# Patient Record
Sex: FEMALE | Race: WHITE | NOT HISPANIC OR LATINO | Employment: OTHER | URBAN - METROPOLITAN AREA
[De-identification: names, ages, dates, MRNs, and addresses within clinical notes are randomized per-mention and may not be internally consistent; named-entity substitution may affect disease eponyms.]

---

## 2017-01-17 DIAGNOSIS — K58.9 IRRITABLE BOWEL SYNDROME WITHOUT DIARRHEA: ICD-10-CM

## 2017-01-17 RX ORDER — DICYCLOMINE HYDROCHLORIDE 10 MG/1
CAPSULE ORAL
Qty: 360 CAPSULE | Refills: 1 | Status: SHIPPED | OUTPATIENT
Start: 2017-01-17 | End: 2018-11-30

## 2017-03-10 RX ORDER — ALBUTEROL SULFATE 90 UG/1
AEROSOL, METERED RESPIRATORY (INHALATION)
Qty: 18 INHALER | Refills: 5 | Status: SHIPPED | OUTPATIENT
Start: 2017-03-10 | End: 2017-09-27 | Stop reason: SDUPTHER

## 2017-03-10 NOTE — TELEPHONE ENCOUNTER
----- Message from Dahlia Wise sent at 3/10/2017 12:26 PM CST -----  Contact: self  Pt needs VENTOLIN HFA 90 mcg/actuation inhaler refilled. Please call [phone]. Thanks

## 2017-03-13 ENCOUNTER — OFFICE VISIT (OUTPATIENT)
Dept: NEUROLOGY | Facility: CLINIC | Age: 67
End: 2017-03-13
Payer: MEDICARE

## 2017-03-13 VITALS
BODY MASS INDEX: 26.01 KG/M2 | WEIGHT: 141.31 LBS | HEART RATE: 115 BPM | DIASTOLIC BLOOD PRESSURE: 93 MMHG | SYSTOLIC BLOOD PRESSURE: 156 MMHG | HEIGHT: 62 IN

## 2017-03-13 DIAGNOSIS — G25.0 ESSENTIAL TREMOR: ICD-10-CM

## 2017-03-13 PROCEDURE — 99999 PR PBB SHADOW E&M-EST. PATIENT-LVL III: CPT | Mod: PBBFAC,,, | Performed by: NEUROLOGICAL SURGERY

## 2017-03-13 PROCEDURE — 99214 OFFICE O/P EST MOD 30 MIN: CPT | Mod: S$GLB,,, | Performed by: NEUROLOGICAL SURGERY

## 2017-03-13 PROCEDURE — 1160F RVW MEDS BY RX/DR IN RCRD: CPT | Mod: S$GLB,,, | Performed by: NEUROLOGICAL SURGERY

## 2017-03-13 PROCEDURE — 3077F SYST BP >= 140 MM HG: CPT | Mod: S$GLB,,, | Performed by: NEUROLOGICAL SURGERY

## 2017-03-13 PROCEDURE — 3080F DIAST BP >= 90 MM HG: CPT | Mod: S$GLB,,, | Performed by: NEUROLOGICAL SURGERY

## 2017-03-13 NOTE — PROGRESS NOTES
"Chief Complaint   Patient presents with    Follow-up     Rebecca Boyd Susan Webb is a 66 y.o. female with a history of multiple medical diagnoses as listed below that presents for evaluation of tremor. She says that she has been having a tremor in her head for the last 5 years but feel that in the last year it has began to get worse so she was told that she should get it evaluated. She has a grandparent that had PD and she suspects that her father had PD though her was never diagnosed. She does not have any other family members that have PD. She has not had any shaking in her legs and feels that it has been present only very minimally in her hands. She has not had any difficulty with her ADLs due to the tremor but she does feel that she has been having some social difficulty due to her symptoms. She does not have any problems with managing her household. She does not have any memory impairment. She has been able to walk without problem other than her legs will on occasion buckle and cause her to fall. She has found that stress and anxiety tends to make the symptoms worse. She has not found that anything has made them any better. She does not drink alcohol. On occasion taking her Ativan will make her tremor less prominent.    Interval History  03/13/2017  Since last seen in clinic she has been doing well on Primidone. She says that the medication has helped to reduce the frequency and the intensity of the tremors. She does says that there have been a few bad days where it seems to be more prominent but she does think that it is mainly related to the weather. She has been tacking 50 mg TID for the last several weeks and notices that it makes her sleep which she thinks is beneficial because she was having insomnia. One fall was reported when she got up to tell her  to come back to get her at a later time and she feels her left knee "gave out" on her.     PAST MEDICAL HISTORY:  Past Medical History: "   Diagnosis Date    Allergy     Arthritis     Cataract     Colon polyps     COPD (chronic obstructive pulmonary disease)     Diabetes mellitus type II     Diabetic neuropathy     Fever blister     Glaucoma (increased eye pressure)     Hyperlipidemia     Hypertension     Irritable bowel syndrome     Keloid cicatrix     Osteoporosis     Retained cholelithiasis following cholecystectomy(997.41)     Right patella fracture        PAST SURGICAL HISTORY:  Past Surgical History:   Procedure Laterality Date    CATARACT EXTRACTION W/  INTRAOCULAR LENS IMPLANT Bilateral     CHOLECYSTECTOMY      Laparoscopic    COLONOSCOPY      HERNIA REPAIR      HYSTERECTOMY      KNEE SURGERY Right 3/4/2015    Dr Weller     ovarian tumor      Benign    TONSILLECTOMY, ADENOIDECTOMY         SOCIAL HISTORY:  Social History     Social History    Marital status:      Spouse name: N/A    Number of children: N/A    Years of education: N/A     Occupational History     [employer]     Social History Main Topics    Smoking status: Current Every Day Smoker     Packs/day: 0.50     Years: 40.00     Types: Cigarettes    Smokeless tobacco: Current User    Alcohol use No    Drug use: No    Sexual activity: Not on file     Other Topics Concern    Are You Pregnant Or Think You May Be? No    Breast-Feeding No     Social History Narrative       FAMILY HISTORY:  Family History   Problem Relation Age of Onset    Cancer Mother      Skin    Skin cancer Mother     Glaucoma Mother     Cataracts Mother     Heart disease Father     Diabetes Father     Skin cancer Sister     Melanoma Neg Hx     Psoriasis Neg Hx     Eczema Neg Hx     Lupus Neg Hx     Amblyopia Neg Hx     Blindness Neg Hx     Macular degeneration Neg Hx     Retinal detachment Neg Hx     Strabismus Neg Hx        ALLERGIES AND MEDICATIONS: updated and reviewed.  Review of patient's allergies indicates:   Allergen Reactions     Silicone      Burn skin    Adhesive Rash     Current Outpatient Prescriptions   Medication Sig Dispense Refill    albuterol (VENTOLIN HFA) 90 mcg/actuation inhaler INHALE 2 PUFFS EVERY 4 HOURS AS NEEDED FOR WHEEZING 18 Inhaler 5    alendronate (FOSAMAX) 70 MG tablet TAKE ONE TABLET BY MOUTH EVERY 7 DAYS 12 tablet 1    atorvastatin (LIPITOR) 40 MG tablet Take 1 tablet (40 mg total) by mouth once daily. 90 tablet 1    blood sugar diagnostic (BLOOD GLUCOSE TEST) Strp 1 strip by Misc.(Non-Drug; Combo Route) route once daily. 100 each 11    citalopram (CELEXA) 20 MG tablet Take 1 tablet (20 mg total) by mouth once daily. 90 tablet 1    cyclobenzaprine (FLEXERIL) 5 MG tablet TAKE 1 TABLET BY MOUTH 3 TIMES A DAY AS NEEDED 90 tablet 5    dicyclomine (BENTYL) 10 MG capsule TAKE ONE CAPSULE BY MOUTH 4 TIMES A DAY BEFORE MEALS AND NIGHTLY 360 capsule 1    dorzolamide-timolol 2-0.5% (COSOPT) 22.3-6.8 mg/mL ophthalmic solution INSTILL 1 DROP IN EACH EYE TWO TIMES A DAY 10 mL 6    esomeprazole (NEXIUM) 40 MG capsule Take 1 capsule (40 mg total) by mouth before breakfast. (Patient taking differently: Take 40 mg by mouth before breakfast. ) 90 capsule 1    fluticasone (FLONASE) 50 mcg/actuation nasal spray SPRAY TWICE IN EACH NOSTRIL ONCE DAILY 16 g 5    furosemide (LASIX) 20 MG tablet TAKE 1 TABLET BY MOUTH EVERY MORNING 90 tablet 1    gabapentin (NEURONTIN) 300 MG capsule TAKE ONE CAPSULE BY MOUTH THREE TIMES A  capsule 1    latanoprost 0.005 % ophthalmic solution INSTILL 1 DROP INTO BOTH EYES EVERY EVENING 2.5 mL 6    lisinopril (PRINIVIL,ZESTRIL) 20 MG tablet Take 1 tablet (20 mg total) by mouth once daily. 90 tablet 1    lorazepam (ATIVAN) 1 MG tablet Take 1 tablet (1 mg total) by mouth every 12 (twelve) hours as needed for Anxiety. 60 tablet 2    meloxicam (MOBIC) 15 MG tablet TAKE 1 TABLET BY MOUTH EVERY DAY 90 tablet 1    metformin (GLUCOPHAGE) 1000 MG tablet TAKE 1 TABLET BY MOUTH TWICE A DAY WITH  FOOD 180 tablet 1    omega-3 fatty acids-vitamin E (FISH OIL) 1,000 mg Cap       primidone (MYSOLINE) 50 MG Tab Take 1 tablet (50 mg total) by mouth 3 (three) times daily. 90 tablet 11    repaglinide (PRANDIN) 1 MG tablet TAKE 1 TABLET BY MOUTH 3 TIMES A DAY BEFORE MEALS 270 tablet 1    sitagliptin (JANUVIA) 100 MG Tab Take 1 tablet (100 mg total) by mouth once daily. 90 tablet 1    tiotropium (SPIRIVA WITH HANDIHALER) 18 mcg inhalation capsule Inhale 1 capsule (18 mcg total) into the lungs once daily. 90 capsule 1    tramadol (ULTRAM) 50 mg tablet Take 1 tablet (50 mg total) by mouth every 6 (six) hours as needed for Pain. 30 tablet 0     No current facility-administered medications for this visit.        Review of Systems   Constitutional: Negative for activity change, appetite change, fever and unexpected weight change.   HENT: Negative for trouble swallowing and voice change.    Eyes: Negative for photophobia and visual disturbance.   Respiratory: Negative for apnea and shortness of breath.    Cardiovascular: Negative for chest pain and leg swelling.   Gastrointestinal: Negative for constipation and nausea.   Genitourinary: Negative for difficulty urinating.   Musculoskeletal: Positive for neck pain. Negative for back pain and gait problem.   Skin: Negative for color change and pallor.   Neurological: Positive for tremors. Negative for dizziness, seizures, syncope, weakness and numbness.   Hematological: Negative for adenopathy.   Psychiatric/Behavioral: Negative for agitation, confusion and decreased concentration.       Neurologic Exam     Mental Status   Oriented to person, place, and time.   Registration: recalls 3 of 3 objects.   Attention: normal. Concentration: normal.   Speech: speech is normal   Level of consciousness: alert  Knowledge: good.     Cranial Nerves     CN II   Visual fields full to confrontation.   Right visual field deficit: none  Left visual field deficit: none     CN III, IV, VI    Pupils are equal, round, and reactive to light.  Extraocular motions are normal.   Right pupil: Size: 3 mm. Shape: regular. Accommodation: intact.   Left pupil: Size: 3 mm. Shape: regular. Accommodation: intact.   CN III: no CN III palsy  CN VI: no CN VI palsy  Nystagmus: none   Diplopia: none  Ophthalmoparesis: none  Upgaze: normal  Downgaze: normal  Conjugate gaze: present    CN V   Facial sensation intact.   Right facial sensation deficit: none  Left facial sensation deficit: none    CN VII   Facial expression full, symmetric.   Right facial weakness: none  Left facial weakness: none    CN VIII   CN VIII normal.     CN IX, X   CN IX normal.   CN X normal.   Palate: symmetric    CN XI   CN XI normal.   Right sternocleidomastoid strength: normal  Left sternocleidomastoid strength: normal  Right trapezius strength: normal  Left trapezius strength: normal    CN XII   CN XII normal.   Tongue deviation: none    Motor Exam   Muscle bulk: normal  Overall muscle tone: normal  Right arm tone: normal  Left arm tone: normal  Right leg tone: normal  Left leg tone: normal    Strength   Strength 5/5 throughout.     Sensory Exam   Right arm light touch: normal  Left arm light touch: normal  Right leg light touch: normal  Left leg light touch: normal  Right arm vibration: normal  Left arm vibration: normal  Right leg vibration: normal  Left leg vibration: normal  Right arm proprioception: normal  Left arm proprioception: normal  Right leg proprioception: normal  Left leg proprioception: normal  Right arm pinprick: normal  Left arm pinprick: normal  Right leg pinprick: normal  Left leg pinprick: normal    Gait, Coordination, and Reflexes     Gait  Gait: normal    Coordination   Romberg: negative  Finger to nose coordination: normal  Heel to shin coordination: normal  Tandem walking coordination: normal    Tremor   Resting tremor: absent    Reflexes   Right brachioradialis: 2+  Left brachioradialis: 2+  Right biceps: 2+  Left  "biceps: 2+  Right triceps: 2+  Left triceps: 2+  Right patellar: 2+  Left patellar: 2+  Right achilles: 2+  Left achilles: 2+  Right plantar: normal  Left plantar: normal      Physical Exam   Constitutional: She is oriented to person, place, and time. She appears well-developed and well-nourished.   HENT:   Head: Normocephalic and atraumatic.   Eyes: EOM are normal. Pupils are equal, round, and reactive to light.   Neck: Normal range of motion.   Cardiovascular: Normal rate and intact distal pulses.    Pulmonary/Chest: Effort normal. No apnea. No respiratory distress.   Musculoskeletal: Normal range of motion.   Neurological: She is alert and oriented to person, place, and time. She has normal strength. She has a normal Finger-Nose-Finger Test, a normal Heel to Shin Test, a normal Romberg Test and a normal Tandem Gait Test. Gait normal.   Reflex Scores:       Tricep reflexes are 2+ on the right side and 2+ on the left side.       Bicep reflexes are 2+ on the right side and 2+ on the left side.       Brachioradialis reflexes are 2+ on the right side and 2+ on the left side.       Patellar reflexes are 2+ on the right side and 2+ on the left side.       Achilles reflexes are 2+ on the right side and 2+ on the left side.  Skin: Skin is warm and dry.   Psychiatric: She has a normal mood and affect. Her speech is normal and behavior is normal. Thought content normal.   Vitals reviewed.      Vitals:    03/13/17 0935   BP: (!) 156/93   BP Location: Left arm   Patient Position: Sitting   BP Method: Manual   Pulse: (!) 115   Weight: 64.1 kg (141 lb 5 oz)   Height: 5' 2" (1.575 m)       Assessment & Plan:  Problem List Items Addressed This Visit     Essential tremor    Overview     Tremors have been better overall since starting primidone. She feels that she has had some days when it seems to be better than other. She had a fall after standing and feels her leg gave out on her. No rigidity. No other gait issues. No memory " problems.         Current Assessment & Plan     Continue primidone 50 mg TID. Patient says that the medication makes her sleepy at the current dose, but she feels it is beneficial because she has been having some difficulty with sleep. Discussed increasing the medication if needed.               Cervical disc disease likely contributing to mild symptoms in her hands    Health Maintenance reviewed    Follow-up: Return in about 6 months (around 9/13/2017).  More than 50% of this 25 minute encounter was spent in counseling and coordinating care.

## 2017-03-13 NOTE — ASSESSMENT & PLAN NOTE
Continue primidone 50 mg TID. Patient says that the medication makes her sleepy at the current dose, but she feels it is beneficial because she has been having some difficulty with sleep. Discussed increasing the medication if needed.

## 2017-03-15 ENCOUNTER — LAB VISIT (OUTPATIENT)
Dept: LAB | Facility: HOSPITAL | Age: 67
End: 2017-03-15
Attending: FAMILY MEDICINE
Payer: MEDICARE

## 2017-03-15 ENCOUNTER — OFFICE VISIT (OUTPATIENT)
Dept: FAMILY MEDICINE | Facility: CLINIC | Age: 67
End: 2017-03-15
Payer: MEDICARE

## 2017-03-15 ENCOUNTER — CLINICAL SUPPORT (OUTPATIENT)
Dept: OPHTHALMOLOGY | Facility: CLINIC | Age: 67
End: 2017-03-15
Attending: FAMILY MEDICINE
Payer: MEDICARE

## 2017-03-15 VITALS
HEART RATE: 118 BPM | BODY MASS INDEX: 25.68 KG/M2 | HEIGHT: 62 IN | WEIGHT: 139.56 LBS | OXYGEN SATURATION: 95 % | SYSTOLIC BLOOD PRESSURE: 136 MMHG | TEMPERATURE: 98 F | DIASTOLIC BLOOD PRESSURE: 96 MMHG

## 2017-03-15 DIAGNOSIS — M94.9 DISORDER OF BONE AND ARTICULAR CARTILAGE: ICD-10-CM

## 2017-03-15 DIAGNOSIS — I10 ESSENTIAL HYPERTENSION: Chronic | ICD-10-CM

## 2017-03-15 DIAGNOSIS — H40.1133 PRIMARY OPEN ANGLE GLAUCOMA OF BOTH EYES, SEVERE STAGE: Primary | ICD-10-CM

## 2017-03-15 DIAGNOSIS — G89.29 OTHER CHRONIC PAIN: ICD-10-CM

## 2017-03-15 DIAGNOSIS — Z13.220 SCREENING FOR CHOLESTEROL LEVEL: ICD-10-CM

## 2017-03-15 DIAGNOSIS — J42 CHRONIC BRONCHITIS, UNSPECIFIED CHRONIC BRONCHITIS TYPE: Chronic | ICD-10-CM

## 2017-03-15 DIAGNOSIS — M89.9 DISORDER OF BONE AND ARTICULAR CARTILAGE: ICD-10-CM

## 2017-03-15 LAB
CHOLEST/HDLC SERPL: 4 {RATIO}
HDL/CHOLESTEROL RATIO: 25 %
HDLC SERPL-MCNC: 156 MG/DL
HDLC SERPL-MCNC: 39 MG/DL
LDLC SERPL CALC-MCNC: 51 MG/DL
NONHDLC SERPL-MCNC: 117 MG/DL
TRIGL SERPL-MCNC: 330 MG/DL

## 2017-03-15 PROCEDURE — 3075F SYST BP GE 130 - 139MM HG: CPT | Mod: S$GLB,,, | Performed by: FAMILY MEDICINE

## 2017-03-15 PROCEDURE — 92227 IMG RTA DETCJ/MNTR DS STAFF: CPT | Mod: S$GLB,,, | Performed by: OPHTHALMOLOGY

## 2017-03-15 PROCEDURE — 99999 PR PBB SHADOW E&M-EST. PATIENT-LVL IV: CPT | Mod: PBBFAC,,, | Performed by: FAMILY MEDICINE

## 2017-03-15 PROCEDURE — 3080F DIAST BP >= 90 MM HG: CPT | Mod: S$GLB,,, | Performed by: FAMILY MEDICINE

## 2017-03-15 PROCEDURE — 99214 OFFICE O/P EST MOD 30 MIN: CPT | Mod: S$GLB,,, | Performed by: FAMILY MEDICINE

## 2017-03-15 PROCEDURE — 4010F ACE/ARB THERAPY RXD/TAKEN: CPT | Mod: S$GLB,,, | Performed by: FAMILY MEDICINE

## 2017-03-15 PROCEDURE — 3046F HEMOGLOBIN A1C LEVEL >9.0%: CPT | Mod: S$GLB,,, | Performed by: FAMILY MEDICINE

## 2017-03-15 PROCEDURE — 1160F RVW MEDS BY RX/DR IN RCRD: CPT | Mod: S$GLB,,, | Performed by: FAMILY MEDICINE

## 2017-03-15 RX ORDER — REPAGLINIDE 1 MG/1
1 TABLET ORAL
Qty: 270 TABLET | Refills: 1 | Status: SHIPPED | OUTPATIENT
Start: 2017-03-15 | End: 2017-08-21 | Stop reason: SDUPTHER

## 2017-03-15 RX ORDER — METFORMIN HYDROCHLORIDE 500 MG/1
500 TABLET, EXTENDED RELEASE ORAL 2 TIMES DAILY WITH MEALS
Qty: 360 TABLET | Refills: 3 | Status: SHIPPED | OUTPATIENT
Start: 2017-03-15 | End: 2018-06-07 | Stop reason: SDUPTHER

## 2017-03-15 RX ORDER — CYCLOBENZAPRINE HCL 5 MG
5 TABLET ORAL 3 TIMES DAILY PRN
Qty: 90 TABLET | Refills: 5 | Status: SHIPPED | OUTPATIENT
Start: 2017-03-15 | End: 2018-02-22 | Stop reason: SDUPTHER

## 2017-03-15 RX ORDER — TRAMADOL HYDROCHLORIDE 50 MG/1
50 TABLET ORAL
Qty: 30 TABLET | Refills: 0 | Status: SHIPPED | OUTPATIENT
Start: 2017-03-15 | End: 2017-06-29 | Stop reason: SDUPTHER

## 2017-03-15 RX ORDER — LISINOPRIL 40 MG/1
40 TABLET ORAL DAILY
Qty: 90 TABLET | Refills: 1 | Status: SHIPPED | OUTPATIENT
Start: 2017-03-15 | End: 2017-04-12 | Stop reason: SDUPTHER

## 2017-03-15 NOTE — MR AVS SNAPSHOT
[facility]  [address]  [place] [address]  Phone: [phone]  Fax: [phone]                  Deb Webb   3/15/2017 9:00 AM   Office Visit    Description:  Female : 1950   Provider:  Moiz Goldberg MD   Department:  [facility] - [facility] Medicine           Reason for Visit     Establish Care           Diagnoses this Visit        Comments    Uncontrolled type 2 diabetes mellitus with diabetic neuropathy, without long-term current use of insulin    -  Primary     Disorder of bone and articular cartilage         Screening for cholesterol level         Other chronic pain         Chronic bronchitis, unspecified chronic bronchitis type         Uncontrolled type 2 diabetes mellitus without complication, without long-term current use of insulin         Uncontrolled type 2 diabetes with neuropathy         Essential hypertension                To Do List           Goals (5 Years of Data)              3/15/17    10/3/16    2/3/16    HEMOGLOBIN A1C < 7.0   13.5  10.7  8.8      Follow-Up and Disposition     Return if symptoms worsen or fail to improve.    Follow-up and Disposition History       These Medications        Disp Refills Start End    cyclobenzaprine (FLEXERIL) 5 MG tablet 90 tablet 5 3/15/2017     Take 1 tablet (5 mg total) by mouth 3 (three) times daily as needed. - Oral    Pharmacy: [facility]/pharmacy #[address] [place] [address] [address] [facility] Ph #: [phone]       tramadol (ULTRAM) 50 mg tablet 30 tablet 0 3/15/2017     Take 1 tablet (50 mg total) by mouth every 24 hours as needed for Pain. - Oral    Pharmacy: [facility]/pharmacy #[address] [place] [address] [address] [facility] Ph #: [phone]       metformin (GLUCOPHAGE-XR) 500 MG 24 hr tablet 360 tablet 3 3/15/2017 3/15/2018    Take 1 tablet (500 mg total) by mouth 2 (two) times daily with meals. - Oral    Pharmacy: [facility]/pharmacy #[address] [place] [address] [address] [facility] Ph #: [phone]        umeclidinium 62.5 mcg/actuation DsDv 30 each 5 3/15/2017     Inhale 1 application into the lungs once daily. Controller - Inhalation    Pharmacy: [facility]/pharmacy #[address] [place] [address] Ph #: [phone]       repaglinide (PRANDIN) 1 MG tablet 270 tablet 1 3/15/2017     Take 1 tablet (1 mg total) by mouth 3 (three) times daily before meals. - Oral    Pharmacy: [facility]/pharmacy #[address] [place] [address] Ph #: [phone]       lisinopril (PRINIVIL,ZESTRIL) 40 MG tablet 90 tablet 1 3/15/2017 9/11/2017    Take 1 tablet (40 mg total) by mouth once daily. - Oral    Pharmacy: [facility]/pharmacy #[address] [place] [address] Ph #: [phone]         [facility]     [facility] - 24/7 Assistance  Registered nurses in the [facility] provide clinical advisement, health education, appointment booking, and other advisory services.  Call for this free service at [phone].             Medications           Message regarding Medications     Verify the changes and/or additions to your medication regime listed below are the same as discussed with your clinician today.  If any of these changes or additions are incorrect, please notify your healthcare provider.        START taking these NEW medications        Refills    metformin (GLUCOPHAGE-XR) 500 MG 24 hr tablet 3    Sig: Take 1 tablet (500 mg total) by mouth 2 (two) times daily with meals.    Class: Normal    Route: Oral    umeclidinium 62.5 mcg/actuation DsDv 5    Sig: Inhale 1 application into the lungs once daily. Controller    Class: Normal    Route: Inhalation      CHANGE how you are taking these medications     Start Taking Instead of    cyclobenzaprine (FLEXERIL) 5 MG tablet cyclobenzaprine (FLEXERIL) 5 MG tablet    Dosage:  Take 1 tablet (5 mg total) by mouth 3 (three) times daily as needed. Dosage:  TAKE 1 TABLET BY MOUTH 3 TIMES A DAY AS NEEDED    Reason for Change:  Reorder      tramadol (ULTRAM) 50 mg tablet tramadol (ULTRAM) 50 mg tablet    Dosage:  Take 1 tablet (50 mg total) by mouth every 24 hours as needed for Pain. Dosage:  Take 1 tablet (50 mg total) by mouth every 6 (six) hours as needed for Pain.    Reason for Change:  Reorder     repaglinide (PRANDIN) 1 MG tablet repaglinide (PRANDIN) 1 MG tablet    Dosage:  Take 1 tablet (1 mg total) by mouth 3 (three) times daily before meals. Dosage:  TAKE 1 TABLET BY MOUTH 3 TIMES A DAY BEFORE MEALS    Reason for Change:  Reorder     lisinopril (PRINIVIL,ZESTRIL) 40 MG tablet lisinopril (PRINIVIL,ZESTRIL) 20 MG tablet    Dosage:  Take 1 tablet (40 mg total) by mouth once daily. Dosage:  Take 1 tablet (20 mg total) by mouth once daily.    Reason for Change:  Reorder       STOP taking these medications     metformin (GLUCOPHAGE) 1000 MG tablet TAKE 1 TABLET BY MOUTH TWICE A DAY WITH FOOD    tiotropium (SPIRIVA WITH HANDIHALER) 18 mcg inhalation capsule Inhale 1 capsule (18 mcg total) into the lungs once daily.    sitagliptin (JANUVIA) 100 MG Tab Take 1 tablet (100 mg total) by mouth once daily.           Verify that the below list of medications is an accurate representation of the medications you are currently taking.  If none reported, the list may be blank. If incorrect, please contact your healthcare provider. Carry this list with you in case of emergency.           Current Medications     albuterol (VENTOLIN HFA) 90 mcg/actuation inhaler INHALE 2 PUFFS EVERY 4 HOURS AS NEEDED FOR WHEEZING    alendronate (FOSAMAX) 70 MG tablet TAKE ONE TABLET BY MOUTH EVERY 7 DAYS    atorvastatin (LIPITOR) 40 MG tablet Take 1 tablet (40 mg total) by mouth once daily.    blood sugar diagnostic (BLOOD GLUCOSE TEST) Strp 1 strip by Misc.(Non-Drug; Combo Route) route once daily.    citalopram (CELEXA) 20 MG tablet Take 1 tablet (20 mg total) by mouth once daily.    cyclobenzaprine (FLEXERIL) 5 MG tablet Take 1 tablet (5 mg total) by mouth 3 (three) times  "daily as needed.    dicyclomine (BENTYL) 10 MG capsule TAKE ONE CAPSULE BY MOUTH 4 TIMES A DAY BEFORE MEALS AND NIGHTLY    dorzolamide-timolol 2-0.5% (COSOPT) 22.3-6.8 mg/mL ophthalmic solution INSTILL 1 DROP IN EACH EYE TWO TIMES A DAY    esomeprazole (NEXIUM) 40 MG capsule Take 1 capsule (40 mg total) by mouth before breakfast.    fluticasone (FLONASE) 50 mcg/actuation nasal spray SPRAY TWICE IN EACH NOSTRIL ONCE DAILY    furosemide (LASIX) 20 MG tablet TAKE 1 TABLET BY MOUTH EVERY MORNING    gabapentin (NEURONTIN) 300 MG capsule TAKE ONE CAPSULE BY MOUTH THREE TIMES A DAY    latanoprost 0.005 % ophthalmic solution INSTILL 1 DROP INTO BOTH EYES EVERY EVENING    lisinopril (PRINIVIL,ZESTRIL) 40 MG tablet Take 1 tablet (40 mg total) by mouth once daily.    lorazepam (ATIVAN) 1 MG tablet Take 1 tablet (1 mg total) by mouth every 12 (twelve) hours as needed for Anxiety.    meloxicam (MOBIC) 15 MG tablet TAKE 1 TABLET BY MOUTH EVERY DAY    metformin (GLUCOPHAGE-XR) 500 MG 24 hr tablet Take 1 tablet (500 mg total) by mouth 2 (two) times daily with meals.    omega-3 fatty acids-vitamin E (FISH OIL) 1,000 mg Cap     primidone (MYSOLINE) 50 MG Tab Take 1 tablet (50 mg total) by mouth 3 (three) times daily.    repaglinide (PRANDIN) 1 MG tablet Take 1 tablet (1 mg total) by mouth 3 (three) times daily before meals.    tramadol (ULTRAM) 50 mg tablet Take 1 tablet (50 mg total) by mouth every 24 hours as needed for Pain.    umeclidinium 62.5 mcg/actuation DsDv Inhale 1 application into the lungs once daily. Controller           Clinical Reference Information           Your Vitals Were     BP Pulse Temp Height Weight SpO2    136/96 (BP Location: Left arm, Patient Position: Sitting, BP Method: Manual) 118 97.6 °F (36.4 °C) (Oral) 5' 2" (1.575 m) 63.3 kg (139 lb 8.8 oz) 95%    BMI                25.52 kg/m2          Blood Pressure          Most Recent Value    BP  (!)  136/96      Allergies as of 3/15/2017     Silicone    " Adhesive      Immunizations Administered on Date of Encounter - 3/15/2017     None      Orders Placed During Today's Visit     Future Labs/Procedures Expected by Expires    DXA Bone Density Spine And Hip  3/15/2017 3/15/2018    Hemoglobin A1c  3/15/2017 5/14/2018    Lipid panel  3/15/2017 5/14/2018    Diabetic Eye Screening Photo  As directed 3/15/2018      [facility] Sign-Up     Activating your [facility] account is as easy as 1-2-3!     1) Visit [email or web address], select Sign Up Now, enter this activation code and your date of birth, then select Next.  [email or web address]  Expires: 4/30/2017  9:27 AM      2) Create a username and password to use when you visit [facility] in the future and select a security question in case you lose your password and select Next.    3) Enter your e-mail address and click Sign Up!    Additional Information  If you have questions, please e-mail [email or web address] or call [phone] to talk to our [facility] staff. Remember, [facility] is NOT to be used for urgent needs. For medical emergencies, dial 911.         Smoking Cessation     If you would like to quit smoking:   You may be eligible for free services if you are a [place] resident and started smoking cigarettes before September 1, 1988.  Call the Smoking Cessation Trust (SCT) toll free at (277) 322-8709 or (610) 141-6883.   Call 0-800-QUIT-NOW if you do not meet the above criteria.            Language Assistance Services     ATTENTION: Language assistance services are available, free of charge. Please call [phone].      ATENCIÓN: Si habla español, tiene a jewell disposición servicios gratuitos de asistencia lingüística. Llame al [phone].     CHÚ Ý: N?u b?n nói Ti?ng Vi?t, có các d?ch v? h? tr? ngôn ng? mi?n phí dành cho b?n. G?i s? [phone].         [facility] complies with applicable Federal civil rights laws and does not discriminate on the basis of race, color, national origin, age, disability,  or sex.

## 2017-03-15 NOTE — PROGRESS NOTES
HPI     65 y/o here for screening for diabetic retinopathy with non-dilated   fundus photos per   Dr. Goldberg.       Last edited by Laverne Duffy on 3/15/2017 10:20 AM.         Assessment /Plan     For exam results, see Encounter Report.    Uncontrolled type 2 diabetes mellitus with diabetic neuropathy, without long-term current use of insulin  -     Diabetic Eye Screening Photo      Please see Dr. Johnson progress note for interpretation.

## 2017-03-15 NOTE — PROGRESS NOTES
[facility]  Progress Note    SUBJECTIVE:     Chief Complaint   Patient presents with    Establish Care       HPI   Deb Susan Webb  is a 66 y.o. female here to establish care and for follow up of her chronic conditions, DM, HTN, COPD, HLD. She takes prandal for her diabetes but stopped metformin due to GI side effects and januvia due to cost. She also can't afford her spiriva anymore and would like to try something else.     Review of patient's allergies indicates:   Allergen Reactions    Silicone      Burn skin    Adhesive Rash       Past Medical History:   Diagnosis Date    Allergy     Arthritis     Cataract     Colon polyps     COPD (chronic obstructive pulmonary disease)     Diabetes mellitus type II     Diabetic neuropathy     Fever blister     Glaucoma (increased eye pressure)     Hyperlipidemia     Hypertension     Irritable bowel syndrome     Keloid cicatrix     Osteoporosis     Retained cholelithiasis following cholecystectomy(997.41)     Right patella fracture      Past Surgical History:   Procedure Laterality Date    CATARACT EXTRACTION W/  INTRAOCULAR LENS IMPLANT Bilateral     CHOLECYSTECTOMY      Laparoscopic    COLONOSCOPY      HERNIA REPAIR      HYSTERECTOMY      KNEE SURGERY Right 3/4/2015    Dr Weller     ovarian tumor      Benign    TONSILLECTOMY, ADENOIDECTOMY       Family History   Problem Relation Age of Onset    Cancer Mother      Skin    Skin cancer Mother     Glaucoma Mother     Cataracts Mother     Heart disease Father     Diabetes Father     Skin cancer Sister     Melanoma Neg Hx     Psoriasis Neg Hx     Eczema Neg Hx     Lupus Neg Hx     Amblyopia Neg Hx     Blindness Neg Hx     Macular degeneration Neg Hx     Retinal detachment Neg Hx     Strabismus Neg Hx      Social History   Substance Use Topics    Smoking status: Current Every Day Smoker     Packs/day: 0.50     Years: 40.00     Types: Cigarettes    Smokeless tobacco:  Current User    Alcohol use No        Review of Systems   Constitutional: Negative for chills, fever and malaise/fatigue.   HENT: Negative.    Respiratory: Negative.  Negative for cough and shortness of breath.    Cardiovascular: Negative.  Negative for chest pain.   Gastrointestinal: Negative.  Negative for abdominal pain, nausea and vomiting.   Genitourinary: Negative.    Neurological: Negative for weakness and headaches.   All other systems reviewed and are negative.    OBJECTIVE:     Vitals:    03/15/17 0906   BP: (!) 136/96   Pulse: (!) 118   Temp: 97.6 °F (36.4 °C)     Body mass index is 25.52 kg/(m^2).    Physical Exam   Constitutional: She is oriented to person, place, and time and well-developed, well-nourished, and in no distress. No distress.   HENT:   Head: Normocephalic and atraumatic.   Eyes: Conjunctivae and EOM are normal.   Cardiovascular: Normal rate, regular rhythm and normal heart sounds.  Exam reveals no gallop and no friction rub.    No murmur heard.  Pulmonary/Chest: Effort normal and breath sounds normal. No respiratory distress. She has no wheezes. She has no rales.   Abdominal: Soft. Bowel sounds are normal. She exhibits no distension. There is no tenderness.   Neurological: She is alert and oriented to person, place, and time.   Skin: Skin is warm. She is not diaphoretic.     Protective Sensation (w/ 10 gram monofilament):  Right: Intact  Left: Intact    Visual Inspection:  Normal -  Bilateral    Pedal Pulses:   Right: Present  Left: Present    Posterior tibialis:   Right:Present  Left: Present        Old records were reviewed. Labs and/or images were independently reviewed.    ASSESSMENT     1. Uncontrolled type 2 diabetes mellitus with diabetic neuropathy, without long-term current use of insulin    2. Disorder of bone and articular cartilage    3. Screening for cholesterol level    4. Other chronic pain    5. Chronic bronchitis, unspecified chronic bronchitis type    6. Uncontrolled  type 2 diabetes mellitus without complication, without long-term current use of insulin    7. Uncontrolled type 2 diabetes with neuropathy    8. Essential hypertension        PLAN:     Uncontrolled type 2 diabetes mellitus with diabetic neuropathy, without long-term current use of insulin  -     Diabetic Eye Screening Photo; Future  -     Hemoglobin A1c; Future; Expected date: 3/15/17  -     Start metformin (GLUCOPHAGE-XR) 500 MG 24 hr tablet; Take 1 tablet (500 mg total) by mouth 2 (two) times daily with meals.  Dispense: 360 tablet; Refill: 3  -     Instructed patient to take daily glucose AM logs and to write them down to bring with on next visit. Advised patient to decrease intake of carbohydrates/simple sugars.           Disorder of bone and articular cartilage  -     DXA Bone Density Spine And Hip; Future; Expected date: 3/15/17    Screening for cholesterol level  -     Lipid panel; Future; Expected date: 3/15/17    Other chronic pain  -     cyclobenzaprine (FLEXERIL) 5 MG tablet; Take 1 tablet (5 mg total) by mouth 3 (three) times daily as needed.  Dispense: 90 tablet; Refill: 5  -     tramadol (ULTRAM) 50 mg tablet; Take 1 tablet (50 mg total) by mouth every 24 hours as needed for Pain.  Dispense: 30 tablet; Refill: 0    Chronic bronchitis, unspecified chronic bronchitis type  -     Start umeclidinium 62.5 mcg/actuation DsDv; Inhale 1 application into the lungs once daily. Controller  Dispense: 30 each; Refill: 5  -     Stop spiriva since it isnt covered anymore.    Uncontrolled type 2 diabetes mellitus without complication, without long-term current use of insulin  -     repaglinide (PRANDIN) 1 MG tablet; Take 1 tablet (1 mg total) by mouth 3 (three) times daily before meals.  Dispense: 270 tablet; Refill: 1    Essential hypertension  -     Increase lisinopril (PRINIVIL,ZESTRIL) 40 MG tablet; Take 1 tablet (40 mg total) by mouth once daily.  Dispense: 90 tablet; Refill: 1  -     Educated patient to take  medications as prescribed, and to record bp logs daily to bring along to next visit. Instructed patient to decrease salt intake. Also told patient to call if any new signs or symptoms develop.        RTC PRN 2 weeks.    Moiz Goldberg MD  03/15/2017 9:51 AM

## 2017-03-16 ENCOUNTER — HOSPITAL ENCOUNTER (OUTPATIENT)
Dept: RADIOLOGY | Facility: CLINIC | Age: 67
Discharge: HOME | End: 2017-03-16
Attending: FAMILY MEDICINE
Payer: MEDICARE

## 2017-03-16 DIAGNOSIS — M94.9 DISORDER OF BONE AND ARTICULAR CARTILAGE: ICD-10-CM

## 2017-03-16 DIAGNOSIS — M89.9 DISORDER OF BONE AND ARTICULAR CARTILAGE: ICD-10-CM

## 2017-03-16 LAB
ESTIMATED AVG GLUCOSE: 341 MG/DL
HBA1C MFR BLD HPLC: 13.5 %

## 2017-03-16 PROCEDURE — 77080 DXA BONE DENSITY AXIAL: CPT | Mod: TC,PO

## 2017-03-16 PROCEDURE — 77080 DXA BONE DENSITY AXIAL: CPT | Mod: 26,,, | Performed by: INTERNAL MEDICINE

## 2017-03-17 ENCOUNTER — TELEPHONE (OUTPATIENT)
Dept: OPHTHALMOLOGY | Facility: CLINIC | Age: 67
End: 2017-03-17

## 2017-03-17 PROBLEM — H40.1133 PRIMARY OPEN ANGLE GLAUCOMA OF BOTH EYES, SEVERE STAGE: Status: ACTIVE | Noted: 2017-03-17

## 2017-03-20 ENCOUNTER — TELEPHONE (OUTPATIENT)
Dept: FAMILY MEDICINE | Facility: CLINIC | Age: 67
End: 2017-03-20

## 2017-03-20 DIAGNOSIS — H40.1133 PRIMARY OPEN ANGLE GLAUCOMA OF BOTH EYES, SEVERE STAGE: Primary | ICD-10-CM

## 2017-03-20 NOTE — TELEPHONE ENCOUNTER
----- Message from Triston Johnson MD sent at 3/17/2017  1:40 PM CDT -----  No BDR  Know Glaucoma and lost to fu with [facility]

## 2017-03-24 ENCOUNTER — OFFICE VISIT (OUTPATIENT)
Dept: FAMILY MEDICINE | Facility: CLINIC | Age: 67
End: 2017-03-24
Payer: MEDICARE

## 2017-03-24 VITALS
HEIGHT: 62 IN | DIASTOLIC BLOOD PRESSURE: 96 MMHG | WEIGHT: 143.75 LBS | SYSTOLIC BLOOD PRESSURE: 142 MMHG | OXYGEN SATURATION: 95 % | TEMPERATURE: 98 F | HEART RATE: 107 BPM | BODY MASS INDEX: 26.45 KG/M2

## 2017-03-24 DIAGNOSIS — I10 ESSENTIAL HYPERTENSION: Chronic | ICD-10-CM

## 2017-03-24 DIAGNOSIS — F13.20 SEDATIVE HYPNOTIC OR ANXIOLYTIC DEPENDENCE: Chronic | ICD-10-CM

## 2017-03-24 DIAGNOSIS — M25.561 CHRONIC PAIN OF RIGHT KNEE: ICD-10-CM

## 2017-03-24 DIAGNOSIS — G89.29 CHRONIC PAIN OF RIGHT KNEE: ICD-10-CM

## 2017-03-24 PROCEDURE — 99214 OFFICE O/P EST MOD 30 MIN: CPT | Mod: S$GLB,,, | Performed by: FAMILY MEDICINE

## 2017-03-24 PROCEDURE — 4010F ACE/ARB THERAPY RXD/TAKEN: CPT | Mod: S$GLB,,, | Performed by: FAMILY MEDICINE

## 2017-03-24 PROCEDURE — 99999 PR PBB SHADOW E&M-EST. PATIENT-LVL IV: CPT | Mod: PBBFAC,,, | Performed by: FAMILY MEDICINE

## 2017-03-24 PROCEDURE — 3080F DIAST BP >= 90 MM HG: CPT | Mod: S$GLB,,, | Performed by: FAMILY MEDICINE

## 2017-03-24 PROCEDURE — 3077F SYST BP >= 140 MM HG: CPT | Mod: S$GLB,,, | Performed by: FAMILY MEDICINE

## 2017-03-24 PROCEDURE — 1160F RVW MEDS BY RX/DR IN RCRD: CPT | Mod: S$GLB,,, | Performed by: FAMILY MEDICINE

## 2017-03-24 PROCEDURE — 3046F HEMOGLOBIN A1C LEVEL >9.0%: CPT | Mod: S$GLB,,, | Performed by: FAMILY MEDICINE

## 2017-03-24 NOTE — PROGRESS NOTES
[facility]  Progress Note    SUBJECTIVE:     Chief Complaint   Patient presents with    Follow-up     fill out  paper work       HPI   Deb Webb  is a 66 y.o. female here for follow-up of her DM/HTN. She also has paperwork to be filled out for her right knee since she hasn't been able to work due to the fracture. She rates pain as moderate.     Review of patient's allergies indicates:   Allergen Reactions    Silicone      Burn skin    Adhesive Rash       Past Medical History:   Diagnosis Date    Allergy     Arthritis     Cataract     Colon polyps     COPD (chronic obstructive pulmonary disease)     Diabetes mellitus type II     Diabetic neuropathy     Fever blister     Glaucoma (increased eye pressure)     Hyperlipidemia     Hypertension     Irritable bowel syndrome     Keloid cicatrix     Osteoporosis     Retained cholelithiasis following cholecystectomy(997.41)     Right patella fracture      Past Surgical History:   Procedure Laterality Date    CATARACT EXTRACTION W/  INTRAOCULAR LENS IMPLANT Bilateral     CHOLECYSTECTOMY      Laparoscopic    COLONOSCOPY      HERNIA REPAIR      HYSTERECTOMY      KNEE SURGERY Right 3/4/2015    Dr Weller     ovarian tumor      Benign    TONSILLECTOMY, ADENOIDECTOMY       Family History   Problem Relation Age of Onset    Cancer Mother      Skin    Skin cancer Mother     Glaucoma Mother     Cataracts Mother     Heart disease Father     Diabetes Father     Skin cancer Sister     Melanoma Neg Hx     Psoriasis Neg Hx     Eczema Neg Hx     Lupus Neg Hx     Amblyopia Neg Hx     Blindness Neg Hx     Macular degeneration Neg Hx     Retinal detachment Neg Hx     Strabismus Neg Hx      Social History   Substance Use Topics    Smoking status: Current Every Day Smoker     Packs/day: 0.50     Years: 40.00     Types: Cigarettes    Smokeless tobacco: Current User    Alcohol use No        Review of Systems   Constitutional:  Negative for chills, fever and malaise/fatigue.   HENT: Negative.    Respiratory: Negative.  Negative for cough and shortness of breath.    Cardiovascular: Negative.  Negative for chest pain.   Gastrointestinal: Negative.  Negative for abdominal pain, nausea and vomiting.   Genitourinary: Negative.    Musculoskeletal: Positive for joint pain (right knee pain).   Neurological: Negative for weakness and headaches.   All other systems reviewed and are negative.    OBJECTIVE:     Vitals:    03/24/17 1411   BP: (!) 142/96   Pulse: 107   Temp: 98 °F (36.7 °C)     Body mass index is 26.29 kg/(m^2).    Physical Exam   Constitutional: She is oriented to person, place, and time and well-developed, well-nourished, and in no distress. No distress.   HENT:   Head: Normocephalic and atraumatic.   Eyes: Conjunctivae and EOM are normal.   Cardiovascular: Normal rate, regular rhythm and normal heart sounds.  Exam reveals no gallop and no friction rub.    No murmur heard.  Pulmonary/Chest: Effort normal and breath sounds normal. No respiratory distress. She has no wheezes. She has no rales.   Abdominal: Soft. Bowel sounds are normal. She exhibits no distension. There is no tenderness.   Musculoskeletal: She exhibits no tenderness.   Healed right knee surgical scar   Neurological: She is alert and oriented to person, place, and time.   + tremors   Skin: Skin is warm. She is not diaphoretic.       Old records were reviewed. Labs and/or images were independently reviewed.    ASSESSMENT     1. Uncontrolled type 2 diabetes mellitus with diabetic neuropathy, without long-term current use of insulin    2. Sedative hypnotic or anxiolytic dependence    3. Essential hypertension    4. Chronic pain of right knee        PLAN:     Uncontrolled type 2 diabetes mellitus with diabetic neuropathy, without long-term current use of insulin   -     Instructed patient to take daily glucose AM logs and to write them down to bring with on next visit.  Advised patient to decrease intake of carbohydrates/simple sugars.         Sedative hypnotic or anxiolytic dependence   -     Stable. Continue current regimen.'    Essential hypertension   -     Educated patient to take medications as prescribed, and to record bp logs daily to bring along to next visit. Instructed patient to decrease salt intake. Also told patient to call if any new signs or symptoms develop.    Chronic pain of right knee   -     Stable. Continue current regimen.    Filled out patient's paperwork in office.     RTC PRN 2 weeks for DM follow-up and blood work.    Moiz Goldberg MD  03/24/2017 2:42 PM

## 2017-03-24 NOTE — MR AVS SNAPSHOT
[facility]  [address] [place] [address]  Phone: [phone]  Fax: [phone]                  Deb Webb   3/24/2017 2:20 PM   Office Visit    Description:  Female : 1950   Provider:  Moiz Goldberg MD   Department:  [facility]           Reason for Visit     Follow-up           Diagnoses this Visit        Comments    Uncontrolled type 2 diabetes mellitus with diabetic neuropathy, without long-term current use of insulin    -  Primary     Sedative hypnotic or anxiolytic dependence         Essential hypertension         Chronic pain of right knee                To Do List           Future Appointments        Provider Department Dept Phone    2017 2:00 PM [facility] [phone]    2017 2:30 PM Christiano Mccartney MD [facility] [phone]      Goals (5 Years of Data)              3/15/17    10/3/16    2/3/16    HEMOGLOBIN A1C < 7.0   13.5  10.7  8.8      [facility]     [facility] On Call Nurse [facility] -  Assistance  Registered nurses in the [facility] On Call Center provide clinical advisement, health education, appointment booking, and other advisory services.  Call for this free service at [phone].             Medications           Message regarding Medications     Verify the changes and/or additions to your medication regime listed below are the same as discussed with your clinician today.  If any of these changes or additions are incorrect, please notify your healthcare provider.             Verify that the below list of medications is an accurate representation of the medications you are currently taking.  If none reported, the list may be blank. If incorrect, please contact your healthcare provider. Carry this list with you in case of emergency.           Current Medications     albuterol (VENTOLIN HFA) 90 mcg/actuation inhaler INHALE 2 PUFFS EVERY 4 HOURS AS NEEDED FOR  WHEEZING    alendronate (FOSAMAX) 70 MG tablet TAKE ONE TABLET BY MOUTH EVERY 7 DAYS    atorvastatin (LIPITOR) 40 MG tablet Take 1 tablet (40 mg total) by mouth once daily.    blood sugar diagnostic (BLOOD GLUCOSE TEST) Strp 1 strip by Misc.(Non-Drug; Combo Route) route once daily.    citalopram (CELEXA) 20 MG tablet Take 1 tablet (20 mg total) by mouth once daily.    cyclobenzaprine (FLEXERIL) 5 MG tablet Take 1 tablet (5 mg total) by mouth 3 (three) times daily as needed.    dicyclomine (BENTYL) 10 MG capsule TAKE ONE CAPSULE BY MOUTH 4 TIMES A DAY BEFORE MEALS AND NIGHTLY    dorzolamide-timolol 2-0.5% (COSOPT) 22.3-6.8 mg/mL ophthalmic solution INSTILL 1 DROP IN EACH EYE TWO TIMES A DAY    esomeprazole (NEXIUM) 40 MG capsule Take 1 capsule (40 mg total) by mouth before breakfast.    fluticasone (FLONASE) 50 mcg/actuation nasal spray SPRAY TWICE IN EACH NOSTRIL ONCE DAILY    furosemide (LASIX) 20 MG tablet TAKE 1 TABLET BY MOUTH EVERY MORNING    gabapentin (NEURONTIN) 300 MG capsule TAKE ONE CAPSULE BY MOUTH THREE TIMES A DAY    latanoprost 0.005 % ophthalmic solution INSTILL 1 DROP INTO BOTH EYES EVERY EVENING    lisinopril (PRINIVIL,ZESTRIL) 40 MG tablet Take 1 tablet (40 mg total) by mouth once daily.    lorazepam (ATIVAN) 1 MG tablet Take 1 tablet (1 mg total) by mouth every 12 (twelve) hours as needed for Anxiety.    meloxicam (MOBIC) 15 MG tablet TAKE 1 TABLET BY MOUTH EVERY DAY    metformin (GLUCOPHAGE-XR) 500 MG 24 hr tablet Take 1 tablet (500 mg total) by mouth 2 (two) times daily with meals.    omega-3 fatty acids-vitamin E (FISH OIL) 1,000 mg Cap     primidone (MYSOLINE) 50 MG Tab Take 1 tablet (50 mg total) by mouth 3 (three) times daily.    repaglinide (PRANDIN) 1 MG tablet Take 1 tablet (1 mg total) by mouth 3 (three) times daily before meals.    tramadol (ULTRAM) 50 mg tablet Take 1 tablet (50 mg total) by mouth every 24 hours as needed for Pain.    umeclidinium 62.5 mcg/actuation DsDv Inhale 1  "application into the lungs once daily. Controller           Clinical Reference Information           Your Vitals Were     BP Pulse Temp Height Weight SpO2    142/96 (BP Location: Right arm, Patient Position: Sitting, BP Method: Manual) 107 98 °F (36.7 °C) (Oral) 5' 2" (1.575 m) 65.2 kg (143 lb 11.8 oz) 95%    BMI                26.29 kg/m2          Blood Pressure          Most Recent Value    BP  (!)  142/96      Allergies as of 3/24/2017     Silicone    Adhesive      Immunizations Administered on Date of Encounter - 3/24/2017     None      [facility] Sign-Up     Activating your [facility] account is as easy as 1-2-3!     1) Visit [email or web address], select Sign Up Now, enter this activation code and your date of birth, then select Next.  [email or web address]  Expires: 4/30/2017  9:27 AM      2) Create a username and password to use when you visit [facility] in the future and select a security question in case you lose your password and select Next.    3) Enter your e-mail address and click Sign Up!    Additional Information  If you have questions, please e-mail [email or web address] or call [phone] to talk to our [facility] staff. Remember, [facility] is NOT to be used for urgent needs. For medical emergencies, dial 911.         Smoking Cessation     If you would like to quit smoking:   You may be eligible for free services if you are a [place] resident and started smoking cigarettes before September 1, 1988.  Call the Smoking Cessation [employer] (SCT) toll free at (554) 094-1374 or (019) 131-5755.   Call 1-800-QUIT-NOW if you do not meet the above criteria.            Language Assistance Services     ATTENTION: Language assistance services are available, free of charge. Please call [phone].      ATENCIÓN: Si habla español, tiene a jewell disposición servicios gratuitos de asistencia lingüística. Llame al [phone].     CHÚ Ý: N?u b?n nói Ti?ng Vi?t, có các d?ch v? h? tr? ngôn ng? mi?n phí dành cho b?n. G?i s? " [phone].         [facility] complies with applicable Federal civil rights laws and does not discriminate on the basis of race, color, national origin, age, disability, or sex.

## 2017-04-12 DIAGNOSIS — E78.2 COMBINED HYPERLIPIDEMIA ASSOCIATED WITH TYPE 2 DIABETES MELLITUS: Chronic | ICD-10-CM

## 2017-04-12 DIAGNOSIS — I10 ESSENTIAL HYPERTENSION: Chronic | ICD-10-CM

## 2017-04-12 DIAGNOSIS — E11.69 COMBINED HYPERLIPIDEMIA ASSOCIATED WITH TYPE 2 DIABETES MELLITUS: Chronic | ICD-10-CM

## 2017-04-12 RX ORDER — ATORVASTATIN CALCIUM 40 MG/1
40 TABLET, FILM COATED ORAL DAILY
Qty: 90 TABLET | Refills: 1 | Status: SHIPPED | OUTPATIENT
Start: 2017-04-12 | End: 2017-10-11 | Stop reason: SDUPTHER

## 2017-04-12 RX ORDER — LISINOPRIL 40 MG/1
40 TABLET ORAL DAILY
Qty: 90 TABLET | Refills: 1 | Status: SHIPPED | OUTPATIENT
Start: 2017-04-12 | End: 2018-03-07 | Stop reason: SDUPTHER

## 2017-04-12 NOTE — TELEPHONE ENCOUNTER
----- Message from Jaimie Tidwell sent at 4/12/2017 11:15 AM CDT -----  Contact: [facility]  PT is requesting refill for lisinopril (PRINIVIL,ZESTRIL) 40 MG tablet; atorvastatin (LIPITOR) 40 MG tablet Please call pt to confirm        Thanks

## 2017-04-24 ENCOUNTER — LAB VISIT (OUTPATIENT)
Dept: LAB | Facility: HOSPITAL | Age: 67
End: 2017-04-24
Attending: FAMILY MEDICINE
Payer: MEDICARE

## 2017-04-24 ENCOUNTER — OFFICE VISIT (OUTPATIENT)
Dept: FAMILY MEDICINE | Facility: CLINIC | Age: 67
End: 2017-04-24
Payer: MEDICARE

## 2017-04-24 VITALS
HEART RATE: 96 BPM | SYSTOLIC BLOOD PRESSURE: 118 MMHG | WEIGHT: 141.88 LBS | OXYGEN SATURATION: 94 % | BODY MASS INDEX: 26.11 KG/M2 | HEIGHT: 62 IN | TEMPERATURE: 97 F | DIASTOLIC BLOOD PRESSURE: 78 MMHG

## 2017-04-24 DIAGNOSIS — K21.9 GASTROESOPHAGEAL REFLUX DISEASE, ESOPHAGITIS PRESENCE NOT SPECIFIED: ICD-10-CM

## 2017-04-24 DIAGNOSIS — Z23 PNEUMOCOCCAL VACCINATION ADMINISTERED AT CURRENT VISIT: ICD-10-CM

## 2017-04-24 DIAGNOSIS — F17.200 TOBACCO USE DISORDER: ICD-10-CM

## 2017-04-24 DIAGNOSIS — I10 ESSENTIAL HYPERTENSION, BENIGN: ICD-10-CM

## 2017-04-24 PROCEDURE — 3046F HEMOGLOBIN A1C LEVEL >9.0%: CPT | Mod: S$GLB,,, | Performed by: FAMILY MEDICINE

## 2017-04-24 PROCEDURE — 1160F RVW MEDS BY RX/DR IN RCRD: CPT | Mod: S$GLB,,, | Performed by: FAMILY MEDICINE

## 2017-04-24 PROCEDURE — 36415 COLL VENOUS BLD VENIPUNCTURE: CPT | Mod: PO

## 2017-04-24 PROCEDURE — 90670 PCV13 VACCINE IM: CPT | Mod: S$GLB,,, | Performed by: FAMILY MEDICINE

## 2017-04-24 PROCEDURE — G0009 ADMIN PNEUMOCOCCAL VACCINE: HCPCS | Mod: S$GLB,,, | Performed by: FAMILY MEDICINE

## 2017-04-24 PROCEDURE — 82985 ASSAY OF GLYCATED PROTEIN: CPT

## 2017-04-24 PROCEDURE — 99214 OFFICE O/P EST MOD 30 MIN: CPT | Mod: S$GLB,,, | Performed by: FAMILY MEDICINE

## 2017-04-24 PROCEDURE — 99999 PR PBB SHADOW E&M-EST. PATIENT-LVL IV: CPT | Mod: PBBFAC,,, | Performed by: FAMILY MEDICINE

## 2017-04-24 PROCEDURE — 3078F DIAST BP <80 MM HG: CPT | Mod: S$GLB,,, | Performed by: FAMILY MEDICINE

## 2017-04-24 PROCEDURE — 4010F ACE/ARB THERAPY RXD/TAKEN: CPT | Mod: S$GLB,,, | Performed by: FAMILY MEDICINE

## 2017-04-24 PROCEDURE — 3074F SYST BP LT 130 MM HG: CPT | Mod: S$GLB,,, | Performed by: FAMILY MEDICINE

## 2017-04-24 RX ORDER — ESOMEPRAZOLE MAGNESIUM 40 MG/1
40 CAPSULE, DELAYED RELEASE ORAL
Qty: 90 CAPSULE | Refills: 2 | Status: SHIPPED | OUTPATIENT
Start: 2017-04-24 | End: 2018-02-22 | Stop reason: SDUPTHER

## 2017-04-24 RX ORDER — IBUPROFEN 200 MG
1 TABLET ORAL DAILY
Qty: 30 PATCH | Refills: 5 | Status: SHIPPED | OUTPATIENT
Start: 2017-04-24 | End: 2018-11-30

## 2017-04-24 RX ORDER — IBUPROFEN 200 MG
1 TABLET ORAL DAILY
Qty: 30 PATCH | Refills: 5 | Status: SHIPPED | OUTPATIENT
Start: 2017-04-24 | End: 2017-04-24 | Stop reason: SDUPTHER

## 2017-04-24 RX ORDER — IBUPROFEN 200 MG
1 TABLET ORAL DAILY
Qty: 30 PATCH | Refills: 5 | Status: SHIPPED | OUTPATIENT
Start: 2017-04-24 | End: 2017-04-24

## 2017-04-24 NOTE — PROGRESS NOTES
[facility]  Progress Note    SUBJECTIVE:     Chief Complaint   Patient presents with    Hypertension       HPI   Deb Webb  is a 66 y.o. female here for follow up of her HTN/DM2. Her accu checks been around 140s-150s. She has been trying to diet better. Admits not exercising. Does take meds as prescribed. She says is ready to try and quit smoking.     Review of patient's allergies indicates:   Allergen Reactions    Silicone      Burn skin    Adhesive Rash       Past Medical History:   Diagnosis Date    Allergy     Arthritis     Cataract     Colon polyps     COPD (chronic obstructive pulmonary disease)     Diabetes mellitus type II     Diabetic neuropathy     Fever blister     Glaucoma (increased eye pressure)     Hyperlipidemia     Hypertension     Irritable bowel syndrome     Keloid cicatrix     Osteoporosis     Retained cholelithiasis following cholecystectomy     Right patella fracture      Past Surgical History:   Procedure Laterality Date    CATARACT EXTRACTION W/  INTRAOCULAR LENS IMPLANT Bilateral     CHOLECYSTECTOMY      Laparoscopic    COLONOSCOPY      HERNIA REPAIR      HYSTERECTOMY      KNEE SURGERY Right 3/4/2015    Dr Weller     ovarian tumor      Benign    TONSILLECTOMY, ADENOIDECTOMY       Family History   Problem Relation Age of Onset    Cancer Mother      Skin    Skin cancer Mother     Glaucoma Mother     Cataracts Mother     Heart disease Father     Diabetes Father     Skin cancer Sister     Melanoma Neg Hx     Psoriasis Neg Hx     Eczema Neg Hx     Lupus Neg Hx     Amblyopia Neg Hx     Blindness Neg Hx     Macular degeneration Neg Hx     Retinal detachment Neg Hx     Strabismus Neg Hx      Social History   Substance Use Topics    Smoking status: Current Every Day Smoker     Packs/day: 0.50     Years: 40.00     Types: Cigarettes    Smokeless tobacco: Current User    Alcohol use No        Review of Systems   Constitutional: Negative  for chills, fever and malaise/fatigue.   HENT: Negative.    Respiratory: Negative.  Negative for cough and shortness of breath.    Cardiovascular: Negative.  Negative for chest pain.   Gastrointestinal: Negative.  Negative for abdominal pain, nausea and vomiting.   Genitourinary: Negative.    Neurological: Negative for weakness and headaches.   All other systems reviewed and are negative.    OBJECTIVE:     Vitals:    04/24/17 0835   BP: 118/78   Pulse: 96   Temp: 97.4 °F (36.3 °C)     Body mass index is 25.95 kg/(m^2).    Physical Exam   Constitutional: She is oriented to person, place, and time and well-developed, well-nourished, and in no distress. No distress.   HENT:   Head: Normocephalic and atraumatic.   Eyes: Conjunctivae and EOM are normal.   Pulmonary/Chest: Effort normal.   Neurological: She is alert and oriented to person, place, and time.   Skin: She is not diaphoretic.       Old records were reviewed. Labs and/or images were independently reviewed.    ASSESSMENT     1. Uncontrolled type 2 diabetes mellitus with diabetic neuropathy, without long-term current use of insulin    2. Essential hypertension, benign    3. Gastroesophageal reflux disease, esophagitis presence not specified    4. Pneumococcal vaccination administered at current visit    5. Tobacco use disorder        PLAN:     Uncontrolled type 2 diabetes mellitus with diabetic neuropathy, without long-term current use of insulin  -     FRUCTOSAMINE; Future; Expected date: 4/24/17  -     Instructed patient to take daily glucose AM logs and to write them down to bring with on next visit. Advised patient to decrease intake of carbohydrates/simple sugars.         Essential Hypertension   Well controlled, continue current regimen.    Gastroesophageal reflux disease, esophagitis presence not specified  -     esomeprazole (NEXIUM) 40 MG capsule; Take 1 capsule (40 mg total) by mouth before breakfast.  Dispense: 90 capsule; Refill: 2  -     Counseled  patient about healthy diet, exercise habits, and to increase physical activity.    Pneumococcal vaccination administered at current visit  -     Pneumococcal Conjugate Vaccine (13 Valent) (IM)    Tobacco use disorder  -     nicotine (NICODERM CQ) 21 mg/24 hr; Place 1 patch onto the skin once daily.  Dispense: 30 patch; Refill: 5  -     Counseled patient about importance of smoking cessation. Patient ready to quit. Will start smoking cessation treatment options.    RTC PRN or 2 weeks for DM follow-up.    Moiz Goldberg MD  04/24/2017 8:58 AM

## 2017-04-24 NOTE — PROGRESS NOTES
Prevnar-13 vaccine administered, filiberto well. Instructed to wait 15mins for observation, no reaction noted @ time of discharge.

## 2017-04-25 LAB — FRUCTOSAMINE SERPL-SCNC: 452 UMOL /L (ref 0–285)

## 2017-05-08 ENCOUNTER — OFFICE VISIT (OUTPATIENT)
Dept: FAMILY MEDICINE | Facility: CLINIC | Age: 67
End: 2017-05-08
Payer: MEDICARE

## 2017-05-08 VITALS
SYSTOLIC BLOOD PRESSURE: 126 MMHG | WEIGHT: 143.31 LBS | TEMPERATURE: 98 F | DIASTOLIC BLOOD PRESSURE: 84 MMHG | HEIGHT: 62 IN | OXYGEN SATURATION: 94 % | BODY MASS INDEX: 26.37 KG/M2 | HEART RATE: 120 BPM

## 2017-05-08 DIAGNOSIS — I10 ESSENTIAL HYPERTENSION, BENIGN: ICD-10-CM

## 2017-05-08 DIAGNOSIS — N39.0 URINARY TRACT INFECTION WITHOUT HEMATURIA, SITE UNSPECIFIED: ICD-10-CM

## 2017-05-08 PROCEDURE — 99214 OFFICE O/P EST MOD 30 MIN: CPT | Mod: S$GLB,,, | Performed by: FAMILY MEDICINE

## 2017-05-08 PROCEDURE — 4010F ACE/ARB THERAPY RXD/TAKEN: CPT | Mod: S$GLB,,, | Performed by: FAMILY MEDICINE

## 2017-05-08 PROCEDURE — 99999 PR PBB SHADOW E&M-EST. PATIENT-LVL III: CPT | Mod: PBBFAC,,, | Performed by: FAMILY MEDICINE

## 2017-05-08 PROCEDURE — 3074F SYST BP LT 130 MM HG: CPT | Mod: S$GLB,,, | Performed by: FAMILY MEDICINE

## 2017-05-08 PROCEDURE — 3079F DIAST BP 80-89 MM HG: CPT | Mod: S$GLB,,, | Performed by: FAMILY MEDICINE

## 2017-05-08 PROCEDURE — 3046F HEMOGLOBIN A1C LEVEL >9.0%: CPT | Mod: S$GLB,,, | Performed by: FAMILY MEDICINE

## 2017-05-08 RX ORDER — CIPROFLOXACIN 500 MG/1
500 TABLET ORAL 2 TIMES DAILY
Qty: 6 TABLET | Refills: 0 | Status: SHIPPED | OUTPATIENT
Start: 2017-05-08 | End: 2018-01-03 | Stop reason: ALTCHOICE

## 2017-05-08 RX ORDER — GLIMEPIRIDE 2 MG/1
2 TABLET ORAL
Qty: 90 TABLET | Refills: 3 | Status: SHIPPED | OUTPATIENT
Start: 2017-05-08 | End: 2018-01-03 | Stop reason: SDUPTHER

## 2017-05-08 NOTE — PROGRESS NOTES
[facility]  Progress Note    SUBJECTIVE:     Chief Complaint   Patient presents with    Hypertension    Diabetes       HPI   Deb Webb  is a 66 y.o. female here for follow-up of her diabetes and HTN. She has been taking her medications as prescribed. She admits still drinking a lot of coke zero. Overall, she feels better.     Review of patient's allergies indicates:   Allergen Reactions    Silicone      Burn skin    Adhesive Rash       Past Medical History:   Diagnosis Date    Allergy     Arthritis     Cataract     Colon polyps     COPD (chronic obstructive pulmonary disease)     Diabetes mellitus type II     Diabetic neuropathy     Fever blister     Glaucoma (increased eye pressure)     Hyperlipidemia     Hypertension     Irritable bowel syndrome     Keloid cicatrix     Osteoporosis     Retained cholelithiasis following cholecystectomy     Right patella fracture      Past Surgical History:   Procedure Laterality Date    CATARACT EXTRACTION W/  INTRAOCULAR LENS IMPLANT Bilateral     CHOLECYSTECTOMY      Laparoscopic    COLONOSCOPY      HERNIA REPAIR      HYSTERECTOMY      KNEE SURGERY Right 3/4/2015    Dr Weller     ovarian tumor      Benign    TONSILLECTOMY, ADENOIDECTOMY       Family History   Problem Relation Age of Onset    Cancer Mother      Skin    Skin cancer Mother     Glaucoma Mother     Cataracts Mother     Heart disease Father     Diabetes Father     Skin cancer Sister     Melanoma Neg Hx     Psoriasis Neg Hx     Eczema Neg Hx     Lupus Neg Hx     Amblyopia Neg Hx     Blindness Neg Hx     Macular degeneration Neg Hx     Retinal detachment Neg Hx     Strabismus Neg Hx      Social History   Substance Use Topics    Smoking status: Current Every Day Smoker     Packs/day: 0.50     Years: 40.00     Types: Cigarettes    Smokeless tobacco: Current User    Alcohol use No        Review of Systems   Constitutional: Negative for chills, fever and  malaise/fatigue.   HENT: Negative.    Respiratory: Negative.  Negative for cough and shortness of breath.    Cardiovascular: Negative.  Negative for chest pain.   Gastrointestinal: Negative.  Negative for abdominal pain, nausea and vomiting.   Genitourinary: Negative.    Neurological: Negative for weakness and headaches.   All other systems reviewed and are negative.    OBJECTIVE:     Vitals:    05/08/17 0824   BP: 126/84   Pulse: (!) 120   Temp: 97.8 °F (36.6 °C)     Body mass index is 26.21 kg/(m^2).    Physical Exam   Constitutional: She is oriented to person, place, and time and well-developed, well-nourished, and in no distress. No distress.   HENT:   Head: Normocephalic and atraumatic.   Eyes: Conjunctivae and EOM are normal.   Cardiovascular: Normal rate, regular rhythm and normal heart sounds.  Exam reveals no gallop and no friction rub.    No murmur heard.  Pulmonary/Chest: Effort normal and breath sounds normal. No respiratory distress. She has no wheezes. She has no rales.   Abdominal: Soft. Bowel sounds are normal. She exhibits no distension. There is no tenderness.   Neurological: She is alert and oriented to person, place, and time.   Skin: Skin is warm. She is not diaphoretic.       Old records were reviewed. Labs and/or images were independently reviewed.    ASSESSMENT     1. Uncontrolled type 2 diabetes mellitus with diabetic neuropathy, without long-term current use of insulin    2. Urinary tract infection without hematuria, site unspecified    3. Essential hypertension, benign        PLAN:     Uncontrolled type 2 diabetes mellitus with diabetic neuropathy, without long-term current use of insulin  -     Start glimepiride (AMARYL) 2 MG tablet; Take 1 tablet (2 mg total) by mouth before breakfast.  Dispense: 90 tablet; Refill: 3  -     Instructed patient to take daily glucose AM logs and to write them down to bring with on next visit. Advised patient to decrease intake of carbohydrates/simple  sugars.  -     Patient needs to stop coke zero.         Urinary tract infection without hematuria, site unspecified  -     Start ciprofloxacin HCl (CIPRO) 500 MG tablet; Take 1 tablet (500 mg total) by mouth 2 (two) times daily.  Dispense: 6 tablet; Refill: 0  -     Instructed patient to drink plenty of water, and to take medications as prescribed. Advised patient to call or RTC if symptoms persist or worsen.    Essential hypertension, benign   -     Stable. Continue current regimen.      RTC PRn 3-4 weeks for dm/htn.    Moiz Goldberg MD  05/08/2017 8:36 AM

## 2017-06-02 ENCOUNTER — OFFICE VISIT (OUTPATIENT)
Dept: FAMILY MEDICINE | Facility: CLINIC | Age: 67
End: 2017-06-02
Payer: MEDICARE

## 2017-06-02 ENCOUNTER — LAB VISIT (OUTPATIENT)
Dept: LAB | Facility: HOSPITAL | Age: 67
End: 2017-06-02
Attending: FAMILY MEDICINE
Payer: MEDICARE

## 2017-06-02 VITALS
DIASTOLIC BLOOD PRESSURE: 89 MMHG | SYSTOLIC BLOOD PRESSURE: 146 MMHG | HEIGHT: 62 IN | OXYGEN SATURATION: 95 % | BODY MASS INDEX: 26.03 KG/M2 | TEMPERATURE: 99 F | WEIGHT: 141.44 LBS | HEART RATE: 113 BPM

## 2017-06-02 DIAGNOSIS — M19.90 OSTEOARTHRITIS, UNSPECIFIED OSTEOARTHRITIS TYPE, UNSPECIFIED SITE: ICD-10-CM

## 2017-06-02 DIAGNOSIS — J01.00 ACUTE MAXILLARY SINUSITIS, RECURRENCE NOT SPECIFIED: Primary | ICD-10-CM

## 2017-06-02 PROCEDURE — 83036 HEMOGLOBIN GLYCOSYLATED A1C: CPT

## 2017-06-02 PROCEDURE — 99214 OFFICE O/P EST MOD 30 MIN: CPT | Mod: S$GLB,,, | Performed by: FAMILY MEDICINE

## 2017-06-02 PROCEDURE — 4010F ACE/ARB THERAPY RXD/TAKEN: CPT | Mod: S$GLB,,, | Performed by: FAMILY MEDICINE

## 2017-06-02 PROCEDURE — 36415 COLL VENOUS BLD VENIPUNCTURE: CPT | Mod: PO

## 2017-06-02 PROCEDURE — 99999 PR PBB SHADOW E&M-EST. PATIENT-LVL IV: CPT | Mod: PBBFAC,,, | Performed by: FAMILY MEDICINE

## 2017-06-02 PROCEDURE — 3046F HEMOGLOBIN A1C LEVEL >9.0%: CPT | Mod: S$GLB,,, | Performed by: FAMILY MEDICINE

## 2017-06-02 RX ORDER — LORATADINE 10 MG/1
10 TABLET ORAL DAILY PRN
Qty: 30 TABLET | Refills: 0 | Status: SHIPPED | OUTPATIENT
Start: 2017-06-02 | End: 2017-06-29 | Stop reason: SDUPTHER

## 2017-06-02 RX ORDER — AMOXICILLIN AND CLAVULANATE POTASSIUM 500; 125 MG/1; MG/1
1 TABLET, FILM COATED ORAL 2 TIMES DAILY
Qty: 14 TABLET | Refills: 0 | Status: SHIPPED | OUTPATIENT
Start: 2017-06-02 | End: 2018-11-16 | Stop reason: SDUPTHER

## 2017-06-02 RX ORDER — MELOXICAM 15 MG/1
15 TABLET ORAL DAILY
Qty: 90 TABLET | Refills: 1 | Status: SHIPPED | OUTPATIENT
Start: 2017-06-02 | End: 2017-10-24 | Stop reason: SDUPTHER

## 2017-06-02 RX ORDER — FLUTICASONE PROPIONATE 50 MCG
SPRAY, SUSPENSION (ML) NASAL
Qty: 16 G | Refills: 5 | Status: SHIPPED | OUTPATIENT
Start: 2017-06-02 | End: 2018-02-12 | Stop reason: SDUPTHER

## 2017-06-02 NOTE — PROGRESS NOTES
[facility]  Progress Note    SUBJECTIVE:     Chief Complaint   Patient presents with    URI       HPI   Deb Webb  is a 66 y.o. female here for c/o sinus tenderness, fevers, chills, malaise for the past week. Tried otc meds without relief. No known sick contacts. Cough is non-productive. Denies any SOB, chest pain.    Review of patient's allergies indicates:   Allergen Reactions    Silicone      Burn skin    Adhesive Rash       Past Medical History:   Diagnosis Date    Allergy     Arthritis     Cataract     Colon polyps     COPD (chronic obstructive pulmonary disease)     Diabetes mellitus type II     Diabetic neuropathy     Fever blister     Glaucoma (increased eye pressure)     Hyperlipidemia     Hypertension     Irritable bowel syndrome     Keloid cicatrix     Osteoporosis     Retained cholelithiasis following cholecystectomy     Right patella fracture      Past Surgical History:   Procedure Laterality Date    CATARACT EXTRACTION W/  INTRAOCULAR LENS IMPLANT Bilateral     CHOLECYSTECTOMY      Laparoscopic    COLONOSCOPY      HERNIA REPAIR      HYSTERECTOMY      KNEE SURGERY Right 3/4/2015    Dr Weller     ovarian tumor      Benign    TONSILLECTOMY, ADENOIDECTOMY       Family History   Problem Relation Age of Onset    Cancer Mother      Skin    Skin cancer Mother     Glaucoma Mother     Cataracts Mother     Heart disease Father     Diabetes Father     Skin cancer Sister     Melanoma Neg Hx     Psoriasis Neg Hx     Eczema Neg Hx     Lupus Neg Hx     Amblyopia Neg Hx     Blindness Neg Hx     Macular degeneration Neg Hx     Retinal detachment Neg Hx     Strabismus Neg Hx      Social History   Substance Use Topics    Smoking status: Current Every Day Smoker     Packs/day: 0.50     Years: 40.00     Types: Cigarettes    Smokeless tobacco: Current User    Alcohol use No        Review of Systems   Constitutional: Positive for malaise/fatigue. Negative for  chills, diaphoresis and fever.   HENT: Positive for congestion. Negative for ear pain and sore throat.    Respiratory: Positive for cough. Negative for shortness of breath.    Cardiovascular: Negative.    Musculoskeletal: Negative for myalgias.   Neurological: Positive for headaches.   All other systems reviewed and are negative.    OBJECTIVE:     Vitals:    06/02/17 0750   BP: (!) 146/89   Pulse: (!) 113   Temp: 99.3 °F (37.4 °C)     Body mass index is 25.87 kg/m².    Physical Exam   Constitutional: She is oriented to person, place, and time. She appears distressed (mild).   HENT:   Head: Normocephalic and atraumatic.   Right Ear: External ear normal. Tympanic membrane is not perforated, not erythematous, not retracted and not bulging. No hemotympanum.   Left Ear: External ear normal. Tympanic membrane is not perforated, not erythematous, not retracted and not bulging. No hemotympanum.   Nose: Right sinus exhibits maxillary sinus tenderness. Left sinus exhibits maxillary sinus tenderness.   Mouth/Throat: Oropharynx is clear and moist. No oropharyngeal exudate.   Eyes: Conjunctivae and EOM are normal.   Pulmonary/Chest: Effort normal and breath sounds normal. No stridor. No respiratory distress. She has no wheezes. She has no rales.   Lymphadenopathy:     She has no cervical adenopathy.   Neurological: She is alert and oriented to person, place, and time.   Skin: She is not diaphoretic.       Old records were reviewed. Labs and/or images were independently reviewed.    ASSESSMENT     1. Acute maxillary sinusitis, recurrence not specified    2. Osteoarthritis, unspecified osteoarthritis type, unspecified site    3. Uncontrolled type 2 diabetes mellitus with diabetic neuropathy, without long-term current use of insulin        PLAN:     Acute maxillary sinusitis, recurrence not specified  -     loratadine (CLARITIN) 10 mg tablet; Take 1 tablet (10 mg total) by mouth daily as needed for Allergies (or runny nose).   Dispense: 30 tablet; Refill: 0  -     amoxicillin-clavulanate 500-125mg (AUGMENTIN) 500-125 mg Tab; Take 1 tablet (500 mg total) by mouth 2 (two) times daily.  Dispense: 14 tablet; Refill: 0  -     fluticasone (FLONASE) 50 mcg/actuation nasal spray; SPRAY TWICE IN EACH NOSTRIL ONCE DAILY  Dispense: 16 g; Refill: 5  -     OK to take tylenol as needed PRN fever. Take mucinex and or claritin to help decrease congestion. Educated patient to drink plenty of fluids. Instructed patient to call or RTC if symptoms persist or worsen.    Osteoarthritis, unspecified osteoarthritis type, unspecified site  -     meloxicam (MOBIC) 15 MG tablet; Take 1 tablet (15 mg total) by mouth once daily.  Dispense: 90 tablet; Refill: 1    Uncontrolled type 2 diabetes mellitus with diabetic neuropathy, without long-term current use of insulin  -     Hemoglobin A1c; Future  -     Instructed patient to take daily glucose AM logs and to write them down to bring with on next visit. Advised patient to decrease intake of carbohydrates/simple sugars.             RTC PRN or 3-4 weeks for DM management.    Moiz Goldberg MD  06/02/2017 8:10 AM

## 2017-06-03 LAB
ESTIMATED AVG GLUCOSE: 292 MG/DL
HBA1C MFR BLD HPLC: 11.8 %

## 2017-06-23 ENCOUNTER — TELEPHONE (OUTPATIENT)
Dept: OPHTHALMOLOGY | Facility: CLINIC | Age: 67
End: 2017-06-23

## 2017-06-23 NOTE — TELEPHONE ENCOUNTER
----- Message from Aris James sent at 6/23/2017  8:06 AM CDT -----  Contact: Deb Webb [[id number]]  Pt needs to reschedule appt w/[facility],pt states August may be too far out,please call back at [phone],thanks

## 2017-06-29 DIAGNOSIS — R25.1 TREMOR: ICD-10-CM

## 2017-06-29 DIAGNOSIS — F41.9 ANXIETY: ICD-10-CM

## 2017-06-29 DIAGNOSIS — J01.00 ACUTE MAXILLARY SINUSITIS, RECURRENCE NOT SPECIFIED: ICD-10-CM

## 2017-06-29 DIAGNOSIS — G89.29 OTHER CHRONIC PAIN: ICD-10-CM

## 2017-06-29 DIAGNOSIS — F13.20 SEDATIVE HYPNOTIC OR ANXIOLYTIC DEPENDENCE: Chronic | ICD-10-CM

## 2017-06-29 RX ORDER — LORATADINE 10 MG/1
10 TABLET ORAL DAILY PRN
Qty: 90 TABLET | Refills: 3 | Status: SHIPPED | OUTPATIENT
Start: 2017-06-29 | End: 2018-01-24 | Stop reason: SDUPTHER

## 2017-06-29 RX ORDER — TRAMADOL HYDROCHLORIDE 50 MG/1
TABLET ORAL
Qty: 30 TABLET | Refills: 0 | Status: SHIPPED | OUTPATIENT
Start: 2017-06-29 | End: 2018-12-06 | Stop reason: SDUPTHER

## 2017-06-30 RX ORDER — LORAZEPAM 1 MG/1
TABLET ORAL
Qty: 60 TABLET | Refills: 0 | Status: SHIPPED | OUTPATIENT
Start: 2017-06-30 | End: 2017-07-24 | Stop reason: SDUPTHER

## 2017-07-14 ENCOUNTER — OFFICE VISIT (OUTPATIENT)
Dept: OPHTHALMOLOGY | Facility: CLINIC | Age: 67
End: 2017-07-14
Payer: MEDICARE

## 2017-07-14 ENCOUNTER — CLINICAL SUPPORT (OUTPATIENT)
Dept: OPHTHALMOLOGY | Facility: CLINIC | Age: 67
End: 2017-07-14
Payer: MEDICARE

## 2017-07-14 DIAGNOSIS — Z96.1 PSEUDOPHAKIA: ICD-10-CM

## 2017-07-14 DIAGNOSIS — H52.7 REFRACTIVE ERROR: ICD-10-CM

## 2017-07-14 DIAGNOSIS — I10 ESSENTIAL HYPERTENSION: ICD-10-CM

## 2017-07-14 DIAGNOSIS — H40.1133 PRIMARY OPEN ANGLE GLAUCOMA OF BOTH EYES, SEVERE STAGE: Primary | ICD-10-CM

## 2017-07-14 DIAGNOSIS — E11.9 DM TYPE 2 WITHOUT RETINOPATHY: ICD-10-CM

## 2017-07-14 DIAGNOSIS — H43.812 VITREOUS DETACHMENT, LEFT: ICD-10-CM

## 2017-07-14 PROCEDURE — 92083 EXTENDED VISUAL FIELD XM: CPT | Mod: S$GLB,,, | Performed by: OPHTHALMOLOGY

## 2017-07-14 PROCEDURE — 99999 PR PBB SHADOW E&M-EST. PATIENT-LVL II: CPT | Mod: PBBFAC,,, | Performed by: OPHTHALMOLOGY

## 2017-07-14 PROCEDURE — 92014 COMPRE OPH EXAM EST PT 1/>: CPT | Mod: S$GLB,,, | Performed by: OPHTHALMOLOGY

## 2017-07-14 RX ORDER — LATANOPROST 50 UG/ML
1 SOLUTION/ DROPS OPHTHALMIC NIGHTLY
Qty: 2.5 ML | Refills: 6 | Status: SHIPPED | OUTPATIENT
Start: 2017-07-14 | End: 2019-02-04 | Stop reason: SDUPTHER

## 2017-07-14 RX ORDER — DORZOLAMIDE HYDROCHLORIDE AND TIMOLOL MALEATE 20; 5 MG/ML; MG/ML
1 SOLUTION/ DROPS OPHTHALMIC 2 TIMES DAILY
Qty: 10 ML | Refills: 3 | Status: SHIPPED | OUTPATIENT
Start: 2017-07-14 | End: 2018-01-06 | Stop reason: SDUPTHER

## 2017-07-14 NOTE — LETTER
July 14, 2017      Moiz Goldberg MD  [address]  [place] [address]           [facility]  [address] [place] [address]  Phone: [phone]  Fax: [phone]          Patient: Deb Webb   MR Number: [id number]   YOB: 1950   Date of Visit: 7/14/2017       Dear Dr. Moiz Goldberg:    Thank you for referring Deb Webb to me for evaluation. Attached you will find relevant portions of my assessment and plan of care.    If you have questions, please do not hesitate to call me. I look forward to following Deb Webb along with you.    Sincerely,    Christiano Mccartney MD    Enclosure  CC:  No Recipients    If you would like to receive this communication electronically, please contact [email or web address] or [phone] to request more information on [facility] access.    For providers and/or their staff who would like to refer a patient to [facility], please contact us through our one-stop-shop provider referral line, [facility], at [phone].    If you feel you have received this communication in error or would no longer like to receive these types of communications, please e-mail [email or web address]

## 2017-07-14 NOTE — PROGRESS NOTES
Subjective:       Patient ID: Deb Webb is a 66 y.o. female.    Chief Complaint: Glaucoma ([facility] )    HPI  Review of Systems    Objective:      Physical Exam    Assessment:       1. Primary open angle glaucoma of both eyes, severe stage    2. Vitreous detachment, left    3. DM type 2 without retinopathy    4. Essential hypertension    5. Refractive error    6. Pseudophakia        Plan:       POAG-IOP's & ON's appear stable OU. HVF's are unreliable OU but appear worse OU.  PVD OS-Stable.  DM-No NPDR OU.  HTN-No retinopathy OU.  RE-No need to change Rx.      Restart Xalatan OU & increase Cosopt to bid OU.     Control DM & HTN.  RTC 4 mos for IOP's.

## 2017-07-24 DIAGNOSIS — F41.9 ANXIETY: ICD-10-CM

## 2017-07-24 DIAGNOSIS — R25.1 TREMOR: ICD-10-CM

## 2017-07-24 DIAGNOSIS — J01.00 ACUTE MAXILLARY SINUSITIS, RECURRENCE NOT SPECIFIED: ICD-10-CM

## 2017-07-24 DIAGNOSIS — F13.20 SEDATIVE HYPNOTIC OR ANXIOLYTIC DEPENDENCE: Chronic | ICD-10-CM

## 2017-07-24 RX ORDER — LORAZEPAM 0.5 MG/1
TABLET ORAL
Qty: 6 TABLET | Refills: 0 | Status: SHIPPED | OUTPATIENT
Start: 2017-07-24 | End: 2018-01-03 | Stop reason: SDUPTHER

## 2017-07-24 RX ORDER — AMOXICILLIN AND CLAVULANATE POTASSIUM 500; 125 MG/1; MG/1
1 TABLET, FILM COATED ORAL 2 TIMES DAILY
Qty: 14 TABLET | Refills: 0 | OUTPATIENT
Start: 2017-07-24 | End: 2017-07-31

## 2017-07-25 ENCOUNTER — OFFICE VISIT (OUTPATIENT)
Dept: FAMILY MEDICINE | Facility: CLINIC | Age: 67
End: 2017-07-25
Payer: MEDICARE

## 2017-07-25 VITALS
SYSTOLIC BLOOD PRESSURE: 110 MMHG | BODY MASS INDEX: 25.11 KG/M2 | DIASTOLIC BLOOD PRESSURE: 68 MMHG | WEIGHT: 136.44 LBS | TEMPERATURE: 98 F | HEART RATE: 122 BPM | OXYGEN SATURATION: 98 % | HEIGHT: 62 IN

## 2017-07-25 DIAGNOSIS — J01.90 ACUTE SINUSITIS, RECURRENCE NOT SPECIFIED, UNSPECIFIED LOCATION: Primary | ICD-10-CM

## 2017-07-25 DIAGNOSIS — Z72.0 TOBACCO ABUSE: ICD-10-CM

## 2017-07-25 DIAGNOSIS — J98.01 BRONCHOSPASM: ICD-10-CM

## 2017-07-25 PROCEDURE — 94640 AIRWAY INHALATION TREATMENT: CPT | Mod: S$GLB,,, | Performed by: FAMILY MEDICINE

## 2017-07-25 PROCEDURE — 99999 PR PBB SHADOW E&M-EST. PATIENT-LVL V: CPT | Mod: PBBFAC,,, | Performed by: FAMILY MEDICINE

## 2017-07-25 PROCEDURE — 99214 OFFICE O/P EST MOD 30 MIN: CPT | Mod: 25,S$GLB,, | Performed by: FAMILY MEDICINE

## 2017-07-25 RX ORDER — AMOXICILLIN AND CLAVULANATE POTASSIUM 500; 125 MG/1; MG/1
1 TABLET, FILM COATED ORAL 2 TIMES DAILY
Qty: 14 TABLET | Refills: 0 | Status: SHIPPED | OUTPATIENT
Start: 2017-07-25 | End: 2017-08-01

## 2017-07-25 RX ORDER — IPRATROPIUM BROMIDE AND ALBUTEROL SULFATE 2.5; .5 MG/3ML; MG/3ML
3 SOLUTION RESPIRATORY (INHALATION)
Status: COMPLETED | OUTPATIENT
Start: 2017-07-25 | End: 2017-07-25

## 2017-07-25 RX ADMIN — IPRATROPIUM BROMIDE AND ALBUTEROL SULFATE 3 ML: 2.5; .5 SOLUTION RESPIRATORY (INHALATION) at 11:07

## 2017-07-25 NOTE — PROGRESS NOTES
[facility]  Progress Note    SUBJECTIVE:     Chief Complaint   Patient presents with    Sinus Problem       HPI   Deb Webb  is a 66 y.o. female here for c/o cough, congestion, sinus pressure/tenderness for the past couple days. Has some mild fevers, chills as well. No known sick contacts. Has been using flonase, and claritin with no relief.     Review of patient's allergies indicates:   Allergen Reactions    Silicone      Burn skin    Adhesive Rash       Past Medical History:   Diagnosis Date    Allergy     Arthritis     Cataract     Colon polyps     COPD (chronic obstructive pulmonary disease)     Diabetes mellitus type II     Diabetic neuropathy     Fever blister     Glaucoma (increased eye pressure)     Hyperlipidemia     Hypertension     Irritable bowel syndrome     Keloid cicatrix     Osteoporosis     Ret cholelh fol cholecys     Right patella fracture      Past Surgical History:   Procedure Laterality Date    CATARACT EXTRACTION W/  INTRAOCULAR LENS IMPLANT Bilateral     CHOLECYSTECTOMY      Laparoscopic    COLONOSCOPY      HERNIA REPAIR      HYSTERECTOMY      KNEE SURGERY Right 3/4/2015    Dr Weller     ovarian tumor      Benign    TONSILLECTOMY, ADENOIDECTOMY       Family History   Problem Relation Age of Onset    Cancer Mother      Skin    Skin cancer Mother     Glaucoma Mother     Cataracts Mother     Heart disease Father     Diabetes Father     Skin cancer Sister     Melanoma Neg Hx     Psoriasis Neg Hx     Eczema Neg Hx     Lupus Neg Hx     Amblyopia Neg Hx     Blindness Neg Hx     Macular degeneration Neg Hx     Retinal detachment Neg Hx     Strabismus Neg Hx      Social History   Substance Use Topics    Smoking status: Current Every Day Smoker     Packs/day: 0.50     Years: 40.00     Types: Cigarettes    Smokeless tobacco: Current User    Alcohol use No        Review of Systems   Constitutional: Positive for malaise/fatigue. Negative  for chills, diaphoresis and fever.   HENT: Positive for congestion. Negative for ear pain and sore throat.    Respiratory: Negative.  Negative for cough and shortness of breath.    Cardiovascular: Negative.    Neurological: Positive for headaches.   All other systems reviewed and are negative.    OBJECTIVE:     Vitals:    07/25/17 1117   BP: 110/68   Pulse: (!) 122   Temp: 98.2 °F (36.8 °C)     Body mass index is 24.96 kg/m².    Physical Exam   Constitutional: She is oriented to person, place, and time. She appears distressed (mild).   HENT:   Head: Normocephalic and atraumatic.   Right Ear: External ear normal. Tympanic membrane is not perforated, not erythematous, not retracted and not bulging. No hemotympanum.   Left Ear: External ear normal. Tympanic membrane is not perforated, not erythematous, not retracted and not bulging. No hemotympanum.   Nose: Right sinus exhibits maxillary sinus tenderness. Left sinus exhibits maxillary sinus tenderness.   Mouth/Throat: Oropharynx is clear and moist. No oropharyngeal exudate.   Eyes: Conjunctivae and EOM are normal.   Pulmonary/Chest: Effort normal and breath sounds normal. No stridor. No respiratory distress. She has no wheezes. She has no rales.   Lymphadenopathy:     She has no cervical adenopathy.   Neurological: She is alert and oriented to person, place, and time.   Skin: She is not diaphoretic.       Old records were reviewed. Labs and/or images were independently reviewed.    ASSESSMENT     1. Acute sinusitis, recurrence not specified, unspecified location    2. Bronchospasm    3. Tobacco abuse        PLAN:     Acute sinusitis, recurrence not specified, unspecified location  -     amoxicillin-clavulanate 500-125mg (AUGMENTIN) 500-125 mg Tab; Take 1 tablet (500 mg total) by mouth 2 (two) times daily.  Dispense: 14 tablet; Refill: 0  -     OK to take tylenol as needed PRN fever. Take mucinex and or claritin to help decrease congestion. Educated patient to drink  plenty of fluids. Instructed patient to call or RTC if symptoms persist or worsen.  -     I recommend that she get the neti pot to rinse her sinuses to prevent future episodes of sinusitis.    Bronchospasm  -     albuterol-ipratropium 2.5mg-0.5mg/3mL nebulizer solution 3 mL; Take 3 mLs by nebulization one time.  -     Recommend patient use duoneb up to 5x a day for the next week. Take medications as prescribed.    Tobacco abuse  -     Ambulatory referral to Smoking Cessation Program  -     Counseled patient about importance of smoking cessation. Patient ready to quit. Will start smoking cessation treatment options.      RTC GITA Goldberg MD  07/25/2017 1:02 PM

## 2017-08-21 RX ORDER — REPAGLINIDE 1 MG/1
1 TABLET ORAL
Qty: 270 TABLET | Refills: 1 | Status: SHIPPED | OUTPATIENT
Start: 2017-08-21 | End: 2018-02-22

## 2017-09-22 DIAGNOSIS — E11.9 TYPE 2 DIABETES MELLITUS WITHOUT COMPLICATION: ICD-10-CM

## 2017-09-28 RX ORDER — ALBUTEROL SULFATE 90 UG/1
AEROSOL, METERED RESPIRATORY (INHALATION)
Qty: 18 INHALER | Refills: 5 | Status: SHIPPED | OUTPATIENT
Start: 2017-09-28 | End: 2018-01-24 | Stop reason: SDUPTHER

## 2017-10-11 DIAGNOSIS — E11.69 COMBINED HYPERLIPIDEMIA ASSOCIATED WITH TYPE 2 DIABETES MELLITUS: Chronic | ICD-10-CM

## 2017-10-11 DIAGNOSIS — E78.2 COMBINED HYPERLIPIDEMIA ASSOCIATED WITH TYPE 2 DIABETES MELLITUS: Chronic | ICD-10-CM

## 2017-10-11 RX ORDER — ATORVASTATIN CALCIUM 40 MG/1
40 TABLET, FILM COATED ORAL DAILY
Qty: 90 TABLET | Refills: 1 | Status: SHIPPED | OUTPATIENT
Start: 2017-10-11 | End: 2018-04-12 | Stop reason: SDUPTHER

## 2017-10-24 DIAGNOSIS — M19.90 OSTEOARTHRITIS, UNSPECIFIED OSTEOARTHRITIS TYPE, UNSPECIFIED SITE: ICD-10-CM

## 2017-10-24 RX ORDER — MELOXICAM 15 MG/1
15 TABLET ORAL DAILY
Qty: 90 TABLET | Refills: 1 | Status: SHIPPED | OUTPATIENT
Start: 2017-10-24 | End: 2018-07-28 | Stop reason: SDUPTHER

## 2017-11-10 ENCOUNTER — TELEPHONE (OUTPATIENT)
Dept: FAMILY MEDICINE | Facility: CLINIC | Age: 67
End: 2017-11-10

## 2017-11-10 NOTE — TELEPHONE ENCOUNTER
----- Message from Ashley Diaz sent at 11/10/2017  9:06 AM CST -----  Contact: LATA Card from [facility] requesting a call back regarding PA on medication cyclobenzaprine (FLEXERIL) 5 MG tablet.  Please call [phone].    Thanks!

## 2017-11-10 NOTE — TELEPHONE ENCOUNTER
Spoke with Stephie [facility] gave verbal clarification of clinical information.

## 2017-12-13 DIAGNOSIS — F41.9 ANXIETY: ICD-10-CM

## 2017-12-13 DIAGNOSIS — R25.1 TREMOR: ICD-10-CM

## 2017-12-13 DIAGNOSIS — F13.20 SEDATIVE HYPNOTIC OR ANXIOLYTIC DEPENDENCE: Chronic | ICD-10-CM

## 2017-12-13 RX ORDER — LORAZEPAM 1 MG/1
TABLET ORAL
Qty: 60 TABLET | Refills: 0 | OUTPATIENT
Start: 2017-12-13

## 2017-12-20 RX ORDER — LISINOPRIL 40 MG/1
40 TABLET ORAL DAILY
Refills: 1 | COMMUNITY
Start: 2017-12-06 | End: 2018-03-07

## 2018-01-03 ENCOUNTER — LAB VISIT (OUTPATIENT)
Dept: LAB | Facility: HOSPITAL | Age: 68
End: 2018-01-03
Attending: FAMILY MEDICINE
Payer: MEDICARE

## 2018-01-03 ENCOUNTER — OFFICE VISIT (OUTPATIENT)
Dept: FAMILY MEDICINE | Facility: CLINIC | Age: 68
End: 2018-01-03
Payer: MEDICARE

## 2018-01-03 ENCOUNTER — TELEPHONE (OUTPATIENT)
Dept: FAMILY MEDICINE | Facility: CLINIC | Age: 68
End: 2018-01-03

## 2018-01-03 VITALS
OXYGEN SATURATION: 95 % | SYSTOLIC BLOOD PRESSURE: 130 MMHG | HEART RATE: 130 BPM | WEIGHT: 139.69 LBS | DIASTOLIC BLOOD PRESSURE: 64 MMHG | HEIGHT: 62 IN | BODY MASS INDEX: 25.7 KG/M2 | TEMPERATURE: 98 F

## 2018-01-03 DIAGNOSIS — I10 ESSENTIAL HYPERTENSION, BENIGN: ICD-10-CM

## 2018-01-03 DIAGNOSIS — F41.9 ANXIETY: ICD-10-CM

## 2018-01-03 DIAGNOSIS — R53.1 WEAKNESS: ICD-10-CM

## 2018-01-03 DIAGNOSIS — N39.0 URINARY TRACT INFECTION WITHOUT HEMATURIA, SITE UNSPECIFIED: ICD-10-CM

## 2018-01-03 DIAGNOSIS — J42 CHRONIC BRONCHITIS, UNSPECIFIED CHRONIC BRONCHITIS TYPE: Chronic | ICD-10-CM

## 2018-01-03 DIAGNOSIS — F13.20 SEDATIVE HYPNOTIC OR ANXIOLYTIC DEPENDENCE: Chronic | ICD-10-CM

## 2018-01-03 DIAGNOSIS — R25.1 TREMOR: ICD-10-CM

## 2018-01-03 PROBLEM — E11.9 DM TYPE 2 WITHOUT RETINOPATHY: Status: RESOLVED | Noted: 2017-07-14 | Resolved: 2018-01-03

## 2018-01-03 LAB
ALBUMIN SERPL BCP-MCNC: 3.6 G/DL
ALP SERPL-CCNC: 140 U/L
ALT SERPL W/O P-5'-P-CCNC: 18 U/L
ANION GAP SERPL CALC-SCNC: 10 MMOL/L
AST SERPL-CCNC: 18 U/L
BILIRUB SERPL-MCNC: 0.3 MG/DL
BUN SERPL-MCNC: 10 MG/DL
CALCIUM SERPL-MCNC: 11.2 MG/DL
CHLORIDE SERPL-SCNC: 99 MMOL/L
CO2 SERPL-SCNC: 27 MMOL/L
CREAT SERPL-MCNC: 1.1 MG/DL
EST. GFR  (AFRICAN AMERICAN): >60 ML/MIN/1.73 M^2
EST. GFR  (NON AFRICAN AMERICAN): 52.4 ML/MIN/1.73 M^2
ESTIMATED AVG GLUCOSE: 309 MG/DL
GLUCOSE SERPL-MCNC: 546 MG/DL
HBA1C MFR BLD HPLC: 12.4 %
POTASSIUM SERPL-SCNC: 3.7 MMOL/L
PROT SERPL-MCNC: 7.4 G/DL
SODIUM SERPL-SCNC: 136 MMOL/L

## 2018-01-03 PROCEDURE — 36415 COLL VENOUS BLD VENIPUNCTURE: CPT | Mod: PO

## 2018-01-03 PROCEDURE — 99214 OFFICE O/P EST MOD 30 MIN: CPT | Mod: S$GLB,,, | Performed by: FAMILY MEDICINE

## 2018-01-03 PROCEDURE — 83036 HEMOGLOBIN GLYCOSYLATED A1C: CPT

## 2018-01-03 PROCEDURE — 99999 PR PBB SHADOW E&M-EST. PATIENT-LVL V: CPT | Mod: PBBFAC,,, | Performed by: FAMILY MEDICINE

## 2018-01-03 PROCEDURE — 80053 COMPREHEN METABOLIC PANEL: CPT

## 2018-01-03 RX ORDER — SULFAMETHOXAZOLE AND TRIMETHOPRIM 800; 160 MG/1; MG/1
1 TABLET ORAL 2 TIMES DAILY
Qty: 6 TABLET | Refills: 0 | Status: SHIPPED | OUTPATIENT
Start: 2018-01-03 | End: 2018-01-24 | Stop reason: ALTCHOICE

## 2018-01-03 RX ORDER — GLIMEPIRIDE 4 MG/1
4 TABLET ORAL
Qty: 90 TABLET | Refills: 3 | Status: SHIPPED | OUTPATIENT
Start: 2018-01-03 | End: 2018-09-05 | Stop reason: SDUPTHER

## 2018-01-03 RX ORDER — CITALOPRAM 20 MG/1
20 TABLET, FILM COATED ORAL DAILY
Qty: 90 TABLET | Refills: 3 | Status: SHIPPED | OUTPATIENT
Start: 2018-01-03 | End: 2018-12-14 | Stop reason: SDUPTHER

## 2018-01-03 RX ORDER — LORAZEPAM 0.5 MG/1
0.5 TABLET ORAL DAILY PRN
Qty: 10 TABLET | Refills: 0 | Status: SHIPPED | OUTPATIENT
Start: 2018-01-03 | End: 2018-02-06 | Stop reason: SDUPTHER

## 2018-01-03 NOTE — PROGRESS NOTES
"[facility]  Progress Note    SUBJECTIVE:     Chief Complaint   Patient presents with    Dysuria       HPI   Deb Webb  is a 67 y.o. female here with multiple complaints.  1. Dysuria and increased urinary frequency for the past week. No fevers, chills, discharge, flank pain. Admits doesn't drink a lot of water.  2. Diabetes. She is very non-compliant with her medications. Hasn't been checking her sugars. No chest pain, SOB, visual changes.   3. She feels "sort of weak", specifically lower extremities. Is able to ambulate but feels wobby at times.     Review of patient's allergies indicates:   Allergen Reactions    Silicone      Burn skin    Adhesive Rash       Past Medical History:   Diagnosis Date    Allergy     Arthritis     Cataract     Colon polyps     COPD (chronic obstructive pulmonary disease)     Diabetes mellitus type II     Diabetic neuropathy     Fever blister     Glaucoma (increased eye pressure)     Hyperlipidemia     Hypertension     Irritable bowel syndrome     Keloid cicatrix     Osteoporosis     Retained cholelithiasis following cholecystectomy(997.41)     Right patella fracture      Past Surgical History:   Procedure Laterality Date    CATARACT EXTRACTION W/  INTRAOCULAR LENS IMPLANT Bilateral     CHOLECYSTECTOMY      Laparoscopic    COLONOSCOPY      HERNIA REPAIR      HYSTERECTOMY      KNEE SURGERY Right 3/4/2015    Dr Weller     ovarian tumor      Benign    TONSILLECTOMY, ADENOIDECTOMY       Family History   Problem Relation Age of Onset    Cancer Mother      Skin    Skin cancer Mother     Glaucoma Mother     Cataracts Mother     Heart disease Father     Diabetes Father     Skin cancer Sister     Melanoma Neg Hx     Psoriasis Neg Hx     Eczema Neg Hx     Lupus Neg Hx     Amblyopia Neg Hx     Blindness Neg Hx     Macular degeneration Neg Hx     Retinal detachment Neg Hx     Strabismus Neg Hx      Social History   Substance Use Topics "    Smoking status: Current Every Day Smoker     Packs/day: 0.50     Years: 40.00     Types: Cigarettes    Smokeless tobacco: Current User    Alcohol use No        Review of Systems   Constitutional: Positive for malaise/fatigue. Negative for chills and fever.   Respiratory: Negative for shortness of breath.    Cardiovascular: Negative for chest pain.   Genitourinary: Positive for dysuria and urgency. Negative for flank pain, frequency and hematuria.   Skin: Negative for itching and rash.   Neurological: Positive for weakness.   Psychiatric/Behavioral: The patient is nervous/anxious.      OBJECTIVE:     Vitals:    01/03/18 0853   BP: 130/64   Pulse: (!) 130   Temp: 97.7 °F (36.5 °C)     Body mass index is 25.54 kg/m².    Physical Exam   Constitutional: She is oriented to person, place, and time and well-developed, well-nourished, and in no distress. No distress.   HENT:   Head: Normocephalic and atraumatic.   Nose: Nose normal.   Eyes: Conjunctivae and EOM are normal.   Cardiovascular: Normal rate, regular rhythm and normal heart sounds.  Exam reveals no gallop and no friction rub.    No murmur heard.  Pulmonary/Chest: Effort normal and breath sounds normal. No respiratory distress. She has no wheezes. She has no rales. She exhibits no tenderness.   Abdominal: Soft. Bowel sounds are normal. She exhibits no distension. There is no tenderness. There is no rebound.   Musculoskeletal:   No flank pain   Neurological: She is alert and oriented to person, place, and time.   Skin: Skin is warm. She is not diaphoretic.       Old records were reviewed. Labs and/or images were independently reviewed.    ASSESSMENT     1. Uncontrolled type 2 diabetes mellitus with diabetic neuropathy, without long-term current use of insulin    2. Anxiety    3. Sedative hypnotic or anxiolytic dependence    4. Tremor    5. Weakness    6. Chronic bronchitis, unspecified chronic bronchitis type    7. Essential hypertension, benign    8. Urinary  tract infection without hematuria, site unspecified        PLAN:     Uncontrolled type 2 diabetes mellitus with diabetic neuropathy, without long-term current use of insulin  -     Comprehensive metabolic panel; Future  -     Hemoglobin A1c; Future  -     Increased glimepiride (AMARYL) 4 MG tablet; Take 1 tablet (4 mg total) by mouth before breakfast.  Dispense: 90 tablet; Refill: 3  -     Microalbumin/creatinine urine ratio; Future; Expected date: 01/03/2018  -     Instructed patient to take daily glucose AM logs and to write them down to bring with on next visit. Advised patient to decrease intake of carbohydrates/simple sugars.         Anxiety  -     citalopram (CELEXA) 20 MG tablet; Take 1 tablet (20 mg total) by mouth once daily.  Dispense: 90 tablet; Refill: 3  -     LORazepam (ATIVAN) 0.5 MG tablet; Take 1 tablet (0.5 mg total) by mouth daily as needed for Anxiety.  Dispense: 10 tablet; Refill: 0  -     Stable. Continue current regimen.    Sedative hypnotic or anxiolytic dependence  -     LORazepam (ATIVAN) 0.5 MG tablet; Take 1 tablet (0.5 mg total) by mouth daily as needed for Anxiety.  Dispense: 10 tablet; Refill: 0    Tremor  -     LORazepam (ATIVAN) 0.5 MG tablet; Take 1 tablet (0.5 mg total) by mouth daily as needed for Anxiety.  Dispense: 10 tablet; Refill: 0    Weakness  -     Ambulatory Referral to Physical/Occupational Therapy for further evaluation and treatment.      Essential hypertension, benign   -     Stable. Continue current regimen.    Urinary tract infection without hematuria, site unspecified  -     sulfamethoxazole-trimethoprim 800-160mg (BACTRIM DS) 800-160 mg Tab; Take 1 tablet by mouth 2 (two) times daily.  Dispense: 6 tablet; Refill: 0  -     Instructed patient to drink plenty of water, and to take medications as prescribed. Advised patient to call or RTC if symptoms persist or worsen.      RTC GITA Goldberg MD  01/03/2018 4:56 PM

## 2018-01-06 DIAGNOSIS — H40.1133 PRIMARY OPEN ANGLE GLAUCOMA OF BOTH EYES, SEVERE STAGE: ICD-10-CM

## 2018-01-06 RX ORDER — DORZOLAMIDE HYDROCHLORIDE AND TIMOLOL MALEATE 20; 5 MG/ML; MG/ML
1 SOLUTION/ DROPS OPHTHALMIC 2 TIMES DAILY
Qty: 10 ML | Refills: 3 | Status: SHIPPED | OUTPATIENT
Start: 2018-01-06 | End: 2018-06-05 | Stop reason: SDUPTHER

## 2018-01-16 ENCOUNTER — CLINICAL SUPPORT (OUTPATIENT)
Dept: REHABILITATION | Facility: HOSPITAL | Age: 68
End: 2018-01-16
Attending: FAMILY MEDICINE
Payer: MEDICARE

## 2018-01-16 DIAGNOSIS — Z74.09 IMPAIRED FUNCTIONAL MOBILITY, BALANCE, GAIT, AND ENDURANCE: ICD-10-CM

## 2018-01-16 DIAGNOSIS — R29.898 BILATERAL LEG WEAKNESS: ICD-10-CM

## 2018-01-16 DIAGNOSIS — R29.898 WEAKNESS OF BOTH HIPS: ICD-10-CM

## 2018-01-16 PROCEDURE — G8978 MOBILITY CURRENT STATUS: HCPCS | Mod: CL,PN | Performed by: PHYSICAL THERAPIST

## 2018-01-16 PROCEDURE — G8979 MOBILITY GOAL STATUS: HCPCS | Mod: CK,PN | Performed by: PHYSICAL THERAPIST

## 2018-01-16 PROCEDURE — 97110 THERAPEUTIC EXERCISES: CPT | Mod: PN | Performed by: PHYSICAL THERAPIST

## 2018-01-16 PROCEDURE — 97162 PT EVAL MOD COMPLEX 30 MIN: CPT | Mod: PN | Performed by: PHYSICAL THERAPIST

## 2018-01-16 NOTE — PLAN OF CARE
TIME RECORD    Date: 01/16/2018    Start Time:  0800  Stop Time:  0900    Total Timed Minutes:  25'  Total Unimed Units:  35 minutes  Total Untimed Units:  1 Eval  Charges Billed/# of units:  2 TE    OUTPATIENT PHYSICAL THERAPY   PATIENT EVALUATION  Primary Diagnosis:BLE weakness  Treatment Diagnosis: decreased muscular endurance, balance impairment, gait instability, poor posture  Past Medical History:   Diagnosis Date    Allergy     Arthritis     Cataract     Colon polyps     COPD (chronic obstructive pulmonary disease)     Diabetes mellitus type II     Diabetic neuropathy     Fever blister     Glaucoma (increased eye pressure)     Hyperlipidemia     Hypertension     Irritable bowel syndrome     Keloid cicatrix     Osteoporosis     Retained cholelithiasis following cholecystectomy(997.41)     Right patella fracture      Past Surgical History:   Procedure Laterality Date    CATARACT EXTRACTION W/  INTRAOCULAR LENS IMPLANT Bilateral     CHOLECYSTECTOMY      Laparoscopic    COLONOSCOPY      HERNIA REPAIR      HYSTERECTOMY      KNEE SURGERY Right 3/4/2015    Dr Weller     ovarian tumor      Benign    TONSILLECTOMY, ADENOIDECTOMY       Precautions: falls  Prior Therapy: yes  Medications: Deb Webb has a current medication list which includes the following prescription(s): alendronate, atorvastatin, blood sugar diagnostic, citalopram, cyclobenzaprine, dicyclomine, dorzolamide-timolol 2-0.5%, esomeprazole, fluticasone, fluzone high-dose 2017-18 (pf), furosemide, gabapentin, glimepiride, latanoprost, lisinopril, loratadine, lorazepam, meloxicam, metformin, nicotine, omega-3 fatty acids-vitamin e, primidone, repaglinide, sulfamethoxazole-trimethoprim 800-160mg, tramadol, umeclidinium, and ventolin hfa.  Nutrition:  Overweight, recent weight loss for management of her diabetes  Prior Level of Function: Independent  Social History: retired  Place of Residence (Steps/Adaptations): 4 steps  to enter with railing  Functional Deficits Leading to Referral/Nature of Injury: falls, weakness in her legs  [facility]     [facility] states requesting to come back to therapy to work on the weakness in her legs and walk straight. She falls often. Most recent fall on Sunday while bending over to  items. Being treated by neurologist for essential tremors and has a new medication.     Pain:  Location: Left lateral hip, B anterior knees, and ankles  Description: Aching and Sharp  Activities Which Increase Pain: Walking, squatting  Activities Which Decrease Pain: massage and working it out  Pain Scale: 0/10 at best 6/10 now  10/10 at worst    Objective     Posture: increased thoracic kyphosis, flattened lordosis  Palpation: TTP L gluteus medius/ TFL  Sensation: LT sensation grossly intact  BLE's, monofilament NT  DTRs:NT  Range of Motion/Strength:      MMT   Left  Right    Hip:  Flexion   3+/5  4-/5   Extension  3+/5  3/5  Abduction  3+/5  3-/4  Adduction  4-/5  4-/5  External Rotation 3+/5  3+/5  Internal rotation 4-/5  4-/5    Knee:  Flexion   3+/5  3+/5  Extension  4/5  4-/5    Ankle:  Dorsiflexion  4-/5  4-/5  Plantar flexion  4/5  4/5  Inversion  4-/5  4-/5  Eversion  3+/5  3+/5    Flexibility: tightness in B gastroc  Gait: Without AD  Analysis: Assistance independent  Bed Mobility:Independent  Transfers: Independent    Other:     SMYTH Assessment    1. Sitting to Standing   4 - able to stand without using hands and stabilize independently  2. Standing Unsupported   4 - able to stand safely 2 minutes without hold  3. Sitting Unsupported   4 - able to sit safely and securely 2 minutes  4. Standing to Sitting   3 - controls descent by using hands  5. Pivot Transfer   3 - able to transfer safely with definite use of hands  6. Standing with Eyes Closed   3 - able to stand 10 seconds with supervision  7. Standing with Feet Together   2 - able to place feet  together independently but unable to hold for 30 seconds  8. Reaching Forward with Outstretched Arm   3 - can reach forward 12 cm/5 inches safely  9. Retrieving Object from Floor   3 - able to pick slipper but needs supervision  10. Turning to Look Behind   3 - looks behind one side only, other side less weight shift  11. Turning 360 Degrees   1 - needs close supervision or verbal cueing  12. Placing Alternate Foot on Step   1 - able to complete > 2 steps needs min assist  13. Standing with One Foot in Front   0 - Looses balance while stepping or standing  14. Standing on One Foot   0 - unable to try or needs assistance to prevent fall    SMYTH/56 (41-56 = low fall risk 21-40 = medium fall risk 0 -20 = high fall risk)    Treatment: Physical Therapist educated patient in a home exercise program and issued handout. Patient demonstrating good understanding of education provided and able to return demonstration of correct technique. Patient given the following exercises: supine transverse abdominus activation with marches in HL 2 min, HL clams 3 min green TB 3 min, SL hip abduction 2 x 10, bridging 2 x 10, diaphragmatic breathing 3 min    Assessment       Initial Assessment: Ms. Boyd is a 67 year old female referred to physical therapy for diagnosis of weakness. Patient has the following impairments upon initial evaluation: poor postural awareness, BLE weakness, poor cardiovascular endurance, gait instability, and decreased LE flexibility. Pt also demonstrating decreased static balance and is at risk for falls as not by SMYTH balance assessment. Pt is known to me from previous episode in 2016 and with decreased BLE strength since last seen. Patient to benefit from skilled physical therapy to address above deficits and maximize functional independence. Patient appears motivated to improve condition and is a good candidate for physical therapy. Patient has verbalized understanding of plan of care and set goals.  Patient has no identified cultural, spiritual, or educational needs that would impair learning.     History  Co-morbidities and personal factors that may impact the plan of care Examination  Body Structures and Functions, activity limitations and participation restrictions that may impact the plan of care Clinical Presentation   Decision Making/ Complexity Score   Co-morbidities:       hx of patella fx.            Personal Factors:    Body Regions:hips, knees, ankles    Body Systems:     Musculoskeletal:  weakness, impaired endurance, impaired functional mobility, gait instability, impaired balance, decreased safety awareness, pain and impaired muscle length    Neuromuscular:tremor    Activity limitations: bending, walking, squatting      Participation Restrictions: recreational activities such as dancing         evolving     moderate     CMS Impairment/Limitation/Restriction for FOTO Lower Leg (w/o Knee) Survey  Status Limitation G-Code CMS Severity Modifier  Intake 29% 71% Current Status CL - At least 60 percent but less than 80 percent  Predicted 44% 56% Goal Status+ CK - At least 40 percent but less than 60 percent    Rehab Potiential: good    Short Term Goals (4 Weeks):   1. Patient to have improved BLE strength as noted by sit to stand transfer without use of UE's  2. Patient to independently verbalize 3 strategies for fall prevention  3. Patient to be able to perform 30 minutes of consecutive exercise without rest break for improved functional endurance with [facility]  Long Term Goals (6 Weeks):   1. Patient to be independent with home exercise program for improved self management of condition  2. Patient to have decreased subjective report of disability as noted by a score of 56% or less on the FOTO questionnaire   3. Patient to have improved static balance as noted by 42/56 or greater of SMYTH for decreased risk for falls  4. Patient to have improved strength in BLE's by 1/3 muscle grade for improved  functional mobility     Plan     Certification Period: 1/16/2018 to 4/10/2018  Recommended Treatment Plan: 2 times per week for 6-8 weeks: Gait Training, Manual Therapy, Moist Heat/ Ice, Neuromuscular Re-ed, Patient Education, Self Care, Therapeutic Activites and Therapeutic Exercise      Therapist: Araseli Savage, PT

## 2018-01-23 ENCOUNTER — CLINICAL SUPPORT (OUTPATIENT)
Dept: REHABILITATION | Facility: HOSPITAL | Age: 68
End: 2018-01-23
Attending: FAMILY MEDICINE
Payer: MEDICARE

## 2018-01-23 DIAGNOSIS — R26.89 IMPAIRMENT OF BALANCE: ICD-10-CM

## 2018-01-23 DIAGNOSIS — R26.9 GAIT ABNORMALITY: ICD-10-CM

## 2018-01-23 DIAGNOSIS — R29.898 BILATERAL LEG WEAKNESS: ICD-10-CM

## 2018-01-23 DIAGNOSIS — R29.898 MUSCULAR DECONDITIONING: ICD-10-CM

## 2018-01-23 DIAGNOSIS — R29.898 WEAKNESS OF BOTH HIPS: ICD-10-CM

## 2018-01-23 DIAGNOSIS — Z74.09 IMPAIRED FUNCTIONAL MOBILITY, BALANCE, GAIT, AND ENDURANCE: Primary | ICD-10-CM

## 2018-01-23 PROCEDURE — 97110 THERAPEUTIC EXERCISES: CPT | Mod: PN | Performed by: PHYSICAL THERAPIST

## 2018-01-23 NOTE — PROGRESS NOTES
"Name: Deb Webb  [facility] Number: [id number]  Date of Treatment: 01/23/2018   Diagnosis:   Encounter Diagnoses   Name Primary?    Impaired functional mobility, balance, gait, and endurance Yes    Weakness of both hips     Bilateral leg weakness     Muscular deconditioning     Impairment of balance     Gait abnormality        Time in: 0830  Time Out: 0925  Total Treatment Time: 60' (1:1 with PT 30' of treatment session)  Visit #: 2      Subjective:    Deb reports not having any tennis shoes at home and has been wearing her slip on sandals. She plans to go shoe shopping soon.  Patient reports their pain to be 6/10 on a 0-10 scale with 0 being no pain and 10 being the worst pain imaginable in knees/ankles.    Objective    Patient received individual therapy with activities as follows:   Deb received therapeutic exercises to develop strength, endurance, ROM, flexibility, posture and core stabilization for 55 minutes including:     NuStep 6 min    Standing:  Marches x 10 ea LE  Hip extension x 10 ea LE  Mini squats x 10  Toe/heel raises x 20  NBOS airex pad 2 x 30"   Weight shifting airex pad 2 min  Hip abduction x 10 ea LE BUE support    Supine:  supine transverse abdominus activation with marches in HL 2 min  HL clams 3 min green TB 3 min  SL hip abduction 2 x 10  bridging 2 x 10  diaphragmatic breathing 3 min    Written Home Exercises Provided: reviewed HEP issued on [facility]  Pt demo good understanding of the education provided. Deb demonstrated good return demonstration of activities.     Assessment:     Ms. Boyd with good tolerance to therapeutic exercise without exacerbation of symptoms. Pt demonstrating poor safety awareness and spontaneous movements resulting in LOB.   Pt will continue to benefit from skilled PT intervention. Medical Necessity is demonstrated by:  Fall Risk, Pain limits function of effected part for some activities, Unable to participate fully in daily activities, Requires " skilled supervision to complete and progress HEP and Weakness.    Patient is making good progress towards established goals.    New/Revised goals: see initial eval on 1/16/2018      Plan:  Continue with established Plan of Care towards PT goals.     Araseli Savage, PT

## 2018-01-24 ENCOUNTER — OFFICE VISIT (OUTPATIENT)
Dept: FAMILY MEDICINE | Facility: CLINIC | Age: 68
End: 2018-01-24
Payer: MEDICARE

## 2018-01-24 VITALS
BODY MASS INDEX: 25.62 KG/M2 | SYSTOLIC BLOOD PRESSURE: 116 MMHG | HEIGHT: 62 IN | DIASTOLIC BLOOD PRESSURE: 86 MMHG | WEIGHT: 139.25 LBS | TEMPERATURE: 98 F | HEART RATE: 104 BPM

## 2018-01-24 DIAGNOSIS — J30.9 ALLERGIC RHINITIS, UNSPECIFIED CHRONICITY, UNSPECIFIED SEASONALITY, UNSPECIFIED TRIGGER: ICD-10-CM

## 2018-01-24 DIAGNOSIS — J42 CHRONIC BRONCHITIS, UNSPECIFIED CHRONIC BRONCHITIS TYPE: Chronic | ICD-10-CM

## 2018-01-24 PROCEDURE — 99214 OFFICE O/P EST MOD 30 MIN: CPT | Mod: S$GLB,,, | Performed by: FAMILY MEDICINE

## 2018-01-24 PROCEDURE — 99999 PR PBB SHADOW E&M-EST. PATIENT-LVL IV: CPT | Mod: PBBFAC,,, | Performed by: FAMILY MEDICINE

## 2018-01-24 RX ORDER — ALBUTEROL SULFATE 90 UG/1
AEROSOL, METERED RESPIRATORY (INHALATION)
Qty: 18 INHALER | Refills: 5 | Status: SHIPPED | OUTPATIENT
Start: 2018-01-24 | End: 2018-12-21 | Stop reason: SDUPTHER

## 2018-01-24 RX ORDER — LORATADINE 10 MG/1
10 TABLET ORAL DAILY PRN
Qty: 90 TABLET | Refills: 3 | Status: SHIPPED | OUTPATIENT
Start: 2018-01-24 | End: 2019-02-03 | Stop reason: SDUPTHER

## 2018-01-24 NOTE — PROGRESS NOTES
[facility]  Progress Note    SUBJECTIVE:     Chief Complaint   Patient presents with    Diabetes       HPI   Deb Webb  is a 67 y.o. female here for follow up of her diabetes. She knows been eating bad and not exercising. She takes her meds as prescribed, but when asked about the prandin, she doesn't know if she takes that or not. Doing well otherwise and has no new complaints/problems at this time.    Review of patient's allergies indicates:   Allergen Reactions    Silicone      Burn skin    Adhesive Rash       Past Medical History:   Diagnosis Date    Allergy     Arthritis     Cataract     Colon polyps     COPD (chronic obstructive pulmonary disease)     Diabetes mellitus type II     Diabetic neuropathy     Fever blister     Glaucoma (increased eye pressure)     Hyperlipidemia     Hypertension     Irritable bowel syndrome     Keloid cicatrix     Osteoporosis     Retained cholelithiasis following cholecystectomy(997.41)     Right patella fracture      Past Surgical History:   Procedure Laterality Date    CATARACT EXTRACTION W/  INTRAOCULAR LENS IMPLANT Bilateral     CHOLECYSTECTOMY      Laparoscopic    COLONOSCOPY      HERNIA REPAIR      HYSTERECTOMY      KNEE SURGERY Right 3/4/2015    Dr Weller     ovarian tumor      Benign    TONSILLECTOMY, ADENOIDECTOMY       Family History   Problem Relation Age of Onset    Cancer Mother      Skin    Skin cancer Mother     Glaucoma Mother     Cataracts Mother     Heart disease Father     Diabetes Father     Skin cancer Sister     Melanoma Neg Hx     Psoriasis Neg Hx     Eczema Neg Hx     Lupus Neg Hx     Amblyopia Neg Hx     Blindness Neg Hx     Macular degeneration Neg Hx     Retinal detachment Neg Hx     Strabismus Neg Hx      Social History   Substance Use Topics    Smoking status: Current Every Day Smoker     Packs/day: 0.50     Years: 40.00     Types: Cigarettes    Smokeless tobacco: Current User     Alcohol use No        Review of Systems   Constitutional: Negative for chills, fever and malaise/fatigue.   HENT: Negative.    Respiratory: Negative.  Negative for cough and shortness of breath.    Cardiovascular: Negative.  Negative for chest pain.   Gastrointestinal: Negative.  Negative for abdominal pain, nausea and vomiting.   Genitourinary: Negative.    Neurological: Negative for weakness and headaches.   All other systems reviewed and are negative.    OBJECTIVE:     Vitals:    01/24/18 0857   BP: 116/86   Pulse: 104   Temp: 97.7 °F (36.5 °C)     Body mass index is 25.46 kg/m².    Physical Exam   Constitutional: She is oriented to person, place, and time and well-developed, well-nourished, and in no distress. No distress.   HENT:   Head: Normocephalic and atraumatic.   Nose: Nose normal.   Eyes: Conjunctivae and EOM are normal.   Cardiovascular: Normal rate, regular rhythm and normal heart sounds.  Exam reveals no gallop and no friction rub.    No murmur heard.  Pulmonary/Chest: Effort normal and breath sounds normal. No respiratory distress. She has no wheezes. She has no rales. She exhibits no tenderness.   Abdominal: Soft. Bowel sounds are normal. She exhibits no distension. There is no tenderness. There is no rebound.   Neurological: She is alert and oriented to person, place, and time.   Skin: Skin is warm. She is not diaphoretic.       Old records were reviewed. Labs and/or images were independently reviewed.    ASSESSMENT     1. Uncontrolled type 2 diabetes mellitus with diabetic neuropathy, without long-term current use of insulin    2. Allergic rhinitis, unspecified chronicity, unspecified seasonality, unspecified trigger    3. Chronic bronchitis, unspecified chronic bronchitis type        PLAN:     Uncontrolled type 2 diabetes mellitus with diabetic neuropathy, without long-term current use of insulin  -     Start dulaglutide 0.75 mg/0.5 mL PnIj; Inject 0.5 mLs (0.75 mg total) into the skin once a  week.  Dispense: 4 Syringe; Refill: 5  -     Instructed patient to take daily glucose AM logs and to write them down to bring with on next visit. Advised patient to decrease intake of carbohydrates/simple sugars.         Allergic rhinitis, unspecified chronicity, unspecified seasonality, unspecified trigger  -     loratadine (CLARITIN) 10 mg tablet; Take 1 tablet (10 mg total) by mouth daily as needed for Allergies (or runny nose).  Dispense: 90 tablet; Refill: 3    Chronic bronchitis, unspecified chronic bronchitis type  -     umeclidinium 62.5 mcg/actuation DsDv; Inhale 1 application into the lungs once daily. Controller  Dispense: 30 each; Refill: 5  -     Stable. Continue current regimen.    Other orders  -     albuterol (VENTOLIN HFA) 90 mcg/actuation inhaler; INHALE 2 PUFFS EVERY 4 HOURS AS NEEDED FOR WHEEZING  Dispense: 18 Inhaler; Refill: 5      RTC PRN or 3-4 weeks for diabetic follow-up.    Moiz Goldberg MD  01/24/2018 9:56 AM

## 2018-01-31 ENCOUNTER — CLINICAL SUPPORT (OUTPATIENT)
Dept: REHABILITATION | Facility: HOSPITAL | Age: 68
End: 2018-01-31
Attending: FAMILY MEDICINE
Payer: MEDICARE

## 2018-01-31 DIAGNOSIS — R29.898 BILATERAL LEG WEAKNESS: ICD-10-CM

## 2018-01-31 DIAGNOSIS — Z74.09 IMPAIRED FUNCTIONAL MOBILITY, BALANCE, GAIT, AND ENDURANCE: Primary | ICD-10-CM

## 2018-01-31 DIAGNOSIS — R29.898 WEAKNESS OF BOTH HIPS: ICD-10-CM

## 2018-01-31 PROCEDURE — 97110 THERAPEUTIC EXERCISES: CPT | Mod: PN

## 2018-01-31 NOTE — PROGRESS NOTES
"Name: Deb Webb  [facility] Number: [id number]  Date of Treatment: 01/31/2018   Diagnosis:   Encounter Diagnoses   Name Primary?    Impaired functional mobility, balance, gait, and endurance Yes    Weakness of both hips     Bilateral leg weakness        Time in: 0830  Time Out: 0925    Total Treatment Time: 60' (1:1 with PT 30' of treatment session)  Visit #: 3      Subjective:    Pt c/o bilateral hip pain today 7/10. Reports good compliance with home program.     Objective    Patient received individual therapy with activities as follows:   Deb received therapeutic exercises to develop strength, endurance, ROM, flexibility, posture and core stabilization for 55 minutes including:     NuStep 8 min    Standing:  Marches x 10 ea LE  Hip extension x 10 ea LE  Mini squats x 10  Toe/heel raises x 20  NBOS airex pad 2 x 30"   Weight shifting airex pad 2 min  Hip abduction 2 x 10 ea LE BUE support    Supine:  supine transverse abdominus activation with marches in HL 2 min  HL clams 3 min green TB 3 min  SL hip abduction 2 x 10  bridging 2 x 10  diaphragmatic breathing 3 min    Written Home Exercises Provided: reviewed HEP issued on eval  Pt demo good understanding of the education provided. Deb demonstrated good return demonstration of activities.     Assessment:     No c/o increased discomfort with prescribed activities.  Demonstrates improved performance with standing therex requiring CGA  25%. Pt will continue to benefit from skilled PT intervention. Medical Necessity is demonstrated by:  Fall Risk, Pain limits function of effected part for some activities, Unable to participate fully in daily activities, Requires skilled supervision to complete and progress HEP and Weakness.    Patient is making good progress towards established goals.    New/Revised goals: see initial eval on 1/16/2018      Plan:  Continue with established Plan of Care towards PT goals.     Chriss Webster, PT  "

## 2018-02-06 ENCOUNTER — CLINICAL SUPPORT (OUTPATIENT)
Dept: REHABILITATION | Facility: HOSPITAL | Age: 68
End: 2018-02-06
Attending: FAMILY MEDICINE
Payer: MEDICARE

## 2018-02-06 DIAGNOSIS — R29.898 BILATERAL LEG WEAKNESS: ICD-10-CM

## 2018-02-06 DIAGNOSIS — R25.1 TREMOR: ICD-10-CM

## 2018-02-06 DIAGNOSIS — Z74.09 IMPAIRED FUNCTIONAL MOBILITY, BALANCE, GAIT, AND ENDURANCE: Primary | ICD-10-CM

## 2018-02-06 DIAGNOSIS — F13.20 SEDATIVE HYPNOTIC OR ANXIOLYTIC DEPENDENCE: Chronic | ICD-10-CM

## 2018-02-06 DIAGNOSIS — F41.9 ANXIETY: ICD-10-CM

## 2018-02-06 DIAGNOSIS — R29.898 WEAKNESS OF BOTH HIPS: ICD-10-CM

## 2018-02-06 PROCEDURE — 97110 THERAPEUTIC EXERCISES: CPT | Mod: PN

## 2018-02-06 NOTE — PROGRESS NOTES
"Name: Deb Webb  [facility] Number: [id number]  Date of Treatment: 02/06/2018   Diagnosis:   Encounter Diagnoses   Name Primary?    Impaired functional mobility, balance, gait, and endurance Yes    Weakness of both hips     Bilateral leg weakness      Time in: 0800  Time Out: 0900  Total Treatment Time: 60 minutes (1:1 with PTA duration of treatment session)     Visit #: 4    Subjective:    Deb states that both of her hips are sore today. Patient rates current pain level as 6/10 this morning. Patient states she continues to wear sandals because she hasn't found a pair of tennis shoes that work for her feet yet.     Objective    Patient received individual therapy with activities as follows:   Deb received therapeutic exercises to develop strength, endurance, ROM, flexibility, posture and core stabilization for 55 minutes including:     NuStep 8 min (collected subjective and discussed proper shoes)     Standing:  Marches x 15 ea LE  Hip extension x 15 ea LE  Mini squats 2x10  Toe/heel raises x 15  NBOS airex pad 2 x 30"   Weight shifting airex pad 2 min  Hip abduction 2 x 10 ea LE BUE support  +Step ups (4 inch) 2x10 ea   +BLE shuttle squats x 2 black cords 3x10    Supine:  supine transverse abdominus activation with marches in HL 2 min  +HL hip adduction (ball squeeze) x 2 minutes   HL clams 3 min green TB   SL hip abduction 2 x 10  bridging 2 x 10  diaphragmatic breathing 3 min    Written Home Exercises Provided: reviewed HEP issued on eval  Pt demo good understanding of the education provided. Deb demonstrated good return demonstration of activities.     Assessment:   Deb tolerated treatment well today. Patient demonstrates good tolerance to progression of repetitions today with appropriate training effect easily achieved. Patient able to progress to step ups and shuttle squats with heavy cues needed for bilateral valgus collapse awareness and correction. Patient requires heavy tactile cues to " achieve proper sidleying position with sidelying hip abduction in order to correct hip flexor compensation to ensure glut med activation.   Pt will continue to benefit from skilled PT intervention. Medical Necessity is demonstrated by:  Fall Risk, Pain limits function of effected part for some activities, Unable to participate fully in daily activities, Requires skilled supervision to complete and progress HEP and Weakness.    Patient is making good progress towards established goals.    New/Revised goals: see initial eval on 1/16/2018    Plan:  Continue with established Plan of Care towards PT goals.     Madeline Bridges, PTA   2/6/2018

## 2018-02-08 ENCOUNTER — CLINICAL SUPPORT (OUTPATIENT)
Dept: REHABILITATION | Facility: HOSPITAL | Age: 68
End: 2018-02-08
Attending: FAMILY MEDICINE
Payer: MEDICARE

## 2018-02-08 DIAGNOSIS — R29.898 BILATERAL LEG WEAKNESS: ICD-10-CM

## 2018-02-08 DIAGNOSIS — R29.898 WEAKNESS OF BOTH HIPS: ICD-10-CM

## 2018-02-08 DIAGNOSIS — Z74.09 IMPAIRED FUNCTIONAL MOBILITY, BALANCE, GAIT, AND ENDURANCE: ICD-10-CM

## 2018-02-08 PROCEDURE — 97110 THERAPEUTIC EXERCISES: CPT | Mod: PN

## 2018-02-08 RX ORDER — LORAZEPAM 0.5 MG/1
0.5 TABLET ORAL DAILY PRN
Qty: 10 TABLET | Refills: 0 | Status: SHIPPED | OUTPATIENT
Start: 2018-02-08 | End: 2021-09-09

## 2018-02-08 NOTE — PROGRESS NOTES
"Name: Deb Webb  [facility] Number: [id number]  Date of Treatment: 02/08/2018   Diagnosis:   Encounter Diagnoses   Name Primary?    Impaired functional mobility, balance, gait, and endurance     Weakness of both hips     Bilateral leg weakness      Time in: 0800  Time Out: 0855  Total Treatment Time: 55 minutes (1:1 with PTA 30 minutes of treatment session)     Visit #: 5    Subjective:    Deb states that her knee hurts this morning but she thinks it is because of the weather. Patient states she did have some muscle soreness after last treatment session and that she felt "perky." Patient rates current pain level as 5/10 this morning. Patient states she continues to wear sandals because she hasn't found a pair of tennis shoes that work for her feet yet.     Objective    Patient received individual therapy with activities as follows:   Deb received therapeutic exercises to develop strength, endurance, ROM, flexibility, posture and core stabilization for 55 minutes including:     NuStep x 6 minutes    Standing:  Marches 2x10  ea LE  Hip extension 2x10 ea LE  Hip abduction 2 x 10 ea LE BUE support  Mini squats 2x10  Toe/heel raises x 15  NBOS airex pad 2 x 30"   Weight shifting airex pad 2 min  +Tandem stance 30 sec x 2 ea LE  Step ups (4 inch) 2x10 ea   BLE shuttle squats x 2 black cords 3x10    Supine:  supine transverse abdominus activation with marches in HL 2 min  HL hip adduction (ball squeeze) x 2 minutes   HL clams 3 min green TB   SL hip abduction 2 x 10  bridging 2 x 10  diaphragmatic breathing 3 min    Written Home Exercises Provided: reviewed HEP issued on [facility]  Pt demo good understanding of the education provided. Deb demonstrated good return demonstration of activities.     Assessment:   Deb tolerated treatment well today. Patient able to progress to tandem stance balance activity with moderate postural sway and loss of balance episodes observed with heavy cues needed for utilization of " balance strategies. Patient demonstrates heavy TFL compensation when performing both side lying and standing hip abduction with tactile cues needed for correction. [facility] regarding the importance of proper shoe wear in order to reduce fall risk.   Pt will continue to benefit from skilled PT intervention. Medical Necessity is demonstrated by:  Fall Risk, Pain limits function of effected part for some activities, Unable to participate fully in daily activities, Requires skilled supervision to complete and progress HEP and Weakness.    Patient is making good progress towards established goals.    New/Revised goals: see initial eval on 1/16/2018    Plan:  Continue with established Plan of Care towards PT goals.     Madeline Bridges, PTA   2/8/2018

## 2018-02-12 ENCOUNTER — CLINICAL SUPPORT (OUTPATIENT)
Dept: REHABILITATION | Facility: HOSPITAL | Age: 68
End: 2018-02-12
Attending: FAMILY MEDICINE
Payer: MEDICARE

## 2018-02-12 DIAGNOSIS — J01.00 ACUTE MAXILLARY SINUSITIS, RECURRENCE NOT SPECIFIED: ICD-10-CM

## 2018-02-12 DIAGNOSIS — R29.898 BILATERAL LEG WEAKNESS: ICD-10-CM

## 2018-02-12 DIAGNOSIS — R29.898 WEAKNESS OF BOTH HIPS: ICD-10-CM

## 2018-02-12 DIAGNOSIS — Z74.09 IMPAIRED FUNCTIONAL MOBILITY, BALANCE, GAIT, AND ENDURANCE: ICD-10-CM

## 2018-02-12 PROCEDURE — 97110 THERAPEUTIC EXERCISES: CPT | Mod: PN

## 2018-02-12 RX ORDER — FLUTICASONE PROPIONATE 50 MCG
SPRAY, SUSPENSION (ML) NASAL
Qty: 1 BOTTLE | Refills: 0 | Status: SHIPPED | OUTPATIENT
Start: 2018-02-12 | End: 2018-03-13 | Stop reason: SDUPTHER

## 2018-02-12 NOTE — PROGRESS NOTES
"Name: Deb Webb  Clinic Number: [id number]  Date of Treatment: 02/12/2018   Diagnosis:   Encounter Diagnoses   Name Primary?    Impaired functional mobility, balance, gait, and endurance     Weakness of both hips     Bilateral leg weakness      Time in: 0800  Time Out: 0855  Total Treatment Time: 55 minutes (1:1 with PTA 30 minutes of treatment session)     Visit #: 6    Subjective:    Deb states that her knee hurts "as usual" this morning. Patient rates current pain level as 5/10 this morning. Patient states she is feeling very tired this morning.     Objective    Patient received individual therapy with activities as follows:   Deb received therapeutic exercises to develop strength, endurance, ROM, flexibility, posture and core stabilization for 55 minutes including:     NuStep x 8 minutes    Standing:  Marches 2x10  ea LE  Hip extension 2x10 ea LE  Hip abduction 2 x 10 ea LE BUE support  Mini squats 2x10  Toe/heel raises 2x10  NBOS airex pad 2 x 1 minute  Weight shifting airex pad 2 min  Tandem stance 30 sec x 2 ea LE  Step ups (4 inch) 2x10 ea   BLE shuttle squats x 2 black cords 3x10    Supine:  diaphragmatic breathing 3 min  Supine transverse abdominus activation with marches in HL 2 min  HL hip adduction (ball squeeze) x 2 minutes   HL clams x 3 min green TB   SL hip abduction 2 x 10  bridging 2 x 10    Written Home Exercises Provided: reviewed HEP issued on [employer]  Pt demo good understanding of the education provided. Deb demonstrated good return demonstration of activities.     Assessment:   Deb tolerated treatment well today. Patient demonstrates improvements in glut med muscle recruitment today with less incidence of valgus collapse with weight berating activities. Patient able to perform all standing exercises with 2 finger assist with cues needed to attempt without UE assist but increased apprehension noted. Patient continues to presents to clinic in [facility] despite heavy education " regarding the importance of proper shoe wear in order to reduce fall risk.   Pt will continue to benefit from skilled PT intervention. Medical Necessity is demonstrated by:  Fall Risk, Pain limits function of effected part for some activities, Unable to participate fully in daily activities, Requires skilled supervision to complete and progress HEP and Weakness.    Patient is making good progress towards established goals.    New/Revised goals: see initial eval on 1/16/2018    Plan:  Continue with established Plan of Care towards PT goals.     Madeline Bridges, PTA   2/12/2018

## 2018-02-15 ENCOUNTER — CLINICAL SUPPORT (OUTPATIENT)
Dept: REHABILITATION | Facility: HOSPITAL | Age: 68
End: 2018-02-15
Attending: FAMILY MEDICINE
Payer: MEDICARE

## 2018-02-15 DIAGNOSIS — Z74.09 IMPAIRED FUNCTIONAL MOBILITY, BALANCE, GAIT, AND ENDURANCE: ICD-10-CM

## 2018-02-15 DIAGNOSIS — R29.898 BILATERAL LEG WEAKNESS: ICD-10-CM

## 2018-02-15 DIAGNOSIS — R29.898 WEAKNESS OF BOTH HIPS: ICD-10-CM

## 2018-02-15 PROCEDURE — 97110 THERAPEUTIC EXERCISES: CPT | Mod: PN

## 2018-02-15 NOTE — PROGRESS NOTES
"Name: Deb Webb  Clinic Number: [id number]  Date of Treatment: 02/15/2018   Diagnosis:   Encounter Diagnoses   Name Primary?    Impaired functional mobility, balance, gait, and endurance     Weakness of both hips     Bilateral leg weakness      Time in: 0745  Time Out: 0845  Total Treatment Time: 60 minutes (1:1 with PTA 30 minutes of treatment session)     Visit #: 7  FOTO performed: visit 7     Subjective:    Deb states that her right knee is still hurting "as usual" today. Patient states her left hip is also a little sore today. Patient rates current pain level as 5/10 this morning. Patient states she has trouble riding in the car for more than an hour because of her left knee.     Objective    Patient received individual therapy with activities as follows:   Deb received therapeutic exercises to develop strength, endurance, ROM, flexibility, posture and core stabilization for 55 minutes including:     NuStep x 8 minutes    Standing:  Marches 2x10  ea LE  Hip extension 2x10 ea LE  Hip abduction 2 x 10 ea LE BUE support  Mini squats 2x10  Toe/heel raises 2x10  NBOS airex pad 2 x 1 minute  Weight shifting airex pad 2 min  Tandem stance 30 sec x 2 ea LE  +SL stance 10 sec x 2 ea   Step ups (4 inch) 2x10 ea   BLE shuttle squats x 2 black cords 3x10    Supine:  diaphragmatic breathing 3 min  Supine transverse abdominus activation with marches in HL 2 min  HL hip adduction (ball squeeze) x 2 minutes   HL clams x 3 min green TB   bridging 2 x 10  SL hip abduction 2 x 10    Written Home Exercises Provided: reviewed HEP issued on [employer]  Pt demo good understanding of the education provided. Deb demonstrated good return demonstration of activities.     Assessment:   Deb tolerated treatment well today. Patient able to progress to single limb stance with 2 finger assist required due to increased apprehension and decreased overall stability. Patient continues with TFL compensation when performing both " standing and sidelyinhg hip abduction with only minimal improvements achieved with cues for correction. Patient continues to presents to clinic in [facility] despite heavy education regarding the importance of proper shoe wear in order to reduce fall risk.   Pt will continue to benefit from skilled PT intervention. Medical Necessity is demonstrated by:  Fall Risk, Pain limits function of effected part for some activities, Unable to participate fully in daily activities, Requires skilled supervision to complete and progress HEP and Weakness.    Patient is making good progress towards established goals.    New/Revised goals: see initial eval on 1/16/2018    Plan:  Continue with established Plan of Care towards PT goals.     Madeline Bridges, PTA   2/15/2018

## 2018-02-20 ENCOUNTER — CLINICAL SUPPORT (OUTPATIENT)
Dept: REHABILITATION | Facility: HOSPITAL | Age: 68
End: 2018-02-20
Attending: FAMILY MEDICINE
Payer: MEDICARE

## 2018-02-20 DIAGNOSIS — R29.898 MUSCULAR DECONDITIONING: ICD-10-CM

## 2018-02-20 DIAGNOSIS — R29.898 WEAKNESS OF BOTH HIPS: ICD-10-CM

## 2018-02-20 DIAGNOSIS — Z74.09 IMPAIRED FUNCTIONAL MOBILITY, BALANCE, GAIT, AND ENDURANCE: Primary | ICD-10-CM

## 2018-02-20 DIAGNOSIS — R29.898 BILATERAL LEG WEAKNESS: ICD-10-CM

## 2018-02-20 DIAGNOSIS — R26.89 IMPAIRMENT OF BALANCE: ICD-10-CM

## 2018-02-20 PROCEDURE — G8979 MOBILITY GOAL STATUS: HCPCS | Mod: CK,PN | Performed by: PHYSICAL THERAPIST

## 2018-02-20 PROCEDURE — 97110 THERAPEUTIC EXERCISES: CPT | Mod: PN | Performed by: PHYSICAL THERAPIST

## 2018-02-20 PROCEDURE — 97112 NEUROMUSCULAR REEDUCATION: CPT | Mod: PN | Performed by: PHYSICAL THERAPIST

## 2018-02-20 PROCEDURE — G8978 MOBILITY CURRENT STATUS: HCPCS | Mod: CK,PN | Performed by: PHYSICAL THERAPIST

## 2018-02-20 NOTE — PROGRESS NOTES
"Name: Deb Webb  [facility] Number: [id number]  Date of Treatment: 02/20/2018   Diagnosis:   Encounter Diagnoses   Name Primary?    Impaired functional mobility, balance, gait, and endurance Yes    Weakness of both hips     Bilateral leg weakness     Muscular deconditioning     Impairment of balance      Time in: 0800  Time Out: 0900  Total Treatment Time: 60 minutes (1:1 with PT 30 minutes of treatment session)     Visit #: 8  FOTO performed: visit 7     Subjective:    Deb states that her right knee is still hurting "as usual" today. Patient states her left hip is also a little sore today. Patient rates current pain level as 5/10 this morning. Patient states she has trouble riding in the car for more than an hour because of her left knee.     Objective    SMYTH Assessment     1. Sitting to Standing              4 - able to stand without using hands and stabilize independently  2. Standing Unsupported              4 - able to stand safely 2 minutes without hold  3. Sitting Unsupported              4 - able to sit safely and securely 2 minutes  4. Standing to Sitting              4 - able to sit without using hands and stabilize independently  5. Pivot Transfer              4 - able to transfer safely with minimal use of hands  6. Standing with Eyes Closed              3 - able to stand 10 seconds with supervision  7. Standing with Feet Together              2 - able to place feet together independently but unable to hold for 30 seconds  8. Reaching Forward with Outstretched Arm              3 - can reach forward 12 cm/5 inches safely  9. Retrieving Object from Floor              3 - able to pick slipper but needs supervision  10. Turning to Look Behind              3 - looks behind one side only, other side less weight shift  11. Turning 360 Degrees              1 - needs close supervision or verbal cueing  12. Placing Alternate Foot on Step              1 - able to complete > 2 steps needs min assist  13. " Standing with One Foot in Front              0 - Looses balance while stepping or standing  14. Standing on One Foot              0 - unable to try or needs assistance to prevent fall     SMYTH/56 (41-56 = low fall risk 21-40 = medium fall risk 0 -20 = high fall risk)    Patient received individual therapy with activities as follows:   Deb received therapeutic exercises to develop strength, endurance, ROM, flexibility, posture and core stabilization for 55 minutes including:     NuStep x 8 minutes    Standing:  Marches 2x10  ea LE  Hip extension 2x10 ea LE  Hip abduction 2 x 10 ea LE BUE support  Mini squats 2x10  Toe/heel raises 2x10  NBOS airex pad 2 x 1 minute  Weight shifting airex pad 2 min  Tandem stance 30 sec x 2 ea LE  +SL stance 10 sec x 2 ea   Step ups (4 inch) 2x10 ea   BLE shuttle squats x 2 black cords 3x10    Supine:  diaphragmatic breathing 3 min  Supine transverse abdominus activation with marches in HL 2 min  HL hip adduction (ball squeeze) x 2 minutes   HL clams x 3 min green TB   bridging 2 x 10  SL hip abduction 2 x 10    Written Home Exercises Provided: reviewed HEP issued on [facility]  Pt demo good understanding of the education provided. Deb demonstrated good return demonstration of activities.     Assessment:   Deb tolerated treatment well with one month reassessment performed today. Patient demonstrating improved static standing balance as noted by recent SMYTH. Pt also reporting decreased subjective report of disability on FOTO outcomes measure. Pt continues to demonstrate poor safety awareness and discussed strategies for fall prevention including proper lighting throughout house, use of SC, and proper foot wear. Patient has met 2/3 short term and 1/4 long term goals. Patient to benefit from continued skilled physical therapy to progress towards remaining goals. Medical Necessity is demonstrated by:  Fall Risk, Pain limits function of effected part for some activities, Unable to  participate fully in daily activities, Requires skilled supervision to complete and progress HEP and Weakness.    Patient is making good progress towards established goals.    CMS Impairment/Limitation/Restriction for FOTO Lower Leg (w/o Knee) Survey  Status Limitation G-Code CMS Severity Modifier  Intake 29% 71%  Predicted 44% 56% Goal Status+ CK - At least 40 percent but less than 60 percent  2/15/2018 52% 48% Current Status CK - At least 40 percent but less than 60 percent    New/Revised goals: updated 2/20/2018    Short Term Goals (4 Weeks):   1. Patient to have improved BLE strength as noted by sit to stand transfer without use of UE's- met  2. Patient to independently verbalize 3 strategies for fall prevention- in progress  3. Patient to be able to perform 30 minutes of consecutive exercise without rest break for improved functional endurance with housechores- met  Long Term Goals (6 Weeks):   1. Patient to be independent with home exercise program for improved self management of condition  2. Patient to have decreased subjective report of disability as noted by a score of 56% or less on the FOTO questionnaire - met  3. Patient to have improved static balance as noted by 42/56 or greater of SMYTH for decreased risk for falls- in progress  4. Patient to have improved strength in BLE's by 1/3 muscle grade for improved functional mobility     Plan:  Continue with established Plan of Care towards PT goals.     Araseli Savage, PT   2/20/2018

## 2018-02-22 ENCOUNTER — OFFICE VISIT (OUTPATIENT)
Dept: FAMILY MEDICINE | Facility: CLINIC | Age: 68
End: 2018-02-22
Payer: MEDICARE

## 2018-02-22 VITALS
TEMPERATURE: 98 F | BODY MASS INDEX: 25.87 KG/M2 | HEART RATE: 116 BPM | WEIGHT: 140.56 LBS | OXYGEN SATURATION: 98 % | SYSTOLIC BLOOD PRESSURE: 120 MMHG | HEIGHT: 62 IN | DIASTOLIC BLOOD PRESSURE: 80 MMHG

## 2018-02-22 DIAGNOSIS — K58.9 IRRITABLE BOWEL SYNDROME WITHOUT DIARRHEA: ICD-10-CM

## 2018-02-22 DIAGNOSIS — Z00.00 ROUTINE PHYSICAL EXAMINATION: Primary | ICD-10-CM

## 2018-02-22 DIAGNOSIS — K21.9 GASTROESOPHAGEAL REFLUX DISEASE, ESOPHAGITIS PRESENCE NOT SPECIFIED: ICD-10-CM

## 2018-02-22 DIAGNOSIS — G89.29 OTHER CHRONIC PAIN: ICD-10-CM

## 2018-02-22 PROCEDURE — 99999 PR PBB SHADOW E&M-EST. PATIENT-LVL V: CPT | Mod: PBBFAC,,, | Performed by: FAMILY MEDICINE

## 2018-02-22 PROCEDURE — 99397 PER PM REEVAL EST PAT 65+ YR: CPT | Mod: S$GLB,,, | Performed by: FAMILY MEDICINE

## 2018-02-22 RX ORDER — CYCLOBENZAPRINE HCL 5 MG
5 TABLET ORAL 3 TIMES DAILY PRN
Qty: 90 TABLET | Refills: 5 | Status: SHIPPED | OUTPATIENT
Start: 2018-02-22 | End: 2020-05-18 | Stop reason: SDUPTHER

## 2018-02-22 RX ORDER — ESOMEPRAZOLE MAGNESIUM 40 MG/1
40 CAPSULE, DELAYED RELEASE ORAL
Qty: 90 CAPSULE | Refills: 2 | Status: SHIPPED | OUTPATIENT
Start: 2018-02-22 | End: 2018-11-28 | Stop reason: SDUPTHER

## 2018-02-22 RX ORDER — LANCETS
1 EACH MISCELLANEOUS 3 TIMES DAILY
Qty: 100 EACH | Refills: 5 | Status: SHIPPED | OUTPATIENT
Start: 2018-02-22 | End: 2018-07-09 | Stop reason: SDUPTHER

## 2018-02-22 RX ORDER — INSULIN PUMP SYRINGE, 3 ML
EACH MISCELLANEOUS
Qty: 1 EACH | Refills: 0 | Status: SHIPPED | OUTPATIENT
Start: 2018-02-22 | End: 2018-07-09

## 2018-02-22 NOTE — PROGRESS NOTES
[facility]  Progress Note    SUBJECTIVE:     Chief Complaint   Patient presents with    Diabetes    Annual Exam       HPI   Deb Webb  is a 67 y.o. female here for physical exam and for follow-up of her chronic conditions. Patient has no other new complaints/problems at this time.      Review of patient's allergies indicates:   Allergen Reactions    Silicone      Burn skin    Adhesive Rash       Past Medical History:   Diagnosis Date    Allergy     Arthritis     Cataract     Colon polyps     COPD (chronic obstructive pulmonary disease)     Diabetes mellitus type II     Diabetic neuropathy     Fever blister     Glaucoma (increased eye pressure)     Hyperlipidemia     Hypertension     Irritable bowel syndrome     Keloid cicatrix     Osteoporosis     Retained cholelithiasis following cholecystectomy(997.41)     Right patella fracture      Past Surgical History:   Procedure Laterality Date    CATARACT EXTRACTION W/  INTRAOCULAR LENS IMPLANT Bilateral     CHOLECYSTECTOMY      Laparoscopic    COLONOSCOPY      HERNIA REPAIR      HYSTERECTOMY      KNEE SURGERY Right 3/4/2015    Dr Weller     ovarian tumor      Benign    TONSILLECTOMY, ADENOIDECTOMY       Family History   Problem Relation Age of Onset    Cancer Mother      Skin    Skin cancer Mother     Glaucoma Mother     Cataracts Mother     Heart disease Father     Diabetes Father     Skin cancer Sister     Melanoma Neg Hx     Psoriasis Neg Hx     Eczema Neg Hx     Lupus Neg Hx     Amblyopia Neg Hx     Blindness Neg Hx     Macular degeneration Neg Hx     Retinal detachment Neg Hx     Strabismus Neg Hx      Social History   Substance Use Topics    Smoking status: Current Every Day Smoker     Packs/day: 0.50     Years: 40.00     Types: Cigarettes    Smokeless tobacco: Current User    Alcohol use No        Review of Systems   Constitutional: Negative for chills, diaphoresis and fever.   HENT: Negative for  congestion, ear pain and sore throat.    Eyes: Negative for photophobia and discharge.   Respiratory: Negative for cough, shortness of breath and wheezing.    Cardiovascular: Negative for chest pain and palpitations.   Gastrointestinal: Positive for constipation. Negative for abdominal pain, blood in stool, diarrhea, melena, nausea and vomiting.   Genitourinary: Negative for dysuria and hematuria.   Musculoskeletal: Negative for back pain and myalgias.   Skin: Negative for itching and rash.   Neurological: Negative for dizziness, sensory change, focal weakness, weakness and headaches.   All other systems reviewed and are negative.    OBJECTIVE:     Vitals:    02/22/18 0827   BP: 120/80   Pulse: (!) 116   Temp: 98.1 °F (36.7 °C)     Body mass index is 25.71 kg/m².    Physical Exam   Constitutional: She is oriented to person, place, and time and well-developed, well-nourished, and in no distress. No distress.   HENT:   Head: Normocephalic and atraumatic.   Right Ear: Tympanic membrane is not perforated, not erythematous and not bulging. No hemotympanum.   Left Ear: Tympanic membrane is not perforated, not erythematous and not bulging. No hemotympanum.   Mouth/Throat: Oropharynx is clear and moist. No oropharyngeal exudate.   Eyes: Conjunctivae and EOM are normal. Pupils are equal, round, and reactive to light.   Neck: No thyromegaly present.   Cardiovascular: Regular rhythm and normal heart sounds.  Tachycardia present.  Exam reveals no gallop and no friction rub.    No murmur heard.  Pulmonary/Chest: Effort normal and breath sounds normal. No respiratory distress. She has no wheezes. She has no rales.   Abdominal: Soft. Bowel sounds are normal. She exhibits no distension. There is no tenderness. There is no rebound and no guarding.   Musculoskeletal: Normal range of motion. She exhibits no edema or tenderness.   Lymphadenopathy:     She has no cervical adenopathy.   Neurological: She is alert and oriented to person,  place, and time.   Skin: Skin is warm. No rash noted. She is not diaphoretic. No erythema.     Protective Sensation (w/ 10 gram monofilament):  Right: Intact  Left: Intact    Visual Inspection:  Normal -  Bilateral    Pedal Pulses:   Right: Present  Left: Present    Posterior tibialis:   Right:Present  Left: Present      Old records were reviewed. Labs and/or images were independently reviewed.    ASSESSMENT     1. Routine physical examination    2. Other chronic pain    3. Gastroesophageal reflux disease, esophagitis presence not specified    4. Irritable bowel syndrome without diarrhea    5. Uncontrolled type 2 diabetes mellitus with diabetic neuropathy, without long-term current use of insulin        PLAN:     Routine physical examination  -     CBC auto differential; Future  -     Comprehensive metabolic panel; Future  -     Cancel: Hemoglobin A1c; Future  -     Lipid panel; Future  -     TSH; Future  -     Fructosamine; Future; Expected date: 02/22/2018  -     We briefly discussed diet, exercise, and routine preventive exams. All questions and comments addressed.    Other chronic pain  -     cyclobenzaprine (FLEXERIL) 5 MG tablet; Take 1 tablet (5 mg total) by mouth 3 (three) times daily as needed.  Dispense: 90 tablet; Refill: 5  -     Stable. Continue current regimen.    Gastroesophageal reflux disease, esophagitis presence not specified  -     esomeprazole (NEXIUM) 40 MG capsule; Take 1 capsule (40 mg total) by mouth before breakfast.  Dispense: 90 capsule; Refill: 2    Irritable bowel syndrome without diarrhea  -     Ambulatory referral to Gastroenterology for further evaluation and treatment.    Uncontrolled type 2 diabetes mellitus with diabetic neuropathy, without long-term current use of insulin  -     blood-glucose meter kit; Use as instructed  Dispense: 1 each; Refill: 0  -     lancets Misc; 1 application by Misc.(Non-Drug; Combo Route) route 3 (three) times daily.  Dispense: 100 each; Refill: 5  -      blood sugar diagnostic Strp; 1 strip by Misc.(Non-Drug; Combo Route) route 3 (three) times daily.  Dispense: 100 strip; Refill: 5  -     Instructed patient to take daily glucose AM logs and to write them down to bring with on next visit. Advised patient to decrease intake of carbohydrates/simple sugars.           RTC GITA Goldberg MD  02/22/2018 8:54 AM

## 2018-02-23 ENCOUNTER — CLINICAL SUPPORT (OUTPATIENT)
Dept: REHABILITATION | Facility: HOSPITAL | Age: 68
End: 2018-02-23
Attending: FAMILY MEDICINE
Payer: MEDICARE

## 2018-02-23 DIAGNOSIS — R29.898 BILATERAL LEG WEAKNESS: ICD-10-CM

## 2018-02-23 DIAGNOSIS — Z74.09 IMPAIRED FUNCTIONAL MOBILITY, BALANCE, GAIT, AND ENDURANCE: ICD-10-CM

## 2018-02-23 DIAGNOSIS — R29.898 WEAKNESS OF BOTH HIPS: ICD-10-CM

## 2018-02-23 PROCEDURE — 97110 THERAPEUTIC EXERCISES: CPT | Mod: PN

## 2018-02-23 PROCEDURE — 97112 NEUROMUSCULAR REEDUCATION: CPT | Mod: 59,PN

## 2018-02-23 NOTE — PROGRESS NOTES
Name: Deb Webb  [facility] Number: [id number]  Date of Treatment: 02/23/2018   Diagnosis:   Encounter Diagnoses   Name Primary?    Impaired functional mobility, balance, gait, and endurance     Weakness of both hips     Bilateral leg weakness      Time in: 0800  Time Out: 0900  Total Treatment Time: 60 minutes (1:1 with PTA duration of treatment session)     Visit #: 9  FOTO performed: visit 7     Subjective:    Deb states that her knees and hips are today. Patient states she is just feeling tired because she didn't sleep well last night. Patient rates current pain level as 0/10 this morning.      Objective       Patient received individual therapy with activities as follows:   Deb received therapeutic exercises to develop strength, endurance, ROM, flexibility, posture and core stabilization for 55 minutes including:     NuStep x 7 minutes    Standing:  Marches 2x10  ea LE  Hip extension 2x10 ea LE (YTB)  Hip abduction 2 x 10 ea LE BUE support (YTB)  Mini squats 2x10  Toe/heel raises 2x10  NBOS airex pad 2 x 1 minute (with head turns today)  Tandem stance 30 sec x 2 ea LE  SL stance 10 sec x 2 ea   +Ambulation over level 1 hurdles forward and lateral x 2 laps ea   Step ups (6 inch) 2x10 ea   BLE shuttle squats x 2 black cords 3x10    Supine:  diaphragmatic breathing 3 min  Supine transverse abdominus activation with SLR 2x10 ea   HL hip adduction (ball squeeze) x 2 minutes   Bridging 2 x 10  +SL clamshells (RTB) 2x10 ea     Written Home Exercises Provided: reviewed HEP issued on [employer]  Pt demo good understanding of the education provided. Deb demonstrated good return demonstration of activities.     Assessment:   Deb tolerated treatment well today. Patient with very good tolerance to ambulation over obstacles today with 1 loss of balance episode noted with lateral stepping. Patient able to progress quad and gluteals strengthening exercises without complaints of pain with appropriate training effect  achieved. Patient required heavy cues with sidelying clamshells to avoid compensatory rotation with appropriate correction achieved with tactile cueing.   Patient to benefit from continued skilled physical therapy to progress towards remaining goals. Medical Necessity is demonstrated by:  Fall Risk, Pain limits function of effected part for some activities, Unable to participate fully in daily activities, Requires skilled supervision to complete and progress HEP and Weakness.    Patient is making good progress towards established goals.    New/Revised goals: updated 2/20/2018    Short Term Goals (4 Weeks):   1. Patient to have improved BLE strength as noted by sit to stand transfer without use of UE's- met  2. Patient to independently verbalize 3 strategies for fall prevention- in progress  3. Patient to be able to perform 30 minutes of consecutive exercise without rest break for improved functional endurance with housechores- met  Long Term Goals (6 Weeks):   1. Patient to be independent with home exercise program for improved self management of condition  2. Patient to have decreased subjective report of disability as noted by a score of 56% or less on the FOTO questionnaire - met  3. Patient to have improved static balance as noted by 42/56 or greater of SMYTH for decreased risk for falls- in progress  4. Patient to have improved strength in BLE's by 1/3 muscle grade for improved functional mobility     Plan:  Continue with established Plan of Care towards PT goals.     Madeline Bridges, PTA   2/23/2018

## 2018-03-06 ENCOUNTER — CLINICAL SUPPORT (OUTPATIENT)
Dept: REHABILITATION | Facility: HOSPITAL | Age: 68
End: 2018-03-06
Attending: FAMILY MEDICINE
Payer: MEDICARE

## 2018-03-06 DIAGNOSIS — R29.898 WEAKNESS OF BOTH HIPS: ICD-10-CM

## 2018-03-06 DIAGNOSIS — Z74.09 IMPAIRED FUNCTIONAL MOBILITY, BALANCE, GAIT, AND ENDURANCE: ICD-10-CM

## 2018-03-06 DIAGNOSIS — R29.898 BILATERAL LEG WEAKNESS: ICD-10-CM

## 2018-03-06 PROCEDURE — 97112 NEUROMUSCULAR REEDUCATION: CPT | Mod: 59,PN

## 2018-03-06 PROCEDURE — 97110 THERAPEUTIC EXERCISES: CPT | Mod: PN

## 2018-03-06 NOTE — PROGRESS NOTES
Name: Deb Webb  [facility] Number: [id number]  Date of Treatment: 03/06/2018   Diagnosis:   Encounter Diagnoses   Name Primary?    Impaired functional mobility, balance, gait, and endurance     Weakness of both hips     Bilateral leg weakness      Time in: 0755  Time Out: 0850  Total Treatment Time: 55 minutes (1:1 with PTA 40 minutes of treatment session)     Visit #: 10  FOTO and G code performed: visit 7     Next FOTO and G code due visit: 17    Subjective:    Deb states that she finally got a pair of tennis shoes and she is trying to adjust. Patient states her left knee is a little more sore than usual this morning and she isn't sure why. Patient rates current pain level as 6/10 this morning.      Objective     Patient received individual therapy with activities as follows:   Deb received therapeutic exercises to develop strength, endurance, ROM, flexibility, posture and core stabilization for 55 minutes including:     NuStep x 7 minutes, hills-level 3     Standing:  Marches 2x10 ea LE  Hip extension 2x10 ea LE (YTB)  Hip abduction 2 x 10 ea LE BUE support (YTB)  Mini squats 2x10  Toe/heel raises 2x10  NBOS airex pad 2 x 1 minute (with head turns today)  Tandem stance 30 sec x 2 ea LE  SL stance 10 sec x 2 ea   Ambulation over level 1 hurdles forward and lateral x 2 laps ea   Step ups (6 inch) 2x10 ea   BLE shuttle squats x 2 black cords 3x10    Supine:  diaphragmatic breathing 3 min  Supine transverse abdominus activation with SLR 2x10 ea   HL hip adduction (ball squeeze) x 2 minutes   Bridging 2 x 10  SL clamshells (RTB) 2x10 ea     Written Home Exercises Provided: reviewed HEP issued on [employer]  Pt demo good understanding of the education provided. Deb demonstrated good return demonstration of activities.     Assessment:   Deb tolerated treatment well today. Patient continues demonstrate most difficulty with single limb stability requiring 1 UE assist to maintain balance with step ups. Patient  requires heavy cueing initially to perform SL clamshells correctly with improvements in form noted post instructions and with repetitions. Patient demonstrates improvements in both step height and step length when ambulating in tennis shoes vs. sandals with overall improvements in gait quality noted.   Patient to benefit from continued skilled physical therapy to progress towards remaining goals. Medical Necessity is demonstrated by:  Fall Risk, Pain limits function of effected part for some activities, Unable to participate fully in daily activities, Requires skilled supervision to complete and progress HEP and Weakness.    Patient is making good progress towards established goals.    New/Revised goals: updated 2/20/2018    Short Term Goals (4 Weeks):   1. Patient to have improved BLE strength as noted by sit to stand transfer without use of UE's- met  2. Patient to independently verbalize 3 strategies for fall prevention- in progress  3. Patient to be able to perform 30 minutes of consecutive exercise without rest break for improved functional endurance with housechores- met  Long Term Goals (6 Weeks):   1. Patient to be independent with home exercise program for improved self management of condition  2. Patient to have decreased subjective report of disability as noted by a score of 56% or less on the FOTO questionnaire - met  3. Patient to have improved static balance as noted by 42/56 or greater of SMYTH for decreased risk for falls- in progress  4. Patient to have improved strength in BLE's by 1/3 muscle grade for improved functional mobility     Plan:  Continue with established Plan of Care towards PT goals.     Madeline Bridges, PTA   3/6/2018

## 2018-03-06 NOTE — PLAN OF CARE
"Name: Deb Webb  [facility] Number: [id number]  Date of Treatment: 02/20/2018   Diagnosis:   Encounter Diagnoses   Name Primary?    Impaired functional mobility, balance, gait, and endurance Yes    Weakness of both hips     Bilateral leg weakness     Muscular deconditioning     Impairment of balance      Time in: 0800  Time Out: 0900  Total Treatment Time: 60 minutes (1:1 with PT 30 minutes of treatment session)     Visit #: 8  FOTO performed: visit 7     Subjective:    Deb states that her right knee is still hurting "as usual" today. Patient states her left hip is also a little sore today. Patient rates current pain level as 5/10 this morning. Patient states she has trouble riding in the car for more than an hour because of her left knee.     Objective    SMYTH Assessment     1. Sitting to Standing              4 - able to stand without using hands and stabilize independently  2. Standing Unsupported              4 - able to stand safely 2 minutes without hold  3. Sitting Unsupported              4 - able to sit safely and securely 2 minutes  4. Standing to Sitting              4 - able to sit without using hands and stabilize independently  5. Pivot Transfer              4 - able to transfer safely with minimal use of hands  6. Standing with Eyes Closed              3 - able to stand 10 seconds with supervision  7. Standing with Feet Together              2 - able to place feet together independently but unable to hold for 30 seconds  8. Reaching Forward with Outstretched Arm              3 - can reach forward 12 cm/5 inches safely  9. Retrieving Object from Floor              3 - able to pick slipper but needs supervision  10. Turning to Look Behind              3 - looks behind one side only, other side less weight shift  11. Turning 360 Degrees              1 - needs close supervision or verbal cueing  12. Placing Alternate Foot on Step              1 - able to complete > 2 steps needs min assist  13. " Standing with One Foot in Front              0 - Looses balance while stepping or standing  14. Standing on One Foot              0 - unable to try or needs assistance to prevent fall     SMYTH/56 (41-56 = low fall risk 21-40 = medium fall risk 0 -20 = high fall risk)    Patient received individual therapy with activities as follows:   Deb received therapeutic exercises to develop strength, endurance, ROM, flexibility, posture and core stabilization for 55 minutes including:     NuStep x 8 minutes    Standing:  Marches 2x10  ea LE  Hip extension 2x10 ea LE  Hip abduction 2 x 10 ea LE BUE support  Mini squats 2x10  Toe/heel raises 2x10  NBOS airex pad 2 x 1 minute  Weight shifting airex pad 2 min  Tandem stance 30 sec x 2 ea LE  +SL stance 10 sec x 2 ea   Step ups (4 inch) 2x10 ea   BLE shuttle squats x 2 black cords 3x10    Supine:  diaphragmatic breathing 3 min  Supine transverse abdominus activation with marches in HL 2 min  HL hip adduction (ball squeeze) x 2 minutes   HL clams x 3 min green TB   bridging 2 x 10  SL hip abduction 2 x 10    Written Home Exercises Provided: reviewed HEP issued on [facility]  Pt demo good understanding of the education provided. Deb demonstrated good return demonstration of activities.     Assessment:   Deb tolerated treatment well with one month reassessment performed today. Patient demonstrating improved static standing balance as noted by recent SMYTH. Pt also reporting decreased subjective report of disability on FOTO outcomes measure. Pt continues to demonstrate poor safety awareness and discussed strategies for fall prevention including proper lighting throughout house, use of SC, and proper foot wear. Patient has met 2/3 short term and 1/4 long term goals. Patient to benefit from continued skilled physical therapy to progress towards remaining goals. Medical Necessity is demonstrated by:  Fall Risk, Pain limits function of effected part for some activities, Unable to  participate fully in daily activities, Requires skilled supervision to complete and progress HEP and Weakness.    Patient is making good progress towards established goals.    CMS Impairment/Limitation/Restriction for FOTO Lower Leg (w/o Knee) Survey  Status Limitation G-Code CMS Severity Modifier  Intake 29% 71%  Predicted 44% 56% Goal Status+ CK - At least 40 percent but less than 60 percent  2/15/2018 52% 48% Current Status CK - At least 40 percent but less than 60 percent    New/Revised goals: updated 2/20/2018    Short Term Goals (4 Weeks):   1. Patient to have improved BLE strength as noted by sit to stand transfer without use of UE's- met  2. Patient to independently verbalize 3 strategies for fall prevention- in progress  3. Patient to be able to perform 30 minutes of consecutive exercise without rest break for improved functional endurance with housechores- met  Long Term Goals (6 Weeks):   1. Patient to be independent with home exercise program for improved self management of condition  2. Patient to have decreased subjective report of disability as noted by a score of 56% or less on the FOTO questionnaire - met  3. Patient to have improved static balance as noted by 42/56 or greater of SMYTH for decreased risk for falls- in progress  4. Patient to have improved strength in BLE's by 1/3 muscle grade for improved functional mobility     Plan:  Continue with established Plan of Care towards PT goals.     Araseli Savage, PT   2/20/2018

## 2018-03-07 DIAGNOSIS — I10 ESSENTIAL HYPERTENSION: Chronic | ICD-10-CM

## 2018-03-07 RX ORDER — LISINOPRIL 40 MG/1
40 TABLET ORAL DAILY
Qty: 90 TABLET | Refills: 1 | Status: SHIPPED | OUTPATIENT
Start: 2018-03-07 | End: 2018-09-02 | Stop reason: SDUPTHER

## 2018-03-13 ENCOUNTER — CLINICAL SUPPORT (OUTPATIENT)
Dept: REHABILITATION | Facility: HOSPITAL | Age: 68
End: 2018-03-13
Attending: FAMILY MEDICINE
Payer: MEDICARE

## 2018-03-13 DIAGNOSIS — J01.00 ACUTE MAXILLARY SINUSITIS, RECURRENCE NOT SPECIFIED: ICD-10-CM

## 2018-03-13 DIAGNOSIS — R29.898 WEAKNESS OF BOTH HIPS: ICD-10-CM

## 2018-03-13 DIAGNOSIS — R29.898 BILATERAL LEG WEAKNESS: ICD-10-CM

## 2018-03-13 DIAGNOSIS — Z74.09 IMPAIRED FUNCTIONAL MOBILITY, BALANCE, GAIT, AND ENDURANCE: ICD-10-CM

## 2018-03-13 PROCEDURE — 97112 NEUROMUSCULAR REEDUCATION: CPT | Mod: 59,PN

## 2018-03-13 PROCEDURE — 97110 THERAPEUTIC EXERCISES: CPT | Mod: PN

## 2018-03-13 RX ORDER — FLUTICASONE PROPIONATE 50 MCG
SPRAY, SUSPENSION (ML) NASAL
Qty: 16 G | Refills: 2 | Status: SHIPPED | OUTPATIENT
Start: 2018-03-13 | End: 2018-06-05 | Stop reason: SDUPTHER

## 2018-03-13 NOTE — PROGRESS NOTES
Name: Deb Webb  [facility] Number: [id number]  Date of Treatment: 03/13/2018   Diagnosis:   Encounter Diagnoses   Name Primary?    Impaired functional mobility, balance, gait, and endurance     Weakness of both hips     Bilateral leg weakness      Time in: 0755  Time Out: 0845  Total Treatment Time: 50 minutes (1:1 with PTA 40 minutes of treatment session)     Visit #: 10  FOTO and G code performed: visit 7     Next FOTO and G code due visit: 17    Subjective:    Deb states that her knees are finally feeling good for a change. Patient rates current pain level as 0/10 this morning.      Objective    Patient received individual therapy with activities as follows:   Deb received therapeutic exercises to develop strength, endurance, ROM, flexibility, posture and core stabilization for 55 minutes including:     NuStep x 7 minutes, hills-level 3 (+ upright bike today x 5 minutes)    Standing:  Hip extension 2x10 ea LE (YTB)  Hip abduction 2x10 ea LE BUE support (YTB)  Mini squats 2x10  Toe/heel raises 2x10    NBOS airex pad 2 x 1 minute (with head turns today)  Marches 2x10 ea LE (on air ex pad)   Tandem stance 30 sec x 2 ea LE  SL stance 10 sec x 2 ea   Ambulation over level 1/level 2 hurdles forward and lateral x 2 laps ea   Step ups (6 inch) 2x10 ea   BLE shuttle squats x 2 black cords 3x10    Supine:  Diaphragmatic breathing x 2 minutes  Supine transverse abdominus activation with SLR 2x10 ea   HL hip adduction (ball squeeze) x 2 minutes   Bridging 2 x 10  SL clamshells (RTB) 2x10 ea     Written Home Exercises Provided: reviewed HEP issued on [employer]  Pt demo good understanding of the education provided. Deb demonstrated good return demonstration of activities.     Assessment:   Deb tolerated treatment well today. Patient able to progress to warm up on upright bike this morning with BLE muscle fatigue and poor cardiovascular endurance noted. Patient able to progress to level 2 hurdles today but poor  neuromuscular control noted with several loss of balance episodes requiring UE assist to recover. Patient able to perform all exercises without complaints of BLE pain  Patient to benefit from continued skilled physical therapy to progress towards remaining goals. Medical Necessity is demonstrated by:  Fall Risk, Pain limits function of effected part for some activities, Unable to participate fully in daily activities, Requires skilled supervision to complete and progress HEP and Weakness.    Patient is making good progress towards established goals.    New/Revised goals: updated 2/20/2018    Short Term Goals (4 Weeks):   1. Patient to have improved BLE strength as noted by sit to stand transfer without use of UE's- met  2. Patient to independently verbalize 3 strategies for fall prevention- in progress  3. Patient to be able to perform 30 minutes of consecutive exercise without rest break for improved functional endurance with housechores- met  Long Term Goals (6 Weeks):   1. Patient to be independent with home exercise program for improved self management of condition  2. Patient to have decreased subjective report of disability as noted by a score of 56% or less on the FOTO questionnaire - met  3. Patient to have improved static balance as noted by 42/56 or greater of SMYTH for decreased risk for falls- in progress  4. Patient to have improved strength in BLE's by 1/3 muscle grade for improved functional mobility     Plan:  Continue with established Plan of Care towards PT goals.     Madeline Bridges, PTA   3/13/2018

## 2018-03-16 ENCOUNTER — CLINICAL SUPPORT (OUTPATIENT)
Dept: REHABILITATION | Facility: HOSPITAL | Age: 68
End: 2018-03-16
Attending: FAMILY MEDICINE
Payer: MEDICARE

## 2018-03-16 DIAGNOSIS — R29.898 MUSCULAR DECONDITIONING: ICD-10-CM

## 2018-03-16 DIAGNOSIS — R29.898 BILATERAL LEG WEAKNESS: ICD-10-CM

## 2018-03-16 DIAGNOSIS — R29.898 WEAKNESS OF BOTH HIPS: ICD-10-CM

## 2018-03-16 DIAGNOSIS — Z74.09 IMPAIRED FUNCTIONAL MOBILITY, BALANCE, GAIT, AND ENDURANCE: Primary | ICD-10-CM

## 2018-03-16 DIAGNOSIS — R26.89 IMPAIRMENT OF BALANCE: ICD-10-CM

## 2018-03-16 PROCEDURE — 97110 THERAPEUTIC EXERCISES: CPT | Mod: PN | Performed by: PHYSICAL THERAPIST

## 2018-03-16 NOTE — PROGRESS NOTES
Name: Deb Webb  [facility] Number: [id number]  Date of Treatment: 03/16/2018   Diagnosis:   Encounter Diagnoses   Name Primary?    Impaired functional mobility, balance, gait, and endurance Yes    Weakness of both hips     Muscular deconditioning     Bilateral leg weakness     Impairment of balance      Time in: 0730  Time Out: 0820  Total Treatment Time: 50 minutes (1:1 with PT 40 minutes of treatment session)     Visit #: 12  FOTO and G code performed: visit 7     Next FOTO and G code due visit: 17    Subjective:    Deb states looking into joining [facility] to be able to exercise after she is done with therapy. She would like to continue here to work more on her balance and not be so shaky. Patient rates current pain level as 0/10 this morning.      Objective    MMT                            Left                  Right     Hip:  Flexion                         3+/5                 4-/5        Extension                    3+/5                 3/5  Abduction                    4-/5                 3+/4  Adduction                    4-/5                  4-/5  External Rotation        4-/5                 3+/5  Internal rotation           4-/5                  4-/5     Knee:  Flexion                         3+/5                 4-/5  Extension                    4/5                   4/5     Ankle:  Dorsiflexion                 4-/5                  4-/5  Plantar flexion             4/5                   4/5  Inversion                     4-/5                  4-/5  Eversion                      4-/5                 4/5    Patient received individual therapy with activities as follows:   Deb received therapeutic exercises to develop strength, endurance, ROM, flexibility, posture and core stabilization for 50 minutes including:     NuStep x 7 minutes, hills-level 3     Standing:  Hip extension 2x10 ea LE (YTB)  Hip abduction 2x10 ea LE BUE support (YTB)  Mini squats 2x10  Toe/heel raises  2x10    NBOS airex pad 2 x 1 minute (with head turns today)  Marches 2x10 ea LE (on air ex pad)   Tandem stance 30 sec x 2 ea LE  SL stance 10 sec x 2 ea   Ambulation over level 1/level 2 hurdles forward and lateral x 2 laps ea   Step ups (6 inch) 2x10 ea   BLE shuttle squats x 2 black cords 3x10    Supine:  Diaphragmatic breathing x 2 minutes  Supine transverse abdominus activation with SLR 2x10 ea   HL hip adduction (ball squeeze) x 2 minutes   Bridging 2 x 10  SL clamshells (RTB) 2x10 ea     Written Home Exercises Provided: reviewed HEP issued on eval  Pt demo good understanding of the education provided. Deb demonstrated good return demonstration of activities.     Assessment:   Deb tolerated treatment well today with month reassessment performed. Pt demonstrating improvements in BLE strength as noted by recent objective measures. Patient to benefit from continued skilled physical therapy to progress towards remaining goals and emphasis on transition to gym program for management of chronic conditions. Medical Necessity is demonstrated by:  Fall Risk, Pain limits function of effected part for some activities, Unable to participate fully in daily activities, Requires skilled supervision to complete and progress HEP and Weakness.    Patient is making good progress towards established goals.    New/Revised goals: updated 3/16/2018    Short Term Goals (4 Weeks):   1. Patient to have improved BLE strength as noted by sit to stand transfer without use of UE's- met  2. Patient to independently verbalize 3 strategies for fall prevention- met  3. Patient to be able to perform 30 minutes of consecutive exercise without rest break for improved functional endurance with housechores- met  Long Term Goals (6 Weeks):   1. Patient to be independent with home exercise program for improved self management of condition  2. Patient to have decreased subjective report of disability as noted by a score of 56% or less on the  FOTO questionnaire - met  3. Patient to have improved static balance as noted by 42/56 or greater of SMYTH for decreased risk for falls- in progress  4. Patient to have improved strength in BLE's by 1/3 muscle grade for improved functional mobility - in progress    Plan:     Certification Period: 1/16/2018 to 5/10/2018  Recommended Treatment Plan: 2 times per week for 4-6 more weeks: Gait Training, Manual Therapy, Moist Heat/ Ice, Neuromuscular Re-ed, Patient Education, Self Care, Therapeutic Activites and Therapeutic Exercise     Araseli Savage, PT   3/16/2018

## 2018-03-16 NOTE — PLAN OF CARE
Name: Deb Webb  [facility] Number: [id number]  Date of Treatment: 03/16/2018   Diagnosis:   Encounter Diagnoses   Name Primary?    Impaired functional mobility, balance, gait, and endurance Yes    Weakness of both hips     Muscular deconditioning     Bilateral leg weakness     Impairment of balance      Time in: 0730  Time Out: 0820  Total Treatment Time: 50 minutes (1:1 with PT 40 minutes of treatment session)     Visit #: 12  FOTO and G code performed: visit 7     Next FOTO and G code due visit: 17    Subjective:    Deb states looking into joining [facility] to be able to exercise after she is done with therapy. She would like to continue here to work more on her balance and not be so shaky. Patient rates current pain level as 0/10 this morning.      Objective    MMT                            Left                  Right     Hip:  Flexion                         3+/5                 4-/5        Extension                    3+/5                 3/5  Abduction                    4-/5                 3+/4  Adduction                    4-/5                  4-/5  External Rotation        4-/5                 3+/5  Internal rotation           4-/5                  4-/5     Knee:  Flexion                         3+/5                 4-/5  Extension                    4/5                   4/5     Ankle:  Dorsiflexion                 4-/5                  4-/5  Plantar flexion             4/5                   4/5  Inversion                     4-/5                  4-/5  Eversion                      4-/5                 4/5    Patient received individual therapy with activities as follows:   Deb received therapeutic exercises to develop strength, endurance, ROM, flexibility, posture and core stabilization for 50 minutes including:     NuStep x 7 minutes, hills-level 3     Standing:  Hip extension 2x10 ea LE (YTB)  Hip abduction 2x10 ea LE BUE support (YTB)  Mini squats 2x10  Toe/heel raises  2x10    NBOS airex pad 2 x 1 minute (with head turns today)  Marches 2x10 ea LE (on air ex pad)   Tandem stance 30 sec x 2 ea LE  SL stance 10 sec x 2 ea   Ambulation over level 1/level 2 hurdles forward and lateral x 2 laps ea   Step ups (6 inch) 2x10 ea   BLE shuttle squats x 2 black cords 3x10    Supine:  Diaphragmatic breathing x 2 minutes  Supine transverse abdominus activation with SLR 2x10 ea   HL hip adduction (ball squeeze) x 2 minutes   Bridging 2 x 10  SL clamshells (RTB) 2x10 ea     Written Home Exercises Provided: reviewed HEP issued on eval  Pt demo good understanding of the education provided. Deb demonstrated good return demonstration of activities.     Assessment:   Deb tolerated treatment well today with month reassessment performed. Pt demonstrating improvements in BLE strength as noted by recent objective measures. Patient to benefit from continued skilled physical therapy to progress towards remaining goals and emphasis on transition to gym program for management of chronic conditions. Medical Necessity is demonstrated by:  Fall Risk, Pain limits function of effected part for some activities, Unable to participate fully in daily activities, Requires skilled supervision to complete and progress HEP and Weakness.    Patient is making good progress towards established goals.    New/Revised goals: updated 3/16/2018    Short Term Goals (4 Weeks):   1. Patient to have improved BLE strength as noted by sit to stand transfer without use of UE's- met  2. Patient to independently verbalize 3 strategies for fall prevention- met  3. Patient to be able to perform 30 minutes of consecutive exercise without rest break for improved functional endurance with housechores- met  Long Term Goals (6 Weeks):   1. Patient to be independent with home exercise program for improved self management of condition  2. Patient to have decreased subjective report of disability as noted by a score of 56% or less on the  FOTO questionnaire - met  3. Patient to have improved static balance as noted by 42/56 or greater of SMYTH for decreased risk for falls- in progress  4. Patient to have improved strength in BLE's by 1/3 muscle grade for improved functional mobility - in progress    Plan:     Certification Period: 1/16/2018 to 5/10/2018  Recommended Treatment Plan: 2 times per week for 4-6 more weeks: Gait Training, Manual Therapy, Moist Heat/ Ice, Neuromuscular Re-ed, Patient Education, Self Care, Therapeutic Activites and Therapeutic Exercise     Araseli Savage, PT   3/16/2018

## 2018-03-23 ENCOUNTER — CLINICAL SUPPORT (OUTPATIENT)
Dept: REHABILITATION | Facility: HOSPITAL | Age: 68
End: 2018-03-23
Attending: FAMILY MEDICINE
Payer: MEDICARE

## 2018-03-23 DIAGNOSIS — R29.898 WEAKNESS OF BOTH HIPS: ICD-10-CM

## 2018-03-23 DIAGNOSIS — Z74.09 IMPAIRED FUNCTIONAL MOBILITY, BALANCE, GAIT, AND ENDURANCE: ICD-10-CM

## 2018-03-23 DIAGNOSIS — R29.898 BILATERAL LEG WEAKNESS: ICD-10-CM

## 2018-03-23 PROCEDURE — 97112 NEUROMUSCULAR REEDUCATION: CPT | Mod: 59,PN

## 2018-03-23 PROCEDURE — 97110 THERAPEUTIC EXERCISES: CPT | Mod: PN

## 2018-03-23 NOTE — PROGRESS NOTES
Name: Deb Webb  Clinic Number: [id number]  Date of Treatment: 03/23/2018   Diagnosis:   Encounter Diagnoses   Name Primary?    Impaired functional mobility, balance, gait, and endurance     Weakness of both hips     Bilateral leg weakness      Time in: 0832  Time Out: 0930  Total Treatment Time: 58 minutes (1:1 with PTA 55 minutes of treatment session)     Visit #: 13  FOTO and PHYLICIA code performed: visit 7 (due on visit 17)    Subjective:    Deb states her knees and hips are pain free today. Patient states she will be attending [facility] in a few weeks and she would like to try going without her cane. Patient rates current pain level as 0/10 this morning.      Objective:    Patient received individual therapy with activities as follows:   Deb received therapeutic exercises to develop strength, endurance, ROM, flexibility, posture and core stabilization for 50 minutes including:     NuStep x 7 minutes, hills-level 3 (collected subjective and discussed importance of using cane in large group settings)     Standing:  Hip extension 2x10 ea LE (YTB)  Hip abduction 2x10 ea LE BUE support (YTB)  Mini squats 2x10  Toe/heel raises 2x10    NBOS airex pad 2 x 1 minute (with head turns today)  Marches 2x10 ea LE (on air ex pad)   Tandem stance 30 sec x 2 ea LE  SL stance 15 sec x 2 ea   Ambulation over level 1/level 2 hurdles forward and lateral x 2 laps ea   Step ups (6 inch) 2x10 ea   BLE shuttle squats x 2 black cords 3x10    Supine:  Diaphragmatic breathing x 2 minutes  Supine transverse abdominus activation with SLR 2x10 ea   HL hip adduction (ball squeeze) x 2 minutes   Bridging 3 x 10  SL clamshells (GTB) 3x10 ea     Written Home Exercises Provided: reviewed HEP issued on [employer]  Pt demo good understanding of the education provided. Deb demonstrated good return demonstration of activities.     Assessment:   Deb tolerated treatment well today. Patient demonstrates ability to perform forward and lateral  ambulation over hurdles today without HHA with good clearance of hurdles and no loss of balance noted. Patient demonstrates good tolerance to progression of repetitions and resistance of hip stabilization exercises with appropriate training effect achieved. Patient to benefit from continued skilled physical therapy to progress towards remaining goals and emphasis on transition to gym program for management of chronic conditions. Medical Necessity is demonstrated by:  Fall Risk, Pain limits function of effected part for some activities, Unable to participate fully in daily activities, Requires skilled supervision to complete and progress HEP and Weakness.    Patient is making good progress towards established goals.    New/Revised goals: updated 3/16/2018    Short Term Goals (4 Weeks):   1. Patient to have improved BLE strength as noted by sit to stand transfer without use of UE's- met  2. Patient to independently verbalize 3 strategies for fall prevention- met  3. Patient to be able to perform 30 minutes of consecutive exercise without rest break for improved functional endurance with housechores- met  Long Term Goals (6 Weeks):   1. Patient to be independent with home exercise program for improved self management of condition  2. Patient to have decreased subjective report of disability as noted by a score of 56% or less on the FOTO questionnaire - met  3. Patient to have improved static balance as noted by 42/56 or greater of SMYTH for decreased risk for falls- in progress  4. Patient to have improved strength in BLE's by 1/3 muscle grade for improved functional mobility - in progress    Plan:     Certification Period: 1/16/2018 to 5/10/2018  Recommended Treatment Plan: 2 times per week for 4-6 more weeks: Gait Training, Manual Therapy, Moist Heat/ Ice, Neuromuscular Re-ed, Patient Education, Self Care, Therapeutic Activites and Therapeutic Exercise     Madeline Bridges, PTA   3/23/2018

## 2018-03-27 ENCOUNTER — CLINICAL SUPPORT (OUTPATIENT)
Dept: REHABILITATION | Facility: HOSPITAL | Age: 68
End: 2018-03-27
Attending: FAMILY MEDICINE
Payer: MEDICARE

## 2018-03-27 DIAGNOSIS — R26.89 IMPAIRMENT OF BALANCE: ICD-10-CM

## 2018-03-27 DIAGNOSIS — R29.898 WEAKNESS OF BOTH HIPS: ICD-10-CM

## 2018-03-27 DIAGNOSIS — Z74.09 IMPAIRED FUNCTIONAL MOBILITY, BALANCE, GAIT, AND ENDURANCE: Primary | ICD-10-CM

## 2018-03-27 DIAGNOSIS — R29.898 BILATERAL LEG WEAKNESS: ICD-10-CM

## 2018-03-27 DIAGNOSIS — R29.898 MUSCULAR DECONDITIONING: ICD-10-CM

## 2018-03-27 PROCEDURE — 97110 THERAPEUTIC EXERCISES: CPT | Mod: PN | Performed by: PHYSICAL THERAPIST

## 2018-03-27 NOTE — PROGRESS NOTES
Name: Deb Webb  Clinic Number: [id number]  Date of Treatment: 03/27/2018   Diagnosis:   Encounter Diagnoses   Name Primary?    Impaired functional mobility, balance, gait, and endurance Yes    Weakness of both hips     Bilateral leg weakness     Muscular deconditioning     Impairment of balance      Time in: 0830  Time Out: 0930  Total Treatment Time: 60 minutes (1:1 with PT 30 minutes of treatment session)     Visit #: 14  FOTO and G code performed: visit 7 (due on visit 17)    Subjective:    Deb states wanting to try new balance exercises today. Patient rates current pain level as 0/10 this morning.      Objective:    Patient received individual therapy with activities as follows:   Deb received therapeutic exercises to develop strength, endurance, ROM, flexibility, posture and core stabilization for 50 minutes including:     NuStep x 7 minutes, hills-level 3     Standing:  Hip extension 2x10 ea LE (YTB)  Hip abduction 2x10 ea LE BUE support (YTB)  Hip flexion 2 x 10 ea LE (YTB)  Mini squats 2x10  Toe/heel raises 2x10    NBOS airex pad 2 x 1 minute (with head turns today)  Marches 2x10 ea LE (on air ex pad)   Tandem stance 30 sec x 2 ea LE  SL stance 15 sec x 2 ea   Ambulation over level 1/level 2 hurdles forward and lateral x 2 laps ea   Step ups (6 inch) 2x10 ea   BLE shuttle squats x 2 black cords 3x10 (NP)  +Leg press 20# x 15  +Step ups onto BOSU x 15 ea LE    Supine:  Diaphragmatic breathing x 2 minutes  Supine transverse abdominus activation with SLR 2x10 ea   HL hip adduction (ball squeeze) x 2 minutes   Bridging 3 x 10  SL clamshells (GTB) 3x10 ea     Written Home Exercises Provided: reviewed HEP issued on [employer]  Pt demo good understanding of the education provided. Deb demonstrated good return demonstration of activities.     Assessment:   Deb tolerated treatment well today without complaint of pain. Patient unable to progress LE strengthening on resisted machines due to poor  eccentric control. Pt also demonstrating poor dynamic balance on uneven surfaces requiring BUE support in parallel bars to correct los of balance. PT continues to recommend to continue use of AD for fall prevention and safety. Patient to benefit from continued skilled physical therapy to progress towards remaining goals and emphasis on transition to gym program for management of chronic conditions. Medical Necessity is demonstrated by:  Fall Risk, Pain limits function of effected part for some activities, Unable to participate fully in daily activities, Requires skilled supervision to complete and progress HEP and Weakness.    Patient is making good progress towards established goals.    New/Revised goals: updated 3/16/2018    Short Term Goals (4 Weeks):   1. Patient to have improved BLE strength as noted by sit to stand transfer without use of UE's- met  2. Patient to independently verbalize 3 strategies for fall prevention- met  3. Patient to be able to perform 30 minutes of consecutive exercise without rest break for improved functional endurance with housechores- met  Long Term Goals (6 Weeks):   1. Patient to be independent with home exercise program for improved self management of condition  2. Patient to have decreased subjective report of disability as noted by a score of 56% or less on the FOTO questionnaire - met  3. Patient to have improved static balance as noted by 42/56 or greater of SMYTH for decreased risk for falls- in progress  4. Patient to have improved strength in BLE's by 1/3 muscle grade for improved functional mobility - in progress    Plan:     Certification Period: 1/16/2018 to 5/10/2018  Recommended Treatment Plan: 2 times per week for 4-6 more weeks: Gait Training, Manual Therapy, Moist Heat/ Ice, Neuromuscular Re-ed, Patient Education, Self Care, Therapeutic Activites and Therapeutic Exercise     Araseli Savage, PT   3/27/2018

## 2018-03-29 ENCOUNTER — CLINICAL SUPPORT (OUTPATIENT)
Dept: REHABILITATION | Facility: HOSPITAL | Age: 68
End: 2018-03-29
Attending: FAMILY MEDICINE
Payer: MEDICARE

## 2018-03-29 DIAGNOSIS — Z74.09 IMPAIRED FUNCTIONAL MOBILITY, BALANCE, GAIT, AND ENDURANCE: ICD-10-CM

## 2018-03-29 DIAGNOSIS — R29.898 WEAKNESS OF BOTH HIPS: ICD-10-CM

## 2018-03-29 DIAGNOSIS — R29.898 BILATERAL LEG WEAKNESS: ICD-10-CM

## 2018-03-29 PROCEDURE — 97110 THERAPEUTIC EXERCISES: CPT | Mod: PN

## 2018-03-29 PROCEDURE — 97112 NEUROMUSCULAR REEDUCATION: CPT | Mod: PN

## 2018-03-29 NOTE — PROGRESS NOTES
Name: Deb Webb  [facility] Number: [id number]  Date of Treatment: 03/29/2018   Diagnosis:   Encounter Diagnoses   Name Primary?    Impaired functional mobility, balance, gait, and endurance     Weakness of both hips     Bilateral leg weakness      Time in: 0750  Time Out: 0845  Total Treatment Time: 55 minutes (1:1 with PTA 30 minutes of treatment session)     Visit #: 15  FOTO and PHYLICIA code performed: visit 7 (due on visit 17)    Subjective:    Deb states her right knee feels like lead today. Patient states there is no pain, it just feels heavy. Patient states she did not like the leg press last session and she would rather do the shuttle instead. Patient rates current pain level as 0/10 this morning.      Objective:    Patient received individual therapy with activities as follows:   Deb received therapeutic exercises to develop strength, endurance, ROM, flexibility, posture and core stabilization for 50 minutes including:     NuStep x 7 minutes, [place]-level 3     Standing:  Hip extension 2x10 ea LE (YTB)  Hip abduction 2x10 ea LE (YTB)  Hip flexion 2 x 10 ea LE (YTB)  Mini squats 2x10  Toe/heel raises 2x10    NBOS airex pad 2 x 1 minute (with head turns)  Marches 2x10 ea LE (on air ex pad)   Tandem stance 30 sec x 2 ea LE  SL stance 15 sec x 2 ea   Ambulation over level 2 hurdles forward and lateral x 2 laps ea   Step ups (6 inch) 2x10 ea   BLE shuttle squats x 2.5 cords 3x10  Step ups onto BOSU x 15 ea LE    Supine:  Diaphragmatic breathing x 2 minutes  Supine transverse abdominus activation with SLR 2x10 ea   HL hip adduction (ball squeeze) x 2 minutes   Bridging 3 x 10  SL clamshells (GTB) 3x10 ea     Written Home Exercises Provided: reviewed HEP issued on [employer]  Pt demo good understanding of the education provided. Deb demonstrated good return demonstration of activities.     Assessment:   Deb tolerated treatment well today without complaint of pain. Patient able to progress to level 2 hurdles  today with no loss of balance with forward ambulation but apprehension and decreased foot clearance noted with lateral ambulation. Patient demonstrates decreased dynamic stability requiring HHA to maintain stability when performing step ups on bosu ball. Patient continues to require cueing throughout session to ensure proper exercise form.   Patient to benefit from continued skilled physical therapy to progress towards remaining goals and emphasis on transition to gym program for management of chronic conditions. Medical Necessity is demonstrated by:  Fall Risk, Pain limits function of effected part for some activities, Unable to participate fully in daily activities, Requires skilled supervision to complete and progress HEP and Weakness.    Patient is making good progress towards established goals.    New/Revised goals: updated 3/16/2018    Short Term Goals (4 Weeks):   1. Patient to have improved BLE strength as noted by sit to stand transfer without use of UE's- met  2. Patient to independently verbalize 3 strategies for fall prevention- met  3. Patient to be able to perform 30 minutes of consecutive exercise without rest break for improved functional endurance with housechores- met  Long Term Goals (6 Weeks):   1. Patient to be independent with home exercise program for improved self management of condition  2. Patient to have decreased subjective report of disability as noted by a score of 56% or less on the FOTO questionnaire - met  3. Patient to have improved static balance as noted by 42/56 or greater of SMYTH for decreased risk for falls- in progress  4. Patient to have improved strength in BLE's by 1/3 muscle grade for improved functional mobility - in progress    Plan:     Certification Period: 1/16/2018 to 5/10/2018  Recommended Treatment Plan: 2 times per week for 4-6 more weeks: Gait Training, Manual Therapy, Moist Heat/ Ice, Neuromuscular Re-ed, Patient Education, Self Care, Therapeutic Activites and  Therapeutic Exercise     Madeline Bridges, PTA   3/29/2018

## 2018-04-10 ENCOUNTER — CLINICAL SUPPORT (OUTPATIENT)
Dept: REHABILITATION | Facility: HOSPITAL | Age: 68
End: 2018-04-10
Attending: FAMILY MEDICINE
Payer: MEDICARE

## 2018-04-10 DIAGNOSIS — R29.898 WEAKNESS OF BOTH HIPS: ICD-10-CM

## 2018-04-10 DIAGNOSIS — R29.898 MUSCULAR DECONDITIONING: ICD-10-CM

## 2018-04-10 DIAGNOSIS — R26.89 IMPAIRMENT OF BALANCE: ICD-10-CM

## 2018-04-10 DIAGNOSIS — Z74.09 IMPAIRED FUNCTIONAL MOBILITY, BALANCE, GAIT, AND ENDURANCE: Primary | ICD-10-CM

## 2018-04-10 DIAGNOSIS — R29.898 BILATERAL LEG WEAKNESS: ICD-10-CM

## 2018-04-10 PROCEDURE — 97110 THERAPEUTIC EXERCISES: CPT | Mod: PN | Performed by: PHYSICAL THERAPIST

## 2018-04-10 NOTE — PROGRESS NOTES
Name: Deb Webb  [facility] Number: [id number]  Date of Treatment: 04/10/2018   Diagnosis:   Encounter Diagnoses   Name Primary?    Impaired functional mobility, balance, gait, and endurance Yes    Weakness of both hips     Bilateral leg weakness     Muscular deconditioning     Impairment of balance      Time in: 0830  Time Out: 0930  Total Treatment Time: 60 minutes (1:1 with PT 30 minutes of treatment session)     Visit #: 16  FOTO and G code performed: visit 7 (due on visit 17)    Subjective:    Deb states forgetting her cane at [employer] and having trouble walking up the ramps. She notes increased swelling in her legs today and not sure if she can do all the standing. Patient rates current pain level as 0/10 this morning.      FOTO: issue next visit    Objective:    Patient received individual therapy with activities as follows:   Deb received therapeutic exercises to develop strength, endurance, ROM, flexibility, posture and core stabilization for 60 minutes including:     NuStep x 7 minutes, hills-level 3     Standing:  Hip extension 2x10 ea LE (YTB)  Hip abduction 2x10 ea LE (YTB)  Hip flexion 2 x 10 ea LE (YTB)  Mini squats 2x10  Toe/heel raises 2x10    NBOS airex pad 2 x 1 minute (with head turns)  Marches 2x10 ea LE (on air ex pad)   Tandem stance 30 sec x 2 ea LE  SL stance 15 sec x 2 ea   Ambulation over level 2 hurdles forward and lateral x 2 laps ea - not performed  Step ups (6 inch) 2x10 ea   BLE shuttle squats x 2.5 cords 3x10  Step ups onto BOSU x 15 ea LE    Supine:  Diaphragmatic breathing x 2 minutes  Supine transverse abdominus activation with SLR 2x10 ea   HL hip adduction (ball squeeze) x 2 minutes   Bridging 3 x 10  SL clamshells (GTB) 3x10 ea     Written Home Exercises Provided: reviewed HEP issued on eval  Pt demo good understanding of the education provided. Deb demonstrated good return demonstration of activities.     Assessment:   Deb tolerated treatment well and  able to perform all standing exercises without exacerbation of symptoms. Patient continues to demonstrate poor static and dynamic standing balance requiring HHA for recovery of LOB. Extensive education on use of cane for safety and fall prevention.   Patient to benefit from continued skilled physical therapy to progress towards remaining goals and emphasis on transition to gym program for management of chronic conditions. Medical Necessity is demonstrated by:  Fall Risk, Pain limits function of effected part for some activities, Unable to participate fully in daily activities, Requires skilled supervision to complete and progress HEP and Weakness.    Patient is making good progress towards established goals.    New/Revised goals: updated 3/16/2018    Short Term Goals (4 Weeks):   1. Patient to have improved BLE strength as noted by sit to stand transfer without use of UE's- met  2. Patient to independently verbalize 3 strategies for fall prevention- met  3. Patient to be able to perform 30 minutes of consecutive exercise without rest break for improved functional endurance with housechores- met  Long Term Goals (6 Weeks):   1. Patient to be independent with home exercise program for improved self management of condition  2. Patient to have decreased subjective report of disability as noted by a score of 56% or less on the FOTO questionnaire - met  3. Patient to have improved static balance as noted by 42/56 or greater of SMYTH for decreased risk for falls- in progress  4. Patient to have improved strength in BLE's by 1/3 muscle grade for improved functional mobility - in progress    Plan:     Certification Period: 1/16/2018 to 5/10/2018  Recommended Treatment Plan: 2 times per week for 4-6 more weeks: Gait Training, Manual Therapy, Moist Heat/ Ice, Neuromuscular Re-ed, Patient Education, Self Care, Therapeutic Activites and Therapeutic Exercise     Araseli Savage, PT   4/10/2018

## 2018-04-12 ENCOUNTER — CLINICAL SUPPORT (OUTPATIENT)
Dept: REHABILITATION | Facility: HOSPITAL | Age: 68
End: 2018-04-12
Attending: FAMILY MEDICINE
Payer: MEDICARE

## 2018-04-12 DIAGNOSIS — R29.898 BILATERAL LEG WEAKNESS: ICD-10-CM

## 2018-04-12 DIAGNOSIS — R29.898 WEAKNESS OF BOTH HIPS: ICD-10-CM

## 2018-04-12 DIAGNOSIS — E78.2 COMBINED HYPERLIPIDEMIA ASSOCIATED WITH TYPE 2 DIABETES MELLITUS: Chronic | ICD-10-CM

## 2018-04-12 DIAGNOSIS — E11.69 COMBINED HYPERLIPIDEMIA ASSOCIATED WITH TYPE 2 DIABETES MELLITUS: Chronic | ICD-10-CM

## 2018-04-12 DIAGNOSIS — Z74.09 IMPAIRED FUNCTIONAL MOBILITY, BALANCE, GAIT, AND ENDURANCE: ICD-10-CM

## 2018-04-12 PROCEDURE — 97112 NEUROMUSCULAR REEDUCATION: CPT | Mod: 59,PN

## 2018-04-12 PROCEDURE — 97110 THERAPEUTIC EXERCISES: CPT | Mod: PN

## 2018-04-12 PROCEDURE — G8979 MOBILITY GOAL STATUS: HCPCS | Mod: CK,PN | Performed by: PHYSICAL THERAPIST

## 2018-04-12 PROCEDURE — G8978 MOBILITY CURRENT STATUS: HCPCS | Mod: CK,PN | Performed by: PHYSICAL THERAPIST

## 2018-04-12 RX ORDER — ATORVASTATIN CALCIUM 40 MG/1
40 TABLET, FILM COATED ORAL DAILY
Qty: 90 TABLET | Refills: 2 | Status: SHIPPED | OUTPATIENT
Start: 2018-04-12 | End: 2018-11-16 | Stop reason: SDUPTHER

## 2018-04-12 NOTE — PROGRESS NOTES
Name: Deb Webb  [facility] Number: [id number]  Date of Treatment: 04/12/2018   Diagnosis:   Encounter Diagnoses   Name Primary?    Impaired functional mobility, balance, gait, and endurance     Weakness of both hips     Bilateral leg weakness      Time in: 0800  Time Out: 0855  Total Treatment Time: 60 minutes (1:1 with PTA 30 minutes of treatment session)     Visit #: 17  FOTO and G code performed: visit 17     Subjective:    Deb states she forgot her cane again this morning. Patient states she is completely pain free this morning. Patient rates current pain level as 0/10 this morning.      Objective:    Patient received individual therapy with activities as follows:   Deb received therapeutic exercises to develop strength, endurance, ROM, flexibility, posture and core stabilization for 60 minutes including:     NuStep x 7 minutes, hills-level 3     Standing:  Hip extension 2x10 ea LE (YTB)  Hip abduction 2x10 ea LE (YTB)  Hip flexion 2 x 10 ea LE (YTB)  Mini squats 2x10  Toe/heel raises 2x10    NBOS airex pad 2 x 1 minute (with head turns)  Marches 2x10 ea LE (on air ex pad)   Tandem stance 30 sec x 2 ea LE  SL stance 15 sec x 2 ea   Ambulation over level 2 hurdles forward and lateral x 2 laps ea - not performed  Step ups (6 inch) 2x10 ea   BLE shuttle squats x 2.5 cords 3x10  Step ups onto BOSU x 15 ea LE    Supine:  Diaphragmatic breathing x 2 minutes  Supine transverse abdominus activation with SLR 2x10 ea   HL hip adduction (ball squeeze) x 2 minutes   Bridging 3 x 10  SL clamshells (GTB) 3x10 ea     Written Home Exercises Provided: reviewed HEP issued on [employer]  Pt demo good understanding of the education provided. Deb demonstrated good return demonstration of activities.     Assessment:   Deb tolerated treatment well today. Patient able to perform SL clamshells with proper form and muscle activation noted. Patient continues to demonstrates most difficulty with stability with loss of balance  noted when challenged dynamically. Patient able to perform all exercises today without provocation of pain. Continued emphasis placed on ambulation with cane to promote patient safety and fall prevention.   Patient to benefit from continued skilled physical therapy to progress towards remaining goals and emphasis on transition to gym program for management of chronic conditions. Medical Necessity is demonstrated by:  Fall Risk, Pain limits function of effected part for some activities, Unable to participate fully in daily activities, Requires skilled supervision to complete and progress HEP and Weakness.    CMS Impairment/Limitation/Restriction for FOTO Lower Leg (w/o Knee) Survey  Status Limitation G-Code CMS Severity Modifier  Intake 29% 71%  Predicted 44% 56% Goal Status+ CK - At least 40 percent but less than 60 percent  2/15/2018 52% 48%  4/12/2018 48% 52% Current Status CK - At least 40 percent but less than 60 percent    Patient is making good progress towards established goals.    New/Revised goals: updated 3/16/2018    Short Term Goals (4 Weeks):   1. Patient to have improved BLE strength as noted by sit to stand transfer without use of UE's- met  2. Patient to independently verbalize 3 strategies for fall prevention- met  3. Patient to be able to perform 30 minutes of consecutive exercise without rest break for improved functional endurance with housechores- met  Long Term Goals (6 Weeks):   1. Patient to be independent with home exercise program for improved self management of condition  2. Patient to have decreased subjective report of disability as noted by a score of 56% or less on the FOTO questionnaire - met  3. Patient to have improved static balance as noted by 42/56 or greater of SMYTH for decreased risk for falls- in progress  4. Patient to have improved strength in BLE's by 1/3 muscle grade for improved functional mobility - in progress    Plan:     Certification Period: 1/16/2018 to  5/10/2018  Recommended Treatment Plan: 2 times per week for 4-6 more weeks: Gait Training, Manual Therapy, Moist Heat/ Ice, Neuromuscular Re-ed, Patient Education, Self Care, Therapeutic Activites and Therapeutic Exercise     DANIEL Dickinson, PT    4/12/2018

## 2018-04-13 DIAGNOSIS — E11.9 DIABETES MELLITUS WITHOUT COMPLICATION: ICD-10-CM

## 2018-04-13 DIAGNOSIS — E11.9 TYPE 2 DIABETES MELLITUS WITHOUT COMPLICATION: ICD-10-CM

## 2018-04-17 ENCOUNTER — CLINICAL SUPPORT (OUTPATIENT)
Dept: REHABILITATION | Facility: HOSPITAL | Age: 68
End: 2018-04-17
Attending: FAMILY MEDICINE
Payer: MEDICARE

## 2018-04-17 DIAGNOSIS — R29.898 MUSCULAR DECONDITIONING: ICD-10-CM

## 2018-04-17 DIAGNOSIS — R29.898 WEAKNESS OF BOTH HIPS: ICD-10-CM

## 2018-04-17 DIAGNOSIS — Z74.09 IMPAIRED FUNCTIONAL MOBILITY, BALANCE, GAIT, AND ENDURANCE: Primary | ICD-10-CM

## 2018-04-17 DIAGNOSIS — R29.898 BILATERAL LEG WEAKNESS: ICD-10-CM

## 2018-04-17 PROCEDURE — 97110 THERAPEUTIC EXERCISES: CPT | Mod: PN | Performed by: PHYSICAL THERAPIST

## 2018-04-17 NOTE — PROGRESS NOTES
Name: Deb Webb  [facility] Number: [id number]  Date of Treatment: 04/17/2018   Diagnosis:   Encounter Diagnoses   Name Primary?    Impaired functional mobility, balance, gait, and endurance Yes    Weakness of both hips     Bilateral leg weakness     Muscular deconditioning      Time in: 0800  Time Out: 0900  Total Treatment Time: 60 minutes (1:1 with PT 30 minutes of treatment session)     Visit #: 18  FOTO and G code performed: visit 17     Subjective:    Deb states she brought her cane today but forgot how to use it it's been so long. Patient rates current pain level as 0/10 this morning.      Objective:    MMT                            Left                  Right     Hip:  Flexion                         4-/5                 4-/5        Extension                    3+/5                 3+/5  Abduction                    4-/5                 4-/4  Adduction                    4/5                  4/5  External Rotation        3+/5                 3+/5  Internal rotation           4-/5                  4/5     Knee:  Flexion                         3+/5                 3+/5  Extension                    4+/5                   4+/5     Ankle:  Dorsiflexion                 4/5                  4/5    Patient received individual therapy with activities as follows:   Deb received therapeutic exercises to develop strength, endurance, ROM, flexibility, posture and core stabilization for 60 minutes including:     NuStep x 7 minutes, hills-level 3     Standing:  Hip extension 2x10 ea LE (YTB)  Hip abduction 2x10 ea LE (YTB)  Hip flexion 2 x 10 ea LE (YTB)  Mini squats 2x10  Toe/heel raises 2x10    NBOS airex pad 2 x 1 minute (with head turns)  Marches 2x10 ea LE (on air ex pad)   Tandem stance 30 sec x 2 ea LE  SL stance 15 sec x 2 ea   Ambulation over level 2 hurdles forward and lateral x 2 laps ea - not performed  Step ups (6 inch) 2x10 ea   BLE shuttle squats x 2.5 cords 3x10  Step ups onto BOSU x 15 ea  LE    Supine:  Diaphragmatic breathing x 2 minutes  Supine transverse abdominus activation with SLR 2x10 ea   HL hip adduction (ball squeeze) x 2 minutes   Bridging 3 x 10  SL clamshells (GTB) 3x10 ea     Written Home Exercises Provided: reviewed HEP issued on eval  Pt demo good understanding of the education provided. Deb demonstrated good return demonstration of activities.     Assessment:   Deb tolerated treatment well with month reassessment performed. Pt demonstrating improvements in BLE strength since evaluation. Pt continues to demonstrate decreased dynamic and static SL standing balance.   Patient to benefit from continued skilled physical therapy to progress towards remaining goals and emphasis on transition to gym program for management of chronic conditions. Medical Necessity is demonstrated by:  Fall Risk, Pain limits function of effected part for some activities, Unable to participate fully in daily activities, Requires skilled supervision to complete and progress HEP and Weakness.    CMS Impairment/Limitation/Restriction for FOTO Lower Leg (w/o Knee) Survey  Status Limitation G-Code CMS Severity Modifier  Intake 29% 71%  Predicted 44% 56% Goal Status+ CK - At least 40 percent but less than 60 percent  2/15/2018 52% 48%  4/12/2018 48% 52% Current Status CK - At least 40 percent but less than 60 percent    Patient is making good progress towards established goals.    New/Revised goals: updated 4/17/2018    Short Term Goals (4 Weeks):   1. Patient to have improved BLE strength as noted by sit to stand transfer without use of UE's- met  2. Patient to independently verbalize 3 strategies for fall prevention- met  3. Patient to be able to perform 30 minutes of consecutive exercise without rest break for improved functional endurance with housechores- met  Long Term Goals (6 Weeks):   1. Patient to be independent with home exercise program for improved self management of condition- met  2. Patient to  have decreased subjective report of disability as noted by a score of 56% or less on the FOTO questionnaire - met  3. Patient to have improved static balance as noted by 42/56 or greater of SMYTH for decreased risk for falls- in progress  4. Patient to have improved strength in BLE's by 1/3 muscle grade for improved functional mobility - in progress    Plan:     Certification Period: 1/16/2018 to 5/10/2018  Recommended Treatment Plan: 2 times per week for 4-6 more weeks: Gait Training, Manual Therapy, Moist Heat/ Ice, Neuromuscular Re-ed, Patient Education, Self Care, Therapeutic Activites and Therapeutic Exercise     Araseli Savage, PT   Araseli Savage, PT    4/17/2018

## 2018-04-18 DIAGNOSIS — G25.0 ESSENTIAL TREMOR: ICD-10-CM

## 2018-04-18 RX ORDER — PRIMIDONE 50 MG/1
TABLET ORAL
Qty: 90 TABLET | Refills: 0 | Status: SHIPPED | OUTPATIENT
Start: 2018-04-18 | End: 2018-05-13 | Stop reason: SDUPTHER

## 2018-04-19 ENCOUNTER — CLINICAL SUPPORT (OUTPATIENT)
Dept: REHABILITATION | Facility: HOSPITAL | Age: 68
End: 2018-04-19
Attending: FAMILY MEDICINE
Payer: MEDICARE

## 2018-04-19 DIAGNOSIS — R29.898 BILATERAL LEG WEAKNESS: ICD-10-CM

## 2018-04-19 DIAGNOSIS — Z74.09 IMPAIRED FUNCTIONAL MOBILITY, BALANCE, GAIT, AND ENDURANCE: Primary | ICD-10-CM

## 2018-04-19 DIAGNOSIS — R29.898 MUSCULAR DECONDITIONING: ICD-10-CM

## 2018-04-19 DIAGNOSIS — R29.898 WEAKNESS OF BOTH HIPS: ICD-10-CM

## 2018-04-19 DIAGNOSIS — R26.9 GAIT ABNORMALITY: ICD-10-CM

## 2018-04-19 DIAGNOSIS — R26.89 IMPAIRMENT OF BALANCE: ICD-10-CM

## 2018-04-19 PROCEDURE — 97110 THERAPEUTIC EXERCISES: CPT | Mod: PN | Performed by: PHYSICAL THERAPIST

## 2018-04-19 NOTE — PROGRESS NOTES
Name: Deb Webb  Clinic Number: [id number]  Date of Treatment: 04/19/2018   Diagnosis:   Encounter Diagnoses   Name Primary?    Impaired functional mobility, balance, gait, and endurance Yes    Weakness of both hips     Bilateral leg weakness     Muscular deconditioning     Impairment of balance     Gait abnormality      Time in: 0930  Time Out: 1030  Total Treatment Time: 60 minutes (1:1 with PT 30 minutes of treatment session)     Visit #: 19  FOTO and G code performed: visit 17     Subjective:    Deb states soreness in her right hip and noticed that it is weaker when doing stairs. Patient rates current pain level as 0/10 this morning.      Objective:    Patient received individual therapy with activities as follows:   Deb received therapeutic exercises to develop strength, endurance, ROM, flexibility, posture and core stabilization for 60 minutes including:     NuStep x 7 minutes, hills-level 3     Standing:  Hip extension 2x10 ea LE (YTB)- increase to red next visit  Hip abduction 2x10 ea LE (YTB)  Hip flexion 2 x 10 ea LE (YTB)  Mini squats 2x10  Toe/heel raises 2x10    NBOS airex pad 2 x 1 minute (with head turns)  Marches 2x10 ea LE (on air ex pad)   Tandem stance 30 sec x 2 ea LE  SL stance 15 sec x 2 ea   Ambulation over level 2 hurdles forward and lateral x 2 laps ea   Step ups (6 inch) 2x10 ea   +lateral step ups x 15  BLE shuttle squats x 2.5 cords 3x10  +SL shuttle squats x 1.5 cords x 15 ea LE  Step ups onto BOSU x 15 ea LE    Supine:  Diaphragmatic breathing x 2 minutes  Supine transverse abdominus activation with SLR 2x10 ea   HL hip adduction (ball squeeze) x 2 minutes - NP  Bridging 3 x 10  SL clamshells (GTB) 3x10 ea     Written Home Exercises Provided: reviewed HEP issued on eval  Pt demo good understanding of the education provided. Deb demonstrated good return demonstration of activities.     Assessment:   Deb tolerated treatment well without c/o pain. Pt able to progress  closed chain SL strengthening this session with appropriate muscular fatigue post RX. Plan to increase resistance with hip strengthening exercises next visit.     Patient to benefit from continued skilled physical therapy to progress towards remaining goals and emphasis on transition to gym program for management of chronic conditions. Medical Necessity is demonstrated by:  Fall Risk, Pain limits function of effected part for some activities, Unable to participate fully in daily activities, Requires skilled supervision to complete and progress HEP and Weakness.    CMS Impairment/Limitation/Restriction for FOTO Lower Leg (w/o Knee) Survey  Status Limitation G-Code CMS Severity Modifier  Intake 29% 71%  Predicted 44% 56% Goal Status+ CK - At least 40 percent but less than 60 percent  2/15/2018 52% 48%  4/12/2018 48% 52% Current Status CK - At least 40 percent but less than 60 percent    Patient is making good progress towards established goals.    New/Revised goals: updated 4/17/2018    Short Term Goals (4 Weeks):   1. Patient to have improved BLE strength as noted by sit to stand transfer without use of UE's- met  2. Patient to independently verbalize 3 strategies for fall prevention- met  3. Patient to be able to perform 30 minutes of consecutive exercise without rest break for improved functional endurance with housechores- met  Long Term Goals (6 Weeks):   1. Patient to be independent with home exercise program for improved self management of condition- met  2. Patient to have decreased subjective report of disability as noted by a score of 56% or less on the FOTO questionnaire - met  3. Patient to have improved static balance as noted by 42/56 or greater of SMYTH for decreased risk for falls- in progress  4. Patient to have improved strength in BLE's by 1/3 muscle grade for improved functional mobility - in progress    Plan:     Certification Period: 1/16/2018 to 5/10/2018  Recommended Treatment Plan: 2 times per  week for 4-6 more weeks: Gait Training, Manual Therapy, Moist Heat/ Ice, Neuromuscular Re-ed, Patient Education, Self Care, Therapeutic Activites and Therapeutic Exercise     Araseli Savage, PT   4/19/2018

## 2018-04-25 ENCOUNTER — TELEPHONE (OUTPATIENT)
Dept: FAMILY MEDICINE | Facility: CLINIC | Age: 68
End: 2018-04-25

## 2018-04-25 NOTE — TELEPHONE ENCOUNTER
Spoke w/emily pharmacist, states that rx is covered by her insurance but she currently in the gap. States that cash price for rx is $862 and copay is $190. States that she recommends pt call her insurance to find the most compatibale and affordable rx.     Spoke w/patient, requesting new rx for Trulicity. States that she spoke with her insurance company. Stated that she could not remember the name of the medication but call back to get them. Stated she would give our office a call back with insurance's recommendation.

## 2018-04-25 NOTE — TELEPHONE ENCOUNTER
----- Message from Juhi Velázquez sent at 4/25/2018  2:28 PM CDT -----  Contact: Self     Trulicity is no longer covered by insurance and she would like an alternate as close to it as posssible because it worked really good for her. Please call [phone].          [facility]

## 2018-04-26 ENCOUNTER — CLINICAL SUPPORT (OUTPATIENT)
Dept: REHABILITATION | Facility: HOSPITAL | Age: 68
End: 2018-04-26
Attending: FAMILY MEDICINE
Payer: MEDICARE

## 2018-04-26 DIAGNOSIS — R29.898 BILATERAL LEG WEAKNESS: ICD-10-CM

## 2018-04-26 DIAGNOSIS — R29.898 MUSCULAR DECONDITIONING: ICD-10-CM

## 2018-04-26 DIAGNOSIS — R26.89 IMPAIRMENT OF BALANCE: ICD-10-CM

## 2018-04-26 DIAGNOSIS — R29.898 WEAKNESS OF BOTH HIPS: ICD-10-CM

## 2018-04-26 DIAGNOSIS — Z74.09 IMPAIRED FUNCTIONAL MOBILITY, BALANCE, GAIT, AND ENDURANCE: Primary | ICD-10-CM

## 2018-04-26 PROCEDURE — 97110 THERAPEUTIC EXERCISES: CPT | Mod: PN | Performed by: PHYSICAL THERAPIST

## 2018-04-26 NOTE — PROGRESS NOTES
"Name: Deb Webb  [facility] Number: [id number]  Date of Treatment: 04/26/2018   Diagnosis:   Encounter Diagnoses   Name Primary?    Impaired functional mobility, balance, gait, and endurance Yes    Weakness of both hips     Bilateral leg weakness     Muscular deconditioning     Impairment of balance      Time in: 0900  Time Out: 0950  Total Treatment Time: 45 minutes (1:1 with PT entire duration of treatment session)     Visit #: 20  FOTO and G code performed: visit 17     Subjective:    Deb states no pain today but has had an upset stomach. Patient rates current pain level as 0/10 this morning.      Objective:    Patient received individual therapy with activities as follows:   Deb received therapeutic exercises to develop strength, endurance, ROM, flexibility, posture and core stabilization for 45 minutes including:     NuStep x 0 minutes, hills-level 3   Upright Bike x 7 minutes    Standing:  Hip extension 2x10 ea LE (RTB)  Hip abduction 2x10 ea LE (RTB)  Hip flexion 2 x 10 ea LE (RTB)  Mini squats 2x10 with (RTB)  Toe/heel raises 2x10    NBOS airex pad 2 x 1 minute (with head turns)  Marches 2x10 ea LE (on air ex pad)   Tandem stance 30 sec x 2 ea LE- NP  SL stance 15 sec x 2 ea - NP  Ambulation over level 2 hurdles forward and lateral x 2 laps ea   Step ups (6 inch) 2x10 ea   +lateral step ups x 15  BLE shuttle squats x 2.5 cords 3x10- NP  +SL shuttle squats x 1.5 cords x 15 ea LE- NP  Step ups onto BOSU x 15 ea LE  +static balance black side of BOSU 30" SBA to CGA    Supine:  Diaphragmatic breathing x 2 minutes  Supine transverse abdominus activation with SLR 2x10 ea   HL hip adduction (ball squeeze) x 2 minutes   Bridging 3 x 10- NP  SL clamshells (GTB) 3x10 ea     Written Home Exercises Provided: reviewed HEP issued on eval  Pt demo good understanding of the education provided. Deb demonstrated good return demonstration of activities.     Assessment:   Deb tolerated treatment well with " progression of resisted hip strengthening this session. Pt demonstrating decreased cardiorespiratory endurance on upright bike and standing endurance. Supine exercises performed with HOB elevated per subjective report of GI discomfort.     Patient to benefit from continued skilled physical therapy to progress towards remaining goals and emphasis on transition to gym program for management of chronic conditions. Medical Necessity is demonstrated by:  Fall Risk, Pain limits function of effected part for some activities, Unable to participate fully in daily activities, Requires skilled supervision to complete and progress HEP and Weakness.    CMS Impairment/Limitation/Restriction for FOTO Lower Leg (w/o Knee) Survey  Status Limitation G-Code CMS Severity Modifier  Intake 29% 71%  Predicted 44% 56% Goal Status+ CK - At least 40 percent but less than 60 percent  2/15/2018 52% 48%  4/12/2018 48% 52% Current Status CK - At least 40 percent but less than 60 percent    Patient is making good progress towards established goals.    New/Revised goals: updated 4/17/2018    Short Term Goals (4 Weeks):   1. Patient to have improved BLE strength as noted by sit to stand transfer without use of UE's- met  2. Patient to independently verbalize 3 strategies for fall prevention- met  3. Patient to be able to perform 30 minutes of consecutive exercise without rest break for improved functional endurance with housechores- met  Long Term Goals (6 Weeks):   1. Patient to be independent with home exercise program for improved self management of condition- met  2. Patient to have decreased subjective report of disability as noted by a score of 56% or less on the FOTO questionnaire - met  3. Patient to have improved static balance as noted by 42/56 or greater of SMYTH for decreased risk for falls- in progress  4. Patient to have improved strength in BLE's by 1/3 muscle grade for improved functional mobility - in  progress    Plan:     Certification Period: 1/16/2018 to 5/10/2018  Recommended Treatment Plan: 2 times per week for 4-6 more weeks: Gait Training, Manual Therapy, Moist Heat/ Ice, Neuromuscular Re-ed, Patient Education, Self Care, Therapeutic Activites and Therapeutic Exercise     Araseli Savage, PT   4/26/2018

## 2018-05-01 ENCOUNTER — CLINICAL SUPPORT (OUTPATIENT)
Dept: REHABILITATION | Facility: HOSPITAL | Age: 68
End: 2018-05-01
Attending: FAMILY MEDICINE
Payer: MEDICARE

## 2018-05-01 DIAGNOSIS — R26.89 IMPAIRMENT OF BALANCE: ICD-10-CM

## 2018-05-01 DIAGNOSIS — Z74.09 IMPAIRED FUNCTIONAL MOBILITY, BALANCE, GAIT, AND ENDURANCE: Primary | ICD-10-CM

## 2018-05-01 DIAGNOSIS — R29.898 MUSCULAR DECONDITIONING: ICD-10-CM

## 2018-05-01 DIAGNOSIS — R29.898 WEAKNESS OF BOTH HIPS: ICD-10-CM

## 2018-05-01 DIAGNOSIS — R29.898 BILATERAL LEG WEAKNESS: ICD-10-CM

## 2018-05-01 DIAGNOSIS — R26.9 GAIT ABNORMALITY: ICD-10-CM

## 2018-05-01 PROCEDURE — 97110 THERAPEUTIC EXERCISES: CPT | Mod: PN | Performed by: PHYSICAL THERAPIST

## 2018-05-01 NOTE — PROGRESS NOTES
"Name: Deb Webb  [facility] Number: [id number]  Date of Treatment: 05/01/2018   Diagnosis:   Encounter Diagnoses   Name Primary?    Impaired functional mobility, balance, gait, and endurance Yes    Weakness of both hips     Bilateral leg weakness     Muscular deconditioning     Impairment of balance     Gait abnormality      Time in: 0745  Time Out: 0845  Total Treatment Time: 45 minutes (1:1 with PT 30' of treatment session)     Visit #: 21  FOTO and G code performed: visit 18    Subjective:    Deb states she hopes her balance is better and that she can continue therapy. She believes that it is really helping. Patient rates current pain level as 0/10 this morning.      Objective:    Patient received individual therapy with activities as follows:   Deb received therapeutic exercises to develop strength, endurance, ROM, flexibility, posture and core stabilization for 60 minutes including:     NuStep x 8 minutes, hills-level 3   Upright Bike x 0 minutes    Standing:  Hip extension 2x10 ea LE (RTB)  Hip abduction 2x10 ea LE (RTB)  Hip flexion 2 x 10 ea LE (RTB)  Mini squats 2x10 with (RTB)  Toe/heel raises 2x10    NBOS airex pad 2 x 1 minute (with head turns)  Marches 2x10 ea LE (on air ex pad)   Tandem stance 30 sec x 2 ea LE  SL stance 15 sec x 2 ea   Ambulation over level 2 hurdles forward and lateral x 2 laps ea   Step ups (6 inch) 2x10 ea   lateral step ups x 15  BLE shuttle squats x 2.5 cords 3x10  SL shuttle squats x 1.5 cords x 15 ea LE- NP  Step ups onto BOSU x 15 ea LE  static balance black side of BOSU 30" SBA to CGA    Supine:  Diaphragmatic breathing x 2 minutes  Supine transverse abdominus activation with SLR 2x10 ea   HL hip adduction (ball squeeze) x 2 minutes   Bridging 3 x 10- NP  SL clamshells (GTB) 3x10 ea     Written Home Exercises Provided: reviewed HEP issued on eval  Pt demo good understanding of the education provided. Deb demonstrated good return demonstration of activities. "     Assessment:   Deb tolerated treatment well without c/o symptoms. Pt requiring SBA to min A for balance on BOSU this visit for posterior weight shifting and LOB. Patient to benefit from continued skilled physical therapy to progress towards remaining goals and emphasis on transition to gym program for management of chronic conditions. Medical Necessity is demonstrated by:  Fall Risk, Pain limits function of effected part for some activities, Unable to participate fully in daily activities, Requires skilled supervision to complete and progress HEP and Weakness.    CMS Impairment/Limitation/Restriction for FOTO Lower Leg (w/o Knee) Survey  Status Limitation G-Code CMS Severity Modifier  Intake 29% 71%  Predicted 44% 56% Goal Status+ CK - At least 40 percent but less than 60 percent  2/15/2018 52% 48%  4/12/2018 48% 52% Current Status CK - At least 40 percent but less than 60 percent    Patient is making good progress towards established goals.    New/Revised goals: updated 4/17/2018    Short Term Goals (4 Weeks):   1. Patient to have improved BLE strength as noted by sit to stand transfer without use of UE's- met  2. Patient to independently verbalize 3 strategies for fall prevention- met  3. Patient to be able to perform 30 minutes of consecutive exercise without rest break for improved functional endurance with housechores- met  Long Term Goals (6 Weeks):   1. Patient to be independent with home exercise program for improved self management of condition- met  2. Patient to have decreased subjective report of disability as noted by a score of 56% or less on the FOTO questionnaire - met  3. Patient to have improved static balance as noted by 42/56 or greater of SMYTH for decreased risk for falls- in progress  4. Patient to have improved strength in BLE's by 1/3 muscle grade for improved functional mobility - in progress    Plan:     Certification Period: 1/16/2018 to 5/10/2018  Recommended Treatment Plan: 2 times  per week for 4-6 more weeks: Gait Training, Manual Therapy, Moist Heat/ Ice, Neuromuscular Re-ed, Patient Education, Self Care, Therapeutic Activites and Therapeutic Exercise     Araseli Savage, PT   5/1/2018

## 2018-05-01 NOTE — PLAN OF CARE
Name: Deb Webb  [facility] Number: [id number]  Date of Treatment: 04/17/2018   Diagnosis:   Encounter Diagnoses   Name Primary?    Impaired functional mobility, balance, gait, and endurance Yes    Weakness of both hips     Bilateral leg weakness     Muscular deconditioning      Time in: 0800  Time Out: 0900  Total Treatment Time: 60 minutes (1:1 with PT 30 minutes of treatment session)     Visit #: 18  FOTO and G code performed: visit 17     Subjective:    Deb states she brought her cane today but forgot how to use it it's been so long. Patient rates current pain level as 0/10 this morning.      Objective:    MMT                            Left                  Right     Hip:  Flexion                         4-/5                 4-/5        Extension                    3+/5                 3+/5  Abduction                    4-/5                 4-/4  Adduction                    4/5                  4/5  External Rotation        3+/5                 3+/5  Internal rotation           4-/5                  4/5     Knee:  Flexion                         3+/5                 3+/5  Extension                    4+/5                   4+/5     Ankle:  Dorsiflexion                 4/5                  4/5    Patient received individual therapy with activities as follows:   Deb received therapeutic exercises to develop strength, endurance, ROM, flexibility, posture and core stabilization for 60 minutes including:     NuStep x 7 minutes, hills-level 3     Standing:  Hip extension 2x10 ea LE (YTB)  Hip abduction 2x10 ea LE (YTB)  Hip flexion 2 x 10 ea LE (YTB)  Mini squats 2x10  Toe/heel raises 2x10    NBOS airex pad 2 x 1 minute (with head turns)  Marches 2x10 ea LE (on air ex pad)   Tandem stance 30 sec x 2 ea LE  SL stance 15 sec x 2 ea   Ambulation over level 2 hurdles forward and lateral x 2 laps ea - not performed  Step ups (6 inch) 2x10 ea   BLE shuttle squats x 2.5 cords 3x10  Step ups onto BOSU x 15 ea  LE    Supine:  Diaphragmatic breathing x 2 minutes  Supine transverse abdominus activation with SLR 2x10 ea   HL hip adduction (ball squeeze) x 2 minutes   Bridging 3 x 10  SL clamshells (GTB) 3x10 ea     Written Home Exercises Provided: reviewed HEP issued on eval  Pt demo good understanding of the education provided. Deb demonstrated good return demonstration of activities.     Assessment:   Deb tolerated treatment well with month reassessment performed. Pt demonstrating improvements in BLE strength since evaluation. Pt continues to demonstrate decreased dynamic and static SL standing balance.   Patient to benefit from continued skilled physical therapy to progress towards remaining goals and emphasis on transition to gym program for management of chronic conditions. Medical Necessity is demonstrated by:  Fall Risk, Pain limits function of effected part for some activities, Unable to participate fully in daily activities, Requires skilled supervision to complete and progress HEP and Weakness.    CMS Impairment/Limitation/Restriction for FOTO Lower Leg (w/o Knee) Survey  Status Limitation G-Code CMS Severity Modifier  Intake 29% 71%  Predicted 44% 56% Goal Status+ CK - At least 40 percent but less than 60 percent  2/15/2018 52% 48%  4/12/2018 48% 52% Current Status CK - At least 40 percent but less than 60 percent    Patient is making good progress towards established goals.    New/Revised goals: updated 4/17/2018    Short Term Goals (4 Weeks):   1. Patient to have improved BLE strength as noted by sit to stand transfer without use of UE's- met  2. Patient to independently verbalize 3 strategies for fall prevention- met  3. Patient to be able to perform 30 minutes of consecutive exercise without rest break for improved functional endurance with housechores- met  Long Term Goals (6 Weeks):   1. Patient to be independent with home exercise program for improved self management of condition- met  2. Patient to  have decreased subjective report of disability as noted by a score of 56% or less on the FOTO questionnaire - met  3. Patient to have improved static balance as noted by 42/56 or greater of SMYTH for decreased risk for falls- in progress  4. Patient to have improved strength in BLE's by 1/3 muscle grade for improved functional mobility - in progress    Plan:     Certification Period: 1/16/2018 to 5/10/2018  Recommended Treatment Plan: 2 times per week for 4-6 more weeks: Gait Training, Manual Therapy, Moist Heat/ Ice, Neuromuscular Re-ed, Patient Education, Self Care, Therapeutic Activites and Therapeutic Exercise     Araseli Savage, PT   Araseli Savage, PT    4/17/2018

## 2018-05-10 ENCOUNTER — CLINICAL SUPPORT (OUTPATIENT)
Dept: REHABILITATION | Facility: HOSPITAL | Age: 68
End: 2018-05-10
Attending: FAMILY MEDICINE
Payer: MEDICARE

## 2018-05-10 DIAGNOSIS — R29.898 BILATERAL LEG WEAKNESS: ICD-10-CM

## 2018-05-10 DIAGNOSIS — Z74.09 IMPAIRED FUNCTIONAL MOBILITY, BALANCE, GAIT, AND ENDURANCE: ICD-10-CM

## 2018-05-10 DIAGNOSIS — R29.898 WEAKNESS OF BOTH HIPS: ICD-10-CM

## 2018-05-10 PROCEDURE — 97112 NEUROMUSCULAR REEDUCATION: CPT | Mod: 59,PN

## 2018-05-10 PROCEDURE — 97110 THERAPEUTIC EXERCISES: CPT | Mod: PN

## 2018-05-10 NOTE — PROGRESS NOTES
"Name: Deb Webb  [facility] Number: [id number]  Date of Treatment: 05/10/2018   Diagnosis:   Encounter Diagnoses   Name Primary?    Impaired functional mobility, balance, gait, and endurance     Weakness of both hips     Bilateral leg weakness      Time in: 0855  Time Out: 1005  Total Treatment Time: 70 minutes (1:1 with PT/PTA 30 minutes of treatment session)     Visit #: 22  FOTO and PHYLICIA code performed: visit 18    Subjective:    Deb states she's not in any pain today and is feeling good. Patient rates current pain level as 0/10 this morning.      Objective:    Patient received individual therapy with activities as follows:   Deb received therapeutic exercises to develop strength, endurance, ROM, flexibility, posture and core stabilization for 60 minutes including:     NuStep x 8 minutes, hills-level 3   Upright Bike x 0 minutes    Standing:  Hip extension 2x10 ea LE (RTB)  Hip abduction 2x10 ea LE (RTB)  Hip flexion 2 x 10 ea LE (RTB)  Mini squats 2x10 with (RTB)  Toe/heel raises 2x10    NBOS airex pad 2 x 1 minute (with head turns)  Marches 2 minutes alt. LE (on air ex pad)   Tandem stance 30 sec x 2 ea LE  SL stance 15 sec x 2 ea   Ambulation over level 2 hurdles forward and lateral x 2 laps ea   Step ups (3 steps) 2x10 ea      lateral step ups (3 steps) x 15       BLE shuttle squats x 2.5 cords 3x10  +SL shuttle squats x 1.5 cords x 15 ea LE  Step ups onto BOSU x 15 ea LE  static balance black side of BOSU 30" SBA to CGA    Supine:  Diaphragmatic breathing x 2 minutes  Supine transverse abdominus activation with SLR 2x10 ea 1#  HL hip adduction (ball squeeze) x 2 minutes   Bridging 3 x 10  SL clamshells (GTB) 3x10 ea     Written Home Exercises Provided: reviewed HEP issued on eval  Pt demo good understanding of the education provided. Deb demonstrated good return demonstration of activities.     Assessment:   Deb tolerated treatment well. Pt requires stand by assist/contact guard assist for " balance on BOSU for proper weight shifting. Patient also demonstrates loss of balance episodes when balancing on bosu requiring BUE assist to recover stability. Patient demonstrates good tolerance to BLE strengthening and balance progressions today with increased endurance and appropriate training effect noted.   Medical Necessity is demonstrated by:  Fall Risk, Pain limits function of effected part for some activities, Unable to participate fully in daily activities, Requires skilled supervision to complete and progress HEP and Weakness.    CMS Impairment/Limitation/Restriction for FOTO Lower Leg (w/o Knee) Survey  Status Limitation G-Code CMS Severity Modifier  Intake 29% 71%  Predicted 44% 56% Goal Status+ CK - At least 40 percent but less than 60 percent  2/15/2018 52% 48%  4/12/2018 48% 52% Current Status CK - At least 40 percent but less than 60 percent    Patient is making good progress towards established goals.    New/Revised goals: updated 4/17/2018    Short Term Goals (4 Weeks):   1. Patient to have improved BLE strength as noted by sit to stand transfer without use of UE's- met  2. Patient to independently verbalize 3 strategies for fall prevention- met  3. Patient to be able to perform 30 minutes of consecutive exercise without rest break for improved functional endurance with housechores- met  Long Term Goals (6 Weeks):   1. Patient to be independent with home exercise program for improved self management of condition- met  2. Patient to have decreased subjective report of disability as noted by a score of 56% or less on the FOTO questionnaire - met  3. Patient to have improved static balance as noted by 42/56 or greater of SMYTH for decreased risk for falls- in progress  4. Patient to have improved strength in BLE's by 1/3 muscle grade for improved functional mobility - in progress    Plan:     Certification Period: 1/16/2018 to 5/10/2018  Recommended Treatment Plan: 2 times per week for 4-6 more  weeks: Gait Training, Manual Therapy, Moist Heat/ Ice, Neuromuscular Re-ed, Patient Education, Self Care, Therapeutic Activites and Therapeutic Exercise     Madeline Bridges, PTA   5/10/2018

## 2018-05-11 ENCOUNTER — TELEPHONE (OUTPATIENT)
Dept: FAMILY MEDICINE | Facility: CLINIC | Age: 68
End: 2018-05-11

## 2018-05-13 DIAGNOSIS — G25.0 ESSENTIAL TREMOR: ICD-10-CM

## 2018-05-13 RX ORDER — PRIMIDONE 50 MG/1
TABLET ORAL
Qty: 90 TABLET | Refills: 0 | Status: SHIPPED | OUTPATIENT
Start: 2018-05-13 | End: 2018-06-09 | Stop reason: SDUPTHER

## 2018-05-15 ENCOUNTER — CLINICAL SUPPORT (OUTPATIENT)
Dept: REHABILITATION | Facility: HOSPITAL | Age: 68
End: 2018-05-15
Attending: FAMILY MEDICINE
Payer: MEDICARE

## 2018-05-15 DIAGNOSIS — R26.89 IMPAIRMENT OF BALANCE: ICD-10-CM

## 2018-05-15 DIAGNOSIS — R29.898 MUSCULAR DECONDITIONING: ICD-10-CM

## 2018-05-15 DIAGNOSIS — R29.898 WEAKNESS OF BOTH HIPS: ICD-10-CM

## 2018-05-15 DIAGNOSIS — R29.898 BILATERAL LEG WEAKNESS: ICD-10-CM

## 2018-05-15 DIAGNOSIS — R26.9 GAIT ABNORMALITY: ICD-10-CM

## 2018-05-15 DIAGNOSIS — Z74.09 IMPAIRED FUNCTIONAL MOBILITY, BALANCE, GAIT, AND ENDURANCE: Primary | ICD-10-CM

## 2018-05-15 PROCEDURE — 97110 THERAPEUTIC EXERCISES: CPT | Mod: PN | Performed by: PHYSICAL THERAPIST

## 2018-05-15 NOTE — PROGRESS NOTES
Name: Deb Webb  [facility] Number: [id number]  Date of Treatment: 05/15/2018   Diagnosis:   Encounter Diagnoses   Name Primary?    Impaired functional mobility, balance, gait, and endurance Yes    Weakness of both hips     Bilateral leg weakness     Muscular deconditioning     Impairment of balance     Gait abnormality      Time in: 0800  Time Out: 0900  Total Treatment Time: 60 minutes (1:1 with PT 30 minutes of treatment session)     Visit #: 23  FOTO and PHYLICIA code performed: visit 18    Subjective:    Deb states cramping in her toes today. Otherwise no issues or pain. She hopes her balance is better. Patient rates current pain level as 0/10 this morning.      Objective:    MMT                            Left                  Right     Hip:  Flexion                         4/5                 4/5        Extension                    3+/5                 3+/5  Abduction                    4-/5                 4-/4  Adduction                    4/5                  4/5  External Rotation        4/5                 4+/5  Internal rotation           4/5                  4/5     Knee:  Flexion                         4+/5                 4+/5  Extension                    4+/5                   4+/5     Ankle:  Dorsiflexion                 4/5                  4/5    SMYTH Assessment    1. Sitting to Standing   4 - able to stand without using hands and stabilize independently  2. Standing Unsupported   4 - able to stand safely 2 minutes without hold  3. Sitting Unsupported   4 - able to sit safely and securely 2 minutes  4. Standing to Sitting   4 - sits safely with minimal use of hands  5. Pivot Transfer   4 - able to trnasfer safely with minor use of hands  6. Standing with Eyes Closed   3 - able to stand 10 seconds with supervision  7. Standing with Feet Together   3 - able to place feet together independently and stand for 1 minute with supervision  8. Reaching Forward with Outstretched Arm   2 - can reach  "forward 5 cm/2 inches safely  9. Retrieving Object from Floor   4 - able to  slipper safely and easily  10. Turning to Look Behind   4 - looks behind from both sides and weights shifts well  11. Turning 360 Degrees   1 - needs close supervision or verbal cueing  12. Placing Alternate Foot on Step   1 - able to complete > 2 steps needs min assist  13. Standing with One Foot in Front   2 - able to take small step indenpendently and hold 30 seconds  14. Standing on One Foot   2- able to lift leg independently and hold < or = 3 seconds     SMYTH/56 (41-56 = low fall risk 21-40 = medium fall risk 0 -20 = high fall risk)    Patient received individual therapy with activities as follows:   Deb received therapeutic exercises to develop strength, endurance, ROM, flexibility, posture and core stabilization for 60 minutes including:     NuStep x 8 minutes, hills-level 3   Upright Bike x 0 minutes    Standing:  Hip extension 2x10 ea LE (RTB)  Hip abduction 2x10 ea LE (RTB)  Hip flexion 2 x 10 ea LE (RTB)  Mini squats 2x10 with (RTB)  Toe/heel raises 2x10    NBOS airex pad 2 x 1 minute (with head turns)  Marches 2 minutes alt. LE (on air ex pad)   Tandem stance 30 sec x 2 ea LE  SL stance 15 sec x 2 ea   Ambulation over level 2 hurdles forward and lateral x 2 laps ea   Step ups (3 steps) 2x10 ea      lateral step ups (3 steps) x 15       BLE shuttle squats x 2.5 cords 3x10  +SL shuttle squats x 1.5 cords x 15 ea LE  Step ups onto BOSU x 15 ea LE  static balance black side of BOSU 30" SBA to CGA    Supine:  Diaphragmatic breathing x 2 minutes  Supine transverse abdominus activation with SLR 2x10 ea 1#  HL hip adduction (ball squeeze) x 2 minutes   Bridging 3 x 10  SL clamshells (GTB) 3x10 ea     Written Home Exercises Provided: reviewed HEP issued on eval  Pt demo good understanding of the education provided. Deb demonstrated good return demonstration of activities.     Assessment:   Deb tolerated treatment " well one month reassessment performed today. Patient demonstrating improved static standing balance and BLE strength as noted by recent objective measures. Pt continues to be medium fall risk and advised continued use of SC for safety when ambulating in the community. Patient has met 3/3 short term and 2/4 long term goals. Patient to benefit from continued skilled physical therapy to progress towards remaining goals. Updated POC and goals this visit based on current progress. Medical Necessity is demonstrated by:  Fall Risk, Pain limits function of effected part for some activities, Unable to participate fully in daily activities, Requires skilled supervision to complete and progress HEP and Weakness.    CMS Impairment/Limitation/Restriction for FOTO Lower Leg (w/o Knee) Survey  Status Limitation G-Code CMS Severity Modifier  Intake 29% 71%  Predicted 44% 56% Goal Status+ CK - At least 40 percent but less than 60 percent  2/15/2018 52% 48%  4/12/2018 48% 52% Current Status CK - At least 40 percent but less than 60 percent    Patient is making good progress towards established goals.    New/Revised goals: updated 4/17/2018    Short Term Goals (4 Weeks):   1. Patient to have improved BLE strength as noted by sit to stand transfer without use of UE's- met  2. Patient to independently verbalize 3 strategies for fall prevention- met  3. Patient to be able to perform 30 minutes of consecutive exercise without rest break for improved functional endurance with housechores- met  Long Term Goals (6 Weeks):   1. Patient to be independent with home exercise program for improved self management of condition- met  2. Patient to have decreased subjective report of disability as noted by a score of 56% or less on the FOTO questionnaire - met  3. Patient to have improved static balance as noted by 42/56 or greater of SMYTH for decreased risk for falls- in progress  4. Patient to have improved strength in BLE's by 1/3 muscle grade for  improved functional mobility - in progress (met all but hip extension)    Plan:     Certification Period: 1/16/2018 to 6/15/2018  Recommended Treatment Plan: 1-2 times per week for 4 more weeks: Gait Training, Manual Therapy, Moist Heat/ Ice, Neuromuscular Re-ed, Patient Education, Self Care, Therapeutic Activites and Therapeutic Exercise     Araseli Savage, PT   5/15/2018

## 2018-05-22 NOTE — PLAN OF CARE
Name: Deb Webb  [facility] Number: [id number]  Date of Treatment: 05/15/2018   Diagnosis:   Encounter Diagnoses   Name Primary?    Impaired functional mobility, balance, gait, and endurance Yes    Weakness of both hips     Bilateral leg weakness     Muscular deconditioning     Impairment of balance     Gait abnormality      Time in: 0800  Time Out: 0900  Total Treatment Time: 60 minutes (1:1 with PT 30 minutes of treatment session)     Visit #: 23  FOTO and PHYLICIA code performed: visit 18    Subjective:    Deb states cramping in her toes today. Otherwise no issues or pain. She hopes her balance is better. Patient rates current pain level as 0/10 this morning.      Objective:    MMT                            Left                  Right     Hip:  Flexion                         4/5                 4/5        Extension                    3+/5                 3+/5  Abduction                    4-/5                 4-/4  Adduction                    4/5                  4/5  External Rotation        4/5                 4+/5  Internal rotation           4/5                  4/5     Knee:  Flexion                         4+/5                 4+/5  Extension                    4+/5                   4+/5     Ankle:  Dorsiflexion                 4/5                  4/5    SMYTH Assessment    1. Sitting to Standing   4 - able to stand without using hands and stabilize independently  2. Standing Unsupported   4 - able to stand safely 2 minutes without hold  3. Sitting Unsupported   4 - able to sit safely and securely 2 minutes  4. Standing to Sitting   4 - sits safely with minimal use of hands  5. Pivot Transfer   4 - able to trnasfer safely with minor use of hands  6. Standing with Eyes Closed   3 - able to stand 10 seconds with supervision  7. Standing with Feet Together   3 - able to place feet together independently and stand for 1 minute with supervision  8. Reaching Forward with Outstretched Arm   2 - can reach  "forward 5 cm/2 inches safely  9. Retrieving Object from Floor   4 - able to  slipper safely and easily  10. Turning to Look Behind   4 - looks behind from both sides and weights shifts well  11. Turning 360 Degrees   1 - needs close supervision or verbal cueing  12. Placing Alternate Foot on Step   1 - able to complete > 2 steps needs min assist  13. Standing with One Foot in Front   2 - able to take small step indenpendently and hold 30 seconds  14. Standing on One Foot   2- able to lift leg independently and hold < or = 3 seconds     SMYTH/56 (41-56 = low fall risk 21-40 = medium fall risk 0 -20 = high fall risk)    Patient received individual therapy with activities as follows:   Deb received therapeutic exercises to develop strength, endurance, ROM, flexibility, posture and core stabilization for 60 minutes including:     NuStep x 8 minutes, hills-level 3   Upright Bike x 0 minutes    Standing:  Hip extension 2x10 ea LE (RTB)  Hip abduction 2x10 ea LE (RTB)  Hip flexion 2 x 10 ea LE (RTB)  Mini squats 2x10 with (RTB)  Toe/heel raises 2x10    NBOS airex pad 2 x 1 minute (with head turns)  Marches 2 minutes alt. LE (on air ex pad)   Tandem stance 30 sec x 2 ea LE  SL stance 15 sec x 2 ea   Ambulation over level 2 hurdles forward and lateral x 2 laps ea   Step ups (3 steps) 2x10 ea      lateral step ups (3 steps) x 15       BLE shuttle squats x 2.5 cords 3x10  +SL shuttle squats x 1.5 cords x 15 ea LE  Step ups onto BOSU x 15 ea LE  static balance black side of BOSU 30" SBA to CGA    Supine:  Diaphragmatic breathing x 2 minutes  Supine transverse abdominus activation with SLR 2x10 ea 1#  HL hip adduction (ball squeeze) x 2 minutes   Bridging 3 x 10  SL clamshells (GTB) 3x10 ea     Written Home Exercises Provided: reviewed HEP issued on eval  Pt demo good understanding of the education provided. Deb demonstrated good return demonstration of activities.     Assessment:   Deb tolerated treatment " well one month reassessment performed today. Patient demonstrating improved static standing balance and BLE strength as noted by recent objective measures. Pt continues to be medium fall risk and advised continued use of SC for safety when ambulating in the community. Patient has met 3/3 short term and 2/4 long term goals. Patient to benefit from continued skilled physical therapy to progress towards remaining goals. Updated POC and goals this visit based on current progress. Medical Necessity is demonstrated by:  Fall Risk, Pain limits function of effected part for some activities, Unable to participate fully in daily activities, Requires skilled supervision to complete and progress HEP and Weakness.    CMS Impairment/Limitation/Restriction for FOTO Lower Leg (w/o Knee) Survey  Status Limitation G-Code CMS Severity Modifier  Intake 29% 71%  Predicted 44% 56% Goal Status+ CK - At least 40 percent but less than 60 percent  2/15/2018 52% 48%  4/12/2018 48% 52% Current Status CK - At least 40 percent but less than 60 percent    Patient is making good progress towards established goals.    New/Revised goals: updated 4/17/2018    Short Term Goals (4 Weeks):   1. Patient to have improved BLE strength as noted by sit to stand transfer without use of UE's- met  2. Patient to independently verbalize 3 strategies for fall prevention- met  3. Patient to be able to perform 30 minutes of consecutive exercise without rest break for improved functional endurance with housechores- met  Long Term Goals (6 Weeks):   1. Patient to be independent with home exercise program for improved self management of condition- met  2. Patient to have decreased subjective report of disability as noted by a score of 56% or less on the FOTO questionnaire - met  3. Patient to have improved static balance as noted by 42/56 or greater of SMYTH for decreased risk for falls- in progress  4. Patient to have improved strength in BLE's by 1/3 muscle grade for  improved functional mobility - in progress (met all but hip extension)    Plan:     Certification Period: 1/16/2018 to 6/15/2018  Recommended Treatment Plan: 1-2 times per week for 4 more weeks: Gait Training, Manual Therapy, Moist Heat/ Ice, Neuromuscular Re-ed, Patient Education, Self Care, Therapeutic Activites and Therapeutic Exercise     Araseli Savage, PT   5/15/2018

## 2018-05-29 ENCOUNTER — CLINICAL SUPPORT (OUTPATIENT)
Dept: REHABILITATION | Facility: HOSPITAL | Age: 68
End: 2018-05-29
Attending: FAMILY MEDICINE
Payer: MEDICARE

## 2018-05-29 DIAGNOSIS — R29.898 BILATERAL LEG WEAKNESS: ICD-10-CM

## 2018-05-29 DIAGNOSIS — Z74.09 IMPAIRED FUNCTIONAL MOBILITY, BALANCE, GAIT, AND ENDURANCE: Primary | ICD-10-CM

## 2018-05-29 DIAGNOSIS — R29.898 MUSCULAR DECONDITIONING: ICD-10-CM

## 2018-05-29 DIAGNOSIS — R26.89 IMPAIRMENT OF BALANCE: ICD-10-CM

## 2018-05-29 DIAGNOSIS — R26.9 GAIT ABNORMALITY: ICD-10-CM

## 2018-05-29 DIAGNOSIS — R29.898 WEAKNESS OF BOTH HIPS: ICD-10-CM

## 2018-05-29 PROCEDURE — 97110 THERAPEUTIC EXERCISES: CPT | Mod: PN | Performed by: PHYSICAL THERAPIST

## 2018-05-29 PROCEDURE — 97112 NEUROMUSCULAR REEDUCATION: CPT | Mod: PN | Performed by: PHYSICAL THERAPIST

## 2018-05-29 NOTE — PROGRESS NOTES
"Name: Deb Webb  Clinic Number: [id number]  Date of Treatment: 05/29/2018   Diagnosis:   Encounter Diagnoses   Name Primary?    Impaired functional mobility, balance, gait, and endurance Yes    Weakness of both hips     Bilateral leg weakness     Muscular deconditioning     Impairment of balance     Gait abnormality      Time in: 0805  Time Out: 0905  Total Treatment Time: 60 minutes (1:1 with PT 40 minutes of treatment session)     Visit #: 24  FOTO and G code performed: visit 19    Subjective:    Deb states having a "vacation" from the exercises while waiting for approval for more visits. She does not have a gym membership and thinking about [employer] since a lot of her family members go there. Patient rates current pain level as 0/10 this morning.      Objective:    Patient received individual therapy with activities as follows:   Deb received therapeutic exercises to develop strength, endurance, ROM, flexibility, posture and core stabilization for 60 minutes including:     NuStep x 8 minutes, hills-level 3   Upright Bike x 0 minutes    Standing:  Hip extension 2x10 ea LE (RTB)  Hip abduction 2x10 ea LE (RTB)  Hip flexion 2 x 10 ea LE (RTB)  Mini squats 2x10 with (RTB)  Toe/heel raises 2x10    NBOS airex pad 2 x 1 minute (with head turns)  Marches 2 minutes alt. LE (on air ex pad)   Tandem stance 30 sec x 2 ea LE  SL stance 15 sec x 2 ea   Ambulation over level 2 hurdles forward and lateral x 2 laps ea   Step ups (3 steps) 2x10 ea      lateral step ups (3 steps) x 15       BLE shuttle squats x 2.5 cords 3x10  +SL shuttle squats x 1.5 cords x 15 ea LE  Step ups onto BOSU x 15 ea LE  static balance black side of BOSU 30" SBA to CGA    Supine:  Diaphragmatic breathing x 2 minutes  Supine transverse abdominus activation with SLR 2x10 ea 1#  HL hip adduction (ball squeeze) x 2 minutes   Bridging 3 x 10  SL clamshells (GTB) 3x10 ea     Written Home Exercises Provided: reviewed HEP issued on eval  Pt demo " good understanding of the education provided. Deb demonstrated good return demonstration of activities.     Assessment:   Deb with fair tolerance to treatment session 2/2 fatigue and decreased cardiovascaular endurance. PT educated pt on medical fitness referral program at [facility] and importance of cardiovascular and performance of HEP for improved functional strength and endurance. Patient to benefit from continued skilled physical therapy to progress towards remaining goals. POC updated this visit due to lapse in care awaiting for approval for remaining visits. Medical Necessity is demonstrated by:  Fall Risk, Pain limits function of effected part for some activities, Unable to participate fully in daily activities, Requires skilled supervision to complete and progress HEP and Weakness.    CMS Impairment/Limitation/Restriction for FOTO Lower Leg (w/o Knee) Survey  Status Limitation G-Code CMS Severity Modifier  Intake 29% 71%  Predicted 44% 56% Goal Status+ CK - At least 40 percent but less than 60 percent  2/15/2018 52% 48%  4/12/2018 48% 52% Current Status CK - At least 40 percent but less than 60 percent    Patient is making good progress towards established goals.    New/Revised goals: updated 4/17/2018    Short Term Goals (4 Weeks):   1. Patient to have improved BLE strength as noted by sit to stand transfer without use of UE's- met  2. Patient to independently verbalize 3 strategies for fall prevention- met  3. Patient to be able to perform 30 minutes of consecutive exercise without rest break for improved functional endurance with housechores- met  Long Term Goals (6 Weeks):   1. Patient to be independent with home exercise program for improved self management of condition- met  2. Patient to have decreased subjective report of disability as noted by a score of 56% or less on the FOTO questionnaire - met  3. Patient to have improved static balance as noted by 42/56 or greater of SMYTH for decreased  risk for falls- in progress  4. Patient to have improved strength in BLE's by 1/3 muscle grade for improved functional mobility - in progress (met all but hip extension)    Plan:     Updated Certification Period: 1/16/2018 to 6/30/2018  Recommended Treatment Plan: 1-2 times per week for 4 more weeks: Gait Training, Manual Therapy, Moist Heat/ Ice, Neuromuscular Re-ed, Patient Education, Self Care, Therapeutic Activites and Therapeutic Exercise     Araseli Savage, PT   5/29/2018

## 2018-05-31 ENCOUNTER — CLINICAL SUPPORT (OUTPATIENT)
Dept: REHABILITATION | Facility: HOSPITAL | Age: 68
End: 2018-05-31
Attending: FAMILY MEDICINE
Payer: MEDICARE

## 2018-05-31 DIAGNOSIS — R29.898 MUSCULAR DECONDITIONING: ICD-10-CM

## 2018-05-31 DIAGNOSIS — R29.898 BILATERAL LEG WEAKNESS: ICD-10-CM

## 2018-05-31 DIAGNOSIS — R29.898 WEAKNESS OF BOTH HIPS: ICD-10-CM

## 2018-05-31 DIAGNOSIS — Z74.09 IMPAIRED FUNCTIONAL MOBILITY, BALANCE, GAIT, AND ENDURANCE: Primary | ICD-10-CM

## 2018-05-31 PROCEDURE — 97110 THERAPEUTIC EXERCISES: CPT | Mod: PN | Performed by: PHYSICAL THERAPIST

## 2018-05-31 NOTE — PROGRESS NOTES
"Name: Deb Webb  Clinic Number: [id number]  Date of Treatment: 05/31/2018   Diagnosis:   Encounter Diagnoses   Name Primary?    Impaired functional mobility, balance, gait, and endurance Yes    Weakness of both hips     Bilateral leg weakness     Muscular deconditioning      Time in: 0900  Time Out: 1000  Total Treatment Time: 60 minutes (1:1 with PT 40 minutes of treatment session)     Visit #: 25  FOTO and G code performed: visit 19    Subjective:    Deb states not qualifying for additional government assistance and it will be difficult to keep paying her co-payments and unable to get her medicines. Patient rates current pain level as 0/10 this morning. She is interested in the [facility] financal aid program and given handout.     Objective:    Patient received individual therapy with activities as follows:   Deb received therapeutic exercises to develop strength, endurance, ROM, flexibility, posture and core stabilization for 60 minutes including:     NuStep x 8 minutes, hills-level 3   Upright Bike x 0 minutes    Standing:  Hip extension 2x10 ea LE (RTB)  Hip abduction 2x10 ea LE (RTB)  Hip flexion 2 x 10 ea LE (RTB)  Mini squats 2x10 with (RTB)  Toe/heel raises 2x10   +monster walks RTB 4 laps in // bars  +kareoke walking 4 laps // bars    NBOS airex pad 2 x 1 minute (with head turns)  Marches 2 minutes alt. LE (on air ex pad)   Tandem stance 30 sec x 2 ea LE  SL stance 15 sec x 2 ea   Ambulation over level 2 hurdles forward and lateral x 2 laps ea   Step ups (3 steps) 2x10 ea      lateral step ups (3 steps) x 15       BLE shuttle squats x 2.5 cords 3x10  +SL shuttle squats x 1.5 cords x 15 ea LE  Step ups onto BOSU x 15 ea LE  static balance black side of BOSU 30" SBA to CGA    Supine:  Diaphragmatic breathing x 2 minutes  Supine transverse abdominus activation with SLR 2x10 ea 1#  HL hip adduction (ball squeeze) x 2 minutes   Bridging 3 x 10  SL clamshells (GTB) 3x10 ea     Written Home Exercises " Provided: reviewed HEP issued on eval  Pt demo good understanding of the education provided. Deb demonstrated good return demonstration of activities.     Assessment:   Deb with improved tolerance to treatment and standing exercises this session. Pt demonstrating poor motor learning and coordination of new dynamic balance and strengthening exercises. Patient to benefit from continued skilled physical therapy to progress towards remaining goals. POC updated this visit due to lapse in care awaiting for approval for remaining visits. Medical Necessity is demonstrated by:  Fall Risk, Pain limits function of effected part for some activities, Unable to participate fully in daily activities, Requires skilled supervision to complete and progress HEP and Weakness.    CMS Impairment/Limitation/Restriction for FOTO Lower Leg (w/o Knee) Survey  Status Limitation G-Code CMS Severity Modifier  Intake 29% 71%  Predicted 44% 56% Goal Status+ CK - At least 40 percent but less than 60 percent  2/15/2018 52% 48%  4/12/2018 48% 52% Current Status CK - At least 40 percent but less than 60 percent    Patient is making good progress towards established goals.    New/Revised goals: updated 4/17/2018    Short Term Goals (4 Weeks):   1. Patient to have improved BLE strength as noted by sit to stand transfer without use of UE's- met  2. Patient to independently verbalize 3 strategies for fall prevention- met  3. Patient to be able to perform 30 minutes of consecutive exercise without rest break for improved functional endurance with housechores- met  Long Term Goals (6 Weeks):   1. Patient to be independent with home exercise program for improved self management of condition- met  2. Patient to have decreased subjective report of disability as noted by a score of 56% or less on the FOTO questionnaire - met  3. Patient to have improved static balance as noted by 42/56 or greater of SMYTH for decreased risk for falls- in progress  4.  Patient to have improved strength in BLE's by 1/3 muscle grade for improved functional mobility - in progress (met all but hip extension)    Plan:     Updated Certification Period: 1/16/2018 to 6/30/2018  Recommended Treatment Plan: 1-2 times per week for 4 more weeks: Gait Training, Manual Therapy, Moist Heat/ Ice, Neuromuscular Re-ed, Patient Education, Self Care, Therapeutic Activites and Therapeutic Exercise     Araseli Savage, PT   5/31/2018

## 2018-06-05 DIAGNOSIS — H40.1133 PRIMARY OPEN ANGLE GLAUCOMA OF BOTH EYES, SEVERE STAGE: ICD-10-CM

## 2018-06-05 DIAGNOSIS — J01.00 ACUTE MAXILLARY SINUSITIS, RECURRENCE NOT SPECIFIED: ICD-10-CM

## 2018-06-05 RX ORDER — DORZOLAMIDE HYDROCHLORIDE AND TIMOLOL MALEATE 20; 5 MG/ML; MG/ML
1 SOLUTION/ DROPS OPHTHALMIC 2 TIMES DAILY
Qty: 10 ML | Refills: 3 | Status: SHIPPED | OUTPATIENT
Start: 2018-06-05 | End: 2019-02-04 | Stop reason: SDUPTHER

## 2018-06-05 RX ORDER — FLUTICASONE PROPIONATE 50 MCG
SPRAY, SUSPENSION (ML) NASAL
Qty: 16 ML | Refills: 2 | Status: SHIPPED | OUTPATIENT
Start: 2018-06-05 | End: 2018-09-16 | Stop reason: SDUPTHER

## 2018-06-06 ENCOUNTER — CLINICAL SUPPORT (OUTPATIENT)
Dept: REHABILITATION | Facility: HOSPITAL | Age: 68
End: 2018-06-06
Attending: FAMILY MEDICINE
Payer: MEDICARE

## 2018-06-06 DIAGNOSIS — Z74.09 IMPAIRED FUNCTIONAL MOBILITY, BALANCE, GAIT, AND ENDURANCE: Primary | ICD-10-CM

## 2018-06-06 DIAGNOSIS — R29.898 MUSCULAR DECONDITIONING: ICD-10-CM

## 2018-06-06 DIAGNOSIS — R29.898 BILATERAL LEG WEAKNESS: ICD-10-CM

## 2018-06-06 DIAGNOSIS — R29.898 WEAKNESS OF BOTH HIPS: ICD-10-CM

## 2018-06-06 PROCEDURE — 97110 THERAPEUTIC EXERCISES: CPT | Mod: PN | Performed by: PHYSICAL THERAPIST

## 2018-06-06 NOTE — PROGRESS NOTES
"Name: Deb Webb  Clinic Number: [id number]  Date of Treatment: 06/06/2018   Diagnosis:   Encounter Diagnoses   Name Primary?    Impaired functional mobility, balance, gait, and endurance Yes    Weakness of both hips     Bilateral leg weakness     Muscular deconditioning      Time in: 1030  Time Out: 1130  Total Treatment Time: 60 minutes (1:1 with PT 30 minutes of treatment session)     Visit #: 26  FOTO and PHYLICIA code performed: visit 19    Subjective:    Deb states feeling more tired than usual and has been running errands this morning. Patient rates current pain level as 0/10 this morning. She is interested in the [facility] financal aid program and given handout.     Objective:    Patient received individual therapy with activities as follows:   Deb received therapeutic exercises to develop strength, endurance, ROM, flexibility, posture and core stabilization for 60 minutes including:     NuStep x 8 minutes, hills-level 3   Upright Bike x 8 minutes    Standing:  Hip extension 2x10 ea LE (RTB)  Hip abduction 2x10 ea LE (RTB)  Hip flexion 2 x 10 ea LE (RTB)  Mini squats 2x10 with (RTB)  Toe/heel raises 2x10   monster walks RTB 4 laps in // bars  kareoke walking 4 laps // bars    NBOS airex pad 2 x 1 minute (with head turns)  Marches 2 minutes alt. LE (on air ex pad)   Tandem stance 30 sec x 2 ea LE  SL stance 15 sec x 2 ea   Ambulation over level 2 hurdles forward and lateral x 2 laps ea   Step ups (3 steps) 2x10 ea      lateral step ups (3 steps) x 15       BLE shuttle squats x 2.5 cords 3x10  +SL shuttle squats x 1.5 cords x 15 ea LE  Step ups onto BOSU x 15 ea LE  static balance black side of BOSU 30" SBA to CGA    Supine:  Diaphragmatic breathing x 2 minutes  Supine transverse abdominus activation with SLR 2x10 ea 1#  HL hip adduction (ball squeeze) x 2 minutes   Bridging 3 x 10  SL clamshells (GTB) 3x10 ea     Written Home Exercises Provided: reviewed HEP issued on eval  Pt demo good understanding of " the education provided. Deb demonstrated good return demonstration of activities.     Assessment:   Deb with fair tolerance to treatment session 2/2 fatigue. Pt requiring more frequent rest breaks due poor cardiovascular and muscular endurance. RLE appearing weaker than LLE with performance of single limb strengthening.  Patient to benefit from continued skilled physical therapy to progress towards remaining goals. POC updated this visit due to lapse in care awaiting for approval for remaining visits. Medical Necessity is demonstrated by:  Fall Risk, Pain limits function of effected part for some activities, Unable to participate fully in daily activities, Requires skilled supervision to complete and progress HEP and Weakness.    CMS Impairment/Limitation/Restriction for FOTO Lower Leg (w/o Knee) Survey  Status Limitation G-Code CMS Severity Modifier  Intake 29% 71%  Predicted 44% 56% Goal Status+ CK - At least 40 percent but less than 60 percent  2/15/2018 52% 48%  4/12/2018 48% 52% Current Status CK - At least 40 percent but less than 60 percent    Patient is making good progress towards established goals.    New/Revised goals: updated 4/17/2018    Short Term Goals (4 Weeks):   1. Patient to have improved BLE strength as noted by sit to stand transfer without use of UE's- met  2. Patient to independently verbalize 3 strategies for fall prevention- met  3. Patient to be able to perform 30 minutes of consecutive exercise without rest break for improved functional endurance with housechores- met  Long Term Goals (6 Weeks):   1. Patient to be independent with home exercise program for improved self management of condition- met  2. Patient to have decreased subjective report of disability as noted by a score of 56% or less on the FOTO questionnaire - met  3. Patient to have improved static balance as noted by 42/56 or greater of SMYTH for decreased risk for falls- in progress  4. Patient to have improved strength  in BLE's by 1/3 muscle grade for improved functional mobility - in progress (met all but hip extension)    Plan:     Updated Certification Period: 1/16/2018 to 6/30/2018  Recommended Treatment Plan: 1-2 times per week for 4 more weeks: Gait Training, Manual Therapy, Moist Heat/ Ice, Neuromuscular Re-ed, Patient Education, Self Care, Therapeutic Activites and Therapeutic Exercise     Araseli Saavge, PT   6/6/2018

## 2018-06-07 RX ORDER — METFORMIN HYDROCHLORIDE 500 MG/1
500 TABLET, EXTENDED RELEASE ORAL 2 TIMES DAILY WITH MEALS
Qty: 360 TABLET | Refills: 2 | Status: SHIPPED | OUTPATIENT
Start: 2018-06-07 | End: 2019-02-20

## 2018-06-08 ENCOUNTER — CLINICAL SUPPORT (OUTPATIENT)
Dept: REHABILITATION | Facility: HOSPITAL | Age: 68
End: 2018-06-08
Attending: FAMILY MEDICINE
Payer: MEDICARE

## 2018-06-08 DIAGNOSIS — R29.898 BILATERAL LEG WEAKNESS: ICD-10-CM

## 2018-06-08 DIAGNOSIS — Z74.09 IMPAIRED FUNCTIONAL MOBILITY, BALANCE, GAIT, AND ENDURANCE: Primary | ICD-10-CM

## 2018-06-08 DIAGNOSIS — R26.9 GAIT ABNORMALITY: ICD-10-CM

## 2018-06-08 DIAGNOSIS — R29.898 MUSCULAR DECONDITIONING: ICD-10-CM

## 2018-06-08 DIAGNOSIS — R26.89 IMPAIRMENT OF BALANCE: ICD-10-CM

## 2018-06-08 DIAGNOSIS — R29.898 WEAKNESS OF BOTH HIPS: ICD-10-CM

## 2018-06-08 PROCEDURE — 97110 THERAPEUTIC EXERCISES: CPT | Mod: PN | Performed by: PHYSICAL THERAPIST

## 2018-06-08 NOTE — PROGRESS NOTES
"Name: Deb Webb  Clinic Number: [id number]  Date of Treatment: 06/08/2018   Diagnosis:   Encounter Diagnoses   Name Primary?    Impaired functional mobility, balance, gait, and endurance Yes    Weakness of both hips     Bilateral leg weakness     Muscular deconditioning     Impairment of balance     Gait abnormality      Time in: 0800  Time Out: 0910  Total Treatment Time: 60 minutes (1:1 with PT 30 minutes of treatment session)     Visit #: 27  FOTO and G code performed: visit 19    Subjective:    Deb states her knees hurting more than usual today. Patient rates current pain level as 5/10 this morning.     Objective:    Patient received individual therapy with activities as follows:   Deb received therapeutic exercises to develop strength, endurance, ROM, flexibility, posture and core stabilization for 60 minutes including:     NuStep x 8 minutes, hills-level 3   Upright Bike x 8 minutes    Standing:  Hip extension 2x10 ea LE (RTB)  Hip abduction 2x10 ea LE (RTB)  Hip flexion 2 x 10 ea LE (RTB)  Mini squats 2x10 with (RTB)  Toe/heel raises 2x10   monster walks RTB 4 laps in // bars  kareoke walking 4 laps // bars    NBOS airex pad 2 x 1 minute (with head turns)  Marches 2 minutes alt. LE (on air ex pad)   Tandem stance 30 sec x 2 ea LE  SL stance 15 sec x 2 ea   Ambulation over level 2 hurdles forward and lateral x 2 laps ea   Step ups (3 steps) 2x10 ea      lateral step ups (3 steps) x 15       BLE shuttle squats x 2.5 cords 3x10  +SL shuttle squats x 1.5 cords x 15 ea LE  Step ups onto BOSU x 15 ea LE  static balance black side of BOSU 30" SBA to CGA    Supine:  Diaphragmatic breathing x 2 minutes  Supine transverse abdominus activation with SLR 2x10 ea 1#  HL hip adduction (ball squeeze) x 2 minutes   Bridging 3 x 10  SL clamshells (GTB) 3x10 ea     Written Home Exercises Provided: reviewed HEP issued on eval  Pt demo good understanding of the education provided. Deb demonstrated good return " demonstration of activities.     Assessment:   Deb with with improved tolerance to therapeutic exercise without c/o fatigue. Good response to skilled interventions with reduction in knee pain following. Patient to benefit from continued skilled physical therapy to progress towards remaining goals. POC updated this visit due to lapse in care awaiting for approval for remaining visits. Medical Necessity is demonstrated by:  Fall Risk, Pain limits function of effected part for some activities, Unable to participate fully in daily activities, Requires skilled supervision to complete and progress HEP and Weakness.    CMS Impairment/Limitation/Restriction for FOTO Lower Leg (w/o Knee) Survey  Status Limitation G-Code CMS Severity Modifier  Intake 29% 71%  Predicted 44% 56% Goal Status+ CK - At least 40 percent but less than 60 percent  2/15/2018 52% 48%  4/12/2018 48% 52% Current Status CK - At least 40 percent but less than 60 percent    Patient is making good progress towards established goals.    New/Revised goals: updated 4/17/2018    Short Term Goals (4 Weeks):   1. Patient to have improved BLE strength as noted by sit to stand transfer without use of UE's- met  2. Patient to independently verbalize 3 strategies for fall prevention- met  3. Patient to be able to perform 30 minutes of consecutive exercise without rest break for improved functional endurance with housechores- met  Long Term Goals (6 Weeks):   1. Patient to be independent with home exercise program for improved self management of condition- met  2. Patient to have decreased subjective report of disability as noted by a score of 56% or less on the FOTO questionnaire - met  3. Patient to have improved static balance as noted by 42/56 or greater of SMYTH for decreased risk for falls- in progress  4. Patient to have improved strength in BLE's by 1/3 muscle grade for improved functional mobility - in progress (met all but hip  extension)    Plan:     Updated Certification Period: 1/16/2018 to 6/30/2018  Recommended Treatment Plan: 1-2 times per week for 4 more weeks: Gait Training, Manual Therapy, Moist Heat/ Ice, Neuromuscular Re-ed, Patient Education, Self Care, Therapeutic Activites and Therapeutic Exercise     Araseli Savage, PT   6/8/2018

## 2018-06-09 DIAGNOSIS — G25.0 ESSENTIAL TREMOR: ICD-10-CM

## 2018-06-11 RX ORDER — PRIMIDONE 50 MG/1
TABLET ORAL
Qty: 90 TABLET | Refills: 0 | Status: SHIPPED | OUTPATIENT
Start: 2018-06-11 | End: 2018-07-09 | Stop reason: SDUPTHER

## 2018-06-12 ENCOUNTER — CLINICAL SUPPORT (OUTPATIENT)
Dept: REHABILITATION | Facility: HOSPITAL | Age: 68
End: 2018-06-12
Attending: FAMILY MEDICINE
Payer: MEDICARE

## 2018-06-12 DIAGNOSIS — R29.898 WEAKNESS OF BOTH HIPS: ICD-10-CM

## 2018-06-12 DIAGNOSIS — R29.898 BILATERAL LEG WEAKNESS: ICD-10-CM

## 2018-06-12 DIAGNOSIS — Z74.09 IMPAIRED FUNCTIONAL MOBILITY, BALANCE, GAIT, AND ENDURANCE: ICD-10-CM

## 2018-06-12 PROCEDURE — 97112 NEUROMUSCULAR REEDUCATION: CPT | Mod: PN

## 2018-06-12 PROCEDURE — 97110 THERAPEUTIC EXERCISES: CPT | Mod: PN

## 2018-06-12 NOTE — PROGRESS NOTES
"Name: Deb Webb  [facility] Number: [id number]  Date of Treatment: 06/12/2018   Diagnosis:   Encounter Diagnoses   Name Primary?    Impaired functional mobility, balance, gait, and endurance     Weakness of both hips     Bilateral leg weakness      Time in: 0730  Time Out: 0830  Total Treatment Time: 60 minutes (1:1 with PTA 40 minutes of treatment session)     Visit #: 28  FOTO and G code performed: visit 19    Subjective:    Deb states her knees are pain free this morning and she is feeling better than last week. Patient rates current pain level as 0/10.     Objective:    Patient received individual therapy with activities as follows:   Deb received therapeutic exercises to develop strength, endurance, ROM, flexibility, posture and core stabilization for 60 minutes including:     NuStep x 8 minutes, hills-level 3   Upright Bike x 8 minutes    Standing:  Hip extension 2x10 ea LE (RTB)  Hip abduction 2x10 ea LE (RTB)  Hip flexion 2 x 10 ea LE (RTB)  Mini squats 2x10 with (RTB)  Monster walks RTB 4 laps in // bars  Toe/heel raises 2x10   Kareoke walking 4 laps // bars    NBOS airex pad 2 x 1 minute (with head turns)  Marches 2 minutes alt. LE (on air ex pad)   Tandem stance 30 sec x 2 ea LE  SL stance 15 sec x 2 ea   Ambulation over level 2 hurdles forward and lateral x 2 laps ea   Step ups (3 steps) 2x10 ea      Lateral step ups (3 steps) x 15       BLE shuttle squats x 2.5 cords 3x10  SL shuttle squats x 1 cord x 15 ea LE  Step ups onto BOSU x 15 ea LE  static balance black side of BOSU 30" SBA to CGA    Supine:  Diaphragmatic breathing x 2 minutes  Supine transverse abdominus activation with SLR 2x10 ea 1#  HL hip adduction (ball squeeze) x 2 minutes   Bridging 3 x 10  SL clamshells (GTB) 3x10 ea     Written Home Exercises Provided: reviewed HEP issued on [employer]  Pt demo good understanding of the education provided. Deb demonstrated good return demonstration of activities.     Assessment:   Deb " tolerated treatment well today. Patient demonstrates max difficulty coordinating kareoke step activity with verbal and visual feedback needed for correct sequencing. Patient able to complete all exercises without complaints of pain or loss of balance episodes noted. Patient to benefit from continued skilled physical therapy to progress towards remaining goals. POC updated this visit due to lapse in care awaiting for approval for remaining visits. Medical Necessity is demonstrated by:  Fall Risk, Pain limits function of effected part for some activities, Unable to participate fully in daily activities, Requires skilled supervision to complete and progress HEP and Weakness.    CMS Impairment/Limitation/Restriction for FOTO Lower Leg (w/o Knee) Survey  Status Limitation G-Code CMS Severity Modifier  Intake 29% 71%  Predicted 44% 56% Goal Status+ CK - At least 40 percent but less than 60 percent  2/15/2018 52% 48%  4/12/2018 48% 52% Current Status CK - At least 40 percent but less than 60 percent    Patient is making good progress towards established goals.    New/Revised goals: updated 4/17/2018    Short Term Goals (4 Weeks):   1. Patient to have improved BLE strength as noted by sit to stand transfer without use of UE's- met  2. Patient to independently verbalize 3 strategies for fall prevention- met  3. Patient to be able to perform 30 minutes of consecutive exercise without rest break for improved functional endurance with housechores- met  Long Term Goals (6 Weeks):   1. Patient to be independent with home exercise program for improved self management of condition- met  2. Patient to have decreased subjective report of disability as noted by a score of 56% or less on the FOTO questionnaire - met  3. Patient to have improved static balance as noted by 42/56 or greater of SMYTH for decreased risk for falls- in progress  4. Patient to have improved strength in BLE's by 1/3 muscle grade for improved functional mobility  - in progress (met all but hip extension)    Plan:     Updated Certification Period: 1/16/2018 to 6/30/2018  Recommended Treatment Plan: 1-2 times per week for 4 more weeks: Gait Training, Manual Therapy, Moist Heat/ Ice, Neuromuscular Re-ed, Patient Education, Self Care, Therapeutic Activites and Therapeutic Exercise     Madeline Bridges, PTA   6/12/2018

## 2018-06-14 ENCOUNTER — CLINICAL SUPPORT (OUTPATIENT)
Dept: REHABILITATION | Facility: HOSPITAL | Age: 68
End: 2018-06-14
Attending: FAMILY MEDICINE
Payer: MEDICARE

## 2018-06-14 DIAGNOSIS — Z74.09 IMPAIRED FUNCTIONAL MOBILITY, BALANCE, GAIT, AND ENDURANCE: Primary | ICD-10-CM

## 2018-06-14 DIAGNOSIS — R29.898 MUSCULAR DECONDITIONING: ICD-10-CM

## 2018-06-14 DIAGNOSIS — R26.9 GAIT ABNORMALITY: ICD-10-CM

## 2018-06-14 DIAGNOSIS — R26.89 IMPAIRMENT OF BALANCE: ICD-10-CM

## 2018-06-14 DIAGNOSIS — R29.898 BILATERAL LEG WEAKNESS: ICD-10-CM

## 2018-06-14 DIAGNOSIS — R29.898 WEAKNESS OF BOTH HIPS: ICD-10-CM

## 2018-06-14 PROCEDURE — G8978 MOBILITY CURRENT STATUS: HCPCS | Mod: CK,PN | Performed by: PHYSICAL THERAPIST

## 2018-06-14 PROCEDURE — G8979 MOBILITY GOAL STATUS: HCPCS | Mod: CK,PN | Performed by: PHYSICAL THERAPIST

## 2018-06-14 PROCEDURE — 97110 THERAPEUTIC EXERCISES: CPT | Mod: PN | Performed by: PHYSICAL THERAPIST

## 2018-06-14 NOTE — PROGRESS NOTES
Name: Deb Webb  [facility] Number: [id number]  Date of Treatment: 06/14/2018   Diagnosis:   Encounter Diagnoses   Name Primary?    Impaired functional mobility, balance, gait, and endurance Yes    Weakness of both hips     Bilateral leg weakness     Muscular deconditioning     Impairment of balance     Gait abnormality      Time in: 0900  Time Out: 1010  Total Treatment Time: 60 minutes (1:1 with PT duration of treatment session)     Visit #: 29  FOTO and G code performed: visit 19    Subjective:    Deb states her knees are hurting today and requests ice at end of today. Patient rates current pain level as 5/10. Her family members go to [facility] and she may look into a membership for after therapy.     Objective:    Patient received individual therapy with activities as follows:   Deb received therapeutic exercises to develop strength, endurance, ROM, flexibility, posture and core stabilization for 60 minutes including:     NuStep x 8 minutes, hills-level 3     Matrix machines:  hip abduction 25# x 20  Hip adduction 25# x 20  Leg extension 10# x 20  Leg curls 10# x 20  Leg press 10# x 20    Hip extension 2x10 ea LE (RTB)  Hip abduction 2x10 ea LE (RTB)  Hip flexion 2 x 10 ea LE (RTB)  Toe/heel raises 2x10   Kareoke walking 4 laps // bars    NBOS airex pad 2 x 1 minute (with head turns)  Marches 2 minutes alt. LE (on air ex pad)   Tandem stance 30 sec x 2 ea LE  SL stance 15 sec x 2 ea     Written Home Exercises Provided: reviewed HEP issued on eval  Pt demo good understanding of the education provided. Deb demonstrated good return demonstration of activities.     Assessment:   Deb tolerated treatment well today. Recommended pt have her PCP refer her up for medical fitness program at [facility]. Began matrix machines today for resisted LE strengthening and transition to wellness program. Patient with c/o anterior knee pain with leg extensions and range modified 60-40deg.  Patient to benefit from continued skilled physical therapy to progress towards remaining goals. Medical Necessity is demonstrated by:  Fall Risk, Pain limits function of effected part for some activities, Unable to participate fully in daily activities, Requires skilled supervision to complete and progress HEP and Weakness.    CMS Impairment/Limitation/Restriction for FOTO Lower Leg (w/o Knee) Survey  Status Limitation G-Code CMS Severity Modifier  Intake 29% 71%  Predicted 44% 56% Goal Status+ CK - At least 40 percent but less than 60 percent  2/15/2018 52% 48%  4/12/2018 48% 52%  6/14/2018 51% 49% Current Status CK - At least 40 percent but less than 60 percent  Patient is making good progress towards established goals.    New/Revised goals: updated 4/17/2018    Short Term Goals (4 Weeks):   1. Patient to have improved BLE strength as noted by sit to stand transfer without use of UE's- met  2. Patient to independently verbalize 3 strategies for fall prevention- met  3. Patient to be able to perform 30 minutes of consecutive exercise without rest break for improved functional endurance with housechores- met  Long Term Goals (6 Weeks):   1. Patient to be independent with home exercise program for improved self management of condition- met  2. Patient to have decreased subjective report of disability as noted by a score of 56% or less on the FOTO questionnaire - met  3. Patient to have improved static balance as noted by 42/56 or greater of SMYTH for decreased risk for falls- in progress  4. Patient to have improved strength in BLE's by 1/3 muscle grade for improved functional mobility - in progress (met all but hip extension)    Plan:     Updated Certification Period: 1/16/2018 to 6/30/2018  Recommended Treatment Plan: 1-2 times per week for 4 more weeks: Gait Training, Manual Therapy, Moist Heat/ Ice, Neuromuscular Re-ed, Patient Education, Self Care, Therapeutic Activites and Therapeutic Exercise     Araseli  Janette, PT   6/14/2018

## 2018-06-15 ENCOUNTER — CLINICAL SUPPORT (OUTPATIENT)
Dept: OPHTHALMOLOGY | Facility: CLINIC | Age: 68
End: 2018-06-15
Attending: FAMILY MEDICINE
Payer: MEDICARE

## 2018-06-15 ENCOUNTER — OFFICE VISIT (OUTPATIENT)
Dept: FAMILY MEDICINE | Facility: CLINIC | Age: 68
End: 2018-06-15
Payer: MEDICARE

## 2018-06-15 ENCOUNTER — TELEPHONE (OUTPATIENT)
Dept: OPHTHALMOLOGY | Facility: CLINIC | Age: 68
End: 2018-06-15

## 2018-06-15 VITALS
SYSTOLIC BLOOD PRESSURE: 100 MMHG | HEIGHT: 62 IN | WEIGHT: 138.44 LBS | OXYGEN SATURATION: 96 % | TEMPERATURE: 98 F | DIASTOLIC BLOOD PRESSURE: 64 MMHG | BODY MASS INDEX: 25.48 KG/M2 | HEART RATE: 123 BPM

## 2018-06-15 DIAGNOSIS — I10 ESSENTIAL HYPERTENSION, BENIGN: ICD-10-CM

## 2018-06-15 DIAGNOSIS — Z12.31 ENCOUNTER FOR SCREENING MAMMOGRAM FOR BREAST CANCER: ICD-10-CM

## 2018-06-15 DIAGNOSIS — Q07.9 CONGENITAL TILTED OPTIC NERVE: Primary | ICD-10-CM

## 2018-06-15 DIAGNOSIS — H40.023 OPEN ANGLE WITH BORDERLINE FINDINGS AND HIGH GLAUCOMA RISK IN BOTH EYES: ICD-10-CM

## 2018-06-15 PROCEDURE — 3046F HEMOGLOBIN A1C LEVEL >9.0%: CPT | Mod: CPTII,S$GLB,, | Performed by: FAMILY MEDICINE

## 2018-06-15 PROCEDURE — 3074F SYST BP LT 130 MM HG: CPT | Mod: CPTII,S$GLB,, | Performed by: FAMILY MEDICINE

## 2018-06-15 PROCEDURE — 99999 PR PBB SHADOW E&M-EST. PATIENT-LVL V: CPT | Mod: PBBFAC,,, | Performed by: FAMILY MEDICINE

## 2018-06-15 PROCEDURE — 99214 OFFICE O/P EST MOD 30 MIN: CPT | Mod: S$GLB,,, | Performed by: FAMILY MEDICINE

## 2018-06-15 PROCEDURE — 92250 FUNDUS PHOTOGRAPHY W/I&R: CPT | Mod: S$GLB,,, | Performed by: OPHTHALMOLOGY

## 2018-06-15 PROCEDURE — 99999 PR PBB SHADOW E&M-EST. PATIENT-LVL II: CPT | Mod: PBBFAC,,,

## 2018-06-15 PROCEDURE — 3078F DIAST BP <80 MM HG: CPT | Mod: CPTII,S$GLB,, | Performed by: FAMILY MEDICINE

## 2018-06-15 NOTE — PROGRESS NOTES
HPI     Deb Webb here for diabetic eye exam with non-dilated fundus   photos.    Small pupils: yes  Pt cooperative: yes    Pt has hx of cataract surgery.  7/14/2017 - Dr. Mccartney: dx w/ primary open angle glaucoma ou, severe   stage.      Last edited by Xavier Hutson on 6/15/2018  9:27 AM. (History)            Assessment /Plan     For exam results, see Encounter Report.    Uncontrolled type 2 diabetes mellitus with diabetic neuropathy, without long-term current use of insulin  -     Diabetic Eye Screening Photo      Please see Dr. Johnson's progress notes for interpretation.

## 2018-06-15 NOTE — Clinical Note
HPI    Deb Webb here for diabetic eye exam with non-dilated fundus  photos.  Small pupils: yes Pt cooperative: yes  Pt has hx of cataract surgery. 7/14/2017 - Dr. Mccartney: dx w/ primary open angle glaucoma ou, severe  stage.    Last edited by Xavier Hutson on 6/15/2018  9:27 AM. (History)

## 2018-06-15 NOTE — PROGRESS NOTES
[facility]  Progress Note    SUBJECTIVE:     Chief Complaint   Patient presents with    Otalgia       HPI   Deb Webb  is a 67 y.o. female here for c/o mild left ear pain. She was tugging and ripped her earring off her ear lobe. Not bleeding but her piercing is now detached. Just wanted to get it checked. Admits her diabetes has been > 200. She hasn't been taking her trulicty because expensive. She doesn't diet, exercise, and a very non-compliant patient.     Review of patient's allergies indicates:   Allergen Reactions    Silicone      Burn skin    Adhesive Rash       Past Medical History:   Diagnosis Date    Allergy     Arthritis     Cataract     Colon polyps     COPD (chronic obstructive pulmonary disease)     Diabetes mellitus type II     Diabetic neuropathy     Fever blister     Glaucoma (increased eye pressure)     Hyperlipidemia     Hypertension     Irritable bowel syndrome     Keloid cicatrix     Osteoporosis     Retained cholelithiasis following cholecystectomy(997.41)     Right patella fracture      Past Surgical History:   Procedure Laterality Date    CATARACT EXTRACTION W/  INTRAOCULAR LENS IMPLANT Bilateral     CHOLECYSTECTOMY      Laparoscopic    COLONOSCOPY      HERNIA REPAIR      HYSTERECTOMY      KNEE SURGERY Right 3/4/2015    Dr Weller     ovarian tumor      Benign    TONSILLECTOMY, ADENOIDECTOMY       Family History   Problem Relation Age of Onset    Cancer Mother         Skin    Skin cancer Mother     Glaucoma Mother     Cataracts Mother     Heart disease Father     Diabetes Father     Skin cancer Sister     Melanoma Neg Hx     Psoriasis Neg Hx     Eczema Neg Hx     Lupus Neg Hx     Amblyopia Neg Hx     Blindness Neg Hx     Macular degeneration Neg Hx     Retinal detachment Neg Hx     Strabismus Neg Hx      Social History   Substance Use Topics    Smoking status: Current Every Day Smoker     Packs/day: 0.50     Years: 40.00      Types: Cigarettes    Smokeless tobacco: Current User    Alcohol use No        Review of Systems   Constitutional: Negative for chills, fever and malaise/fatigue.   HENT: Negative.    Respiratory: Negative.  Negative for cough and shortness of breath.    Cardiovascular: Negative.  Negative for chest pain.   Gastrointestinal: Negative.  Negative for abdominal pain, nausea and vomiting.   Genitourinary: Negative.    Neurological: Negative for weakness and headaches.   All other systems reviewed and are negative.    OBJECTIVE:     Vitals:    06/15/18 0835   BP: 100/64   Pulse: (!) 123   Temp: 98.1 °F (36.7 °C)     Body mass index is 25.32 kg/m².    Physical Exam   Constitutional: She is oriented to person, place, and time and well-developed, well-nourished, and in no distress. No distress.   HENT:   Head: Normocephalic and atraumatic.   Nose: Nose normal.   + disconnected previous ear ring hole of left ear.    Eyes: Conjunctivae and EOM are normal.   Cardiovascular: Normal rate, regular rhythm and normal heart sounds.  Exam reveals no gallop and no friction rub.    No murmur heard.  Pulmonary/Chest: Effort normal and breath sounds normal. No respiratory distress. She has no wheezes. She has no rales. She exhibits no tenderness.   Abdominal: Soft. Bowel sounds are normal. She exhibits no distension. There is no tenderness. There is no rebound.   Neurological: She is alert and oriented to person, place, and time.   Skin: Skin is warm. She is not diaphoretic.       Old records were reviewed. Labs and/or images were independently reviewed.    ASSESSMENT     1. Uncontrolled type 2 diabetes mellitus with diabetic neuropathy, without long-term current use of insulin    2. Essential hypertension, benign    3. Encounter for screening mammogram for breast cancer        PLAN:     Uncontrolled type 2 diabetes mellitus with diabetic neuropathy, without long-term current use of insulin  -     Ambulatory Referral to Diabetes  Education  -     Ambulatory referral to Endocrinology  -     Diabetic Eye Screening Photo; Future  -     Instructed patient to take daily glucose AM logs and to write them down to bring with on next visit. Advised patient to decrease intake of carbohydrates/simple sugars.  -     Discussed that uncontrolled diabetes is a risk factor for heart disease, stroke, among other things. She will try to be more compliant.          Essential hypertension, benign   -     Stable. Continue current regimen.    Encounter for screening mammogram for breast cancer  -     Mammo Digital Screening Bilat with CAD; Future; Expected date: 06/15/2018      RTC PRN, f/u with endo.    Moiz Goldberg MD  06/15/2018 8:57 AM

## 2018-06-15 NOTE — TELEPHONE ENCOUNTER
----- Message from BILL Mantilla sent at 6/15/2018 12:50 PM CDT -----      ----- Message -----  From: Triston Johnson MD  Sent: 6/15/2018  11:50 AM  To: Sherrill Rock

## 2018-06-15 NOTE — LETTER
Raegan 15, 2018    Deb Webb  [address] [place] [id number]             [facility]  [address] [facility] [address]  Phone: [phone]  Fax: [phone] Dear Ms. Deb Webb:    Below are the results from your recent visit:    No results found from the In Basket message.  Your results are abnormal.  Please don't hesitate to call our office if you have any questions or concerns and follow-up 1-month with the general ophthalmologist, Dr. Christiano Mccartney..      Sincerely,        Xavier Hutson MA

## 2018-06-21 ENCOUNTER — HOSPITAL ENCOUNTER (OUTPATIENT)
Dept: RADIOLOGY | Facility: HOSPITAL | Age: 68
Discharge: HOME | End: 2018-06-21
Attending: FAMILY MEDICINE
Payer: MEDICARE

## 2018-06-21 DIAGNOSIS — Z12.31 ENCOUNTER FOR SCREENING MAMMOGRAM FOR BREAST CANCER: ICD-10-CM

## 2018-06-21 PROCEDURE — 77063 BREAST TOMOSYNTHESIS BI: CPT | Mod: 26,,, | Performed by: RADIOLOGY

## 2018-06-21 PROCEDURE — 77067 SCR MAMMO BI INCL CAD: CPT | Mod: 26,,, | Performed by: RADIOLOGY

## 2018-06-21 PROCEDURE — 77067 SCR MAMMO BI INCL CAD: CPT | Mod: TC,PO

## 2018-06-28 ENCOUNTER — DOCUMENTATION ONLY (OUTPATIENT)
Dept: REHABILITATION | Facility: HOSPITAL | Age: 68
End: 2018-06-28

## 2018-06-28 DIAGNOSIS — R29.898 WEAKNESS OF BOTH HIPS: ICD-10-CM

## 2018-06-28 DIAGNOSIS — Z74.09 IMPAIRED FUNCTIONAL MOBILITY, BALANCE, GAIT, AND ENDURANCE: ICD-10-CM

## 2018-06-28 DIAGNOSIS — R29.898 BILATERAL LEG WEAKNESS: ICD-10-CM

## 2018-06-28 NOTE — PROGRESS NOTES
Name: Deb Webb   Clinic Number: [id number]   Age: 67 y.o.   Diagnosis:   Encounter Diagnoses   Name Primary?    Impaired functional mobility, balance, gait, and endurance     Weakness of both hips     Bilateral leg weakness       Physician: Moiz Goldberg MD  Original Orders : PT eval and treat  Initial visit: 1/16/2018  Date of Last visit: 6/28/2018  Date of Discharge Note:  6/14/2018  Total Visits Received: 29    Subjective: Per phone call, pt not approved for additional visits. Pt went to [facility] and will try the medical referral program. She has her HEP and band. She has not been doing the exercises.     Objective:  Treatment :    Included:Therapeutic exercise, Proprioception exercises , Stretching, Balance and Coordination ex and education in HEP        Status at last reassessment 5/15/2018:    MMT                            Left                  Right     Hip:  Flexion                         4/5                 4/5        Extension                    3+/5                 3+/5  Abduction                    4-/5                 4-/4  Adduction                    4/5                  4/5  External Rotation        4/5                 4+/5  Internal rotation           4/5                  4/5     Knee:  Flexion                         4+/5                 4+/5  Extension                    4+/5                   4+/5     Ankle:  Dorsiflexion                 4/5                  4/5    SMYTH Assessment    1. Sitting to Standing   4 - able to stand without using hands and stabilize independently  2. Standing Unsupported   4 - able to stand safely 2 minutes without hold  3. Sitting Unsupported   4 - able to sit safely and securely 2 minutes  4. Standing to Sitting   4 - sits safely with minimal use of hands  5. Pivot Transfer   4 - able to trnasfer safely with minor use of hands  6. Standing with Eyes Closed   3 - able to stand 10 seconds with supervision  7. Standing with Feet Together   3 -  able to place feet together independently and stand for 1 minute with supervision  8. Reaching Forward with Outstretched Arm   2 - can reach forward 5 cm/2 inches safely  9. Retrieving Object from Floor   4 - able to  slipper safely and easily  10. Turning to Look Behind   4 - looks behind from both sides and weights shifts well  11. Turning 360 Degrees   1 - needs close supervision or verbal cueing  12. Placing Alternate Foot on Step   1 - able to complete > 2 steps needs min assist  13. Standing with One Foot in Front   2 - able to take small step indenpendently and hold 30 seconds  14. Standing on One Foot   2- able to lift leg independently and hold < or = 3 seconds     SMYTH/56 (41-56 = low fall risk 21-40 = medium fall risk 0 -20 = high fall risk)    Assessment:  Ms Boyd has made improvements with muscular endurance, strength, and functional mobility. Long term SMYTH goal not met and recommended continue use of SC for safety. Pt continues to be at moderate risk for falls.    Short Term Goals (4 Weeks):   1. Patient to have improved BLE strength as noted by sit to stand transfer without use of UE's- met  2. Patient to independently verbalize 3 strategies for fall prevention- met  3. Patient to be able to perform 30 minutes of consecutive exercise without rest break for improved functional endurance with housechores- met  Long Term Goals (6 Weeks):   1. Patient to be independent with home exercise program for improved self management of condition- met  2. Patient to have decreased subjective report of disability as noted by a score of 56% or less on the FOTO questionnaire - met  3. Patient to have improved static balance as noted by 42/56 or greater of SMYTH for decreased risk for falls- not met  4. Patient to have improved strength in BLE's by 1/3 muscle grade for improved functional mobility - partially met (met all but hip extension)    Discharge reason : Patient has maximized benefit from PT at  this time    Discharge plan :Continue HEP, Pt to follow-up with MD as planned and Wellness program at [facility]    Plan:  This patient is discharged from Physical Therapy Services.     Araseli Savage, PT

## 2018-06-29 ENCOUNTER — TELEPHONE (OUTPATIENT)
Dept: DIABETES | Facility: CLINIC | Age: 68
End: 2018-06-29

## 2018-07-02 ENCOUNTER — CLINICAL SUPPORT (OUTPATIENT)
Dept: DIABETES | Facility: CLINIC | Age: 68
End: 2018-07-02
Payer: MEDICARE

## 2018-07-02 VITALS — WEIGHT: 138.38 LBS | BODY MASS INDEX: 25.31 KG/M2

## 2018-07-02 PROCEDURE — G0108 DIAB MANAGE TRN  PER INDIV: HCPCS | Mod: S$GLB,,, | Performed by: DIETITIAN, REGISTERED

## 2018-07-02 NOTE — PROGRESS NOTES
"Diabetes Education  Author: Estela Bob RD  Date: 7/2/2018    Diabetes Education Visit  Diabetes Education Record Assessment/Progress: Initial  Pt visit today focused on introducing pt to diabetes self management w emphasis on SMBG, CHO awareness/counting and meal planning. Pt states she has been diabetic for a long time but does not recall any recent dm education. Pt comes in w training for meter  Diabetes Type  Diabetes Type : Type II  Diabetes History  Diabetes Diagnosis: >10 years    Nutrition 24-hr diet recall  Meal Planning: skipping meals, diet drinks, artificial sweeteners (does not cook; diet hx indicates 2 meals, limited non-starchy veg. consumes sweetened desserts- jello, cookies, milk limited to catherine milk, drinks juices on occasion)  What type of sweetener do you use?: Equal  What type of beverages do you drink?: diet soda/tea, milk, juice (only tolerates catherine milk)  Meal Plan 24 Hour Recall - Breakfast: 9am: Brain Rex stuffed hashbrowns (800+mg NA, 24 gCHO, high sat fat), regular jello with canned peaches, coke zero  Meal Plan 24 Hour Recall - Lunch: 2-3pm: Chicken abigail microwave dinner w broccoli, water  Meal Plan 24 Hour Recall - Dinner: pt states not hungry    Monitoring   Monitoring: Other (True metrix ordered 4 months ago)  Self Monitoring : Pt picked up meter this week from pharmacy but did not start using. Attempted to Instruct pt on her meter today but did not have strips  Blood Glucose Logs: No (pt stated she has not checked BG in "very long time")  Do you use a personal glucose monitor?: No  In the last month, how often have you had a low blood sugar reaction?: never  Can you tell when your blood sugar is too high?: no    Exercise   Exercise Type: none (bad knee; in PT)    Current Diabetes Treatment   Current Treatment: Oral Medication, Diet (pt stated she stopped taking Trulicity)    Social History  Preferred Learning Method: Face to Face  Primary Support: Self  Educational Level: High " School  Smoking Status: Current Smoker  Alcohol Use: Never    PHQ-2 Total Score: 0  DDS-2 Score  ( > 3 = SIGNIFICANT DISTRESS): 1.5  Barriers to Change: None  Learning Challenges : None  Readiness to Learn : Acceptance  Cultural Influences: No    Diabetes Education Assessment/Progress  Diabetes Disease Process (diabetes disease process and treatment options): Discussion, Individual Session, Written Materials Provided, Instructed, Comprehends Key Points  Nutrition (Incorporating nutritional management into one's lifestyle): Discussion, Individual Session, Written Materials Provided, Instructed, Comprehends Key Points, Needs Review  -diet hx indicates pt consumes only 2 meals/day, stating she isnt hungry. Wt has been stable fluctuation 1-4 lb. Diet hx indicates limited low fat dairy, whole grains, non-starchy vegetables and unsweetened desserts. Pt drinks lots of water and some coke Zero.   -Pt states kitchen sink not working so consumes mostly microwave meals and/or eats out.  -Discussed carb vs non-carb foods. Discussed appropriate amount of carbs to have at meals/snacks. Discussed appropriate serving sizes of individual carb items. Instructed pt to aim for 30-45 gm carb at 3 evenly-spaced meals and 0-15 gm at snacks.    Physical Activity (incorporating physical activity into one's lifestyle): Discussion, Individual Session, Written Materials Provided, Instructed, Comprehends Key Points  Medications (states correct name, dose, onset, peak, duration, side effects & timing of meds): Discussion, Individual Session, Written Materials Provided, Instructed, Comprehends Key Points  Monitoring (monitoring blood glucose/other parameters & using results): Discussion, Return Demonstration, Demonstration, Individual Session, Written Materials Provided, Instructed, Needs Review, Comprehends Key Points  -pt brings new glucometer to office visit for training  but does not have the strips nor extra lancets. Supplies ordered 4  months ago and pt just picked up glucometer last week. Pt advised to retrun to pharmacy for additional supplies  -demonstrated the use of meter and lancet device ; pt given log sheet and instructed on how to complete and to bring in to all clinic visits. Pt stated she felt comfortable with the demonstration and how to check BG and would keep log as advised     Acute Complications (preventing, detecting, and treating acute complications): Discussion, Individual Session, Written Materials Provided, Instructed, Comprehends Key Points  -Reviewed s/s, causes, and treatment of hyperglycemia and hypoglycemia    Chronic Complications (preventing, detecting, and treating chronic complications): Discussion, Individual Session, Instructed, Comprehends Key Points, Needs Review  Clinical (diabetes, other pertinent medical history, and relevant comorbidities reviewed during visit): Discussion, Individual Session, Written Materials Provided, Instructed, Comprehends Key Points, Needs Review  Cognitive (knowledge of self-management skills, functional health literacy): Discussion, Individual Session  Psychosocial (emotional response to diabetes): Discussion, Individual Session, Instructed, Comprehends Key Points  Diabetes Distress and Support Systems: Discussion, Individual Session, Instructed, Comprehends Key Points  Behavioral (readiness for change, lifestyle practices, self-care behaviors): Discussion, Individual Session    Goals  Patient has selected/evaluated goals during today's session: Yes, selected  Healthy Eating: Set (consume 3 evenly spaced meals, 30-45gCHO/meal)  Start Date: 07/02/18  Monitoring: Set (SMBG 3x/day )  Start Date: 07/02/18    Diabetes Care Plan/Intervention  Education Plan/Intervention: Individual Follow-Up DSMT (pt will schedule follow-up)    Diabetes Meal Plan  Restrictions: Restricted Carbohydrate  Carbohydrate Per Meal: 30-45g  Carbohydrate Per Snack : 15-20g    Education Units of Time   Time Spent: 60  min    Health Maintenance was reviewed today with patient. Discussed with patient importance of routine eye exams, foot exams/foot care, blood work (i.e.: A1c, microalbumin, and lipid), dental visits, yearly flu vaccine, and pneumonia vaccine as indicated by PCP. Patient verbalized understanding.     Health Maintenance Topics with due status: Not Due       Topic Last Completion Date    DEXA SCAN 03/16/2017    Pneumococcal (65+) 04/24/2017    Influenza Vaccine 10/30/2017    Urine Microalbumin 01/03/2018    Foot Exam 02/22/2018    Lipid Panel 02/22/2018    Low Dose Statin 06/15/2018    Eye Exam 06/15/2018    Mammogram 06/21/2018     Health Maintenance Due   Topic Date Due    TETANUS VACCINE  10/29/1968    Hemoglobin A1c  04/03/2018    Colonoscopy  05/02/2018

## 2018-07-03 ENCOUNTER — TELEPHONE (OUTPATIENT)
Dept: ADMINISTRATIVE | Facility: HOSPITAL | Age: 68
End: 2018-07-03

## 2018-07-09 ENCOUNTER — LAB VISIT (OUTPATIENT)
Dept: LAB | Facility: HOSPITAL | Age: 68
End: 2018-07-09
Attending: FAMILY MEDICINE
Payer: MEDICARE

## 2018-07-09 ENCOUNTER — TELEPHONE (OUTPATIENT)
Dept: FAMILY MEDICINE | Facility: CLINIC | Age: 68
End: 2018-07-09

## 2018-07-09 ENCOUNTER — OFFICE VISIT (OUTPATIENT)
Dept: FAMILY MEDICINE | Facility: CLINIC | Age: 68
End: 2018-07-09
Payer: MEDICARE

## 2018-07-09 VITALS
OXYGEN SATURATION: 97 % | SYSTOLIC BLOOD PRESSURE: 114 MMHG | TEMPERATURE: 98 F | WEIGHT: 137.56 LBS | BODY MASS INDEX: 25.32 KG/M2 | HEART RATE: 110 BPM | DIASTOLIC BLOOD PRESSURE: 62 MMHG | HEIGHT: 62 IN

## 2018-07-09 DIAGNOSIS — G25.0 ESSENTIAL TREMOR: ICD-10-CM

## 2018-07-09 DIAGNOSIS — Z12.11 ENCOUNTER FOR FIT (FECAL IMMUNOCHEMICAL TEST) SCREENING: ICD-10-CM

## 2018-07-09 LAB
ALBUMIN SERPL BCP-MCNC: 3.8 G/DL
ALP SERPL-CCNC: 94 U/L
ALT SERPL W/O P-5'-P-CCNC: 23 U/L
ANION GAP SERPL CALC-SCNC: 12 MMOL/L
AST SERPL-CCNC: 24 U/L
BILIRUB SERPL-MCNC: 0.3 MG/DL
BUN SERPL-MCNC: 18 MG/DL
CALCIUM SERPL-MCNC: 9.4 MG/DL
CHLORIDE SERPL-SCNC: 103 MMOL/L
CO2 SERPL-SCNC: 18 MMOL/L
CREAT SERPL-MCNC: 1.4 MG/DL
EST. GFR  (AFRICAN AMERICAN): 45.2 ML/MIN/1.73 M^2
EST. GFR  (NON AFRICAN AMERICAN): 39.2 ML/MIN/1.73 M^2
ESTIMATED AVG GLUCOSE: 280 MG/DL
GLUCOSE SERPL-MCNC: 513 MG/DL
HBA1C MFR BLD HPLC: 11.4 %
POTASSIUM SERPL-SCNC: 4.3 MMOL/L
PROT SERPL-MCNC: 7.3 G/DL
SODIUM SERPL-SCNC: 133 MMOL/L

## 2018-07-09 PROCEDURE — 99214 OFFICE O/P EST MOD 30 MIN: CPT | Mod: S$GLB,,, | Performed by: FAMILY MEDICINE

## 2018-07-09 PROCEDURE — 3074F SYST BP LT 130 MM HG: CPT | Mod: CPTII,S$GLB,, | Performed by: FAMILY MEDICINE

## 2018-07-09 PROCEDURE — 83036 HEMOGLOBIN GLYCOSYLATED A1C: CPT

## 2018-07-09 PROCEDURE — 3046F HEMOGLOBIN A1C LEVEL >9.0%: CPT | Mod: CPTII,S$GLB,, | Performed by: FAMILY MEDICINE

## 2018-07-09 PROCEDURE — 36415 COLL VENOUS BLD VENIPUNCTURE: CPT | Mod: PO

## 2018-07-09 PROCEDURE — 99999 PR PBB SHADOW E&M-EST. PATIENT-LVL V: CPT | Mod: PBBFAC,,, | Performed by: FAMILY MEDICINE

## 2018-07-09 PROCEDURE — 3078F DIAST BP <80 MM HG: CPT | Mod: CPTII,S$GLB,, | Performed by: FAMILY MEDICINE

## 2018-07-09 PROCEDURE — 80053 COMPREHEN METABOLIC PANEL: CPT

## 2018-07-09 RX ORDER — INSULIN PUMP SYRINGE, 3 ML
EACH MISCELLANEOUS
Qty: 1 EACH | Refills: 0 | Status: SHIPPED | OUTPATIENT
Start: 2018-07-09 | End: 2018-09-05

## 2018-07-09 RX ORDER — PRIMIDONE 50 MG/1
TABLET ORAL
Qty: 90 TABLET | Refills: 0 | Status: SHIPPED | OUTPATIENT
Start: 2018-07-09 | End: 2018-08-27 | Stop reason: SDUPTHER

## 2018-07-09 RX ORDER — LANCETS
1 EACH MISCELLANEOUS 3 TIMES DAILY
Qty: 200 EACH | Refills: 5 | Status: SHIPPED | OUTPATIENT
Start: 2018-07-09 | End: 2018-09-05

## 2018-07-09 NOTE — PROGRESS NOTES
[facility]  Progress Note    SUBJECTIVE:     Chief Complaint   Patient presents with    [facility] health       HPI   Deb Webb  is a 67 y.o. female here for follow up of her diabetes. She hasn't been checking her sugars because ran out of strips/lancets. She hasn't been taking her trulicity because it is too expensive. She is only taking the metformin and glimepiride. She needs a form to be filled out today.     Review of patient's allergies indicates:   Allergen Reactions    Silicone      Burn skin    Adhesive Rash       Past Medical History:   Diagnosis Date    Allergy     Arthritis     Cataract     Colon polyps     COPD (chronic obstructive pulmonary disease)     Diabetes mellitus type II     Diabetic neuropathy     Fever blister     Glaucoma (increased eye pressure)     Hyperlipidemia     Hypertension     Irritable bowel syndrome     Keloid cicatrix     Osteoporosis     Retained cholelithiasis following cholecystectomy(997.41)     Right patella fracture      Past Surgical History:   Procedure Laterality Date    CATARACT EXTRACTION W/  INTRAOCULAR LENS IMPLANT Bilateral     CHOLECYSTECTOMY      Laparoscopic    COLONOSCOPY      HERNIA REPAIR      HYSTERECTOMY      KNEE SURGERY Right 3/4/2015    Dr Weller     OOPHORECTOMY      ovarian tumor      Benign    TONSILLECTOMY, ADENOIDECTOMY       Family History   Problem Relation Age of Onset    Cancer Mother         Skin    Skin cancer Mother     Glaucoma Mother     Cataracts Mother     Heart disease Father     Diabetes Father     Skin cancer Sister     Melanoma Neg Hx     Psoriasis Neg Hx     Eczema Neg Hx     Lupus Neg Hx     Amblyopia Neg Hx     Blindness Neg Hx     Macular degeneration Neg Hx     Retinal detachment Neg Hx     Strabismus Neg Hx      Social History   Substance Use Topics    Smoking status: Current Every Day Smoker     Packs/day: 0.50     Years: 40.00     Types: Cigarettes    Smokeless  tobacco: Current User    Alcohol use No        Review of Systems   Constitutional: Negative for chills, fever and malaise/fatigue.   HENT: Negative.    Respiratory: Negative.  Negative for cough and shortness of breath.    Cardiovascular: Negative.  Negative for chest pain.   Gastrointestinal: Negative.  Negative for abdominal pain, nausea and vomiting.   Genitourinary: Negative.    Neurological: Negative for weakness and headaches.   All other systems reviewed and are negative.    OBJECTIVE:     Vitals:    07/09/18 0923   BP: 114/62   Pulse: 110   Temp: 97.5 °F (36.4 °C)     Body mass index is 25.16 kg/m².    Physical Exam   Constitutional: She is oriented to person, place, and time and well-developed, well-nourished, and in no distress. No distress.   HENT:   Head: Normocephalic and atraumatic.   Nose: Nose normal.   Eyes: Conjunctivae and EOM are normal.   Cardiovascular: Normal rate, regular rhythm and normal heart sounds.  Exam reveals no gallop and no friction rub.    No murmur heard.  Pulmonary/Chest: Effort normal and breath sounds normal. No respiratory distress. She has no wheezes. She has no rales. She exhibits no tenderness.   Abdominal: Soft. Bowel sounds are normal. She exhibits no distension. There is no tenderness. There is no rebound.   Neurological: She is alert and oriented to person, place, and time.   Skin: Skin is warm. She is not diaphoretic.       Old records were reviewed. Labs and/or images were independently reviewed.    ASSESSMENT     1. Uncontrolled type 2 diabetes mellitus with diabetic neuropathy, without long-term current use of insulin    2. Encounter for FIT (fecal immunochemical test) screening        PLAN:     Uncontrolled type 2 diabetes mellitus with diabetic neuropathy, without long-term current use of insulin  -     blood sugar diagnostic Strp; 1 strip by Misc.(Non-Drug; Combo Route) route 3 (three) times daily.  Dispense: 200 strip; Refill: 5  -     lancets Misc; 1  application by Misc.(Non-Drug; Combo Route) route 3 (three) times daily.  Dispense: 200 each; Refill: 5  -     blood-glucose meter kit; Use as instructed  Dispense: 1 each; Refill: 0  -     Comprehensive metabolic panel; Future  -     Hemoglobin A1c; Future  -     Ambulatory Referral to Pharmacy Assistance   -     Patient unable to afford trulicity due to price. Will refer to pharmacy to see if they can help. Patient has appt with diabetes (endo) next month.   -     Instructed patient to take daily glucose AM logs and to write them down to bring with on next visit. Advised patient to decrease intake of carbohydrates/simple sugars.         Encounter for FIT (fecal immunochemical test) screening  -     Fecal Immunochemical Test (iFOBT); Future; Expected date: 07/09/2018      RTC PRN. F/u with endo.    Moiz Goldberg MD  07/09/2018 9:40 AM

## 2018-07-10 ENCOUNTER — TELEPHONE (OUTPATIENT)
Dept: FAMILY MEDICINE | Facility: CLINIC | Age: 68
End: 2018-07-10

## 2018-07-10 NOTE — TELEPHONE ENCOUNTER
----- Message from Moiz Goldberg MD sent at 7/9/2018  4:41 PM CDT -----  Glucose too high. Recommend to take glimepiride 8 mg a day for now until seen by endocrinology (currently on 4 mg a day).

## 2018-07-10 NOTE — TELEPHONE ENCOUNTER
Spoke with pt informed her to increase her glimepiride rx to 8 mg a day. Pt verbalized understanding

## 2018-07-17 ENCOUNTER — TELEPHONE (OUTPATIENT)
Dept: PHARMACY | Facility: CLINIC | Age: 68
End: 2018-07-17

## 2018-07-19 ENCOUNTER — TELEPHONE (OUTPATIENT)
Dept: FAMILY MEDICINE | Facility: CLINIC | Age: 68
End: 2018-07-19

## 2018-07-19 NOTE — TELEPHONE ENCOUNTER
----- Message from Jeff Hernandez sent at 7/19/2018 10:29 AM CDT -----  Contact: Brenda [facility]/[phone]  [facility] that [facility] need orders as well as clinical notes for the patients diabetic supplies.    Thank you

## 2018-07-28 DIAGNOSIS — M19.90 OSTEOARTHRITIS, UNSPECIFIED OSTEOARTHRITIS TYPE, UNSPECIFIED SITE: ICD-10-CM

## 2018-07-29 RX ORDER — MELOXICAM 15 MG/1
15 TABLET ORAL DAILY
Qty: 90 TABLET | Refills: 1 | Status: SHIPPED | OUTPATIENT
Start: 2018-07-29 | End: 2019-02-02 | Stop reason: SDUPTHER

## 2018-07-31 ENCOUNTER — TELEPHONE (OUTPATIENT)
Dept: FAMILY MEDICINE | Facility: CLINIC | Age: 68
End: 2018-07-31

## 2018-07-31 NOTE — TELEPHONE ENCOUNTER
----- Message from Alla Carrington sent at 7/31/2018  8:55 AM CDT -----  [facility] order for pt's diabetic supplies. Please call back at [phone]

## 2018-08-27 DIAGNOSIS — G25.0 ESSENTIAL TREMOR: ICD-10-CM

## 2018-08-27 RX ORDER — PRIMIDONE 50 MG/1
TABLET ORAL
Qty: 90 TABLET | Refills: 0 | Status: SHIPPED | OUTPATIENT
Start: 2018-08-27 | End: 2018-09-24 | Stop reason: SDUPTHER

## 2018-09-02 DIAGNOSIS — I10 ESSENTIAL HYPERTENSION: Chronic | ICD-10-CM

## 2018-09-03 RX ORDER — LISINOPRIL 40 MG/1
40 TABLET ORAL DAILY
Qty: 90 TABLET | Refills: 0 | Status: SHIPPED | OUTPATIENT
Start: 2018-09-03 | End: 2018-10-04 | Stop reason: SDUPTHER

## 2018-09-05 ENCOUNTER — OFFICE VISIT (OUTPATIENT)
Dept: ENDOCRINOLOGY | Facility: CLINIC | Age: 68
End: 2018-09-05
Payer: MEDICARE

## 2018-09-05 VITALS
SYSTOLIC BLOOD PRESSURE: 114 MMHG | BODY MASS INDEX: 25.92 KG/M2 | HEART RATE: 107 BPM | DIASTOLIC BLOOD PRESSURE: 78 MMHG | HEIGHT: 62 IN | WEIGHT: 140.88 LBS

## 2018-09-05 DIAGNOSIS — I10 ESSENTIAL HYPERTENSION: ICD-10-CM

## 2018-09-05 DIAGNOSIS — E78.1 HYPERTRIGLYCERIDEMIA: ICD-10-CM

## 2018-09-05 DIAGNOSIS — E78.5 HYPERLIPIDEMIA LDL GOAL <70: ICD-10-CM

## 2018-09-05 DIAGNOSIS — M81.0 OSTEOPOROSIS, POSTMENOPAUSAL: ICD-10-CM

## 2018-09-05 DIAGNOSIS — Z72.0 TOBACCO ABUSE: ICD-10-CM

## 2018-09-05 LAB — GLUCOSE SERPL-MCNC: 367 MG/DL (ref 70–110)

## 2018-09-05 PROCEDURE — 3074F SYST BP LT 130 MM HG: CPT | Mod: CPTII,,, | Performed by: NURSE PRACTITIONER

## 2018-09-05 PROCEDURE — 3078F DIAST BP <80 MM HG: CPT | Mod: CPTII,,, | Performed by: NURSE PRACTITIONER

## 2018-09-05 PROCEDURE — 82948 REAGENT STRIP/BLOOD GLUCOSE: CPT | Mod: PBBFAC,PN | Performed by: NURSE PRACTITIONER

## 2018-09-05 PROCEDURE — 99999 PR PBB SHADOW E&M-EST. PATIENT-LVL V: CPT | Mod: PBBFAC,,, | Performed by: NURSE PRACTITIONER

## 2018-09-05 PROCEDURE — 99215 OFFICE O/P EST HI 40 MIN: CPT | Mod: PBBFAC,PN | Performed by: NURSE PRACTITIONER

## 2018-09-05 PROCEDURE — 1101F PT FALLS ASSESS-DOCD LE1/YR: CPT | Mod: CPTII,,, | Performed by: NURSE PRACTITIONER

## 2018-09-05 PROCEDURE — 3046F HEMOGLOBIN A1C LEVEL >9.0%: CPT | Mod: CPTII,,, | Performed by: NURSE PRACTITIONER

## 2018-09-05 PROCEDURE — 99204 OFFICE O/P NEW MOD 45 MIN: CPT | Mod: S$PBB,,, | Performed by: NURSE PRACTITIONER

## 2018-09-05 RX ORDER — INSULIN PUMP SYRINGE, 3 ML
EACH MISCELLANEOUS
Qty: 1 EACH | Refills: 1 | Status: SHIPPED | OUTPATIENT
Start: 2018-09-05 | End: 2018-10-04

## 2018-09-05 RX ORDER — ALENDRONATE SODIUM 70 MG/1
70 TABLET ORAL
Qty: 12 TABLET | Refills: 0 | Status: SHIPPED | OUTPATIENT
Start: 2018-09-05 | End: 2018-11-26 | Stop reason: SDUPTHER

## 2018-09-05 RX ORDER — GLIMEPIRIDE 4 MG/1
8 TABLET ORAL
Qty: 180 TABLET | Refills: 2 | Status: SHIPPED | OUTPATIENT
Start: 2018-09-05 | End: 2018-11-30 | Stop reason: SDUPTHER

## 2018-09-05 RX ORDER — LANCETS
EACH MISCELLANEOUS
Qty: 200 EACH | Refills: 3 | Status: SHIPPED | OUTPATIENT
Start: 2018-09-05 | End: 2018-10-04

## 2018-09-05 RX ORDER — ASPIRIN 81 MG/1
81 TABLET ORAL DAILY
Refills: 0 | COMMUNITY
Start: 2018-09-05 | End: 2021-01-25 | Stop reason: SDUPTHER

## 2018-09-05 NOTE — PATIENT INSTRUCTIONS
Please call [facility] to set up an account - [phone]    To avoid diarrhea from metformin, can try 7-13 grams/day of psyllium fiber like sugar-free Metamucil and take metformin with food. Fiber can also lower cholesterol.    Avoid beverages with sugar.     Continue metformin and glimepiride.     Apply for free insulin from [facility].         Extra Help With Medicare Prescription Drug Plan Costs    Welcome!  The Medicare Prescription Drug program gives you a choice of prescription plans that offer various types of coverage.  You may be able to get extra help to pay for the monthly premiums, annual deductibles, and co-payments related to the Medicare Prescription Drug program. However, you must be enrolled in a Medicare Prescription Drug plan to get this extra help.  What Is This Application?  This is an application for Extra Help and does not enroll you in a Medicare prescription drug plan. You will have to enroll directly with an approved Medicare prescription drug provider for coverage. If you need information about Medicare Prescription Drug plans or how to enroll in a plan, call 1-800-MEDICARE (TTy 1-253.297.1728) or visit www.medicare.gov.   Who Should Complete This Application For Extra Help With Medicare Prescription Drug Plan Costs?  You should complete this application for Extra Help on the Internet if:   You have Medicare Part A (Hospital Insurance) and/or Medicare Part B (Medical Insurance); and   You live in one of the [address] [place] [address] or the [facility]; and   Your combined savings, investments, and real estate are not worth more than $27,600, if you are  and living with your spouse, or $13,820 if you are not currently  or not living with your spouse. (Do NOT count your home, vehicles, personal possessions, life insurance, burial plots, irrevocable burial contracts or back payments from Social Security or SSI.) If you have more than those amounts, you may not qualify for  the extra help. However, you can still enroll in an approved Medicare prescription drug plan for coverage.   EXCEPTION: Even if you meet these conditions, DO NOT complete this application if you have Medicare and Supplemental Security Income (SSI) or Medicare and Medicaid because you automatically will get the extra help.   How Can You Get The Extra Help?  To get extra help with Medicare Prescription Drug plan costs, you must complete and submit this application. We will review your application and send you a letter to let you know if you qualify for extra help.  NOTE: To apply, you must live in one of the [address] [place] [address] or the [facility].  If you need help completing this application, call Social Security toll-free at 1-742.326.8683 (TTY 1-462.308.2983).  You also may be able to get help from your State with other Medicare costs under the Medicare Savings Programs. By completing this form, you will start your application process for a Medicare Savings Program. We will send information to your State who will contact you to help you apply for a Medicare Savings Program unless you tell us not to when you complete this application.  If you need information about Medicare Savings Programs, Medicare Prescription Drug plans or how to enroll in a plan, call 1-800-MEDICARE (TTY 1-265.157.7346) or visit www.medicare.gov. You also can request information about how to contact your State Health Insurance Counseling and Assistance Program (SHIP). The SHIP offers help with your Medicare questions.

## 2018-09-05 NOTE — LETTER
September 5, 2018      Moiz Goldberg MD  [address]  [place] [address]           [place] [facility]  [address]  [place] [address]  Phone: [phone]  Fax: [phone]          Patient: Deb Webb   MR Number: [id number]   YOB: 1950   Date of Visit: 9/5/2018       Dear Dr. Moiz Goldberg:    Thank you for referring Deb Webb to me for evaluation. Attached you will find relevant portions of my assessment and plan of care.    If you have questions, please do not hesitate to call me. I look forward to following Deb Webb along with you.    Sincerely,    Sussy Goldberg, MARCO ANTONIO    Enclosure  CC:  No Recipients    If you would like to receive this communication electronically, please contact [email or web address] or [phone] to request more information on [facility] access.    For providers and/or their staff who would like to refer a patient to [facility], please contact us through our one-stop-shop provider referral line, [facility], at [phone].    If you feel you have received this communication in error or would no longer like to receive these types of communications, please e-mail [email or web address]

## 2018-09-10 ENCOUNTER — TELEPHONE (OUTPATIENT)
Dept: ENDOCRINOLOGY | Facility: CLINIC | Age: 68
End: 2018-09-10

## 2018-09-10 RX ORDER — PEN NEEDLE, DIABETIC 30 GX3/16"
1 NEEDLE, DISPOSABLE MISCELLANEOUS DAILY
Qty: 100 EACH | Refills: 4 | Status: SHIPPED | OUTPATIENT
Start: 2018-09-10 | End: 2019-08-20 | Stop reason: ALTCHOICE

## 2018-09-10 NOTE — TELEPHONE ENCOUNTER
Lantus approved for patient assistance for patient.   She will need to  insulin when available from office and start at 14 units once daily. She needs to see diabetes education for insulin training and nutrition. Referral order is in. rx for pen needles sent. Make sure she's picked up her testing supplies.

## 2018-09-10 NOTE — TELEPHONE ENCOUNTER
Spoke with pt to inform her of the info below. Pt stated she's not in the city and will be gone for at least 2 weeks and will not be able to pick it up until she's back. Told her that a rx for her pens and needles has been sent to her pharmacy, she stated that she will find out where a [facility] located where she's at to pick them up sooner. appt for education is scheduled.

## 2018-09-16 DIAGNOSIS — J01.00 ACUTE MAXILLARY SINUSITIS, RECURRENCE NOT SPECIFIED: ICD-10-CM

## 2018-09-16 RX ORDER — FLUTICASONE PROPIONATE 50 MCG
SPRAY, SUSPENSION (ML) NASAL
Qty: 16 ML | Refills: 2 | Status: SHIPPED | OUTPATIENT
Start: 2018-09-16 | End: 2018-10-16 | Stop reason: SDUPTHER

## 2018-09-24 DIAGNOSIS — G25.0 ESSENTIAL TREMOR: ICD-10-CM

## 2018-09-24 RX ORDER — PRIMIDONE 50 MG/1
TABLET ORAL
Qty: 90 TABLET | Refills: 0 | Status: SHIPPED | OUTPATIENT
Start: 2018-09-24 | End: 2019-03-27

## 2018-09-28 ENCOUNTER — TELEPHONE (OUTPATIENT)
Dept: DIABETES | Facility: CLINIC | Age: 68
End: 2018-09-28

## 2018-10-01 ENCOUNTER — CLINICAL SUPPORT (OUTPATIENT)
Dept: DIABETES | Facility: CLINIC | Age: 68
End: 2018-10-01
Payer: MEDICARE

## 2018-10-01 ENCOUNTER — TELEPHONE (OUTPATIENT)
Dept: ENDOCRINOLOGY | Facility: CLINIC | Age: 68
End: 2018-10-01

## 2018-10-01 PROCEDURE — G0108 DIAB MANAGE TRN  PER INDIV: HCPCS | Mod: PBBFAC,PN | Performed by: DIETITIAN, REGISTERED

## 2018-10-01 NOTE — PROGRESS NOTES
Diabetes Education  Author: Estela Bob RD  Date: 10/1/2018    Diabetes Care Management Summary  Pt visit today focused on SMBG and medication use, dosage, timing. Discussed CHO awareness and meal planning. Spent 3/4 of education session discussing BG testing and medications dosage, injections and timing  Diabetes Education Record Assessment/Progress: Post Program/Follow-up  Current Diabetes Risk Level: Moderate     Last A1c:   Lab Results   Component Value Date    HGBA1C 11.4 (H) 07/09/2018     Last visit with Diabetes Educator: : 10/01/2018    Diabetes Type  Diabetes Type : Type II  Diabetes History  Diabetes Diagnosis: >10 years  Current Treatment: Diet, Insulin    Health Maintenance was reviewed today with patient. Discussed with patient importance of routine eye exams, foot exams/foot care, blood work (i.e.: A1c, microalbumin, and lipid), dental visits, yearly flu vaccine, and pneumonia vaccine as indicated by PCP. Patient verbalized understanding.     Health Maintenance Topics with due status: Not Due       Topic Last Completion Date    DEXA SCAN 03/16/2017    Pneumococcal (65+) 04/24/2017    Urine Microalbumin 01/03/2018    Foot Exam 02/22/2018    Lipid Panel 02/22/2018    Eye Exam 06/15/2018    Mammogram 06/21/2018    Hemoglobin A1c 07/09/2018    Low Dose Statin 09/05/2018     Health Maintenance Due   Topic Date Due    TETANUS VACCINE  10/29/1969    Colonoscopy  05/02/2018    Influenza Vaccine  08/01/2018       Nutrition  Meal Planning: skipping meals, diet drinks, artificial sweeteners  What type of sweetener do you use?: Equal  What type of beverages do you drink?: diet soda/tea, juice    Monitoring   Monitoring: Other  Self Monitoring : true metrix brought in meter received in july but still does not have strips and not checking BG  Blood Glucose Logs: No  Do you use a personal continuous glucose monitor?: No  In the last month, how often have you had a low blood sugar reaction?: never  Can you tell  "when your blood sugar is too high?: no    Exercise   Exercise Type: walking, use exercise equipment  Intensity: Low    Current Diabetes Treatment   Current Treatment: Diet, Insulin    Social History  Preferred Learning Method: Face to Face  Primary Support: Self  Educational Level: High School  Smoking Status: Current Smoker  Alcohol Use: Never  Barriers to Change: None  Learning Challenges : None  Readiness to Learn : Acceptance  Cultural Influences: No    Diabetes Education Assessment/Progress  Diabetes Disease Process (diabetes disease process and treatment options): Discussion, Individual Session  Nutrition (Incorporating nutritional management into one's lifestyle): Discussion, Individual Session, Written Materials Provided, Instructed, Needs Review  -discussed need to continue w CHO control and reduction of reg sweetened foods/beverages in diet. Pt stated it was hard to stay on diet [place] Provided more suggestions on limiting CHO, and simple food preparation techniques. Reminded to to consume 3 evenly spaced meals w 30-45 grams CHO/meal    Physical Activity (incorporating physical activity into one's lifestyle): Discussion, Individual Session  Medications (states correct name, dose, onset, peak, duration, side effects & timing of meds): Individual Session, Written Materials Provided, Demonstration, Instructed, Discussion, Return Demonstration, Needs Review  -Pt  States she needed to p/u insulin from ENDO. It has been in office for past 3 weeks. Pt not taking insulin in its place. Instructed pt in correct use of inulin pen, airshots, needle use and disposal and pen storage. Pt had to repeat demonstrations 2 times before correctly performed    Monitoring (monitoring blood glucose/other parameters & using results): Individual Session, Written Materials Provided, Instructed, Discussion, Needs Review  -pt brings in meter for instruction on how to use but did not bring strips; stated " she did not know she needed to  both lancets and strips from pharmacy. Reviewed the correct technique w office meter. Pt stated she understood the process and correctly returned the demonstration    Acute Complications (preventing, detecting, and treating acute complications): Discussion, Individual Session  -Reviewed s/s, causes, and treatment of hyperglycemia and hypoglycemia    Chronic Complications (preventing, detecting, and treating chronic complications): Discussion, Individual Session    Goals  Patient has selected/evaluated goals during today's session: Yes, evaluated  Healthy Eating: In Progress    Diabetes Care Plan/Intervention  Education Plan/Intervention: Individual Follow-Up DSMT(contact information provided)    Diabetes Meal Plan  Restrictions: Restricted Carbohydrate  Carbohydrate Per Meal: 30-45g  Carbohydrate Per Snack : 15-20g    Today's Self-Management Care Plan was developed with the patient's input and is based on barriers identified during today's assessment.    The long and short-term goals in the care plan were written with the patient/caregiver's input. The patient has agreed to work toward these goals to improve her overall diabetes control.      The patient received a copy of today's self-management plan and verbalized understanding of the care plan, goals, and all of today's instructions.      The patient was encouraged to communicate with her physician and care team regarding her condition(s) and treatment.  I provided the patient with my contact information today and encouraged her to contact me via phone or patient portal as needed.     Education Units of Time   Time Spent: 60 min

## 2018-10-04 ENCOUNTER — OFFICE VISIT (OUTPATIENT)
Dept: FAMILY MEDICINE | Facility: CLINIC | Age: 68
End: 2018-10-04
Payer: MEDICARE

## 2018-10-04 VITALS
HEART RATE: 101 BPM | WEIGHT: 137 LBS | SYSTOLIC BLOOD PRESSURE: 128 MMHG | OXYGEN SATURATION: 95 % | BODY MASS INDEX: 25.21 KG/M2 | DIASTOLIC BLOOD PRESSURE: 86 MMHG | HEIGHT: 62 IN | TEMPERATURE: 98 F

## 2018-10-04 DIAGNOSIS — Z12.11 ENCOUNTER FOR SCREENING FOR MALIGNANT NEOPLASM OF COLON: ICD-10-CM

## 2018-10-04 DIAGNOSIS — R60.0 BILATERAL LOWER EXTREMITY EDEMA: ICD-10-CM

## 2018-10-04 DIAGNOSIS — I10 ESSENTIAL HYPERTENSION: Chronic | ICD-10-CM

## 2018-10-04 DIAGNOSIS — M79.89 SWELLING OF LOWER LEG: ICD-10-CM

## 2018-10-04 DIAGNOSIS — K29.00 ACUTE GASTRITIS WITHOUT HEMORRHAGE, UNSPECIFIED GASTRITIS TYPE: Primary | ICD-10-CM

## 2018-10-04 PROCEDURE — 99999 PR PBB SHADOW E&M-EST. PATIENT-LVL IV: CPT | Mod: PBBFAC,,, | Performed by: FAMILY MEDICINE

## 2018-10-04 PROCEDURE — 99214 OFFICE O/P EST MOD 30 MIN: CPT | Mod: PBBFAC,PO | Performed by: FAMILY MEDICINE

## 2018-10-04 PROCEDURE — 1101F PT FALLS ASSESS-DOCD LE1/YR: CPT | Mod: CPTII,,, | Performed by: FAMILY MEDICINE

## 2018-10-04 PROCEDURE — 99214 OFFICE O/P EST MOD 30 MIN: CPT | Mod: S$PBB,,, | Performed by: FAMILY MEDICINE

## 2018-10-04 PROCEDURE — 3079F DIAST BP 80-89 MM HG: CPT | Mod: CPTII,,, | Performed by: FAMILY MEDICINE

## 2018-10-04 PROCEDURE — 3074F SYST BP LT 130 MM HG: CPT | Mod: CPTII,,, | Performed by: FAMILY MEDICINE

## 2018-10-04 PROCEDURE — 3046F HEMOGLOBIN A1C LEVEL >9.0%: CPT | Mod: CPTII,,, | Performed by: FAMILY MEDICINE

## 2018-10-04 RX ORDER — INSULIN PUMP SYRINGE, 3 ML
EACH MISCELLANEOUS
Qty: 1 EACH | Refills: 0 | Status: SHIPPED | OUTPATIENT
Start: 2018-10-04 | End: 2018-10-04 | Stop reason: SDUPTHER

## 2018-10-04 RX ORDER — LANCETS
1 EACH MISCELLANEOUS 3 TIMES DAILY
Qty: 200 EACH | Refills: 5 | Status: SHIPPED | OUTPATIENT
Start: 2018-10-04 | End: 2018-11-16 | Stop reason: SDUPTHER

## 2018-10-04 RX ORDER — FUROSEMIDE 20 MG/1
20 TABLET ORAL EVERY MORNING
Qty: 90 TABLET | Refills: 1 | Status: SHIPPED | OUTPATIENT
Start: 2018-10-04 | End: 2019-03-30 | Stop reason: SDUPTHER

## 2018-10-04 RX ORDER — LISINOPRIL 40 MG/1
40 TABLET ORAL DAILY
Qty: 90 TABLET | Refills: 0 | Status: SHIPPED | OUTPATIENT
Start: 2018-10-04 | End: 2019-01-02

## 2018-10-04 RX ORDER — INSULIN PUMP SYRINGE, 3 ML
EACH MISCELLANEOUS
Qty: 1 EACH | Refills: 0 | Status: SHIPPED | OUTPATIENT
Start: 2018-10-04 | End: 2018-11-30

## 2018-10-04 NOTE — PROGRESS NOTES
[facility]  Progress Note    SUBJECTIVE:     Chief Complaint   Patient presents with    GI Problem       HPI   Deb Webb  is a 67 y.o. female here for c/o epigastric pain for the past week. Rates pain as mild. Hasn't tried anything for it. Hasn't eaten anything out of the ordinary. No nausea, vomiting, diarrhea. Patient has no other new complaints/problems at this time.      Review of patient's allergies indicates:   Allergen Reactions    Silicone      Burn skin    Adhesive Rash       Past Medical History:   Diagnosis Date    Allergy     Arthritis     Cataract     Colon polyps     COPD (chronic obstructive pulmonary disease)     Diabetes mellitus type II     Diabetic neuropathy     Fever blister     Glaucoma (increased eye pressure)     Hyperlipidemia     Hypertension     Irritable bowel syndrome     Keloid cicatrix     Osteoporosis     Retained cholelithiasis following cholecystectomy(997.41)     Right patella fracture      Past Surgical History:   Procedure Laterality Date    BACK SURGERY  2006    CATARACT EXTRACTION W/  INTRAOCULAR LENS IMPLANT Bilateral     CHOLECYSTECTOMY      Laparoscopic    COLONOSCOPY      COLONOSCOPY N/A 5/2/2013    [facility] ([facility])    HERNIA REPAIR      HYSTERECTOMY      KNEE SURGERY Right 3/4/2015    Dr Weller     OOPHORECTOMY      ORIF-PATELLA / C-ARM Right 3/4/2015    Per[facility]airon [facility]er [facility] [place]    ovarian tumor      Benign    TONSILLECTOMY, ADENOIDECTOMY       Family History   Problem Relation Age of Onset    Cancer Mother         Skin    Skin cancer Mother     Glaucoma Mother     Cataracts Mother     Diabetes Mother     Heart disease Father     Diabetes Father     Skin cancer Sister     Diabetes Sister     Diabetes Daughter     Melanoma Neg Hx     Psoriasis Neg Hx     Eczema Neg Hx     Lupus Neg Hx     Amblyopia Neg Hx     Blindness Neg Hx     Macular  degeneration Neg Hx     Retinal detachment Neg Hx     Strabismus Neg Hx      Social History     Tobacco Use    Smoking status: Current Every Day Smoker     Packs/day: 0.50     Years: 40.00     Pack years: 20.00     Types: Cigarettes    Smokeless tobacco: Current User   Substance Use Topics    Alcohol use: No    Drug use: No        Review of Systems   Constitutional: Negative for chills, fever, malaise/fatigue and weight loss.   Respiratory: Negative for cough and shortness of breath.    Cardiovascular: Negative for chest pain and palpitations.   Gastrointestinal: Positive for abdominal pain and heartburn. Negative for blood in stool, constipation, diarrhea, melena, nausea and vomiting.   Neurological: Negative for weakness.     OBJECTIVE:     Vitals:    10/04/18 1027   BP: 128/86   Pulse: 101   Temp: 98.4 °F (36.9 °C)     Body mass index is 25.06 kg/m².    Physical Exam   Constitutional: She is oriented to person, place, and time. She appears distressed (mild).   HENT:   Head: Normocephalic and atraumatic.   Nose: Nose normal.   Eyes: Conjunctivae and EOM are normal.   Cardiovascular: Normal rate, regular rhythm and normal heart sounds. Exam reveals no gallop and no friction rub.   No murmur heard.  Pulmonary/Chest: Effort normal and breath sounds normal. No respiratory distress. She has no wheezes. She has no rales. She exhibits no tenderness.   Abdominal: Soft. Bowel sounds are normal. She exhibits no distension. There is tenderness (to palpation of epigastric area). There is no rebound.   Musculoskeletal: She exhibits edema (b/l pitting lower leg swelling 2+).   Neurological: She is alert and oriented to person, place, and time.   Skin: Skin is warm. She is not diaphoretic.       Old records were reviewed. Labs and/or images were independently reviewed.    ASSESSMENT     1. Acute gastritis without hemorrhage, unspecified gastritis type    2. Uncontrolled type 2 diabetes mellitus with diabetic neuropathy,  without long-term current use of insulin    3. Encounter for screening for malignant neoplasm of colon    4. Swelling of lower leg    5. Essential hypertension    6. Bilateral lower extremity edema        PLAN:     Acute gastritis without hemorrhage, unspecified gastritis type  -     (pyxis) gi cocktail (mylanta 30 mL, lidocaine 2 % viscous 10 mL, dicyclomine 10 mL) 50 mL; Take by mouth once.    Uncontrolled type 2 diabetes mellitus with diabetic neuropathy, without long-term current use of insulin  -     lancets Misc; 1 application by Misc.(Non-Drug; Combo Route) route 3 (three) times daily.  Dispense: 200 each; Refill: 5  -     blood sugar diagnostic Strp; 1 strip by Misc.(Non-Drug; Combo Route) route 3 (three) times daily.  Dispense: 200 strip; Refill: 5  -     blood-glucose meter kit; Use as instructed  Dispense: 1 each; Refill: 0  -     Instructed patient to take daily glucose AM logs and to write them down to bring with on next visit. Advised patient to decrease intake of carbohydrates/simple sugars.         Encounter for screening for malignant neoplasm of colon  -     Case request GI: COLONOSCOPY    Swelling of lower leg  -     COMPRESSION STOCKINGS    Essential hypertension  -     lisinopril (PRINIVIL,ZESTRIL) 40 MG tablet; Take 1 tablet (40 mg total) by mouth once daily.  Dispense: 90 tablet; Refill: 0  -     Stable. Continue current regimen.    Bilateral lower extremity edema  -     furosemide (LASIX) 20 MG tablet; Take 1 tablet (20 mg total) by mouth every morning.  Dispense: 90 tablet; Refill: 1  -     Stable. Continue current regimen.      RTC PRLUAN Goldberg MD  10/04/2018 11:28 AM

## 2018-10-16 DIAGNOSIS — J01.00 ACUTE MAXILLARY SINUSITIS, RECURRENCE NOT SPECIFIED: ICD-10-CM

## 2018-10-16 RX ORDER — FLUTICASONE PROPIONATE 50 MCG
SPRAY, SUSPENSION (ML) NASAL
Qty: 16 ML | Refills: 5 | Status: SHIPPED | OUTPATIENT
Start: 2018-10-16 | End: 2018-11-08 | Stop reason: SDUPTHER

## 2018-10-19 DIAGNOSIS — E11.9 TYPE 2 DIABETES MELLITUS WITHOUT COMPLICATION: ICD-10-CM

## 2018-11-08 DIAGNOSIS — J01.00 ACUTE MAXILLARY SINUSITIS, RECURRENCE NOT SPECIFIED: ICD-10-CM

## 2018-11-08 RX ORDER — FLUTICASONE PROPIONATE 50 MCG
SPRAY, SUSPENSION (ML) NASAL
Qty: 16 ML | Refills: 5 | Status: SHIPPED | OUTPATIENT
Start: 2018-11-08 | End: 2019-10-07 | Stop reason: SDUPTHER

## 2018-11-16 ENCOUNTER — LAB VISIT (OUTPATIENT)
Dept: LAB | Facility: HOSPITAL | Age: 68
End: 2018-11-16
Attending: INTERNAL MEDICINE
Payer: MEDICARE

## 2018-11-16 ENCOUNTER — OFFICE VISIT (OUTPATIENT)
Dept: FAMILY MEDICINE | Facility: CLINIC | Age: 68
End: 2018-11-16
Payer: MEDICARE

## 2018-11-16 VITALS
OXYGEN SATURATION: 93 % | DIASTOLIC BLOOD PRESSURE: 88 MMHG | HEIGHT: 60 IN | BODY MASS INDEX: 27.48 KG/M2 | WEIGHT: 140 LBS | HEART RATE: 120 BPM | SYSTOLIC BLOOD PRESSURE: 138 MMHG | TEMPERATURE: 98 F

## 2018-11-16 DIAGNOSIS — J01.90 ACUTE BACTERIAL SINUSITIS: ICD-10-CM

## 2018-11-16 DIAGNOSIS — I10 ESSENTIAL HYPERTENSION, BENIGN: ICD-10-CM

## 2018-11-16 DIAGNOSIS — J42 CHRONIC BRONCHITIS, UNSPECIFIED CHRONIC BRONCHITIS TYPE: Chronic | ICD-10-CM

## 2018-11-16 DIAGNOSIS — E11.69 COMBINED HYPERLIPIDEMIA ASSOCIATED WITH TYPE 2 DIABETES MELLITUS: Chronic | ICD-10-CM

## 2018-11-16 DIAGNOSIS — B96.89 ACUTE BACTERIAL SINUSITIS: ICD-10-CM

## 2018-11-16 DIAGNOSIS — F17.200 TOBACCO USE DISORDER: ICD-10-CM

## 2018-11-16 DIAGNOSIS — R42 DIZZINESS: ICD-10-CM

## 2018-11-16 DIAGNOSIS — E78.2 COMBINED HYPERLIPIDEMIA ASSOCIATED WITH TYPE 2 DIABETES MELLITUS: Chronic | ICD-10-CM

## 2018-11-16 LAB
ANION GAP SERPL CALC-SCNC: 12 MMOL/L
BUN SERPL-MCNC: 6 MG/DL
CALCIUM SERPL-MCNC: 9.8 MG/DL
CHLORIDE SERPL-SCNC: 106 MMOL/L
CHOLEST SERPL-MCNC: 120 MG/DL
CO2 SERPL-SCNC: 25 MMOL/L
CREAT SERPL-MCNC: 0.8 MG/DL
EST. GFR  (AFRICAN AMERICAN): >60 ML/MIN/1.73 M^2
EST. GFR  (NON AFRICAN AMERICAN): >60 ML/MIN/1.73 M^2
ESTIMATED AVG GLUCOSE: 197 MG/DL
GLUCOSE SERPL-MCNC: 227 MG/DL
HBA1C MFR BLD HPLC: 8.5 %
HDLC SERPL-MCNC: 40 MG/DL
POTASSIUM SERPL-SCNC: 3.4 MMOL/L
SODIUM SERPL-SCNC: 143 MMOL/L

## 2018-11-16 PROCEDURE — 99406 BEHAV CHNG SMOKING 3-10 MIN: CPT | Mod: S$GLB,,, | Performed by: INTERNAL MEDICINE

## 2018-11-16 PROCEDURE — 3079F DIAST BP 80-89 MM HG: CPT | Mod: CPTII,S$GLB,, | Performed by: INTERNAL MEDICINE

## 2018-11-16 PROCEDURE — 3046F HEMOGLOBIN A1C LEVEL >9.0%: CPT | Mod: CPTII,S$GLB,, | Performed by: INTERNAL MEDICINE

## 2018-11-16 PROCEDURE — 1101F PT FALLS ASSESS-DOCD LE1/YR: CPT | Mod: CPTII,S$GLB,, | Performed by: INTERNAL MEDICINE

## 2018-11-16 PROCEDURE — 82465 ASSAY BLD/SERUM CHOLESTEROL: CPT

## 2018-11-16 PROCEDURE — 3075F SYST BP GE 130 - 139MM HG: CPT | Mod: CPTII,S$GLB,, | Performed by: INTERNAL MEDICINE

## 2018-11-16 PROCEDURE — 80048 BASIC METABOLIC PNL TOTAL CA: CPT

## 2018-11-16 PROCEDURE — 83718 ASSAY OF LIPOPROTEIN: CPT

## 2018-11-16 PROCEDURE — 99499 UNLISTED E&M SERVICE: CPT | Mod: S$GLB,,, | Performed by: INTERNAL MEDICINE

## 2018-11-16 PROCEDURE — 83701 LIPOPROTEIN BLD HR FRACTION: CPT

## 2018-11-16 PROCEDURE — 99999 PR PBB SHADOW E&M-EST. PATIENT-LVL III: CPT | Mod: PBBFAC,,, | Performed by: INTERNAL MEDICINE

## 2018-11-16 PROCEDURE — 99214 OFFICE O/P EST MOD 30 MIN: CPT | Mod: S$GLB,,, | Performed by: INTERNAL MEDICINE

## 2018-11-16 PROCEDURE — 36415 COLL VENOUS BLD VENIPUNCTURE: CPT | Mod: PO

## 2018-11-16 PROCEDURE — 83036 HEMOGLOBIN GLYCOSYLATED A1C: CPT

## 2018-11-16 RX ORDER — AMOXICILLIN AND CLAVULANATE POTASSIUM 500; 125 MG/1; MG/1
1 TABLET, FILM COATED ORAL 2 TIMES DAILY
Qty: 14 TABLET | Refills: 0 | Status: SHIPPED | OUTPATIENT
Start: 2018-11-16 | End: 2018-11-23

## 2018-11-16 RX ORDER — LANCETS
1 EACH MISCELLANEOUS 2 TIMES DAILY
Qty: 200 EACH | Refills: 0 | Status: SHIPPED | OUTPATIENT
Start: 2018-11-16 | End: 2018-11-30

## 2018-11-16 RX ORDER — MECLIZINE HCL 12.5 MG 12.5 MG/1
12.5 TABLET ORAL 3 TIMES DAILY PRN
Qty: 20 TABLET | Refills: 0 | Status: SHIPPED | OUTPATIENT
Start: 2018-11-16 | End: 2018-11-23 | Stop reason: SDUPTHER

## 2018-11-16 RX ORDER — ATORVASTATIN CALCIUM 40 MG/1
40 TABLET, FILM COATED ORAL DAILY
Qty: 90 TABLET | Refills: 2 | Status: SHIPPED | OUTPATIENT
Start: 2018-11-16 | End: 2019-05-15

## 2018-11-16 NOTE — PROGRESS NOTES
Patient, Deb Webb (MRN #[id number]), presented with a recent Estimated Glumerular Filtration Rate (EGFR) between 30 and 245 consistent with the definition of chronic kidney disease stage 4 (ICD10 - N18.3).    eGFR if non    Date Value Ref Range Status   07/09/2018 39.2 (A) >60 mL/min/1.73 m^2 Final     Comment:     Calculation used to obtain the estimated glomerular filtration  rate (eGFR) is the CKD-EPI equation.          The patient's chronic kidney disease stage 4 was monitored, evaluated, addressed and/or treated. This addendum to the medical record is made on 11/16/2018.

## 2018-11-16 NOTE — PROGRESS NOTES
Subjective:     Chief Complaint  Chief Complaint   Patient presents with    Dizziness    Fall       HPI  Deb Webb is a 68 y.o. female with medical diagnoses as listed in the medical history and problem list that presents for dizziness.  Last clinic visit was 10/4/2018.      Dizziness  Daily symptoms for several months  Feels 'woozy' and then 'next thing I know I'm passed out on the floor'  Recently fell and hurt her tailbone on her granddaughter's wood deck  She feels pressure in her face/sinus region as well as congestion and symptoms of ear blockage  She has been using a GI cocktail to alleviate diarrhea  Taking 2-3 Pepto Bismols a day  Also was taking fiber     T2DM  She is currently taking insulin for her diabetes mellitus   Started LANTUS in October 2018 - using 14 units every morning  Doesn't forget dosages she states   BG levels - hasn't been able to check since she      Taking lasix 20 mg daily - skipped yesterday's tablet    Patient Care Team:  Moiz Goldberg MD as PCP - General (Family Medicine)  Estela Bob RD as Dietitian (Nutrition)      PAST MEDICAL HISTORY:  Past Medical History:   Diagnosis Date    Allergy     Arthritis     Cataract     Colon polyps     COPD (chronic obstructive pulmonary disease)     Diabetes mellitus type II     Diabetic neuropathy     Fever blister     Glaucoma (increased eye pressure)     Hyperlipidemia     Hypertension     Irritable bowel syndrome     Keloid cicatrix     Osteoporosis     Retained cholelithiasis following cholecystectomy(997.41)     Right patella fracture        PAST SURGICAL HISTORY:  Past Surgical History:   Procedure Laterality Date    BACK SURGERY  2006    CATARACT EXTRACTION W/  INTRAOCULAR LENS IMPLANT Bilateral     CHOLECYSTECTOMY      Laparoscopic    COLONOSCOPY      COLONOSCOPY N/A 5/2/2013    Performed [facility] Perry [facility] ([facility])    HERNIA REPAIR      HYSTERECTOMY      KNEE SURGERY Right  3/4/2015    Dr Weller     OOPHORECTOMY      ORIF-PATELLA / C-ARM Right 3/4/2015    [facility]    ovarian tumor      Benign    TONSILLECTOMY, ADENOIDECTOMY         SOCIAL HISTORY:  Social History     Socioeconomic History    Marital status:      Spouse name: Not on file    Number of children: Not on file    Years of education: Not on file    Highest education level: Not on file   Social Needs    Financial resource strain: Not on file    Food insecurity - worry: Not on file    Food insecurity - inability: Not on file    Transportation needs - medical: Not on file    Transportation needs - non-medical: Not on file   Occupational History     Employer: [employer]   Tobacco Use    Smoking status: Current Every Day Smoker     Packs/day: 0.50     Years: 40.00     Pack years: 20.00     Types: Cigarettes    Smokeless tobacco: Current User   Substance and Sexual Activity    Alcohol use: No    Drug use: No    Sexual activity: No   Other Topics Concern    Are you pregnant or think you may be? No    Breast-feeding No   Social History Narrative    Not on file       FAMILY HISTORY:  Family History   Problem Relation Age of Onset    Cancer Mother         Skin    Skin cancer Mother     Glaucoma Mother     Cataracts Mother     Diabetes Mother     Heart disease Father     Diabetes Father     Skin cancer Sister     Diabetes Sister     Diabetes Daughter     Melanoma Neg Hx     Psoriasis Neg Hx     Eczema Neg Hx     Lupus Neg Hx     Amblyopia Neg Hx     Blindness Neg Hx     Macular degeneration Neg Hx     Retinal detachment Neg Hx     Strabismus Neg Hx        ALLERGIES AND MEDICATIONS: updated and reviewed.  Review of patient's allergies indicates:   Allergen Reactions    Silicone      Burn skin    Adhesive Rash     Current Outpatient Medications   Medication Sig Dispense Refill    albuterol (VENTOLIN HFA) 90 mcg/actuation inhaler  INHALE 2 PUFFS EVERY 4 HOURS AS NEEDED FOR WHEEZING 18 Inhaler 5    alendronate (FOSAMAX) 70 MG tablet Take 1 tablet (70 mg total) by mouth every 7 days. 12 tablet 0    aspirin (ECOTRIN) 81 MG EC tablet Take 1 tablet (81 mg total) by mouth once daily.  0    blood sugar diagnostic Strp 1 strip by Misc.(Non-Drug; Combo Route) route 3 (three) times daily. 200 strip 5    blood-glucose meter kit Use as instructed 1 each 0    citalopram (CELEXA) 20 MG tablet Take 1 tablet (20 mg total) by mouth once daily. 90 tablet 3    cyclobenzaprine (FLEXERIL) 5 MG tablet Take 1 tablet (5 mg total) by mouth 3 (three) times daily as needed. 90 tablet 5    dicyclomine (BENTYL) 10 MG capsule TAKE ONE CAPSULE BY MOUTH 4 TIMES A DAY BEFORE MEALS AND NIGHTLY 360 capsule 1    dorzolamide-timolol 2-0.5% (COSOPT) 22.3-6.8 mg/mL ophthalmic solution PLACE 1 DROP INTO BOTH EYES 2 (TWO) TIMES DAILY. 10 mL 3    esomeprazole (NEXIUM) 40 MG capsule Take 1 capsule (40 mg total) by mouth before breakfast. 90 capsule 2    fluticasone (FLONASE) 50 mcg/actuation nasal spray USE 2 SPRAY INTO EACH NOSTRIL DAILY 16 mL 5    furosemide (LASIX) 20 MG tablet Take 1 tablet (20 mg total) by mouth every morning. 90 tablet 1    gabapentin (NEURONTIN) 300 MG capsule TAKE ONE CAPSULE BY MOUTH THREE TIMES A  capsule 1    glimepiride (AMARYL) 4 MG tablet Take 2 tablets (8 mg total) by mouth before breakfast. 180 tablet 2    lancets Misc 1 application by Misc.(Non-Drug; Combo Route) route 3 (three) times daily. 200 each 5    latanoprost 0.005 % ophthalmic solution Place 1 drop into both eyes every evening. 2.5 mL 6    lisinopril (PRINIVIL,ZESTRIL) 40 MG tablet Take 1 tablet (40 mg total) by mouth once daily. 90 tablet 0    loratadine (CLARITIN) 10 mg tablet Take 1 tablet (10 mg total) by mouth daily as needed for Allergies (or runny nose). 90 tablet 3    LORazepam (ATIVAN) 0.5 MG tablet TAKE 1 TABLET (0.5 MG TOTAL) BY MOUTH DAILY AS NEEDED FOR  "ANXIETY. 10 tablet 0    meloxicam (MOBIC) 15 MG tablet TAKE 1 TABLET (15 MG TOTAL) BY MOUTH ONCE DAILY. 90 tablet 1    metFORMIN (GLUCOPHAGE-XR) 500 MG 24 hr tablet TAKE 1 TABLET (500 MG TOTAL) BY MOUTH 2 (TWO) TIMES DAILY WITH MEALS. 360 tablet 2    nicotine (NICODERM CQ) 21 mg/24 hr Place 1 patch onto the skin once daily. 30 patch 5    omega-3 fatty acids-vitamin E (FISH OIL) 1,000 mg Cap       pen needle, diabetic (BD ULTRA-FINE AGUS PEN NEEDLE) 32 gauge x 5/32" Ndle 1 Device by Misc.(Non-Drug; Combo Route) route once daily. 100 each 4    primidone (MYSOLINE) 50 MG Tab TAKE 1 TABLET BY MOUTH 3 TIMES A DAY 90 tablet 0    tramadol (ULTRAM) 50 mg tablet TAKE 1 TABLET BY MOUTH EVERY 24 HOURS AS NEEDED FOR PAIN 30 tablet 0    umeclidinium (INCRUSE ELLIPTA) 62.5 mcg/actuation DsDv Inhale 1 each into the lungs once daily. Controller 30 each 2    amoxicillin-clavulanate 500-125mg (AUGMENTIN) 500-125 mg Tab Take 1 tablet (500 mg total) by mouth 2 (two) times daily. for 7 days 14 tablet 0    atorvastatin (LIPITOR) 40 MG tablet Take 1 tablet (40 mg total) by mouth once daily. 90 tablet 2    meclizine (ANTIVERT) 12.5 mg tablet Take 1 tablet (12.5 mg total) by mouth 3 (three) times daily as needed for Dizziness. 20 tablet 0     Current Facility-Administered Medications   Medication Dose Route Frequency Provider Last Rate Last Dose    (pyxis) gi cocktail (mylanta 30 mL, lidocaine 2 % viscous 10 mL, dicyclomine 10 mL) 50 mL   Oral Once Moiz Goldberg MD             ROS:  Review of Systems   Constitutional: Positive for fatigue.   Neurological: Positive for dizziness (vertigo) and light-headedness.   Psychiatric/Behavioral: Positive for sleep disturbance.       Objective:       Physical Exam  Vitals:    11/16/18 0841 11/16/18 0927   BP: (!) 140/90 138/88   BP Location: Right arm    Patient Position: Sitting    BP Method: X-Large (Manual)    Pulse: (!) 120    Temp: 97.9 °F (36.6 °C)    SpO2: (!) 93%    Weight: 63.5 " kg (140 lb)    Height: 5' (1.524 m)     Body mass index is 27.34 kg/m².  Weight: 63.5 kg (140 lb)   Height: 5' (152.4 cm)   Physical Exam   Constitutional: She appears well-developed and well-nourished. No distress.   HENT:   Head: Normocephalic and atraumatic.   Nasal turbinate hypertrophy/swelling  Effusion present bilateral TMs  Maxillary sinus tenderness   Neck: Neck supple.   Cardiovascular: Normal rate. Exam reveals no gallop and no friction rub.   No murmur heard.  Pulmonary/Chest: Effort normal.   Musculoskeletal: She exhibits edema.   Lymphadenopathy:     She has no cervical adenopathy.   Skin: Skin is warm and dry. No rash noted.   Psychiatric: She has a normal mood and affect. Her behavior is normal.   Vitals reviewed.          Assessment:     1. Uncontrolled type 2 diabetes mellitus with diabetic neuropathy, without long-term current use of insulin    2. Acute bacterial sinusitis    3. Dizziness    4. Tobacco use disorder    5. Chronic bronchitis, unspecified chronic bronchitis type    6. Combined hyperlipidemia associated with type 2 diabetes mellitus    7. Essential hypertension, benign      Plan:     Deb was seen today for dizziness and fall.    Diagnoses and all orders for this visit:    Uncontrolled type 2 diabetes mellitus with diabetic neuropathy, without long-term current use of insulin  Last A1c >11% - she is taking Lantus 14 units daily however does not have glucometer supplies to check her BG levels (lancets). Will re-attempt to send supplies presently  -     LDL Cholesterol, Direct Measurement; Future  -     Cholesterol, total; Future  -     HDL cholesterol; Future  -     Basic metabolic panel; Future  -     Hemoglobin A1c; Future    Acute bacterial sinusitis  Discussed symptomatology of acute bacterial rhinosinusitis, including risk factors and proposed benefit, side effects/risks of antibiotic therapy   -     amoxicillin-clavulanate 500-125mg (AUGMENTIN) 500-125 mg Tab; Take 1 tablet  (500 mg total) by mouth 2 (two) times daily. for 7 days    Dizziness  In the setting of maxillary sinus infection  Given limited meclizine for associated vertigo symptoms  -     meclizine (ANTIVERT) 12.5 mg tablet; Take 1 tablet (12.5 mg total) by mouth 3 (three) times daily as needed for Dizziness.    Essential HTN  Some elevation noted today - improved with repeat. Likely secondary to acute illness.  Recheck BP at subsequent clinic encounter - continue current therapy    Tobacco use disorder  The patient was counseled on the dangers of tobacco use, and was Counseled for 3-10 minutes.. The patient was reluctant to quit     Chronic bronchitis, unspecified chronic bronchitis type  Stable - sent medication refill for inhaler to patient's preferred pharmacy on file.  -     umeclidinium (INCRUSE ELLIPTA) 62.5 mcg/actuation DsDv; Inhale 1 each into the lungs once daily. Controller    Combined hyperlipidemia associated with type 2 diabetes mellitus  Lipids controlled presently - reviewed anti-hyperlipidemic regimen - continue current therapy  -     atorvastatin (LIPITOR) 40 MG tablet; Take 1 tablet (40 mg total) by mouth once daily.          Health Maintenance       Date Due Completion Date    TETANUS VACCINE 10/29/1969 ---    Low Dose Statin 10/29/1972 ---    Colonoscopy 05/02/2018 5/2/2013    Urine Microalbumin 01/03/2019 1/3/2018    Hemoglobin A1c 12/05/2018 9/5/2018    Override on 3/2/2015: Done    Foot Exam 02/22/2019 2/22/2018    Override on 7/17/2014: Done (Dr.Giang LUIS Goldberg/podiatry)    Lipid Panel 02/22/2019 2/22/2018    Eye Exam 06/15/2019 6/15/2018    Override on 6/15/2018: Done    Override on 1/27/2012: Done    Mammogram 06/21/2019 6/21/2018    Override on 9/12/2006: Done    DEXA SCAN 03/16/2020 3/16/2017    Pneumococcal (65+) (2 of 2 - PPSV23) 02/03/2021 4/24/2017        Health Maintenance reviewed and addressed as per orders    Follow-up in about 2 months (around 1/16/2019) for DM - January 2019 /   [place].    The patient expressed understanding and no barriers to adherence were identified.     1. The patient indicates understanding of these issues and agrees with the plan. Brief care plan is updated and reviewed with the patient as applicable.     2. The patient is given an After Visit Summary that lists all medications with directions, allergies, orders placed during this encounter and follow-up instructions.     3. I have reviewed the patient's medical information including past medical, family, and social history sections including the medications and allergies.     4. We discussed the patient's current medications. I reconciled the patient's medication list and prepared and supplied needed refills.       Erik Mandujano MD  Internal Medicine-Pediatrics

## 2018-11-19 LAB — LDLC SERPL-MCNC: 53 MG/DL

## 2018-11-23 DIAGNOSIS — R42 DIZZINESS: ICD-10-CM

## 2018-11-23 RX ORDER — MECLIZINE HCL 12.5 MG 12.5 MG/1
12.5 TABLET ORAL 3 TIMES DAILY PRN
Qty: 20 TABLET | Refills: 0 | Status: SHIPPED | OUTPATIENT
Start: 2018-11-23 | End: 2019-01-29 | Stop reason: SDUPTHER

## 2018-11-26 DIAGNOSIS — M81.0 OSTEOPOROSIS, POSTMENOPAUSAL: ICD-10-CM

## 2018-11-26 RX ORDER — ALENDRONATE SODIUM 70 MG/1
TABLET ORAL
Qty: 12 TABLET | Refills: 1 | Status: SHIPPED | OUTPATIENT
Start: 2018-11-26 | End: 2019-05-23 | Stop reason: SDUPTHER

## 2018-11-28 DIAGNOSIS — K21.9 GASTROESOPHAGEAL REFLUX DISEASE, ESOPHAGITIS PRESENCE NOT SPECIFIED: ICD-10-CM

## 2018-11-28 RX ORDER — ESOMEPRAZOLE MAGNESIUM 40 MG/1
40 CAPSULE, DELAYED RELEASE ORAL
Qty: 90 CAPSULE | Refills: 2 | Status: SHIPPED | OUTPATIENT
Start: 2018-11-28 | End: 2019-03-07

## 2018-11-29 NOTE — PROGRESS NOTES
CC: This 68 y.o. White female  is here for evaluation of  T2DM along with comorbidities indicated in the Visit Diagnosis section of this encounter.    HPI: Deb Webb was diagnosed with T2DM in her 40s. Oral agent started at the time of diagnosis.     Initial visit on 9/5/18  New to Endocrine. smokes close to 1 ppd.   She reports having diarrhea on metformin xr 500 mg bid. She had to back down 1000 mg bid.   She started Trulicity ~ march 2018 but had to stop a few months later d/t cost $ 177, up from $30. She is in rx gap. States her BGs were in the low 200s on the Trulicity.   States that she can't cook because her sink is stopped up. Has been only eating microwave meals.   Plan Please call [employer] to set up an account - [phone]  Also call to apply for Extra Help.   To avoid diarrhea from metformin, can try 7-13 grams/day of psyllium fiber like sugar-free Metamucil and take metformin with food. Fiber can also lower cholesterol.  Avoid beverages with sugar. Continue metformin and glimepiride.   Apply for free insulin from [employer] [place]. -- will fax signed application for patient. Will wait for approval from [employer] before setting f/u date.     Interval history  She was approved for free insulin from [employer] but she did not pick it up until weeks later on 10/1/18. She no-showed her f/u visit with me a month earlier.   a1c down from 11.4 to 8.5%. Has not been able to test her BG because she does not have testing supplies. She does feel better overall.     Stopped metformin a month ago d/t diarrhea despite taking fiber supplement. However, she continued to have diarrhea up until recently.       LAST DIABETES EDUCATION: 7/2/18 - found visit helpful   10/1/18 for insulin training     PRESCIBED DIABETES MEDICATIONS: metformin xr 500 mg bid, glimepiride 8 mg once daily in AM Lantus Solostar 14 units every morning     Misses medication doses - No    DM COMPLICATIONS: nephropathy and peripheral  neuropathy    SIGNIFICANT DIABETES MED HISTORY: diarrhea on metformin     SELF MONITORING BLOOD GLUCOSE: Checks blood glucose at home - none.     HYPOGLYCEMIC EPISODES: denies symptoms; continues to have dizziness but no worse that before she started the insulin and the dizziness is a bit better now overall.      CURRENT DIET: drinks mostly water and occ Coke Zero. Eats about 2-3x/day.     CURRENT EXERCISE: none recent; [place] [facility] [place] - does the same exercises from her PT sessions. - stationary bike included       /85 (BP Location: Left arm, Patient Position: Sitting, BP Method: Large (Automatic))   Pulse 104   Ht 5' (1.524 m)   Wt 62.7 kg (138 lb 3.7 oz)   BMI 27.00 kg/m²          ROS:   CONSTITUTIONAL: Appetite good, denies fatigue  FOOT: pain, left worse than right foot       PHYSICAL EXAM:  GENERAL: Well developed, well nourished. No acute distress.   PSYCH: AAOx3, appropriate mood and affect, conversant, well-groomed. Judgement and insight good.   NEURO: Cranial nerves grossly intact. Speech clear, + tremor to head and hands   CHEST: Respirations even and unlabored. CTA bilaterally.  CARDIOVASCULAR:  + pitting 1-2 trace ble edema.           Hemoglobin A1C   Date Value Ref Range Status   11/16/2018 8.5 (H) 4.0 - 5.6 % Final     Comment:     ADA Screening Guidelines:  5.7-6.4%  Consistent with prediabetes  >or=6.5%  Consistent with diabetes  High levels of fetal hemoglobin interfere with the HbA1C  assay. Heterozygous hemoglobin variants (HbS, HgC, etc)do  not significantly interfere with this assay.   However, presence of multiple variants may affect accuracy.     07/09/2018 11.4 (H) 4.0 - 5.6 % Final     Comment:     ADA Screening Guidelines:  5.7-6.4%  Consistent with prediabetes  >or=6.5%  Consistent with diabetes  High levels of fetal hemoglobin interfere with the HbA1C  assay. Heterozygous hemoglobin variants (HbS, HgC, etc)do  not significantly interfere with this  assay.   However, presence of multiple variants may affect accuracy.     01/03/2018 12.4 (H) 4.0 - 5.6 % Final     Comment:     According to ADA guidelines, hemoglobin A1c <7.0% represents  optimal control in non-pregnant diabetic patients. Different  metrics may apply to specific patient populations.   Standards of Medical Care in Diabetes-2016.  For the purpose of screening for the presence of diabetes:  <5.7%     Consistent with the absence of diabetes  5.7-6.4%  Consistent with increasing risk for diabetes   (prediabetes)  >or=6.5%  Consistent with diabetes  Currently, no consensus exists for use of hemoglobin A1c  for diagnosis of diabetes for children.  This Hemoglobin A1c assay has significant interference with fetal   hemoglobin   (HbF). The results are invalid for patients with abnormal amounts of   HbF,   including those with known Hereditary Persistence   of Fetal Hemoglobin. Heterozygous hemoglobin variants (HbAS, HbAC,   HbAD, HbAE, HbA2) do not significantly interfere with this assay;   however, presence of multiple variants in a sample may impact the %   interference.             Chemistry        Component Value Date/Time     11/16/2018 0931    K 3.4 (L) 11/16/2018 0931     11/16/2018 0931    CO2 25 11/16/2018 0931    BUN 6 (L) 11/16/2018 0931    CREATININE 0.8 11/16/2018 0931     (H) 11/16/2018 0931        Component Value Date/Time    CALCIUM 9.8 11/16/2018 0931    ALKPHOS 94 07/09/2018 0958    AST 24 07/09/2018 0958    ALT 23 07/09/2018 0958    BILITOT 0.3 07/09/2018 0958    ESTGFRAFRICA >60.0 11/16/2018 0931    EGFRNONAA >60.0 11/16/2018 0931          Lab Results   Component Value Date    LDLCALC 27.2 (L) 02/22/2018        Ref. Range 2/22/2018 08:56   Cholesterol Latest Ref Range: 120 - 199 mg/dL 125   HDL Latest Ref Range: 40 - 75 mg/dL 42   LDL Cholesterol Latest Ref Range: 63.0 - 159.0 mg/dL 27.2 (L)   Total Cholesterol/HDL Ratio Latest Ref Range: 2.0 - 5.0  3.0    Triglycerides Latest Ref Range: 30 - 150 mg/dL 279 (H)     Lab Results   Component Value Date    STEFANLBCREAT 33.3 (H) 01/03/2018           STANDARDS of CARE:        ASA:               Last eye exam:       ASSESSMENT and PLAN:    A1C GOAL: < 7 %     1. Uncontrolled type 2 diabetes mellitus with diabetic neuropathy, without long-term current use of insulin  Unable to optimally adjust DM meds as there is no SMBG.       Please call [employer] to set up an account - [phone].   Please call our office if you do not get your testing supplies by the end of next week.   Test blood sugar 2x/day. Keep a blood sugar log.   Return to clinic in a month and bring your log.        2. Edema leg  Make f/u appt with PCP.   She will also f/u with DME company to inquire re: her compression stockings.    3. Hyperlipidemia LDL goal <70  At goal        No orders of the defined types were placed in this encounter.       Follow-up in about 4 weeks (around 12/28/2018).     Thank you very much for allowing me to participate in Deb Webb's care.

## 2018-11-30 ENCOUNTER — OFFICE VISIT (OUTPATIENT)
Dept: ENDOCRINOLOGY | Facility: CLINIC | Age: 68
End: 2018-11-30
Payer: MEDICARE

## 2018-11-30 VITALS
HEIGHT: 60 IN | DIASTOLIC BLOOD PRESSURE: 85 MMHG | BODY MASS INDEX: 27.14 KG/M2 | WEIGHT: 138.25 LBS | HEART RATE: 104 BPM | SYSTOLIC BLOOD PRESSURE: 132 MMHG

## 2018-11-30 DIAGNOSIS — R60.0 EDEMA LEG: ICD-10-CM

## 2018-11-30 DIAGNOSIS — E78.5 HYPERLIPIDEMIA LDL GOAL <70: ICD-10-CM

## 2018-11-30 PROCEDURE — 3045F PR MOST RECENT HEMOGLOBIN A1C LEVEL 7.0-9.0%: CPT | Mod: CPTII,S$GLB,, | Performed by: NURSE PRACTITIONER

## 2018-11-30 PROCEDURE — 99214 OFFICE O/P EST MOD 30 MIN: CPT | Mod: S$GLB,,, | Performed by: NURSE PRACTITIONER

## 2018-11-30 PROCEDURE — 3079F DIAST BP 80-89 MM HG: CPT | Mod: CPTII,S$GLB,, | Performed by: NURSE PRACTITIONER

## 2018-11-30 PROCEDURE — 1101F PT FALLS ASSESS-DOCD LE1/YR: CPT | Mod: CPTII,S$GLB,, | Performed by: NURSE PRACTITIONER

## 2018-11-30 PROCEDURE — 99999 PR PBB SHADOW E&M-EST. PATIENT-LVL V: CPT | Mod: PBBFAC,,, | Performed by: NURSE PRACTITIONER

## 2018-11-30 PROCEDURE — 3075F SYST BP GE 130 - 139MM HG: CPT | Mod: CPTII,S$GLB,, | Performed by: NURSE PRACTITIONER

## 2018-11-30 RX ORDER — LANCETS
EACH MISCELLANEOUS
Qty: 200 EACH | Refills: 3 | Status: SHIPPED | OUTPATIENT
Start: 2018-11-30 | End: 2018-12-06 | Stop reason: SDUPTHER

## 2018-11-30 RX ORDER — INSULIN PUMP SYRINGE, 3 ML
EACH MISCELLANEOUS
Qty: 1 EACH | Refills: 0 | Status: SHIPPED | OUTPATIENT
Start: 2018-11-30 | End: 2023-12-06 | Stop reason: CLARIF

## 2018-11-30 RX ORDER — GLIMEPIRIDE 4 MG/1
8 TABLET ORAL
Qty: 180 TABLET | Refills: 0 | Status: SHIPPED | OUTPATIENT
Start: 2018-11-30 | End: 2019-02-28

## 2018-11-30 NOTE — PATIENT INSTRUCTIONS
Please call [employer] to set up an account - [phone].   Please call our office if you do not get your testing supplies by the end of next week.   Test blood sugar 2x/day. Keep a blood sugar log.   Return to clinic in a month and bring your log.

## 2018-12-06 ENCOUNTER — OFFICE VISIT (OUTPATIENT)
Dept: FAMILY MEDICINE | Facility: CLINIC | Age: 68
End: 2018-12-06
Payer: MEDICARE

## 2018-12-06 VITALS
OXYGEN SATURATION: 95 % | TEMPERATURE: 98 F | SYSTOLIC BLOOD PRESSURE: 130 MMHG | BODY MASS INDEX: 27.55 KG/M2 | HEART RATE: 105 BPM | DIASTOLIC BLOOD PRESSURE: 84 MMHG | HEIGHT: 60 IN | WEIGHT: 140.31 LBS

## 2018-12-06 DIAGNOSIS — G89.29 OTHER CHRONIC PAIN: ICD-10-CM

## 2018-12-06 DIAGNOSIS — Z72.0 TOBACCO ABUSE: ICD-10-CM

## 2018-12-06 DIAGNOSIS — Z12.11 ENCOUNTER FOR FIT (FECAL IMMUNOCHEMICAL TEST) SCREENING: ICD-10-CM

## 2018-12-06 DIAGNOSIS — Z12.11 ENCOUNTER FOR SCREENING FOR MALIGNANT NEOPLASM OF COLON: ICD-10-CM

## 2018-12-06 DIAGNOSIS — I10 ESSENTIAL HYPERTENSION, BENIGN: ICD-10-CM

## 2018-12-06 DIAGNOSIS — I87.2 VENOUS INSUFFICIENCY: Primary | ICD-10-CM

## 2018-12-06 PROCEDURE — 1101F PT FALLS ASSESS-DOCD LE1/YR: CPT | Mod: CPTII,S$GLB,, | Performed by: FAMILY MEDICINE

## 2018-12-06 PROCEDURE — 3075F SYST BP GE 130 - 139MM HG: CPT | Mod: CPTII,S$GLB,, | Performed by: FAMILY MEDICINE

## 2018-12-06 PROCEDURE — 3079F DIAST BP 80-89 MM HG: CPT | Mod: CPTII,S$GLB,, | Performed by: FAMILY MEDICINE

## 2018-12-06 PROCEDURE — 99214 OFFICE O/P EST MOD 30 MIN: CPT | Mod: S$GLB,,, | Performed by: FAMILY MEDICINE

## 2018-12-06 PROCEDURE — 3045F PR MOST RECENT HEMOGLOBIN A1C LEVEL 7.0-9.0%: CPT | Mod: CPTII,S$GLB,, | Performed by: FAMILY MEDICINE

## 2018-12-06 PROCEDURE — 99999 PR PBB SHADOW E&M-EST. PATIENT-LVL V: CPT | Mod: PBBFAC,,, | Performed by: FAMILY MEDICINE

## 2018-12-06 RX ORDER — LANCETS
EACH MISCELLANEOUS
Qty: 200 EACH | Refills: 3 | Status: SHIPPED | OUTPATIENT
Start: 2018-12-06 | End: 2019-12-23 | Stop reason: SDUPTHER

## 2018-12-06 RX ORDER — TRAMADOL HYDROCHLORIDE 50 MG/1
TABLET ORAL
Qty: 30 TABLET | Refills: 0 | Status: ON HOLD | OUTPATIENT
Start: 2018-12-06 | End: 2019-01-27 | Stop reason: HOSPADM

## 2018-12-06 NOTE — PROGRESS NOTES
[facility]  Progress Note    SUBJECTIVE:     Chief Complaint   Patient presents with    Foot Swelling       HPI   Deb Webb  is a 68 y.o. female here for c/o b/l swelling. This has been going on for the past couple months but seems to be worsening. It causes pain in both feet. Takes lasix as prescribed which doesn't seem to be helping much. No falls/trauma.    Review of patient's allergies indicates:   Allergen Reactions    Silicone      Burn skin    Adhesive Rash       Past Medical History:   Diagnosis Date    Allergy     Arthritis     Cataract     Colon polyps     COPD (chronic obstructive pulmonary disease)     Diabetes mellitus type II     Diabetic neuropathy     Fever blister     Glaucoma (increased eye pressure)     Hyperlipidemia     Hypertension     Irritable bowel syndrome     Keloid cicatrix     Osteoporosis     Retained cholelithiasis following cholecystectomy(997.41)     Right patella fracture      Past Surgical History:   Procedure Laterality Date    BACK SURGERY  2006    CATARACT EXTRACTION W/  INTRAOCULAR LENS IMPLANT Bilateral     CHOLECYSTECTOMY      Laparoscopic    COLONOSCOPY      COLONOSCOPY N/A 5/2/2013    Performed by Perry Myers [facility] at [facility] (4TH FLR)    HERNIA REPAIR      HYSTERECTOMY      KNEE SURGERY Right 3/4/2015    Dr Weller     OOPHORECTOMY      ORIF-PATELLA / C-ARM Right 3/4/2015    Per[facility] Bairon [facility]er [facility] at [facility] OR    ovarian tumor      Benign    TONSILLECTOMY, ADENOIDECTOMY       Family History   Problem Relation Age of Onset    Cancer Mother         Skin    Skin cancer Mother     Glaucoma Mother     Cataracts Mother     Diabetes Mother     Heart disease Father     Diabetes Father     Skin cancer Sister     Diabetes Sister     Diabetes Daughter     Melanoma Neg Hx     Psoriasis Neg Hx     Eczema Neg Hx     Lupus Neg Hx     Amblyopia Neg Hx     Blindness Neg Hx     Macular degeneration Neg Hx      Retinal detachment Neg Hx     Strabismus Neg Hx      Social History     Tobacco Use    Smoking status: Current Every Day Smoker     Packs/day: 0.50     Years: 40.00     Pack years: 20.00     Types: Cigarettes    Smokeless tobacco: Current User   Substance Use Topics    Alcohol use: No    Drug use: No        Review of Systems   Constitutional: Negative for chills, fever and malaise/fatigue.   HENT: Negative.    Respiratory: Negative.  Negative for cough and shortness of breath.    Cardiovascular: Positive for leg swelling. Negative for chest pain.   Gastrointestinal: Negative.  Negative for abdominal pain, nausea and vomiting.   Genitourinary: Negative.    Neurological: Negative for weakness and headaches.   All other systems reviewed and are negative.    OBJECTIVE:     Vitals:    12/06/18 0819   BP: 130/84   Pulse: 105   Temp: 97.7 °F (36.5 °C)     Body mass index is 27.4 kg/m².    Physical Exam   Constitutional: She is oriented to person, place, and time and well-developed, well-nourished, and in no distress. No distress.   HENT:   Head: Normocephalic and atraumatic.   Nose: Nose normal.   Eyes: Conjunctivae and EOM are normal.   Cardiovascular: Normal rate, regular rhythm and normal heart sounds. Exam reveals no gallop and no friction rub.   No murmur heard.  Pulmonary/Chest: Effort normal and breath sounds normal. No respiratory distress. She has no wheezes. She has no rales. She exhibits no tenderness.   Abdominal: Soft. Bowel sounds are normal. She exhibits no distension. There is no tenderness. There is no rebound.   Musculoskeletal: She exhibits edema (2+ pitting edema b/l lower extremities).   Neurological: She is alert and oriented to person, place, and time.   Skin: Skin is warm. She is not diaphoretic.       Old records were reviewed. Labs and/or images were independently reviewed.    ASSESSMENT     1. Venous insufficiency    2. Uncontrolled type 2 diabetes mellitus with diabetic neuropathy,  without long-term current use of insulin    3. Essential hypertension, benign    4. Encounter for screening for malignant neoplasm of colon    5. Encounter for FIT (fecal immunochemical test) screening    6. Tobacco abuse    7. Other chronic pain        PLAN:     Venous insufficiency  -     COMPRESSION STOCKINGS  -     Recommend rest, elevation, and compression stockings to improve swelling. Ok to continue with lasix as prescribed.    Uncontrolled type 2 diabetes mellitus with diabetic neuropathy, without long-term current use of insulin   -     F/u with endo, A1c improving.    Essential hypertension, benign   -     Stable. Continue current regimen.    Encounter for screening for malignant neoplasm of colon  -     Case request GI: COLONOSCOPY    Encounter for FIT (fecal immunochemical test) screening  -     Fecal Immunochemical Test (iFOBT); Future; Expected date: 12/06/2018    Tobacco abuse  -     Ambulatory referral to Smoking Cessation Program  -     Counseled patient about importance of smoking cessation. Patient ready to quit. Will start smoking cessation treatment options.    Other chronic pain     -    Tramadol 50 mg po qd prn pain.    RTC PRN    Moiz Goldberg MD  12/06/2018 8:41 AM

## 2018-12-14 ENCOUNTER — TELEPHONE (OUTPATIENT)
Dept: ENDOCRINOLOGY | Facility: CLINIC | Age: 68
End: 2018-12-14

## 2018-12-14 DIAGNOSIS — F41.9 ANXIETY: ICD-10-CM

## 2018-12-14 RX ORDER — CITALOPRAM 20 MG/1
20 TABLET, FILM COATED ORAL DAILY
Qty: 90 TABLET | Refills: 0 | Status: SHIPPED | OUTPATIENT
Start: 2018-12-14 | End: 2018-12-21

## 2018-12-14 NOTE — TELEPHONE ENCOUNTER
Called patient and informed her that Lantus has been delivered by [employer] and is ready for . Patient stated she will come on Monday.

## 2018-12-21 RX ORDER — ALBUTEROL SULFATE 90 UG/1
AEROSOL, METERED RESPIRATORY (INHALATION)
Qty: 18 INHALER | Refills: 0 | Status: SHIPPED | OUTPATIENT
Start: 2018-12-21 | End: 2019-01-21 | Stop reason: SDUPTHER

## 2018-12-21 NOTE — TELEPHONE ENCOUNTER
----- Message from Carmen Gomez sent at 12/21/2018  9:39 AM CST -----  Contact: Self   Patient need a refill on her inhaler. Please call at [phone].      albuterol (VENTOLIN HFA) 90 mcg/actuation inhaler      [facility][id number] [facility] [address]

## 2019-01-07 ENCOUNTER — OFFICE VISIT (OUTPATIENT)
Dept: PODIATRY | Facility: CLINIC | Age: 69
End: 2019-01-07
Payer: MEDICARE

## 2019-01-07 VITALS — BODY MASS INDEX: 27.48 KG/M2 | HEIGHT: 60 IN | WEIGHT: 140 LBS

## 2019-01-07 DIAGNOSIS — R60.0 BILATERAL LEG EDEMA: ICD-10-CM

## 2019-01-07 DIAGNOSIS — M21.619 BUNION: ICD-10-CM

## 2019-01-07 DIAGNOSIS — M20.40 HAMMER TOE, UNSPECIFIED LATERALITY: ICD-10-CM

## 2019-01-07 PROCEDURE — 3045F PR MOST RECENT HEMOGLOBIN A1C LEVEL 7.0-9.0%: CPT | Mod: CPTII,S$GLB,, | Performed by: PODIATRIST

## 2019-01-07 PROCEDURE — 99203 PR OFFICE/OUTPT VISIT, NEW, LEVL III, 30-44 MIN: ICD-10-PCS | Mod: S$GLB,,, | Performed by: PODIATRIST

## 2019-01-07 PROCEDURE — 3045F PR MOST RECENT HEMOGLOBIN A1C LEVEL 7.0-9.0%: ICD-10-PCS | Mod: CPTII,S$GLB,, | Performed by: PODIATRIST

## 2019-01-07 PROCEDURE — 99203 OFFICE O/P NEW LOW 30 MIN: CPT | Mod: S$GLB,,, | Performed by: PODIATRIST

## 2019-01-07 PROCEDURE — 99999 PR PBB SHADOW E&M-EST. PATIENT-LVL IV: ICD-10-PCS | Mod: PBBFAC,,, | Performed by: PODIATRIST

## 2019-01-07 PROCEDURE — 1101F PR PT FALLS ASSESS DOC 0-1 FALLS W/OUT INJ PAST YR: ICD-10-PCS | Mod: CPTII,S$GLB,, | Performed by: PODIATRIST

## 2019-01-07 PROCEDURE — 1101F PT FALLS ASSESS-DOCD LE1/YR: CPT | Mod: CPTII,S$GLB,, | Performed by: PODIATRIST

## 2019-01-07 PROCEDURE — 99999 PR PBB SHADOW E&M-EST. PATIENT-LVL IV: CPT | Mod: PBBFAC,,, | Performed by: PODIATRIST

## 2019-01-07 RX ORDER — DICLOFENAC SODIUM 10 MG/G
2 GEL TOPICAL DAILY
Qty: 100 G | Refills: 3 | Status: ON HOLD | OUTPATIENT
Start: 2019-01-07 | End: 2022-01-31 | Stop reason: CLARIF

## 2019-01-07 NOTE — PROGRESS NOTES
Subjective:      Patient ID: Deb Webb is a 68 y.o. female.    Chief Complaint: Diabetes Mellitus (PCP Dr Goldberg 12/06/18); Diabetic Foot Exam; Nail Care; and Foot Problem    Deb is a 68 y.o. female who presents to the clinic upon referral from Dr. Bingham  for evaluation and treatment of diabetic feet. Deb has a past medical history of Allergy, Arthritis, Cataract, Colon polyps, COPD (chronic obstructive pulmonary disease), Diabetes mellitus type II, Diabetic neuropathy, Fever blister, Glaucoma (increased eye pressure), Hyperlipidemia, Hypertension, Irritable bowel syndrome, Keloid cicatrix, Osteoporosis, Retained cholelithiasis following cholecystectomy(997.41), and Right patella fracture. presents for diabetic foot risk assessment. Reports worsening swelling to B/L LE that started a couple of weeks ago. Also reports sharp shooting pain B/L LE L>R. Was previously prescribed compression stockings, has not received them yet.     PCP: Moiz Goldberg MD    Date Last Seen by PCP: 12/6/18    Current shoe gear: Tennis shoes    Hemoglobin A1C   Date Value Ref Range Status   11/16/2018 8.5 (H) 4.0 - 5.6 % Final     Comment:     ADA Screening Guidelines:  5.7-6.4%  Consistent with prediabetes  >or=6.5%  Consistent with diabetes  High levels of fetal hemoglobin interfere with the HbA1C  assay. Heterozygous hemoglobin variants (HbS, HgC, etc)do  not significantly interfere with this assay.   However, presence of multiple variants may affect accuracy.     07/09/2018 11.4 (H) 4.0 - 5.6 % Final     Comment:     ADA Screening Guidelines:  5.7-6.4%  Consistent with prediabetes  >or=6.5%  Consistent with diabetes  High levels of fetal hemoglobin interfere with the HbA1C  assay. Heterozygous hemoglobin variants (HbS, HgC, etc)do  not significantly interfere with this assay.   However, presence of multiple variants may affect accuracy.     01/03/2018 12.4 (H) 4.0 - 5.6 % Final     Comment:     According to ADA guidelines,  hemoglobin A1c <7.0% represents  optimal control in non-pregnant diabetic patients. Different  metrics may apply to specific patient populations.   Standards of Medical Care in Diabetes-2016.  For the purpose of screening for the presence of diabetes:  <5.7%     Consistent with the absence of diabetes  5.7-6.4%  Consistent with increasing risk for diabetes   (prediabetes)  >or=6.5%  Consistent with diabetes  Currently, no consensus exists for use of hemoglobin A1c  for diagnosis of diabetes for children.  This Hemoglobin A1c assay has significant interference with fetal   hemoglobin   (HbF). The results are invalid for patients with abnormal amounts of   HbF,   including those with known Hereditary Persistence   of Fetal Hemoglobin. Heterozygous hemoglobin variants (HbAS, HbAC,   HbAD, HbAE, HbA2) do not significantly interfere with this assay;   however, presence of multiple variants in a sample may impact the %   interference.             Review of Systems   Constitution: Negative for chills, diaphoresis, fever and weakness.   Cardiovascular: Negative for claudication, cyanosis, leg swelling and syncope.   Respiratory: Negative for cough and shortness of breath.    Skin: Positive for color change, nail changes and suspicious lesions.   Musculoskeletal: Negative for falls, joint pain, muscle cramps and muscle weakness.   Gastrointestinal: Negative for diarrhea, nausea and vomiting.   Neurological: Negative for disturbances in coordination, numbness, paresthesias, sensory change and tremors.   Psychiatric/Behavioral: Negative for altered mental status.           Objective:      Physical Exam   Constitutional: She appears well-developed. She is cooperative.   Oriented to time, place, and person.   Cardiovascular:   DP and PT pulses are diminished likely 2/2 edema. bilaterally. 3 sec capillary refill time and toes and feet are warm to touch proximally .  1+ pitting edema B/L LE.    Musculoskeletal:   Equinus noted b/l  ankles with < 10 deg DF noted. MMT 5/5 in DF/PF/Inv/Ev resistance with no reproduction of pain in any direction. Passive range of motion of ankle and pedal joints is painless b/l.          Patient has hammertoes of digits 2-5 bilateral partially reducible without symptom today.     Visible and palpable bunion without pain at dorsomedial 1st metatarsal head right and left.  Hallux abducted right and left partially reducible, tracks laterally without being track bound.  No ecchymosis, erythema, edema, or cardinal signs infection or signs of trauma same foot.          Feet:   Right Foot:   Skin Integrity: Negative for callus or dry skin.   Left Foot:   Skin Integrity: Negative for callus or dry skin.   Lymphadenopathy:   Negative lymphadenopathy bilateral popliteal fossa and tarsal tunnel.   Neurological: She is alert.   Light touch, proprioception, and sharp/dull sensation are all intact bilaterally. Protective threshold with the [place]ienstein monofilament is intact bilaterally.  Subjective paresthesias with no clearly identifiable source or trigger.      Skin:   No open lesions, lacerations or wounds noted.Interdigital spaces clean, dry and intact b/l. No erythema noted to b/l foot.    Toenails 1-5 bilaterally are elongated by 2-3 mm. Nails painted unable to evaluate for trophic changes.        Psychiatric: She has a normal mood and affect.             Assessment:       Encounter Diagnoses   Name Primary?    Uncontrolled type 2 diabetes mellitus with diabetic neuropathy, without long-term current use of insulin Yes    Bunion     Hammer toe, unspecified laterality     Bilateral leg edema          Plan:       Deb was seen today for diabetes mellitus, diabetic foot exam, nail care and foot problem.    Diagnoses and all orders for this visit:    Uncontrolled type 2 diabetes mellitus with diabetic neuropathy, without long-term current use of insulin  -     DIABETIC SHOES FOR HOME USE    Bunion  -     DIABETIC  SHOES FOR HOME USE    Hammer toe, unspecified laterality  -     DIABETIC SHOES FOR HOME USE    Bilateral leg edema    Other orders  -     diclofenac sodium (VOLTAREN) 1 % Gel; Apply 2 g topically once daily.      I counseled the patient on her conditions, their implications and medical management.      - Shoe inspection. Diabetic Foot Education. Patient reminded of the importance of good nutrition and blood sugar control to help prevent podiatric complications of diabetes. Patient instructed on proper foot hygeine. We discussed wearing proper shoe gear, daily foot inspections, never walking without protective shoe gear, never putting sharp instruments to feet, routine podiatric nail visits every 6  months.   - With patient's permission, nails were aggressively reduced and debrided x 10 to their soft tissue attachment mechanically and with electric , removing all offending nail and debris. Patient relates relief following the procedure. He will continue to monitor the areas daily, inspect his feet, wear protective shoe gear when ambulatory, moisturizer to maintain skin integrity and follow in this office in approximately 6  months, sooner p.r.n.     Discussed the use of compression stockings b/l to help control edema. Tubigrip applied today. Discussed proper and consistent elevation of lower extremities, above the level of the heart, while at rest, to help control/improve edema.     Rx diabetic shoes with custom molded inserts to be worn at all times while ambulating. Prescription provided with list of local retailers.     Rx Voltaren gel to be applied to affected area up to 3-4 x daily as needed for pain    F/u 6 months     Deyanira Kebede DPM

## 2019-01-21 ENCOUNTER — TELEPHONE (OUTPATIENT)
Dept: ENDOCRINOLOGY | Facility: CLINIC | Age: 69
End: 2019-01-21

## 2019-01-21 RX ORDER — ALBUTEROL SULFATE 90 UG/1
AEROSOL, METERED RESPIRATORY (INHALATION)
Qty: 18 INHALER | Refills: 3 | Status: SHIPPED | OUTPATIENT
Start: 2019-01-21 | End: 2019-05-24 | Stop reason: SDUPTHER

## 2019-01-21 NOTE — TELEPHONE ENCOUNTER
Called patient who stated she needed to reschedule her appointment until February. Rescheduled and reminder letter mailed.

## 2019-01-21 NOTE — TELEPHONE ENCOUNTER
----- Message from Sally Bautista sent at 1/21/2019 10:40 AM CST -----  Contact: self  Pt calling to discuss medications. Please call [phone].

## 2019-01-22 PROBLEM — N18.4 CKD STAGE 4 DUE TO TYPE 2 DIABETES MELLITUS: Status: ACTIVE | Noted: 2019-01-22

## 2019-01-22 PROBLEM — E11.22 CKD STAGE 4 DUE TO TYPE 2 DIABETES MELLITUS: Status: ACTIVE | Noted: 2019-01-22

## 2019-01-24 ENCOUNTER — HOSPITAL ENCOUNTER (INPATIENT)
Facility: HOSPITAL | Age: 69
LOS: 3 days | Discharge: HOME HEALTH | DRG: 073 | End: 2019-01-27
Attending: EMERGENCY MEDICINE | Admitting: INTERNAL MEDICINE
Payer: MEDICARE

## 2019-01-24 DIAGNOSIS — R55 SYNCOPE, UNSPECIFIED SYNCOPE TYPE: Primary | ICD-10-CM

## 2019-01-24 DIAGNOSIS — S20.212A CONTUSION OF LEFT CHEST WALL, INITIAL ENCOUNTER: ICD-10-CM

## 2019-01-24 DIAGNOSIS — R42 DIZZY: ICD-10-CM

## 2019-01-24 DIAGNOSIS — I63.9 CEREBROVASCULAR ACCIDENT (CVA), UNSPECIFIED MECHANISM: ICD-10-CM

## 2019-01-24 DIAGNOSIS — R73.9 HYPERGLYCEMIA: ICD-10-CM

## 2019-01-24 DIAGNOSIS — R55 SYNCOPE: ICD-10-CM

## 2019-01-24 DIAGNOSIS — E86.0 DEHYDRATION: ICD-10-CM

## 2019-01-24 DIAGNOSIS — S42.002A CLOSED NONDISPLACED FRACTURE OF LEFT CLAVICLE, UNSPECIFIED PART OF CLAVICLE, INITIAL ENCOUNTER: ICD-10-CM

## 2019-01-24 DIAGNOSIS — G89.29 OTHER CHRONIC PAIN: ICD-10-CM

## 2019-01-24 PROBLEM — S42.012A: Status: ACTIVE | Noted: 2019-01-24

## 2019-01-24 LAB
ALBUMIN SERPL BCP-MCNC: 3.4 G/DL
ALBUMIN SERPL-MCNC: 3.8 G/DL (ref 3.3–5.5)
ALLENS TEST: ABNORMAL
ALP SERPL-CCNC: 108 U/L (ref 42–141)
ALP SERPL-CCNC: 114 U/L
ALT SERPL W/O P-5'-P-CCNC: 14 U/L
ANION GAP SERPL CALC-SCNC: 11 MMOL/L
AST SERPL-CCNC: 18 U/L
BASOPHILS # BLD AUTO: 0.04 K/UL
BASOPHILS NFR BLD: 0.2 %
BILIRUB SERPL-MCNC: 0.4 MG/DL
BILIRUB SERPL-MCNC: 0.7 MG/DL (ref 0.2–1.6)
BILIRUBIN, POC UA: NEGATIVE
BLOOD, POC UA: ABNORMAL
BUN SERPL-MCNC: 22 MG/DL (ref 7–22)
BUN SERPL-MCNC: 23 MG/DL
CALCIUM SERPL-MCNC: 10.5 MG/DL (ref 8–10.3)
CALCIUM SERPL-MCNC: 9.9 MG/DL
CHLORIDE SERPL-SCNC: 105 MMOL/L
CHLORIDE SERPL-SCNC: 98 MMOL/L (ref 98–108)
CLARITY, POC UA: CLEAR
CO2 SERPL-SCNC: 26 MMOL/L
COLOR, POC UA: ABNORMAL
CREAT SERPL-MCNC: 0.6 MG/DL (ref 0.6–1.2)
CREAT SERPL-MCNC: 1.1 MG/DL
DELSYS: ABNORMAL
DIFFERENTIAL METHOD: ABNORMAL
EOSINOPHIL # BLD AUTO: 0.1 K/UL
EOSINOPHIL NFR BLD: 0.7 %
ERYTHROCYTE [DISTWIDTH] IN BLOOD BY AUTOMATED COUNT: 15.4 %
EST. GFR  (AFRICAN AMERICAN): >60 ML/MIN/1.73 M^2
EST. GFR  (NON AFRICAN AMERICAN): 52 ML/MIN/1.73 M^2
GLUCOSE SERPL-MCNC: 226 MG/DL (ref 70–110)
GLUCOSE SERPL-MCNC: 273 MG/DL
GLUCOSE SERPL-MCNC: 488 MG/DL (ref 70–110)
GLUCOSE SERPL-MCNC: 657 MG/DL (ref 73–118)
GLUCOSE SERPL-MCNC: >500 MG/DL (ref 70–110)
GLUCOSE, POC UA: ABNORMAL
HCO3 UR-SCNC: 21.8 MMOL/L (ref 24–28)
HCT VFR BLD AUTO: 41.9 %
HGB BLD-MCNC: 13.9 G/DL
KETONES, POC UA: NEGATIVE
LACTATE SERPL-SCNC: 1.5 MMOL/L
LDH SERPL L TO P-CCNC: 2.68 MMOL/L (ref 0.36–1.25)
LEUKOCYTE EST, POC UA: NEGATIVE
LYMPHOCYTES # BLD AUTO: 3.1 K/UL
LYMPHOCYTES NFR BLD: 18 %
MCH RBC QN AUTO: 29.4 PG
MCHC RBC AUTO-ENTMCNC: 33.2 G/DL
MCV RBC AUTO: 89 FL
MONOCYTES # BLD AUTO: 1.3 K/UL
MONOCYTES NFR BLD: 7.9 %
NEUTROPHILS # BLD AUTO: 12.4 K/UL
NEUTROPHILS NFR BLD: 73.2 %
NITRITE, POC UA: NEGATIVE
PCO2 BLDA: 34.2 MMHG (ref 35–45)
PH SMN: 7.41 [PH] (ref 7.35–7.45)
PH UR STRIP: 5.5 [PH]
PLATELET # BLD AUTO: 194 K/UL
PMV BLD AUTO: 10.3 FL
PO2 BLDA: 68 MMHG (ref 80–100)
POC ALT (SGPT): 20 U/L (ref 10–47)
POC AST (SGOT): 27 U/L (ref 11–38)
POC BE: -2 MMOL/L
POC CARDIAC TROPONIN I: 0.03 NG/ML
POC SATURATED O2: 94 % (ref 95–100)
POC TCO2: 23 MMOL/L (ref 23–27)
POC TCO2: 24 MMOL/L (ref 18–33)
POCT GLUCOSE: 208 MG/DL (ref 70–110)
POCT GLUCOSE: 226 MG/DL (ref 70–110)
POCT GLUCOSE: 242 MG/DL (ref 70–110)
POCT GLUCOSE: 302 MG/DL (ref 70–110)
POCT GLUCOSE: 314 MG/DL (ref 70–110)
POCT GLUCOSE: 488 MG/DL (ref 70–110)
POTASSIUM BLD-SCNC: 4 MMOL/L (ref 3.6–5.1)
POTASSIUM SERPL-SCNC: 3.7 MMOL/L
PROT SERPL-MCNC: 6.6 G/DL
PROTEIN, POC UA: ABNORMAL
PROTEIN, POC: 7.2 G/DL (ref 6.4–8.1)
RBC # BLD AUTO: 4.73 M/UL
SAMPLE: ABNORMAL
SAMPLE: NORMAL
SITE: ABNORMAL
SODIUM BLD-SCNC: 135 MMOL/L (ref 128–145)
SODIUM SERPL-SCNC: 142 MMOL/L
SPECIFIC GRAVITY, POC UA: <=1.005
TROPONIN I SERPL DL<=0.01 NG/ML-MCNC: 0.02 NG/ML
TROPONIN I SERPL DL<=0.01 NG/ML-MCNC: 0.04 NG/ML
UROBILINOGEN, POC UA: 0.2 E.U./DL
WBC # BLD AUTO: 16.94 K/UL

## 2019-01-24 PROCEDURE — 80053 COMPREHEN METABOLIC PANEL: CPT

## 2019-01-24 PROCEDURE — S5571 INSULIN DISPOS PEN 3 ML: HCPCS | Performed by: PHYSICIAN ASSISTANT

## 2019-01-24 PROCEDURE — 25000003 PHARM REV CODE 250: Performed by: EMERGENCY MEDICINE

## 2019-01-24 PROCEDURE — 99285 EMERGENCY DEPT VISIT HI MDM: CPT | Mod: 25,ER

## 2019-01-24 PROCEDURE — 83605 ASSAY OF LACTIC ACID: CPT

## 2019-01-24 PROCEDURE — 25500020 PHARM REV CODE 255: Mod: ER

## 2019-01-24 PROCEDURE — 85025 COMPLETE CBC W/AUTO DIFF WBC: CPT | Mod: ER

## 2019-01-24 PROCEDURE — 96361 HYDRATE IV INFUSION ADD-ON: CPT | Mod: ER

## 2019-01-24 PROCEDURE — 25000003 PHARM REV CODE 250: Performed by: PHYSICIAN ASSISTANT

## 2019-01-24 PROCEDURE — 93010 EKG 12-LEAD: ICD-10-PCS | Mod: ,,, | Performed by: INTERNAL MEDICINE

## 2019-01-24 PROCEDURE — 83036 HEMOGLOBIN GLYCOSYLATED A1C: CPT

## 2019-01-24 PROCEDURE — 36600 WITHDRAWAL OF ARTERIAL BLOOD: CPT | Mod: ER

## 2019-01-24 PROCEDURE — 84484 ASSAY OF TROPONIN QUANT: CPT | Mod: ER

## 2019-01-24 PROCEDURE — 80053 COMPREHEN METABOLIC PANEL: CPT | Mod: ER

## 2019-01-24 PROCEDURE — 96372 THER/PROPH/DIAG INJ SC/IM: CPT | Mod: 59,ER

## 2019-01-24 PROCEDURE — 93010 ELECTROCARDIOGRAM REPORT: CPT | Mod: ,,, | Performed by: INTERNAL MEDICINE

## 2019-01-24 PROCEDURE — 63600175 PHARM REV CODE 636 W HCPCS: Performed by: PHYSICIAN ASSISTANT

## 2019-01-24 PROCEDURE — 96374 THER/PROPH/DIAG INJ IV PUSH: CPT | Mod: ER

## 2019-01-24 PROCEDURE — 21400001 HC TELEMETRY ROOM

## 2019-01-24 PROCEDURE — 93005 ELECTROCARDIOGRAM TRACING: CPT | Mod: ER

## 2019-01-24 PROCEDURE — 96376 TX/PRO/DX INJ SAME DRUG ADON: CPT | Mod: ER

## 2019-01-24 PROCEDURE — 85025 COMPLETE CBC W/AUTO DIFF WBC: CPT

## 2019-01-24 PROCEDURE — 96372 THER/PROPH/DIAG INJ SC/IM: CPT | Mod: 59 | Performed by: EMERGENCY MEDICINE

## 2019-01-24 PROCEDURE — 81003 URINALYSIS AUTO W/O SCOPE: CPT | Mod: ER

## 2019-01-24 PROCEDURE — 25000003 PHARM REV CODE 250: Mod: ER | Performed by: EMERGENCY MEDICINE

## 2019-01-24 PROCEDURE — 63600175 PHARM REV CODE 636 W HCPCS: Mod: ER | Performed by: EMERGENCY MEDICINE

## 2019-01-24 PROCEDURE — 82962 GLUCOSE BLOOD TEST: CPT | Mod: ER

## 2019-01-24 PROCEDURE — 84484 ASSAY OF TROPONIN QUANT: CPT

## 2019-01-24 PROCEDURE — 36415 COLL VENOUS BLD VENIPUNCTURE: CPT

## 2019-01-24 PROCEDURE — 82803 BLOOD GASES ANY COMBINATION: CPT | Mod: ER

## 2019-01-24 PROCEDURE — 82607 VITAMIN B-12: CPT

## 2019-01-24 RX ORDER — IBUPROFEN 200 MG
16 TABLET ORAL
Status: DISCONTINUED | OUTPATIENT
Start: 2019-01-24 | End: 2019-01-24

## 2019-01-24 RX ORDER — ASPIRIN 81 MG/1
81 TABLET ORAL DAILY
Status: DISCONTINUED | OUTPATIENT
Start: 2019-01-25 | End: 2019-01-27 | Stop reason: HOSPADM

## 2019-01-24 RX ORDER — GLUCAGON 1 MG
1 KIT INJECTION
Status: DISCONTINUED | OUTPATIENT
Start: 2019-01-24 | End: 2019-01-24

## 2019-01-24 RX ORDER — INSULIN ASPART 100 [IU]/ML
0-5 INJECTION, SOLUTION INTRAVENOUS; SUBCUTANEOUS
Status: DISCONTINUED | OUTPATIENT
Start: 2019-01-24 | End: 2019-01-27 | Stop reason: HOSPADM

## 2019-01-24 RX ORDER — IBUPROFEN 200 MG
16 TABLET ORAL
Status: DISCONTINUED | OUTPATIENT
Start: 2019-01-24 | End: 2019-01-27 | Stop reason: HOSPADM

## 2019-01-24 RX ORDER — SODIUM CHLORIDE 9 MG/ML
INJECTION, SOLUTION INTRAVENOUS CONTINUOUS
Status: DISCONTINUED | OUTPATIENT
Start: 2019-01-24 | End: 2019-01-25

## 2019-01-24 RX ORDER — SODIUM CHLORIDE 0.9 % (FLUSH) 0.9 %
5 SYRINGE (ML) INJECTION
Status: DISCONTINUED | OUTPATIENT
Start: 2019-01-24 | End: 2019-01-27 | Stop reason: HOSPADM

## 2019-01-24 RX ORDER — MECLIZINE HCL 12.5 MG 12.5 MG/1
12.5 TABLET ORAL 3 TIMES DAILY PRN
Status: DISCONTINUED | OUTPATIENT
Start: 2019-01-24 | End: 2019-01-27 | Stop reason: HOSPADM

## 2019-01-24 RX ORDER — ALBUTEROL SULFATE 90 UG/1
2 AEROSOL, METERED RESPIRATORY (INHALATION) EVERY 4 HOURS PRN
Status: DISCONTINUED | OUTPATIENT
Start: 2019-01-24 | End: 2019-01-27 | Stop reason: HOSPADM

## 2019-01-24 RX ORDER — INSULIN ASPART 100 [IU]/ML
2 INJECTION, SOLUTION INTRAVENOUS; SUBCUTANEOUS
Status: DISCONTINUED | OUTPATIENT
Start: 2019-01-25 | End: 2019-01-26

## 2019-01-24 RX ORDER — ATORVASTATIN CALCIUM 40 MG/1
40 TABLET, FILM COATED ORAL DAILY
Status: DISCONTINUED | OUTPATIENT
Start: 2019-01-25 | End: 2019-01-27 | Stop reason: HOSPADM

## 2019-01-24 RX ORDER — IBUPROFEN 200 MG
24 TABLET ORAL
Status: DISCONTINUED | OUTPATIENT
Start: 2019-01-24 | End: 2019-01-24

## 2019-01-24 RX ORDER — INSULIN ASPART 100 [IU]/ML
0-5 INJECTION, SOLUTION INTRAVENOUS; SUBCUTANEOUS
Status: DISCONTINUED | OUTPATIENT
Start: 2019-01-24 | End: 2019-01-24

## 2019-01-24 RX ORDER — SODIUM CHLORIDE 9 MG/ML
1000 INJECTION, SOLUTION INTRAVENOUS
Status: COMPLETED | OUTPATIENT
Start: 2019-01-24 | End: 2019-01-24

## 2019-01-24 RX ORDER — INSULIN ASPART 100 [IU]/ML
1-10 INJECTION, SOLUTION INTRAVENOUS; SUBCUTANEOUS
Status: DISCONTINUED | OUTPATIENT
Start: 2019-01-24 | End: 2019-01-24

## 2019-01-24 RX ORDER — GLUCAGON 1 MG
1 KIT INJECTION
Status: DISCONTINUED | OUTPATIENT
Start: 2019-01-24 | End: 2019-01-27 | Stop reason: HOSPADM

## 2019-01-24 RX ORDER — LATANOPROST 50 UG/ML
1 SOLUTION/ DROPS OPHTHALMIC NIGHTLY
Status: DISCONTINUED | OUTPATIENT
Start: 2019-01-24 | End: 2019-01-27 | Stop reason: HOSPADM

## 2019-01-24 RX ORDER — PANTOPRAZOLE SODIUM 40 MG/1
40 TABLET, DELAYED RELEASE ORAL DAILY
Status: DISCONTINUED | OUTPATIENT
Start: 2019-01-25 | End: 2019-01-27 | Stop reason: HOSPADM

## 2019-01-24 RX ORDER — SODIUM CHLORIDE 9 MG/ML
1000 INJECTION, SOLUTION INTRAVENOUS
Status: DISCONTINUED | OUTPATIENT
Start: 2019-01-24 | End: 2019-01-24

## 2019-01-24 RX ORDER — LISINOPRIL 40 MG/1
40 TABLET ORAL DAILY
Status: ON HOLD | COMMUNITY
End: 2019-01-27 | Stop reason: HOSPADM

## 2019-01-24 RX ORDER — IBUPROFEN 200 MG
24 TABLET ORAL
Status: DISCONTINUED | OUTPATIENT
Start: 2019-01-24 | End: 2019-01-27 | Stop reason: HOSPADM

## 2019-01-24 RX ORDER — DORZOLAMIDE HYDROCHLORIDE AND TIMOLOL MALEATE 20; 5 MG/ML; MG/ML
1 SOLUTION/ DROPS OPHTHALMIC 2 TIMES DAILY
Status: DISCONTINUED | OUTPATIENT
Start: 2019-01-24 | End: 2019-01-27 | Stop reason: HOSPADM

## 2019-01-24 RX ORDER — TIOTROPIUM BROMIDE 18 UG/1
1 CAPSULE ORAL; RESPIRATORY (INHALATION) DAILY
Status: DISCONTINUED | OUTPATIENT
Start: 2019-01-25 | End: 2019-01-27 | Stop reason: HOSPADM

## 2019-01-24 RX ORDER — PRIMIDONE 50 MG/1
50 TABLET ORAL 3 TIMES DAILY
Status: DISCONTINUED | OUTPATIENT
Start: 2019-01-24 | End: 2019-01-27 | Stop reason: HOSPADM

## 2019-01-24 RX ADMIN — SODIUM CHLORIDE: 0.9 INJECTION, SOLUTION INTRAVENOUS at 04:01

## 2019-01-24 RX ADMIN — SODIUM CHLORIDE 1000 ML: 0.9 INJECTION, SOLUTION INTRAVENOUS at 02:01

## 2019-01-24 RX ADMIN — INSULIN ASPART 2 UNITS: 100 INJECTION, SOLUTION INTRAVENOUS; SUBCUTANEOUS at 10:01

## 2019-01-24 RX ADMIN — PRIMIDONE 50 MG: 50 TABLET ORAL at 10:01

## 2019-01-24 RX ADMIN — DORZOLAMIDE HYDROCHLORIDE AND TIMOLOL MALEATE 1 DROP: 20; 5 SOLUTION/ DROPS OPHTHALMIC at 10:01

## 2019-01-24 RX ADMIN — INSULIN HUMAN 6 UNITS: 100 INJECTION, SOLUTION PARENTERAL at 12:01

## 2019-01-24 RX ADMIN — LATANOPROST 1 DROP: 50 SOLUTION/ DROPS OPHTHALMIC at 10:01

## 2019-01-24 RX ADMIN — IOHEXOL 75 ML: 350 INJECTION, SOLUTION INTRAVENOUS at 12:01

## 2019-01-24 RX ADMIN — INSULIN DETEMIR 6 UNITS: 100 INJECTION, SOLUTION SUBCUTANEOUS at 10:01

## 2019-01-24 RX ADMIN — INSULIN HUMAN 4 UNITS: 100 INJECTION, SOLUTION PARENTERAL at 02:01

## 2019-01-24 NOTE — ASSESSMENT & PLAN NOTE
Lungs free of wheezes. CT with signs of emphysema. Saturating appropriately on RA without complaints of SOB.   Will continue home albuterol and spiriva in place of incruse ellipta

## 2019-01-24 NOTE — ED PROVIDER NOTES
"Encounter Date: 1/24/2019    SCRIBE #1 NOTE: I, Stacey Minor, am scribing for, and in the presence of,  Dr. Howell. I have scribed the following portions of the note - Other sections scribed: HPI, ROS, PE.       History     Chief Complaint   Patient presents with    Dizziness     Pt states," Dizzy for two weeks, clicking in my right ear.I fell today and hurt my left elbow."     Deb Webb is a 68 y.o. female with DM, HTN, HLD, COPD, and diabetic neuropathy who presents to the ED complaining of dizziness for 3 months with frequent falls. She reports she has dizzy-like spells where she would fall if she wasn't holding onto anything. Reports LOC for a few minutes, but as soon as she hit the floor she'd wake up. She reports a little shortness of breath during these episodes. Denies chest pain. Last episode was yesterday. She reports she fell today and hurt her left elbow. She has been to 4 doctors for these symptoms.    She has been told her symptoms might be due to her DM or sinus infection. She was put on medication for dizziness, which helped her symptoms. She reports she takes medication for "shakes" and the "shakes" occur before she feels dizzy. She has not taken her DM medication in the past two days. She reports vomiting episodes yesterday and today. She has not been eating much.    She is a current smoker. Denies drinking EtOH. She is not on blood thinners. She takes aspirin.      The history is provided by the patient. No  was used.     Review of patient's allergies indicates:   Allergen Reactions    Silicone      Burn skin    Adhesive Rash     Past Medical History:   Diagnosis Date    Allergy     Arthritis     Cataract     Colon polyps     COPD (chronic obstructive pulmonary disease)     Diabetes mellitus type II     Diabetic neuropathy     Fever blister     Glaucoma (increased eye pressure)     Hyperlipidemia     Hypertension     Irritable bowel syndrome     " Keloid cicatrix     Osteoporosis     Retained cholelithiasis following cholecystectomy(997.41)     Right patella fracture      Past Surgical History:   Procedure Laterality Date    BACK SURGERY  2006    CATARACT EXTRACTION W/  INTRAOCULAR LENS IMPLANT Bilateral     CHOLECYSTECTOMY      Laparoscopic    COLONOSCOPY      COLONOSCOPY N/A 5/2/2013    [facility]    HERNIA REPAIR      HYSTERECTOMY      KNEE SURGERY Right 3/4/2015    Dr Weller     OOPHORECTOMY      ORIF-PATELLA / C-ARM Right 3/4/2015    [facility]    ovarian tumor      Benign    TONSILLECTOMY, ADENOIDECTOMY       Family History   Problem Relation Age of Onset    Cancer Mother         Skin    Skin cancer Mother     Glaucoma Mother     Cataracts Mother     Diabetes Mother     Heart disease Father     Diabetes Father     Skin cancer Sister     Diabetes Sister     Diabetes Daughter     Melanoma Neg Hx     Psoriasis Neg Hx     Eczema Neg Hx     Lupus Neg Hx     Amblyopia Neg Hx     Blindness Neg Hx     Macular degeneration Neg Hx     Retinal detachment Neg Hx     Strabismus Neg Hx      Social History     Tobacco Use    Smoking status: Current Every Day Smoker     Packs/day: 0.50     Years: 40.00     Pack years: 20.00     Types: Cigarettes    Smokeless tobacco: Current User   Substance Use Topics    Alcohol use: No    Drug use: No     Review of Systems   Respiratory: Positive for shortness of breath.    Cardiovascular: Negative for chest pain.   Gastrointestinal: Positive for nausea and vomiting.   Musculoskeletal: Positive for arthralgias.        +left elbow pain   Neurological: Positive for dizziness and syncope. Negative for weakness.   All other systems reviewed and are negative.      Physical Exam     Initial Vitals [01/24/19 1036]   BP Pulse Resp Temp SpO2   133/85 100 16 98 °F (36.7 °C) 95 %      MAP       --         Physical  Exam    Nursing note and vitals reviewed.  Constitutional: She appears distressed (minimal).   Thin, frail and under nourished appearing   HENT:   Head: Normocephalic and atraumatic.   Mouth/Throat: Mucous membranes are dry.   Visual field intact.   Eyes: Conjunctivae and EOM are normal. No scleral icterus. Right eye exhibits no nystagmus. Left eye exhibits no nystagmus.   Neck: Normal range of motion. Neck supple. No JVD present.   Cardiovascular: Normal rate, regular rhythm and intact distal pulses. Exam reveals no gallop and no friction rub.    Murmur (soft) heard.   Systolic murmur is present.  Pulses:       Dorsalis pedis pulses are 2+ on the right side, and 2+ on the left side.   Pulmonary/Chest: Effort normal and breath sounds normal. No respiratory distress. She has no wheezes. She has no rhonchi. She has no rales.       Abdominal: Soft. She exhibits no distension. There is tenderness (mild) in the epigastric area. There is no rebound and no guarding.   Musculoskeletal: Normal range of motion. She exhibits edema.        Left shoulder: She exhibits tenderness, bony tenderness, swelling and deformity.        Right forearm: Normal. She exhibits no tenderness, no bony tenderness, no swelling, no edema, no deformity and no laceration.        Left forearm: Normal. She exhibits no tenderness, no bony tenderness, no swelling, no edema, no deformity and no laceration.        Arms:  Non-pitting peripheral edema in LE noted.   Neurological: She is alert and oriented to person, place, and time. She displays tremor (continuous mild). No cranial nerve deficit or sensory deficit. GCS score is 15. GCS eye subscore is 4. GCS verbal subscore is 5. GCS motor subscore is 6.   Negative pronator drift. Equal strength to bilateral lower extremities. Normal finger to nose. No dysmetria.     Strength is equal although the patient has generalized weakness.       Skin: Skin is warm and dry. Bruising and ecchymosis noted.        Large  contusion across the chest wall.  There is deformity of the left clavicle at the sternoclavicular margin.  Chest wall is tender to palpation.    Poor skin turgor.     Psychiatric: She has a normal mood and affect.         ED Course   Procedures  Labs Reviewed   POCT GLUCOSE, HAND-HELD DEVICE - Abnormal; Notable for the following components:       Result Value    POC Glucose 488 (*)     All other components within normal limits   POCT URINALYSIS W/O SCOPE - Abnormal; Notable for the following components:    Glucose, UA 3+ (*)     Bilirubin, UA Negative (*)     Ketones, UA Negative (*)     Spec Grav UA <=1.005 (*)     Blood, UA Trace-intact (*)     Protein, UA Trace (*)     Nitrite, UA Negative (*)     Leukocytes, UA Negative (*)     All other components within normal limits   POCT CMP - Abnormal; Notable for the following components:    Calcium, POC 10.5 (*)     POC Chloride 98 (*)     POC Glucose 657 (*)     All other components within normal limits   ISTAT PROCEDURE - Abnormal; Notable for the following components:    POC PCO2 34.2 (*)     POC PO2 68 (*)     POC HCO3 21.8 (*)     POC SATURATED O2 94 (*)     POC Lactate 2.68 (*)     All other components within normal limits   POCT GLUCOSE - Abnormal; Notable for the following components:    POCT Glucose 488 (*)     All other components within normal limits   POCT GLUCOSE - Abnormal; Notable for the following components:    POCT Glucose 314 (*)     All other components within normal limits   TROPONIN ISTAT   POCT CBC   POCT GLUCOSE, HAND-HELD DEVICE   POCT URINALYSIS W/O SCOPE   POCT GLUCOSE, HAND-HELD DEVICE   POCT GLUCOSE MONITORING CONTINUOUS   POCT CMP   POCT TROPONIN          Imaging Results          CT Chest With Contrast (Final result)  Result time 01/24/19 13:01:06    Final result by Alex Scott MD (01/24/19 13:01:06)                 Impression:      1. Proximal clavicular fracture at midline with overlying edema/hematoma.  Please note, evaluation of  the osseous structures is markedly limited secondary to respiratory motion.  No definite acute displaced rib fracture although there appears to be remote injuries involving the left and right ribs as described above.  2. Pulmonary emphysematous change without findings to suggest pneumothorax.  3. Possible ground-glass nodule within the right upper lobe.  For a ground glass nodule <6 mm, Fleischner Society 2017 guidelines recommend no routine follow up. However, suspicious features could warrant follow up with non-contrast chest CT at 2 years and 4 years after discovery.  4. Findings suggesting hepatic steatosis, correlation with LFTs recommended.  5. Left adrenal nodule, appears enlarged since the previous exam, remains nonspecific.  Further evaluation on a nonemergent basis as warranted.  6. Additional findings above.      Electronically signed by: Alex Scott MD  Date:    01/24/2019  Time:    13:01             Narrative:    EXAMINATION:  CT CHEST WITH CONTRAST    CLINICAL HISTORY:  Chest trauma, blunt;    TECHNIQUE:  Low dose axial images, sagittal and coronal reformations were obtained from the thoracic inlet to the lung bases following the IV administration of 75 mL of Omnipaque 350.    COMPARISON:  CT abdomen and pelvis 05/15/2014    FINDINGS:  Please note, examination is limited secondary to patient motion.    The structures at the base of the neck are grossly unremarkable.  No significant mediastinal lymphadenopathy.  The heart is not enlarged.  The thoracic aorta tapers normally.  The visualized portions of the kidneys, spleen, pancreas, and right adrenal gland are grossly unremarkable.  There is hypoattenuation of the a patent parenchyma, may reflect steatosis, correlation with LFTs recommended.  There is a left adrenal nodule measuring 2 cm, mildly enlarged since the previous examination.  The portal vein and splenic vein are patent.    Allowing for motion artifact, the airways are grossly patent.   There is bilateral pulmonary emphysematous change.  There is a ground-glass nodule within the anterior aspect of the right upper lobe measuring 0.4 cm.  Differential for this would include atelectasis as there is motion in the region.  There is bilateral basilar dependent atelectasis.  No pneumothorax.  No large focal consolidation.  No pleural effusion.    Although the examination is not optimized for the evaluation of the pulmonary arteries, no large central pulmonary arterial filling defect to suggest pulmonary thromboembolism.    Degenerative changes are noted of the spine.  There is fracture of the medial aspect of the left clavicle proximally.  There is associated subcutaneous edema/hematoma of the overlying pectoralis musculature.  Evaluation of the adjacent ribs is limited by motion.  There appears to be remote injury of left anterolateral ribs 4 and 5.  There is remote injury of right posterolateral ribs 8 and 7.  The sternum is unable to be adequately evaluated secondary to motion, no definite sternal fracture.                               CT Head Without Contrast (Final result)  Result time 01/24/19 12:46:11    Final result by Alex Scott MD (01/24/19 12:46:11)                 Impression:      1. Motion limited examination, no convincing acute intracranial abnormalities, noting sequela of chronic microvascular ischemic change and senescent change.      Electronically signed by: Alex Scott MD  Date:    01/24/2019  Time:    12:46             Narrative:    EXAMINATION:  CT HEAD WITHOUT CONTRAST    CLINICAL HISTORY:  Confusion/delirium, altered LOC, unexplained;Dizziness;    TECHNIQUE:  Low dose axial images were obtained through the head.  Coronal and sagittal reformations were also performed. Contrast was not administered.    COMPARISON:  None.    FINDINGS:  Examination is limited secondary to patient motion.  There is generalized cerebral volume loss.  There is hypoattenuation in a  periventricular fashion, likely sequela of chronic microvascular ischemic change.  There is no evidence of acute major vascular territory infarct, hemorrhage, or mass.  There is no hydrocephalus.  There are no abnormal extra-axial fluid collections.  The paranasal sinuses and mastoid air cells are clear, and there is no evidence of calvarial fracture.  The visualized soft tissues are unremarkable.                                 Medical Decision Making:   Clinical Tests:   Lab Tests: Ordered and Reviewed  Medical Tests: Ordered and Reviewed            Scribe Attestation:   Scribe #1: I performed the above scribed service and the documentation accurately describes the services I performed. I attest to the accuracy of the note.    I attest that I personally performed the services documented by the scribe and acknowledged and confirm the content of the note. Maria Teresa Howell              ED Course as of Jan 24 1438   Thu Jan 24, 2019   1139 The CMP reveals a glucose of 657, but no anion gap acidosis.  Calcium is elevated at 10.5.    Initial troponin is 0.03    CBC reveals an elevated white count of 18.  H&H are normal in platelets are normal  [MH]   1142 My independent interpretation of the EKG reveals normal sinus rhythm with sinus tachycardia at 1:07 a.m..  There is no ST segment elevation or depression.  [MH]   1338 CT of the chest:  Proximal clavicular fracture at midline with overlying edema/hematoma.    [MH]   1338 CT Head Without Contrast [MH]   1338 CT head no convincing acute intracranial abnormalities, noting sequela of chronic microvascular ischemic change and senescent change.  [MH]   1436 UA is consistent with dehydration Protein, UA: (!) Trace []      ED Course User Index  [MH] Maria Teresa Howell MD     Clinical Impression:     1. Syncope, unspecified syncope type    2. Dizzy    3. Hyperglycemia    4. Dehydration    5. Contusion of left chest wall, initial encounter    6. Closed nondisplaced fracture of  left clavicle, unspecified part of clavicle, initial encounter                Maria Teresa Howell MD  01/24/19 1139       Maria Teresa Howell MD  01/24/19 1148       Maria Teresa Howell MD  01/24/19 1353      Plan:  Admit to observation for gentle hydration and trending of troponins and evaluation of multiple episodes of syncope   Discussed with ERIK Wells.     Maria Teresa Howell MD  01/24/19 1438       Maria Teresa Howell MD  01/24/19 1410

## 2019-01-24 NOTE — NURSING
Pt arrived to floor via acaidian. Pt assisted from bed to stretcher. Tele monitor initiated. Pt is aaox4, calm. Resp even and unlabored. Family at bedside. Denies pain. In no acute distress . Bed in lowest locked positon. Call light placed within reach. Will continue to monitor.

## 2019-01-24 NOTE — ED NOTES
Report called to [facility] to update on pt's condition for transfer.  Nick is at facility for transfer.

## 2019-01-24 NOTE — ED NOTES
4 units Reg insulin IV given for CBG of 314.  Pt tolerated well.  Will continue to monitor.  Pt will be admitted to hospital and aware.  Pt continue to be monitor on continue cardiac monitor

## 2019-01-24 NOTE — ED NOTES
1140 [facility] [phone] 6 unit Reg insulin IV given per MD orders will recheck blood sugar.

## 2019-01-25 LAB
ALBUMIN SERPL BCP-MCNC: 2.9 G/DL
ALP SERPL-CCNC: 98 U/L
ALT SERPL W/O P-5'-P-CCNC: 12 U/L
ANION GAP SERPL CALC-SCNC: 9 MMOL/L
AORTIC ROOT ANNULUS: 3.35 CM
AORTIC VALVE CUSP SEPERATION: 2.02 CM
ASCENDING AORTA: 2.55 CM
AST SERPL-CCNC: 16 U/L
AV INDEX (PROSTH): 0.67
AV MEAN GRADIENT: 3.78 MMHG
AV PEAK GRADIENT: 5.48 MMHG
AV VALVE AREA: 2.82 CM2
AV VELOCITY RATIO: 0.68
BACTERIA #/AREA URNS HPF: ABNORMAL /HPF
BASOPHILS # BLD AUTO: 0.02 K/UL
BASOPHILS NFR BLD: 0.1 %
BILIRUB SERPL-MCNC: 0.6 MG/DL
BILIRUB UR QL STRIP: NEGATIVE
BSA FOR ECHO PROCEDURE: 1.64 M2
BUN SERPL-MCNC: 14 MG/DL
CALCIUM SERPL-MCNC: 9.2 MG/DL
CHLORIDE SERPL-SCNC: 104 MMOL/L
CHOLEST SERPL-MCNC: 104 MG/DL
CHOLEST/HDLC SERPL: 2.8 {RATIO}
CK SERPL-CCNC: 78 U/L
CLARITY UR: CLEAR
CO2 SERPL-SCNC: 25 MMOL/L
COLOR UR: YELLOW
CREAT SERPL-MCNC: 0.8 MG/DL
CV ECHO LV RWT: 0.47 CM
DIFFERENTIAL METHOD: ABNORMAL
DOP CALC AO PEAK VEL: 1.17 M/S
DOP CALC AO VTI: 23.49 CM
DOP CALC LVOT AREA: 4.23 CM2
DOP CALC LVOT DIAMETER: 2.32 CM
DOP CALC LVOT PEAK VEL: 0.79 M/S
DOP CALC LVOT STROKE VOLUME: 66.34 CM3
DOP CALCLVOT PEAK VEL VTI: 15.7 CM
E WAVE DECELERATION TIME: 113.87 MSEC
E/A RATIO: 1.25
E/E' RATIO: 6.32
ECHO LV POSTERIOR WALL: 0.96 CM (ref 0.6–1.1)
EOSINOPHIL # BLD AUTO: 0.1 K/UL
EOSINOPHIL NFR BLD: 0.8 %
ERYTHROCYTE [DISTWIDTH] IN BLOOD BY AUTOMATED COUNT: 15.2 %
EST. GFR  (AFRICAN AMERICAN): >60 ML/MIN/1.73 M^2
EST. GFR  (NON AFRICAN AMERICAN): >60 ML/MIN/1.73 M^2
ESTIMATED AVG GLUCOSE: 194 MG/DL
FRACTIONAL SHORTENING: 24 % (ref 28–44)
GLUCOSE SERPL-MCNC: 288 MG/DL
GLUCOSE UR QL STRIP: ABNORMAL
HBA1C MFR BLD HPLC: 8.4 %
HCT VFR BLD AUTO: 40 %
HDLC SERPL-MCNC: 37 MG/DL
HDLC SERPL: 35.6 %
HGB BLD-MCNC: 13.2 G/DL
HGB UR QL STRIP: ABNORMAL
INTERVENTRICULAR SEPTUM: 1 CM (ref 0.6–1.1)
IVRT: 0.13 MSEC
KETONES UR QL STRIP: NEGATIVE
LA MAJOR: 5.03 CM
LA MINOR: 4.47 CM
LA WIDTH: 3.6 CM
LDLC SERPL CALC-MCNC: 43.2 MG/DL
LEFT ATRIUM SIZE: 3.44 CM
LEFT ATRIUM VOLUME INDEX: 30.9 ML/M2
LEFT ATRIUM VOLUME: 49.83 CM3
LEFT INTERNAL DIMENSION IN SYSTOLE: 3.11 CM (ref 2.1–4)
LEFT VENTRICLE DIASTOLIC VOLUME INDEX: 46.19 ML/M2
LEFT VENTRICLE DIASTOLIC VOLUME: 74.53 ML
LEFT VENTRICLE MASS INDEX: 79.9 G/M2
LEFT VENTRICLE SYSTOLIC VOLUME INDEX: 23.7 ML/M2
LEFT VENTRICLE SYSTOLIC VOLUME: 38.23 ML
LEFT VENTRICULAR INTERNAL DIMENSION IN DIASTOLE: 4.11 CM (ref 3.5–6)
LEFT VENTRICULAR MASS: 128.91 G
LEUKOCYTE ESTERASE UR QL STRIP: ABNORMAL
LV LATERAL E/E' RATIO: 6
LV SEPTAL E/E' RATIO: 6.67
LYMPHOCYTES # BLD AUTO: 2.8 K/UL
LYMPHOCYTES NFR BLD: 18.3 %
MAGNESIUM SERPL-MCNC: 1.1 MG/DL
MCH RBC QN AUTO: 28.9 PG
MCHC RBC AUTO-ENTMCNC: 33 G/DL
MCV RBC AUTO: 88 FL
MICROSCOPIC COMMENT: ABNORMAL
MONOCYTES # BLD AUTO: 0.9 K/UL
MONOCYTES NFR BLD: 5.7 %
MV PEAK A VEL: 0.48 M/S
MV PEAK E VEL: 0.6 M/S
NEUTROPHILS # BLD AUTO: 11.5 K/UL
NEUTROPHILS NFR BLD: 75.1 %
NITRITE UR QL STRIP: NEGATIVE
NONHDLC SERPL-MCNC: 67 MG/DL
PH UR STRIP: 5 [PH] (ref 5–8)
PHOSPHATE SERPL-MCNC: 2.4 MG/DL
PISA TR MAX VEL: 4.09 M/S
PLATELET # BLD AUTO: 174 K/UL
PMV BLD AUTO: 11 FL
POCT GLUCOSE: 238 MG/DL (ref 70–110)
POCT GLUCOSE: 253 MG/DL (ref 70–110)
POCT GLUCOSE: 268 MG/DL (ref 70–110)
POCT GLUCOSE: 281 MG/DL (ref 70–110)
POCT GLUCOSE: >500 MG/DL (ref 70–110)
POTASSIUM SERPL-SCNC: 3.6 MMOL/L
PROT SERPL-MCNC: 6 G/DL
PROT UR QL STRIP: NEGATIVE
PULM VEIN S/D RATIO: 0.79
PV PEAK D VEL: 0.33 M/S
PV PEAK S VEL: 0.26 M/S
PV PEAK VELOCITY: 0.82 CM/S
RA MAJOR: 4.97 CM
RA PRESSURE: 3 MMHG
RA WIDTH: 3.44 CM
RBC # BLD AUTO: 4.56 M/UL
RBC #/AREA URNS HPF: 2 /HPF (ref 0–4)
RIGHT VENTRICULAR END-DIASTOLIC DIMENSION: 3.19 CM
RV TISSUE DOPPLER FREE WALL SYSTOLIC VELOCITY 1 (APICAL 4 CHAMBER VIEW): 10.98 M/S
SINUS: 3.17 CM
SODIUM SERPL-SCNC: 138 MMOL/L
SP GR UR STRIP: 1.03 (ref 1–1.03)
SQUAMOUS #/AREA URNS HPF: 10 /HPF
STJ: 2.35 CM
TDI LATERAL: 0.1
TDI SEPTAL: 0.09
TDI: 0.1
TR MAX PG: 66.91 MMHG
TRICUSPID ANNULAR PLANE SYSTOLIC EXCURSION: 1.88 CM
TRIGL SERPL-MCNC: 119 MG/DL
TSH SERPL DL<=0.005 MIU/L-ACNC: 0.52 UIU/ML
TV REST PULMONARY ARTERY PRESSURE: 70 MMHG
URN SPEC COLLECT METH UR: ABNORMAL
UROBILINOGEN UR STRIP-ACNC: NEGATIVE EU/DL
VIT B12 SERPL-MCNC: 981 PG/ML
WBC # BLD AUTO: 15.38 K/UL
WBC #/AREA URNS HPF: 18 /HPF (ref 0–5)
YEAST URNS QL MICRO: ABNORMAL

## 2019-01-25 PROCEDURE — 99222 PR INITIAL HOSPITAL CARE,LEVL II: ICD-10-PCS | Mod: ,,, | Performed by: PSYCHIATRY & NEUROLOGY

## 2019-01-25 PROCEDURE — 25000003 PHARM REV CODE 250: Performed by: HOSPITALIST

## 2019-01-25 PROCEDURE — 80061 LIPID PANEL: CPT

## 2019-01-25 PROCEDURE — 97165 OT EVAL LOW COMPLEX 30 MIN: CPT

## 2019-01-25 PROCEDURE — 84443 ASSAY THYROID STIM HORMONE: CPT

## 2019-01-25 PROCEDURE — 87077 CULTURE AEROBIC IDENTIFY: CPT

## 2019-01-25 PROCEDURE — 83735 ASSAY OF MAGNESIUM: CPT

## 2019-01-25 PROCEDURE — 85025 COMPLETE CBC W/AUTO DIFF WBC: CPT

## 2019-01-25 PROCEDURE — G8978 MOBILITY CURRENT STATUS: HCPCS | Mod: CK

## 2019-01-25 PROCEDURE — 99900035 HC TECH TIME PER 15 MIN (STAT)

## 2019-01-25 PROCEDURE — 21400001 HC TELEMETRY ROOM

## 2019-01-25 PROCEDURE — 80053 COMPREHEN METABOLIC PANEL: CPT

## 2019-01-25 PROCEDURE — 97162 PT EVAL MOD COMPLEX 30 MIN: CPT

## 2019-01-25 PROCEDURE — 63600175 PHARM REV CODE 636 W HCPCS: Performed by: PHYSICIAN ASSISTANT

## 2019-01-25 PROCEDURE — 81000 URINALYSIS NONAUTO W/SCOPE: CPT

## 2019-01-25 PROCEDURE — 36415 COLL VENOUS BLD VENIPUNCTURE: CPT

## 2019-01-25 PROCEDURE — 84100 ASSAY OF PHOSPHORUS: CPT

## 2019-01-25 PROCEDURE — 25000003 PHARM REV CODE 250: Performed by: PHYSICIAN ASSISTANT

## 2019-01-25 PROCEDURE — 87088 URINE BACTERIA CULTURE: CPT

## 2019-01-25 PROCEDURE — 25000242 PHARM REV CODE 250 ALT 637 W/ HCPCS: Performed by: PHYSICIAN ASSISTANT

## 2019-01-25 PROCEDURE — 87086 URINE CULTURE/COLONY COUNT: CPT

## 2019-01-25 PROCEDURE — 99222 1ST HOSP IP/OBS MODERATE 55: CPT | Mod: ,,, | Performed by: PSYCHIATRY & NEUROLOGY

## 2019-01-25 PROCEDURE — 25000003 PHARM REV CODE 250: Performed by: EMERGENCY MEDICINE

## 2019-01-25 PROCEDURE — G8979 MOBILITY GOAL STATUS: HCPCS | Mod: CI

## 2019-01-25 PROCEDURE — 94640 AIRWAY INHALATION TREATMENT: CPT

## 2019-01-25 PROCEDURE — 94761 N-INVAS EAR/PLS OXIMETRY MLT: CPT

## 2019-01-25 PROCEDURE — 82550 ASSAY OF CK (CPK): CPT

## 2019-01-25 PROCEDURE — 87186 SC STD MICRODIL/AGAR DIL: CPT

## 2019-01-25 RX ORDER — GABAPENTIN 300 MG/1
300 CAPSULE ORAL 3 TIMES DAILY
Status: DISCONTINUED | OUTPATIENT
Start: 2019-01-25 | End: 2019-01-27 | Stop reason: HOSPADM

## 2019-01-25 RX ORDER — LISINOPRIL 20 MG/1
40 TABLET ORAL DAILY
Status: DISCONTINUED | OUTPATIENT
Start: 2019-01-25 | End: 2019-01-27 | Stop reason: HOSPADM

## 2019-01-25 RX ORDER — FUROSEMIDE 20 MG/1
20 TABLET ORAL EVERY MORNING
Status: DISCONTINUED | OUTPATIENT
Start: 2019-01-26 | End: 2019-01-27 | Stop reason: HOSPADM

## 2019-01-25 RX ADMIN — ATORVASTATIN CALCIUM 40 MG: 40 TABLET, FILM COATED ORAL at 08:01

## 2019-01-25 RX ADMIN — DORZOLAMIDE HYDROCHLORIDE AND TIMOLOL MALEATE 1 DROP: 20; 5 SOLUTION/ DROPS OPHTHALMIC at 08:01

## 2019-01-25 RX ADMIN — INSULIN ASPART 3 UNITS: 100 INJECTION, SOLUTION INTRAVENOUS; SUBCUTANEOUS at 08:01

## 2019-01-25 RX ADMIN — INSULIN ASPART 2 UNITS: 100 INJECTION, SOLUTION INTRAVENOUS; SUBCUTANEOUS at 08:01

## 2019-01-25 RX ADMIN — PRIMIDONE 50 MG: 50 TABLET ORAL at 08:01

## 2019-01-25 RX ADMIN — INSULIN ASPART 2 UNITS: 100 INJECTION, SOLUTION INTRAVENOUS; SUBCUTANEOUS at 05:01

## 2019-01-25 RX ADMIN — TIOTROPIUM BROMIDE 18 MCG: 18 CAPSULE ORAL; RESPIRATORY (INHALATION) at 11:01

## 2019-01-25 RX ADMIN — INSULIN ASPART 1 UNITS: 100 INJECTION, SOLUTION INTRAVENOUS; SUBCUTANEOUS at 08:01

## 2019-01-25 RX ADMIN — INSULIN ASPART 2 UNITS: 100 INJECTION, SOLUTION INTRAVENOUS; SUBCUTANEOUS at 12:01

## 2019-01-25 RX ADMIN — LISINOPRIL 40 MG: 20 TABLET ORAL at 06:01

## 2019-01-25 RX ADMIN — ASPIRIN 81 MG: 81 TABLET, COATED ORAL at 08:01

## 2019-01-25 RX ADMIN — LATANOPROST 1 DROP: 50 SOLUTION/ DROPS OPHTHALMIC at 08:01

## 2019-01-25 RX ADMIN — INSULIN DETEMIR 6 UNITS: 100 INJECTION, SOLUTION SUBCUTANEOUS at 08:01

## 2019-01-25 RX ADMIN — GABAPENTIN 300 MG: 300 CAPSULE ORAL at 08:01

## 2019-01-25 RX ADMIN — PRIMIDONE 50 MG: 50 TABLET ORAL at 02:01

## 2019-01-25 RX ADMIN — PANTOPRAZOLE SODIUM 40 MG: 40 TABLET, DELAYED RELEASE ORAL at 08:01

## 2019-01-25 RX ADMIN — INSULIN ASPART 3 UNITS: 100 INJECTION, SOLUTION INTRAVENOUS; SUBCUTANEOUS at 12:01

## 2019-01-25 NOTE — ASSESSMENT & PLAN NOTE
Greater than 3 minutes spent counseling patient on dangers of continued tobacco abuse. Will provide tobacco cessation.

## 2019-01-25 NOTE — ASSESSMENT & PLAN NOTE
Holding home antihypertensives as patient had low blood pressure in ED and was orthostatic per report. Likely secondary to increase in urination that began around the same time as the syncope.

## 2019-01-25 NOTE — PLAN OF CARE
Problem: Fall Injury Risk  Goal: Absence of Fall and Fall-Related Injury    Intervention: Identify and Manage Contributors to Fall Injury Risk   01/24/19 1700 01/25/19 0349   Manage Acute Allergic Reaction   Medication Review/Management --  medications reviewed   Identify and Manage Contributors to Fall Injury Risk   Self-Care Promotion independence encouraged;BADL personal objects within reach;BADL personal routines maintained --          Problem: Adult Inpatient Plan of Care  Goal: Plan of Care Review   01/25/19 0349   Plan of Care Review   Plan of Care Reviewed With patient       Problem: Diabetes Comorbidity  Goal: Blood Glucose Level Within Desired Range    Intervention: Maintain Glycemic Control   01/25/19 0349   Monitor and Manage Ketoacidosis   Glycemic Management blood glucose monitoring;supplemental insulin given         Problem: Adjustment to Illness (Stroke, Ischemic/Transient Ischemic Attack)  Goal: Optimal Coping    Intervention: Support Patient/Family Psychosocial Response to Stroke   01/25/19 0349   Promote Anxiety Reduction   Supportive Measures active listening utilized;verbalization of feelings encouraged;self-care encouraged   Family/Support System Care presence promoted;support provided         Problem: Cerebral Tissue Perfusion Risk (Stroke, Ischemic/Transient Ischemic Attack)  Goal: Optimal Cerebral Tissue Perfusion    Intervention: Protect and Optimize Cerebral Perfusion   01/25/19 0349   Protect and Optimize Cerebral Perfusion   Cerebral Perfusion Promotion blood pressure monitored   Prevent or Manage Pain   Sensory Stimulation Regulation care clustered;music/television provided for relaxation     Intervention: Optimize Oxygenation and Ventilation   01/25/19 0349   Support Asthma Symptom Control   Airway/Ventilation Management airway patency maintained         Problem: Hemodynamic Instability (Stroke, Ischemic/Transient Ischemic Attack)  Goal: Vital Signs Remain in Desired  Range    Intervention: Optimize Blood Flow   01/25/19 0349   Monitor and Manage Cardiac Rhythm Effects   Fluid/Electrolyte Management intravenous fluids adjusted;intravenous fluid replacement initiated

## 2019-01-25 NOTE — PT/OT/SLP EVAL
"Occupational Therapy   Evaluation    Name: Deb Webb  MRN: [id number]  Admitting Diagnosis:  CVA (cerebral vascular accident)      Recommendations:     Discharge Recommendations: other (see comments)(home with family assistance and outpatient OT to follow when appropriate)  Discharge Equipment Recommendations:  none  Barriers to discharge:       Assessment:     Deb Webb is a 68 y.o. female with a medical diagnosis of CVA (cerebral vascular accident).  She presents with  independence for self-care and functional transfers secondary to immobilized LUE. Performance deficits affecting function: decreased upper extremity function, impaired self care skills, impaired endurance, impaired functional mobilty, decreased safety awareness, decreased ROM, impaired coordination, impaired fine motor, impaired cardiopulmonary response to activity, impaired joint extensibility, impaired muscle length.      Rehab Prognosis: Good; patient would benefit from acute skilled OT services to address these deficits and reach maximum level of function.       Plan:     Patient to be seen 3 x/week to address the above listed problems via self-care/home management, therapeutic activities, therapeutic exercises  · Plan of Care Expires: 19  · Plan of Care Reviewed with: patient    Subjective     Chief Complaint: "I told my  that I would likely be going to my daughter's house for a couple of weeks."  Patient/Family Comments/goals: to get better    Occupational Profile:  Living Environment: lives with her  in a house  Previous level of function: modified independent  Equipment Used at Home:  cane, straight  Assistance upon Discharge: from her adult children    Pain/Comfort:  · Pain Rating 1: 0/10("Surprisingly, my arm doesn't hurt too much right now.")    Patients cultural, spiritual, Jainism conflicts given the current situation: no    Objective:     Communicated with: nurse prior to session.  Patient " found up in chair with peripheral IV, telemetry upon OT entry to room.    General Precautions: Standard, fall   Orthopedic Precautions:LUE non weight bearing   Braces: N/A     Occupational Performance:    Bed Mobility:    · Patient found ambulating in the ram with PT no AD, HHA    Functional Mobility/Transfers:  · Patient completed Sit <> Stand Transfer with contact guard assistance  with  no assistive device and hand-held assist   · Functional Mobility: ambulated in the ram with PT    Activities of Daily Living:  · Feeding:  modified independence    · Upper Body Dressing: minimum assistance for one handed dressing  · Lower Body Dressing: moderate assistance limited to R UE only    Cognitive/Visual Perceptual:  Cognitive/Psychosocial Skills:     -       Oriented to: Person, Place and Situation   -       Follows Commands/attention:Follows one-step commands  -       Communication: clear/fluent  -       Memory: No Deficits noted  -       Safety awareness/insight to disability: impaired   -       Mood/Affect/Coping skills/emotional control: Appropriate to situation    Physical Exam:  Balance:    -       sit balance good; standing balance fair plus  Postural examination/scapula alignment:    -       Rounded shoulders  Skin integrity: Visible skin intact  Dominant hand:    -       right  Upper Extremity Range of Motion:     -       Right Upper Extremity: WFL  -       Left Upper Extremity: NT secondary to humeral fracture  Upper Extremity Strength:    -       Right Upper Extremity: WFL    AMPAC 6 Click ADL:  AMPAC Total Score: 18    Treatment & Education:  Evaluation   Education:    Patient left up in chair with all lines intact and call button in reach    GOALS:   Multidisciplinary Problems     Occupational Therapy Goals        Problem: Occupational Therapy Goal    Goal Priority Disciplines Outcome Interventions   Occupational Therapy Goal     OT, PT/OT Ongoing (interventions implemented as appropriate)    Description:   "Goals to be met by: 2019     Patient will increase functional independence with ADLs by performing:    UE Dressing with Contact Guard Assistance.  LE Dressing with Minimal Assistance.  Grooming while standing at sink with Contact Guard Assistance.  Toileting from toilet with Stand-by Assistance for hygiene and clothing management.   Toilet transfer to toilet with Stand-by Assistance.  Upper extremity exercise program with assistance as needed.                      History:     Past Medical History:   Diagnosis Date    Allergy     Arthritis     Cataract     Colon polyps     COPD (chronic obstructive pulmonary disease)     Diabetes mellitus type II     Diabetic neuropathy     Fever blister     Glaucoma (increased eye pressure)     Hyperlipidemia     Hypertension     Irritable bowel syndrome     Keloid cicatrix     Osteoporosis     Retained cholelithiasis following cholecystectomy(997.41)     Right patella fracture        Past Surgical History:   Procedure Laterality Date    BACK SURGERY      CATARACT EXTRACTION W/  INTRAOCULAR LENS IMPLANT Bilateral     CHOLECYSTECTOMY      Laparoscopic    COLONOSCOPY      COLONOSCOPY N/A 2013    [facility]formed [facility] at [facility] ([facility])    HERNIA REPAIR      HYSTERECTOMY      KNEE SURGERY Right 3/4/2015    Dr Weller     OOPHORECTOMY      ORIF-PATELLA / C-ARM Right 3/4/2015    [facility]on [facility] OR    ovarian tumor      Benign    TONSILLECTOMY, ADENOIDECTOMY         Clinical Decision Makin.  OT Low:  "Pt evaluation falls under low complexity for evaluation coding due to performance deficits noted in 1-3 areas as stated above and 0 co-morbities affecting current functional status. Data obtained from problem focused assessments. No modifications or assistance was required for completion of evaluation. Only brief occupational profile and history review completed."     Time Tracking:     OT Date " of Treatment: 01/25/19  OT Start Time: 1053  OT Stop Time: 1105  OT Total Time (min): 12 min    Billable Minutes:Evaluation 12 minutes following PT    KHALIDA Blackwood, MS  1/25/2019

## 2019-01-25 NOTE — ASSESSMENT & PLAN NOTE
Sling applied to arm. Will consult ortho for further recommendations as concern discussed with radiology there is some displacement.

## 2019-01-25 NOTE — H&P
"[facility] Medicine  History & Physical    Patient Name: Deb Webb  MRN: [id number]  Admission Date: 1/24/2019  Attending Physician: Sally Wells MD   Primary Care Provider: Moiz Goldberg MD         Patient information was obtained from patient, past medical records and ER records.     Subjective:     Principal Problem:Syncope    Chief Complaint:   Chief Complaint   Patient presents with    Dizziness     Pt states," Dizzy for two weeks, clicking in my right ear.I fell today and hurt my left elbow."        HPI: Deb Webb 68 y.o. female with HTN, HLD, COPD, and DM2 presents to the hospital with a chief complaint of syncope. She reports beginning in November of last year she found she would have syncopal episodes when she changed position. These episodes have increased in number and frequency. She now reports when she moves from laying to sitting and especially to standing she passes out, but immediately regains consciousness when on ground. She reports she changes positions quickly when she moves and finds this exacerbates the dizziness. She also had a syncopal episode Sunday in which she injured her chest and reports since then she has had chest pain located at the top left of her sternum and worsened with arm movement. She hand a syncopal episode today when she was getting out of her car at the grocery store prompting her to come to the hospital. Her syncopal episodes are associated with dizziness which was relieved with meclizine provided by PCP. She is not dizzy currently when laying in the bed. She has also had increased urination that started in November, and she reports she began to drink less water as home as she was urinating too frequently. She also stopped her lasix as she felt that was increasing her symptoms. She has not been compliant with her diabetic diet as she reports she has been eating food with her family over the holidays. She endorses dizzgiovanni, " emesis (2 episodes both associated with eating then laying flat patient believes), chest pain, and syncope. She denies fever, diarrhea, abdominal pain, dysuria.     In the ED, CT head without acute abnormality, CT chest with proximal clavicle fracture, remote rib injuries, emphysematous changes, troponin negative, EKG of sinus tach.    Past Medical History:   Diagnosis Date    Allergy     Arthritis     Cataract     Colon polyps     COPD (chronic obstructive pulmonary disease)     Diabetes mellitus type II     Diabetic neuropathy     Fever blister     Glaucoma (increased eye pressure)     Hyperlipidemia     Hypertension     Irritable bowel syndrome     Keloid cicatrix     Osteoporosis     Retained cholelithiasis following cholecystectomy(997.41)     Right patella fracture        Past Surgical History:   Procedure Laterality Date    BACK SURGERY  2006    CATARACT EXTRACTION W/  INTRAOCULAR LENS IMPLANT Bilateral     CHOLECYSTECTOMY      Laparoscopic    COLONOSCOPY      COLONOSCOPY N/A 5/2/2013    [facility]    HERNIA REPAIR      HYSTERECTOMY      KNEE SURGERY Right 3/4/2015    Dr Weller     OOPHORECTOMY      ORIF-PATELLA / C-ARM Right 3/4/2015    Per[facility] Bairon [facility]er [facility] OR    ovarian tumor      Benign    TONSILLECTOMY, ADENOIDECTOMY         Review of patient's allergies indicates:   Allergen Reactions    Silicone      Burn skin    Adhesive Rash       No current facility-administered medications on file prior to encounter.      Current Outpatient Medications on File Prior to Encounter   Medication Sig    albuterol (VENTOLIN HFA) 90 mcg/actuation inhaler TAKE 2 PUFFS BY MOUTH EVERY 4 HOURS AS NEEDED FOR WHEEZE    alendronate (FOSAMAX) 70 MG tablet TAKE 1 TABLET BY MOUTH ONE TIME PER WEEK    aspirin (ECOTRIN) 81 MG EC tablet Take 1 tablet (81 mg total) by mouth once daily.    atorvastatin (LIPITOR) 40 MG tablet Take 1  "tablet (40 mg total) by mouth once daily.    blood sugar diagnostic Strp To check BG 2 times daily, to use with insurance preferred meter    blood-glucose meter kit To check BG 2 times daily, to use with insurance preferred meter    cyclobenzaprine (FLEXERIL) 5 MG tablet Take 1 tablet (5 mg total) by mouth 3 (three) times daily as needed.    dorzolamide-timolol 2-0.5% (COSOPT) 22.3-6.8 mg/mL ophthalmic solution PLACE 1 DROP INTO BOTH EYES 2 (TWO) TIMES DAILY.    esomeprazole (NEXIUM) 40 MG capsule Take 1 capsule (40 mg total) by mouth before breakfast.    fluticasone (FLONASE) 50 mcg/actuation nasal spray USE 2 SPRAY INTO EACH NOSTRIL DAILY    furosemide (LASIX) 20 MG tablet Take 1 tablet (20 mg total) by mouth every morning.    gabapentin (NEURONTIN) 300 MG capsule TAKE ONE CAPSULE BY MOUTH THREE TIMES A DAY    glimepiride (AMARYL) 4 MG tablet Take 2 tablets (8 mg total) by mouth before breakfast.    lancets Misc To check BG 2 times daily, to use with insurance preferred meter    latanoprost 0.005 % ophthalmic solution Place 1 drop into both eyes every evening.    lisinopril (PRINIVIL,ZESTRIL) 40 MG tablet Take 40 mg by mouth once daily.    loratadine (CLARITIN) 10 mg tablet Take 1 tablet (10 mg total) by mouth daily as needed for Allergies (or runny nose).    LORazepam (ATIVAN) 0.5 MG tablet TAKE 1 TABLET (0.5 MG TOTAL) BY MOUTH DAILY AS NEEDED FOR ANXIETY.    meloxicam (MOBIC) 15 MG tablet TAKE 1 TABLET (15 MG TOTAL) BY MOUTH ONCE DAILY.    metFORMIN (GLUCOPHAGE-XR) 500 MG 24 hr tablet TAKE 1 TABLET (500 MG TOTAL) BY MOUTH 2 (TWO) TIMES DAILY WITH MEALS.    omega-3 fatty acids-vitamin E (FISH OIL) 1,000 mg Cap     pen needle, diabetic (BD ULTRA-FINE AGUS PEN NEEDLE) 32 gauge x 5/32" Ndle 1 Device by Misc.(Non-Drug; Combo Route) route once daily.    primidone (MYSOLINE) 50 MG Tab TAKE 1 TABLET BY MOUTH 3 TIMES A DAY    traMADol (ULTRAM) 50 mg tablet TAKE 1 TABLET BY MOUTH EVERY 24 HOURS AS " NEEDED FOR PAIN    umeclidinium (INCRUSE ELLIPTA) 62.5 mcg/actuation DsDv Inhale 1 each into the lungs once daily. Controller    diclofenac sodium (VOLTAREN) 1 % Gel Apply 2 g topically once daily.    meclizine (ANTIVERT) 12.5 mg tablet TAKE 1 TABLET (12.5 MG TOTAL) BY MOUTH 3 (THREE) TIMES DAILY AS NEEDED FOR DIZZINESS.     Family History     Problem Relation (Age of Onset)    Cancer Mother    Cataracts Mother    Diabetes Mother, Father, Sister, Daughter    Glaucoma Mother    Heart disease Father    Skin cancer Mother, Sister        Tobacco Use    Smoking status: Current Every Day Smoker     Packs/day: 0.50     Years: 40.00     Pack years: 20.00     Types: Cigarettes    Smokeless tobacco: Current User   Substance and Sexual Activity    Alcohol use: No    Drug use: No    Sexual activity: No     Review of Systems   Constitutional: Negative for chills and fever.   HENT: Negative for nosebleeds and tinnitus.    Eyes: Negative for photophobia and visual disturbance.   Respiratory: Positive for shortness of breath (chronic). Negative for wheezing.    Cardiovascular: Positive for chest pain and leg swelling (chronic improved with compression stockings). Negative for palpitations.   Gastrointestinal: Positive for vomiting (two episodes 1 last night and 1 this morning. Bother occurred after eating then laying down). Negative for abdominal distention, abdominal pain, diarrhea and nausea.   Endocrine: Positive for polyuria.   Genitourinary: Negative for dysuria, flank pain and hematuria.   Musculoskeletal: Negative for gait problem and joint swelling.   Skin: Negative for rash and wound.   Neurological: Positive for dizziness and syncope. Negative for seizures.     Objective:     Vital Signs (Most Recent):  Temp: 98.1 °F (36.7 °C) (01/24/19 1648)  Pulse: 108 (01/24/19 1648)  Resp: 18 (01/24/19 1648)  BP: 136/78 (01/24/19 1648)  SpO2: (!) 92 % (01/24/19 1648) Vital Signs (24h Range):  Temp:  [97.5 °F (36.4 °C)-98.1 °F  (36.7 °C)] 98.1 °F (36.7 °C)  Pulse:  [100-144] 108  Resp:  [16-32] 18  SpO2:  [88 %-95 %] 92 %  BP: (102-156)/(71-85) 136/78     Weight: 63 kg (138 lb 14.2 oz)  Body mass index is 26.24 kg/m².    Physical Exam   Constitutional: She is oriented to person, place, and time. She appears well-developed and well-nourished. No distress.   HENT:   Head: Normocephalic and atraumatic.   Right Ear: External ear normal.   Left Ear: External ear normal.   No bruising or lacerations to scalp. Area of mild tenderness at top of scalp.    Eyes: Conjunctivae and EOM are normal. Right eye exhibits no discharge. Left eye exhibits no discharge.   Neck: Normal range of motion. No thyromegaly present.   Cardiovascular: Normal rate, regular rhythm and intact distal pulses.   Murmur heard.  Pulmonary/Chest: Effort normal and breath sounds normal. No respiratory distress.   Decreased breath sounds. Few intermittent diffuse wheezes   Abdominal: Soft. Bowel sounds are normal. She exhibits no distension and no mass. There is no tenderness.   Musculoskeletal: She exhibits deformity. She exhibits no edema.   Bruising to anterior chest wall. Swelling at left clavicle sternal end. Pain to palpation.   Neurological: She is alert and oriented to person, place, and time.   Finger to nose intact. Able to feed self in bed. Slightly tremulous. Able to stand 5/5 strength at wrist and upper extremity. Shoulder exam limited due to clavicle fracture. Negative romberg. Sensation intact. Patient able to ambulate in room and feed herself. thumb to finger intact.   Skin: Skin is warm and dry. Capillary refill takes 2 to 3 seconds.   Psychiatric: She has a normal mood and affect. Her behavior is normal.   Nursing note and vitals reviewed.        CRANIAL NERVES     CN III, IV, VI   Extraocular motions are normal.        Significant Labs:   CBC:   Recent Labs   Lab 01/24/19  1713   WBC 16.94*   HGB 13.9   HCT 41.9        CMP:   Recent Labs   Lab  01/24/19  1713      K 3.7      CO2 26   *   BUN 23   CREATININE 1.1   CALCIUM 9.9   PROT 6.6   ALBUMIN 3.4*   BILITOT 0.4   ALKPHOS 114   AST 18   ALT 14   ANIONGAP 11   EGFRNONAA 52*     Lactic Acid:   Recent Labs   Lab 01/24/19  1743   LACTATE 1.5     Troponin:   Recent Labs   Lab 01/24/19  1721   TROPONINI 0.042*       Significant Imaging:   Imaging Results          CT Chest With Contrast (Final result)  Result time 01/24/19 13:01:06    Final result by Alex Scott MD (01/24/19 13:01:06)                 Impression:      1. Proximal clavicular fracture at midline with overlying edema/hematoma.  Please note, evaluation of the osseous structures is markedly limited secondary to respiratory motion.  No definite acute displaced rib fracture although there appears to be remote injuries involving the left and right ribs as described above.  2. Pulmonary emphysematous change without findings to suggest pneumothorax.  3. Possible ground-glass nodule within the right upper lobe.  For a ground glass nodule <6 mm, Fleischner Society 2017 guidelines recommend no routine follow up. However, suspicious features could warrant follow up with non-contrast chest CT at 2 years and 4 years after discovery.  4. Findings suggesting hepatic steatosis, correlation with LFTs recommended.  5. Left adrenal nodule, appears enlarged since the previous exam, remains nonspecific.  Further evaluation on a nonemergent basis as warranted.  6. Additional findings above.      Electronically signed by: Alex Scott MD  Date:    01/24/2019  Time:    13:01             Narrative:    EXAMINATION:  CT CHEST WITH CONTRAST    CLINICAL HISTORY:  Chest trauma, blunt;    TECHNIQUE:  Low dose axial images, sagittal and coronal reformations were obtained from the thoracic inlet to the lung bases following the IV administration of 75 mL of Omnipaque 350.    COMPARISON:  CT abdomen and pelvis 05/15/2014    FINDINGS:  Please note,  examination is limited secondary to patient motion.    The structures at the base of the neck are grossly unremarkable.  No significant mediastinal lymphadenopathy.  The heart is not enlarged.  The thoracic aorta tapers normally.  The visualized portions of the kidneys, spleen, pancreas, and right adrenal gland are grossly unremarkable.  There is hypoattenuation of the a patent parenchyma, may reflect steatosis, correlation with LFTs recommended.  There is a left adrenal nodule measuring 2 cm, mildly enlarged since the previous examination.  The portal vein and splenic vein are patent.    Allowing for motion artifact, the airways are grossly patent.  There is bilateral pulmonary emphysematous change.  There is a ground-glass nodule within the anterior aspect of the right upper lobe measuring 0.4 cm.  Differential for this would include atelectasis as there is motion in the region.  There is bilateral basilar dependent atelectasis.  No pneumothorax.  No large focal consolidation.  No pleural effusion.    Although the examination is not optimized for the evaluation of the pulmonary arteries, no large central pulmonary arterial filling defect to suggest pulmonary thromboembolism.    Degenerative changes are noted of the spine.  There is fracture of the medial aspect of the left clavicle proximally.  There is associated subcutaneous edema/hematoma of the overlying pectoralis musculature.  Evaluation of the adjacent ribs is limited by motion.  There appears to be remote injury of left anterolateral ribs 4 and 5.  There is remote injury of right posterolateral ribs 8 and 7.  The sternum is unable to be adequately evaluated secondary to motion, no definite sternal fracture.                               CT Head Without Contrast (Final result)  Result time 01/24/19 12:46:11    Final result by Alex Scott MD (01/24/19 12:46:11)                 Impression:      1. Motion limited examination, no convincing acute  intracranial abnormalities, noting sequela of chronic microvascular ischemic change and senescent change.      Electronically signed by: Alex Scott MD  Date:    01/24/2019  Time:    12:46             Narrative:    EXAMINATION:  CT HEAD WITHOUT CONTRAST    CLINICAL HISTORY:  Confusion/delirium, altered LOC, unexplained;Dizziness;    TECHNIQUE:  Low dose axial images were obtained through the head.  Coronal and sagittal reformations were also performed. Contrast was not administered.    COMPARISON:  None.    FINDINGS:  Examination is limited secondary to patient motion.  There is generalized cerebral volume loss.  There is hypoattenuation in a periventricular fashion, likely sequela of chronic microvascular ischemic change.  There is no evidence of acute major vascular territory infarct, hemorrhage, or mass.  There is no hydrocephalus.  There are no abnormal extra-axial fluid collections.  The paranasal sinuses and mastoid air cells are clear, and there is no evidence of calvarial fracture.  The visualized soft tissues are unremarkable.                                  Assessment/Plan:     * CVA (cerebral vascular accident)    Multiple episodes of dizziness and syncope. Reported by patient. CT in the ED without acute abnormality.   MRI with acute infarct.   Will allow permissive htn  Echo pending  PT/OT  Telemetry  Neuro check/aspiration precaution/seizure precaution/fall precaution     Fracture of sternal end of left clavicle    Sling applied to arm. Will consult ortho for further recommendations as concern discussed with radiology there is some displacement.   PT/OT     Syncope    Seems orthostatic per patient report. Likely secondary to volume loss from increased urination. Patient reports not following diabetic diet over holidays. She then began to urinate more frequently and then stopped drinking water as she did not want to continue to urinate. She was also taking lasix at home.  Orthostatic BP-Second set  negative. Reports not as dizzy as previously   IVF  Echo  Telemetry  Troponin trend  Holding home BP meds as BP is low.   MRI Given area of tenderness  Carotid US     Essential hypertension, benign    Holding home antihypertensives as patient had low blood pressure in ED and was orthostatic per report. Likely secondary to increase in urination that began around the same time as the syncope.      Essential tremor    Continue home primidone. Slightly tremulous today on exam     Tobacco use disorder    Greater than 3 minutes spent counseling patient on dangers of continued tobacco abuse. Will provide tobacco cessation.       COPD (chronic obstructive pulmonary disease)    Lungs free of wheezes. CT with signs of emphysema. Saturating appropriately on RA without complaints of SOB.   Will continue home albuterol and spiriva in place of incruse ellipta     Combined hyperlipidemia associated with type 2 diabetes mellitus    Continue home atovastatin     Uncontrolled type 2 diabetes mellitus with diabetic neuropathy, without long-term current use of insulin    BG of 600 initially in ED  Hold home glymepiride and metformin  A1c of 8.5 2 moths ago  Low dose sliding scale insulin in hospital. 6 units of Basal and 2 units of prandial  Hypoglycemic protocol  POCT glucose checks  Diabetic diet       VTE Risk Mitigation (From admission, onward)        Ordered     Place sequential compression device  Until discontinued      01/24/19 1713     IP VTE LOW RISK PATIENT  Once      01/24/19 1642     Place KANDI hose  Until discontinued      01/24/19 1642        VTE: KANDI/SCD  Code: full  Diet: diabetic  Dispo: pending echo, troponin trend, and control of BG       ADDENDUM 2230  MRI positive for embolic CVA in frontal lobes. Will allow permissive HTN to 220. Neurology consulted. Echo pending. Will consult PT/OT.    Luksa Luz PA-C  Department of Hospital Medicine   [facility]

## 2019-01-25 NOTE — PLAN OF CARE
01/25/19 1616   Discharge Assessment   Assessment Type Discharge Planning Assessment   Confirmed/corrected address and phone number on facesheet? Yes   Assessment information obtained from? Patient   Prior to hospitilization cognitive status: Alert/Oriented   Prior to hospitalization functional status: Assistive Equipment   Current cognitive status: Alert/Oriented   Current Functional Status: Assistive Equipment   Facility Arrived From: Home    Lives With spouse   Able to Return to Prior Arrangements yes   Is patient able to care for self after discharge? Yes   Who are your caregiver(s) and their phone number(s)? Halle Daughter [phone]    Patient's perception of discharge disposition admitted as an inpatient   Readmission Within the Last 30 Days no previous admission in last 30 days   Patient currently being followed by outpatient case management? No   Patient currently receives any other outside agency services? No   Equipment Currently Used at Home cane, straight   Do you have any problems affording any of your prescribed medications? No   Is the patient taking medications as prescribed? yes   Does the patient have transportation home? Yes   Transportation Anticipated family or friend will provide   Dialysis Name and Scheduled days N/A    Does the patient receive services at the [facility]? No   Discharge Plan A Home with family;Home Health   Discharge Plan B Home with family;Home Health   DME Needed Upon Discharge  walker, rolling   Patient/Family in Agreement with Plan yes     SW to patient's room to discuss Helping the patient manage care at home.   TN/SW role explained to pt.  Patient identified using two identifiers:  Name and date of birth.    TN's name and contact info placed on white board.     Person who will help at home if needed:  Pt's daughterHalle.     Preferred pharmacy:   [facility]/pharmacy #[id number] [facility] [address] [place] [address]  [id number]  Phone: [phone] Fax: [phone]      Preferred Appointment time: Morning appointment appointments

## 2019-01-25 NOTE — ASSESSMENT & PLAN NOTE
Initially holding medications for permissive HTN.  No need for permissive HTN per Neuro.  Restart home meds.

## 2019-01-25 NOTE — PLAN OF CARE
Problem: Physical Therapy Goal  Goal: Physical Therapy Goal  Goals to be met by: 19     Patient will increase functional independence with mobility by performin. Supine to sit with Modified [place]  2. Rolling to Left and Right with Modified [place]  3. Sit to stand transfer with Modified [place]  4. Bed to chair transfer with Modified [place]   5. Gait >250 feet with Modified [place] using Single-point Cane   6. Lower extremity exercise program 3 sets x10 reps per handout, with independence    Pt ambulated ~150 ft with R hand held assistance.

## 2019-01-25 NOTE — NURSING
Report received from CASI Trejo. Patient awake and alert. NAD noted. Safety maintained. Will continue to monitor.

## 2019-01-25 NOTE — ASSESSMENT & PLAN NOTE
"Multiple episodes of dizziness and syncope. Reported by patient. CT in the ED without acute abnormality.   MRI showing "Two punctate foci of diffusion restriction in the bilateral frontal lobes, most consistent with acute small embolic infarctions."  PT/OT  Telemetry  Appreciate Neuro input.  Symptoms not consistent with abnormalities found in MRI.  Possibly home tomorrow.  "

## 2019-01-25 NOTE — PROGRESS NOTES
[facility] Hospital Medicine  Progress Note    Patient Name: Deb Webb  MRN: [id number]  Patient Class: IP- Inpatient   Admission Date: 1/24/2019  Length of Stay: 1 days  Attending Physician: Bo Hinds MD  Primary Care Provider: Moiz Goldberg MD        Subjective:     Principal Problem:CVA (cerebral vascular accident)    HPI:  Deb Webb 68 y.o. female with HTN, HLD, COPD, and DM2 presents to the hospital with a chief complaint of syncope. She reports beginning in November of last year she found she would have syncopal episodes when she changed position. These episodes have increased in number and frequency. She now reports when she moves from laying to sitting and especially to standing she passes out, but immediately regains consciousness when on ground. She reports she changes positions quickly when she moves and finds this exacerbates the dizziness. She also had a syncopal episode Sunday in which she injured her chest and reports since then she has had chest pain located at the top left of her sternum and worsened with arm movement. She hand a syncopal episode today when she was getting out of her car at the grocery store prompting her to come to the hospital. Her syncopal episodes are associated with dizziness which was relieved with meclizine provided by PCP. She is not dizzy currently when laying in the bed. She has also had increased urination that started in November, and she reports she began to drink less water as home as she was urinating too frequently. She also stopped her lasix as she felt that was increasing her symptoms. She has not been compliant with her diabetic diet as she reports she has been eating food with her family over the holidays. She endorses dizzines, emesis (2 episodes both associated with eating then laying flat patient believes), chest pain, and syncope. She denies fever, diarrhea, abdominal pain, dysuria.     In the ED, CT head without  "acute abnormality, CT chest with proximal clavicle fracture, remote rib injuries, emphysematous changes, troponin negative, EKG of sinus tach.    Hospital Course:  Deb Webb 68 y.o. female placed in observation for management of syncope. In the ED, CT head without acute abnormality, CT chest with proximal clavicle fracture, remote rib injuries, emphysematous changes, troponin negative, EKG of sinus tach.  Ortho consulted for clavicle fracture.  MRI showed "Two punctate foci of diffusion restriction in the bilateral frontal lobes, most consistent with acute small embolic infarctions.".  Neurology consulted.    Interval History: Feeling better.    Review of Systems   HENT: Negative for drooling and ear discharge.    Eyes: Negative for pain and itching.   Endocrine: Negative for polyphagia and polyuria.   Neurological: Negative for seizures and speech difficulty.     Objective:     Vital Signs (Most Recent):  Temp: 98.4 °F (36.9 °C) (01/25/19 1546)  Pulse: 101 (01/25/19 1700)  Resp: 16 (01/25/19 1546)  BP: (!) 187/97 (01/25/19 1546)  SpO2: (!) 85 % (01/25/19 1546) Vital Signs (24h Range):  Temp:  [97.8 °F (36.6 °C)-99.6 °F (37.6 °C)] 98.4 °F (36.9 °C)  Pulse:  [] 101  Resp:  [16-19] 16  SpO2:  [85 %-95 %] 85 %  BP: (139-199)/() 187/97     Weight: 62.6 kg (138 lb)  Body mass index is 26.07 kg/m².    Intake/Output Summary (Last 24 hours) at 1/25/2019 1741  Last data filed at 1/25/2019 1419  Gross per 24 hour   Intake --   Output 600 ml   Net -600 ml      Physical Exam   Constitutional: She is oriented to person, place, and time. She appears well-developed and well-nourished. No distress.   HENT:   Head: Normocephalic and atraumatic.   Right Ear: External ear normal.   Left Ear: External ear normal.   No bruising or lacerations to scalp. Area of mild tenderness at top of scalp.    Eyes: Conjunctivae and EOM are normal. Right eye exhibits no discharge. Left eye exhibits no discharge.   Neck: Normal " "range of motion. No thyromegaly present.   Cardiovascular: Normal rate, regular rhythm and intact distal pulses.   Murmur heard.  Pulmonary/Chest: Effort normal and breath sounds normal. No respiratory distress.   Decreased breath sounds. Few intermittent diffuse wheezes   Abdominal: Soft. Bowel sounds are normal. She exhibits no distension and no mass. There is no tenderness.   Musculoskeletal: She exhibits deformity. She exhibits no edema.   Bruising to anterior chest wall. Swelling at left clavicle sternal end. Pain to palpation.   Neurological: She is alert and oriented to person, place, and time.   Finger to nose intact. Able to feed self in bed. Slightly tremulous. Able to stand   Skin: Skin is warm and dry. Capillary refill takes 2 to 3 seconds.   Psychiatric: She has a normal mood and affect. Her behavior is normal.   Nursing note and vitals reviewed.      Significant Labs:   BMP:   Recent Labs   Lab 01/25/19  0554   *      K 3.6      CO2 25   BUN 14   CREATININE 0.8   CALCIUM 9.2   MG 1.1*     CBC:   Recent Labs   Lab 01/24/19  1713 01/25/19  0554   WBC 16.94* 15.38*   HGB 13.9 13.2   HCT 41.9 40.0    174       Significant Imaging: I have reviewed all pertinent imaging results/findings within the past 24 hours.    Assessment/Plan:      * CVA (cerebral vascular accident)    Multiple episodes of dizziness and syncope. Reported by patient. CT in the ED without acute abnormality.   MRI showing "Two punctate foci of diffusion restriction in the bilateral frontal lobes, most consistent with acute small embolic infarctions."  PT/OT  Telemetry  Appreciate Neuro input.  Symptoms not consistent with abnormalities found in MRI.  Possibly home tomorrow.     Fracture of sternal end of left clavicle    Sling applied to arm.   Appreciate Ortho input.     Syncope    Unsure etiology.  Polypharmacy?  Monitor on Tele.     Essential hypertension, benign    Initially holding medications for permissive " HTN.  No need for permissive HTN per Neuro.  Restart home meds.     Essential tremor    Continue home primidone.     Tobacco use disorder    Greater than 3 minutes spent counseling patient on dangers of continued tobacco abuse. Will provide tobacco cessation.       COPD (chronic obstructive pulmonary disease)    Lungs free of wheezes. CT with signs of emphysema. Saturating appropriately on RA without complaints of SOB.   Will continue home albuterol and spiriva in place of incruse ellipta     Combined hyperlipidemia associated with type 2 diabetes mellitus    Continue home atovastatin     Uncontrolled type 2 diabetes mellitus with diabetic neuropathy, without long-term current use of insulin    BG of 600 initially in ED  Hold home glymepiride and metformin  A1c of 8.5 2 moths ago  Continue current insulin regimen.  Hypoglycemic protocol  POCT glucose checks  Diabetic diet       VTE Risk Mitigation (From admission, onward)        Ordered     Place sequential compression device  Until discontinued      01/24/19 1713     IP VTE LOW RISK PATIENT  Once      01/24/19 1642     Place KANDI hose  Until discontinued      01/24/19 1642              Bo Hinds MD  Department of Hospital Medicine   [facility]

## 2019-01-25 NOTE — CONSULTS
C.C. Left shoulder proximal clavicle fracture    HPI: Deb Albert68 y.o. complaining of left shoulder and chest pain s/p fall with left shoulder proximal clavicle fracture with pain. She was admitted after a recent stroke.     ROS:   Pertinent positives: left shoulder pain   Negatives: F/C, N/V, CP, SOB,    All other 14 point ROS negative    PMH:   Past Medical History:   Diagnosis Date    Allergy     Arthritis     Cataract     Colon polyps     COPD (chronic obstructive pulmonary disease)     Diabetes mellitus type II     Diabetic neuropathy     Fever blister     Glaucoma (increased eye pressure)     Hyperlipidemia     Hypertension     Irritable bowel syndrome     Keloid cicatrix     Osteoporosis     Retained cholelithiasis following cholecystectomy(997.41)     Right patella fracture        PSH:   Past Surgical History:   Procedure Laterality Date    BACK SURGERY  2006    CATARACT EXTRACTION W/  INTRAOCULAR LENS IMPLANT Bilateral     CHOLECYSTECTOMY      Laparoscopic    COLONOSCOPY      COLONOSCOPY N/A 5/2/2013    Performed by Perry Myers [facility] ([facility])    HERNIA REPAIR      HYSTERECTOMY      KNEE SURGERY Right 3/4/2015    Dr Weller     OOPHORECTOMY      ORIF-PATELLA / C-ARM Right 3/4/2015    Performed by Bairon Weller MD at [facility] OR    ovarian tumor      Benign    TONSILLECTOMY, ADENOIDECTOMY         Social Hx:   Social History     Occupational History     Employer: [employer]   Tobacco Use    Smoking status: Current Every Day Smoker     Packs/day: 0.50     Years: 40.00     Pack years: 20.00     Types: Cigarettes    Smokeless tobacco: Current User   Substance and Sexual Activity    Alcohol use: No    Drug use: No    Sexual activity: No       Medications:    No current facility-administered medications on file prior to encounter.      Current Outpatient Medications on File Prior to Encounter   Medication Sig Dispense Refill     albuterol (VENTOLIN HFA) 90 mcg/actuation inhaler TAKE 2 PUFFS BY MOUTH EVERY 4 HOURS AS NEEDED FOR WHEEZE 18 Inhaler 3    alendronate (FOSAMAX) 70 MG tablet TAKE 1 TABLET BY MOUTH ONE TIME PER WEEK 12 tablet 1    aspirin (ECOTRIN) 81 MG EC tablet Take 1 tablet (81 mg total) by mouth once daily.  0    atorvastatin (LIPITOR) 40 MG tablet Take 1 tablet (40 mg total) by mouth once daily. 90 tablet 2    blood sugar diagnostic Strp To check BG 2 times daily, to use with insurance preferred meter 200 each 3    blood-glucose meter kit To check BG 2 times daily, to use with insurance preferred meter 1 each 0    cyclobenzaprine (FLEXERIL) 5 MG tablet Take 1 tablet (5 mg total) by mouth 3 (three) times daily as needed. 90 tablet 5    dorzolamide-timolol 2-0.5% (COSOPT) 22.3-6.8 mg/mL ophthalmic solution PLACE 1 DROP INTO BOTH EYES 2 (TWO) TIMES DAILY. 10 mL 3    esomeprazole (NEXIUM) 40 MG capsule Take 1 capsule (40 mg total) by mouth before breakfast. 90 capsule 2    fluticasone (FLONASE) 50 mcg/actuation nasal spray USE 2 SPRAY INTO EACH NOSTRIL DAILY 16 mL 5    furosemide (LASIX) 20 MG tablet Take 1 tablet (20 mg total) by mouth every morning. 90 tablet 1    gabapentin (NEURONTIN) 300 MG capsule TAKE ONE CAPSULE BY MOUTH THREE TIMES A  capsule 1    glimepiride (AMARYL) 4 MG tablet Take 2 tablets (8 mg total) by mouth before breakfast. 180 tablet 0    lancets Misc To check BG 2 times daily, to use with insurance preferred meter 200 each 3    latanoprost 0.005 % ophthalmic solution Place 1 drop into both eyes every evening. 2.5 mL 6    lisinopril (PRINIVIL,ZESTRIL) 40 MG tablet Take 40 mg by mouth once daily.      loratadine (CLARITIN) 10 mg tablet Take 1 tablet (10 mg total) by mouth daily as needed for Allergies (or runny nose). 90 tablet 3    LORazepam (ATIVAN) 0.5 MG tablet TAKE 1 TABLET (0.5 MG TOTAL) BY MOUTH DAILY AS NEEDED FOR ANXIETY. 10 tablet 0    meloxicam (MOBIC) 15 MG tablet TAKE 1  "TABLET (15 MG TOTAL) BY MOUTH ONCE DAILY. 90 tablet 1    metFORMIN (GLUCOPHAGE-XR) 500 MG 24 hr tablet TAKE 1 TABLET (500 MG TOTAL) BY MOUTH 2 (TWO) TIMES DAILY WITH MEALS. 360 tablet 2    omega-3 fatty acids-vitamin E (FISH OIL) 1,000 mg Cap       pen needle, diabetic (BD ULTRA-FINE AGUS PEN NEEDLE) 32 gauge x 5/32" Ndle 1 Device by Misc.(Non-Drug; Combo Route) route once daily. 100 each 4    primidone (MYSOLINE) 50 MG Tab TAKE 1 TABLET BY MOUTH 3 TIMES A DAY 90 tablet 0    traMADol (ULTRAM) 50 mg tablet TAKE 1 TABLET BY MOUTH EVERY 24 HOURS AS NEEDED FOR PAIN 30 tablet 0    umeclidinium (INCRUSE ELLIPTA) 62.5 mcg/actuation DsDv Inhale 1 each into the lungs once daily. Controller 30 each 2    diclofenac sodium (VOLTAREN) 1 % Gel Apply 2 g topically once daily. 100 g 3    meclizine (ANTIVERT) 12.5 mg tablet TAKE 1 TABLET (12.5 MG TOTAL) BY MOUTH 3 (THREE) TIMES DAILY AS NEEDED FOR DIZZINESS. 20 tablet 0         PE:         Vitals:    01/25/19 1209   BP: (!) 194/120   Pulse: 105   Resp: 16   Temp: 97.8 °F (36.6 °C)       Estimated body mass index is 26.07 kg/m² as calculated from the following:    Height as of this encounter: 5' 1" (1.549 m).    Weight as of this encounter: 62.6 kg (138 lb).     General frail elderly     Extremity: left shoulder mild pain with ROM  Severe pain with palpation over the chest at the SC joint at the proximal clavicle.     Labs:    Lab Results   Component Value Date    WBC 15.38 (H) 01/25/2019    HGB 13.2 01/25/2019    HCT 40.0 01/25/2019    MCV 88 01/25/2019     01/25/2019       BMP  Lab Results   Component Value Date     01/25/2019    K 3.6 01/25/2019     01/25/2019    CO2 25 01/25/2019    BUN 14 01/25/2019    CREATININE 0.8 01/25/2019    CALCIUM 9.2 01/25/2019    ANIONGAP 9 01/25/2019    ESTGFRAFRICA >60 01/25/2019    EGFRNONAA >60 01/25/2019       Lab Results   Component Value Date    INR 1.0 02/26/2015    INR 0.9 07/01/2013    INR 1.0 12/27/2010       No " results found for: SEDRATE    No results found for: CRP    Radiography:  Film    Interpretation  Left shoulder proximal clavicle fracture closed minimally displaced        A/P  68 y.o.female with left shoulder proximal clavicle fracture minimally displaced    Plan conservative treatment with sling only for comfort. Please begin active and passive ROM  F/u in 4 weeks      Ibrahima Ramirez MD

## 2019-01-25 NOTE — ASSESSMENT & PLAN NOTE
Seems orthostatic per patient report. Likely secondary to volume loss from increased urination. Patient reports not following diabetic diet over holidays. She then began to urinate more frequently and then stopped drinking water as she did not want to continue to urinate. She was also taking lasix at home.  Orthostatic BP-Second set negative. Reports not as dizzy as previously   IVF  Echo  Telemetry  Troponin trend  Holding home BP meds as BP is low.

## 2019-01-25 NOTE — HOSPITAL COURSE
"Deb Webb 68 y.o. female placed in observation for management of syncope. In the ED, CT head without acute abnormality, CT chest with proximal clavicle fracture, remote rib injuries, emphysematous changes, troponin negative, EKG of sinus tach.  Ortho consulted for clavicle fracture.  MRI showed "Two punctate foci of diffusion restriction in the bilateral frontal lobes, most consistent with acute small embolic infarctions.".  Neurology consulted.  Symptoms not consistent with changes seen on MRI.  Symptoms possibly secondary to diabetic neuropathy with autonomic manifestations.  Unremarkable Echo.  Restarted home Lisinopril.  Increased insulin regimen for better glycemic control.  No intervention for clavicle fracture per Ortho.  Patient has remained afebrile and hemodynamically stable.  Symptoms much improved from admission.  She will continue ASA and Statin.  She will be discharged home with HH and follow up with PCP and Neurology.  "

## 2019-01-25 NOTE — PLAN OF CARE
Problem: Occupational Therapy Goal  Goal: Occupational Therapy Goal  Goals to be met by: 2/1/2019     Patient will increase functional independence with ADLs by performing:    UE Dressing with Contact Guard Assistance.  LE Dressing with Minimal Assistance.  Grooming while standing at sink with Contact Guard Assistance.  Toileting from toilet with Stand-by Assistance for hygiene and clothing management.   Toilet transfer to toilet with Stand-by Assistance.  Upper extremity exercise program with assistance as needed.    Outcome: Ongoing (interventions implemented as appropriate)  Patient tolerated evaluation well, good participation.  Patient will benefit from skilled OT services to address the above mentioned goals. KHALIDA Blackwood, MS

## 2019-01-25 NOTE — SUBJECTIVE & OBJECTIVE
Interval History: Feeling better.    Review of Systems   HENT: Negative for drooling and ear discharge.    Eyes: Negative for pain and itching.   Endocrine: Negative for polyphagia and polyuria.   Neurological: Negative for seizures and speech difficulty.     Objective:     Vital Signs (Most Recent):  Temp: 98.4 °F (36.9 °C) (01/25/19 1546)  Pulse: 101 (01/25/19 1700)  Resp: 16 (01/25/19 1546)  BP: (!) 187/97 (01/25/19 1546)  SpO2: (!) 85 % (01/25/19 1546) Vital Signs (24h Range):  Temp:  [97.8 °F (36.6 °C)-99.6 °F (37.6 °C)] 98.4 °F (36.9 °C)  Pulse:  [] 101  Resp:  [16-19] 16  SpO2:  [85 %-95 %] 85 %  BP: (139-199)/() 187/97     Weight: 62.6 kg (138 lb)  Body mass index is 26.07 kg/m².    Intake/Output Summary (Last 24 hours) at 1/25/2019 1741  Last data filed at 1/25/2019 1419  Gross per 24 hour   Intake --   Output 600 ml   Net -600 ml      Physical Exam   Constitutional: She is oriented to person, place, and time. She appears well-developed and well-nourished. No distress.   HENT:   Head: Normocephalic and atraumatic.   Right Ear: External ear normal.   Left Ear: External ear normal.   No bruising or lacerations to scalp. Area of mild tenderness at top of scalp.    Eyes: Conjunctivae and EOM are normal. Right eye exhibits no discharge. Left eye exhibits no discharge.   Neck: Normal range of motion. No thyromegaly present.   Cardiovascular: Normal rate, regular rhythm and intact distal pulses.   Murmur heard.  Pulmonary/Chest: Effort normal and breath sounds normal. No respiratory distress.   Decreased breath sounds. Few intermittent diffuse wheezes   Abdominal: Soft. Bowel sounds are normal. She exhibits no distension and no mass. There is no tenderness.   Musculoskeletal: She exhibits deformity. She exhibits no edema.   Bruising to anterior chest wall. Swelling at left clavicle sternal end. Pain to palpation.   Neurological: She is alert and oriented to person, place, and time.   Finger to nose  intact. Able to feed self in bed. Slightly tremulous. Able to stand   Skin: Skin is warm and dry. Capillary refill takes 2 to 3 seconds.   Psychiatric: She has a normal mood and affect. Her behavior is normal.   Nursing note and vitals reviewed.      Significant Labs:   BMP:   Recent Labs   Lab 01/25/19  0554   *      K 3.6      CO2 25   BUN 14   CREATININE 0.8   CALCIUM 9.2   MG 1.1*     CBC:   Recent Labs   Lab 01/24/19  1713 01/25/19  0554   WBC 16.94* 15.38*   HGB 13.9 13.2   HCT 41.9 40.0    174       Significant Imaging: I have reviewed all pertinent imaging results/findings within the past 24 hours.

## 2019-01-25 NOTE — SUBJECTIVE & OBJECTIVE
Past Medical History:   Diagnosis Date    Allergy     Arthritis     Cataract     Colon polyps     COPD (chronic obstructive pulmonary disease)     Diabetes mellitus type II     Diabetic neuropathy     Fever blister     Glaucoma (increased eye pressure)     Hyperlipidemia     Hypertension     Irritable bowel syndrome     Keloid cicatrix     Osteoporosis     Retained cholelithiasis following cholecystectomy(997.41)     Right patella fracture        Past Surgical History:   Procedure Laterality Date    BACK SURGERY  2006    CATARACT EXTRACTION W/  INTRAOCULAR LENS IMPLANT Bilateral     CHOLECYSTECTOMY      Laparoscopic    COLONOSCOPY      COLONOSCOPY N/A 5/2/2013    [facility] ([facility])    HERNIA REPAIR      HYSTERECTOMY      KNEE SURGERY Right 3/4/2015    Dr Weller     OOPHORECTOMY      ORIF-PATELLA / C-ARM Right 3/4/2015    Performed by Bairon [facility] at [facility] OR    ovarian tumor      Benign    TONSILLECTOMY, ADENOIDECTOMY         Review of patient's allergies indicates:   Allergen Reactions    Silicone      Burn skin    Adhesive Rash       No current facility-administered medications on file prior to encounter.      Current Outpatient Medications on File Prior to Encounter   Medication Sig    albuterol (VENTOLIN HFA) 90 mcg/actuation inhaler TAKE 2 PUFFS BY MOUTH EVERY 4 HOURS AS NEEDED FOR WHEEZE    alendronate (FOSAMAX) 70 MG tablet TAKE 1 TABLET BY MOUTH ONE TIME PER WEEK    aspirin (ECOTRIN) 81 MG EC tablet Take 1 tablet (81 mg total) by mouth once daily.    atorvastatin (LIPITOR) 40 MG tablet Take 1 tablet (40 mg total) by mouth once daily.    blood sugar diagnostic Strp To check BG 2 times daily, to use with insurance preferred meter    blood-glucose meter kit To check BG 2 times daily, to use with insurance preferred meter    cyclobenzaprine (FLEXERIL) 5 MG tablet Take 1 tablet (5 mg total) by mouth 3 (three) times daily as  "needed.    dorzolamide-timolol 2-0.5% (COSOPT) 22.3-6.8 mg/mL ophthalmic solution PLACE 1 DROP INTO BOTH EYES 2 (TWO) TIMES DAILY.    esomeprazole (NEXIUM) 40 MG capsule Take 1 capsule (40 mg total) by mouth before breakfast.    fluticasone (FLONASE) 50 mcg/actuation nasal spray USE 2 SPRAY INTO EACH NOSTRIL DAILY    furosemide (LASIX) 20 MG tablet Take 1 tablet (20 mg total) by mouth every morning.    gabapentin (NEURONTIN) 300 MG capsule TAKE ONE CAPSULE BY MOUTH THREE TIMES A DAY    glimepiride (AMARYL) 4 MG tablet Take 2 tablets (8 mg total) by mouth before breakfast.    lancets Misc To check BG 2 times daily, to use with insurance preferred meter    latanoprost 0.005 % ophthalmic solution Place 1 drop into both eyes every evening.    lisinopril (PRINIVIL,ZESTRIL) 40 MG tablet Take 40 mg by mouth once daily.    loratadine (CLARITIN) 10 mg tablet Take 1 tablet (10 mg total) by mouth daily as needed for Allergies (or runny nose).    LORazepam (ATIVAN) 0.5 MG tablet TAKE 1 TABLET (0.5 MG TOTAL) BY MOUTH DAILY AS NEEDED FOR ANXIETY.    meloxicam (MOBIC) 15 MG tablet TAKE 1 TABLET (15 MG TOTAL) BY MOUTH ONCE DAILY.    metFORMIN (GLUCOPHAGE-XR) 500 MG 24 hr tablet TAKE 1 TABLET (500 MG TOTAL) BY MOUTH 2 (TWO) TIMES DAILY WITH MEALS.    omega-3 fatty acids-vitamin E (FISH OIL) 1,000 mg Cap     pen needle, diabetic (BD ULTRA-FINE AGUS PEN NEEDLE) 32 gauge x 5/32" Ndle 1 Device by Misc.(Non-Drug; Combo Route) route once daily.    primidone (MYSOLINE) 50 MG Tab TAKE 1 TABLET BY MOUTH 3 TIMES A DAY    traMADol (ULTRAM) 50 mg tablet TAKE 1 TABLET BY MOUTH EVERY 24 HOURS AS NEEDED FOR PAIN    umeclidinium (INCRUSE ELLIPTA) 62.5 mcg/actuation DsDv Inhale 1 each into the lungs once daily. Controller    diclofenac sodium (VOLTAREN) 1 % Gel Apply 2 g topically once daily.    meclizine (ANTIVERT) 12.5 mg tablet TAKE 1 TABLET (12.5 MG TOTAL) BY MOUTH 3 (THREE) TIMES DAILY AS NEEDED FOR DIZZINESS.     Family " History     Problem Relation (Age of Onset)    Cancer Mother    Cataracts Mother    Diabetes Mother, Father, Sister, Daughter    Glaucoma Mother    Heart disease Father    Skin cancer Mother, Sister        Tobacco Use    Smoking status: Current Every Day Smoker     Packs/day: 0.50     Years: 40.00     Pack years: 20.00     Types: Cigarettes    Smokeless tobacco: Current User   Substance and Sexual Activity    Alcohol use: No    Drug use: No    Sexual activity: No     Review of Systems   Constitutional: Negative for chills and fever.   HENT: Negative for nosebleeds and tinnitus.    Eyes: Negative for photophobia and visual disturbance.   Respiratory: Positive for shortness of breath (chronic). Negative for wheezing.    Cardiovascular: Positive for chest pain and leg swelling (chronic improved with compression stockings). Negative for palpitations.   Gastrointestinal: Positive for vomiting (two episodes 1 last night and 1 this morning. Bother occurred after eating then laying down). Negative for abdominal distention, abdominal pain, diarrhea and nausea.   Endocrine: Positive for polyuria.   Genitourinary: Negative for dysuria, flank pain and hematuria.   Musculoskeletal: Negative for gait problem and joint swelling.   Skin: Negative for rash and wound.   Neurological: Positive for dizziness and syncope. Negative for seizures.     Objective:     Vital Signs (Most Recent):  Temp: 98.1 °F (36.7 °C) (01/24/19 1648)  Pulse: 108 (01/24/19 1648)  Resp: 18 (01/24/19 1648)  BP: 136/78 (01/24/19 1648)  SpO2: (!) 92 % (01/24/19 1648) Vital Signs (24h Range):  Temp:  [97.5 °F (36.4 °C)-98.1 °F (36.7 °C)] 98.1 °F (36.7 °C)  Pulse:  [100-144] 108  Resp:  [16-32] 18  SpO2:  [88 %-95 %] 92 %  BP: (102-156)/(71-85) 136/78     Weight: 63 kg (138 lb 14.2 oz)  Body mass index is 26.24 kg/m².    Physical Exam   Constitutional: She is oriented to person, place, and time. She appears well-developed and well-nourished. No distress.    HENT:   Head: Normocephalic and atraumatic.   Right Ear: External ear normal.   Left Ear: External ear normal.   No bruising or lacerations to scalp. Area of mild tenderness at top of scalp.    Eyes: Conjunctivae and EOM are normal. Right eye exhibits no discharge. Left eye exhibits no discharge.   Neck: Normal range of motion. No thyromegaly present.   Cardiovascular: Normal rate, regular rhythm and intact distal pulses.   Murmur heard.  Pulmonary/Chest: Effort normal and breath sounds normal. No respiratory distress.   Decreased breath sounds. Few intermittent diffuse wheezes   Abdominal: Soft. Bowel sounds are normal. She exhibits no distension and no mass. There is no tenderness.   Musculoskeletal: She exhibits deformity. She exhibits no edema.   Bruising to anterior chest wall. Swelling at left clavicle sternal end. Pain to palpation.   Neurological: She is alert and oriented to person, place, and time.   Finger to nose intact. Able to feed self in bed. Slightly tremulous. Able to stand   Skin: Skin is warm and dry. Capillary refill takes 2 to 3 seconds.   Psychiatric: She has a normal mood and affect. Her behavior is normal.   Nursing note and vitals reviewed.        CRANIAL NERVES     CN III, IV, VI   Extraocular motions are normal.        Significant Labs:   CBC:   Recent Labs   Lab 01/24/19  1713   WBC 16.94*   HGB 13.9   HCT 41.9        CMP:   Recent Labs   Lab 01/24/19  1713      K 3.7      CO2 26   *   BUN 23   CREATININE 1.1   CALCIUM 9.9   PROT 6.6   ALBUMIN 3.4*   BILITOT 0.4   ALKPHOS 114   AST 18   ALT 14   ANIONGAP 11   EGFRNONAA 52*     Lactic Acid:   Recent Labs   Lab 01/24/19  1743   LACTATE 1.5     Troponin:   Recent Labs   Lab 01/24/19  1721   TROPONINI 0.042*       Significant Imaging:   Imaging Results          CT Chest With Contrast (Final result)  Result time 01/24/19 13:01:06    Final result by Alex Scott MD (01/24/19 13:01:06)                  Impression:      1. Proximal clavicular fracture at midline with overlying edema/hematoma.  Please note, evaluation of the osseous structures is markedly limited secondary to respiratory motion.  No definite acute displaced rib fracture although there appears to be remote injuries involving the left and right ribs as described above.  2. Pulmonary emphysematous change without findings to suggest pneumothorax.  3. Possible ground-glass nodule within the right upper lobe.  For a ground glass nodule <6 mm, Fleischner Society 2017 guidelines recommend no routine follow up. However, suspicious features could warrant follow up with non-contrast chest CT at 2 years and 4 years after discovery.  4. Findings suggesting hepatic steatosis, correlation with LFTs recommended.  5. Left adrenal nodule, appears enlarged since the previous exam, remains nonspecific.  Further evaluation on a nonemergent basis as warranted.  6. Additional findings above.      Electronically signed by: Alex Scott MD  Date:    01/24/2019  Time:    13:01             Narrative:    EXAMINATION:  CT CHEST WITH CONTRAST    CLINICAL HISTORY:  Chest trauma, blunt;    TECHNIQUE:  Low dose axial images, sagittal and coronal reformations were obtained from the thoracic inlet to the lung bases following the IV administration of 75 mL of Omnipaque 350.    COMPARISON:  CT abdomen and pelvis 05/15/2014    FINDINGS:  Please note, examination is limited secondary to patient motion.    The structures at the base of the neck are grossly unremarkable.  No significant mediastinal lymphadenopathy.  The heart is not enlarged.  The thoracic aorta tapers normally.  The visualized portions of the kidneys, spleen, pancreas, and right adrenal gland are grossly unremarkable.  There is hypoattenuation of the a patent parenchyma, may reflect steatosis, correlation with LFTs recommended.  There is a left adrenal nodule measuring 2 cm, mildly enlarged since the previous  examination.  The portal vein and splenic vein are patent.    Allowing for motion artifact, the airways are grossly patent.  There is bilateral pulmonary emphysematous change.  There is a ground-glass nodule within the anterior aspect of the right upper lobe measuring 0.4 cm.  Differential for this would include atelectasis as there is motion in the region.  There is bilateral basilar dependent atelectasis.  No pneumothorax.  No large focal consolidation.  No pleural effusion.    Although the examination is not optimized for the evaluation of the pulmonary arteries, no large central pulmonary arterial filling defect to suggest pulmonary thromboembolism.    Degenerative changes are noted of the spine.  There is fracture of the medial aspect of the left clavicle proximally.  There is associated subcutaneous edema/hematoma of the overlying pectoralis musculature.  Evaluation of the adjacent ribs is limited by motion.  There appears to be remote injury of left anterolateral ribs 4 and 5.  There is remote injury of right posterolateral ribs 8 and 7.  The sternum is unable to be adequately evaluated secondary to motion, no definite sternal fracture.                               CT Head Without Contrast (Final result)  Result time 01/24/19 12:46:11    Final result by Alex Scott MD (01/24/19 12:46:11)                 Impression:      1. Motion limited examination, no convincing acute intracranial abnormalities, noting sequela of chronic microvascular ischemic change and senescent change.      Electronically signed by: Alex Scott MD  Date:    01/24/2019  Time:    12:46             Narrative:    EXAMINATION:  CT HEAD WITHOUT CONTRAST    CLINICAL HISTORY:  Confusion/delirium, altered LOC, unexplained;Dizziness;    TECHNIQUE:  Low dose axial images were obtained through the head.  Coronal and sagittal reformations were also performed. Contrast was not administered.    COMPARISON:  None.    FINDINGS:  Examination  is limited secondary to patient motion.  There is generalized cerebral volume loss.  There is hypoattenuation in a periventricular fashion, likely sequela of chronic microvascular ischemic change.  There is no evidence of acute major vascular territory infarct, hemorrhage, or mass.  There is no hydrocephalus.  There are no abnormal extra-axial fluid collections.  The paranasal sinuses and mastoid air cells are clear, and there is no evidence of calvarial fracture.  The visualized soft tissues are unremarkable.

## 2019-01-25 NOTE — ASSESSMENT & PLAN NOTE
BG of 600 initially in ED  Hold home glymepiride and metformin  A1c of 8.5 2 moths ago  Continue current insulin regimen.  Hypoglycemic protocol  POCT glucose checks  Diabetic diet

## 2019-01-25 NOTE — PT/OT/SLP EVAL
Physical Therapy Evaluation    Patient Name:  Deb Webb   MRN:  [id number]    Recommendations:     Discharge Recommendations:  (home health PT services with family assistance)   Discharge Equipment Recommendations: shower chair   Barriers to discharge: None    Assessment:     Deb Webb is a 68 y.o. female admitted with a medical diagnosis of CVA (cerebral vascular accident).  She presents with the following impairments/functional limitations:  weakness, impaired endurance, impaired sensation, impaired functional mobilty, gait instability, impaired balance, decreased upper extremity function, decreased lower extremity function, decreased safety awareness, pain, impaired cardiopulmonary response to activity, orthopedic precautions.    Rehab Prognosis: Good; patient would benefit from acute skilled PT services to address these deficits and reach maximum level of function.    Recent Surgery: * No surgery found *      Plan:     During this hospitalization, patient to be seen 3 x/week(S or S) to address the identified rehab impairments via gait training, therapeutic activities, therapeutic exercises and progress toward the following goals:    · Plan of Care Expires:  02/08/19    Subjective     Chief Complaint: dizziness  Patient/Family Comments/goals: to get well   Pain/Comfort:  · Pain Rating 1: (Pt c/o generalized pain everywhere.)      Living Environment:  Pt lives with spouse in a mobile home with ~4 steps and B HR at entry.  Prior to admission, patients level of function was independent and driving.  Equipment used at home: cane, straight.  Upon discharge, patient will have assistance from spouse and daughter.    Objective:     Communicated with nurse Trejo prior to session.  Patient found in bed with all lines intact, call button in reach and spouse present peripheral IV, telemetry  upon PT entry to room.    General Precautions: Standard, fall, diabetic   Orthopedic Precautions:LUE non weight bearing  2* L clavicle fx  Braces: UE Sling     Exams:  · Cognitive Exam:  Patient was able to follow multiple commands.   · Gross Motor Coordination:  WFL  · Postural Exam:  Patient presented with the following abnormalities:    · -       Rounded shoulders  · Sensation:    · -       Intact  light/touch BLE, reported h/o numbness to B feet  · Skin Integrity/Edema:      · -       Skin integrity: Visible skin intact  · -       Edema: None noted BLE  · RLE ROM: WNL  · RLE Strength: 4/5  · LLE ROM: WNL  · LLE Strength: 4/5    Functional Mobility:  · Bed Mobility:     · Scooting: minimum assistance to scoot EOB  · Supine to Sit: contact guard assistance with HOB elevated   · Transfers:     · Sit to Stand:  contact guard assistance with hand-held assist  · Bed to Chair: contact guard assistance with  hand-held assist  using  Step Transfer  · Gait: Pt ambulated ~150 ft with R HHA.  Pt with minimal unsteadiness, reaching for wall and furniture.  Pt c/o some dizziness and SOB.  Pt with decreased step length and rachel.   · Balance: Pt with fair dynamic standing balance.       AM-PAC 6 CLICK MOBILITY  Total Score:18     Patient left up in chair with all lines intact, call button in reach and OT present.    GOALS:   Multidisciplinary Problems     Physical Therapy Goals        Problem: Physical Therapy Goal    Goal Priority Disciplines Outcome Goal Variances Interventions   Physical Therapy Goal     PT, PT/OT      Description:  Goals to be met by: 19     Patient will increase functional independence with mobility by performin. Supine to sit with Modified [place]  2. Rolling to Left and Right with Modified [place]  3. Sit to stand transfer with Modified [place]  4. Bed to chair transfer with Modified [place]   5. Gait >250 feet with Modified [place] using Single-point Cane   6. Lower extremity exercise program 3 sets x10 reps per handout, with independence                      History:     Past  Medical History:   Diagnosis Date    Allergy     Arthritis     Cataract     Colon polyps     COPD (chronic obstructive pulmonary disease)     Diabetes mellitus type II     Diabetic neuropathy     Fever blister     Glaucoma (increased eye pressure)     Hyperlipidemia     Hypertension     Irritable bowel syndrome     Keloid cicatrix     Osteoporosis     Retained cholelithiasis following cholecystectomy(997.41)     Right patella fracture        Past Surgical History:   Procedure Laterality Date    BACK SURGERY  2006    CATARACT EXTRACTION W/  INTRAOCULAR LENS IMPLANT Bilateral     CHOLECYSTECTOMY      Laparoscopic    COLONOSCOPY      COLONOSCOPY N/A 5/2/2013    [facility] ([facility])    HERNIA REPAIR      HYSTERECTOMY      KNEE SURGERY Right 3/4/2015    Dr Weller     OOPHORECTOMY      ORIF-PATELLA / C-ARM Right 3/4/2015    [facility] OR    ovarian tumor      Benign    TONSILLECTOMY, ADENOIDECTOMY         Clinical Decision Making:     History  Co-morbidities and personal factors that may impact the plan of care Examination  Body Structures and Functions, activity limitations and participation restrictions that may impact the plan of care Clinical Presentation   Decision Making/ Complexity Score   Co-morbidities:   [] Time since onset of injury / illness / exacerbation  [x] Status of current condition  [x]Patient's cognitive status and safety concerns    [x] Multiple Medical Problems (see med hx)  Personal Factors:   [] Patient's age  [] Prior Level of function   [] Patient's home situation (environment and family support)  [] Patient's level of motivation  [] Expected progression of patient      HISTORY:(criteria)    [] 73516 - no personal factors/history    [] 34966 - has 1-2 personal factor/comorbidity     [x] 13506 - has >3 personal factor/comorbidity     Body Regions:  [x] Objective examination findings  [] Head      []  Neck  [] Trunk   [] Upper Extremity  [x] Lower Extremity    Body Systems:  [x] For communication ability, affect, cognition, language, and learning style: the assessment of the ability to make needs known, consciousness, orientation (person, place, and time), expected emotional /behavioral responses, and learning preferences (eg, learning barriers, education  needs)  [x] For the neuromuscular system: a general assessment of gross coordinated movement (eg, balance, gait, locomotion, transfers, and transitions) and motor function  (motor control and motor learning)  [x] For the musculoskeletal system: the assessment of gross symmetry, gross range of motion, gross strength, height, and weight  [] For the integumentary system: the assessment of pliability(texture), presence of scar formation, skin color, and skin integrity  [x] For cardiovascular/pulmonary system: the assessment of heart rate, respiratory rate, blood pressure, and edema     Activity limitations:    [x] Patient's cognitive status and saf ety concerns          [x] Status of current condition      [] Weight bearing restriction  [] Cardiopulmunary Restriction    Participation Restrictions:   [] Goals and goal agreement with the patient     [] Rehab potential (prognosis) and probable outcome      Examination of Body System: (criteria)    [] 19858 - addressing 1-2 elements    [] 74964 - addressing a total of 3 or more elements     [x] 32566 -  Addressing a total of 4 or more elements         Clinical Presentation: (criteria)  Evolving - 94821     On examination of body system using standardized tests and measures patient presents with 4 or more elements from any of the following: body structures and functions, activity limitations, and/or participation restrictions.  Leading to a clinical presentation that is considered evolving with changing characteristics                              Clinical Decision Making  (Eval Complexity):  Moderate - 66927      Time Tracking:     PT Received On: 01/25/19  PT Start Time: 1043     PT Stop Time: 1058  PT Total Time (min): 15 min     Billable Minutes: Evaluation 15 min      Patricia Pickens, PT  01/25/2019

## 2019-01-25 NOTE — HPI
Deb Webb 68 y.o. female with HTN, HLD, COPD, and DM2 presents to the hospital with a chief complaint of syncope. She reports beginning in November of last year she found she would have syncopal episodes when she changed position. These episodes have increased in number and frequency. She now reports when she moves from laying to sitting and especially to standing she passes out, but immediately regains consciousness when on ground. She reports she changes positions quickly when she moves and finds this exacerbates the dizziness. She also had a syncopal episode Sunday in which she injured her chest and reports since then she has had chest pain located at the top left of her sternum and worsened with arm movement. She hand a syncopal episode today when she was getting out of her car at the grocery store prompting her to come to the hospital. Her syncopal episodes are associated with dizziness which was relieved with meclizine provided by PCP. She is not dizzy currently when laying in the bed. She has also had increased urination that started in November, and she reports she began to drink less water as home as she was urinating too frequently. She also stopped her lasix as she felt that was increasing her symptoms. She has not been compliant with her diabetic diet as she reports she has been eating food with her family over the holidays. She endorses dizzines, emesis (2 episodes both associated with eating then laying flat patient believes), chest pain, and syncope. She denies fever, diarrhea, abdominal pain, dysuria.     In the ED, CT head without acute abnormality, CT chest with proximal clavicle fracture, remote rib injuries, emphysematous changes, troponin negative, EKG of sinus tach.

## 2019-01-25 NOTE — ASSESSMENT & PLAN NOTE
BG of 600 initially in ED  Hold home glymepiride and metformin  A1c of 8.5 2 moths ago  Low dose sliding scale insulin in hospital. 6 units of Basal and 2 units of prandial  Hypoglycemic protocol  POCT glucose checks  Diabetic diet

## 2019-01-26 LAB
ALBUMIN SERPL BCP-MCNC: 2.8 G/DL
ALP SERPL-CCNC: 107 U/L
ALT SERPL W/O P-5'-P-CCNC: 12 U/L
ANION GAP SERPL CALC-SCNC: 10 MMOL/L
AST SERPL-CCNC: 16 U/L
BILIRUB SERPL-MCNC: 0.5 MG/DL
BUN SERPL-MCNC: 8 MG/DL
CALCIUM SERPL-MCNC: 9.1 MG/DL
CHLORIDE SERPL-SCNC: 103 MMOL/L
CO2 SERPL-SCNC: 24 MMOL/L
CREAT SERPL-MCNC: 0.7 MG/DL
EST. GFR  (AFRICAN AMERICAN): >60 ML/MIN/1.73 M^2
EST. GFR  (NON AFRICAN AMERICAN): >60 ML/MIN/1.73 M^2
GLUCOSE SERPL-MCNC: 157 MG/DL
POCT GLUCOSE: 190 MG/DL (ref 70–110)
POCT GLUCOSE: 229 MG/DL (ref 70–110)
POCT GLUCOSE: 289 MG/DL (ref 70–110)
POCT GLUCOSE: 319 MG/DL (ref 70–110)
POTASSIUM SERPL-SCNC: 3.1 MMOL/L
PROT SERPL-MCNC: 5.9 G/DL
SODIUM SERPL-SCNC: 137 MMOL/L

## 2019-01-26 PROCEDURE — 27000221 HC OXYGEN, UP TO 24 HOURS

## 2019-01-26 PROCEDURE — 94761 N-INVAS EAR/PLS OXIMETRY MLT: CPT

## 2019-01-26 PROCEDURE — 21400001 HC TELEMETRY ROOM

## 2019-01-26 PROCEDURE — 25000003 PHARM REV CODE 250: Performed by: HOSPITALIST

## 2019-01-26 PROCEDURE — 36415 COLL VENOUS BLD VENIPUNCTURE: CPT

## 2019-01-26 PROCEDURE — 25000003 PHARM REV CODE 250: Performed by: EMERGENCY MEDICINE

## 2019-01-26 PROCEDURE — 25000003 PHARM REV CODE 250: Performed by: PHYSICIAN ASSISTANT

## 2019-01-26 PROCEDURE — 94640 AIRWAY INHALATION TREATMENT: CPT

## 2019-01-26 PROCEDURE — 80053 COMPREHEN METABOLIC PANEL: CPT

## 2019-01-26 RX ORDER — INSULIN ASPART 100 [IU]/ML
4 INJECTION, SOLUTION INTRAVENOUS; SUBCUTANEOUS
Status: DISCONTINUED | OUTPATIENT
Start: 2019-01-26 | End: 2019-01-27

## 2019-01-26 RX ORDER — CIPROFLOXACIN 500 MG/1
500 TABLET ORAL EVERY 12 HOURS
Status: DISCONTINUED | OUTPATIENT
Start: 2019-01-26 | End: 2019-01-27 | Stop reason: HOSPADM

## 2019-01-26 RX ORDER — CLONIDINE HYDROCHLORIDE 0.1 MG/1
0.2 TABLET ORAL EVERY 8 HOURS PRN
Status: DISCONTINUED | OUTPATIENT
Start: 2019-01-26 | End: 2019-01-27 | Stop reason: HOSPADM

## 2019-01-26 RX ORDER — LOPERAMIDE HYDROCHLORIDE 2 MG/1
2 CAPSULE ORAL 4 TIMES DAILY PRN
Status: DISCONTINUED | OUTPATIENT
Start: 2019-01-26 | End: 2019-01-27 | Stop reason: HOSPADM

## 2019-01-26 RX ADMIN — GABAPENTIN 300 MG: 300 CAPSULE ORAL at 08:01

## 2019-01-26 RX ADMIN — DORZOLAMIDE HYDROCHLORIDE AND TIMOLOL MALEATE 1 DROP: 20; 5 SOLUTION/ DROPS OPHTHALMIC at 08:01

## 2019-01-26 RX ADMIN — GABAPENTIN 300 MG: 300 CAPSULE ORAL at 04:01

## 2019-01-26 RX ADMIN — FUROSEMIDE 20 MG: 20 TABLET ORAL at 08:01

## 2019-01-26 RX ADMIN — PRIMIDONE 50 MG: 50 TABLET ORAL at 08:01

## 2019-01-26 RX ADMIN — CIPROFLOXACIN HYDROCHLORIDE 500 MG: 500 TABLET, FILM COATED ORAL at 10:01

## 2019-01-26 RX ADMIN — LISINOPRIL 40 MG: 20 TABLET ORAL at 08:01

## 2019-01-26 RX ADMIN — LOPERAMIDE HYDROCHLORIDE 2 MG: 2 CAPSULE ORAL at 01:01

## 2019-01-26 RX ADMIN — LATANOPROST 1 DROP: 50 SOLUTION/ DROPS OPHTHALMIC at 10:01

## 2019-01-26 RX ADMIN — TIOTROPIUM BROMIDE 18 MCG: 18 CAPSULE ORAL; RESPIRATORY (INHALATION) at 09:01

## 2019-01-26 RX ADMIN — PRIMIDONE 50 MG: 50 TABLET ORAL at 10:01

## 2019-01-26 RX ADMIN — PANTOPRAZOLE SODIUM 40 MG: 40 TABLET, DELAYED RELEASE ORAL at 08:01

## 2019-01-26 RX ADMIN — INSULIN ASPART 2 UNITS: 100 INJECTION, SOLUTION INTRAVENOUS; SUBCUTANEOUS at 08:01

## 2019-01-26 RX ADMIN — INSULIN ASPART 2 UNITS: 100 INJECTION, SOLUTION INTRAVENOUS; SUBCUTANEOUS at 12:01

## 2019-01-26 RX ADMIN — ATORVASTATIN CALCIUM 40 MG: 40 TABLET, FILM COATED ORAL at 08:01

## 2019-01-26 RX ADMIN — PRIMIDONE 50 MG: 50 TABLET ORAL at 04:01

## 2019-01-26 RX ADMIN — INSULIN ASPART 4 UNITS: 100 INJECTION, SOLUTION INTRAVENOUS; SUBCUTANEOUS at 05:01

## 2019-01-26 RX ADMIN — INSULIN ASPART 3 UNITS: 100 INJECTION, SOLUTION INTRAVENOUS; SUBCUTANEOUS at 12:01

## 2019-01-26 RX ADMIN — INSULIN ASPART 1 UNITS: 100 INJECTION, SOLUTION INTRAVENOUS; SUBCUTANEOUS at 10:01

## 2019-01-26 RX ADMIN — GABAPENTIN 300 MG: 300 CAPSULE ORAL at 10:01

## 2019-01-26 RX ADMIN — DORZOLAMIDE HYDROCHLORIDE AND TIMOLOL MALEATE 1 DROP: 20; 5 SOLUTION/ DROPS OPHTHALMIC at 10:01

## 2019-01-26 RX ADMIN — ASPIRIN 81 MG: 81 TABLET, COATED ORAL at 08:01

## 2019-01-26 NOTE — NURSING
Report received from CASI Trejo. Patient awake and alert. Assisted up to BSC. NAD noted. Safety maintained. Will continue to monitor.

## 2019-01-26 NOTE — PROGRESS NOTES
WRITTEN HEALTHCARE DISCHARGE INFORMATION     Things that YOU are RESPONSIBLE for to Manage Your Care At Home:    1. Getting your prescriptions filled.  2. Taking you medications as directed. DO NOT MISS ANY DOSES!  3. Going to your follow-up doctor appointments. This is important because it allows the doctor to monitor your progress and to determine if any changes need to be made to your treatment plan.    If you are unable to make your follow up appointments, please call the number listed and reschedule this appointment.     ____________HELP AT HOME____________________    Experiencing any SIGNS or SYMPTOMS: YOU CAN    Schedule a same day appopintment with your Primary Care Doctor or  you can call [facility] On Call Nurse Care Line for 24/7 assistance at [phone]    If you are experience any signs or symptoms that have become severe, Call 911 and come to your nearest Emergency Room.    Thank you for choosing [facility] and allowing us to care for you.   From your care management team    You should receive a call from [facility] Discharge Department within 48-72 hours to help manage your care after discharge. Please try to make sure that you answer your phone for this important phone call.     Follow-up Information     Moiz Goldberg MD On 2/5/2019.    Specialty:  Family Medicine  Why:  Hospital Follow Up on Tuesday at 9:20am.   Contact information:  [address] [facility] [address]  [phone]             Ibrahima Ramirez MD In 4 weeks.    Specialty:  Orthopedic Surgery  Why:  Please call to schedule Hospital Follow Up for Fall.   Contact information:  [address] [facility]  [address]  [facility] [address]  [phone]             Edward Stratton MD On 3/27/2019.    Specialty:  Neurology  Why:  Hospital Follow Up on Wednesday at 9:20am.   Contact information:  [address] [facility] [address]  [address]  [facility] [address]  [phone]

## 2019-01-26 NOTE — SUBJECTIVE & OBJECTIVE
Interval History: Feeling better with less dizziness.    Review of Systems   HENT: Negative for drooling and ear discharge.    Eyes: Negative for pain and itching.   Endocrine: Negative for polyphagia and polyuria.   Neurological: Negative for seizures and speech difficulty.     Objective:     Vital Signs (Most Recent):  Temp: 98 °F (36.7 °C) (01/26/19 1149)  Pulse: 97 (01/26/19 1149)  Resp: 18 (01/26/19 1149)  BP: (!) 148/85 (01/26/19 1149)  SpO2: (!) 94 % (01/26/19 1149) Vital Signs (24h Range):  Temp:  [98 °F (36.7 °C)-98.8 °F (37.1 °C)] 98 °F (36.7 °C)  Pulse:  [] 97  Resp:  [16-19] 18  SpO2:  [85 %-94 %] 94 %  BP: (148-189)/() 148/85     Weight: 60.5 kg (133 lb 6.1 oz)  Body mass index is 25.2 kg/m².    Intake/Output Summary (Last 24 hours) at 1/26/2019 1449  Last data filed at 1/26/2019 0448  Gross per 24 hour   Intake 0 ml   Output --   Net 0 ml      Physical Exam   Constitutional: She is oriented to person, place, and time. She appears well-developed and well-nourished. No distress.   HENT:   Head: Normocephalic and atraumatic.   Right Ear: External ear normal.   Left Ear: External ear normal.   No bruising or lacerations to scalp. Area of mild tenderness at top of scalp.    Eyes: Conjunctivae and EOM are normal. Right eye exhibits no discharge. Left eye exhibits no discharge.   Neck: Normal range of motion. No thyromegaly present.   Cardiovascular: Normal rate, regular rhythm and intact distal pulses.   Murmur heard.  Pulmonary/Chest: Effort normal and breath sounds normal. No respiratory distress.   Decreased breath sounds. Few intermittent diffuse wheezes   Abdominal: Soft. Bowel sounds are normal. She exhibits no distension and no mass. There is no tenderness.   Musculoskeletal: She exhibits no edema.   Bruising to anterior chest wall. Swelling at left clavicle sternal end. Pain to palpation.   Neurological: She is alert and oriented to person, place, and time.   Finger to nose intact. Able  to feed self in bed. Slightly tremulous. Able to stand   Skin: Skin is warm and dry. Capillary refill takes 2 to 3 seconds.   Psychiatric: She has a normal mood and affect. Her behavior is normal.   Nursing note and vitals reviewed.      Significant Labs:   BMP:   Recent Labs   Lab 01/25/19  0554 01/26/19  0438   * 157*    137   K 3.6 3.1*    103   CO2 25 24   BUN 14 8   CREATININE 0.8 0.7   CALCIUM 9.2 9.1   MG 1.1*  --      CBC:   Recent Labs   Lab 01/24/19  1713 01/25/19  0554   WBC 16.94* 15.38*   HGB 13.9 13.2   HCT 41.9 40.0    174

## 2019-01-26 NOTE — NURSING
"Pt c/o of diarrhea x3 days. States " I was constipated at home and took a whole bunch of laxatives and can't stop going." reported to BAR Hinds MD. Imodium administered per prn order.  "

## 2019-01-26 NOTE — ASSESSMENT & PLAN NOTE
BG of 600 initially in ED  Hold home glymepiride and metformin  A1c of 8.5 2 moths ago  Increase current insulin regimen.  Hypoglycemic protocol  POCT glucose checks  Diabetic diet

## 2019-01-26 NOTE — ASSESSMENT & PLAN NOTE
"Multiple episodes of dizziness and syncope. Reported by patient. CT in the ED without acute abnormality.   MRI showing "Two punctate foci of diffusion restriction in the bilateral frontal lobes, most consistent with acute small embolic infarctions."  PT/OT  Telemetry  Appreciate Neuro input.  Symptoms not consistent with abnormalities found in MRI.  Symptoms probably related to diabetic neuropathy with autonomic manifestations.  BP elevated.  Resume home Lisinopril.  Hyperglycemic and will increase insulin regimen.  Ambulate.  Wean off oxygen.  UTI POA--afebrile, but with leukocytosis.  Unsure if contributing to symptoms.  Start Cipro.  Home tomorrow.  "

## 2019-01-26 NOTE — PROGRESS NOTES
[facility] Hospital Medicine  Progress Note    Patient Name: Deb Webb  MRN: [id number]  Patient Class: IP- Inpatient   Admission Date: 1/24/2019  Length of Stay: 2 days  Attending Physician: Bo Hinds MD  Primary Care Provider: Moiz Goldberg MD        Subjective:     Principal Problem:CVA (cerebral vascular accident)    HPI:  Deb Webb 68 y.o. female with HTN, HLD, COPD, and DM2 presents to the hospital with a chief complaint of syncope. She reports beginning in November of last year she found she would have syncopal episodes when she changed position. These episodes have increased in number and frequency. She now reports when she moves from laying to sitting and especially to standing she passes out, but immediately regains consciousness when on ground. She reports she changes positions quickly when she moves and finds this exacerbates the dizziness. She also had a syncopal episode Sunday in which she injured her chest and reports since then she has had chest pain located at the top left of her sternum and worsened with arm movement. She hand a syncopal episode today when she was getting out of her car at the grocery store prompting her to come to the hospital. Her syncopal episodes are associated with dizziness which was relieved with meclizine provided by PCP. She is not dizzy currently when laying in the bed. She has also had increased urination that started in November, and she reports she began to drink less water as home as she was urinating too frequently. She also stopped her lasix as she felt that was increasing her symptoms. She has not been compliant with her diabetic diet as she reports she has been eating food with her family over the holidays. She endorses dizzines, emesis (2 episodes both associated with eating then laying flat patient believes), chest pain, and syncope. She denies fever, diarrhea, abdominal pain, dysuria.     In the ED, CT head without  "acute abnormality, CT chest with proximal clavicle fracture, remote rib injuries, emphysematous changes, troponin negative, EKG of sinus tach.    Hospital Course:  Deb Webb 68 y.o. female placed in observation for management of syncope. In the ED, CT head without acute abnormality, CT chest with proximal clavicle fracture, remote rib injuries, emphysematous changes, troponin negative, EKG of sinus tach.  Ortho consulted for clavicle fracture.  MRI showed "Two punctate foci of diffusion restriction in the bilateral frontal lobes, most consistent with acute small embolic infarctions.".  Neurology consulted.  Symptoms not consistent with changes seen on MRI.  Symptoms possibly secondary to diabetic neuropathy with autonomic manifestations.  Restarted home Lisinopril.  Increased insulin regimen for better glycemic control.      Interval History: Feeling better with less dizziness.    Review of Systems   HENT: Negative for drooling and ear discharge.    Eyes: Negative for pain and itching.   Endocrine: Negative for polyphagia and polyuria.   Neurological: Negative for seizures and speech difficulty.     Objective:     Vital Signs (Most Recent):  Temp: 98 °F (36.7 °C) (01/26/19 1149)  Pulse: 97 (01/26/19 1149)  Resp: 18 (01/26/19 1149)  BP: (!) 148/85 (01/26/19 1149)  SpO2: (!) 94 % (01/26/19 1149) Vital Signs (24h Range):  Temp:  [98 °F (36.7 °C)-98.8 °F (37.1 °C)] 98 °F (36.7 °C)  Pulse:  [] 97  Resp:  [16-19] 18  SpO2:  [85 %-94 %] 94 %  BP: (148-189)/() 148/85     Weight: 60.5 kg (133 lb 6.1 oz)  Body mass index is 25.2 kg/m².    Intake/Output Summary (Last 24 hours) at 1/26/2019 1449  Last data filed at 1/26/2019 0448  Gross per 24 hour   Intake 0 ml   Output --   Net 0 ml      Physical Exam   Constitutional: She is oriented to person, place, and time. She appears well-developed and well-nourished. No distress.   HENT:   Head: Normocephalic and atraumatic.   Right Ear: External ear normal.   Left " "Ear: External ear normal.   No bruising or lacerations to scalp. Area of mild tenderness at top of scalp.    Eyes: Conjunctivae and EOM are normal. Right eye exhibits no discharge. Left eye exhibits no discharge.   Neck: Normal range of motion. No thyromegaly present.   Cardiovascular: Normal rate, regular rhythm and intact distal pulses.   Murmur heard.  Pulmonary/Chest: Effort normal and breath sounds normal. No respiratory distress.   Decreased breath sounds. Few intermittent diffuse wheezes   Abdominal: Soft. Bowel sounds are normal. She exhibits no distension and no mass. There is no tenderness.   Musculoskeletal: She exhibits no edema.   Bruising to anterior chest wall. Swelling at left clavicle sternal end. Pain to palpation.   Neurological: She is alert and oriented to person, place, and time.   Finger to nose intact. Able to feed self in bed. Slightly tremulous. Able to stand   Skin: Skin is warm and dry. Capillary refill takes 2 to 3 seconds.   Psychiatric: She has a normal mood and affect. Her behavior is normal.   Nursing note and vitals reviewed.      Significant Labs:   BMP:   Recent Labs   Lab 01/25/19  0554 01/26/19  0438   * 157*    137   K 3.6 3.1*    103   CO2 25 24   BUN 14 8   CREATININE 0.8 0.7   CALCIUM 9.2 9.1   MG 1.1*  --      CBC:   Recent Labs   Lab 01/24/19  1713 01/25/19  0554   WBC 16.94* 15.38*   HGB 13.9 13.2   HCT 41.9 40.0    174         Assessment/Plan:      * CVA (cerebral vascular accident)    Multiple episodes of dizziness and syncope. Reported by patient. CT in the ED without acute abnormality.   MRI showing "Two punctate foci of diffusion restriction in the bilateral frontal lobes, most consistent with acute small embolic infarctions."  PT/OT  Telemetry  Appreciate Neuro input.  Symptoms not consistent with abnormalities found in MRI.  Symptoms probably related to diabetic neuropathy with autonomic manifestations.  BP elevated.  Resume home " Lisinopril.  Hyperglycemic and will increase insulin regimen.  Ambulate.  Wean off oxygen.  UTI POA--afebrile, but with leukocytosis.  Unsure if contributing to symptoms.  Start Cipro.  Home tomorrow.     Fracture of sternal end of left clavicle    Sling applied to arm.   Appreciate Ortho input.     Syncope    Unsure etiology.  Polypharmacy?  Monitor on Tele.     Essential hypertension, benign    Initially holding medications for permissive HTN.  No need for permissive HTN per Neuro.  Restart home meds.     Essential tremor    Continue home primidone.     Tobacco use disorder    Greater than 3 minutes spent counseling patient on dangers of continued tobacco abuse. Will provide tobacco cessation.       COPD (chronic obstructive pulmonary disease)    Lungs free of wheezes. CT with signs of emphysema. Saturating appropriately on RA without complaints of SOB.   Will continue home albuterol and spiriva in place of incruse ellipta     Combined hyperlipidemia associated with type 2 diabetes mellitus    Continue home atovastatin     Uncontrolled type 2 diabetes mellitus with diabetic neuropathy, without long-term current use of insulin    BG of 600 initially in ED  Hold home glymepiride and metformin  A1c of 8.5 2 moths ago  Increase current insulin regimen.  Hypoglycemic protocol  POCT glucose checks  Diabetic diet       VTE Risk Mitigation (From admission, onward)        Ordered     Place sequential compression device  Until discontinued      01/24/19 1713     IP VTE LOW RISK PATIENT  Once      01/24/19 1642     Place KANDI hose  Until discontinued      01/24/19 1642              Bo Hinds MD  Department of Hospital Medicine   [facility]

## 2019-01-27 VITALS
HEIGHT: 61 IN | DIASTOLIC BLOOD PRESSURE: 79 MMHG | BODY MASS INDEX: 26.35 KG/M2 | OXYGEN SATURATION: 92 % | SYSTOLIC BLOOD PRESSURE: 124 MMHG | TEMPERATURE: 98 F | RESPIRATION RATE: 18 BRPM | WEIGHT: 139.56 LBS | HEART RATE: 99 BPM

## 2019-01-27 PROBLEM — R55 SYNCOPE: Status: RESOLVED | Noted: 2019-01-24 | Resolved: 2019-01-27

## 2019-01-27 LAB
ALBUMIN SERPL BCP-MCNC: 2.9 G/DL
ALP SERPL-CCNC: 93 U/L
ALT SERPL W/O P-5'-P-CCNC: 10 U/L
ANION GAP SERPL CALC-SCNC: 8 MMOL/L
AST SERPL-CCNC: 14 U/L
BACTERIA UR CULT: NORMAL
BILIRUB SERPL-MCNC: 0.5 MG/DL
BUN SERPL-MCNC: 10 MG/DL
CALCIUM SERPL-MCNC: 9.2 MG/DL
CHLORIDE SERPL-SCNC: 103 MMOL/L
CO2 SERPL-SCNC: 26 MMOL/L
CREAT SERPL-MCNC: 0.8 MG/DL
EST. GFR  (AFRICAN AMERICAN): >60 ML/MIN/1.73 M^2
EST. GFR  (NON AFRICAN AMERICAN): >60 ML/MIN/1.73 M^2
GLUCOSE SERPL-MCNC: 181 MG/DL
POCT GLUCOSE: 239 MG/DL (ref 70–110)
POCT GLUCOSE: 265 MG/DL (ref 70–110)
POCT GLUCOSE: 287 MG/DL (ref 70–110)
POTASSIUM SERPL-SCNC: 3.3 MMOL/L
PROT SERPL-MCNC: 6.1 G/DL
SODIUM SERPL-SCNC: 137 MMOL/L

## 2019-01-27 PROCEDURE — 80053 COMPREHEN METABOLIC PANEL: CPT

## 2019-01-27 PROCEDURE — 99232 PR SUBSEQUENT HOSPITAL CARE,LEVL II: ICD-10-PCS | Mod: ,,, | Performed by: PSYCHIATRY & NEUROLOGY

## 2019-01-27 PROCEDURE — 97116 GAIT TRAINING THERAPY: CPT

## 2019-01-27 PROCEDURE — 63600175 PHARM REV CODE 636 W HCPCS: Performed by: HOSPITALIST

## 2019-01-27 PROCEDURE — 25000003 PHARM REV CODE 250: Performed by: EMERGENCY MEDICINE

## 2019-01-27 PROCEDURE — 25000003 PHARM REV CODE 250: Performed by: HOSPITALIST

## 2019-01-27 PROCEDURE — 94640 AIRWAY INHALATION TREATMENT: CPT

## 2019-01-27 PROCEDURE — 36415 COLL VENOUS BLD VENIPUNCTURE: CPT

## 2019-01-27 PROCEDURE — 99232 SBSQ HOSP IP/OBS MODERATE 35: CPT | Mod: ,,, | Performed by: PSYCHIATRY & NEUROLOGY

## 2019-01-27 PROCEDURE — 25000003 PHARM REV CODE 250: Performed by: PHYSICIAN ASSISTANT

## 2019-01-27 PROCEDURE — 94761 N-INVAS EAR/PLS OXIMETRY MLT: CPT

## 2019-01-27 RX ORDER — INSULIN GLARGINE 100 [IU]/ML
15 INJECTION, SOLUTION SUBCUTANEOUS DAILY
Refills: 0
Start: 2019-01-27 | End: 2019-08-20 | Stop reason: ALTCHOICE

## 2019-01-27 RX ORDER — GABAPENTIN 300 MG/1
300 CAPSULE ORAL 3 TIMES DAILY
Qty: 90 CAPSULE | Refills: 11 | Status: SHIPPED | OUTPATIENT
Start: 2019-01-27 | End: 2020-02-24 | Stop reason: SDUPTHER

## 2019-01-27 RX ORDER — CIPROFLOXACIN 500 MG/1
500 TABLET ORAL EVERY 12 HOURS
Qty: 10 TABLET | Refills: 0 | Status: SHIPPED | OUTPATIENT
Start: 2019-01-27 | End: 2019-02-01

## 2019-01-27 RX ORDER — TRAMADOL HYDROCHLORIDE 50 MG/1
50 TABLET ORAL EVERY 6 HOURS PRN
Qty: 12 TABLET | Refills: 0 | Status: SHIPPED | OUTPATIENT
Start: 2019-01-27 | End: 2019-02-05 | Stop reason: SDUPTHER

## 2019-01-27 RX ORDER — INSULIN ASPART 100 [IU]/ML
5 INJECTION, SOLUTION INTRAVENOUS; SUBCUTANEOUS
Status: DISCONTINUED | OUTPATIENT
Start: 2019-01-27 | End: 2019-01-27 | Stop reason: HOSPADM

## 2019-01-27 RX ORDER — METOPROLOL TARTRATE 1 MG/ML
2.5 INJECTION, SOLUTION INTRAVENOUS ONCE
Status: COMPLETED | OUTPATIENT
Start: 2019-01-27 | End: 2019-01-27

## 2019-01-27 RX ADMIN — LOPERAMIDE HYDROCHLORIDE 2 MG: 2 CAPSULE ORAL at 05:01

## 2019-01-27 RX ADMIN — INSULIN ASPART 3 UNITS: 100 INJECTION, SOLUTION INTRAVENOUS; SUBCUTANEOUS at 08:01

## 2019-01-27 RX ADMIN — CIPROFLOXACIN HYDROCHLORIDE 500 MG: 500 TABLET, FILM COATED ORAL at 08:01

## 2019-01-27 RX ADMIN — INSULIN ASPART 4 UNITS: 100 INJECTION, SOLUTION INTRAVENOUS; SUBCUTANEOUS at 08:01

## 2019-01-27 RX ADMIN — TIOTROPIUM BROMIDE 18 MCG: 18 CAPSULE ORAL; RESPIRATORY (INHALATION) at 07:01

## 2019-01-27 RX ADMIN — INSULIN ASPART 2 UNITS: 100 INJECTION, SOLUTION INTRAVENOUS; SUBCUTANEOUS at 11:01

## 2019-01-27 RX ADMIN — INSULIN ASPART 5 UNITS: 100 INJECTION, SOLUTION INTRAVENOUS; SUBCUTANEOUS at 04:01

## 2019-01-27 RX ADMIN — INSULIN ASPART 5 UNITS: 100 INJECTION, SOLUTION INTRAVENOUS; SUBCUTANEOUS at 11:01

## 2019-01-27 RX ADMIN — FUROSEMIDE 20 MG: 20 TABLET ORAL at 06:01

## 2019-01-27 RX ADMIN — GABAPENTIN 300 MG: 300 CAPSULE ORAL at 08:01

## 2019-01-27 RX ADMIN — PRIMIDONE 50 MG: 50 TABLET ORAL at 03:01

## 2019-01-27 RX ADMIN — LISINOPRIL 40 MG: 20 TABLET ORAL at 08:01

## 2019-01-27 RX ADMIN — PRIMIDONE 50 MG: 50 TABLET ORAL at 08:01

## 2019-01-27 RX ADMIN — ASPIRIN 81 MG: 81 TABLET, COATED ORAL at 08:01

## 2019-01-27 RX ADMIN — DORZOLAMIDE HYDROCHLORIDE AND TIMOLOL MALEATE 1 DROP: 20; 5 SOLUTION/ DROPS OPHTHALMIC at 08:01

## 2019-01-27 RX ADMIN — INSULIN ASPART 3 UNITS: 100 INJECTION, SOLUTION INTRAVENOUS; SUBCUTANEOUS at 04:01

## 2019-01-27 RX ADMIN — GABAPENTIN 300 MG: 300 CAPSULE ORAL at 03:01

## 2019-01-27 RX ADMIN — ATORVASTATIN CALCIUM 40 MG: 40 TABLET, FILM COATED ORAL at 08:01

## 2019-01-27 RX ADMIN — METOPROLOL TARTRATE 2.5 MG: 5 INJECTION, SOLUTION INTRAVENOUS at 02:01

## 2019-01-27 RX ADMIN — PANTOPRAZOLE SODIUM 40 MG: 40 TABLET, DELAYED RELEASE ORAL at 08:01

## 2019-01-27 NOTE — PT/OT/SLP PROGRESS
Physical Therapy Treatment    Patient Name:  Deb Webb   MRN:  [id number]    Recommendations:     Discharge Recommendations:  (home health PT services with family assistance)   Discharge Equipment Recommendations: shower chair   Barriers to discharge: None    Assessment:     Deb Webb is a 68 y.o. female admitted with a medical diagnosis of CVA (cerebral vascular accident).  She presents with the following impairments/functional limitations:  weakness, impaired endurance, impaired sensation, impaired functional mobilty, gait instability, impaired balance, decreased upper extremity function, decreased lower extremity function, decreased safety awareness, pain, impaired cardiopulmonary response to activity, orthopedic precautions.    Rehab Prognosis: Fair+; patient would benefit from acute skilled PT services to address these deficits and reach maximum level of function.    Recent Surgery: * No surgery found *      Plan:     During this hospitalization, patient to be seen 3 x/week(S or S) to address the identified rehab impairments via gait training, therapeutic activities, therapeutic exercises and progress toward the following goals:    · Plan of Care Expires:  02/08/19    Subjective     Chief Complaint: balance issues  Patient/Family Comments/goals: Pt will be staying with her daughter.    Pain/Comfort:  · Pain Rating 1: (Pt c/o minimal L shoulder pain.)      Objective:     Patient found in bed with all lines intact, call button in reach and [employer] monitor present peripheral IV, telemetry  upon PT entry to room.     General Precautions: Standard, fall, diabetic   Orthopedic Precautions:LUE non weight bearing   Braces: Pt declined LUE sling.      Functional Mobility:  · Bed Mobility:     · Scooting: supervision  · Supine to Sit: supervision with HOB elevated  · Transfers:     · Sit to Stand:  stand by assistance with no AD  · Bed to Chair: stand by assistance with  no AD  using  Step Transfer  · Gait:  Pt ambulated ~250 ft with CGA-SBA using no AD and ~250 ft with CGA-SBA using str cane.  Pt with increased B hip ER, decreased step length and rachel.  Pt with minimal unsteadiness with 1 LOB and able to self correct.    · Balance: Pt with fair dynamic standing balance.      AM-PAC 6 CLICK MOBILITY  Turning over in bed (including adjusting bedclothes, sheets and blankets)?: 4  Sitting down on and standing up from a chair with arms (e.g., wheelchair, bedside commode, etc.): 4  Moving from lying on back to sitting on the side of the bed?: 4  Moving to and from a bed to a chair (including a wheelchair)?: 3  Need to walk in hospital room?: 3  Climbing 3-5 steps with a railing?: 3  Basic Mobility Total Score: 21       Patient left up in chair with all lines intact, call button in reach and [employer] monitor present.    GOALS:   Multidisciplinary Problems     Physical Therapy Goals        Problem: Physical Therapy Goal    Goal Priority Disciplines Outcome Goal Variances Interventions   Physical Therapy Goal     PT, PT/OT      Description:  Goals to be met by: 19     Patient will increase functional independence with mobility by performin. Supine to sit with Modified [place]  2. Rolling to Left and Right with Modified [place]  3. Sit to stand transfer with Modified [place]  4. Bed to chair transfer with Modified [place]   5. Gait >250 feet with Modified [place] using Single-point Cane   6. Lower extremity exercise program 3 sets x10 reps per handout, with independence                      Time Tracking:     PT Received On: 19  PT Start Time: 1051     PT Stop Time: 1107  PT Total Time (min): 16 min     Billable Minutes: Gait Training 16 min                   Patricia Pickens, PT  2019

## 2019-01-27 NOTE — PLAN OF CARE
01/27/19 1120   Medicare Message   Important Message from Medicare regarding Discharge Appeal Rights Given to patient/caregiver;Signed/date by patient/caregiver;Explained to patient/caregiver   Date IMM was signed 01/27/19   Time IMM was signed 1120

## 2019-01-27 NOTE — PROGRESS NOTES
WRITTEN DISCHARGE INFORMATION:   Follow-up Information     Moiz Goldberg MD On 2/5/2019.    Specialty:  Family Medicine  Why:  Hospital Follow Up on Tuesday at 9:20am.   Contact information:  [address] [facility]  [place] [address]  [phone]             Ibrahima Ramirez MD In 4 weeks.    Specialty:  Orthopedic Surgery  Why:  Please call to schedule Hospital Follow Up for Fall.   Contact information:  [address] [facility]  [address]  [facility] [address]  [phone]             Edward Stratton MD On 3/27/2019.    Specialty:  Neurology  Why:  Hospital Follow Up on Wednesday at 9:20am.   Contact information:  [address] [facility] [address]  [address]  [facility] [address]  [phone]             [facility] On 1/28/2019.    Specialty:  Home Health Services  Why:  Home Health PHN will call with name of provider  Contact information:  [address]  [facility] [address]  [phone]               Things that YOU are responsible for to Manage Your Care At Home:  1. Getting your prescriptions filled.  2. Taking you medications as directed. DO NOT MISS ANY DOSES!  3. Going to your follow-up doctor appointments. This is important because it allows the doctor to monitor your progress and to determine if any changes need to be made to your treatment plan.                     Help at Home  After discharge for assistance [facility] On [facility] 24/7 assistance  [phone]   Thank you for choosing [facility] for your care.  .  Sincerely, Your [facility] Healthcare Manager is,  Enid Olguin RN [facility] [phone]

## 2019-01-27 NOTE — PLAN OF CARE
Problem: Cerebral Tissue Perfusion Risk (Stroke, Ischemic/Transient Ischemic Attack)  Goal: Optimal Cerebral Tissue Perfusion    Intervention: Protect and Optimize Cerebral Perfusion   01/25/19 0349 01/26/19 2200   Protect and Optimize Cerebral Perfusion   Cerebral Perfusion Promotion blood pressure monitored --    Prevent or Manage Pain   Sensory Stimulation Regulation --  care clustered;lighting decreased;music/television provided for relaxation         Comments: BP remained WNL. No new signs of CVA. Tremors noted. Camera at bedside. No injury.

## 2019-01-27 NOTE — PLAN OF CARE
Problem: Fall Injury Risk  Goal: Absence of Fall and Fall-Related Injury  Outcome: Outcome(s) achieved Date Met: 01/27/19  Intervention: Identify and Manage Contributors to Fall Injury Risk   01/27/19 1140   Manage Acute Allergic Reaction   Medication Review/Management medications reviewed   Identify and Manage Contributors to Fall Injury Risk   Self-Care Promotion independence encouraged

## 2019-01-27 NOTE — PROGRESS NOTES
TN taught Symptoms and Problems for Stroke home care with pt and  with teach back:  1. Weakness, 2.Dizziness, 3. Loss of balance. TN placed education sheet in [facility]..     Help at home will be from Halle olvera, assisting in pt's recovery.     TN taught patient about things she is responsible for when discharged:  Particularly on how to Manage his Care At Home:  1. Getting his prescriptions filled.  2. Taking his medications as directed. DO NOT MISS ANY DOSES!  3. Going to his follow-up doctor appointments.   .  TN checked whiteboard for cm/sw name, spectra link #, pt contact, and d/c disposition....Enid Olguin RN, BSN, STN CCMb1/27/2019

## 2019-01-27 NOTE — PROGRESS NOTES
TN informed telemetry nurse, Julianne that pt is cleared for discharge from cm viewpoint..Enid Olguin RN, BSN, STN [facility]  1/27/2019

## 2019-01-27 NOTE — DISCHARGE SUMMARY
[facility] Hospital Medicine  Discharge Summary      Patient Name: Deb Webb  MRN: [id number]  Admission Date: 1/24/2019  Hospital Length of Stay: 3 days  Discharge Date and Time:  01/27/2019 9:25 AM  Attending Physician: Bo Hinds MD   Discharging Provider: Bo Hinds MD  Primary Care Provider: Moiz Goldberg MD      HPI:   Deb Webb 68 y.o. female with HTN, HLD, COPD, and DM2 presents to the hospital with a chief complaint of syncope. She reports beginning in November of last year she found she would have syncopal episodes when she changed position. These episodes have increased in number and frequency. She now reports when she moves from laying to sitting and especially to standing she passes out, but immediately regains consciousness when on ground. She reports she changes positions quickly when she moves and finds this exacerbates the dizziness. She also had a syncopal episode Sunday in which she injured her chest and reports since then she has had chest pain located at the top left of her sternum and worsened with arm movement. She hand a syncopal episode today when she was getting out of her car at the grocery store prompting her to come to the hospital. Her syncopal episodes are associated with dizziness which was relieved with meclizine provided by PCP. She is not dizzy currently when laying in the bed. She has also had increased urination that started in November, and she reports she began to drink less water as home as she was urinating too frequently. She also stopped her lasix as she felt that was increasing her symptoms. She has not been compliant with her diabetic diet as she reports she has been eating food with her family over the holidays. She endorses dizzines, emesis (2 episodes both associated with eating then laying flat patient believes), chest pain, and syncope. She denies fever, diarrhea, abdominal pain, dysuria.     In the ED, CT head without  "acute abnormality, CT chest with proximal clavicle fracture, remote rib injuries, emphysematous changes, troponin negative, EKG of sinus tach.    * No surgery found *      Hospital Course:   Deb Webb 68 y.o. female placed in observation for management of syncope. In the ED, CT head without acute abnormality, CT chest with proximal clavicle fracture, remote rib injuries, emphysematous changes, troponin negative, EKG of sinus tach.  Ortho consulted for clavicle fracture.  MRI showed "Two punctate foci of diffusion restriction in the bilateral frontal lobes, most consistent with acute small embolic infarctions.".  Neurology consulted.  Symptoms not consistent with changes seen on MRI.  Symptoms possibly secondary to diabetic neuropathy with autonomic manifestations.  Unremarkable Echo.  Restarted home Lisinopril.  Increased insulin regimen for better glycemic control.  No intervention for clavicle fracture per Ortho.  Patient has remained afebrile and hemodynamically stable.  Symptoms much improved from admission.  She will continue ASA and Statin.  She will be discharged home with HH and follow up with PCP and Neurology.     Consults:   Consults (From admission, onward)        Status Ordering Provider     Inpatient consult to Neurology  Once     Provider:  Loy Osorio MD    Completed AMADEO VICTOR     Inpatient consult to Orthopedic Surgery  Once     Provider:  Francisco Mccloud MD    Completed SHOWAMADEO MICHELLE          No new Assessment & Plan notes have been filed under this hospital service since the last note was generated.  Service: Hospital Medicine    Final Active Diagnoses:    Diagnosis Date Noted POA    PRINCIPAL PROBLEM:  CVA (cerebral vascular accident) [I63.9] 01/24/2019 Yes    Fracture of sternal end of left clavicle [S42.012A] 01/24/2019 Yes    Essential hypertension, benign [I10] 05/08/2017 Yes    Essential tremor [G25.0] 03/13/2017 Yes    Tobacco use disorder [F17.200] 12/26/2014 Yes "    COPD (chronic obstructive pulmonary disease) [J44.9] 08/07/2014 Yes     Chronic    Uncontrolled type 2 diabetes mellitus with diabetic neuropathy, without long-term current use of insulin [E11.40, E11.65] 03/21/2013 Yes     Chronic    Combined hyperlipidemia associated with type 2 diabetes mellitus [E11.69, E78.2] 03/21/2013 Yes     Chronic      Problems Resolved During this Admission:    Diagnosis Date Noted Date Resolved POA    Syncope [R55] 01/24/2019 01/27/2019 Yes       Discharged Condition: stable    Disposition: Home-Health Care [facility]    Follow Up:  Follow-up Information     Moiz Goldberg MD On 2/5/2019.    Specialty:  Family Medicine  Why:  Hospital Follow Up on Tuesday at 9:20am.   Contact information:  [address] [facility] [address]  [phone]             Ibrahima Ramirez MD In 4 weeks.    Specialty:  Orthopedic Surgery  Why:  Please call to schedule Hospital Follow Up for Fall.   Contact information:  [address] [facility]  [address]  [facility] [address]  [phone]             Edward Stratton MD On 3/27/2019.    Specialty:  Neurology  Why:  Hospital Follow Up on Wednesday at 9:20am.   Contact information:  [address]  [address]  [facility] [address]  [phone]                 Patient Instructions:      Diet diabetic     Diet Cardiac     Notify your health care provider if you experience any of the following:  temperature >100.4     Notify your health care provider if you experience any of the following:  persistent nausea and vomiting or diarrhea     Notify your health care provider if you experience any of the following:  severe uncontrolled pain     Notify your health care provider if you experience any of the following:  difficulty breathing or increased cough     Notify your health care provider if you experience any of the following:  severe persistent headache     Notify your health care provider if you experience any of the following:  persistent dizziness, light-headedness, or  visual disturbances     Notify your health care provider if you experience any of the following:  increased confusion or weakness     Activity as tolerated         Pending Diagnostic Studies:     None         Medications:  Reconciled Home Medications:      Medication List      START taking these medications    ciprofloxacin HCl 500 MG tablet  Commonly known as:  CIPRO  Take 1 tablet (500 mg total) by mouth every 12 (twelve) hours. for 5 days     insulin glargine 100 units/mL (3mL) SubQ pen  Commonly known as:  LANTUS SOLOSTAR U-100 INSULIN  Inject 15 Units into the skin once daily.        CHANGE how you take these medications    traMADol 50 mg tablet  Commonly known as:  ULTRAM  Take 1 tablet (50 mg total) by mouth every 6 (six) hours as needed for Pain.  What changed:    · how much to take  · how to take this  · when to take this  · reasons to take this  · additional instructions        CONTINUE taking these medications    albuterol 90 mcg/actuation inhaler  Commonly known as:  VENTOLIN HFA  TAKE 2 PUFFS BY MOUTH EVERY 4 HOURS AS NEEDED FOR WHEEZE     alendronate 70 MG tablet  Commonly known as:  FOSAMAX  TAKE 1 TABLET BY MOUTH ONE TIME PER WEEK     aspirin 81 MG EC tablet  Commonly known as:  ECOTRIN  Take 1 tablet (81 mg total) by mouth once daily.     atorvastatin 40 MG tablet  Commonly known as:  LIPITOR  Take 1 tablet (40 mg total) by mouth once daily.     blood sugar diagnostic Strp  To check BG 2 times daily, to use with insurance preferred meter     blood-glucose meter kit  To check BG 2 times daily, to use with insurance preferred meter     cyclobenzaprine 5 MG tablet  Commonly known as:  FLEXERIL  Take 1 tablet (5 mg total) by mouth 3 (three) times daily as needed.     diclofenac sodium 1 % Gel  Commonly known as:  VOLTAREN  Apply 2 g topically once daily.     dorzolamide-timolol 2-0.5% 22.3-6.8 mg/mL ophthalmic solution  Commonly known as:  COSOPT  PLACE 1 DROP INTO BOTH EYES 2 (TWO) TIMES DAILY.    "  esomeprazole 40 MG capsule  Commonly known as:  NexIUM  Take 1 capsule (40 mg total) by mouth before breakfast.     FISH OIL 1,000 mg Cap  Generic drug:  omega-3 fatty acids-vitamin E     fluticasone 50 mcg/actuation nasal spray  Commonly known as:  FLONASE  USE 2 SPRAY INTO EACH NOSTRIL DAILY     furosemide 20 MG tablet  Commonly known as:  LASIX  Take 1 tablet (20 mg total) by mouth every morning.     gabapentin 300 MG capsule  Commonly known as:  NEURONTIN  TAKE ONE CAPSULE BY MOUTH THREE TIMES A DAY     glimepiride 4 MG tablet  Commonly known as:  AMARYL  Take 2 tablets (8 mg total) by mouth before breakfast.     lancets [facility]  To check BG 2 times daily, to use with insurance preferred meter     latanoprost 0.005 % ophthalmic solution  Place 1 drop into both eyes every evening.     loratadine 10 mg tablet  Commonly known as:  CLARITIN  Take 1 tablet (10 mg total) by mouth daily as needed for Allergies (or runny nose).     LORazepam 0.5 MG tablet  Commonly known as:  ATIVAN  TAKE 1 TABLET (0.5 MG TOTAL) BY MOUTH DAILY AS NEEDED FOR ANXIETY.     meclizine 12.5 mg tablet  Commonly known as:  ANTIVERT  TAKE 1 TABLET (12.5 MG TOTAL) BY MOUTH 3 (THREE) TIMES DAILY AS NEEDED FOR DIZZINESS.     meloxicam 15 MG tablet  Commonly known as:  MOBIC  TAKE 1 TABLET (15 MG TOTAL) BY MOUTH ONCE DAILY.     metFORMIN 500 MG 24 hr tablet  Commonly known as:  GLUCOPHAGE-XR  TAKE 1 TABLET (500 MG TOTAL) BY MOUTH 2 (TWO) TIMES DAILY WITH MEALS.     pen needle, diabetic 32 gauge x 5/32" Ndle  Commonly known as:  BD ULTRA-FINE AGUS PEN NEEDLE  1 Device by Misc.(Non-Drug; Combo Route) route once daily.     primidone 50 MG Tab  Commonly known as:  MYSOLINE  TAKE 1 TABLET BY MOUTH 3 TIMES A DAY     umeclidinium 62.5 mcg/actuation Dsdv  Commonly known as:  INCRUSE ELLIPTA  Inhale 1 each into the lungs once daily. Controller        STOP taking these medications    lisinopril 40 MG tablet  Commonly known as:  PRINIVIL,ZESTRIL        ASK your " doctor about these medications    lisinopril 40 MG tablet  Commonly known as:  PRINIVIL,ZESTRIL  Take 1 tablet (40 mg total) by mouth once daily.  Ask about: Should I take this medication?            Indwelling Lines/Drains at time of discharge:   Lines/Drains/Airways     Epidural Line                 Perineural Analgesia/Anesthesia Assessment (using dermatomes) Epidural -- days                Time spent on the discharge of patient: >30 minutes  Patient was seen and examined on the date of discharge and determined to be suitable for discharge.         Bo Hinds MD  Department of Hospital Medicine  [facility]

## 2019-01-27 NOTE — PLAN OF CARE
01/27/19 1154   Final Note   Assessment Type Final Discharge Note   Anticipated Discharge Disposition Home-Health   What phone number can be called within the next 1-3 days to see how you are doing after discharge? (see chart)   Hospital Follow Up  Appt(s) scheduled? Yes   Discharge plans and expectations educations in teach back method with documentation complete? Yes   Right Care Referral Info   Referral Type HOME CARE   Facility Name [facility].   [address] [place] [address]

## 2019-01-27 NOTE — NURSING
In preparation for discharge, removal of patient's saline lock and heart monitor per policy. Discharge instructions giving to patient. Patient verbalized understanding. Patient is waiting for her daughter to take her home. No distress noted.

## 2019-01-27 NOTE — PROGRESS NOTES
Denise from [facility] says pt will have [facility].  Patient will have 1 week of frozen diabetic dinners delivered to her house in the next day or two..  TN placed informed patient and placed on alfonso tab..Enid Olguin RN, BSN, STN CCM  1/27/2019

## 2019-01-27 NOTE — PLAN OF CARE
[facility]    HOME HEALTH ORDERS  FACE TO FACE ENCOUNTER    Patient Name: Deb Webb  YOB: 1950    PCP: Moiz Goldberg MD   PCP Address: [address] [place] [address]  PCP Phone Number: [phone]  PCP Fax: [phone]       Encounter Date: 01/27/2019    Admit to Home Health    Diagnoses:  Active Hospital Problems    Diagnosis  POA    *CVA (cerebral vascular accident) [I63.9]  Yes     Priority: 1 - High    Fracture of sternal end of left clavicle [S42.012A]  Yes    Essential hypertension, benign [I10]  Yes    Essential tremor [G25.0]  Yes     Tremors have been better overall since starting primidone. She feels that she has had some days when it seems to be better than other. She had a fall after standing and feels her leg gave out on her. No rigidity. No other gait issues. No memory problems.      Tobacco use disorder [F17.200]  Yes    COPD (chronic obstructive pulmonary disease) [J44.9]  Yes     Chronic    Uncontrolled type 2 diabetes mellitus with diabetic neuropathy, without long-term current use of insulin [E11.40, E11.65]  Yes     Chronic    Combined hyperlipidemia associated with type 2 diabetes mellitus [E11.69, E78.2]  Yes     Chronic      Resolved Hospital Problems    Diagnosis Date Resolved POA    Syncope [R55] 01/27/2019 Yes       Future Appointments   Date Time Provider Department Center   2/4/2019  9:30 AM Christiano Mccartney MD [facility] OPHTHAL [place]   2/5/2019  9:20 AM Moiz Goldberg MD [facility] FAM MED [place]   2/20/2019 11:30 AM Sussy Goldberg NP [facility] -    3/27/2019  9:20 AM Edward Stratton MD [facility] NEURO [place]     Follow-up Information     Moiz Goldberg MD On 2/5/2019.    Specialty:  Family Medicine  Why:  Hospital Follow Up on Tuesday at 9:20am.   Contact information:  [address] [place] [address]  [phone]             Ibrahima Ramirez MD In 4 weeks.    Specialty:  Orthopedic Surgery  Why:  Please  call to schedule Hospital Follow Up for Fall.   Contact information:  [address] [facility]  [address]  [place]  [phone]             Edward Stratton MD On 3/27/2019.    Specialty:  Neurology  Why:  Hospital Follow Up on Wednesday at 9:20am.   Contact information:  [address] [facility]  [address]  [place]  [phone]                     I have seen and examined this patient face to face today. My clinical findings that support the need for the home health skilled services and home bound status are the following:  Weakness/numbness causing balance and gait disturbance due to Fracture making it taxing to leave home.    Allergies:  Review of patient's allergies indicates:   Allergen Reactions    Silicone      Burn skin    Adhesive Rash       Diet: cardiac diet and diabetic diet: 2000 calorie    Activities: activity as tolerated    Nursing:   SN to complete comprehensive assessment including routine vital signs. Instruct on disease process and s/s of complications to report to MD. Review/verify medication list sent home with the patient at time of discharge  and instruct patient/caregiver as needed. Frequency may be adjusted depending on start of care date.    Notify MD if SBP > 160 or < 90; DBP > 90 or < 50; HR > 120 or < 50; Temp > 101      CONSULTS:    Physical Therapy to evaluate and treat. Evaluate for home safety and equipment needs; Establish/upgrade home exercise program. Perform / instruct on therapeutic exercises, gait training, transfer training, and Range of Motion.      Medications: Review discharge medications with patient and family and provide education.      Current Discharge Medication List      START taking these medications    Details   ciprofloxacin HCl (CIPRO) 500 MG tablet Take 1 tablet (500 mg total) by mouth every 12 (twelve) hours. for 5 days  Qty: 10 tablet, Refills: 0      insulin (LANTUS SOLOSTAR U-100 INSULIN) glargine 100 units/mL (3mL) SubQ pen Inject 15 Units into the  skin once daily.  Refills: 0         CONTINUE these medications which have CHANGED    Details   traMADol (ULTRAM) 50 mg tablet Take 1 tablet (50 mg total) by mouth every 6 (six) hours as needed for Pain.  Qty: 12 tablet, Refills: 0         CONTINUE these medications which have NOT CHANGED    Details   albuterol (VENTOLIN HFA) 90 mcg/actuation inhaler TAKE 2 PUFFS BY MOUTH EVERY 4 HOURS AS NEEDED FOR WHEEZE  Qty: 18 Inhaler, Refills: 3      alendronate (FOSAMAX) 70 MG tablet TAKE 1 TABLET BY MOUTH ONE TIME PER WEEK  Qty: 12 tablet, Refills: 1    Associated Diagnoses: Osteoporosis, postmenopausal      aspirin (ECOTRIN) 81 MG EC tablet Take 1 tablet (81 mg total) by mouth once daily.  Refills: 0    Associated Diagnoses: Uncontrolled type 2 diabetes mellitus with diabetic neuropathy, without long-term current use of insulin      atorvastatin (LIPITOR) 40 MG tablet Take 1 tablet (40 mg total) by mouth once daily.  Qty: 90 tablet, Refills: 2    Associated Diagnoses: Combined hyperlipidemia associated with type 2 diabetes mellitus      blood sugar diagnostic Strp To check BG 2 times daily, to use with insurance preferred meter  Qty: 200 each, Refills: 3    Associated Diagnoses: Uncontrolled type 2 diabetes mellitus with diabetic neuropathy, without long-term current use of insulin      blood-glucose meter kit To check BG 2 times daily, to use with insurance preferred meter  Qty: 1 each, Refills: 0      cyclobenzaprine (FLEXERIL) 5 MG tablet Take 1 tablet (5 mg total) by mouth 3 (three) times daily as needed.  Qty: 90 tablet, Refills: 5    Associated Diagnoses: Other chronic pain      dorzolamide-timolol 2-0.5% (COSOPT) 22.3-6.8 mg/mL ophthalmic solution PLACE 1 DROP INTO BOTH EYES 2 (TWO) TIMES DAILY.  Qty: 10 mL, Refills: 3    Associated Diagnoses: Primary open angle glaucoma of both eyes, severe stage      esomeprazole (NEXIUM) 40 MG capsule Take 1 capsule (40 mg total) by mouth before breakfast.  Qty: 90 capsule,  Refills: 2    Associated Diagnoses: Gastroesophageal reflux disease, esophagitis presence not specified      fluticasone (FLONASE) 50 mcg/actuation nasal spray USE 2 SPRAY INTO EACH NOSTRIL DAILY  Qty: 16 mL, Refills: 5    Associated Diagnoses: Acute maxillary sinusitis, recurrence not specified      furosemide (LASIX) 20 MG tablet Take 1 tablet (20 mg total) by mouth every morning.  Qty: 90 tablet, Refills: 1    Associated Diagnoses: Bilateral lower extremity edema      gabapentin (NEURONTIN) 300 MG capsule TAKE ONE CAPSULE BY MOUTH THREE TIMES A DAY  Qty: 270 capsule, Refills: 1      glimepiride (AMARYL) 4 MG tablet Take 2 tablets (8 mg total) by mouth before breakfast.  Qty: 180 tablet, Refills: 0    Associated Diagnoses: Uncontrolled type 2 diabetes mellitus with diabetic neuropathy, without long-term current use of insulin      lancets Misc To check BG 2 times daily, to use with insurance preferred meter  Qty: 200 each, Refills: 3    Associated Diagnoses: Uncontrolled type 2 diabetes mellitus with diabetic neuropathy, without long-term current use of insulin      latanoprost 0.005 % ophthalmic solution Place 1 drop into both eyes every evening.  Qty: 2.5 mL, Refills: 6    Associated Diagnoses: Primary open angle glaucoma of both eyes, severe stage      loratadine (CLARITIN) 10 mg tablet Take 1 tablet (10 mg total) by mouth daily as needed for Allergies (or runny nose).  Qty: 90 tablet, Refills: 3    Associated Diagnoses: Allergic rhinitis, unspecified chronicity, unspecified seasonality, unspecified trigger      LORazepam (ATIVAN) 0.5 MG tablet TAKE 1 TABLET (0.5 MG TOTAL) BY MOUTH DAILY AS NEEDED FOR ANXIETY.  Qty: 10 tablet, Refills: 0    Comments: Not to exceed 5 additional fills before 07/02/2018  Associated Diagnoses: Anxiety; Sedative hypnotic or anxiolytic dependence; Tremor      meloxicam (MOBIC) 15 MG tablet TAKE 1 TABLET (15 MG TOTAL) BY MOUTH ONCE DAILY.  Qty: 90 tablet, Refills: 1    Associated  "Diagnoses: Osteoarthritis, unspecified osteoarthritis type, unspecified site      metFORMIN (GLUCOPHAGE-XR) 500 MG 24 hr tablet TAKE 1 TABLET (500 MG TOTAL) BY MOUTH 2 (TWO) TIMES DAILY WITH MEALS.  Qty: 360 tablet, Refills: 2    Associated Diagnoses: Uncontrolled type 2 diabetes mellitus with diabetic neuropathy, without long-term current use of insulin      omega-3 fatty acids-vitamin E (FISH OIL) 1,000 mg Cap       pen needle, diabetic (BD ULTRA-FINE AGUS PEN NEEDLE) 32 gauge x 5/32" Ndle 1 Device by Misc.(Non-Drug; Combo Route) route once daily.  Qty: 100 each, Refills: 4      primidone (MYSOLINE) 50 MG Tab TAKE 1 TABLET BY MOUTH 3 TIMES A DAY  Qty: 90 tablet, Refills: 0    Associated Diagnoses: Essential tremor      umeclidinium (INCRUSE ELLIPTA) 62.5 mcg/actuation DsDv Inhale 1 each into the lungs once daily. Controller  Qty: 30 each, Refills: 2    Associated Diagnoses: Chronic bronchitis, unspecified chronic bronchitis type      diclofenac sodium (VOLTAREN) 1 % Gel Apply 2 g topically once daily.  Qty: 100 g, Refills: 3      meclizine (ANTIVERT) 12.5 mg tablet TAKE 1 TABLET (12.5 MG TOTAL) BY MOUTH 3 (THREE) TIMES DAILY AS NEEDED FOR DIZZINESS.  Qty: 20 tablet, Refills: 0    Associated Diagnoses: Dizziness         STOP taking these medications       lisinopril (PRINIVIL,ZESTRIL) 40 MG tablet Comments:   Reason for Stopping:         lisinopril (PRINIVIL,ZESTRIL) 40 MG tablet Comments:   Reason for Stopping:               I certify that this patient is confined to her home and needs intermittent skilled nursing care and physical therapy.      "

## 2019-01-27 NOTE — PROGRESS NOTES
"[facility] [place]  [place] Note    Patient Name: Deb Webb  MRN: [id number]  Admission Date: 1/24/2019  Hospital Length of Stay: 3 days  Code Status: Full Code   Attending Provider: Bo Hinds MD  Primary Care Physician: Moiz Goldberg MD   Principal Problem:CVA (cerebral vascular accident)    Subjective:     Interval History: 67 y/o female with medical Hx as listed below comes to ED for dizziness. Symptoms have been ongoing since November but worse for the last two weeks, Pt states that she has difficulty walking as when stands up feels lightheaded and at times that the room is spinning. Her dizziness may also occur whenever she sits up. No symptoms when laying down. Has fallen several times; last fall pt had a clavicular Fx. Ms. Webb tells me that upon standing she has "these tremors" that have also impaired her gait. She was given primidone which reports has helped her with tremors. Denies headaches, visual or speech disturbances, unilateral weakness or numbness.    -1/27/19: Pt with no new complaints today. She was able to walk assisted by PT.    Current Neurological Medications:     Current Facility-Administered Medications   Medication Dose Route Frequency Provider Last Rate Last Dose    albuterol inhaler 2 puff  2 puff Inhalation Q4H PRN Maria Teresa Howell MD        aspirin EC tablet 81 mg  81 mg Oral Daily Maria Teresa Howell MD   81 mg at 01/27/19 0818    atorvastatin tablet 40 mg  40 mg Oral Daily Lukas Luz PA-C   40 mg at 01/27/19 0818    ciprofloxacin HCl tablet 500 mg  500 mg Oral Q12H Bo Hinds MD   500 mg at 01/27/19 0818    cloNIDine tablet 0.2 mg  0.2 mg Oral Q8H PRN Bo Hinds MD        dextrose 50% injection 12.5 g  12.5 g Intravenous PRN Lukas Luz PA-C        dextrose 50% injection 25 g  25 g Intravenous PRN Lukas Luz PA-C        dorzolamide-timolol 2-0.5% ophthalmic solution 1 drop  1 drop Both Eyes BID Maria Teresa POSEY" MD Lynda   1 drop at 01/27/19 0825    furosemide tablet 20 mg  20 mg Oral QAM Bo Hinds MD   20 mg at 01/27/19 0602    gabapentin capsule 300 mg  300 mg Oral TID Bo Hinds MD   300 mg at 01/27/19 0819    glucagon (human recombinant) injection 1 mg  1 mg Intramuscular PRN Lukas Luz PA-C        glucose chewable tablet 16 g  16 g Oral PRN Lukas Luz PA-C        glucose chewable tablet 24 g  24 g Oral PRN Lukas Luz PA-C        insulin aspart U-100 pen 0-5 Units  0-5 Units Subcutaneous QID (AC + HS) PRN Lukas Luz PA-C   2 Units at 01/27/19 1158    insulin aspart U-100 pen 5 Units  5 Units Subcutaneous TIDWM Bo Hinds MD   5 Units at 01/27/19 1158    insulin detemir U-100 pen 9 Units  9 Units Subcutaneous QHS Bo Hinds MD   9 Units at 01/26/19 2225    latanoprost 0.005 % ophthalmic solution 1 drop  1 drop Both Eyes QHS Maria Teresa Howell MD   1 drop at 01/26/19 2225    lisinopril tablet 40 mg  40 mg Oral Daily Bo Hinds MD   40 mg at 01/27/19 0818    loperamide capsule 2 mg  2 mg Oral QID PRN Bo Hinds MD   2 mg at 01/27/19 0555    meclizine tablet 12.5 mg  12.5 mg Oral TID PRN Lukas Luz PA-C        pantoprazole EC tablet 40 mg  40 mg Oral Daily Lukas Luz PA-C   40 mg at 01/27/19 0818    primidone tablet 50 mg  50 mg Oral TID Lukas Luz PA-C   50 mg at 01/27/19 0825    sodium chloride 0.9% flush 5 mL  5 mL Intravenous PRN Maria Teresa Howell MD        tiotropium inhalation capsule 18 mcg  1 capsule Inhalation Daily Lukas Luz PA-C   18 mcg at 01/27/19 0717       Review of Systems   Constitutional: Negative for fever.   HENT: Negative for trouble swallowing.    Eyes: Negative for photophobia.   Respiratory: Negative for shortness of breath.    Cardiovascular: Negative for chest pain.   Gastrointestinal: Negative for abdominal pain.   Genitourinary: Negative for dysuria.   Musculoskeletal: Negative for back pain.    Neurological: Negative for facial asymmetry.   Psychiatric/Behavioral: Negative for confusion.         Objective:     Vital Signs (Most Recent):  Temp: 98 °F (36.7 °C) (01/27/19 1127)  Pulse: 99 (01/27/19 1127)  Resp: 18 (01/27/19 1127)  BP: 110/77 (01/27/19 1127)  SpO2: (!) 91 % (01/27/19 1127) Vital Signs (24h Range):  Temp:  [97.2 °F (36.2 °C)-99.2 °F (37.3 °C)] 98 °F (36.7 °C)  Pulse:  [] 99  Resp:  [16-20] 18  SpO2:  [90 %-95 %] 91 %  BP: (110-138)/(76-89) 110/77     Weight: 63.3 kg (139 lb 8.8 oz)  Body mass index is 26.37 kg/m².    Physical Exam  Constitutional: She is oriented to person, place, and time.   HENT:   Head: Normocephalic.   Eyes: Right eye exhibits no discharge. Left eye exhibits no discharge.   Neck: Normal range of motion.   Cardiovascular: Regular rhythm.   Pulmonary/Chest: Breath sounds normal.   Abdominal: Bowel sounds are normal.   Musculoskeletal: She exhibits no edema.   Neurological: She is oriented to person, place, and time. She has normal strength. She has a normal Finger-Nose-Finger Test and a normal Heel to Velázquez Test.   Skin: She is not diaphoretic.         NEUROLOGICAL EXAMINATION:      MENTAL STATUS   Oriented to person, place, and time.   Attention: normal.   Level of consciousness: alert     CRANIAL NERVES      CN III, IV, VI   Right pupil: Shape: regular. Consensual response: intact.   Left pupil: Shape: regular. Consensual response: intact.   Nystagmus: none      CN V   Right facial sensation deficit: none  Left facial sensation deficit: none     CN VII   Right facial weakness: none  Left facial weakness: none     CN XI   Right trapezius strength: normal  Left trapezius strength: normal     CN XII   Tongue: not atrophic     MOTOR EXAM      Strength   Strength 5/5 throughout     SENSORY EXAM   Right arm light touch: normal  Left arm light touch: normal  Right leg light touch: normal  Left leg light touch: normal       markedly decreased vibration in feet      GAIT AND  COORDINATION      Coordination   Finger to nose coordination: normal  Heel to shin coordination: normal     Tremor   Resting tremor: absent  Action tremor: absent             Significant Labs:   CBC: No results for input(s): WBC, HGB, HCT, PLT in the last 48 hours.  CMP:   Recent Labs   Lab 01/26/19  0438 01/27/19  0633   * 181*    137   K 3.1* 3.3*    103   CO2 24 26   BUN 8 10   CREATININE 0.7 0.8   CALCIUM 9.1 9.2   PROT 5.9* 6.1   ALBUMIN 2.8* 2.9*   BILITOT 0.5 0.5   ALKPHOS 107 93   AST 16 14   ALT 12 10   ANIONGAP 10 8   EGFRNONAA >60 >60         Assessment and Plan:     67 y/o female with acute stroke     1. Stroke: small area of infarction on left frontal lobe. This does not correlate with her symptoms.           -ASA and statin (lipids at goal).            -OK to control BP. Needs DM control as well.           -Mildly decreased EF on echo.     2. Falls/dizziness: perhaps the effects of a DM-related peripheral neuropathy (with autonomic manifestations).           -She is on gabapentin 300 mg TID for dysesthesias on distal LE's.           -Return to neurology clinic for follow up.     No other recs from neurology standpoint.    Active Diagnoses:    Diagnosis Date Noted POA    PRINCIPAL PROBLEM:  CVA (cerebral vascular accident) [I63.9] 01/24/2019 Yes    Fracture of sternal end of left clavicle [S42.012A] 01/24/2019 Yes    Essential hypertension, benign [I10] 05/08/2017 Yes    Essential tremor [G25.0] 03/13/2017 Yes    Tobacco use disorder [F17.200] 12/26/2014 Yes    COPD (chronic obstructive pulmonary disease) [J44.9] 08/07/2014 Yes     Chronic    Uncontrolled type 2 diabetes mellitus with diabetic neuropathy, without long-term current use of insulin [E11.40, E11.65] 03/21/2013 Yes     Chronic    Combined hyperlipidemia associated with type 2 diabetes mellitus [E11.69, E78.2] 03/21/2013 Yes     Chronic      Problems Resolved During this Admission:    Diagnosis Date Noted Date  Resolved POA    Syncope [R55] 01/24/2019 01/27/2019 Yes       VTE Risk Mitigation (From admission, onward)        Ordered     Place sequential compression device  Until discontinued      01/24/19 1713     IP VTE LOW RISK PATIENT  Once      01/24/19 1642     Place KANDI hose  Until discontinued      01/24/19 1642          Loy Osorio MD  Neurology  [facility]

## 2019-01-28 NOTE — PT/OT/SLP DISCHARGE
Physical Therapy Discharge Summary    Name: Deb Webb  MRN: [id number]   Principal Problem: CVA (cerebral vascular accident)     Patient Discharged from acute Physical Therapy on 19.  Please refer to prior PT notes for functional status.     Assessment:     Patient was discharged home. Refer to therapy initial evaluation and last progress note for initial and most recent functional status and goal achievement.  Recommendations made may be found in medical record.    Objective:     GOALS:   Multidisciplinary Problems     Physical Therapy Goals        Problem: Physical Therapy Goal    Goal Priority Disciplines Outcome Goal Variances Interventions   Physical Therapy Goal     PT, PT/OT Ongoing (interventions implemented as appropriate)     Description:  Goals to be met by: 19     Patient will increase functional independence with mobility by performin. Supine to sit with Modified [place]  2. Rolling to Left and Right with Modified [place]  3. Sit to stand transfer with Modified [place]  4. Bed to chair transfer with Modified [place]   5. Gait >250 feet with Modified [place] using Single-point Cane   6. Lower extremity exercise program 3 sets x10 reps per handout, with independence                      Reasons for Discontinuation of Therapy Services  Transfer to alternate level of care.      Plan:     Patient Discharged to: Home with Home Health Service.    Patricia Pickens, PT  2019

## 2019-01-29 DIAGNOSIS — R42 DIZZINESS: ICD-10-CM

## 2019-01-29 RX ORDER — MECLIZINE HCL 12.5 MG 12.5 MG/1
12.5 TABLET ORAL 3 TIMES DAILY PRN
Qty: 20 TABLET | Refills: 0 | Status: SHIPPED | OUTPATIENT
Start: 2019-01-29 | End: 2019-02-19 | Stop reason: SDUPTHER

## 2019-01-29 NOTE — TELEPHONE ENCOUNTER
----- Message from Lex Drake sent at 2019  1:23 PM CST -----  Contact: JAVON [phone]  REFILL: meclizine (ANTIVERT) 12.5 mg tablet ()    PHARMACY: [facility] #[id number] [address]

## 2019-01-30 NOTE — PHYSICIAN QUERY
PT Name: Deb Webb  MR #: [id number]    Physician Query Form - Emergency Medicine Diagnosis Clarification     CDS/: Minna Aceves RN               Contact information:[email or web address]  This form is a permanent document in the medical record.     Query Date: January 30, 2019    By submitting this query, we are merely seeking further clarification of documentation.  Please utilize your independent clinical judgment when addressing the question(s) below.      The Medical record contains the following:     Diagnosis Supporting Clinical Information Location in Medical Record   Dehydration She reports vomiting episodes yesterday and today. She has not been eating much.  Thin, frail and under nourished appearing .  Mucous membranes are dry. Conjunctivae and EOM are normal.  Non-pitting peripheral edema in LE noted. Strength is equal although the patient has generalized weakness.  Skin is warm and dry. Bruising and ecchymosis noted. glucose of 657, but no anion gap acidosis.  Calcium is elevated at 10.5.UA is consistent with dehydration Protein, UA: (!) Trace     WBC 16.94, glucose 273, Hemoglobin A1c 8.4  Urine culture: positive for escherichia coli    She has also had increased urination that started in November, and she reports she began to drink less water as home as she was urinating too frequently. She also stopped her lasix as she felt that was increasing her symptoms. She has not been compliant with her diabetic diet as she reports she has been eating food with her family over the holidays. She endorses dizzines, emesis (2 episodes both associated with eating then laying flat patient believes), chest pain   ED provider                  Labs 1/24  Urine culture 1/25    Discharge summary     Do you agree with the Emergency Medicine diagnosis of ___dehydration_____?    [ x  ] Yes   [   ] Yes, but it resolved prior to my assessment of the patient   [   ] No   [   ] Clinically Insignificant   [    ] Other/Clarification of Findings:   [  ] Clinically Undetermined         Please document in your progress notes daily for the duration of treatment until resolved and include in your discharge summary.

## 2019-01-30 NOTE — PHYSICIAN QUERY
PT Name: Deb Webb  MR #: [id number]     Physician Query Form - Documentation Clarification      CDS/: Minna Aceves RN               Contact information:[email or web address]    This form is a permanent document in the medical record.     Query Date: January 30, 2019    By submitting this query, we are merely seeking further clarification of documentation. Please utilize your independent clinical judgment when addressing the question(s) below.    The Medical record reflects the following:    Supporting Clinical Findings Location in Medical Record   She has also had increased urination that started in November, and she reports she began to drink less water as home as she was urinating too frequently. She also stopped her lasix as she felt that was increasing her symptoms. She has not been compliant with her diabetic diet as she reports she has been eating food with her family over the holidays. She endorses dizzines, emesis.  Uncontrolled type 2 diabetes mellitus with diabetic neuropathy, without long-term current use of insulin.    Increased insulin regimen for better glycemic control.     Regular insulin IV given twice  Insulin aspart   Insulin detemir    Discharge summary                    MAR 1/24  MAR 1/24-27  MAR 1/24-26   WBC 16.94, glucose 273, Hemoglobin A1c 8.4  Glucose 288  Glucose 157  Glucose 181 Labs 1/24  Labs 1/25  Labs 1/26  Labs 1/27                                                                            Doctor, Please specify diagnosis or diagnoses associated with above clinical findings.    Provider Use Only    [  x ] DM2, uncontrolled with hyperglycemia  [   ] DM2, uncontrolled with ________(please specify)  [   ] DM2, controlled    [   ] Other ____________________                                                                                                                           [  ] Clinically Undetermined

## 2019-01-30 NOTE — PHYSICIAN QUERY
PT Name: Deb Webb  MR #: [id number]    Physician Query Form - CardioPulmonary Clarification      CDS/: Minna Aceves RN   Contact information: [email or web address]  This form is a permanent document in the medical record.    Query Date: January 30, 2019    By submitting this query, we are merely seeking further clarification of documentation. Please utilize your independent clinical judgment when addressing the question(s) below.    The Medical record contains the following:   Indicators   Supporting Clinical Findings Location in Medical Record   x Pulmonary Hypertension documented Pulmonary hypertension ECHO 1/25   x Acute/Chronic Illness CVA, fracture of sternal end of L clavicle, HTN, HLD, COPD,  DM2, hyperlipidemia Discharge summary   x Echo and/or Heart Cath Findings · TDS  · Mildly decreased left ventricular systolic function. The estimated ejection fraction is 40%  · Possible Anterior wall motion abnormality  · Concentric left ventricular remodeling.  · Grade I (mild) left ventricular diastolic dysfunction consistent with impaired relaxation.  · The estimated PA systolic pressure is 70 mm Hg  · Pulmonary hypertension present. ECHO 1/25    BiPAP/Intubation/Supplemental O2     x SOB, ROPER, Fatigue, Dizziness, LE Edema, Cyanosis, Chest Pain, Respiratory Distress, Hypoxia, etc. beginning in November of last year she found she would have syncopal episodes when she changed position. These episodes have increased in number and frequency. She now reports when she moves from laying to sitting and especially to standing she passes out, but immediately regains consciousness when on ground. She reports she changes positions quickly when she moves and finds this exacerbates the dizziness. She also had a syncopal episode Sunday.  syncopal episode today when she was getting out of her car at the grocery store prompting her to come to the hospital. Her syncopal episodes are associated with dizziness  which was relieved with meclizine provided by PCP.  She has also had increased urination that started in November, and she reports she began to drink less water as home as she was urinating too frequently. She also stopped her lasix as she felt that was increasing her symptoms. She has not been compliant with her diabetic diet as she reports she has been eating food with her family over the holidays. She endorses dizzines, emesis (2 episodes both associated with eating then laying flat patient believes), chest pain, and syncope.  H&P   x Treatment         Medication Ecotrin, furosemide, lisinopril, lorazepam, metformin, tramadol, meclizine under current outpatient medications on file prior to encounter H&P     Other     Provider, please specify the type of pulmonary hypertension:  [   ] Group 1:  Pulmonary Arterial Hypertension - includes Primary, Idiopathic, Inheritable, and Secondary (due to drugs, toxins, congenital heart diease, HIV infection, etc.)     [   ] Group 3:  Pulmonary Hypertension due to Lung Disease     [   ] Group 5:  Pulmonary Hypertension due to other, multifactorial, or unclear mechanisms     [  x ] Pulmonary Hypertension, unspecified     [   ] Other Cardiopulmonary Condition (please specify):     [  ] Clinically Undetermined         Please document in your progress notes daily for the duration of treatment, until resolved, and include in your discharge summary.

## 2019-01-30 NOTE — PHYSICIAN QUERY
"PT Name: Deb Webb  MR #: [id number]    Physician Query Form - Nutrition Clarification     CDS/: Minna Aceves RN               Contact information: [email or web address]    This form is a permanent document in the medical record.     Query Date: January 30, 2019    By submitting this query, we are merely seeking further clarification of documentation.. Please utilize your independent clinical judgment when addressing the question(s) below.    The Medical record contains the following:   Indicators  Supporting Clinical Findings Location in Medical Record   x % of Estimated Energy Intake over a time frame from p.o., TF, or TPN She reports vomiting episodes yesterday and today. She has not been eating much. Skin is warm and dry. Bruising and ecchymosis noted.      Thin, frail and under nourished appearing.    No po intake recorded  50% intake @0800, 75% @ 1200, 100%@1700 ED provider        ED provider    Intake & output record 1/24-26  Intake & output record 1/27    Weight Status over a time frame      Subcutaneous Fat and/or Muscle Loss     x Fluid Accumulation or Edema Non-pitting peripheral edema in LE noted.she began to drink less water as home as she was urinating too frequently. ED provider   x Reduced  Strength Strength is equal although the patient has generalized weakness.x ED provider   x Wt / BMI / Usual Body Weight Height 5'1"  Weight 138 lb  BMI 26.3 Anthropometrics 1/24    Delayed Wound Healing / Failure to Thrive     x Acute or Chronic Illness increased urination that started in November, and she reports she began to drink less water as home as she was urinating too frequently. She also stopped her lasix as she felt that was increasing her symptoms. She has not been compliant with her diabetic diet as she reports she has been eating food with her family over the holidays. She endorses dizzines, emesis.  Uncontrolled type 2 diabetes mellitus with diabetic neuropathy, without " long-term current use of insulin.      Dehydration Discharge summary                        ED provider    Medication      Treatment     x Other Mucous membranes are dry. Conjunctivae and EOM are normal.  ED provider     AND / ASPEN Clinical Characteristics (October 2011)  A minimum of two characteristics is recommended for diagnosing either moderate or severe malnutrition   Mild Malnutrition Moderate Malnutrition Severe Malnutrition   Energy Intake from p.o., TF or TPN. < 75% intake of estimated energy needs for less than 7 days < 75% intake of estimated energy needs for greater than 7 days < 50% intake of estimated energy needs for > 5 days   Weight Loss 1-2% in 1 month  5% in 3 months  7.5% in 6 months  10% in 1 year 1-2 % in 1 week  5% in 1 month  7.5% in 3 months  10% in 6 months  20% in 1 year > 2% in 1 week  > 5% in 1 month  > 7.5% in 3 months  > 10% in 6 months  > 20% in 1 year   Physical Findings     None *Mild subcutaneous fat and/or muscle loss  *Mild fluid accumulation  *Stage II decubitus  *Surgical wound or non-healing wound *Mod/severe subcutaneous fat and/or muscle loss  *Mod/severe fluid accumulation  *Stage III or IV decubitus  *Non-healing surgical wound     Provider, please specify diagnosis or diagnoses associated with above clinical findings.    [ x ] Mild Protein-Calorie Malnutrition   [  ] Moderate Protein-Calorie Malnutrition   [  ] Cachexia   [  ] Other Nutritional Diagnosis (please specify):    [  ] Other:    [  ] Clinically Undetermined       Please document in your progress notes daily for the duration of treatment until resolved and include in your discharge summary.

## 2019-02-02 DIAGNOSIS — M19.90 OSTEOARTHRITIS, UNSPECIFIED OSTEOARTHRITIS TYPE, UNSPECIFIED SITE: ICD-10-CM

## 2019-02-03 DIAGNOSIS — J30.9 ALLERGIC RHINITIS: ICD-10-CM

## 2019-02-04 ENCOUNTER — OFFICE VISIT (OUTPATIENT)
Dept: OPHTHALMOLOGY | Facility: CLINIC | Age: 69
End: 2019-02-04
Payer: MEDICARE

## 2019-02-04 DIAGNOSIS — E11.9 DM TYPE 2 WITHOUT RETINOPATHY: ICD-10-CM

## 2019-02-04 DIAGNOSIS — Z96.1 PSEUDOPHAKIA: ICD-10-CM

## 2019-02-04 DIAGNOSIS — I10 ESSENTIAL HYPERTENSION: ICD-10-CM

## 2019-02-04 DIAGNOSIS — H40.1133 PRIMARY OPEN ANGLE GLAUCOMA OF BOTH EYES, SEVERE STAGE: Primary | ICD-10-CM

## 2019-02-04 DIAGNOSIS — H52.7 REFRACTIVE ERROR: ICD-10-CM

## 2019-02-04 DIAGNOSIS — H43.812 VITREOUS DETACHMENT, LEFT: ICD-10-CM

## 2019-02-04 PROCEDURE — 92014 COMPRE OPH EXAM EST PT 1/>: CPT | Mod: S$GLB,,, | Performed by: OPHTHALMOLOGY

## 2019-02-04 PROCEDURE — 99999 PR PBB SHADOW E&M-EST. PATIENT-LVL II: CPT | Mod: PBBFAC,,, | Performed by: OPHTHALMOLOGY

## 2019-02-04 PROCEDURE — 99999 PR PBB SHADOW E&M-EST. PATIENT-LVL II: ICD-10-PCS | Mod: PBBFAC,,, | Performed by: OPHTHALMOLOGY

## 2019-02-04 PROCEDURE — 92014 PR EYE EXAM, EST PATIENT,COMPREHESV: ICD-10-PCS | Mod: S$GLB,,, | Performed by: OPHTHALMOLOGY

## 2019-02-04 RX ORDER — DORZOLAMIDE HYDROCHLORIDE AND TIMOLOL MALEATE 20; 5 MG/ML; MG/ML
1 SOLUTION/ DROPS OPHTHALMIC 2 TIMES DAILY
Qty: 10 ML | Refills: 3 | Status: SHIPPED | OUTPATIENT
Start: 2019-02-04 | End: 2019-07-09 | Stop reason: SDUPTHER

## 2019-02-04 RX ORDER — MELOXICAM 15 MG/1
15 TABLET ORAL DAILY
Qty: 90 TABLET | Refills: 1 | Status: SHIPPED | OUTPATIENT
Start: 2019-02-04 | End: 2019-02-05

## 2019-02-04 RX ORDER — LATANOPROST 50 UG/ML
1 SOLUTION/ DROPS OPHTHALMIC NIGHTLY
Qty: 2.5 ML | Refills: 6 | Status: SHIPPED | OUTPATIENT
Start: 2019-02-04 | End: 2019-06-29 | Stop reason: SDUPTHER

## 2019-02-04 RX ORDER — LORATADINE 10 MG/1
10 TABLET ORAL DAILY PRN
Qty: 90 TABLET | Refills: 2 | Status: SHIPPED | OUTPATIENT
Start: 2019-02-04 | End: 2019-03-07 | Stop reason: SDUPTHER

## 2019-02-04 NOTE — PROGRESS NOTES
Subjective:       Patient ID: Deb Webb is a 68 y.o. female.    Chief Complaint: Glaucoma (POAG of both eyes, severe stage.)    HPI     Glaucoma      Additional comments: POAG of both eyes, severe stage.              Comments     68 y.o. Female is here for POAG of both eyes, severe stage. Pt had two   mild strokes a couple of weeks ago. Pt states that she has fainted over 10   times, since November. Health problems happen due to Diabetic Neuropathy.   Denies eye pain. No longer seeing f/f. No noticeable VA changes since last   visit. Tearing and burning bilateral. No itching.     Eye Meds: Latanoprost qhs OU                     Cosopt BID OU           Last edited by Christiano Mccartney MD on 2/4/2019 12:18 PM. (History)             Assessment:       1. Primary open angle glaucoma of both eyes, severe stage    2. Vitreous detachment, left    3. DM type 2 without retinopathy    4. Essential hypertension    5. Refractive error    6. Pseudophakia        Plan:       POAG OU-IOP's elevated OU but Pt has not been using gtts properly. ON's appear stable OU.  PVD OS-Stable.  DM-No NPDR OU.  HTN-No retinopathy OU.  RE-Pt wants MRx.        Cont Xalatan qhs OU & Cosopt bid OU.  Control DM & HTN.  RTC 3 mos for IOP's, HVF's & OCT's.

## 2019-02-05 ENCOUNTER — OFFICE VISIT (OUTPATIENT)
Dept: FAMILY MEDICINE | Facility: CLINIC | Age: 69
End: 2019-02-05
Payer: MEDICARE

## 2019-02-05 ENCOUNTER — TELEPHONE (OUTPATIENT)
Dept: FAMILY MEDICINE | Facility: CLINIC | Age: 69
End: 2019-02-05

## 2019-02-05 VITALS
OXYGEN SATURATION: 95 % | BODY MASS INDEX: 23.46 KG/M2 | SYSTOLIC BLOOD PRESSURE: 120 MMHG | HEIGHT: 61 IN | WEIGHT: 124.25 LBS | DIASTOLIC BLOOD PRESSURE: 82 MMHG | HEART RATE: 113 BPM | TEMPERATURE: 98 F

## 2019-02-05 DIAGNOSIS — E78.2 COMBINED HYPERLIPIDEMIA ASSOCIATED WITH TYPE 2 DIABETES MELLITUS: Chronic | ICD-10-CM

## 2019-02-05 DIAGNOSIS — F13.20 SEDATIVE HYPNOTIC OR ANXIOLYTIC DEPENDENCE: ICD-10-CM

## 2019-02-05 DIAGNOSIS — J42 CHRONIC BRONCHITIS, UNSPECIFIED CHRONIC BRONCHITIS TYPE: Chronic | ICD-10-CM

## 2019-02-05 DIAGNOSIS — I63.9 CEREBROVASCULAR ACCIDENT (CVA), UNSPECIFIED MECHANISM: ICD-10-CM

## 2019-02-05 DIAGNOSIS — I27.20 PULMONARY HYPERTENSION: ICD-10-CM

## 2019-02-05 DIAGNOSIS — Q07.9 CONGENITAL TILTED OPTIC NERVE: ICD-10-CM

## 2019-02-05 DIAGNOSIS — R55 SYNCOPE, UNSPECIFIED SYNCOPE TYPE: Primary | ICD-10-CM

## 2019-02-05 DIAGNOSIS — S42.018D CLOSED NONDISPLACED FRACTURE OF STERNAL END OF LEFT CLAVICLE WITH ROUTINE HEALING, SUBSEQUENT ENCOUNTER: ICD-10-CM

## 2019-02-05 DIAGNOSIS — E11.69 COMBINED HYPERLIPIDEMIA ASSOCIATED WITH TYPE 2 DIABETES MELLITUS: Chronic | ICD-10-CM

## 2019-02-05 DIAGNOSIS — I10 ESSENTIAL HYPERTENSION: Chronic | ICD-10-CM

## 2019-02-05 DIAGNOSIS — I50.42 CHRONIC COMBINED SYSTOLIC AND DIASTOLIC CONGESTIVE HEART FAILURE: ICD-10-CM

## 2019-02-05 PROCEDURE — 3074F SYST BP LT 130 MM HG: CPT | Mod: CPTII,S$GLB,, | Performed by: FAMILY MEDICINE

## 2019-02-05 PROCEDURE — 3074F PR MOST RECENT SYSTOLIC BLOOD PRESSURE < 130 MM HG: ICD-10-PCS | Mod: CPTII,S$GLB,, | Performed by: FAMILY MEDICINE

## 2019-02-05 PROCEDURE — 3079F PR MOST RECENT DIASTOLIC BLOOD PRESSURE 80-89 MM HG: ICD-10-PCS | Mod: CPTII,S$GLB,, | Performed by: FAMILY MEDICINE

## 2019-02-05 PROCEDURE — 99999 PR PBB SHADOW E&M-EST. PATIENT-LVL V: CPT | Mod: PBBFAC,,, | Performed by: FAMILY MEDICINE

## 2019-02-05 PROCEDURE — 99499 UNLISTED E&M SERVICE: CPT | Mod: S$GLB,,, | Performed by: FAMILY MEDICINE

## 2019-02-05 PROCEDURE — 3045F PR MOST RECENT HEMOGLOBIN A1C LEVEL 7.0-9.0%: CPT | Mod: CPTII,S$GLB,, | Performed by: FAMILY MEDICINE

## 2019-02-05 PROCEDURE — 3079F DIAST BP 80-89 MM HG: CPT | Mod: CPTII,S$GLB,, | Performed by: FAMILY MEDICINE

## 2019-02-05 PROCEDURE — 1101F PR PT FALLS ASSESS DOC 0-1 FALLS W/OUT INJ PAST YR: ICD-10-PCS | Mod: CPTII,S$GLB,, | Performed by: FAMILY MEDICINE

## 2019-02-05 PROCEDURE — 99999 PR PBB SHADOW E&M-EST. PATIENT-LVL V: ICD-10-PCS | Mod: PBBFAC,,, | Performed by: FAMILY MEDICINE

## 2019-02-05 PROCEDURE — 1101F PT FALLS ASSESS-DOCD LE1/YR: CPT | Mod: CPTII,S$GLB,, | Performed by: FAMILY MEDICINE

## 2019-02-05 PROCEDURE — 99214 OFFICE O/P EST MOD 30 MIN: CPT | Mod: S$GLB,,, | Performed by: FAMILY MEDICINE

## 2019-02-05 PROCEDURE — 99499 RISK ADDL DX/OHS AUDIT: ICD-10-PCS | Mod: S$GLB,,, | Performed by: FAMILY MEDICINE

## 2019-02-05 PROCEDURE — 3045F PR MOST RECENT HEMOGLOBIN A1C LEVEL 7.0-9.0%: ICD-10-PCS | Mod: CPTII,S$GLB,, | Performed by: FAMILY MEDICINE

## 2019-02-05 PROCEDURE — 99214 PR OFFICE/OUTPT VISIT, EST, LEVL IV, 30-39 MIN: ICD-10-PCS | Mod: S$GLB,,, | Performed by: FAMILY MEDICINE

## 2019-02-05 RX ORDER — TRAMADOL HYDROCHLORIDE 50 MG/1
50 TABLET ORAL EVERY 8 HOURS PRN
Qty: 21 TABLET | Refills: 0 | Status: SHIPPED | OUTPATIENT
Start: 2019-02-05 | End: 2020-01-08 | Stop reason: SDUPTHER

## 2019-02-05 NOTE — PROGRESS NOTES
[facility]  Progress Note    SUBJECTIVE:     Chief Complaint   Patient presents with    Hospital Follow Up       HPI   Deb Webb  is a 68 y.o. female here with daughter for hospital follow-up for having a syncopal episode. Work-up revealedd small embolic infarctions. Was seen by neurology  And felt that symptoms do not match MRI results. U/s carotid/echo did not show any thrombus. Doing better today, has home PT setup. Brought glucose log which has been around 140 to 180s, which she checked once in the AM before breakfast. Patient has no other new complaints/problems at this time.      Review of patient's allergies indicates:   Allergen Reactions    Silicone      Burn skin    Adhesive Rash       Past Medical History:   Diagnosis Date    Allergy     Arthritis     Cataract     Colon polyps     COPD (chronic obstructive pulmonary disease)     Diabetes mellitus type II     Diabetic neuropathy     Fever blister     Glaucoma (increased eye pressure)     Hyperlipidemia     Hypertension     Irritable bowel syndrome     Keloid cicatrix     Osteoporosis     Retained cholelithiasis following cholecystectomy(997.41)     Right patella fracture      Past Surgical History:   Procedure Laterality Date    BACK SURGERY  2006    CATARACT EXTRACTION W/  INTRAOCULAR LENS IMPLANT Bilateral     CHOLECYSTECTOMY      Laparoscopic    COLONOSCOPY      COLONOSCOPY N/A 5/2/2013    [facility] ([facility])    HERNIA REPAIR      HYSTERECTOMY      KNEE SURGERY Right 3/4/2015    Dr Weller     OOPHORECTOMY      ORIF-PATELLA / C-ARM Right 3/4/2015    [facility]airon [facility] OR    ovarian tumor      Benign    TONSILLECTOMY, ADENOIDECTOMY       Family History   Problem Relation Age of Onset    Cancer Mother         Skin    Skin cancer Mother     Glaucoma Mother     Cataracts Mother     Diabetes Mother     Heart disease Father     Diabetes  Father     Skin cancer Sister     Diabetes Sister     Diabetes Daughter     Melanoma Neg Hx     Psoriasis Neg Hx     Eczema Neg Hx     Lupus Neg Hx     Amblyopia Neg Hx     Blindness Neg Hx     Macular degeneration Neg Hx     Retinal detachment Neg Hx     Strabismus Neg Hx      Social History     Tobacco Use    Smoking status: Current Every Day Smoker     Packs/day: 0.50     Years: 40.00     Pack years: 20.00     Types: Cigarettes    Smokeless tobacco: Current User   Substance Use Topics    Alcohol use: No    Drug use: No        Review of Systems   Constitutional: Negative for chills, fever and malaise/fatigue.   HENT: Negative.    Respiratory: Negative.  Negative for cough and shortness of breath.    Cardiovascular: Negative.  Negative for chest pain.   Gastrointestinal: Negative.  Negative for abdominal pain, nausea and vomiting.   Genitourinary: Negative.    Neurological: Positive for tremors. Negative for weakness and headaches.        + syncope   All other systems reviewed and are negative.    OBJECTIVE:     Vitals:    02/05/19 0925   BP: 120/82   Pulse: (!) 113   Temp: 98.4 °F (36.9 °C)     Body mass index is 23.47 kg/m².    Physical Exam   Constitutional: She is oriented to person, place, and time and well-developed, well-nourished, and in no distress. No distress.   HENT:   Head: Normocephalic and atraumatic.   Nose: Nose normal.   Eyes: Conjunctivae and EOM are normal.   Cardiovascular: Regular rhythm and normal heart sounds. Tachycardia present. Exam reveals no gallop and no friction rub.   No murmur heard.  Pulmonary/Chest: Effort normal and breath sounds normal. No respiratory distress. She has no wheezes. She has no rales. She exhibits no tenderness.   Abdominal: Soft. Bowel sounds are normal. She exhibits no distension. There is no tenderness. There is no rebound.   Musculoskeletal: She exhibits tenderness (to left medial clavicle).   Neurological: She is alert and oriented to person,  place, and time.   Skin: Skin is warm. She is not diaphoretic.       Old records were reviewed. Labs and/or images were independently reviewed.    ASSESSMENT     1. Syncope, unspecified syncope type    2. Cerebrovascular accident (CVA), unspecified mechanism    3. Closed nondisplaced fracture of sternal end of left clavicle with routine healing, subsequent encounter    4. Uncontrolled type 2 diabetes mellitus with diabetic neuropathy, without long-term current use of insulin    5. Essential hypertension    6. Combined hyperlipidemia associated with type 2 diabetes mellitus    7. Pulmonary hypertension    8. Chronic combined systolic and diastolic congestive heart failure    9. 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 Chronic bronchitis, unspecified chronic bronchitis type        PLAN:     Syncope, unspecified syncope type  -     WALKER FOR HOME USE  -      Has home PT for gait strengthening.     Cerebrovascular accident (CVA), unspecified mechanism  -     WALKER FOR HOME USE  -     Already on high intensity statin, do not see any further benefit of increasing lipitor to 80 mg, with LDL < 50. Continue current regimen, needs further improvement in diabetes. She is improving slowly. F/u with endo, with glucose log 3x a day. F/u with neuro as scheduled. Continue with aspirin.    Closed nondisplaced fracture of sternal end of left clavicle with routine healing, subsequent encounter  -     traMADol (ULTRAM) 50 mg tablet; Take 1 tablet (50 mg total) by mouth every 8 (eight) hours as needed for Pain.  Dispense: 21 tablet; Refill: 0  -     Conservative management per ortho. F/u as scheduled.     Uncontrolled type 2 diabetes mellitus with diabetic neuropathy, without long-term current use of insulin  -     WALKER FOR HOME USE  -     F/u with endo.    Essential hypertension   -     Stable. Continue current regimen.    Combined hyperlipidemia associated with type 2 diabetes  mellitus   -     Stable. Continue current regimen.    Pulmonary hypertension   -     Stable. Continue current regimen.    Chronic combined systolic and diastolic congestive heart failure   -     Stable. Continue current regimen.    Sedative hypnotic or anxiolytic dependence   -     Stable. Continue current regimen.    Congenital tilted optic nerve   -     Stable. Continue current regimen.    Chronic bronchitis, unspecified chronic bronchitis type   -     Stable. Continue current regimen.      RTC PRLUAN Goldberg MD  02/05/2019 10:12 AM

## 2019-02-12 ENCOUNTER — TELEPHONE (OUTPATIENT)
Dept: FAMILY MEDICINE | Facility: CLINIC | Age: 69
End: 2019-02-12

## 2019-02-12 DIAGNOSIS — R26.81 GAIT INSTABILITY: Primary | ICD-10-CM

## 2019-02-12 NOTE — TELEPHONE ENCOUNTER
Patient is requesting to go to outpatient therapy through [facility] for Gait Training. Need script to go to [facility].

## 2019-02-12 NOTE — TELEPHONE ENCOUNTER
----- Message from Lex Drake sent at 2/12/2019 10:51 AM CST -----  Contact: JAVON [phone]  The patient is calling to get [facility], so that she can do outpatient therapy. Please call at your earliest convenience.

## 2019-02-19 DIAGNOSIS — R42 DIZZINESS: ICD-10-CM

## 2019-02-19 RX ORDER — MECLIZINE HCL 12.5 MG 12.5 MG/1
12.5 TABLET ORAL 3 TIMES DAILY PRN
Qty: 20 TABLET | Refills: 0 | Status: SHIPPED | OUTPATIENT
Start: 2019-02-19 | End: 2019-03-07 | Stop reason: SDUPTHER

## 2019-02-20 ENCOUNTER — OFFICE VISIT (OUTPATIENT)
Dept: ENDOCRINOLOGY | Facility: CLINIC | Age: 69
End: 2019-02-20
Payer: MEDICARE

## 2019-02-20 VITALS
DIASTOLIC BLOOD PRESSURE: 92 MMHG | WEIGHT: 135.56 LBS | SYSTOLIC BLOOD PRESSURE: 134 MMHG | HEIGHT: 61 IN | BODY MASS INDEX: 25.59 KG/M2 | HEART RATE: 106 BPM

## 2019-02-20 DIAGNOSIS — E11.649 HYPOGLYCEMIA ASSOCIATED WITH DIABETES: ICD-10-CM

## 2019-02-20 DIAGNOSIS — I10 ESSENTIAL HYPERTENSION: ICD-10-CM

## 2019-02-20 DIAGNOSIS — R60.0 EDEMA LEG: ICD-10-CM

## 2019-02-20 PROCEDURE — 99214 OFFICE O/P EST MOD 30 MIN: CPT | Mod: S$GLB,,, | Performed by: NURSE PRACTITIONER

## 2019-02-20 PROCEDURE — 3075F SYST BP GE 130 - 139MM HG: CPT | Mod: CPTII,S$GLB,, | Performed by: NURSE PRACTITIONER

## 2019-02-20 PROCEDURE — 99214 PR OFFICE/OUTPT VISIT, EST, LEVL IV, 30-39 MIN: ICD-10-PCS | Mod: S$GLB,,, | Performed by: NURSE PRACTITIONER

## 2019-02-20 PROCEDURE — 99999 PR PBB SHADOW E&M-EST. PATIENT-LVL V: ICD-10-PCS | Mod: PBBFAC,,, | Performed by: NURSE PRACTITIONER

## 2019-02-20 PROCEDURE — 3045F PR MOST RECENT HEMOGLOBIN A1C LEVEL 7.0-9.0%: ICD-10-PCS | Mod: CPTII,S$GLB,, | Performed by: NURSE PRACTITIONER

## 2019-02-20 PROCEDURE — 3080F DIAST BP >= 90 MM HG: CPT | Mod: CPTII,S$GLB,, | Performed by: NURSE PRACTITIONER

## 2019-02-20 PROCEDURE — 1101F PR PT FALLS ASSESS DOC 0-1 FALLS W/OUT INJ PAST YR: ICD-10-PCS | Mod: CPTII,S$GLB,, | Performed by: NURSE PRACTITIONER

## 2019-02-20 PROCEDURE — 3045F PR MOST RECENT HEMOGLOBIN A1C LEVEL 7.0-9.0%: CPT | Mod: CPTII,S$GLB,, | Performed by: NURSE PRACTITIONER

## 2019-02-20 PROCEDURE — 3075F PR MOST RECENT SYSTOLIC BLOOD PRESS GE 130-139MM HG: ICD-10-PCS | Mod: CPTII,S$GLB,, | Performed by: NURSE PRACTITIONER

## 2019-02-20 PROCEDURE — 99499 RISK ADDL DX/OHS AUDIT: ICD-10-PCS | Mod: S$GLB,,, | Performed by: NURSE PRACTITIONER

## 2019-02-20 PROCEDURE — 99999 PR PBB SHADOW E&M-EST. PATIENT-LVL V: CPT | Mod: PBBFAC,,, | Performed by: NURSE PRACTITIONER

## 2019-02-20 PROCEDURE — 1101F PT FALLS ASSESS-DOCD LE1/YR: CPT | Mod: CPTII,S$GLB,, | Performed by: NURSE PRACTITIONER

## 2019-02-20 PROCEDURE — 3080F PR MOST RECENT DIASTOLIC BLOOD PRESSURE >= 90 MM HG: ICD-10-PCS | Mod: CPTII,S$GLB,, | Performed by: NURSE PRACTITIONER

## 2019-02-20 PROCEDURE — 99499 UNLISTED E&M SERVICE: CPT | Mod: S$GLB,,, | Performed by: NURSE PRACTITIONER

## 2019-02-20 NOTE — PATIENT INSTRUCTIONS
Reduce insulin to 10 units every evening. Start today.   Continue to take glimepiride once daily with breakfast.

## 2019-02-20 NOTE — PROGRESS NOTES
CC: This 68 y.o. White female  is here for evaluation of  T2DM along with comorbidities indicated in the Visit Diagnosis section of this encounter.    HPI: Deb Webb was diagnosed with T2DM in her 40s. Oral agent started at the time of diagnosis.     Prior visit on 11/30/18  She was approved for free insulin from [employer] but she did not pick it up until weeks later on 10/1/18. She no-showed her f/u visit with me a month earlier.   a1c down from 11.4 to 8.5%. Has not been able to test her BG because she does not have testing supplies. She does feel better overall.   Stopped metformin a month ago d/t diarrhea despite taking fiber supplement. However, she continued to have diarrhea up until recently.   Plan Unable to optimally adjust DM meds as there is no SMBG.   Please call [employer] to set up an account - [phone].   Please call our office if you do not get your testing supplies by the end of next week.   Test blood sugar 2x/day. Keep a blood sugar log.   Return to clinic in a month and bring your log.     Interval history  She was seen by Dr. Moiz Goldberg about 2 weeks ago and was advised to increase her insulin dose from 14 to 15 units. She increased it to16 units bc she didn't see 15 units on her pen. But she resumed 14 units r/t hypoglycemia after about 5 days.     She was recently admitted for 4 days late Jan 2019 for syncopal episode. Work-up revealedd small embolic infarctions. Was seen by neurology  And felt that symptoms do not match MRI results. U/s carotid/echo did not show any thrombus.    LAST DIABETES EDUCATION: 7/2/18 - found visit helpful   10/1/18 for insulin training     PRESCIBED DIABETES MEDICATIONS: glimepiride 8 mg once daily in AM, Lantus Solostar 14 units every evening     Misses medication doses - No    DM COMPLICATIONS: nephropathy and peripheral neuropathy    SIGNIFICANT DIABETES MED HISTORY: diarrhea on metformin   She started Trulicity ~ march 2018 but had to stop a few months  "later d/t cost $ 177, up from $30. She is in rx gap. States her BGs were in the low 200s on the Trulicity.       SELF MONITORING BLOOD GLUCOSE: Checks blood glucose at home - 2x/day.              HYPOGLYCEMIC EPISODES: symptoms start in the 50s - starts getting dizzy      CURRENT DIET: drinks mostly water and occ Coke Zero. Eats about 2-3x/day. Trying to eat healthier.     CURRENT EXERCISE: will start PT in about a month.       BP (!) 134/92 (BP Location: Left arm, Patient Position: Sitting, BP Method: Large (Automatic))   Pulse 106   Ht 5' 1" (1.549 m)   Wt 61.5 kg (135 lb 9.3 oz)   BMI 25.62 kg/m²          ROS:   CONSTITUTIONAL: Appetite good, denies fatigue  NEURO: + Imbalance       PHYSICAL EXAM:  GENERAL: Well developed, well nourished. No acute distress.   PSYCH: AAOx3, appropriate mood and affect, conversant, well-groomed. Judgement and insight good.   NEURO: Cranial nerves grossly intact. Speech clear, + tremor to head and hands   CHEST: Respirations even and unlabored.   CARDIOVASCULAR:  + 2 pitting ble edema.           Hemoglobin A1C   Date Value Ref Range Status   01/24/2019 8.4 (H) 4.0 - 5.6 % Final     Comment:     ADA Screening Guidelines:  5.7-6.4%  Consistent with prediabetes  >or=6.5%  Consistent with diabetes  High levels of fetal hemoglobin interfere with the HbA1C  assay. Heterozygous hemoglobin variants (HbS, HgC, etc)do  not significantly interfere with this assay.   However, presence of multiple variants may affect accuracy.     11/16/2018 8.5 (H) 4.0 - 5.6 % Final     Comment:     ADA Screening Guidelines:  5.7-6.4%  Consistent with prediabetes  >or=6.5%  Consistent with diabetes  High levels of fetal hemoglobin interfere with the HbA1C  assay. Heterozygous hemoglobin variants (HbS, HgC, etc)do  not significantly interfere with this assay.   However, presence of multiple variants may affect accuracy.     07/09/2018 11.4 (H) 4.0 - 5.6 % Final     Comment:     ADA Screening " Guidelines:  5.7-6.4%  Consistent with prediabetes  >or=6.5%  Consistent with diabetes  High levels of fetal hemoglobin interfere with the HbA1C  assay. Heterozygous hemoglobin variants (HbS, HgC, etc)do  not significantly interfere with this assay.   However, presence of multiple variants may affect accuracy.             Chemistry        Component Value Date/Time     01/27/2019 0633    K 3.3 (L) 01/27/2019 0633     01/27/2019 0633    CO2 26 01/27/2019 0633    BUN 10 01/27/2019 0633    CREATININE 0.8 01/27/2019 0633     (H) 01/27/2019 0633        Component Value Date/Time    CALCIUM 9.2 01/27/2019 0633    ALKPHOS 93 01/27/2019 0633    AST 14 01/27/2019 0633    ALT 10 01/27/2019 0633    BILITOT 0.5 01/27/2019 0633    ESTGFRAFRICA >60 01/27/2019 0633    EGFRNONAA >60 01/27/2019 0633          Lab Results   Component Value Date    LDLCALC 43.2 (L) 01/25/2019          Ref. Range 1/25/2019 05:54   Cholesterol Latest Ref Range: 120 - 199 mg/dL 104 (L)   HDL Latest Ref Range: 40 - 75 mg/dL 37 (L)   HDL/Chol Ratio Latest Ref Range: 20.0 - 50.0 % 35.6   LDL Cholesterol Latest Ref Range: 63.0 - 159.0 mg/dL 43.2 (L)   Non-HDL Cholesterol Latest Units: mg/dL 67   Total Cholesterol/HDL Ratio Latest Ref Range: 2.0 - 5.0  2.8   Triglycerides Latest Ref Range: 30 - 150 mg/dL 119       Lab Results   Component Value Date    MICALBCREAT 33.3 (H) 01/03/2018           STANDARDS of CARE:        ASA:               Last eye exam:       ASSESSMENT and PLAN:    A1C GOAL: < 7 %     1. Uncontrolled type 2 diabetes mellitus with diabetic neuropathy, without long-term current use of insulin  Reduce Lantus to 10 units daily.   Test blood sugar 2x/day. Send log in 1 week. Continue glimepiride.   rtc in 5 mo with labs prior.     Hemoglobin A1c    Microalbumin/creatinine urine ratio   2. Edema leg  Has not yet taken lasix yet. Advised her to do so when she gets home.    3. Essential hypertension  As above.    4. Hypoglycemia  associated with diabetes  As above.        Orders Placed This Encounter   Procedures    Hemoglobin A1c     Standing Status:   Future     Standing Expiration Date:   4/20/2020    Microalbumin/creatinine urine ratio     Standing Status:   Future     Standing Expiration Date:   4/20/2020     Order Specific Question:   Specimen Source     Answer:   Urine        Follow-up in about 5 months (around 7/20/2019).

## 2019-02-21 ENCOUNTER — TELEPHONE (OUTPATIENT)
Dept: FAMILY MEDICINE | Facility: CLINIC | Age: 69
End: 2019-02-21

## 2019-02-21 NOTE — TELEPHONE ENCOUNTER
----- Message from Isabelle Flores sent at 2/21/2019 10:24 AM CST -----  Contact: self   Pt is requesting a refill on      meclizine (ANTIVERT) 12.5 mg tablet     Pls call ...    [facility]/pharmacy #[id number] [place], [place]  [facility] [address] [address]  Phone: [phone] Fax: [phone]    Thanks......ALICJA

## 2019-02-23 DIAGNOSIS — J42 CHRONIC BRONCHITIS, UNSPECIFIED CHRONIC BRONCHITIS TYPE: Chronic | ICD-10-CM

## 2019-02-24 RX ORDER — UMECLIDINIUM 62.5 UG/1
AEROSOL, POWDER ORAL
Qty: 30 EACH | Refills: 2 | Status: SHIPPED | OUTPATIENT
Start: 2019-02-24 | End: 2019-05-29 | Stop reason: SDUPTHER

## 2019-02-27 ENCOUNTER — TELEPHONE (OUTPATIENT)
Dept: ENDOCRINOLOGY | Facility: CLINIC | Age: 69
End: 2019-02-27

## 2019-02-27 NOTE — TELEPHONE ENCOUNTER
----- Message from Sussy Goldberg NP sent at 2/27/2019  8:25 AM CST -----      ----- Message -----  From: Sussy Goldberg NP  Sent: 2/27/2019  To: Sussy Goldberg NP    Need log

## 2019-02-27 NOTE — TELEPHONE ENCOUNTER
Called patient and informed her we need a BG log. Patient stated she will drop it off either this week or early next week.

## 2019-03-07 ENCOUNTER — OFFICE VISIT (OUTPATIENT)
Dept: FAMILY MEDICINE | Facility: CLINIC | Age: 69
End: 2019-03-07
Payer: MEDICARE

## 2019-03-07 VITALS
BODY MASS INDEX: 25.62 KG/M2 | HEIGHT: 61 IN | DIASTOLIC BLOOD PRESSURE: 106 MMHG | TEMPERATURE: 98 F | HEART RATE: 100 BPM | SYSTOLIC BLOOD PRESSURE: 148 MMHG | WEIGHT: 135.69 LBS | OXYGEN SATURATION: 96 %

## 2019-03-07 DIAGNOSIS — R26.81 UNSTABLE GAIT: ICD-10-CM

## 2019-03-07 DIAGNOSIS — S99.929A INJURY OF TOE, UNSPECIFIED LATERALITY, INITIAL ENCOUNTER: ICD-10-CM

## 2019-03-07 DIAGNOSIS — R42 DIZZINESS: ICD-10-CM

## 2019-03-07 DIAGNOSIS — J30.9 ALLERGIC RHINITIS: ICD-10-CM

## 2019-03-07 DIAGNOSIS — I10 ESSENTIAL HYPERTENSION, BENIGN: Primary | ICD-10-CM

## 2019-03-07 PROCEDURE — 99999 PR PBB SHADOW E&M-EST. PATIENT-LVL IV: CPT | Mod: PBBFAC,,, | Performed by: FAMILY MEDICINE

## 2019-03-07 PROCEDURE — 99214 PR OFFICE/OUTPT VISIT, EST, LEVL IV, 30-39 MIN: ICD-10-PCS | Mod: S$GLB,,, | Performed by: FAMILY MEDICINE

## 2019-03-07 PROCEDURE — 3045F PR MOST RECENT HEMOGLOBIN A1C LEVEL 7.0-9.0%: CPT | Mod: CPTII,S$GLB,, | Performed by: FAMILY MEDICINE

## 2019-03-07 PROCEDURE — 3080F DIAST BP >= 90 MM HG: CPT | Mod: CPTII,S$GLB,, | Performed by: FAMILY MEDICINE

## 2019-03-07 PROCEDURE — 99214 OFFICE O/P EST MOD 30 MIN: CPT | Mod: S$GLB,,, | Performed by: FAMILY MEDICINE

## 2019-03-07 PROCEDURE — 99999 PR PBB SHADOW E&M-EST. PATIENT-LVL IV: ICD-10-PCS | Mod: PBBFAC,,, | Performed by: FAMILY MEDICINE

## 2019-03-07 PROCEDURE — 3077F SYST BP >= 140 MM HG: CPT | Mod: CPTII,S$GLB,, | Performed by: FAMILY MEDICINE

## 2019-03-07 PROCEDURE — 3080F PR MOST RECENT DIASTOLIC BLOOD PRESSURE >= 90 MM HG: ICD-10-PCS | Mod: CPTII,S$GLB,, | Performed by: FAMILY MEDICINE

## 2019-03-07 PROCEDURE — 1101F PR PT FALLS ASSESS DOC 0-1 FALLS W/OUT INJ PAST YR: ICD-10-PCS | Mod: CPTII,S$GLB,, | Performed by: FAMILY MEDICINE

## 2019-03-07 PROCEDURE — 3045F PR MOST RECENT HEMOGLOBIN A1C LEVEL 7.0-9.0%: ICD-10-PCS | Mod: CPTII,S$GLB,, | Performed by: FAMILY MEDICINE

## 2019-03-07 PROCEDURE — 3077F PR MOST RECENT SYSTOLIC BLOOD PRESSURE >= 140 MM HG: ICD-10-PCS | Mod: CPTII,S$GLB,, | Performed by: FAMILY MEDICINE

## 2019-03-07 PROCEDURE — 1101F PT FALLS ASSESS-DOCD LE1/YR: CPT | Mod: CPTII,S$GLB,, | Performed by: FAMILY MEDICINE

## 2019-03-07 RX ORDER — MECLIZINE HCL 12.5 MG 12.5 MG/1
12.5 TABLET ORAL 3 TIMES DAILY PRN
Qty: 30 TABLET | Refills: 0 | Status: SHIPPED | OUTPATIENT
Start: 2019-03-07 | End: 2019-06-28 | Stop reason: SDUPTHER

## 2019-03-07 RX ORDER — OMEPRAZOLE 40 MG/1
40 CAPSULE, DELAYED RELEASE ORAL
Refills: 2 | COMMUNITY
Start: 2019-02-27 | End: 2019-11-25 | Stop reason: SDUPTHER

## 2019-03-07 RX ORDER — IRBESARTAN 75 MG/1
75 TABLET ORAL DAILY
Qty: 90 TABLET | Refills: 3 | Status: SHIPPED | OUTPATIENT
Start: 2019-03-07 | End: 2020-03-06

## 2019-03-07 RX ORDER — LORATADINE 10 MG/1
10 TABLET ORAL DAILY PRN
Qty: 90 TABLET | Refills: 2 | Status: SHIPPED | OUTPATIENT
Start: 2019-03-07 | End: 2019-11-20 | Stop reason: SDUPTHER

## 2019-03-07 NOTE — PROGRESS NOTES
[facility]  Progress Note    SUBJECTIVE:     Chief Complaint   Patient presents with    Toe Injury       HPI   Deb Webb  is a 68 y.o. female here for follow up of her chronic conditions. Been checking sugars, around 140-180s. Also has issues with toe injury which she stepped on something. Rates pain as mild. She just wants to make sure it is not infected.     Review of patient's allergies indicates:   Allergen Reactions    Silicone      Burn skin    Adhesive Rash       Past Medical History:   Diagnosis Date    Allergy     Arthritis     Cataract     Colon polyps     COPD (chronic obstructive pulmonary disease)     Diabetes mellitus type II     Diabetic neuropathy     Fever blister     Glaucoma (increased eye pressure)     Hyperlipidemia     Hypertension     Irritable bowel syndrome     Keloid cicatrix     Osteoporosis     Retained cholelithiasis following cholecystectomy(997.41)     Right patella fracture      Past Surgical History:   Procedure Laterality Date    BACK SURGERY  2006    CATARACT EXTRACTION W/  INTRAOCULAR LENS IMPLANT Bilateral     CHOLECYSTECTOMY      Laparoscopic    COLONOSCOPY      COLONOSCOPY N/A 5/2/2013    Performed by [facility] at [facility]O (4TH FLR)    HERNIA REPAIR      HYSTERECTOMY      KNEE SURGERY Right 3/4/2015    Dr Weller     OOPHORECTOMY      ORIF-PATELLA / C-ARM Right 3/4/2015    [facility]on [facility] OR    ovarian tumor      Benign    TONSILLECTOMY, ADENOIDECTOMY       Family History   Problem Relation Age of Onset    Cancer Mother         Skin    Skin cancer Mother     Glaucoma Mother     Cataracts Mother     Diabetes Mother     Heart disease Father     Diabetes Father     Skin cancer Sister     Diabetes Sister     Diabetes Daughter     Melanoma Neg Hx     Psoriasis Neg Hx     Eczema Neg Hx     Lupus Neg Hx     Amblyopia Neg Hx     Blindness Neg Hx     Macular degeneration Neg Hx      Retinal detachment Neg Hx     Strabismus Neg Hx      Social History     Tobacco Use    Smoking status: Current Every Day Smoker     Packs/day: 0.50     Years: 40.00     Pack years: 20.00     Types: Cigarettes    Smokeless tobacco: Current User   Substance Use Topics    Alcohol use: No    Drug use: No        Review of Systems   Constitutional: Negative for chills, fever and malaise/fatigue.   HENT: Negative.    Respiratory: Negative.  Negative for cough and shortness of breath.    Cardiovascular: Negative.  Negative for chest pain.   Gastrointestinal: Negative.  Negative for abdominal pain, nausea and vomiting.   Genitourinary: Negative.    Neurological: Negative for weakness and headaches.   All other systems reviewed and are negative.    OBJECTIVE:     Vitals:    03/07/19 0934   BP: (!) 148/106   Pulse: 100   Temp: 97.6 °F (36.4 °C)     Body mass index is 25.64 kg/m².    Physical Exam   Constitutional: She is oriented to person, place, and time and well-developed, well-nourished, and in no distress. No distress.   HENT:   Head: Normocephalic and atraumatic.   Nose: Nose normal.   Eyes: Conjunctivae and EOM are normal.   Cardiovascular: Normal rate, regular rhythm and normal heart sounds. Exam reveals no gallop and no friction rub.   No murmur heard.  Pulmonary/Chest: Effort normal and breath sounds normal. No respiratory distress. She has no wheezes. She has no rales. She exhibits no tenderness.   Abdominal: Soft. Bowel sounds are normal. She exhibits no distension. There is no tenderness. There is no rebound.   Neurological: She is alert and oriented to person, place, and time.   Skin: Skin is warm. She is not diaphoretic.   No skin breaks of big toe. No laceration.     + hard callus on bottom of both feet.      Protective Sensation (w/ 10 gram monofilament):  Right: Decreased  Left: Decreased    Visual Inspection:  Normal -  Bilateral    Pedal Pulses:   Right: Present  Left: Present    Posterior  tibialis:   Right:Present  Left: Present      Old records were reviewed. Labs and/or images were independently reviewed.    ASSESSMENT     1. Essential hypertension, benign    2. Allergic rhinitis    3. Uncontrolled type 2 diabetes mellitus with diabetic neuropathy, without long-term current use of insulin    4. Dizziness    5. Injury of toe, unspecified laterality, initial encounter        PLAN:     Essential hypertension, benign  -     Start irbesartan (AVAPRO) 75 MG tablet; Take 1 tablet (75 mg total) by mouth once daily.  Dispense: 90 tablet; Refill: 3  -     Educated patient to take medications as prescribed, and to record bp logs daily to bring along to next visit. Instructed patient to decrease salt intake. Also told patient to call if any new signs or symptoms develop.    Allergic rhinitis  -     loratadine (CLARITIN) 10 mg tablet; Take 1 tablet (10 mg total) by mouth daily as needed for Allergies (or runny nose).  Dispense: 90 tablet; Refill: 2    Uncontrolled type 2 diabetes mellitus with diabetic neuropathy, without long-term current use of insulin  -     Microalbumin/creatinine urine ratio; Future; Expected date: 03/07/2019  -     DIABETIC SHOES FOR HOME USE  -     Instructed patient to take daily glucose AM logs and to write them down to bring with on next visit. Advised patient to decrease intake of carbohydrates/simple sugars.         Dizziness  -     meclizine (ANTIVERT) 12.5 mg tablet; Take 1 tablet (12.5 mg total) by mouth 3 (three) times daily as needed for Dizziness.  Dispense: 30 tablet; Refill: 0  -     Stable. Continue current regimen.    Injury of toe, unspecified laterality, initial encounter   -     No further testing, treatment necessary.    RTC PRLUAN Goldberg MD  03/07/2019 9:52 AM

## 2019-03-08 ENCOUNTER — TELEPHONE (OUTPATIENT)
Dept: FAMILY MEDICINE | Facility: CLINIC | Age: 69
End: 2019-03-08

## 2019-03-08 DIAGNOSIS — Z12.11 ENCOUNTER FOR FIT (FECAL IMMUNOCHEMICAL TEST) SCREENING: Primary | ICD-10-CM

## 2019-03-08 NOTE — TELEPHONE ENCOUNTER
Spoke with patient and she said that she informed them that she can't do the colonoscopy right now because she is recovering from a broken neck.. Patient want to know if she could do the fit kit instead.

## 2019-03-08 NOTE — TELEPHONE ENCOUNTER
----- Message from Jolene Aguila LPN sent at 3/7/2019  3:48 PM CST -----          03/07/2019  Medical Record # [id number]     Dear Moiz Goldberg MD,    An order for the following procedure,colonoscopy , was placed for Deb Davis Jon. Three attempts have been made to schedule your patient at [phone] (home) .  She has refused to schedule. Please follow up with the patient regarding scheduling this procedure.        Sincerely,  Jolene Aguila LPN [phone]

## 2019-03-11 NOTE — PROGRESS NOTES
Please See Full Physical Therapy Evaluation in Plan of Care                                                        Physical Therapy Initial Evaluation     Name: Deb Webb  Clinic Number: [id number]    Therapy Diagnosis:   Encounter Diagnoses   Name Primary?    Gait difficulty Yes    Impaired functional mobility, balance, gait, and endurance     Muscle weakness      Physician: Moiz Goldberg MD    Physician Orders: PT Eval and Treat  Medical Diagnosis from Referral: Gait Instability  Evaluation Date: 3/12/2019  Authorization Period Expiration: 5/12/19  Plan of Care Expiration: 5/12/19  Visit # / Visits authorized: 1/ 12    Precautions: Standard and Fall, Diabetic Neuropathy B feet, COPD    Functional Limitations Reports - G Codes  Category: Mobility  Tool: FOTO NOC - Musculoskeletal Disorder Survey  Score: 71% Limitation   Modifier  Impairment Limitation Restriction    CH  0 % impaired, limited or restricted    CI  @ least 1% but less than 20% impaired, limited or restricted    CJ  @ least 20%<40% impaired, limited or restricted    CK  @ least 40%<60% impaired, limited or restricted    CL  @ least 60% <80% impaired, limited or restricted    CM  @ least 80%<100% impaired limited or restricted    CN  100% impaired, limited or restricted     Current/  CL = 60-80% limitation  Goal/ : CK =  40-60% limitation    TREATMENT     Deb received therapeutic exercises to develop strength, endurance, ROM, flexibility, posture and core stabilization for 15 minutes including:    Bridges 10x  Seated March 20x ea  Seated LAQ 20x ea  -add balance training    Home Exercises and Patient Education Provided  Education provided:   Written Home Exercises Provided: Patient instructed to cont prior HEP.  Exercises were reviewed and Deb was able to demonstrate them prior to the end of the session.  Deb demonstrated good  understanding of the education provided.     See EMR under Patient Instructions for exercises  provided 3/12/2019.    ASSESSMENT   Pt's spiritual, cultural and educational needs considered and pt agreeable to plan of care and goals as stated below:     Short Term GOALS: 4 weeks. Pt agrees with goals set.  1. Patient demonstrates independence with HEP.   2. Patient demonstrates independence with Postural Awareness.   3. Patient demonstrates increased strength BLE's by 1/3 muscle grade or greater to improve tolerance to functional activities pain free.   4. Patient demonstrates ability to complete Timed Up-and-Go Test by 28 seconds or less, use of AD, no LOB    Long Term GOALS: 8 weeks. Pt agrees with goals set.  1. Patient demonstrates improve Hines Balance Assessment  To 41/56 or greater  2. Patient demonstrates increased strength BLE's to 4/5 or greater to improve tolerance to functional activities pain free.   3. Patient demonstrates improved overall function per FOTO NOC - Musculoskeletal Survey to 60% Limitation or less.   4. Patient demonstrates ability to perform 7 repetitions or more on 30 Second Sit-to-Stand test, use of BUE on arm-rest, no LOB  5. Pt able to ambulate 500 feet, use of AD, appropriate gait patter, no LOB    PLAN     Plan of care Certification: 3/12/2019 to 5/12/19.    Outpatient Physical Therapy 2 times weekly for 8 weeks to include the following interventions: Electrical Stimulation IFC/NMES, Gait Training, Manual Therapy, Moist Heat/ Ice, Neuromuscular Re-ed, Paraffin, Patient Education, Self Care, Therapeutic Activites, Therapeutic Exercise and Ultrasound.  Pt may be seen by PTA as part of the rehabilitation team.     Logan Goldberg, PT  3/12/2019    I have seen the patient, reviewed the therapist's plan of care, and I agree with the plan of care.      I certify the need for these services furnished under this plan of treatment and while under my care.     ___________________ ________ Physician/Referring Practitioner            ___________________________ Date of Signature

## 2019-03-12 ENCOUNTER — CLINICAL SUPPORT (OUTPATIENT)
Dept: REHABILITATION | Facility: HOSPITAL | Age: 69
End: 2019-03-12
Attending: FAMILY MEDICINE
Payer: MEDICARE

## 2019-03-12 DIAGNOSIS — M62.81 MUSCLE WEAKNESS: ICD-10-CM

## 2019-03-12 DIAGNOSIS — R26.9 GAIT DIFFICULTY: Primary | ICD-10-CM

## 2019-03-12 DIAGNOSIS — Z74.09 IMPAIRED FUNCTIONAL MOBILITY, BALANCE, GAIT, AND ENDURANCE: ICD-10-CM

## 2019-03-12 PROCEDURE — 97110 THERAPEUTIC EXERCISES: CPT | Mod: PN

## 2019-03-12 PROCEDURE — 97161 PT EVAL LOW COMPLEX 20 MIN: CPT | Mod: PN

## 2019-03-12 PROCEDURE — G8978 MOBILITY CURRENT STATUS: HCPCS | Mod: CL,PN

## 2019-03-12 PROCEDURE — G8979 MOBILITY GOAL STATUS: HCPCS | Mod: CK,PN

## 2019-03-12 NOTE — TELEPHONE ENCOUNTER
.[employer] was Mailed to patient on 3/12/2019 1:22 PM . Let message on answering machine that [employer] was approved and will be mailed to home with instruction and if she has any questions to return call to clinic.

## 2019-03-12 NOTE — PLAN OF CARE
"                                                      Physical Therapy Initial Evaluation     Name: Deb Webb  Clinic Number: [id number]    Therapy Diagnosis:   Encounter Diagnoses   Name Primary?    Gait difficulty Yes    Impaired functional mobility, balance, gait, and endurance     Muscle weakness      Physician: Moiz Goldberg MD    Physician Orders: PT Eval and Treat  Medical Diagnosis from Referral: Gait Instability  Evaluation Date: 3/12/2019  Authorization Period Expiration: 5/12/19  Plan of Care Expiration: 5/12/19  Visit # / Visits authorized: 1/ 12    Time In: 1000  Time Out: 1100  Total Billable Time: 60 minutes    Precautions: Standard and Fall, Diabetic Neuropathy B feet, COPD    Subjective     Date of onset: ~1.5 months ago  History of current condition - Deb reports: having dizziness spell, she fell on kitchen floor and broke her "neck" and injured ribs. Had fall ~1 month ago. Was previously here for balance and weakness. Reports falling over 5x in last 3 months. Most recent time she fell was due to uncontrolled shaking, similar to seizure, and fell. Believes more of her imbalance and due to dizziness/feeling of faintness, more than LE buckling or ability to lift LE over obstacles. Has been using straight-cane for past ~1 year, however she thought she was improved and didn't have AD in hand when she most recently fell. Supposed to have RW but not approved yet. Believes she can walk ~100 feet before having to sit. She was using sling for L collarbone/sternum and neck     Per Discharge Summary 1/27/19"  She also had a syncopal episode Sunday in which she injured her chest and reports since then she has had chest pain located at the top left of her sternum and worsened with arm movement. She hand a syncopal episode today when she was getting out of her car at the grocery store prompting her to come to the hospital."      Medical History:   Past Medical History:   Diagnosis Date    Allergy  "    Arthritis     Cataract     Colon polyps     COPD (chronic obstructive pulmonary disease)     Diabetes mellitus type II     Diabetic neuropathy     Fever blister     Glaucoma (increased eye pressure)     Hyperlipidemia     Hypertension     Irritable bowel syndrome     Keloid cicatrix     Osteoporosis     Retained cholelithiasis following cholecystectomy(997.41)     Right patella fracture      Surgical History:   Deb Webb  has a past surgical history that includes Cholecystectomy; Hysterectomy; ovarian tumor; Colonoscopy; TONSILLECTOMY, ADENOIDECTOMY; Hernia repair; Knee surgery (Right, 3/4/2015); Oophorectomy; Back surgery (2006); and Cataract extraction w/  intraocular lens implant (Bilateral).    Medications:   Deb has a current medication list which includes the following prescription(s): albuterol, alendronate, aspirin, atorvastatin, blood sugar diagnostic, blood-glucose meter, calcium carbonate/vitamin d3, cyclobenzaprine, diclofenac sodium, dorzolamide-timolol 2-0.5%, fluticasone, furosemide, gabapentin, glimepiride, incruse ellipta, insulin, irbesartan, lancets, latanoprost, loratadine, lorazepam, meclizine, omega-3 fatty acids-vitamin e, omeprazole, pen needle, diabetic, primidone, and tramadol.    Allergies:   Review of patient's allergies indicates:   Allergen Reactions    Silicone      Burn skin    Adhesive Rash      Imaging CT Clavicle 1/24/19  1. Proximal clavicular fracture at midline with overlying edema/hematoma.      Prior Therapy: Yes - last year due to balance/gait  Social History: lives with spouse; lives 1-story home 5 steps with rails to get to deck  Occupation: Retired  Prior Level of Function: mod indep with straight-cane  DME owned/used: straight cane  Current Level of Function: Mod indep with straight-cane    Pain:  Current 5/10, worst 8/10, best 0/10   Location: bilateral lumbar  Description: Aching  Aggravating Factors: weather change, sitting  Easing  Factors: massage, stretching, gentle movement    Pts goals: improve balance, improve walking quality/distance, improve strength    Objective     Mental status : alert, oriented  Posture Alignment : forward head, slouched posture  Sensation: Light Touch: Intact        Flexibility:  Mild deficit in B HS bilaterally    Lower Extremity Strength  Right LE  Left LE    Knee extension: 4/5 Knee extension: 4-/5   Knee flexion: 4-/5 Knee flexion: 4-/5   Hip flexion: 4/5 Hip flexion: 4-/5   Hip abduction: (seated) 4+/5 Hip abduction: 4+/5   Hip adduction: (seated) 4+/5 Hip adduction: 4+/5   Ankle dorsiflexion:   5/5 Ankle dorsiflexion:   5/5   Ankle plantarflexion: 4+/5 Ankle plantarflexion: 4+/5     GAIT: Deb ambulates 50 feet with SPC with mod independently.     GAIT DEVIATIONS: Deb displays occasional unsteadiness with change of directions, decreased stride length, decreased gait speed, forward trunk lean    Eye Head Coordination:  Smooth Pursuit: good motion quality  Convergence/divergence: Intact  Vestibular Ocular Reflex: Intact    30 second Sit to Stand:  5 reps - use of B UE    TU seconds (use of SPC)    SMYTH Assessment  1. Sitting to Standing   3 - able to stand independent using hands  2. Standing Unsupported   2 - able to stand 30 seconds unsupported  3. Sitting Unsupported   4 - able to sit safely and securely 2 minutes  4. Standing to Sitting   3 - controls descent by using hands  5. Pivot Transfer   3 - able to transfer safely with definite use of hands  6. Standing with Eyes Closed   2 - able to stand 3 seconds  7. Standing with Feet Together   2 - able to place feet together independently but unable to hold for 30 seconds  8. Reaching Forward with Outstretched Arm   2 - can reach forward 5 cm/2 inches safely  9. Retrieving Object from Floor   3 - able to pick slipper but needs supervision  10. Turning to Look Behind   4 - looks behind from both sides and weights shifts well  11. Turning 360  Degrees   2 - able to turn 360 safely but slowly  12. Placing Alternate Foot on Step   1 - able to complete > 2 steps needs min assist  13. Standing with One Foot in Front   1 - need help to step but can hold 15 seconds  14. Standing on One Foot   1 - tries to lift leg and unable to hold 3 seconds but remains standing independently  Total: 35  Maximum: 56    Functional Limitations Reports - G Codes  Category: Mobility  Tool: FOTO NOC - Musculoskeletal Disorder Survey  Score: 71% Limitation   Modifier  Impairment Limitation Restriction    CH  0 % impaired, limited or restricted    CI  @ least 1% but less than 20% impaired, limited or restricted    CJ  @ least 20%<40% impaired, limited or restricted    CK  @ least 40%<60% impaired, limited or restricted    CL  @ least 60% <80% impaired, limited or restricted    CM  @ least 80%<100% impaired limited or restricted    CN  100% impaired, limited or restricted     Current/  CL = 60-80% limitation  Goal/ : CK =  40-60% limitation    TREATMENT     Treatment Time In: 1040  Treatment Time Out: 1055  Total Treatment time separate from Evaluation: 15 minutes    Deb received therapeutic exercises to develop strength, endurance, ROM, flexibility, posture and core stabilization for 15 minutes including:    Bridges 10x  Seated March 20x ea  Seated LAQ 20x ea  -add balance training    Home Exercises and Patient Education Provided  Education provided:   Written Home Exercises Provided: Patient instructed to cont prior HEP.  Exercises were reviewed and Deb was able to demonstrate them prior to the end of the session.  Deb demonstrated good  understanding of the education provided.     See EMR under Patient Instructions for exercises provided 3/12/2019.    ASSESSMENT     Deb is a 68 y.o. female referred to outpatient Physical Therapy with a medical diagnosis of Gait Instability. with signs and symptoms including: decreased LE strength, impaired gait quality, high  risk of falls, impaired balance in weight-bearing, decreased endurance, impaired independence with transfers, and decreased tolerance to functional activities. Pt presents ambulating with SPC, with supervision required with change of directions; mod indep with straight-line short clinic distances. Greatest deficit noted with narrow KENNEDY or single-limb stance activities, with inability to maintain stability greater than 2 seconds with tandem (either LE position) and SL stance. High fall risk indicated by 35/56 score on Hines Balance Assessment, subjective history of frequent falls, and prolonged time with TUG. No significant unsteadiness noted when performing TUG, however slow, wide turn required for change of direction. Decreased strength globally throughout B hip, knees, and ankles. Fair light touch sensation intact during sensory testing at plantar surface of B feet.  Deb will benefit from skilled physical therapy intervention to address the above impairments and functional limitations.     The following goals were discussed with the patient and patient is in agreement with them as to be addressed in the treatment plan.   Pt prognosis is Good.  Pt will benefit from skilled outpatient physical therapy to address the above stated deficits, provide pt/family education and to maximize pt's level of independence.     Medical necessity is demonstrated by the following IMPAIRMENTS/PROBLEMS:  weakness, impaired endurance, impaired sensation, impaired self care skills, impaired functional mobility, gait instability, impaired balance, decreased coordination, decreased lower extremity function, decreased safety awareness, pain and decreased ROM    Plan of care discussed with patient: Yes  Pt's spiritual, cultural and educational needs considered and patient is agreeable to the plan of care and goals as stated below:     Anticipated Barriers for therapy: Transportation    Medical Necessity is demonstrated by the  following  History  Co-morbidities and personal factors that may impact the plan of care Co-morbidities:   COPD, Osteoporosis, Diabetic neuropathy, tremors    Personal Factors:   no deficits     moderate   Examination  Body Structures and Functions, activity limitations and participation restrictions that may impact the plan of care Body Regions:   back  lower extremities  upper extremities  trunk    Body Systems:    gross symmetry  ROM  strength  gross coordinated movement  balance  gait  transfers  transitions  motor control  motor learning  heart rate    Participation Restrictions:   Heavy household activity, recreational activity, ADL's    Activity limitations:   Learning and applying knowledge  no deficits    General Tasks and Commands  no deficits    Communication  no deficits    Mobility  lifting and carrying objects  fine hand use (grasping/picking up)  walking  moving around using equipment (WC)  driving (bike, car, motorcycle)    Self care  washing oneself (bathing, drying, washing hands)  caring for body parts (brushing teeth, shaving, grooming)  toileting  dressing  looking after one's health    Domestic Life  shopping  cooking  doing house work (cleaning house, washing dishes, laundry)  assisting others    Interactions/Relationships  no deficits    Life Areas  no deficits    Community and Social Life  no deficits         low   Clinical Presentation stable and uncomplicated low   Decision Making/ Complexity Score: low     Pt's spiritual, cultural and educational needs considered and pt agreeable to plan of care and goals as stated below:     Short Term GOALS: 4 weeks. Pt agrees with goals set.  1. Patient demonstrates independence with HEP.   2. Patient demonstrates independence with Postural Awareness.   3. Patient demonstrates increased strength BLE's by 1/3 muscle grade or greater to improve tolerance to functional activities pain free.   4. Patient demonstrates ability to complete Timed Up-and-Go  Test by 28 seconds or less, use of AD, no LOB    Long Term GOALS: 8 weeks. Pt agrees with goals set.  1. Patient demonstrates improve Hines Balance Assessment  To 41/56 or greater  2. Patient demonstrates increased strength BLE's to 4/5 or greater to improve tolerance to functional activities pain free.   3. Patient demonstrates improved overall function per FOTO NOC - Musculoskeletal Survey to 60% Limitation or less.   4. Patient demonstrates ability to perform 7 repetitions or more on 30 Second Sit-to-Stand test, use of BUE on arm-rest, no LOB  5. Pt able to ambulate 500 feet, use of AD, appropriate gait patter, no LOB    PLAN     Plan of care Certification: 3/12/2019 to 5/12/19.    Outpatient Physical Therapy 2 times weekly for 8 weeks to include the following interventions: Electrical Stimulation IFC/NMES, Gait Training, Manual Therapy, Moist Heat/ Ice, Neuromuscular Re-ed, Paraffin, Patient Education, Self Care, Therapeutic Activites, Therapeutic Exercise and Ultrasound.  Pt may be seen by PTA as part of the rehabilitation team.     Logan Goldberg, PT  3/12/2019    I have seen the patient, reviewed the therapist's plan of care, and I agree with the plan of care.      I certify the need for these services furnished under this plan of treatment and while under my care.     ___________________ ________ Physician/Referring Practitioner            ___________________________ Date of Signature

## 2019-03-14 DIAGNOSIS — F41.9 ANXIETY: ICD-10-CM

## 2019-03-14 RX ORDER — CITALOPRAM 20 MG/1
TABLET, FILM COATED ORAL
Qty: 90 TABLET | Refills: 3 | Status: SHIPPED | OUTPATIENT
Start: 2019-03-14 | End: 2020-06-09

## 2019-03-26 ENCOUNTER — CLINICAL SUPPORT (OUTPATIENT)
Dept: REHABILITATION | Facility: HOSPITAL | Age: 69
End: 2019-03-26
Attending: FAMILY MEDICINE
Payer: MEDICARE

## 2019-03-26 DIAGNOSIS — M62.81 MUSCLE WEAKNESS: ICD-10-CM

## 2019-03-26 DIAGNOSIS — R26.9 GAIT DIFFICULTY: ICD-10-CM

## 2019-03-26 DIAGNOSIS — Z74.09 IMPAIRED FUNCTIONAL MOBILITY, BALANCE, GAIT, AND ENDURANCE: Primary | ICD-10-CM

## 2019-03-26 PROCEDURE — 97110 THERAPEUTIC EXERCISES: CPT | Mod: PN

## 2019-03-26 PROCEDURE — 97112 NEUROMUSCULAR REEDUCATION: CPT | Mod: PN

## 2019-03-26 NOTE — PROGRESS NOTES
"  Physical Therapy Daily Treatment Note     Name: Deb Webb  Clinic Number: [id number]    Therapy Diagnosis:   Encounter Diagnoses   Name Primary?    Impaired functional mobility, balance, gait, and endurance Yes    Gait difficulty     Muscle weakness      Physician: Moiz Goldberg MD    Visit Date: 3/26/2019    Physician Orders: PT Eval and Treat  Medical Diagnosis from Referral: Gait Instability  Evaluation Date: 3/12/2019  Authorization Period Expiration: 5/12/19  Plan of Care Expiration: 5/12/19  Visit # / Visits authorized: 2/ 12     Time In: 0846  Time Out: 0945  Total Time: 59 Minutes  Total Billable Time: 40 Minutes (One on One with PTA)     Precautions: Standard and Fall, Diabetic Neuropathy B feet, COPD        Subjective     Pt reports: she wasn't able to make her appointment last week because she was sick.  She was compliant with home exercise program.  Response to previous treatment: good  Functional change: ongoing    Pain: 5/10  Location: left shoulder  and collar bone      Objective     Deb received therapeutic exercises to develop strength, endurance, ROM, flexibility, posture and core stabilization for 32 minutes including:    NuStep 5 minutes    Standing in // bars:  Heel raises x20  Toe raises xf20  Hip abduction x20  Marching x20 (unilateral UE assist)    Supine:  Bridges x20  SLRs x15  SAQs 3" hold x15  Ball Squeezes 3" x15  Hip Abduction with RTB 3" x15    Seated on blue foam: (engaging abdominal muscles)  Seated March 20x ea  Seated LAQ 20x ea  Ball Throws with small red theraball x20 (reaching outside KENNEDY/hand eye coordination)          Deb received the following manual therapy techniques:  were applied to the: NA for 00 minutes, including:      Deb participated in neuromuscular re-education activities to improve: Balance, Coordination, Kinesthetic, Sense, Proprioception and Posture for 10 minutes. The following activities were included:    NBOS with no UE support " "3x20"  NBOS with eyes closed with no UE support 3x20"  WBOS on blue foam with no UE support 3x20"  Cone taps x20 each LE (alternating)  Modified single leg balance with purple block 3x20"    Deb participated in dynamic functional therapeutic activities to improve functional performance for 5  minutes, including:  Sit<>stands x15 from high/low table (no UE assist)  Step Ups on blue foam x15 leading with each LE (unilateral UE assist)    Deb participated in gait training to improve functional mobility and safety for 12 minutes, including:    Standing in // bars:  Fwd walking x3 trials (no UE assist)  Retro walking x3 trials (no UE assist)  Lateral walking x3 trials (no UE assist)    Deb received the following direct contact modalities after being cleared for contraindications:   Deb received the following supervised modalities after being cleared for contradictions:    Deb received hot pack for 00 minutes to L shoulder/ collar bone.    Deb received cold pack for 00 minutes to L shoulder/collar bone.      Home Exercises Provided and Patient Education Provided     Education provided:   - compliance with HEP  - safety with ambulation    Written Home Exercises Provided: Patient instructed to cont prior HEP.  Exercises were reviewed and Deb was able to demonstrate them prior to the end of the session.  Deb demonstrated good  understanding of the education provided.     See EMR under Patient Instructions for exercises provided on IE 3/12/19.    Assessment     Pt tolerated treatment good today.  Challenged with new exercises post initial evaluation with no adverse reactions.  Pt is greatly challenged with balance exercises with decreased proprioception, balance, coordination, and ankle strategy.  Displays difficulties reaching outside KENNEDY.  BUE tremors with standing exercises noted.  Pt requires close CGA/SBA for all exercises in // bars 2/2 safety.  Pt experienced LOB x2 in // bars with fwd walking " requiring Min A for recovery. Pt is a fall risk and requires close assistance for all standing activities.    Deb is progressing well towards her goals.   Pt prognosis is Good.     Pt will continue to benefit from skilled outpatient physical therapy to address the deficits listed in the problem list box on initial evaluation, provide pt/family education and to maximize pt's level of independence in the home and community environment.     Pt's spiritual, cultural and educational needs considered and pt agreeable to plan of care and goals.     Anticipated barriers to physical therapy: transportation    Goals:   Short Term GOALS: 4 weeks. Pt agrees with goals set.  1. Patient demonstrates independence with HEP.   2. Patient demonstrates independence with Postural Awareness.   3. Patient demonstrates increased strength BLE's by 1/3 muscle grade or greater to improve tolerance to functional activities pain free.   4. Patient demonstrates ability to complete Timed Up-and-Go Test by 28 seconds or less, use of AD, no LOB     Long Term GOALS: 8 weeks. Pt agrees with goals set.  1. Patient demonstrates improve Hines Balance Assessment  To 41/56 or greater  2. Patient demonstrates increased strength BLE's to 4/5 or greater to improve tolerance to functional activities pain free.   3. Patient demonstrates improved overall function per FOTO NOC - Musculoskeletal Survey to 60% Limitation or less.   4. Patient demonstrates ability to perform 7 repetitions or more on 30 Second Sit-to-Stand test, use of BUE on arm-rest, no LOB  5. Pt able to ambulate 500 feet, use of AD, appropriate gait patter, no LOB        Plan     Continue with current POC    Plan of care Certification: 3/12/2019 to 5/12/19.     Outpatient Physical Therapy 2 times weekly for 8 weeks to include the following interventions: Electrical Stimulation IFC/NMES, Gait Training, Manual Therapy, Moist Heat/ Ice, Neuromuscular Re-ed, Paraffin, Patient Education, Self  Care, Therapeutic Activites, Therapeutic Exercise and Ultrasound.  Pt may be seen by PTA as part of the rehabilitation team.       Cara Dean, PTA

## 2019-03-27 ENCOUNTER — HOSPITAL ENCOUNTER (OUTPATIENT)
Dept: RADIOLOGY | Facility: HOSPITAL | Age: 69
Discharge: HOME | End: 2019-03-27
Attending: NEUROLOGICAL SURGERY
Payer: MEDICARE

## 2019-03-27 ENCOUNTER — OFFICE VISIT (OUTPATIENT)
Dept: NEUROLOGY | Facility: CLINIC | Age: 69
End: 2019-03-27
Payer: MEDICARE

## 2019-03-27 VITALS
WEIGHT: 135.56 LBS | HEIGHT: 61 IN | SYSTOLIC BLOOD PRESSURE: 150 MMHG | BODY MASS INDEX: 25.59 KG/M2 | DIASTOLIC BLOOD PRESSURE: 90 MMHG | HEART RATE: 112 BPM

## 2019-03-27 DIAGNOSIS — G25.0 ESSENTIAL TREMOR: Primary | ICD-10-CM
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
## 2019-03-27 PROCEDURE — 99999 PR PBB SHADOW E&M-EST. PATIENT-LVL V: ICD-10-PCS | Mod: PBBFAC,,, | Performed by: NEUROLOGICAL SURGERY

## 2019-03-27 PROCEDURE — 73030 XR SHOULDER COMPLETE 2 OR MORE VIEWS LEFT: ICD-10-PCS | Mod: 26,LT,, | Performed by: RADIOLOGY

## 2019-03-27 PROCEDURE — 3077F SYST BP >= 140 MM HG: CPT | Mod: CPTII,S$GLB,, | Performed by: NEUROLOGICAL SURGERY

## 2019-03-27 PROCEDURE — 99999 PR PBB SHADOW E&M-EST. PATIENT-LVL V: CPT | Mod: PBBFAC,,, | Performed by: NEUROLOGICAL SURGERY

## 2019-03-27 PROCEDURE — 99214 OFFICE O/P EST MOD 30 MIN: CPT | Mod: S$GLB,,, | Performed by: NEUROLOGICAL SURGERY

## 2019-03-27 PROCEDURE — 73030 X-RAY EXAM OF SHOULDER: CPT | Mod: TC,FY,PO,LT

## 2019-03-27 PROCEDURE — 3080F DIAST BP >= 90 MM HG: CPT | Mod: CPTII,S$GLB,, | Performed by: NEUROLOGICAL SURGERY

## 2019-03-27 PROCEDURE — 3080F PR MOST RECENT DIASTOLIC BLOOD PRESSURE >= 90 MM HG: ICD-10-PCS | Mod: CPTII,S$GLB,, | Performed by: NEUROLOGICAL SURGERY

## 2019-03-27 PROCEDURE — 1101F PR PT FALLS ASSESS DOC 0-1 FALLS W/OUT INJ PAST YR: ICD-10-PCS | Mod: CPTII,S$GLB,, | Performed by: NEUROLOGICAL SURGERY

## 2019-03-27 PROCEDURE — 3077F PR MOST RECENT SYSTOLIC BLOOD PRESSURE >= 140 MM HG: ICD-10-PCS | Mod: CPTII,S$GLB,, | Performed by: NEUROLOGICAL SURGERY

## 2019-03-27 PROCEDURE — 1101F PT FALLS ASSESS-DOCD LE1/YR: CPT | Mod: CPTII,S$GLB,, | Performed by: NEUROLOGICAL SURGERY

## 2019-03-27 PROCEDURE — 99214 PR OFFICE/OUTPT VISIT, EST, LEVL IV, 30-39 MIN: ICD-10-PCS | Mod: S$GLB,,, | Performed by: NEUROLOGICAL SURGERY

## 2019-03-27 PROCEDURE — 73030 X-RAY EXAM OF SHOULDER: CPT | Mod: 26,LT,, | Performed by: RADIOLOGY

## 2019-03-27 RX ORDER — PRIMIDONE 50 MG/1
100 TABLET ORAL 3 TIMES DAILY
Qty: 180 TABLET | Refills: 11 | Status: SHIPPED | OUTPATIENT
Start: 2019-03-27 | End: 2019-12-30

## 2019-03-27 NOTE — PROGRESS NOTES
"  Physical Therapy Daily Treatment Note     Name: Deb Webb  Clinic Number: [id number]    Therapy Diagnosis:   Encounter Diagnoses   Name Primary?    Impaired functional mobility, balance, gait, and endurance Yes    Gait difficulty     Muscle weakness      Physician: Moiz Goldberg MD    Visit Date: 3/28/2019    Physician Orders: PT Eval and Treat  Medical Diagnosis from Referral: Gait Instability  Evaluation Date: 3/12/2019  Authorization Period Expiration: 5/12/19  Plan of Care Expiration: 5/12/19  Visit # / Visits authorized: 3 / 12  PTA Visit 1/6      Time In: 0730  Time Out: 0825  Total Time: 55 Minutes  Total Billable Time: 30 Minutes (One on One with PTA)     Precautions: Standard and Fall, Diabetic Neuropathy B feet, COPD        Subjective     Pt reports: Feeling tired this morning, some muscle fatigue after last session.     She was compliant with home exercise program.  Response to previous treatment: good  Functional change: ongoing    Pain: 5/10  Location: left shoulder  and collar bone      Objective     Deb received therapeutic exercises to develop strength, endurance, ROM, flexibility, posture and core stabilization for 55 minutes including:    NuStep 5 minutes    Standing in // bars:  Heel raises x20  Toe raises xf20  Hip abduction x20  Marching x20 (unilateral UE assist)  +Standing SLR x 20     Supine:  Bridges x20  SLRs x15  SAQs 3" hold x15  +Quad set x 20   Ball Squeezes 3" x15  Hip Abduction with RTB 3" x15    Seated on blue foam: (engaging abdominal muscles)  Seated March 20x ea  Seated LAQ 20x ea  Ball Throws with small red theraball x20 (reaching outside KENNEDY/hand eye coordination)          Deb received the following manual therapy techniques:  were applied to the: NA for 00 minutes, including:      Deb participated in neuromuscular re-education activities to improve: Balance, Coordination, Kinesthetic, Sense, Proprioception and Posture for 0 minutes. The following " "activities were included:    NBOS with no UE support 3x20"  NBOS with eyes closed with no UE support 3x20"  WBOS on blue foam with no UE support 3x20"  Cone taps x20 each LE (alternating)  Modified single leg balance with purple block 3x20"    Deb participated in dynamic functional therapeutic activities to improve functional performance for 0 minutes, including:  Sit<>stands x15 from high/low table (no UE assist)  Step Ups on blue foam x15 leading with each LE (unilateral UE assist)    Deb participated in gait training to improve functional mobility and safety for 0 minutes, including:    Standing in // bars:  Fwd walking x3 trials (no UE assist)  Retro walking x3 trials (no UE assist)  Lateral walking x3 trials (no UE assist)    Deb received the following direct contact modalities after being cleared for contraindications:   Deb received the following supervised modalities after being cleared for contradictions:    Deb received hot pack for 00 minutes to L shoulder/ collar bone.    Deb received cold pack for 00 minutes to L shoulder/collar bone.      Home Exercises Provided and Patient Education Provided     Education provided:   - compliance with HEP  - safety with ambulation    Written Home Exercises Provided: Patient instructed to cont prior HEP.  Exercises were reviewed and Deb was able to demonstrate them prior to the end of the session.  Deb demonstrated good  understanding of the education provided.     See EMR under Patient Instructions for exercises provided on IE 3/12/19.    Assessment     Patient tolerated today's treatment session well. Minor cues required for proper technique with exercises. Patient continues to benefit from skilled therapies for progression of exercise routine. No reports of increased pain with any of today's activities.       Deb is progressing well towards her goals.   Pt prognosis is Good.     Pt will continue to benefit from skilled outpatient physical " therapy to address the deficits listed in the problem list box on initial evaluation, provide pt/family education and to maximize pt's level of independence in the home and community environment.     Pt's spiritual, cultural and educational needs considered and pt agreeable to plan of care and goals.     Anticipated barriers to physical therapy: transportation    Goals:   Short Term GOALS: 4 weeks. Pt agrees with goals set.  1. Patient demonstrates independence with HEP.   2. Patient demonstrates independence with Postural Awareness.   3. Patient demonstrates increased strength BLE's by 1/3 muscle grade or greater to improve tolerance to functional activities pain free.   4. Patient demonstrates ability to complete Timed Up-and-Go Test by 28 seconds or less, use of AD, no LOB     Long Term GOALS: 8 weeks. Pt agrees with goals set.  1. Patient demonstrates improve Hines Balance Assessment  To 41/56 or greater  2. Patient demonstrates increased strength BLE's to 4/5 or greater to improve tolerance to functional activities pain free.   3. Patient demonstrates improved overall function per FOTO NOC - Musculoskeletal Survey to 60% Limitation or less.   4. Patient demonstrates ability to perform 7 repetitions or more on 30 Second Sit-to-Stand test, use of BUE on arm-rest, no LOB  5. Pt able to ambulate 500 feet, use of AD, appropriate gait patter, no LOB        Plan     Continue with current POC    Plan of care Certification: 3/12/2019 to 5/12/19.     Outpatient Physical Therapy 2 times weekly for 8 weeks to include the following interventions: Electrical Stimulation IFC/NMES, Gait Training, Manual Therapy, Moist Heat/ Ice, Neuromuscular Re-ed, Paraffin, Patient Education, Self Care, Therapeutic Activites, Therapeutic Exercise and Ultrasound.  Pt may be seen by PTA as part of the rehabilitation team.       Neymar Varela, DANIEL

## 2019-03-27 NOTE — PATIENT INSTRUCTIONS
Increase primidone slowly to try to better control your tremors:    Week one: 1 AM/ 1 afternoon/ 2 PM  Week two: 2 AM/ 1 afternoon/ 2 PM  Week three: 2 AM/ 2 afternoon/ 2 PM

## 2019-03-27 NOTE — PROGRESS NOTES
"Chief Complaint   Patient presents with    Stroke        Deb Webb is a 68 y.o. female with a history of multiple medical diagnoses as listed below that presents for evaluation of tremor. She says that she has been having a tremor in her head for the last 5 years but feel that in the last year it has began to get worse so she was told that she should get it evaluated. She has a grandparent that had PD and she suspects that her father had PD though her was never diagnosed. She does not have any other family members that have PD. She has not had any shaking in her legs and feels that it has been present only very minimally in her hands. She has not had any difficulty with her ADLs due to the tremor but she does feel that she has been having some social difficulty due to her symptoms. She does not have any problems with managing her household. She does not have any memory impairment. She has been able to walk without problem other than her legs will on occasion buckle and cause her to fall. She has found that stress and anxiety tends to make the symptoms worse. She has not found that anything has made them any better. She does not drink alcohol. On occasion taking her Ativan will make her tremor less prominent.    Interval History  03/13/2017  Since last seen in clinic she has been doing well on Primidone. She says that the medication has helped to reduce the frequency and the intensity of the tremors. She does says that there have been a few bad days where it seems to be more prominent but she does think that it is mainly related to the weather. She has been tacking 50 mg TID for the last several weeks and notices that it makes her sleep which she thinks is beneficial because she was having insomnia. One fall was reported when she got up to tell her  to come back to get her at a later time and she feels her left knee "gave out" on her.    03/27/2019  She presents to clinic for evaluation of tremor.  " She feels that the symptoms have been getting progressively worse since she was last seen in clinic approximately 2 years ago.  She has been taking primidone 3 times a day as directed and feels that the medication was helpful initially, but seems to be less effective with time.  The medication has been well tolerated without any complaints of side effects. She denies any change in her walking, memory, or sleeping.  She had a fall recently that caused her to fall onto a left-sided hitting her shoulder which has caused her to have residual pain in the left shoulder and across the clavicle on the left.    PAST MEDICAL HISTORY:  Past Medical History:   Diagnosis Date    Allergy     Arthritis     Cataract     Colon polyps     COPD (chronic obstructive pulmonary disease)     Diabetes mellitus type II     Diabetic neuropathy     Fever blister     Glaucoma (increased eye pressure)     Hyperlipidemia     Hypertension     Irritable bowel syndrome     Keloid cicatrix     Osteoporosis     Retained cholelithiasis following cholecystectomy(997.41)     Right patella fracture        PAST SURGICAL HISTORY:  Past Surgical History:   Procedure Laterality Date    BACK SURGERY  2006    CATARACT EXTRACTION W/  INTRAOCULAR LENS IMPLANT Bilateral     CHOLECYSTECTOMY      Laparoscopic    COLONOSCOPY      COLONOSCOPY N/A 5/2/2013    Performed [facility] ([facility])    HERNIA REPAIR      HYSTERECTOMY      KNEE SURGERY Right 3/4/2015    Dr Weller     OOPHORECTOMY      ORIF-PATELLA / C-ARM Right 3/4/2015    [facility]on [facility] OR    ovarian tumor      Benign    TONSILLECTOMY, ADENOIDECTOMY         SOCIAL HISTORY:  Social History     Socioeconomic History    Marital status:      Spouse name: Not on file    Number of children: Not on file    Years of education: Not on file    Highest education level: Not on file   Occupational History     Employer: [employer]    Social Needs    Financial resource strain: Not on file    Food insecurity:     Worry: Not on file     Inability: Not on file    Transportation needs:     Medical: Not on file     Non-medical: Not on file   Tobacco Use    Smoking status: Current Every Day Smoker     Packs/day: 0.50     Years: 40.00     Pack years: 20.00     Types: Cigarettes    Smokeless tobacco: Current User   Substance and Sexual Activity    Alcohol use: No    Drug use: No    Sexual activity: Never   Lifestyle    Physical activity:     Days per week: Not on file     Minutes per session: Not on file    Stress: Not on file   Relationships    Social connections:     Talks on phone: Not on file     Gets together: Not on file     Attends Scientologist service: Not on file     Active member of club or organization: Not on file     Attends meetings of clubs or organizations: Not on file     Relationship status: Not on file    Intimate partner violence:     Fear of current or ex partner: Not on file     Emotionally abused: Not on file     Physically abused: Not on file     Forced sexual activity: Not on file   Other Topics Concern    Are you pregnant or think you may be? No    Breast-feeding No   Social History Narrative    Not on file       FAMILY HISTORY:  Family History   Problem Relation Age of Onset    Cancer Mother         Skin    Skin cancer Mother     Glaucoma Mother     Cataracts Mother     Diabetes Mother     Heart disease Father     Diabetes Father     Skin cancer Sister     Diabetes Sister     Diabetes Daughter     Melanoma Neg Hx     Psoriasis Neg Hx     Eczema Neg Hx     Lupus Neg Hx     Amblyopia Neg Hx     Blindness Neg Hx     Macular degeneration Neg Hx     Retinal detachment Neg Hx     Strabismus Neg Hx        ALLERGIES AND MEDICATIONS: updated and reviewed.  Review of patient's allergies indicates:   Allergen Reactions    Silicone      Burn skin    Adhesive Rash     Current Outpatient  Medications   Medication Sig Dispense Refill    albuterol (VENTOLIN HFA) 90 mcg/actuation inhaler TAKE 2 PUFFS BY MOUTH EVERY 4 HOURS AS NEEDED FOR WHEEZE 18 Inhaler 3    alendronate (FOSAMAX) 70 MG tablet TAKE 1 TABLET BY MOUTH ONE TIME PER WEEK 12 tablet 1    aspirin (ECOTRIN) 81 MG EC tablet Take 1 tablet (81 mg total) by mouth once daily.  0    atorvastatin (LIPITOR) 40 MG tablet Take 1 tablet (40 mg total) by mouth once daily. 90 tablet 2    blood sugar diagnostic Strp To check BG 2 times daily, to use with insurance preferred meter 200 each 3    blood-glucose meter kit To check BG 2 times daily, to use with insurance preferred meter 1 each 0    calcium carbonate/vitamin D3 (CALCIUM 600 + D,3, ORAL) Take by mouth.      citalopram (CELEXA) 20 MG tablet TAKE 1 TABLET BY MOUTH EVERY DAY 90 tablet 3    cyclobenzaprine (FLEXERIL) 5 MG tablet Take 1 tablet (5 mg total) by mouth 3 (three) times daily as needed. 90 tablet 5    diclofenac sodium (VOLTAREN) 1 % Gel Apply 2 g topically once daily. 100 g 3    dorzolamide-timolol 2-0.5% (COSOPT) 22.3-6.8 mg/mL ophthalmic solution Place 1 drop into both eyes 2 (two) times daily. 10 mL 3    fluticasone (FLONASE) 50 mcg/actuation nasal spray USE 2 SPRAY INTO EACH NOSTRIL DAILY 16 mL 5    furosemide (LASIX) 20 MG tablet Take 1 tablet (20 mg total) by mouth every morning. 90 tablet 1    gabapentin (NEURONTIN) 300 MG capsule Take 1 capsule (300 mg total) by mouth 3 (three) times daily. 90 capsule 11    glimepiride (AMARYL) 4 MG tablet Take 2 tablets (8 mg total) by mouth before breakfast. 180 tablet 0    INCRUSE ELLIPTA 62.5 mcg/actuation DsDv INHALE 1 EACH INTO THE LUNGS ONCE DAILY. CONTROLLER 30 each 2    insulin (LANTUS SOLOSTAR U-100 INSULIN) glargine 100 units/mL (3mL) SubQ pen Inject 15 Units into the skin once daily.  0    irbesartan (AVAPRO) 75 MG tablet Take 1 tablet (75 mg total) by mouth once daily. 90 tablet 3    lancets Misc To check BG 2 times  "daily, to use with insurance preferred meter 200 each 3    latanoprost 0.005 % ophthalmic solution Place 1 drop into both eyes every evening. 2.5 mL 6    loratadine (CLARITIN) 10 mg tablet Take 1 tablet (10 mg total) by mouth daily as needed for Allergies (or runny nose). 90 tablet 2    LORazepam (ATIVAN) 0.5 MG tablet TAKE 1 TABLET (0.5 MG TOTAL) BY MOUTH DAILY AS NEEDED FOR ANXIETY. 10 tablet 0    meclizine (ANTIVERT) 12.5 mg tablet Take 1 tablet (12.5 mg total) by mouth 3 (three) times daily as needed for Dizziness. 30 tablet 0    omega-3 fatty acids-vitamin E (FISH OIL) 1,000 mg Cap       omeprazole (PRILOSEC) 40 MG capsule Take 40 mg by mouth before breakfast.  2    pen needle, diabetic (BD ULTRA-FINE AGUS PEN NEEDLE) 32 gauge x 5/32" Ndle 1 Device by Misc.(Non-Drug; Combo Route) route once daily. 100 each 4    primidone (MYSOLINE) 50 MG Tab Take 2 tablets (100 mg total) by mouth 3 (three) times daily. One half tablet for one week, then as prescribed 180 tablet 11    traMADol (ULTRAM) 50 mg tablet Take 1 tablet (50 mg total) by mouth every 8 (eight) hours as needed for Pain. 21 tablet 0     No current facility-administered medications for this visit.        Review of Systems   Constitutional: Negative for activity change, appetite change, fever and unexpected weight change.   HENT: Negative for trouble swallowing and voice change.    Eyes: Negative for photophobia and visual disturbance.   Respiratory: Negative for apnea and shortness of breath.    Cardiovascular: Negative for chest pain and leg swelling.   Gastrointestinal: Negative for constipation and nausea.   Genitourinary: Negative for difficulty urinating.   Musculoskeletal: Positive for neck pain. Negative for back pain and gait problem.   Skin: Negative for color change and pallor.   Neurological: Positive for tremors. Negative for dizziness, seizures, syncope, weakness and numbness.   Hematological: Negative for adenopathy. "   Psychiatric/Behavioral: Negative for agitation, confusion and decreased concentration.       Neurologic Exam     Mental Status   Oriented to person, place, and time.   Registration: recalls 3 of 3 objects.   Attention: normal. Concentration: normal.   Speech: speech is normal   Level of consciousness: alert  Knowledge: good.     Cranial Nerves     CN II   Visual fields full to confrontation.   Right visual field deficit: none  Left visual field deficit: none     CN III, IV, VI   Pupils are equal, round, and reactive to light.  Extraocular motions are normal.   Right pupil: Size: 3 mm. Shape: regular. Accommodation: intact.   Left pupil: Size: 3 mm. Shape: regular. Accommodation: intact.   CN III: no CN III palsy  CN VI: no CN VI palsy  Nystagmus: none   Diplopia: none  Ophthalmoparesis: none  Upgaze: normal  Downgaze: normal  Conjugate gaze: present    CN V   Facial sensation intact.   Right facial sensation deficit: none  Left facial sensation deficit: none    CN VII   Facial expression full, symmetric.   Right facial weakness: none  Left facial weakness: none    CN VIII   CN VIII normal.     CN IX, X   CN IX normal.   CN X normal.   Palate: symmetric    CN XI   CN XI normal.   Right sternocleidomastoid strength: normal  Left sternocleidomastoid strength: normal  Right trapezius strength: normal  Left trapezius strength: normal    CN XII   CN XII normal.   Tongue deviation: none    Motor Exam   Muscle bulk: normal  Overall muscle tone: normal  Right arm tone: normal  Left arm tone: normal  Right leg tone: normal  Left leg tone: normal    Strength   Strength 5/5 throughout.     Sensory Exam   Right arm light touch: normal  Left arm light touch: normal  Right leg light touch: normal  Left leg light touch: normal  Right arm vibration: normal  Left arm vibration: normal  Right leg vibration: normal  Left leg vibration: normal  Right arm proprioception: normal  Left arm proprioception: normal  Right leg  proprioception: normal  Left leg proprioception: normal  Right arm pinprick: normal  Left arm pinprick: normal  Right leg pinprick: normal  Left leg pinprick: normal    Gait, Coordination, and Reflexes     Gait  Gait: normal    Coordination   Romberg: negative  Finger to nose coordination: normal  Heel to shin coordination: normal  Tandem walking coordination: normal    Tremor   Resting tremor: absent    Reflexes   Right brachioradialis: 2+  Left brachioradialis: 2+  Right biceps: 2+  Left biceps: 2+  Right triceps: 2+  Left triceps: 2+  Right patellar: 2+  Left patellar: 2+  Right achilles: 2+  Left achilles: 2+  Right plantar: normal  Left plantar: normal      Physical Exam   Constitutional: She is oriented to person, place, and time. She appears well-developed and well-nourished.   HENT:   Head: Normocephalic and atraumatic.   Eyes: Pupils are equal, round, and reactive to light. EOM are normal.   Neck: Normal range of motion.   Cardiovascular: Normal rate and intact distal pulses.   Pulmonary/Chest: Effort normal. No apnea. No respiratory distress.   Musculoskeletal: Normal range of motion.   Neurological: She is alert and oriented to person, place, and time. She has normal strength. She has a normal Finger-Nose-Finger Test, a normal Heel to Shin Test, a normal Romberg Test and a normal Tandem Gait Test. Gait normal.   Reflex Scores:       Tricep reflexes are 2+ on the right side and 2+ on the left side.       Bicep reflexes are 2+ on the right side and 2+ on the left side.       Brachioradialis reflexes are 2+ on the right side and 2+ on the left side.       Patellar reflexes are 2+ on the right side and 2+ on the left side.       Achilles reflexes are 2+ on the right side and 2+ on the left side.  Skin: Skin is warm and dry.   Psychiatric: She has a normal mood and affect. Her speech is normal and behavior is normal. Thought content normal.   Vitals reviewed.      Vitals:    03/27/19 0924   BP: (!) 150/90   BP  "Location: Right arm   Patient Position: Sitting   BP Method: Large (Automatic)   Pulse: (!) 112   Weight: 61.5 kg (135 lb 9.3 oz)   Height: 5' 1" (1.549 m)       Assessment & Plan:  Problem List Items Addressed This Visit     Essential tremor - Primary    Overview     Tremors have been better overall since starting primidone. She feels that she has had some days when it seems to be better than other. She had a fall after standing and feels her leg gave out on her. No rigidity. No other gait issues. No memory problems.         Relevant Medications    primidone (MYSOLINE) 50 MG Tab      Other Visit Diagnoses     Fall, subsequent encounter            Follow-up: Follow up in about 3 months (around 6/27/2019).  More than 50% of this 25 minute encounter was spent in counseling and coordinating care.      "

## 2019-03-28 ENCOUNTER — CLINICAL SUPPORT (OUTPATIENT)
Dept: REHABILITATION | Facility: HOSPITAL | Age: 69
End: 2019-03-28
Attending: FAMILY MEDICINE
Payer: MEDICARE

## 2019-03-28 DIAGNOSIS — Z74.09 IMPAIRED FUNCTIONAL MOBILITY, BALANCE, GAIT, AND ENDURANCE: Primary | ICD-10-CM

## 2019-03-28 DIAGNOSIS — M62.81 MUSCLE WEAKNESS: ICD-10-CM

## 2019-03-28 DIAGNOSIS — R26.9 GAIT DIFFICULTY: ICD-10-CM

## 2019-03-28 PROCEDURE — 97110 THERAPEUTIC EXERCISES: CPT | Mod: PN

## 2019-03-29 NOTE — PROGRESS NOTES
"  Physical Therapy Daily Treatment Note     Name: Deb Webb  Clinic Number: [id number]    Therapy Diagnosis:   Encounter Diagnoses   Name Primary?    Impaired functional mobility, balance, gait, and endurance Yes    Gait difficulty     Muscle weakness     Weakness of both hips     Bilateral leg weakness     Muscular deconditioning     Gait abnormality     Impairment of balance      Physician: Moiz Goldberg MD    Visit Date: 4/1/2019    Physician Orders: PT Eval and Treat  Medical Diagnosis from Referral: Gait Instability  Evaluation Date: 3/12/2019  Authorization Period Expiration: 5/12/19  Plan of Care Expiration: 5/12/19  Visit # / Visits authorized: 4 / 12  PTA Visit 3/6      Time In: 0938  Time Out: 1030  Total Time: 52 Minutes  Total Billable Time: 22 Minutes (One on One with PTA)     Precautions: Standard and Fall, Diabetic Neuropathy B feet, COPD        Subjective     Pt reports: Feeling dizzy today, reports having to lie back down to rest this morning. Staff monitored BP before session, 128/88 and HR 98 BPM. Education completed on contacting MD if dizziness continues or if worsens.     She was compliant with home exercise program.  Response to previous treatment: good  Functional change: ongoing    Pain: 5/10  Location: left shoulder  and collar bone      Objective     Deb received therapeutic exercises to develop strength, endurance, ROM, flexibility, posture and core stabilization for 32 minutes including:    NuStep 5 minutes    Standing in // bars:  Heel raises x20  Toe raises xf20  Hip abduction x20  Marching x20 (unilateral UE assist)  Standing SLR x 20     Supine:  Bridges x20  SLRs x15  SAQs 3" hold x15  +Quad set x 20   Ball Squeezes 3" x15  Hip Abduction with RTB 3" x15    Seated on blue foam: (engaging abdominal muscles)  Seated March 20x ea  Seated LAQ 20x ea  Ball Throws with small red theraball x20 (reaching outside KENNEDY/hand eye coordination)          Deb received the " "following manual therapy techniques:  were applied to the: NA for 00 minutes, including:      Deb participated in neuromuscular re-education activities to improve: Balance, Coordination, Kinesthetic, Sense, Proprioception and Posture for 15 minutes. The following activities were included:    NBOS with no UE support 3x20"  NBOS with eyes closed with no UE support 3x20"  +Standing airex FT EO 3 x 30"   WBOS on blue foam with no UE support 3x20"  Cone taps x20 each LE (alternating)  Modified single leg balance with purple block 3x20"  +Tandem standing 3 x 30" ea    Deb participated in dynamic functional therapeutic activities to improve functional performance for 0 minutes, including:  Sit<>stands x15 from high/low table (no UE assist)  Step Ups on blue foam x15 leading with each LE (unilateral UE assist)    Deb participated in gait training to improve functional mobility and safety for 5 minutes, including:    Standing in // bars:  Fwd walking x3 trials (no UE assist)  Retro walking x3 trials (no UE assist)  Lateral walking x3 trials (no UE assist)    Deb received the following direct contact modalities after being cleared for contraindications:   Deb received the following supervised modalities after being cleared for contradictions:    Deb received hot pack for 00 minutes to L shoulder/ collar bone.    Deb received cold pack for 10 minutes to L bilateral knees      Home Exercises Provided and Patient Education Provided     Education provided:   - compliance with HEP  - safety with ambulation    Written Home Exercises Provided: Patient instructed to cont prior HEP.  Exercises were reviewed and Deb was able to demonstrate them prior to the end of the session.  Deb demonstrated good  understanding of the education provided.     See EMR under Patient Instructions for exercises provided on IE 3/12/19.    Assessment     Patient tolerated today's treatment session well. Staff progressed balance " activities today with some swaying noted but no overt LOB. Short rest breaks required throughout today's session due to patient reports of fatigue. Continues to benefit from skilled therapy sessions for progression of balance activities and strength.     Deb is progressing well towards her goals.   Pt prognosis is Good.     Pt will continue to benefit from skilled outpatient physical therapy to address the deficits listed in the problem list box on initial evaluation, provide pt/family education and to maximize pt's level of independence in the home and community environment.     Pt's spiritual, cultural and educational needs considered and pt agreeable to plan of care and goals.     Anticipated barriers to physical therapy: transportation    Goals:   Short Term GOALS: 4 weeks. Pt agrees with goals set.  1. Patient demonstrates independence with HEP.   2. Patient demonstrates independence with Postural Awareness.   3. Patient demonstrates increased strength BLE's by 1/3 muscle grade or greater to improve tolerance to functional activities pain free.   4. Patient demonstrates ability to complete Timed Up-and-Go Test by 28 seconds or less, use of AD, no LOB     Long Term GOALS: 8 weeks. Pt agrees with goals set.  1. Patient demonstrates improve Hines Balance Assessment  To 41/56 or greater  2. Patient demonstrates increased strength BLE's to 4/5 or greater to improve tolerance to functional activities pain free.   3. Patient demonstrates improved overall function per FOTO NOC - Musculoskeletal Survey to 60% Limitation or less.   4. Patient demonstrates ability to perform 7 repetitions or more on 30 Second Sit-to-Stand test, use of BUE on arm-rest, no LOB  5. Pt able to ambulate 500 feet, use of AD, appropriate gait patter, no LOB        Plan     Continue with current POC    Plan of care Certification: 3/12/2019 to 5/12/19.     Outpatient Physical Therapy 2 times weekly for 8 weeks to include the following  interventions: Electrical Stimulation IFC/NMES, Gait Training, Manual Therapy, Moist Heat/ Ice, Neuromuscular Re-ed, Paraffin, Patient Education, Self Care, Therapeutic Activites, Therapeutic Exercise and Ultrasound.  Pt may be seen by PTA as part of the rehabilitation team.       Neymar Varela, PTA

## 2019-03-30 DIAGNOSIS — R60.0 BILATERAL LOWER EXTREMITY EDEMA: ICD-10-CM

## 2019-03-30 RX ORDER — FUROSEMIDE 20 MG/1
TABLET ORAL
Qty: 90 TABLET | Refills: 1 | Status: SHIPPED | OUTPATIENT
Start: 2019-03-30 | End: 2019-09-24 | Stop reason: SDUPTHER

## 2019-04-01 ENCOUNTER — CLINICAL SUPPORT (OUTPATIENT)
Dept: REHABILITATION | Facility: HOSPITAL | Age: 69
End: 2019-04-01
Attending: FAMILY MEDICINE
Payer: MEDICARE

## 2019-04-01 DIAGNOSIS — R26.9 GAIT ABNORMALITY: ICD-10-CM

## 2019-04-01 DIAGNOSIS — R29.898 BILATERAL LEG WEAKNESS: ICD-10-CM

## 2019-04-01 DIAGNOSIS — R26.89 IMPAIRMENT OF BALANCE: ICD-10-CM

## 2019-04-01 DIAGNOSIS — M62.81 MUSCLE WEAKNESS: ICD-10-CM

## 2019-04-01 DIAGNOSIS — I10 ESSENTIAL HYPERTENSION: Chronic | ICD-10-CM

## 2019-04-01 DIAGNOSIS — Z74.09 IMPAIRED FUNCTIONAL MOBILITY, BALANCE, GAIT, AND ENDURANCE: Primary | ICD-10-CM

## 2019-04-01 DIAGNOSIS — R29.898 WEAKNESS OF BOTH HIPS: ICD-10-CM

## 2019-04-01 DIAGNOSIS — R26.9 GAIT DIFFICULTY: ICD-10-CM

## 2019-04-01 DIAGNOSIS — R29.898 MUSCULAR DECONDITIONING: ICD-10-CM

## 2019-04-01 PROCEDURE — 97110 THERAPEUTIC EXERCISES: CPT | Mod: PN

## 2019-04-01 RX ORDER — LISINOPRIL 40 MG/1
TABLET ORAL
Qty: 90 TABLET | Refills: 0 | OUTPATIENT
Start: 2019-04-01

## 2019-04-03 ENCOUNTER — CLINICAL SUPPORT (OUTPATIENT)
Dept: REHABILITATION | Facility: HOSPITAL | Age: 69
End: 2019-04-03
Attending: FAMILY MEDICINE
Payer: MEDICARE

## 2019-04-03 DIAGNOSIS — R26.9 GAIT DIFFICULTY: ICD-10-CM

## 2019-04-03 DIAGNOSIS — M62.81 MUSCLE WEAKNESS: ICD-10-CM

## 2019-04-03 DIAGNOSIS — Z74.09 IMPAIRED FUNCTIONAL MOBILITY, BALANCE, GAIT, AND ENDURANCE: ICD-10-CM

## 2019-04-03 PROCEDURE — 97110 THERAPEUTIC EXERCISES: CPT | Mod: PN

## 2019-04-03 NOTE — PROGRESS NOTES
"  Physical Therapy Daily Treatment Note     Name: Deb Webb  Clinic Number: [id number]    Therapy Diagnosis:   Encounter Diagnoses   Name Primary?    Muscle weakness     Impaired functional mobility, balance, gait, and endurance     Gait difficulty      Physician: Moiz Goldberg MD    Visit Date: 4/3/2019    Physician Orders: PT Eval and Treat  Medical Diagnosis from Referral: Gait Instability  Evaluation Date: 3/12/2019  Authorization Period Expiration: 5/12/19  Plan of Care Expiration: 5/12/19  Visit # / Visits authorized: 5 / 12  PTA Visit 0     Time In: 830  Time Out: 930  Total Time: 60 Minutes  Total Billable Time: 30 Minutes (One on One with PT)     Precautions: Standard and Fall, Diabetic Neuropathy B feet, COPD      Subjective     Pt reports: She is doing well. Denies dizziness currently. Believes she has improved in stability overall    She was compliant with home exercise program.  Response to previous treatment: good  Functional change: ongoing    Pain: 0/10  Location: left shoulder  and collar bone      Objective     Deb received therapeutic exercises to develop strength, endurance, ROM, flexibility, posture and core stabilization for 32 minutes including:    NuStep 5 minutes    Standing in // bars:  Heel raises x20  Toe raises xf20  Hip abduction x20 1#  Marching x20 (unilateral UE assist) 1#  Standing SLR x 20 1#    Supine:  Bridges x20  SLRs x15  SAQs 3" hold x15  +Quad set x 20   Ball Squeezes 3" x15  Hip Abduction with RTB 3" x15    Seated on blue foam: (engaging abdominal muscles)  Seated March 20x ea  Seated LAQ 20x ea 1#  Ball Throws with small red theraball x20 (reaching outside KENNEDY/hand eye coordination)          Deb received the following manual therapy techniques:  were applied to the: NA for 00 minutes, including:      Deb participated in neuromuscular re-education activities to improve: Balance, Coordination, Kinesthetic, Sense, Proprioception and Posture for 15 minutes. " "The following activities were included:    NBOS with no UE support 3x20"  NBOS with eyes closed with no UE support 3x20"  Standing airex FT EO 3 x 30"   WBOS on blue foam with no UE support 3x20"  Cone taps x20 each LE (alternating)  Modified single leg balance with purple block 3x20"  Tandem standing 3 x 30" ea    Deb participated in dynamic functional therapeutic activities to improve functional performance for 0 minutes, including:  Sit<>stands x15 from high/low table (no UE assist)  Step Ups on blue foam x15 leading with each LE (unilateral UE assist)    Deb participated in gait training to improve functional mobility and safety for 5 minutes, including:    Standing in // bars:  Fwd walking x3 trials (no UE assist)  Retro walking x3 trials (no UE assist)  Lateral walking x3 trials (no UE assist)    Deb received the following direct contact modalities after being cleared for contraindications:   Deb received the following supervised modalities after being cleared for contradictions:    Deb received hot pack for 00 minutes to L shoulder/ collar bone.    Deb received cold pack for 10 minutes to L bilateral knees      Home Exercises Provided and Patient Education Provided     Education provided:   - compliance with HEP  - safety with ambulation    Written Home Exercises Provided: Patient instructed to cont prior HEP.  Exercises were reviewed and Deb was able to demonstrate them prior to the end of the session.  Deb demonstrated good  understanding of the education provided.     See EMR under Patient Instructions for exercises provided on IE 3/12/19.    Assessment     Patient tolerated today's treatment session well. Good response to added resistance at ankles for standing strength activities, no significant difficulty noted. Greatest difficulty with tandem stance during balance activities due to narrow KENNEDY; intermittent HHA required throughout. Continues to benefit from skilled therapy " sessions for progression of balance activities and strength.     Deb is progressing well towards her goals.   Pt prognosis is Good.     Pt will continue to benefit from skilled outpatient physical therapy to address the deficits listed in the problem list box on initial evaluation, provide pt/family education and to maximize pt's level of independence in the home and community environment.     Pt's spiritual, cultural and educational needs considered and pt agreeable to plan of care and goals.     Anticipated barriers to physical therapy: transportation    Goals:   Short Term GOALS: 4 weeks. Pt agrees with goals set.  1. Patient demonstrates independence with HEP.   2. Patient demonstrates independence with Postural Awareness.   3. Patient demonstrates increased strength BLE's by 1/3 muscle grade or greater to improve tolerance to functional activities pain free.   4. Patient demonstrates ability to complete Timed Up-and-Go Test by 28 seconds or less, use of AD, no LOB     Long Term GOALS: 8 weeks. Pt agrees with goals set.  1. Patient demonstrates improve Hines Balance Assessment  To 41/56 or greater  2. Patient demonstrates increased strength BLE's to 4/5 or greater to improve tolerance to functional activities pain free.   3. Patient demonstrates improved overall function per FOTO NOC - Musculoskeletal Survey to 60% Limitation or less.   4. Patient demonstrates ability to perform 7 repetitions or more on 30 Second Sit-to-Stand test, use of BUE on arm-rest, no LOB  5. Pt able to ambulate 500 feet, use of AD, appropriate gait patter, no LOB        Plan     Continue with current POC    Plan of care Certification: 3/12/2019 to 5/12/19.     Outpatient Physical Therapy 2 times weekly for 8 weeks to include the following interventions: Electrical Stimulation IFC/NMES, Gait Training, Manual Therapy, Moist Heat/ Ice, Neuromuscular Re-ed, Paraffin, Patient Education, Self Care, Therapeutic Activites, Therapeutic Exercise  and Ultrasound.  Pt may be seen by PTA as part of the rehabilitation team.       Logan Goldberg, PT

## 2019-04-10 NOTE — PROGRESS NOTES
"  Physical Therapy Daily Treatment Note     Name: Deb Webb  Clinic Number: [id number]    Therapy Diagnosis:   Encounter Diagnoses   Name Primary?    Muscle weakness Yes    Impaired functional mobility, balance, gait, and endurance     Gait difficulty     Weakness of both hips     Bilateral leg weakness     Muscular deconditioning     Gait abnormality     Impairment of balance      Physician: Moiz Goldberg MD    Visit Date: 4/11/2019    Physician Orders: PT Eval and Treat  Medical Diagnosis from Referral: Gait Instability  Evaluation Date: 3/12/2019  Authorization Period Expiration: 5/12/19  Plan of Care Expiration: 5/12/19  Visit # / Visits authorized: 6 / 12  PTA Visit 1/6      Time In: 0830  Time Out: 0910  Total Time: 40 Minutes  Total Billable Time: 30 Minutes (One on One with PTA)     Precautions: Standard and Fall, Diabetic Neuropathy B feet, COPD      Subjective     Pt reports: Was feeling dizzy earlier in the week and required missing appointments. Feels better now.     She was compliant with home exercise program.  Response to previous treatment: good  Functional change: ongoing    Pain: 0/10  Location: left shoulder  and collar bone      Objective     Deb received therapeutic exercises to develop strength, endurance, ROM, flexibility, posture and core stabilization for 40 minutes including:    NuStep 5 minutes    Standing in // bars:  Heel raises x20  Toe raises xf20  Hip abduction x20 1#  Marching x20 (unilateral UE assist) 1#  Standing SLR x 20 1#    Supine:  Bridges x20  SLRs x15  SAQs 3" hold x15  Quad set x 20   Ball Squeezes 3" x15  Hip Abduction with RTB 3" x15    Seated on blue foam: (engaging abdominal muscles)  Seated March 20x ea  Seated LAQ 20x ea 1#  Ball Throws with small red theraball x20 (reaching outside KENNEDY/hand eye coordination)          Deb received the following manual therapy techniques:  were applied to the: NA for 00 minutes, including:      Deb " "participated in neuromuscular re-education activities to improve: Balance, Coordination, Kinesthetic, Sense, Proprioception and Posture for 0 minutes. The following activities were included:    NBOS with no UE support 3x20"  NBOS with eyes closed with no UE support 3x20"  Standing airex FT EO 3 x 30"   WBOS on blue foam with no UE support 3x20"  Cone taps x20 each LE (alternating)  Modified single leg balance with purple block 3x20"  Tandem standing 3 x 30" ea    Deb participated in dynamic functional therapeutic activities to improve functional performance for 0 minutes, including:  Sit<>stands x15 from high/low table (no UE assist)  Step Ups on blue foam x15 leading with each LE (unilateral UE assist)    Deb participated in gait training to improve functional mobility and safety for 5 minutes, including:    Standing in // bars:  Fwd walking x3 trials (no UE assist)  Retro walking x3 trials (no UE assist)  Lateral walking x3 trials (no UE assist)    Deb received the following direct contact modalities after being cleared for contraindications:   Deb received the following supervised modalities after being cleared for contradictions:    Deb received hot pack for 00 minutes to L shoulder/ collar bone.    Deb received cold pack for 10 minutes to L bilateral knees      Home Exercises Provided and Patient Education Provided     Education provided:   - compliance with HEP  - safety with ambulation    Written Home Exercises Provided: Patient instructed to cont prior HEP.  Exercises were reviewed and Deb was able to demonstrate them prior to the end of the session.  Deb demonstrated good  understanding of the education provided.     See EMR under Patient Instructions for exercises provided on IE 3/12/19.    Assessment     Patient tolerated today's session well. States legs are were getting tired so session ended a little early. Patient informed staff they continue to have issues with variable blood " sugars and managing medications. Patient also reports doctor is not able to see her until August. Patient encouraged to contact clinic to see another MD to manage medications. Patient also encouraged to bring new shoes to next therapy session with focus on dynamic balance activities.     Deb is progressing well towards her goals.   Pt prognosis is Good.     Pt will continue to benefit from skilled outpatient physical therapy to address the deficits listed in the problem list box on initial evaluation, provide pt/family education and to maximize pt's level of independence in the home and community environment.     Pt's spiritual, cultural and educational needs considered and pt agreeable to plan of care and goals.     Anticipated barriers to physical therapy: transportation    Goals:   Short Term GOALS: 4 weeks. Pt agrees with goals set.  1. Patient demonstrates independence with HEP.   2. Patient demonstrates independence with Postural Awareness.   3. Patient demonstrates increased strength BLE's by 1/3 muscle grade or greater to improve tolerance to functional activities pain free.   4. Patient demonstrates ability to complete Timed Up-and-Go Test by 28 seconds or less, use of AD, no LOB     Long Term GOALS: 8 weeks. Pt agrees with goals set.  1. Patient demonstrates improve Hines Balance Assessment  To 41/56 or greater  2. Patient demonstrates increased strength BLE's to 4/5 or greater to improve tolerance to functional activities pain free.   3. Patient demonstrates improved overall function per FOTO NOC - Musculoskeletal Survey to 60% Limitation or less.   4. Patient demonstrates ability to perform 7 repetitions or more on 30 Second Sit-to-Stand test, use of BUE on arm-rest, no LOB  5. Pt able to ambulate 500 feet, use of AD, appropriate gait patter, no LOB        Plan     Continue with current POC    Plan of care Certification: 3/12/2019 to 5/12/19.     Outpatient Physical Therapy 2 times weekly for 8 weeks  to include the following interventions: Electrical Stimulation IFC/NMES, Gait Training, Manual Therapy, Moist Heat/ Ice, Neuromuscular Re-ed, Paraffin, Patient Education, Self Care, Therapeutic Activites, Therapeutic Exercise and Ultrasound.  Pt may be seen by PTA as part of the rehabilitation team.       Neymar Varela, PTA

## 2019-04-11 ENCOUNTER — CLINICAL SUPPORT (OUTPATIENT)
Dept: REHABILITATION | Facility: HOSPITAL | Age: 69
End: 2019-04-11
Attending: FAMILY MEDICINE
Payer: MEDICARE

## 2019-04-11 DIAGNOSIS — R29.898 BILATERAL LEG WEAKNESS: ICD-10-CM

## 2019-04-11 DIAGNOSIS — R29.898 WEAKNESS OF BOTH HIPS: ICD-10-CM

## 2019-04-11 DIAGNOSIS — R26.9 GAIT ABNORMALITY: ICD-10-CM

## 2019-04-11 DIAGNOSIS — R26.9 GAIT DIFFICULTY: ICD-10-CM

## 2019-04-11 DIAGNOSIS — R26.89 IMPAIRMENT OF BALANCE: ICD-10-CM

## 2019-04-11 DIAGNOSIS — Z74.09 IMPAIRED FUNCTIONAL MOBILITY, BALANCE, GAIT, AND ENDURANCE: ICD-10-CM

## 2019-04-11 DIAGNOSIS — M62.81 MUSCLE WEAKNESS: Primary | ICD-10-CM

## 2019-04-11 DIAGNOSIS — R29.898 MUSCULAR DECONDITIONING: ICD-10-CM

## 2019-04-11 PROCEDURE — 97110 THERAPEUTIC EXERCISES: CPT | Mod: PN

## 2019-04-24 NOTE — PROGRESS NOTES
"  Physical Therapy Daily Treatment Note     Name: Deb Webb  Clinic Number: [id number]    Therapy Diagnosis:   Encounter Diagnoses   Name Primary?    Muscle weakness     Impaired functional mobility, balance, gait, and endurance     Gait difficulty      Physician: Moiz Goldberg MD    Visit Date: 4/25/2019    Physician Orders: PT Eval and Treat  Medical Diagnosis from Referral: Gait Instability  Evaluation Date: 3/12/2019  Authorization Period Expiration: 5/12/19  Plan of Care Expiration: 5/12/19  Visit # / Visits authorized: 7 / 12  PTA Visit 2/6      Time In: 0755  Time Out: 0855  Total Time: 60 Minutes  Total Billable Time: 30 Minutes (One on One with PTA)     Precautions: Standard and Fall, Diabetic Neuropathy B feet, COPD      Subjective     Pt reports: Patient arrives with new shoes today, feels that have improved support and stability.    She was compliant with home exercise program.  Response to previous treatment: good  Functional change: ongoing    Pain: 0/10  Location: left shoulder  and collar bone      Objective     Deb received therapeutic exercises to develop strength, endurance, ROM, flexibility, posture and core stabilization for 45 minutes including:    NuStep 5 minutes    Standing in // bars:  Heel raises x20  Toe raises x 20  Hip abduction x20 1#  Marching x20 (unilateral UE assist) 1#  Standing SLR x 20 1#  +Mini squats 2 x 10     Supine:  Bridges x20  SLRs x15  SAQs 3" hold x15  Quad set x 20   Ball Squeezes 3" x15  Hip Abduction with RTB 3" x15    Seated on blue foam: (engaging abdominal muscles)  Seated March 20x ea  Seated LAQ 20x ea 1#  Ball Throws with small red theraball x20 (reaching outside KENNEDY/hand eye coordination)          Deb received the following manual therapy techniques:  were applied to the: NA for 00 minutes, including:      Deb participated in neuromuscular re-education activities to improve: Balance, Coordination, Kinesthetic, Sense, Proprioception and " "Posture for 15 minutes. The following activities were included:    NBOS with no UE support 3x20"   NBOS with eyes closed with no UE support 3x20"  Standing airex FT EO 3 x 30"   NBOS on blue foam with no UE support 3x20"  Cone taps x20 each LE (alternating)  Modified single leg balance with purple block 3x20"  Tandem standing 3 x 30" ea    Deb participated in dynamic functional therapeutic activities to improve functional performance for 0 minutes, including:  Sit<>stands x15 from high/low table (no UE assist)  Step Ups on blue foam x15 leading with each LE (unilateral UE assist)    Deb participated in gait training to improve functional mobility and safety for 0 minutes, including:    Standing in // bars:  Fwd walking x3 trials (no UE assist)  Retro walking x3 trials (no UE assist)  Lateral walking x3 trials (no UE assist)    Deb received the following direct contact modalities after being cleared for contraindications:   Deb received the following supervised modalities after being cleared for contradictions:    Deb received hot pack for 00 minutes to L shoulder/ collar bone.    Deb received cold pack for 10 minutes to L bilateral knees      Home Exercises Provided and Patient Education Provided     Education provided:   - compliance with HEP  - safety with ambulation    Written Home Exercises Provided: Patient instructed to cont prior HEP.  Exercises were reviewed and Deb was able to demonstrate them prior to the end of the session.  Deb demonstrated good  understanding of the education provided.     See EMR under Patient Instructions for exercises provided on IE 3/12/19.    Assessment     Patient tolerated today's treatment session well. Patient challenged with dynamic balance activities today with minor swaying noted. No overt LOB noted with balance activities. Continues to progress in therapies and benefit from skilled services.     Deb is progressing well towards her goals.   Pt " prognosis is Good.     Pt will continue to benefit from skilled outpatient physical therapy to address the deficits listed in the problem list box on initial evaluation, provide pt/family education and to maximize pt's level of independence in the home and community environment.     Pt's spiritual, cultural and educational needs considered and pt agreeable to plan of care and goals.     Anticipated barriers to physical therapy: transportation    Goals:   Short Term GOALS: 4 weeks. Pt agrees with goals set.  1. Patient demonstrates independence with HEP.   2. Patient demonstrates independence with Postural Awareness.   3. Patient demonstrates increased strength BLE's by 1/3 muscle grade or greater to improve tolerance to functional activities pain free.   4. Patient demonstrates ability to complete Timed Up-and-Go Test by 28 seconds or less, use of AD, no LOB     Long Term GOALS: 8 weeks. Pt agrees with goals set.  1. Patient demonstrates improve Hines Balance Assessment  To 41/56 or greater  2. Patient demonstrates increased strength BLE's to 4/5 or greater to improve tolerance to functional activities pain free.   3. Patient demonstrates improved overall function per FOTO NOC - Musculoskeletal Survey to 60% Limitation or less.   4. Patient demonstrates ability to perform 7 repetitions or more on 30 Second Sit-to-Stand test, use of BUE on arm-rest, no LOB  5. Pt able to ambulate 500 feet, use of AD, appropriate gait patter, no LOB        Plan     Continue with current [facility]    Plan of care Certification: 3/12/2019 to 5/12/19.     Outpatient Physical Therapy 2 times weekly for 8 weeks to include the following interventions: Electrical Stimulation IFC/NMES, Gait Training, Manual Therapy, Moist Heat/ Ice, Neuromuscular Re-ed, Paraffin, Patient Education, Self Care, Therapeutic Activites, Therapeutic Exercise and Ultrasound.  Pt may be seen by PTA as part of the rehabilitation team.       Neymar Varela PTA

## 2019-04-25 ENCOUNTER — CLINICAL SUPPORT (OUTPATIENT)
Dept: REHABILITATION | Facility: HOSPITAL | Age: 69
End: 2019-04-25
Attending: FAMILY MEDICINE
Payer: MEDICARE

## 2019-04-25 DIAGNOSIS — R26.9 GAIT DIFFICULTY: ICD-10-CM

## 2019-04-25 DIAGNOSIS — M62.81 MUSCLE WEAKNESS: ICD-10-CM

## 2019-04-25 DIAGNOSIS — Z74.09 IMPAIRED FUNCTIONAL MOBILITY, BALANCE, GAIT, AND ENDURANCE: ICD-10-CM

## 2019-04-25 PROCEDURE — 97110 THERAPEUTIC EXERCISES: CPT | Mod: PN

## 2019-04-25 PROCEDURE — 97112 NEUROMUSCULAR REEDUCATION: CPT | Mod: PN

## 2019-05-02 NOTE — PROGRESS NOTES
"  Physical Therapy Daily Treatment Note     Name: Deb Webb  Clinic Number: [id number]    Therapy Diagnosis:   Encounter Diagnoses   Name Primary?    Muscle weakness     Impaired functional mobility, balance, gait, and endurance     Gait difficulty      Physician: Moiz Goldberg MD    Visit Date: 5/3/2019    Physician Orders: PT Eval and Treat  Medical Diagnosis from Referral: Gait Instability  Evaluation Date: 3/12/2019  Authorization Period Expiration: 5/12/19  Plan of Care Expiration: 5/12/19  Visit # / Visits authorized: 8 / 12  PTA Visit 3/6      Time In: 0820  Time Out: 0920  Total Time: 60 Minutes  Total Billable Time: 45 Minutes (One on One with PTA)     Precautions: Standard and Fall, Diabetic Neuropathy B feet, COPD      Subjective     Pt reports: Was not able to make last appointment due to being sick. Feels pretty good today.    She was compliant with home exercise program.  Response to previous treatment: good  Functional change: ongoing    Pain: 0/10  Location: left shoulder  and collar bone      Objective     Deb received therapeutic exercises to develop strength, endurance, ROM, flexibility, posture and core stabilization for 45 minutes including:    NuStep 7 minutes    Standing in // bars:  Heel raises x20  Toe raises x 20  Hip abduction x20 1#  Marching x20 (unilateral UE assist) 1#  Standing SLR x 20 1#  Mini squats 2 x 10     Supine:  Bridges x20  SLRs 2 x 10   SAQs 3" hold x15  Quad set x 20   Ball Squeezes 3" x15  Hip Abduction with RTB 3" x15    Seated on blue foam: (engaging abdominal muscles)  Seated March 20x ea  Seated LAQ 20x ea 1#  Ball Throws with small red theraball x20 (reaching outside KENNEDY/hand eye coordination)    +Matrix hamstring curl and knee extension - trial next session          Deb received the following manual therapy techniques:  were applied to the: NA for 00 minutes, including:      Deb participated in neuromuscular re-education activities to improve: " "Balance, Coordination, Kinesthetic, Sense, [id number] and Posture for 15 minutes. The following activities were included:    NBOS with no UE support 3x20"   NBOS with eyes closed with no UE support 3x20"  NBOS on blue foam with no UE support 3x20"  +NBOS on blue foam with no UE support, head turning left/right 3 x 30"   Cone taps x20 each LE (alternating)  Modified single leg balance with purple block 3x20"  Tandem standing 3 x 30" ea    Deb participated in dynamic functional therapeutic activities to improve functional performance for 0 minutes, including:  Sit<>stands x15 from high/low table (no UE assist)  Step Ups on blue foam x15 leading with each LE (unilateral UE assist)    Deb participated in gait training to improve functional mobility and safety for 0 minutes, including:     Standing in // bars:  Fwd walking x3 trials (no UE assist)  Retro walking x3 trials (no UE assist)  Lateral walking x3 trials (no UE assist)    Deb received the following direct contact modalities after being cleared for contraindications:   Deb received the following supervised modalities after being cleared for contradictions:    Deb received hot pack for 00 minutes to L shoulder/ collar bone.    Deb received cold pack for 10 minutes to L bilateral knees      Home Exercises Provided and Patient Education Provided     Education provided:   - compliance with HEP  - safety with ambulation    Written Home Exercises Provided: Patient instructed to cont prior HEP.  Exercises were reviewed and Deb was able to demonstrate them prior to the end of the session.  Deb demonstrated good  understanding of the education provided.     See EMR under Patient Instructions for exercises provided on IE 3/12/19.    Assessment     Patient tolerated today's treatment session well. Patient challenged with progression of dynamic balance activities in parallel bars, one LOB noted with CGA to recover. Continues to improve with " skilled therapies and benefit from progression of exercise routine. Next session would benefit from trial of matrix knee extension and matrix hamstring exercises.     Deb is progressing well towards her goals.   Pt prognosis is Good.     Pt will continue to benefit from skilled outpatient physical therapy to address the deficits listed in the problem list box on initial evaluation, provide pt/family education and to maximize pt's level of independence in the home and community environment.     Pt's spiritual, cultural and educational needs considered and pt agreeable to plan of care and goals.     Anticipated barriers to physical therapy: transportation    Goals:   Short Term GOALS: 4 weeks. Pt agrees with goals set.  1. Patient demonstrates independence with HEP.   2. Patient demonstrates independence with Postural Awareness.   3. Patient demonstrates increased strength BLE's by 1/3 muscle grade or greater to improve tolerance to functional activities pain free.   4. Patient demonstrates ability to complete Timed Up-and-Go Test by 28 seconds or less, use of AD, no LOB     Long Term GOALS: 8 weeks. Pt agrees with goals set.  1. Patient demonstrates improve Hines Balance Assessment  To 41/56 or greater  2. Patient demonstrates increased strength BLE's to 4/5 or greater to improve tolerance to functional activities pain free.   3. Patient demonstrates improved overall function per FOTO NOC - Musculoskeletal Survey to 60% Limitation or less.   4. Patient demonstrates ability to perform 7 repetitions or more on 30 Second Sit-to-Stand test, use of BUE on arm-rest, no LOB  5. Pt able to ambulate 500 feet, use of AD, appropriate gait patter, no LOB        Plan     Continue with current POC    Plan of care Certification: 3/12/2019 to 5/12/19.     Outpatient Physical Therapy 2 times weekly for 8 weeks to include the following interventions: Electrical Stimulation IFC/NMES, Gait Training, Manual Therapy, Moist Heat/ Ice,  Neuromuscular Re-ed, Paraffin, Patient Education, Self Care, Therapeutic Activites, Therapeutic Exercise and Ultrasound.  Pt may be seen by PTA as part of the rehabilitation team.       Neymar Varela, PTA   05/03/2019

## 2019-05-03 ENCOUNTER — CLINICAL SUPPORT (OUTPATIENT)
Dept: REHABILITATION | Facility: HOSPITAL | Age: 69
End: 2019-05-03
Attending: FAMILY MEDICINE
Payer: MEDICARE

## 2019-05-03 DIAGNOSIS — Z74.09 IMPAIRED FUNCTIONAL MOBILITY, BALANCE, GAIT, AND ENDURANCE: ICD-10-CM

## 2019-05-03 DIAGNOSIS — R26.9 GAIT DIFFICULTY: ICD-10-CM

## 2019-05-03 DIAGNOSIS — M62.81 MUSCLE WEAKNESS: ICD-10-CM

## 2019-05-03 PROCEDURE — 97112 NEUROMUSCULAR REEDUCATION: CPT | Mod: PN

## 2019-05-03 PROCEDURE — 97110 THERAPEUTIC EXERCISES: CPT | Mod: PN

## 2019-05-06 ENCOUNTER — OFFICE VISIT (OUTPATIENT)
Dept: NEUROLOGY | Facility: CLINIC | Age: 69
End: 2019-05-06
Payer: MEDICARE

## 2019-05-06 VITALS
WEIGHT: 136.69 LBS | HEART RATE: 103 BPM | DIASTOLIC BLOOD PRESSURE: 93 MMHG | BODY MASS INDEX: 25.81 KG/M2 | SYSTOLIC BLOOD PRESSURE: 137 MMHG | HEIGHT: 61 IN

## 2019-05-06 DIAGNOSIS — F41.9 ANXIETY: ICD-10-CM

## 2019-05-06 DIAGNOSIS — G25.0 ESSENTIAL TREMOR: ICD-10-CM

## 2019-05-06 DIAGNOSIS — M25.612 DECREASED RANGE OF MOTION OF LEFT SHOULDER: ICD-10-CM

## 2019-05-06 DIAGNOSIS — I63.9 CEREBROVASCULAR ACCIDENT (CVA), UNSPECIFIED MECHANISM: Primary | ICD-10-CM

## 2019-05-06 PROCEDURE — 99214 PR OFFICE/OUTPT VISIT, EST, LEVL IV, 30-39 MIN: ICD-10-PCS | Mod: S$GLB,,, | Performed by: NEUROLOGICAL SURGERY

## 2019-05-06 PROCEDURE — 3080F DIAST BP >= 90 MM HG: CPT | Mod: CPTII,S$GLB,, | Performed by: NEUROLOGICAL SURGERY

## 2019-05-06 PROCEDURE — 1101F PR PT FALLS ASSESS DOC 0-1 FALLS W/OUT INJ PAST YR: ICD-10-PCS | Mod: CPTII,S$GLB,, | Performed by: NEUROLOGICAL SURGERY

## 2019-05-06 PROCEDURE — 1101F PT FALLS ASSESS-DOCD LE1/YR: CPT | Mod: CPTII,S$GLB,, | Performed by: NEUROLOGICAL SURGERY

## 2019-05-06 PROCEDURE — 99999 PR PBB SHADOW E&M-EST. PATIENT-LVL III: CPT | Mod: PBBFAC,,, | Performed by: NEUROLOGICAL SURGERY

## 2019-05-06 PROCEDURE — 3080F PR MOST RECENT DIASTOLIC BLOOD PRESSURE >= 90 MM HG: ICD-10-PCS | Mod: CPTII,S$GLB,, | Performed by: NEUROLOGICAL SURGERY

## 2019-05-06 PROCEDURE — 99999 PR PBB SHADOW E&M-EST. PATIENT-LVL III: ICD-10-PCS | Mod: PBBFAC,,, | Performed by: NEUROLOGICAL SURGERY

## 2019-05-06 PROCEDURE — 3075F PR MOST RECENT SYSTOLIC BLOOD PRESS GE 130-139MM HG: ICD-10-PCS | Mod: CPTII,S$GLB,, | Performed by: NEUROLOGICAL SURGERY

## 2019-05-06 PROCEDURE — 3075F SYST BP GE 130 - 139MM HG: CPT | Mod: CPTII,S$GLB,, | Performed by: NEUROLOGICAL SURGERY

## 2019-05-06 PROCEDURE — 99214 OFFICE O/P EST MOD 30 MIN: CPT | Mod: S$GLB,,, | Performed by: NEUROLOGICAL SURGERY

## 2019-05-06 NOTE — PROGRESS NOTES
"Chief Complaint   Patient presents with    Stroke        Deb Webb is a 68 y.o. female with a history of multiple medical diagnoses as listed below that presents for evaluation of tremor. She says that she has been having a tremor in her head for the last 5 years but feel that in the last year it has began to get worse so she was told that she should get it evaluated. She has a grandparent that had PD and she suspects that her father had PD though her was never diagnosed. She does not have any other family members that have PD. She has not had any shaking in her legs and feels that it has been present only very minimally in her hands. She has not had any difficulty with her ADLs due to the tremor but she does feel that she has been having some social difficulty due to her symptoms. She does not have any problems with managing her household. She does not have any memory impairment. She has been able to walk without problem other than her legs will on occasion buckle and cause her to fall. She has found that stress and anxiety tends to make the symptoms worse. She has not found that anything has made them any better. She does not drink alcohol. On occasion taking her Ativan will make her tremor less prominent.    Interval History  03/13/2017  Since last seen in clinic she has been doing well on Primidone. She says that the medication has helped to reduce the frequency and the intensity of the tremors. She does says that there have been a few bad days where it seems to be more prominent but she does think that it is mainly related to the weather. She has been tacking 50 mg TID for the last several weeks and notices that it makes her sleep which she thinks is beneficial because she was having insomnia. One fall was reported when she got up to tell her  to come back to get her at a later time and she feels her left knee "gave out" on her.    03/27/2019  She presents to clinic for evaluation of tremor.  " She feels that the symptoms have been getting progressively worse since she was last seen in clinic approximately 2 years ago.  She has been taking primidone 3 times a day as directed and feels that the medication was helpful initially, but seems to be less effective with time.  The medication has been well tolerated without any complaints of side effects. She denies any change in her walking, memory, or sleeping.  She had a fall recently that caused her to fall onto a left-sided hitting her shoulder which has caused her to have residual pain in the left shoulder and across the clavicle on the left.    05/06/2019  She has been having reduction in her tremor since increasing her dose of primidone as directed.  She has been complaining of excessive daytime somnolence and has been falling sleep throughout the day which causes her to be up at odd hours of the night.  She would like to have a more closely regulated sleep cycle.  Blood pressures have been well controlled.  She has been taking her aspirin as directed.    PAST MEDICAL HISTORY:  Past Medical History:   Diagnosis Date    Allergy     Arthritis     Cataract     Colon polyps     COPD (chronic obstructive pulmonary disease)     Diabetes mellitus type II     Diabetic neuropathy     Fever blister     Glaucoma (increased eye pressure)     Hyperlipidemia     Hypertension     Irritable bowel syndrome     Keloid cicatrix     Osteoporosis     Retained cholelithiasis following cholecystectomy(997.41)     Right patella fracture        PAST SURGICAL HISTORY:  Past Surgical History:   Procedure Laterality Date    BACK SURGERY  2006    CATARACT EXTRACTION W/  INTRAOCULAR LENS IMPLANT Bilateral     CHOLECYSTECTOMY      Laparoscopic    COLONOSCOPY      COLONOSCOPY N/A 5/2/2013    Performed by [facility] at [facility] ([facility] F[facility])    HERNIA REPAIR      HYSTERECTOMY      KNEE SURGERY Right 3/4/2015    Dr Weller     OOPHORECTOMY       ORIF-PATELLA / C-ARM Right 3/4/2015    Performed by Bairon Weller [facility] at [facility] OR    ovarian tumor      Benign    TONSILLECTOMY, ADENOIDECTOMY         SOCIAL HISTORY:  Social History     Socioeconomic History    Marital status:      Spouse name: Not on file    Number of children: Not on file    Years of education: Not on file    Highest education level: Not on file   Occupational History     Employer: [employer]   Social Needs    Financial resource strain: Not on file    Food insecurity:     Worry: Not on file     Inability: Not on file    Transportation needs:     Medical: Not on file     Non-medical: Not on file   Tobacco Use    Smoking status: Current Every Day Smoker     Packs/day: 0.50     Years: 40.00     Pack years: 20.00     Types: Cigarettes    Smokeless tobacco: Current User   Substance and Sexual Activity    Alcohol use: No    Drug use: No    Sexual activity: Never   Lifestyle    Physical activity:     Days per week: Not on file     Minutes per session: Not on file    Stress: Not on file   Relationships    Social connections:     Talks on phone: Not on file     Gets together: Not on file     Attends Scientologist service: Not on file     Active member of club or organization: Not on file     Attends meetings of clubs or organizations: Not on file     Relationship status: Not on file   Other Topics Concern    Are you pregnant or think you may be? No    Breast-feeding No   Social History Narrative    Not on file       FAMILY HISTORY:  Family History   Problem Relation Age of Onset    Cancer Mother         Skin    Skin cancer Mother     Glaucoma Mother     Cataracts Mother     Diabetes Mother     Heart disease Father     Diabetes Father     Skin cancer Sister     Diabetes Sister     Diabetes Daughter     Melanoma Neg Hx     Psoriasis Neg Hx     Eczema Neg Hx     Lupus Neg Hx     Amblyopia Neg Hx     Blindness Neg Hx     Macular degeneration Neg Hx      Retinal detachment Neg Hx     Strabismus Neg Hx        ALLERGIES AND MEDICATIONS: updated and reviewed.  Review of patient's allergies indicates:   Allergen Reactions    Silicone      Burn skin    Adhesive Rash     Current Outpatient Medications   Medication Sig Dispense Refill    albuterol (VENTOLIN HFA) 90 mcg/actuation inhaler TAKE 2 PUFFS BY MOUTH EVERY 4 HOURS AS NEEDED FOR WHEEZE 18 Inhaler 3    alendronate (FOSAMAX) 70 MG tablet TAKE 1 TABLET BY MOUTH ONE TIME PER WEEK 12 tablet 1    aspirin (ECOTRIN) 81 MG EC tablet Take 1 tablet (81 mg total) by mouth once daily.  0    atorvastatin (LIPITOR) 40 MG tablet Take 1 tablet (40 mg total) by mouth once daily. 90 tablet 2    blood sugar diagnostic Strp To check BG 2 times daily, to use with insurance preferred meter 200 each 3    blood-glucose meter kit To check BG 2 times daily, to use with insurance preferred meter 1 each 0    calcium carbonate/vitamin D3 (CALCIUM 600 + D,3, ORAL) Take by mouth.      citalopram (CELEXA) 20 MG tablet TAKE 1 TABLET BY MOUTH EVERY DAY 90 tablet 3    cyclobenzaprine (FLEXERIL) 5 MG tablet Take 1 tablet (5 mg total) by mouth 3 (three) times daily as needed. 90 tablet 5    diclofenac sodium (VOLTAREN) 1 % Gel Apply 2 g topically once daily. 100 g 3    dorzolamide-timolol 2-0.5% (COSOPT) 22.3-6.8 mg/mL ophthalmic solution Place 1 drop into both eyes 2 (two) times daily. 10 mL 3    fluticasone (FLONASE) 50 mcg/actuation nasal spray USE 2 SPRAY INTO EACH NOSTRIL DAILY 16 mL 5    furosemide (LASIX) 20 MG tablet TAKE 1 TABLET BY MOUTH EVERY MORNING 90 tablet 1    gabapentin (NEURONTIN) 300 MG capsule Take 1 capsule (300 mg total) by mouth 3 (three) times daily. 90 capsule 11    glimepiride (AMARYL) 4 MG tablet Take 2 tablets (8 mg total) by mouth before breakfast. 180 tablet 0    INCRUSE ELLIPTA 62.5 mcg/actuation DsDv INHALE 1 EACH INTO THE LUNGS ONCE DAILY. CONTROLLER 30 each 2    insulin (LANTUS SOLOSTAR U-100  "INSULIN) glargine 100 units/mL (3mL) SubQ pen Inject 15 Units into the skin once daily.  0    irbesartan (AVAPRO) 75 MG tablet Take 1 tablet (75 mg total) by mouth once daily. 90 tablet 3    lancets Misc To check BG 2 times daily, to use with insurance preferred meter 200 each 3    latanoprost 0.005 % ophthalmic solution Place 1 drop into both eyes every evening. 2.5 mL 6    loratadine (CLARITIN) 10 mg tablet Take 1 tablet (10 mg total) by mouth daily as needed for Allergies (or runny nose). 90 tablet 2    LORazepam (ATIVAN) 0.5 MG tablet TAKE 1 TABLET (0.5 MG TOTAL) BY MOUTH DAILY AS NEEDED FOR ANXIETY. 10 tablet 0    meclizine (ANTIVERT) 12.5 mg tablet Take 1 tablet (12.5 mg total) by mouth 3 (three) times daily as needed for Dizziness. 30 tablet 0    omega-3 fatty acids-vitamin E (FISH OIL) 1,000 mg Cap       omeprazole (PRILOSEC) 40 MG capsule Take 40 mg by mouth before breakfast.  2    pen needle, diabetic (BD ULTRA-FINE AGUS PEN NEEDLE) 32 gauge x 5/32" Ndle 1 Device by Misc.(Non-Drug; Combo Route) route once daily. 100 each 4    primidone (MYSOLINE) 50 MG Tab Take 2 tablets (100 mg total) by mouth 3 (three) times daily. One half tablet for one week, then as prescribed 180 tablet 11    traMADol (ULTRAM) 50 mg tablet Take 1 tablet (50 mg total) by mouth every 8 (eight) hours as needed for Pain. 21 tablet 0     No current facility-administered medications for this visit.        Review of Systems   Constitutional: Negative for activity change, appetite change, fever and unexpected weight change.   HENT: Negative for trouble swallowing and voice change.    Eyes: Negative for photophobia and visual disturbance.   Respiratory: Negative for apnea and shortness of breath.    Cardiovascular: Negative for chest pain and leg swelling.   Gastrointestinal: Negative for constipation and nausea.   Genitourinary: Negative for difficulty urinating.   Musculoskeletal: Positive for neck pain. Negative for back pain " and gait problem.   Skin: Negative for color change and pallor.   Neurological: Positive for tremors. Negative for dizziness, seizures, syncope, weakness and numbness.   Hematological: Negative for adenopathy.   Psychiatric/Behavioral: Negative for agitation, confusion and decreased concentration.       Neurologic Exam     Mental Status   Oriented to person, place, and time.   Registration: recalls 3 of 3 objects.   Attention: normal. Concentration: normal.   Speech: speech is normal   Level of consciousness: alert  Knowledge: good.     Cranial Nerves     CN II   Visual fields full to confrontation.   Right visual field deficit: none  Left visual field deficit: none     CN III, IV, VI   Pupils are equal, round, and reactive to light.  Extraocular motions are normal.   Right pupil: Size: 3 mm. Shape: regular. Accommodation: intact.   Left pupil: Size: 3 mm. Shape: regular. Accommodation: intact.   CN III: no CN III palsy  CN VI: no CN VI palsy  Nystagmus: none   Diplopia: none  Ophthalmoparesis: none  Upgaze: normal  Downgaze: normal  Conjugate gaze: present    CN V   Facial sensation intact.   Right facial sensation deficit: none  Left facial sensation deficit: none    CN VII   Facial expression full, symmetric.   Right facial weakness: none  Left facial weakness: none    CN VIII   CN VIII normal.     CN IX, X   CN IX normal.   CN X normal.   Palate: symmetric    CN XI   CN XI normal.   Right sternocleidomastoid strength: normal  Left sternocleidomastoid strength: normal  Right trapezius strength: normal  Left trapezius strength: normal    CN XII   CN XII normal.   Tongue deviation: none    Motor Exam   Muscle bulk: normal  Overall muscle tone: normal  Right arm tone: normal  Left arm tone: normal  Right leg tone: normal  Left leg tone: normal    Strength   Strength 5/5 throughout.     Sensory Exam   Right arm light touch: normal  Left arm light touch: normal  Right leg light touch: normal  Left leg light touch:  normal  Right arm vibration: normal  Left arm vibration: normal  Right leg vibration: normal  Left leg vibration: normal  Right arm proprioception: normal  Left arm proprioception: normal  Right leg proprioception: normal  Left leg proprioception: normal  Right arm pinprick: normal  Left arm pinprick: normal  Right leg pinprick: normal  Left leg pinprick: normal    Gait, Coordination, and Reflexes     Gait  Gait: normal    Coordination   Romberg: negative  Finger to nose coordination: normal  Heel to shin coordination: normal  Tandem walking coordination: normal    Tremor   Resting tremor: absent    Reflexes   Right brachioradialis: 2+  Left brachioradialis: 2+  Right biceps: 2+  Left biceps: 2+  Right triceps: 2+  Left triceps: 2+  Right patellar: 2+  Left patellar: 2+  Right achilles: 2+  Left achilles: 2+  Right plantar: normal  Left plantar: normal      Physical Exam   Constitutional: She is oriented to person, place, and time. She appears well-developed and well-nourished.   HENT:   Head: Normocephalic and atraumatic.   Eyes: Pupils are equal, round, and reactive to light. EOM are normal.   Neck: Normal range of motion.   Cardiovascular: Normal rate and intact distal pulses.   Pulmonary/Chest: Effort normal. No apnea. No respiratory distress.   Musculoskeletal: Normal range of motion.   Neurological: She is alert and oriented to person, place, and time. She has normal strength. She has a normal Finger-Nose-Finger Test, a normal Heel to Shin Test, a normal Romberg Test and a normal Tandem Gait Test. Gait normal.   Reflex Scores:       Tricep reflexes are 2+ on the right side and 2+ on the left side.       Bicep reflexes are 2+ on the right side and 2+ on the left side.       Brachioradialis reflexes are 2+ on the right side and 2+ on the left side.       Patellar reflexes are 2+ on the right side and 2+ on the left side.       Achilles reflexes are 2+ on the right side and 2+ on the left side.  Skin: Skin is  "warm and dry.   Psychiatric: She has a normal mood and affect. Her speech is normal and behavior is normal. Thought content normal.   Vitals reviewed.      Vitals:    05/06/19 0917   BP: (!) 137/93   BP Location: Left arm   Patient Position: Sitting   BP Method: Large (Automatic)   Pulse: 103   Weight: 62 kg (136 lb 11 oz)   Height: 5' 1" (1.549 m)       Assessment & Plan:  Problem List Items Addressed This Visit     Anxiety    Essential tremor    Overview     Tremors have been better overall since starting primidone. She feels that she has had some days when it seems to be better than other. She had a fall after standing and feels her leg gave out on her. No rigidity. No other gait issues. No memory problems.         CVA (cerebral vascular accident) - Primary      Other Visit Diagnoses     Decreased range of motion of left shoulder        Relevant Orders    Ambulatory referral to Orthopedics        Change dosing of primidone to try to minimize daytime somnolence.  One tablet in the morning, 2 tablets in the afternoon, 2 tablets at bedtime.    Follow-up: Follow up in about 3 months (around 8/6/2019).  More than 50% of this 25 minute encounter was spent in counseling and coordinating care of stroke in tremor.      "

## 2019-05-08 ENCOUNTER — CLINICAL SUPPORT (OUTPATIENT)
Dept: REHABILITATION | Facility: HOSPITAL | Age: 69
End: 2019-05-08
Attending: FAMILY MEDICINE
Payer: MEDICARE

## 2019-05-08 DIAGNOSIS — Z74.09 IMPAIRED FUNCTIONAL MOBILITY, BALANCE, GAIT, AND ENDURANCE: ICD-10-CM

## 2019-05-08 DIAGNOSIS — M62.81 MUSCLE WEAKNESS: ICD-10-CM

## 2019-05-08 DIAGNOSIS — R26.9 GAIT DIFFICULTY: ICD-10-CM

## 2019-05-08 PROCEDURE — 97110 THERAPEUTIC EXERCISES: CPT | Mod: PN

## 2019-05-08 PROCEDURE — 97112 NEUROMUSCULAR REEDUCATION: CPT | Mod: PN

## 2019-05-08 NOTE — PROGRESS NOTES
"  Physical Therapy Daily Treatment Note     Name: Deb Webb  Clinic Number: [id number]    Therapy Diagnosis:   Encounter Diagnoses   Name Primary?    Muscle weakness     Impaired functional mobility, balance, gait, and endurance     Gait difficulty      Physician: Moiz Goldberg MD    Visit Date: 5/8/2019    Physician Orders: PT Eval and Treat  Medical Diagnosis from Referral: Gait Instability  Evaluation Date: 3/12/2019  Authorization Period Expiration: 5/12/19  Plan of Care Expiration: 5/12/19  Visit # / Visits authorized: 9 / 12  PTA Visit 4/6      Time In: 0800  Time Out: 0900  Total Time: 60 Minutes  Total Billable Time: 40 Minutes (One on One with PTA)     Precautions: Standard and Fall, Diabetic Neuropathy B feet, COPD      Subjective     Pt reports: Overall feels that things have been improving. Been walking less at home with SEC.     She was compliant with home exercise program.  Response to previous treatment: good  Functional change: ongoing    Pain: 0/10  Location: left shoulder  and collar bone      Objective     Deb received therapeutic exercises to develop strength, endurance, ROM, flexibility, posture and core stabilization for 38 minutes including:    NuStep 7 minutes    Standing in // bars:  Heel raises x20  Toe raises x 20  Hip abduction x20 1#  Marching x20 (unilateral UE assist) 1#  Standing SLR x 20 1#  Mini squats 2 x 10   +Matrix extension 5# x 10     Supine:  Bridges x20  SLRs 2 x 10   SAQs 3" hold x15  Quad set x 20   Ball Squeezes 3" x15  Hip Abduction with RTB 3" x15    Seated on blue foam: (engaging abdominal muscles)  Seated March 20x ea  Seated LAQ 20x ea 1#  Ball Throws with small red theraball x20 (reaching outside KENNEDY/hand eye coordination)              Deb received the following manual therapy techniques:  were applied to the: NA for 00 minutes, including:      Deb participated in neuromuscular re-education activities to improve: Balance, Coordination, " "Kinesthetic, Sense, [id number] and Posture for 12 minutes. The following activities were included:    NBOS with no UE support 3x20"   NBOS with eyes closed with no UE support 3x20"  NBOS on blue foam with no UE support 3x20"  NBOS on blue foam with no UE support, head turning left/right 3 x 30"   Cone taps x20 each LE (alternating)  Modified single leg balance with purple block 3x20"  Tandem standing 3 x 30" ea  +Marching airex no UE support 1 min x 2     Deb participated in dynamic functional therapeutic activities to improve functional performance for 0 minutes, including:  Sit<>stands x15 from high/low table (no UE assist)  Step Ups on blue foam x15 leading with each LE (unilateral UE assist)    Deb participated in gait training to improve functional mobility and safety for 0 minutes, including:     Standing in // bars:  Fwd walking x3 trials (no UE assist)  Retro walking x3 trials (no UE assist)  Lateral walking x3 trials (no UE assist)    Deb received the following direct contact modalities after being cleared for contraindications:   Deb received the following supervised modalities after being cleared for contradictions:    Deb received hot pack for 00 minutes to L shoulder/ collar bone.    Deb received cold pack for 10 minutes to L bilateral knees      Home Exercises Provided and Patient Education Provided     Education provided:   - compliance with HEP  - safety with ambulation    Written Home Exercises Provided: Patient instructed to cont prior HEP.  Exercises were reviewed and Deb was able to demonstrate them prior to the end of the session.  Deb demonstrated good  understanding of the education provided.     See EMR under Patient Instructions for exercises provided on IE 3/12/19.    Assessment     Patient tolerated today's treatment session well. Patient challenged with balance activities with swaying noted but no overt LOB noted. Patient encouraged to return to fitness center " once therapy ends to maintain strength and activity tolerance. Minor cues required for rest breaks throughout session.     Deb is progressing well towards her goals.   Pt prognosis is Good.     Pt will continue to benefit from skilled outpatient physical therapy to address the deficits listed in the problem list box on initial evaluation, provide pt/family education and to maximize pt's level of independence in the home and community environment.     Pt's spiritual, cultural and educational needs considered and pt agreeable to plan of care and goals.     Anticipated barriers to physical therapy: transportation    Goals:   Short Term GOALS: 4 weeks. Pt agrees with goals set.  1. Patient demonstrates independence with HEP.   2. Patient demonstrates independence with Postural Awareness.   3. Patient demonstrates increased strength BLE's by 1/3 muscle grade or greater to improve tolerance to functional activities pain free.   4. Patient demonstrates ability to complete Timed Up-and-Go Test by 28 seconds or less, use of AD, no LOB     Long Term GOALS: 8 weeks. Pt agrees with goals set.  1. Patient demonstrates improve Hines Balance Assessment  To 41/56 or greater  2. Patient demonstrates increased strength BLE's to 4/5 or greater to improve tolerance to functional activities pain free.   3. Patient demonstrates improved overall function per FOTO NOC - Musculoskeletal Survey to 60% Limitation or less.   4. Patient demonstrates ability to perform 7 repetitions or more on 30 Second Sit-to-Stand test, use of BUE on arm-rest, no LOB  5. Pt able to ambulate 500 feet, use of AD, appropriate gait patter, no LOB        Plan     Continue with current POC    Plan of care Certification: 3/12/2019 to 5/12/19.     Outpatient Physical Therapy 2 times weekly for 8 weeks to include the following interventions: Electrical Stimulation IFC/NMES, Gait Training, Manual Therapy, Moist Heat/ Ice, Neuromuscular Re-ed, Paraffin, Patient  Education, Self Care, Therapeutic Activites, Therapeutic Exercise and Ultrasound.  Pt may be seen by PTA as part of the rehabilitation team.       Neymar Varela, PTA   05/08/2019

## 2019-05-09 ENCOUNTER — CLINICAL SUPPORT (OUTPATIENT)
Dept: REHABILITATION | Facility: HOSPITAL | Age: 69
End: 2019-05-09
Attending: FAMILY MEDICINE
Payer: MEDICARE

## 2019-05-09 ENCOUNTER — OFFICE VISIT (OUTPATIENT)
Dept: ORTHOPEDICS | Facility: CLINIC | Age: 69
End: 2019-05-09
Payer: MEDICARE

## 2019-05-09 VITALS
BODY MASS INDEX: 26.47 KG/M2 | SYSTOLIC BLOOD PRESSURE: 120 MMHG | WEIGHT: 140.19 LBS | HEART RATE: 119 BPM | HEIGHT: 61 IN | DIASTOLIC BLOOD PRESSURE: 80 MMHG

## 2019-05-09 DIAGNOSIS — R26.9 GAIT DIFFICULTY: ICD-10-CM

## 2019-05-09 DIAGNOSIS — M25.512 CHRONIC LEFT SHOULDER PAIN: Primary | ICD-10-CM

## 2019-05-09 DIAGNOSIS — M62.81 MUSCLE WEAKNESS: ICD-10-CM

## 2019-05-09 DIAGNOSIS — Z74.09 IMPAIRED FUNCTIONAL MOBILITY, BALANCE, GAIT, AND ENDURANCE: ICD-10-CM

## 2019-05-09 DIAGNOSIS — G89.29 CHRONIC LEFT SHOULDER PAIN: Primary | ICD-10-CM

## 2019-05-09 PROCEDURE — 97110 THERAPEUTIC EXERCISES: CPT | Mod: PN

## 2019-05-09 PROCEDURE — 20610 LARGE JOINT ASPIRATION/INJECTION: L SUBACROMIAL BURSA: ICD-10-PCS | Mod: LT,S$GLB,, | Performed by: ORTHOPAEDIC SURGERY

## 2019-05-09 PROCEDURE — 3074F SYST BP LT 130 MM HG: CPT | Mod: CPTII,S$GLB,, | Performed by: ORTHOPAEDIC SURGERY

## 2019-05-09 PROCEDURE — 3079F DIAST BP 80-89 MM HG: CPT | Mod: CPTII,S$GLB,, | Performed by: ORTHOPAEDIC SURGERY

## 2019-05-09 PROCEDURE — 20610 DRAIN/INJ JOINT/BURSA W/O US: CPT | Mod: LT,S$GLB,, | Performed by: ORTHOPAEDIC SURGERY

## 2019-05-09 PROCEDURE — 99999 PR PBB SHADOW E&M-EST. PATIENT-LVL III: ICD-10-PCS | Mod: PBBFAC,,, | Performed by: ORTHOPAEDIC SURGERY

## 2019-05-09 PROCEDURE — 1101F PR PT FALLS ASSESS DOC 0-1 FALLS W/OUT INJ PAST YR: ICD-10-PCS | Mod: CPTII,S$GLB,, | Performed by: ORTHOPAEDIC SURGERY

## 2019-05-09 PROCEDURE — 99204 OFFICE O/P NEW MOD 45 MIN: CPT | Mod: 25,S$GLB,, | Performed by: ORTHOPAEDIC SURGERY

## 2019-05-09 PROCEDURE — 1101F PT FALLS ASSESS-DOCD LE1/YR: CPT | Mod: CPTII,S$GLB,, | Performed by: ORTHOPAEDIC SURGERY

## 2019-05-09 PROCEDURE — 99999 PR PBB SHADOW E&M-EST. PATIENT-LVL III: CPT | Mod: PBBFAC,,, | Performed by: ORTHOPAEDIC SURGERY

## 2019-05-09 PROCEDURE — G8979 MOBILITY GOAL STATUS: HCPCS | Mod: CJ,PN

## 2019-05-09 PROCEDURE — 3079F PR MOST RECENT DIASTOLIC BLOOD PRESSURE 80-89 MM HG: ICD-10-PCS | Mod: CPTII,S$GLB,, | Performed by: ORTHOPAEDIC SURGERY

## 2019-05-09 PROCEDURE — 3074F PR MOST RECENT SYSTOLIC BLOOD PRESSURE < 130 MM HG: ICD-10-PCS | Mod: CPTII,S$GLB,, | Performed by: ORTHOPAEDIC SURGERY

## 2019-05-09 PROCEDURE — G8978 MOBILITY CURRENT STATUS: HCPCS | Mod: CK,PN

## 2019-05-09 PROCEDURE — 99204 PR OFFICE/OUTPT VISIT, NEW, LEVL IV, 45-59 MIN: ICD-10-PCS | Mod: 25,S$GLB,, | Performed by: ORTHOPAEDIC SURGERY

## 2019-05-09 RX ORDER — TRIAMCINOLONE ACETONIDE 40 MG/ML
40 INJECTION, SUSPENSION INTRA-ARTICULAR; INTRAMUSCULAR
Status: DISCONTINUED | OUTPATIENT
Start: 2019-05-09 | End: 2019-05-09 | Stop reason: HOSPADM

## 2019-05-09 RX ADMIN — TRIAMCINOLONE ACETONIDE 40 MG: 40 INJECTION, SUSPENSION INTRA-ARTICULAR; INTRAMUSCULAR at 10:05

## 2019-05-09 NOTE — PATIENT INSTRUCTIONS
Your shoulder was injected with two medications today: a numbing medicine (lidocaine) and a corticosteroid (kenalog).  The numbing medicine works immediately and works for a few hours. The steroid medication takes 2-3 days to work.   You may notice that your pain returns or even worsens after the numbing medicine wears off.    If pain worsens, treat with ice 30 minutes on and 10 minutes off the area, over the counter or prescription anti-inflammatories and rest.    Call for any redness, fevers, chills or inability to move the joint. Call the office if pain does not improve in 2-3 days.

## 2019-05-09 NOTE — PLAN OF CARE
Physical Therapy Daily Treatment Note     Name: Deb Webb  Clinic Number: [id number]    Therapy Diagnosis:   Encounter Diagnoses   Name Primary?    Muscle weakness     Impaired functional mobility, balance, gait, and endurance     Gait difficulty      Physician: Moiz Goldberg MD    Visit Date: 2019    Physician Orders: PT Eval and Treat  Medical Diagnosis from Referral: Gait Instability  Evaluation Date: 3/12/2019  Authorization Period Expiration: 19  Plan of Care Expiration: 19  Visit # / Visits authorized: 10 / 12  PTA Visit 0/6      Time In: 820  Time Out: 920  Total Time: 60 Minutes  Total Billable Time: 30 Minutes (One on One with PT)     Precautions: Standard and Fall, Diabetic Neuropathy B feet, COPD      Subjective     Pt reports: Improving overall, however still has mild unsteadiness during ambulation without AD.    She was compliant with home exercise program.  Response to previous treatment: good  Functional change: ongoing    Pain: 0/10  Location: left shoulder  and collar bone      Objective     Lower Extremity Strength  Right LE   Left LE     Knee extension: 4/5 Knee extension: 4-/5   Knee flexion: 4-/5 Knee flexion: 4-/5   Hip flexion: 4/5 Hip flexion: 4/5   Hip abduction: (seated) 4+/5 Hip abduction: 4+/5   Hip adduction: (seated) 4+/5 Hip adduction: 4+/5   Ankle dorsiflexion:   5/5 Ankle dorsiflexion:   5/5   Ankle plantarflexion: 4+/5 Ankle plantarflexion: 4+/5         30 second Sit to Stand:  7 reps - use of B UE (5 at eval)     TU seconds (use of SPC) (33 at eval)    Home Exercises Provided and Patient Education Provided     Education provided:   - compliance with HEP  - safety with ambulation    Written Home Exercises Provided: Patient instructed to cont prior HEP.  Exercises were reviewed and Deb was able to demonstrate them prior to the end of the session.  Deb demonstrated good  understanding of the education provided.     See EMR under Patient  Instructions for exercises provided on IE 3/12/19.    Functional Limitations Reports - G Codes  Category: Mobility  Tool: FOTO NOC - Musculoskeletal Disorder Survey  Score: 54% Limitation   Modifier  Impairment Limitation Restriction    CH  0 % impaired, limited or restricted    CI  @ least 1% but less than 20% impaired, limited or restricted    CJ  @ least 20%<40% impaired, limited or restricted    CK  @ least 40%<60% impaired, limited or restricted    CL  @ least 60% <80% impaired, limited or restricted    CM  @ least 80%<100% impaired limited or restricted    CN  100% impaired, limited or restricted     Current/  CK = 40-60% limitation  Goal/ : CJ =  20-40% limitation      Assessment     Patient tolerated today's treatment session well, with reassessment perforemd. Pt demonstrates increased repetitions on 30 Second Sit-to-Stand and decreased duration with TUG without LOB. Significantly improved static/dynamic standing balance during this episode of care. Mild limitations remain throughout B LE strength. Continue to progress with strength, endurance, balance and is working well towards goals.    Deb is progressing well towards her goals.   Pt prognosis is Good.     Pt will continue to benefit from skilled outpatient physical therapy to address the deficits listed in the problem list box on initial evaluation, provide pt/family education and to maximize pt's level of independence in the home and community environment.   Pt's spiritual, cultural and educational needs considered and pt agreeable to plan of care and goals.     Anticipated barriers to physical therapy: transportation    Goals:   Short Term GOALS: 4 weeks. Pt agrees with goals set.  1. Patient demonstrates independence with HEP. In progress   2. Patient demonstrates independence with Postural Awareness.  In progress   3. Patient demonstrates increased strength BLE's by 1/3 muscle grade or greater to improve tolerance to functional  activities pain free.  In progress   4. Patient demonstrates ability to complete Timed Up-and-Go Test by 28 seconds or less, use of AD, no LOB met     Long Term GOALS: 8 weeks. Pt agrees with goals set.  1. Patient demonstrates improve Hines Balance Assessment  To 41/56 or greater  2. Patient demonstrates increased strength BLE's to 4/5 or greater to improve tolerance to functional activities pain free.   3. Patient demonstrates improved overall function per FOTO NOC - Musculoskeletal Survey to 60% Limitation or less.   4. Patient demonstrates ability to perform 7 repetitions or more on 30 Second Sit-to-Stand test, use of BUE on arm-rest, no LOB  5. Pt able to ambulate 500 feet, use of AD, appropriate gait patter, no LOB      Plan     Plan of care Certification: 5/9/2019 to 6/20/19.     Extension of Currently Established Physical Therapy 2 times weekly for 6 weeks    Logan Goldberg, PT   05/09/2019

## 2019-05-09 NOTE — LETTER
May 9, 2019      Edward Stratton MD  [address] [facility] [address]  [address]  [place] [address]           [facility]  [address]  [place] [address]  Phone: [phone]          Patient: Deb Webb   MR Number: [id number]   YOB: 1950   Date of Visit: 5/9/2019       Dear Dr. Edward Stratton:    Thank you for referring Deb Webb to me for evaluation. Attached you will find relevant portions of my assessment and plan of care.    If you have questions, please do not hesitate to call me. I look forward to following Deb Webb along with you.    Sincerely,    Bel Bernard MD    Enclosure  CC:  No Recipients    If you would like to receive this communication electronically, please contact [email or web address] or [phone] to request more information on [employer] Link access.    For providers and/or their staff who would like to refer a patient to [facility], please contact us through our one-stop-shop provider referral line, [facility], at [phone].    If you feel you have received this communication in error or would no longer like to receive these types of communications, please e-mail [email or web address]

## 2019-05-09 NOTE — PROCEDURES
Large Joint Aspiration/Injection: L subacromial bursa  Date/Time: 5/9/2019 10:50 AM  Performed by: Bel Bernard MD  Authorized by: Bel Bernard MD     Consent Done?:  Yes (Written)  Indications:  Pain  Timeout: Prior to procedure the correct patient, procedure, and site was verified      Location:  Shoulder  Site:  L subacromial bursa  Prep: Patient was prepped and draped in usual sterile fashion    Needle size:  22 G  Medications:  40 mg triamcinolone acetonide 40 mg/mL  Patient tolerance:  Patient tolerated the procedure well with no immediate complications

## 2019-05-09 NOTE — PROGRESS NOTES
Physical Therapy Daily Treatment Note     Name: Deb Webb  Clinic Number: [id number]    Therapy Diagnosis:   Encounter Diagnoses   Name Primary?    Muscle weakness     Impaired functional mobility, balance, gait, and endurance     Gait difficulty      Physician: Moiz Goldberg MD    Visit Date: 2019    Physician Orders: PT Eval and Treat  Medical Diagnosis from Referral: Gait Instability  Evaluation Date: 3/12/2019  Authorization Period Expiration: 19  Plan of Care Expiration: 19  Visit # / Visits authorized: 10 / 12  PTA Visit 0/6      Time In: 820  Time Out: 920  Total Time: 60 Minutes  Total Billable Time: 30 Minutes (One on One with PT)     Precautions: Standard and Fall, Diabetic Neuropathy B feet, COPD      Subjective     Pt reports: Improving overall, however still has mild unsteadiness during ambulation without AD.    She was compliant with home exercise program.  Response to previous treatment: good  Functional change: ongoing    Pain: 0/10  Location: left shoulder  and collar bone      Objective     Lower Extremity Strength  Right LE   Left LE     Knee extension: 4/5 Knee extension: 4-/5   Knee flexion: 4-/5 Knee flexion: 4-/5   Hip flexion: 4/5 Hip flexion: 4/5   Hip abduction: (seated) 4+/5 Hip abduction: 4+/5   Hip adduction: (seated) 4+/5 Hip adduction: 4+/5   Ankle dorsiflexion:   5/5 Ankle dorsiflexion:   5/5   Ankle plantarflexion: 4+/5 Ankle plantarflexion: 4+/5         30 second Sit to Stand:  7 reps - use of B UE (5 at eval)     TU seconds (use of SPC) (33 at eval)    Deb received therapeutic exercises to develop strength, endurance, ROM, flexibility, posture and core stabilization for 38 minutes including:    NuStep 7 minutes    Standing in // bars:  Heel raises x20  Toe raises x 20  Hip abduction x20 1#  Marching x20 (unilateral UE assist) 1#  Standing SLR x 20 1#  Mini squats 2 x 10   +Matrix extension 5# x 10     Supine:  Bridges x20  SLRs 2 x 10   SAQs  "3" hold x15  Quad set x 20   Ball Squeezes 3" x15  Hip Abduction with RTB 3" x15    Seated on blue foam: (engaging abdominal muscles)  Seated March 20x ea  Seated LAQ 20x ea 1#  Ball Throws with small red theraball x20 (reaching outside KENNEDY/hand eye coordination)    Deb received the following manual therapy techniques:  were applied to the: NA for 00 minutes, including:    Deb participated in neuromuscular re-education activities to improve: Balance, Coordination, Kinesthetic, Sense, [id number] and Posture for 12 minutes. The following activities were included:    NBOS with no UE support 3x20"   NBOS with eyes closed with no UE support 3x20"  NBOS on blue foam with no UE support 3x20"  NBOS on blue foam with no UE support, head turning left/right 3 x 30"   Cone taps x20 each LE (alternating)  Modified single leg balance with purple block 3x20"  Tandem standing 3 x 30" ea  +Marching airex no UE support 1 min x 2     Deb participated in dynamic functional therapeutic activities to improve functional performance for 0 minutes, including:  Sit<>stands x15 from high/low table (no UE assist)  Step Ups on blue foam x15 leading with each LE (unilateral UE assist)    Deb participated in gait training to improve functional mobility and safety for 0 minutes, including:     Standing in // bars:  Fwd walking x3 trials (no UE assist)  Retro walking x3 trials (no UE assist)  Lateral walking x3 trials (no UE assist)    Deb received the following direct contact modalities after being cleared for contraindications:   Deb received the following supervised modalities after being cleared for contradictions:    Deb received hot pack for 00 minutes to L shoulder/ collar bone.  Deb received cold pack for 10 minutes to L bilateral knees      Home Exercises Provided and Patient Education Provided     Education provided:   - compliance with HEP  - safety with ambulation    Written Home Exercises Provided: Patient " instructed to cont prior HEP.  Exercises were reviewed and Deb was able to demonstrate them prior to the end of the session.  Deb demonstrated good  understanding of the education provided.     See EMR under Patient Instructions for exercises provided on IE 3/12/19.    Functional Limitations Reports - G Codes  Category: Mobility  Tool: FOTO NOC - Musculoskeletal Disorder Survey  Score: 54% Limitation   Modifier  Impairment Limitation Restriction    CH  0 % impaired, limited or restricted    CI  @ least 1% but less than 20% impaired, limited or restricted    CJ  @ least 20%<40% impaired, limited or restricted    CK  @ least 40%<60% impaired, limited or restricted    CL  @ least 60% <80% impaired, limited or restricted    CM  @ least 80%<100% impaired limited or restricted    CN  100% impaired, limited or restricted     Current/  CK = 40-60% limitation  Goal/ : CJ =  20-40% limitation      Assessment     Patient tolerated today's treatment session well, with reassessment perforemd. Pt demonstrates increased repetitions on 30 Second Sit-to-Stand and decreased duration with TUG without LOB. Significantly improved static/dynamic standing balance during this episode of care. Mild limitations remain throughout B LE strength. Continue to progress with strength, endurance, balance and is working well towards goals.    Deb is progressing well towards her goals.   Pt prognosis is Good.     Pt will continue to benefit from skilled outpatient physical therapy to address the deficits listed in the problem list box on initial evaluation, provide pt/family education and to maximize pt's level of independence in the home and community environment.   Pt's spiritual, cultural and educational needs considered and pt agreeable to plan of care and goals.     Anticipated barriers to physical therapy: transportation    Goals:   Short Term GOALS: 4 weeks. Pt agrees with goals set.  1. Patient demonstrates independence  with HEP. In progress   2. Patient demonstrates independence with Postural Awareness.  In progress   3. Patient demonstrates increased strength BLE's by 1/3 muscle grade or greater to improve tolerance to functional activities pain free.  In progress   4. Patient demonstrates ability to complete Timed Up-and-Go Test by 28 seconds or less, use of AD, no LOB met     Long Term GOALS: 8 weeks. Pt agrees with goals set.  1. Patient demonstrates improve Hines Balance Assessment  To 41/56 or greater  2. Patient demonstrates increased strength BLE's to 4/5 or greater to improve tolerance to functional activities pain free.   3. Patient demonstrates improved overall function per FOTO NOC - Musculoskeletal Survey to 60% Limitation or less.   4. Patient demonstrates ability to perform 7 repetitions or more on 30 Second Sit-to-Stand test, use of BUE on arm-rest, no LOB  5. Pt able to ambulate 500 feet, use of AD, appropriate gait patter, no LOB      Plan     Plan of care Certification: 5/9/2019 to 6/20/19.     Extension of Currently Established Physical Therapy 2 times weekly for 6 weeks      Logan Goldberg, PT   05/09/2019

## 2019-05-09 NOTE — PROGRESS NOTES
Chief Complaint   Patient presents with    Left Shoulder - Injury, Pain, Swelling    Neck - Injury, Pain, Swelling    Right Hand - thumb, Pain, Swelling       This patient was seen in consultation at the request of Dr. Edward Stratton     HPI: Deb Webb is a 68 y.o. female who presents today complaining of Injury, Pain, and Swelling of the Left Shoulder; Injury, Pain, and Swelling of the Neck; and thumb, Pain, and Swelling of the Right Hand     Duration of symptoms:  Fall in January after a stroke, sustained a proximal clavicle fracture that was treated non operatively.  Her pain and swelling over the proximal clavicle have improved significantly over the last 3 months but she does still have occasional pain. She also complains of lateral based shoulder pain that is worse at night and with motion of the shoulder.  Her motion has improved significantly over the last few months as well.  She has not had any treatment for this pain but has been doing physical therapy to address the stroke and even doing some shoulder exercises which are    This is the extent of the patient's complaints at this time.     Hand dominance: Right     Review of Systems   Constitutional: Negative.    HENT: Negative.    Eyes: Negative.    Respiratory: Negative.    Cardiovascular: Negative.    Gastrointestinal: Negative.    Genitourinary: Negative.    Skin: Negative.    Neurological: Negative.    Endo/Heme/Allergies: Negative.    Psychiatric/Behavioral: Negative.          Review of patient's allergies indicates:   Allergen Reactions    Silicone      Burn skin    Adhesive Rash         Current Outpatient Medications:     albuterol (VENTOLIN HFA) 90 mcg/actuation inhaler, TAKE 2 PUFFS BY MOUTH EVERY 4 HOURS AS NEEDED FOR WHEEZE, Disp: 18 Inhaler, Rfl: 3    alendronate (FOSAMAX) 70 MG tablet, TAKE 1 TABLET BY MOUTH ONE TIME PER WEEK, Disp: 12 tablet, Rfl: 1    aspirin (ECOTRIN) 81 MG EC tablet, Take 1 tablet (81 mg total) by mouth  once daily., Disp: , Rfl: 0    atorvastatin (LIPITOR) 40 MG tablet, Take 1 tablet (40 mg total) by mouth once daily., Disp: 90 tablet, Rfl: 2    blood sugar diagnostic Strp, To check BG 2 times daily, to use with insurance preferred meter, Disp: 200 each, Rfl: 3    blood-glucose meter kit, To check BG 2 times daily, to use with insurance preferred meter, Disp: 1 each, Rfl: 0    calcium carbonate/vitamin D3 (CALCIUM 600 + D,3, ORAL), Take by mouth., Disp: , Rfl:     citalopram (CELEXA) 20 MG tablet, TAKE 1 TABLET BY MOUTH EVERY DAY, Disp: 90 tablet, Rfl: 3    cyclobenzaprine (FLEXERIL) 5 MG tablet, Take 1 tablet (5 mg total) by mouth 3 (three) times daily as needed., Disp: 90 tablet, Rfl: 5    diclofenac sodium (VOLTAREN) 1 % Gel, Apply 2 g topically once daily., Disp: 100 g, Rfl: 3    dorzolamide-timolol 2-0.5% (COSOPT) 22.3-6.8 mg/mL ophthalmic solution, Place 1 drop into both eyes 2 (two) times daily., Disp: 10 mL, Rfl: 3    fluticasone (FLONASE) 50 mcg/actuation nasal spray, USE 2 SPRAY INTO EACH NOSTRIL DAILY, Disp: 16 mL, Rfl: 5    furosemide (LASIX) 20 MG tablet, TAKE 1 TABLET BY MOUTH EVERY MORNING, Disp: 90 tablet, Rfl: 1    gabapentin (NEURONTIN) 300 MG capsule, Take 1 capsule (300 mg total) by mouth 3 (three) times daily., Disp: 90 capsule, Rfl: 11    INCRUSE ELLIPTA 62.5 mcg/actuation DsDv, INHALE 1 EACH INTO THE LUNGS ONCE DAILY. CONTROLLER, Disp: 30 each, Rfl: 2    insulin (LANTUS SOLOSTAR U-100 INSULIN) glargine 100 units/mL (3mL) SubQ pen, Inject 15 Units into the skin once daily., Disp: , Rfl: 0    irbesartan (AVAPRO) 75 MG tablet, Take 1 tablet (75 mg total) by mouth once daily., Disp: 90 tablet, Rfl: 3    lancets Misc, To check BG 2 times daily, to use with insurance preferred meter, Disp: 200 each, Rfl: 3    latanoprost 0.005 % ophthalmic solution, Place 1 drop into both eyes every evening., Disp: 2.5 mL, Rfl: 6    loratadine (CLARITIN) 10 mg tablet, Take 1 tablet (10 mg total) by  "mouth daily as needed for Allergies (or runny nose)., Disp: 90 tablet, Rfl: 2    LORazepam (ATIVAN) 0.5 MG tablet, TAKE 1 TABLET (0.5 MG TOTAL) BY MOUTH DAILY AS NEEDED FOR ANXIETY., Disp: 10 tablet, Rfl: 0    omega-3 fatty acids-vitamin E (FISH OIL) 1,000 mg Cap, , Disp: , Rfl:     omeprazole (PRILOSEC) 40 MG capsule, Take 40 mg by mouth before breakfast., Disp: , Rfl: 2    pen needle, diabetic (BD ULTRA-FINE AGUS PEN NEEDLE) 32 gauge x 5/32" Ndle, 1 Device by Misc.(Non-Drug; Combo Route) route once daily., Disp: 100 each, Rfl: 4    primidone (MYSOLINE) 50 MG Tab, Take 2 tablets (100 mg total) by mouth 3 (three) times daily. One half tablet for one week, then as prescribed, Disp: 180 tablet, Rfl: 11    traMADol (ULTRAM) 50 mg tablet, Take 1 tablet (50 mg total) by mouth every 8 (eight) hours as needed for Pain., Disp: 21 tablet, Rfl: 0    glimepiride (AMARYL) 4 MG tablet, Take 2 tablets (8 mg total) by mouth before breakfast., Disp: 180 tablet, Rfl: 0    meclizine (ANTIVERT) 12.5 mg tablet, Take 1 tablet (12.5 mg total) by mouth 3 (three) times daily as needed for Dizziness., Disp: 30 tablet, Rfl: 0    Past Medical History:   Diagnosis Date    Allergy     Arthritis     Cataract     Colon polyps     COPD (chronic obstructive pulmonary disease)     Diabetes mellitus type II     Diabetic neuropathy     Fever blister     Glaucoma (increased eye pressure)     Hyperlipidemia     Hypertension     Irritable bowel syndrome     Keloid cicatrix     Osteoporosis     Retained cholelithiasis following cholecystectomy(997.41)     Right patella fracture        Patient Active Problem List   Diagnosis    Retained cholelithiasis following cholecystectomy(997.41)    Osteoporosis, postmenopausal    Uncontrolled type 2 diabetes mellitus with diabetic neuropathy, without long-term current use of insulin    Combined hyperlipidemia associated with type 2 diabetes mellitus    Essential hypertension    " Osteoarthritis    Anxiety    GERD (gastroesophageal reflux disease)    COPD (chronic obstructive pulmonary disease)    Pseudophakia    Tobacco use disorder    IBS (irritable bowel syndrome)    S/P R knee surgery, ORIF patella (3/4/15)    Gait difficulty    Chronic allergic rhinitis    Vitreous detachment, left    Sedative hypnotic or anxiolytic dependence    Essential tremor    Primary open angle glaucoma of both eyes, severe stage    Essential hypertension, benign    DM type 2 without retinopathy    Refractive error    Impaired functional mobility, balance, gait, and endurance    Muscle weakness    Open angle with borderline findings and high glaucoma risk in both eyes    Congenital tilted optic nerve    Fracture of sternal end of left clavicle    CVA (cerebral vascular accident)    Pulmonary hypertension    Chronic combined systolic and diastolic congestive heart failure       Past Surgical History:   Procedure Laterality Date    BACK SURGERY  2006    CATARACT EXTRACTION W/  INTRAOCULAR LENS IMPLANT Bilateral     CHOLECYSTECTOMY      Laparoscopic    COLONOSCOPY      COLONOSCOPY N/A 5/2/2013    [facility] ([facility])    HERNIA REPAIR      HYSTERECTOMY      KNEE SURGERY Right 3/4/2015    Dr Weller     OOPHORECTOMY      ORIF-PATELLA / C-ARM Right 3/4/2015    [facility]    ovarian tumor      Benign    TONSILLECTOMY, ADENOIDECTOMY         Social History     Tobacco Use    Smoking status: Current Every Day Smoker     Packs/day: 0.50     Years: 40.00     Pack years: 20.00     Types: Cigarettes    Smokeless tobacco: Current User   Substance Use Topics    Alcohol use: No    Drug use: No       Family History   Problem Relation Age of Onset    Cancer Mother         Skin    Skin cancer Mother     Glaucoma Mother     Cataracts Mother     Diabetes Mother     Heart disease Father     Diabetes Father     Skin  cancer Sister     Diabetes Sister     Diabetes Daughter     Melanoma Neg Hx     Psoriasis Neg Hx     Eczema Neg Hx     Lupus Neg Hx     Amblyopia Neg Hx     Blindness Neg Hx     Macular degeneration Neg Hx     Retinal detachment Neg Hx     Strabismus Neg Hx        Physical Exam:     Vitals:    05/09/19 0952   BP: 120/80   Pulse: (!) 119           General: Weight: 63.6 kg (140 lb 3.4 oz) Body mass index is 26.49 kg/m².  Patient is alert, awake and oriented to time, place and person. Mood and affect are appropriate.  Patient does not appear to be in any distress, denies any constitutional symptoms and appears stated age.   HEENT: Pupils are equal and round, sclera are not injected. External examination of ears and nose reveals no abnormalities. Cranial nerves II-X are grossly intact  Neck: examination demonstrates painless full active range of motion. Spurling's sign is negative  Skin: no rashes, abrasions or open wounds on the affected extremity   Resp: No respiratory distress or audible wheezing   CV: 2+  pulses, all extremities warm and well perfused     There is a prominence over the proximal clavicle. This is mildly tender palpation    Shoulder Range of Motion    Right     Left   (Active/Passive)       Forward Elevation     150/160            150/160  External rotation (abduction)   40             50   External rotation (arm at side)  20/30             40/40   Internal rotation in abduction   50                        60   Internal rotation behind the back  buttock             buttock     Range of motion is painful     Scapular winging no  Scapular dyskinesia no    Examination of the back shows no atrophy     Acromioclavicular joint is tender  Crossbody test: negative    Neer's positive  Hawkin's positive    Orestes's positive -- pain no weakness  Drop arm negative  Belly press negative  Liftoff not tested due to limitations of motion  External rotation lag sign negative    Internal rotation lag sign  negative    Cuff Strength     Right     Left   Supraspinatus        5/5    5/5  Infraspinatus     5/5    5/5  Subscapularis     5/5    5/5    Deltoid testing            5/5    5/5    Speeds negative  Yergasons negative    Elbow examination demonstrates no tenderness to palpation and has normal range of motion.     ltsi C5-T1  + epl, io, fds, fdp   2+ RP      Imaging: 3 views of the left shoulder:  positive for degenerative changes of the AC joint. The humeral head is well centered on the AP and axillary views.  There is no cystic change, sclerosis or calcification at the rotator cuff insertion on the greater tuberosity. There is not significant degenerative change of the glenohumeral joint or posterior subluxation of the humeral head. No acute changes or fracture.        Assessment: 68 y.o. female with left proximal clavicle fracture, left subacromial bursitis, possible rotator cuff dysfunction      I explained my diagnostic impression and the reasoning behind it in detail, using layman's terms.  Models and/or pictures were used to help in the explanation.  We discussed non operative and operative treatment modalities -- She would like to start with non-operative treatment in the form of injection.       Plan:   - injection performed to the left shoulder, please see procedure note for more details  - Continue physical therapy  - Return to clinic in 3 months. Return sooner if symptoms worsen or fail to improve.    All questions were answered in detail. The patient is in full agreement with the treatment plan and will proceed accordingly.    A note notifying Dr. Edward Stratton of my findings was sent via the electronic medical record     This note was created by combination of typed  and M-Modal dictation. Transcription and phonetic errors may be present.  If there are any questions, please contact me.

## 2019-05-09 NOTE — PROGRESS NOTES
Chief Complaint   Patient presents with    Left Shoulder - Injury, Pain, Swelling    Neck - Injury, Pain, Swelling    Right Hand - thumb, Pain, Swelling       This patient was seen in consultation at the request of Dr. Edward Stratton     HPI: Deb Webb is a 68 y.o. female who presents today complaining of Injury, Pain, and Swelling of the Left Shoulder; Injury, Pain, and Swelling of the Neck; and thumb, Pain, and Swelling of the Right Hand     She had a fall in January and sustained a proximal clavicle fracture.  Pain is {Constant/Intermittent:62728}   ***/10 at best and ***/10 at its worst  Aggravating factors: ***  Relieving factors: ***  Radicular symptoms: {YES/NO:23914} {Radiculopathy symptoms:66213}   Associated symptoms:  {pain associated symptoms:43417}.    Prior treatment:  {prior treatment :83635} {WITH-WITHOUT:00399} improvement in {lgl pain swelling range of motion:74068}.     Pain {DOES_DOES NOT:84289} interfere with {activities :95038}.    This is the extent of the patient's complaints at this time.     Hand dominance: {Desc; Left/Right:18402}     Occupation:***    ROS ***     Review of patient's allergies indicates:   Allergen Reactions    Silicone      Burn skin    Adhesive Rash         Current Outpatient Medications:     albuterol (VENTOLIN HFA) 90 mcg/actuation inhaler, TAKE 2 PUFFS BY MOUTH EVERY 4 HOURS AS NEEDED FOR WHEEZE, Disp: 18 Inhaler, Rfl: 3    alendronate (FOSAMAX) 70 MG tablet, TAKE 1 TABLET BY MOUTH ONE TIME PER WEEK, Disp: 12 tablet, Rfl: 1    aspirin (ECOTRIN) 81 MG EC tablet, Take 1 tablet (81 mg total) by mouth once daily., Disp: , Rfl: 0    atorvastatin (LIPITOR) 40 MG tablet, Take 1 tablet (40 mg total) by mouth once daily., Disp: 90 tablet, Rfl: 2    blood sugar diagnostic Strp, To check BG 2 times daily, to use with insurance preferred meter, Disp: 200 each, Rfl: 3    blood-glucose meter kit, To check BG 2 times daily, to use with insurance preferred meter,  Disp: 1 each, Rfl: 0    calcium carbonate/vitamin D3 (CALCIUM 600 + D,3, ORAL), Take by mouth., Disp: , Rfl:     citalopram (CELEXA) 20 MG tablet, TAKE 1 TABLET BY MOUTH EVERY DAY, Disp: 90 tablet, Rfl: 3    cyclobenzaprine (FLEXERIL) 5 MG tablet, Take 1 tablet (5 mg total) by mouth 3 (three) times daily as needed., Disp: 90 tablet, Rfl: 5    diclofenac sodium (VOLTAREN) 1 % Gel, Apply 2 g topically once daily., Disp: 100 g, Rfl: 3    dorzolamide-timolol 2-0.5% (COSOPT) 22.3-6.8 mg/mL ophthalmic solution, Place 1 drop into both eyes 2 (two) times daily., Disp: 10 mL, Rfl: 3    fluticasone (FLONASE) 50 mcg/actuation nasal spray, USE 2 SPRAY INTO EACH NOSTRIL DAILY, Disp: 16 mL, Rfl: 5    furosemide (LASIX) 20 MG tablet, TAKE 1 TABLET BY MOUTH EVERY MORNING, Disp: 90 tablet, Rfl: 1    gabapentin (NEURONTIN) 300 MG capsule, Take 1 capsule (300 mg total) by mouth 3 (three) times daily., Disp: 90 capsule, Rfl: 11    INCRUSE ELLIPTA 62.5 mcg/actuation DsDv, INHALE 1 EACH INTO THE LUNGS ONCE DAILY. CONTROLLER, Disp: 30 each, Rfl: 2    insulin (LANTUS SOLOSTAR U-100 INSULIN) glargine 100 units/mL (3mL) SubQ pen, Inject 15 Units into the skin once daily., Disp: , Rfl: 0    irbesartan (AVAPRO) 75 MG tablet, Take 1 tablet (75 mg total) by mouth once daily., Disp: 90 tablet, Rfl: 3    lancets Misc, To check BG 2 times daily, to use with insurance preferred meter, Disp: 200 each, Rfl: 3    latanoprost 0.005 % ophthalmic solution, Place 1 drop into both eyes every evening., Disp: 2.5 mL, Rfl: 6    loratadine (CLARITIN) 10 mg tablet, Take 1 tablet (10 mg total) by mouth daily as needed for Allergies (or runny nose)., Disp: 90 tablet, Rfl: 2    LORazepam (ATIVAN) 0.5 MG tablet, TAKE 1 TABLET (0.5 MG TOTAL) BY MOUTH DAILY AS NEEDED FOR ANXIETY., Disp: 10 tablet, Rfl: 0    omega-3 fatty acids-vitamin E (FISH OIL) 1,000 mg Cap, , Disp: , Rfl:     omeprazole (PRILOSEC) 40 MG capsule, Take 40 mg by mouth before  "breakfast., Disp: , Rfl: 2    pen needle, diabetic (BD ULTRA-FINE AGUS PEN NEEDLE) 32 gauge x 5/32" Ndle, 1 Device by Misc.(Non-Drug; Combo Route) route once daily., Disp: 100 each, Rfl: 4    primidone (MYSOLINE) 50 MG Tab, Take 2 tablets (100 mg total) by mouth 3 (three) times daily. One half tablet for one week, then as prescribed, Disp: 180 tablet, Rfl: 11    traMADol (ULTRAM) 50 mg tablet, Take 1 tablet (50 mg total) by mouth every 8 (eight) hours as needed for Pain., Disp: 21 tablet, Rfl: 0    glimepiride (AMARYL) 4 MG tablet, Take 2 tablets (8 mg total) by mouth before breakfast., Disp: 180 tablet, Rfl: 0    meclizine (ANTIVERT) 12.5 mg tablet, Take 1 tablet (12.5 mg total) by mouth 3 (three) times daily as needed for Dizziness., Disp: 30 tablet, Rfl: 0    Past Medical History:   Diagnosis Date    Allergy     Arthritis     Cataract     Colon polyps     COPD (chronic obstructive pulmonary disease)     Diabetes mellitus type II     Diabetic neuropathy     Fever blister     Glaucoma (increased eye pressure)     Hyperlipidemia     Hypertension     Irritable bowel syndrome     Keloid cicatrix     Osteoporosis     Retained cholelithiasis following cholecystectomy(997.41)     Right patella fracture        Patient Active Problem List   Diagnosis    Retained cholelithiasis following cholecystectomy(997.41)    Osteoporosis, postmenopausal    Uncontrolled type 2 diabetes mellitus with diabetic neuropathy, without long-term current use of insulin    Combined hyperlipidemia associated with type 2 diabetes mellitus    Essential hypertension    Osteoarthritis    Anxiety    GERD (gastroesophageal reflux disease)    COPD (chronic obstructive pulmonary disease)    Pseudophakia    Tobacco use disorder    IBS (irritable bowel syndrome)    S/P R knee surgery, ORIF patella (3/4/15)    Gait difficulty    Chronic allergic rhinitis    Vitreous detachment, left    Sedative hypnotic or anxiolytic " dependence    Essential tremor    Primary open angle glaucoma of both eyes, severe stage    Essential hypertension, benign    DM type 2 without retinopathy    Refractive error    Impaired functional mobility, balance, gait, and endurance    Muscle weakness    Open angle with borderline findings and high glaucoma risk in both eyes    Congenital tilted optic nerve    Fracture of sternal end of left clavicle    CVA (cerebral vascular accident)    Pulmonary hypertension    Chronic combined systolic and diastolic congestive heart failure       Past Surgical History:   Procedure Laterality Date    BACK SURGERY  2006    CATARACT EXTRACTION W/  INTRAOCULAR LENS IMPLANT Bilateral     CHOLECYSTECTOMY      Laparoscopic    COLONOSCOPY      COLONOSCOPY N/A 5/2/2013    [facility])    HERNIA REPAIR      HYSTERECTOMY      KNEE SURGERY Right 3/4/2015    Dr Weller     OOPHORECTOMY      ORIF-PATELLA / C-ARM Right 3/4/2015    [facility]    ovarian tumor      Benign    TONSILLECTOMY, ADENOIDECTOMY         Social History     Tobacco Use    Smoking status: Current Every Day Smoker     Packs/day: 0.50     Years: 40.00     Pack years: 20.00     Types: Cigarettes    Smokeless tobacco: Current User   Substance Use Topics    Alcohol use: No    Drug use: No       Family History   Problem Relation Age of Onset    Cancer Mother         Skin    Skin cancer Mother     Glaucoma Mother     Cataracts Mother     Diabetes Mother     Heart disease Father     Diabetes Father     Skin cancer Sister     Diabetes Sister     Diabetes Daughter     Melanoma Neg Hx     Psoriasis Neg Hx     Eczema Neg Hx     Lupus Neg Hx     Amblyopia Neg Hx     Blindness Neg Hx     Macular degeneration Neg Hx     Retinal detachment Neg Hx     Strabismus Neg Hx        Physical Exam:     Vitals:    05/09/19 0952   BP: 120/80   Pulse: (!) 119            General: Weight: 63.6 kg (140 lb 3.4 oz) Body mass index is 26.49 kg/m².  Patient is alert, awake and oriented to time, place and person. Mood and affect are appropriate.  Patient does not appear to be in any distress, denies any constitutional symptoms and appears stated age.   HEENT: Pupils are equal and round, sclera are not injected. External examination of ears and nose reveals no abnormalities. Cranial nerves II-X are grossly intact  Neck:*** examination demonstrates painless *** active range of motion. Spurling's sign is {NEGATIVE/POSITIVE:18343}  Skin: no rashes, abrasions or open wounds on the affected extremity   Resp: No respiratory distress or audible wheezing   CV: 2+ *** pulses, all extremities warm and well perfused   {Desc; Left/Right:31364} {examlocation:78319}     Imaging: ***     Assessment: 68 y.o. female with ***      I explained my diagnostic impression and the reasoning behind it in detail, using layman's terms.  Models and/or pictures were used to help in the explanation.  We discussed non operative and operative treatment modalities -- {HE/SHE:56858} would like to start with {lgltreatment:61828} treatment in the form of ***.       Plan:   - ***  - Return to clinic in {NUMBERS 1-20:82454} {DAYS, WEEKS ,MONTHS:44331}. Return sooner if symptoms worsen or fail to improve.    All questions were answered in detail. The patient is in full agreement with the treatment plan and will proceed accordingly.    A note notifying Dr. Edward Stratton of my findings was sent via the electronic medical record     This note was created by combination of typed  and M-Modal dictation. Transcription and phonetic errors may be present.  If there are any questions, please contact me.

## 2019-05-10 ENCOUNTER — OFFICE VISIT (OUTPATIENT)
Dept: OPHTHALMOLOGY | Facility: CLINIC | Age: 69
End: 2019-05-10
Payer: MEDICARE

## 2019-05-10 ENCOUNTER — CLINICAL SUPPORT (OUTPATIENT)
Dept: OPHTHALMOLOGY | Facility: CLINIC | Age: 69
End: 2019-05-10
Payer: MEDICARE

## 2019-05-10 DIAGNOSIS — Z96.1 PSEUDOPHAKIA: ICD-10-CM

## 2019-05-10 DIAGNOSIS — H40.1133 PRIMARY OPEN ANGLE GLAUCOMA OF BOTH EYES, SEVERE STAGE: ICD-10-CM

## 2019-05-10 DIAGNOSIS — H40.1133 PRIMARY OPEN ANGLE GLAUCOMA OF BOTH EYES, SEVERE STAGE: Primary | ICD-10-CM

## 2019-05-10 PROCEDURE — 92133 POSTERIOR SEGMENT OCT OPTIC NERVE(OCULAR COHERENCE TOMOGRAPHY) - OU - BOTH EYES: ICD-10-PCS | Mod: S$GLB,,, | Performed by: OPHTHALMOLOGY

## 2019-05-10 PROCEDURE — 99999 PR PBB SHADOW E&M-EST. PATIENT-LVL II: ICD-10-PCS | Mod: PBBFAC,,, | Performed by: OPHTHALMOLOGY

## 2019-05-10 PROCEDURE — 92012 PR EYE EXAM, EST PATIENT,INTERMED: ICD-10-PCS | Mod: S$GLB,,, | Performed by: OPHTHALMOLOGY

## 2019-05-10 PROCEDURE — 92012 INTRM OPH EXAM EST PATIENT: CPT | Mod: S$GLB,,, | Performed by: OPHTHALMOLOGY

## 2019-05-10 PROCEDURE — 92133 CPTRZD OPH DX IMG PST SGM ON: CPT | Mod: S$GLB,,, | Performed by: OPHTHALMOLOGY

## 2019-05-10 PROCEDURE — 99999 PR PBB SHADOW E&M-EST. PATIENT-LVL II: CPT | Mod: PBBFAC,,, | Performed by: OPHTHALMOLOGY

## 2019-05-10 PROCEDURE — 92083 HUMPHREY VISUAL FIELD - OU - BOTH EYES: ICD-10-PCS | Mod: S$GLB,,, | Performed by: OPHTHALMOLOGY

## 2019-05-10 PROCEDURE — 92083 EXTENDED VISUAL FIELD XM: CPT | Mod: S$GLB,,, | Performed by: OPHTHALMOLOGY

## 2019-05-10 NOTE — PROGRESS NOTES
Subjective:       Patient ID: Deb Webb is a 68 y.o. female.    Chief Complaint: Glaucoma    HPI     DSL- 2/4/19     67 y/o female is here for IOP check. Pt has been doing gtts as directed.     Eyemeds  Latanoprost OU QHS  Cospost BID OU     Last edited by Cruz Mensah on 5/10/2019  9:30 AM. (History)             Assessment:       1. Primary open angle glaucoma of both eyes, severe stage    2. Pseudophakia        Plan:       POAG OU-IOP's better today OU. HVF's are abnl OU but stable. OCT's are abnl OU.      Cont Xalatan & Cosopt OU.  RTC 4 mos for IOP's & DFE.

## 2019-05-13 ENCOUNTER — CLINICAL SUPPORT (OUTPATIENT)
Dept: REHABILITATION | Facility: HOSPITAL | Age: 69
End: 2019-05-13
Attending: FAMILY MEDICINE
Payer: MEDICARE

## 2019-05-13 DIAGNOSIS — R26.9 GAIT DIFFICULTY: ICD-10-CM

## 2019-05-13 DIAGNOSIS — M62.81 MUSCLE WEAKNESS: ICD-10-CM

## 2019-05-13 DIAGNOSIS — Z74.09 IMPAIRED FUNCTIONAL MOBILITY, BALANCE, GAIT, AND ENDURANCE: ICD-10-CM

## 2019-05-13 PROCEDURE — 97110 THERAPEUTIC EXERCISES: CPT | Mod: PN

## 2019-05-13 NOTE — PROGRESS NOTES
"  Physical Therapy Daily Treatment Note     Name: Deb Webb  Clinic Number: [id number]    Therapy Diagnosis:   Encounter Diagnoses   Name Primary?    Muscle weakness     Impaired functional mobility, balance, gait, and endurance     Gait difficulty      Physician: Moiz Goldberg MD    Visit Date: 5/13/2019    Physician Orders: PT Eval and Treat  Medical Diagnosis from Referral: Gait Instability  Evaluation Date: 3/12/2019  Authorization Period Expiration: 5/12/19  Plan of Care Expiration: 5/12/19  Visit # / Visits authorized: 10 / 12  PTA Visit 0/6      Time In: 0800  Time Out: 0900  Total Time: 60 Minutes  Total Billable Time: 30 Minutes (One on One with PT)     Precautions: Standard and Fall, Diabetic Neuropathy B feet, COPD      Subjective     Pt reports: Slowly progressing and is walking with less unsteadiness.    She was compliant with home exercise program.  Response to previous treatment: good  Functional change: improved confidence with walking  Pain: 0/10  Location: left shoulder  and collar bone      Objective     Deb received therapeutic exercises to develop strength, endurance, ROM, flexibility, posture and core stabilization for 38 minutes including:    NuStep 7 minutes    Standing in // bars:  Heel raises x30  Toe raises x 30  Hip abduction x30 1#  +Hip Extension x30 1#  Standing SLR x 30 1#  Marching x30 (unilateral UE assist) 1#    Mini squats 2 x 10   Matrix extension 5# 2 x 10     Supine:  Bridges x20  SLRs 2 x 10   SAQs 3" hold x15  Quad set x 20   Ball Squeezes 3" x15    Hip Abduction with RTB 3" x15    Seated on blue foam: (engaging abdominal muscles)  Seated March 20x ea  Seated LAQ 20x ea 1#  Ball Throws with small red theraball x20 (reaching outside KENNEDY/hand eye coordination)    Deb received the following manual therapy techniques:  were applied to the: NA for 00 minutes, including:    Deb participated in neuromuscular re-education activities to improve: Balance, " "Coordination, Kinesthetic, Sense, [id number] and Posture for 12 minutes. The following activities were included:    NBOS with no UE support 3x20"   NBOS with eyes closed with no UE support 3x20"  NBOS on blue foam with no UE support 3x20"  NBOS on blue foam with no UE support, head turning left/right 3 x 30"   Cone taps x20 each LE (alternating)  Modified single leg balance with purple block 3x20"  Tandem standing 3 x 30" ea  +Marching airex no UE support 1 min x 2     Deb participated in dynamic functional therapeutic activities to improve functional performance for 0 minutes, including:  Sit<>stands x15 from high/low table (no UE assist)  Step Ups on blue foam x15 leading with each LE (unilateral UE assist)    Deb participated in gait training to improve functional mobility and safety for 0 minutes, including:     Standing in // bars:  Fwd walking x3 trials (no UE assist)  Retro walking x3 trials (no UE assist)  Lateral walking x3 trials (no UE assist)    Deb received the following direct contact modalities after being cleared for contraindications:   Deb received the following supervised modalities after being cleared for contradictions:    Deb received hot pack for 00 minutes to L shoulder/ collar bone.  Deb received cold pack for 10 minutes to L bilateral knees      Home Exercises Provided and Patient Education Provided     Education provided:   - compliance with HEP  - safety with ambulation    Written Home Exercises Provided: Patient instructed to cont prior HEP.  Exercises were reviewed and Deb was able to demonstrate them prior to the end of the session.  Deb demonstrated good  understanding of the education provided.     See EMR under Patient Instructions for exercises provided on IE 3/12/19.    Assessment     Patient tolerated today's treatment session well. Good response to increased repetitions throughout standing strength/endurance training without adverse effect. Slowly " progressing with dynamic balance activities, with intermittent trunk sway. Demonstrates safe indep ambulation with short clinic distances this session    Deb is progressing well towards her goals.   Pt prognosis is Good.     Pt will continue to benefit from skilled outpatient physical therapy to address the deficits listed in the problem list box on initial evaluation, provide pt/family education and to maximize pt's level of independence in the home and community environment.   Pt's spiritual, cultural and educational needs considered and pt agreeable to plan of care and goals.     Anticipated barriers to physical therapy: transportation    Goals:   Short Term GOALS: 4 weeks. Pt agrees with goals set.  1. Patient demonstrates independence with HEP. In progress   2. Patient demonstrates independence with Postural Awareness.  In progress   3. Patient demonstrates increased strength BLE's by 1/3 muscle grade or greater to improve tolerance to functional activities pain free.  In progress   4. Patient demonstrates ability to complete Timed Up-and-Go Test by 28 seconds or less, use of AD, no LOB met     Long Term GOALS: 8 weeks. Pt agrees with goals set.  1. Patient demonstrates improve Hines Balance Assessment  To 41/56 or greater  2. Patient demonstrates increased strength BLE's to 4/5 or greater to improve tolerance to functional activities pain free.   3. Patient demonstrates improved overall function per FOTO NOC - Musculoskeletal Survey to 60% Limitation or less.   4. Patient demonstrates ability to perform 7 repetitions or more on 30 Second Sit-to-Stand test, use of BUE on arm-rest, no LOB  5. Pt able to ambulate 500 feet, use of AD, appropriate gait patter, no LOB      Plan     Plan of care Certification: 5/9/2019 to 6/20/19.     Extension of Currently Established Physical Therapy 2 times weekly for 6 weeks      Logan Goldberg, PT   05/13/2019

## 2019-05-23 DIAGNOSIS — M81.0 OSTEOPOROSIS, POSTMENOPAUSAL: ICD-10-CM

## 2019-05-23 RX ORDER — ALENDRONATE SODIUM 70 MG/1
TABLET ORAL
Qty: 12 TABLET | Refills: 1 | Status: SHIPPED | OUTPATIENT
Start: 2019-05-23 | End: 2019-11-18 | Stop reason: SDUPTHER

## 2019-05-24 RX ORDER — ALBUTEROL SULFATE 90 UG/1
AEROSOL, METERED RESPIRATORY (INHALATION)
Qty: 18 INHALER | Refills: 3 | Status: SHIPPED | OUTPATIENT
Start: 2019-05-24 | End: 2019-09-18 | Stop reason: SDUPTHER

## 2019-05-29 DIAGNOSIS — J42 CHRONIC BRONCHITIS, UNSPECIFIED CHRONIC BRONCHITIS TYPE: Chronic | ICD-10-CM

## 2019-05-29 RX ORDER — UMECLIDINIUM 62.5 UG/1
AEROSOL, POWDER ORAL
Qty: 30 EACH | Refills: 2 | Status: SHIPPED | OUTPATIENT
Start: 2019-05-29 | End: 2019-11-27 | Stop reason: SDUPTHER

## 2019-06-05 ENCOUNTER — CLINICAL SUPPORT (OUTPATIENT)
Dept: REHABILITATION | Facility: HOSPITAL | Age: 69
End: 2019-06-05
Attending: FAMILY MEDICINE
Payer: MEDICARE

## 2019-06-05 DIAGNOSIS — M62.81 MUSCLE WEAKNESS: ICD-10-CM

## 2019-06-05 DIAGNOSIS — R26.9 GAIT DIFFICULTY: ICD-10-CM

## 2019-06-05 DIAGNOSIS — Z74.09 IMPAIRED FUNCTIONAL MOBILITY, BALANCE, GAIT, AND ENDURANCE: ICD-10-CM

## 2019-06-05 PROCEDURE — 97112 NEUROMUSCULAR REEDUCATION: CPT | Mod: PN

## 2019-06-05 NOTE — PROGRESS NOTES
"  Physical Therapy Daily Treatment Note     Name: Deb Webb  Clinic Number: [id number]    Therapy Diagnosis:   Encounter Diagnoses   Name Primary?    Muscle weakness     Impaired functional mobility, balance, gait, and endurance     Gait difficulty      Physician: Moiz Goldberg MD    Visit Date: 6/5/2019    Physician Orders: PT Eval and Treat  Medical Diagnosis from Referral: Gait Instability  Evaluation Date: 3/12/2019  Authorization Period Expiration: 5/12/19  Plan of Care Expiration: 5/12/19  Visit # / Visits authorized: 11 / 12  PTA Visit 1/6      Time In: 1015  Time Out: 1100  Total Time: 45 Minutes  Total Billable Time: 15 Minutes (One on One with PT)     Precautions: Standard and Fall, Diabetic Neuropathy B feet, COPD      Subjective     Pt reports: Has not been to therapy in a few weeks due to waiting on insurance. Has been continuing with HEP outside of sessions.     She was compliant with home exercise program.  Response to previous treatment: good  Functional change: improved confidence with walking  Pain: 0/10  Location: left shoulder  and collar bone      Objective     Deb received therapeutic exercises to develop strength, endurance, ROM, flexibility, posture and core stabilization for 7 minutes including:    NuStep 7 minutes    Standing in // bars:  Heel raises x30  Toe raises x 30  Hip abduction x30 1#  +Hip Extension x30 1#  Standing SLR x 30 1#  Marching x30 (unilateral UE assist) 1#    Mini squats 2 x 10   Matrix knee extension 5# 2 x 10     Supine:  Bridges x20  SLRs 2 x 10   SAQs 3" hold x15  Quad set x 20   Ball Squeezes 3" x15    Hip Abduction with RTB 3" x15    Seated on blue foam: (engaging abdominal muscles)  Seated March 20x ea  Seated LAQ 20x ea 1#  Ball Throws with small red theraball x20 (reaching outside KENNEDY/hand eye coordination)    Deb received the following manual therapy techniques:  were applied to the: NA for 00 minutes, including:    Deb participated in " "neuromuscular re-education activities to improve: Balance, Coordination, Kinesthetic, Sense, [id number] and Posture for 38 minutes. The following activities were included:    NBOS with eyes closed with no UE support 3x20"  NBOS on blue foam with no UE support 3x20"  NBOS on blue foam with no UE support, head turning left/right 3 x 30"   Cone taps x20 each LE (alternating)  Modified single leg balance with purple block 3x20"  Tandem standing 3 x 30" ea  Marching airex no UE support 1 min x 2     Deb participated in dynamic functional therapeutic activities to improve functional performance for 0 minutes, including:  Sit<>stands x15 from high/low table (no UE assist)  Step Ups on blue foam x15 leading with each LE (unilateral UE assist)    Deb participated in gait training to improve functional mobility and safety for 0 minutes, including:     Standing in // bars:  Fwd walking x3 trials (no UE assist)  Retro walking x3 trials (no UE assist)  Lateral walking x3 trials (no UE assist)    Deb received the following direct contact modalities after being cleared for contraindications:   Deb received the following supervised modalities after being cleared for contradictions:    Deb received hot pack for 00 minutes to L shoulder/ collar bone.  Deb received cold pack for 10 minutes to L bilateral knees      Home Exercises Provided and Patient Education Provided     Education provided:   - compliance with HEP  - safety with ambulation    Written Home Exercises Provided: Patient instructed to cont prior HEP.  Exercises were reviewed and Deb was able to demonstrate them prior to the end of the session.  Deb demonstrated good  understanding of the education provided.     See EMR under Patient Instructions for exercises provided on IE 3/12/19.    Assessment     Muscle fatigue achieved with prescribed activities today. Patient improving with balance activities with less sway noted. Patient continues to " progress and benefit from skilled sessions for monitoring of response and cues as needed for technique. Session ended slightly early today due to patient having GI issues    Deb is progressing well towards her goals.   Pt prognosis is Good.     Pt will continue to benefit from skilled outpatient physical therapy to address the deficits listed in the problem list box on initial evaluation, provide pt/family education and to maximize pt's level of independence in the home and community environment.   Pt's spiritual, cultural and educational needs considered and pt agreeable to plan of care and goals.     Anticipated barriers to physical therapy: transportation    Goals:   Short Term GOALS: 4 weeks. Pt agrees with goals set.  1. Patient demonstrates independence with HEP. In progress   2. Patient demonstrates independence with Postural Awareness.  In progress   3. Patient demonstrates increased strength BLE's by 1/3 muscle grade or greater to improve tolerance to functional activities pain free.  In progress   4. Patient demonstrates ability to complete Timed Up-and-Go Test by 28 seconds or less, use of AD, no LOB met     Long Term GOALS: 8 weeks. Pt agrees with goals set.  1. Patient demonstrates improve Hines Balance Assessment  To 41/56 or greater  2. Patient demonstrates increased strength BLE's to 4/5 or greater to improve tolerance to functional activities pain free.   3. Patient demonstrates improved overall function per FOTO NOC - Musculoskeletal Survey to 60% Limitation or less.   4. Patient demonstrates ability to perform 7 repetitions or more on 30 Second Sit-to-Stand test, use of BUE on arm-rest, no LOB  5. Pt able to ambulate 500 feet, use of AD, appropriate gait patter, no LOB      Plan     Plan of care Certification: 5/9/2019 to 6/20/19.     Extension of Currently Established Physical Therapy 2 times weekly for 6 weeks      Neymar Varela, PTA   06/05/2019

## 2019-06-10 ENCOUNTER — CLINICAL SUPPORT (OUTPATIENT)
Dept: REHABILITATION | Facility: HOSPITAL | Age: 69
End: 2019-06-10
Attending: FAMILY MEDICINE
Payer: MEDICARE

## 2019-06-10 DIAGNOSIS — R26.9 GAIT DIFFICULTY: ICD-10-CM

## 2019-06-10 DIAGNOSIS — M62.81 MUSCLE WEAKNESS: ICD-10-CM

## 2019-06-10 DIAGNOSIS — Z74.09 IMPAIRED FUNCTIONAL MOBILITY, BALANCE, GAIT, AND ENDURANCE: ICD-10-CM

## 2019-06-10 PROCEDURE — 97110 THERAPEUTIC EXERCISES: CPT | Mod: PN

## 2019-06-10 NOTE — PROGRESS NOTES
"  Physical Therapy Reassessment Note     Name: Deb Webb  Clinic Number: [id number]    Therapy Diagnosis:   Encounter Diagnoses   Name Primary?    Muscle weakness     Impaired functional mobility, balance, gait, and endurance     Gait difficulty      Physician: Moiz Goldberg MD    Visit Date: 6/10/2019    Physician Orders: PT Eval and Treat  Medical Diagnosis from Referral: Gait Instability  Evaluation Date: 3/12/2019  Authorization Period Expiration: 19  Plan of Care Expiration: 19  Visit # / Visits authorized: 3 / 12 (total visit 13)  PTA Visit 0/6      Time In: 900  Time Out: 1000  Total Time: 60 Minutes  Total Billable Time: 35 Minutes (One on One with PT)     Precautions: Standard and Fall, Diabetic Neuropathy B feet, COPD      Subjective     Pt reports: Presents without cane because she "forgot it". She is doing better and contineus to improving    She was compliant with home exercise program.  Response to previous treatment: good  Functional change: improved confidence with walking  Pain: 0/10  Location: left shoulder  and collar bone      Objective     30 second Sit to Stand:  8 reps - use of 1 UE; 4 reps without UE (5 at eval)     TU seconds (no AD) (33 at eval with AD)       Deb received therapeutic exercises to develop strength, endurance, ROM, flexibility, posture and core stabilization for 7 minutes including:    NuStep 7 minutes    Standing in // bars:  Heel raises x30  Toe raises x 30  Hip abduction x30 1#  +Hip Extension x30 1#  Standing SLR x 30 1#  Marching x30 (unilateral UE assist) 1#    Mini squats 2 x 10   Matrix knee extension 5# 2 x 10     Supine:  Bridges x20  SLRs 2 x 10   SAQs 3" hold x15  Quad set x 20   Ball Squeezes 3" x15    Hip Abduction with RTB 3" x15    Seated on blue foam: (engaging abdominal muscles)  Seated March 20x ea  Seated LAQ 20x ea 1#  Ball Throws with small red theraball x20 (reaching outside KENNEDY/hand eye coordination)    Deb received " "the following manual therapy techniques:  were applied to the: NA for 00 minutes, including:    Deb participated in neuromuscular re-education activities to improve: Balance, Coordination, Kinesthetic, Sense, [id number] and Posture for 38 minutes. The following activities were included:    NBOS with eyes closed with no UE support 3x20"  NBOS on blue foam with no UE support 3x20"  NBOS on blue foam with no UE support, head turning left/right 3 x 30"   Cone taps x20 each LE (alternating)  Modified single leg balance with purple block 3x20"  Tandem standing 3 x 30" ea  Marching airex no UE support 1 min x 2     Deb participated in dynamic functional therapeutic activities to improve functional performance for 0 minutes, including:  Sit<>stands x15 from high/low table (no UE assist)  Step Ups on blue foam x15 leading with each LE (unilateral UE assist)    Deb participated in gait training to improve functional mobility and safety for 0 minutes, including:     Standing in // bars:  Fwd walking x3 trials (no UE assist)  Retro walking x3 trials (no UE assist)  Lateral walking x3 trials (no UE assist)    Deb received the following direct contact modalities after being cleared for contraindications:   Deb received the following supervised modalities after being cleared for contradictions:    Deb received hot pack for 00 minutes to L shoulder/ collar bone.  Deb received cold pack for 10 minutes to L bilateral knees      Home Exercises Provided and Patient Education Provided     Education provided:   - compliance with HEP  - safety with ambulation    Written Home Exercises Provided: Patient instructed to cont prior HEP.  Exercises were reviewed and Deb was able to demonstrate them prior to the end of the session.  Deb demonstrated good  understanding of the education provided.     See EMR under Patient Instructions for exercises provided on IE 3/12/19.    Assessment     Pt tolerated treatment " session well, with reassessment performed. Prolonged breaks between sessions during reassessment period due to authorization difficulties. Pt with significantly improved functional mobility since start of care, with decreased time duration on TUG and no AD with no LOB or unsteadiness noted throughout. Able to perform increased sit-to-stand repetitions with decreased UE assistance. Pt is progressing well towards achieving goals and optimal functional mobility in house and community.    Deb is progressing well towards her goals.   Pt prognosis is Good.     Pt will continue to benefit from skilled outpatient physical therapy to address the deficits listed in the problem list box on initial evaluation, provide pt/family education and to maximize pt's level of independence in the home and community environment.   Pt's spiritual, cultural and educational needs considered and pt agreeable to plan of care and goals.     Anticipated barriers to physical therapy: transportation    Goals:   Short Term GOALS: 4 weeks. Pt agrees with goals set.  1. Patient demonstrates independence with HEP. met  2. Patient demonstrates independence with Postural Awareness.  In progress   3. Patient demonstrates increased strength BLE's by 1/3 muscle grade or greater to improve tolerance to functional activities pain free.  met  4. Patient demonstrates ability to complete Timed Up-and-Go Test by 28 seconds or less, use of AD, no LOB met     Long Term GOALS: 8 weeks. Pt agrees with goals set.  1. Patient demonstrates improve Hines Balance Assessment  To 41/56 or greater in progress  2. Patient demonstrates increased strength BLE's to 4/5 or greater to improve tolerance to functional activities pain free. In progress  3. Patient demonstrates improved overall function per FOTO NOC - Musculoskeletal Survey to 60% Limitation or less. In progress  4. Patient demonstrates ability to perform 7 repetitions or more on 30 Second Sit-to-Stand test, use of  BUE on arm-rest, no LOB met  5. Pt able to ambulate 500 feet, use of AD, appropriate gait patter, no LOB in progress      Plan     Plan of care Certification: 5/9/2019 to 6/20/19.     Continues with Established Physical Therapy 2 times weekly for 6 weeks. Increase gait training without AD, balance and comprehensive strengthening    Logan Goldberg, PT   06/10/2019

## 2019-06-12 ENCOUNTER — CLINICAL SUPPORT (OUTPATIENT)
Dept: REHABILITATION | Facility: HOSPITAL | Age: 69
End: 2019-06-12
Attending: FAMILY MEDICINE
Payer: MEDICARE

## 2019-06-12 DIAGNOSIS — M62.81 MUSCLE WEAKNESS: ICD-10-CM

## 2019-06-12 DIAGNOSIS — R26.9 GAIT DIFFICULTY: ICD-10-CM

## 2019-06-12 DIAGNOSIS — Z74.09 IMPAIRED FUNCTIONAL MOBILITY, BALANCE, GAIT, AND ENDURANCE: ICD-10-CM

## 2019-06-12 PROCEDURE — 97110 THERAPEUTIC EXERCISES: CPT | Mod: PN

## 2019-06-12 NOTE — PROGRESS NOTES
"  Physical Therapy Reassessment Note     Name: Deb Webb  Clinic Number: [id number]    Therapy Diagnosis:   Encounter Diagnoses   Name Primary?    Muscle weakness     Impaired functional mobility, balance, gait, and endurance     Gait difficulty      Physician: Moiz Goldberg MD    Visit Date: 2019    Physician Orders: PT Eval and Treat  Medical Diagnosis from Referral: Gait Instability  Evaluation Date: 3/12/2019  Authorization Period Expiration: 19  Plan of Care Expiration: 19  Visit # / Visits authorized:  (total visit 13)  PTA Visit       Time In: 855  Time Out: 955  Total Time: 60 Minutes  Total Billable Time: 30     Precautions: Standard and Fall, Diabetic Neuropathy B feet, COPD      Subjective     Pt reports: No new complaints or concerns. Feels things are improving.     She was compliant with home exercise program.  Response to previous treatment: good  Functional change: improved confidence with walking  Pain: 0/10  Location: left shoulder  and collar bone      Objective     30 second Sit to Stand:  8 reps - use of 1 UE; 4 reps without UE (5 at eval)     TU seconds (no AD) (33 at eval with AD)       Deb received therapeutic exercises to develop strength, endurance, ROM, flexibility, posture and core stabilization for 60 minutes including:    NuStep 7 minutes     Standing in // bars:  Heel raises x30  Toe raises x 30  Hip abduction x30 1#  Hip Extension x30 1#  Standing SLR x 30 1#  Marching x30 (unilateral UE assist) 1#    Mini squats 2 x 10   Matrix knee extension 5# 2 x 10   Matrix knee flexion 15# 2 x 10     Supine:  Bridges x20  SLRs 2 x 10   SAQs 3" hold x15  Quad set x 20   Ball Squeezes 3" x15    Hip Abduction with RTB 3" x15    Seated on blue foam: (engaging abdominal muscles)  Seated March 20x ea  Seated LAQ 20x ea 1#  Ball Throws with small red theraball x20 (reaching outside KENNEDY/hand eye coordination)    Deb received the following manual therapy " "techniques:  were applied to the: NA for 00 minutes, including:    Deb participated in neuromuscular re-education activities to improve: Balance, Coordination, Kinesthetic, Sense, [id number] and Posture for 0 minutes. The following activities were included:    NBOS with eyes closed with no UE support 3x20"  NBOS on blue foam with no UE support 3x20"  NBOS on blue foam with no UE support, head turning left/right 3 x 30"   Cone taps x20 each LE (alternating)  Modified single leg balance with purple block 3x20"  Tandem standing 3 x 30" ea  Marching airex no UE support 1 min x 2     Deb participated in dynamic functional therapeutic activities to improve functional performance for 0 minutes, including:  Sit<>stands x15 from high/low table (no UE assist)  Step Ups on blue foam x15 leading with each LE (unilateral UE assist)    Deb participated in gait training to improve functional mobility and safety for 0 minutes, including:     Standing in // bars:  Fwd walking x3 trials (no UE assist)  Retro walking x3 trials (no UE assist)  Lateral walking x3 trials (no UE assist)    Dbe received the following direct contact modalities after being cleared for contraindications:   Deb received the following supervised modalities after being cleared for contradictions:    Deb received hot pack for 00 minutes to L shoulder/ collar bone.  Deb received cold pack for 10 minutes to L bilateral knees      Home Exercises Provided and Patient Education Provided     Education provided:   - compliance with HEP  - safety with ambulation    Written Home Exercises Provided: Patient instructed to cont prior HEP.  Exercises were reviewed and Deb was able to demonstrate them prior to the end of the session.  Deb demonstrated good  understanding of the education provided.     See EMR under Patient Instructions for exercises provided on IE 3/12/19.    Assessment     Patient tolerated today's session well. Staff " incorporated use of Matrix knee flexion today with appropriate muscle fatigue from prescribed activities. Patient continues to progress with strength and balance activities. Next session would benefit from trial Matrix hip add/abd.     Deb is progressing well towards her goals.   Pt prognosis is Good.     Pt will continue to benefit from skilled outpatient physical therapy to address the deficits listed in the problem list box on initial evaluation, provide pt/family education and to maximize pt's level of independence in the home and community environment.   Pt's spiritual, cultural and educational needs considered and pt agreeable to plan of care and goals.     Anticipated barriers to physical therapy: transportation    Goals:   Short Term GOALS: 4 weeks. Pt agrees with goals set.  1. Patient demonstrates independence with HEP. met  2. Patient demonstrates independence with Postural Awareness.  In progress   3. Patient demonstrates increased strength BLE's by 1/3 muscle grade or greater to improve tolerance to functional activities pain free.  met  4. Patient demonstrates ability to complete Timed Up-and-Go Test by 28 seconds or less, use of AD, no LOB met     Long Term GOALS: 8 weeks. Pt agrees with goals set.  1. Patient demonstrates improve Hines Balance Assessment  To 41/56 or greater in progress  2. Patient demonstrates increased strength BLE's to 4/5 or greater to improve tolerance to functional activities pain free. In progress  3. Patient demonstrates improved overall function per FOTO NOC - Musculoskeletal Survey to 60% Limitation or less. In progress  4. Patient demonstrates ability to perform 7 repetitions or more on 30 Second Sit-to-Stand test, use of BUE on arm-rest, no LOB met  5. Pt able to ambulate 500 feet, use of AD, appropriate gait patter, no LOB in progress      Plan     Plan of care Certification: 5/9/2019 to 6/20/19.     Continues with Established Physical Therapy 2 times weekly for 6  weeks. Increase gait training without AD, balance and comprehensive strengthening    Neymar Varela, PTA   06/12/2019

## 2019-06-18 ENCOUNTER — CLINICAL SUPPORT (OUTPATIENT)
Dept: REHABILITATION | Facility: HOSPITAL | Age: 69
End: 2019-06-18
Attending: FAMILY MEDICINE
Payer: MEDICARE

## 2019-06-18 DIAGNOSIS — Z74.09 IMPAIRED FUNCTIONAL MOBILITY, BALANCE, GAIT, AND ENDURANCE: ICD-10-CM

## 2019-06-18 DIAGNOSIS — R26.9 GAIT DIFFICULTY: ICD-10-CM

## 2019-06-18 DIAGNOSIS — M62.81 MUSCLE WEAKNESS: ICD-10-CM

## 2019-06-18 PROCEDURE — 97110 THERAPEUTIC EXERCISES: CPT | Mod: PN

## 2019-06-18 NOTE — PROGRESS NOTES
"  Physical Therapy Reassessment Note     Name: Deb Webb  Clinic Number: [id number]    Therapy Diagnosis:   Encounter Diagnoses   Name Primary?    Muscle weakness     Impaired functional mobility, balance, gait, and endurance     Gait difficulty      Physician: Moiz Goldberg MD    Visit Date: 6/18/2019    Physician Orders: PT Eval and Treat  Medical Diagnosis from Referral: Gait Instability  Evaluation Date: 3/12/2019  Authorization Period Expiration: 5/12/19  Plan of Care Expiration: 5/12/19  Visit # / Visits authorized: 5 / 12 (total visit 13)  PTA Visit 1/6      Time In: 0855  Time Out: 0955    Total Billable Time: 30     Precautions: Standard and Fall, Diabetic Neuropathy B feet, COPD      Subjective     Pt reports: she continues to improve with therapy.     She was compliant with home exercise program.  Response to previous treatment: good  Functional change: improved confidence with walking  Pain: 0/10  Location: left shoulder  and collar bone      Objective       Deb received therapeutic exercises to develop strength, endurance, ROM, flexibility, posture and core stabilization for 60 minutes including:    NuStep 7 minutes     Standing in // bars:  Heel raises x30  Toe raises x 30  Hip abduction x30 1#  Hip Extension x30 1#  Standing SLR x 30 1#  Marching x30 (unilateral UE assist) 1#    Mini squats 2 x 10   Matrix knee extension 5# 2 x 10   Matrix knee flexion 15# 2 x 10     Supine:  Bridges x20  SLRs 2 x 10   SAQs 3" hold x15  Quad set x 20   Ball Squeezes 3" x15    Hip Abduction with RTB 3" x15    Seated on blue foam: (engaging abdominal muscles)    Seated LAQ 20x ea 1#  Ball Throws with small red theraball x20 (reaching outside KENNEDY/hand eye coordination)    Deb received the following manual therapy techniques:  were applied to the: NA for 00 minutes, including:    Deb participated in neuromuscular re-education activities to improve: Balance, Coordination, Kinesthetic, Sense, " "[id number] and Posture for 0 minutes. The following activities were included:    NBOS with eyes closed with no UE support 3x20"  NBOS on blue foam with no UE support 3x20"  NBOS on blue foam with no UE support, head turning left/right 3 x 30"   Cone taps x20 each LE (alternating)  Modified single leg balance with purple block 3x20"  Tandem standing 3 x 30" ea  Marching airex no UE support 1 min x 2     Deb participated in dynamic functional therapeutic activities to improve functional performance for 0 minutes, including:  Sit<>stands x15 from high/low table (no UE assist)  Step Ups on blue foam x15 leading with each LE (unilateral UE assist)    Deb participated in gait training to improve functional mobility and safety for 0 minutes, including:     Standing in // bars:  Fwd walking x3 trials (no UE assist)  Retro walking x3 trials (no UE assist)  Lateral walking x3 trials (no UE assist)    Deb received cold pack for 10 minutes to L bilateral knees      Home Exercises Provided and Patient Education Provided     Education provided:   - compliance with HEP  - safety with ambulation    Written Home Exercises Provided: Patient instructed to cont prior HEP.  Exercises were reviewed and Deb was able to demonstrate them prior to the end of the session.  Deb demonstrated good  understanding of the education provided.     See EMR under Patient Instructions for exercises provided on IE 3/12/19.    Assessment     No c/o increased discomfort with prescribed activities. Good response to exercise progression.  Mild difficulty with modified balance activities with HT requiring 25% CGa.  Patient continues to progress with strength and balance activities.   Deb is progressing well towards her goals.     Pt prognosis is Good.     Pt will continue to benefit from skilled outpatient physical therapy to address the deficits listed in the problem list box on initial evaluation, provide pt/family education and to " maximize pt's level of independence in the home and community environment.   Pt's spiritual, cultural and educational needs considered and pt agreeable to plan of care and goals.     Anticipated barriers to physical therapy: transportation    Goals:   Short Term GOALS: 4 weeks. Pt agrees with goals set.  1. Patient demonstrates independence with HEP. met  2. Patient demonstrates independence with Postural Awareness.  In progress   3. Patient demonstrates increased strength BLE's by 1/3 muscle grade or greater to improve tolerance to functional activities pain free.  met  4. Patient demonstrates ability to complete Timed Up-and-Go Test by 28 seconds or less, use of AD, no LOB met     Long Term GOALS: 8 weeks. Pt agrees with goals set.  1. Patient demonstrates improve Hines Balance Assessment  To 41/56 or greater in progress  2. Patient demonstrates increased strength BLE's to 4/5 or greater to improve tolerance to functional activities pain free. In progress  3. Patient demonstrates improved overall function per FOTO NOC - Musculoskeletal Survey to 60% Limitation or less. In progress  4. Patient demonstrates ability to perform 7 repetitions or more on 30 Second Sit-to-Stand test, use of BUE on arm-rest, no LOB met  5. Pt able to ambulate 500 feet, use of AD, appropriate gait patter, no LOB in progress      Plan     Plan of care Certification: 5/9/2019 to 6/20/19.     Continues with Established Physical Therapy 2 times weekly for 6 weeks. Increase gait training without AD, balance and comprehensive strengthening    Chriss Webster, PT   06/18/2019

## 2019-06-21 ENCOUNTER — TELEPHONE (OUTPATIENT)
Dept: OTOLARYNGOLOGY | Facility: CLINIC | Age: 69
End: 2019-06-21

## 2019-06-21 NOTE — TELEPHONE ENCOUNTER
----- Message from Erasto Upton sent at 6/21/2019  2:57 PM CDT -----  Contact: pt   ..Type: Patient Call Back    Who called:pt     What is the request in detail:pt states that she has been out of her meds for 2  Weeks because the pharmacy said they are not recieving that medicine anymore. They sent over a request for something different but pt never received her call.     Can the clinic reply by [facility] no    Would the patient rather a call back or a response via [facility]? Call     Best call back number:[phone]    Additional Information:Needs filled today

## 2019-06-21 NOTE — TELEPHONE ENCOUNTER
Called patients Pharmacy and a shipment of meds came in today and her Irbesartan is being refilled.  Notified Patient of her medication being filled today.

## 2019-06-28 DIAGNOSIS — R42 DIZZINESS: ICD-10-CM

## 2019-06-28 RX ORDER — MECLIZINE HCL 12.5 MG 12.5 MG/1
12.5 TABLET ORAL 3 TIMES DAILY PRN
Qty: 30 TABLET | Refills: 0 | Status: SHIPPED | OUTPATIENT
Start: 2019-06-28 | End: 2019-08-08 | Stop reason: SDUPTHER

## 2019-06-29 DIAGNOSIS — H40.1133 PRIMARY OPEN ANGLE GLAUCOMA OF BOTH EYES, SEVERE STAGE: ICD-10-CM

## 2019-06-30 RX ORDER — LATANOPROST 50 UG/ML
1 SOLUTION/ DROPS OPHTHALMIC NIGHTLY
Qty: 7.5 ML | Refills: 2 | Status: SHIPPED | OUTPATIENT
Start: 2019-06-30 | End: 2020-01-03

## 2019-07-09 DIAGNOSIS — H40.1133 PRIMARY OPEN ANGLE GLAUCOMA OF BOTH EYES, SEVERE STAGE: ICD-10-CM

## 2019-07-09 RX ORDER — DORZOLAMIDE HYDROCHLORIDE AND TIMOLOL MALEATE 20; 5 MG/ML; MG/ML
SOLUTION/ DROPS OPHTHALMIC
Qty: 20 ML | Refills: 1 | Status: SHIPPED | OUTPATIENT
Start: 2019-07-09 | End: 2021-10-13 | Stop reason: SDUPTHER

## 2019-07-31 ENCOUNTER — PATIENT OUTREACH (OUTPATIENT)
Dept: ADMINISTRATIVE | Facility: OTHER | Age: 69
End: 2019-07-31

## 2019-08-01 NOTE — PROGRESS NOTES
Follow up visit    History of Present Illness:   Deb comes to the office for follow up evaluation of left shoulder pain.  Recommended treatment at the last visit included physical therapy and an injection of the left subacromial space.  Since the last visit her pain has improved significantly.   She did PT for balance and gait.  They did not add shoulder on to her PT. She needs a new prescription    ROS: unremarkable and no change since last visit    Physical Examination:    NAD  Left shoulder   Prominence over proximal clavicle without tenderness   AROM FE: 150/160. ER 0  IR L3  Negative jobes  Good cuff testing  LTSI m/u/r  2+ RP  + EPL, IO, FDS, FDP    Radiographic imaging: no new imaging   CT from previous hospitalization showed possible proximal clavicle fracture that was not visible on plain films including shoulder and calvicle series     Assessment/Plan:  68 y.o. female  with Left subacromial bursitis, healed clavicle fracture     We discussed the etiology of persistent pain and further treatment options.  1. PT referral sent   2. Return for follow up visit as needed     All questions were answered in detail. The patient  verbalized the understanding of the treatment plan and is in full agreement with the treatment plan.

## 2019-08-02 ENCOUNTER — TELEPHONE (OUTPATIENT)
Dept: ENDOCRINOLOGY | Facility: CLINIC | Age: 69
End: 2019-08-02

## 2019-08-02 ENCOUNTER — OFFICE VISIT (OUTPATIENT)
Dept: ENDOCRINOLOGY | Facility: CLINIC | Age: 69
End: 2019-08-02
Payer: MEDICARE

## 2019-08-02 ENCOUNTER — OFFICE VISIT (OUTPATIENT)
Dept: ORTHOPEDICS | Facility: CLINIC | Age: 69
End: 2019-08-02
Payer: MEDICARE

## 2019-08-02 VITALS
RESPIRATION RATE: 16 BRPM | HEART RATE: 92 BPM | OXYGEN SATURATION: 92 % | BODY MASS INDEX: 26.55 KG/M2 | HEIGHT: 61 IN | WEIGHT: 140.63 LBS | TEMPERATURE: 99 F | DIASTOLIC BLOOD PRESSURE: 87 MMHG | SYSTOLIC BLOOD PRESSURE: 148 MMHG

## 2019-08-02 VITALS
HEART RATE: 88 BPM | SYSTOLIC BLOOD PRESSURE: 148 MMHG | DIASTOLIC BLOOD PRESSURE: 87 MMHG | BODY MASS INDEX: 26.45 KG/M2 | WEIGHT: 140 LBS

## 2019-08-02 DIAGNOSIS — M25.512 CHRONIC LEFT SHOULDER PAIN: Primary | ICD-10-CM

## 2019-08-02 DIAGNOSIS — G89.29 CHRONIC LEFT SHOULDER PAIN: Primary | ICD-10-CM

## 2019-08-02 DIAGNOSIS — Z72.0 TOBACCO ABUSE: ICD-10-CM

## 2019-08-02 DIAGNOSIS — M62.81 MUSCLE WEAKNESS: ICD-10-CM

## 2019-08-02 DIAGNOSIS — E78.5 HYPERLIPIDEMIA LDL GOAL <70: ICD-10-CM

## 2019-08-02 DIAGNOSIS — M81.0 OSTEOPOROSIS, POSTMENOPAUSAL: ICD-10-CM

## 2019-08-02 PROCEDURE — 99213 OFFICE O/P EST LOW 20 MIN: CPT | Mod: S$GLB,,, | Performed by: ORTHOPAEDIC SURGERY

## 2019-08-02 PROCEDURE — 3045F PR MOST RECENT HEMOGLOBIN A1C LEVEL 7.0-9.0%: CPT | Mod: CPTII,S$GLB,, | Performed by: NURSE PRACTITIONER

## 2019-08-02 PROCEDURE — 99213 PR OFFICE/OUTPT VISIT, EST, LEVL III, 20-29 MIN: ICD-10-PCS | Mod: S$GLB,,, | Performed by: ORTHOPAEDIC SURGERY

## 2019-08-02 PROCEDURE — 3079F DIAST BP 80-89 MM HG: CPT | Mod: CPTII,S$GLB,, | Performed by: NURSE PRACTITIONER

## 2019-08-02 PROCEDURE — 3045F PR MOST RECENT HEMOGLOBIN A1C LEVEL 7.0-9.0%: ICD-10-PCS | Mod: CPTII,S$GLB,, | Performed by: NURSE PRACTITIONER

## 2019-08-02 PROCEDURE — 3079F PR MOST RECENT DIASTOLIC BLOOD PRESSURE 80-89 MM HG: ICD-10-PCS | Mod: CPTII,S$GLB,, | Performed by: ORTHOPAEDIC SURGERY

## 2019-08-02 PROCEDURE — 1101F PR PT FALLS ASSESS DOC 0-1 FALLS W/OUT INJ PAST YR: ICD-10-PCS | Mod: CPTII,S$GLB,, | Performed by: NURSE PRACTITIONER

## 2019-08-02 PROCEDURE — 3077F PR MOST RECENT SYSTOLIC BLOOD PRESSURE >= 140 MM HG: ICD-10-PCS | Mod: CPTII,S$GLB,, | Performed by: ORTHOPAEDIC SURGERY

## 2019-08-02 PROCEDURE — 3079F PR MOST RECENT DIASTOLIC BLOOD PRESSURE 80-89 MM HG: ICD-10-PCS | Mod: CPTII,S$GLB,, | Performed by: NURSE PRACTITIONER

## 2019-08-02 PROCEDURE — 99999 PR PBB SHADOW E&M-EST. PATIENT-LVL III: CPT | Mod: PBBFAC,,, | Performed by: NURSE PRACTITIONER

## 2019-08-02 PROCEDURE — 99999 PR PBB SHADOW E&M-EST. PATIENT-LVL III: CPT | Mod: PBBFAC,,, | Performed by: ORTHOPAEDIC SURGERY

## 2019-08-02 PROCEDURE — 1101F PT FALLS ASSESS-DOCD LE1/YR: CPT | Mod: CPTII,S$GLB,, | Performed by: NURSE PRACTITIONER

## 2019-08-02 PROCEDURE — 99999 PR PBB SHADOW E&M-EST. PATIENT-LVL III: ICD-10-PCS | Mod: PBBFAC,,, | Performed by: ORTHOPAEDIC SURGERY

## 2019-08-02 PROCEDURE — 1101F PT FALLS ASSESS-DOCD LE1/YR: CPT | Mod: CPTII,S$GLB,, | Performed by: ORTHOPAEDIC SURGERY

## 2019-08-02 PROCEDURE — 99999 PR PBB SHADOW E&M-EST. PATIENT-LVL III: ICD-10-PCS | Mod: PBBFAC,,, | Performed by: NURSE PRACTITIONER

## 2019-08-02 PROCEDURE — 99214 OFFICE O/P EST MOD 30 MIN: CPT | Mod: S$GLB,,, | Performed by: NURSE PRACTITIONER

## 2019-08-02 PROCEDURE — 3077F SYST BP >= 140 MM HG: CPT | Mod: CPTII,S$GLB,, | Performed by: NURSE PRACTITIONER

## 2019-08-02 PROCEDURE — 3077F PR MOST RECENT SYSTOLIC BLOOD PRESSURE >= 140 MM HG: ICD-10-PCS | Mod: CPTII,S$GLB,, | Performed by: NURSE PRACTITIONER

## 2019-08-02 PROCEDURE — 99214 PR OFFICE/OUTPT VISIT, EST, LEVL IV, 30-39 MIN: ICD-10-PCS | Mod: S$GLB,,, | Performed by: NURSE PRACTITIONER

## 2019-08-02 PROCEDURE — 1101F PR PT FALLS ASSESS DOC 0-1 FALLS W/OUT INJ PAST YR: ICD-10-PCS | Mod: CPTII,S$GLB,, | Performed by: ORTHOPAEDIC SURGERY

## 2019-08-02 PROCEDURE — 3077F SYST BP >= 140 MM HG: CPT | Mod: CPTII,S$GLB,, | Performed by: ORTHOPAEDIC SURGERY

## 2019-08-02 PROCEDURE — 3079F DIAST BP 80-89 MM HG: CPT | Mod: CPTII,S$GLB,, | Performed by: ORTHOPAEDIC SURGERY

## 2019-08-02 RX ORDER — GLIMEPIRIDE 4 MG/1
TABLET ORAL
Refills: 0 | COMMUNITY
Start: 2019-05-30 | End: 2019-08-20 | Stop reason: ALTCHOICE

## 2019-08-02 RX ORDER — MELOXICAM 15 MG/1
15 TABLET ORAL DAILY
Refills: 1 | COMMUNITY
Start: 2019-05-10 | End: 2020-01-28

## 2019-08-02 RX ORDER — ATORVASTATIN CALCIUM 40 MG/1
40 TABLET, FILM COATED ORAL DAILY
Refills: 2 | COMMUNITY
Start: 2019-06-28 | End: 2019-12-23

## 2019-08-02 NOTE — TELEPHONE ENCOUNTER
Call made to [employer], paperwork faxed to office and pt called to inform her that her signature is needed to complete paperwork.

## 2019-08-02 NOTE — PATIENT INSTRUCTIONS
Continue glimepiride at 8 mg once daily before breakfast.   Start Trulicity 0.75 mg once weekly, cost permitting.   If you are able to start Trulicity, please reduce Lantus from 16 to 14 units once daily.    Test glucose 2x/day.     Return to clinic in 6 weeks.

## 2019-08-02 NOTE — TELEPHONE ENCOUNTER
----- Message from Sussy Goldberg NP sent at 8/2/2019 11:38 AM CDT -----  Please call [employer] patient assistance. Pt needs to reorder her Lantus. We need order form faxed here.

## 2019-08-02 NOTE — PROGRESS NOTES
CC: This 68 y.o. White female  is here for evaluation of  T2DM along with comorbidities indicated in the Visit Diagnosis section of this encounter.    HPI: Deb Webb was diagnosed with T2DM in her 40s. Oral agent started at the time of diagnosis.       Prior visit 2/20/19  She was seen by Dr. Moiz Goldberg about 2 weeks ago and was advised to increase her insulin dose from 14 to 15 units. She increased it to16 units bc she didn't see 15 units on her pen. But she resumed 14 units r/t hypoglycemia after about 5 days.   She was recently admitted for 4 days late Jan 2019 for syncopal episode. Work-up revealedd small embolic infarctions. Was seen by neurology  And felt that symptoms do not match MRI results. U/s carotid/echo did not show any thrombus.  Plan Reduce Lantus to 10 units daily.   Test blood sugar 2x/day. Send log in 1 week. Continue glimepiride.   rtc in 5 mo with labs prior.     Interval history  She increased her Lantus dose because of high BGs which started rising ~ March. Increased from 10 to 16 units. She also increased her glimepiride dose from 8 to 12 mg once daily in the morning (= 3 tablets).     Smokes 6-7 cigarettes a day which is an improvement.   She is enrolled in new program for financial assistance from  so many meds don't have copay.     LAST DIABETES EDUCATION: 7/2/18 - found visit helpful   10/1/18 for insulin training     PRESCIBED DIABETES MEDICATIONS: glimepiride 8 mg once daily in AM, Lantus Solostar 16 units every evening     Misses medication doses - No    DM COMPLICATIONS: nephropathy and peripheral neuropathy    SIGNIFICANT DIABETES MED HISTORY: diarrhea on metformin   She started Trulicity ~ march 2018 but had to stop a few months later d/t cost $ 177, up from $30. She is in rx gap. States her BGs were in the low 200s on the Trulicity.       SELF MONITORING BLOOD GLUCOSE: Checks blood glucose at home - 2x/day.                          HYPOGLYCEMIC EPISODES: none recent;  symptoms start in the 50s - starts getting dizzy      CURRENT DIET: drinks mostly water and occ Coke Zero. Eats about 2-3x/day.     Diet recall: breakfast is tucker deacon stuffed hash brown; dinner was skipped bc she was full from lunch;   Lunch was [employer] buffet but states she didn't eat much - salad, a little mac n cheese, grilled chx, sausage.           BP (!) 148/87 (BP Location: Left arm, Patient Position: Sitting, BP Method: Large (Automatic))   Pulse 88   Wt 63.5 kg (140 lb)   BMI 26.45 kg/m²          ROS:   CONSTITUTIONAL: Appetite good, denies fatigue  MS: + shoulder pain       PHYSICAL EXAM:  GENERAL: Well developed, well nourished. No acute distress.   PSYCH: AAOx3, appropriate mood and affect, conversant, well-groomed. Judgement and insight good.   NEURO: Cranial nerves grossly intact. Speech clear, + tremor to head and hands   CHEST: Respirations even and unlabored.         Hemoglobin A1C   Date Value Ref Range Status   01/24/2019 8.4 (H) 4.0 - 5.6 % Final     Comment:     ADA Screening Guidelines:  5.7-6.4%  Consistent with prediabetes  >or=6.5%  Consistent with diabetes  High levels of fetal hemoglobin interfere with the HbA1C  assay. Heterozygous hemoglobin variants (HbS, HgC, etc)do  not significantly interfere with this assay.   However, presence of multiple variants may affect accuracy.     11/16/2018 8.5 (H) 4.0 - 5.6 % Final     Comment:     ADA Screening Guidelines:  5.7-6.4%  Consistent with prediabetes  >or=6.5%  Consistent with diabetes  High levels of fetal hemoglobin interfere with the HbA1C  assay. Heterozygous hemoglobin variants (HbS, HgC, etc)do  not significantly interfere with this assay.   However, presence of multiple variants may affect accuracy.     07/09/2018 11.4 (H) 4.0 - 5.6 % Final     Comment:     ADA Screening Guidelines:  5.7-6.4%  Consistent with prediabetes  >or=6.5%  Consistent with diabetes  High levels of fetal hemoglobin interfere with the HbA1C  assay.  Heterozygous hemoglobin variants (HbS, HgC, etc)do  not significantly interfere with this assay.   However, presence of multiple variants may affect accuracy.             Chemistry        Component Value Date/Time     01/27/2019 0633    K 3.3 (L) 01/27/2019 0633     01/27/2019 0633    CO2 26 01/27/2019 0633    BUN 10 01/27/2019 0633    CREATININE 0.8 01/27/2019 0633     (H) 01/27/2019 0633        Component Value Date/Time    CALCIUM 9.2 01/27/2019 0633    ALKPHOS 93 01/27/2019 0633    AST 14 01/27/2019 0633    ALT 10 01/27/2019 0633    BILITOT 0.5 01/27/2019 0633    ESTGFRAFRICA >60 01/27/2019 0633    EGFRNONAA >60 01/27/2019 0633          Lab Results   Component Value Date    LDLCALC 43.2 (L) 01/25/2019          Ref. Range 1/25/2019 05:54   Cholesterol Latest Ref Range: 120 - 199 mg/dL 104 (L)   HDL Latest Ref Range: 40 - 75 mg/dL 37 (L)   HDL/Chol Ratio Latest Ref Range: 20.0 - 50.0 % 35.6   LDL Cholesterol Latest Ref Range: 63.0 - 159.0 mg/dL 43.2 (L)   Non-HDL Cholesterol Latest Units: mg/dL 67   Total Cholesterol/HDL Ratio Latest Ref Range: 2.0 - 5.0  2.8   Triglycerides Latest Ref Range: 30 - 150 mg/dL 119       Lab Results   Component Value Date    MICALBCREAT 25.9 03/07/2019           STANDARDS of CARE:        ASA:               Last eye exam:       ASSESSMENT and PLAN:    A1C GOAL: < 7 %       1. Uncontrolled type 2 diabetes mellitus with diabetic neuropathy, without long-term current use of insulin  Continue glimepiride at 8 mg once daily before breakfast.   Start Trulicity 0.75 mg once weekly, cost permitting.   If you are able to start Trulicity, please reduce Lantus from 16 to 14 units once daily.    Test glucose 2x/day.     Return to clinic in 6 weeks.        2. Hyperlipidemia LDL goal <70  At goal   3. Tobacco abuse  Encouraged total cessation   4. Osteoporosis, postmenopausal  On fosamax      No orders of the defined types were placed in this encounter.       Follow up in about 6  weeks (around 9/13/2019).

## 2019-08-07 ENCOUNTER — TELEPHONE (OUTPATIENT)
Dept: FAMILY MEDICINE | Facility: CLINIC | Age: 69
End: 2019-08-07

## 2019-08-07 ENCOUNTER — TELEPHONE (OUTPATIENT)
Dept: ENDOCRINOLOGY | Facility: CLINIC | Age: 69
End: 2019-08-07

## 2019-08-07 DIAGNOSIS — M19.90 OSTEOARTHRITIS, UNSPECIFIED OSTEOARTHRITIS TYPE, UNSPECIFIED SITE: ICD-10-CM

## 2019-08-07 RX ORDER — MELOXICAM 15 MG/1
TABLET ORAL
Qty: 90 TABLET | Refills: 1 | Status: SHIPPED | OUTPATIENT
Start: 2019-08-07 | End: 2020-01-08

## 2019-08-07 NOTE — TELEPHONE ENCOUNTER
Start Januvia 100 mg TABLET once daily in AM, cost permitting. Side effects can include headache and joint pain but this is rare.

## 2019-08-07 NOTE — TELEPHONE ENCOUNTER
----- Message from Annita James MA sent at 8/7/2019 10:50 AM CDT -----      ----- Message -----  From: Enid Pisano  Sent: 8/7/2019  10:06 AM  To: [facility] Staff    Type: Patient Call Back    Who called: pt     What is the request in detail: pt state she still cannot get the Trulicity it is $103.    Can the clinic reply by [facility]? No     Would the patient rather a call back or a response via [facility]? Call back     Best call back number: [phone]    Additional Information:

## 2019-08-08 ENCOUNTER — OFFICE VISIT (OUTPATIENT)
Dept: FAMILY MEDICINE | Facility: CLINIC | Age: 69
End: 2019-08-08
Payer: MEDICARE

## 2019-08-08 ENCOUNTER — LAB VISIT (OUTPATIENT)
Dept: LAB | Facility: HOSPITAL | Age: 69
End: 2019-08-08
Attending: FAMILY MEDICINE
Payer: MEDICARE

## 2019-08-08 VITALS
BODY MASS INDEX: 26.43 KG/M2 | DIASTOLIC BLOOD PRESSURE: 74 MMHG | TEMPERATURE: 98 F | SYSTOLIC BLOOD PRESSURE: 130 MMHG | HEIGHT: 61 IN | WEIGHT: 140 LBS

## 2019-08-08 DIAGNOSIS — J01.90 ACUTE RHINOSINUSITIS: Primary | ICD-10-CM

## 2019-08-08 DIAGNOSIS — Z12.11 ENCOUNTER FOR SCREENING FOR MALIGNANT NEOPLASM OF COLON: ICD-10-CM

## 2019-08-08 DIAGNOSIS — J42 CHRONIC BRONCHITIS, UNSPECIFIED CHRONIC BRONCHITIS TYPE: Chronic | ICD-10-CM

## 2019-08-08 DIAGNOSIS — I10 ESSENTIAL HYPERTENSION, BENIGN: ICD-10-CM

## 2019-08-08 DIAGNOSIS — Z12.31 ENCOUNTER FOR SCREENING MAMMOGRAM FOR BREAST CANCER: ICD-10-CM

## 2019-08-08 DIAGNOSIS — R42 DIZZINESS: ICD-10-CM

## 2019-08-08 LAB
ALBUMIN SERPL BCP-MCNC: 3.9 G/DL (ref 3.5–5.2)
ALP SERPL-CCNC: 109 U/L (ref 55–135)
ALT SERPL W/O P-5'-P-CCNC: 21 U/L (ref 10–44)
ANION GAP SERPL CALC-SCNC: 11 MMOL/L (ref 8–16)
AST SERPL-CCNC: 23 U/L (ref 10–40)
BILIRUB SERPL-MCNC: 0.3 MG/DL (ref 0.1–1)
BUN SERPL-MCNC: 18 MG/DL (ref 8–23)
CALCIUM SERPL-MCNC: 10.5 MG/DL (ref 8.7–10.5)
CHLORIDE SERPL-SCNC: 104 MMOL/L (ref 95–110)
CO2 SERPL-SCNC: 26 MMOL/L (ref 23–29)
CREAT SERPL-MCNC: 1.2 MG/DL (ref 0.5–1.4)
EST. GFR  (AFRICAN AMERICAN): 54 ML/MIN/1.73 M^2
EST. GFR  (NON AFRICAN AMERICAN): 46.9 ML/MIN/1.73 M^2
ESTIMATED AVG GLUCOSE: 197 MG/DL (ref 68–131)
GLUCOSE SERPL-MCNC: 356 MG/DL (ref 70–110)
HBA1C MFR BLD HPLC: 8.5 % (ref 4–5.6)
POTASSIUM SERPL-SCNC: 4.7 MMOL/L (ref 3.5–5.1)
PROT SERPL-MCNC: 7.6 G/DL (ref 6–8.4)
SODIUM SERPL-SCNC: 141 MMOL/L (ref 136–145)

## 2019-08-08 PROCEDURE — 3075F PR MOST RECENT SYSTOLIC BLOOD PRESS GE 130-139MM HG: ICD-10-PCS | Mod: CPTII,S$GLB,, | Performed by: FAMILY MEDICINE

## 2019-08-08 PROCEDURE — 1101F PT FALLS ASSESS-DOCD LE1/YR: CPT | Mod: CPTII,S$GLB,, | Performed by: FAMILY MEDICINE

## 2019-08-08 PROCEDURE — 94640 AIRWAY INHALATION TREATMENT: CPT | Mod: S$GLB,,, | Performed by: FAMILY MEDICINE

## 2019-08-08 PROCEDURE — 99999 PR PBB SHADOW E&M-EST. PATIENT-LVL III: ICD-10-PCS | Mod: PBBFAC,,, | Performed by: FAMILY MEDICINE

## 2019-08-08 PROCEDURE — 83036 HEMOGLOBIN GLYCOSYLATED A1C: CPT

## 2019-08-08 PROCEDURE — 3078F PR MOST RECENT DIASTOLIC BLOOD PRESSURE < 80 MM HG: ICD-10-PCS | Mod: CPTII,S$GLB,, | Performed by: FAMILY MEDICINE

## 2019-08-08 PROCEDURE — 3078F DIAST BP <80 MM HG: CPT | Mod: CPTII,S$GLB,, | Performed by: FAMILY MEDICINE

## 2019-08-08 PROCEDURE — 99214 OFFICE O/P EST MOD 30 MIN: CPT | Mod: 25,S$GLB,, | Performed by: FAMILY MEDICINE

## 2019-08-08 PROCEDURE — 99214 PR OFFICE/OUTPT VISIT, EST, LEVL IV, 30-39 MIN: ICD-10-PCS | Mod: 25,S$GLB,, | Performed by: FAMILY MEDICINE

## 2019-08-08 PROCEDURE — 1101F PR PT FALLS ASSESS DOC 0-1 FALLS W/OUT INJ PAST YR: ICD-10-PCS | Mod: CPTII,S$GLB,, | Performed by: FAMILY MEDICINE

## 2019-08-08 PROCEDURE — 99999 PR PBB SHADOW E&M-EST. PATIENT-LVL III: CPT | Mod: PBBFAC,,, | Performed by: FAMILY MEDICINE

## 2019-08-08 PROCEDURE — 3045F PR MOST RECENT HEMOGLOBIN A1C LEVEL 7.0-9.0%: CPT | Mod: CPTII,S$GLB,, | Performed by: FAMILY MEDICINE

## 2019-08-08 PROCEDURE — 36415 COLL VENOUS BLD VENIPUNCTURE: CPT | Mod: PO

## 2019-08-08 PROCEDURE — 3045F PR MOST RECENT HEMOGLOBIN A1C LEVEL 7.0-9.0%: ICD-10-PCS | Mod: CPTII,S$GLB,, | Performed by: FAMILY MEDICINE

## 2019-08-08 PROCEDURE — 3075F SYST BP GE 130 - 139MM HG: CPT | Mod: CPTII,S$GLB,, | Performed by: FAMILY MEDICINE

## 2019-08-08 PROCEDURE — 80053 COMPREHEN METABOLIC PANEL: CPT

## 2019-08-08 PROCEDURE — 94640 PR INHAL RX, AIRWAY OBST/DX SPUTUM INDUCT: ICD-10-PCS | Mod: S$GLB,,, | Performed by: FAMILY MEDICINE

## 2019-08-08 RX ORDER — IPRATROPIUM BROMIDE AND ALBUTEROL SULFATE 2.5; .5 MG/3ML; MG/3ML
3 SOLUTION RESPIRATORY (INHALATION)
Status: COMPLETED | OUTPATIENT
Start: 2019-08-08 | End: 2019-08-08

## 2019-08-08 RX ORDER — MECLIZINE HCL 12.5 MG 12.5 MG/1
12.5 TABLET ORAL 3 TIMES DAILY PRN
Qty: 30 TABLET | Refills: 0 | Status: SHIPPED | OUTPATIENT
Start: 2019-08-08 | End: 2019-11-04 | Stop reason: SDUPTHER

## 2019-08-08 RX ORDER — AMOXICILLIN AND CLAVULANATE POTASSIUM 500; 125 MG/1; MG/1
1 TABLET, FILM COATED ORAL 2 TIMES DAILY
Qty: 14 TABLET | Refills: 0 | Status: SHIPPED | OUTPATIENT
Start: 2019-08-08 | End: 2019-08-15

## 2019-08-08 RX ADMIN — IPRATROPIUM BROMIDE AND ALBUTEROL SULFATE 3 ML: 2.5; .5 SOLUTION RESPIRATORY (INHALATION) at 09:08

## 2019-08-08 NOTE — TELEPHONE ENCOUNTER
Patient's diabetes very uncontrolled. Will need to try something different. I have sent in trulicity and referred to pharmacy assistance. If she can get it, let us know, to determine next steps.

## 2019-08-08 NOTE — PROGRESS NOTES
[facility]  Progress Note    SUBJECTIVE:     Chief Complaint   Patient presents with    Facial Pain       HPI   Deb Webb  is a 68 y.o. female here for c/o sinus pressure, tenderness, malaise, cough, congestion for the past week. Tried otc meds without relief. No fevers, chills.     Review of patient's allergies indicates:   Allergen Reactions    Silicone      Burn skin    Adhesive Rash       Past Medical History:   Diagnosis Date    Allergy     Arthritis     Cataract     Colon polyps     COPD (chronic obstructive pulmonary disease)     Diabetes mellitus type II     Diabetic neuropathy     Fever blister     Glaucoma (increased eye pressure)     Hyperlipidemia     Hypertension     Irritable bowel syndrome     Keloid cicatrix     Osteoporosis     Retained cholelithiasis following cholecystectomy(997.41)     Right patella fracture      Past Surgical History:   Procedure Laterality Date    BACK SURGERY  2006    CATARACT EXTRACTION W/  INTRAOCULAR LENS IMPLANT Bilateral     CHOLECYSTECTOMY      Laparoscopic    COLONOSCOPY      COLONOSCOPY N/A 5/2/2013    [facility] ([facility]    HERNIA REPAIR      HYSTERECTOMY      KNEE SURGERY Right 3/4/2015    Dr Weller     OOPHORECTOMY      ORIF-PATELLA / C-ARM Right 3/4/2015    [facility]    ovarian tumor      Benign    TONSILLECTOMY, ADENOIDECTOMY       Family History   Problem Relation Age of Onset    Cancer Mother         Skin    Skin cancer Mother     Glaucoma Mother     Cataracts Mother     Diabetes Mother     Heart disease Father     Diabetes Father     Skin cancer Sister     Diabetes Sister     Diabetes Daughter     Melanoma Neg Hx     Psoriasis Neg Hx     Eczema Neg Hx     Lupus Neg Hx     Amblyopia Neg Hx     Blindness Neg Hx     Macular degeneration Neg Hx     Retinal detachment Neg Hx     Strabismus Neg Hx      Social History      Tobacco Use    Smoking status: Current Every Day Smoker     Packs/day: 0.50     Years: 40.00     Pack years: 20.00     Types: Cigarettes    Smokeless tobacco: Current User   Substance Use Topics    Alcohol use: No    Drug use: No        Review of Systems   Constitutional: Positive for malaise/fatigue. Negative for chills, diaphoresis and fever.   HENT: Positive for congestion. Negative for ear pain and sore throat.    Respiratory: Positive for cough. Negative for hemoptysis, sputum production, shortness of breath and wheezing.    Cardiovascular: Negative.    Musculoskeletal: Negative for myalgias and neck pain.   Neurological: Positive for headaches.   All other systems reviewed and are negative.    OBJECTIVE:     Vitals:    08/08/19 0847   BP: 130/74   Temp: 98.1 °F (36.7 °C)     Body mass index is 26.45 kg/m².    Physical Exam   Constitutional: She is oriented to person, place, and time. She appears distressed (mild).   HENT:   Head: Normocephalic and atraumatic.   Right Ear: External ear normal. Tympanic membrane is not perforated, not erythematous, not retracted and not bulging. No hemotympanum.   Left Ear: External ear normal. Tympanic membrane is not perforated, not erythematous, not retracted and not bulging. No hemotympanum.   Nose: Right sinus exhibits maxillary sinus tenderness. Left sinus exhibits maxillary sinus tenderness.   Mouth/Throat: Oropharynx is clear and moist. No oropharyngeal exudate.   Eyes: Conjunctivae and EOM are normal.   Pulmonary/Chest: Effort normal and breath sounds normal. No stridor. No respiratory distress. She has no wheezes. She has no rales.   Lymphadenopathy:     She has no cervical adenopathy.   Neurological: She is alert and oriented to person, place, and time.   Skin: She is not diaphoretic.       Old records were reviewed. Labs and/or images were independently reviewed.    ASSESSMENT     1. Acute rhinosinusitis    2. Dizziness    3. Chronic bronchitis, unspecified  chronic bronchitis type    4. Essential hypertension, benign    5. Encounter for screening mammogram for breast cancer    6. Encounter for screening for malignant neoplasm of colon    7. Uncontrolled type 2 diabetes mellitus with diabetic neuropathy, without long-term current use of insulin        PLAN:     Acute rhinosinusitis  -     albuterol-ipratropium 2.5 mg-0.5 mg/3 mL nebulizer solution 3 mL  -     amoxicillin-clavulanate 500-125mg (AUGMENTIN) 500-125 mg Tab; Take 1 tablet (500 mg total) by mouth 2 (two) times daily. for 7 days  Dispense: 14 tablet; Refill: 0  -     OK to take tylenol as needed PRN fever. Take mucinex and or claritin to help decrease congestion. Educated patient to drink plenty of fluids and to take vitamin C to help boost immune system. Instructed patient to call or RTC if symptoms persist or worsen.    Dizziness  -     meclizine (ANTIVERT) 12.5 mg tablet; Take 1 tablet (12.5 mg total) by mouth 3 (three) times daily as needed for Dizziness.  Dispense: 30 tablet; Refill: 0  -     Stable. Continue current regimen.    Chronic bronchitis, unspecified chronic bronchitis type   -     Stable. Continue current regimen.    Essential hypertension, benign   -     Stable. Continue current regimen.    Encounter for screening mammogram for breast cancer  -     Mammo Digital Screening Bilat; Future; Expected date: 08/08/2019    Encounter for screening for malignant neoplasm of colon  -     Case request GI: COLONOSCOPY    Uncontrolled type 2 diabetes mellitus with diabetic neuropathy, without long-term current use of insulin  -     Comprehensive metabolic panel; Future  -     Hemoglobin A1c; Future  -     Instructed patient to take daily glucose AM logs and to write them down to bring with on next visit. Advised patient to decrease intake of carbohydrates/simple sugars.           RTC PRN    Moiz Goldberg MD  08/08/2019 9:50 AM

## 2019-08-08 NOTE — TELEPHONE ENCOUNTER
Spoke with pt informed of results and information. Pt states she will call to follow up if medication a covered or not.

## 2019-08-09 ENCOUNTER — TELEPHONE (OUTPATIENT)
Dept: ENDOCRINOLOGY | Facility: CLINIC | Age: 69
End: 2019-08-09

## 2019-08-09 DIAGNOSIS — Z12.11 COLON CANCER SCREENING: Primary | ICD-10-CM

## 2019-08-09 NOTE — TELEPHONE ENCOUNTER
Spoke to patient. She cannot afford Januvia which is > $100 as well.   She states that Dr. Moiz Goldberg is working to get Trulicity for her through a patient assistance program.     Advised her to call office if she cannot get it so that we can adjust meds. Will likely need to switch from lantus to mixed insulin bid as a next step if needed.

## 2019-08-20 ENCOUNTER — PATIENT OUTREACH (OUTPATIENT)
Dept: ADMINISTRATIVE | Facility: OTHER | Age: 69
End: 2019-08-20

## 2019-08-20 ENCOUNTER — TELEPHONE (OUTPATIENT)
Dept: NEUROLOGY | Facility: CLINIC | Age: 69
End: 2019-08-20

## 2019-08-20 RX ORDER — PEN NEEDLE, DIABETIC 30 GX3/16"
1 NEEDLE, DISPOSABLE MISCELLANEOUS 2 TIMES DAILY
Qty: 200 EACH | Refills: 4 | Status: SHIPPED | OUTPATIENT
Start: 2019-08-20 | End: 2019-12-23 | Stop reason: SDUPTHER

## 2019-08-20 NOTE — TELEPHONE ENCOUNTER
She's been taking Lantus 17 units every day ~ 11 am. Advised pt to start Novolin 70/30 14 units before breakfast (at 5-6 am) and 10 units before dinner (~ 1-2 pm). If rx not covered or is not affordable can get vial/syringe from [employer] without rx. Send log in 1-2 weeks. Test bg 3x/day. Stop Lantus and glimepiride same day that she starts Novolin 70/30.  She voiced understanding.

## 2019-08-20 NOTE — TELEPHONE ENCOUNTER
Patient states she still waiting for the patient asst program to kick in so she has not been on the Trulicity. She has been calling the asst program as well no answer. However would like to know what to do until she receives the Trulicity.       ----- Message from Reinaldo Gomez, Patient Care Assistant sent at 8/20/2019 11:09 AM CDT -----  Contact: Self  Pt states that she can not get her Trulicity and was told to reach out to you if she was having this problem.     She can be reached at [phone].    Thank you

## 2019-08-21 ENCOUNTER — OFFICE VISIT (OUTPATIENT)
Dept: ORTHOPEDICS | Facility: CLINIC | Age: 69
End: 2019-08-21
Payer: MEDICARE

## 2019-08-21 VITALS
SYSTOLIC BLOOD PRESSURE: 138 MMHG | HEIGHT: 61 IN | BODY MASS INDEX: 26.47 KG/M2 | WEIGHT: 140.19 LBS | DIASTOLIC BLOOD PRESSURE: 98 MMHG | HEART RATE: 66 BPM | RESPIRATION RATE: 16 BRPM

## 2019-08-21 DIAGNOSIS — M67.431 GANGLION CYST OF VOLAR ASPECT OF RIGHT WRIST: ICD-10-CM

## 2019-08-21 PROCEDURE — 3080F DIAST BP >= 90 MM HG: CPT | Mod: CPTII,S$GLB,, | Performed by: ORTHOPAEDIC SURGERY

## 2019-08-21 PROCEDURE — 99999 PR PBB SHADOW E&M-EST. PATIENT-LVL V: ICD-10-PCS | Mod: PBBFAC,,, | Performed by: ORTHOPAEDIC SURGERY

## 2019-08-21 PROCEDURE — 3075F PR MOST RECENT SYSTOLIC BLOOD PRESS GE 130-139MM HG: ICD-10-PCS | Mod: CPTII,S$GLB,, | Performed by: ORTHOPAEDIC SURGERY

## 2019-08-21 PROCEDURE — 99213 OFFICE O/P EST LOW 20 MIN: CPT | Mod: S$GLB,,, | Performed by: ORTHOPAEDIC SURGERY

## 2019-08-21 PROCEDURE — 1101F PR PT FALLS ASSESS DOC 0-1 FALLS W/OUT INJ PAST YR: ICD-10-PCS | Mod: CPTII,S$GLB,, | Performed by: ORTHOPAEDIC SURGERY

## 2019-08-21 PROCEDURE — 99999 PR PBB SHADOW E&M-EST. PATIENT-LVL V: CPT | Mod: PBBFAC,,, | Performed by: ORTHOPAEDIC SURGERY

## 2019-08-21 PROCEDURE — 3075F SYST BP GE 130 - 139MM HG: CPT | Mod: CPTII,S$GLB,, | Performed by: ORTHOPAEDIC SURGERY

## 2019-08-21 PROCEDURE — 1101F PT FALLS ASSESS-DOCD LE1/YR: CPT | Mod: CPTII,S$GLB,, | Performed by: ORTHOPAEDIC SURGERY

## 2019-08-21 PROCEDURE — 3080F PR MOST RECENT DIASTOLIC BLOOD PRESSURE >= 90 MM HG: ICD-10-PCS | Mod: CPTII,S$GLB,, | Performed by: ORTHOPAEDIC SURGERY

## 2019-08-21 PROCEDURE — 99213 PR OFFICE/OUTPT VISIT, EST, LEVL III, 20-29 MIN: ICD-10-PCS | Mod: S$GLB,,, | Performed by: ORTHOPAEDIC SURGERY

## 2019-08-21 NOTE — PROGRESS NOTES
Follow up visit    History of Present Illness:   Deb comes to the office for follow up evaluation of right wrist cyst and pain.  She first noticed a cyst a few months ago, started hurting a couple weeks ago. Varies in size. No numbness or paresthesias in hand. RHD    ROS: unremarkable and no change since last visit    Physical Examination:    NAD  Right wrist   Cyst over volar wrist consistent in appearance with ganglion - firm, mobile. Mildly tender  Directly under radial artery. Mass itself is not pulsating   LTSI m/u/r  2+ RP  + EPL, IO, FDS, FDP    Radiographic imaging: 3 views right wrist:  No acute or chronic changes    Assessment/Plan:  68 y.o. female  with Right volar ganglion     We discussed the etiology of persistent pain and further treatment options.  1. Continue mobic  2. Wrist brace 24/7 x 4 weeks  3. Ice p.r.n.  4. Keep PT appt for shoulder   5. Return for follow up visit: 4 weeks, will refer to hand if continues to be symptomatic     All questions were answered in detail. The patient  verbalized the understanding of the treatment plan and is in full agreement with the treatment plan.

## 2019-08-26 ENCOUNTER — TELEPHONE (OUTPATIENT)
Dept: ENDOCRINOLOGY | Facility: CLINIC | Age: 69
End: 2019-08-26

## 2019-08-26 NOTE — TELEPHONE ENCOUNTER
Spoke with pt and reminded that on her last visit Sussy changed her her insulin to the 70/30 and she wants her to continue with this dosage. The pt is aware of the out of pocket cost as is the provider. The pt acknowledged understanding. Sussy informed of this pt update.

## 2019-08-28 RX ORDER — GLIMEPIRIDE 4 MG/1
8 TABLET ORAL
Qty: 180 TABLET | Refills: 0 | OUTPATIENT
Start: 2019-08-28 | End: 2019-11-26

## 2019-09-16 ENCOUNTER — PATIENT OUTREACH (OUTPATIENT)
Dept: ADMINISTRATIVE | Facility: OTHER | Age: 69
End: 2019-09-16

## 2019-09-19 RX ORDER — ALBUTEROL SULFATE 90 UG/1
AEROSOL, METERED RESPIRATORY (INHALATION)
Qty: 18 INHALER | Refills: 3 | Status: SHIPPED | OUTPATIENT
Start: 2019-09-19 | End: 2020-01-19

## 2019-09-23 ENCOUNTER — PATIENT OUTREACH (OUTPATIENT)
Dept: ADMINISTRATIVE | Facility: OTHER | Age: 69
End: 2019-09-23

## 2019-09-23 ENCOUNTER — TELEPHONE (OUTPATIENT)
Dept: NEUROLOGY | Facility: CLINIC | Age: 69
End: 2019-09-23

## 2019-09-23 ENCOUNTER — OFFICE VISIT (OUTPATIENT)
Dept: ORTHOPEDICS | Facility: CLINIC | Age: 69
End: 2019-09-23
Payer: MEDICARE

## 2019-09-23 VITALS
HEIGHT: 61 IN | WEIGHT: 139.75 LBS | BODY MASS INDEX: 26.39 KG/M2 | DIASTOLIC BLOOD PRESSURE: 80 MMHG | HEART RATE: 113 BPM | SYSTOLIC BLOOD PRESSURE: 132 MMHG | RESPIRATION RATE: 18 BRPM | OXYGEN SATURATION: 98 %

## 2019-09-23 DIAGNOSIS — M67.431 GANGLION CYST OF VOLAR ASPECT OF RIGHT WRIST: Primary | ICD-10-CM

## 2019-09-23 PROCEDURE — 1101F PT FALLS ASSESS-DOCD LE1/YR: CPT | Mod: CPTII,S$GLB,, | Performed by: ORTHOPAEDIC SURGERY

## 2019-09-23 PROCEDURE — 3075F PR MOST RECENT SYSTOLIC BLOOD PRESS GE 130-139MM HG: ICD-10-PCS | Mod: CPTII,S$GLB,, | Performed by: ORTHOPAEDIC SURGERY

## 2019-09-23 PROCEDURE — 3079F PR MOST RECENT DIASTOLIC BLOOD PRESSURE 80-89 MM HG: ICD-10-PCS | Mod: CPTII,S$GLB,, | Performed by: ORTHOPAEDIC SURGERY

## 2019-09-23 PROCEDURE — 99213 OFFICE O/P EST LOW 20 MIN: CPT | Mod: S$GLB,,, | Performed by: ORTHOPAEDIC SURGERY

## 2019-09-23 PROCEDURE — 99213 PR OFFICE/OUTPT VISIT, EST, LEVL III, 20-29 MIN: ICD-10-PCS | Mod: S$GLB,,, | Performed by: ORTHOPAEDIC SURGERY

## 2019-09-23 PROCEDURE — 3075F SYST BP GE 130 - 139MM HG: CPT | Mod: CPTII,S$GLB,, | Performed by: ORTHOPAEDIC SURGERY

## 2019-09-23 PROCEDURE — 99999 PR PBB SHADOW E&M-EST. PATIENT-LVL V: CPT | Mod: PBBFAC,,, | Performed by: ORTHOPAEDIC SURGERY

## 2019-09-23 PROCEDURE — 1101F PR PT FALLS ASSESS DOC 0-1 FALLS W/OUT INJ PAST YR: ICD-10-PCS | Mod: CPTII,S$GLB,, | Performed by: ORTHOPAEDIC SURGERY

## 2019-09-23 PROCEDURE — 99999 PR PBB SHADOW E&M-EST. PATIENT-LVL V: ICD-10-PCS | Mod: PBBFAC,,, | Performed by: ORTHOPAEDIC SURGERY

## 2019-09-23 PROCEDURE — 3079F DIAST BP 80-89 MM HG: CPT | Mod: CPTII,S$GLB,, | Performed by: ORTHOPAEDIC SURGERY

## 2019-09-23 NOTE — TELEPHONE ENCOUNTER
----- Message from Makayla Gonzales sent at 9/23/2019  2:08 PM CDT -----  Contact: Pt    Name of Who is Calling:JAVON THOMAS [[id number]]    What is the request in detail: Patient would like a call back regarding primidone (MYSOLINE) 50 MG Tab Please contact to further discuss and advise    Can the clinic reply by [facility]: no    [facility] [phone]        Informed patient that she should be taking 2 tablets 3 times a day not 4 tablets three times a day

## 2019-09-24 DIAGNOSIS — R60.0 BILATERAL LOWER EXTREMITY EDEMA: ICD-10-CM

## 2019-09-24 RX ORDER — FUROSEMIDE 20 MG/1
TABLET ORAL
Qty: 90 TABLET | Refills: 1 | Status: SHIPPED | OUTPATIENT
Start: 2019-09-24 | End: 2020-01-08 | Stop reason: SDUPTHER

## 2019-09-26 ENCOUNTER — OFFICE VISIT (OUTPATIENT)
Dept: FAMILY MEDICINE | Facility: CLINIC | Age: 69
End: 2019-09-26
Payer: MEDICARE

## 2019-09-26 VITALS
OXYGEN SATURATION: 96 % | WEIGHT: 141.19 LBS | TEMPERATURE: 98 F | BODY MASS INDEX: 26.68 KG/M2 | SYSTOLIC BLOOD PRESSURE: 130 MMHG | DIASTOLIC BLOOD PRESSURE: 66 MMHG | HEART RATE: 100 BPM

## 2019-09-26 DIAGNOSIS — K52.9 ENTERITIS: Primary | ICD-10-CM

## 2019-09-26 DIAGNOSIS — K58.9 IRRITABLE BOWEL SYNDROME, UNSPECIFIED TYPE: ICD-10-CM

## 2019-09-26 DIAGNOSIS — R19.7 DIARRHEA, UNSPECIFIED TYPE: ICD-10-CM

## 2019-09-26 PROCEDURE — 1101F PR PT FALLS ASSESS DOC 0-1 FALLS W/OUT INJ PAST YR: ICD-10-PCS | Mod: CPTII,S$GLB,, | Performed by: NURSE PRACTITIONER

## 2019-09-26 PROCEDURE — 99214 PR OFFICE/OUTPT VISIT, EST, LEVL IV, 30-39 MIN: ICD-10-PCS | Mod: S$GLB,,, | Performed by: NURSE PRACTITIONER

## 2019-09-26 PROCEDURE — 3045F PR MOST RECENT HEMOGLOBIN A1C LEVEL 7.0-9.0%: CPT | Mod: CPTII,S$GLB,, | Performed by: NURSE PRACTITIONER

## 2019-09-26 PROCEDURE — 1101F PT FALLS ASSESS-DOCD LE1/YR: CPT | Mod: CPTII,S$GLB,, | Performed by: NURSE PRACTITIONER

## 2019-09-26 PROCEDURE — 3045F PR MOST RECENT HEMOGLOBIN A1C LEVEL 7.0-9.0%: ICD-10-PCS | Mod: CPTII,S$GLB,, | Performed by: NURSE PRACTITIONER

## 2019-09-26 PROCEDURE — 99214 OFFICE O/P EST MOD 30 MIN: CPT | Mod: S$GLB,,, | Performed by: NURSE PRACTITIONER

## 2019-09-26 PROCEDURE — 3075F SYST BP GE 130 - 139MM HG: CPT | Mod: CPTII,S$GLB,, | Performed by: NURSE PRACTITIONER

## 2019-09-26 PROCEDURE — 99999 PR PBB SHADOW E&M-EST. PATIENT-LVL V: ICD-10-PCS | Mod: PBBFAC,,, | Performed by: NURSE PRACTITIONER

## 2019-09-26 PROCEDURE — 3078F DIAST BP <80 MM HG: CPT | Mod: CPTII,S$GLB,, | Performed by: NURSE PRACTITIONER

## 2019-09-26 PROCEDURE — 3075F PR MOST RECENT SYSTOLIC BLOOD PRESS GE 130-139MM HG: ICD-10-PCS | Mod: CPTII,S$GLB,, | Performed by: NURSE PRACTITIONER

## 2019-09-26 PROCEDURE — 99999 PR PBB SHADOW E&M-EST. PATIENT-LVL V: CPT | Mod: PBBFAC,,, | Performed by: NURSE PRACTITIONER

## 2019-09-26 PROCEDURE — 3078F PR MOST RECENT DIASTOLIC BLOOD PRESSURE < 80 MM HG: ICD-10-PCS | Mod: CPTII,S$GLB,, | Performed by: NURSE PRACTITIONER

## 2019-09-26 RX ORDER — SULFAMETHOXAZOLE AND TRIMETHOPRIM 800; 160 MG/1; MG/1
1 TABLET ORAL 2 TIMES DAILY
Qty: 20 TABLET | Refills: 0 | Status: SHIPPED | OUTPATIENT
Start: 2019-09-26 | End: 2019-10-06

## 2019-09-26 RX ORDER — DICYCLOMINE HYDROCHLORIDE 20 MG/1
20 TABLET ORAL EVERY 6 HOURS
Qty: 120 TABLET | Refills: 0 | Status: SHIPPED | OUTPATIENT
Start: 2019-09-26 | End: 2019-10-19 | Stop reason: SDUPTHER

## 2019-09-26 RX ORDER — METRONIDAZOLE 500 MG/1
500 TABLET ORAL EVERY 8 HOURS
Qty: 30 TABLET | Refills: 0 | Status: SHIPPED | OUTPATIENT
Start: 2019-09-26 | End: 2019-10-06

## 2019-09-26 NOTE — PROGRESS NOTES
Subjective:       Patient ID: Deb Webb is a 68 y.o. female.    Chief Complaint: Diarrhea    Diarrhea    This is a new problem. The current episode started more than 1 month ago. The problem occurs 2 to 4 times per day. The problem has been unchanged. The stool consistency is described as watery (loose). The patient states that diarrhea awakens (sometimes) her from sleep. Associated symptoms include abdominal pain, bloating and increased flatus. Pertinent negatives include no arthralgias, chills, coughing, fever, headaches, myalgias, sweats, URI, vomiting or weight loss. Nothing aggravates the symptoms. There are no known risk factors (history of IBS). She has tried anti-motility drug for the symptoms. Her past medical history is significant for irritable bowel syndrome.     Review of Systems   Constitutional: Negative for chills, fever and weight loss.   Respiratory: Negative for cough and chest tightness.    Gastrointestinal: Positive for abdominal distention, abdominal pain, bloating, diarrhea and flatus. Negative for anal bleeding, blood in stool, constipation, nausea and vomiting.   Musculoskeletal: Negative for arthralgias and myalgias.   Neurological: Negative for headaches.       Objective:      Physical Exam   Constitutional: She is oriented to person, place, and time. Vital signs are normal. She appears well-developed and well-nourished.   HENT:   Head: Normocephalic and atraumatic.   Right Ear: External ear normal.   Left Ear: External ear normal.   Nose: Nose normal.   Mouth/Throat: Oropharynx is clear and moist. No oropharyngeal exudate.   Eyes: Pupils are equal, round, and reactive to light. Conjunctivae and EOM are normal.   Cardiovascular: Normal rate, regular rhythm and normal heart sounds.   Pulmonary/Chest: Effort normal and breath sounds normal.   Abdominal: Soft. Normal appearance and bowel sounds are normal. There is tenderness in the epigastric area.   Neurological: She is alert and  oriented to person, place, and time.   Skin: Skin is warm, dry and intact.   Psychiatric: She has a normal mood and affect.       Assessment:       1. Enteritis    2. Irritable bowel syndrome, unspecified type    3. Diarrhea, unspecified type    4. Uncontrolled type 2 diabetes mellitus with diabetic neuropathy, without long-term current use of insulin        Plan:       Deb was seen today for diarrhea.    Diagnoses and all orders for this visit:    Enteritis  -     dicyclomine (BENTYL) 20 mg tablet; Take 1 tablet (20 mg total) by mouth every 6 (six) hours.  -     metroNIDAZOLE (FLAGYL) 500 MG tablet; Take 1 tablet (500 mg total) by mouth every 8 (eight) hours. for 10 days  -     sulfamethoxazole-trimethoprim 800-160mg (BACTRIM DS) 800-160 mg Tab; Take 1 tablet by mouth 2 (two) times daily. for 10 days    Irritable bowel syndrome, unspecified type  -     dicyclomine (BENTYL) 20 mg tablet; Take 1 tablet (20 mg total) by mouth every 6 (six) hours.    Diarrhea, unspecified type  -     dicyclomine (BENTYL) 20 mg tablet; Take 1 tablet (20 mg total) by mouth every 6 (six) hours.  -     metroNIDAZOLE (FLAGYL) 500 MG tablet; Take 1 tablet (500 mg total) by mouth every 8 (eight) hours. for 10 days  -     sulfamethoxazole-trimethoprim 800-160mg (BACTRIM DS) 800-160 mg Tab; Take 1 tablet by mouth 2 (two) times daily. for 10 days  Home care  The goal of treatment is to control and relieve your symptoms, so you can lead a full and active life. There is no cure for IBS. But it can be managed.  Diet  Your diet did not cause your IBS, but it can affect it. No one diet works for everyone. Finding the best foods for you may take trial and error. Keep a food log to help find what foods made your symptoms worse. Below are some tips that may help you.  · Eat more slowly. Eat smaller amounts at a time, but more often. Remember, you can always eat more, but cannot eat less once youve eaten too much.  · High-fiber foods are complicated.  While they may help relieve constipation, they can make your bloating, cramping, gas, and diarrhea worse.  · Eat less sugar.  · Try cutting out dairy products. Sometimes this helps.  · Try cutting out foods that are high in fat and fatty meats.  · You can control bloating or passing excess gas. Be careful with gassy vegetables and fruits like beans, cabbage, broccoli, and cauliflower.  · Be careful with carbonated beverages and fruit juices. They can make your bloating and diarrhea worse.  · Caffeine, alcohol, and stimulants can make symptoms worse. These include coffee, tea, sodas, energy drinks, and chocolate.  Lifestyle  · Look for factors that seem to worsen your symptoms. These include stress and emotions.   · Although stress does not cause IBS, it may trigger flare-ups. Counseling can help you learn to handle stress. So can self-help measures like exercise, yoga, and meditation.  · Depression can occur along with IBS. Your healthcare provider may prescribe antidepressant medicine. This may help with diarrhea, constipation, and cramping, as well as with symptoms of depression.  · Smoking doesn't cause IBS, but can make the symptoms worse.  Medicines  Your healthcare provider may prescribe medicines. Take them as directed. For acute flare-ups of your illness, your provider may give you prescription medicines.  · Check with your healthcare provider before taking any medicines for diarrhea.  · Avoid anti-inflammatory medicines like ibuprofen or naproxen.  · Consider nutritional supplements. This is especially true if your diarrhea is prolonged, or you aren't eating or are losing weight  Follow-up care  Follow up with your healthcare provider, or as advised. If a stool sample was taken or cultures were done, you will be told if your treatment needs to change. You can call as directed for the results.  When to seek medical advice  Call your healthcare provider right away if any of these occur:  · Abdominal pain  gets worse  · Constant abdominal pain moves to the right-lower abdomen  · You can't keep liquids down because of vomiting  · You have severe diarrhea  · You have blood (red or black color) or mucus in your stool  · You feel very weak or dizzy, faint, or have extreme thirst  · You have a fever of 100.4ºF (38.0ºC) or higher, or as directed by your healthcare provider  Date Last Reviewed: 8/31/2015 © 2000-2017 [employer]. [address] [place] [address]. All rights reserved. This information is not intended as a substitute for professional medical care. Always follow your healthcare professional's instructions.        Uncontrolled type 2 diabetes mellitus with diabetic neuropathy, without long-term current use of insulin    The current medical regimen is effective;  continue present plan and medications.

## 2019-09-26 NOTE — PATIENT INSTRUCTIONS
Irritable Bowel Syndrome    Irritable bowel syndrome (IBS) is a disorder of the intestines. It is not a disease, but a group of symptoms caused by changes in the way the intestines work. It is fairly common, but the cause is not well understood.  Symptoms of IBS include:  · Abdominal pain, discomfort, and cramping  · Diarrhea  · Constipation or dry, hard stools  · Mucous stool  · Bloating  · Feeling of incomplete bowel movements  It usually results in one of 3 patterns of symptoms:  · Chronic abdominal pain and constipation  · Recurring episodes of diarrhea, with or without pain  · Alternating diarrhea and constipation  Home care  The goal of treatment is to control and relieve your symptoms, so you can lead a full and active life. There is no cure for IBS. But it can be managed.  Diet  Your diet did not cause your IBS, but it can affect it. No one diet works for everyone. Finding the best foods for you may take trial and error. Keep a food log to help find what foods made your symptoms worse. Below are some tips that may help you.  · Eat more slowly. Eat smaller amounts at a time, but more often. Remember, you can always eat more, but cannot eat less once youve eaten too much.  · High-fiber foods are complicated. While they may help relieve constipation, they can make your bloating, cramping, gas, and diarrhea worse.  · Eat less sugar.  · Try cutting out dairy products. Sometimes this helps.  · Try cutting out foods that are high in fat and fatty meats.  · You can control bloating or passing excess gas. Be careful with gassy vegetables and fruits like beans, cabbage, broccoli, and cauliflower.  · Be careful with carbonated beverages and fruit juices. They can make your bloating and diarrhea worse.  · Caffeine, alcohol, and stimulants can make symptoms worse. These include coffee, tea, sodas, energy drinks, and chocolate.  Lifestyle  · Look for factors that seem to worsen your symptoms. These include stress and  emotions.   · Although stress does not cause IBS, it may trigger flare-ups. Counseling can help you learn to handle stress. So can self-help measures like exercise, yoga, and meditation.  · Depression can occur along with IBS. Your healthcare provider may prescribe antidepressant medicine. This may help with diarrhea, constipation, and cramping, as well as with symptoms of depression.  · Smoking doesn't cause IBS, but can make the symptoms worse.  Medicines  Your healthcare provider may prescribe medicines. Take them as directed. For acute flare-ups of your illness, your provider may give you prescription medicines.  · Check with your healthcare provider before taking any medicines for diarrhea.  · Avoid anti-inflammatory medicines like ibuprofen or naproxen.  · Consider nutritional supplements. This is especially true if your diarrhea is prolonged, or you aren't eating or are losing weight  Follow-up care  Follow up with your healthcare provider, or as advised. If a stool sample was taken or cultures were done, you will be told if your treatment needs to change. You can call as directed for the results.  When to seek medical advice  Call your healthcare provider right away if any of these occur:  · Abdominal pain gets worse  · Constant abdominal pain moves to the right-lower abdomen  · You can't keep liquids down because of vomiting  · You have severe diarrhea  · You have blood (red or black color) or mucus in your stool  · You feel very weak or dizzy, faint, or have extreme thirst  · You have a fever of 100.4ºF (38.0ºC) or higher, or as directed by your healthcare provider  Date Last Reviewed: 8/31/2015  © 5200-5529 [employer]. [address] [place] [address], [place] [address]. All rights reserved. This information is not intended as a substitute for professional medical care. Always follow your healthcare professional's instructions.

## 2019-09-30 ENCOUNTER — TELEPHONE (OUTPATIENT)
Dept: RADIOLOGY | Facility: HOSPITAL | Age: 69
End: 2019-09-30

## 2019-10-01 ENCOUNTER — HOSPITAL ENCOUNTER (OUTPATIENT)
Dept: RADIOLOGY | Facility: HOSPITAL | Age: 69
Discharge: HOME | End: 2019-10-01
Attending: FAMILY MEDICINE
Payer: MEDICARE

## 2019-10-01 DIAGNOSIS — Z12.31 ENCOUNTER FOR SCREENING MAMMOGRAM FOR BREAST CANCER: ICD-10-CM

## 2019-10-01 PROCEDURE — 77063 MAMMO DIGITAL SCREENING BILAT WITH TOMOSYNTHESIS_CAD: ICD-10-PCS | Mod: 26,,, | Performed by: RADIOLOGY

## 2019-10-01 PROCEDURE — 77067 MAMMO DIGITAL SCREENING BILAT WITH TOMOSYNTHESIS_CAD: ICD-10-PCS | Mod: 26,,, | Performed by: RADIOLOGY

## 2019-10-01 PROCEDURE — 77063 BREAST TOMOSYNTHESIS BI: CPT | Mod: 26,,, | Performed by: RADIOLOGY

## 2019-10-01 PROCEDURE — 77067 SCR MAMMO BI INCL CAD: CPT | Mod: TC,PO

## 2019-10-01 PROCEDURE — 77067 SCR MAMMO BI INCL CAD: CPT | Mod: 26,,, | Performed by: RADIOLOGY

## 2019-10-07 DIAGNOSIS — J01.00 ACUTE MAXILLARY SINUSITIS, RECURRENCE NOT SPECIFIED: ICD-10-CM

## 2019-10-07 RX ORDER — FLUTICASONE PROPIONATE 50 MCG
SPRAY, SUSPENSION (ML) NASAL
Qty: 48 ML | Refills: 1 | Status: ON HOLD | OUTPATIENT
Start: 2019-10-07 | End: 2022-01-31 | Stop reason: CLARIF

## 2019-10-11 NOTE — TELEPHONE ENCOUNTER
----- Message from Kacy Acevedo sent at 10/11/2019  8:13 AM CDT -----  Contact: self  Type: Patient Call Back    Who called:self    What is the request in detail:Pt said she has spoke to MARCO ANTONIO Goldberg about her being out of her 70/30 insulin and she has not gotten anything sent to the Pharmacy. Pt is going out of Town today and really needs her insulin.    Can the clinic reply by [facility]?    Would the patient rather a call back or a response via [facility]? call    Best call back number:

## 2019-10-11 NOTE — TELEPHONE ENCOUNTER
Spoke with pt/ Pt stated the pharmacy does not have a Rx on file for Novolin 70/30. Notified pt Rx was sent to [facility] in [place] on 10/8/2019. Pt stated she contact the pharmacy again

## 2019-10-19 DIAGNOSIS — K58.9 IRRITABLE BOWEL SYNDROME, UNSPECIFIED TYPE: ICD-10-CM

## 2019-10-19 DIAGNOSIS — R19.7 DIARRHEA, UNSPECIFIED TYPE: ICD-10-CM

## 2019-10-19 DIAGNOSIS — K52.9 ENTERITIS: ICD-10-CM

## 2019-10-20 RX ORDER — DICYCLOMINE HYDROCHLORIDE 20 MG/1
20 TABLET ORAL EVERY 6 HOURS
Qty: 120 TABLET | Refills: 0 | Status: SHIPPED | OUTPATIENT
Start: 2019-10-20 | End: 2019-11-26 | Stop reason: SDUPTHER

## 2019-11-04 DIAGNOSIS — R42 DIZZINESS: ICD-10-CM

## 2019-11-04 RX ORDER — MECLIZINE HCL 12.5 MG 12.5 MG/1
12.5 TABLET ORAL 3 TIMES DAILY PRN
Qty: 30 TABLET | Refills: 0 | Status: SHIPPED | OUTPATIENT
Start: 2019-11-04 | End: 2019-12-05 | Stop reason: SDUPTHER

## 2019-11-09 ENCOUNTER — PATIENT OUTREACH (OUTPATIENT)
Dept: ADMINISTRATIVE | Facility: OTHER | Age: 69
End: 2019-11-09

## 2019-11-09 DIAGNOSIS — E11.9 TYPE 2 DIABETES MELLITUS WITHOUT COMPLICATION, UNSPECIFIED WHETHER LONG TERM INSULIN USE: Primary | ICD-10-CM

## 2019-11-18 DIAGNOSIS — M81.0 OSTEOPOROSIS, POSTMENOPAUSAL: ICD-10-CM

## 2019-11-18 RX ORDER — ALENDRONATE SODIUM 70 MG/1
TABLET ORAL
Qty: 12 TABLET | Refills: 1 | Status: SHIPPED | OUTPATIENT
Start: 2019-11-18 | End: 2020-05-15

## 2019-11-20 ENCOUNTER — PATIENT OUTREACH (OUTPATIENT)
Dept: ADMINISTRATIVE | Facility: OTHER | Age: 69
End: 2019-11-20

## 2019-11-20 DIAGNOSIS — J30.9 ALLERGIC RHINITIS: ICD-10-CM

## 2019-11-20 RX ORDER — LORATADINE 10 MG/1
10 TABLET ORAL DAILY PRN
Qty: 90 TABLET | Refills: 2 | Status: SHIPPED | OUTPATIENT
Start: 2019-11-20 | End: 2020-08-11

## 2019-11-22 ENCOUNTER — OFFICE VISIT (OUTPATIENT)
Dept: FAMILY MEDICINE | Facility: CLINIC | Age: 69
End: 2019-11-22
Payer: MEDICARE

## 2019-11-22 VITALS
DIASTOLIC BLOOD PRESSURE: 74 MMHG | WEIGHT: 134.69 LBS | HEART RATE: 80 BPM | TEMPERATURE: 98 F | OXYGEN SATURATION: 97 % | SYSTOLIC BLOOD PRESSURE: 132 MMHG | HEIGHT: 61 IN | BODY MASS INDEX: 25.43 KG/M2

## 2019-11-22 DIAGNOSIS — R42 DIZZINESS: ICD-10-CM

## 2019-11-22 DIAGNOSIS — J42 CHRONIC BRONCHITIS, UNSPECIFIED CHRONIC BRONCHITIS TYPE: ICD-10-CM

## 2019-11-22 DIAGNOSIS — J01.90 ACUTE NON-RECURRENT SINUSITIS, UNSPECIFIED LOCATION: Primary | ICD-10-CM

## 2019-11-22 PROCEDURE — 3075F PR MOST RECENT SYSTOLIC BLOOD PRESS GE 130-139MM HG: ICD-10-PCS | Mod: CPTII,S$GLB,, | Performed by: PHYSICIAN ASSISTANT

## 2019-11-22 PROCEDURE — 1101F PT FALLS ASSESS-DOCD LE1/YR: CPT | Mod: CPTII,S$GLB,, | Performed by: PHYSICIAN ASSISTANT

## 2019-11-22 PROCEDURE — 1159F PR MEDICATION LIST DOCUMENTED IN MEDICAL RECORD: ICD-10-PCS | Mod: S$GLB,,, | Performed by: PHYSICIAN ASSISTANT

## 2019-11-22 PROCEDURE — 99499 RISK ADDL DX/OHS AUDIT: ICD-10-PCS | Mod: S$GLB,,, | Performed by: PHYSICIAN ASSISTANT

## 2019-11-22 PROCEDURE — 99499 UNLISTED E&M SERVICE: CPT | Mod: S$GLB,,, | Performed by: PHYSICIAN ASSISTANT

## 2019-11-22 PROCEDURE — 99999 PR PBB SHADOW E&M-EST. PATIENT-LVL V: CPT | Mod: PBBFAC,,, | Performed by: PHYSICIAN ASSISTANT

## 2019-11-22 PROCEDURE — 99214 OFFICE O/P EST MOD 30 MIN: CPT | Mod: S$GLB,,, | Performed by: PHYSICIAN ASSISTANT

## 2019-11-22 PROCEDURE — 1159F MED LIST DOCD IN RCRD: CPT | Mod: S$GLB,,, | Performed by: PHYSICIAN ASSISTANT

## 2019-11-22 PROCEDURE — 99999 PR PBB SHADOW E&M-EST. PATIENT-LVL V: ICD-10-PCS | Mod: PBBFAC,,, | Performed by: PHYSICIAN ASSISTANT

## 2019-11-22 PROCEDURE — 3078F DIAST BP <80 MM HG: CPT | Mod: CPTII,S$GLB,, | Performed by: PHYSICIAN ASSISTANT

## 2019-11-22 PROCEDURE — 3078F PR MOST RECENT DIASTOLIC BLOOD PRESSURE < 80 MM HG: ICD-10-PCS | Mod: CPTII,S$GLB,, | Performed by: PHYSICIAN ASSISTANT

## 2019-11-22 PROCEDURE — 3075F SYST BP GE 130 - 139MM HG: CPT | Mod: CPTII,S$GLB,, | Performed by: PHYSICIAN ASSISTANT

## 2019-11-22 PROCEDURE — 1101F PR PT FALLS ASSESS DOC 0-1 FALLS W/OUT INJ PAST YR: ICD-10-PCS | Mod: CPTII,S$GLB,, | Performed by: PHYSICIAN ASSISTANT

## 2019-11-22 PROCEDURE — 99214 PR OFFICE/OUTPT VISIT, EST, LEVL IV, 30-39 MIN: ICD-10-PCS | Mod: S$GLB,,, | Performed by: PHYSICIAN ASSISTANT

## 2019-11-22 RX ORDER — DOXYCYCLINE 100 MG/1
100 CAPSULE ORAL 2 TIMES DAILY
Qty: 14 CAPSULE | Refills: 0 | Status: SHIPPED | OUTPATIENT
Start: 2019-11-22 | End: 2019-11-29

## 2019-11-22 NOTE — PROGRESS NOTES
Patient Name: Deb Webb    : 1950  MRN: [id number]    Subjective:  Deb is a 69 y.o. female who presents today for:    Chief Complaint   Patient presents with    Sinus Problem    Cough    Dizziness       HPI  Patient has multiple medical diagnoses as listed below in the history. Patient is new to me, but known to the clinic. She complains of sinus pain, pressure, dizziness and post nasal drip for 8 days. She now has developed cough that is non productive. The symptoms are worse in the evening and early morning. She has taken Claritin and Meclizine with some relief. She denies sick contacts. She denies fever, chills, body aches, wheezing, dyspnea or chest pain.    Past Medical History  Past Medical History:   Diagnosis Date    Allergy     Arthritis     Cataract     Colon polyps     COPD (chronic obstructive pulmonary disease)     Diabetes mellitus type II     Diabetic neuropathy     Fever blister     Glaucoma (increased eye pressure)     Hyperlipidemia     Hypertension     Irritable bowel syndrome     Keloid cicatrix     Osteoporosis     Retained cholelithiasis following cholecystectomy(997.41)     Right patella fracture        Past Surgical History  Past Surgical History:   Procedure Laterality Date    BACK SURGERY      CATARACT EXTRACTION W/  INTRAOCULAR LENS IMPLANT Bilateral     CHOLECYSTECTOMY      Laparoscopic    COLONOSCOPY      HERNIA REPAIR      HYSTERECTOMY      KNEE SURGERY Right 3/4/2015    Dr Weller     OOPHORECTOMY      ovarian tumor      Benign    TONSILLECTOMY, ADENOIDECTOMY         Family History  Family History   Problem Relation Age of Onset    Cancer Mother         Skin    Skin cancer Mother     Glaucoma Mother     Cataracts Mother     Diabetes Mother     Heart disease Father     Diabetes Father     Skin cancer Sister     Diabetes Sister     Diabetes Daughter     Breast cancer Maternal Aunt     Melanoma Neg Hx     Psoriasis Neg Hx      Eczema Neg Hx     Lupus Neg Hx     Amblyopia Neg Hx     Blindness Neg Hx     Macular degeneration Neg Hx     Retinal detachment Neg Hx     Strabismus Neg Hx        Social History  Social History     Socioeconomic History    Marital status:      Spouse name: Not on file    Number of children: Not on file    Years of education: Not on file    Highest education level: Not on file   Occupational History     Employer: [employer]   Social Needs    Financial resource strain: Not on file    Food insecurity:     Worry: Not on file     Inability: Not on file    Transportation needs:     Medical: Not on file     Non-medical: Not on file   Tobacco Use    Smoking status: Current Every Day Smoker     Packs/day: 0.50     Years: 40.00     Pack years: 20.00     Types: Cigarettes    Smokeless tobacco: Current User   Substance and Sexual Activity    Alcohol use: No    Drug use: No    Sexual activity: Never   Lifestyle    Physical activity:     Days per week: Not on file     Minutes per session: Not on file    Stress: Not on file   Relationships    Social connections:     Talks on phone: Not on file     Gets together: Not on file     Attends Oriental orthodox service: Not on file     Active member of club or organization: Not on file     Attends meetings of clubs or organizations: Not on file     Relationship status: Not on file   Other Topics Concern    Are you pregnant or think you may be? No    Breast-feeding No   Social History Narrative    Not on file       Current Medications  Current Outpatient Medications on File Prior to Visit   Medication Sig Dispense Refill    albuterol (PROVENTIL/VENTOLIN HFA) 90 mcg/actuation inhaler TAKE 2 PUFFS BY MOUTH EVERY 4 HOURS AS NEEDED FOR WHEEZING 18 Inhaler 3    alendronate (FOSAMAX) 70 MG tablet TAKE 1 TABLET BY MOUTH ONE TIME PER WEEK 12 tablet 1    aspirin (ECOTRIN) 81 MG EC tablet Take 1 tablet (81 mg total) by mouth once daily.  0     atorvastatin (LIPITOR) 40 MG tablet Take 40 mg by mouth once daily.  2    blood sugar diagnostic Strp To check BG 2 times daily, to use with insurance preferred meter 200 each 3    blood-glucose meter kit To check BG 2 times daily, to use with insurance preferred meter 1 each 0    calcium carbonate/vitamin D3 (CALCIUM 600 + D,3, ORAL) Take by mouth.      citalopram (CELEXA) 20 MG tablet TAKE 1 TABLET BY MOUTH EVERY DAY 90 tablet 3    cyclobenzaprine (FLEXERIL) 5 MG tablet Take 1 tablet (5 mg total) by mouth 3 (three) times daily as needed. 90 tablet 5    diclofenac sodium (VOLTAREN) 1 % Gel Apply 2 g topically once daily. 100 g 3    dorzolamide-timolol 2-0.5% (COSOPT) 22.3-6.8 mg/mL ophthalmic solution INSTILL 1 DROP INTO BOTH EYES TWICE A DAY 20 mL 1    dulaglutide (TRULICITY) 0.75 mg/0.5 mL PnIj Inject 0.5 mLs (0.75 mg total) into the skin every 7 days. 12 Syringe 1    fluticasone propionate (FLONASE) 50 mcg/actuation nasal spray INHALE 2 SPRAY INTO EACH NOSTRIL DAILY 48 mL 1    furosemide (LASIX) 20 MG tablet TAKE 1 TABLET BY MOUTH EVERY DAY IN THE MORNING 90 tablet 1    gabapentin (NEURONTIN) 300 MG capsule Take 1 capsule (300 mg total) by mouth 3 (three) times daily. 90 capsule 11    INCRUSE ELLIPTA 62.5 mcg/actuation DsDv INHALE 1 EACH INTO THE LUNGS ONCE DAILY. 30 each 2    insulin NPH/Reg human (NOVOLIN 70-30 FLEXPEN U-100) 100 unit/mL (70-30) InPn pen INJECT 14 UNITS BEFORE BREAKFAST AND 10 UNITS BEFORE DINNER 15 mL 2    irbesartan (AVAPRO) 75 MG tablet Take 1 tablet (75 mg total) by mouth once daily. 90 tablet 3    lancets Misc To check BG 2 times daily, to use with insurance preferred meter 200 each 3    latanoprost 0.005 % ophthalmic solution PLACE 1 DROP INTO BOTH EYES EVERY EVENING. 7.5 mL 2    loratadine (CLARITIN) 10 mg tablet TAKE 1 TABLET (10 MG TOTAL) BY MOUTH DAILY AS NEEDED FOR ALLERGIES (OR RUNNY NOSE). 90 tablet 2    LORazepam (ATIVAN) 0.5 MG tablet TAKE 1 TABLET (0.5 MG  "TOTAL) BY MOUTH DAILY AS NEEDED FOR ANXIETY. 10 tablet 0    meclizine (ANTIVERT) 12.5 mg tablet TAKE 1 TABLET (12.5 MG TOTAL) BY MOUTH 3 (THREE) TIMES DAILY AS NEEDED FOR DIZZINESS. 30 tablet 0    meloxicam (MOBIC) 15 MG tablet Take 15 mg by mouth once daily.  1    meloxicam (MOBIC) 15 MG tablet TAKE 1 TABLET BY MOUTH EVERY DAY 90 tablet 1    omega-3 fatty acids-vitamin E (FISH OIL) 1,000 mg Cap       omeprazole (PRILOSEC) 40 MG capsule Take 40 mg by mouth before breakfast.  2    pen needle, diabetic (BD ULTRA-FINE AGUS PEN NEEDLE) 32 gauge x 5/32" Ndle 1 Device by Misc.(Non-Drug; Combo Route) route 2 (two) times daily. 200 each 4    primidone (MYSOLINE) 50 MG Tab Take 2 tablets (100 mg total) by mouth 3 (three) times daily. One half tablet for one week, then as prescribed 180 tablet 11    SITagliptin (JANUVIA) 100 MG Tab Take 1 tablet (100 mg total) by mouth once daily. 30 tablet 2    traMADol (ULTRAM) 50 mg tablet Take 1 tablet (50 mg total) by mouth every 8 (eight) hours as needed for Pain. 21 tablet 0    varicella-zoster gE-AS01B, PF, (SHINGRIX, PF,) 50 mcg/0.5 mL injection Inject 0.5 mLs into the muscle. 0.5 mL 0     No current facility-administered medications on file prior to visit.        Allergies   Review of patient's allergies indicates:   Allergen Reactions    Silicone      Burn skin    Adhesive Rash         ROS  Review of Systems   Constitutional: Positive for fatigue. Negative for chills and fever.   HENT: Positive for congestion, postnasal drip, sinus pressure and sinus pain. Negative for ear pain, rhinorrhea, sneezing and sore throat.    Respiratory: Positive for cough. Negative for shortness of breath and wheezing.    Cardiovascular: Negative for chest pain and palpitations.   Gastrointestinal: Negative for abdominal pain and nausea.   Musculoskeletal: Negative for myalgias.   Skin: Negative for rash.   Neurological: Positive for dizziness. Negative for light-headedness and headaches. " "  Hematological: Negative for adenopathy.         Objective:    /74 (BP Location: Right arm, Patient Position: Sitting, BP Method: Medium (Manual))   Pulse 80   Temp 98.4 °F (36.9 °C) (Oral)   Ht 5' 1" (1.549 m)   Wt 61.1 kg (134 lb 11.2 oz)   SpO2 97%   BMI 25.45 kg/m²     Physical Exam   Constitutional: Vital signs are normal.   HENT:   Head: Normocephalic.   Right Ear: Hearing, tympanic membrane, external ear and ear canal normal.   Left Ear: Hearing, tympanic membrane, external ear and ear canal normal.   Nose: Mucosal edema present. Right sinus exhibits maxillary sinus tenderness. Right sinus exhibits no frontal sinus tenderness. Left sinus exhibits maxillary sinus tenderness. Left sinus exhibits no frontal sinus tenderness.   Mouth/Throat: Uvula is midline and mucous membranes are normal. Posterior oropharyngeal erythema present. No oropharyngeal exudate.   Eyes: EOM are normal.   Cardiovascular: Normal rate, regular rhythm, S1 normal and S2 normal.   Pulmonary/Chest: Effort normal and breath sounds normal. She has no wheezes.   Abdominal: Soft. Normal appearance and bowel sounds are normal. There is no hepatosplenomegaly. There is no tenderness. There is no guarding and no CVA tenderness.   Lymphadenopathy:     She has no cervical adenopathy.        Right: No supraclavicular adenopathy present.        Left: No supraclavicular adenopathy present.   Neurological: She is alert. No cranial nerve deficit.   Skin: Skin is warm, dry and intact. No rash noted.   Psychiatric: She has a normal mood and affect. Judgment normal.       Assessment/Plan:  Deb Webb is a 69 y.o. female who presents today for :    Deb was seen today for sinus problem, cough and dizziness.    Diagnoses and all orders for this visit:    Acute non-recurrent sinusitis, unspecified location  -     doxycycline (MONODOX) 100 MG capsule; Take 1 capsule (100 mg total) by mouth 2 (two) times daily. for 7 days  Discussed " symptomatology of acute bacterial rhinosinusitis, including risk factors and proposed benefit, side effects/risks of antibiotic therapy. Continue with Claritin and Mucinex for symptoms relief.     Dizziness  Likely 2/2 to sinus infection, will treat as above  Continue with meclizine PRN    Counseled patient on the clinical course of condition including symptomatology, treatment and prevention.  Counseled regarding risks, benefits, and limitations of medications.  Advised patient to seek urgent/emergent care if symptoms intensify.  Sent patient with informational material about diagnosis.  Patient gave verbal understanding and agreement of plan.    Problem list issues addressed during this visit    COPD (chronic obstructive pulmonary disease)  Will cover with antibiotic given sinus infection, smoking history and COPD  No apparent acute exacerbation, lungs are clear         Health maintenance reviewed and discussed, deferred at this time due to acute illness      I spent >50% of this 25 minute encounter counseling the patient on diagnoses, risk factors, and treatments.         Encouraged to call/return to clinic if symptoms persist or worsen    Lesly Guevara PA-C  [facility]/ Internal [facility]

## 2019-11-22 NOTE — PATIENT INSTRUCTIONS
Sinusitis (Antibiotic Treatment)    The sinuses are air-filled spaces within the bones of the face. They connect to the inside of the nose. Sinusitis is an inflammation of the tissue lining the sinus cavity. Sinus inflammation can occur during a cold. It can also be due to allergies to pollens and other particles in the air. Sinusitis can cause symptoms of sinus congestion and fullness. A sinus infection causes fever, headache and facial pain. There is often green or yellow drainage from the nose or into the back of the throat (post-nasal drip). You have been given antibiotics to treat this condition.  Home care:  · Take the full course of antibiotics as instructed. Do not stop taking them, even if you feel better.  · Drink plenty of water, hot tea, and other liquids. This may help thin mucus. It also may promote sinus drainage.  · Heat may help soothe painful areas of the face. Use a towel soaked in hot water. Or,  the shower and direct the hot spray onto your face. Using a vaporizer along with a menthol rub at night may also help.   · An expectorant containing guaifenesin may help thin the mucus and promote drainage from the sinuses.  · Over-the-counter decongestants may be used unless a similar medicine was prescribed. Nasal sprays work the fastest. Use one that contains phenylephrine or oxymetazoline. First blow the nose gently. Then use the spray. Do not use these medicines more often than directed on the label or symptoms may get worse. You may also use tablets containing pseudoephedrine. Avoid products that combine ingredients, because side effects may be increased. Read labels. You can also ask the pharmacist for help. (NOTE: Persons with high blood pressure should not use decongestants. They can raise blood pressure.)  · Over-the-counter antihistamines may help if allergies contributed to your sinusitis.    · Do not use nasal rinses or irrigation during an acute sinus infection, unless told to by  your health care provider. Rinsing may spread the infection to other sinuses.  · Use acetaminophen or ibuprofen to control pain, unless another pain medicine was prescribed. (If you have chronic liver or kidney disease or ever had a stomach ulcer, talk with your doctor before using these medicines. Aspirin should never be used in anyone under 18 years of age who is ill with a fever. It may cause severe liver damage.)  · Don't smoke. This can worsen symptoms.  Follow-up care  Follow up with your healthcare provider or our staff if you are not improving within the next week.  When to seek medical advice  Call your healthcare provider if any of these occur:  · Facial pain or headache becoming more severe  · Stiff neck  · Unusual drowsiness or confusion  · Swelling of the forehead or eyelids  · Vision problems, including blurred or double vision  · Fever of 100.4ºF (38ºC) or higher, or as directed by your healthcare provider  · Seizure  · Breathing problems  · Symptoms not resolving within 10 days  Date Last Reviewed: 4/13/2015  © 1995-2718 [employer] [address] [place] [address], [place] [address]. All rights reserved. This information is not intended as a substitute for professional medical care. Always follow your healthcare professional's instructions.

## 2019-11-22 NOTE — ASSESSMENT & PLAN NOTE
Will cover with antibiotic given sinus infection, smoking history and COPD  No apparent acute exacerbation, lungs are clear

## 2019-11-25 ENCOUNTER — OFFICE VISIT (OUTPATIENT)
Dept: OTOLARYNGOLOGY | Facility: CLINIC | Age: 69
End: 2019-11-25
Payer: MEDICARE

## 2019-11-25 ENCOUNTER — CLINICAL SUPPORT (OUTPATIENT)
Dept: AUDIOLOGY | Facility: CLINIC | Age: 69
End: 2019-11-25
Payer: MEDICARE

## 2019-11-25 VITALS — HEIGHT: 61 IN | BODY MASS INDEX: 25.27 KG/M2 | WEIGHT: 133.81 LBS

## 2019-11-25 DIAGNOSIS — H69.91 DYSFUNCTION OF RIGHT EUSTACHIAN TUBE: ICD-10-CM

## 2019-11-25 DIAGNOSIS — H90.3 SENSORINEURAL HEARING LOSS, BILATERAL: Primary | ICD-10-CM

## 2019-11-25 DIAGNOSIS — Z01.10 ENCOUNTER FOR HEARING EXAMINATION, UNSPECIFIED WHETHER ABNORMAL FINDINGS: Primary | ICD-10-CM

## 2019-11-25 DIAGNOSIS — M26.609 TEMPOROMANDIBULAR JOINT DYSFUNCTION: ICD-10-CM

## 2019-11-25 DIAGNOSIS — H91.13 PRESBYCUSIS OF BOTH EARS: ICD-10-CM

## 2019-11-25 PROCEDURE — 1159F PR MEDICATION LIST DOCUMENTED IN MEDICAL RECORD: ICD-10-PCS | Mod: S$GLB,,, | Performed by: OTOLARYNGOLOGY

## 2019-11-25 PROCEDURE — 92567 PR TYMPA2METRY: ICD-10-PCS | Mod: S$GLB,,, | Performed by: AUDIOLOGIST

## 2019-11-25 PROCEDURE — 92557 PR COMPREHENSIVE HEARING TEST: ICD-10-PCS | Mod: S$GLB,,, | Performed by: AUDIOLOGIST

## 2019-11-25 PROCEDURE — 1126F PR PAIN SEVERITY QUANTIFIED, NO PAIN PRESENT: ICD-10-PCS | Mod: S$GLB,,, | Performed by: OTOLARYNGOLOGY

## 2019-11-25 PROCEDURE — 99204 OFFICE O/P NEW MOD 45 MIN: CPT | Mod: S$GLB,,, | Performed by: OTOLARYNGOLOGY

## 2019-11-25 PROCEDURE — 92567 TYMPANOMETRY: CPT | Mod: S$GLB,,, | Performed by: AUDIOLOGIST

## 2019-11-25 PROCEDURE — 1101F PT FALLS ASSESS-DOCD LE1/YR: CPT | Mod: CPTII,S$GLB,, | Performed by: OTOLARYNGOLOGY

## 2019-11-25 PROCEDURE — 1159F MED LIST DOCD IN RCRD: CPT | Mod: S$GLB,,, | Performed by: OTOLARYNGOLOGY

## 2019-11-25 PROCEDURE — 92557 COMPREHENSIVE HEARING TEST: CPT | Mod: S$GLB,,, | Performed by: AUDIOLOGIST

## 2019-11-25 PROCEDURE — 99999 PR PBB SHADOW E&M-EST. PATIENT-LVL V: ICD-10-PCS | Mod: PBBFAC,,, | Performed by: OTOLARYNGOLOGY

## 2019-11-25 PROCEDURE — 1126F AMNT PAIN NOTED NONE PRSNT: CPT | Mod: S$GLB,,, | Performed by: OTOLARYNGOLOGY

## 2019-11-25 PROCEDURE — 99999 PR PBB SHADOW E&M-EST. PATIENT-LVL V: CPT | Mod: PBBFAC,,, | Performed by: OTOLARYNGOLOGY

## 2019-11-25 PROCEDURE — 99204 PR OFFICE/OUTPT VISIT, NEW, LEVL IV, 45-59 MIN: ICD-10-PCS | Mod: S$GLB,,, | Performed by: OTOLARYNGOLOGY

## 2019-11-25 PROCEDURE — 1101F PR PT FALLS ASSESS DOC 0-1 FALLS W/OUT INJ PAST YR: ICD-10-PCS | Mod: CPTII,S$GLB,, | Performed by: OTOLARYNGOLOGY

## 2019-11-25 RX ORDER — OMEPRAZOLE 40 MG/1
CAPSULE, DELAYED RELEASE ORAL
Qty: 90 CAPSULE | Refills: 2 | Status: SHIPPED | OUTPATIENT
Start: 2019-11-25 | End: 2020-08-25

## 2019-11-25 NOTE — PROGRESS NOTES
Deb Webb was seen today in the clinic for an audiologic evaluation.  Patients main complaint was crackling sound and pain in her right ear.  Ms. Webb reported that she is experiencing an intermittent crackling sound and pain in her right ear.  She denied any issues with her left ear.  Pt also experienced episodic dizziness that lasts for minutes at time.      Tympanometry revealed Type A in the right ear and Type A in the left ear.  Audiogram results revealed normal sloping to moderate-severe sensorineural hearing loss (SNHL) in the right ear and normal sloping to moderate-severe SNHL in the left ear.  Speech reception thresholds were noted at 10 dB in the right ear and 10 dB in the left ear.  Speech discrimination scores were 96% in the right ear and 80% in the left ear.    Recommendations:  1. Otologic evaluation  2. Annual audiogram  3. Noise protection when in noise

## 2019-11-25 NOTE — PROGRESS NOTES
Subjective:       Patient ID: Deb Webb is a 69 y.o. female.    Chief Complaint: Other (crackling sound in right ear)    HPI: Pt with MMP and recent cough/ sinus inf on Doxy with R ear popping, otalgia.    No HL.      No DC.    Present for a few days.      Past Medical History: Patient has a past medical history of Allergy, Arthritis, Cataract, Colon polyps, COPD (chronic obstructive pulmonary disease), Diabetes mellitus type II, Diabetic neuropathy, Fever blister, Glaucoma (increased eye pressure), Hyperlipidemia, Hypertension, Irritable bowel syndrome, Keloid cicatrix, Osteoporosis, Retained cholelithiasis following cholecystectomy(997.41), and Right patella fracture.    Past Surgical History: Patient has a past surgical history that includes Cholecystectomy; Hysterectomy; ovarian tumor; Colonoscopy; TONSILLECTOMY, ADENOIDECTOMY; Hernia repair; Knee surgery (Right, 3/4/2015); Oophorectomy; Back surgery (2006); and Cataract extraction w/  intraocular lens implant (Bilateral).    Social History: Patient reports that she has been smoking cigarettes. She has a 20.00 pack-year smoking history. She uses smokeless tobacco. She reports that she does not drink alcohol or use drugs.    Family History: family history includes Breast cancer in her maternal aunt; Cancer in her mother; Cataracts in her mother; Diabetes in her daughter, father, mother, and sister; Glaucoma in her mother; Heart disease in her father; Skin cancer in her mother and sister.    Medications:   Current Outpatient Medications   Medication Sig    albuterol (PROVENTIL/VENTOLIN HFA) 90 mcg/actuation inhaler TAKE 2 PUFFS BY MOUTH EVERY 4 HOURS AS NEEDED FOR WHEEZING    alendronate (FOSAMAX) 70 MG tablet TAKE 1 TABLET BY MOUTH ONE TIME PER WEEK    atorvastatin (LIPITOR) 40 MG tablet Take 40 mg by mouth once daily.    blood sugar diagnostic Strp To check BG 2 times daily, to use with insurance preferred meter    blood-glucose meter kit To check  BG 2 times daily, to use with insurance preferred meter    calcium carbonate/vitamin D3 (CALCIUM 600 + D,3, ORAL) Take by mouth.    citalopram (CELEXA) 20 MG tablet TAKE 1 TABLET BY MOUTH EVERY DAY    cyclobenzaprine (FLEXERIL) 5 MG tablet Take 1 tablet (5 mg total) by mouth 3 (three) times daily as needed.    diclofenac sodium (VOLTAREN) 1 % Gel Apply 2 g topically once daily.    dorzolamide-timolol 2-0.5% (COSOPT) 22.3-6.8 mg/mL ophthalmic solution INSTILL 1 DROP INTO BOTH EYES TWICE A DAY    doxycycline (MONODOX) 100 MG capsule Take 1 capsule (100 mg total) by mouth 2 (two) times daily. for 7 days    dulaglutide (TRULICITY) 0.75 mg/0.5 mL PnIj Inject 0.5 mLs (0.75 mg total) into the skin every 7 days.    fluticasone propionate (FLONASE) 50 mcg/actuation nasal spray INHALE 2 SPRAY INTO EACH NOSTRIL DAILY    furosemide (LASIX) 20 MG tablet TAKE 1 TABLET BY MOUTH EVERY DAY IN THE MORNING    gabapentin (NEURONTIN) 300 MG capsule Take 1 capsule (300 mg total) by mouth 3 (three) times daily.    INCRUSE ELLIPTA 62.5 mcg/actuation DsDv INHALE 1 EACH INTO THE LUNGS ONCE DAILY.    insulin NPH/Reg human (NOVOLIN 70-30 FLEXPEN U-100) 100 unit/mL (70-30) InPn pen INJECT 14 UNITS BEFORE BREAKFAST AND 10 UNITS BEFORE DINNER    irbesartan (AVAPRO) 75 MG tablet Take 1 tablet (75 mg total) by mouth once daily.    lancets Misc To check BG 2 times daily, to use with insurance preferred meter    latanoprost 0.005 % ophthalmic solution PLACE 1 DROP INTO BOTH EYES EVERY EVENING.    loratadine (CLARITIN) 10 mg tablet TAKE 1 TABLET (10 MG TOTAL) BY MOUTH DAILY AS NEEDED FOR ALLERGIES (OR RUNNY NOSE).    LORazepam (ATIVAN) 0.5 MG tablet TAKE 1 TABLET (0.5 MG TOTAL) BY MOUTH DAILY AS NEEDED FOR ANXIETY.    meclizine (ANTIVERT) 12.5 mg tablet TAKE 1 TABLET (12.5 MG TOTAL) BY MOUTH 3 (THREE) TIMES DAILY AS NEEDED FOR DIZZINESS.    meloxicam (MOBIC) 15 MG tablet Take 15 mg by mouth once daily.    meloxicam (MOBIC) 15 MG  "tablet TAKE 1 TABLET BY MOUTH EVERY DAY    omega-3 fatty acids-vitamin E (FISH OIL) 1,000 mg Cap     omeprazole (PRILOSEC) 40 MG capsule TAKE ONE CAPSULE BY MOUTH BEFORE BREAKFAST    pen needle, diabetic (BD ULTRA-FINE AGUS PEN NEEDLE) 32 gauge x 5/32" Ndle 1 Device by Misc.(Non-Drug; Combo Route) route 2 (two) times daily.    primidone (MYSOLINE) 50 MG Tab Take 2 tablets (100 mg total) by mouth 3 (three) times daily. One half tablet for one week, then as prescribed    SITagliptin (JANUVIA) 100 MG Tab Take 1 tablet (100 mg total) by mouth once daily.    traMADol (ULTRAM) 50 mg tablet Take 1 tablet (50 mg total) by mouth every 8 (eight) hours as needed for Pain.    varicella-zoster gE-AS01B, PF, (SHINGRIX, PF,) 50 mcg/0.5 mL injection Inject 0.5 mLs into the muscle.    aspirin (ECOTRIN) 81 MG EC tablet Take 1 tablet (81 mg total) by mouth once daily.     No current facility-administered medications for this visit.        Allergies: Patient is allergic to silicone and adhesive.    Review of Systems   Constitutional: Negative for appetite change, chills, fatigue and fever.   HENT: Positive for ear pain and rhinorrhea. Negative for congestion, ear discharge, facial swelling, hearing loss, postnasal drip, sore throat, tinnitus and trouble swallowing.    Eyes: Negative for photophobia, pain, discharge, redness, itching and visual disturbance.   Respiratory: Negative for apnea, cough, choking, chest tightness, shortness of breath, wheezing and stridor.    Cardiovascular: Negative for chest pain and palpitations.   Gastrointestinal: Positive for abdominal pain and diarrhea. Negative for constipation, nausea and vomiting.   Genitourinary: Positive for frequency.   Musculoskeletal: Positive for arthralgias. Negative for gait problem, joint swelling, myalgias, neck pain and neck stiffness.   Skin: Negative for color change, pallor, rash and wound.   Neurological: Positive for dizziness. Negative for tremors, seizures, " syncope, facial asymmetry, speech difficulty, weakness, light-headedness, numbness and headaches.   Hematological: Negative for adenopathy. Does not bruise/bleed easily.   Psychiatric/Behavioral: Negative for agitation, behavioral problems, confusion, decreased concentration, dysphoric mood, hallucinations, sleep disturbance and suicidal ideas. The patient is not nervous/anxious and is not hyperactive.        Objective:      Physical Exam   Constitutional: She is oriented to person, place, and time. She appears well-developed and well-nourished. She is cooperative.  Non-toxic appearance. She does not have a sickly appearance. She does not appear ill. No distress.   HENT:   Head: Normocephalic and atraumatic. Not macrocephalic and not microcephalic. Head is without raccoon's eyes, without Meyer's sign, without abrasion, without contusion, without laceration, without right periorbital erythema and without left periorbital erythema. Hair is normal.   Right Ear: Ear canal normal. No lacerations. No drainage, swelling or tenderness. No foreign bodies. No mastoid tenderness. Tympanic membrane is not injected, not scarred, not perforated, not erythematous, not retracted and not bulging. Tympanic membrane mobility is normal. No middle ear effusion. No hemotympanum. Decreased hearing is noted.   Left Ear: Ear canal normal. No lacerations. No drainage, swelling or tenderness. No foreign bodies. No mastoid tenderness. Tympanic membrane is not injected, not scarred, not perforated, not erythematous, not retracted and not bulging. Tympanic membrane mobility is normal.  No middle ear effusion. No hemotympanum. Decreased hearing is noted.   Nose: No mucosal edema, rhinorrhea, nose lacerations, sinus tenderness, nasal deformity, septal deviation or nasal septal hematoma. No epistaxis.  No foreign bodies. Right sinus exhibits no maxillary sinus tenderness. Left sinus exhibits no maxillary sinus tenderness.       R TMJ +++  tender.      Audio with B HF SNHL/ Nl tymps.               Eyes: Pupils are equal, round, and reactive to light. Conjunctivae, EOM and lids are normal.   Neck: Normal range of motion. Neck supple. No JVD present. No tracheal tenderness and no muscular tenderness present. No neck rigidity. No tracheal deviation, no edema, no erythema and normal range of motion present. No thyroid mass and no thyromegaly present.   Cardiovascular: Normal rate and regular rhythm.   Pulmonary/Chest: Effort normal. No accessory muscle usage or stridor. No apnea, no tachypnea and no bradypnea. No respiratory distress.   Abdominal: Soft. Normal appearance.   Musculoskeletal: Normal range of motion.   Lymphadenopathy:        Head (right side): No submental, no submandibular, no tonsillar, no preauricular and no posterior auricular adenopathy present.        Head (left side): No submental, no submandibular, no tonsillar, no preauricular and no posterior auricular adenopathy present.     She has no cervical adenopathy.        Right cervical: No superficial cervical, no deep cervical and no posterior cervical adenopathy present.       Left cervical: No superficial cervical, no deep cervical and no posterior cervical adenopathy present.   Neurological: She is alert and oriented to person, place, and time. She displays no atrophy and no tremor. No cranial nerve deficit or sensory deficit. She exhibits normal muscle tone. She displays no seizure activity.   Skin: Skin is warm, dry and intact. No abrasion, no bruising, no burn, no ecchymosis, no laceration, no lesion and no rash noted. She is not diaphoretic. No erythema. No pallor.   Psychiatric: She has a normal mood and affect. Her behavior is normal. Judgment and thought content normal. Her speech is not rapid and/or pressured and not slurred. Cognition and memory are normal. She is communicative.   Nursing note and vitals reviewed.          Assessment:       1. Encounter for hearing  examination, unspecified whether abnormal findings    2. Dysfunction of right eustachian tube    3. Presbycusis of both ears    4. Temporomandibular joint dysfunction        Plan:         Finish meds.      TMJ regimen with soft diet, NSAID's, Heating pad over joint for 20 minutes tid for 7-10 days. If no better consider re evaluation for use of muscle relaxants and or referral to Oral Surgery specialist.      Answers for HPI/ROS submitted by the patient on 11/25/2019   Sinus infection(s)?: Yes  Foot swelling?: Yes

## 2019-11-26 DIAGNOSIS — R19.7 DIARRHEA, UNSPECIFIED TYPE: ICD-10-CM

## 2019-11-26 DIAGNOSIS — K52.9 ENTERITIS: ICD-10-CM

## 2019-11-26 DIAGNOSIS — K58.9 IRRITABLE BOWEL SYNDROME, UNSPECIFIED TYPE: ICD-10-CM

## 2019-11-26 RX ORDER — DICYCLOMINE HYDROCHLORIDE 20 MG/1
20 TABLET ORAL EVERY 6 HOURS
Qty: 120 TABLET | Refills: 0 | Status: SHIPPED | OUTPATIENT
Start: 2019-11-26 | End: 2020-01-10

## 2019-11-27 DIAGNOSIS — J42 CHRONIC BRONCHITIS, UNSPECIFIED CHRONIC BRONCHITIS TYPE: Chronic | ICD-10-CM

## 2019-11-27 RX ORDER — UMECLIDINIUM 62.5 UG/1
AEROSOL, POWDER ORAL
Qty: 30 EACH | Refills: 2 | Status: SHIPPED | OUTPATIENT
Start: 2019-11-27 | End: 2021-01-25 | Stop reason: SDUPTHER

## 2019-12-02 ENCOUNTER — TELEPHONE (OUTPATIENT)
Dept: ADMINISTRATIVE | Facility: OTHER | Age: 69
End: 2019-12-02

## 2019-12-02 NOTE — TELEPHONE ENCOUNTER
Left message for patient to collect and mail FitKit.  Patient should call if new FitKit is needed and one will be mailed.

## 2019-12-05 DIAGNOSIS — R42 DIZZINESS: ICD-10-CM

## 2019-12-05 RX ORDER — MECLIZINE HCL 12.5 MG 12.5 MG/1
12.5 TABLET ORAL 3 TIMES DAILY PRN
Qty: 30 TABLET | Refills: 1 | Status: SHIPPED | OUTPATIENT
Start: 2019-12-05 | End: 2020-01-08 | Stop reason: SDUPTHER

## 2019-12-23 ENCOUNTER — TELEPHONE (OUTPATIENT)
Dept: ENDOCRINOLOGY | Facility: CLINIC | Age: 69
End: 2019-12-23

## 2019-12-23 RX ORDER — LANCETS
EACH MISCELLANEOUS
Qty: 200 EACH | Refills: 3 | OUTPATIENT
Start: 2019-12-23 | End: 2021-03-01 | Stop reason: SDUPTHER

## 2019-12-23 RX ORDER — PEN NEEDLE, DIABETIC 30 GX3/16"
1 NEEDLE, DISPOSABLE MISCELLANEOUS 2 TIMES DAILY
Qty: 200 EACH | Refills: 4 | Status: SHIPPED | OUTPATIENT
Start: 2019-12-23 | End: 2023-12-06 | Stop reason: CLARIF

## 2019-12-23 RX ORDER — LANCETS
EACH MISCELLANEOUS
Qty: 200 EACH | Refills: 3 | Status: SHIPPED | OUTPATIENT
Start: 2019-12-23 | End: 2019-12-23 | Stop reason: SDUPTHER

## 2019-12-23 RX ORDER — ATORVASTATIN CALCIUM 40 MG/1
TABLET, FILM COATED ORAL
Qty: 90 TABLET | Refills: 2 | Status: SHIPPED | OUTPATIENT
Start: 2019-12-23 | End: 2020-09-14

## 2019-12-23 RX ORDER — PEN NEEDLE, DIABETIC 30 GX3/16"
1 NEEDLE, DISPOSABLE MISCELLANEOUS 2 TIMES DAILY
Qty: 200 EACH | Refills: 4 | Status: SHIPPED | OUTPATIENT
Start: 2019-12-23 | End: 2019-12-23

## 2019-12-23 NOTE — TELEPHONE ENCOUNTER
Spoke with [facility] pharmacy and they informed me they were out of stock of Novolin and suggested pt to try another pharmacy. Pt has requested to use [facility] in [place] the pharmacy has been added to her chart.

## 2019-12-23 NOTE — TELEPHONE ENCOUNTER
Received message from [facility] pharmacy stating that Novolin 70/30 doesn't come in a pen. However, patient confirms she's been taking it in a pen. She requested rx be sent to [facility]. rx sent to [facility] as requested.

## 2019-12-28 DIAGNOSIS — G25.0 ESSENTIAL TREMOR: ICD-10-CM

## 2019-12-30 RX ORDER — PRIMIDONE 50 MG/1
TABLET ORAL
Qty: 540 TABLET | Refills: 3 | Status: SHIPPED | OUTPATIENT
Start: 2019-12-30 | End: 2021-09-21 | Stop reason: SDUPTHER

## 2020-01-02 NOTE — PLAN OF CARE
Problem: Fall Injury Risk  Goal: Absence of Fall and Fall-Related Injury    Intervention: Identify and Manage Contributors to Fall Injury Risk   01/26/19 0527   Identify and Manage Contributors to Fall Injury Risk   Self-Care Promotion independence encouraged;BADL personal objects within reach         Problem: Adult Inpatient Plan of Care  Goal: Plan of Care Review   01/25/19 1113   Plan of Care Review   Plan of Care Reviewed With patient       Problem: Diabetes Comorbidity  Goal: Blood Glucose Level Within Desired Range    Intervention: Maintain Glycemic Control   01/25/19 0349   Monitor and Manage Ketoacidosis   Glycemic Management blood glucose monitoring;supplemental insulin given         Problem: Adjustment to Illness (Stroke, Ischemic/Transient Ischemic Attack)  Goal: Optimal Coping    Intervention: Support Patient/Family Psychosocial Response to Stroke   01/25/19 0349 01/26/19 0527   Promote Anxiety Reduction   Supportive Measures --  active listening utilized;verbalization of feelings encouraged   Family/Support System Care presence promoted;support provided --          Problem: Cerebral Tissue Perfusion Risk (Stroke, Ischemic/Transient Ischemic Attack)  Goal: Optimal Cerebral Tissue Perfusion    Intervention: Protect and Optimize Cerebral Perfusion   01/25/19 0349 01/25/19 0808   Protect and Optimize Cerebral Perfusion   Cerebral Perfusion Promotion blood pressure monitored --    Prevent or Manage Pain   Sensory Stimulation Regulation --  care clustered            Product 16 Refills: 0 Product 11 Amount/Unit (Numbers Only): 1 Product 54 Unit Type: mg Product 10 Application Directions: Take two tablets with water prior to procedure. Product 4 Unit Type: tablets Name Of Product 13: Exfoliate Glycolic Cleanser Product 1 Price/Unit (In Dollars): 0.00 Product 15 Application Directions: Take one tablet twice daily for 3-6 months Name Of Product 5: Hydroquinone 4% / Tretinoin 0.05% / Fluocinolone 0.01% Product 2 Application Directions: Apply at bedtime Product 8 Application Directions: Apply to eyelashes nightly Name Of Product 11: Hydroquinone 6% pads Product 9 Unit Type: jar(s) Product 1 Application Directions: Apply to eyelids am and pm Name Of Product 10: Valium Product 1 Unit Type: ml Product 4 Units Dispensed: 2 Product 7 Unit Type: bottle(s) Product 12 Application Directions: Apply to the entire face in the Am and Pm Name Of Product 15: Viviscal PRO Product 6 Amount/Unit (Numbers Only): [address] [facility] [place] Product 4 Application Directions: Take 1 tablet today for swelling second tomorrow Name Of Product 8: Prednisone 10mg Name Of Product 2: [employer] Render Product Pricing In Note: No Name Of Product 14: Valtrex 500mg Product 21 Application Directions: Use as directed Name Of Product 6: Tretinoin 0.05% cream Product 3 Application Directions: Take 1 tablet today after procedure as indicated Name Of Product 1: Latiesse 3 ml Name Of Product 7: Latisse 5ml Product 10 Amount/Unit (Numbers Only): 5 Product 9 Application Directions: Apply to affected area perioral area at bedtime only as indicated. Product 21 Unit Type: kit(s) Name Of Product 12: Skin Better Even tone Serum Product 15 Amount/Unit (Numbers Only): [facility] Product 14 Application Directions: Take one tablet in the Am and Pm x 3 days Product 8 Amount/Unit (Numbers Only): 3 Product 7 Application Directions: Apply to each eye lashes at bedtime Product 14 Amount/Unit (Numbers Only): [address] Product 13 Application Directions: Wash the face in the AM and PM Name Of Product 21: Defenage skincare Product 5 Application Directions: Apply thin layer to back area at bedtime every night as indicated Detail Level: Detailed Name Of Product 9: Brightening pads 4 % Name Of Product 3: Loratadine 10 mg Product 6 Application Directions: Mix with her cerave apply to entire body. Product 11 Application Directions: Apply to the affected area of the body daily. Product 5 Unit Type: tube(s) Product 6 Unit Type: grams

## 2020-01-03 DIAGNOSIS — H40.1133 PRIMARY OPEN ANGLE GLAUCOMA OF BOTH EYES, SEVERE STAGE: ICD-10-CM

## 2020-01-03 RX ORDER — LATANOPROST 50 UG/ML
1 SOLUTION/ DROPS OPHTHALMIC NIGHTLY
Qty: 7.5 ML | Refills: 1 | Status: SHIPPED | OUTPATIENT
Start: 2020-01-03 | End: 2021-10-13 | Stop reason: SDUPTHER

## 2020-01-04 ENCOUNTER — PATIENT OUTREACH (OUTPATIENT)
Dept: ADMINISTRATIVE | Facility: OTHER | Age: 70
End: 2020-01-04

## 2020-01-08 ENCOUNTER — LAB VISIT (OUTPATIENT)
Dept: LAB | Facility: HOSPITAL | Age: 70
End: 2020-01-08
Payer: MEDICARE

## 2020-01-08 ENCOUNTER — OFFICE VISIT (OUTPATIENT)
Dept: ENDOCRINOLOGY | Facility: CLINIC | Age: 70
End: 2020-01-08
Payer: MEDICARE

## 2020-01-08 ENCOUNTER — OFFICE VISIT (OUTPATIENT)
Dept: FAMILY MEDICINE | Facility: CLINIC | Age: 70
End: 2020-01-08
Payer: MEDICARE

## 2020-01-08 VITALS
SYSTOLIC BLOOD PRESSURE: 138 MMHG | BODY MASS INDEX: 26.76 KG/M2 | HEART RATE: 66 BPM | OXYGEN SATURATION: 95 % | WEIGHT: 141.75 LBS | TEMPERATURE: 98 F | HEIGHT: 61 IN | DIASTOLIC BLOOD PRESSURE: 78 MMHG

## 2020-01-08 VITALS
HEART RATE: 98 BPM | BODY MASS INDEX: 26.77 KG/M2 | SYSTOLIC BLOOD PRESSURE: 142 MMHG | DIASTOLIC BLOOD PRESSURE: 98 MMHG | WEIGHT: 141.69 LBS

## 2020-01-08 DIAGNOSIS — M19.90 OSTEOARTHRITIS, UNSPECIFIED OSTEOARTHRITIS TYPE, UNSPECIFIED SITE: ICD-10-CM

## 2020-01-08 DIAGNOSIS — Q07.9 CONGENITAL TILTED OPTIC NERVE: ICD-10-CM

## 2020-01-08 DIAGNOSIS — R42 DIZZINESS: ICD-10-CM

## 2020-01-08 DIAGNOSIS — I10 ESSENTIAL HYPERTENSION, BENIGN: Chronic | ICD-10-CM

## 2020-01-08 DIAGNOSIS — Z59.9 FINANCIAL DIFFICULTIES: ICD-10-CM

## 2020-01-08 DIAGNOSIS — F13.20 SEDATIVE HYPNOTIC OR ANXIOLYTIC DEPENDENCE: ICD-10-CM

## 2020-01-08 DIAGNOSIS — I27.20 PULMONARY HYPERTENSION: ICD-10-CM

## 2020-01-08 DIAGNOSIS — Z71.9 VISIT FOR COUNSELING: ICD-10-CM

## 2020-01-08 DIAGNOSIS — R60.0 BILATERAL LOWER EXTREMITY EDEMA: ICD-10-CM

## 2020-01-08 DIAGNOSIS — J42 CHRONIC BRONCHITIS, UNSPECIFIED CHRONIC BRONCHITIS TYPE: ICD-10-CM

## 2020-01-08 DIAGNOSIS — J31.0 RHINITIS, UNSPECIFIED TYPE: Primary | ICD-10-CM

## 2020-01-08 DIAGNOSIS — E11.9 DM TYPE 2 WITHOUT RETINOPATHY: ICD-10-CM

## 2020-01-08 PROBLEM — Z86.73 HISTORY OF STROKE: Status: ACTIVE | Noted: 2019-01-24

## 2020-01-08 LAB
ANION GAP SERPL CALC-SCNC: 10 MMOL/L (ref 8–16)
BUN SERPL-MCNC: 14 MG/DL (ref 8–23)
CALCIUM SERPL-MCNC: 10.2 MG/DL (ref 8.7–10.5)
CHLORIDE SERPL-SCNC: 104 MMOL/L (ref 95–110)
CO2 SERPL-SCNC: 29 MMOL/L (ref 23–29)
CREAT SERPL-MCNC: 1 MG/DL (ref 0.5–1.4)
EST. GFR  (AFRICAN AMERICAN): >60 ML/MIN/1.73 M^2
EST. GFR  (NON AFRICAN AMERICAN): 58 ML/MIN/1.73 M^2
ESTIMATED AVG GLUCOSE: 200 MG/DL (ref 68–131)
GLUCOSE SERPL-MCNC: 252 MG/DL (ref 70–110)
HBA1C MFR BLD HPLC: 8.6 % (ref 4–5.6)
POTASSIUM SERPL-SCNC: 4.6 MMOL/L (ref 3.5–5.1)
SODIUM SERPL-SCNC: 143 MMOL/L (ref 136–145)

## 2020-01-08 PROCEDURE — 3075F PR MOST RECENT SYSTOLIC BLOOD PRESS GE 130-139MM HG: ICD-10-PCS | Mod: CPTII,S$GLB,, | Performed by: FAMILY MEDICINE

## 2020-01-08 PROCEDURE — 1101F PT FALLS ASSESS-DOCD LE1/YR: CPT | Mod: CPTII,S$GLB,, | Performed by: FAMILY MEDICINE

## 2020-01-08 PROCEDURE — 1101F PR PT FALLS ASSESS DOC 0-1 FALLS W/OUT INJ PAST YR: ICD-10-PCS | Mod: CPTII,S$GLB,, | Performed by: FAMILY MEDICINE

## 2020-01-08 PROCEDURE — 99499 UNLISTED E&M SERVICE: CPT | Mod: S$GLB,,, | Performed by: FAMILY MEDICINE

## 2020-01-08 PROCEDURE — 99214 OFFICE O/P EST MOD 30 MIN: CPT | Mod: S$GLB,,, | Performed by: FAMILY MEDICINE

## 2020-01-08 PROCEDURE — 1159F MED LIST DOCD IN RCRD: CPT | Mod: S$GLB,,, | Performed by: FAMILY MEDICINE

## 2020-01-08 PROCEDURE — 1159F MED LIST DOCD IN RCRD: CPT | Mod: S$GLB,,, | Performed by: NURSE PRACTITIONER

## 2020-01-08 PROCEDURE — 3080F PR MOST RECENT DIASTOLIC BLOOD PRESSURE >= 90 MM HG: ICD-10-PCS | Mod: CPTII,S$GLB,, | Performed by: NURSE PRACTITIONER

## 2020-01-08 PROCEDURE — 1159F PR MEDICATION LIST DOCUMENTED IN MEDICAL RECORD: ICD-10-PCS | Mod: S$GLB,,, | Performed by: NURSE PRACTITIONER

## 2020-01-08 PROCEDURE — 3077F PR MOST RECENT SYSTOLIC BLOOD PRESSURE >= 140 MM HG: ICD-10-PCS | Mod: CPTII,S$GLB,, | Performed by: NURSE PRACTITIONER

## 2020-01-08 PROCEDURE — 1125F PR PAIN SEVERITY QUANTIFIED, PAIN PRESENT: ICD-10-PCS | Mod: S$GLB,,, | Performed by: FAMILY MEDICINE

## 2020-01-08 PROCEDURE — 3078F PR MOST RECENT DIASTOLIC BLOOD PRESSURE < 80 MM HG: ICD-10-PCS | Mod: CPTII,S$GLB,, | Performed by: FAMILY MEDICINE

## 2020-01-08 PROCEDURE — 99214 PR OFFICE/OUTPT VISIT, EST, LEVL IV, 30-39 MIN: ICD-10-PCS | Mod: S$GLB,,, | Performed by: FAMILY MEDICINE

## 2020-01-08 PROCEDURE — 99499 RISK ADDL DX/OHS AUDIT: ICD-10-PCS | Mod: S$GLB,,, | Performed by: NURSE PRACTITIONER

## 2020-01-08 PROCEDURE — 1126F AMNT PAIN NOTED NONE PRSNT: CPT | Mod: S$GLB,,, | Performed by: NURSE PRACTITIONER

## 2020-01-08 PROCEDURE — 3078F DIAST BP <80 MM HG: CPT | Mod: CPTII,S$GLB,, | Performed by: FAMILY MEDICINE

## 2020-01-08 PROCEDURE — 99214 PR OFFICE/OUTPT VISIT, EST, LEVL IV, 30-39 MIN: ICD-10-PCS | Mod: S$GLB,,, | Performed by: NURSE PRACTITIONER

## 2020-01-08 PROCEDURE — 3075F SYST BP GE 130 - 139MM HG: CPT | Mod: CPTII,S$GLB,, | Performed by: FAMILY MEDICINE

## 2020-01-08 PROCEDURE — 1159F PR MEDICATION LIST DOCUMENTED IN MEDICAL RECORD: ICD-10-PCS | Mod: S$GLB,,, | Performed by: FAMILY MEDICINE

## 2020-01-08 PROCEDURE — 3077F SYST BP >= 140 MM HG: CPT | Mod: CPTII,S$GLB,, | Performed by: NURSE PRACTITIONER

## 2020-01-08 PROCEDURE — 3080F DIAST BP >= 90 MM HG: CPT | Mod: CPTII,S$GLB,, | Performed by: NURSE PRACTITIONER

## 2020-01-08 PROCEDURE — 1125F AMNT PAIN NOTED PAIN PRSNT: CPT | Mod: S$GLB,,, | Performed by: FAMILY MEDICINE

## 2020-01-08 PROCEDURE — 36415 COLL VENOUS BLD VENIPUNCTURE: CPT | Mod: PO

## 2020-01-08 PROCEDURE — 99999 PR PBB SHADOW E&M-EST. PATIENT-LVL V: CPT | Mod: PBBFAC,,, | Performed by: NURSE PRACTITIONER

## 2020-01-08 PROCEDURE — 83036 HEMOGLOBIN GLYCOSYLATED A1C: CPT

## 2020-01-08 PROCEDURE — 99499 UNLISTED E&M SERVICE: CPT | Mod: S$GLB,,, | Performed by: NURSE PRACTITIONER

## 2020-01-08 PROCEDURE — 99999 PR PBB SHADOW E&M-EST. PATIENT-LVL III: ICD-10-PCS | Mod: PBBFAC,,, | Performed by: FAMILY MEDICINE

## 2020-01-08 PROCEDURE — 99499 RISK ADDL DX/OHS AUDIT: ICD-10-PCS | Mod: S$GLB,,, | Performed by: FAMILY MEDICINE

## 2020-01-08 PROCEDURE — 1101F PT FALLS ASSESS-DOCD LE1/YR: CPT | Mod: CPTII,S$GLB,, | Performed by: NURSE PRACTITIONER

## 2020-01-08 PROCEDURE — 99999 PR PBB SHADOW E&M-EST. PATIENT-LVL III: CPT | Mod: PBBFAC,,, | Performed by: FAMILY MEDICINE

## 2020-01-08 PROCEDURE — 80048 BASIC METABOLIC PNL TOTAL CA: CPT

## 2020-01-08 PROCEDURE — 99999 PR PBB SHADOW E&M-EST. PATIENT-LVL V: ICD-10-PCS | Mod: PBBFAC,,, | Performed by: NURSE PRACTITIONER

## 2020-01-08 PROCEDURE — 1101F PR PT FALLS ASSESS DOC 0-1 FALLS W/OUT INJ PAST YR: ICD-10-PCS | Mod: CPTII,S$GLB,, | Performed by: NURSE PRACTITIONER

## 2020-01-08 PROCEDURE — 1126F PR PAIN SEVERITY QUANTIFIED, NO PAIN PRESENT: ICD-10-PCS | Mod: S$GLB,,, | Performed by: NURSE PRACTITIONER

## 2020-01-08 PROCEDURE — 99214 OFFICE O/P EST MOD 30 MIN: CPT | Mod: S$GLB,,, | Performed by: NURSE PRACTITIONER

## 2020-01-08 RX ORDER — FUROSEMIDE 20 MG/1
20 TABLET ORAL DAILY
Qty: 90 TABLET | Refills: 1 | Status: SHIPPED | OUTPATIENT
Start: 2020-01-08 | End: 2020-07-02

## 2020-01-08 RX ORDER — MECLIZINE HCL 12.5 MG 12.5 MG/1
12.5 TABLET ORAL 3 TIMES DAILY PRN
Qty: 30 TABLET | Refills: 1 | Status: SHIPPED | OUTPATIENT
Start: 2020-01-08 | End: 2020-03-17 | Stop reason: SDUPTHER

## 2020-01-08 RX ORDER — TRAMADOL HYDROCHLORIDE 50 MG/1
50 TABLET ORAL EVERY 8 HOURS PRN
Qty: 21 TABLET | Refills: 0 | Status: SHIPPED | OUTPATIENT
Start: 2020-01-08 | End: 2021-01-25 | Stop reason: SDUPTHER

## 2020-01-08 RX ORDER — AZELASTINE 1 MG/ML
1 SPRAY, METERED NASAL 2 TIMES DAILY
Qty: 30 ML | Refills: 0 | Status: SHIPPED | OUTPATIENT
Start: 2020-01-08 | End: 2020-01-30

## 2020-01-08 SDOH — SOCIAL DETERMINANTS OF HEALTH (SDOH): PROBLEM RELATED TO HOUSING AND ECONOMIC CIRCUMSTANCES, UNSPECIFIED: Z59.9

## 2020-01-08 NOTE — PROGRESS NOTES
[facility]  Progress Note    SUBJECTIVE:     Chief Complaint   Patient presents with    Fatigue    Knee Pain    Medication Refill       HPI   Deb Webb  is a 69 y.o. female here for follow-up of her chronic conditions. Doing well on current regimen. Has been having issues with runny nose/sinus congestion. Takes loratadine and flonase which slightly helps. Patient has no other new complaints/problems at this time.      Review of patient's allergies indicates:   Allergen Reactions    Silicone      Burn skin    Adhesive Rash       Past Medical History:   Diagnosis Date    Allergy     Arthritis     Cataract     Colon polyps     COPD (chronic obstructive pulmonary disease)     Diabetes mellitus type II     Diabetic neuropathy     Fever blister     Glaucoma (increased eye pressure)     Hyperlipidemia     Hypertension     Irritable bowel syndrome     Keloid cicatrix     Osteoporosis     Retained cholelithiasis following cholecystectomy(997.41)     Right patella fracture      Past Surgical History:   Procedure Laterality Date    BACK SURGERY  2006    CATARACT EXTRACTION W/  INTRAOCULAR LENS IMPLANT Bilateral     CHOLECYSTECTOMY      Laparoscopic    COLONOSCOPY      HERNIA REPAIR      HYSTERECTOMY      KNEE SURGERY Right 3/4/2015    Dr Weller     OOPHORECTOMY      ovarian tumor      Benign    TONSILLECTOMY, ADENOIDECTOMY       Family History   Problem Relation Age of Onset    Cancer Mother         Skin    Skin cancer Mother     Glaucoma Mother     Cataracts Mother     Diabetes Mother     Heart disease Father     Diabetes Father     Skin cancer Sister     Diabetes Sister     Diabetes Daughter     Breast cancer Maternal Aunt     Melanoma Neg Hx     Psoriasis Neg Hx     Eczema Neg Hx     Lupus Neg Hx     Amblyopia Neg Hx     Blindness Neg Hx     Macular degeneration Neg Hx     Retinal detachment Neg Hx     Strabismus Neg Hx      Social History     Tobacco  Use    Smoking status: Current Every Day Smoker     Packs/day: 0.50     Years: 40.00     Pack years: 20.00     Types: Cigarettes    Smokeless tobacco: Current User   Substance Use Topics    Alcohol use: No    Drug use: No        Review of Systems   Constitutional: Negative for chills, fever and malaise/fatigue.   HENT: Negative.    Respiratory: Negative.  Negative for cough and shortness of breath.    Cardiovascular: Negative.  Negative for chest pain.   Gastrointestinal: Negative.  Negative for abdominal pain, nausea and vomiting.   Genitourinary: Negative.    Neurological: Negative for weakness and headaches.   All other systems reviewed and are negative.    OBJECTIVE:     Vitals:    01/08/20 0912   BP: 138/78   Pulse: 66   Temp: 98.1 °F (36.7 °C)     Body mass index is 26.78 kg/m².    Physical Exam   Constitutional: She is oriented to person, place, and time and well-developed, well-nourished, and in no distress. No distress.   HENT:   Head: Normocephalic and atraumatic.   Nose: Nose normal.   Eyes: Conjunctivae and EOM are normal.   Cardiovascular: Normal rate, regular rhythm and normal heart sounds. Exam reveals no gallop and no friction rub.   No murmur heard.  Pulmonary/Chest: Effort normal and breath sounds normal. No respiratory distress. She has no wheezes. She has no rales. She exhibits no tenderness.   Abdominal: Soft. Bowel sounds are normal. She exhibits no distension. There is no tenderness. There is no rebound.   Neurological: She is alert and oriented to person, place, and time.   Skin: Skin is warm. She is not diaphoretic.       Old records were reviewed. Labs and/or images were independently reviewed.    ASSESSMENT     1. Rhinitis, unspecified type    2. Sedative hypnotic or anxiolytic dependence    3. Congenital tilted optic nerve    4. Pulmonary hypertension    5. Chronic bronchitis, unspecified chronic bronchitis type    6. DM type 2 without retinopathy    7. Essential hypertension, benign     8. Dizziness    9. Bilateral lower extremity edema    10. Osteoarthritis, unspecified osteoarthritis type, unspecified site        PLAN:     Rhinitis, unspecified type  -     Start azelastine (ASTELIN) 137 mcg (0.1 %) nasal spray; 1 spray (137 mcg total) by Nasal route 2 (two) times daily.  Dispense: 30 mL; Refill: 0  -     OK to take tylenol as needed PRN fever. Take mucinex and or claritin to help decrease congestion. Educated patient to drink plenty of fluids and to take vitamin C to help boost immune system. Instructed patient to call or RTC if symptoms persist or worsen.    Sedative hypnotic or anxiolytic dependence   -     Stable. Continue current regimen.    Congenital tilted optic nerve   -     Stable. Continue current regimen.    Pulmonary hypertension   -     Stable. Continue current regimen.    Chronic bronchitis, unspecified chronic bronchitis type   -     Stable. Continue current regimen.    DM type 2 without retinopathy   -     Stable. Continue current regimen.    Essential hypertension, benign   -     Stable. Continue current regimen.    Dizziness  -     meclizine (ANTIVERT) 12.5 mg tablet; Take 1 tablet (12.5 mg total) by mouth 3 (three) times daily as needed for Dizziness.  Dispense: 30 tablet; Refill: 1  -     Stable. Continue current regimen.    Bilateral lower extremity edema  -     furosemide (LASIX) 20 MG tablet; Take 1 tablet (20 mg total) by mouth once daily.  Dispense: 90 tablet; Refill: 1    Osteoarthritis, unspecified osteoarthritis type, unspecified site  -     traMADol (ULTRAM) 50 mg tablet; Take 1 tablet (50 mg total) by mouth every 8 (eight) hours as needed for Pain.  Dispense: 21 tablet; Refill: 0  -     Discussed to take very sparingly.       RTC PRN    Moiz Goldberg MD  01/08/2020 9:54 AM

## 2020-01-08 NOTE — PATIENT INSTRUCTIONS
Stop glimepiride.   Increase Novolin 70/30 to 19 units before breakfast and 16 units before lunch.   See Diabetes Educator/Registered Dietician for Medical Nutrition Therapy. -- fluctuations in glucoses are related to inconsistencies in diet and insulin administration.   Test glucose 3x/day - before meals.   Please follow up with Pharmacy Assistance to see if you can get the Trulicity. It would be ideal if you can start Trulicity again.   Return to clinic in 6 weeks.

## 2020-01-08 NOTE — PROGRESS NOTES
CC: This 69 y.o. White female  is here for evaluation of  T2DM along with comorbidities indicated in the Visit Diagnosis section of this encounter.    HPI: Deb Webb was diagnosed with T2DM in her 40s. Oral agent started at the time of diagnosis.         Prior visit 8/2/19  She increased her Lantus dose because of high BGs which started rising ~ March. Increased from 10 to 16 units. She also increased her glimepiride dose from 8 to 12 mg once daily in the morning (= 3 tablets).   Smokes 6-7 cigarettes a day which is an improvement.   She is enrolled in new program for financial assistance from  so many meds don't have copay.   Plan Continue glimepiride at 8 mg once daily before breakfast.   Start Trulicity 0.75 mg once weekly, cost permitting.   If you are able to start Trulicity, please reduce Lantus from 16 to 14 units once daily.  Test glucose 2x/day. Return to clinic in 6 weeks.       Interval history  She was advised to switch from Lantus and glimepiride to Novolin 70/30 in late August until she could get the Trulicity from [facility] [employer] [facility]. However, she reports today that she was eventually approved to get the Trulicity but thought she had to remain on the 70/30. She gets 2 boxes of Novolin 70/30 insulin for $38.     She was advised to start Novolin 70/30 at 14 units before breakfast and 10 units before dinner. She has since increased to 16 units BID. She restarted taking glimepiride 4 mg once daily in AM off/on around November and finds it's helped lower her BGs.     Has been dizzy because she's been taking more primidone that rx'd by mistake.        LAST DIABETES EDUCATION: 7/2/18 - found visit helpful   10/1/18 for insulin training     PRESCIBED DIABETES MEDICATIONS: Novolin 70/30 Flexpen 16 units before breakfast and before dinner   She injects insulin at 5-6 am and again at 1-2 pm before lunch.      Misses medication doses - No    DM COMPLICATIONS: nephropathy and peripheral  neuropathy                                                                                          SIGNIFICANT DIABETES MED HISTORY:   - diarrhea on metformin   - She started Trulicity ~ march 2018 but had to stop a few months later d/t cost $ 177, up from $30. She was in rx gap. States her BGs were in the low 200s on the Trulicity.       SELF MONITORING BLOOD GLUCOSE: Checks blood glucose at home - 2x/day.                   HYPOGLYCEMIC EPISODES: She has taken insulin midday without eating, resulting in low BGs in the 50-70s.         CURRENT DIET: drinks mostly water and occ Coke Zero. Eats about 2-3x/day. Eats always at 5-6 am, 1-2 pm, and a few times a week she eats again at 6 pm.   breakfast is tucker deacon stuffed hash brown      BP (!) 142/98 (BP Location: Left arm, Patient Position: Sitting, BP Method: Large (Automatic))   Pulse 98   Wt 64.3 kg (141 lb 11.2 oz)   BMI 26.77 kg/m²          ROS:   CONSTITUTIONAL: Appetite good, denies fatigue  MS: + shoulder pain       PHYSICAL EXAM:  GENERAL: Well developed, well nourished. No acute distress.   PSYCH: AAOx3, appropriate mood and affect, conversant, well-groomed. Judgement and insight good.   NEURO: Cranial nerves grossly intact. Speech clear, + tremor to head and hands   CHEST: Respirations even and unlabored.         Hemoglobin A1C   Date Value Ref Range Status   08/08/2019 8.5 (H) 4.0 - 5.6 % Final     Comment:     ADA Screening Guidelines:  5.7-6.4%  Consistent with prediabetes  >or=6.5%  Consistent with diabetes  High levels of fetal hemoglobin interfere with the HbA1C  assay. Heterozygous hemoglobin variants (HbS, HgC, etc)do  not significantly interfere with this assay.   However, presence of multiple variants may affect accuracy.     01/24/2019 8.4 (H) 4.0 - 5.6 % Final     Comment:     ADA Screening Guidelines:  5.7-6.4%  Consistent with prediabetes  >or=6.5%  Consistent with diabetes  High levels of fetal hemoglobin interfere with the HbA1C  assay.  Heterozygous hemoglobin variants (HbS, HgC, etc)do  not significantly interfere with this assay.   However, presence of multiple variants may affect accuracy.     11/16/2018 8.5 (H) 4.0 - 5.6 % Final     Comment:     ADA Screening Guidelines:  5.7-6.4%  Consistent with prediabetes  >or=6.5%  Consistent with diabetes  High levels of fetal hemoglobin interfere with the HbA1C  assay. Heterozygous hemoglobin variants (HbS, HgC, etc)do  not significantly interfere with this assay.   However, presence of multiple variants may affect accuracy.             Chemistry        Component Value Date/Time     08/08/2019 0922    K 4.7 08/08/2019 0922     08/08/2019 0922    CO2 26 08/08/2019 0922    BUN 18 08/08/2019 0922    CREATININE 1.2 08/08/2019 0922     (H) 08/08/2019 0922        Component Value Date/Time    CALCIUM 10.5 08/08/2019 0922    ALKPHOS 109 08/08/2019 0922    AST 23 08/08/2019 0922    ALT 21 08/08/2019 0922    BILITOT 0.3 08/08/2019 0922    ESTGFRAFRICA 54.0 (A) 08/08/2019 0922    EGFRNONAA 46.9 (A) 08/08/2019 0922          Lab Results   Component Value Date    LDLCALC 43.2 (L) 01/25/2019          Ref. Range 1/25/2019 05:54   Cholesterol Latest Ref Range: 120 - 199 mg/dL 104 (L)   HDL Latest Ref Range: 40 - 75 mg/dL 37 (L)   HDL/Chol Ratio Latest Ref Range: 20.0 - 50.0 % 35.6   LDL Cholesterol Latest Ref Range: 63.0 - 159.0 mg/dL 43.2 (L)   Non-HDL Cholesterol Latest Units: mg/dL 67   Total Cholesterol/HDL Ratio Latest Ref Range: 2.0 - 5.0  2.8   Triglycerides Latest Ref Range: 30 - 150 mg/dL 119       Lab Results   Component Value Date    MICALBCREAT 25.9 03/07/2019           STANDARDS of CARE:        ASA:               Last eye exam:       ASSESSMENT and PLAN:    A1C GOAL: < 7 %       1. Uncontrolled type 2 diabetes mellitus with diabetic neuropathy, without long-term current use of insulin  Very difficult situation because she cannot afford ideal diabetic medications.  Fluctuations in glucoses  are related to inconsistencies in diet and insulin administration. Mixed insulin should be taken on a fixed schedule and fairly consistent diet for ideal results.     Advised her -     Stop glimepiride.   Increase Novolin 70/30 to 19 units before breakfast and 16 units before lunch.   See Diabetes Educator/Registered Dietician for Medical Nutrition Therapy. -- needs help with maintaining consistent diet.   Test glucose 3x/day - before meals.   Please follow up with Pharmacy Assistance to see if you can get the Trulicity. It would be ideal if you can start Trulicity again. -- a message was sent to Pharmacy Assistance by myself as well to see if she would qualify for any insulin analogues.   Return to clinic in 6 weeks.       Hemoglobin A1c    Basic metabolic panel   2. Financial difficulties  As above        Spent 40 minutes with patient with >50% time spent in counseling, as noted in # 1.          Orders Placed This Encounter   Procedures    Hemoglobin A1c     Standing Status:   Future     Standing Expiration Date:   3/8/2021    Basic metabolic panel     Standing Status:   Future     Standing Expiration Date:   3/8/2021        Follow up in about 6 weeks (around 2/19/2020).

## 2020-01-09 ENCOUNTER — TELEPHONE (OUTPATIENT)
Dept: PHARMACY | Facility: CLINIC | Age: 70
End: 2020-01-09

## 2020-01-09 ENCOUNTER — TELEPHONE (OUTPATIENT)
Dept: ENDOCRINOLOGY | Facility: CLINIC | Age: 70
End: 2020-01-09

## 2020-01-09 NOTE — TELEPHONE ENCOUNTER
----- Message from Duyen Frances sent at 2020  9:17 AM CST -----  Good Morning,   The medication that she was approved for is no longer available because the time has , but I can do a application for the Trulicity now and I can also do the Basaglar along with the Trulicity through the  [facility] program.  Thank You  Duyen  ----- Message -----  From: Sussy Goldberg NP  Sent: 2020   9:05 AM CST  To: Pharmacy Patient Assistance Team    Good morning,     This patient was trying to get Trulicity a few months ago and she told me that she was approved for it. She mistakenly did not pick it up.     Is she still able to get the Trulicity?     Also, are there other medications that she would qualify for?   Like a basal insulin (Lantus, Toujeo, Basaglar, Levemir, or Tresiba) and a prandial insulin (Novolog, Humalog, Apidra)?     Thanks so much!     Sussy Goldberg NP

## 2020-01-10 ENCOUNTER — TELEPHONE (OUTPATIENT)
Dept: ENDOCRINOLOGY | Facility: CLINIC | Age: 70
End: 2020-01-10

## 2020-01-10 DIAGNOSIS — R19.7 DIARRHEA, UNSPECIFIED TYPE: ICD-10-CM

## 2020-01-10 DIAGNOSIS — K58.9 IRRITABLE BOWEL SYNDROME, UNSPECIFIED TYPE: ICD-10-CM

## 2020-01-10 DIAGNOSIS — K52.9 ENTERITIS: ICD-10-CM

## 2020-01-10 RX ORDER — DICYCLOMINE HYDROCHLORIDE 20 MG/1
20 TABLET ORAL EVERY 6 HOURS
Qty: 120 TABLET | Refills: 0 | Status: SHIPPED | OUTPATIENT
Start: 2020-01-10 | End: 2020-02-08

## 2020-01-10 NOTE — TELEPHONE ENCOUNTER
----- Message from Sussy Goldberg NP sent at 1/9/2020  9:22 AM CST -----  A1c is about the same at 8.6% which indicates average glucose of 200.

## 2020-01-13 ENCOUNTER — TELEPHONE (OUTPATIENT)
Dept: ENDOCRINOLOGY | Facility: CLINIC | Age: 70
End: 2020-01-13

## 2020-01-13 NOTE — TELEPHONE ENCOUNTER
Spoke with pt and informed her that her A1c is about the same at 8.6% which indicates average glucose of 200.

## 2020-01-19 RX ORDER — ALBUTEROL SULFATE 90 UG/1
AEROSOL, METERED RESPIRATORY (INHALATION)
Qty: 18 INHALER | Refills: 3 | Status: SHIPPED | OUTPATIENT
Start: 2020-01-19 | End: 2020-03-17 | Stop reason: SDUPTHER

## 2020-01-28 RX ORDER — MELOXICAM 15 MG/1
TABLET ORAL
Qty: 90 TABLET | Refills: 1 | Status: SHIPPED | OUTPATIENT
Start: 2020-01-28 | End: 2020-10-14

## 2020-01-30 DIAGNOSIS — J31.0 RHINITIS, UNSPECIFIED TYPE: ICD-10-CM

## 2020-01-30 RX ORDER — AZELASTINE 1 MG/ML
1 SPRAY, METERED NASAL 2 TIMES DAILY
Qty: 30 ML | Refills: 0 | Status: SHIPPED | OUTPATIENT
Start: 2020-01-30 | End: 2020-04-20 | Stop reason: SDUPTHER

## 2020-01-31 DIAGNOSIS — E11.9 TYPE 2 DIABETES MELLITUS WITHOUT COMPLICATION: ICD-10-CM

## 2020-02-08 DIAGNOSIS — K58.9 IRRITABLE BOWEL SYNDROME, UNSPECIFIED TYPE: ICD-10-CM

## 2020-02-08 DIAGNOSIS — R19.7 DIARRHEA, UNSPECIFIED TYPE: ICD-10-CM

## 2020-02-08 DIAGNOSIS — K52.9 ENTERITIS: ICD-10-CM

## 2020-02-08 RX ORDER — DICYCLOMINE HYDROCHLORIDE 20 MG/1
20 TABLET ORAL EVERY 6 HOURS
Qty: 120 TABLET | Refills: 0 | Status: SHIPPED | OUTPATIENT
Start: 2020-02-08 | End: 2020-03-09

## 2020-02-21 ENCOUNTER — PATIENT OUTREACH (OUTPATIENT)
Dept: ADMINISTRATIVE | Facility: OTHER | Age: 70
End: 2020-02-21

## 2020-02-24 RX ORDER — GABAPENTIN 300 MG/1
300 CAPSULE ORAL 3 TIMES DAILY
Qty: 90 CAPSULE | Refills: 0 | Status: SHIPPED | OUTPATIENT
Start: 2020-02-24 | End: 2020-04-18

## 2020-02-24 RX ORDER — GABAPENTIN 300 MG/1
300 CAPSULE ORAL 3 TIMES DAILY
Qty: 90 CAPSULE | Refills: 0 | Status: SHIPPED | OUTPATIENT
Start: 2020-02-24 | End: 2020-02-24 | Stop reason: SDUPTHER

## 2020-02-24 RX ORDER — GABAPENTIN 300 MG/1
300 CAPSULE ORAL 3 TIMES DAILY
Qty: 90 CAPSULE | Refills: 11 | Status: CANCELLED | OUTPATIENT
Start: 2020-02-24

## 2020-02-24 NOTE — TELEPHONE ENCOUNTER
----- Message from Adilene Santoro sent at 2/24/2020 11:30 AM CST -----  Contact: Patient   Type: RX Refill Request    Who Called: Patient     Have you contacted your pharmacy: no     Refill or New Rx: Refill     RX Name and Strength: gabapentin (NEURONTIN) 300 MG capsule    How is the patient currently taking it? (ex. 1XDay)    Is this a 30 day or 90 day RX:    Preferred Pharmacy with phone number:   [facility]/pharmacy #[id number] [place], [place] - [address] [address]  Phone: [phone] Fax: [phone]      Local or Mail Order: Local    Ordering Provider: Dr. Goldberg     Would the patient rather a call back or a response via My [facility]? Call back     Best Call Back Number: [phone]

## 2020-03-05 DIAGNOSIS — I10 ESSENTIAL HYPERTENSION, BENIGN: ICD-10-CM

## 2020-03-06 ENCOUNTER — TELEPHONE (OUTPATIENT)
Dept: ENDOCRINOLOGY | Facility: CLINIC | Age: 70
End: 2020-03-06

## 2020-03-06 RX ORDER — IRBESARTAN 75 MG/1
TABLET ORAL
Qty: 90 TABLET | Refills: 3 | Status: SHIPPED | OUTPATIENT
Start: 2020-03-06 | End: 2021-03-24

## 2020-03-06 NOTE — PROGRESS NOTES
Refill Authorization Note     is requesting a refill authorization.    Brief assessment and rationale for refill: APPROVE: prr                                         Comments:     Requested Prescriptions   Signed Prescriptions Disp Refills    irbesartan (AVAPRO) 75 MG tablet 90 tablet 3     Sig: TAKE 1 TABLET BY MOUTH EVERY DAY       Cardiovascular:  Angiotensin Receptor Blockers Passed - 3/5/2020  1:52 AM        Passed - Patient is at least 18 years old        Passed - Last BP in normal range within 360 days.     BP Readings from Last 3 Encounters:   01/08/20 138/78   01/08/20 (!) 142/98   11/22/19 132/74              Passed - Office visit in past 12 months or future 90 days.     Recent Outpatient Visits            1 month ago Rhinitis, unspecified type    [facility] - Family Medicine Moiz Goldberg MD    1 month ago Uncontrolled type 2 diabetes mellitus with diabetic neuropathy, without long-term current use of insulin    [place] - Endo/Diabetes Sussy Goldberg NP    3 months ago Encounter for hearing examination, unspecified whether abnormal findings    [facility] - Otorhinolaryngology Zeus Dick MD    3 months ago Acute non-recurrent sinusitis, unspecified location    [facility] - Family Medicine Lesly Guevara PA-C    5 months ago Enteritis    [facility] Medicine Milad Gruber NP          Future Appointments              In 2 weeks [facility]    In 1 month Sussy Goldberg NP [place] - Endo/Diabetes, [facility]                Passed - Cr is 1.3 or below and within 360 days     Creatinine   Date Value Ref Range Status   01/08/2020 1.0 0.5 - 1.4 mg/dL Final   08/08/2019 1.2 0.5 - 1.4 mg/dL Final   01/27/2019 0.8 0.5 - 1.4 mg/dL Final     POC Creatinine   Date Value Ref Range Status   01/24/2019 0.6 0.6 - 1.2 mg/dL Final              Passed - K in normal range and within 360 days     POC Potassium   Date Value Ref Range Status   01/24/2019 4.0 3.6  - 5.1 mmol/L Final     Potassium   Date Value Ref Range Status   01/08/2020 4.6 3.5 - 5.1 mmol/L Final   08/08/2019 4.7 3.5 - 5.1 mmol/L Final   01/27/2019 3.3 (L) 3.5 - 5.1 mmol/L Final              Passed - eGFR within 360 days     eGFR if non    Date Value Ref Range Status   01/08/2020 58.0 (A) >60 mL/min/1.73 m^2 Final     Comment:     Calculation used to obtain the estimated glomerular filtration  rate (eGFR) is the CKD-EPI equation.      08/08/2019 46.9 (A) >60 mL/min/1.73 m^2 Final     Comment:     Calculation used to obtain the estimated glomerular filtration  rate (eGFR) is the CKD-EPI equation.      01/27/2019 >60 >60 mL/min/1.73 m^2 Final     Comment:     Calculation used to obtain the estimated glomerular filtration  rate (eGFR) is the CKD-EPI equation.        eGFR if    Date Value Ref Range Status   01/08/2020 >60.0 >60 mL/min/1.73 m^2 Final   08/08/2019 54.0 (A) >60 mL/min/1.73 m^2 Final   01/27/2019 >60 >60 mL/min/1.73 m^2 Final               Appointments  past 12m or future 3m with PCP    Date Provider   Last Visit   1/8/2020 Moiz Goldberg MD   Next Visit   Visit date not found Moiz Goldberg MD   .  ED visits in past 90 days: 0       Note composed:1:08 PM 03/06/2020

## 2020-03-17 DIAGNOSIS — R42 DIZZINESS: ICD-10-CM

## 2020-03-17 NOTE — TELEPHONE ENCOUNTER
----- Message from Frankrudy Ko sent at 3/17/2020 11:19 AM CDT -----  Contact: Self  Type: RX Refill Request    Who Called:  self  Have you contacted your pharmacy: no     Refill or New Rx refill     RX Name and Strength: meclizine (ANTIVERT) 12.5 mg tablet                                                  albuterol (PROVENTIL/VENTOLIN HFA) 90 mcg/actuation inhaler    Preferred Pharmacy with phone number: [facility]/pharmacy #[id number] [address] [phone] (Phone)  [phone] (Fax)      Local or Mail Order: local  Ordering Provider: Ashlyn    Would the patient rather a call back or a response via My [facility]? Call     Best Call Back Number: [phone]

## 2020-03-19 ENCOUNTER — TELEPHONE (OUTPATIENT)
Dept: FAMILY MEDICINE | Facility: CLINIC | Age: 70
End: 2020-03-19

## 2020-03-19 NOTE — TELEPHONE ENCOUNTER
----- Message from Crystal Malagon, Patient Care Assistant sent at 3/19/2020  2:03 PM CDT -----  Contact: JAVON THOMAS [[id number]]  Name of Who is Calling: JAVON THOMAS [[id number]]    What is the request in detail:Requesting a call back in regards of Rx albuterol (PROVENTIL/VENTOLIN HFA) 90 mcg/actuation inhaler, stating office needs to call [facility] [employer] so she can receive medication. Ph [phone]..  Please contact to further discuss and advise      Can the clinic reply by [facility]: No    What Number to Call Back [facility] not [facility]:   [phone]

## 2020-03-25 RX ORDER — MECLIZINE HCL 12.5 MG 12.5 MG/1
12.5 TABLET ORAL 3 TIMES DAILY PRN
Qty: 30 TABLET | Refills: 1 | Status: SHIPPED | OUTPATIENT
Start: 2020-03-25 | End: 2020-07-27

## 2020-03-25 RX ORDER — ALBUTEROL SULFATE 90 UG/1
AEROSOL, METERED RESPIRATORY (INHALATION)
Qty: 54 G | Refills: 3 | Status: SHIPPED | OUTPATIENT
Start: 2020-03-25 | End: 2020-04-02 | Stop reason: SDUPTHER

## 2020-03-25 NOTE — TELEPHONE ENCOUNTER
Refill Routing Note     Medication(s) are not appropriate for processing by [facility]:    Medication Outside of Protocol    Appointments  past 12m or future 3m with PCP    Date Provider   Last Visit   1/8/2020 Moiz Goldberg MD   Next Visit   3/19/2020 Moiz Goldberg MD           Automatic Epic Protocol Generated Data:    Requested Prescriptions   Pending Prescriptions Disp Refills    meclizine (ANTIVERT) 12.5 mg tablet 30 tablet 1     Sig: Take 1 tablet (12.5 mg total) by mouth 3 (three) times daily as needed for Dizziness.       There is no refill protocol information for this order        Note composed:12:31 PM 03/25/2020

## 2020-03-25 NOTE — PROGRESS NOTES
Refill Authorization Note     is requesting a refill authorization.    Brief assessment and rationale for refill: ROUTE: op(antivert)/APPROVE          Medication Therapy Plan: Outside of protocol(antivert), route to you; Incruse active on med list; Approve albuterol inhaler 12 more months    Medication reconciliation completed: No                         Comments:   Refill Center Care Gap Closure protocols temporarily suspended.   Requested Prescriptions   Pending Prescriptions Disp Refills    albuterol (PROVENTIL/VENTOLIN HFA) 90 mcg/actuation inhaler 54 g 3     Sig: TAKE 2 PUFFS BY MOUTH EVERY 4 HOURS AS NEEDED FOR WHEEZING (RESCUE)       Pulmonology:  Beta Agonists Failed - 3/25/2020 11:21 AM        Failed - Patient has asthma or COPD and there is a controller medication on the active medication list        Failed - Manual Review: If patient with asthma requests more than 1 inhaler every 3 months, assess for uncontrolled symptoms and/or notify provider.        Passed - Patient is at least 18 years old        Passed - Last Heart Rate in normal range within 360 days.     Pulse Readings from Last 3 Encounters:   01/08/20 66   01/08/20 98   11/22/19 80             Passed - Office visit in past 12 months or future 90 days.     Recent Outpatient Visits            2 months ago Rhinitis, unspecified type    [place] - Family Medicine Moiz Goldberg MD    2 months ago Uncontrolled type 2 diabetes mellitus with diabetic neuropathy, without long-term current use of insulin    [place] - Endo/Diabetes Sussy Goldberg NP    4 months ago Encounter for hearing examination, unspecified whether abnormal findings    [facility] - Otorhinolaryngology Zeus Dick MD    4 months ago Acute non-recurrent sinusitis, unspecified location    [place] - Family Medicine Lesly Guevara PA-C    6 months ago Enteritis    [facility] - Family Medicine Milad Gruber NP          Future Appointments              In 2 weeks Sussy  MARCO ANTONIO Goldberg [place] - Endo/Diabetes, [place] - [facility]    In 2 weeks Edward Stratton MD [place] - Neurology, [place]               meclizine (ANTIVERT) 12.5 mg tablet 30 tablet 1     Sig: Take 1 tablet (12.5 mg total) by mouth 3 (three) times daily as needed for Dizziness.       There is no refill protocol information for this order         Appointments  past 12m or future 3m with PCP    Date Provider   Last Visit   1/8/2020 Moiz Goldberg MD   Next Visit   3/19/2020 Moiz Goldberg MD   .  ED visits in past 90 days: 0       Note composed:11:32 AM 03/25/2020

## 2020-04-01 RX ORDER — DULAGLUTIDE 0.75 MG/.5ML
INJECTION, SOLUTION SUBCUTANEOUS
Qty: 2 SYRINGE | Refills: 2 | Status: SHIPPED | OUTPATIENT
Start: 2020-04-01 | End: 2021-01-25

## 2020-04-02 ENCOUNTER — TELEPHONE (OUTPATIENT)
Dept: FAMILY MEDICINE | Facility: CLINIC | Age: 70
End: 2020-04-02

## 2020-04-02 DIAGNOSIS — J98.01 BRONCHOSPASM: Primary | ICD-10-CM

## 2020-04-02 RX ORDER — ALBUTEROL SULFATE 90 UG/1
AEROSOL, METERED RESPIRATORY (INHALATION)
Qty: 18 G | Refills: 3 | Status: SHIPPED | OUTPATIENT
Start: 2020-04-02 | End: 2020-04-06 | Stop reason: SDUPTHER

## 2020-04-02 NOTE — TELEPHONE ENCOUNTER
----- Message from Beba Ko sent at 4/2/2020  9:30 AM CDT -----  Contact: Self  Type: Patient Call Back    Who called:Self    What is the request in detail: albuterol (PROVENTIL/VENTOLIN HFA) 90 mcg/actuation inhaler not covered by patient's insurance. Please consider another medication     Can the clinic reply by [facility]? no    Would the patient rather a call back or a response via [facility]? call    Best call back number: [phone]

## 2020-04-02 NOTE — TELEPHONE ENCOUNTER
Spoke with Sally with [facility] pharmacy she states if quantity of medication is changed from a 90 day to 30 day supply it can lower cost of rx.Also she states medication is covered its just the pt's co pay she has to pay for rx. Please advise how to move forward.

## 2020-04-06 ENCOUNTER — TELEPHONE (OUTPATIENT)
Dept: ENDOCRINOLOGY | Facility: CLINIC | Age: 70
End: 2020-04-06

## 2020-04-06 ENCOUNTER — TELEPHONE (OUTPATIENT)
Dept: FAMILY MEDICINE | Facility: CLINIC | Age: 70
End: 2020-04-06

## 2020-04-06 DIAGNOSIS — J98.01 BRONCHOSPASM: ICD-10-CM

## 2020-04-06 RX ORDER — ALBUTEROL SULFATE 90 UG/1
AEROSOL, METERED RESPIRATORY (INHALATION)
Qty: 18 G | Refills: 3 | Status: SHIPPED | OUTPATIENT
Start: 2020-04-06 | End: 2020-04-06 | Stop reason: SDUPTHER

## 2020-04-06 RX ORDER — ALBUTEROL SULFATE 90 UG/1
AEROSOL, METERED RESPIRATORY (INHALATION)
Qty: 18 G | Refills: 3 | Status: SHIPPED | OUTPATIENT
Start: 2020-04-06 | End: 2021-06-10

## 2020-04-06 NOTE — TELEPHONE ENCOUNTER
[facility] Fax Message: Albuterol alternative    Alternative requested: WRITE WITH A KRISTINA 1 FOR PROAIR.

## 2020-04-06 NOTE — TELEPHONE ENCOUNTER
Left message for patient to discuss diabetes on phone and to cancel appt on Wednesday d/t COVID19 on 4/3/20 and again today. Callback number provided.

## 2020-04-09 ENCOUNTER — PATIENT OUTREACH (OUTPATIENT)
Dept: ADMINISTRATIVE | Facility: OTHER | Age: 70
End: 2020-04-09

## 2020-04-13 ENCOUNTER — OFFICE VISIT (OUTPATIENT)
Dept: NEUROLOGY | Facility: CLINIC | Age: 70
End: 2020-04-13
Payer: MEDICARE

## 2020-04-13 VITALS — WEIGHT: 141.75 LBS | HEIGHT: 61 IN | BODY MASS INDEX: 26.76 KG/M2

## 2020-04-13 DIAGNOSIS — G25.0 ESSENTIAL TREMOR: Primary | ICD-10-CM

## 2020-04-13 PROCEDURE — 99442 PR PHYSICIAN TELEPHONE EVALUATION 11-20 MIN: CPT | Mod: 95,,, | Performed by: NEUROLOGICAL SURGERY

## 2020-04-13 PROCEDURE — 99442 PR PHYSICIAN TELEPHONE EVALUATION 11-20 MIN: ICD-10-PCS | Mod: 95,,, | Performed by: NEUROLOGICAL SURGERY

## 2020-04-13 NOTE — PROGRESS NOTES
"Chief Complaint   Patient presents with    Tremors        Deb Webb is a 69 y.o. female with a history of multiple medical diagnoses as listed below that presents for evaluation of tremor. She says that she has been having a tremor in her head for the last 5 years but feel that in the last year it has began to get worse so she was told that she should get it evaluated. She has a grandparent that had PD and she suspects that her father had PD though her was never diagnosed. She does not have any other family members that have PD. She has not had any shaking in her legs and feels that it has been present only very minimally in her hands. She has not had any difficulty with her ADLs due to the tremor but she does feel that she has been having some social difficulty due to her symptoms. She does not have any problems with managing her household. She does not have any memory impairment. She has been able to walk without problem other than her legs will on occasion buckle and cause her to fall. She has found that stress and anxiety tends to make the symptoms worse. She has not found that anything has made them any better. She does not drink alcohol. On occasion taking her Ativan will make her tremor less prominent.    Interval History  03/13/2017  Since last seen in clinic she has been doing well on Primidone. She says that the medication has helped to reduce the frequency and the intensity of the tremors. She does says that there have been a few bad days where it seems to be more prominent but she does think that it is mainly related to the weather. She has been tacking 50 mg TID for the last several weeks and notices that it makes her sleep which she thinks is beneficial because she was having insomnia. One fall was reported when she got up to tell her  to come back to get her at a later time and she feels her left knee "gave out" on her.    03/27/2019  She presents to clinic for evaluation of tremor.  " She feels that the symptoms have been getting progressively worse since she was last seen in clinic approximately 2 years ago.  She has been taking primidone 3 times a day as directed and feels that the medication was helpful initially, but seems to be less effective with time.  The medication has been well tolerated without any complaints of side effects. She denies any change in her walking, memory, or sleeping.  She had a fall recently that caused her to fall onto a left-sided hitting her shoulder which has caused her to have residual pain in the left shoulder and across the clavicle on the left.    05/06/2019  She has been having reduction in her tremor since increasing her dose of primidone as directed.  She has been complaining of excessive daytime somnolence and has been falling sleep throughout the day which causes her to be up at odd hours of the night.  She would like to have a more closely regulated sleep cycle.  Blood pressures have been well controlled.  She has been taking her aspirin as directed.    04/13/2020  She says that her tremors have been variable.  They seem to be under good control but for a few days she notes that they were more prominent than her baseline.  She was taking the medication as directed and cannot account for what may have caused her tremors to become worse all of sudden.  She has since notes that they have returned to their baseline.  Primidone has been taken two tablets 3 times a day.  She has noticed some excessive daytime somnolence on the medication..    PAST MEDICAL HISTORY:  Past Medical History:   Diagnosis Date    Allergy     Arthritis     Cataract     Colon polyps     COPD (chronic obstructive pulmonary disease)     Diabetes mellitus type II     Diabetic neuropathy     Fever blister     Glaucoma (increased eye pressure)     Hyperlipidemia     Hypertension     Irritable bowel syndrome     Keloid cicatrix     Osteoporosis     Retained cholelithiasis  following cholecystectomy(997.41)     Right patella fracture        PAST SURGICAL HISTORY:  Past Surgical History:   Procedure Laterality Date    BACK SURGERY  2006    CATARACT EXTRACTION W/  INTRAOCULAR LENS IMPLANT Bilateral     CHOLECYSTECTOMY      Laparoscopic    COLONOSCOPY      HERNIA REPAIR      HYSTERECTOMY      KNEE SURGERY Right 3/4/2015    Dr Weller     OOPHORECTOMY      ovarian tumor      Benign    TONSILLECTOMY, ADENOIDECTOMY         SOCIAL HISTORY:  Social History     Socioeconomic History    Marital status:      Spouse name: Not on file    Number of children: Not on file    Years of education: Not on file    Highest education level: Not on file   Occupational History     Employer: [employer]   Social Needs    Financial resource strain: Not on file    Food insecurity:     Worry: Not on file     Inability: Not on file    Transportation needs:     Medical: Not on file     Non-medical: Not on file   Tobacco Use    Smoking status: Current Every Day Smoker     Packs/day: 0.50     Years: 40.00     Pack years: 20.00     Types: Cigarettes    Smokeless tobacco: Current User   Substance and Sexual Activity    Alcohol use: No    Drug use: No    Sexual activity: Never   Lifestyle    Physical activity:     Days per week: Not on file     Minutes per session: Not on file    Stress: Not on file   Relationships    Social connections:     Talks on phone: Not on file     Gets together: Not on file     Attends Muslim service: Not on file     Active member of club or organization: Not on file     Attends meetings of clubs or organizations: Not on file     Relationship status: Not on file   Other Topics Concern    Are you pregnant or think you may be? No    Breast-feeding No   Social History Narrative    Not on file       FAMILY HISTORY:  Family History   Problem Relation Age of Onset    Cancer Mother         Skin    Skin cancer Mother     Glaucoma Mother      Cataracts Mother     Diabetes Mother     Heart disease Father     Diabetes Father     Skin cancer Sister     Diabetes Sister     Diabetes Daughter     Breast cancer Maternal Aunt     Melanoma Neg Hx     Psoriasis Neg Hx     Eczema Neg Hx     Lupus Neg Hx     Amblyopia Neg Hx     Blindness Neg Hx     Macular degeneration Neg Hx     Retinal detachment Neg Hx     Strabismus Neg Hx        ALLERGIES AND MEDICATIONS: updated and reviewed.  Review of patient's allergies indicates:   Allergen Reactions    Silicone      Burn skin    Adhesive Rash     Current Outpatient Medications   Medication Sig Dispense Refill    albuterol (PROVENTIL/VENTOLIN HFA) 90 mcg/actuation inhaler TAKE 2 PUFFS BY MOUTH EVERY 4 HOURS AS NEEDED FOR WHEEZING (RESCUE) 18 g 3    alendronate (FOSAMAX) 70 MG tablet TAKE 1 TABLET BY MOUTH ONE TIME PER WEEK 12 tablet 1    aspirin (ECOTRIN) 81 MG EC tablet Take 1 tablet (81 mg total) by mouth once daily.  0    atorvastatin (LIPITOR) 40 MG tablet TAKE 1 TABLET BY MOUTH EVERY DAY 90 tablet 2    azelastine (ASTELIN) 137 mcg (0.1 %) nasal spray 1 SPRAY (137 MCG TOTAL) BY NASAL ROUTE 2 (TWO) TIMES DAILY. 30 mL 0    blood sugar diagnostic Strp To check BG 2 times daily, to use with insurance preferred meter 200 each 3    blood-glucose meter kit To check BG 2 times daily, to use with insurance preferred meter 1 each 0    calcium carbonate/vitamin D3 (CALCIUM 600 + D,3, ORAL) Take by mouth.      citalopram (CELEXA) 20 MG tablet TAKE 1 TABLET BY MOUTH EVERY DAY 90 tablet 3    cyclobenzaprine (FLEXERIL) 5 MG tablet Take 1 tablet (5 mg total) by mouth 3 (three) times daily as needed. 90 tablet 5    diclofenac sodium (VOLTAREN) 1 % Gel Apply 2 g topically once daily. 100 g 3    dorzolamide-timolol 2-0.5% (COSOPT) 22.3-6.8 mg/mL ophthalmic solution INSTILL 1 DROP INTO BOTH EYES TWICE A DAY 20 mL 1    fluticasone propionate (FLONASE) 50 mcg/actuation nasal spray INHALE 2 SPRAY INTO EACH  "NOSTRIL DAILY 48 mL 1    furosemide (LASIX) 20 MG tablet Take 1 tablet (20 mg total) by mouth once daily. 90 tablet 1    gabapentin (NEURONTIN) 300 MG capsule Take 1 capsule (300 mg total) by mouth 3 (three) times daily. 90 capsule 0    INCRUSE ELLIPTA 62.5 mcg/actuation DsDv INHALE 1 EACH INTO THE LUNGS ONCE DAILY. 30 each 2    insulin NPH/Reg human (HUMULIN 70/30 U-100 KWIKPEN) 100 unit/mL (70-30) InPn pen Inject 14 units before breakfast and 10 units before supper 15 mL 5    irbesartan (AVAPRO) 75 MG tablet TAKE 1 TABLET BY MOUTH EVERY DAY 90 tablet 3    lancets Misc To check BG 2 times daily, to use with insurance preferred meter 200 each 3    latanoprost 0.005 % ophthalmic solution PLACE 1 DROP INTO BOTH EYES EVERY EVENING. 7.5 mL 1    loratadine (CLARITIN) 10 mg tablet TAKE 1 TABLET (10 MG TOTAL) BY MOUTH DAILY AS NEEDED FOR ALLERGIES (OR RUNNY NOSE). 90 tablet 2    LORazepam (ATIVAN) 0.5 MG tablet TAKE 1 TABLET (0.5 MG TOTAL) BY MOUTH DAILY AS NEEDED FOR ANXIETY. 10 tablet 0    meclizine (ANTIVERT) 12.5 mg tablet Take 1 tablet (12.5 mg total) by mouth 3 (three) times daily as needed for Dizziness. 30 tablet 1    meloxicam (MOBIC) 15 MG tablet TAKE 1 TABLET BY MOUTH EVERY DAY 90 tablet 1    omega-3 fatty acids-vitamin E (FISH OIL) 1,000 mg Cap       omeprazole (PRILOSEC) 40 MG capsule TAKE ONE CAPSULE BY MOUTH BEFORE BREAKFAST 90 capsule 2    pen needle, diabetic (BD ULTRA-FINE AGUS PEN NEEDLE) 32 gauge x 5/32" Ndle 1 Device by Misc.(Non-Drug; Combo Route) route 2 (two) times daily. 200 each 4    primidone (MYSOLINE) 50 MG Tab TAKE 2 TABLETS BY MOUTH 3 (THREE) TIMES DAILY. ONE HALF TABLET FOR ONE WEEK, THEN AS PRESCRIBED 540 tablet 3    SITagliptin (JANUVIA) 100 MG Tab Take 1 tablet (100 mg total) by mouth once daily. 30 tablet 2    traMADol (ULTRAM) 50 mg tablet Take 1 tablet (50 mg total) by mouth every 8 (eight) hours as needed for Pain. 21 tablet 0    TRULICITY 0.75 mg/0.5 mL PnIj INJECT " 0.5 MLS (0.75 MG TOTAL) INTO THE SKIN EVERY 7 DAYS. 2 Syringe 2    varicella-zoster gE-AS01B, PF, (SHINGRIX, PF,) 50 mcg/0.5 mL injection Inject 0.5 mLs into the muscle. 0.5 mL 0     No current facility-administered medications for this visit.        Review of Systems   Constitutional: Negative for activity change, appetite change, fever and unexpected weight change.   HENT: Negative for trouble swallowing and voice change.    Eyes: Negative for photophobia and visual disturbance.   Respiratory: Negative for apnea and shortness of breath.    Cardiovascular: Negative for chest pain and leg swelling.   Gastrointestinal: Negative for constipation and nausea.   Genitourinary: Negative for difficulty urinating.   Musculoskeletal: Positive for neck pain. Negative for back pain and gait problem.   Skin: Negative for color change and pallor.   Neurological: Positive for tremors. Negative for dizziness, seizures, syncope, weakness and numbness.   Hematological: Negative for adenopathy.   Psychiatric/Behavioral: Negative for agitation, confusion and decreased concentration.       Neurologic Exam     Mental Status   Oriented to person, place, and time.   Registration: recalls 3 of 3 objects.   Attention: normal. Concentration: normal.   Speech: speech is normal   Level of consciousness: alert  Knowledge: good.     Cranial Nerves     CN II   Visual fields full to confrontation.   Right visual field deficit: none  Left visual field deficit: none     CN III, IV, VI   Pupils are equal, round, and reactive to light.  Extraocular motions are normal.   Right pupil: Size: 3 mm. Shape: regular. Accommodation: intact.   Left pupil: Size: 3 mm. Shape: regular. Accommodation: intact.   CN III: no CN III palsy  CN VI: no CN VI palsy  Nystagmus: none   Diplopia: none  Ophthalmoparesis: none  Upgaze: normal  Downgaze: normal  Conjugate gaze: present    CN V   Facial sensation intact.   Right facial sensation deficit: none  Left facial  sensation deficit: none    CN VII   Facial expression full, symmetric.   Right facial weakness: none  Left facial weakness: none    CN VIII   CN VIII normal.     CN IX, X   CN IX normal.   CN X normal.   Palate: symmetric    CN XI   CN XI normal.   Right sternocleidomastoid strength: normal  Left sternocleidomastoid strength: normal  Right trapezius strength: normal  Left trapezius strength: normal    CN XII   CN XII normal.   Tongue deviation: none    Motor Exam   Muscle bulk: normal  Overall muscle tone: normal  Right arm tone: normal  Left arm tone: normal  Right leg tone: normal  Left leg tone: normal    Strength   Strength 5/5 throughout.     Sensory Exam   Right arm light touch: normal  Left arm light touch: normal  Right leg light touch: normal  Left leg light touch: normal  Right arm vibration: normal  Left arm vibration: normal  Right leg vibration: normal  Left leg vibration: normal  Right arm proprioception: normal  Left arm proprioception: normal  Right leg proprioception: normal  Left leg proprioception: normal  Right arm pinprick: normal  Left arm pinprick: normal  Right leg pinprick: normal  Left leg pinprick: normal    Gait, Coordination, and Reflexes     Gait  Gait: normal    Coordination   Romberg: negative  Finger to nose coordination: normal  Heel to shin coordination: normal  Tandem walking coordination: normal    Tremor   Resting tremor: absent    Reflexes   Right brachioradialis: 2+  Left brachioradialis: 2+  Right biceps: 2+  Left biceps: 2+  Right triceps: 2+  Left triceps: 2+  Right patellar: 2+  Left patellar: 2+  Right achilles: 2+  Left achilles: 2+  Right plantar: normal  Left plantar: normal      Physical Exam   Constitutional: She is oriented to person, place, and time. She appears well-developed and well-nourished.   HENT:   Head: Normocephalic and atraumatic.   Eyes: Pupils are equal, round, and reactive to light. EOM are normal.   Neck: Normal range of motion.   Cardiovascular:  "Normal rate and intact distal pulses.   Pulmonary/Chest: Effort normal. No apnea. No respiratory distress.   Musculoskeletal: Normal range of motion.   Neurological: She is alert and oriented to person, place, and time. She has normal strength. She has a normal Finger-Nose-Finger Test, a normal Heel to Shin Test, a normal Romberg Test and a normal Tandem Gait Test. Gait normal.   Reflex Scores:       Tricep reflexes are 2+ on the right side and 2+ on the left side.       Bicep reflexes are 2+ on the right side and 2+ on the left side.       Brachioradialis reflexes are 2+ on the right side and 2+ on the left side.       Patellar reflexes are 2+ on the right side and 2+ on the left side.       Achilles reflexes are 2+ on the right side and 2+ on the left side.  Skin: Skin is warm and dry.   Psychiatric: She has a normal mood and affect. Her speech is normal and behavior is normal. Thought content normal.   Vitals reviewed.      Vitals:    04/13/20 0956   Weight: 64.3 kg (141 lb 12.1 oz)   Height: 5' 1" (1.549 m)       Assessment & Plan:  Problem List Items Addressed This Visit     Essential tremor - Primary    Overview     Tremors have been better overall since starting primidone. She feels that she has had some days when it seems to be better than other. She had a fall after standing and feels her leg gave out on her. No rigidity. No other gait issues. No memory problems. Change dosing of primidone to try to minimize daytime somnolence.  One tablet in the morning, 2 tablets in the afternoon, 2 tablets at bedtime.             The patient location is: home  The chief complaint leading to consultation is: Tremor  Visit type: Audio Only  Total time spent with patient: 12  Each patient to whom he or she provides medical services by telemedicine is:  (1) informed of the relationship between the physician and patient and the respective role of any other health care provider with respect to management of the patient; and " (2) notified that he or she may decline to receive medical services by telemedicine and may withdraw from such care at any time.    Notes:       Follow-up: Follow up in about 6 months (around 10/13/2020).

## 2020-04-16 DIAGNOSIS — J31.0 RHINITIS, UNSPECIFIED TYPE: ICD-10-CM

## 2020-04-17 NOTE — PROGRESS NOTES
Refill Routing Note     Medication(s) are not appropriate for processing by [facility]:    Outside of protocol                    Appointments  past 12m or future 3m with PCP    Date Provider   Last Visit   1/8/2020 Moiz Goldberg MD   Next Visit   Visit date not found Moiz Goldberg MD           Automatic Epic Protocol Generated Data:    Requested Prescriptions   Pending Prescriptions Disp Refills    gabapentin (NEURONTIN) 300 MG capsule [Pharmacy Med Name: GABAPENTIN 300 MG CAPSULE] 90 capsule 0     Sig: TAKE 1 CAPSULE BY MOUTH THREE TIMES A DAY       Anticonvulsants Protocol Passed - 4/17/2020 12:26 AM        Passed - Visit with Authorizing provider in past 9 months or upcoming 90 days        Passed - No active pregnancy on record           Note composed:11:27 AM 04/17/2020

## 2020-04-18 RX ORDER — GABAPENTIN 300 MG/1
CAPSULE ORAL
Qty: 90 CAPSULE | Refills: 0 | Status: SHIPPED | OUTPATIENT
Start: 2020-04-18 | End: 2020-05-12

## 2020-04-20 DIAGNOSIS — J31.0 RHINITIS, UNSPECIFIED TYPE: ICD-10-CM

## 2020-04-20 DIAGNOSIS — K58.9 IRRITABLE BOWEL SYNDROME, UNSPECIFIED TYPE: Primary | ICD-10-CM

## 2020-04-20 RX ORDER — AZELASTINE 1 MG/ML
1 SPRAY, METERED NASAL 2 TIMES DAILY
Qty: 30 ML | Refills: 0 | Status: SHIPPED | OUTPATIENT
Start: 2020-04-20 | End: 2020-05-14

## 2020-04-20 RX ORDER — DICYCLOMINE HYDROCHLORIDE 20 MG/1
20 TABLET ORAL EVERY 6 HOURS
Qty: 120 TABLET | Refills: 0 | Status: SHIPPED | OUTPATIENT
Start: 2020-04-20 | End: 2020-05-14

## 2020-04-20 NOTE — TELEPHONE ENCOUNTER
----- Message from Giana Nathan sent at 4/20/2020  1:20 PM CDT -----  Contact: self  [phone]  .Type: RX Refill Request    Who Called: self    Have you contacted your pharmacy: no    Refill or New Rx: refill     RX Name and Strength: dicyclomine (BENTYL) 20 mg tablet, azelastine (ASTELIN) 137 mcg (0.1 %) nasal spray    Is this a 30 day or 90 day RX: 90 day    Preferred Pharmacy with phone number: .  [facility]/pharmacy #[id number] [place], [place] - [address] [address]  Phone: [phone] Fax: [phone]    Local or Mail Order: local    Would the patient rather a call back or a response via [facility]? Call back     Best Call Back Number: [phone]

## 2020-04-21 RX ORDER — AZELASTINE 1 MG/ML
SPRAY, METERED NASAL
Qty: 30 ML | Refills: 0 | OUTPATIENT
Start: 2020-04-21

## 2020-04-21 NOTE — PROGRESS NOTES
Quick DC. Duplicate Request   Refill Authorization Note     is requesting a refill authorization.    Brief assessment and rationale for refill: QUICK DC: Duplicate               Medication reconciliation completed: No                         Comments:   Pended Medication(s)   Requested Prescriptions     Pending Prescriptions Disp Refills    azelastine (ASTELIN) 137 mcg (0.1 %) nasal spray 30 mL 0     Sig: SPRAY 1 SPRAY INTO EACH NOSTRIL 2 (TWO) TIMES DAILY.        Duplicate Pended Encounter(s)/ Last Prescribed Details:    Ordering User:  Russell Ruiz MD            Diagnosis Association: Rhinitis, unspecified type (J31.0)      Original Order:  azelastine (ASTELIN) 137 mcg (0.1 %) nasal spray [[id number]]      Pharmacy:  [facility]/pharmacy #[id number] [address] [place] #:  [id number]     Pharmacy Comments:  --          Fill quantity remaining:  -- Fill quantity used:  -- Next fill due: --       Outpatient Medication Detail      Disp Refills Start End    azelastine (ASTELIN) 137 mcg (0.1 %) nasal spray 30 mL 0 4/20/2020 4/20/2021    Sig - Route: 1 spray (137 mcg total) by Nasal route 2 (two) times daily. - Nasal    Sent to pharmacy as: azelastine (ASTELIN) 137 mcg (0.1 %) nasal spray    E-Prescribing Status: Receipt confirmed by pharmacy (4/20/2020  2:10 PM CDT)    Ordering Encounter Report     Associated Reports   View Encounter                  Note composed:6:52 PM 04/21/2020

## 2020-05-05 DIAGNOSIS — M19.90 OSTEOARTHRITIS, UNSPECIFIED OSTEOARTHRITIS TYPE, UNSPECIFIED SITE: ICD-10-CM

## 2020-05-05 NOTE — PROGRESS NOTES
Refill Routing Note    Medication(s) are not appropriate for processing by [facility]:       Outside of protocol           Medication reconciliation completed: No      Automatic Epic Protocol Generated Data:    Requested Prescriptions   Pending Prescriptions Disp Refills    traMADoL (ULTRAM) 50 mg tablet [Pharmacy Med Name: TRAMADOL HCL 50 MG TABLET] 21 tablet 0     Sig: TAKE 1 TABLET BY MOUTH EVERY 8 HOURS AS NEEDED FOR PAIN       Narcotics Refill Protocol Failed - 5/5/2020  5:23 PM        Failed - Patient seen within 3 months     Last visit with Moiz Goldberg MD: 1/8/2020  Last visit in [facility]: 1/8/2020    Patient's next visit in [facility]: Visit date not found           Passed - Patient not pregnant        Passed - Med not refilled within 4 weeks           Appointments  past 12m or future 3m with PCP    Date Provider   Last Visit   1/8/2020 Moiz Goldberg MD   Next Visit   Visit date not found Moiz Goldberg MD   ED visits in past 90 days: 0     Note composed:6:39 PM 05/05/2020

## 2020-05-06 RX ORDER — TRAMADOL HYDROCHLORIDE 50 MG/1
TABLET ORAL
Qty: 21 TABLET | Refills: 0 | OUTPATIENT
Start: 2020-05-06

## 2020-05-12 RX ORDER — GABAPENTIN 300 MG/1
CAPSULE ORAL
Qty: 90 CAPSULE | Refills: 0 | Status: SHIPPED | OUTPATIENT
Start: 2020-05-12 | End: 2020-06-11

## 2020-05-12 NOTE — PROGRESS NOTES
Refill Routing Note     Medication(s) are not appropriate for processing by [facility]:    Medication Outside of Protocol    Appointments  past 12m or future 3m with PCP    Date Provider   Last Visit   1/8/2020 Moiz Goldberg MD   Next Visit   Visit date not found Moiz Goldberg MD           Automatic Epic Protocol Generated Data:    Requested Prescriptions   Pending Prescriptions Disp Refills    gabapentin (NEURONTIN) 300 MG capsule [Pharmacy Med Name: GABAPENTIN 300 MG CAPSULE] 90 capsule 0     Sig: TAKE 1 CAPSULE BY MOUTH THREE TIMES A DAY       Anticonvulsants Protocol Passed - 5/12/2020  1:37 PM        Passed - Visit with Authorizing provider in past 9 months or upcoming 90 days        Passed - No active pregnancy on record           Note composed:2:24 PM 05/12/2020

## 2020-05-14 ENCOUNTER — TELEPHONE (OUTPATIENT)
Dept: PULMONOLOGY | Facility: CLINIC | Age: 70
End: 2020-05-14

## 2020-05-14 DIAGNOSIS — K58.9 IRRITABLE BOWEL SYNDROME, UNSPECIFIED TYPE: ICD-10-CM

## 2020-05-14 DIAGNOSIS — J31.0 RHINITIS, UNSPECIFIED TYPE: ICD-10-CM

## 2020-05-14 RX ORDER — AZELASTINE 1 MG/ML
1 SPRAY, METERED NASAL 2 TIMES DAILY
Qty: 30 ML | Refills: 2 | Status: SHIPPED | OUTPATIENT
Start: 2020-05-14 | End: 2020-08-10

## 2020-05-14 RX ORDER — DICYCLOMINE HYDROCHLORIDE 20 MG/1
20 TABLET ORAL EVERY 6 HOURS
Qty: 120 TABLET | Refills: 0 | Status: SHIPPED | OUTPATIENT
Start: 2020-05-14 | End: 2020-06-10

## 2020-05-15 DIAGNOSIS — E11.9 TYPE 2 DIABETES MELLITUS WITHOUT COMPLICATION: ICD-10-CM

## 2020-05-15 DIAGNOSIS — M81.0 OSTEOPOROSIS, POSTMENOPAUSAL: ICD-10-CM

## 2020-05-15 RX ORDER — ALENDRONATE SODIUM 70 MG/1
TABLET ORAL
Qty: 12 TABLET | Refills: 1 | Status: SHIPPED | OUTPATIENT
Start: 2020-05-15 | End: 2020-10-27

## 2020-05-15 NOTE — PROGRESS NOTES
Refill Routing Note    Medication(s) are not appropriate for processing by [facility]:       Outside of protocol           Medication reconciliation completed: No      Automatic Epic Protocol Generated Data:    Requested Prescriptions   Pending Prescriptions Disp Refills    alendronate (FOSAMAX) 70 MG tablet [Pharmacy Med Name: ALENDRONATE SODIUM 70 MG TAB] 12 tablet 1     Sig: TAKE 1 TABLET BY MOUTH ONE TIME PER WEEK       Endocrinology: Bisphosphonates - alendronate Failed - 5/15/2020  1:45 AM        Failed - DEXA completed in last 720 days     Results for orders placed during the hospital encounter of 03/16/17   DXA Bone Density Spine And Hip 3/20/2017               Failed - Vitamin D in normal range and within 360 days     Vit D, 25-Hydroxy   Date Value Ref Range Status   01/17/2006 15 (L) 25 - 80 ng/mL Final     Comment:     Vitamin D, 25-Hydroxy:   Interpretation: 10-24 (mild to moderate deficiency)   -- EXPECTED VALUES --   25-HYDROXY D TOTAL (D2+D3)   Optimum levels in the normal   population are 25-80   Please note change of reference value formatting   effective November 22, 2005.                 Passed - Patient is at least 18 years old        Passed - Office visit in past 12 months or future 90 days.     Recent Outpatient Visits            1 month ago Essential tremor    [facility] [place]    4 months ago Rhinitis, unspecified type    [place] - Family Medicine Moiz Goldberg MD    4 months ago Uncontrolled type 2 diabetes mellitus with diabetic neuropathy, without long-term current use of insulin    [place] - Endo/Diabetes Sussy Goldberg NP    5 months ago Encounter for hearing examination, unspecified whether abnormal findings    [facility] - Otorhinolaryngology Zeus Dick MD    5 months ago Acute non-recurrent sinusitis, unspecified location    [place] - Family Medicine Lesly Gueavra PA-C                    Passed - Cr is 1.3 or below and within 360 days      Creatinine   Date Value Ref Range Status   01/08/2020 1.0 0.5 - 1.4 mg/dL Final   08/08/2019 1.2 0.5 - 1.4 mg/dL Final   01/27/2019 0.8 0.5 - 1.4 mg/dL Final     POC Creatinine   Date Value Ref Range Status   01/24/2019 0.6 0.6 - 1.2 mg/dL Final              Passed - Ca in normal range and within 360 days     Calcium   Date Value Ref Range Status   01/08/2020 10.2 8.7 - 10.5 mg/dL Final   08/08/2019 10.5 8.7 - 10.5 mg/dL Final   01/27/2019 9.2 8.7 - 10.5 mg/dL Final     Calcium, POC   Date Value Ref Range Status   01/24/2019 10.5 (H) 8.0 - 10.3 mg/dL Final              Passed - eGFR is 35 or above and within 360 days     eGFR if non    Date Value Ref Range Status   01/08/2020 58.0 (A) >60 mL/min/1.73 m^2 Final     Comment:     Calculation used to obtain the estimated glomerular filtration  rate (eGFR) is the CKD-EPI equation.      08/08/2019 46.9 (A) >60 mL/min/1.73 m^2 Final     Comment:     Calculation used to obtain the estimated glomerular filtration  rate (eGFR) is the CKD-EPI equation.      01/27/2019 >60 >60 mL/min/1.73 m^2 Final     Comment:     Calculation used to obtain the estimated glomerular filtration  rate (eGFR) is the CKD-EPI equation.        eGFR if    Date Value Ref Range Status   01/08/2020 >60.0 >60 mL/min/1.73 m^2 Final   08/08/2019 54.0 (A) >60 mL/min/1.73 m^2 Final   01/27/2019 >60 >60 mL/min/1.73 m^2 Final                 Appointments  past 12m or future 3m with PCP    Date Provider   Last Visit   1/8/2020 Moiz Goldberg MD   Next Visit   Visit date not found Moiz Goldberg MD   ED visits in past 90 days: 0     Note composed:4:22 PM 05/15/2020

## 2020-05-18 DIAGNOSIS — G89.29 OTHER CHRONIC PAIN: ICD-10-CM

## 2020-05-18 DIAGNOSIS — M19.90 OSTEOARTHRITIS, UNSPECIFIED OSTEOARTHRITIS TYPE, UNSPECIFIED SITE: ICD-10-CM

## 2020-05-18 RX ORDER — CYCLOBENZAPRINE HCL 5 MG
5 TABLET ORAL 3 TIMES DAILY PRN
Qty: 90 TABLET | Refills: 5 | Status: SHIPPED | OUTPATIENT
Start: 2020-05-18 | End: 2021-09-09

## 2020-05-18 RX ORDER — TRAMADOL HYDROCHLORIDE 50 MG/1
50 TABLET ORAL EVERY 8 HOURS PRN
Qty: 21 TABLET | Refills: 0 | OUTPATIENT
Start: 2020-05-18

## 2020-05-18 NOTE — TELEPHONE ENCOUNTER
----- Message from Giana Nathan sent at 5/18/2020 10:06 AM CDT -----  Contact: self  [phone]  .Type: RX Refill Request    Who Called: self    Have you contacted your pharmacy yes     Refill or New Rx refill     RX Name and Strength: cyclobenzaprine (FLEXERIL) 5 MG tablet & traMADol (ULTRAM) 50 mg tablet    Is this a 30 day or 90 day RX: 30 day    Preferred Pharmacy with phone number: .  [facility]/pharmacy #[id number] [place] [address] [address] [address]  Phone: [phone] Fax: [phone]    Local or Mail Order local    Would the patient rather a call back or a response via [facility]?  Call back     Best Call Back Number: [phone]

## 2020-05-19 DIAGNOSIS — M19.90 OSTEOARTHRITIS, UNSPECIFIED OSTEOARTHRITIS TYPE, UNSPECIFIED SITE: ICD-10-CM

## 2020-05-19 RX ORDER — TRAMADOL HYDROCHLORIDE 50 MG/1
TABLET ORAL
Qty: 21 TABLET | Refills: 0 | OUTPATIENT
Start: 2020-05-19

## 2020-05-19 NOTE — PROGRESS NOTES
Refill Routing Note    Medication(s) are not appropriate for processing by [facility]:       Outside of protocol           Medication reconciliation completed: No      Automatic Epic Protocol Generated Data:    Requested Prescriptions   Pending Prescriptions Disp Refills    traMADoL (ULTRAM) 50 mg tablet [Pharmacy Med Name: TRAMADOL HCL 50 MG TABLET] 21 tablet 0     Sig: TAKE 1 TABLET BY MOUTH EVERY 8 HOURS AS NEEDED FOR PAIN       Narcotics Refill Protocol Failed - 5/19/2020 10:35 AM        Failed - Patient seen within 3 months     Last visit with Moiz Goldberg MD: 1/8/2020  Last visit in [facility]: 1/8/2020    Patient's next visit in [facility]: Visit date not found           Passed - Patient not pregnant        Passed - Med not refilled within 4 weeks           Appointments  past 12m or future 3m with PCP    Date Provider   Last Visit   1/8/2020 Moiz Goldberg MD   Next Visit   Visit date not found Moiz Goldberg MD   ED visits in past 90 days: 0     Note composed:10:49 AM 05/19/2020

## 2020-05-20 NOTE — TELEPHONE ENCOUNTER
Provider Staff:   Please note denial of medication.     Patient Needs  Appointment     Thanks!  [facility]     Note composed: 05/20/2020 1:54 PM

## 2020-06-01 DIAGNOSIS — M19.90 OSTEOARTHRITIS, UNSPECIFIED OSTEOARTHRITIS TYPE, UNSPECIFIED SITE: ICD-10-CM

## 2020-06-02 RX ORDER — TRAMADOL HYDROCHLORIDE 50 MG/1
TABLET ORAL
Qty: 21 TABLET | Refills: 0 | OUTPATIENT
Start: 2020-06-02

## 2020-06-02 NOTE — PROGRESS NOTES
Refill Routing Note    Medication(s) are not appropriate for processing by [facility]:       Outside of protocol           Medication reconciliation completed: No      Automatic Epic Protocol Generated Data:    Requested Prescriptions   Pending Prescriptions Disp Refills    traMADoL (ULTRAM) 50 mg tablet [Pharmacy Med Name: TRAMADOL HCL 50 MG TABLET] 21 tablet 0     Sig: TAKE 1 TABLET BY MOUTH EVERY 8 HOURS AS NEEDED FOR PAIN       Narcotics Refill Protocol Failed - 6/1/2020 11:20 AM        Failed - Patient seen within 3 months     Last visit with Moiz Goldberg MD: 1/8/2020  Last visit in [facility]: 1/8/2020    Patient's next visit in [facility]: Visit date not found           Passed - Patient not pregnant        Passed - Med not refilled within 4 weeks           Appointments  past 12m or future 3m with PCP    Date Provider   Last Visit   1/8/2020 Moiz Goldberg MD   Next Visit   Visit date not found Moiz Goldberg MD   ED visits in past 90 days: 0     Note composed:9:19 PM 06/01/2020

## 2020-06-07 DIAGNOSIS — F41.9 ANXIETY: ICD-10-CM

## 2020-06-09 RX ORDER — CITALOPRAM 20 MG/1
TABLET, FILM COATED ORAL
Qty: 90 TABLET | Refills: 2 | Status: SHIPPED | OUTPATIENT
Start: 2020-06-09 | End: 2021-03-22

## 2020-06-09 NOTE — PROGRESS NOTES
Refill Authorization Note    is requesting a refill authorization.    Brief assessment and rationale for refill: APPROVE: prr               Medication reconciliation completed: No                         Comments:      Requested Prescriptions   Signed Prescriptions Disp Refills    citalopram (CELEXA) 20 MG tablet 90 tablet 2     Sig: TAKE 1 TABLET BY MOUTH EVERY DAY       Psychiatry:  Antidepressants - SSRI Passed - 6/7/2020  9:31 AM        Passed - Patient is at least 18 years old        Passed - Office visit in past 6 months or future 90 days.     Recent Outpatient Visits            1 month ago Essential tremor    [facility]- Neurology Edward Stratton MD    5 months ago Rhinitis, unspecified type    [facility] - Family Medicine Moiz Goldberg MD    5 months ago Uncontrolled type 2 diabetes mellitus with diabetic neuropathy, without long-term current use of insulin    [place] - Endo/Diabetes Sussy Goldberg NP    6 months ago Encounter for hearing examination, unspecified whether abnormal findings    [facility] - Otorhinolaryngology Zeus Dick MD    6 months ago Acute non-recurrent sinusitis, unspecified location    [facility] Lesly Guevara PA-C                     Appointments  past 12m or future 3m with PCP    Date Provider   Last Visit   1/8/2020 Moiz Goldberg MD   Next Visit   Visit date not found Moiz Goldberg MD   ED visits in past 90 days: 0     Note composed:8:35 AM 06/09/2020

## 2020-06-11 RX ORDER — GABAPENTIN 300 MG/1
CAPSULE ORAL
Qty: 90 CAPSULE | Refills: 0 | Status: SHIPPED | OUTPATIENT
Start: 2020-06-11 | End: 2020-07-08

## 2020-06-11 NOTE — PROGRESS NOTES
Refill Routing Note    Medication(s) are not appropriate for processing by [facility]:       Outside of protocol           Medication reconciliation completed: No      Automatic Epic Protocol Generated Data:    Requested Prescriptions   Pending Prescriptions Disp Refills    gabapentin (NEURONTIN) 300 MG capsule [Pharmacy Med Name: GABAPENTIN 300 MG CAPSULE] 90 capsule 0     Sig: TAKE 1 CAPSULE BY MOUTH THREE TIMES A DAY       Anticonvulsants Protocol Passed - 6/10/2020  1:35 PM        Passed - Visit with Authorizing provider in past 9 months or upcoming 90 days        Passed - No active pregnancy on record           Appointments  past 12m or future 3m with PCP    Date Provider   Last Visit   1/8/2020 Moiz Goldberg MD   Next Visit   Visit date not found Moiz Goldberg MD   ED visits in past 90 days: 0     Note composed:11:00 PM 06/10/2020

## 2020-06-17 DIAGNOSIS — M19.90 OSTEOARTHRITIS, UNSPECIFIED OSTEOARTHRITIS TYPE, UNSPECIFIED SITE: ICD-10-CM

## 2020-06-17 RX ORDER — TRAMADOL HYDROCHLORIDE 50 MG/1
50 TABLET ORAL EVERY 8 HOURS PRN
Qty: 21 TABLET | Refills: 0 | OUTPATIENT
Start: 2020-06-17

## 2020-06-17 NOTE — TELEPHONE ENCOUNTER
----- Message from Leidytony Ny sent at 6/17/2020  9:16 AM CDT -----  Contact: Self [phone]  Type: RX Refill Request    Who Called: self    Have you contacted your pharmacy:    Refill or New Rx: refill    RX Name and Strength: traMADol (ULTRAM) 50 mg tablet    Preferred Pharmacy with phone number:   [facility]/pharmacy #[id number] [facility] [address] [address]  Phone: [phone] Fax: [phone]    Local or Mail Order: Local    Would the patient rather a call back or a response via [facility]? Call back    Best Call Back Number: [phone]

## 2020-07-02 ENCOUNTER — TELEPHONE (OUTPATIENT)
Dept: FAMILY MEDICINE | Facility: CLINIC | Age: 70
End: 2020-07-02

## 2020-07-02 DIAGNOSIS — R60.0 BILATERAL LOWER EXTREMITY EDEMA: ICD-10-CM

## 2020-07-02 RX ORDER — FUROSEMIDE 20 MG/1
TABLET ORAL
Qty: 90 TABLET | Refills: 0 | Status: SHIPPED | OUTPATIENT
Start: 2020-07-02 | End: 2020-09-30

## 2020-07-02 NOTE — TELEPHONE ENCOUNTER
----- Message from Aneta Cervantes sent at 7/2/2020  2:06 PM CDT -----  Regarding: Medication  Contact: pt  Reason: Refill request for insuline(pt does not know the name) Please send to [facility] on file    Communication: [phone]

## 2020-07-02 NOTE — PROGRESS NOTES
Refill Authorization Note    is requesting a refill authorization.    Brief assessment and rationale for refill: APPROVE: PRR               Medication reconciliation completed: No                         Comments:      Requested Prescriptions   Pending Prescriptions Disp Refills    furosemide (LASIX) 20 MG tablet [Pharmacy Med Name: FUROSEMIDE 20 MG TABLET] 90 tablet 0     Sig: TAKE 1 TABLET BY MOUTH EVERY DAY       Cardiovascular:  Diuretics - Loop Passed - 7/2/2020 12:41 AM        Passed - Patient is at least 18 years old        Passed - Last BP in normal range within 360 days.     BP Readings from Last 3 Encounters:   01/08/20 138/78   01/08/20 (!) 142/98   11/22/19 132/74              Passed - Office visit in past 12 months or future 90 days.     Recent Outpatient Visits            2 months ago Essential tremor    [facility] [place] Edward Stratton MD    5 months ago Rhinitis, unspecified type    [facility] - Family Medicine Moiz Goldberg MD    5 months ago Uncontrolled type 2 diabetes mellitus with diabetic neuropathy, without long-term current use of insulin    [place] - Endo/Diabetes Sussy Goldberg NP    7 months ago Encounter for hearing examination, unspecified whether abnormal findings    [facility] - Otorhinolaryngology Zeus Dick MD    7 months ago Acute non-recurrent sinusitis, unspecified location    [facility] Lesly Guevara PA-C          Future Appointments              In 1 week Agustín Barrientos, SONYP-C [facility]                Passed - K in normal range and within 180 days     POC Potassium   Date Value Ref Range Status   01/24/2019 4.0 3.6 - 5.1 mmol/L Final     Potassium   Date Value Ref Range Status   01/08/2020 4.6 3.5 - 5.1 mmol/L Final   08/08/2019 4.7 3.5 - 5.1 mmol/L Final   01/27/2019 3.3 (L) 3.5 - 5.1 mmol/L Final              Passed - Na is between 130 and 148 and within 180 days     POC Sodium   Date Value Ref Range Status    01/24/2019 135 128 - 145 mmol/L Final     Sodium   Date Value Ref Range Status   01/08/2020 143 136 - 145 mmol/L Final   08/08/2019 141 136 - 145 mmol/L Final   01/27/2019 137 136 - 145 mmol/L Final              Passed - Cr is 1.3 or below and within 180 days     Creatinine   Date Value Ref Range Status   01/08/2020 1.0 0.5 - 1.4 mg/dL Final   08/08/2019 1.2 0.5 - 1.4 mg/dL Final   01/27/2019 0.8 0.5 - 1.4 mg/dL Final     POC Creatinine   Date Value Ref Range Status   01/24/2019 0.6 0.6 - 1.2 mg/dL Final              Passed - eGFR in normal range and within 180 days     eGFR if non    Date Value Ref Range Status   01/08/2020 58.0 (A) >60 mL/min/1.73 m^2 Final     Comment:     Calculation used to obtain the estimated glomerular filtration  rate (eGFR) is the CKD-EPI equation.      08/08/2019 46.9 (A) >60 mL/min/1.73 m^2 Final     Comment:     Calculation used to obtain the estimated glomerular filtration  rate (eGFR) is the CKD-EPI equation.      01/27/2019 >60 >60 mL/min/1.73 m^2 Final     Comment:     Calculation used to obtain the estimated glomerular filtration  rate (eGFR) is the CKD-EPI equation.        eGFR if    Date Value Ref Range Status   01/08/2020 >60.0 >60 mL/min/1.73 m^2 Final   08/08/2019 54.0 (A) >60 mL/min/1.73 m^2 Final   01/27/2019 >60 >60 mL/min/1.73 m^2 Final                  Appointments  past 12m or future 3m with PCP    Date Provider   Last Visit   1/8/2020 Moiz Goldberg MD   Next Visit   Visit date not found Moiz Goldberg MD   ED visits in past 90 days: 0     Note composed:6:22 AM 07/02/2020

## 2020-07-02 NOTE — TELEPHONE ENCOUNTER
Left message on answering machine to return call to clinic. Patient has 2 insulins on file. Need to know what one need to be refilled.

## 2020-07-08 DIAGNOSIS — K58.9 IRRITABLE BOWEL SYNDROME, UNSPECIFIED TYPE: ICD-10-CM

## 2020-07-08 RX ORDER — DICYCLOMINE HYDROCHLORIDE 20 MG/1
20 TABLET ORAL EVERY 6 HOURS
Qty: 120 TABLET | Refills: 0 | Status: SHIPPED | OUTPATIENT
Start: 2020-07-08 | End: 2020-08-03

## 2020-07-08 NOTE — PROGRESS NOTES
Refill Routing Note   Medication(s) are not appropriate for processing by [facility]:    Outside of Protocol       Automatic Epic Protocol Generated Data:    Requested Prescriptions   Pending Prescriptions Disp Refills    dicyclomine (BENTYL) 20 mg tablet [Pharmacy Med Name: DICYCLOMINE 20 MG TABLET] 120 tablet 0     Sig: TAKE 1 TABLET (20 MG TOTAL) BY MOUTH EVERY 6 (SIX) HOURS.       There is no refill protocol information for this order           Appointments [id number] or future 3m with PCP    Date Provider   Last Visit   1/8/2020 Moiz Goldberg MD   Next Visit   Visit date not found Moiz Goldberg MD   ED visits in past 90 days: 0     Note composed:3:54 PM 07/08/2020

## 2020-07-31 DIAGNOSIS — K58.9 IRRITABLE BOWEL SYNDROME, UNSPECIFIED TYPE: ICD-10-CM

## 2020-07-31 NOTE — PROGRESS NOTES
Refill Routing Note   Medication(s) are not appropriate for processing by [facility]:       - Outside of protocol           Medication reconciliation completed: No      Automatic Epic Generated Protocol Data:        Requested Prescriptions   Pending Prescriptions Disp Refills    dicyclomine (BENTYL) 20 mg tablet [Pharmacy Med Name: DICYCLOMINE 20 MG TABLET] 120 tablet 0     Sig: TAKE 1 TABLET (20 MG TOTAL) BY MOUTH EVERY 6 (SIX) HOURS.       There is no refill protocol information for this order           Appointments  past 12m or future 3m with PCP    Date Provider   Last Visit   1/8/2020 Moiz Goldberg MD   Next Visit   Visit date not found Moiz Goldberg MD   ED visits in past 90 days: 0     Note composed:9:45 AM 07/31/2020

## 2020-08-03 RX ORDER — DICYCLOMINE HYDROCHLORIDE 20 MG/1
20 TABLET ORAL EVERY 6 HOURS
Qty: 120 TABLET | Refills: 0 | Status: SHIPPED | OUTPATIENT
Start: 2020-08-03 | End: 2020-09-02

## 2020-08-13 ENCOUNTER — OFFICE VISIT (OUTPATIENT)
Dept: ENDOCRINOLOGY | Facility: CLINIC | Age: 70
End: 2020-08-13
Payer: MEDICARE

## 2020-08-13 ENCOUNTER — LAB VISIT (OUTPATIENT)
Dept: LAB | Facility: HOSPITAL | Age: 70
End: 2020-08-13
Attending: NURSE PRACTITIONER
Payer: MEDICARE

## 2020-08-13 VITALS
HEIGHT: 61 IN | TEMPERATURE: 97 F | HEART RATE: 102 BPM | WEIGHT: 136 LBS | DIASTOLIC BLOOD PRESSURE: 83 MMHG | BODY MASS INDEX: 25.68 KG/M2 | SYSTOLIC BLOOD PRESSURE: 123 MMHG

## 2020-08-13 DIAGNOSIS — E78.5 HYPERLIPIDEMIA, UNSPECIFIED HYPERLIPIDEMIA TYPE: ICD-10-CM

## 2020-08-13 DIAGNOSIS — I10 ESSENTIAL HYPERTENSION: ICD-10-CM

## 2020-08-13 PROCEDURE — 99999 PR PBB SHADOW E&M-EST. PATIENT-LVL V: ICD-10-PCS | Mod: PBBFAC,,, | Performed by: NURSE PRACTITIONER

## 2020-08-13 PROCEDURE — 99499 UNLISTED E&M SERVICE: CPT | Mod: S$GLB,,, | Performed by: NURSE PRACTITIONER

## 2020-08-13 PROCEDURE — 99214 PR OFFICE/OUTPT VISIT, EST, LEVL IV, 30-39 MIN: ICD-10-PCS | Mod: S$GLB,,, | Performed by: NURSE PRACTITIONER

## 2020-08-13 PROCEDURE — 3079F DIAST BP 80-89 MM HG: CPT | Mod: CPTII,S$GLB,, | Performed by: NURSE PRACTITIONER

## 2020-08-13 PROCEDURE — 1159F MED LIST DOCD IN RCRD: CPT | Mod: S$GLB,,, | Performed by: NURSE PRACTITIONER

## 2020-08-13 PROCEDURE — 99499 RISK ADDL DX/OHS AUDIT: ICD-10-PCS | Mod: S$GLB,,, | Performed by: NURSE PRACTITIONER

## 2020-08-13 PROCEDURE — 36415 COLL VENOUS BLD VENIPUNCTURE: CPT | Mod: PN

## 2020-08-13 PROCEDURE — 3052F PR MOST RECENT HEMOGLOBIN A1C LEVEL 8.0 - < 9.0%: ICD-10-PCS | Mod: CPTII,S$GLB,, | Performed by: NURSE PRACTITIONER

## 2020-08-13 PROCEDURE — 3074F PR MOST RECENT SYSTOLIC BLOOD PRESSURE < 130 MM HG: ICD-10-PCS | Mod: CPTII,S$GLB,, | Performed by: NURSE PRACTITIONER

## 2020-08-13 PROCEDURE — 3008F PR BODY MASS INDEX (BMI) DOCUMENTED: ICD-10-PCS | Mod: CPTII,S$GLB,, | Performed by: NURSE PRACTITIONER

## 2020-08-13 PROCEDURE — 3052F HG A1C>EQUAL 8.0%<EQUAL 9.0%: CPT | Mod: CPTII,S$GLB,, | Performed by: NURSE PRACTITIONER

## 2020-08-13 PROCEDURE — 99999 PR PBB SHADOW E&M-EST. PATIENT-LVL V: CPT | Mod: PBBFAC,,, | Performed by: NURSE PRACTITIONER

## 2020-08-13 PROCEDURE — 83036 HEMOGLOBIN GLYCOSYLATED A1C: CPT

## 2020-08-13 PROCEDURE — 99214 OFFICE O/P EST MOD 30 MIN: CPT | Mod: S$GLB,,, | Performed by: NURSE PRACTITIONER

## 2020-08-13 PROCEDURE — 1101F PR PT FALLS ASSESS DOC 0-1 FALLS W/OUT INJ PAST YR: ICD-10-PCS | Mod: CPTII,S$GLB,, | Performed by: NURSE PRACTITIONER

## 2020-08-13 PROCEDURE — 1101F PT FALLS ASSESS-DOCD LE1/YR: CPT | Mod: CPTII,S$GLB,, | Performed by: NURSE PRACTITIONER

## 2020-08-13 PROCEDURE — 3008F BODY MASS INDEX DOCD: CPT | Mod: CPTII,S$GLB,, | Performed by: NURSE PRACTITIONER

## 2020-08-13 PROCEDURE — 1159F PR MEDICATION LIST DOCUMENTED IN MEDICAL RECORD: ICD-10-PCS | Mod: S$GLB,,, | Performed by: NURSE PRACTITIONER

## 2020-08-13 PROCEDURE — 3079F PR MOST RECENT DIASTOLIC BLOOD PRESSURE 80-89 MM HG: ICD-10-PCS | Mod: CPTII,S$GLB,, | Performed by: NURSE PRACTITIONER

## 2020-08-13 PROCEDURE — 3074F SYST BP LT 130 MM HG: CPT | Mod: CPTII,S$GLB,, | Performed by: NURSE PRACTITIONER

## 2020-08-13 RX ORDER — DULAGLUTIDE 1.5 MG/.5ML
1.5 INJECTION, SOLUTION SUBCUTANEOUS
Qty: 4 PEN | Refills: 11 | Status: SHIPPED | OUTPATIENT
Start: 2020-08-13 | End: 2021-05-28 | Stop reason: SDUPTHER

## 2020-08-13 NOTE — PATIENT INSTRUCTIONS
Sent orders for Trulicity at higher dose of 1.5 mg weekly and Pharmacy Assistance has been contacted to help with this.     Until you can start Trulicity 1.5 mg weekly, then continue taking Trulicity 0.75 mg weeky and the Humulin 70/30 at reduced doses.   Inject Humulin 70/30 - 10  Units before breakfast and 6 units before dinner.     Call if blood sugars continue to be less 80 or if you get night sweats. Check blood sugar if you're having night sweats.     When you do start Trulicity 1.5 mg weekly, then  STOP Humulin 70/30.     Test glucose 2-3x/day. Keep a log.     Follow up in 3 months with a1c prior.

## 2020-08-13 NOTE — PROGRESS NOTES
CC: This 69 y.o. White female  is here for evaluation of  T2DM along with comorbidities indicated in the Visit Diagnosis section of this encounter.    HPI: Deb Webb was diagnosed with T2DM in her 40s. Oral agent started at the time of diagnosis.         Prior visit 1/2020  She was advised to switch from Lantus and glimepiride to Novolin 70/30 in late August until she could get the Trulicity from [facility]. However, she reports today that she was eventually approved to get the Trulicity but thought she had to remain on the 70/30. She gets 2 boxes of Novolin 70/30 insulin for $38.   She was advised to start Novolin 70/30 at 14 units before breakfast and 10 units before dinner. She has since increased to 16 units BID. She restarted taking glimepiride 4 mg once daily in AM off/on around November and finds it's helped lower her BGs.   Has been dizzy because she's been taking more primidone that rx'd by mistake.   Plan Very difficult situation because she cannot afford ideal diabetic medications.  Fluctuations in glucoses are related to inconsistencies in diet and insulin administration. Mixed insulin should be taken on a fixed schedule and fairly consistent diet for ideal results.   Advised her -   Stop glimepiride.   Increase Novolin 70/30 to 19 units before breakfast and 16 units before lunch.   See Diabetes Educator/Registered Dietician for Medical Nutrition Therapy. -- needs help with maintaining consistent diet.   Test glucose 3x/day - before meals.   Please follow up with Pharmacy Assistance to see if you can get the Trulicity. It would be ideal if you can start Trulicity again. -- a message was sent to Pharmacy Assistance by myself as well to see if she would qualify for any insulin analogues.   Return to clinic in 6 weeks.     Interval history   States that her BGs are doing better. She has been taking Trulicity through patient assistance and it has greatly helped lower BGs. Dizziness  has improved. She continues to see Neurology.         LAST DIABETES EDUCATION: 7/2/18 - found visit helpful   10/1/18 for insulin training     PRESCIBED DIABETES MEDICATIONS: Humulin 70/30 14 units before breakfast and 10 units before dinner, Trulicity 0.75 mg weekly     Misses medication doses - No    DM COMPLICATIONS: nephropathy and peripheral neuropathy                                                                                          SIGNIFICANT DIABETES MED HISTORY:   - diarrhea on metformin   - She started Trulicity ~ march 2018 but had to stop a few months later d/t cost $ 177, up from $30. She was in rx gap. States her BGs were in the low 200s on the Trulicity.       SELF MONITORING BLOOD GLUCOSE: Checks blood glucose at home - 2x/day.                   HYPOGLYCEMIC EPISODES: she c/o night sweats almost nightly         CURRENT DIET: drinks mostly water and occ Coke Zero. Eats 2 meals/day. No lunch.     /83 (BP Location: Left arm, Patient Position: Sitting, BP Method: Medium (Automatic))   Pulse 102   Temp 96.6 °F (35.9 °C) (Oral)          ROS:   CONSTITUTIONAL: Appetite good, denies fatigue  NEURO: DIZZINESS IMPROVED       PHYSICAL EXAM:  GENERAL: Well developed, well nourished. No acute distress.   PSYCH: AAOx3, appropriate mood and affect, conversant, well-groomed. Judgement and insight good.   NEURO: Cranial nerves grossly intact. Speech clear, + tremor to head and hands   CHEST: Respirations even and unlabored.   Foot exam:     Protective Sensation (w/ 10 gram monofilament):  Right: Intact  Left: Intact    Visual Inspection:  Skin Breakdown -  Neither    Pedal Pulses:   Right: Present  Left: Present    Posterior tibialis:   Right:Diminshed  Left: Diminshed          Hemoglobin A1C   Date Value Ref Range Status   01/08/2020 8.6 (H) 4.0 - 5.6 % Final     Comment:     ADA Screening Guidelines:  5.7-6.4%  Consistent with prediabetes  >or=6.5%  Consistent with diabetes  High levels of fetal  hemoglobin interfere with the HbA1C  assay. Heterozygous hemoglobin variants (HbS, HgC, etc)do  not significantly interfere with this assay.   However, presence of multiple variants may affect accuracy.     08/08/2019 8.5 (H) 4.0 - 5.6 % Final     Comment:     ADA Screening Guidelines:  5.7-6.4%  Consistent with prediabetes  >or=6.5%  Consistent with diabetes  High levels of fetal hemoglobin interfere with the HbA1C  assay. Heterozygous hemoglobin variants (HbS, HgC, etc)do  not significantly interfere with this assay.   However, presence of multiple variants may affect accuracy.     01/24/2019 8.4 (H) 4.0 - 5.6 % Final     Comment:     ADA Screening Guidelines:  5.7-6.4%  Consistent with prediabetes  >or=6.5%  Consistent with diabetes  High levels of fetal hemoglobin interfere with the HbA1C  assay. Heterozygous hemoglobin variants (HbS, HgC, etc)do  not significantly interfere with this assay.   However, presence of multiple variants may affect accuracy.             Chemistry        Component Value Date/Time     01/08/2020 0952    K 4.6 01/08/2020 0952     01/08/2020 0952    CO2 29 01/08/2020 0952    BUN 14 01/08/2020 0952    CREATININE 1.0 01/08/2020 0952     (H) 01/08/2020 0952        Component Value Date/Time    CALCIUM 10.2 01/08/2020 0952    ALKPHOS 109 08/08/2019 0922    AST 23 08/08/2019 0922    ALT 21 08/08/2019 0922    BILITOT 0.3 08/08/2019 0922    ESTGFRAFRICA >60.0 01/08/2020 0952    EGFRNONAA 58.0 (A) 01/08/2020 0952          Lab Results   Component Value Date    LDLCALC 43.2 (L) 01/25/2019          Ref. Range 1/25/2019 05:54   Cholesterol Latest Ref Range: 120 - 199 mg/dL 104 (L)   HDL Latest Ref Range: 40 - 75 mg/dL 37 (L)   HDL/Chol Ratio Latest Ref Range: 20.0 - 50.0 % 35.6   LDL Cholesterol Latest Ref Range: 63.0 - 159.0 mg/dL 43.2 (L)   Non-HDL Cholesterol Latest Units: mg/dL 67   Total Cholesterol/HDL Ratio Latest Ref Range: 2.0 - 5.0  2.8   Triglycerides Latest Ref Range: 30  - 150 mg/dL 119       Lab Results   Component Value Date    MICALBCREAT 25.9 03/07/2019           STANDARDS of CARE:        ASA:               Last eye exam:       ASSESSMENT and PLAN:    A1C GOAL: < 7 %       1. Uncontrolled type 2 diabetes mellitus with diabetic neuropathy, without long-term current use of insulin  Sent orders for Trulicity at higher dose of 1.5 mg weekly and Pharmacy Assistance has been contacted to help with this.     Until you can start Trulicity 1.5 mg weekly, then continue taking Trulicity 0.75 mg weeky and the Humulin 70/30 at reduced doses.   Inject Humulin 70/30 - 10  Units before breakfast and 6 units before dinner.     Call if blood sugars continue to be less 80 or if you get night sweats.     When you do start Trulicity 1.5 mg weekly, then  STOP Humulin 70/30.     Test glucose 2-3x/day. Keep a log.     Follow up in 3 months with a1c prior.     Hemoglobin A1C   2. Hyperlipidemia, unspecified hyperlipidemia type  Lipid Panel   3. Essential hypertension  Microalbumin/creatinine urine ratio            Orders Placed This Encounter   Procedures    Hemoglobin A1C     Standing Status:   Standing     Number of Occurrences:   2     Standing Expiration Date:   10/12/2021    Lipid Panel     Standing Status:   Future     Standing Expiration Date:   8/13/2021    Microalbumin/creatinine urine ratio     Standing Status:   Future     Standing Expiration Date:   10/12/2021     Order Specific Question:   Specimen Source     Answer:   Urine        Follow up in about 3 months (around 11/13/2020).

## 2020-08-14 ENCOUNTER — TELEPHONE (OUTPATIENT)
Dept: ENDOCRINOLOGY | Facility: CLINIC | Age: 70
End: 2020-08-14

## 2020-08-14 LAB
ESTIMATED AVG GLUCOSE: 120 MG/DL (ref 68–131)
HBA1C MFR BLD HPLC: 5.8 % (ref 4–5.6)

## 2020-08-14 NOTE — TELEPHONE ENCOUNTER
Spoke with the pt and informed her that her a1c is down from 8.6% in January to now 5.8%. Pharmacy Assistance will help her next week obtain higher dose of Trulicity. The pt understood.

## 2020-08-14 NOTE — TELEPHONE ENCOUNTER
----- Message from Sussy Goldberg NP sent at 8/14/2020  4:02 PM CDT -----  a1c is down from 8.6% in January to now 5.8%. Pharmacy Assistance will help her next week obtain higher dose of Trulicity.

## 2020-08-25 RX ORDER — OMEPRAZOLE 40 MG/1
CAPSULE, DELAYED RELEASE ORAL
Qty: 90 CAPSULE | Refills: 1 | Status: SHIPPED | OUTPATIENT
Start: 2020-08-25 | End: 2021-02-17

## 2020-08-25 NOTE — TELEPHONE ENCOUNTER
No new care gaps identified.  Powered by [employer]. Reference number: [id number]. 8/25/2020 12:34:53 AM   JACQUET

## 2020-08-26 NOTE — PROGRESS NOTES
Refill Routing Note   Medication(s) are not appropriate for processing by [facility]:       - Drug-Disease Interaction (Osteoporosis and omeprazole)     Medication-related problems identified: Drug-disease interaction  Medication Therapy Plan: CDMR. Drug disease interaction: Osteoporosis on problem list; Defer to you  Medication reconciliation completed: No      Automatic Epic Generated Protocol Data:        Requested Prescriptions   Pending Prescriptions Disp Refills    omeprazole (PRILOSEC) 40 MG capsule [Pharmacy Med Name: OMEPRAZOLE DR 40 MG CAPSULE] 90 capsule 1     Sig: TAKE ONE CAPSULE BY MOUTH BEFORE BREAKFAST       Gastroenterology: Proton Pump Inhibitors Failed - 8/25/2020 12:34 AM        Failed - Osteoporosis is not on problem list        Failed - Office visit in past 6 months or future 90 days.     Recent Outpatient Visits            1 week ago Uncontrolled type 2 diabetes mellitus with diabetic neuropathy, without long-term current use of insulin    [place] - Endo/Diabetes Sussy Goldberg NP    4 months ago Essential tremor    [facility] [place]- Neurology Edward Stratton MD    7 months ago Rhinitis, unspecified type    [place] - Family Medicine Moiz Goldberg MD    7 months ago Uncontrolled type 2 diabetes mellitus with diabetic neuropathy, without long-term current use of insulin    [place] - Endo/Diabetes Sussy Goldberg NP    9 months ago Encounter for hearing examination, unspecified whether abnormal findings    [facility] - [place] [address] Zeus Dick MD          Future Appointments              In 2 months LAB, [facility] DRAW STATION [facility] [place]    In 2 months Sussy Goldberg NP [place] - Endo/Diabetes, [facility] [place]                Passed - Patient is at least 18 years old        Passed - Plavix is not on active medication list        Passed - GERD is on problem list              Appointments  past 12m or future 3m with PCP    Date Provider    Last Visit   1/8/2020 Moiz Goldberg MD   Next Visit   Visit date not found Moiz Goldberg MD   ED visits in past 90 days: 0     Note composed:8:09 PM 08/25/2020

## 2020-09-01 DIAGNOSIS — K58.9 IRRITABLE BOWEL SYNDROME, UNSPECIFIED TYPE: ICD-10-CM

## 2020-09-01 NOTE — PROGRESS NOTES
Refill Routing Note   Medication(s) are not appropriate for processing by [facility]:       - Outside of protocol           Medication reconciliation completed: No      Automatic Epic Generated Protocol Data:        Requested Prescriptions   Pending Prescriptions Disp Refills    dicyclomine (BENTYL) 20 mg tablet [Pharmacy Med Name: DICYCLOMINE 20 MG TABLET] 120 tablet 0     Sig: TAKE 1 TABLET (20 MG TOTAL) BY MOUTH EVERY 6 (SIX) HOURS.       Off-Protocol Failed - 9/1/2020  1:35 PM        Failed - Medication not assigned to a protocol, review manually.        Passed - Office visit in past 12 months or future 90 days.     Recent Outpatient Visits            2 weeks ago Uncontrolled type 2 diabetes mellitus with diabetic neuropathy, without long-term current use of insulin    [place] - Endo/Diabetes Sussy Goldberg NP    4 months ago Essential tremor    [facility] [place]- Neurology Edward Stratton MD    7 months ago Rhinitis, unspecified type    [facility] Moiz [facility]    7 months ago Uncontrolled type 2 diabetes mellitus with diabetic neuropathy, without long-term current use of insulin    [place] - Endo/Diabetes Sussy Goldberg NP    9 months ago Encounter for hearing examination, unspecified whether abnormal findings    [facility]  [place] [address] Zeus Dick MD          Future Appointments              In 2 months LAB, [facility] DRAW STATION [facility] [place] - [place]    In 2 months Sussy Goldberg NP [place] - Endo/Diabetes, [facility] [place]                      Appointments  past 12m or future 3m with PCP    Date Provider   Last Visit   1/8/2020 Moiz Goldberg MD   Next Visit   Visit date not found Moiz Goldberg MD   ED visits in past 90 days: 0     Note composed:4:38 PM 09/01/2020

## 2020-09-02 RX ORDER — DICYCLOMINE HYDROCHLORIDE 20 MG/1
20 TABLET ORAL EVERY 6 HOURS
Qty: 120 TABLET | Refills: 0 | Status: SHIPPED | OUTPATIENT
Start: 2020-09-02 | End: 2020-09-25

## 2020-09-09 ENCOUNTER — TELEPHONE (OUTPATIENT)
Dept: ENDOCRINOLOGY | Facility: CLINIC | Age: 70
End: 2020-09-09

## 2020-09-09 DIAGNOSIS — Z12.11 SPECIAL SCREENING FOR MALIGNANT NEOPLASM OF COLON: Primary | ICD-10-CM

## 2020-09-09 NOTE — TELEPHONE ENCOUNTER
Spoke with the pt and she states the her Rx has to be called into the number she provided due to her going through a patient assistance program. I called the number and had to leave a voicemail for a verbal Rx with a call back number.

## 2020-09-09 NOTE — TELEPHONE ENCOUNTER
----- Message from Giana Evans sent at 9/9/2020  1:20 PM CDT -----  Regarding: self [phone]  .Type: Patient Call Back    Who called: self     What is the request in detail: Pt stated that she needs the correct dose of TRULICITY  to be called in to [facility] @ [phone]      Can the clinic reply by [facility]? Call back     Would the patient rather a call back or a response via [facility]?  Call back     Best call back number: [phone]

## 2020-09-11 NOTE — TELEPHONE ENCOUNTER
No new care gaps identified.  Powered by [employer]. Reference number: [id number]. 9/11/2020 12:37:45 AM   JACQUET

## 2020-09-14 RX ORDER — ATORVASTATIN CALCIUM 40 MG/1
TABLET, FILM COATED ORAL
Qty: 90 TABLET | Refills: 0 | Status: SHIPPED | OUTPATIENT
Start: 2020-09-14 | End: 2020-12-17

## 2020-09-17 ENCOUNTER — TELEPHONE (OUTPATIENT)
Dept: ENDOCRINOLOGY | Facility: CLINIC | Age: 70
End: 2020-09-17

## 2020-09-17 NOTE — TELEPHONE ENCOUNTER
Spoke with Evie at the patient assistance pharmacy and gave a verbal fill of the Trulicity. I informed the pt her Rx was put in and should be getting to her shortly. The pt understood.

## 2020-09-17 NOTE — TELEPHONE ENCOUNTER
----- Message from Aneta Peña sent at 9/17/2020  1:12 PM CDT -----  Contact: Patient [phone]  Type: Patient Call Back    Who called:Patient    What is the request in detail: Need the nurse to contact the pharmacy again in regards to her Trulicity medication. Pt states that she was sent the old dosage of the medication. Please call again to put in the request for the new dosage, which is 1.5, at [phone]. Please call pt when this is done.     Would the patient rather a call back or a response via [facility]? Call back     Best call back number: [phone]

## 2020-09-25 DIAGNOSIS — K58.9 IRRITABLE BOWEL SYNDROME, UNSPECIFIED TYPE: ICD-10-CM

## 2020-09-25 RX ORDER — DICYCLOMINE HYDROCHLORIDE 20 MG/1
20 TABLET ORAL EVERY 6 HOURS
Qty: 120 TABLET | Refills: 0 | Status: SHIPPED | OUTPATIENT
Start: 2020-09-25 | End: 2020-10-19

## 2020-09-25 NOTE — PROGRESS NOTES
Refill Routing Note   Medication(s) are not appropriate for processing by [facility]:       - Outside of protocol           Medication reconciliation completed: No      Automatic Epic Generated Protocol Data:        Requested Prescriptions   Pending Prescriptions Disp Refills    dicyclomine (BENTYL) 20 mg tablet [Pharmacy Med Name: DICYCLOMINE 20 MG TABLET] 120 tablet 0     Sig: TAKE 1 TABLET (20 MG TOTAL) BY MOUTH EVERY 6 (SIX) HOURS.       Off-Protocol Failed - 9/25/2020  9:32 AM        Failed - Medication not assigned to a protocol, review manually.        Passed - Valid encounter within last 12 months     Recent Outpatient Visits            1 month ago Uncontrolled type 2 diabetes mellitus with diabetic neuropathy, without long-term current use of insulin    [place] - Endo/Diabetes Sussy Goldberg NP    5 months ago Essential tremor    [facility]- Neurology Edward Stratton MD    8 months ago [facility]initis, unspecified type    [facility]    8 months ago Uncontrolled type 2 diabetes mellitus with diabetic neuropathy, without long-term current use of insulin    [place] - Endo/Diabetes Sussy Goldberg NP    10 months ago Encounter for hearing examination, unspecified whether abnormal findings    [facility] - [place] [address] Zeus Dick MD          Future Appointments              In 1 month LAB, [facility] DRAW STATION [facility] [place] - [place]    In 1 month Sussy Goldberg NP [place] - Endo/Diabetes, [facility]                      Appointments  past 12m or future 3m with PCP    Date Provider   Last Visit   1/8/2020 Moiz Goldberg MD   Next Visit   Visit date not found Moiz Goldberg MD   ED visits in past 90 days: 0     Note composed:11:03 AM 09/25/2020

## 2020-09-25 NOTE — TELEPHONE ENCOUNTER
No new care gaps identified.  Powered by [employer] Reference number: [id number]. 9/25/2020 9:33:38 AM LIZY

## 2020-10-18 DIAGNOSIS — K58.9 IRRITABLE BOWEL SYNDROME, UNSPECIFIED TYPE: ICD-10-CM

## 2020-10-19 RX ORDER — DICYCLOMINE HYDROCHLORIDE 20 MG/1
20 TABLET ORAL EVERY 6 HOURS
Qty: 120 TABLET | Refills: 0 | Status: SHIPPED | OUTPATIENT
Start: 2020-10-19 | End: 2020-11-11

## 2020-10-19 NOTE — PROGRESS NOTES
Refill Routing Note   Medication(s) are not appropriate for processing by [facility] for the following reason(s):     - Outside of protocol    [facility] actions taken in this encounter: Route          Medication reconciliation completed: No   Automatic Epic Generated Protocol Data:        Requested Prescriptions   Pending Prescriptions Disp Refills    dicyclomine (BENTYL) 20 mg tablet [Pharmacy Med Name: DICYCLOMINE 20 MG TABLET] 120 tablet 0     Sig: TAKE 1 TABLET (20 MG TOTAL) BY MOUTH EVERY 6 (SIX) HOURS.       There is no refill protocol information for this order           Appointments  past 12m or future 3m with PCP    Date Provider   Last Visit   1/8/2020 Moiz Goldberg MD   Next Visit   Visit date not found Moiz Goldberg MD   ED visits in past 90 days: 0        Note composed:12:01 PM 10/19/2020

## 2020-10-25 DIAGNOSIS — M81.0 OSTEOPOROSIS, POSTMENOPAUSAL: ICD-10-CM

## 2020-10-26 NOTE — PROGRESS NOTES
Refill Routing Note   Medication(s) are not appropriate for processing by [facility] for the following reason(s):     - Outside of protocol    [facility] actions taken in this encounter: Route          Medication reconciliation completed: No   Automatic Epic Generated Protocol Data:        Requested Prescriptions   Pending Prescriptions Disp Refills    alendronate (FOSAMAX) 70 MG tablet [Pharmacy Med Name: ALENDRONATE SODIUM 70 MG TAB] 12 tablet 1     Sig: TAKE 1 TABLET BY MOUTH ONE TIME PER WEEK       There is no refill protocol information for this order           Appointments  past 12m or future 3m with PCP    Date Provider   Last Visit   1/8/2020 Moiz Goldberg MD   Next Visit   Visit date not found Moiz Goldberg MD   ED visits in past 90 days: 0        Note composed:11:46 AM 10/26/2020

## 2020-10-27 RX ORDER — ALENDRONATE SODIUM 70 MG/1
TABLET ORAL
Qty: 12 TABLET | Refills: 1 | Status: SHIPPED | OUTPATIENT
Start: 2020-10-27 | End: 2021-04-13

## 2020-11-06 DIAGNOSIS — G25.0 ESSENTIAL TREMOR: ICD-10-CM

## 2020-11-06 RX ORDER — PRIMIDONE 50 MG/1
TABLET ORAL
Qty: 540 TABLET | Refills: 3 | Status: CANCELLED | OUTPATIENT
Start: 2020-11-06

## 2020-11-24 ENCOUNTER — LAB VISIT (OUTPATIENT)
Dept: LAB | Facility: HOSPITAL | Age: 70
End: 2020-11-24
Attending: FAMILY MEDICINE
Payer: MEDICARE

## 2020-11-24 DIAGNOSIS — I10 ESSENTIAL HYPERTENSION: ICD-10-CM

## 2020-11-24 DIAGNOSIS — E11.9 TYPE 2 DIABETES MELLITUS WITHOUT COMPLICATION: ICD-10-CM

## 2020-11-24 DIAGNOSIS — E11.9 TYPE 2 DIABETES MELLITUS WITHOUT COMPLICATION, UNSPECIFIED WHETHER LONG TERM INSULIN USE: ICD-10-CM

## 2020-11-24 DIAGNOSIS — I10 ESSENTIAL HYPERTENSION, BENIGN: Chronic | ICD-10-CM

## 2020-11-24 LAB
ALBUMIN SERPL BCP-MCNC: 4.1 G/DL (ref 3.5–5.2)
ALP SERPL-CCNC: 110 U/L (ref 55–135)
ALT SERPL W/O P-5'-P-CCNC: 18 U/L (ref 10–44)
ANION GAP SERPL CALC-SCNC: 13 MMOL/L (ref 8–16)
AST SERPL-CCNC: 20 U/L (ref 10–40)
BILIRUB SERPL-MCNC: 0.4 MG/DL (ref 0.1–1)
BUN SERPL-MCNC: 9 MG/DL (ref 8–23)
CALCIUM SERPL-MCNC: 9.9 MG/DL (ref 8.7–10.5)
CHLORIDE SERPL-SCNC: 104 MMOL/L (ref 95–110)
CHOLEST SERPL-MCNC: 126 MG/DL (ref 120–199)
CHOLEST/HDLC SERPL: 3 {RATIO} (ref 2–5)
CO2 SERPL-SCNC: 27 MMOL/L (ref 23–29)
CREAT SERPL-MCNC: 0.8 MG/DL (ref 0.5–1.4)
EST. GFR  (AFRICAN AMERICAN): >60 ML/MIN/1.73 M^2
EST. GFR  (NON AFRICAN AMERICAN): >60 ML/MIN/1.73 M^2
ESTIMATED AVG GLUCOSE: 146 MG/DL (ref 68–131)
GLUCOSE SERPL-MCNC: 300 MG/DL (ref 70–110)
HBA1C MFR BLD HPLC: 6.7 % (ref 4–5.6)
HDLC SERPL-MCNC: 42 MG/DL (ref 40–75)
HDLC SERPL: 33.3 % (ref 20–50)
LDLC SERPL CALC-MCNC: 56 MG/DL (ref 63–159)
NONHDLC SERPL-MCNC: 84 MG/DL
POTASSIUM SERPL-SCNC: 3.7 MMOL/L (ref 3.5–5.1)
PROT SERPL-MCNC: 7.2 G/DL (ref 6–8.4)
SODIUM SERPL-SCNC: 144 MMOL/L (ref 136–145)
TRIGL SERPL-MCNC: 140 MG/DL (ref 30–150)

## 2020-11-24 PROCEDURE — 83036 HEMOGLOBIN GLYCOSYLATED A1C: CPT

## 2020-11-24 PROCEDURE — 80061 LIPID PANEL: CPT

## 2020-11-24 PROCEDURE — 36415 COLL VENOUS BLD VENIPUNCTURE: CPT | Mod: PN

## 2020-11-24 PROCEDURE — 80053 COMPREHEN METABOLIC PANEL: CPT

## 2020-12-03 ENCOUNTER — PATIENT OUTREACH (OUTPATIENT)
Dept: ADMINISTRATIVE | Facility: OTHER | Age: 70
End: 2020-12-03

## 2020-12-03 NOTE — PROGRESS NOTES
Health Maintenance Due   Topic Date Due    Aspirin/Antiplatelet Therapy  10/29/1968    Colorectal Cancer Screening  05/02/2018    Urine Microalbumin  03/07/2020    DEXA SCAN  03/16/2020    Eye Exam  05/10/2020    Influenza Vaccine (1) 08/01/2020    Mammogram  10/01/2020     Updates were requested from care everywhere.  Chart was reviewed for overdue [facility]active [facility] ([facility]) topics (CRS, Breast Cancer Screening, Eye exam)  Health Maintenance has been updated.  LINKS immunization registry triggered.  Immunizations were reconciled.    Case request already placed for cscope.

## 2020-12-06 DIAGNOSIS — K58.9 IRRITABLE BOWEL SYNDROME, UNSPECIFIED TYPE: ICD-10-CM

## 2020-12-07 RX ORDER — DICYCLOMINE HYDROCHLORIDE 20 MG/1
20 TABLET ORAL EVERY 6 HOURS
Qty: 120 TABLET | Refills: 0 | Status: SHIPPED | OUTPATIENT
Start: 2020-12-07 | End: 2021-03-01

## 2020-12-07 NOTE — PROGRESS NOTES
Refill Routing Note   Medication(s) are not appropriate for processing by [facility] for the following reason(s):     - Outside of protocol  [facility] action(s):  Route        Medication reconciliation completed: No   Automatic Epic Generated Protocol Data:        Requested Prescriptions   Pending Prescriptions Disp Refills    dicyclomine (BENTYL) 20 mg tablet [Pharmacy Med Name: DICYCLOMINE 20 MG TABLET] 120 tablet 0     Sig: TAKE 1 TABLET (20 MG TOTAL) BY MOUTH EVERY 6 (SIX) HOURS.       There is no refill protocol information for this order           Appointments  past 12m or future 3m with PCP    Date Provider   Last Visit   1/8/2020 Moiz Goldberg MD   Next Visit   Visit date not found Moiz Goldberg MD   ED visits in past 90 days: 0        Note composed:7:15 AM 12/07/2020

## 2020-12-16 NOTE — TELEPHONE ENCOUNTER
No new care gaps identified.  Powered by [employer]. Reference number: [id number]. 12/16/2020 1:08:20 AM   CST

## 2020-12-17 RX ORDER — ATORVASTATIN CALCIUM 40 MG/1
TABLET, FILM COATED ORAL
Qty: 90 TABLET | Refills: 0 | Status: SHIPPED | OUTPATIENT
Start: 2020-12-17 | End: 2021-03-22

## 2020-12-17 NOTE — PROGRESS NOTES
Refill Authorization Note   Deb Webb  is requesting a refill authorization.  Brief Assessment and Rationale for Refill:  Approve    -Medication-Related Problems Identified: Requires appointment  Medication Therapy Plan:  CDML. NEEDS APPT(ANNUAL)    Medication Reconciliation Completed: No   Comments:       Requested Prescriptions   Pending Prescriptions Disp Refills    atorvastatin (LIPITOR) 40 MG tablet [Pharmacy Med Name: ATORVASTATIN 40 MG TABLET] 90 tablet 0     Sig: TAKE 1 TABLET BY MOUTH EVERY DAY       Cardiovascular:  Antilipid - Statins Passed - 12/16/2020  1:07 AM        Passed - Patient is at least 18 years old        Passed - Office visit in past 12 months or future 90 days     Recent Outpatient Visits            4 months ago Uncontrolled type 2 diabetes mellitus with diabetic neuropathy, without long-term current use of insulin    [place] - Endo/Diabetes Sussy Goldberg NP    8 months ago Essential tremor    [facility] [place]- Neurology Edward Stratton MD    11 months ago Rhinitis, unspecified type    [facility] - Family Medicine Moiz Goldberg MD    11 months ago Uncontrolled type 2 diabetes mellitus with diabetic neuropathy, without long-term current use of insulin    [place] - Endo/Diabetes Sussy Goldberg NP    1 year ago Encounter for hearing examination, unspecified whether abnormal findings    [facility]  [facility] [address] Zeus Dick MD          Future Appointments              Tomorrow Sussy Goldberg NP [place] - Endo/Diabetes, [facility] [place]                Passed - ALT is 94 or below and within 360 days     ALT   Date Value Ref Range Status   11/24/2020 18 10 - 44 U/L Final   08/08/2019 21 10 - 44 U/L Final   01/27/2019 10 10 - 44 U/L Final     ALT (SGPT), POC   Date Value Ref Range Status   01/24/2019 20 10 - 47 U/L Final              Passed - AST is 54 or below and within 360 days     AST   Date Value Ref Range Status   11/24/2020 20 10 - 40 U/L Final   08/08/2019 23 10 - 40 U/L  Final   01/27/2019 14 10 - 40 U/L Final     AST (SGOT), POC   Date Value Ref Range Status   01/24/2019 27 11 - 38 U/L Final              Passed - Total Cholesterol within 360 days     Cholesterol   Date Value Ref Range Status   11/24/2020 126 120 - 199 mg/dL Final     Comment:     The National Cholesterol Education Program (NCEP) has set the  following guidelines (reference ranges) for Cholesterol:  Optimal.....................<200 mg/dL  Borderline High.............200-239 mg/dL  High........................> or = 240 mg/dL     01/25/2019 104 (L) 120 - 199 mg/dL Final     Comment:     The National Cholesterol Education Program (NCEP) has set the  following guidelines (reference ranges) for Cholesterol:  Optimal.....................<200 mg/dL  Borderline High.............200-239 mg/dL  High........................> or = 240 mg/dL     11/16/2018 120 120 - 199 mg/dL Final     Comment:     The National Cholesterol Education Program (NCEP) has set the  following guidelines (reference ranges) for Cholesterol:  Optimal.....................<200 mg/dL  Borderline High.............200-239 mg/dL  High........................> or = 240 mg/dL                Passed - LDL within 360 days     LDL Cholesterol   Date Value Ref Range Status   11/24/2020 56.0 (L) 63.0 - 159.0 mg/dL Final     Comment:     The National Cholesterol Education Program (NCEP) has set the  following guidelines (reference values) for LDL Cholesterol:  Optimal.......................<130 mg/dL  Borderline High...............130-159 mg/dL  High..........................160-189 mg/dL  Very High.....................>190 mg/dL       LDL Cholesterol (Beta Quantification), Serum   Date Value Ref Range Status   11/16/2018 53 mg/dL Final     Comment:     -------------------REFERENCE VALUE--------------------------  Desirable: <100  Above Desirable: 100-129   Borderline high: 130-159  High: 160-189  Very high: > or =190  -------------------ADDITIONAL  INFORMATION-------------------  This test was developed and its performance characteristics   determined by [facility] in a manner consistent with   CLIA requirements. This test has not been cleared or   approved by the U.S. Food and Drug Administration.  Test Performed by:  [facility] - [address] [address]              Passed - HDL within 360 days     HDL   Date Value Ref Range Status   11/24/2020 42 40 - 75 mg/dL Final     Comment:     The National Cholesterol Education Program (NCEP) has set the  following guidelines (reference values) for HDL Cholesterol:  Low...............<40 mg/dL  Optimal...........>60 mg/dL              Passed - Triglycerides within 360 days     Triglycerides   Date Value Ref Range Status   11/24/2020 140 30 - 150 mg/dL Final     Comment:     The National Cholesterol Education Program (NCEP) has set the  following guidelines (reference values) for triglycerides:  Normal......................<150 mg/dL  Borderline High.............150-199 mg/dL  High........................200-499 mg/dL     01/25/2019 119 30 - 150 mg/dL Final     Comment:     The National Cholesterol Education Program (NCEP) has set the  following guidelines (reference values) for triglycerides:  Normal......................<150 mg/dL  Borderline High.............150-199 mg/dL  High........................200-499 mg/dL     02/22/2018 279 (H) 30 - 150 mg/dL Final     Comment:     The National Cholesterol Education Program (NCEP) has set the  following guidelines (reference values) for triglycerides:  Normal......................<150 mg/dL  Borderline High.............150-199 mg/dL  High........................200-499 mg/dL                    Appointments  past 12m or future 3m with PCP    Date Provider   Last Visit   1/8/2020 Moiz Goldberg MD   Next Visit   Visit date not found Moiz Goldberg MD   ED visits in past 90 days: 0     Note composed:7:48 AM 12/17/2020

## 2021-01-25 ENCOUNTER — OFFICE VISIT (OUTPATIENT)
Dept: FAMILY MEDICINE | Facility: CLINIC | Age: 71
End: 2021-01-25
Payer: MEDICARE

## 2021-01-25 ENCOUNTER — LAB VISIT (OUTPATIENT)
Dept: LAB | Facility: HOSPITAL | Age: 71
End: 2021-01-25
Attending: FAMILY MEDICINE
Payer: MEDICARE

## 2021-01-25 VITALS
HEART RATE: 108 BPM | DIASTOLIC BLOOD PRESSURE: 80 MMHG | BODY MASS INDEX: 26.2 KG/M2 | SYSTOLIC BLOOD PRESSURE: 130 MMHG | OXYGEN SATURATION: 90 % | WEIGHT: 129.94 LBS | HEIGHT: 59 IN | TEMPERATURE: 98 F

## 2021-01-25 DIAGNOSIS — R19.7 DIARRHEA, UNSPECIFIED TYPE: ICD-10-CM

## 2021-01-25 DIAGNOSIS — E11.42 TYPE 2 DIABETES MELLITUS WITH DIABETIC POLYNEUROPATHY, WITHOUT LONG-TERM CURRENT USE OF INSULIN: ICD-10-CM

## 2021-01-25 DIAGNOSIS — Z12.11 ENCOUNTER FOR SCREENING FOR MALIGNANT NEOPLASM OF COLON: ICD-10-CM

## 2021-01-25 DIAGNOSIS — Z72.0 TOBACCO ABUSE: ICD-10-CM

## 2021-01-25 DIAGNOSIS — M19.90 OSTEOARTHRITIS, UNSPECIFIED OSTEOARTHRITIS TYPE, UNSPECIFIED SITE: ICD-10-CM

## 2021-01-25 DIAGNOSIS — Z12.31 ENCOUNTER FOR SCREENING MAMMOGRAM FOR BREAST CANCER: ICD-10-CM

## 2021-01-25 DIAGNOSIS — E11.42 TYPE 2 DIABETES MELLITUS WITH DIABETIC POLYNEUROPATHY, WITHOUT LONG-TERM CURRENT USE OF INSULIN: Primary | ICD-10-CM

## 2021-01-25 DIAGNOSIS — I10 ESSENTIAL HYPERTENSION, BENIGN: Chronic | ICD-10-CM

## 2021-01-25 DIAGNOSIS — J42 CHRONIC BRONCHITIS, UNSPECIFIED CHRONIC BRONCHITIS TYPE: ICD-10-CM

## 2021-01-25 DIAGNOSIS — R42 DIZZINESS: ICD-10-CM

## 2021-01-25 DIAGNOSIS — F13.20 SEDATIVE HYPNOTIC OR ANXIOLYTIC DEPENDENCE: ICD-10-CM

## 2021-01-25 DIAGNOSIS — Q07.9 CONGENITAL TILTED OPTIC NERVE: ICD-10-CM

## 2021-01-25 DIAGNOSIS — J01.90 ACUTE RHINOSINUSITIS: ICD-10-CM

## 2021-01-25 DIAGNOSIS — I27.20 PULMONARY HYPERTENSION: ICD-10-CM

## 2021-01-25 PROBLEM — I50.42 CHRONIC COMBINED SYSTOLIC AND DIASTOLIC CONGESTIVE HEART FAILURE: Chronic | Status: ACTIVE | Noted: 2019-02-05

## 2021-01-25 PROBLEM — E11.9 DM TYPE 2 WITHOUT RETINOPATHY: Status: RESOLVED | Noted: 2017-07-14 | Resolved: 2021-01-25

## 2021-01-25 PROBLEM — Z86.73 HISTORY OF STROKE: Chronic | Status: ACTIVE | Noted: 2019-01-24

## 2021-01-25 LAB
ALBUMIN/CREAT UR: 17.9 UG/MG (ref 0–30)
CREAT UR-MCNC: 156 MG/DL (ref 15–325)
MICROALBUMIN UR DL<=1MG/L-MCNC: 28 UG/ML

## 2021-01-25 PROCEDURE — 3079F DIAST BP 80-89 MM HG: CPT | Mod: CPTII,S$GLB,, | Performed by: FAMILY MEDICINE

## 2021-01-25 PROCEDURE — 1101F PT FALLS ASSESS-DOCD LE1/YR: CPT | Mod: CPTII,S$GLB,, | Performed by: FAMILY MEDICINE

## 2021-01-25 PROCEDURE — 1159F PR MEDICATION LIST DOCUMENTED IN MEDICAL RECORD: ICD-10-PCS | Mod: S$GLB,,, | Performed by: FAMILY MEDICINE

## 2021-01-25 PROCEDURE — 3008F PR BODY MASS INDEX (BMI) DOCUMENTED: ICD-10-PCS | Mod: CPTII,S$GLB,, | Performed by: FAMILY MEDICINE

## 2021-01-25 PROCEDURE — 3044F HG A1C LEVEL LT 7.0%: CPT | Mod: CPTII,S$GLB,, | Performed by: FAMILY MEDICINE

## 2021-01-25 PROCEDURE — 99214 OFFICE O/P EST MOD 30 MIN: CPT | Mod: S$GLB,,, | Performed by: FAMILY MEDICINE

## 2021-01-25 PROCEDURE — 3075F PR MOST RECENT SYSTOLIC BLOOD PRESS GE 130-139MM HG: ICD-10-PCS | Mod: CPTII,S$GLB,, | Performed by: FAMILY MEDICINE

## 2021-01-25 PROCEDURE — 3288F PR FALLS RISK ASSESSMENT DOCUMENTED: ICD-10-PCS | Mod: CPTII,S$GLB,, | Performed by: FAMILY MEDICINE

## 2021-01-25 PROCEDURE — 3288F FALL RISK ASSESSMENT DOCD: CPT | Mod: CPTII,S$GLB,, | Performed by: FAMILY MEDICINE

## 2021-01-25 PROCEDURE — 82570 ASSAY OF URINE CREATININE: CPT

## 2021-01-25 PROCEDURE — 3075F SYST BP GE 130 - 139MM HG: CPT | Mod: CPTII,S$GLB,, | Performed by: FAMILY MEDICINE

## 2021-01-25 PROCEDURE — 99499 RISK ADDL DX/OHS AUDIT: ICD-10-PCS | Mod: S$GLB,,, | Performed by: FAMILY MEDICINE

## 2021-01-25 PROCEDURE — 1101F PR PT FALLS ASSESS DOC 0-1 FALLS W/OUT INJ PAST YR: ICD-10-PCS | Mod: CPTII,S$GLB,, | Performed by: FAMILY MEDICINE

## 2021-01-25 PROCEDURE — 1125F PR PAIN SEVERITY QUANTIFIED, PAIN PRESENT: ICD-10-PCS | Mod: S$GLB,,, | Performed by: FAMILY MEDICINE

## 2021-01-25 PROCEDURE — 1125F AMNT PAIN NOTED PAIN PRSNT: CPT | Mod: S$GLB,,, | Performed by: FAMILY MEDICINE

## 2021-01-25 PROCEDURE — 99214 PR OFFICE/OUTPT VISIT, EST, LEVL IV, 30-39 MIN: ICD-10-PCS | Mod: S$GLB,,, | Performed by: FAMILY MEDICINE

## 2021-01-25 PROCEDURE — 99499 UNLISTED E&M SERVICE: CPT | Mod: S$GLB,,, | Performed by: FAMILY MEDICINE

## 2021-01-25 PROCEDURE — 99999 PR PBB SHADOW E&M-EST. PATIENT-LVL V: CPT | Mod: PBBFAC,,, | Performed by: FAMILY MEDICINE

## 2021-01-25 PROCEDURE — 3008F BODY MASS INDEX DOCD: CPT | Mod: CPTII,S$GLB,, | Performed by: FAMILY MEDICINE

## 2021-01-25 PROCEDURE — 3079F PR MOST RECENT DIASTOLIC BLOOD PRESSURE 80-89 MM HG: ICD-10-PCS | Mod: CPTII,S$GLB,, | Performed by: FAMILY MEDICINE

## 2021-01-25 PROCEDURE — 1159F MED LIST DOCD IN RCRD: CPT | Mod: S$GLB,,, | Performed by: FAMILY MEDICINE

## 2021-01-25 PROCEDURE — 3044F PR MOST RECENT HEMOGLOBIN A1C LEVEL <7.0%: ICD-10-PCS | Mod: CPTII,S$GLB,, | Performed by: FAMILY MEDICINE

## 2021-01-25 PROCEDURE — 99999 PR PBB SHADOW E&M-EST. PATIENT-LVL V: ICD-10-PCS | Mod: PBBFAC,,, | Performed by: FAMILY MEDICINE

## 2021-01-25 PROCEDURE — 82043 UR ALBUMIN QUANTITATIVE: CPT

## 2021-01-25 RX ORDER — FLASH GLUCOSE SCANNING READER
1 EACH MISCELLANEOUS 3 TIMES DAILY
Qty: 1 EACH | Refills: 0 | Status: ON HOLD | OUTPATIENT
Start: 2021-01-25 | End: 2022-01-31 | Stop reason: CLARIF

## 2021-01-25 RX ORDER — MECLIZINE HCL 12.5 MG 12.5 MG/1
12.5 TABLET ORAL 3 TIMES DAILY PRN
Qty: 30 TABLET | Refills: 0 | Status: SHIPPED | OUTPATIENT
Start: 2021-01-25 | End: 2021-06-01

## 2021-01-25 RX ORDER — UMECLIDINIUM 62.5 UG/1
AEROSOL, POWDER ORAL
Qty: 30 EACH | Refills: 2 | Status: SHIPPED | OUTPATIENT
Start: 2021-01-25 | End: 2021-05-17

## 2021-01-25 RX ORDER — ASPIRIN 81 MG/1
81 TABLET ORAL DAILY
Refills: 0 | Status: ON HOLD | COMMUNITY
Start: 2021-01-25 | End: 2022-01-31 | Stop reason: CLARIF

## 2021-01-25 RX ORDER — TRAMADOL HYDROCHLORIDE 50 MG/1
50 TABLET ORAL EVERY 8 HOURS PRN
Qty: 21 TABLET | Refills: 0 | Status: SHIPPED | OUTPATIENT
Start: 2021-01-25 | End: 2021-09-09

## 2021-01-25 RX ORDER — AMOXICILLIN AND CLAVULANATE POTASSIUM 500; 125 MG/1; MG/1
1 TABLET, FILM COATED ORAL 2 TIMES DAILY
Qty: 14 TABLET | Refills: 0 | Status: SHIPPED | OUTPATIENT
Start: 2021-01-25 | End: 2021-02-01

## 2021-01-25 RX ORDER — FLASH GLUCOSE SENSOR
1 KIT MISCELLANEOUS 3 TIMES DAILY
Qty: 2 KIT | Refills: 3 | Status: ON HOLD | OUTPATIENT
Start: 2021-01-25 | End: 2022-01-31 | Stop reason: CLARIF

## 2021-01-29 ENCOUNTER — TELEPHONE (OUTPATIENT)
Dept: FAMILY MEDICINE | Facility: CLINIC | Age: 71
End: 2021-01-29

## 2021-02-04 DIAGNOSIS — J31.0 RHINITIS, UNSPECIFIED TYPE: ICD-10-CM

## 2021-02-05 RX ORDER — AZELASTINE 1 MG/ML
SPRAY, METERED NASAL
Qty: 90 ML | Refills: 3 | Status: SHIPPED | OUTPATIENT
Start: 2021-02-05 | End: 2022-02-14

## 2021-02-14 DIAGNOSIS — J30.9 ALLERGIC RHINITIS: ICD-10-CM

## 2021-02-17 RX ORDER — OMEPRAZOLE 40 MG/1
CAPSULE, DELAYED RELEASE ORAL
Qty: 90 CAPSULE | Refills: 3 | Status: SHIPPED | OUTPATIENT
Start: 2021-02-17 | End: 2021-09-09 | Stop reason: SDUPTHER

## 2021-02-17 RX ORDER — LORATADINE 10 MG/1
10 TABLET ORAL DAILY PRN
Qty: 90 TABLET | Refills: 3 | Status: SHIPPED | OUTPATIENT
Start: 2021-02-17 | End: 2021-11-15

## 2021-02-26 RX ORDER — GABAPENTIN 300 MG/1
CAPSULE ORAL
Qty: 90 CAPSULE | Refills: 1 | Status: SHIPPED | OUTPATIENT
Start: 2021-02-26 | End: 2021-06-11

## 2021-03-01 DIAGNOSIS — K58.9 IRRITABLE BOWEL SYNDROME, UNSPECIFIED TYPE: ICD-10-CM

## 2021-03-01 RX ORDER — DICYCLOMINE HYDROCHLORIDE 20 MG/1
20 TABLET ORAL EVERY 6 HOURS
Qty: 120 TABLET | Refills: 0 | Status: SHIPPED | OUTPATIENT
Start: 2021-03-01 | End: 2021-03-26

## 2021-03-01 RX ORDER — LANCETS
EACH MISCELLANEOUS
Qty: 200 EACH | Refills: 3 | Status: ON HOLD | OUTPATIENT
Start: 2021-03-01 | End: 2024-01-04

## 2021-03-12 ENCOUNTER — IMMUNIZATION (OUTPATIENT)
Dept: PRIMARY CARE CLINIC | Facility: CLINIC | Age: 71
End: 2021-03-12
Payer: MEDICARE

## 2021-03-12 DIAGNOSIS — Z23 NEED FOR VACCINATION: Primary | ICD-10-CM

## 2021-03-12 PROCEDURE — 0001A PR IMMUNIZ ADMIN, SARS-COV-2 COVID-19 VACC, 30MCG/0.3ML, 1ST DOSE: CPT | Mod: CV19,S$GLB,, | Performed by: INTERNAL MEDICINE

## 2021-03-12 PROCEDURE — 91300 PR SARS-COV- 2 COVID-19 VACCINE, NO PRSV, 30MCG/0.3ML, IM: CPT | Mod: S$GLB,,, | Performed by: INTERNAL MEDICINE

## 2021-03-12 PROCEDURE — 0001A PR IMMUNIZ ADMIN, SARS-COV-2 COVID-19 VACC, 30MCG/0.3ML, 1ST DOSE: ICD-10-PCS | Mod: CV19,S$GLB,, | Performed by: INTERNAL MEDICINE

## 2021-03-12 PROCEDURE — 91300 PR SARS-COV- 2 COVID-19 VACCINE, NO PRSV, 30MCG/0.3ML, IM: ICD-10-PCS | Mod: S$GLB,,, | Performed by: INTERNAL MEDICINE

## 2021-03-12 RX ADMIN — Medication 0.3 ML: at 09:03

## 2021-03-19 DIAGNOSIS — F41.9 ANXIETY: ICD-10-CM

## 2021-03-22 DIAGNOSIS — I10 ESSENTIAL HYPERTENSION, BENIGN: ICD-10-CM

## 2021-03-22 RX ORDER — ATORVASTATIN CALCIUM 40 MG/1
TABLET, FILM COATED ORAL
Qty: 90 TABLET | Refills: 2 | Status: SHIPPED | OUTPATIENT
Start: 2021-03-22 | End: 2021-09-09 | Stop reason: SDUPTHER

## 2021-03-22 RX ORDER — CITALOPRAM 20 MG/1
TABLET, FILM COATED ORAL
Qty: 90 TABLET | Refills: 3 | Status: SHIPPED | OUTPATIENT
Start: 2021-03-22 | End: 2021-09-09 | Stop reason: SDUPTHER

## 2021-03-24 RX ORDER — IRBESARTAN 75 MG/1
TABLET ORAL
Qty: 90 TABLET | Refills: 2 | Status: SHIPPED | OUTPATIENT
Start: 2021-03-24 | End: 2021-09-09 | Stop reason: SDUPTHER

## 2021-03-25 DIAGNOSIS — K58.9 IRRITABLE BOWEL SYNDROME, UNSPECIFIED TYPE: ICD-10-CM

## 2021-03-26 RX ORDER — DICYCLOMINE HYDROCHLORIDE 20 MG/1
20 TABLET ORAL EVERY 6 HOURS
Qty: 120 TABLET | Refills: 0 | Status: SHIPPED | OUTPATIENT
Start: 2021-03-26 | End: 2021-04-19

## 2021-04-02 ENCOUNTER — IMMUNIZATION (OUTPATIENT)
Dept: PRIMARY CARE CLINIC | Facility: CLINIC | Age: 71
End: 2021-04-02
Payer: MEDICARE

## 2021-04-02 DIAGNOSIS — Z23 NEED FOR VACCINATION: Primary | ICD-10-CM

## 2021-04-02 PROCEDURE — 91300 PR SARS-COV- 2 COVID-19 VACCINE, NO PRSV, 30MCG/0.3ML, IM: ICD-10-PCS | Mod: S$GLB,,, | Performed by: INTERNAL MEDICINE

## 2021-04-02 PROCEDURE — 91300 PR SARS-COV- 2 COVID-19 VACCINE, NO PRSV, 30MCG/0.3ML, IM: CPT | Mod: S$GLB,,, | Performed by: INTERNAL MEDICINE

## 2021-04-02 PROCEDURE — 0002A PR IMMUNIZ ADMIN, SARS-COV-2 COVID-19 VACC, 30MCG/0.3ML, 2ND DOSE: CPT | Mod: CV19,S$GLB,, | Performed by: INTERNAL MEDICINE

## 2021-04-02 PROCEDURE — 0002A PR IMMUNIZ ADMIN, SARS-COV-2 COVID-19 VACC, 30MCG/0.3ML, 2ND DOSE: ICD-10-PCS | Mod: CV19,S$GLB,, | Performed by: INTERNAL MEDICINE

## 2021-04-02 RX ADMIN — Medication 0.3 ML: at 09:04

## 2021-04-12 ENCOUNTER — LAB VISIT (OUTPATIENT)
Dept: LAB | Facility: HOSPITAL | Age: 71
End: 2021-04-12
Attending: NURSE PRACTITIONER
Payer: MEDICARE

## 2021-04-12 ENCOUNTER — OFFICE VISIT (OUTPATIENT)
Dept: ENDOCRINOLOGY | Facility: CLINIC | Age: 71
End: 2021-04-12
Payer: MEDICARE

## 2021-04-12 VITALS
DIASTOLIC BLOOD PRESSURE: 95 MMHG | BODY MASS INDEX: 25.93 KG/M2 | TEMPERATURE: 98 F | WEIGHT: 128.38 LBS | HEART RATE: 96 BPM | SYSTOLIC BLOOD PRESSURE: 142 MMHG

## 2021-04-12 DIAGNOSIS — Z72.0 TOBACCO ABUSE: ICD-10-CM

## 2021-04-12 DIAGNOSIS — E11.40 TYPE 2 DIABETES MELLITUS WITH DIABETIC NEUROPATHY, WITHOUT LONG-TERM CURRENT USE OF INSULIN: Primary | ICD-10-CM

## 2021-04-12 DIAGNOSIS — M81.0 OSTEOPOROSIS, POSTMENOPAUSAL: ICD-10-CM

## 2021-04-12 DIAGNOSIS — E78.5 HYPERLIPIDEMIA, UNSPECIFIED HYPERLIPIDEMIA TYPE: ICD-10-CM

## 2021-04-12 LAB
ESTIMATED AVG GLUCOSE: 151 MG/DL (ref 68–131)
HBA1C MFR BLD: 6.9 % (ref 4–5.6)

## 2021-04-12 PROCEDURE — 83036 HEMOGLOBIN GLYCOSYLATED A1C: CPT | Performed by: NURSE PRACTITIONER

## 2021-04-12 PROCEDURE — 1126F AMNT PAIN NOTED NONE PRSNT: CPT | Mod: S$GLB,,, | Performed by: NURSE PRACTITIONER

## 2021-04-12 PROCEDURE — 36415 COLL VENOUS BLD VENIPUNCTURE: CPT | Mod: PN | Performed by: NURSE PRACTITIONER

## 2021-04-12 PROCEDURE — 99999 PR PBB SHADOW E&M-EST. PATIENT-LVL IV: ICD-10-PCS | Mod: PBBFAC,,, | Performed by: NURSE PRACTITIONER

## 2021-04-12 PROCEDURE — 3008F BODY MASS INDEX DOCD: CPT | Mod: CPTII,S$GLB,, | Performed by: NURSE PRACTITIONER

## 2021-04-12 PROCEDURE — 99214 OFFICE O/P EST MOD 30 MIN: CPT | Mod: S$GLB,,, | Performed by: NURSE PRACTITIONER

## 2021-04-12 PROCEDURE — 99499 UNLISTED E&M SERVICE: CPT | Mod: S$GLB,,, | Performed by: NURSE PRACTITIONER

## 2021-04-12 PROCEDURE — 1159F MED LIST DOCD IN RCRD: CPT | Mod: S$GLB,,, | Performed by: NURSE PRACTITIONER

## 2021-04-12 PROCEDURE — 99214 PR OFFICE/OUTPT VISIT, EST, LEVL IV, 30-39 MIN: ICD-10-PCS | Mod: S$GLB,,, | Performed by: NURSE PRACTITIONER

## 2021-04-12 PROCEDURE — 3008F PR BODY MASS INDEX (BMI) DOCUMENTED: ICD-10-PCS | Mod: CPTII,S$GLB,, | Performed by: NURSE PRACTITIONER

## 2021-04-12 PROCEDURE — 99999 PR PBB SHADOW E&M-EST. PATIENT-LVL IV: CPT | Mod: PBBFAC,,, | Performed by: NURSE PRACTITIONER

## 2021-04-12 PROCEDURE — 99499 RISK ADDL DX/OHS AUDIT: ICD-10-PCS | Mod: S$GLB,,, | Performed by: NURSE PRACTITIONER

## 2021-04-12 PROCEDURE — 1126F PR PAIN SEVERITY QUANTIFIED, NO PAIN PRESENT: ICD-10-PCS | Mod: S$GLB,,, | Performed by: NURSE PRACTITIONER

## 2021-04-12 PROCEDURE — 1159F PR MEDICATION LIST DOCUMENTED IN MEDICAL RECORD: ICD-10-PCS | Mod: S$GLB,,, | Performed by: NURSE PRACTITIONER

## 2021-04-13 ENCOUNTER — TELEPHONE (OUTPATIENT)
Dept: ENDOCRINOLOGY | Facility: CLINIC | Age: 71
End: 2021-04-13

## 2021-04-13 RX ORDER — ALENDRONATE SODIUM 70 MG/1
TABLET ORAL
Qty: 12 TABLET | Refills: 3 | Status: SHIPPED | OUTPATIENT
Start: 2021-04-13 | End: 2021-09-09 | Stop reason: SDUPTHER

## 2021-04-17 DIAGNOSIS — K58.9 IRRITABLE BOWEL SYNDROME, UNSPECIFIED TYPE: ICD-10-CM

## 2021-04-19 RX ORDER — DICYCLOMINE HYDROCHLORIDE 20 MG/1
20 TABLET ORAL EVERY 6 HOURS
Qty: 120 TABLET | Refills: 0 | Status: SHIPPED | OUTPATIENT
Start: 2021-04-19 | End: 2021-05-12

## 2021-04-26 RX ORDER — MELOXICAM 15 MG/1
15 TABLET ORAL DAILY PRN
Qty: 90 TABLET | Refills: 3 | Status: ON HOLD | OUTPATIENT
Start: 2021-04-26 | End: 2022-01-31 | Stop reason: CLARIF

## 2021-05-12 DIAGNOSIS — K58.9 IRRITABLE BOWEL SYNDROME, UNSPECIFIED TYPE: ICD-10-CM

## 2021-05-12 RX ORDER — DICYCLOMINE HYDROCHLORIDE 20 MG/1
20 TABLET ORAL EVERY 6 HOURS
Qty: 120 TABLET | Refills: 0 | Status: SHIPPED | OUTPATIENT
Start: 2021-05-12 | End: 2021-06-11

## 2021-05-13 ENCOUNTER — PATIENT OUTREACH (OUTPATIENT)
Dept: ADMINISTRATIVE | Facility: OTHER | Age: 71
End: 2021-05-13

## 2021-05-13 ENCOUNTER — TELEPHONE (OUTPATIENT)
Dept: GASTROENTEROLOGY | Facility: CLINIC | Age: 71
End: 2021-05-13

## 2021-05-13 DIAGNOSIS — J42 CHRONIC BRONCHITIS, UNSPECIFIED CHRONIC BRONCHITIS TYPE: ICD-10-CM

## 2021-05-17 RX ORDER — UMECLIDINIUM 62.5 UG/1
AEROSOL, POWDER ORAL
Qty: 90 EACH | Refills: 0 | Status: SHIPPED | OUTPATIENT
Start: 2021-05-17 | End: 2021-12-09

## 2021-05-26 ENCOUNTER — TELEPHONE (OUTPATIENT)
Dept: FAMILY MEDICINE | Facility: CLINIC | Age: 71
End: 2021-05-26

## 2021-05-27 ENCOUNTER — TELEPHONE (OUTPATIENT)
Dept: FAMILY MEDICINE | Facility: CLINIC | Age: 71
End: 2021-05-27

## 2021-05-28 RX ORDER — DULAGLUTIDE 1.5 MG/.5ML
1.5 INJECTION, SOLUTION SUBCUTANEOUS
Qty: 4 PEN | Refills: 11 | Status: SHIPPED | OUTPATIENT
Start: 2021-05-28 | End: 2021-06-01 | Stop reason: SDUPTHER

## 2021-06-01 ENCOUNTER — TELEPHONE (OUTPATIENT)
Dept: FAMILY MEDICINE | Facility: CLINIC | Age: 71
End: 2021-06-01

## 2021-06-01 DIAGNOSIS — R42 DIZZINESS: ICD-10-CM

## 2021-06-01 DIAGNOSIS — E11.42 TYPE 2 DIABETES MELLITUS WITH DIABETIC POLYNEUROPATHY, WITHOUT LONG-TERM CURRENT USE OF INSULIN: Primary | ICD-10-CM

## 2021-06-01 RX ORDER — MECLIZINE HCL 12.5 MG 12.5 MG/1
12.5 TABLET ORAL 3 TIMES DAILY PRN
Qty: 30 TABLET | Refills: 0 | Status: SHIPPED | OUTPATIENT
Start: 2021-06-01 | End: 2021-09-09

## 2021-06-01 RX ORDER — DULAGLUTIDE 1.5 MG/.5ML
1.5 INJECTION, SOLUTION SUBCUTANEOUS
Qty: 12 PEN | Refills: 3 | Status: SHIPPED | OUTPATIENT
Start: 2021-06-01 | End: 2021-06-08 | Stop reason: SDUPTHER

## 2021-06-08 DIAGNOSIS — E11.42 TYPE 2 DIABETES MELLITUS WITH DIABETIC POLYNEUROPATHY, WITHOUT LONG-TERM CURRENT USE OF INSULIN: ICD-10-CM

## 2021-06-08 RX ORDER — DULAGLUTIDE 1.5 MG/.5ML
1.5 INJECTION, SOLUTION SUBCUTANEOUS
Qty: 12 PEN | Refills: 3 | Status: SHIPPED | OUTPATIENT
Start: 2021-06-08 | End: 2022-05-02 | Stop reason: SDUPTHER

## 2021-06-10 DIAGNOSIS — J98.01 BRONCHOSPASM: ICD-10-CM

## 2021-06-10 DIAGNOSIS — R60.0 BILATERAL LOWER EXTREMITY EDEMA: ICD-10-CM

## 2021-06-10 DIAGNOSIS — K58.9 IRRITABLE BOWEL SYNDROME, UNSPECIFIED TYPE: ICD-10-CM

## 2021-06-10 RX ORDER — ALBUTEROL SULFATE 90 UG/1
AEROSOL, METERED RESPIRATORY (INHALATION)
Qty: 25.5 G | Refills: 3 | Status: SHIPPED | OUTPATIENT
Start: 2021-06-10 | End: 2022-02-21 | Stop reason: SDUPTHER

## 2021-06-11 RX ORDER — DICYCLOMINE HYDROCHLORIDE 20 MG/1
20 TABLET ORAL EVERY 6 HOURS
Qty: 120 TABLET | Refills: 0 | Status: SHIPPED | OUTPATIENT
Start: 2021-06-11 | End: 2021-07-05

## 2021-06-11 RX ORDER — GABAPENTIN 300 MG/1
CAPSULE ORAL
Qty: 90 CAPSULE | Refills: 1 | Status: SHIPPED | OUTPATIENT
Start: 2021-06-11 | End: 2021-09-09 | Stop reason: SDUPTHER

## 2021-06-11 RX ORDER — FUROSEMIDE 20 MG/1
TABLET ORAL
Qty: 90 TABLET | Refills: 1 | Status: SHIPPED | OUTPATIENT
Start: 2021-06-11 | End: 2021-09-09 | Stop reason: SDUPTHER

## 2021-07-04 DIAGNOSIS — K58.9 IRRITABLE BOWEL SYNDROME, UNSPECIFIED TYPE: ICD-10-CM

## 2021-07-05 RX ORDER — DICYCLOMINE HYDROCHLORIDE 20 MG/1
20 TABLET ORAL EVERY 6 HOURS
Qty: 120 TABLET | Refills: 0 | Status: SHIPPED | OUTPATIENT
Start: 2021-07-05 | End: 2021-07-30

## 2021-07-21 DIAGNOSIS — E11.9 TYPE 2 DIABETES MELLITUS WITHOUT COMPLICATION: ICD-10-CM

## 2021-07-30 DIAGNOSIS — K58.9 IRRITABLE BOWEL SYNDROME, UNSPECIFIED TYPE: ICD-10-CM

## 2021-07-30 RX ORDER — DICYCLOMINE HYDROCHLORIDE 20 MG/1
20 TABLET ORAL EVERY 6 HOURS
Qty: 120 TABLET | Refills: 0 | Status: SHIPPED | OUTPATIENT
Start: 2021-07-30 | End: 2021-08-16

## 2021-08-13 DIAGNOSIS — K58.9 IRRITABLE BOWEL SYNDROME, UNSPECIFIED TYPE: ICD-10-CM

## 2021-08-16 RX ORDER — DICYCLOMINE HYDROCHLORIDE 20 MG/1
20 TABLET ORAL EVERY 6 HOURS
Qty: 360 TABLET | Refills: 1 | Status: SHIPPED | OUTPATIENT
Start: 2021-08-16 | End: 2021-09-09

## 2021-08-17 ENCOUNTER — TELEPHONE (OUTPATIENT)
Dept: FAMILY MEDICINE | Facility: CLINIC | Age: 71
End: 2021-08-17

## 2021-09-08 PROBLEM — H52.7 REFRACTIVE ERROR: Status: RESOLVED | Noted: 2017-07-14 | Resolved: 2021-09-08

## 2021-09-08 PROBLEM — M67.431 GANGLION CYST OF VOLAR ASPECT OF RIGHT WRIST: Status: RESOLVED | Noted: 2019-08-21 | Resolved: 2021-09-08

## 2021-09-08 PROBLEM — M62.81 MUSCLE WEAKNESS: Status: RESOLVED | Noted: 2018-01-16 | Resolved: 2021-09-08

## 2021-09-08 PROBLEM — S42.012A: Status: RESOLVED | Noted: 2019-01-24 | Resolved: 2021-09-08

## 2021-09-08 PROBLEM — Q07.9 CONGENITAL TILTED OPTIC NERVE: Status: RESOLVED | Noted: 2018-06-15 | Resolved: 2021-09-08

## 2021-09-08 PROBLEM — Z74.09 IMPAIRED FUNCTIONAL MOBILITY, BALANCE, GAIT, AND ENDURANCE: Status: RESOLVED | Noted: 2018-01-16 | Resolved: 2021-09-08

## 2021-09-09 ENCOUNTER — OFFICE VISIT (OUTPATIENT)
Dept: FAMILY MEDICINE | Facility: CLINIC | Age: 71
End: 2021-09-09
Payer: MEDICARE

## 2021-09-09 ENCOUNTER — LAB VISIT (OUTPATIENT)
Dept: LAB | Facility: HOSPITAL | Age: 71
End: 2021-09-09
Attending: INTERNAL MEDICINE
Payer: MEDICARE

## 2021-09-09 VITALS
DIASTOLIC BLOOD PRESSURE: 60 MMHG | BODY MASS INDEX: 23.65 KG/M2 | TEMPERATURE: 98 F | HEART RATE: 53 BPM | OXYGEN SATURATION: 98 % | HEIGHT: 59 IN | SYSTOLIC BLOOD PRESSURE: 126 MMHG | WEIGHT: 117.31 LBS

## 2021-09-09 DIAGNOSIS — Z12.2 SCREENING FOR MALIGNANT NEOPLASM OF RESPIRATORY ORGAN: ICD-10-CM

## 2021-09-09 DIAGNOSIS — Z12.11 SCREENING FOR COLON CANCER: ICD-10-CM

## 2021-09-09 DIAGNOSIS — M81.0 OSTEOPOROSIS, POSTMENOPAUSAL: ICD-10-CM

## 2021-09-09 DIAGNOSIS — Z79.1 NSAID LONG-TERM USE: ICD-10-CM

## 2021-09-09 DIAGNOSIS — R42 DIZZINESS: ICD-10-CM

## 2021-09-09 DIAGNOSIS — Z91.81 AT HIGH RISK FOR FALLS: ICD-10-CM

## 2021-09-09 DIAGNOSIS — Z12.31 ENCOUNTER FOR SCREENING MAMMOGRAM FOR BREAST CANCER: ICD-10-CM

## 2021-09-09 DIAGNOSIS — R27.8 OTHER LACK OF COORDINATION: ICD-10-CM

## 2021-09-09 DIAGNOSIS — Z72.0 TOBACCO ABUSE: ICD-10-CM

## 2021-09-09 DIAGNOSIS — K21.9 GERD WITHOUT ESOPHAGITIS: ICD-10-CM

## 2021-09-09 DIAGNOSIS — F41.9 ANXIETY: ICD-10-CM

## 2021-09-09 DIAGNOSIS — I10 ESSENTIAL HYPERTENSION: ICD-10-CM

## 2021-09-09 DIAGNOSIS — R60.0 BILATERAL LOWER EXTREMITY EDEMA: ICD-10-CM

## 2021-09-09 DIAGNOSIS — K58.0 IRRITABLE BOWEL SYNDROME WITH DIARRHEA: ICD-10-CM

## 2021-09-09 DIAGNOSIS — Z78.0 POSTMENOPAUSAL: ICD-10-CM

## 2021-09-09 DIAGNOSIS — E11.42 TYPE 2 DIABETES MELLITUS WITH DIABETIC POLYNEUROPATHY, WITHOUT LONG-TERM CURRENT USE OF INSULIN: Primary | ICD-10-CM

## 2021-09-09 DIAGNOSIS — E11.42 TYPE 2 DIABETES MELLITUS WITH DIABETIC POLYNEUROPATHY, WITHOUT LONG-TERM CURRENT USE OF INSULIN: ICD-10-CM

## 2021-09-09 DIAGNOSIS — J42 CHRONIC BRONCHITIS, UNSPECIFIED CHRONIC BRONCHITIS TYPE: Chronic | ICD-10-CM

## 2021-09-09 DIAGNOSIS — F17.210 NICOTINE DEPENDENCE, CIGARETTES, UNCOMPLICATED: ICD-10-CM

## 2021-09-09 LAB
ALBUMIN SERPL BCP-MCNC: 4 G/DL (ref 3.5–5.2)
ALP SERPL-CCNC: 97 U/L (ref 55–135)
ALT SERPL W/O P-5'-P-CCNC: 15 U/L (ref 10–44)
ANION GAP SERPL CALC-SCNC: 9 MMOL/L (ref 8–16)
AST SERPL-CCNC: 16 U/L (ref 10–40)
BILIRUB SERPL-MCNC: 0.4 MG/DL (ref 0.1–1)
BUN SERPL-MCNC: 11 MG/DL (ref 8–23)
CALCIUM SERPL-MCNC: 10.4 MG/DL (ref 8.7–10.5)
CHLORIDE SERPL-SCNC: 107 MMOL/L (ref 95–110)
CHOLEST SERPL-MCNC: 154 MG/DL (ref 120–199)
CHOLEST/HDLC SERPL: 3.1 {RATIO} (ref 2–5)
CO2 SERPL-SCNC: 26 MMOL/L (ref 23–29)
CREAT SERPL-MCNC: 0.7 MG/DL (ref 0.5–1.4)
EST. GFR  (AFRICAN AMERICAN): >60 ML/MIN/1.73 M^2
EST. GFR  (NON AFRICAN AMERICAN): >60 ML/MIN/1.73 M^2
ESTIMATED AVG GLUCOSE: 137 MG/DL (ref 68–131)
GLUCOSE SERPL-MCNC: 123 MG/DL (ref 70–110)
HBA1C MFR BLD: 6.4 % (ref 4–5.6)
HDLC SERPL-MCNC: 49 MG/DL (ref 40–75)
HDLC SERPL: 31.8 % (ref 20–50)
LDLC SERPL CALC-MCNC: 85.8 MG/DL (ref 63–159)
NONHDLC SERPL-MCNC: 105 MG/DL
POTASSIUM SERPL-SCNC: 3.9 MMOL/L (ref 3.5–5.1)
PROT SERPL-MCNC: 6.9 G/DL (ref 6–8.4)
SODIUM SERPL-SCNC: 142 MMOL/L (ref 136–145)
TRIGL SERPL-MCNC: 96 MG/DL (ref 30–150)
VIT B12 SERPL-MCNC: 561 PG/ML (ref 210–950)

## 2021-09-09 PROCEDURE — 1159F PR MEDICATION LIST DOCUMENTED IN MEDICAL RECORD: ICD-10-PCS | Mod: CPTII,S$GLB,, | Performed by: INTERNAL MEDICINE

## 2021-09-09 PROCEDURE — 3061F PR NEG MICROALBUMINURIA RESULT DOCUMENTED/REVIEW: ICD-10-PCS | Mod: CPTII,S$GLB,, | Performed by: INTERNAL MEDICINE

## 2021-09-09 PROCEDURE — 3008F PR BODY MASS INDEX (BMI) DOCUMENTED: ICD-10-PCS | Mod: CPTII,S$GLB,, | Performed by: INTERNAL MEDICINE

## 2021-09-09 PROCEDURE — 3074F SYST BP LT 130 MM HG: CPT | Mod: CPTII,S$GLB,, | Performed by: INTERNAL MEDICINE

## 2021-09-09 PROCEDURE — 99214 PR OFFICE/OUTPT VISIT, EST, LEVL IV, 30-39 MIN: ICD-10-PCS | Mod: S$GLB,,, | Performed by: INTERNAL MEDICINE

## 2021-09-09 PROCEDURE — 3288F FALL RISK ASSESSMENT DOCD: CPT | Mod: CPTII,S$GLB,, | Performed by: INTERNAL MEDICINE

## 2021-09-09 PROCEDURE — 3044F HG A1C LEVEL LT 7.0%: CPT | Mod: CPTII,S$GLB,, | Performed by: INTERNAL MEDICINE

## 2021-09-09 PROCEDURE — 3066F NEPHROPATHY DOC TX: CPT | Mod: CPTII,S$GLB,, | Performed by: INTERNAL MEDICINE

## 2021-09-09 PROCEDURE — 99999 PR PBB SHADOW E&M-EST. PATIENT-LVL IV: ICD-10-PCS | Mod: PBBFAC,,, | Performed by: INTERNAL MEDICINE

## 2021-09-09 PROCEDURE — 80053 COMPREHEN METABOLIC PANEL: CPT | Performed by: INTERNAL MEDICINE

## 2021-09-09 PROCEDURE — 3078F PR MOST RECENT DIASTOLIC BLOOD PRESSURE < 80 MM HG: ICD-10-PCS | Mod: CPTII,S$GLB,, | Performed by: INTERNAL MEDICINE

## 2021-09-09 PROCEDURE — 3061F NEG MICROALBUMINURIA REV: CPT | Mod: CPTII,S$GLB,, | Performed by: INTERNAL MEDICINE

## 2021-09-09 PROCEDURE — 99499 UNLISTED E&M SERVICE: CPT | Mod: S$GLB,,, | Performed by: INTERNAL MEDICINE

## 2021-09-09 PROCEDURE — 1126F PR PAIN SEVERITY QUANTIFIED, NO PAIN PRESENT: ICD-10-PCS | Mod: CPTII,S$GLB,, | Performed by: INTERNAL MEDICINE

## 2021-09-09 PROCEDURE — 1101F PR PT FALLS ASSESS DOC 0-1 FALLS W/OUT INJ PAST YR: ICD-10-PCS | Mod: CPTII,S$GLB,, | Performed by: INTERNAL MEDICINE

## 2021-09-09 PROCEDURE — 3074F PR MOST RECENT SYSTOLIC BLOOD PRESSURE < 130 MM HG: ICD-10-PCS | Mod: CPTII,S$GLB,, | Performed by: INTERNAL MEDICINE

## 2021-09-09 PROCEDURE — 36415 COLL VENOUS BLD VENIPUNCTURE: CPT | Mod: PO | Performed by: INTERNAL MEDICINE

## 2021-09-09 PROCEDURE — 3288F PR FALLS RISK ASSESSMENT DOCUMENTED: ICD-10-PCS | Mod: CPTII,S$GLB,, | Performed by: INTERNAL MEDICINE

## 2021-09-09 PROCEDURE — 1160F PR REVIEW ALL MEDS BY PRESCRIBER/CLIN PHARMACIST DOCUMENTED: ICD-10-PCS | Mod: CPTII,S$GLB,, | Performed by: INTERNAL MEDICINE

## 2021-09-09 PROCEDURE — 3044F PR MOST RECENT HEMOGLOBIN A1C LEVEL <7.0%: ICD-10-PCS | Mod: CPTII,S$GLB,, | Performed by: INTERNAL MEDICINE

## 2021-09-09 PROCEDURE — 4010F ACE/ARB THERAPY RXD/TAKEN: CPT | Mod: CPTII,S$GLB,, | Performed by: INTERNAL MEDICINE

## 2021-09-09 PROCEDURE — 83036 HEMOGLOBIN GLYCOSYLATED A1C: CPT | Performed by: INTERNAL MEDICINE

## 2021-09-09 PROCEDURE — 1101F PT FALLS ASSESS-DOCD LE1/YR: CPT | Mod: CPTII,S$GLB,, | Performed by: INTERNAL MEDICINE

## 2021-09-09 PROCEDURE — 1126F AMNT PAIN NOTED NONE PRSNT: CPT | Mod: CPTII,S$GLB,, | Performed by: INTERNAL MEDICINE

## 2021-09-09 PROCEDURE — 3008F BODY MASS INDEX DOCD: CPT | Mod: CPTII,S$GLB,, | Performed by: INTERNAL MEDICINE

## 2021-09-09 PROCEDURE — 82607 VITAMIN B-12: CPT | Performed by: INTERNAL MEDICINE

## 2021-09-09 PROCEDURE — 3078F DIAST BP <80 MM HG: CPT | Mod: CPTII,S$GLB,, | Performed by: INTERNAL MEDICINE

## 2021-09-09 PROCEDURE — 1159F MED LIST DOCD IN RCRD: CPT | Mod: CPTII,S$GLB,, | Performed by: INTERNAL MEDICINE

## 2021-09-09 PROCEDURE — 99999 PR PBB SHADOW E&M-EST. PATIENT-LVL IV: CPT | Mod: PBBFAC,,, | Performed by: INTERNAL MEDICINE

## 2021-09-09 PROCEDURE — 80061 LIPID PANEL: CPT | Performed by: INTERNAL MEDICINE

## 2021-09-09 PROCEDURE — 1160F RVW MEDS BY RX/DR IN RCRD: CPT | Mod: CPTII,S$GLB,, | Performed by: INTERNAL MEDICINE

## 2021-09-09 PROCEDURE — 4010F PR ACE/ARB THEARPY RXD/TAKEN: ICD-10-PCS | Mod: CPTII,S$GLB,, | Performed by: INTERNAL MEDICINE

## 2021-09-09 PROCEDURE — 99214 OFFICE O/P EST MOD 30 MIN: CPT | Mod: S$GLB,,, | Performed by: INTERNAL MEDICINE

## 2021-09-09 PROCEDURE — 3066F PR DOCUMENTATION OF TREATMENT FOR NEPHROPATHY: ICD-10-PCS | Mod: CPTII,S$GLB,, | Performed by: INTERNAL MEDICINE

## 2021-09-09 PROCEDURE — 99499 RISK ADDL DX/OHS AUDIT: ICD-10-PCS | Mod: S$GLB,,, | Performed by: INTERNAL MEDICINE

## 2021-09-09 RX ORDER — OMEPRAZOLE 40 MG/1
40 CAPSULE, DELAYED RELEASE ORAL
Qty: 90 CAPSULE | Refills: 1 | Status: SHIPPED | OUTPATIENT
Start: 2021-09-09 | End: 2022-06-01 | Stop reason: SDUPTHER

## 2021-09-09 RX ORDER — IRBESARTAN 75 MG/1
75 TABLET ORAL DAILY
Qty: 90 TABLET | Refills: 1 | Status: ON HOLD | OUTPATIENT
Start: 2021-09-09 | End: 2022-03-24 | Stop reason: HOSPADM

## 2021-09-09 RX ORDER — CITALOPRAM 20 MG/1
20 TABLET, FILM COATED ORAL DAILY
Qty: 90 TABLET | Refills: 1 | Status: SHIPPED | OUTPATIENT
Start: 2021-09-09 | End: 2022-11-07 | Stop reason: SDUPTHER

## 2021-09-09 RX ORDER — GABAPENTIN 300 MG/1
300 CAPSULE ORAL 3 TIMES DAILY
Qty: 270 CAPSULE | Refills: 1 | Status: ON HOLD | OUTPATIENT
Start: 2021-09-09 | End: 2022-01-31 | Stop reason: CLARIF

## 2021-09-09 RX ORDER — MECLIZINE HCL 12.5 MG 12.5 MG/1
12.5 TABLET ORAL 3 TIMES DAILY PRN
Qty: 30 TABLET | Refills: 0 | Status: SHIPPED | OUTPATIENT
Start: 2021-09-09 | End: 2022-03-21 | Stop reason: CLARIF

## 2021-09-09 RX ORDER — ALENDRONATE SODIUM 70 MG/1
70 TABLET ORAL WEEKLY
Qty: 12 TABLET | Refills: 1 | Status: SHIPPED | OUTPATIENT
Start: 2021-09-09 | End: 2022-11-07 | Stop reason: SDUPTHER

## 2021-09-09 RX ORDER — ATORVASTATIN CALCIUM 40 MG/1
40 TABLET, FILM COATED ORAL DAILY
Qty: 90 TABLET | Refills: 1 | Status: SHIPPED | OUTPATIENT
Start: 2021-09-09 | End: 2022-05-28

## 2021-09-09 RX ORDER — CHOLESTYRAMINE 4 G/4.8G
4 POWDER, FOR SUSPENSION ORAL 2 TIMES DAILY
Qty: 180 PACKET | Refills: 0 | Status: SHIPPED | OUTPATIENT
Start: 2021-09-09 | End: 2021-11-30

## 2021-09-09 RX ORDER — FUROSEMIDE 20 MG/1
20 TABLET ORAL DAILY
Qty: 90 TABLET | Refills: 1 | Status: ON HOLD | OUTPATIENT
Start: 2021-09-09 | End: 2022-07-18 | Stop reason: SDUPTHER

## 2021-09-21 ENCOUNTER — TELEPHONE (OUTPATIENT)
Dept: GASTROENTEROLOGY | Facility: CLINIC | Age: 71
End: 2021-09-21

## 2021-09-21 DIAGNOSIS — G25.0 ESSENTIAL TREMOR: ICD-10-CM

## 2021-09-21 RX ORDER — PRIMIDONE 50 MG/1
TABLET ORAL
Qty: 540 TABLET | Refills: 3 | Status: SHIPPED | OUTPATIENT
Start: 2021-09-21 | End: 2022-09-14

## 2021-10-04 NOTE — PLAN OF CARE
Problem: Physical Therapy Goal  Goal: Physical Therapy Goal  Goals to be met by: 19     Patient will increase functional independence with mobility by performin. Supine to sit with Modified [place]  2. Rolling to Left and Right with Modified [place]  3. Sit to stand transfer with Modified [place]  4. Bed to chair transfer with Modified [place]   5. Gait >250 feet with Modified [place] using Single-point Cane   6. Lower extremity exercise program 3 sets x10 reps per handout, with independence     Outcome: Ongoing (interventions implemented as appropriate)  Pt ambulated ~250 ft without AD and ~250 ft with str cane with CGA-SBA.         [Negative] : Heme/Lymph [FreeTextEntry9] : Right ankle/foot

## 2021-10-07 ENCOUNTER — PATIENT OUTREACH (OUTPATIENT)
Dept: ADMINISTRATIVE | Facility: OTHER | Age: 71
End: 2021-10-07

## 2021-10-13 ENCOUNTER — OFFICE VISIT (OUTPATIENT)
Dept: OPTOMETRY | Facility: CLINIC | Age: 71
End: 2021-10-13
Payer: MEDICARE

## 2021-10-13 DIAGNOSIS — H40.1133 PRIMARY OPEN ANGLE GLAUCOMA OF BOTH EYES, SEVERE STAGE: ICD-10-CM

## 2021-10-13 DIAGNOSIS — E11.9 TYPE 2 DIABETES MELLITUS WITHOUT RETINOPATHY: Primary | ICD-10-CM

## 2021-10-13 DIAGNOSIS — Z96.1 PSEUDOPHAKIA OF BOTH EYES: ICD-10-CM

## 2021-10-13 PROCEDURE — 92004 COMPRE OPH EXAM NEW PT 1/>: CPT | Mod: S$GLB,,, | Performed by: OPTOMETRIST

## 2021-10-13 PROCEDURE — 2023F DILAT RTA XM W/O RTNOPTHY: CPT | Mod: CPTII,S$GLB,, | Performed by: OPTOMETRIST

## 2021-10-13 PROCEDURE — 99499 RISK ADDL DX/OHS AUDIT: ICD-10-PCS | Mod: S$GLB,,, | Performed by: OPTOMETRIST

## 2021-10-13 PROCEDURE — 3288F FALL RISK ASSESSMENT DOCD: CPT | Mod: CPTII,S$GLB,, | Performed by: OPTOMETRIST

## 2021-10-13 PROCEDURE — 1101F PR PT FALLS ASSESS DOC 0-1 FALLS W/OUT INJ PAST YR: ICD-10-PCS | Mod: CPTII,S$GLB,, | Performed by: OPTOMETRIST

## 2021-10-13 PROCEDURE — 1126F AMNT PAIN NOTED NONE PRSNT: CPT | Mod: CPTII,S$GLB,, | Performed by: OPTOMETRIST

## 2021-10-13 PROCEDURE — 3066F PR DOCUMENTATION OF TREATMENT FOR NEPHROPATHY: ICD-10-PCS | Mod: CPTII,S$GLB,, | Performed by: OPTOMETRIST

## 2021-10-13 PROCEDURE — 99999 PR PBB SHADOW E&M-EST. PATIENT-LVL II: ICD-10-PCS | Mod: PBBFAC,,, | Performed by: OPTOMETRIST

## 2021-10-13 PROCEDURE — 2023F PR DILATED RETINAL EXAM W/O EVID OF RETINOPATHY: ICD-10-PCS | Mod: CPTII,S$GLB,, | Performed by: OPTOMETRIST

## 2021-10-13 PROCEDURE — 1159F MED LIST DOCD IN RCRD: CPT | Mod: CPTII,S$GLB,, | Performed by: OPTOMETRIST

## 2021-10-13 PROCEDURE — 99499 UNLISTED E&M SERVICE: CPT | Mod: S$GLB,,, | Performed by: OPTOMETRIST

## 2021-10-13 PROCEDURE — 3066F NEPHROPATHY DOC TX: CPT | Mod: CPTII,S$GLB,, | Performed by: OPTOMETRIST

## 2021-10-13 PROCEDURE — 3061F NEG MICROALBUMINURIA REV: CPT | Mod: CPTII,S$GLB,, | Performed by: OPTOMETRIST

## 2021-10-13 PROCEDURE — 1101F PT FALLS ASSESS-DOCD LE1/YR: CPT | Mod: CPTII,S$GLB,, | Performed by: OPTOMETRIST

## 2021-10-13 PROCEDURE — 4010F PR ACE/ARB THEARPY RXD/TAKEN: ICD-10-PCS | Mod: CPTII,S$GLB,, | Performed by: OPTOMETRIST

## 2021-10-13 PROCEDURE — 1160F PR REVIEW ALL MEDS BY PRESCRIBER/CLIN PHARMACIST DOCUMENTED: ICD-10-PCS | Mod: CPTII,S$GLB,, | Performed by: OPTOMETRIST

## 2021-10-13 PROCEDURE — 1126F PR PAIN SEVERITY QUANTIFIED, NO PAIN PRESENT: ICD-10-PCS | Mod: CPTII,S$GLB,, | Performed by: OPTOMETRIST

## 2021-10-13 PROCEDURE — 3288F PR FALLS RISK ASSESSMENT DOCUMENTED: ICD-10-PCS | Mod: CPTII,S$GLB,, | Performed by: OPTOMETRIST

## 2021-10-13 PROCEDURE — 1159F PR MEDICATION LIST DOCUMENTED IN MEDICAL RECORD: ICD-10-PCS | Mod: CPTII,S$GLB,, | Performed by: OPTOMETRIST

## 2021-10-13 PROCEDURE — 3044F PR MOST RECENT HEMOGLOBIN A1C LEVEL <7.0%: ICD-10-PCS | Mod: CPTII,S$GLB,, | Performed by: OPTOMETRIST

## 2021-10-13 PROCEDURE — 99999 PR PBB SHADOW E&M-EST. PATIENT-LVL II: CPT | Mod: PBBFAC,,, | Performed by: OPTOMETRIST

## 2021-10-13 PROCEDURE — 3044F HG A1C LEVEL LT 7.0%: CPT | Mod: CPTII,S$GLB,, | Performed by: OPTOMETRIST

## 2021-10-13 PROCEDURE — 3061F PR NEG MICROALBUMINURIA RESULT DOCUMENTED/REVIEW: ICD-10-PCS | Mod: CPTII,S$GLB,, | Performed by: OPTOMETRIST

## 2021-10-13 PROCEDURE — 92004 PR EYE EXAM, NEW PATIENT,COMPREHESV: ICD-10-PCS | Mod: S$GLB,,, | Performed by: OPTOMETRIST

## 2021-10-13 PROCEDURE — 1160F RVW MEDS BY RX/DR IN RCRD: CPT | Mod: CPTII,S$GLB,, | Performed by: OPTOMETRIST

## 2021-10-13 PROCEDURE — 4010F ACE/ARB THERAPY RXD/TAKEN: CPT | Mod: CPTII,S$GLB,, | Performed by: OPTOMETRIST

## 2021-10-13 RX ORDER — LATANOPROST 50 UG/ML
1 SOLUTION/ DROPS OPHTHALMIC NIGHTLY
Qty: 7.5 ML | Refills: 1 | Status: SHIPPED | OUTPATIENT
Start: 2021-10-13 | End: 2022-04-04

## 2021-10-13 RX ORDER — DORZOLAMIDE HYDROCHLORIDE AND TIMOLOL MALEATE 20; 5 MG/ML; MG/ML
1 SOLUTION/ DROPS OPHTHALMIC 2 TIMES DAILY
Qty: 10 ML | Refills: 11 | Status: ON HOLD | OUTPATIENT
Start: 2021-10-13 | End: 2022-08-29

## 2021-11-04 ENCOUNTER — OFFICE VISIT (OUTPATIENT)
Dept: OPTOMETRY | Facility: CLINIC | Age: 71
End: 2021-11-04
Payer: MEDICARE

## 2021-11-04 DIAGNOSIS — H40.1133 PRIMARY OPEN ANGLE GLAUCOMA OF BOTH EYES, SEVERE STAGE: ICD-10-CM

## 2021-11-04 PROCEDURE — 1126F PR PAIN SEVERITY QUANTIFIED, NO PAIN PRESENT: ICD-10-PCS | Mod: CPTII,S$GLB,, | Performed by: OPTOMETRIST

## 2021-11-04 PROCEDURE — 92012 INTRM OPH EXAM EST PATIENT: CPT | Mod: S$GLB,,, | Performed by: OPTOMETRIST

## 2021-11-04 PROCEDURE — 1159F PR MEDICATION LIST DOCUMENTED IN MEDICAL RECORD: ICD-10-PCS | Mod: CPTII,S$GLB,, | Performed by: OPTOMETRIST

## 2021-11-04 PROCEDURE — 3044F HG A1C LEVEL LT 7.0%: CPT | Mod: CPTII,S$GLB,, | Performed by: OPTOMETRIST

## 2021-11-04 PROCEDURE — 3061F NEG MICROALBUMINURIA REV: CPT | Mod: CPTII,S$GLB,, | Performed by: OPTOMETRIST

## 2021-11-04 PROCEDURE — 1101F PR PT FALLS ASSESS DOC 0-1 FALLS W/OUT INJ PAST YR: ICD-10-PCS | Mod: CPTII,S$GLB,, | Performed by: OPTOMETRIST

## 2021-11-04 PROCEDURE — 1126F AMNT PAIN NOTED NONE PRSNT: CPT | Mod: CPTII,S$GLB,, | Performed by: OPTOMETRIST

## 2021-11-04 PROCEDURE — 3288F FALL RISK ASSESSMENT DOCD: CPT | Mod: CPTII,S$GLB,, | Performed by: OPTOMETRIST

## 2021-11-04 PROCEDURE — 92012 PR EYE EXAM, EST PATIENT,INTERMED: ICD-10-PCS | Mod: S$GLB,,, | Performed by: OPTOMETRIST

## 2021-11-04 PROCEDURE — 1160F RVW MEDS BY RX/DR IN RCRD: CPT | Mod: CPTII,S$GLB,, | Performed by: OPTOMETRIST

## 2021-11-04 PROCEDURE — 1159F MED LIST DOCD IN RCRD: CPT | Mod: CPTII,S$GLB,, | Performed by: OPTOMETRIST

## 2021-11-04 PROCEDURE — 4010F PR ACE/ARB THEARPY RXD/TAKEN: ICD-10-PCS | Mod: CPTII,S$GLB,, | Performed by: OPTOMETRIST

## 2021-11-04 PROCEDURE — 92133 CPTRZD OPH DX IMG PST SGM ON: CPT | Mod: S$GLB,,, | Performed by: OPTOMETRIST

## 2021-11-04 PROCEDURE — 3066F NEPHROPATHY DOC TX: CPT | Mod: CPTII,S$GLB,, | Performed by: OPTOMETRIST

## 2021-11-04 PROCEDURE — 3044F PR MOST RECENT HEMOGLOBIN A1C LEVEL <7.0%: ICD-10-PCS | Mod: CPTII,S$GLB,, | Performed by: OPTOMETRIST

## 2021-11-04 PROCEDURE — 99999 PR PBB SHADOW E&M-EST. PATIENT-LVL IV: CPT | Mod: PBBFAC,,, | Performed by: OPTOMETRIST

## 2021-11-04 PROCEDURE — 3066F PR DOCUMENTATION OF TREATMENT FOR NEPHROPATHY: ICD-10-PCS | Mod: CPTII,S$GLB,, | Performed by: OPTOMETRIST

## 2021-11-04 PROCEDURE — 99999 PR PBB SHADOW E&M-EST. PATIENT-LVL IV: ICD-10-PCS | Mod: PBBFAC,,, | Performed by: OPTOMETRIST

## 2021-11-04 PROCEDURE — 3288F PR FALLS RISK ASSESSMENT DOCUMENTED: ICD-10-PCS | Mod: CPTII,S$GLB,, | Performed by: OPTOMETRIST

## 2021-11-04 PROCEDURE — 3061F PR NEG MICROALBUMINURIA RESULT DOCUMENTED/REVIEW: ICD-10-PCS | Mod: CPTII,S$GLB,, | Performed by: OPTOMETRIST

## 2021-11-04 PROCEDURE — 4010F ACE/ARB THERAPY RXD/TAKEN: CPT | Mod: CPTII,S$GLB,, | Performed by: OPTOMETRIST

## 2021-11-04 PROCEDURE — 1101F PT FALLS ASSESS-DOCD LE1/YR: CPT | Mod: CPTII,S$GLB,, | Performed by: OPTOMETRIST

## 2021-11-04 PROCEDURE — 92133 POSTERIOR SEGMENT OCT OPTIC NERVE(OCULAR COHERENCE TOMOGRAPHY) - OU - BOTH EYES: ICD-10-PCS | Mod: S$GLB,,, | Performed by: OPTOMETRIST

## 2021-11-04 PROCEDURE — 1160F PR REVIEW ALL MEDS BY PRESCRIBER/CLIN PHARMACIST DOCUMENTED: ICD-10-PCS | Mod: CPTII,S$GLB,, | Performed by: OPTOMETRIST

## 2021-11-09 ENCOUNTER — PATIENT OUTREACH (OUTPATIENT)
Dept: ADMINISTRATIVE | Facility: OTHER | Age: 71
End: 2021-11-09
Payer: MEDICAID

## 2021-11-09 DIAGNOSIS — Z12.11 COLON CANCER SCREENING: Primary | ICD-10-CM

## 2021-11-12 ENCOUNTER — TELEPHONE (OUTPATIENT)
Dept: GASTROENTEROLOGY | Facility: CLINIC | Age: 71
End: 2021-11-12
Payer: MEDICAID

## 2021-11-22 ENCOUNTER — TELEPHONE (OUTPATIENT)
Dept: FAMILY MEDICINE | Facility: CLINIC | Age: 71
End: 2021-11-22
Payer: MEDICAID

## 2021-11-22 ENCOUNTER — OFFICE VISIT (OUTPATIENT)
Dept: URGENT CARE | Facility: CLINIC | Age: 71
End: 2021-11-22
Payer: MEDICARE

## 2021-11-22 VITALS
WEIGHT: 117 LBS | TEMPERATURE: 98 F | RESPIRATION RATE: 16 BRPM | OXYGEN SATURATION: 96 % | HEART RATE: 115 BPM | BODY MASS INDEX: 23.59 KG/M2 | SYSTOLIC BLOOD PRESSURE: 138 MMHG | HEIGHT: 59 IN | DIASTOLIC BLOOD PRESSURE: 88 MMHG

## 2021-11-22 DIAGNOSIS — J34.9 SINUS PROBLEM: Primary | ICD-10-CM

## 2021-11-22 LAB
CTP QC/QA: YES
SARS-COV-2 RDRP RESP QL NAA+PROBE: NEGATIVE

## 2021-11-22 PROCEDURE — 3061F PR NEG MICROALBUMINURIA RESULT DOCUMENTED/REVIEW: ICD-10-PCS | Mod: CPTII,S$GLB,, | Performed by: FAMILY MEDICINE

## 2021-11-22 PROCEDURE — 99214 PR OFFICE/OUTPT VISIT, EST, LEVL IV, 30-39 MIN: ICD-10-PCS | Mod: S$GLB,,, | Performed by: FAMILY MEDICINE

## 2021-11-22 PROCEDURE — U0002 COVID-19 LAB TEST NON-CDC: HCPCS | Mod: QW,S$GLB,, | Performed by: FAMILY MEDICINE

## 2021-11-22 PROCEDURE — 99214 OFFICE O/P EST MOD 30 MIN: CPT | Mod: S$GLB,,, | Performed by: FAMILY MEDICINE

## 2021-11-22 PROCEDURE — 3061F NEG MICROALBUMINURIA REV: CPT | Mod: CPTII,S$GLB,, | Performed by: FAMILY MEDICINE

## 2021-11-22 PROCEDURE — U0002: ICD-10-PCS | Mod: QW,S$GLB,, | Performed by: FAMILY MEDICINE

## 2021-11-22 PROCEDURE — 3066F NEPHROPATHY DOC TX: CPT | Mod: CPTII,S$GLB,, | Performed by: FAMILY MEDICINE

## 2021-11-22 PROCEDURE — 3066F PR DOCUMENTATION OF TREATMENT FOR NEPHROPATHY: ICD-10-PCS | Mod: CPTII,S$GLB,, | Performed by: FAMILY MEDICINE

## 2021-11-22 PROCEDURE — 4010F ACE/ARB THERAPY RXD/TAKEN: CPT | Mod: CPTII,S$GLB,, | Performed by: FAMILY MEDICINE

## 2021-11-22 PROCEDURE — 4010F PR ACE/ARB THEARPY RXD/TAKEN: ICD-10-PCS | Mod: CPTII,S$GLB,, | Performed by: FAMILY MEDICINE

## 2021-11-22 RX ORDER — BENZONATATE 100 MG/1
100 CAPSULE ORAL EVERY 6 HOURS PRN
Qty: 30 CAPSULE | Refills: 1 | Status: ON HOLD | OUTPATIENT
Start: 2021-11-22 | End: 2022-01-31 | Stop reason: CLARIF

## 2021-11-22 RX ORDER — DOXYCYCLINE 100 MG/1
100 CAPSULE ORAL 2 TIMES DAILY
Qty: 20 CAPSULE | Refills: 0 | Status: SHIPPED | OUTPATIENT
Start: 2021-11-22 | End: 2021-12-02

## 2021-11-22 NOTE — TELEPHONE ENCOUNTER
----- Message from Beth Perez sent at 11/22/2021  9:44 AM CST -----  Type:  Patient Returning Call    Who Called:  Patient  Who Left Message for Patient:  Mansi  Does the patient know what this is regarding?:  yes  Best Call Back Number:  [phone]  Additional Information:  No response on messenger

## 2021-11-22 NOTE — TELEPHONE ENCOUNTER
----- Message from Magi Jaimes sent at 11/22/2021 10:16 AM CST -----  Contact: patient  Type:  Patient Returning Call    Who Called:   patient     Who Left Message for Patient:   Mansi    Does the patient know what this is regarding?:   medication     Best Call Back Number:  [phone] (home)     Additional Information:

## 2021-11-22 NOTE — TELEPHONE ENCOUNTER
----- Message from Tammie George sent at 11/22/2021  8:46 AM CST -----  Type: Needs Medical Advice  Who Called:  pt  Symptoms (please be specific):  Sinus infection  How long has patient had these symptoms:  2 days   Pharmacy name and phone #:     [id number] - [place], [place] - [address]  [address]  [facility] [address]  Phone: [phone] Fax: [phone]      Best Call Back Number: [phone] ([place])     Additional Information: Pt would like an antibiotic called in if possible for a sinus infection. Please advise

## 2021-11-30 DIAGNOSIS — K58.0 IRRITABLE BOWEL SYNDROME WITH DIARRHEA: ICD-10-CM

## 2021-11-30 RX ORDER — CHOLESTYRAMINE 4 G/5.5G
POWDER, FOR SUSPENSION ORAL
Qty: 180 PACKET | Refills: 0 | Status: SHIPPED | OUTPATIENT
Start: 2021-11-30 | End: 2022-02-22 | Stop reason: SDUPTHER

## 2021-12-09 ENCOUNTER — LAB VISIT (OUTPATIENT)
Dept: LAB | Facility: HOSPITAL | Age: 71
End: 2021-12-09
Attending: INTERNAL MEDICINE
Payer: MEDICARE

## 2021-12-09 ENCOUNTER — OFFICE VISIT (OUTPATIENT)
Dept: FAMILY MEDICINE | Facility: CLINIC | Age: 71
End: 2021-12-09
Payer: MEDICARE

## 2021-12-09 VITALS
SYSTOLIC BLOOD PRESSURE: 120 MMHG | HEART RATE: 60 BPM | WEIGHT: 109.38 LBS | DIASTOLIC BLOOD PRESSURE: 78 MMHG | HEIGHT: 59 IN | BODY MASS INDEX: 22.05 KG/M2 | OXYGEN SATURATION: 98 %

## 2021-12-09 DIAGNOSIS — E11.42 TYPE 2 DIABETES MELLITUS WITH DIABETIC POLYNEUROPATHY, WITHOUT LONG-TERM CURRENT USE OF INSULIN: Primary | ICD-10-CM

## 2021-12-09 DIAGNOSIS — I10 ESSENTIAL HYPERTENSION: ICD-10-CM

## 2021-12-09 DIAGNOSIS — F17.200 TOBACCO USE DISORDER: ICD-10-CM

## 2021-12-09 DIAGNOSIS — J41.0 SIMPLE CHRONIC BRONCHITIS: ICD-10-CM

## 2021-12-09 DIAGNOSIS — N18.31 CHRONIC KIDNEY DISEASE, STAGE 3A: ICD-10-CM

## 2021-12-09 DIAGNOSIS — E11.42 TYPE 2 DIABETES MELLITUS WITH DIABETIC POLYNEUROPATHY, WITHOUT LONG-TERM CURRENT USE OF INSULIN: ICD-10-CM

## 2021-12-09 LAB
ESTIMATED AVG GLUCOSE: 166 MG/DL (ref 68–131)
HBA1C MFR BLD: 7.4 % (ref 4–5.6)

## 2021-12-09 PROCEDURE — 99214 OFFICE O/P EST MOD 30 MIN: CPT | Mod: S$GLB,,, | Performed by: INTERNAL MEDICINE

## 2021-12-09 PROCEDURE — 99999 PR PBB SHADOW E&M-EST. PATIENT-LVL V: ICD-10-PCS | Mod: PBBFAC,,, | Performed by: INTERNAL MEDICINE

## 2021-12-09 PROCEDURE — 3061F NEG MICROALBUMINURIA REV: CPT | Mod: CPTII,S$GLB,, | Performed by: INTERNAL MEDICINE

## 2021-12-09 PROCEDURE — 36415 COLL VENOUS BLD VENIPUNCTURE: CPT | Mod: PO | Performed by: INTERNAL MEDICINE

## 2021-12-09 PROCEDURE — 3066F PR DOCUMENTATION OF TREATMENT FOR NEPHROPATHY: ICD-10-PCS | Mod: CPTII,S$GLB,, | Performed by: INTERNAL MEDICINE

## 2021-12-09 PROCEDURE — 83036 HEMOGLOBIN GLYCOSYLATED A1C: CPT | Performed by: INTERNAL MEDICINE

## 2021-12-09 PROCEDURE — 4010F PR ACE/ARB THEARPY RXD/TAKEN: ICD-10-PCS | Mod: CPTII,S$GLB,, | Performed by: INTERNAL MEDICINE

## 2021-12-09 PROCEDURE — 99499 UNLISTED E&M SERVICE: CPT | Mod: S$GLB,,, | Performed by: INTERNAL MEDICINE

## 2021-12-09 PROCEDURE — 99499 RISK ADDL DX/OHS AUDIT: ICD-10-PCS | Mod: S$GLB,,, | Performed by: INTERNAL MEDICINE

## 2021-12-09 PROCEDURE — 3061F PR NEG MICROALBUMINURIA RESULT DOCUMENTED/REVIEW: ICD-10-PCS | Mod: CPTII,S$GLB,, | Performed by: INTERNAL MEDICINE

## 2021-12-09 PROCEDURE — 99999 PR PBB SHADOW E&M-EST. PATIENT-LVL V: CPT | Mod: PBBFAC,,, | Performed by: INTERNAL MEDICINE

## 2021-12-09 PROCEDURE — 3066F NEPHROPATHY DOC TX: CPT | Mod: CPTII,S$GLB,, | Performed by: INTERNAL MEDICINE

## 2021-12-09 PROCEDURE — 99214 PR OFFICE/OUTPT VISIT, EST, LEVL IV, 30-39 MIN: ICD-10-PCS | Mod: S$GLB,,, | Performed by: INTERNAL MEDICINE

## 2021-12-09 PROCEDURE — 4010F ACE/ARB THERAPY RXD/TAKEN: CPT | Mod: CPTII,S$GLB,, | Performed by: INTERNAL MEDICINE

## 2021-12-09 RX ORDER — PREDNISONE 20 MG/1
40 TABLET ORAL DAILY
Qty: 10 TABLET | Refills: 0 | Status: SHIPPED | OUTPATIENT
Start: 2021-12-09 | End: 2021-12-14

## 2021-12-16 ENCOUNTER — HOSPITAL ENCOUNTER (OUTPATIENT)
Dept: RADIOLOGY | Facility: HOSPITAL | Age: 71
Discharge: HOME | End: 2021-12-16
Attending: INTERNAL MEDICINE
Payer: MEDICARE

## 2021-12-16 DIAGNOSIS — M81.0 OSTEOPOROSIS, POSTMENOPAUSAL: ICD-10-CM

## 2021-12-16 DIAGNOSIS — F17.210 NICOTINE DEPENDENCE, CIGARETTES, UNCOMPLICATED: ICD-10-CM

## 2021-12-16 PROBLEM — K76.0 HEPATIC STEATOSIS: Status: ACTIVE | Noted: 2021-12-16

## 2021-12-16 PROCEDURE — 71271 CT CHEST LUNG SCREENING LOW DOSE: ICD-10-PCS | Mod: 26,,, | Performed by: RADIOLOGY

## 2021-12-16 PROCEDURE — 71271 CT THORAX LUNG CANCER SCR C-: CPT | Mod: 26,,, | Performed by: RADIOLOGY

## 2021-12-16 PROCEDURE — 77080 DEXA BONE DENSITY SPINE HIP: ICD-10-PCS | Mod: 26,,, | Performed by: RADIOLOGY

## 2021-12-16 PROCEDURE — 77080 DXA BONE DENSITY AXIAL: CPT | Mod: 26,,, | Performed by: RADIOLOGY

## 2021-12-16 PROCEDURE — 71271 CT THORAX LUNG CANCER SCR C-: CPT | Mod: TC,PO

## 2021-12-16 PROCEDURE — 77080 DXA BONE DENSITY AXIAL: CPT | Mod: TC,PO

## 2021-12-30 DIAGNOSIS — R91.1 LUNG NODULE: Primary | ICD-10-CM

## 2022-01-03 ENCOUNTER — TELEPHONE (OUTPATIENT)
Dept: HEMATOLOGY/ONCOLOGY | Facility: CLINIC | Age: 72
End: 2022-01-03
Payer: MEDICAID

## 2022-01-03 DIAGNOSIS — R91.1 LUNG NODULE: Primary | ICD-10-CM

## 2022-01-03 NOTE — NURSING
Dr. Busch discussed this patient with Dr. Valverde and is recommending PET and IR biopsy Lung prior to arranging appt with Hem/Onc. Per Dr. Busch, can hold off on pulmonary referral for now. Patient has been notified of plan. She has to rely on transportation by her sister and nephew so will coordinate appts according to what will work best for her. Advised her I would call her once coordinated and she verbalized understanding. Requested Hem/ONc referral to be put in Epic by Dr. Valverde's staff

## 2022-01-05 ENCOUNTER — TELEPHONE (OUTPATIENT)
Dept: FAMILY MEDICINE | Facility: CLINIC | Age: 72
End: 2022-01-05
Payer: MEDICAID

## 2022-01-05 DIAGNOSIS — R91.1 LUNG NODULE: Primary | ICD-10-CM

## 2022-01-05 NOTE — TELEPHONE ENCOUNTER
----- Message from Desiree Beck RN sent at 1/3/2022  9:39 AM CST -----  Regarding: Referral  Good Morning,    Dr. Valverde discussed CT results with Dr. Busch and we will set her up with an appointment. Can someone place a Ambulatory referral to Hem/Onc ([id number]) so we can get her scheduled?    Thanks,  CASI Ramírez  Oncology Nurse Navigator

## 2022-01-06 ENCOUNTER — HOSPITAL ENCOUNTER (OUTPATIENT)
Dept: RADIOLOGY | Facility: HOSPITAL | Age: 72
Discharge: HOME | End: 2022-01-06
Attending: INTERNAL MEDICINE
Payer: MEDICARE

## 2022-01-06 ENCOUNTER — TELEPHONE (OUTPATIENT)
Dept: HEMATOLOGY/ONCOLOGY | Facility: CLINIC | Age: 72
End: 2022-01-06
Payer: MEDICAID

## 2022-01-06 DIAGNOSIS — Z12.31 ENCOUNTER FOR SCREENING MAMMOGRAM FOR BREAST CANCER: ICD-10-CM

## 2022-01-06 PROCEDURE — 77067 SCR MAMMO BI INCL CAD: CPT | Mod: 26,,, | Performed by: RADIOLOGY

## 2022-01-06 PROCEDURE — 77067 MAMMO DIGITAL SCREENING BILAT WITH TOMO: ICD-10-PCS | Mod: 26,,, | Performed by: RADIOLOGY

## 2022-01-06 PROCEDURE — 77063 MAMMO DIGITAL SCREENING BILAT WITH TOMO: ICD-10-PCS | Mod: 26,,, | Performed by: RADIOLOGY

## 2022-01-06 PROCEDURE — 77063 BREAST TOMOSYNTHESIS BI: CPT | Mod: 26,,, | Performed by: RADIOLOGY

## 2022-01-06 PROCEDURE — 77063 BREAST TOMOSYNTHESIS BI: CPT | Mod: TC,PO

## 2022-01-06 NOTE — NURSING
Left msg on VM to return call to navigation office to schedule appt with Dr. Busch. Patient tentatively placed on the schedule and will need to be confirmed with patient

## 2022-01-10 ENCOUNTER — OFFICE VISIT (OUTPATIENT)
Dept: FAMILY MEDICINE | Facility: CLINIC | Age: 72
End: 2022-01-10
Payer: MEDICARE

## 2022-01-10 VITALS
BODY MASS INDEX: 21.73 KG/M2 | OXYGEN SATURATION: 95 % | SYSTOLIC BLOOD PRESSURE: 128 MMHG | HEIGHT: 59 IN | WEIGHT: 107.81 LBS | HEART RATE: 82 BPM | DIASTOLIC BLOOD PRESSURE: 78 MMHG

## 2022-01-10 DIAGNOSIS — N18.31 CHRONIC KIDNEY DISEASE, STAGE 3A: ICD-10-CM

## 2022-01-10 DIAGNOSIS — R91.1 LUNG NODULE: Primary | ICD-10-CM

## 2022-01-10 DIAGNOSIS — J41.0 SIMPLE CHRONIC BRONCHITIS: ICD-10-CM

## 2022-01-10 DIAGNOSIS — E11.42 TYPE 2 DIABETES MELLITUS WITH DIABETIC POLYNEUROPATHY, WITHOUT LONG-TERM CURRENT USE OF INSULIN: ICD-10-CM

## 2022-01-10 DIAGNOSIS — G25.0 ESSENTIAL TREMOR: ICD-10-CM

## 2022-01-10 DIAGNOSIS — I27.20 PULMONARY HYPERTENSION: ICD-10-CM

## 2022-01-10 PROCEDURE — 3051F HG A1C>EQUAL 7.0%<8.0%: CPT | Mod: CPTII,S$GLB,, | Performed by: INTERNAL MEDICINE

## 2022-01-10 PROCEDURE — 3078F DIAST BP <80 MM HG: CPT | Mod: CPTII,S$GLB,, | Performed by: INTERNAL MEDICINE

## 2022-01-10 PROCEDURE — 3288F FALL RISK ASSESSMENT DOCD: CPT | Mod: CPTII,S$GLB,, | Performed by: INTERNAL MEDICINE

## 2022-01-10 PROCEDURE — 3074F SYST BP LT 130 MM HG: CPT | Mod: CPTII,S$GLB,, | Performed by: INTERNAL MEDICINE

## 2022-01-10 PROCEDURE — 99499 RISK ADDL DX/OHS AUDIT: ICD-10-PCS | Mod: S$PBB,,, | Performed by: INTERNAL MEDICINE

## 2022-01-10 PROCEDURE — 3051F PR MOST RECENT HEMOGLOBIN A1C LEVEL 7.0 - < 8.0%: ICD-10-PCS | Mod: CPTII,S$GLB,, | Performed by: INTERNAL MEDICINE

## 2022-01-10 PROCEDURE — 1101F PT FALLS ASSESS-DOCD LE1/YR: CPT | Mod: CPTII,S$GLB,, | Performed by: INTERNAL MEDICINE

## 2022-01-10 PROCEDURE — 1160F PR REVIEW ALL MEDS BY PRESCRIBER/CLIN PHARMACIST DOCUMENTED: ICD-10-PCS | Mod: CPTII,S$GLB,, | Performed by: INTERNAL MEDICINE

## 2022-01-10 PROCEDURE — 3074F PR MOST RECENT SYSTOLIC BLOOD PRESSURE < 130 MM HG: ICD-10-PCS | Mod: CPTII,S$GLB,, | Performed by: INTERNAL MEDICINE

## 2022-01-10 PROCEDURE — 1159F MED LIST DOCD IN RCRD: CPT | Mod: CPTII,S$GLB,, | Performed by: INTERNAL MEDICINE

## 2022-01-10 PROCEDURE — 99999 PR PBB SHADOW E&M-EST. PATIENT-LVL IV: ICD-10-PCS | Mod: PBBFAC,,, | Performed by: INTERNAL MEDICINE

## 2022-01-10 PROCEDURE — 3008F BODY MASS INDEX DOCD: CPT | Mod: CPTII,S$GLB,, | Performed by: INTERNAL MEDICINE

## 2022-01-10 PROCEDURE — 3008F PR BODY MASS INDEX (BMI) DOCUMENTED: ICD-10-PCS | Mod: CPTII,S$GLB,, | Performed by: INTERNAL MEDICINE

## 2022-01-10 PROCEDURE — 99213 PR OFFICE/OUTPT VISIT, EST, LEVL III, 20-29 MIN: ICD-10-PCS | Mod: S$GLB,,, | Performed by: INTERNAL MEDICINE

## 2022-01-10 PROCEDURE — 99213 OFFICE O/P EST LOW 20 MIN: CPT | Mod: S$GLB,,, | Performed by: INTERNAL MEDICINE

## 2022-01-10 PROCEDURE — 3078F PR MOST RECENT DIASTOLIC BLOOD PRESSURE < 80 MM HG: ICD-10-PCS | Mod: CPTII,S$GLB,, | Performed by: INTERNAL MEDICINE

## 2022-01-10 PROCEDURE — 3072F PR LOW RISK FOR RETINOPATHY: ICD-10-PCS | Mod: CPTII,S$GLB,, | Performed by: INTERNAL MEDICINE

## 2022-01-10 PROCEDURE — 1160F RVW MEDS BY RX/DR IN RCRD: CPT | Mod: CPTII,S$GLB,, | Performed by: INTERNAL MEDICINE

## 2022-01-10 PROCEDURE — 99499 UNLISTED E&M SERVICE: CPT | Mod: S$PBB,,, | Performed by: INTERNAL MEDICINE

## 2022-01-10 PROCEDURE — 99999 PR PBB SHADOW E&M-EST. PATIENT-LVL IV: CPT | Mod: PBBFAC,,, | Performed by: INTERNAL MEDICINE

## 2022-01-10 PROCEDURE — 3072F LOW RISK FOR RETINOPATHY: CPT | Mod: CPTII,S$GLB,, | Performed by: INTERNAL MEDICINE

## 2022-01-10 PROCEDURE — 3288F PR FALLS RISK ASSESSMENT DOCUMENTED: ICD-10-PCS | Mod: CPTII,S$GLB,, | Performed by: INTERNAL MEDICINE

## 2022-01-10 PROCEDURE — 1159F PR MEDICATION LIST DOCUMENTED IN MEDICAL RECORD: ICD-10-PCS | Mod: CPTII,S$GLB,, | Performed by: INTERNAL MEDICINE

## 2022-01-10 PROCEDURE — 1101F PR PT FALLS ASSESS DOC 0-1 FALLS W/OUT INJ PAST YR: ICD-10-PCS | Mod: CPTII,S$GLB,, | Performed by: INTERNAL MEDICINE

## 2022-01-10 NOTE — PROGRESS NOTES
Subjective     Deb Webb is a 71 y.o. old, female here for Follow-up and Neck Pain    Here with her close friend.  72 y/o with PMH of CVA, Type 2 DM with neuropathy, HTN, HLD, COPD, osteopenia, IBS, anxiety, tobacco use, Essential tremor, prior BZO dependence    Discussed results of her recent CT scan showing multiple lung nodules including 8 mm left upper lobe spiculated nodule.   She is scheduled to have a CT guided biopsy and PET scan.  She is losing weight.  Wt Readings from Last 3 Encounters:   01/10/22 1142 48.9 kg (107 lb 12.9 oz)   12/09/21 0936 49.6 kg (109 lb 5.6 oz)   11/22/21 1333 53.1 kg (117 lb)     Neck tremor from ET is worsening over time.  She is cutting back some on smoking.  Wheezing since last visit has resolved, still expectorating some sputum.    Review of Systems   Constitutional: Negative for chills and fever.   Respiratory: Positive for cough. Negative for hemoptysis and wheezing.    Cardiovascular: Negative.    Neurological: Positive for tremors.     Medications     Outpatient Medications Marked as Taking for the 1/10/22 encounter (Office Visit) with Triston Valverde MD   Medication Sig Dispense Refill    alendronate (FOSAMAX) 70 MG tablet Take 1 tablet (70 mg total) by mouth once a week. 12 tablet 1    aspirin (ECOTRIN) 81 MG EC tablet Take 1 tablet (81 mg total) by mouth once daily.  0    atorvastatin (LIPITOR) 40 MG tablet Take 1 tablet (40 mg total) by mouth once daily. 90 tablet 1    azelastine (ASTELIN) 137 mcg (0.1 %) nasal spray INHALE 1 SPRAY (137 MCG TOTAL) BY NASAL ROUTE 2 (TWO) TIMES DAILY. 90 mL 3    benzonatate (TESSALON PERLES) 100 MG capsule Take 1 capsule (100 mg total) by mouth every 6 (six) hours as needed for Cough. 30 capsule 1    blood sugar diagnostic Strp To check BG 2 times daily, to use with insurance preferred meter 200 each 3    calcium carbonate/vitamin D3 (CALCIUM 600 + D,3, ORAL) Take by mouth.      citalopram (CELEXA) 20 MG tablet Take  1 tablet (20 mg total) by mouth once daily. 90 tablet 1    diclofenac sodium (VOLTAREN) 1 % Gel Apply 2 g topically once daily. 100 g 3    dorzolamide-timolol 2-0.5% (COSOPT) 22.3-6.8 mg/mL ophthalmic solution Place 1 drop into both eyes 2 (two) times daily. 10 mL 11    dulaglutide (TRULICITY) 1.5 mg/0.5 mL pen injector Inject 1.5 mg into the skin every 7 days. Through patient assistance 12 pen 3    flash glucose scanning reader (FREESTYLE NATY 14 DAY READER) Misc 1 application by Misc.(Non-Drug; Combo Route) route 3 (three) times daily. 1 each 0    flash glucose sensor (FREESTYLE NATY 14 DAY SENSOR) Kit 1 application by Misc.(Non-Drug; Combo Route) route 3 (three) times daily. 2 kit 3    fluticasone propionate (FLONASE) 50 mcg/actuation nasal spray INHALE 2 SPRAY INTO EACH NOSTRIL DAILY 48 mL 1    furosemide (LASIX) 20 MG tablet Take 1 tablet (20 mg total) by mouth once daily. 90 tablet 1    insulin NPH/Reg human (HUMULIN 70/30 U-100 KWIKPEN) 100 unit/mL (70-30) InPn pen Inject 14 units before breakfast and 10 units before supper 15 mL 5    irbesartan (AVAPRO) 75 MG tablet Take 1 tablet (75 mg total) by mouth once daily. 90 tablet 1    lancets Misc To check BG 2 times daily, to use with insurance preferred meter 200 each 3    latanoprost 0.005 % ophthalmic solution Place 1 drop into both eyes every evening. 7.5 mL 1    loratadine (CLARITIN) 10 mg tablet Take 1 tablet (10 mg total) by mouth daily as needed for Allergies (or runny nose). 90 tablet 1    meclizine (ANTIVERT) 12.5 mg tablet Take 1 tablet (12.5 mg total) by mouth 3 (three) times daily as needed for Dizziness. 30 tablet 0    meloxicam (MOBIC) 15 MG tablet Take 1 tablet (15 mg total) by mouth daily as needed for Pain. 90 tablet 3    omega-3 fatty acids-vitamin E 1,000 mg Cap       omeprazole (PRILOSEC) 40 MG capsule Take 1 capsule (40 mg total) by mouth before breakfast. 90 capsule 1    pen needle, diabetic (BD ULTRA-FINE AGUS PEN  "NEEDLE) 32 gauge x 5/32" Ndle 1 Device by Misc.(Non-Drug; Combo Route) route 2 (two) times daily. 200 each 4    PREVALITE 4 gram PwPk TAKE 1 PACKET (4 G TOTAL) BY MOUTH 2 (TWO) TIMES DAILY. FOR DIARRHEA 180 packet 0    primidone (MYSOLINE) 50 MG Tab TAKE 2 TABLETS BY MOUTH 3 (THREE) TIMES DAILY. ONE HALF TABLET FOR ONE WEEK, THEN AS PRESCRIBED 540 tablet 3    PROAIR HFA 90 mcg/actuation inhaler TAKE 2 PUFFS BY MOUTH EVERY 4 HOURS AS NEEDED FOR WHEEZING (RESCUE) 25.5 g 3    umeclidinium-vilanteroL (ANORO ELLIPTA) 62.5-25 mcg/actuation DsDv Inhale 1 puff into the lungs once daily. Controller 60 each 2     Objective     /78   Pulse 82   Ht 4' 11" (1.499 m)   Wt 48.9 kg (107 lb 12.9 oz)   SpO2 95%   BMI 21.77 kg/m²   Physical Exam  Constitutional:       General: She is not in acute distress.     Appearance: She is well-developed.   Cardiovascular:      Rate and Rhythm: Normal rate and regular rhythm.      Pulses: Intact distal pulses.      Heart sounds: No murmur heard.      Pulmonary:      Effort: Pulmonary effort is normal. No respiratory distress.      Breath sounds: Normal breath sounds.       Assessment and Plan     Lung nodule    Type 2 diabetes mellitus with diabetic polyneuropathy, without long-term current use of insulin    Pulmonary hypertension    Simple chronic bronchitis    Chronic kidney disease, stage 3a    Essential tremor      COPD improved.  Continue meds for chronic medical problems as above.    ___________________  Triston Valverde MD  Internal Medicine and Pediatrics  "

## 2022-01-11 ENCOUNTER — TELEPHONE (OUTPATIENT)
Dept: HEMATOLOGY/ONCOLOGY | Facility: CLINIC | Age: 72
End: 2022-01-11
Payer: MEDICAID

## 2022-01-11 NOTE — NURSING
Reviewed chart. Workup is pending for abnormal Lung CT. Appts are scheduled.    Oncology Navigation   Intake  Date Worked: 1/11/2022     Treatment  Current Status: Staging work-up       Medical Oncologist: Dr. Leatha Busch  Consult Date: 2/3/2022       Procedures: PET scan; Biopsy  Biopsy Schedule Date: 1/27/2022 (CT lung biopsy)  PET Scan Schedule Date: 1/24/2022                 Acuity      Follow Up  No follow-ups on file.

## 2022-01-24 ENCOUNTER — HOSPITAL ENCOUNTER (OUTPATIENT)
Dept: RADIOLOGY | Facility: HOSPITAL | Age: 72
Discharge: HOME | End: 2022-01-24
Attending: INTERNAL MEDICINE
Payer: MEDICARE

## 2022-01-24 DIAGNOSIS — R91.1 LUNG NODULE: ICD-10-CM

## 2022-01-24 LAB — GLUCOSE SERPL-MCNC: 152 MG/DL (ref 70–110)

## 2022-01-24 PROCEDURE — 78815 PET IMAGE W/CT SKULL-THIGH: CPT | Mod: 26,PI,, | Performed by: RADIOLOGY

## 2022-01-24 PROCEDURE — 78815 NM PET CT ROUTINE: ICD-10-PCS | Mod: 26,PI,, | Performed by: RADIOLOGY

## 2022-01-24 PROCEDURE — A9552 F18 FDG: HCPCS | Mod: PN

## 2022-01-24 NOTE — PROGRESS NOTES
PET Imaging Questionnaire    1. Are you a Diabetic? Recent Blood Sugar level? Yes    2. Are you anemic? Bone Marrow Stimulation Meds? No    3. Have you had a CT Scan, if so when & where was your last one? Yes - 1/3/22    4. Have you had a PET Scan, if so when & where was your last one? No    5. Chemotherapy or currently on Chemotherapy? No    6. Radiation therapy? No    Surgical History:   Past Surgical History:   Procedure Laterality Date    BACK SURGERY  2006    CATARACT EXTRACTION W/  INTRAOCULAR LENS IMPLANT Bilateral     CHOLECYSTECTOMY      Laparoscopic    COLONOSCOPY      HERNIA REPAIR      HYSTERECTOMY      KNEE SURGERY Right 3/4/2015    Dr Weller     OOPHORECTOMY      ovarian tumor      Benign    TONSILLECTOMY, ADENOIDECTOMY     7.      8. Have you been fasting for at least 6 hours? Yes    9. Is there any chance you may be pregnant or breastfeeding? No    Assay: 11.0 MCi@:9:56   Injection Site:LT AC    Residual: .212 mCi@: 9:59   Technologist: Glenn Wilkes Injected:10.79mCi

## 2022-01-25 ENCOUNTER — TELEPHONE (OUTPATIENT)
Dept: GASTROENTEROLOGY | Facility: CLINIC | Age: 72
End: 2022-01-25
Payer: MEDICAID

## 2022-01-26 ENCOUNTER — CLINICAL SUPPORT (OUTPATIENT)
Dept: URGENT CARE | Facility: CLINIC | Age: 72
End: 2022-01-26
Payer: MEDICARE

## 2022-01-26 DIAGNOSIS — Z20.822 ENCOUNTER FOR LABORATORY TESTING FOR COVID-19 VIRUS: Primary | ICD-10-CM

## 2022-01-26 LAB
CTP QC/QA: YES
SARS-COV-2 RDRP RESP QL NAA+PROBE: NEGATIVE

## 2022-01-26 PROCEDURE — U0002 COVID-19 LAB TEST NON-CDC: HCPCS | Mod: QW,S$GLB,, | Performed by: EMERGENCY MEDICINE

## 2022-01-26 PROCEDURE — 99211 PR OFFICE/OUTPT VISIT, EST, LEVL I: ICD-10-PCS | Mod: S$GLB,CS,, | Performed by: EMERGENCY MEDICINE

## 2022-01-26 PROCEDURE — 99211 OFF/OP EST MAY X REQ PHY/QHP: CPT | Mod: S$GLB,CS,, | Performed by: EMERGENCY MEDICINE

## 2022-01-26 PROCEDURE — U0002: ICD-10-PCS | Mod: QW,S$GLB,, | Performed by: EMERGENCY MEDICINE

## 2022-01-27 PROBLEM — J96.02 ACUTE RESPIRATORY FAILURE WITH HYPOXIA AND HYPERCARBIA: Status: ACTIVE | Noted: 2022-01-27

## 2022-01-27 PROBLEM — Z71.89 ACP (ADVANCE CARE PLANNING): Status: ACTIVE | Noted: 2022-01-27

## 2022-01-27 PROBLEM — J96.01 ACUTE RESPIRATORY FAILURE WITH HYPOXIA AND HYPERCARBIA: Status: ACTIVE | Noted: 2022-01-27

## 2022-01-28 ENCOUNTER — TELEPHONE (OUTPATIENT)
Dept: HEMATOLOGY/ONCOLOGY | Facility: CLINIC | Age: 72
End: 2022-01-28
Payer: MEDICAID

## 2022-01-28 NOTE — NURSING
Chart reviewed. Patient is admitted to [facility] for observation of post bx pneumothorax. Pathology pending and she is scheduled to see Dr. Busch next week. Continuing to follow.

## 2022-02-03 ENCOUNTER — TELEPHONE (OUTPATIENT)
Dept: HEMATOLOGY/ONCOLOGY | Facility: CLINIC | Age: 72
End: 2022-02-03
Payer: MEDICAID

## 2022-02-03 ENCOUNTER — TELEPHONE (OUTPATIENT)
Dept: GASTROENTEROLOGY | Facility: CLINIC | Age: 72
End: 2022-02-03
Payer: MEDICAID

## 2022-02-03 NOTE — NURSING
Spoke with patient regarding Hem/Onc and pulmonary appts. SHe is only able to schedule appts on Mondays due to transportation. Scheduled her appt for 2/7/22 @ 11:00 following her eye doctor appt. [facility] will contact patient to schedule their hospital follow up. Provided her with my contact info and she verbalized understanding.

## 2022-02-07 ENCOUNTER — OFFICE VISIT (OUTPATIENT)
Dept: HEMATOLOGY/ONCOLOGY | Facility: CLINIC | Age: 72
End: 2022-02-07
Payer: MEDICARE

## 2022-02-07 ENCOUNTER — OFFICE VISIT (OUTPATIENT)
Dept: OPTOMETRY | Facility: CLINIC | Age: 72
End: 2022-02-07
Payer: MEDICARE

## 2022-02-07 VITALS
TEMPERATURE: 98 F | OXYGEN SATURATION: 91 % | WEIGHT: 109.56 LBS | SYSTOLIC BLOOD PRESSURE: 98 MMHG | HEIGHT: 59 IN | DIASTOLIC BLOOD PRESSURE: 70 MMHG | RESPIRATION RATE: 18 BRPM | HEART RATE: 105 BPM | BODY MASS INDEX: 22.09 KG/M2

## 2022-02-07 DIAGNOSIS — F17.200 TOBACCO USE DISORDER: ICD-10-CM

## 2022-02-07 DIAGNOSIS — I10 ESSENTIAL HYPERTENSION: Primary | ICD-10-CM

## 2022-02-07 DIAGNOSIS — N18.31 CHRONIC KIDNEY DISEASE, STAGE 3A: ICD-10-CM

## 2022-02-07 DIAGNOSIS — H40.1133 PRIMARY OPEN ANGLE GLAUCOMA OF BOTH EYES, SEVERE STAGE: Primary | ICD-10-CM

## 2022-02-07 DIAGNOSIS — R91.1 LUNG NODULE: ICD-10-CM

## 2022-02-07 DIAGNOSIS — Z96.1 PSEUDOPHAKIA OF BOTH EYES: ICD-10-CM

## 2022-02-07 PROCEDURE — 99499 RISK ADDL DX/OHS AUDIT: ICD-10-PCS | Mod: S$GLB,,, | Performed by: STUDENT IN AN ORGANIZED HEALTH CARE EDUCATION/TRAINING PROGRAM

## 2022-02-07 PROCEDURE — 3008F PR BODY MASS INDEX (BMI) DOCUMENTED: ICD-10-PCS | Mod: CPTII,S$GLB,, | Performed by: STUDENT IN AN ORGANIZED HEALTH CARE EDUCATION/TRAINING PROGRAM

## 2022-02-07 PROCEDURE — 1159F PR MEDICATION LIST DOCUMENTED IN MEDICAL RECORD: ICD-10-PCS | Mod: CPTII,S$GLB,, | Performed by: STUDENT IN AN ORGANIZED HEALTH CARE EDUCATION/TRAINING PROGRAM

## 2022-02-07 PROCEDURE — 99999 PR PBB SHADOW E&M-EST. PATIENT-LVL V: ICD-10-PCS | Mod: PBBFAC,,, | Performed by: STUDENT IN AN ORGANIZED HEALTH CARE EDUCATION/TRAINING PROGRAM

## 2022-02-07 PROCEDURE — 1101F PT FALLS ASSESS-DOCD LE1/YR: CPT | Mod: CPTII,S$GLB,, | Performed by: STUDENT IN AN ORGANIZED HEALTH CARE EDUCATION/TRAINING PROGRAM

## 2022-02-07 PROCEDURE — 1126F AMNT PAIN NOTED NONE PRSNT: CPT | Mod: CPTII,S$GLB,, | Performed by: STUDENT IN AN ORGANIZED HEALTH CARE EDUCATION/TRAINING PROGRAM

## 2022-02-07 PROCEDURE — 99999 PR PBB SHADOW E&M-EST. PATIENT-LVL V: CPT | Mod: PBBFAC,,, | Performed by: STUDENT IN AN ORGANIZED HEALTH CARE EDUCATION/TRAINING PROGRAM

## 2022-02-07 PROCEDURE — 92012 PR EYE EXAM, EST PATIENT,INTERMED: ICD-10-PCS | Mod: S$GLB,,, | Performed by: OPTOMETRIST

## 2022-02-07 PROCEDURE — 3288F FALL RISK ASSESSMENT DOCD: CPT | Mod: CPTII,S$GLB,, | Performed by: OPTOMETRIST

## 2022-02-07 PROCEDURE — 99999 PR PBB SHADOW E&M-EST. PATIENT-LVL III: ICD-10-PCS | Mod: PBBFAC,,, | Performed by: OPTOMETRIST

## 2022-02-07 PROCEDURE — 1101F PT FALLS ASSESS-DOCD LE1/YR: CPT | Mod: CPTII,S$GLB,, | Performed by: OPTOMETRIST

## 2022-02-07 PROCEDURE — 1126F AMNT PAIN NOTED NONE PRSNT: CPT | Mod: CPTII,S$GLB,, | Performed by: OPTOMETRIST

## 2022-02-07 PROCEDURE — 1111F PR DISCHARGE MEDS RECONCILED W/ CURRENT OUTPATIENT MED LIST: ICD-10-PCS | Mod: CPTII,S$GLB,, | Performed by: STUDENT IN AN ORGANIZED HEALTH CARE EDUCATION/TRAINING PROGRAM

## 2022-02-07 PROCEDURE — 3288F FALL RISK ASSESSMENT DOCD: CPT | Mod: CPTII,S$GLB,, | Performed by: STUDENT IN AN ORGANIZED HEALTH CARE EDUCATION/TRAINING PROGRAM

## 2022-02-07 PROCEDURE — 3008F BODY MASS INDEX DOCD: CPT | Mod: CPTII,S$GLB,, | Performed by: STUDENT IN AN ORGANIZED HEALTH CARE EDUCATION/TRAINING PROGRAM

## 2022-02-07 PROCEDURE — 1101F PR PT FALLS ASSESS DOC 0-1 FALLS W/OUT INJ PAST YR: ICD-10-PCS | Mod: CPTII,S$GLB,, | Performed by: OPTOMETRIST

## 2022-02-07 PROCEDURE — 1160F RVW MEDS BY RX/DR IN RCRD: CPT | Mod: CPTII,S$GLB,, | Performed by: STUDENT IN AN ORGANIZED HEALTH CARE EDUCATION/TRAINING PROGRAM

## 2022-02-07 PROCEDURE — 1126F PR PAIN SEVERITY QUANTIFIED, NO PAIN PRESENT: ICD-10-PCS | Mod: CPTII,S$GLB,, | Performed by: STUDENT IN AN ORGANIZED HEALTH CARE EDUCATION/TRAINING PROGRAM

## 2022-02-07 PROCEDURE — 3288F PR FALLS RISK ASSESSMENT DOCUMENTED: ICD-10-PCS | Mod: CPTII,S$GLB,, | Performed by: STUDENT IN AN ORGANIZED HEALTH CARE EDUCATION/TRAINING PROGRAM

## 2022-02-07 PROCEDURE — 3072F PR LOW RISK FOR RETINOPATHY: ICD-10-PCS | Mod: CPTII,S$GLB,, | Performed by: STUDENT IN AN ORGANIZED HEALTH CARE EDUCATION/TRAINING PROGRAM

## 2022-02-07 PROCEDURE — 3074F PR MOST RECENT SYSTOLIC BLOOD PRESSURE < 130 MM HG: ICD-10-PCS | Mod: CPTII,S$GLB,, | Performed by: STUDENT IN AN ORGANIZED HEALTH CARE EDUCATION/TRAINING PROGRAM

## 2022-02-07 PROCEDURE — 3078F PR MOST RECENT DIASTOLIC BLOOD PRESSURE < 80 MM HG: ICD-10-PCS | Mod: CPTII,S$GLB,, | Performed by: STUDENT IN AN ORGANIZED HEALTH CARE EDUCATION/TRAINING PROGRAM

## 2022-02-07 PROCEDURE — 1111F DSCHRG MED/CURRENT MED MERGE: CPT | Mod: CPTII,S$GLB,, | Performed by: STUDENT IN AN ORGANIZED HEALTH CARE EDUCATION/TRAINING PROGRAM

## 2022-02-07 PROCEDURE — 99204 PR OFFICE/OUTPT VISIT, NEW, LEVL IV, 45-59 MIN: ICD-10-PCS | Mod: S$GLB,,, | Performed by: STUDENT IN AN ORGANIZED HEALTH CARE EDUCATION/TRAINING PROGRAM

## 2022-02-07 PROCEDURE — 3072F LOW RISK FOR RETINOPATHY: CPT | Mod: CPTII,S$GLB,, | Performed by: STUDENT IN AN ORGANIZED HEALTH CARE EDUCATION/TRAINING PROGRAM

## 2022-02-07 PROCEDURE — 3288F PR FALLS RISK ASSESSMENT DOCUMENTED: ICD-10-PCS | Mod: CPTII,S$GLB,, | Performed by: OPTOMETRIST

## 2022-02-07 PROCEDURE — 3074F SYST BP LT 130 MM HG: CPT | Mod: CPTII,S$GLB,, | Performed by: STUDENT IN AN ORGANIZED HEALTH CARE EDUCATION/TRAINING PROGRAM

## 2022-02-07 PROCEDURE — 99999 PR PBB SHADOW E&M-EST. PATIENT-LVL III: CPT | Mod: PBBFAC,,, | Performed by: OPTOMETRIST

## 2022-02-07 PROCEDURE — 92012 INTRM OPH EXAM EST PATIENT: CPT | Mod: S$GLB,,, | Performed by: OPTOMETRIST

## 2022-02-07 PROCEDURE — 1101F PR PT FALLS ASSESS DOC 0-1 FALLS W/OUT INJ PAST YR: ICD-10-PCS | Mod: CPTII,S$GLB,, | Performed by: STUDENT IN AN ORGANIZED HEALTH CARE EDUCATION/TRAINING PROGRAM

## 2022-02-07 PROCEDURE — 99499 UNLISTED E&M SERVICE: CPT | Mod: S$GLB,,, | Performed by: STUDENT IN AN ORGANIZED HEALTH CARE EDUCATION/TRAINING PROGRAM

## 2022-02-07 PROCEDURE — 1126F PR PAIN SEVERITY QUANTIFIED, NO PAIN PRESENT: ICD-10-PCS | Mod: CPTII,S$GLB,, | Performed by: OPTOMETRIST

## 2022-02-07 PROCEDURE — 99204 OFFICE O/P NEW MOD 45 MIN: CPT | Mod: S$GLB,,, | Performed by: STUDENT IN AN ORGANIZED HEALTH CARE EDUCATION/TRAINING PROGRAM

## 2022-02-07 PROCEDURE — 1160F PR REVIEW ALL MEDS BY PRESCRIBER/CLIN PHARMACIST DOCUMENTED: ICD-10-PCS | Mod: CPTII,S$GLB,, | Performed by: STUDENT IN AN ORGANIZED HEALTH CARE EDUCATION/TRAINING PROGRAM

## 2022-02-07 PROCEDURE — 3078F DIAST BP <80 MM HG: CPT | Mod: CPTII,S$GLB,, | Performed by: STUDENT IN AN ORGANIZED HEALTH CARE EDUCATION/TRAINING PROGRAM

## 2022-02-07 PROCEDURE — 1159F MED LIST DOCD IN RCRD: CPT | Mod: CPTII,S$GLB,, | Performed by: STUDENT IN AN ORGANIZED HEALTH CARE EDUCATION/TRAINING PROGRAM

## 2022-02-07 NOTE — PROGRESS NOTES
HPI     Glaucoma      Additional comments: Has plenty of drops at home.   Due for vf              Comments     3 month IOP check/DM2  *pt. Was in hospital until last Thursday due to a punctured lung after a   lung bx. States they were putting a 3rd drop in OU but pt. Does not know   what it was.*    Pt. States denies any changes since last visit.    Denies any flashes or floaters.     Cosopt BID OU   Latanoprost QHS OU    Hemoglobin A1C       Date                     Value               Ref Range             Status                12/09/2021               7.4 (H)             4.0 - 5.6 %           Final                 09/09/2021               6.4 (H)             4.0 - 5.6 %           Final                 04/12/2021               6.9 (H)             4.0 - 5.6 %           Final                    Last edited by Christiano Hdez, OD on 2/7/2022 11:30 AM. (History)            Assessment /Plan     For exam results, see Encounter Report.    Primary open angle glaucoma of both eyes, severe stage  -     García Visual Field - OU - Extended - Both Eyes; Future    Pseudophakia of both eyes      1. Pt just got out of hospital, given drops in hospital, IOP still low, target at 12-14, has enough drops at home, no vision changes noted,  pachy normal, prev OCT with sup and inf loss OD, poor scan OS, RTC IOP ck with HVf(24-2)kodi std, overdue, consider add drops or referral.   2. Monitor condition. Patient to report any changes. RTC 1 year recheck.

## 2022-02-07 NOTE — PROGRESS NOTES
Subjective:                                                                                    Consult note   Patient ID:    Name: Deb Webb  : 1950  MRN: [id number]      HPI:   Deb Webb is a 71 y.o. female presents for evaluation of Lung Nodule    Patient with history of chronic/heaving smoker, quit ~ 2 weeks ago. Had a low dose CT chest low dose screening and was found to have multiple new lung nodules in the upper lobes, largest in the SARAH 8mm, and 5mm RUL, underwent PET/CT scan on 22 Left nodule with SUV of 1.4 and left adrenal mass with SUV of 2.6 likely adrenal adenoma. Patient had a IR guided biopsy of the lung and developed pneumothorax.Patient was discharged on 2 L of oxygen and she a follow up evelia with pulmonologist to start weaning her off. Pathology of the SARAH was negative for tumor and granuloma . Patient schedule to meet with pulmonology team for possible repeating the biopsy. Off note Last clonoscopy ~ 10 years wnl,polyps and last Mammogram 2021: negative     Past Medical History:   Diagnosis Date    Allergy     Arthritis     Cataract     Colon polyps     COPD (chronic obstructive pulmonary disease)     Diabetes mellitus type II     Diabetic neuropathy     Fever blister     Glaucoma (increased eye pressure)     Hyperlipidemia     Hypertension     Irritable bowel syndrome     Keloid cicatrix     Osteoporosis     Retained cholelithiasis following cholecystectomy(997.41)     Right patella fracture        Past Surgical History:   Procedure Laterality Date    BACK SURGERY      CATARACT EXTRACTION W/  INTRAOCULAR LENS IMPLANT Bilateral     CHOLECYSTECTOMY      Laparoscopic    COLONOSCOPY      HERNIA REPAIR      HYSTERECTOMY      KNEE SURGERY Right 3/4/2015    Dr Weller     OOPHORECTOMY      ovarian tumor      Benign    TONSILLECTOMY, ADENOIDECTOMY         Family History   Problem Relation Age of Onset    Cancer Mother         Skin    Skin  "cancer Mother     Glaucoma Mother     Cataracts Mother     Diabetes Mother     Heart disease Father     Diabetes Father     Skin cancer Sister     Diabetes Sister     Diabetes Daughter     Breast cancer Maternal Aunt     Melanoma Neg Hx     Psoriasis Neg Hx     Eczema Neg Hx     Lupus Neg Hx     Amblyopia Neg Hx     Blindness Neg Hx     Macular degeneration Neg Hx     Retinal detachment Neg Hx     Strabismus Neg Hx        Social History     Socioeconomic History    Marital status:    Occupational History     Employer: [employer]   Tobacco Use    Smoking status: Current Every Day Smoker     Packs/day: 0.50     Years: 40.00     Pack years: 20.00     Types: Cigarettes    Smokeless tobacco: Current User   Substance and Sexual Activity    Alcohol use: No    Drug use: No    Sexual activity: Never   Other Topics Concern    Are you pregnant or think you may be? No    Breast-feeding No       Review of patient's allergies indicates:   Allergen Reactions    Silicone      Burn skin    Adhesive Rash       Review of Systems   Constitutional: Negative for decreased appetite, fever and night sweats.   Eyes: Negative for blurred vision, double vision, pain and visual disturbance.   Cardiovascular: Negative for chest pain.   Respiratory: Positive for cough, shortness of breath and wheezing.    Hematologic/Lymphatic: Negative for adenopathy.   Skin: Negative for rash.   Musculoskeletal: Negative for back pain.   Gastrointestinal: Negative for abdominal pain and diarrhea.   Genitourinary: Negative for frequency.   Neurological: Negative for headaches.   Psychiatric/Behavioral: The patient is not nervous/anxious.             Objective:     Vitals:    02/07/22 1113   BP: 98/70   BP Location: Right arm   Patient Position: Sitting   BP Method: Medium (Manual)   Pulse: 105   Resp: 18   Temp: 98 °F (36.7 °C)   TempSrc: Oral   SpO2: (!) 91%   Weight: 49.7 kg (109 lb 9.1 oz)   Height: 4' 11" " (1.499 m)        Physical Exam  Constitutional:       General: She is not in acute distress.     Appearance: Normal appearance. She is normal weight.   HENT:      Head: Normocephalic and atraumatic.   Eyes:      General: No scleral icterus.  Cardiovascular:      Rate and Rhythm: Normal rate and regular rhythm.      Heart sounds: Normal heart sounds.   Pulmonary:      Effort: Pulmonary effort is normal.      Breath sounds: Wheezing, rhonchi and rales present.   Chest:      Chest wall: No tenderness.   Abdominal:      General: Bowel sounds are normal. There is no distension.      Palpations: Abdomen is soft. There is no mass.   Musculoskeletal:         General: No swelling or tenderness.   Lymphadenopathy:      Cervical: No cervical adenopathy.   Skin:     General: Skin is warm.      Coloration: Skin is not jaundiced or pale.   Neurological:      General: No focal deficit present.      Mental Status: She is oriented to person, place, and time.   Psychiatric:         Mood and Affect: Mood normal.         Behavior: Behavior normal.             Current Outpatient Medications on File Prior to Visit   Medication Sig    alendronate (FOSAMAX) 70 MG tablet Take 1 tablet (70 mg total) by mouth once a week. (Patient taking differently: Take 70 mg by mouth every Monday.)    [START ON 2/12/2022] aspirin (ECOTRIN) 81 MG EC tablet Take 1 tablet (81 mg total) by mouth once daily.    atorvastatin (LIPITOR) 40 MG tablet Take 1 tablet (40 mg total) by mouth once daily.    azelastine (ASTELIN) 137 mcg (0.1 %) nasal spray INHALE 1 SPRAY (137 MCG TOTAL) BY NASAL ROUTE 2 (TWO) TIMES DAILY. (Patient taking differently: 1 spray by Nasal route 2 (two) times daily.)    blood sugar diagnostic Strp To check BG 2 times daily, to use with insurance preferred meter    calcium carbonate/vitamin D3 (CALCIUM 600 + D,3, ORAL) Take 1 tablet by mouth once daily.    citalopram (CELEXA) 20 MG tablet Take 1 tablet (20 mg total) by mouth once daily.  "   dorzolamide-timolol 2-0.5% (COSOPT) 22.3-6.8 mg/mL ophthalmic solution Place 1 drop into both eyes 2 (two) times daily.    dulaglutide (TRULICITY) 1.5 mg/0.5 mL pen injector Inject 1.5 mg into the skin every 7 days. Through patient assistance (Patient taking differently: Inject 1.5 mg into the skin Every Friday. Through patient assistance)    furosemide (LASIX) 20 MG tablet Take 1 tablet (20 mg total) by mouth once daily. (Patient taking differently: Take 20 mg by mouth daily as needed (swelling).)    gabapentin (NEURONTIN) 300 MG capsule Take 300 mg by mouth 3 (three) times daily.    irbesartan (AVAPRO) 75 MG tablet Take 1 tablet (75 mg total) by mouth once daily.    lancets [facility] To check BG 2 times daily, to use with insurance preferred meter    latanoprost 0.005 % ophthalmic solution Place 1 drop into both eyes every evening.    loratadine (CLARITIN) 10 mg tablet Take 1 tablet (10 mg total) by mouth daily as needed for Allergies (or runny nose).    meclizine (ANTIVERT) 12.5 mg tablet Take 1 tablet (12.5 mg total) by mouth 3 (three) times daily as needed for Dizziness.    omega-3 fatty acids-vitamin E 1,000 mg Cap Take 1 capsule by mouth once daily.    omeprazole (PRILOSEC) 40 MG capsule Take 1 capsule (40 mg total) by mouth before breakfast.    pen needle, diabetic (BD ULTRA-FINE AGUS PEN NEEDLE) 32 gauge x 5/32" Ndle 1 Device by Misc.(Non-Drug; Combo Route) route 2 (two) times daily.    PREVALITE 4 gram PwPk TAKE 1 PACKET (4 G TOTAL) BY MOUTH 2 (TWO) TIMES DAILY. FOR DIARRHEA (Patient taking differently: Take 1 packet by mouth 2 (two) times daily.)    primidone (MYSOLINE) 50 MG Tab TAKE 2 TABLETS BY MOUTH 3 (THREE) TIMES DAILY. ONE HALF TABLET FOR ONE WEEK, THEN AS PRESCRIBED (Patient taking differently: Take 100 mg by mouth 3 (three) times daily.)    PROAIR HFA 90 mcg/actuation inhaler TAKE 2 PUFFS BY MOUTH EVERY 4 HOURS AS NEEDED FOR WHEEZING (RESCUE) (Patient taking differently: Inhale 2 " puffs into the lungs every 4 (four) hours as needed for Wheezing or Shortness of Breath.)    umeclidinium-vilanteroL (ANORO ELLIPTA) 62.5-25 mcg/actuation DsDv Inhale 1 puff into the lungs once daily. Controller    blood-glucose meter kit To check BG 2 times daily, to use with insurance preferred meter     No current facility-administered medications on file prior to visit.       CBC:  Lab Results   Component Value Date    WBC 11.09 02/02/2022    HGB 12.5 02/02/2022    HCT 38.6 02/02/2022    MCV 97 02/02/2022     02/02/2022       CMP:  Sodium   Date Value Ref Range Status   02/02/2022 138 136 - 145 mmol/L Final     Potassium   Date Value Ref Range Status   02/02/2022 4.4 3.5 - 5.1 mmol/L Final     Chloride   Date Value Ref Range Status   02/02/2022 101 95 - 110 mmol/L Final     CO2   Date Value Ref Range Status   02/02/2022 31 22 - 31 mmol/L Final     Glucose   Date Value Ref Range Status   02/02/2022 140 (H) 70 - 110 mg/dL Final     Comment:     The ADA recommends the following guidelines for fasting glucose:    Normal:       less than 100 mg/dL    Prediabetes:  100 mg/dL to 125 mg/dL    Diabetes:     126 mg/dL or higher       BUN   Date Value Ref Range Status   02/02/2022 14 7 - 18 mg/dL Final     Creatinine   Date Value Ref Range Status   02/02/2022 0.59 0.50 - 1.40 mg/dL Final     Calcium   Date Value Ref Range Status   02/02/2022 10.0 8.4 - 10.2 mg/dL Final     Total Protein   Date Value Ref Range Status   09/09/2021 6.9 6.0 - 8.4 g/dL Final     Albumin   Date Value Ref Range Status   09/09/2021 4.0 3.5 - 5.2 g/dL Final     Total Bilirubin   Date Value Ref Range Status   09/09/2021 0.4 0.1 - 1.0 mg/dL Final     Comment:     For infants and newborns, interpretation of results should be based  on gestational age, weight and in agreement with clinical  observations.    Premature Infant recommended reference ranges:  Up to 24 hours.............<8.0 mg/dL  Up to 48 hours............<12.0 mg/dL  3-5  days..................<15.0 mg/dL  6-29 days.................<15.0 mg/dL       Alkaline Phosphatase   Date Value Ref Range Status   09/09/2021 97 55 - 135 U/L Final     AST   Date Value Ref Range Status   09/09/2021 16 10 - 40 U/L Final     ALT   Date Value Ref Range Status   09/09/2021 15 10 - 44 U/L Final     Anion Gap   Date Value Ref Range Status   02/02/2022 6 (L) 8 - 16 mmol/L Final     eGFR if    Date Value Ref Range Status   02/02/2022 >60 >60 mL/min/1.73 m^2 Final     eGFR if non    Date Value Ref Range Status   02/02/2022 >60 >60 mL/min/1.73 m^2 Final     Comment:     Calculation used to obtain the estimated glomerular filtration  rate (eGFR) is the CKD-EPI equation.          X-Ray Chest PA And Lateral  Narrative: EXAMINATION:  XR CHEST PA AND LATERAL    CLINICAL HISTORY:  pneumothorax; mass lung mass    TECHNIQUE:  PA and lateral views of the chest were performed.    COMPARISON:  01/29/2022    FINDINGS:  Heart size is enlarged.  Lungs are expanded with patchy bilateral pulmonary infiltrates persisting.  Improved aeration about the left lung field when compared to the previous exam.  Blunting and volume loss at the left costophrenic angle.  No pneumothorax visualized on today's exam.  Impression: Improved aeration about the left lung when compared to the previous exam.  No pneumothorax.    Electronically signed by: Gonzalez Renae MD  Date:    01/30/2022  Time:    17:48       All pertinent labs and imaging reviewed.    Assessment:       1. Essential hypertension    2. Lung nodule    3. Chronic kidney disease, stage 3a    4. Tobacco use disorder      # Lung nodules: IR biopsy negative for malignancy   - due to concern for possible malignancy patient might benefit from repeating biopsy with bronchoscopy if possible or EBUS , following up with pulmonology team   - might also consider monitoring with repeat CT chest in the near future   - Patient with extensive smoking  history, quit 2 weeks ago   - discussed in length about quitting and patient seems interested but would like to discuss it further on next visit     # Hypoxic respiratory distress likely due to pneumothorax:  -  on 2L as needed, at home, following up with pulmonologist     # HTN/CKD : following up with her PCP     Plan:     Essential hypertension    Lung nodule  -     Ambulatory referral/consult to Hematology / Oncology    Chronic kidney disease, stage 3a    Tobacco use disorder         Patient queried and all questions were answered.    Plan was discussed with the patient at length, and she verbalized understanding.    F/u in 1 month earlier if needed (Monday's only due to transportation)    Signed:  Elizabeth Callahan MD  Hematology and Oncology  [facility]

## 2022-02-10 ENCOUNTER — DOCUMENTATION ONLY (OUTPATIENT)
Dept: HEMATOLOGY/ONCOLOGY | Facility: CLINIC | Age: 72
End: 2022-02-10
Payer: MEDICAID

## 2022-02-10 NOTE — PROGRESS NOTES
[facility]      THORACIC MULTIDISCIPLINARY TUMOR BOARD  PATIENT REVIEW FORM  ________________________________________________________________________    CLINIC #: [id number]  DATE: 02/10/2022    TUMOR SITE:   Left Upper Lobe Lung mass    PRESENTER:   Dr. Busch    PATIENT SUMMARY:   70 y/o female, smoker  12/16/2021: Low dose screening CT that revealed 9mm spiculated nodule SARAH concerning for malignancy; multiple opacities bilaterally. Mildly prominent lymph nodes  01/24/2022: PET/CT SARAH nodule and Lt adrenal mass  01/27/2022: IR guided Bx with nodular fibrosis/interstitial fibrosis. Patient developed pneumothorax, d/c on O2  Looking for recommendation for EBUS vs short term follow-up    BOARD RECOMMENDATIONS:   Short interval f/u with CT chest in 3 months              [] Randi'l Treatment Guidelines reviewed and care planned is consistent with guidelines.         (i.e., NCCN, NCI, PD, ACO, AUA, etc.)    PRESENTATION AT CANCER CONFERENCE:         [x] Prospective    [] Retrospective     [] Follow-Up          [] Eligible for clinical trial      Luci Jett

## 2022-02-22 DIAGNOSIS — K58.0 IRRITABLE BOWEL SYNDROME WITH DIARRHEA: ICD-10-CM

## 2022-02-22 RX ORDER — CHOLESTYRAMINE 4 G/4.8G
POWDER, FOR SUSPENSION ORAL
Qty: 180 PACKET | Refills: 0 | Status: SHIPPED | OUTPATIENT
Start: 2022-02-22 | End: 2022-08-15

## 2022-02-22 RX ORDER — CHOLESTYRAMINE 4 G/5.5G
POWDER, FOR SUSPENSION ORAL
Qty: 180 PACKET | Refills: 0 | OUTPATIENT
Start: 2022-02-22

## 2022-02-22 NOTE — TELEPHONE ENCOUNTER
Refill Routing Note   Medication(s) are not appropriate for processing by [facility] for the following reason(s):     - Outside of Protocol  [facility] action(s):                  Automatic Epic Generated Protocol Data:        Requested Prescriptions   Pending Prescriptions Disp Refills    cholestyramine-aspartame (PREVALITE) 4 gram PwPk 180 packet 0     Sig: TAKE 1 PACKET (4 G TOTAL) BY MOUTH 2 (TWO) TIMES DAILY. FOR DIARRHEA       There is no refill protocol information for this order      Refused Prescriptions Disp Refills    PREVALITE 4 gram PwPk [Pharmacy Med Name: PREVALITE PACKET] 180 packet 0     Sig: TAKE 1 PACKET (4 G TOTAL) BY MOUTH 2 (TWO) TIMES DAILY. FOR DIARRHEA       There is no refill protocol information for this order           Appointments  past 12m or future 3m with PCP    Date Provider   Last Visit   1/25/2021 Moiz Goldberg MD   Next Visit   Visit date not found oMiz Goldberg MD   ED visits in past 90 days: 0        Note composed:8:21 AM 02/22/2022

## 2022-02-22 NOTE — TELEPHONE ENCOUNTER
No new care gaps identified.  Powered by [employer]. Reference number: [id number].   2/22/2022 7:32:39 AM CST

## 2022-02-28 ENCOUNTER — HOSPITAL ENCOUNTER (OUTPATIENT)
Dept: RADIOLOGY | Facility: HOSPITAL | Age: 72
Discharge: HOME | End: 2022-02-28
Attending: NURSE PRACTITIONER
Payer: MEDICARE

## 2022-02-28 DIAGNOSIS — J98.01 BRONCHOSPASM: ICD-10-CM

## 2022-02-28 DIAGNOSIS — J44.9 CHRONIC OBSTRUCTIVE PULMONARY DISEASE, UNSPECIFIED COPD TYPE: ICD-10-CM

## 2022-02-28 PROCEDURE — 71046 X-RAY EXAM CHEST 2 VIEWS: CPT | Mod: TC,FY,PO

## 2022-02-28 PROCEDURE — 71046 X-RAY EXAM CHEST 2 VIEWS: CPT | Mod: 26,,, | Performed by: RADIOLOGY

## 2022-02-28 PROCEDURE — 71046 XR CHEST PA AND LATERAL: ICD-10-PCS | Mod: 26,,, | Performed by: RADIOLOGY

## 2022-03-07 ENCOUNTER — OFFICE VISIT (OUTPATIENT)
Dept: URGENT CARE | Facility: CLINIC | Age: 72
End: 2022-03-07
Payer: MEDICARE

## 2022-03-07 VITALS
DIASTOLIC BLOOD PRESSURE: 65 MMHG | HEIGHT: 59 IN | OXYGEN SATURATION: 94 % | BODY MASS INDEX: 21.97 KG/M2 | HEART RATE: 77 BPM | SYSTOLIC BLOOD PRESSURE: 103 MMHG | WEIGHT: 109 LBS | TEMPERATURE: 99 F | RESPIRATION RATE: 20 BRPM

## 2022-03-07 DIAGNOSIS — R05.9 COUGH: ICD-10-CM

## 2022-03-07 DIAGNOSIS — B96.89 BACTERIAL SINUSITIS: Primary | ICD-10-CM

## 2022-03-07 DIAGNOSIS — J32.9 BACTERIAL SINUSITIS: Primary | ICD-10-CM

## 2022-03-07 LAB
CTP QC/QA: YES
CTP QC/QA: YES
POC MOLECULAR INFLUENZA A AGN: NEGATIVE
POC MOLECULAR INFLUENZA B AGN: NEGATIVE
SARS-COV-2 RDRP RESP QL NAA+PROBE: NEGATIVE

## 2022-03-07 PROCEDURE — 1160F RVW MEDS BY RX/DR IN RCRD: CPT | Mod: CPTII,S$GLB,, | Performed by: PHYSICIAN ASSISTANT

## 2022-03-07 PROCEDURE — 71046 X-RAY EXAM CHEST 2 VIEWS: CPT | Mod: S$GLB,,, | Performed by: RADIOLOGY

## 2022-03-07 PROCEDURE — 3074F PR MOST RECENT SYSTOLIC BLOOD PRESSURE < 130 MM HG: ICD-10-PCS | Mod: CPTII,S$GLB,, | Performed by: PHYSICIAN ASSISTANT

## 2022-03-07 PROCEDURE — 3078F DIAST BP <80 MM HG: CPT | Mod: CPTII,S$GLB,, | Performed by: PHYSICIAN ASSISTANT

## 2022-03-07 PROCEDURE — 99214 OFFICE O/P EST MOD 30 MIN: CPT | Mod: S$GLB,,, | Performed by: PHYSICIAN ASSISTANT

## 2022-03-07 PROCEDURE — 3008F BODY MASS INDEX DOCD: CPT | Mod: CPTII,S$GLB,, | Performed by: PHYSICIAN ASSISTANT

## 2022-03-07 PROCEDURE — 1159F PR MEDICATION LIST DOCUMENTED IN MEDICAL RECORD: ICD-10-PCS | Mod: CPTII,S$GLB,, | Performed by: PHYSICIAN ASSISTANT

## 2022-03-07 PROCEDURE — U0002: ICD-10-PCS | Mod: QW,S$GLB,, | Performed by: PHYSICIAN ASSISTANT

## 2022-03-07 PROCEDURE — U0002 COVID-19 LAB TEST NON-CDC: HCPCS | Mod: QW,S$GLB,, | Performed by: PHYSICIAN ASSISTANT

## 2022-03-07 PROCEDURE — 99214 PR OFFICE/OUTPT VISIT, EST, LEVL IV, 30-39 MIN: ICD-10-PCS | Mod: S$GLB,,, | Performed by: PHYSICIAN ASSISTANT

## 2022-03-07 PROCEDURE — 87502 POCT INFLUENZA A/B MOLECULAR: ICD-10-PCS | Mod: QW,S$GLB,, | Performed by: PHYSICIAN ASSISTANT

## 2022-03-07 PROCEDURE — 1160F PR REVIEW ALL MEDS BY PRESCRIBER/CLIN PHARMACIST DOCUMENTED: ICD-10-PCS | Mod: CPTII,S$GLB,, | Performed by: PHYSICIAN ASSISTANT

## 2022-03-07 PROCEDURE — 1159F MED LIST DOCD IN RCRD: CPT | Mod: CPTII,S$GLB,, | Performed by: PHYSICIAN ASSISTANT

## 2022-03-07 PROCEDURE — 3078F PR MOST RECENT DIASTOLIC BLOOD PRESSURE < 80 MM HG: ICD-10-PCS | Mod: CPTII,S$GLB,, | Performed by: PHYSICIAN ASSISTANT

## 2022-03-07 PROCEDURE — 3074F SYST BP LT 130 MM HG: CPT | Mod: CPTII,S$GLB,, | Performed by: PHYSICIAN ASSISTANT

## 2022-03-07 PROCEDURE — 3072F LOW RISK FOR RETINOPATHY: CPT | Mod: CPTII,S$GLB,, | Performed by: PHYSICIAN ASSISTANT

## 2022-03-07 PROCEDURE — 87502 INFLUENZA DNA AMP PROBE: CPT | Mod: QW,S$GLB,, | Performed by: PHYSICIAN ASSISTANT

## 2022-03-07 PROCEDURE — 3072F PR LOW RISK FOR RETINOPATHY: ICD-10-PCS | Mod: CPTII,S$GLB,, | Performed by: PHYSICIAN ASSISTANT

## 2022-03-07 PROCEDURE — 3008F PR BODY MASS INDEX (BMI) DOCUMENTED: ICD-10-PCS | Mod: CPTII,S$GLB,, | Performed by: PHYSICIAN ASSISTANT

## 2022-03-07 PROCEDURE — 71046 XR CHEST PA AND LATERAL: ICD-10-PCS | Mod: S$GLB,,, | Performed by: RADIOLOGY

## 2022-03-07 RX ORDER — BENZONATATE 200 MG/1
200 CAPSULE ORAL 3 TIMES DAILY PRN
Qty: 30 CAPSULE | Refills: 0 | Status: SHIPPED | OUTPATIENT
Start: 2022-03-07 | End: 2022-03-17

## 2022-03-07 RX ORDER — DOXYCYCLINE 100 MG/1
100 CAPSULE ORAL 2 TIMES DAILY
Qty: 20 CAPSULE | Refills: 0 | Status: SHIPPED | OUTPATIENT
Start: 2022-03-07 | End: 2022-03-17

## 2022-03-07 NOTE — PROGRESS NOTES
"Subjective:       Patient ID: Deb Webb is a 71 y.o. female.    Vitals:  height is 4' 11" (1.499 m) and weight is 49.4 kg (109 lb). Her oral temperature is 98.9 °F (37.2 °C). Her blood pressure is 103/65 and her pulse is 77. Her respiration is 20 and oxygen saturation is 94% (abnormal).     Chief Complaint: Cough and Nasal Congestion    Pt presents today with c/o nasal and chest congestion, fatigue and cough X's 1 week. Pt is vaccinated. Pt has used OTC meds with no relief. Pt O2 sats upon arrival @ 80%-82%. Pt placed on 3L O2, stats improve to 90% - 95% at baseline. Pt states she is coughing up a "dark brown" sputum, denies any blood in sputum.  Pt also c/o of bi-lat rib pain from coughing. Hx of COPD, pt does use oxygen at home.     Cough  This is a new problem. The current episode started in the past 7 days. The problem has been gradually worsening. The problem occurs constantly. The cough is productive of sputum. Associated symptoms include nasal congestion and postnasal drip. Pertinent negatives include no chills or fever. Treatments tried: OTC congetion meds. The treatment provided no relief. Her past medical history is significant for COPD. There is no history of asthma, bronchitis, emphysema or pneumonia.       Constitution: Negative for chills and fever.   HENT: Positive for postnasal drip, sinus pain and sinus pressure.    Cardiovascular: Negative for leg swelling.   Respiratory: Positive for cough and COPD.        Objective:      Physical Exam   Constitutional:  Non-toxic appearance. No distress.      Comments:Essential tremor noted normal  HENT:   Head: Normocephalic and atraumatic.   Ears:   Right Ear: External ear normal.   Left Ear: External ear normal.   Nose: Right sinus exhibits maxillary sinus tenderness and frontal sinus tenderness. Left sinus exhibits maxillary sinus tenderness and frontal sinus tenderness.   Eyes: Conjunctivae are normal. Right eye exhibits no discharge. Left eye " exhibits no discharge.      extraocular movement intact   Cardiovascular: Normal rate, regular rhythm and normal heart sounds.   No murmur heard.  Pulmonary/Chest: No accessory muscle usage. No respiratory distress. She has wheezes.   Neurological: She is alert.   Skin: Skin is warm, dry, not pale and no rash.   Psychiatric: Her behavior is normal. Mood, judgment and thought content normal.   Nursing note and vitals reviewed.        Assessment:       1. Bacterial sinusitis    2. Cough          Plan:         Bacterial sinusitis    Cough  -     X-Ray Chest PA And Lateral; Future; Expected date: 03/07/2022    X-Ray Chest PA And Lateral    Result Date: 3/7/2022  EXAMINATION: XR CHEST PA AND LATERAL CLINICAL HISTORY: Cough, unspecified TECHNIQUE: PA and lateral views of the chest were performed. COMPARISON: Chest of February 28, 2022 FINDINGS: The mediastinal and cardiac size and contours normal.  There is increased interstitial markings diffusely through the lung fields consistent with interstitial fibrosis or diffuse pneumonitis..  A consolidated infiltrate or solid mass is not seen.  No pneumothorax or pleural effusion is noted.  There is mild thoracic kyphosis.     Diffuse increased interstitial markings through the lung fields may represent a pneumonitis or interstitial fibrosis.  Mild kyphosis. Electronically signed by: Mario Alberto Mccord MD Date:    03/07/2022 Time:    13:24    -     POCT COVID-19 Rapid Screening  -     POCT Influenza A/B MOLECULAR    Results for orders placed or performed in visit on 03/07/22   POCT COVID-19 Rapid Screening   Result Value Ref Range    POC Rapid COVID Negative Negative     Acceptable Yes    POCT Influenza A/B MOLECULAR   Result Value Ref Range    POC Molecular Influenza A Ag Negative Negative, Not Reported    POC Molecular Influenza B Ag Negative Negative, Not Reported     Acceptable Yes      *Note: Due to a large number of results and/or encounters for  the requested time period, some results have not been displayed. A complete set of results can be found in Results Review.       Other orders  -     doxycycline (VIBRAMYCIN) 100 MG Cap; Take 1 capsule (100 mg total) by mouth 2 (two) times daily. for 10 days  Dispense: 20 capsule; Refill: 0  -     benzonatate (TESSALON) 200 MG capsule; Take 1 capsule (200 mg total) by mouth 3 (three) times daily as needed for Cough.  Dispense: 30 capsule; Refill: 0    Patient in no acute distress.  Is able to speak in full sentences.  Upon initial auscultation of lungs, faint wheezing was heard.  Auscultation at end of visit revealed no wheezing.  Discussed with patient will place on doxycycline for bacterial sinusitis.  Discussed if patient begins having any fever, dizziness, increased difficulty with breathing will need to go to ED.  Otherwise would have close follow-up with pulmonology due to initial room air O2 sats of 80%.  Discussed use of O2 machine at home.  Again strict ER precautions discussed with patient.  Patient voices understanding and agrees with plan.     Patient Instructions   You must understand that you've received an Urgent Care treatment only and that you may be released before all your medical problems are known or treated. You, the patient, will arrange for follow up care as instructed.  Follow up with your PCP or specialty clinic as directed in the next 1-2 weeks if not improved or as needed.  You can call [phone] to schedule an appointment with the appropriate provider.  If your condition worsens we recommend that you receive another evaluation at the emergency room immediately or contact your primary medical clinics after hours call service to discuss your concerns.  Please return here or go to the Emergency Department for any concerns or worsening of condition.

## 2022-03-07 NOTE — PATIENT INSTRUCTIONS

## 2022-03-21 ENCOUNTER — TELEPHONE (OUTPATIENT)
Dept: SMOKING CESSATION | Facility: CLINIC | Age: 72
End: 2022-03-21
Payer: MEDICAID

## 2022-03-21 PROBLEM — S32.502D: Status: ACTIVE | Noted: 2022-03-21

## 2022-03-21 PROBLEM — C78.6 CARCINOMATOSIS PERITONEI: Status: ACTIVE | Noted: 2022-03-21

## 2022-03-21 PROBLEM — D72.829 LEUKOCYTOSIS: Status: ACTIVE | Noted: 2022-03-21

## 2022-03-21 PROBLEM — S42.032A DISPLACED FRACTURE OF LATERAL END OF LEFT CLAVICLE, INITIAL ENCOUNTER FOR CLOSED FRACTURE: Status: ACTIVE | Noted: 2022-03-21

## 2022-03-21 PROBLEM — J96.11 CHRONIC RESPIRATORY FAILURE WITH HYPOXIA: Status: ACTIVE | Noted: 2022-03-21

## 2022-03-21 NOTE — TELEPHONE ENCOUNTER
Patient had a tobacco cessation appointment today but patient has been admitted to [facility] ER at this time. I will contact patient later this week to reschedule.

## 2022-03-30 DIAGNOSIS — E11.9 TYPE 2 DIABETES MELLITUS WITHOUT COMPLICATION: ICD-10-CM

## 2022-04-07 DIAGNOSIS — E11.9 TYPE 2 DIABETES MELLITUS WITHOUT COMPLICATION: ICD-10-CM

## 2022-04-13 DIAGNOSIS — E11.9 TYPE 2 DIABETES MELLITUS WITHOUT COMPLICATION: ICD-10-CM

## 2022-04-20 DIAGNOSIS — E11.9 TYPE 2 DIABETES MELLITUS WITHOUT COMPLICATION: ICD-10-CM

## 2022-04-25 ENCOUNTER — TELEPHONE (OUTPATIENT)
Dept: FAMILY MEDICINE | Facility: CLINIC | Age: 72
End: 2022-04-25
Payer: MEDICAID

## 2022-04-25 DIAGNOSIS — R09.02 HYPOXIA: Primary | ICD-10-CM

## 2022-04-25 NOTE — TELEPHONE ENCOUNTER
----- Message from Ann  sent at 4/25/2022  3:46 PM CDT -----  Regarding: orders  Type: Needs Medical Advice    Who Called:      Best Call Back Number:   Additional Information: Requesting a call back from Nurse, regarding people's health needs orders for finger pulse/ox reader sent over for pt ,please advise and call back with questions

## 2022-04-25 NOTE — TELEPHONE ENCOUNTER
Patient is requesting a pulse oximetry for home use. Epic does not have an order to pend. Patient stated it needed to be faxed to [facility]. It may need to be on a written rx pad. Please advise

## 2022-04-27 DIAGNOSIS — E11.9 TYPE 2 DIABETES MELLITUS WITHOUT COMPLICATION: ICD-10-CM

## 2022-04-27 NOTE — PROGRESS NOTES
April 27, 2022    Hello, may I speak with Bryan Dover?    My name is Fausto    I am  with [facility]  [address] [place] [address] [phone].    Before we get started may I verify your date of birth? 1952    I am calling to officially welcome you to our practice and ask about your recent visit. Is this a good time to talk? No. Left VM.     Tell me about your visit with us. What things went well?        We're always looking for ways to make our patients' experiences even better. Do you have recommendations on ways we may improve?     Overall were you satisfied with your first visit to our practice?        I appreciate you taking the time to speak with me today. Is there anything else I can do for you?       Thank you, and have a great day.       Follow up visit    History of Present Illness:   Deb comes to the office for follow up evaluation of right wrist pain due to volar ganglion cyst.  Recommended treatment at the last visit included immobilization and NSAIDs.  Since the last visit her pain has resolved.  Her primary complaint is that her tremors are worse and she recently had a fall because of this     ROS: unremarkable and no change since last visit    Physical Examination:    NAD  Right wrist   Mass over volar wrist unchanged size  Non tender  Mobile, firm - consistent in appearance with volar ganglion     Radiographic imaging: wrists from previous visit reviewed -- mild radiocarpal degenerative changes     Assessment/Plan:  68 y.o. female  with Right volar ganglion cyst     We discussed the etiology of persistent pain and further treatment options.  1. OTC meds and wrist brace PRN  2. Activity as tolerated  3. Will call Dr Stratton Re: tremor  4. Return for follow up visit: PRN    All questions were answered in detail. The patient  verbalized the understanding of the treatment plan and is in full agreement with the treatment plan.

## 2022-05-02 ENCOUNTER — OFFICE VISIT (OUTPATIENT)
Dept: FAMILY MEDICINE | Facility: CLINIC | Age: 72
End: 2022-05-02
Payer: MEDICARE

## 2022-05-02 ENCOUNTER — LAB VISIT (OUTPATIENT)
Dept: LAB | Facility: HOSPITAL | Age: 72
End: 2022-05-02
Attending: INTERNAL MEDICINE
Payer: MEDICAID

## 2022-05-02 VITALS
OXYGEN SATURATION: 96 % | HEART RATE: 108 BPM | BODY MASS INDEX: 21.96 KG/M2 | SYSTOLIC BLOOD PRESSURE: 126 MMHG | WEIGHT: 108.94 LBS | HEIGHT: 59 IN | DIASTOLIC BLOOD PRESSURE: 80 MMHG

## 2022-05-02 DIAGNOSIS — J96.11 CHRONIC RESPIRATORY FAILURE WITH HYPOXIA: ICD-10-CM

## 2022-05-02 DIAGNOSIS — N18.31 CHRONIC KIDNEY DISEASE, STAGE 3A: ICD-10-CM

## 2022-05-02 DIAGNOSIS — S32.502D CLOSED DISPLACED FRACTURE OF LEFT PUBIS WITH ROUTINE HEALING: ICD-10-CM

## 2022-05-02 DIAGNOSIS — I10 ESSENTIAL HYPERTENSION: ICD-10-CM

## 2022-05-02 DIAGNOSIS — E11.42 TYPE 2 DIABETES MELLITUS WITH DIABETIC POLYNEUROPATHY, WITHOUT LONG-TERM CURRENT USE OF INSULIN: Primary | ICD-10-CM

## 2022-05-02 DIAGNOSIS — I50.42 CHRONIC COMBINED SYSTOLIC AND DIASTOLIC CONGESTIVE HEART FAILURE: Chronic | ICD-10-CM

## 2022-05-02 DIAGNOSIS — E11.42 TYPE 2 DIABETES MELLITUS WITH DIABETIC POLYNEUROPATHY, WITHOUT LONG-TERM CURRENT USE OF INSULIN: ICD-10-CM

## 2022-05-02 PROBLEM — J96.01 ACUTE RESPIRATORY FAILURE WITH HYPOXIA: Status: RESOLVED | Noted: 2022-01-27 | Resolved: 2022-05-02

## 2022-05-02 LAB
ESTIMATED AVG GLUCOSE: 154 MG/DL (ref 68–131)
HBA1C MFR BLD: 7 % (ref 4–5.6)

## 2022-05-02 PROCEDURE — 1100F PTFALLS ASSESS-DOCD GE2>/YR: CPT | Mod: CPTII,S$GLB,, | Performed by: INTERNAL MEDICINE

## 2022-05-02 PROCEDURE — 3008F BODY MASS INDEX DOCD: CPT | Mod: CPTII,S$GLB,, | Performed by: INTERNAL MEDICINE

## 2022-05-02 PROCEDURE — 99999 PR PBB SHADOW E&M-EST. PATIENT-LVL IV: ICD-10-PCS | Mod: PBBFAC,,, | Performed by: INTERNAL MEDICINE

## 2022-05-02 PROCEDURE — 99499 UNLISTED E&M SERVICE: CPT | Mod: S$PBB,,, | Performed by: INTERNAL MEDICINE

## 2022-05-02 PROCEDURE — 1159F PR MEDICATION LIST DOCUMENTED IN MEDICAL RECORD: ICD-10-PCS | Mod: CPTII,S$GLB,, | Performed by: INTERNAL MEDICINE

## 2022-05-02 PROCEDURE — 99214 PR OFFICE/OUTPT VISIT, EST, LEVL IV, 30-39 MIN: ICD-10-PCS | Mod: S$GLB,,, | Performed by: INTERNAL MEDICINE

## 2022-05-02 PROCEDURE — 3008F PR BODY MASS INDEX (BMI) DOCUMENTED: ICD-10-PCS | Mod: CPTII,S$GLB,, | Performed by: INTERNAL MEDICINE

## 2022-05-02 PROCEDURE — 4010F ACE/ARB THERAPY RXD/TAKEN: CPT | Mod: CPTII,S$GLB,, | Performed by: INTERNAL MEDICINE

## 2022-05-02 PROCEDURE — 3079F PR MOST RECENT DIASTOLIC BLOOD PRESSURE 80-89 MM HG: ICD-10-PCS | Mod: CPTII,S$GLB,, | Performed by: INTERNAL MEDICINE

## 2022-05-02 PROCEDURE — 3072F LOW RISK FOR RETINOPATHY: CPT | Mod: CPTII,S$GLB,, | Performed by: INTERNAL MEDICINE

## 2022-05-02 PROCEDURE — 3288F PR FALLS RISK ASSESSMENT DOCUMENTED: ICD-10-PCS | Mod: CPTII,S$GLB,, | Performed by: INTERNAL MEDICINE

## 2022-05-02 PROCEDURE — 3072F PR LOW RISK FOR RETINOPATHY: ICD-10-PCS | Mod: CPTII,S$GLB,, | Performed by: INTERNAL MEDICINE

## 2022-05-02 PROCEDURE — 83036 HEMOGLOBIN GLYCOSYLATED A1C: CPT | Performed by: INTERNAL MEDICINE

## 2022-05-02 PROCEDURE — 4010F PR ACE/ARB THEARPY RXD/TAKEN: ICD-10-PCS | Mod: CPTII,S$GLB,, | Performed by: INTERNAL MEDICINE

## 2022-05-02 PROCEDURE — 3051F PR MOST RECENT HEMOGLOBIN A1C LEVEL 7.0 - < 8.0%: ICD-10-PCS | Mod: CPTII,S$GLB,, | Performed by: INTERNAL MEDICINE

## 2022-05-02 PROCEDURE — 99499 RISK ADDL DX/OHS AUDIT: ICD-10-PCS | Mod: S$PBB,,, | Performed by: INTERNAL MEDICINE

## 2022-05-02 PROCEDURE — 1159F MED LIST DOCD IN RCRD: CPT | Mod: CPTII,S$GLB,, | Performed by: INTERNAL MEDICINE

## 2022-05-02 PROCEDURE — 3051F HG A1C>EQUAL 7.0%<8.0%: CPT | Mod: CPTII,S$GLB,, | Performed by: INTERNAL MEDICINE

## 2022-05-02 PROCEDURE — 3074F PR MOST RECENT SYSTOLIC BLOOD PRESSURE < 130 MM HG: ICD-10-PCS | Mod: CPTII,S$GLB,, | Performed by: INTERNAL MEDICINE

## 2022-05-02 PROCEDURE — 99214 OFFICE O/P EST MOD 30 MIN: CPT | Mod: S$GLB,,, | Performed by: INTERNAL MEDICINE

## 2022-05-02 PROCEDURE — 3074F SYST BP LT 130 MM HG: CPT | Mod: CPTII,S$GLB,, | Performed by: INTERNAL MEDICINE

## 2022-05-02 PROCEDURE — 99999 PR PBB SHADOW E&M-EST. PATIENT-LVL IV: CPT | Mod: PBBFAC,,, | Performed by: INTERNAL MEDICINE

## 2022-05-02 PROCEDURE — 1100F PR PT FALLS ASSESS DOC 2+ FALLS/FALL W/INJURY/YR: ICD-10-PCS | Mod: CPTII,S$GLB,, | Performed by: INTERNAL MEDICINE

## 2022-05-02 PROCEDURE — 36415 COLL VENOUS BLD VENIPUNCTURE: CPT | Mod: PO | Performed by: INTERNAL MEDICINE

## 2022-05-02 PROCEDURE — 1160F RVW MEDS BY RX/DR IN RCRD: CPT | Mod: CPTII,S$GLB,, | Performed by: INTERNAL MEDICINE

## 2022-05-02 PROCEDURE — 3079F DIAST BP 80-89 MM HG: CPT | Mod: CPTII,S$GLB,, | Performed by: INTERNAL MEDICINE

## 2022-05-02 PROCEDURE — 1160F PR REVIEW ALL MEDS BY PRESCRIBER/CLIN PHARMACIST DOCUMENTED: ICD-10-PCS | Mod: CPTII,S$GLB,, | Performed by: INTERNAL MEDICINE

## 2022-05-02 PROCEDURE — 3288F FALL RISK ASSESSMENT DOCD: CPT | Mod: CPTII,S$GLB,, | Performed by: INTERNAL MEDICINE

## 2022-05-02 RX ORDER — IRBESARTAN 75 MG/1
75 TABLET ORAL DAILY
Qty: 90 TABLET | Refills: 1 | Status: ON HOLD | OUTPATIENT
Start: 2022-05-02 | End: 2022-07-17 | Stop reason: HOSPADM

## 2022-05-02 RX ORDER — DULAGLUTIDE 1.5 MG/.5ML
1.5 INJECTION, SOLUTION SUBCUTANEOUS
Qty: 12 PEN | Refills: 3 | Status: SHIPPED | OUTPATIENT
Start: 2022-05-02 | End: 2023-04-23

## 2022-05-02 RX ORDER — BLOOD-GLUCOSE SENSOR
EACH MISCELLANEOUS
Qty: 10 EACH | Refills: 2 | Status: SHIPPED | OUTPATIENT
Start: 2022-05-02 | End: 2023-12-06 | Stop reason: CLARIF

## 2022-05-02 RX ORDER — BLOOD-GLUCOSE,RECEIVER,CONT
EACH MISCELLANEOUS
Qty: 1 EACH | Refills: 1 | Status: SHIPPED | OUTPATIENT
Start: 2022-05-02 | End: 2023-12-06 | Stop reason: CLARIF

## 2022-05-02 NOTE — PROGRESS NOTES
Subjective     Deb Webb is a 71 y.o. old, female here for Hospital Follow Up    Here today with her sister. Recent hospital admission for fracture and SNF stay, now back living with family (grand daughter)  72 y/o with PMH of CVA, Type 2 DM with neuropathy, HTN, HLD, Chronic respiratory failure, COPD, osteopenia, IBS, anxiety, tobacco use, Essential tremor, prior BZO dependence    Type 2 DM: On insulin in the hospital and at SNF. She was eating more starches than normal back on her regular diet now with mostly veggies that was controlling it. She needs to check four times a day and I have recommended she get the dexcom system or similar CGM to check things more accurately. Her sister already uses one with good success.    She now uses a walker, has home oxygen.    HTN: She didn't realize she had been taken off irbesartan and since she has been home she has been taking it. BP is controlled the past few weeks and with no adverse effects so it should probably be continued.    Her other problems are stable but she does have a walking boot on the left foot and has a f/u scheduled with ortho.    Review of Systems   Constitutional: Negative.    Cardiovascular: Negative.    Musculoskeletal: Positive for joint pain.   Neurological: Positive for tremors.   Psychiatric/Behavioral: Negative.      Medications     Outpatient Medications Marked as Taking for the 5/2/22 encounter (Office Visit) with Triston Valverde MD   Medication Sig Dispense Refill    alendronate (FOSAMAX) 70 MG tablet Take 1 tablet (70 mg total) by mouth once a week. (Patient taking differently: Take 70 mg by mouth every Monday.) 12 tablet 1    ANORO ELLIPTA 62.5-25 mcg/actuation DsDv INHALE 1 PUFF INTO THE LUNGS ONCE DAILY (Patient taking differently: Inhale 1 puff into the lungs once daily at 6am.) 60 each 0    aspirin (ECOTRIN) 81 MG EC tablet Take 1 tablet (81 mg total) by mouth once daily.  0    atorvastatin (LIPITOR) 40 MG tablet Take 1  "tablet (40 mg total) by mouth once daily. 90 tablet 1    azelastine (ASTELIN) 137 mcg (0.1 %) nasal spray INHALE 1 SPRAY (137 MCG TOTAL) BY NASAL ROUTE 2 (TWO) TIMES DAILY. (Patient taking differently: 1 spray by Nasal route 2 (two) times daily.) 90 mL 0    blood sugar diagnostic Strp To check BG 2 times daily, to use with insurance preferred meter 200 each 3    calcium carbonate/vitamin D3 (CALCIUM 600 + D,3, ORAL) Take 1 tablet by mouth once daily.      cholestyramine-aspartame (PREVALITE) 4 gram PwPk TAKE 1 PACKET (4 G TOTAL) BY MOUTH 2 (TWO) TIMES DAILY. FOR DIARRHEA (Patient taking differently: Take 4 g by mouth 2 (two) times daily. TAKE 1 PACKET (4 G TOTAL) BY MOUTH 2 (TWO) TIMES DAILY. FOR DIARRHEA) 180 packet 0    citalopram (CELEXA) 20 MG tablet Take 1 tablet (20 mg total) by mouth once daily. 90 tablet 1    dorzolamide-timolol 2-0.5% (COSOPT) 22.3-6.8 mg/mL ophthalmic solution Place 1 drop into both eyes 2 (two) times daily. 10 mL 11    furosemide (LASIX) 20 MG tablet Take 1 tablet (20 mg total) by mouth once daily. (Patient taking differently: Take 20 mg by mouth daily as needed (swelling).) 90 tablet 1    gabapentin (NEURONTIN) 300 MG capsule TAKE 1 CAPSULE BY MOUTH THREE TIMES A DAY (Patient taking differently: Take 300 mg by mouth 3 (three) times daily.) 270 capsule 1    lancets Misc To check BG 2 times daily, to use with insurance preferred meter 200 each 3    latanoprost 0.005 % ophthalmic solution INSTILL 1 DROP INTO BOTH EYES EVERY EVENING 7.5 mL 1    loratadine (CLARITIN) 10 mg tablet Take 1 tablet (10 mg total) by mouth daily as needed for Allergies (or runny nose). 90 tablet 1    omega-3 fatty acids-vitamin E 1,000 mg Cap Take 1 capsule by mouth once daily.      omeprazole (PRILOSEC) 40 MG capsule Take 1 capsule (40 mg total) by mouth before breakfast. 90 capsule 1    pen needle, diabetic (BD ULTRA-FINE AGUS PEN NEEDLE) 32 gauge x 5/32" Ndle 1 Device by Misc.(Non-Drug; Combo Route) " "route 2 (two) times daily. 200 each 4    pneumoc 20-geena conj-dip cr,PF, (PREVNAR-20, PF,) 0.5 mL Syrg injection Inject 0.5 mLs into the muscle once. for 1 dose 0.5 mL 0    primidone (MYSOLINE) 50 MG Tab TAKE 2 TABLETS BY MOUTH 3 (THREE) TIMES DAILY. ONE HALF TABLET FOR ONE WEEK, THEN AS PRESCRIBED (Patient taking differently: Take 100 mg by mouth 3 (three) times daily. TAKE 2 TABLETS BY MOUTH 3 (THREE) TIMES DAILY. ONE HALF TABLET FOR ONE WEEK, THEN AS PRESCRIBED) 540 tablet 3    PROAIR HFA 90 mcg/actuation inhaler TAKE 2 PUFFS BY MOUTH EVERY 4 HOURS AS NEEDED FOR WHEEZING (RESCUE) (Patient taking differently: Inhale 1 puff into the lungs 2 (two) times daily as needed for Wheezing.) 25.5 g 3    [DISCONTINUED] dulaglutide (TRULICITY) 1.5 mg/0.5 mL pen injector Inject 1.5 mg into the skin every 7 days. Through patient assistance 12 pen 3     Objective     /80   Pulse 108   Ht 4' 11" (1.499 m)   Wt 49.4 kg (108 lb 14.5 oz)   SpO2 96%   BMI 22.00 kg/m²   Physical Exam  Assessment and Plan     Type 2 diabetes mellitus with diabetic polyneuropathy, without long-term current use of insulin  -     Hemoglobin A1C; Future; Expected date: 05/02/2022  -     Microalbumin/Creatinine Ratio, Urine  -     dulaglutide (TRULICITY) 1.5 mg/0.5 mL pen injector; Inject 1.5 mg into the skin every 7 days. Through patient assistance  Dispense: 12 pen; Refill: 3  -     blood-glucose sensor (DEXCOM G6 SENSOR) Emmie; Use as directed  Dispense: 10 each; Refill: 2  -     blood-glucose meter,continuous (DEXCOM G6 ) Misc; Use as directed  Dispense: 1 each; Refill: 1    Essential hypertension  -     irbesartan (AVAPRO) 75 MG tablet; Take 1 tablet (75 mg total) by mouth once daily.  Dispense: 90 tablet; Refill: 1    Chronic respiratory failure with hypoxia    Chronic combined systolic and diastolic congestive heart failure    Chronic kidney disease, stage 3a    Closed displaced fracture of left pubis with routine " healing      Follow up in about 3 months (around 8/2/2022).  ___________________  Triston Valverde MD  Internal Medicine and Pediatrics

## 2022-05-03 ENCOUNTER — TELEPHONE (OUTPATIENT)
Dept: FAMILY MEDICINE | Facility: CLINIC | Age: 72
End: 2022-05-03
Payer: MEDICAID

## 2022-05-03 DIAGNOSIS — S32.502D CLOSED DISPLACED FRACTURE OF LEFT PUBIS WITH ROUTINE HEALING: Primary | ICD-10-CM

## 2022-05-03 NOTE — TELEPHONE ENCOUNTER
----- Message from Steven Angeles sent at 5/3/2022 10:16 AM CDT -----  Regarding: orders  Contact: Patient  Patient want to know if office can put orders in for physical therapy at [facility], any questions please call back at [phone] (home)     Case number [id number]

## 2022-05-04 DIAGNOSIS — J41.0 SIMPLE CHRONIC BRONCHITIS: ICD-10-CM

## 2022-05-04 DIAGNOSIS — J98.01 BRONCHOSPASM: ICD-10-CM

## 2022-05-04 DIAGNOSIS — E11.9 TYPE 2 DIABETES MELLITUS WITHOUT COMPLICATION: ICD-10-CM

## 2022-05-09 RX ORDER — ALBUTEROL SULFATE 90 UG/1
AEROSOL, METERED RESPIRATORY (INHALATION)
Qty: 25.5 G | Refills: 3 | Status: SHIPPED | OUTPATIENT
Start: 2022-05-09 | End: 2022-07-15 | Stop reason: CLARIF

## 2022-05-09 RX ORDER — ALBUTEROL SULFATE 90 UG/1
2 AEROSOL, METERED RESPIRATORY (INHALATION) EVERY 6 HOURS PRN
Qty: 18 G | Refills: 2 | Status: ON HOLD | OUTPATIENT
Start: 2022-05-09 | End: 2022-07-18 | Stop reason: SDUPTHER

## 2022-05-09 RX ORDER — UMECLIDINIUM BROMIDE AND VILANTEROL TRIFENATATE 62.5; 25 UG/1; UG/1
1 POWDER RESPIRATORY (INHALATION) DAILY
Qty: 60 EACH | Refills: 11 | Status: ON HOLD | OUTPATIENT
Start: 2022-05-09 | End: 2022-07-18 | Stop reason: SDUPTHER

## 2022-05-11 DIAGNOSIS — E11.9 TYPE 2 DIABETES MELLITUS WITHOUT COMPLICATION: ICD-10-CM

## 2022-05-12 DIAGNOSIS — M25.572 LEFT ANKLE PAIN: Primary | ICD-10-CM

## 2022-05-12 DIAGNOSIS — M25.562 LEFT KNEE PAIN: ICD-10-CM

## 2022-05-16 ENCOUNTER — CLINICAL SUPPORT (OUTPATIENT)
Dept: REHABILITATION | Facility: HOSPITAL | Age: 72
End: 2022-05-16
Attending: INTERNAL MEDICINE
Payer: MEDICAID

## 2022-05-16 ENCOUNTER — OFFICE VISIT (OUTPATIENT)
Dept: ORTHOPEDICS | Facility: CLINIC | Age: 72
End: 2022-05-16
Payer: MEDICARE

## 2022-05-16 ENCOUNTER — HOSPITAL ENCOUNTER (OUTPATIENT)
Dept: RADIOLOGY | Facility: HOSPITAL | Age: 72
Discharge: HOME | End: 2022-05-16
Attending: ORTHOPAEDIC SURGERY
Payer: MEDICARE

## 2022-05-16 VITALS — BODY MASS INDEX: 21.77 KG/M2 | WEIGHT: 108 LBS | HEIGHT: 59 IN

## 2022-05-16 DIAGNOSIS — M25.562 LEFT KNEE PAIN: ICD-10-CM

## 2022-05-16 DIAGNOSIS — S32.502D CLOSED DISPLACED FRACTURE OF LEFT PUBIS WITH ROUTINE HEALING: ICD-10-CM

## 2022-05-16 DIAGNOSIS — M25.572 LEFT ANKLE PAIN: ICD-10-CM

## 2022-05-16 DIAGNOSIS — S82.892D CLOSED FRACTURE OF LEFT ANKLE WITH ROUTINE HEALING, SUBSEQUENT ENCOUNTER: Primary | ICD-10-CM

## 2022-05-16 PROCEDURE — 73562 XR KNEE ORTHO BILAT: ICD-10-PCS | Mod: 26,50,, | Performed by: RADIOLOGY

## 2022-05-16 PROCEDURE — 3051F PR MOST RECENT HEMOGLOBIN A1C LEVEL 7.0 - < 8.0%: ICD-10-PCS | Mod: CPTII,S$GLB,, | Performed by: ORTHOPAEDIC SURGERY

## 2022-05-16 PROCEDURE — 1159F MED LIST DOCD IN RCRD: CPT | Mod: CPTII,S$GLB,, | Performed by: ORTHOPAEDIC SURGERY

## 2022-05-16 PROCEDURE — 73562 X-RAY EXAM OF KNEE 3: CPT | Mod: 26,50,, | Performed by: RADIOLOGY

## 2022-05-16 PROCEDURE — 1101F PR PT FALLS ASSESS DOC 0-1 FALLS W/OUT INJ PAST YR: ICD-10-PCS | Mod: CPTII,S$GLB,, | Performed by: ORTHOPAEDIC SURGERY

## 2022-05-16 PROCEDURE — 99999 PR PBB SHADOW E&M-EST. PATIENT-LVL IV: CPT | Mod: PBBFAC,,, | Performed by: ORTHOPAEDIC SURGERY

## 2022-05-16 PROCEDURE — 1160F RVW MEDS BY RX/DR IN RCRD: CPT | Mod: CPTII,S$GLB,, | Performed by: ORTHOPAEDIC SURGERY

## 2022-05-16 PROCEDURE — 1160F PR REVIEW ALL MEDS BY PRESCRIBER/CLIN PHARMACIST DOCUMENTED: ICD-10-PCS | Mod: CPTII,S$GLB,, | Performed by: ORTHOPAEDIC SURGERY

## 2022-05-16 PROCEDURE — 3288F PR FALLS RISK ASSESSMENT DOCUMENTED: ICD-10-PCS | Mod: CPTII,S$GLB,, | Performed by: ORTHOPAEDIC SURGERY

## 2022-05-16 PROCEDURE — 3008F BODY MASS INDEX DOCD: CPT | Mod: CPTII,S$GLB,, | Performed by: ORTHOPAEDIC SURGERY

## 2022-05-16 PROCEDURE — 99203 PR OFFICE/OUTPT VISIT, NEW, LEVL III, 30-44 MIN: ICD-10-PCS | Mod: S$GLB,,, | Performed by: ORTHOPAEDIC SURGERY

## 2022-05-16 PROCEDURE — 3072F PR LOW RISK FOR RETINOPATHY: ICD-10-PCS | Mod: CPTII,S$GLB,, | Performed by: ORTHOPAEDIC SURGERY

## 2022-05-16 PROCEDURE — 99999 PR PBB SHADOW E&M-EST. PATIENT-LVL IV: ICD-10-PCS | Mod: PBBFAC,,, | Performed by: ORTHOPAEDIC SURGERY

## 2022-05-16 PROCEDURE — 3288F FALL RISK ASSESSMENT DOCD: CPT | Mod: CPTII,S$GLB,, | Performed by: ORTHOPAEDIC SURGERY

## 2022-05-16 PROCEDURE — 73610 XR ANKLE COMPLETE 3 VIEW LEFT: ICD-10-PCS | Mod: 26,LT,, | Performed by: RADIOLOGY

## 2022-05-16 PROCEDURE — 73610 X-RAY EXAM OF ANKLE: CPT | Mod: TC,PO,LT

## 2022-05-16 PROCEDURE — 73562 X-RAY EXAM OF KNEE 3: CPT | Mod: TC,50,PO

## 2022-05-16 PROCEDURE — 4010F PR ACE/ARB THEARPY RXD/TAKEN: ICD-10-PCS | Mod: CPTII,S$GLB,, | Performed by: ORTHOPAEDIC SURGERY

## 2022-05-16 PROCEDURE — 4010F ACE/ARB THERAPY RXD/TAKEN: CPT | Mod: CPTII,S$GLB,, | Performed by: ORTHOPAEDIC SURGERY

## 2022-05-16 PROCEDURE — 1101F PT FALLS ASSESS-DOCD LE1/YR: CPT | Mod: CPTII,S$GLB,, | Performed by: ORTHOPAEDIC SURGERY

## 2022-05-16 PROCEDURE — 1159F PR MEDICATION LIST DOCUMENTED IN MEDICAL RECORD: ICD-10-PCS | Mod: CPTII,S$GLB,, | Performed by: ORTHOPAEDIC SURGERY

## 2022-05-16 PROCEDURE — 97163 PT EVAL HIGH COMPLEX 45 MIN: CPT | Mod: PO | Performed by: PHYSICAL THERAPIST

## 2022-05-16 PROCEDURE — 1125F AMNT PAIN NOTED PAIN PRSNT: CPT | Mod: CPTII,S$GLB,, | Performed by: ORTHOPAEDIC SURGERY

## 2022-05-16 PROCEDURE — 3072F LOW RISK FOR RETINOPATHY: CPT | Mod: CPTII,S$GLB,, | Performed by: ORTHOPAEDIC SURGERY

## 2022-05-16 PROCEDURE — 3051F HG A1C>EQUAL 7.0%<8.0%: CPT | Mod: CPTII,S$GLB,, | Performed by: ORTHOPAEDIC SURGERY

## 2022-05-16 PROCEDURE — 73610 X-RAY EXAM OF ANKLE: CPT | Mod: 26,LT,, | Performed by: RADIOLOGY

## 2022-05-16 PROCEDURE — 1125F PR PAIN SEVERITY QUANTIFIED, PAIN PRESENT: ICD-10-PCS | Mod: CPTII,S$GLB,, | Performed by: ORTHOPAEDIC SURGERY

## 2022-05-16 PROCEDURE — 97110 THERAPEUTIC EXERCISES: CPT | Mod: PO | Performed by: PHYSICAL THERAPIST

## 2022-05-16 PROCEDURE — 3008F PR BODY MASS INDEX (BMI) DOCUMENTED: ICD-10-PCS | Mod: CPTII,S$GLB,, | Performed by: ORTHOPAEDIC SURGERY

## 2022-05-16 PROCEDURE — 99203 OFFICE O/P NEW LOW 30 MIN: CPT | Mod: S$GLB,,, | Performed by: ORTHOPAEDIC SURGERY

## 2022-05-16 NOTE — PROGRESS NOTES
71 years old about 2 months out from injury to her left ankle comes in today in a boot says she is feeling better    Exam shows minimal tenderness the ankle, no swelling or bruising good motion and strength    X-rays show healing distal fibular fracture acceptable position    Assessment:  Healing left distal fibula fracture    Plan:  Discontinue boot, gradually return into activities tolerance, physical therapy at [facility], follow-up in several weeks time as an established patient with repeat x-rays of her left ankle    Imaging studies ordered and reviewed by me    Further History  Aching pain  Worse with activity  Relieved with rest  No other associated symptoms  No other radiation    Further Exam  Alert and oriented  Pleasant  Contralateral limb has appropriate range of motion for age and condition  Contralateral limb has appropriate strength for age and condition  Contralateral limb has appropriate stability  for age and condition  No adenopathy  Pulses are appropriate for current condition  Skin is intact        Chief Complaint    Chief Complaint   Patient presents with    Left Ankle - Pain     Hospital FU       [facility]  Deb Susan Webb is a 71 y.o.  female who presents with       Past Medical History  Past Medical History:   Diagnosis Date    Allergy     Arthritis     Cataract     Colon polyps     COPD (chronic obstructive pulmonary disease)     Diabetes mellitus type II     Diabetic neuropathy     Fever blister     Glaucoma (increased eye pressure)     Hyperlipidemia     Hypertension     Irritable bowel syndrome     Keloid cicatrix     Osteoporosis     Retained cholelithiasis following cholecystectomy(997.41)     Right patella fracture        Past Surgical History  Past Surgical History:   Procedure Laterality Date    BACK SURGERY  2006    CATARACT EXTRACTION W/  INTRAOCULAR LENS IMPLANT Bilateral     CHOLECYSTECTOMY      Laparoscopic    COLONOSCOPY      HERNIA REPAIR       HYSTERECTOMY      KNEE SURGERY Right 3/4/2015    Dr Weller     OOPHORECTOMY      ovarian tumor      Benign    TONSILLECTOMY, ADENOIDECTOMY         Medications  Current Outpatient Medications   Medication Sig    albuterol (VENTOLIN HFA) 90 mcg/actuation inhaler Inhale 2 puffs into the lungs every 6 (six) hours as needed for Wheezing. Rescue    alendronate (FOSAMAX) 70 MG tablet Take 1 tablet (70 mg total) by mouth once a week. (Patient taking differently: Take 70 mg by mouth every Monday.)    aspirin (ECOTRIN) 81 MG EC tablet Take 1 tablet (81 mg total) by mouth once daily.    atorvastatin (LIPITOR) 40 MG tablet Take 1 tablet (40 mg total) by mouth once daily.    azelastine (ASTELIN) 137 mcg (0.1 %) nasal spray INHALE 1 SPRAY (137 MCG TOTAL) BY NASAL ROUTE 2 (TWO) TIMES DAILY. (Patient taking differently: 1 spray by Nasal route 2 (two) times daily.)    blood sugar diagnostic Strp To check BG 2 times daily, to use with insurance preferred meter    blood-glucose meter,continuous (DEXCOM G6 ) Misc Use as directed    blood-glucose sensor (DEXCOM G6 SENSOR) Emmie Use as directed    calcium carbonate/vitamin D3 (CALCIUM 600 + D,3, ORAL) Take 1 tablet by mouth once daily.    cholestyramine-aspartame (PREVALITE) 4 gram PwPk TAKE 1 PACKET (4 G TOTAL) BY MOUTH 2 (TWO) TIMES DAILY. FOR DIARRHEA (Patient taking differently: Take 4 g by mouth 2 (two) times daily. TAKE 1 PACKET (4 G TOTAL) BY MOUTH 2 (TWO) TIMES DAILY. FOR DIARRHEA)    citalopram (CELEXA) 20 MG tablet Take 1 tablet (20 mg total) by mouth once daily.    dorzolamide-timolol 2-0.5% (COSOPT) 22.3-6.8 mg/mL ophthalmic solution Place 1 drop into both eyes 2 (two) times daily.    dulaglutide (TRULICITY) 1.5 mg/0.5 mL pen injector Inject 1.5 mg into the skin every 7 days. Through patient assistance    furosemide (LASIX) 20 MG tablet Take 1 tablet (20 mg total) by mouth once daily. (Patient taking differently: Take 20 mg by mouth daily as  "needed (swelling).)    gabapentin (NEURONTIN) 300 MG capsule TAKE 1 CAPSULE BY MOUTH THREE TIMES A DAY (Patient taking differently: Take 300 mg by mouth 3 (three) times daily.)    irbesartan (AVAPRO) 75 MG tablet Take 1 tablet (75 mg total) by mouth once daily.    lancets Misc To check BG 2 times daily, to use with insurance preferred meter    latanoprost 0.005 % ophthalmic solution INSTILL 1 DROP INTO BOTH EYES EVERY EVENING    loratadine (CLARITIN) 10 mg tablet TAKE 1 TABLET (10 MG TOTAL) BY MOUTH DAILY AS NEEDED FOR ALLERGIES (OR RUNNY NOSE).    omega-3 fatty acids-vitamin E 1,000 mg Cap Take 1 capsule by mouth once daily.    omeprazole (PRILOSEC) 40 MG capsule Take 1 capsule (40 mg total) by mouth before breakfast.    pen needle, diabetic (BD ULTRA-FINE AGUS PEN NEEDLE) 32 gauge x 5/32" Ndle 1 Device by Misc.(Non-Drug; Combo Route) route 2 (two) times daily.    primidone (MYSOLINE) 50 MG Tab TAKE 2 TABLETS BY MOUTH 3 (THREE) TIMES DAILY. ONE HALF TABLET FOR ONE WEEK, THEN AS PRESCRIBED (Patient taking differently: Take 100 mg by mouth 3 (three) times daily. TAKE 2 TABLETS BY MOUTH 3 (THREE) TIMES DAILY. ONE HALF TABLET FOR ONE WEEK, THEN AS PRESCRIBED)    PROAIR HFA 90 mcg/actuation inhaler TAKE 2 PUFFS BY MOUTH EVERY 4 HOURS AS NEEDED FOR WHEEZING (RESCUE)    umeclidinium-vilanteroL (ANORO ELLIPTA) 62.5-25 mcg/actuation DsDv Inhale 1 puff into the lungs once daily. Controller    blood-glucose meter kit To check BG 2 times daily, to use with insurance preferred meter     No current facility-administered medications for this visit.       Allergies  Review of patient's allergies indicates:   Allergen Reactions    Silicone      Burn skin    Adhesive Rash       Family History  Family History   Problem Relation Age of Onset    Cancer Mother         Skin    Skin cancer Mother     Glaucoma Mother     Cataracts Mother     Diabetes Mother     Heart disease Father     Diabetes Father     Skin cancer " Sister     Diabetes Sister     Diabetes Daughter     Breast cancer Maternal Aunt     Melanoma Neg Hx     Psoriasis Neg Hx     Eczema Neg Hx     Lupus Neg Hx     Amblyopia Neg Hx     Blindness Neg Hx     Macular degeneration Neg Hx     Retinal detachment Neg Hx     Strabismus Neg Hx        Social History  Social History     Socioeconomic History    Marital status:    Occupational History     Employer: [employer]   Tobacco Use    Smoking status: Current Every Day Smoker     Packs/day: 0.50     Years: 40.00     Pack years: 20.00     Types: Cigarettes    Smokeless tobacco: Current User   Substance and Sexual Activity    Alcohol use: No    Drug use: No    Sexual activity: Never   Other Topics Concern    Are you pregnant or think you may be? No    Breast-feeding No               Review of Systems     Constitutional: Negative    HENT: Negative  Eyes: Negative  Respiratory: Negative  Cardiovascular: Negative  Musculoskeletal: HPI  Skin: Negative  Neurological: Negative  Hematological: Negative  Endocrine: Negative                 Physical Exam    There were no vitals filed for this visit.  Body mass index is 21.81 kg/m².  Physical Examination:     General appearance -  well appearing, and in no distress  Mental status - awake  Neck - supple  Chest -  symmetric air entry  Heart - normal rate   Abdomen - soft      Assessment     1. Closed fracture of left ankle with routine healing, subsequent encounter          Plan

## 2022-05-16 NOTE — PROGRESS NOTES
See anup.  Thanks for Consult.      Isaac Menezes PT, DPT  Board Certified Clinical Specialist in Orthopaedic Physical Therapy  [facility]

## 2022-05-16 NOTE — PLAN OF CARE
[facility]  Physical Therapy Initial Evaluation    Name: Deb Webb  Clinic Number: [id number]    Therapy Diagnosis:   Encounter Diagnosis   Name Primary?    Closed displaced fracture of left pubis with routine healing      Physician: Triston Valverde MD    Physician Orders: PT Eval and Treat  Medical Diagnosis from Referral: Closed displaced fracture of left pubis with routine healing  Evaluation Date: 5/16/2022  Authorization Period Expiration: 5/3/2023  Plan of Care Expiration: 8/16/2022  Progress Note Due: 6/16/2022  Visit # / Visits authorized: 1/ 1     Time In: 11:00  Time Out: 11:30  Total Billable Time: 30 minutes    Precautions: Standard    Subjective   Date of onset: 3 months  History of current condition - Deb reports she a few months ago and injured her ankle and then shortly after fell and fractured part of her pelvis. She has been cleared to put weight on her leg and just this morning was cleared to start walking without her walking boot. She currently lives at her [facility] but is independent with everything.     Medical History:   Past Medical History:   Diagnosis Date    Allergy     Arthritis     Cataract     Colon polyps     COPD (chronic obstructive pulmonary disease)     Diabetes mellitus type II     Diabetic neuropathy     Fever blister     Glaucoma (increased eye pressure)     Hyperlipidemia     Hypertension     Irritable bowel syndrome     Keloid cicatrix     Osteoporosis     Retained cholelithiasis following cholecystectomy(997.41)     Right patella fracture        Surgical History:   Deb Webb  has a past surgical history that includes Cholecystectomy; Hysterectomy; ovarian tumor; Colonoscopy; TONSILLECTOMY, ADENOIDECTOMY; Hernia repair; Knee surgery (Right, 3/4/2015); Oophorectomy; Back surgery (2006); and Cataract extraction w/  intraocular lens implant (Bilateral).    Medications:   Deb has a current  medication list which includes the following prescription(s): albuterol, alendronate, aspirin, atorvastatin, azelastine, blood sugar diagnostic, blood-glucose meter, dexcom g6 , dexcom g6 sensor, calcium carbonate/vitamin d3, cholestyramine-aspartame, citalopram, dorzolamide-timolol 2-0.5%, trulicity, furosemide, gabapentin, irbesartan, lancets, latanoprost, loratadine, omega-3 fatty acids-vitamin e, omeprazole, pen needle, diabetic, primidone, proair hfa, and anoro ellipta.    Allergies:   Review of patient's allergies indicates:   Allergen Reactions    Silicone      Burn skin    Adhesive Rash        Imaging, x-rays:  There is degenerative arthrosis of the medial compartments of both knees with mild joint space narrowing.  There is also degenerative arthrosis of the lateral compartment of the right knee with joint space narrowing.  There is chondrocalcinosis of the knee menisci consistent with CPPD.  No fracture or subluxation are identified.  There are postoperative changes of orthopedic fixation of a right patellar fracture.  There are no signs of joint effusion.  There has been no significant change.    Prior Therapy: Yes, while at SNF immediatley after injury  Social History:  lives with their family (granddaughter and great-grandson)  Occupation: retired  Prior Level of Function: Ambulation with rollator or cane without pain  Current Level of Function: Ambulation with rollator only with some ankle and hip pain    Pain:  Current 2/10, worst 6/10, best 0/10   Location: left hip and ankle   Description: Dull  Aggravating Factors: Standing  Easing Factors: rest    Pts goals: Improve pain and function    Objective     Gait: Step to gait pattern with rollator    Hip Range of Motion:   Left active Left Passive   Flexion WFL WFL   Abduction WFL WFL   Adduction WFL WFL   Extension WFL WFL   ER @ 90 WFL WFL   IR @ 90 WFL WFL     Lower Extremity Strength  Right LE  Left LE    Knee extension: 3+/5 Knee  extension: 3+/5   Knee flexion: 3+/5 Knee flexion: 3+/5   Hip flexion: 3+/5 Hip flexion: 3+/5   Hip Internal Rotation:  3+/5    Hip Internal Rotation: 3+/5      Hip External Rotation: 3+/5    Hip External Rotation: 3+/5      Hip extension:  3+/5 Hip extension: 3+/5   Hip abduction: 3+/5 Hip abduction: 3+/5   Hip adduction: 3+/5 Hip adduction 3+/5       TREATMENT   Treatment Time In: 11:15  Treatment Time Out: 11:30  Total Treatment time separate from Evaluation: 15 minutes    Deb received therapeutic exercises to develop strength, endurance and ROM for 15 minutes including:  Bridge 10x10s  LAQ 30x each leg  Ankle pumps 40  sidelying clamshell no band 3/10    Home Exercises and Patient Education Provided    Education provided:   - Role of PT, PT POC    Written Home Exercises Provided: yes.  Exercises were reviewed and Deb was able to demonstrate them prior to the end of the session.  Deb demonstrated good  understanding of the education provided.     See EMR under Patient Instructions for exercises provided 5/16/2022.    Assessment   Patient presents with signs and symptoms consistent with a diagnosis of a left closed pubic fracture with routine healing including all above listed objective impairments. It appears that the patients most significant impairments contributing to their diagnosis are global decreased LE strength and motor control. This pt is a good candidate for skilled PT treatment and stands to benefit from a combination of manual therapy, therapeutic exercise/actvities, neuromuscular re-education, and modalities Prn. The pt has been educated on their dx/POC and consents to further PT treatment.      Pt prognosis is Fair.   Pt will benefit from skilled outpatient Physical Therapy to address the deficits stated above and in the chart below, provide pt/family education, and to maximize pt's level of independence.     Plan of care discussed with patient: Yes  Pt's spiritual, cultural and  educational needs considered and patient is agreeable to the plan of care and goals as stated below:     Anticipated Barriers for therapy: None    Medical Necessity is demonstrated by the following  History  Co-morbidities and personal factors that may impact the plan of care Co-morbidities:   COPD, HTN, Osteoporosis    Personal Factors:   age     high   Examination  Body Structures and Functions, activity limitations and participation restrictions that may impact the plan of care Body Regions:   lower extremities  trunk    Body Systems:    gross symmetry  ROM  strength  gross coordinated movement  balance  gait  transfers    Participation Restrictions:   ADL    Activity limitations:   Learning and applying knowledge  no deficits    General Tasks and Commands  no deficits    Communication  no deficits    Mobility  no deficits    Self care  no deficits    Domestic Life  no deficits    Interactions/Relationships  no deficits    Life Areas  no deficits    Community and Social Life  no deficits         high   Clinical Presentation unstable clinical presentation with unpredictable characteristics high   Decision Making/ Complexity Score: high     Goals:  Short-Term Goals: 4 weeks  - The patient will be independent with initial home exercise program.  - The patient will increase strength to at least 4-/5 in muscles tested to indicate improvements in functional strength.    Long-Term Goals: 8 weeks  - The patient will increase strength to at least 4/5 to perform functional mobility including sit to stand, steps/curbs, and walking  - The patient will increase ROM to WFL to perform ADL, vocational, and recreational tasks without pain.    Plan   Plan of care Certification: 5/16/2022 to 8/16/2022.    Outpatient Physical Therapy 2 times weekly for 8 weeks to include the following interventions: Manual Therapy, Orthotic Management and Training, Therapeutic Activities and Therapeutic Exercise.     Isaac Menezes, PT

## 2022-05-18 DIAGNOSIS — E11.9 TYPE 2 DIABETES MELLITUS WITHOUT COMPLICATION: ICD-10-CM

## 2022-05-23 ENCOUNTER — CLINICAL SUPPORT (OUTPATIENT)
Dept: REHABILITATION | Facility: HOSPITAL | Age: 72
End: 2022-05-23
Attending: INTERNAL MEDICINE
Payer: MEDICARE

## 2022-05-23 DIAGNOSIS — M25.572 LEFT ANKLE PAIN: ICD-10-CM

## 2022-05-23 DIAGNOSIS — R29.898 WEAKNESS OF BOTH HIPS: ICD-10-CM

## 2022-05-23 DIAGNOSIS — S82.892D CLOSED FRACTURE OF LEFT ANKLE WITH ROUTINE HEALING, SUBSEQUENT ENCOUNTER: ICD-10-CM

## 2022-05-23 PROCEDURE — 97110 THERAPEUTIC EXERCISES: CPT | Mod: PO | Performed by: PHYSICAL THERAPIST

## 2022-05-23 NOTE — PROGRESS NOTES
[facility]   Physical Therapy Treatment Note     Name: Deb Webb  Clinic Number: [id number]    Therapy Diagnosis:   Encounter Diagnoses   Name Primary?    Closed fracture of left ankle with routine healing, subsequent encounter     Left ankle pain     Weakness of both hips      Physician: Triston Valverde MD    Visit Date: 5/23/2022  Physician Orders: PT Eval and Treat  Medical Diagnosis from Referral: Closed displaced fracture of left pubis with routine healing  Evaluation Date: 5/16/2022  Authorization Period Expiration: 5/3/2023  Plan of Care Expiration: 8/16/2022  Progress Note Due: 6/16/2022  Visit # / Visits authorized: 1/ 1     PTA Visit #: 0/5     Time In: 10:00  Time Out: 10:45  Total Billable Time: 45 minutes    SUBJECTIVE     Pt reports: Hip and ankle are feeling good but left ankle is feeling a little swollen today.  She was compliant with home exercise program.  Response to previous treatment: No change.  Functional change: Too soon to tell.    Pain: 2/10  Location: left ankles     OBJECTIVE     Objective Measures updated at progress report unless specified.     Treatment     Deb received the treatments listed below:      therapeutic exercises to develop strength, endurance, ROM, flexibility, posture and core stabilization for 45 minutes including:  Bridge 3x10 5s hold  SLR 3x10  SAQ 3x10 3s hold  Calf stretch with strap 10x10s  Ankle 4-way TB 2x15  Shuttle squats double leg  Recumbent bike 5'    neuromuscular re-education activities to improve: Balance, Coordination, Kinesthetic, Sense and Proprioception for 00 minutes. The following activities were included:    therapeutic activities to improve functional performance for 00  minutes, including:    Patient Education and Home Exercises     Home Exercises Provided and Patient Education Provided     Education provided:   - HEP, PT POC    Written Home Exercises Provided: Patient instructed to cont prior HEP.  Exercises were reviewed and Deb was able to demonstrate them prior to the end of the session.  Deb demonstrated good  understanding of the education provided. See EMR under Patient Instructions for exercises provided during therapy sessions    ASSESSMENT     Pt tolerated exercises well today with good fatigue of both hip and ankle musculature. Current plan is to continue building strength and then work on functional gait and balance tasks once enough strength has been obtained.    Deb Is progressing well towards her goals.   Pt prognosis is Good.     Pt will continue to benefit from skilled outpatient physical therapy to address the deficits listed in the problem list box on initial evaluation, provide pt/family education and to maximize pt's level of independence in the home and community environment.     Pt's spiritual, cultural and educational needs considered and pt agreeable to plan of care and goals.     Anticipated barriers to physical therapy: None    Goals:  Short-Term Goals: 4 weeks  - The patient will be independent with initial home exercise program.  - The patient will increase strength to at least 4-/5 in muscles tested to indicate improvements in functional strength.     Long-Term Goals: 8 weeks  - The patient will increase strength to at least 4/5 to perform functional mobility including sit to stand, steps/curbs, and walking  - The patient will increase ROM to WFL to perform ADL, vocational, and recreational tasks without pain.    PLAN     Progress as tolerated.    Isaac Menezes, PT

## 2022-05-25 DIAGNOSIS — E11.9 TYPE 2 DIABETES MELLITUS WITHOUT COMPLICATION: ICD-10-CM

## 2022-05-30 ENCOUNTER — CLINICAL SUPPORT (OUTPATIENT)
Dept: REHABILITATION | Facility: HOSPITAL | Age: 72
End: 2022-05-30
Attending: INTERNAL MEDICINE
Payer: MEDICAID

## 2022-05-30 DIAGNOSIS — R29.898 WEAKNESS OF BOTH HIPS: Primary | ICD-10-CM

## 2022-05-30 PROCEDURE — 97110 THERAPEUTIC EXERCISES: CPT | Mod: PO,CQ

## 2022-05-30 NOTE — PROGRESS NOTES
[facility]   Physical Therapy Treatment Note     Name: Deb Webb  Clinic Number: [id number]    Therapy Diagnosis:   Encounter Diagnosis   Name Primary?    Weakness of both hips Yes     Physician: Triston Valverde MD    Visit Date: 5/30/2022  Physician Orders: PT Eval and Treat  Medical Diagnosis from Referral: Closed displaced fracture of left pubis with routine healing  Evaluation Date: 5/16/2022  Authorization Period Expiration: 6/20/2022  Plan of Care Expiration: 8/16/2022  Progress Note Due: 6/16/2022  Visit # / Visits authorized: 2/12    PTA Visit #: 1/5     Time In: 10:02  Time Out: 10:45  Total Billable Time: 45 minutes    SUBJECTIVE     Pt reports: Stubbed her 2nd toe on the left foot and thinks that it is broken.  Pt has not contacted the doctor yet.    She was compliant with home exercise program.   Response to previous treatment: some stomach soreness  Functional change: Too soon to tell.    Pain: 2/10  Location: left ankles     OBJECTIVE     Objective Measures updated at progress report unless specified.     Treatment       Deb received the treatments listed below:      Therapeutic exercises to develop strength, endurance, ROM, flexibility, posture and core stabilization for 45 minutes including:  Bridge 3x10 5s hold  Supine clamshell GTB 3x10  SLR 3x10  SAQ 3x10 3s hold  Calf stretch with strap 10x10s  Ankle 4-way RTB 2x15  Shuttle squats double leg 25# 4x10  Recumbent bike 5'- not performed    Neuromuscular re-education activities to improve: Balance, Coordination, Kinesthetic, Sense and Proprioception for 00 minutes. The following activities were included:    therapeutic activities to improve functional performance for 00  minutes, including:    Patient Education and Home Exercises     Home Exercises Provided and Patient Education Provided     Education provided:   - HEP, reprinted for compliance    Written Home Exercises Provided: yes. Exercises were  reviewed and Deb was able to demonstrate them prior to the end of the session.  Deb demonstrated good  understanding of the education provided. See EMR under Patient Instructions for exercises provided during therapy sessions    ASSESSMENT     Pt with good tolerance of exercises today.  Pt requires cueing for inversion and eversion of ankle due to knee compensations.  HEP was reprinted for compliance purposes.    Deb Is progressing well towards her goals.   Pt prognosis is Good.     Pt will continue to benefit from skilled outpatient physical therapy to address the deficits listed in the problem list box on initial evaluation, provide pt/family education and to maximize pt's level of independence in the home and community environment.     Pt's spiritual, cultural and educational needs considered and pt agreeable to plan of care and goals.     Anticipated barriers to physical therapy: None    Goals:  Short-Term Goals: 4 weeks  - The patient will be independent with initial home exercise program.  - The patient will increase strength to at least 4-/5 in muscles tested to indicate improvements in functional strength.     Long-Term Goals: 8 weeks  - The patient will increase strength to at least 4/5 to perform functional mobility including sit to stand, steps/curbs, and walking  - The patient will increase ROM to WFL to perform ADL, vocational, and recreational tasks without pain.    PLAN     Progress as tolerated.    Perlita Lorenzo, PTA

## 2022-06-02 DIAGNOSIS — E11.9 TYPE 2 DIABETES MELLITUS WITHOUT COMPLICATION: ICD-10-CM

## 2022-06-06 ENCOUNTER — CLINICAL SUPPORT (OUTPATIENT)
Dept: REHABILITATION | Facility: HOSPITAL | Age: 72
End: 2022-06-06
Attending: INTERNAL MEDICINE
Payer: MEDICAID

## 2022-06-06 ENCOUNTER — TELEPHONE (OUTPATIENT)
Dept: FAMILY MEDICINE | Facility: CLINIC | Age: 72
End: 2022-06-06
Payer: MEDICAID

## 2022-06-06 DIAGNOSIS — M79.603 PAIN OF UPPER EXTREMITY, UNSPECIFIED LATERALITY: Primary | ICD-10-CM

## 2022-06-06 DIAGNOSIS — R29.898 WEAKNESS OF BOTH HIPS: Primary | ICD-10-CM

## 2022-06-06 PROCEDURE — 97110 THERAPEUTIC EXERCISES: CPT | Mod: PO,CQ

## 2022-06-06 NOTE — TELEPHONE ENCOUNTER
----- Message from Lopez Browning sent at 6/6/2022 11:24 AM CDT -----  Regarding: PT  Patient states she is currently doing physical therapy and would like to know if a new order could be put in for her arms as well

## 2022-06-06 NOTE — PROGRESS NOTES
[facility]   Physical Therapy Treatment Note     Name: eDb Webb  Clinic Number: [id number]    Therapy Diagnosis:   Encounter Diagnosis   Name Primary?    Weakness of both hips Yes     Physician: Triston Valverde MD    Visit Date: 6/6/2022  Physician Orders: PT Eval and Treat  Medical Diagnosis from Referral: Closed displaced fracture of left pubis with routine healing  Evaluation Date: 5/16/2022  Authorization Period Expiration: 6/20/2022  Plan of Care Expiration: 8/16/2022  Progress Note Due: 6/16/2022  Visit # / Visits authorized: 3/12    PTA Visit #: 1/5     Time In: 10:00  Time Out: 10:45  Total Billable Time: 45 minutes    SUBJECTIVE     Pt reports: Her toe is doing better.  No current pain  She was compliant with home exercise program.   Response to previous treatment: some knee and stomach soreness  Functional change: Too soon to tell.    Pain: 0/10  Location: left ankles     OBJECTIVE     Objective Measures updated at progress report unless specified.     Treatment       Deb received the treatments listed below:      Therapeutic exercises to develop strength, endurance, ROM, flexibility, posture and core stabilization for 45 minutes including:    Recumbent bike 5'  LAQ 1# 3 x 10  Hip adduction with ball 3x10  Bridge 3x10 5s hold  Supine clamshell GTB 3x10  SLR 3x10  SAQ 3x10 3s hold- not performed  Calf stretch with strap 10x10s  Ankle 4-way RTB 2x15  Shuttle squats double leg 37# 2x10      Neuromuscular re-education activities to improve: Balance, Coordination, Kinesthetic, Sense and Proprioception for 00 minutes. The following activities were included:    therapeutic activities to improve functional performance for 00  minutes, including:    Patient Education and Home Exercises     Home Exercises Provided and Patient Education Provided     Education provided:   - HEP, reprinted for compliance    Written Home Exercises Provided: yes. Exercises were reviewed and  Deb was able to demonstrate them prior to the end of the session.  Deb demonstrated good  understanding of the education provided. See EMR under Patient Instructions for exercises provided during therapy sessions    ASSESSMENT     Pt with good tolerance of exercises today.  Pt with less cueing during ankle 4 way exercise.  Pt able to progress weight with the shuttle today with mild fatigue.  Will continue working on LE strength and then incorporating balance in future sessions.    Deb Is progressing well towards her goals.   Pt prognosis is Good.     Pt will continue to benefit from skilled outpatient physical therapy to address the deficits listed in the problem list box on initial evaluation, provide pt/family education and to maximize pt's level of independence in the home and community environment.     Pt's spiritual, cultural and educational needs considered and pt agreeable to plan of care and goals.     Anticipated barriers to physical therapy: None    Goals:  Short-Term Goals: 4 weeks  - The patient will be independent with initial home exercise program.  - The patient will increase strength to at least 4-/5 in muscles tested to indicate improvements in functional strength.     Long-Term Goals: 8 weeks  - The patient will increase strength to at least 4/5 to perform functional mobility including sit to stand, steps/curbs, and walking  - The patient will increase ROM to WFL to perform ADL, vocational, and recreational tasks without pain.    PLAN     Progress as tolerated.    Perlita Lorenzo, PTA

## 2022-06-09 DIAGNOSIS — J31.0 RHINITIS, UNSPECIFIED TYPE: ICD-10-CM

## 2022-06-09 RX ORDER — AZELASTINE 1 MG/ML
SPRAY, METERED NASAL
Qty: 90 ML | Refills: 2 | Status: SHIPPED | OUTPATIENT
Start: 2022-06-09 | End: 2022-08-15 | Stop reason: ALTCHOICE

## 2022-06-09 NOTE — TELEPHONE ENCOUNTER
Care Due:                  Date            Visit Type   Department     Provider  --------------------------------------------------------------------------------                                \[facility] [place] [facility]\"" FAMILY  Last Visit: 05-      FOLLOW UP    MEDICINE       Triston Valverde                               -                              [facility] FAMILY  Next Visit: 07-      CARE ([facility])   MEDICINE       Triston Valverde                                                            Last  Test          Frequency    Reason                     Performed    Due Date  --------------------------------------------------------------------------------    Lipid Panel.  12 months..  atorvastatin,              09- 09-                             cholestyramine-aspartame.    [facility] Embedded Care Gaps. Reference number: [id number]. 6/09/2022   1:34:10 PM CDT

## 2022-06-09 NOTE — TELEPHONE ENCOUNTER
Refill Routing Note   Medication(s) are not appropriate for processing by [facility] for the following reason(s):      - Medication is a new start (<3 months)    [facility] action(s):  Defer          Medication reconciliation completed: No     Appointments  past 12m or future 3m with PCP    Date Provider   Last Visit   5/2/2022 Triston Valverde MD   Next Visit   7/25/2022 Triston Valverde MD   ED visits in past 90 days: 0        Note composed:1:43 PM 06/09/2022

## 2022-06-13 ENCOUNTER — CLINICAL SUPPORT (OUTPATIENT)
Dept: REHABILITATION | Facility: HOSPITAL | Age: 72
End: 2022-06-13
Attending: INTERNAL MEDICINE
Payer: MEDICAID

## 2022-06-13 DIAGNOSIS — R29.898 WEAKNESS OF BOTH HIPS: Primary | ICD-10-CM

## 2022-06-13 DIAGNOSIS — M79.603 PAIN OF UPPER EXTREMITY, UNSPECIFIED LATERALITY: ICD-10-CM

## 2022-06-13 PROCEDURE — 97110 THERAPEUTIC EXERCISES: CPT | Mod: PO,CQ

## 2022-06-13 NOTE — PROGRESS NOTES
[facility]   Physical Therapy Treatment Note     Name: Deb Webb  Clinic Number: [id number]    Therapy Diagnosis:   Encounter Diagnoses   Name Primary?    Pain of upper extremity, unspecified laterality     Weakness of both hips Yes     Physician: Triston Valverde MD    Visit Date: 6/13/2022  Physician Orders: PT Eval and Treat  Medical Diagnosis from Referral: Closed displaced fracture of left pubis with routine healing  Evaluation Date: 5/16/2022  Authorization Period Expiration: 6/20/2022  Plan of Care Expiration: 8/16/2022  Progress Note Due: 6/16/2022  Visit # / Visits authorized: 4/12    PTA Visit #: 1/5     Time In: 10:00  Time Out: 10:49  Total Billable Time: 44 minutes    SUBJECTIVE     Pt reports: minimal L ankle pain today. Her stomach hurt today.   She was compliant with home exercise program.   Response to previous treatment: some knee and stomach soreness  Functional change: Too soon to tell.    Pain: 3/10  Location: left ankles     OBJECTIVE     Objective Measures updated at progress report unless specified.     Treatment       Deb received the treatments listed below:      Therapeutic exercises to develop strength, endurance, ROM, flexibility, posture and core stabilization for 34 minutes including:    Recumbent bike 5'  LAQ 2# 3 x 10  Hip adduction with ball 3x10  Bridge 3x10 5s hold  STS 2 x 10    Not performed:  Supine clamshell GTB 3x10  SLR 3x10  SAQ 3x10 3s hold- not performed  Calf stretch with strap 10x10s  Ankle 4-way RTB 2x15  Shuttle squats double leg 37# 2x10      Neuromuscular re-education activities to improve: Balance, Coordination, Kinesthetic, Sense and Proprioception for 10 minutes. The following activities were included:  NBOS 3x 30s  Tandem 3x 30s  therapeutic activities to improve functional performance for 00  minutes, including:    Patient Education and Home Exercises      Home Exercises Provided and Patient Education Provided      Education provided:   - HEP, reprinted for compliance    Written Home Exercises Provided: yes. Exercises were reviewed and Deb was able to demonstrate them prior to the end of the session.  Deb demonstrated good  understanding of the education provided. See EMR under Patient Instructions for exercises provided during therapy sessions    ASSESSMENT     Pt with good tolerance of exercises today.  Added balance activities today with minimal sway, but no LOB.  No c/o increased ankle pain with session.  Will continue working on LE strength and balance to increase strength and maximize safe mobility.     Deb Is progressing well towards her goals.   Pt prognosis is Good.     Pt will continue to benefit from skilled outpatient physical therapy to address the deficits listed in the problem list box on initial evaluation, provide pt/family education and to maximize pt's level of independence in the home and community environment.     Pt's spiritual, cultural and educational needs considered and pt agreeable to plan of care and goals.     Anticipated barriers to physical therapy: None    Goals:  Short-Term Goals: 4 weeks  - The patient will be independent with initial home exercise program.  - The patient will increase strength to at least 4-/5 in muscles tested to indicate improvements in functional strength.     Long-Term Goals: 8 weeks  - The patient will increase strength to at least 4/5 to perform functional mobility including sit to stand, steps/curbs, and walking  - The patient will increase ROM to WFL to perform ADL, vocational, and recreational tasks without pain.    PLAN     Progress as tolerated.    Evie Rene, PTA

## 2022-06-15 DIAGNOSIS — E11.9 TYPE 2 DIABETES MELLITUS WITHOUT COMPLICATION: ICD-10-CM

## 2022-06-22 DIAGNOSIS — E11.9 TYPE 2 DIABETES MELLITUS WITHOUT COMPLICATION: ICD-10-CM

## 2022-06-27 ENCOUNTER — CLINICAL SUPPORT (OUTPATIENT)
Dept: REHABILITATION | Facility: HOSPITAL | Age: 72
End: 2022-06-27
Attending: INTERNAL MEDICINE
Payer: MEDICAID

## 2022-06-27 DIAGNOSIS — R29.898 WEAKNESS OF BOTH HIPS: Primary | ICD-10-CM

## 2022-06-27 PROCEDURE — 97110 THERAPEUTIC EXERCISES: CPT | Mod: PO | Performed by: PHYSICAL THERAPIST

## 2022-06-27 NOTE — PROGRESS NOTES
"Mom calling asking about imetigo care. She will most likely bring child to express care today.    Florida Arredondo RN      Impetigo  Impetigo is a common bacterial infection of the skin that can appear on many parts of the body. It can happen to anyone, of any age, but is more common in children. For this reason, it used to be called \"school sores.\"  Causes  It s normal to get scrapes on your body from activity or from scratching your skin. The skin normally has bacteria on it. Sometimes an impetigo infection can start on healthy skin. But it usually starts when there is an injury to the skin, or break in the skin. Although nothing usually happens, the bacteria normally on the skin can cause infection. This is the most common way people get impetigo.  Impetigo is very contagious. So once there is an infection, it needs to be treated so it doesn't get worse, spread to other areas, or to other people. Impetigo can easily be passed to other family members, friends, schoolmates, or co-workers, through scratching, rubbing, or touching an infected area. Common causes include:    After a cold    Bites    From another infected person    Injury to skin    Insect bites    Other skin problems that are infected, such as eczema    Scratches  Symptoms  There is often a skin injury like a scratch, scrape, or insect bite that may have gone unnoticed or been ignored before the infection began. Symptoms of impetigo include:    Red, inflamed area or rash    One or many red bumps    Bumps that turn into blisters filled with yellow fluid or pus    Blisters break or leak causing honey-colored crusting or scabbing over the area    Skin sores that spread to other surrounding areas  Home care  The following guidelines will help you care for your infection at home.  Wound care    Trim fingernails and cover sores with an adhesive bandage, if needed, to prevent scratching. Picking at the sores may leave a scar.    If the infection is on or " [facility]   Physical Therapy Treatment Note     Name: Deb Webb  Clinic Number: [id number]    Therapy Diagnosis:   Encounter Diagnosis   Name Primary?    Weakness of both hips Yes     Physician: Triston Valverde MD    Visit Date: 6/27/2022  Physician Orders: PT Eval and Treat  Medical Diagnosis from Referral: Closed displaced fracture of left pubis with routine healing  Evaluation Date: 5/16/2022  Authorization Period Expiration: 6/20/2022  Plan of Care Expiration: 8/16/2022  Progress Note Due: 6/16/2022  Visit # / Visits authorized: 4/12    PTA Visit #: 1/5     Time In: 10:00  Time Out: 10:50  Total Billable Time: 25 minutes    SUBJECTIVE     Pt reports: minimal L ankle pain today.  She was compliant with home exercise program.   Response to previous treatment: some knee and stomach soreness  Functional change: Too soon to tell.    Pain: 3/10  Location: left ankles     OBJECTIVE     Objective Measures updated at progress report unless specified.     Treatment       Deb received the treatments listed below:      Therapeutic exercises to develop strength, endurance, ROM, flexibility, posture and core stabilization for 34 minutes including:    Recumbent bike 5'  LAQ 2# 3 x 10  Hip adduction with ball 3x10  Bridge 3x10 5s hold  Sidelying clamshell GTB 3x10  SLR 3x10  SAQ 3x10 3s hold  Shuttle squats double leg 1.0 4x10  Single leg squats .5 2x8    Neuromuscular re-education activities to improve: Balance, Coordination, Kinesthetic, Sense and Proprioception for 10 minutes. The following activities were included:  NBOS 3x 30s  Tandem 3x 30s    therapeutic activities to improve functional performance for 00  minutes, including:    Patient Education and Home Exercises      Home Exercises Provided and Patient Education Provided     Education provided:   - HEP, reprinted for compliance    Written Home Exercises Provided: yes. Exercises were reviewed and Deb was able to  around your lips, don't lick or chew on the sores. This will make the infection worse.    If a bandage or dressing is used, you can put a nonstick dressing over it.    Wash your hands and your child s hands often. This will avoid spreading the infection to other parts of the body and to other people. Do not share the infected person s washcloths, towels, pillows, sheets, or clothes with others. Wash these items in hot water before using again.    Clean the area several times a day. You don t want to scrub the area. The best way to do this is to soak the sores in warm, soapy water until they get soft enough to be wiped away. This will help remove the crust that forms from the dried liquid. In areas that you can t soak, like the mouth or face, you can put a clean, warm washcloth over the infected are for 5 to 10 minutes at a time, until the scabs soften enough to remove.  Medicines    You can use over-the-counter medicine as directed based on age and weight for pain, fever, fussiness, or discomfort, unless another medicine was prescribed. In infants ages 6 months and older, you may use ibuprofen as well as acetaminophen. You can alternate them, or use both together. They work differently and are a different class of medicines, so taking them together is not an overdose. If you or your child has chronic liver or kidney disease or ever had a stomach ulcer or gastrointestinal bleeding, talk with your healthcare provider before using these medicines. Also talk with your healthcare provider if your child is taking blood-thinner medicines.    Do not give aspirin to your child. Aspirin should never be used in children ages 18 and younger who is ill with a fever. It may cause severe disease or death.     Impetigo can often be cured with topical creams. Apply these as directed by your healthcare provider.    If you were given oral antibiotics, take them until they are used up. It is important to finish the antibiotics even if  demonstrate them prior to the end of the session.  Deb demonstrated good  understanding of the education provided. See EMR under Patient Instructions for exercises provided during therapy sessions    ASSESSMENT     Pt with good tolerance of exercises today.  Added balance activities today with minimal sway, but no LOB.  No c/o increased ankle pain with session.  Will continue working on LE strength and balance to increase strength and maximize safe mobility.     Deb Is progressing well towards her goals.   Pt prognosis is Good.     Pt will continue to benefit from skilled outpatient physical therapy to address the deficits listed in the problem list box on initial evaluation, provide pt/family education and to maximize pt's level of independence in the home and community environment.     Pt's spiritual, cultural and educational needs considered and pt agreeable to plan of care and goals.     Anticipated barriers to physical therapy: None    Goals:  Short-Term Goals: 4 weeks  - The patient will be independent with initial home exercise program.  - The patient will increase strength to at least 4-/5 in muscles tested to indicate improvements in functional strength.     Long-Term Goals: 8 weeks  - The patient will increase strength to at least 4/5 to perform functional mobility including sit to stand, steps/curbs, and walking  - The patient will increase ROM to WFL to perform ADL, vocational, and recreational tasks without pain.    PLAN     Progress as tolerated.    Isaac Menezes, PT      the wound looks better to make sure the infection has cleared.  Follow-up care  Follow up with your healthcare provider if the sores continue to spread after 3 days of treatment. It will take about 7 to 10 days to heal completely.  Your child should stay out of school until completing 2 full days of antibiotic treatment.  When to seek medical advice  Call your healthcare provider right away if any of the following occur:    Fever of 100.4 F (38 C) or higher, or as directed    Increased amounts of fluid or pus coming from the sores    Increasing number of sores or spreading areas of redness after 2 days of treatment with antibiotics    Increasing swelling or pain    Loss of appetite or vomiting    Unusual drowsiness, weakness, or change in behavior  Date Last Reviewed: 8/1/2016 2000-2018 [employer]. [address] [place] [address], [place] [address]. All rights reserved. This information is not intended as a substitute for professional medical care. Always follow your healthcare professional's instructions.

## 2022-07-13 ENCOUNTER — OFFICE VISIT (OUTPATIENT)
Dept: URGENT CARE | Facility: CLINIC | Age: 72
End: 2022-07-13
Payer: MEDICARE

## 2022-07-13 VITALS
WEIGHT: 108 LBS | HEIGHT: 59 IN | OXYGEN SATURATION: 96 % | RESPIRATION RATE: 18 BRPM | BODY MASS INDEX: 21.77 KG/M2 | HEART RATE: 100 BPM | DIASTOLIC BLOOD PRESSURE: 92 MMHG | TEMPERATURE: 100 F | SYSTOLIC BLOOD PRESSURE: 150 MMHG

## 2022-07-13 DIAGNOSIS — R09.02 HYPOXIA: Primary | ICD-10-CM

## 2022-07-13 DIAGNOSIS — R09.81 SINUS CONGESTION: ICD-10-CM

## 2022-07-13 DIAGNOSIS — R06.02 SHORTNESS OF BREATH: ICD-10-CM

## 2022-07-13 DIAGNOSIS — R50.9 FEVER, UNSPECIFIED FEVER CAUSE: ICD-10-CM

## 2022-07-13 LAB
CTP QC/QA: YES
SARS-COV-2 RDRP RESP QL NAA+PROBE: NEGATIVE

## 2022-07-13 PROCEDURE — U0002 COVID-19 LAB TEST NON-CDC: HCPCS | Mod: QW,S$GLB,, | Performed by: PHYSICIAN ASSISTANT

## 2022-07-13 PROCEDURE — 3077F SYST BP >= 140 MM HG: CPT | Mod: CPTII,S$GLB,, | Performed by: PHYSICIAN ASSISTANT

## 2022-07-13 PROCEDURE — 1125F PR PAIN SEVERITY QUANTIFIED, PAIN PRESENT: ICD-10-PCS | Mod: CPTII,S$GLB,, | Performed by: PHYSICIAN ASSISTANT

## 2022-07-13 PROCEDURE — 4010F ACE/ARB THERAPY RXD/TAKEN: CPT | Mod: CPTII,S$GLB,, | Performed by: PHYSICIAN ASSISTANT

## 2022-07-13 PROCEDURE — 3072F PR LOW RISK FOR RETINOPATHY: ICD-10-PCS | Mod: CPTII,S$GLB,, | Performed by: PHYSICIAN ASSISTANT

## 2022-07-13 PROCEDURE — 71046 XR CHEST PA AND LATERAL: ICD-10-PCS | Mod: S$GLB,,, | Performed by: RADIOLOGY

## 2022-07-13 PROCEDURE — 3008F PR BODY MASS INDEX (BMI) DOCUMENTED: ICD-10-PCS | Mod: CPTII,S$GLB,, | Performed by: PHYSICIAN ASSISTANT

## 2022-07-13 PROCEDURE — 1160F RVW MEDS BY RX/DR IN RCRD: CPT | Mod: CPTII,S$GLB,, | Performed by: PHYSICIAN ASSISTANT

## 2022-07-13 PROCEDURE — 1159F PR MEDICATION LIST DOCUMENTED IN MEDICAL RECORD: ICD-10-PCS | Mod: CPTII,S$GLB,, | Performed by: PHYSICIAN ASSISTANT

## 2022-07-13 PROCEDURE — 99499 RISK ADDL DX/OHS AUDIT: ICD-10-PCS | Mod: S$GLB,,, | Performed by: PHYSICIAN ASSISTANT

## 2022-07-13 PROCEDURE — 71046 X-RAY EXAM CHEST 2 VIEWS: CPT | Mod: S$GLB,,, | Performed by: RADIOLOGY

## 2022-07-13 PROCEDURE — 3080F DIAST BP >= 90 MM HG: CPT | Mod: CPTII,S$GLB,, | Performed by: PHYSICIAN ASSISTANT

## 2022-07-13 PROCEDURE — 3051F HG A1C>EQUAL 7.0%<8.0%: CPT | Mod: CPTII,S$GLB,, | Performed by: PHYSICIAN ASSISTANT

## 2022-07-13 PROCEDURE — U0002: ICD-10-PCS | Mod: QW,S$GLB,, | Performed by: PHYSICIAN ASSISTANT

## 2022-07-13 PROCEDURE — 99213 OFFICE O/P EST LOW 20 MIN: CPT | Mod: S$GLB,,, | Performed by: PHYSICIAN ASSISTANT

## 2022-07-13 PROCEDURE — 3077F PR MOST RECENT SYSTOLIC BLOOD PRESSURE >= 140 MM HG: ICD-10-PCS | Mod: CPTII,S$GLB,, | Performed by: PHYSICIAN ASSISTANT

## 2022-07-13 PROCEDURE — 1160F PR REVIEW ALL MEDS BY PRESCRIBER/CLIN PHARMACIST DOCUMENTED: ICD-10-PCS | Mod: CPTII,S$GLB,, | Performed by: PHYSICIAN ASSISTANT

## 2022-07-13 PROCEDURE — 99213 PR OFFICE/OUTPT VISIT, EST, LEVL III, 20-29 MIN: ICD-10-PCS | Mod: S$GLB,,, | Performed by: PHYSICIAN ASSISTANT

## 2022-07-13 PROCEDURE — 99499 UNLISTED E&M SERVICE: CPT | Mod: S$GLB,,, | Performed by: PHYSICIAN ASSISTANT

## 2022-07-13 PROCEDURE — 3051F PR MOST RECENT HEMOGLOBIN A1C LEVEL 7.0 - < 8.0%: ICD-10-PCS | Mod: CPTII,S$GLB,, | Performed by: PHYSICIAN ASSISTANT

## 2022-07-13 PROCEDURE — 1159F MED LIST DOCD IN RCRD: CPT | Mod: CPTII,S$GLB,, | Performed by: PHYSICIAN ASSISTANT

## 2022-07-13 PROCEDURE — 4010F PR ACE/ARB THEARPY RXD/TAKEN: ICD-10-PCS | Mod: CPTII,S$GLB,, | Performed by: PHYSICIAN ASSISTANT

## 2022-07-13 PROCEDURE — 3008F BODY MASS INDEX DOCD: CPT | Mod: CPTII,S$GLB,, | Performed by: PHYSICIAN ASSISTANT

## 2022-07-13 PROCEDURE — 1125F AMNT PAIN NOTED PAIN PRSNT: CPT | Mod: CPTII,S$GLB,, | Performed by: PHYSICIAN ASSISTANT

## 2022-07-13 PROCEDURE — 3072F LOW RISK FOR RETINOPATHY: CPT | Mod: CPTII,S$GLB,, | Performed by: PHYSICIAN ASSISTANT

## 2022-07-13 PROCEDURE — 3080F PR MOST RECENT DIASTOLIC BLOOD PRESSURE >= 90 MM HG: ICD-10-PCS | Mod: CPTII,S$GLB,, | Performed by: PHYSICIAN ASSISTANT

## 2022-07-13 NOTE — PROGRESS NOTES
"Subjective:       Patient ID: Deb Webb is a 71 y.o. female.    Vitals:  height is 4' 11" (1.499 m) and weight is 49 kg (108 lb). Her temperature is 100.1 °F (37.8 °C). Her blood pressure is 150/92 (abnormal) and her pulse is 100. Her respiration is 18 and oxygen saturation is 96%.     Chief Complaint: Sinus Problem    Patient is a 71-year-old female with PMH of COPD, pulmonary hypertension, chronic respiratory failure with hypoxia, type 2 diabetes mellitus, essential hypertension, CKD stage 3, GERD, and IBS, who presents to urgent care for evaluation of cough, sinus congestion, fever, body aches, ear pressure, and shortness of breath.  Her symptoms have been progressing for the past week, and they initially started on 07/06/2022..  She states that for the past 3 days she has been staying with her sister and not using her home oxygen.  She denies any chest pain, dizziness, hemoptysis, or weakness in 1 side of her body.  She does report mild swelling in her bilateral lower extremity.     Sinus Problem  This is a new problem. The current episode started in the past 7 days. The problem is unchanged. There has been no fever. Her pain is at a severity of 0/10. She is experiencing no pain. Associated symptoms include congestion, coughing, shortness of breath and sinus pressure. Pertinent negatives include no chills or headaches. Past treatments include nothing.       Constitution: Negative for chills and fever.   HENT: Positive for congestion and sinus pressure.    Cardiovascular: Negative for chest pain.   Respiratory: Positive for cough and shortness of breath.    Gastrointestinal: Negative for abdominal pain, nausea, vomiting, constipation and diarrhea.   Musculoskeletal: Positive for muscle ache.   Skin: Negative for rash.   Neurological: Negative for headaches.       Objective:      Physical Exam   Constitutional: She is oriented to person, place, and time. She appears well-developed. She is cooperative.  " Non-toxic appearance. She does not appear ill. No distress.   HENT:   Head: Normocephalic and atraumatic.   Ears:   Right Ear: Hearing, external ear and ear canal normal. Tympanic membrane is not erythematous and not bulging. A middle ear effusion ( mild; clear) is present.   Left Ear: Hearing, external ear and ear canal normal. Tympanic membrane is bulging. Tympanic membrane is not erythematous. A middle ear effusion (Mild; clear) is present.   Nose: No mucosal edema, rhinorrhea or nasal deformity. No epistaxis. Right sinus exhibits maxillary sinus tenderness. Right sinus exhibits no frontal sinus tenderness. Left sinus exhibits maxillary sinus tenderness. Left sinus exhibits no frontal sinus tenderness.   Mouth/Throat: Uvula is midline and mucous membranes are normal. No trismus in the jaw. Normal dentition. No uvula swelling. Posterior oropharyngeal erythema ( mild; PND noted) present. No oropharyngeal exudate, posterior oropharyngeal edema, tonsillar abscesses or cobblestoning.   Eyes: Conjunctivae and lids are normal. Right eye exhibits no discharge. Left eye exhibits no discharge. No scleral icterus.   Neck: Trachea normal and phonation normal. Neck supple. No edema present. No erythema present. No neck rigidity present.   Cardiovascular: Normal rate, regular rhythm, normal heart sounds and normal pulses.   No murmur heard.Exam reveals no gallop and no friction rub.   Pulmonary/Chest: Effort normal. No stridor. No respiratory distress. She has no decreased breath sounds. She has wheezes in the right upper field, the right middle field, the right lower field, the left upper field, the left middle field and the left lower field. She has rhonchi in the right upper field, the right middle field, the right lower field, the left upper field, the left middle field and the left lower field. She has no rales.         Comments: Scattered rhonchi and wheeze auscultated in bilateral lung fields.  Patient is in mild  distress with purse lipped breathing.    Abdominal: Normal appearance.   Musculoskeletal: Normal range of motion.         General: No deformity. Normal range of motion.   Lymphadenopathy:        Head (right side): No submandibular and no tonsillar adenopathy present.        Head (left side): No submandibular and no tonsillar adenopathy present.     She has no cervical adenopathy.        Right cervical: No superficial cervical and no posterior cervical adenopathy present.       Left cervical: No superficial cervical and no posterior cervical adenopathy present.   Neurological: She is alert and oriented to person, place, and time. She exhibits normal muscle tone. Coordination normal.   Skin: Skin is warm, dry, intact, not diaphoretic and not pale.   Psychiatric: Her speech is normal and behavior is normal. Judgment and thought content normal.   Nursing note and vitals reviewed.        Results for orders placed or performed in visit on 07/13/22   POCT COVID-19 Rapid Screening   Result Value Ref Range    POC Rapid COVID Negative Negative     Acceptable Yes      *Note: Due to a large number of results and/or encounters for the requested time period, some results have not been displayed. A complete set of results can be found in Results Review.     CXR:  Impression:     1. No acute abnormality suspected.  There are chronic changes with minimal scar or atelectasis in the lateral right lower lung but there is no infiltrate, mass, pleural effusion or evidence of gross congestive failure.  2. There is a chronic compression deformity of T11.  Assessment:       1. Hypoxia    2. Sinus congestion    3. Shortness of breath    4. Fever, unspecified fever cause        Plan:       Patient with multiple comorbidities including COPD, pulmonary hypertension, and chronic respiratory failure with hypoxia, presents to urgent care for evaluation of shortness of breath and worsening URI symptoms.  Upon arrival her O2  saturation on room air is 88%.  Patient was started on 3 L of oxygen and O2 saturation improved to 96%.  Patient does appear to be in mild distress with pursed lipped breathing and tachypnea.  This improved with her improved O2 saturation.  She is febrile with temperature of 100.1°.  To further evaluate her hypoxia, cough, and shortness of breath, chest x-ray obtained at time of visit.  Chest x-ray shows no acute changes.  Discussed with patient chest x-ray results.  Discussed that her COVID testing is negative at this time as well.  Patient has been staying at her sister's house with no home oxygen for the last 3 days.  Discussed that my concern at this time is worsening infection and her meeting SIRS criteria as well as considering her hypoxia.  Discussed case with Dr. Mercado who agrees with emergent evaluation in the ER at this time.  Offered EMS transport however patient declined.  She states she would prefer to travel by private vehicle with her nephew.  Patient is aware and verbalized understanding.  Patient is amenable with treatment plan. Patient verbalized understanding and all of their questions were answered.     Hypoxia    Sinus congestion  -     POCT COVID-19 Rapid Screening  -     XR CHEST PA AND LATERAL; Future; Expected date: 07/13/2022    Shortness of breath    Fever, unspecified fever cause    Please follow up with your Primary care provider within 2-5 days if your signs and symptoms have not resolved or worsen.     If your condition worsens or fails to improve we recommend that you receive another evaluation at the emergency room immediately or contact your primary medical clinic to discuss your concerns.   You must understand that you have received an Urgent Care treatment only and that you may be released before all of your medical problems are known or treated. You, the patient, will arrange for follow up care as instructed.     RED FLAGS/WARNING SYMPTOMS DISCUSSED WITH PATIENT THAT WOULD  WARRANT EMERGENT MEDICAL ATTENTION. PATIENT VERBALIZED UNDERSTANDING.

## 2022-07-15 PROBLEM — J06.9 RECURRENT UPPER RESPIRATORY INFECTION (URI): Status: ACTIVE | Noted: 2022-07-15

## 2022-07-15 PROBLEM — J84.9 INTERSTITIAL LUNG DISEASE: Status: ACTIVE | Noted: 2022-07-15

## 2022-07-16 PROBLEM — I47.20 VENTRICULAR TACHYCARDIA: Status: ACTIVE | Noted: 2022-07-16

## 2022-07-16 PROBLEM — J96.21 ACUTE ON CHRONIC RESPIRATORY FAILURE WITH HYPOXEMIA: Status: ACTIVE | Noted: 2022-03-21

## 2022-07-25 ENCOUNTER — OFFICE VISIT (OUTPATIENT)
Dept: FAMILY MEDICINE | Facility: CLINIC | Age: 72
End: 2022-07-25
Payer: MEDICARE

## 2022-07-25 ENCOUNTER — CLINICAL SUPPORT (OUTPATIENT)
Dept: REHABILITATION | Facility: HOSPITAL | Age: 72
End: 2022-07-25
Attending: INTERNAL MEDICINE
Payer: MEDICAID

## 2022-07-25 VITALS
HEART RATE: 105 BPM | HEIGHT: 59 IN | WEIGHT: 104.06 LBS | DIASTOLIC BLOOD PRESSURE: 72 MMHG | OXYGEN SATURATION: 99 % | BODY MASS INDEX: 20.98 KG/M2 | SYSTOLIC BLOOD PRESSURE: 118 MMHG

## 2022-07-25 DIAGNOSIS — E11.40 CONTROLLED TYPE 2 DIABETES MELLITUS WITH DIABETIC NEUROPATHY, WITHOUT LONG-TERM CURRENT USE OF INSULIN: ICD-10-CM

## 2022-07-25 DIAGNOSIS — J44.1 COPD WITH ACUTE EXACERBATION: Primary | ICD-10-CM

## 2022-07-25 DIAGNOSIS — R29.898 WEAKNESS OF BOTH HIPS: Primary | ICD-10-CM

## 2022-07-25 DIAGNOSIS — R53.81 PHYSICAL DECONDITIONING: ICD-10-CM

## 2022-07-25 PROCEDURE — 3008F BODY MASS INDEX DOCD: CPT | Mod: CPTII,S$GLB,, | Performed by: INTERNAL MEDICINE

## 2022-07-25 PROCEDURE — 99214 OFFICE O/P EST MOD 30 MIN: CPT | Mod: S$GLB,,, | Performed by: INTERNAL MEDICINE

## 2022-07-25 PROCEDURE — 1160F RVW MEDS BY RX/DR IN RCRD: CPT | Mod: CPTII,S$GLB,, | Performed by: INTERNAL MEDICINE

## 2022-07-25 PROCEDURE — 99214 PR OFFICE/OUTPT VISIT, EST, LEVL IV, 30-39 MIN: ICD-10-PCS | Mod: S$GLB,,, | Performed by: INTERNAL MEDICINE

## 2022-07-25 PROCEDURE — 1160F PR REVIEW ALL MEDS BY PRESCRIBER/CLIN PHARMACIST DOCUMENTED: ICD-10-PCS | Mod: CPTII,S$GLB,, | Performed by: INTERNAL MEDICINE

## 2022-07-25 PROCEDURE — 97110 THERAPEUTIC EXERCISES: CPT | Mod: PO | Performed by: PHYSICAL THERAPIST

## 2022-07-25 PROCEDURE — 3051F HG A1C>EQUAL 7.0%<8.0%: CPT | Mod: CPTII,S$GLB,, | Performed by: INTERNAL MEDICINE

## 2022-07-25 PROCEDURE — 3288F PR FALLS RISK ASSESSMENT DOCUMENTED: ICD-10-PCS | Mod: CPTII,S$GLB,, | Performed by: INTERNAL MEDICINE

## 2022-07-25 PROCEDURE — 3078F PR MOST RECENT DIASTOLIC BLOOD PRESSURE < 80 MM HG: ICD-10-PCS | Mod: CPTII,S$GLB,, | Performed by: INTERNAL MEDICINE

## 2022-07-25 PROCEDURE — 1101F PR PT FALLS ASSESS DOC 0-1 FALLS W/OUT INJ PAST YR: ICD-10-PCS | Mod: CPTII,S$GLB,, | Performed by: INTERNAL MEDICINE

## 2022-07-25 PROCEDURE — 3072F PR LOW RISK FOR RETINOPATHY: ICD-10-PCS | Mod: CPTII,S$GLB,, | Performed by: INTERNAL MEDICINE

## 2022-07-25 PROCEDURE — 3074F PR MOST RECENT SYSTOLIC BLOOD PRESSURE < 130 MM HG: ICD-10-PCS | Mod: CPTII,S$GLB,, | Performed by: INTERNAL MEDICINE

## 2022-07-25 PROCEDURE — 3288F FALL RISK ASSESSMENT DOCD: CPT | Mod: CPTII,S$GLB,, | Performed by: INTERNAL MEDICINE

## 2022-07-25 PROCEDURE — 3051F PR MOST RECENT HEMOGLOBIN A1C LEVEL 7.0 - < 8.0%: ICD-10-PCS | Mod: CPTII,S$GLB,, | Performed by: INTERNAL MEDICINE

## 2022-07-25 PROCEDURE — 99999 PR PBB SHADOW E&M-EST. PATIENT-LVL III: CPT | Mod: PBBFAC,,, | Performed by: INTERNAL MEDICINE

## 2022-07-25 PROCEDURE — 4010F PR ACE/ARB THEARPY RXD/TAKEN: ICD-10-PCS | Mod: CPTII,S$GLB,, | Performed by: INTERNAL MEDICINE

## 2022-07-25 PROCEDURE — 3008F PR BODY MASS INDEX (BMI) DOCUMENTED: ICD-10-PCS | Mod: CPTII,S$GLB,, | Performed by: INTERNAL MEDICINE

## 2022-07-25 PROCEDURE — 1101F PT FALLS ASSESS-DOCD LE1/YR: CPT | Mod: CPTII,S$GLB,, | Performed by: INTERNAL MEDICINE

## 2022-07-25 PROCEDURE — 3072F LOW RISK FOR RETINOPATHY: CPT | Mod: CPTII,S$GLB,, | Performed by: INTERNAL MEDICINE

## 2022-07-25 PROCEDURE — 4010F ACE/ARB THERAPY RXD/TAKEN: CPT | Mod: CPTII,S$GLB,, | Performed by: INTERNAL MEDICINE

## 2022-07-25 PROCEDURE — 3074F SYST BP LT 130 MM HG: CPT | Mod: CPTII,S$GLB,, | Performed by: INTERNAL MEDICINE

## 2022-07-25 PROCEDURE — 3078F DIAST BP <80 MM HG: CPT | Mod: CPTII,S$GLB,, | Performed by: INTERNAL MEDICINE

## 2022-07-25 PROCEDURE — 99999 PR PBB SHADOW E&M-EST. PATIENT-LVL III: ICD-10-PCS | Mod: PBBFAC,,, | Performed by: INTERNAL MEDICINE

## 2022-07-25 PROCEDURE — 1159F PR MEDICATION LIST DOCUMENTED IN MEDICAL RECORD: ICD-10-PCS | Mod: CPTII,S$GLB,, | Performed by: INTERNAL MEDICINE

## 2022-07-25 PROCEDURE — 1159F MED LIST DOCD IN RCRD: CPT | Mod: CPTII,S$GLB,, | Performed by: INTERNAL MEDICINE

## 2022-07-25 RX ORDER — ALBUTEROL SULFATE 90 UG/1
2 AEROSOL, METERED RESPIRATORY (INHALATION) EVERY 6 HOURS PRN
Qty: 18 G | Refills: 2 | Status: SHIPPED | OUTPATIENT
Start: 2022-07-25 | End: 2022-09-26 | Stop reason: SDUPTHER

## 2022-07-25 RX ORDER — ALBUTEROL SULFATE 90 UG/1
2 AEROSOL, METERED RESPIRATORY (INHALATION) EVERY 6 HOURS PRN
Qty: 18 G | Refills: 2 | Status: SHIPPED | OUTPATIENT
Start: 2022-07-25 | End: 2022-07-25 | Stop reason: SDUPTHER

## 2022-07-25 NOTE — PROGRESS NOTES
Subjective     Deb Webb is a 71 y.o. old, female here for hosp f/u    72 y/o with PMH of CVA, Type 2 DM with neuropathy, HTN, HLD, Chronic respiratory failure, COPD, osteopenia, IBS, anxiety, tobacco use, Essential tremor, prior BZO dependence    HPI:   This patient Deb Webb is a 71-year-old white female with past medical history of COPD, pulmonary hypertension, CKD stage 3, diabetes type 2, hypertension, hyperlipidemia, irritable bowel syndrome and chronic respiratory failure (2-3 L nasal cannula) who presents to the ER (tobacco use 65 years x 0.5-1ppd), pulmonary hypertension, type 2 diabetes, CKD stage 3, hypertension, hyperlipidemia, GERD and IBS. She presents to the ED for evaluation of cough, sinus congestion, fever, body aches, ear pressure and shortness of breath.  Her symptoms have been progressing for the past week, they initially started on 07/06/2022.  She does report mild swelling in her bilateral lower extremity. She was evaluated recently in an Urgent Care for shortness of breath and worsening URI symptoms.  Upon arrival there her O2 saturation on room air was 88%.  She was started on 3 L of oxygen and O2 saturation improved to 96%.  On presentation the patient appeared to be in mild distress with pursed lipped breathing tachypneic, hypoxic and tachypnea.  Radiographic studies showed a chronic compression deformity of T11 and Small bilateral pleural effusions.  Her labs were essentially unremarkable.  Admission requested for further management, respiratory panel showed Human Rhinovirus/Enterovirus.        Hospital Course:   Patient was admitted for closer observation started on IV antibiotics steroids and breathing treatments.  Oxygen quickly weaned to baseline.  Patient has history of COPD her steroids were increased and made p.o..  She tolerated this well.  She worked with therapy during her stay agree home health however patient refused this wanted to continue with her  outpatient therapy.   was consulted for discharge planning.  Complications during stay were of a 12 beat run of V-tach cardiology was consulted echo was done preserved EF cardiology advised just to follow up outpatient.  She will be sent home on a tapering dose of steroids and continued bronchodilators.  PCT negative x 2 DC Abx. She has oxygen at home at to wear at night a home O2 study was done prior to discharge he qualified for oxygen during the day continuously.   to arrange.  Blood pressure noted to be slightly low during stay at discharge discontinued her home ARB and started her on low-dose losartan.  For a full hospital course please refer to all notes, labs, images during patient's stay       Hemoglobin A1C (%)   Date Value   05/02/2022 7.0 (H)   12/09/2021 7.4 (H)   09/09/2021 6.4 (H)     Overall much improved.  Just oxygen at night. Pulse ox even at 99% at times.  Cough improved, wheezing improved  Taking meds as prescribed.  Lost strength and conditioning, previously had PT, will reorder.  Albuterol not covered at pharmacy?  Didn't check her BG's much in the hospital but she will continue to check at home.    Review of Systems   Constitutional: Positive for malaise/fatigue.   Respiratory: Negative.    Cardiovascular: Negative.      Medications     Outpatient Medications Marked as Taking for the 7/25/22 encounter (Office Visit) with Triston Valverde MD   Medication Sig Dispense Refill    alendronate (FOSAMAX) 70 MG tablet Take 1 tablet (70 mg total) by mouth once a week. (Patient taking differently: Take 70 mg by mouth Every Friday.) 12 tablet 1    aspirin (ECOTRIN) 81 MG EC tablet Take 1 tablet (81 mg total) by mouth once daily.  0    atorvastatin (LIPITOR) 40 MG tablet TAKE 1 TABLET BY MOUTH EVERY DAY (Patient taking differently: Take 40 mg by mouth every evening.) 90 tablet 1    azelastine (ASTELIN) 137 mcg (0.1 %) nasal spray INHALE 1 SPRAY (137 MCG TOTAL) BY NASAL  ROUTE 2 (TWO) TIMES DAILY. (Patient taking differently: 1 spray by Nasal route 2 (two) times daily.) 90 mL 2    blood sugar diagnostic Strp To check BG 2 times daily, to use with insurance preferred meter 200 each 3    blood-glucose meter,continuous (DEXCOM G6 ) Misc Use as directed 1 each 1    blood-glucose sensor (DEXCOM G6 SENSOR) Emmie Use as directed 10 each 2    calcium carbonate/vitamin D3 (CALCIUM 600 + D,3, ORAL) Take 1 tablet by mouth once daily.      cetirizine (ZYRTEC) 10 MG tablet Take 1 tablet (10 mg total) by mouth once daily. 90 tablet 3    cholestyramine-aspartame (PREVALITE) 4 gram PwPk TAKE 1 PACKET (4 G TOTAL) BY MOUTH 2 (TWO) TIMES DAILY. FOR DIARRHEA (Patient taking differently: Take 4 g by mouth 2 (two) times daily. FOR DIARRHEA) 180 packet 0    citalopram (CELEXA) 20 MG tablet Take 1 tablet (20 mg total) by mouth once daily. (Patient taking differently: Take 20 mg by mouth every evening.) 90 tablet 1    dorzolamide-timolol 2-0.5% (COSOPT) 22.3-6.8 mg/mL ophthalmic solution Place 1 drop into both eyes 2 (two) times daily. 10 mL 11    dulaglutide (TRULICITY) 1.5 mg/0.5 mL pen injector Inject 1.5 mg into the skin every 7 days. Through patient assistance (Patient taking differently: Inject 1.5 mg into the skin every Monday. Through patient assistance) 12 pen 3    fluticasone propionate (FLONASE) 50 mcg/actuation nasal spray 2 sprays (100 mcg total) by Each Nostril route once daily. 16 g 0    furosemide (LASIX) 20 MG tablet Take 1 tablet (20 mg total) by mouth once daily. 30 tablet 1    gabapentin (NEURONTIN) 300 MG capsule TAKE 1 CAPSULE BY MOUTH THREE TIMES A DAY (Patient taking differently: Take 300 mg by mouth 3 (three) times daily.) 270 capsule 1    lancets Misc To check BG 2 times daily, to use with insurance preferred meter 200 each 3    latanoprost 0.005 % ophthalmic solution INSTILL 1 DROP INTO BOTH EYES EVERY EVENING (Patient taking differently: Place 1 drop into  "both eyes every evening.) 7.5 mL 1    losartan (COZAAR) 25 MG tablet Take 0.5 tablets (12.5 mg total) by mouth once daily. 30 tablet 1    meclizine (ANTIVERT) 12.5 mg tablet TAKE 1 TABLET (12.5 MG TOTAL) BY MOUTH 3 (THREE) TIMES DAILY AS NEEDED FOR DIZZINESS. 30 tablet 0    omega-3 fatty acids-vitamin E 1,000 mg Cap Take 1 capsule by mouth once daily.      omeprazole (PRILOSEC) 40 MG capsule TAKE 1 CAPSULE (40 MG TOTAL) BY MOUTH BEFORE BREAKFAST. 90 capsule 1    pen needle, diabetic (BD ULTRA-FINE AGUS PEN NEEDLE) 32 gauge x 5/32" Ndle 1 Device by Misc.(Non-Drug; Combo Route) route 2 (two) times daily. 200 each 4    predniSONE (DELTASONE) 20 MG tablet Take 2 tablets (40 mg total) by mouth once daily for 5 days, THEN 1 tablet (20 mg total) once daily for 5 days, THEN 0.5 tablets (10 mg total) once daily for 10 days. 20 tablet 0    primidone (MYSOLINE) 50 MG Tab TAKE 2 TABLETS BY MOUTH 3 (THREE) TIMES DAILY. ONE HALF TABLET FOR ONE WEEK, THEN AS PRESCRIBED (Patient taking differently: Take 100 mg by mouth 3 (three) times daily.) 540 tablet 3    umeclidinium-vilanteroL (ANORO ELLIPTA) 62.5-25 mcg/actuation DsDv Inhale 1 puff into the lungs once daily. Controller 60 each 11    [DISCONTINUED] albuterol (VENTOLIN HFA) 90 mcg/actuation inhaler Inhale 2 puffs into the lungs every 6 (six) hours as needed for Wheezing or Shortness of Breath. Rescue 18 g 2     Objective     /72   Pulse 105   Ht 4' 11" (1.499 m)   Wt 47.2 kg (104 lb 0.9 oz)   SpO2 99%   BMI 21.02 kg/m²   Physical Exam  Constitutional:       General: She is not in acute distress.     Appearance: She is well-developed.   Cardiovascular:      Rate and Rhythm: Normal rate and regular rhythm.      Heart sounds: No murmur heard.  Pulmonary:      Effort: Pulmonary effort is normal. No respiratory distress.      Breath sounds: Normal breath sounds.   Neurological:      Motor: Tremor present.       Assessment and Plan     COPD with acute " exacerbation    Physical deconditioning  -     Ambulatory referral/consult to Physical/Occupational Therapy; Future; Expected date: 08/01/2022    Controlled type 2 diabetes mellitus with diabetic neuropathy, without long-term current use of insulin    Other orders  -     Discontinue: albuterol (VENTOLIN HFA) 90 mcg/actuation inhaler; Inhale 2 puffs into the lungs every 6 (six) hours as needed for Wheezing or Shortness of Breath. Rescue  Dispense: 18 g; Refill: 2  -     albuterol (VENTOLIN HFA) 90 mcg/actuation inhaler; Inhale 2 puffs into the lungs every 6 (six) hours as needed for Wheezing or Shortness of Breath. Rescue  Dispense: 18 g; Refill: 2        Follow up in about 3 months (around 10/25/2022).  ___________________  Triston Valverde MD  Internal Medicine and Pediatrics

## 2022-07-25 NOTE — PROGRESS NOTES
[facility]   Physical Therapy Treatment Note     Name: Deb Webb  Clinic Number: [id number]    Therapy Diagnosis:   Encounter Diagnosis   Name Primary?    Weakness of both hips Yes     Physician: Triston Valverde MD    Visit Date: 7/25/2022  Physician Orders: PT Eval and Treat  Medical Diagnosis from Referral: Closed displaced fracture of left pubis with routine healing  Evaluation Date: 5/16/2022  Authorization Period Expiration: 6/20/2022  Plan of Care Expiration: 8/16/2022  Progress Note Due: 6/16/2022  Visit # / Visits authorized: 4/12    PTA Visit #: 1/5     Time In: 10:00  Time Out: 10:50  Total Billable Time: 25 minutes    SUBJECTIVE     Pt reports: No pain today. Has been feeling a little weaker since being in the hospital for a few days.  She was compliant with home exercise program.   Response to previous treatment: some knee and stomach soreness  Functional change: Too soon to tell.    Pain: 3/10  Location: left ankles     OBJECTIVE     Objective Measures updated at progress report unless specified.     Treatment       Deb received the treatments listed below:      Therapeutic exercises to develop strength, endurance, ROM, flexibility, posture and core stabilization for 34 minutes including:    Recumbent bike 5'  LAQ 2# 3 x 10  Hip adduction with ball 3x10  Bridge 3x10 5s hold  Sidelying clamshell GTB 3x10  SLR 3x10  SAQ 3x10 3s hold  Shuttle squats double leg 1.0 4x10  Single leg squats .5 2x8    Neuromuscular re-education activities to improve: Balance, Coordination, Kinesthetic, Sense and Proprioception for 10 minutes. The following activities were included:  NBOS 3x 30s  Tandem 3x 30s    therapeutic activities to improve functional performance for 00  minutes, including:    Patient Education and Home Exercises      Home Exercises Provided and Patient Education Provided     Education provided:   - HEP, reprinted for compliance    Written Home Exercises  Provided: yes. Exercises were reviewed and Deb was able to demonstrate them prior to the end of the session.  Deb demonstrated good  understanding of the education provided. See EMR under Patient Instructions for exercises provided during therapy sessions    ASSESSMENT     Pt with good tolerance of exercises today.  Added balance activities today with minimal sway, but no LOB.  No c/o increased ankle pain with session.  Will continue working on LE strength and balance to increase strength and maximize safe mobility.     Deb Is progressing well towards her goals.   Pt prognosis is Good.     Pt will continue to benefit from skilled outpatient physical therapy to address the deficits listed in the problem list box on initial evaluation, provide pt/family education and to maximize pt's level of independence in the home and community environment.     Pt's spiritual, cultural and educational needs considered and pt agreeable to plan of care and goals.     Anticipated barriers to physical therapy: None    Goals:  Short-Term Goals: 4 weeks  - The patient will be independent with initial home exercise program.  - The patient will increase strength to at least 4-/5 in muscles tested to indicate improvements in functional strength.     Long-Term Goals: 8 weeks  - The patient will increase strength to at least 4/5 to perform functional mobility including sit to stand, steps/curbs, and walking  - The patient will increase ROM to WFL to perform ADL, vocational, and recreational tasks without pain.    PLAN     Progress as tolerated.    Isaac Menezes, PT

## 2022-08-01 ENCOUNTER — CLINICAL SUPPORT (OUTPATIENT)
Dept: REHABILITATION | Facility: HOSPITAL | Age: 72
End: 2022-08-01
Attending: INTERNAL MEDICINE
Payer: MEDICARE

## 2022-08-01 DIAGNOSIS — R29.898 WEAKNESS OF BOTH HIPS: Primary | ICD-10-CM

## 2022-08-01 PROCEDURE — 97112 NEUROMUSCULAR REEDUCATION: CPT | Mod: PO,CQ

## 2022-08-01 PROCEDURE — 97110 THERAPEUTIC EXERCISES: CPT | Mod: PO,CQ

## 2022-08-03 ENCOUNTER — TELEPHONE (OUTPATIENT)
Dept: FAMILY MEDICINE | Facility: CLINIC | Age: 72
End: 2022-08-03
Payer: MEDICAID

## 2022-08-03 RX ORDER — TIOTROPIUM BROMIDE AND OLODATEROL 3.124; 2.736 UG/1; UG/1
2 SPRAY, METERED RESPIRATORY (INHALATION) DAILY
Qty: 4 G | Refills: 11 | Status: SHIPPED | OUTPATIENT
Start: 2022-08-03 | End: 2022-09-26

## 2022-08-03 NOTE — TELEPHONE ENCOUNTER
Pt is stating her insurance will not cover her anoro inhaler and is asking for another inhaler to be sent    Thank you      ----- Message from Kari Helm sent at 8/3/2022 12:12 PM CDT -----  Regarding: pt called  Name of Who is Calling: JAVON THOMAS [[id number]]      What is the request in detail: pt would like another medication called in  [employer] will not pay for her anoro inhaler. Please advise            [facility]/pharmacy #[id number] - [place] - [address]  [address] [address]  Phone: [phone] Fax: [phone]            Can the clinic reply by [facility]: NO      [facility] Back [facility]: [phone] ([place])

## 2022-08-08 ENCOUNTER — CLINICAL SUPPORT (OUTPATIENT)
Dept: REHABILITATION | Facility: HOSPITAL | Age: 72
End: 2022-08-08
Attending: INTERNAL MEDICINE
Payer: MEDICAID

## 2022-08-08 DIAGNOSIS — R29.898 WEAKNESS OF BOTH HIPS: Primary | ICD-10-CM

## 2022-08-08 PROCEDURE — 97110 THERAPEUTIC EXERCISES: CPT | Mod: PO | Performed by: PHYSICAL THERAPIST

## 2022-08-08 NOTE — PROGRESS NOTES
[facility]   Physical Therapy Treatment Note     Name: Deb Webb  Clinic Number: [id number]    Therapy Diagnosis:   Encounter Diagnosis   Name Primary?    Weakness of both hips Yes     Physician: Triston Valverde MD    Visit Date: 8/8/2022  Physician Orders: PT Eval and Treat  Medical Diagnosis from Referral: Closed displaced fracture of left pubis with routine healing  Evaluation Date: 5/16/2022  Authorization Period Expiration: 12/31/2022  Plan of Care Expiration: 8/16/2022  Progress Note Due: 6/16/2022  Visit # / Visits authorized: 7/20    PTA Visit #: 1/5     Time In: 10:00 am  Time Out: 10:50  Total Billable Time: 55 minutes    SUBJECTIVE     Pt reports: No complaints.  Pt saw Primary Care who said she was doing well.  She was compliant with home exercise program.   Response to previous treatment: mild soreness  Functional change: Too soon to tell    Pain: 0/10  Location: left ankles     OBJECTIVE     Objective Measures updated at progress report unless specified.     Treatment       Deb received the treatments listed below:      Therapeutic exercises to develop strength, endurance, ROM, flexibility, posture and core stabilization for 55 minutes including:    Recumbent bike 5'  LAQ 2# 3 x 10  Hip adduction with ball 4x10  Bridge 3x10 5s hold  Sidelying clamshell GTB 3x10  SLR 3x10- not performed  SAQ 3x10 3s hold 1#  Shuttle squats double leg 1.0 4x10- not performed  Squats with UE support 3 x 10  Standing hip abduction and extension c/ YTB 3x10 each  Sit to stand at mat     Neuromuscular re-education activities to improve: Balance, Coordination, Kinesthetic, Sense and Proprioception for 10 minutes. The following activities were included:  NBOS on foam 3x 30s  Tandem 3x 30s    therapeutic activities to improve functional performance for 00  minutes, including:    Patient Education and Home Exercises      Home Exercises Provided and Patient Education Provided      Education provided:   - HEP, reprinted for compliance    Written Home Exercises Provided: Patient instructed to cont prior HEP. Exercises were reviewed and Deb was able to demonstrate them prior to the end of the session.  Deb demonstrated good  understanding of the education provided. See EMR under Patient Instructions for exercises provided during therapy sessions    ASSESSMENT     Pt with good tolerance of exercises today.  Pt would like to be able to walk with her cane but patient is unable to perform more than a few seconds of tandem stance without B UE support.  Pt is still very weak and unable to correct if she missteps. Pt is aware that she is unsafe to use any other AD at this time and verbalized understanding. Pt's O2 stats were taking during exercises and oxygen was never lower than 94%.   Will continue working on LE strength and balance to increase strength and maximize safe mobility.     Deb Is progressing well towards her goals.   Pt prognosis is Good.     Pt will continue to benefit from skilled outpatient physical therapy to address the deficits listed in the problem list box on initial evaluation, provide pt/family education and to maximize pt's level of independence in the home and community environment.     Pt's spiritual, cultural and educational needs considered and pt agreeable to plan of care and goals.     Anticipated barriers to physical therapy: None    Goals:  Short-Term Goals: 4 weeks  - The patient will be independent with initial home exercise program.  - The patient will increase strength to at least 4-/5 in muscles tested to indicate improvements in functional strength.     Long-Term Goals: 8 weeks  - The patient will increase strength to at least 4/5 to perform functional mobility including sit to stand, steps/curbs, and walking  - The patient will increase ROM to WFL to perform ADL, vocational, and recreational tasks without pain.    PLAN     Progress as  tolerated.    Isaac Menezes, PT

## 2022-08-15 ENCOUNTER — CLINICAL SUPPORT (OUTPATIENT)
Dept: REHABILITATION | Facility: HOSPITAL | Age: 72
End: 2022-08-15
Attending: INTERNAL MEDICINE
Payer: MEDICAID

## 2022-08-15 ENCOUNTER — OFFICE VISIT (OUTPATIENT)
Dept: OTOLARYNGOLOGY | Facility: CLINIC | Age: 72
End: 2022-08-15
Payer: MEDICARE

## 2022-08-15 VITALS — HEIGHT: 59 IN | BODY MASS INDEX: 21.12 KG/M2 | WEIGHT: 104.75 LBS

## 2022-08-15 DIAGNOSIS — R29.898 WEAKNESS OF BOTH HIPS: Primary | ICD-10-CM

## 2022-08-15 DIAGNOSIS — J30.0 VMR (VASOMOTOR RHINITIS): Primary | ICD-10-CM

## 2022-08-15 PROCEDURE — 3288F FALL RISK ASSESSMENT DOCD: CPT | Mod: CPTII,S$GLB,, | Performed by: NURSE PRACTITIONER

## 2022-08-15 PROCEDURE — 99213 OFFICE O/P EST LOW 20 MIN: CPT | Mod: S$GLB,,, | Performed by: NURSE PRACTITIONER

## 2022-08-15 PROCEDURE — 3072F LOW RISK FOR RETINOPATHY: CPT | Mod: CPTII,S$GLB,, | Performed by: NURSE PRACTITIONER

## 2022-08-15 PROCEDURE — 1160F PR REVIEW ALL MEDS BY PRESCRIBER/CLIN PHARMACIST DOCUMENTED: ICD-10-PCS | Mod: CPTII,S$GLB,, | Performed by: NURSE PRACTITIONER

## 2022-08-15 PROCEDURE — 99999 PR PBB SHADOW E&M-EST. PATIENT-LVL III: CPT | Mod: PBBFAC,,, | Performed by: NURSE PRACTITIONER

## 2022-08-15 PROCEDURE — 99999 PR PBB SHADOW E&M-EST. PATIENT-LVL III: ICD-10-PCS | Mod: PBBFAC,,, | Performed by: NURSE PRACTITIONER

## 2022-08-15 PROCEDURE — 1160F RVW MEDS BY RX/DR IN RCRD: CPT | Mod: CPTII,S$GLB,, | Performed by: NURSE PRACTITIONER

## 2022-08-15 PROCEDURE — 3072F PR LOW RISK FOR RETINOPATHY: ICD-10-PCS | Mod: CPTII,S$GLB,, | Performed by: NURSE PRACTITIONER

## 2022-08-15 PROCEDURE — 4010F ACE/ARB THERAPY RXD/TAKEN: CPT | Mod: CPTII,S$GLB,, | Performed by: NURSE PRACTITIONER

## 2022-08-15 PROCEDURE — 99213 PR OFFICE/OUTPT VISIT, EST, LEVL III, 20-29 MIN: ICD-10-PCS | Mod: S$GLB,,, | Performed by: NURSE PRACTITIONER

## 2022-08-15 PROCEDURE — 3008F PR BODY MASS INDEX (BMI) DOCUMENTED: ICD-10-PCS | Mod: CPTII,S$GLB,, | Performed by: NURSE PRACTITIONER

## 2022-08-15 PROCEDURE — 3051F HG A1C>EQUAL 7.0%<8.0%: CPT | Mod: CPTII,S$GLB,, | Performed by: NURSE PRACTITIONER

## 2022-08-15 PROCEDURE — 4010F PR ACE/ARB THEARPY RXD/TAKEN: ICD-10-PCS | Mod: CPTII,S$GLB,, | Performed by: NURSE PRACTITIONER

## 2022-08-15 PROCEDURE — 1126F PR PAIN SEVERITY QUANTIFIED, NO PAIN PRESENT: ICD-10-PCS | Mod: CPTII,S$GLB,, | Performed by: NURSE PRACTITIONER

## 2022-08-15 PROCEDURE — 1101F PT FALLS ASSESS-DOCD LE1/YR: CPT | Mod: CPTII,S$GLB,, | Performed by: NURSE PRACTITIONER

## 2022-08-15 PROCEDURE — 1126F AMNT PAIN NOTED NONE PRSNT: CPT | Mod: CPTII,S$GLB,, | Performed by: NURSE PRACTITIONER

## 2022-08-15 PROCEDURE — 1159F PR MEDICATION LIST DOCUMENTED IN MEDICAL RECORD: ICD-10-PCS | Mod: CPTII,S$GLB,, | Performed by: NURSE PRACTITIONER

## 2022-08-15 PROCEDURE — 3008F BODY MASS INDEX DOCD: CPT | Mod: CPTII,S$GLB,, | Performed by: NURSE PRACTITIONER

## 2022-08-15 PROCEDURE — 1159F MED LIST DOCD IN RCRD: CPT | Mod: CPTII,S$GLB,, | Performed by: NURSE PRACTITIONER

## 2022-08-15 PROCEDURE — 3288F PR FALLS RISK ASSESSMENT DOCUMENTED: ICD-10-PCS | Mod: CPTII,S$GLB,, | Performed by: NURSE PRACTITIONER

## 2022-08-15 PROCEDURE — 97112 NEUROMUSCULAR REEDUCATION: CPT | Mod: PO,CQ

## 2022-08-15 PROCEDURE — 97110 THERAPEUTIC EXERCISES: CPT | Mod: PO,CQ

## 2022-08-15 PROCEDURE — 1101F PR PT FALLS ASSESS DOC 0-1 FALLS W/OUT INJ PAST YR: ICD-10-PCS | Mod: CPTII,S$GLB,, | Performed by: NURSE PRACTITIONER

## 2022-08-15 PROCEDURE — 3051F PR MOST RECENT HEMOGLOBIN A1C LEVEL 7.0 - < 8.0%: ICD-10-PCS | Mod: CPTII,S$GLB,, | Performed by: NURSE PRACTITIONER

## 2022-08-15 PROCEDURE — 99213 OFFICE O/P EST LOW 20 MIN: CPT | Mod: PBBFAC,PO | Performed by: NURSE PRACTITIONER

## 2022-08-15 RX ORDER — IPRATROPIUM BROMIDE 42 UG/1
2 SPRAY, METERED NASAL 3 TIMES DAILY
Qty: 15 ML | Refills: 12 | Status: SHIPPED | OUTPATIENT
Start: 2022-08-15 | End: 2023-09-01

## 2022-08-15 NOTE — PROGRESS NOTES
Subjective:       Patient ID: Deb Webb is a 71 y.o. female.    Chief Complaint: No chief complaint on file.    HPI   Patient last seen by Dr. Dick in 2019 for presbycusis, TMJ, and ETD. Patient returns today for sinuses. Patient went to Urgent Care one month ago for sinus pressure. COVID was negative, CXR negative. She was evaluated in ER the next day. Admitted for closer observation started on IV antibiotics steroids and breathing treatments.  Patient states she used to see Dr. Contreras Christensen at [facility] once/year for an allergy shot that would reportedly help for a year. Patient states she has never seen an allergist or been allergy tested. Patient's chief concern is clear rhinorrhea, profuse at times. Does not notice it when she lays down, but as soon as she gets up, it starts. Worse with eating. Allergy meds do not seem to help. She does Flonase & Astelin several times a day without relief.     Review of Systems   Constitutional: Negative.  Negative for fever.   HENT: Positive for nasal congestion, dental problem, rhinorrhea, sneezing, trouble swallowing and voice change. Negative for facial swelling and sore throat.    Eyes: Positive for discharge and itching.   Respiratory: Positive for cough and choking.    Cardiovascular: Negative.    Gastrointestinal: Negative.    Musculoskeletal: Negative.    Integumentary:  Negative.   Neurological: Negative.  Negative for headaches.   Hematological: Negative.    Psychiatric/Behavioral: Negative.          Objective:      Physical Exam  Vitals and nursing note reviewed.   Constitutional:       General: She is not in acute distress.     Appearance: She is well-developed. She is not ill-appearing or diaphoretic.   HENT:      Head: Normocephalic and atraumatic.      Right Ear: Hearing, tympanic membrane, ear canal and external ear normal. No middle ear effusion. Tympanic membrane is not erythematous.      Left Ear: Hearing, tympanic membrane, ear canal and  external ear normal.  No middle ear effusion. Tympanic membrane is not erythematous.      Nose: Nose normal.      Mouth/Throat:      Pharynx: Uvula midline.   Eyes:      General: Lids are normal. No scleral icterus.        Right eye: No discharge.         Left eye: No discharge.   Neck:      Trachea: Trachea normal. No tracheal deviation.   Cardiovascular:      Rate and Rhythm: Normal rate.   Pulmonary:      Effort: Pulmonary effort is normal. No respiratory distress.      Breath sounds: No stridor. No wheezing.   Musculoskeletal:         General: Normal range of motion.      Cervical back: Normal range of motion and neck supple.   Skin:     General: Skin is warm and dry.      Coloration: Skin is not pale.   Neurological:      Mental Status: She is alert and oriented to person, place, and time.      Coordination: Coordination normal.      Gait: Gait normal.   Psychiatric:         Speech: Speech normal.         Behavior: Behavior normal. Behavior is cooperative.         Thought Content: Thought content normal.         Judgment: Judgment normal.         Assessment:       Problem List Items Addressed This Visit    None     Visit Diagnoses     VMR (vasomotor rhinitis)    -  Primary    Relevant Medications    ipratropium (ATROVENT) 42 mcg (0.06 %) nasal spray          Plan:     Discontinue Flonase, Astelin, Zyrtec, and Claritin since not really helping anyway.  Start trial of Atrovent nasal spray TID.     Patient encouraged to return to clinic if symptoms worsen/persist and as needed for further ENT symptoms or concerns.

## 2022-08-15 NOTE — PROGRESS NOTES
[facility]   Physical Therapy Treatment Note     Name: Deb Webb  Clinic Number: [id number]    Therapy Diagnosis:   Encounter Diagnosis   Name Primary?    Weakness of both hips Yes     Physician: Triston Valverde MD    Visit Date: 8/15/2022  Physician Orders: PT Eval and Treat  Medical Diagnosis from Referral: Closed displaced fracture of left pubis with routine healing  Evaluation Date: 5/16/2022  Authorization Period Expiration: 12/31/2022  Plan of Care Expiration: 8/16/2022  Progress Note Due: 6/16/2022  Visit # / Visits authorized: 9/20    PTA Visit #: 1/5     Time In: 11:30 am  Time Out: 12:15 pm  Total Billable Time: 45 minutes    SUBJECTIVE     Pt reports: She is trying to do more walking at home.  Pt reports her sinuses have been draining a lot so she is seeing an ENT today.  She was compliant with home exercise program.   Response to previous treatment: fatigue  Functional change: Too soon to tell    Pain: 0/10  Location: left ankles     OBJECTIVE     Objective Measures updated at progress report unless specified.     Treatment       Deb received the treatments listed below:      Therapeutic exercises to develop strength, endurance, ROM, flexibility, posture and core stabilization for 40 minutes including:    Recumbent bike 5'  LAQ 2# 3 x 10  Hip adduction with ball 3x10- not performed  Bridge 3x10 5s hold  Sidelying clamshell GTB 3x10  SLR 3x10  SAQ 3x10 3s hold 1#- not performed   Shuttle squats double leg 1.5 3x10  Squats with UE support 3 x 10- not performed  Standing hip abduction and extension c/ YTB 3x10 each- not performed  Sit to stand at mat 2 x 10- no UE support    Neuromuscular re-education activities to improve: Balance, Coordination, Kinesthetic, Sense and Proprioception for 15 minutes. The following activities were included:  NBOS on foam 3x 30s  Tandem 3x 30s  Step ups on foam 2 x 10- CGA  Toe taps to foam pad x 20    therapeutic activities to  improve functional performance for 00  minutes, including:    Patient Education and Home Exercises      Home Exercises Provided and Patient Education Provided     Education provided:   - HEP, reprinted for compliance    Written Home Exercises Provided: Patient instructed to cont prior HEP. Exercises were reviewed and Deb was able to demonstrate them prior to the end of the session.  Deb demonstrated good  understanding of the education provided. See EMR under Patient Instructions for exercises provided during therapy sessions    ASSESSMENT     Pt continues to be challenged with balance activities.  Pt unable to perform tandem stance without intermittent UE support.  Pt is unsafe without UE support.   Will continue working on LE strength and balance to increase strength and maximize safe mobility.     Deb Is progressing well towards her goals.   Pt prognosis is Good.     Pt will continue to benefit from skilled outpatient physical therapy to address the deficits listed in the problem list box on initial evaluation, provide pt/family education and to maximize pt's level of independence in the home and community environment.     Pt's spiritual, cultural and educational needs considered and pt agreeable to plan of care and goals.     Anticipated barriers to physical therapy: None    Goals:  Short-Term Goals: 4 weeks  - The patient will be independent with initial home exercise program.  - The patient will increase strength to at least 4-/5 in muscles tested to indicate improvements in functional strength.     Long-Term Goals: 8 weeks  - The patient will increase strength to at least 4/5 to perform functional mobility including sit to stand, steps/curbs, and walking  - The patient will increase ROM to WFL to perform ADL, vocational, and recreational tasks without pain.    PLAN     Progress as tolerated.    Perlita Lorenzo, PTA

## 2022-08-29 PROBLEM — K52.9 ACUTE COLITIS: Status: ACTIVE | Noted: 2022-08-29

## 2022-08-29 PROBLEM — J96.21 ACUTE ON CHRONIC RESPIRATORY FAILURE WITH HYPOXIA: Status: ACTIVE | Noted: 2022-08-29

## 2022-08-29 PROBLEM — R11.2 NAUSEA & VOMITING: Status: ACTIVE | Noted: 2022-08-29

## 2022-08-30 PROBLEM — R09.02 HYPOXIA: Status: ACTIVE | Noted: 2022-08-30

## 2022-09-01 PROBLEM — K62.5 RECTAL BLEEDING: Status: ACTIVE | Noted: 2022-09-01

## 2022-09-02 ENCOUNTER — TELEPHONE (OUTPATIENT)
Dept: FAMILY MEDICINE | Facility: CLINIC | Age: 72
End: 2022-09-02

## 2022-09-02 ENCOUNTER — OUTPATIENT CASE MANAGEMENT (OUTPATIENT)
Dept: ADMINISTRATIVE | Facility: OTHER | Age: 72
End: 2022-09-02
Payer: MEDICARE

## 2022-09-02 NOTE — TELEPHONE ENCOUNTER
----- Message from Herminia Saba RN sent at 9/2/2022  1:31 PM CDT -----  Good afternoon,     I spoke with this patient today. She stated that they hydrocortisone suppositories prescribed to her from the ER are not covered by her insurance. Are there any medications similar that would be covered?   The patient also wondering if she can reschedule her f/u appointment on Thursday with Dr. Valverde to Monday due to transportation issues. Anything around 11 am would be best. She is okay with seeing a nurse practitioner if Dr. Valverde does not have anything available.   Please call and advise patient.       Thank you for your assistance,   Herminia Saba RN  Outpatient Complex Care Management  [phone]

## 2022-09-02 NOTE — LETTER
September 2, 2022    Deb Webb  [id number] [facility] [id number]             [facility] [address] [place] [address] Dear Ms. eWbb:    Welcome to [facility] Complex Care Management Program.  It was a pleasure talking with you today.  My name is Herminia Saba RN. I look forward to working with you as your .  My goal is to help you function at the healthiest and highest level possible.  You can contact me directly at [phone].    As an [facility] patient with [employer], some of the services we may be able to provide include:      Development of an individualized care plan with a Registered Nurse    Connection with a    Connection with available resources and services     Coordinate communication among your care team members    Provide coaching and education    Help you understand your doctors treatment plan   Help you obtain information about your insurance coverage.     All services provided by [facility]s Complex Care Managers and other care team members are coordinated with and communicated to your primary care team.    As part of your enrollment, you will be receiving education materials and more information about these services in your [facility] account, by phone or through the mail.  If you do not wish to participate or receive information, please contact our office at [phone].      Sincerely,    Herminia Saba. RN  [facility]   Out-patient RN Complex Care Manager

## 2022-09-12 ENCOUNTER — CLINICAL SUPPORT (OUTPATIENT)
Dept: REHABILITATION | Facility: HOSPITAL | Age: 72
End: 2022-09-12
Attending: INTERNAL MEDICINE
Payer: MEDICARE

## 2022-09-12 DIAGNOSIS — R29.898 WEAKNESS OF BOTH HIPS: Primary | ICD-10-CM

## 2022-09-12 PROCEDURE — 97110 THERAPEUTIC EXERCISES: CPT | Mod: PO | Performed by: PHYSICAL THERAPIST

## 2022-09-12 NOTE — PROGRESS NOTES
"[facility]   Physical Therapy Treatment Note     Name: Deb Webb  Clinic Number: [id number]    Therapy Diagnosis:   Encounter Diagnosis   Name Primary?    Weakness of both hips Yes       Physician: Triston Valverde MD    Visit Date: 9/12/2022  Physician Orders: PT Eval and Treat  Medical Diagnosis from Referral: Closed displaced fracture of left pubis with routine healing  Evaluation Date: 5/16/2022  Authorization Period Expiration: 12/31/2022  Plan of Care Expiration: 8/16/2022  Progress Note Due: 6/16/2022  Visit # / Visits authorized: 10/20    PTA Visit #: 1/5     Time In: 1000  Time Out: 1050  Total Billable Time: 50    SUBJECTIVE     Pt reports: She is feeling okay, no pain.  She was compliant with home exercise program.   Response to previous treatment: fatigue  Functional change: Too soon to tell    Pain: 0/10  Location: left ankles     OBJECTIVE     Objective Measures updated at progress report unless specified.     Treatment       Deb received the treatments listed below:      Therapeutic exercises to develop strength, endurance, ROM, flexibility, posture and core stabilization for 40 minutes including:    Recumbent bike 5'  SAQ 3x10 3s hold 3#  SLR 3x10  Bridge 3x10 5s hold  Hip adduction with ball 3x10  Sidelying clamshell GTB 3x10  LAQ 3# 3 x 10  Sit to stand at mat 1 x 10    Neuromuscular re-education activities to improve: Balance, Coordination, Kinesthetic, Sense and Proprioception for 10 minutes. The following activities were included:  NBOS on foam 3x 30s  Tandem 3x 30s each leg  Step ups on foam 2 x 10 each leg  SLS 3x30" each leg    therapeutic activities to improve functional performance for 00  minutes, including:    Patient Education and Home Exercises      Home Exercises Provided and Patient Education Provided     Education provided:   - HEP, reprinted for compliance    Written Home Exercises Provided: Patient instructed to cont prior HEP. Exercises " were reviewed and Deb was able to demonstrate them prior to the end of the session.  Deb demonstrated good  understanding of the education provided. See EMR under Patient Instructions for exercises provided during therapy sessions    ASSESSMENT     Deb is doing well, she tolerated last session with no increase in symptoms. She completed new therex weights with no issues and will continue working on her balance. She still struggles with feet together tandem stance but does better when her feet are apart in tandem.  ANYA Flood assisted in all aspects of today's treatment including manual therapy and administration of exercises.     Deb Is progressing well towards her goals.   Pt prognosis is Good.     Pt will continue to benefit from skilled outpatient physical therapy to address the deficits listed in the problem list box on initial evaluation, provide pt/family education and to maximize pt's level of independence in the home and community environment.     Pt's spiritual, cultural and educational needs considered and pt agreeable to plan of care and goals.     Anticipated barriers to physical therapy: None    Goals:  Short-Term Goals: 4 weeks  - The patient will be independent with initial home exercise program.  - The patient will increase strength to at least 4-/5 in muscles tested to indicate improvements in functional strength.     Long-Term Goals: 8 weeks  - The patient will increase strength to at least 4/5 to perform functional mobility including sit to stand, steps/curbs, and walking  - The patient will increase ROM to WFL to perform ADL, vocational, and recreational tasks without pain.    PLAN     Progress as tolerated.    Isaac Menezes, ANYA Krishnan  This note was created in whole or at least part by ANYA Flood and reviewed by Isaac Menezes PT.

## 2022-09-16 ENCOUNTER — OUTPATIENT CASE MANAGEMENT (OUTPATIENT)
Dept: ADMINISTRATIVE | Facility: OTHER | Age: 72
End: 2022-09-16
Payer: MEDICARE

## 2022-09-16 NOTE — PROGRESS NOTES
09/16/22 Attempt assessment with patient/caregiver. No answer. Unable to LM. RN OPCM  first f/u attempt.

## 2022-09-19 ENCOUNTER — OFFICE VISIT (OUTPATIENT)
Dept: FAMILY MEDICINE | Facility: CLINIC | Age: 72
End: 2022-09-19
Payer: MEDICARE

## 2022-09-19 ENCOUNTER — CLINICAL SUPPORT (OUTPATIENT)
Dept: REHABILITATION | Facility: HOSPITAL | Age: 72
End: 2022-09-19
Attending: INTERNAL MEDICINE
Payer: MEDICARE

## 2022-09-19 VITALS
HEIGHT: 59 IN | SYSTOLIC BLOOD PRESSURE: 104 MMHG | HEART RATE: 65 BPM | DIASTOLIC BLOOD PRESSURE: 60 MMHG | BODY MASS INDEX: 21.02 KG/M2 | OXYGEN SATURATION: 91 %

## 2022-09-19 DIAGNOSIS — E11.42 TYPE 2 DIABETES MELLITUS WITH DIABETIC POLYNEUROPATHY, WITHOUT LONG-TERM CURRENT USE OF INSULIN: ICD-10-CM

## 2022-09-19 DIAGNOSIS — K52.9 ACUTE COLITIS: ICD-10-CM

## 2022-09-19 DIAGNOSIS — R29.898 WEAKNESS OF BOTH HIPS: Primary | ICD-10-CM

## 2022-09-19 DIAGNOSIS — I10 ESSENTIAL HYPERTENSION: ICD-10-CM

## 2022-09-19 DIAGNOSIS — J96.11 CHRONIC RESPIRATORY FAILURE WITH HYPOXIA: Primary | ICD-10-CM

## 2022-09-19 DIAGNOSIS — K58.9 IRRITABLE BOWEL SYNDROME WITHOUT DIARRHEA: ICD-10-CM

## 2022-09-19 DIAGNOSIS — J44.1 COPD WITH ACUTE EXACERBATION: ICD-10-CM

## 2022-09-19 PROCEDURE — 1159F MED LIST DOCD IN RCRD: CPT | Mod: CPTII,S$GLB,, | Performed by: INTERNAL MEDICINE

## 2022-09-19 PROCEDURE — 1159F PR MEDICATION LIST DOCUMENTED IN MEDICAL RECORD: ICD-10-PCS | Mod: CPTII,S$GLB,, | Performed by: INTERNAL MEDICINE

## 2022-09-19 PROCEDURE — 3008F PR BODY MASS INDEX (BMI) DOCUMENTED: ICD-10-PCS | Mod: CPTII,S$GLB,, | Performed by: INTERNAL MEDICINE

## 2022-09-19 PROCEDURE — 3074F PR MOST RECENT SYSTOLIC BLOOD PRESSURE < 130 MM HG: ICD-10-PCS | Mod: CPTII,S$GLB,, | Performed by: INTERNAL MEDICINE

## 2022-09-19 PROCEDURE — 97110 THERAPEUTIC EXERCISES: CPT | Mod: PO | Performed by: PHYSICAL THERAPIST

## 2022-09-19 PROCEDURE — 4010F PR ACE/ARB THEARPY RXD/TAKEN: ICD-10-PCS | Mod: CPTII,S$GLB,, | Performed by: INTERNAL MEDICINE

## 2022-09-19 PROCEDURE — 1126F PR PAIN SEVERITY QUANTIFIED, NO PAIN PRESENT: ICD-10-PCS | Mod: CPTII,S$GLB,, | Performed by: INTERNAL MEDICINE

## 2022-09-19 PROCEDURE — 99999 PR PBB SHADOW E&M-EST. PATIENT-LVL III: CPT | Mod: PBBFAC,,, | Performed by: INTERNAL MEDICINE

## 2022-09-19 PROCEDURE — 4010F ACE/ARB THERAPY RXD/TAKEN: CPT | Mod: CPTII,S$GLB,, | Performed by: INTERNAL MEDICINE

## 2022-09-19 PROCEDURE — 99999 PR PBB SHADOW E&M-EST. PATIENT-LVL III: ICD-10-PCS | Mod: PBBFAC,,, | Performed by: INTERNAL MEDICINE

## 2022-09-19 PROCEDURE — 1101F PT FALLS ASSESS-DOCD LE1/YR: CPT | Mod: CPTII,S$GLB,, | Performed by: INTERNAL MEDICINE

## 2022-09-19 PROCEDURE — 1160F RVW MEDS BY RX/DR IN RCRD: CPT | Mod: CPTII,S$GLB,, | Performed by: INTERNAL MEDICINE

## 2022-09-19 PROCEDURE — 3078F DIAST BP <80 MM HG: CPT | Mod: CPTII,S$GLB,, | Performed by: INTERNAL MEDICINE

## 2022-09-19 PROCEDURE — 3078F PR MOST RECENT DIASTOLIC BLOOD PRESSURE < 80 MM HG: ICD-10-PCS | Mod: CPTII,S$GLB,, | Performed by: INTERNAL MEDICINE

## 2022-09-19 PROCEDURE — 3051F PR MOST RECENT HEMOGLOBIN A1C LEVEL 7.0 - < 8.0%: ICD-10-PCS | Mod: CPTII,S$GLB,, | Performed by: INTERNAL MEDICINE

## 2022-09-19 PROCEDURE — 1160F PR REVIEW ALL MEDS BY PRESCRIBER/CLIN PHARMACIST DOCUMENTED: ICD-10-PCS | Mod: CPTII,S$GLB,, | Performed by: INTERNAL MEDICINE

## 2022-09-19 PROCEDURE — 3072F LOW RISK FOR RETINOPATHY: CPT | Mod: CPTII,S$GLB,, | Performed by: INTERNAL MEDICINE

## 2022-09-19 PROCEDURE — 99213 OFFICE O/P EST LOW 20 MIN: CPT | Mod: PBBFAC,PO | Performed by: INTERNAL MEDICINE

## 2022-09-19 PROCEDURE — 3072F PR LOW RISK FOR RETINOPATHY: ICD-10-PCS | Mod: CPTII,S$GLB,, | Performed by: INTERNAL MEDICINE

## 2022-09-19 PROCEDURE — 1101F PR PT FALLS ASSESS DOC 0-1 FALLS W/OUT INJ PAST YR: ICD-10-PCS | Mod: CPTII,S$GLB,, | Performed by: INTERNAL MEDICINE

## 2022-09-19 PROCEDURE — 3008F BODY MASS INDEX DOCD: CPT | Mod: CPTII,S$GLB,, | Performed by: INTERNAL MEDICINE

## 2022-09-19 PROCEDURE — 3288F PR FALLS RISK ASSESSMENT DOCUMENTED: ICD-10-PCS | Mod: CPTII,S$GLB,, | Performed by: INTERNAL MEDICINE

## 2022-09-19 PROCEDURE — 3074F SYST BP LT 130 MM HG: CPT | Mod: CPTII,S$GLB,, | Performed by: INTERNAL MEDICINE

## 2022-09-19 PROCEDURE — 99214 OFFICE O/P EST MOD 30 MIN: CPT | Mod: S$GLB,,, | Performed by: INTERNAL MEDICINE

## 2022-09-19 PROCEDURE — 3288F FALL RISK ASSESSMENT DOCD: CPT | Mod: CPTII,S$GLB,, | Performed by: INTERNAL MEDICINE

## 2022-09-19 PROCEDURE — 1126F AMNT PAIN NOTED NONE PRSNT: CPT | Mod: CPTII,S$GLB,, | Performed by: INTERNAL MEDICINE

## 2022-09-19 PROCEDURE — 99214 PR OFFICE/OUTPT VISIT, EST, LEVL IV, 30-39 MIN: ICD-10-PCS | Mod: S$GLB,,, | Performed by: INTERNAL MEDICINE

## 2022-09-19 PROCEDURE — 3051F HG A1C>EQUAL 7.0%<8.0%: CPT | Mod: CPTII,S$GLB,, | Performed by: INTERNAL MEDICINE

## 2022-09-19 NOTE — PROGRESS NOTES
Subjective     Deb Webb is a 71 y.o. old, female here for hospital f/u  and Shortness of Breath    70 y/o with PMH of CVA, Type 2 DM with neuropathy, HTN, HLD, Chronic respiratory failure, COPD, osteopenia, IBS, anxiety, tobacco use, Essential tremor, prior BZO dependence    Here for hospital follow-up for abdominal pain and AECOPD.  Abdominal pain has improved but COPD exacerbation continues.  In discussing with her and her bringing her inhaler I realized she does not know how to work it and the cartridge is not inside the inhaler.  She continues to wheeze and feel significant ROPER.  Reviewed her latest EGD, colonoscopy.    Review of Systems   Constitutional:  Positive for malaise/fatigue. Negative for chills and fever.   Respiratory:  Positive for cough, sputum production, shortness of breath and wheezing.    Cardiovascular:  Negative for chest pain, palpitations and leg swelling.   Gastrointestinal:  Positive for abdominal pain and nausea. Negative for vomiting.   Medications     Outpatient Medications Marked as Taking for the 9/19/22 encounter (Office Visit) with Triston Valverde MD   Medication Sig Dispense Refill    albuterol (VENTOLIN HFA) 90 mcg/actuation inhaler Inhale 2 puffs into the lungs every 6 (six) hours as needed for Wheezing or Shortness of Breath. Rescue 18 g 2    alendronate (FOSAMAX) 70 MG tablet Take 1 tablet (70 mg total) by mouth once a week. (Patient taking differently: Take 70 mg by mouth Every Friday.) 12 tablet 1    aspirin (ECOTRIN) 81 MG EC tablet Take 1 tablet (81 mg total) by mouth once daily.  0    atorvastatin (LIPITOR) 40 MG tablet TAKE 1 TABLET BY MOUTH EVERY DAY (Patient taking differently: Take 40 mg by mouth every evening.) 90 tablet 1    blood sugar diagnostic Strp To check BG 2 times daily, to use with insurance preferred meter 200 each 3    blood-glucose meter kit To check BG 2 times daily, to use with insurance preferred meter 1 each 0    blood-glucose  meter,continuous (DEXCOM G6 ) Misc Use as directed 1 each 1    blood-glucose sensor (DEXCOM G6 SENSOR) Emmie Use as directed 10 each 2    calcium carbonate/vitamin D3 (CALCIUM 600 + D,3, ORAL) Take 1 tablet by mouth once daily.      cholestyramine-aspartame (PREVALITE) 4 gram PwPk TAKE 1 PACKET (4 G TOTAL) BY MOUTH 2 (TWO) TIMES DAILY. FOR DIARRHEA (Patient taking differently: Take 1 packet by mouth 2 (two) times daily.) 180 packet 0    citalopram (CELEXA) 20 MG tablet Take 1 tablet (20 mg total) by mouth once daily. (Patient taking differently: Take 20 mg by mouth every evening.) 90 tablet 1    dulaglutide (TRULICITY) 1.5 mg/0.5 mL pen injector Inject 1.5 mg into the skin every 7 days. Through patient assistance 12 pen 3    furosemide (LASIX) 20 MG tablet Take 1 tablet (20 mg total) by mouth as needed (swelling).      gabapentin (NEURONTIN) 300 MG capsule TAKE 1 CAPSULE BY MOUTH THREE TIMES A DAY (Patient taking differently: Take 300 mg by mouth 3 (three) times daily.) 270 capsule 1    guaiFENesin (MUCINEX) 600 mg 12 hr tablet Take 1 tablet (600 mg total) by mouth 2 (two) times daily.      ipratropium (ATROVENT) 42 mcg (0.06 %) nasal spray 2 sprays by Nasal route 3 (three) times daily. Use 3 times per day if necessary for chronic nasal drip 15 mL 12    Lactobacillus rhamnosus GG (CULTURELLE) 10 billion cell capsule Take 1 capsule by mouth once daily. 60 capsule 0    lancets Misc To check BG 2 times daily, to use with insurance preferred meter 200 each 3    latanoprost 0.005 % ophthalmic solution INSTILL 1 DROP INTO BOTH EYES EVERY EVENING (Patient taking differently: Place 1 drop into both eyes every evening.) 7.5 mL 1    losartan (COZAAR) 25 MG tablet Take 0.5 tablets (12.5 mg total) by mouth once daily. 30 tablet 1    omega-3 fatty acids-vitamin E 1,000 mg Cap Take 1 capsule by mouth once daily.      omeprazole (PRILOSEC) 40 MG capsule TAKE 1 CAPSULE (40 MG TOTAL) BY MOUTH BEFORE BREAKFAST. 90 capsule 1     "pen needle, diabetic (BD ULTRA-FINE AGUS PEN NEEDLE) 32 gauge x 5/32" Ndle 1 Device by Misc.(Non-Drug; Combo Route) route 2 (two) times daily. 200 each 4    pramoxine-hydrocortisone (PROCTOCREAM-HC) 1-1 % rectal cream Place rectally 2 (two) times daily. 30 g 0    primidone (MYSOLINE) 50 MG Tab TAKE 2 TABLETS BY MOUTH 3 (THREE) TIMES DAILY. ONE HALF TABLET FOR ONE WEEK, THEN AS PRESCRIBED 540 tablet 3    tiotropium-olodateroL (STIOLTO RESPIMAT) 2.5-2.5 mcg/actuation Mist Inhale 2 puffs into the lungs once daily at 6am. Controller 4 g 11     Objective     /60   Pulse 65   Ht 4' 11" (1.499 m)   SpO2 (!) 91%   BMI 21.02 kg/m²   Physical Exam  Constitutional:       General: She is not in acute distress.     Appearance: Normal appearance. She is well-developed.   Abdominal:      General: Abdomen is flat. There is no distension.      Palpations: There is no mass.   Neurological:      Mental Status: She is alert.     Assessment and Plan     Chronic respiratory failure with hypoxia    COPD with acute exacerbation    Type 2 diabetes mellitus with diabetic polyneuropathy, without long-term current use of insulin    Essential hypertension    Acute colitis    Irritable bowel syndrome without diarrhea    Significant time spent teaching her how to use her inhaler and testing it out.    No follow-ups on file.  ___________________  Triston Valverde MD  Internal Medicine and Pediatrics    "

## 2022-09-19 NOTE — PROGRESS NOTES
"[facility]   Physical Therapy Treatment Note     Name: Deb Webb  Clinic Number: [id number]    Therapy Diagnosis:   Encounter Diagnosis   Name Primary?    Weakness of both hips Yes         Physician: Triston Valverde MD    Visit Date: 9/19/2022  Physician Orders: PT Eval and Treat  Medical Diagnosis from Referral: Closed displaced fracture of left pubis with routine healing  Evaluation Date: 5/16/2022  Authorization Period Expiration: 12/31/2022  Plan of Care Expiration: 8/16/2022  Progress Note Due: 6/16/2022  Visit # / Visits authorized: 11/20    PTA Visit #: 1/5     Time In: 1000  Time Out: 1100  Total Billable Time: 60    SUBJECTIVE     Pt reports: She is feeling okay, no pain. Shes been walking outside with her grand-daughter and her walker.  She was compliant with home exercise program.   Response to previous treatment: fatigue  Functional change: Too soon to tell    Pain: 0/10  Location: left ankles     OBJECTIVE     Objective Measures updated at progress report unless specified.     Treatment       Deb received the treatments listed below:      Therapeutic exercises to develop strength, endurance, ROM, flexibility, posture and core stabilization for 40 minutes including:    Recumbent bike 10'  SLR 3x10 #3  Bridge 3x10 5s hold  Hip adduction with ball 3x10  Supine clamshell YTB 3x10  LAQ 3# 3 x 10  Sit to stand at mat 1 x 10  Calf raises, 3x10    Neuromuscular re-education activities to improve: Balance, Coordination, Kinesthetic, Sense and Proprioception for 10 minutes. The following activities were included:  Tandem 3x 30s each leg  SLS 3x30" each leg    therapeutic activities to improve functional performance for 00  minutes, including:    Patient Education and Home Exercises      Home Exercises Provided and Patient Education Provided     Education provided:   - HEP, reprinted for compliance    Written Home Exercises Provided: Patient instructed to cont prior HEP. " Exercises were reviewed and Deb was able to demonstrate them prior to the end of the session.  Deb demonstrated good  understanding of the education provided. See EMR under Patient Instructions for exercises provided during therapy sessions    ASSESSMENT     Deb is doing well, she tolerated last session with no increase in symptoms. She completed new therex weights with no issues and will continue working on her balance. Her balance has improved since last session and her strength is improving as well, we will increase weights next session and incorporate more balance training.  ANYA Flood assisted in all aspects of today's treatment including manual therapy and administration of exercises.     Deb Is progressing well towards her goals.   Pt prognosis is Good.     Pt will continue to benefit from skilled outpatient physical therapy to address the deficits listed in the problem list box on initial evaluation, provide pt/family education and to maximize pt's level of independence in the home and community environment.     Pt's spiritual, cultural and educational needs considered and pt agreeable to plan of care and goals.     Anticipated barriers to physical therapy: None    Goals:  Short-Term Goals: 4 weeks  - The patient will be independent with initial home exercise program.  - The patient will increase strength to at least 4-/5 in muscles tested to indicate improvements in functional strength.     Long-Term Goals: 8 weeks  - The patient will increase strength to at least 4/5 to perform functional mobility including sit to stand, steps/curbs, and walking  - The patient will increase ROM to WFL to perform ADL, vocational, and recreational tasks without pain.    PLAN     Progress as tolerated.    Isaac Menezes, ANYA Krishnan  This note was created in whole or at least part by ANYA Flood and reviewed by Isaac Menezes PT.

## 2022-09-20 ENCOUNTER — OUTPATIENT CASE MANAGEMENT (OUTPATIENT)
Dept: ADMINISTRATIVE | Facility: OTHER | Age: 72
End: 2022-09-20
Payer: MEDICARE

## 2022-09-22 ENCOUNTER — TELEPHONE (OUTPATIENT)
Dept: FAMILY MEDICINE | Facility: CLINIC | Age: 72
End: 2022-09-22
Payer: MEDICARE

## 2022-09-22 NOTE — TELEPHONE ENCOUNTER
Spoke to pt who stated she doesn't know why [facility] never called her for Dexcom monitor. Called pharmacy, monitor is not covered by medicare D and they need pt's medicare B information to run rx. Informed pt of this

## 2022-09-23 NOTE — TELEPHONE ENCOUNTER
I apologize. Nothing is needed on your end at this point. Pt is aware that she needs to update insurance info at pharmacy. Please disregard.

## 2022-09-26 ENCOUNTER — CLINICAL SUPPORT (OUTPATIENT)
Dept: REHABILITATION | Facility: HOSPITAL | Age: 72
End: 2022-09-26
Attending: INTERNAL MEDICINE
Payer: MEDICARE

## 2022-09-26 DIAGNOSIS — R29.898 WEAKNESS OF BOTH HIPS: Primary | ICD-10-CM

## 2022-09-26 PROCEDURE — 97110 THERAPEUTIC EXERCISES: CPT | Mod: PO | Performed by: PHYSICAL THERAPIST

## 2022-09-26 NOTE — PROGRESS NOTES
"[facility]   Physical Therapy Treatment Note     Name: Deb Webb  Clinic Number: [id number]    Therapy Diagnosis:   Encounter Diagnosis   Name Primary?    Weakness of both hips Yes         Physician: Triston Valverde MD    Visit Date: 9/26/2022  Physician Orders: PT Eval and Treat  Medical Diagnosis from Referral: Closed displaced fracture of left pubis with routine healing  Evaluation Date: 5/16/2022  Authorization Period Expiration: 12/31/2022  Plan of Care Expiration: 8/16/2022  Progress Note Due: 6/16/2022  Visit # / Visits authorized: 11/20    PTA Visit #: 1/5     Time In: 1000  Time Out: 1100  Total Billable Time: 60    SUBJECTIVE     Pt reports: She is feeling okay, no pain. Shes been walking outside with her grand-daughter and her walker.  She was compliant with home exercise program.   Response to previous treatment: fatigue  Functional change: Too soon to tell    Pain: 0/10  Location: left ankles     OBJECTIVE     Objective Measures updated at progress report unless specified.     Treatment       Deb received the treatments listed below:      Therapeutic exercises to develop strength, endurance, ROM, flexibility, posture and core stabilization for 40 minutes including:    Recumbent bike 10'  SLR 3x10 #3  Bridge 3x10 5s hold  Hip adduction with ball 3x10  Supine clamshell YTB 3x10  LAQ 3# 3 x 10  Sit to stand at mat 1 x 10  Calf raises, 3x10    Neuromuscular re-education activities to improve: Balance, Coordination, Kinesthetic, Sense and Proprioception for 10 minutes. The following activities were included:  Tandem 3x 30s each leg  SLS 3x30" each leg    therapeutic activities to improve functional performance for 00  minutes, including:    Patient Education and Home Exercises      Home Exercises Provided and Patient Education Provided     Education provided:   - HEP, reprinted for compliance    Written Home Exercises Provided: Patient instructed to cont prior HEP. " Exercises were reviewed and Deb was able to demonstrate them prior to the end of the session.  Deb demonstrated good  understanding of the education provided. See EMR under Patient Instructions for exercises provided during therapy sessions    ASSESSMENT     Deb is doing well, she tolerated last session with no increase in symptoms. She completed new therex weights with no issues and will continue working on her balance. Her balance has improved since last session and her strength is improving as well, we will increase weights next session and incorporate more balance training.  ANYA Flood assisted in all aspects of today's treatment including manual therapy and administration of exercises.     Deb Is progressing well towards her goals.   Pt prognosis is Good.     Pt will continue to benefit from skilled outpatient physical therapy to address the deficits listed in the problem list box on initial evaluation, provide pt/family education and to maximize pt's level of independence in the home and community environment.     Pt's spiritual, cultural and educational needs considered and pt agreeable to plan of care and goals.     Anticipated barriers to physical therapy: None    Goals:  Short-Term Goals: 4 weeks  - The patient will be independent with initial home exercise program.  - The patient will increase strength to at least 4-/5 in muscles tested to indicate improvements in functional strength.     Long-Term Goals: 8 weeks  - The patient will increase strength to at least 4/5 to perform functional mobility including sit to stand, steps/curbs, and walking  - The patient will increase ROM to WFL to perform ADL, vocational, and recreational tasks without pain.    PLAN     Progress as tolerated.    Isaac Menezes, ANYA Krishnan  This note was created in whole or at least part by ANYA Flood and reviewed by Isaac Menezes PT.

## 2022-10-03 ENCOUNTER — CLINICAL SUPPORT (OUTPATIENT)
Dept: REHABILITATION | Facility: HOSPITAL | Age: 72
End: 2022-10-03
Attending: INTERNAL MEDICINE
Payer: MEDICARE

## 2022-10-03 DIAGNOSIS — R29.898 WEAKNESS OF BOTH HIPS: Primary | ICD-10-CM

## 2022-10-03 PROCEDURE — 97112 NEUROMUSCULAR REEDUCATION: CPT | Mod: PO | Performed by: PHYSICAL THERAPIST

## 2022-10-03 PROCEDURE — 97110 THERAPEUTIC EXERCISES: CPT | Mod: PO | Performed by: PHYSICAL THERAPIST

## 2022-10-03 NOTE — PROGRESS NOTES
"[facility]   Physical Therapy Treatment Note     Name: Deb Webb  Clinic Number: [id number]    Therapy Diagnosis:   Encounter Diagnosis   Name Primary?    Weakness of both hips Yes     Physician: Triston Valverde MD    Visit Date: 10/3/2022  Physician Orders: PT Eval and Treat  Medical Diagnosis from Referral: Closed displaced fracture of left pubis with routine healing  Evaluation Date: 5/16/2022  Authorization Period Expiration: 12/31/2022  Plan of Care Expiration: 8/16/2022  Progress Note Due: 6/16/2022  Visit # / Visits authorized: 11/20    PTA Visit #: 1/5     Time In: 1000  Time Out: 1045  Total Billable Time: 45    SUBJECTIVE     Pt reports: She is feeling good, her balance has been better. Shes been walking outside with her grand-daughter and her walker with no issues.   She was compliant with home exercise program.   Response to previous treatment: fatigue  Functional change: Too soon to tell    Pain: 0/10  Location: left ankles     OBJECTIVE     Objective Measures updated at progress report unless specified.     Treatment       Deb received the treatments listed below:      Therapeutic exercises to develop strength, endurance, ROM, flexibility, posture and core stabilization for 30 minutes including:    Recumbent bike 10'  SLR 3x10 #2  Bridge 3x10 5s hold GTB  Hip adduction with ball 3x10  Supine clamshell GTB 3x10  LAQ 4# 3 x 10  Sit to stand 1 x 15    Neuromuscular re-education activities to improve: Balance, Coordination, Kinesthetic, Sense and Proprioception for 10 minutes. The following activities were included:  Tandem 3x 30s each leg, 2x30" on foam each leg  SLS 3x30" each leg    therapeutic activities to improve functional performance for 00  minutes, including:    Patient Education and Home Exercises      Home Exercises Provided and Patient Education Provided     Education provided:   - HEP, reprinted for compliance    Written Home Exercises Provided: " Patient instructed to cont prior HEP. Exercises were reviewed and Deb was able to demonstrate them prior to the end of the session.  Deb demonstrated good  understanding of the education provided. See EMR under Patient Instructions for exercises provided during therapy sessions    ASSESSMENT     Deb is doing well, she tolerated last session with no increase in symptoms. Her balance has improved since last session and she had minimal losses of balance with tandem stance on both legs as compared to starting physical therapy. Balance will continue to be challenged as well as strengthening her lower extremities.   ANYA Flood assisted in all aspects of today's treatment including manual therapy and administration of exercises.     Deb Is progressing well towards her goals.   Pt prognosis is Good.     Pt will continue to benefit from skilled outpatient physical therapy to address the deficits listed in the problem list box on initial evaluation, provide pt/family education and to maximize pt's level of independence in the home and community environment.     Pt's spiritual, cultural and educational needs considered and pt agreeable to plan of care and goals.     Anticipated barriers to physical therapy: None    Goals:  Short-Term Goals: 4 weeks  - The patient will be independent with initial home exercise program.  - The patient will increase strength to at least 4-/5 in muscles tested to indicate improvements in functional strength.     Long-Term Goals: 8 weeks  - The patient will increase strength to at least 4/5 to perform functional mobility including sit to stand, steps/curbs, and walking  - The patient will increase ROM to WFL to perform ADL, vocational, and recreational tasks without pain.    PLAN     Progress as tolerated.    REGGIE Hammond SPT  This note was created in whole or at least part by ANYA Flood and reviewed by Isaac Menezes PT.

## 2022-10-11 ENCOUNTER — PES CALL (OUTPATIENT)
Dept: ADMINISTRATIVE | Facility: OTHER | Age: 72
End: 2022-10-11
Payer: MEDICARE

## 2022-10-14 NOTE — PROGRESS NOTES
"[facility]   Physical Therapy Treatment Note     Name: Deb Webb  Clinic Number: [id number]    Therapy Diagnosis:   Encounter Diagnosis   Name Primary?    Weakness of both hips Yes     Physician: Triston Valverde MD    Physician Orders: PT Eval and Treat  Medical Diagnosis from Referral: Closed displaced fracture of left pubis with routine healing  Evaluation Date: 05/16/2022  Authorization Period Expiration: 11/11/2022  Plan of Care Expiration: 11/18/2022  Progress Note Due: 6/16/2022  Visit # / Visits authorized: 14/21    PTA Visit #: 1/5     Time In: 1100  Time Out: 1155  Total Billable Time: 55    SUBJECTIVE     Pt reports: She is feeling better, her ribs are still sore. She reports she did obtain any imaging.   She was compliant with home exercise program.   Response to previous treatment: fatigue  Functional change: Too soon to tell    Pain: 2/10  Location: ribs    OBJECTIVE     Objective Measures updated at progress report unless specified.     Treatment     Deb received the treatments listed below:      Therapeutic exercises to develop strength, endurance, ROM, flexibility, posture and core stabilization for 45 minutes including:  Recumbent bike 10'  Bridge 3x10 5s hold  Supine clamshell RTB 3x10  Hip adduction with ball 3x10  SAQ #2 2x10 each leg  LAQ #2 2 x 10 each leg  Sit to stand 1 x 15    Neuromuscular re-education activities to improve: Balance, Coordination, Kinesthetic, Sense and Proprioception for 10 minutes. The following activities were included:  Tandem 3x 30s each leg, eyes closed  SLS 3x30" each leg    therapeutic activities to improve functional performance for 00  minutes, including:    Patient Education and Home Exercises      Home Exercises Provided and Patient Education Provided     Education provided:   - HEP, reprinted for compliance    Written Home Exercises Provided: Patient instructed to cont prior HEP. Exercises were reviewed and Deb was " able to demonstrate them prior to the end of the session.  Deb demonstrated good  understanding of the education provided. See EMR under Patient Instructions for exercises provided during therapy sessions    ASSESSMENT     Deb is doing well, she tolerated last session with no increase in symptoms. She still had some rib pain from her fall last week but was able to complete her therex with no issues. She presents with increased lower extremity strength with manual muscle testing but still has deficits with global lower extremity strength, endurance, and balance.     Deb Is progressing well towards her goals.   Pt prognosis is Good.     Pt will continue to benefit from skilled outpatient physical therapy to address the deficits listed in the problem list box on initial evaluation, provide pt/family education and to maximize pt's level of independence in the home and community environment.     Pt's spiritual, cultural and educational needs considered and pt agreeable to plan of care and goals.     Anticipated barriers to physical therapy: None    Goals:  Short-Term Goals: 4 weeks  - The patient will be independent with initial home exercise program. (Not met, progressing)  - The patient will increase strength to at least 4-/5 in muscles tested to indicate improvements in functional strength. (Met)     Long-Term Goals: 8 weeks  - The patient will increase strength to at least 4/5 to perform functional mobility including sit to stand, steps/curbs, and walking (Not met, progressing)  - The patient will increase ROM to WFL to perform ADL, vocational, and recreational tasks without pain. (Met)    PLAN     Progress as tolerated with balance exercises     Scar Ward, PT   Antonino Lewis, SPT assisted in all aspects of today's treatment including manual therapy and administration of exercises.     I certify that I was present in the room directing the student in service delivery and guiding them using my  skilled judgement. As the co-signing therapist, I have reviewed the students documentation and am responsible for the treatment, assessment, and plan.

## 2022-10-17 ENCOUNTER — CLINICAL SUPPORT (OUTPATIENT)
Dept: REHABILITATION | Facility: HOSPITAL | Age: 72
End: 2022-10-17
Attending: INTERNAL MEDICINE
Payer: MEDICARE

## 2022-10-17 ENCOUNTER — IMMUNIZATION (OUTPATIENT)
Dept: PHARMACY | Facility: CLINIC | Age: 72
End: 2022-10-17
Payer: MEDICARE

## 2022-10-17 DIAGNOSIS — J41.0 SIMPLE CHRONIC BRONCHITIS: Primary | ICD-10-CM

## 2022-10-17 DIAGNOSIS — R29.898 WEAKNESS OF BOTH HIPS: Primary | ICD-10-CM

## 2022-10-17 PROCEDURE — 97112 NEUROMUSCULAR REEDUCATION: CPT | Mod: PO

## 2022-10-17 PROCEDURE — 97110 THERAPEUTIC EXERCISES: CPT | Mod: PO

## 2022-10-17 RX ORDER — ALBUTEROL SULFATE 90 UG/1
2 AEROSOL, METERED RESPIRATORY (INHALATION) EVERY 6 HOURS PRN
Qty: 18 G | Refills: 2 | Status: SHIPPED | OUTPATIENT
Start: 2022-10-17 | End: 2022-11-07

## 2022-10-17 NOTE — TELEPHONE ENCOUNTER
----- Message from Tammie Bauer sent at 10/17/2022 10:29 AM CDT -----  Regarding: Pro Air  Mrs. Alexander is requesting for Dr Valverde to order her some Pro Air please. Best contact is [phone]

## 2022-10-17 NOTE — TELEPHONE ENCOUNTER
No new care gaps identified.  [facility] Embedded Care Gaps. Reference number: [id number]. 10/17/2022   2:00:28 PM CDT

## 2022-10-17 NOTE — PLAN OF CARE
[facility]  Physical Therapy Reassessment and Plan of Care Update    Name: Deb Webb  Clinic Number: [id number]    Therapy Diagnosis:   Encounter Diagnosis   Name Primary?    Weakness of both hips Yes     Physician: Triston Valverde MD    Physician Orders: PT Eval and Treat  Medical Diagnosis from Referral: Closed displaced fracture of left pubis with routine healing  Evaluation Date: 05/16/2022  Authorization Period Expiration: 11/11/2022  Plan of Care Expiration: 11/18/2022  Progress Note Due: 6/16/2022  Visit # / Visits authorized: 14/21    Precautions: Standard    Subjective   Date of onset: 3 months  History of current condition - Deb reports she a few months ago and injured her ankle and then shortly after fell and fractured part of her pelvis. She has been cleared to put weight on her leg and just this morning was cleared to start walking without her walking boot. She currently lives at her [facility] but is independent with everything.    Reassessment: She has been doing great with strength. She still has trouble with her balance which is why she fell last week. Overall she thinks she's improved a lot, she's been able to walk around the block and she hadn't been able to do that before. She still wants to work on both her strength and balance.     Medical History:   Past Medical History:   Diagnosis Date    Allergy     Arthritis     Cataract     Colon polyps     COPD (chronic obstructive pulmonary disease)     Diabetes mellitus type II     Diabetic neuropathy     Fever blister     Glaucoma (increased eye pressure)     Hyperlipidemia     Hypertension     Irritable bowel syndrome     Keloid cicatrix     Osteoporosis     Retained cholelithiasis following cholecystectomy(997.41)     Right patella fracture        Surgical History:   Deb Webb  has a past surgical history that includes Cholecystectomy; Hysterectomy; ovarian tumor; Colonoscopy;  TONSILLECTOMY, ADENOIDECTOMY; Hernia repair; Knee surgery (Right, 3/4/2015); Oophorectomy; Back surgery (2006); Cataract extraction w/  intraocular lens implant (Bilateral); Esophagogastroduodenoscopy (N/A, 8/30/2022); Esophageal dilation (N/A, 8/30/2022); and Colonoscopy (N/A, 8/31/2022).    Medications:   Deb has a current medication list which includes the following prescription(s): albuterol, alendronate, aspirin, atorvastatin, blood sugar diagnostic, blood-glucose meter, dexcom g6 , dexcom g6 sensor, calcium carbonate/vitamin d3, cholestyramine-aspartame, citalopram, trulicity, flu vac 2022 [id number](pf), furosemide, gabapentin, guaifenesin, ipratropium, lactobacillus rhamnosus gg, lancets, latanoprost, losartan, omega-3 fatty acids-vitamin e, omeprazole, pen needle, diabetic, pramoxine-hydrocortisone, primidone, anoro ellipta, [DISCONTINUED] irbesartan, and [DISCONTINUED] loratadine.    Allergies:   Review of patient's allergies indicates:   Allergen Reactions    Silicone      Burn skin    Adhesive Rash        Imaging, x-rays:  There is degenerative arthrosis of the medial compartments of both knees with mild joint space narrowing.  There is also degenerative arthrosis of the lateral compartment of the right knee with joint space narrowing.  There is chondrocalcinosis of the knee menisci consistent with CPPD.  No fracture or subluxation are identified.  There are postoperative changes of orthopedic fixation of a right patellar fracture.  There are no signs of joint effusion.  There has been no significant change.    Prior Therapy: Yes, while at SNF immediatley after injury  Social History:  lives with their family (granddaughter and great-grandson)  Occupation: retired  Prior Level of Function: Ambulation with rollator or cane without pain  Current Level of Function: Ambulation with rollator only with some ankle and hip pain    Pain:  Current 2/10, worst 6/10, best 0/10   Location: left hip and  ankle   Description: Dull  Aggravating Factors: Standing  Easing Factors: rest    Pts goals: Improve pain and function    Objective     Gait: Step to gait pattern with rollator    Hip Range of Motion:   Left active Left Passive   Flexion WFL WFL   Abduction WFL WFL   Adduction WFL WFL   Extension WFL WFL   ER @ 90 WFL WFL   IR @ 90 WFL WFL     Lower Extremity Strength  Right LE  Left LE    Knee extension: 4/5 Knee extension: 4-/5   Knee flexion: 4/5 Knee flexion: 4-/5   Hip flexion: 4/5 Hip flexion: 4-/5   Hip Internal Rotation:  4/5 Hip Internal Rotation: 4/5   Hip External Rotation: 4/5 Hip External Rotation: 4/5   Hip extension:  4/5 Hip extension: 4/5   Hip abduction: 4-/5 Hip abduction: 4-/5   Hip adduction: 4-/5 Hip adduction 4-/5       TREATMENT     See Daily Note    Assessment   Patient presents with signs and symptoms consistent with a diagnosis of a left closed pubic fracture with routine healing including all above listed objective impairments. Since beginning PT, Deb has been seen 14 times since initial evaluation on 05/16/2022. Overall, Deb  has made good, steady progress with his/her PT treatments and has worked hard towards all of his/her PT goals as evidenced by subjective and objective improvements. Good improvements noted with her LE strength. Despite these improvements, he/she remains with deficits with balance, gross LE strength and will continue to benefit from skilled PT to address remaining limitations and increase functional mobility.    Pt prognosis is Fair.   Pt will benefit from skilled outpatient Physical Therapy to address the deficits stated above and in the chart below, provide pt/family education, and to maximize pt's level of independence.     Plan of care discussed with patient: Yes  Pt's spiritual, cultural and educational needs considered and patient is agreeable to the plan of care and goals as stated below:     Anticipated Barriers for therapy: None    Medical Necessity  is demonstrated by the following  History  Co-morbidities and personal factors that may impact the plan of care Co-morbidities:   COPD, HTN, Osteoporosis    Personal Factors:   age     high   Examination  Body Structures and Functions, activity limitations and participation restrictions that may impact the plan of care Body Regions:   lower extremities  trunk    Body Systems:    gross symmetry  ROM  strength  gross coordinated movement  balance  gait  transfers    Participation Restrictions:   ADL    Activity limitations:   Learning and applying knowledge  no deficits    General Tasks and Commands  no deficits    Communication  no deficits    Mobility  no deficits    Self care  no deficits    Domestic Life  no deficits    Interactions/Relationships  no deficits    Life Areas  no deficits    Community and Social Life  no deficits         high   Clinical Presentation unstable clinical presentation with unpredictable characteristics high   Decision Making/ Complexity Score: high     Goals:  Short-Term Goals: 4 weeks  - The patient will be independent with initial home exercise program. (Not met, progressing)  - The patient will increase strength to at least 4-/5 in muscles tested to indicate improvements in functional strength. (Met)    Long-Term Goals: 8 weeks  - The patient will increase strength to at least 4/5 to perform functional mobility including sit to stand, steps/curbs, and walking (Not met, progressing)  - The patient will increase ROM to WFL to perform ADL, vocational, and recreational tasks without pain. (Met)    Plan   Plan of care Certification: 10/17/2022 to 11/18/2022.    Outpatient Physical Therapy 2 times weekly for 4 weeks to include the following interventions: Manual Therapy, Neuromuscular Re-ed, Patient Education, Therapeutic Activities, and Therapeutic Exercise.     Scar Ward, PT  Antonino Lewis, SPT assisted in all aspects of today's treatment including manual therapy and  administration of exercises.     I certify that I was present in the room directing the student in service delivery and guiding them using my skilled judgement. As the co-signing therapist, I have reviewed the students documentation and am responsible for the treatment, assessment, and plan.

## 2022-10-20 NOTE — PROGRESS NOTES
"[facility]   Physical Therapy Treatment Note     Name: Deb Webb  Clinic Number: [id number]    Therapy Diagnosis:   Encounter Diagnosis   Name Primary?    Weakness of both hips Yes       Physician: Triston Valverde MD    Physician Orders: PT Eval and Treat  Medical Diagnosis from Referral: Closed displaced fracture of left pubis with routine healing  Evaluation Date: 05/16/2022  Authorization Period Expiration: 11/11/2022  Plan of Care Expiration: 11/18/2022  Progress Note Due: 11/18/2022  Visit # / Visits authorized: 15/21    PTA Visit #: 0/5     Time In: 1148  Time Out: 1238  Total Billable Time: 40     SUBJECTIVE     Pt reports: She reports her ribs are not as painful. Some shoulder soreness today. "I always feel better when I leave physical therapy."  She was compliant with home exercise program.   Response to previous treatment: fatigue  Functional change: Too soon to tell    Pain: 2/10  Location: ribs    OBJECTIVE     Objective Measures updated at progress report unless specified.     Treatment     Deb received the treatments listed below:      Therapeutic exercises to develop strength, endurance, ROM, flexibility, posture and core stabilization for 33 minutes including:  Recumbent bike 10'  Bridge 3x10 5s hold  Supine clamshell RTB 3x10  Hip adduction with ball 3x10  SAQ #2  3 x10 each leg  LAQ #2 3 x 10 each leg  Sit to stand 1 x 15    Neuromuscular re-education activities to improve: Balance, Coordination, Kinesthetic, Sense and Proprioception for 7 minutes. The following activities were included:  Tandem 3x 30s each leg  SLS 3x30" each leg    therapeutic activities to improve functional performance for 00  minutes, including:    Patient Education and Home Exercises      Home Exercises Provided and Patient Education Provided     Education provided:   - HEP, reprinted for compliance    Written Home Exercises Provided: Patient instructed to cont prior HEP. Exercises " were reviewed and Deb was able to demonstrate them prior to the end of the session.  Deb demonstrated good  understanding of the education provided. See EMR under Patient Instructions for exercises provided during therapy sessions    ASSESSMENT     Deb continues to demo increased sway with tandem stance and SLS stance with UE support needed to maintain balance. She tolerated progression of repetitions for lE strengthening well. Appropriate muscle fatigue noted at completion of treatment especially with sit to stands.    Deb Is progressing well towards her goals.   Pt prognosis is Good.     Pt will continue to benefit from skilled outpatient physical therapy to address the deficits listed in the problem list box on initial evaluation, provide pt/family education and to maximize pt's level of independence in the home and community environment.     Pt's spiritual, cultural and educational needs considered and pt agreeable to plan of care and goals.     Anticipated barriers to physical therapy: None    Goals:  Short-Term Goals: 4 weeks  - The patient will be independent with initial home exercise program. (Not met, progressing)  - The patient will increase strength to at least 4-/5 in muscles tested to indicate improvements in functional strength. (Met)     Long-Term Goals: 8 weeks  - The patient will increase strength to at least 4/5 to perform functional mobility including sit to stand, steps/curbs, and walking (Not met, progressing)  - The patient will increase ROM to WFL to perform ADL, vocational, and recreational tasks without pain. (Met)    PLAN     Progress as tolerated with balance exercises     Scar Ward, PT   Antonino Lewis, SPT assisted in all aspects of today's treatment including manual therapy and administration of exercises.     I certify that I was present in the room directing the student in service delivery and guiding them using my skilled judgement. As the co-signing  therapist, I have reviewed the students documentation and am responsible for the treatment, assessment, and plan.

## 2022-10-24 ENCOUNTER — CLINICAL SUPPORT (OUTPATIENT)
Dept: REHABILITATION | Facility: HOSPITAL | Age: 72
End: 2022-10-24
Attending: INTERNAL MEDICINE
Payer: MEDICARE

## 2022-10-24 ENCOUNTER — OFFICE VISIT (OUTPATIENT)
Dept: OPTOMETRY | Facility: CLINIC | Age: 72
End: 2022-10-24
Payer: MEDICARE

## 2022-10-24 DIAGNOSIS — H52.7 REFRACTIVE ERROR: ICD-10-CM

## 2022-10-24 DIAGNOSIS — E11.9 TYPE 2 DIABETES MELLITUS WITHOUT RETINOPATHY: Primary | ICD-10-CM

## 2022-10-24 DIAGNOSIS — Z96.1 PSEUDOPHAKIA OF BOTH EYES: ICD-10-CM

## 2022-10-24 DIAGNOSIS — H40.1133 PRIMARY OPEN ANGLE GLAUCOMA OF BOTH EYES, SEVERE STAGE: ICD-10-CM

## 2022-10-24 DIAGNOSIS — R29.898 WEAKNESS OF BOTH HIPS: Primary | ICD-10-CM

## 2022-10-24 PROCEDURE — 4010F PR ACE/ARB THEARPY RXD/TAKEN: ICD-10-PCS | Mod: CPTII,S$GLB,, | Performed by: OPTOMETRIST

## 2022-10-24 PROCEDURE — 2023F PR DILATED RETINAL EXAM W/O EVID OF RETINOPATHY: ICD-10-PCS | Mod: CPTII,S$GLB,, | Performed by: OPTOMETRIST

## 2022-10-24 PROCEDURE — 4010F ACE/ARB THERAPY RXD/TAKEN: CPT | Mod: CPTII,S$GLB,, | Performed by: OPTOMETRIST

## 2022-10-24 PROCEDURE — 1126F PR PAIN SEVERITY QUANTIFIED, NO PAIN PRESENT: ICD-10-PCS | Mod: CPTII,S$GLB,, | Performed by: OPTOMETRIST

## 2022-10-24 PROCEDURE — 1159F PR MEDICATION LIST DOCUMENTED IN MEDICAL RECORD: ICD-10-PCS | Mod: CPTII,S$GLB,, | Performed by: OPTOMETRIST

## 2022-10-24 PROCEDURE — 3288F FALL RISK ASSESSMENT DOCD: CPT | Mod: CPTII,S$GLB,, | Performed by: OPTOMETRIST

## 2022-10-24 PROCEDURE — 1101F PR PT FALLS ASSESS DOC 0-1 FALLS W/OUT INJ PAST YR: ICD-10-PCS | Mod: CPTII,S$GLB,, | Performed by: OPTOMETRIST

## 2022-10-24 PROCEDURE — 1126F AMNT PAIN NOTED NONE PRSNT: CPT | Mod: CPTII,S$GLB,, | Performed by: OPTOMETRIST

## 2022-10-24 PROCEDURE — 92014 COMPRE OPH EXAM EST PT 1/>: CPT | Mod: S$GLB,,, | Performed by: OPTOMETRIST

## 2022-10-24 PROCEDURE — 92133 POSTERIOR SEGMENT OCT OPTIC NERVE(OCULAR COHERENCE TOMOGRAPHY) - OU - BOTH EYES: ICD-10-PCS | Mod: S$GLB,,, | Performed by: OPTOMETRIST

## 2022-10-24 PROCEDURE — 1159F MED LIST DOCD IN RCRD: CPT | Mod: CPTII,S$GLB,, | Performed by: OPTOMETRIST

## 2022-10-24 PROCEDURE — 92014 PR EYE EXAM, EST PATIENT,COMPREHESV: ICD-10-PCS | Mod: S$GLB,,, | Performed by: OPTOMETRIST

## 2022-10-24 PROCEDURE — 99212 OFFICE O/P EST SF 10 MIN: CPT | Mod: PBBFAC,PO | Performed by: OPTOMETRIST

## 2022-10-24 PROCEDURE — 2023F DILAT RTA XM W/O RTNOPTHY: CPT | Mod: CPTII,S$GLB,, | Performed by: OPTOMETRIST

## 2022-10-24 PROCEDURE — 3288F PR FALLS RISK ASSESSMENT DOCUMENTED: ICD-10-PCS | Mod: CPTII,S$GLB,, | Performed by: OPTOMETRIST

## 2022-10-24 PROCEDURE — 99999 PR PBB SHADOW E&M-EST. PATIENT-LVL II: CPT | Mod: PBBFAC,,, | Performed by: OPTOMETRIST

## 2022-10-24 PROCEDURE — 1160F PR REVIEW ALL MEDS BY PRESCRIBER/CLIN PHARMACIST DOCUMENTED: ICD-10-PCS | Mod: CPTII,S$GLB,, | Performed by: OPTOMETRIST

## 2022-10-24 PROCEDURE — 97110 THERAPEUTIC EXERCISES: CPT | Mod: PO

## 2022-10-24 PROCEDURE — 1160F RVW MEDS BY RX/DR IN RCRD: CPT | Mod: CPTII,S$GLB,, | Performed by: OPTOMETRIST

## 2022-10-24 PROCEDURE — 92133 CPTRZD OPH DX IMG PST SGM ON: CPT | Mod: PBBFAC,PO | Performed by: OPTOMETRIST

## 2022-10-24 PROCEDURE — 3051F HG A1C>EQUAL 7.0%<8.0%: CPT | Mod: CPTII,S$GLB,, | Performed by: OPTOMETRIST

## 2022-10-24 PROCEDURE — 1101F PT FALLS ASSESS-DOCD LE1/YR: CPT | Mod: CPTII,S$GLB,, | Performed by: OPTOMETRIST

## 2022-10-24 PROCEDURE — 99999 PR PBB SHADOW E&M-EST. PATIENT-LVL II: ICD-10-PCS | Mod: PBBFAC,,, | Performed by: OPTOMETRIST

## 2022-10-24 PROCEDURE — 3051F PR MOST RECENT HEMOGLOBIN A1C LEVEL 7.0 - < 8.0%: ICD-10-PCS | Mod: CPTII,S$GLB,, | Performed by: OPTOMETRIST

## 2022-10-24 NOTE — PROGRESS NOTES
HPI    + DM2  + HTN  + POAG  + Phaco OU    C/o not seeing as well out of her glasses since last visit. C/o not being   able to see well through bifocal/progressive glasses.  Denies flashes, floaters, or pain    Latanaprost QHS OU  Another drop BID OU  for POAG (pt. Not sure of name)    Hemoglobin A1C       Date                     Value               Ref Range             Status                08/29/2022               7.3 (H)             0.0 - 5.6 %           Final                    05/02/2022               7.0 (H)             4.0 - 5.6 %           Final                  12/09/2021               7.4 (H)             4.0 - 5.6 %           Final            Last edited by Juhi James on 10/24/2022 10:17 AM.            Assessment /Plan     For exam results, see Encounter Report.    Type 2 diabetes mellitus without retinopathy    Primary open angle glaucoma of both eyes, severe stage  -     OCT - Optic Nerve  -     García Visual Field - OU - Extended - Both Eyes; Future    Pseudophakia of both eyes    Refractive error      No diabetic retinopathy, no csme. Return in 1 year for dilated eye exam.   2. IOP low and cont both drops, pt feels vision stable, Pt prev got out of hospital, given drops in hospital, IOP still low, target at 12-14 no vision changes noted,  pachy normal, prev OCT with sup and inf loss OD, poor scan OS, RTC IOP ck with HVf(24-2)kodi std, overdue, consider add drops or referral.   3. Monitor condition. Patient to report any changes. RTC 1 year recheck.   4. Spectacle Rx given, discussed different options for glasses. RTC 1 year routine eye exam.

## 2022-10-28 NOTE — PROGRESS NOTES
[facility]   Physical Therapy Treatment Note     Name: Deb Webb  Clinic Number: [id number]    Therapy Diagnosis:   Encounter Diagnosis   Name Primary?    Weakness of both hips Yes     Physician: Triston Valverde MD    Physician Orders: PT Eval and Treat  Medical Diagnosis from Referral: Closed displaced fracture of left pubis with routine healing  Evaluation Date: 05/16/2022  Authorization Period Expiration: 11/11/2022  Plan of Care Expiration: 11/18/2022  Progress Note Due: 11/18/2022  Visit # / Visits authorized: 16/21    PTA Visit #: 0/5     Time In: 1000  Time Out: 1040  Total Billable Time: 40     SUBJECTIVE     Pt reports: She feels like she is doing better, but notes she still needs to work on her balance.   She was compliant with home exercise program.   Response to previous treatment: fatigue  Functional change: Too soon to tell    Pain: 2/10  Location: ribs    OBJECTIVE     SO2 at 87%  BP: 110/90  HR: 97 bpm    Treatment     Deb received the treatments listed below:      Therapeutic exercises to develop strength, endurance, ROM, flexibility, posture and core stabilization for 33 minutes including:  Recumbent bike 10'  Bridge 3x10 5s hold  Supine clamshell RTB 3x10  Hip adduction with ball 3x10  SAQ #3  3 x10 each leg  LAQ #3 3 x 10 each leg  Sit to stand 1 x 15 - NP    Neuromuscular re-education activities to improve: Balance, Coordination, Kinesthetic, Sense and Proprioception for 7 minutes. The following activities were included:  Tandem 3x 30s each leg  NBOS on foam 3 x 30s  Wide stance on foam with head turns    Patient Education and Home Exercises      Home Exercises Provided and Patient Education Provided     Education provided:   - HEP, reprinted for compliance    Written Home Exercises Provided: Patient instructed to cont prior HEP. Exercises were reviewed and Deb was able to demonstrate them prior to the end of the session.  Deb demonstrated good   understanding of the education provided. See EMR under Patient Instructions for exercises provided during therapy sessions    ASSESSMENT     Deb escalona decreased SO2 when PT attempted to fix her pulse oximeter, but was not symptomatic with SOB or ROPER. Told patient to follow up with MD or go to ER if her SO2 does not increase with oxygen supplementation at home. Patient observed smoking cigarette outside after treatment and remained asymptomatic. She tolerated all exercises well today with mild sway with balance exercises.      Deb Is progressing well towards her goals.   Pt prognosis is Good.     Pt will continue to benefit from skilled outpatient physical therapy to address the deficits listed in the problem list box on initial evaluation, provide pt/family education and to maximize pt's level of independence in the home and community environment.     Pt's spiritual, cultural and educational needs considered and pt agreeable to plan of care and goals.     Anticipated barriers to physical therapy: None    Goals:  Short-Term Goals: 4 weeks  - The patient will be independent with initial home exercise program. (Not met, progressing)  - The patient will increase strength to at least 4-/5 in muscles tested to indicate improvements in functional strength. (Met)     Long-Term Goals: 8 weeks  - The patient will increase strength to at least 4/5 to perform functional mobility including sit to stand, steps/curbs, and walking (Not met, progressing)  - The patient will increase ROM to WFL to perform ADL, vocational, and recreational tasks without pain. (Met)    PLAN     Progress as tolerated with balance exercises     Scar Ward, PT   Antonino Lewis, SPT assisted in all aspects of today's treatment including manual therapy and administration of exercises.     I certify that I was present in the room directing the student in service delivery and guiding them using my skilled judgement. As the co-signing therapist,  I have reviewed the students documentation and am responsible for the treatment, assessment, and plan.

## 2022-10-31 ENCOUNTER — CLINICAL SUPPORT (OUTPATIENT)
Dept: REHABILITATION | Facility: HOSPITAL | Age: 72
End: 2022-10-31
Attending: INTERNAL MEDICINE
Payer: MEDICARE

## 2022-10-31 DIAGNOSIS — R29.898 WEAKNESS OF BOTH HIPS: Primary | ICD-10-CM

## 2022-10-31 PROCEDURE — 97110 THERAPEUTIC EXERCISES: CPT | Mod: PO

## 2022-11-01 NOTE — TELEPHONE ENCOUNTER
----- Message from Madeline Myers sent at 10/31/2022 11:12 AM CDT -----  Contact: Patient  Type:  Needs Medical Advice    Who Called:  Patient     Would the patient rather a call back or a response via [facility]? Call     Best Call Back Number: [phone] (home)      Additional Information:  Patient is calling about an refill for ProAir.     Please call to advise

## 2022-11-01 NOTE — TELEPHONE ENCOUNTER
----- Message from Madeline Myers sent at 10/31/2022 11:12 AM CDT -----  Contact: Patient  Type:  Needs Medical Advice    Who Called:  Patient     Would the patient rather a call back or a response via [facility]? Call     Best Call Back Number: [phone] (home)      Additional Information:  Patient is calling about an refill for ProAir.     Please call to advise

## 2022-11-02 ENCOUNTER — PES CALL (OUTPATIENT)
Dept: ADMINISTRATIVE | Facility: CLINIC | Age: 72
End: 2022-11-02
Payer: MEDICARE

## 2022-11-04 NOTE — PROGRESS NOTES
"[facility]   Physical Therapy Treatment Note     Name: Deb Webb  Clinic Number: [id number]    Therapy Diagnosis:   Encounter Diagnosis   Name Primary?    Weakness of both hips Yes     Physician: Triston Valverde MD    Physician Orders: PT Eval and Treat  Medical Diagnosis from Referral: Closed displaced fracture of left pubis with routine healing  Evaluation Date: 05/16/2022  Authorization Period Expiration: 11/11/2022  Plan of Care Expiration: 11/18/2022  Progress Note Due: 11/18/2022  Visit # / Visits authorized: 17/21    PTA Visit #: 0/5     Time In: 1155  Time Out: 1240  Total Billable Time: 45     SUBJECTIVE     Pt reports: She's been feeling good overall, still needs to work on balance. She wants to find a way to gain her weight back.  She was compliant with home exercise program.   Response to previous treatment: fatigue  Functional change: Too soon to tell    Pain: 1/10  Location: ribs    OBJECTIVE     SO2 at 87%  BP: 110/90  HR: 97 bpm  Treatment     Deb received the treatments listed below:      Therapeutic exercises to develop strength, endurance, ROM, flexibility, posture and core stabilization for 37 minutes including:  Recumbent bike 10' L2  Sit to stand 1 x 15   Bridge 3x10 5s hold  LAQ #4 3 x 10 each leg  Seated taps to 4" box #4, 2 x 10 each leg  Supine clamshell BTB 3x10  Hip adduction with ball 3x10    Neuromuscular re-education activities to improve: Balance, Coordination, Kinesthetic, Sense and Proprioception for 8 minutes. The following activities were included:  Tandem 3x 30s each leg  SLS 3 x 30" each leg    NP:  NBOS on foam 3 x 30s  Wide stance on foam with head turns    Patient Education and Home Exercises      Home Exercises Provided and Patient Education Provided     Education provided:   - HEP, reprinted for compliance    Written Home Exercises Provided: Patient instructed to cont prior HEP. Exercises were reviewed and Deb was able to " demonstrate them prior to the end of the session.  eDb demonstrated good  understanding of the education provided. See EMR under Patient Instructions for exercises provided during therapy sessions    ASSESSMENT     Deb tolerated today's therex well, she demonstrated increase fatigue with increased weights and reps incorporated today. She is moderately challenged with tandem, single leg balance and this continues to be her biggest deficit and complaint. Continue LE strengthening, balance and progress to small jean carlos step overs next session pending pt presentation and tolerance.     Deb Is progressing well towards her goals.   Pt prognosis is Good.     Pt will continue to benefit from skilled outpatient physical therapy to address the deficits listed in the problem list box on initial evaluation, provide pt/family education and to maximize pt's level of independence in the home and community environment.     Pt's spiritual, cultural and educational needs considered and pt agreeable to plan of care and goals.     Anticipated barriers to physical therapy: None    Goals:  Short-Term Goals: 4 weeks  - The patient will be independent with initial home exercise program. (Not met, progressing)  - The patient will increase strength to at least 4-/5 in muscles tested to indicate improvements in functional strength. (Met)     Long-Term Goals: 8 weeks  - The patient will increase strength to at least 4/5 to perform functional mobility including sit to stand, steps/curbs, and walking (Not met, progressing)  - The patient will increase ROM to WFL to perform ADL, vocational, and recreational tasks without pain. (Met)    PLAN     Progress as tolerated with balance exercises     John Sorensen, PT     Antonino Lewis, SPT assisted in all aspects of today's treatment including manual therapy and administration of exercises.     I certify that I was present in the room directing the student in service delivery and guiding them  using my skilled judgement. As the co-signing therapist, I have reviewed the students documentation and am responsible for the treatment, assessment, and plan.

## 2022-11-07 ENCOUNTER — OFFICE VISIT (OUTPATIENT)
Dept: FAMILY MEDICINE | Facility: CLINIC | Age: 72
End: 2022-11-07
Payer: MEDICARE

## 2022-11-07 ENCOUNTER — IMMUNIZATION (OUTPATIENT)
Dept: PHARMACY | Facility: CLINIC | Age: 72
End: 2022-11-07
Payer: MEDICAID

## 2022-11-07 ENCOUNTER — CLINICAL SUPPORT (OUTPATIENT)
Dept: REHABILITATION | Facility: HOSPITAL | Age: 72
End: 2022-11-07
Attending: INTERNAL MEDICINE
Payer: MEDICARE

## 2022-11-07 VITALS
OXYGEN SATURATION: 98 % | BODY MASS INDEX: 19.7 KG/M2 | SYSTOLIC BLOOD PRESSURE: 102 MMHG | HEART RATE: 92 BPM | WEIGHT: 97.56 LBS | DIASTOLIC BLOOD PRESSURE: 68 MMHG

## 2022-11-07 DIAGNOSIS — R29.898 WEAKNESS OF BOTH HIPS: Primary | ICD-10-CM

## 2022-11-07 DIAGNOSIS — F41.9 ANXIETY: ICD-10-CM

## 2022-11-07 DIAGNOSIS — E11.42 TYPE 2 DIABETES MELLITUS WITH DIABETIC POLYNEUROPATHY, WITHOUT LONG-TERM CURRENT USE OF INSULIN: ICD-10-CM

## 2022-11-07 DIAGNOSIS — Z12.31 ENCOUNTER FOR SCREENING MAMMOGRAM FOR BREAST CANCER: ICD-10-CM

## 2022-11-07 DIAGNOSIS — M81.0 OSTEOPOROSIS, POSTMENOPAUSAL: ICD-10-CM

## 2022-11-07 DIAGNOSIS — J96.11 CHRONIC RESPIRATORY FAILURE WITH HYPOXIA: Primary | ICD-10-CM

## 2022-11-07 DIAGNOSIS — R63.4 WEIGHT LOSS: ICD-10-CM

## 2022-11-07 DIAGNOSIS — R29.6 FREQUENT FALLS: ICD-10-CM

## 2022-11-07 DIAGNOSIS — I10 ESSENTIAL HYPERTENSION: ICD-10-CM

## 2022-11-07 DIAGNOSIS — R42 RECURRENT VERTIGO: ICD-10-CM

## 2022-11-07 PROBLEM — K52.9 ACUTE COLITIS: Status: RESOLVED | Noted: 2022-08-29 | Resolved: 2022-11-07

## 2022-11-07 PROBLEM — Z71.89 ACP (ADVANCE CARE PLANNING): Status: RESOLVED | Noted: 2022-01-27 | Resolved: 2022-11-07

## 2022-11-07 LAB
ALBUMIN/CREAT UR: 24.4 UG/MG (ref 0–30)
CREAT UR-MCNC: 90 MG/DL (ref 15–325)
MICROALBUMIN UR DL<=1MG/L-MCNC: 22 UG/ML

## 2022-11-07 PROCEDURE — 3074F SYST BP LT 130 MM HG: CPT | Mod: CPTII,S$GLB,, | Performed by: INTERNAL MEDICINE

## 2022-11-07 PROCEDURE — 1160F RVW MEDS BY RX/DR IN RCRD: CPT | Mod: CPTII,S$GLB,, | Performed by: INTERNAL MEDICINE

## 2022-11-07 PROCEDURE — 1159F MED LIST DOCD IN RCRD: CPT | Mod: CPTII,S$GLB,, | Performed by: INTERNAL MEDICINE

## 2022-11-07 PROCEDURE — 1100F PTFALLS ASSESS-DOCD GE2>/YR: CPT | Mod: CPTII,S$GLB,, | Performed by: INTERNAL MEDICINE

## 2022-11-07 PROCEDURE — 3288F FALL RISK ASSESSMENT DOCD: CPT | Mod: CPTII,S$GLB,, | Performed by: INTERNAL MEDICINE

## 2022-11-07 PROCEDURE — 3051F HG A1C>EQUAL 7.0%<8.0%: CPT | Mod: CPTII,S$GLB,, | Performed by: INTERNAL MEDICINE

## 2022-11-07 PROCEDURE — 4010F ACE/ARB THERAPY RXD/TAKEN: CPT | Mod: CPTII,S$GLB,, | Performed by: INTERNAL MEDICINE

## 2022-11-07 PROCEDURE — 3072F LOW RISK FOR RETINOPATHY: CPT | Mod: CPTII,S$GLB,, | Performed by: INTERNAL MEDICINE

## 2022-11-07 PROCEDURE — 82570 ASSAY OF URINE CREATININE: CPT | Performed by: INTERNAL MEDICINE

## 2022-11-07 PROCEDURE — 3078F PR MOST RECENT DIASTOLIC BLOOD PRESSURE < 80 MM HG: ICD-10-PCS | Mod: CPTII,S$GLB,, | Performed by: INTERNAL MEDICINE

## 2022-11-07 PROCEDURE — 1125F AMNT PAIN NOTED PAIN PRSNT: CPT | Mod: CPTII,S$GLB,, | Performed by: INTERNAL MEDICINE

## 2022-11-07 PROCEDURE — 3051F PR MOST RECENT HEMOGLOBIN A1C LEVEL 7.0 - < 8.0%: ICD-10-PCS | Mod: CPTII,S$GLB,, | Performed by: INTERNAL MEDICINE

## 2022-11-07 PROCEDURE — 1159F PR MEDICATION LIST DOCUMENTED IN MEDICAL RECORD: ICD-10-PCS | Mod: CPTII,S$GLB,, | Performed by: INTERNAL MEDICINE

## 2022-11-07 PROCEDURE — 1100F PR PT FALLS ASSESS DOC 2+ FALLS/FALL W/INJURY/YR: ICD-10-PCS | Mod: CPTII,S$GLB,, | Performed by: INTERNAL MEDICINE

## 2022-11-07 PROCEDURE — 97110 THERAPEUTIC EXERCISES: CPT | Mod: PO

## 2022-11-07 PROCEDURE — 4010F PR ACE/ARB THEARPY RXD/TAKEN: ICD-10-PCS | Mod: CPTII,S$GLB,, | Performed by: INTERNAL MEDICINE

## 2022-11-07 PROCEDURE — 3288F PR FALLS RISK ASSESSMENT DOCUMENTED: ICD-10-PCS | Mod: CPTII,S$GLB,, | Performed by: INTERNAL MEDICINE

## 2022-11-07 PROCEDURE — 99214 OFFICE O/P EST MOD 30 MIN: CPT | Mod: S$GLB,,, | Performed by: INTERNAL MEDICINE

## 2022-11-07 PROCEDURE — 1125F PR PAIN SEVERITY QUANTIFIED, PAIN PRESENT: ICD-10-PCS | Mod: CPTII,S$GLB,, | Performed by: INTERNAL MEDICINE

## 2022-11-07 PROCEDURE — 3074F PR MOST RECENT SYSTOLIC BLOOD PRESSURE < 130 MM HG: ICD-10-PCS | Mod: CPTII,S$GLB,, | Performed by: INTERNAL MEDICINE

## 2022-11-07 PROCEDURE — 99214 PR OFFICE/OUTPT VISIT, EST, LEVL IV, 30-39 MIN: ICD-10-PCS | Mod: S$GLB,,, | Performed by: INTERNAL MEDICINE

## 2022-11-07 PROCEDURE — 99999 PR PBB SHADOW E&M-EST. PATIENT-LVL V: CPT | Mod: PBBFAC,,, | Performed by: INTERNAL MEDICINE

## 2022-11-07 PROCEDURE — 99499 RISK ADDL DX/OHS AUDIT: ICD-10-PCS | Mod: S$GLB,,, | Performed by: INTERNAL MEDICINE

## 2022-11-07 PROCEDURE — 3008F PR BODY MASS INDEX (BMI) DOCUMENTED: ICD-10-PCS | Mod: CPTII,S$GLB,, | Performed by: INTERNAL MEDICINE

## 2022-11-07 PROCEDURE — 99999 PR PBB SHADOW E&M-EST. PATIENT-LVL V: ICD-10-PCS | Mod: PBBFAC,,, | Performed by: INTERNAL MEDICINE

## 2022-11-07 PROCEDURE — 82043 UR ALBUMIN QUANTITATIVE: CPT | Performed by: INTERNAL MEDICINE

## 2022-11-07 PROCEDURE — 3008F BODY MASS INDEX DOCD: CPT | Mod: CPTII,S$GLB,, | Performed by: INTERNAL MEDICINE

## 2022-11-07 PROCEDURE — 1160F PR REVIEW ALL MEDS BY PRESCRIBER/CLIN PHARMACIST DOCUMENTED: ICD-10-PCS | Mod: CPTII,S$GLB,, | Performed by: INTERNAL MEDICINE

## 2022-11-07 PROCEDURE — 3078F DIAST BP <80 MM HG: CPT | Mod: CPTII,S$GLB,, | Performed by: INTERNAL MEDICINE

## 2022-11-07 PROCEDURE — 3072F PR LOW RISK FOR RETINOPATHY: ICD-10-PCS | Mod: CPTII,S$GLB,, | Performed by: INTERNAL MEDICINE

## 2022-11-07 PROCEDURE — 99499 UNLISTED E&M SERVICE: CPT | Mod: S$GLB,,, | Performed by: INTERNAL MEDICINE

## 2022-11-07 RX ORDER — ALBUTEROL SULFATE 90 UG/1
2 AEROSOL, METERED RESPIRATORY (INHALATION) EVERY 6 HOURS PRN
Qty: 18 G | Refills: 2 | Status: SHIPPED | OUTPATIENT
Start: 2022-11-07 | End: 2023-10-05

## 2022-11-07 RX ORDER — MECLIZINE HCL 12.5 MG 12.5 MG/1
12.5 TABLET ORAL 3 TIMES DAILY PRN
Qty: 30 TABLET | Refills: 0 | Status: SHIPPED | OUTPATIENT
Start: 2022-11-07 | End: 2023-01-03

## 2022-11-07 RX ORDER — CITALOPRAM 20 MG/1
20 TABLET, FILM COATED ORAL DAILY
Qty: 90 TABLET | Refills: 1 | Status: SHIPPED | OUTPATIENT
Start: 2022-11-07

## 2022-11-07 RX ORDER — ALENDRONATE SODIUM 70 MG/1
70 TABLET ORAL WEEKLY
Qty: 12 TABLET | Refills: 3 | Status: SHIPPED | OUTPATIENT
Start: 2022-11-07 | End: 2023-11-06

## 2022-11-07 NOTE — PROGRESS NOTES
Subjective     Dbe Webb is a 72 y.o. old, female here for Referral (PT) and MED REFILLS    71 y/o with PMH of CVA, Type 2 DM with neuropathy, HTN, HLD, Chronic respiratory failure, COPD, osteopenia, IBS, anxiety, tobacco use, Essential tremor, prior BZO dependence    Type 2 DM: adequate control  Hemoglobin A1C (%)   Date Value   08/29/2022 7.3 (H)   05/02/2022 7.0 (H)   12/09/2021 7.4 (H)   Neuropathy stable.    HTN: stable  HLD: on statin therapy, no SE's  COPD: stable, back on anoro, which she can use better, using albuterol prn. Smoking 3-4 cpd  Spiculated lung nodule last year on CT lung cancer screening I ordered. She has seen pulmonology and still not gotten a CT scan scheduled.    Osteopenia: She has not been taking her fosamax I think, also needs to get a new Rx for Calcium/Vitamin D    She is still losing weight, has lost about 20 lbs the past year.    She fell a few weeks ago on her right shoulder and right ribs. She lost her balance, requests more PT.    Wt Readings from Last 3 Encounters:   11/07/22 1016 44.3 kg (97 lb 8.9 oz)   09/26/22 1403 45.5 kg (100 lb 6.4 oz)   08/31/22 1145 47.2 kg (104 lb 0.9 oz)   08/29/22 0845 47.2 kg (104 lb)     Anxiety: not taking celexa everyday if she didn't feel she needed it, given her history I think it would be good for her to continue this.    Review of Systems   Constitutional:  Positive for weight loss. Negative for chills and fever.   Respiratory:  Positive for cough and sputum production.    Cardiovascular: Negative.    Musculoskeletal:  Positive for falls and joint pain.   Neurological:  Positive for tremors.        Intermittent vertigo   Psychiatric/Behavioral:  The patient is nervous/anxious.    Medications     Outpatient Medications Marked as Taking for the 11/7/22 encounter (Office Visit) with Triston Valverde MD   Medication Sig Dispense Refill    aspirin (ECOTRIN) 81 MG EC tablet Take 1 tablet (81 mg total) by mouth once daily.  0     atorvastatin (LIPITOR) 40 MG tablet TAKE 1 TABLET BY MOUTH EVERY DAY (Patient taking differently: Take 40 mg by mouth every evening.) 90 tablet 1    blood sugar diagnostic Strp To check BG 2 times daily, to use with insurance preferred meter 200 each 3    blood-glucose meter,continuous (DEXCOM G6 ) Misc Use as directed 1 each 1    blood-glucose sensor (DEXCOM G6 SENSOR) Emmie Use as directed 10 each 2    calcium carbonate/vitamin D3 (CALCIUM 600 + D,3, ORAL) Take 1 tablet by mouth once daily.      cholestyramine-aspartame (PREVALITE) 4 gram PwPk TAKE 1 PACKET (4 G TOTAL) BY MOUTH 2 (TWO) TIMES DAILY. FOR DIARRHEA (Patient taking differently: Take 1 packet by mouth 2 (two) times daily.) 180 packet 0    dulaglutide (TRULICITY) 1.5 mg/0.5 mL pen injector Inject 1.5 mg into the skin every 7 days. Through patient assistance 12 pen 3    furosemide (LASIX) 20 MG tablet Take 1 tablet (20 mg total) by mouth as needed (swelling).      gabapentin (NEURONTIN) 300 MG capsule TAKE 1 CAPSULE BY MOUTH THREE TIMES A DAY (Patient taking differently: Take 300 mg by mouth 3 (three) times daily.) 270 capsule 1    guaiFENesin (MUCINEX) 600 mg 12 hr tablet Take 1 tablet (600 mg total) by mouth 2 (two) times daily.      ipratropium (ATROVENT) 42 mcg (0.06 %) nasal spray 2 sprays by Nasal route 3 (three) times daily. Use 3 times per day if necessary for chronic nasal drip 15 mL 12    Lactobacillus rhamnosus GG (CULTURELLE) 10 billion cell capsule Take 1 capsule by mouth once daily. 60 capsule 0    lancets Misc To check BG 2 times daily, to use with insurance preferred meter 200 each 3    latanoprost 0.005 % ophthalmic solution INSTILL 1 DROP INTO BOTH EYES EVERY EVENING (Patient taking differently: Place 1 drop into both eyes every evening.) 7.5 mL 1    losartan (COZAAR) 25 MG tablet Take 0.5 tablets (12.5 mg total) by mouth once daily. 30 tablet 1    omega-3 fatty acids-vitamin E 1,000 mg Cap Take 1 capsule by mouth once daily.       "omeprazole (PRILOSEC) 40 MG capsule TAKE 1 CAPSULE (40 MG TOTAL) BY MOUTH BEFORE BREAKFAST. 90 capsule 1    pen needle, diabetic (BD ULTRA-FINE AGUS PEN NEEDLE) 32 gauge x 5/32" Ndle 1 Device by Misc.(Non-Drug; Combo Route) route 2 (two) times daily. 200 each 4    pramoxine-hydrocortisone (PROCTOCREAM-HC) 1-1 % rectal cream Place rectally 2 (two) times daily. 30 g 0    primidone (MYSOLINE) 50 MG Tab TAKE 2 TABLETS BY MOUTH 3 (THREE) TIMES DAILY. ONE HALF TABLET FOR ONE WEEK, THEN AS PRESCRIBED 540 tablet 3    umeclidinium-vilanteroL (ANORO ELLIPTA) 62.5-25 mcg/actuation DsDv Inhale 1 puff into the lungs once daily. Controller 1 each 5    [DISCONTINUED] albuterol (VENTOLIN HFA) 90 mcg/actuation inhaler Inhale 2 puffs into the lungs every 6 (six) hours as needed for Wheezing or Shortness of Breath. Rescue 18 g 2    [DISCONTINUED] alendronate (FOSAMAX) 70 MG tablet Take 1 tablet (70 mg total) by mouth once a week. (Patient taking differently: Take 70 mg by mouth Every Friday.) 12 tablet 1    [DISCONTINUED] citalopram (CELEXA) 20 MG tablet Take 1 tablet (20 mg total) by mouth once daily. (Patient taking differently: Take 20 mg by mouth every evening.) 90 tablet 1     Objective     /68 (BP Location: Right arm, Patient Position: Sitting)   Pulse 92   Wt 44.3 kg (97 lb 8.9 oz)   SpO2 98%   BMI 19.70 kg/m²   Physical Exam  Constitutional:       General: She is not in acute distress.     Appearance: Normal appearance. She is well-developed.   Cardiovascular:      Rate and Rhythm: Normal rate and regular rhythm.      Heart sounds: Murmur heard.   Pulmonary:      Effort: Pulmonary effort is normal. No respiratory distress.      Breath sounds: Normal breath sounds.     Assessment and Plan     Chronic respiratory failure with hypoxia    Recurrent vertigo    Osteoporosis, postmenopausal  -     alendronate (FOSAMAX) 70 MG tablet; Take 1 tablet (70 mg total) by mouth once a week.  Dispense: 12 tablet; Refill: " 3    Anxiety  -     citalopram (CELEXA) 20 MG tablet; Take 1 tablet (20 mg total) by mouth once daily.  Dispense: 90 tablet; Refill: 1    Type 2 diabetes mellitus with diabetic polyneuropathy, without long-term current use of insulin  -     Microalbumin/Creatinine Ratio, Urine    Essential hypertension    Encounter for screening mammogram for breast cancer  -     Mammo Digital Screening Bilat w/ [facility]; Future; Expected date: 01/07/2023    Weight loss    Frequent falls    Other orders  -     albuterol (PROAIR HFA) 90 mcg/actuation inhaler; Inhale 2 puffs into the lungs every 6 (six) hours as needed for Wheezing. Rescue  Dispense: 18 g; Refill: 2  -     meclizine (ANTIVERT) 12.5 mg tablet; Take 1 tablet (12.5 mg total) by mouth 3 (three) times daily as needed for Dizziness.  Dispense: 30 tablet; Refill: 0      Follow up in about 3 months (around 2/7/2023).  ___________________  Triston Valverde MD  Internal Medicine and Pediatrics

## 2022-11-11 NOTE — PROGRESS NOTES
"[facility]   Physical Therapy Treatment Note     Name: Deb Webb  Clinic Number: [id number]    Therapy Diagnosis:   Encounter Diagnosis   Name Primary?    Weakness of both hips Yes       Physician: Triston Valverde MD    Physician Orders: PT Eval and Treat  Medical Diagnosis from Referral: Closed displaced fracture of left pubis with routine healing  Evaluation Date: 05/16/2022  Authorization Period Expiration: 11/11/2022  Plan of Care Expiration: 11/18/2022  Progress Note Due: 11/18/2022  Visit # / Visits authorized: 18/21    PTA Visit #: 0/5     Time In: 958  Time Out: 1034  Total Billable Time: 36     SUBJECTIVE     Pt reports: She fell using her cane taking the trash out. She went to the ER and X-rays were negative.   She was compliant with home exercise program.   Response to previous treatment: fatigue  Functional change: Too soon to tell    Pain: 9/10  Location: hands and neck    OBJECTIVE     Objective Measures updated at progress report unless specified.     Treatment     Deb received the treatments listed below:      Therapeutic exercises to develop strength, endurance, ROM, flexibility, posture and core stabilization for 36 minutes including:  Recumbent bike 5' L2  Glute set 3x10 5s hold  LAQ  3 x 10 each leg   Supine clamshell BTB 3x10  Hip adduction with ball 3x10  SAQ on large bolster 3 x 10  Seated HS curls RTB 2 x 10     Not performed:   Sit to stand 1 x 15   Seated taps to 4" box #4, 2 x 10 each leg    Neuromuscular re-education activities to improve: Balance, Coordination, Kinesthetic, Sense and Proprioception for 00 minutes. The following activities were included:  Tandem 3x 30s each leg  SLS 3 x 30" each leg    NP:  NBOS on foam 3 x 30s  Wide stance on foam with head turns    Patient Education and Home Exercises      Home Exercises Provided and Patient Education Provided     Education provided:   - HEP, reprinted for compliance    Written Home Exercises " Provided: Patient instructed to cont prior HEP. Exercises were reviewed and Deb was able to demonstrate them prior to the end of the session.  Deb demonstrated good  understanding of the education provided. See EMR under Patient Instructions for exercises provided during therapy sessions    ASSESSMENT     Deb reported increased pain following a fall last week while using the SPC taking out the trash. All imaging was negative, but patient had an increase in pain due to the falls. Decreased resistance with her exercises and held standing exercises to avoid exacerbation of symptoms. No increase in pain with supine and seated exercises.      Deb Is progressing well towards her goals.   Pt prognosis is Good.     Pt will continue to benefit from skilled outpatient physical therapy to address the deficits listed in the problem list box on initial evaluation, provide pt/family education and to maximize pt's level of independence in the home and community environment.     Pt's spiritual, cultural and educational needs considered and pt agreeable to plan of care and goals.     Anticipated barriers to physical therapy: None    Goals:  Short-Term Goals: 4 weeks  - The patient will be independent with initial home exercise program. (Not met, progressing)  - The patient will increase strength to at least 4-/5 in muscles tested to indicate improvements in functional strength. (Met)     Long-Term Goals: 8 weeks  - The patient will increase strength to at least 4/5 to perform functional mobility including sit to stand, steps/curbs, and walking (Not met, progressing)  - The patient will increase ROM to WFL to perform ADL, vocational, and recreational tasks without pain. (Met)    PLAN     Reassess next visit     Scar Ward, PT

## 2022-11-14 ENCOUNTER — CLINICAL SUPPORT (OUTPATIENT)
Dept: REHABILITATION | Facility: HOSPITAL | Age: 72
End: 2022-11-14
Attending: INTERNAL MEDICINE
Payer: MEDICARE

## 2022-11-14 DIAGNOSIS — R29.898 WEAKNESS OF BOTH HIPS: Primary | ICD-10-CM

## 2022-11-14 PROCEDURE — 97110 THERAPEUTIC EXERCISES: CPT | Mod: PO

## 2022-11-21 ENCOUNTER — CLINICAL SUPPORT (OUTPATIENT)
Dept: REHABILITATION | Facility: HOSPITAL | Age: 72
End: 2022-11-21
Attending: INTERNAL MEDICINE
Payer: MEDICARE

## 2022-11-21 ENCOUNTER — OFFICE VISIT (OUTPATIENT)
Dept: ORTHOPEDICS | Facility: CLINIC | Age: 72
End: 2022-11-21
Payer: MEDICARE

## 2022-11-21 ENCOUNTER — HOSPITAL ENCOUNTER (OUTPATIENT)
Dept: RADIOLOGY | Facility: HOSPITAL | Age: 72
Discharge: HOME | End: 2022-11-21
Attending: PHYSICIAN ASSISTANT
Payer: MEDICARE

## 2022-11-21 DIAGNOSIS — S62.634A CLOSED MALLET FRACTURE OF DISTAL PHALANX OF RIGHT RING FINGER: ICD-10-CM

## 2022-11-21 DIAGNOSIS — R29.898 WEAKNESS OF BOTH HIPS: Primary | ICD-10-CM

## 2022-11-21 DIAGNOSIS — S69.92XA INJURY OF LEFT WRIST, INITIAL ENCOUNTER: ICD-10-CM

## 2022-11-21 DIAGNOSIS — M20.021 BOUTONNIERE DEFORMITY OF FINGER OF RIGHT HAND: ICD-10-CM

## 2022-11-21 DIAGNOSIS — S69.92XA INJURY OF LEFT HAND, INITIAL ENCOUNTER: ICD-10-CM

## 2022-11-21 DIAGNOSIS — S69.91XA INJURY OF RIGHT WRIST, INITIAL ENCOUNTER: ICD-10-CM

## 2022-11-21 DIAGNOSIS — M25.541 PAIN IN JOINT OF RIGHT HAND: ICD-10-CM

## 2022-11-21 DIAGNOSIS — S69.91XA INJURY OF RIGHT HAND, INITIAL ENCOUNTER: ICD-10-CM

## 2022-11-21 DIAGNOSIS — S69.91XA INJURY OF RIGHT HAND, INITIAL ENCOUNTER: Primary | ICD-10-CM

## 2022-11-21 DIAGNOSIS — M21.941 DEFORMITY, HAND, RIGHT: ICD-10-CM

## 2022-11-21 PROCEDURE — 3066F PR DOCUMENTATION OF TREATMENT FOR NEPHROPATHY: ICD-10-PCS | Mod: CPTII,S$GLB,, | Performed by: PHYSICIAN ASSISTANT

## 2022-11-21 PROCEDURE — 1160F PR REVIEW ALL MEDS BY PRESCRIBER/CLIN PHARMACIST DOCUMENTED: ICD-10-PCS | Mod: CPTII,S$GLB,, | Performed by: PHYSICIAN ASSISTANT

## 2022-11-21 PROCEDURE — 3061F PR NEG MICROALBUMINURIA RESULT DOCUMENTED/REVIEW: ICD-10-PCS | Mod: CPTII,S$GLB,, | Performed by: PHYSICIAN ASSISTANT

## 2022-11-21 PROCEDURE — 3066F NEPHROPATHY DOC TX: CPT | Mod: CPTII,S$GLB,, | Performed by: PHYSICIAN ASSISTANT

## 2022-11-21 PROCEDURE — 99999 PR PBB SHADOW E&M-EST. PATIENT-LVL V: CPT | Mod: PBBFAC,,, | Performed by: PHYSICIAN ASSISTANT

## 2022-11-21 PROCEDURE — 3288F PR FALLS RISK ASSESSMENT DOCUMENTED: ICD-10-PCS | Mod: CPTII,S$GLB,, | Performed by: PHYSICIAN ASSISTANT

## 2022-11-21 PROCEDURE — 73130 X-RAY EXAM OF HAND: CPT | Mod: 26,RT,, | Performed by: RADIOLOGY

## 2022-11-21 PROCEDURE — 73130 X-RAY EXAM OF HAND: CPT | Mod: 26,LT,, | Performed by: RADIOLOGY

## 2022-11-21 PROCEDURE — 99204 OFFICE O/P NEW MOD 45 MIN: CPT | Mod: S$GLB,,, | Performed by: PHYSICIAN ASSISTANT

## 2022-11-21 PROCEDURE — 97110 THERAPEUTIC EXERCISES: CPT | Mod: PO

## 2022-11-21 PROCEDURE — L3933 FO W/O JOINTS CF: HCPCS | Mod: PO

## 2022-11-21 PROCEDURE — 3072F PR LOW RISK FOR RETINOPATHY: ICD-10-PCS | Mod: CPTII,S$GLB,, | Performed by: PHYSICIAN ASSISTANT

## 2022-11-21 PROCEDURE — 73130 X-RAY EXAM OF HAND: CPT | Mod: TC,PO,RT

## 2022-11-21 PROCEDURE — 1125F PR PAIN SEVERITY QUANTIFIED, PAIN PRESENT: ICD-10-PCS | Mod: CPTII,S$GLB,, | Performed by: PHYSICIAN ASSISTANT

## 2022-11-21 PROCEDURE — 3051F PR MOST RECENT HEMOGLOBIN A1C LEVEL 7.0 - < 8.0%: ICD-10-PCS | Mod: CPTII,S$GLB,, | Performed by: PHYSICIAN ASSISTANT

## 2022-11-21 PROCEDURE — 3072F LOW RISK FOR RETINOPATHY: CPT | Mod: CPTII,S$GLB,, | Performed by: PHYSICIAN ASSISTANT

## 2022-11-21 PROCEDURE — 1100F PR PT FALLS ASSESS DOC 2+ FALLS/FALL W/INJURY/YR: ICD-10-PCS | Mod: CPTII,S$GLB,, | Performed by: PHYSICIAN ASSISTANT

## 2022-11-21 PROCEDURE — 97112 NEUROMUSCULAR REEDUCATION: CPT | Mod: PO

## 2022-11-21 PROCEDURE — 1159F MED LIST DOCD IN RCRD: CPT | Mod: CPTII,S$GLB,, | Performed by: PHYSICIAN ASSISTANT

## 2022-11-21 PROCEDURE — 73130 XR HAND COMPLETE 3 VIEW RIGHT: ICD-10-PCS | Mod: 26,RT,, | Performed by: RADIOLOGY

## 2022-11-21 PROCEDURE — 4010F PR ACE/ARB THEARPY RXD/TAKEN: ICD-10-PCS | Mod: CPTII,S$GLB,, | Performed by: PHYSICIAN ASSISTANT

## 2022-11-21 PROCEDURE — 99204 PR OFFICE/OUTPT VISIT, NEW, LEVL IV, 45-59 MIN: ICD-10-PCS | Mod: S$GLB,,, | Performed by: PHYSICIAN ASSISTANT

## 2022-11-21 PROCEDURE — 1159F PR MEDICATION LIST DOCUMENTED IN MEDICAL RECORD: ICD-10-PCS | Mod: CPTII,S$GLB,, | Performed by: PHYSICIAN ASSISTANT

## 2022-11-21 PROCEDURE — 3288F FALL RISK ASSESSMENT DOCD: CPT | Mod: CPTII,S$GLB,, | Performed by: PHYSICIAN ASSISTANT

## 2022-11-21 PROCEDURE — 99999 PR PBB SHADOW E&M-EST. PATIENT-LVL V: ICD-10-PCS | Mod: PBBFAC,,, | Performed by: PHYSICIAN ASSISTANT

## 2022-11-21 PROCEDURE — 3061F NEG MICROALBUMINURIA REV: CPT | Mod: CPTII,S$GLB,, | Performed by: PHYSICIAN ASSISTANT

## 2022-11-21 PROCEDURE — 1125F AMNT PAIN NOTED PAIN PRSNT: CPT | Mod: CPTII,S$GLB,, | Performed by: PHYSICIAN ASSISTANT

## 2022-11-21 PROCEDURE — 3051F HG A1C>EQUAL 7.0%<8.0%: CPT | Mod: CPTII,S$GLB,, | Performed by: PHYSICIAN ASSISTANT

## 2022-11-21 PROCEDURE — 1100F PTFALLS ASSESS-DOCD GE2>/YR: CPT | Mod: CPTII,S$GLB,, | Performed by: PHYSICIAN ASSISTANT

## 2022-11-21 PROCEDURE — 73130 X-RAY EXAM OF HAND: CPT | Mod: TC,PO,LT

## 2022-11-21 PROCEDURE — 1160F RVW MEDS BY RX/DR IN RCRD: CPT | Mod: CPTII,S$GLB,, | Performed by: PHYSICIAN ASSISTANT

## 2022-11-21 PROCEDURE — 4010F ACE/ARB THERAPY RXD/TAKEN: CPT | Mod: CPTII,S$GLB,, | Performed by: PHYSICIAN ASSISTANT

## 2022-11-21 NOTE — PROGRESS NOTES
"[facility]   Physical Therapy Treatment Note     Name: Deb Webb  Clinic Number: [id number]    Therapy Diagnosis:   Encounter Diagnosis   Name Primary?    Weakness of both hips Yes         Physician: Triston Valverde MD    Physician Orders: PT Eval and Treat  Medical Diagnosis from Referral: Closed displaced fracture of left pubis with routine healing  Evaluation Date: 05/16/2022  Authorization Period Expiration: 11/11/2022  Plan of Care Expiration: 12/31/2022  Progress Note Due: 12/18/2022  Visit # / Visits authorized: 19/21    PTA Visit #: 0/5     Time In: 1001  Time Out: 1055  Total Billable Time: 54     SUBJECTIVE     Pt reports: that her pain is better than last visit. She states she has a follow-up appointment with her orthopedist for her 9/10 hand pain.   She was compliant with home exercise program.   Response to previous treatment: fatigue  Functional change: TBD    Pain: 9/10  Location: hands and neck    OBJECTIVE     Objective Measures updated at progress report unless specified.     Treatment     Deb received the treatments listed below:      Therapeutic exercises to develop strength, endurance, ROM, flexibility, posture and core stabilization for 44 minutes including:  Recumbent bike 7' L2  LAQ  3 x 10 each leg   Supine clamshell BTB 3x10  Hip adduction with ball 3x10  TUG x3       Not performed:   Sit to stand 1 x 15   Seated taps to 4" box #4, 2 x 10 each leg  Seated HS curls RTB 2 x 10   Glute set 3x10 5s hold  SAQ on large bolster 3 x 10    Neuromuscular re-education activities to improve: Balance, Coordination, Kinesthetic, Sense and Proprioception for 10 minutes. The following activities were included:  Tandem 3x 30s each leg  SLS 3 x 30" each leg    NP:  NBOS on foam 3 x 30s  Wide stance on foam with head turns    Patient Education and Home Exercises      Home Exercises Provided and Patient Education Provided     Education provided:   - HEP, reprinted for " compliance    Written Home Exercises Provided: Patient instructed to cont prior HEP. Exercises were reviewed and Deb was able to demonstrate them prior to the end of the session.  Deb demonstrated good  understanding of the education provided. See EMR under Patient Instructions for exercises provided during therapy sessions    ASSESSMENT   Increased resistance with her exercises, however held standing exercises to avoid exacerbation of symptoms. Patient's reassessment proved that she has progressively improved her LE strength, however she remains at a high fall risk (TUG avg= 52.24s). Patient required continued skilled therapy to increase LE strength and balance.     Deb Is progressing well towards her goals.   Pt prognosis is Good.     Pt will continue to benefit from skilled outpatient physical therapy to address the deficits listed in the problem list box on initial evaluation, provide pt/family education and to maximize pt's level of independence in the home and community environment.     Pt's spiritual, cultural and educational needs considered and pt agreeable to plan of care and goals.     Anticipated barriers to physical therapy: None    Goals:  Short-Term Goals: 4 weeks  - The patient will be independent with initial home exercise program. (Not met, progressing)  - The patient will increase strength to at least 4-/5 in muscles tested to indicate improvements in functional strength. (Met)     Long-Term Goals: 8 weeks  - The patient will increase strength to at least 4/5 to perform functional mobility including sit to stand, steps/curbs, and walking (Not met, progressing)  - The patient will increase ROM to WFL to perform ADL, vocational, and recreational tasks without pain. (Met)    PLAN     Reassess next visit     John Sorensen, PT

## 2022-11-21 NOTE — PROGRESS NOTES
11/21/2022    Chief Complaint:  Chief Complaint   Patient presents with    Left Hand - Pain, Injury     Fall 2 weeks ago    Right Hand - Pain, Injury     Fall 2 weeks ago       HPI:  Deb Webb is a 72 y.o. female, who presents to clinic today patient of her bilateral hand and bilateral wrist injuries.  States approximately 11 days ago she fell on outstretched hands, as well as falling on her face.  States this prompted her to follow up with the [facility] Emergency Department.  States x-rays were taken which showed no acute fracture.  States he was instructed to follow up with our clinic.  States she severely injured the fingers of the right hand and she continues to have pain and dysfunction of the right hand, particularly the right ring and right middle fingers.  States the left finger/hand pain and swelling have significantly improved since her injury.  States pain can reach up to 10/10 with activity.  His pain is better with rest.  States pain is worse with activity.  Denies any other complaints at this time.    PMHX:  Past Medical History:   Diagnosis Date    Allergy     Arthritis     Cataract     Colon polyps     COPD (chronic obstructive pulmonary disease)     Diabetes mellitus type II     Diabetic neuropathy     Fever blister     Glaucoma (increased eye pressure)     Hyperlipidemia     Hypertension     Irritable bowel syndrome     Keloid cicatrix     Osteoporosis     Retained cholelithiasis following cholecystectomy(997.41)     Right patella fracture        PSHX:  Past Surgical History:   Procedure Laterality Date    BACK SURGERY  2006    CATARACT EXTRACTION W/  INTRAOCULAR LENS IMPLANT Bilateral     CHOLECYSTECTOMY      Laparoscopic    COLONOSCOPY      COLONOSCOPY N/A 8/31/2022    Procedure: COLONOSCOPY;  Surgeon: Salvatore Butler MD;  Location: [facility];  Service: Gastroenterology;  Laterality: N/A;    ESOPHAGEAL DILATION N/A 8/30/2022    Procedure: DILATION, ESOPHAGUS;  Surgeon: Salvatore  MD Luke;  Location: [facility];  Service: Gastroenterology;  Laterality: N/A;    ESOPHAGOGASTRODUODENOSCOPY N/A 8/30/2022    Procedure: EGD (ESOPHAGOGASTRODUODENOSCOPY);  Surgeon: Salvatore Butler MD;  Location: [facility];  Service: Gastroenterology;  Laterality: N/A;    HERNIA REPAIR      HYSTERECTOMY      KNEE SURGERY Right 3/4/2015    Dr Weller     OOPHORECTOMY      ovarian tumor      Benign    TONSILLECTOMY, ADENOIDECTOMY         FMHX:  Family History   Problem Relation Age of Onset    Cancer Mother         Skin    Skin cancer Mother     Glaucoma Mother     Cataracts Mother     Diabetes Mother     Heart disease Father     Diabetes Father     Skin cancer Sister     Diabetes Sister     Diabetes Daughter     Breast cancer Maternal Aunt     Melanoma Neg Hx     Psoriasis Neg Hx     Eczema Neg Hx     Lupus Neg Hx     Amblyopia Neg Hx     Blindness Neg Hx     Macular degeneration Neg Hx     Retinal detachment Neg Hx     Strabismus Neg Hx        SOCHX:  Social History     Tobacco Use    Smoking status: Every Day     Packs/day: 0.50     Years: 40.00     Pack years: 20.00     Types: Cigarettes    Smokeless tobacco: Current   Substance Use Topics    Alcohol use: No       ALLERGIES:  Silicone and Adhesive    CURRENT MEDICATIONS:  Current Outpatient Medications on File Prior to Visit   Medication Sig Dispense Refill    albuterol (PROAIR HFA) 90 mcg/actuation inhaler Inhale 2 puffs into the lungs every 6 (six) hours as needed for Wheezing. Rescue 18 g 2    alendronate (FOSAMAX) 70 MG tablet Take 1 tablet (70 mg total) by mouth once a week. 12 tablet 3    amoxicillin-clavulanate 875-125mg (AUGMENTIN) 875-125 mg per tablet Take 1 tablet by mouth 2 (two) times daily. 14 tablet 0    aspirin (ECOTRIN) 81 MG EC tablet Take 1 tablet (81 mg total) by mouth once daily.  0    atorvastatin (LIPITOR) 40 MG tablet TAKE 1 TABLET BY MOUTH EVERY DAY (Patient taking differently: Take 40 mg by mouth every evening.) 90 tablet 1    blood  sugar diagnostic Strp To check BG 2 times daily, to use with insurance preferred meter 200 each 3    blood-glucose meter,continuous (DEXCOM G6 ) Misc Use as directed 1 each 1    blood-glucose sensor (DEXCOM G6 SENSOR) [facility] Use as directed 10 each 2    calcium carbonate/vitamin D3 (CALCIUM 600 + D,3, ORAL) Take 1 tablet by mouth once daily.      cholestyramine-aspartame (PREVALITE) 4 gram PwPk TAKE 1 PACKET (4 G TOTAL) BY MOUTH 2 (TWO) TIMES DAILY. FOR DIARRHEA (Patient taking differently: Take 1 packet by mouth 2 (two) times daily.) 180 packet 0    citalopram (CELEXA) 20 MG tablet Take 1 tablet (20 mg total) by mouth once daily. 90 tablet 1    dulaglutide (TRULICITY) 1.5 mg/0.5 mL pen injector Inject 1.5 mg into the skin every 7 days. Through patient assistance 12 pen 3    furosemide (LASIX) 20 MG tablet Take 1 tablet (20 mg total) by mouth as needed (swelling).      gabapentin (NEURONTIN) 300 MG capsule TAKE 1 CAPSULE BY MOUTH THREE TIMES A DAY (Patient taking differently: Take 300 mg by mouth 3 (three) times daily.) 270 capsule 1    guaiFENesin (MUCINEX) 600 mg 12 hr tablet Take 1 tablet (600 mg total) by mouth 2 (two) times daily.      HYDROcodone-acetaminophen (NORCO) 5-325 mg per tablet Take 1 tablet by mouth every 12 (twelve) hours as needed for Pain. 10 tablet 0    ipratropium (ATROVENT) 42 mcg (0.06 %) nasal spray 2 sprays by Nasal route 3 (three) times daily. Use 3 times per day if necessary for chronic nasal drip 15 mL 12    Lactobacillus rhamnosus GG (CULTURELLE) 10 billion cell capsule Take 1 capsule by mouth once daily. 60 capsule 0    lancets Misc To check BG 2 times daily, to use with insurance preferred meter 200 each 3    latanoprost 0.005 % ophthalmic solution INSTILL 1 DROP INTO BOTH EYES EVERY EVENING (Patient taking differently: Place 1 drop into both eyes every evening.) 7.5 mL 1    losartan (COZAAR) 25 MG tablet Take 0.5 tablets (12.5 mg total) by mouth once daily. 30 tablet 1     "meclizine (ANTIVERT) 12.5 mg tablet Take 1 tablet (12.5 mg total) by mouth 3 (three) times daily as needed for Dizziness. 30 tablet 0    omega-3 fatty acids-vitamin E 1,000 mg Cap Take 1 capsule by mouth once daily.      omeprazole (PRILOSEC) 40 MG capsule TAKE 1 CAPSULE (40 MG TOTAL) BY MOUTH BEFORE BREAKFAST. 90 capsule 1    pen needle, diabetic (BD ULTRA-FINE AGUS PEN NEEDLE) 32 gauge x 5/32" Ndle 1 Device by Misc.(Non-Drug; Combo Route) route 2 (two) times daily. 200 each 4    pramoxine-hydrocortisone (PROCTOCREAM-HC) 1-1 % rectal cream Place rectally 2 (two) times daily. 30 g 0    primidone (MYSOLINE) 50 MG Tab TAKE 2 TABLETS BY MOUTH 3 (THREE) TIMES DAILY. ONE HALF TABLET FOR ONE WEEK, THEN AS PRESCRIBED 540 tablet 3    umeclidinium-vilanteroL (ANORO ELLIPTA) 62.5-25 mcg/actuation DsDv Inhale 1 puff into the lungs once daily. Controller 1 each 5    blood-glucose meter kit To check BG 2 times daily, to use with insurance preferred meter 1 each 0    [DISCONTINUED] irbesartan (AVAPRO) 75 MG tablet Take 1 tablet (75 mg total) by mouth once daily. 90 tablet 1    [DISCONTINUED] loratadine (CLARITIN) 10 mg tablet TAKE 1 TABLET (10 MG TOTAL) BY MOUTH DAILY AS NEEDED FOR ALLERGIES (OR RUNNY NOSE). 90 tablet 1     No current facility-administered medications on file prior to visit.       REVIEW OF SYSTEMS:  Review of Systems   Constitutional: Negative.    HENT:  Positive for congestion.    Eyes: Negative.    Respiratory:  Positive for cough and wheezing.    Cardiovascular: Negative.    Gastrointestinal: Negative.    Genitourinary: Negative.    Musculoskeletal:  Positive for falls and joint pain.   Skin: Negative.    Neurological:  Positive for dizziness, tingling and tremors.   Endo/Heme/Allergies:  Bruises/bleeds easily.   Psychiatric/Behavioral:  Positive for depression. The patient is nervous/anxious.      GENERAL PHYSICAL EXAM:   There were no vitals taken for this visit.   GEN: well developed, well nourished, no " acute distress   HENT: Normocephalic, atraumatic   EYES: No discharge, conjunctiva normal   NECK: Supple, non-tender   PULM: No wheezing, no respiratory distress   CV: RRR   ABD: Soft, non-tender    ORTHO EXAM:   Examination of the right hand reveals a boutonniere deformity of the right middle finger and a mallet finger deformity of the DIP joint of the right ring finger.  Presence of mild edema of the DIP joint of the right ring finger and PIP of the right middle finger.  No erythema or ecchymosis noted.  Able to flex and extend the fingers appropriately.  Able to flex and extend the wrist appropriately.  Marked tenderness to palpation of the DIP joint of the right ring finger and PIP joint of the right middle finger.  Generalized mild tenderness to palpation throughout the right wrist.  Strength testing secondary to injury.  Sensation is grossly intact in the radial, ulnar, and median nerve distributions.  Capillary refill less than 2 seconds in all fingers.   Examination of the left hand/wrist reveals no edema, erythema, ecchymosis, or skin breakdown.  Able to flex and extend the fingers appropriately.  Able to flex and extend the wrist appropriately.  No significant tenderness noted throughout palpation of the left hand.  Mild generalized tenderness to palpation of the left wrist.  Strength not tested secondary to injury.  Sensation is grossly intact in the radial, ulnar, median nerve distributions.  Capillary refill less than 2 seconds in all fingers.    RADIOLOGY:   X-rays of the left hand were taken today in clinic.  X-rays reviewed by myself.  Imaging showed no acute fracture or dislocation.  Presence of degenerative changes ranging from mild to moderate throughout the joints of the left hand/wrist.  Presence of calcifications in the TFCC/radiocarpal joint consistent with chondrocalcinosis.  Presence of calcification over the dorsum of the left wrist joint.  No other significant bony abnormalities  noted.   X-rays of the right hand were taken today in clinic.  X-rays were reviewed by myself.  Imaging showed the presence of a mallet finger fracture of the distal phalanx of the right ring finger with the fracture fragment measuring approximately 3.9 mm x 1.9 mm.  Presence of an avulsion fracture off the dorsal base of the middle phalanx of the right middle finger with associated boutonniere deformity of the right middle finger.  Presence of degenerative changes ranging from mild to moderate throughout the joints of the right hand/wrist.  Presence of chondrocalcinosis of the radiocarpal joint and TFCC, as well as the 2nd MCP joint, which is consistent with chondrocalcinosis.  No other significant bony abnormalities noted.    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 I discussed with Deb Webb the boutonniere deformity fracture, mallet finger fracture, and hand/wrist injury pathology and treatment options in detail during today's visit.  After the treatment options were discussed, but said the best course of action at this time is to proceed with immobilization of the DIP joint of the right ring finger with an oval 8 extension splint in clinic today.  We discussed with the place the right middle finger PIP joint in an Alumafoam extension splint keeping it in slight hyperextension in clinic today.  We discussed with eventual transition her to a thermoplastic orthosis extension splint of the PIP joint of the right middle finger what she would be fitted by Occupational therapy.  We also discussed with place her in a bilateral middle Velcro short wrist splints.  We discussed the importance of wearing the splint at all times only to be removed for bathing.  She verbally agreed with the treatment plan.    2. She was  placed in an oval 8 extension splint of the DIP joint of the right ring finger in clinic today.      3. She is placed in an Alumafoam extension splint of the PIP joint of the right middle finger in clinic today.    4.  She was placed in bilateral removable Velcro short wrist splints in clinic today.    5.  I would like to follow up in clinic in 2 weeks at which point we will perform repeat x-rays of the right middle and ring fingers.  She was instructed to contact the clinic for any problems or concerns in the interim.

## 2022-11-21 NOTE — PLAN OF CARE
[facility]  Physical Therapy Reassessment and Plan of Care Update    Name: Deb Webb  Clinic Number: [id number]    Therapy Diagnosis:   Encounter Diagnosis   Name Primary?    Weakness of both hips Yes       Physician: Triston Valverde MD    Physician Orders: PT Eval and Treat  Medical Diagnosis from Referral: Closed displaced fracture of left pubis with routine healing  Evaluation Date: 05/16/2022  Authorization Period Expiration: 11/11/2022  Plan of Care Expiration: 12/31/2022  Progress Note Due: 12/18/2022  Visit # / Visits authorized: 19/21       Precautions: Standard and fall    Subjective   Date of onset: 3 months  History of current condition - Deb reports she a few months ago and injured her ankle and then shortly after fell and fractured part of her pelvis. She has been cleared to put weight on her leg and just this morning was cleared to start walking without her walking boot. She currently lives at her [facility] but is independent with everything.    Reassessment: She has been doing great with strength. She still has trouble with her balance which is why she fell last week. Overall she thinks she's improved a lot, she's been able to walk around the block and she hadn't been able to do that before. She still wants to work on both her strength and balance.     Medical History:   Past Medical History:   Diagnosis Date    Allergy     Arthritis     Cataract     Colon polyps     COPD (chronic obstructive pulmonary disease)     Diabetes mellitus type II     Diabetic neuropathy     Fever blister     Glaucoma (increased eye pressure)     Hyperlipidemia     Hypertension     Irritable bowel syndrome     Keloid cicatrix     Osteoporosis     Retained cholelithiasis following cholecystectomy(997.41)     Right patella fracture        Surgical History:   Deb Webb  has a past surgical history that includes Cholecystectomy; Hysterectomy; ovarian tumor;  Colonoscopy; TONSILLECTOMY, ADENOIDECTOMY; Hernia repair; Knee surgery (Right, 3/4/2015); Oophorectomy; Back surgery (2006); Cataract extraction w/  intraocular lens implant (Bilateral); Esophagogastroduodenoscopy (N/A, 8/30/2022); Esophageal dilation (N/A, 8/30/2022); and Colonoscopy (N/A, 8/31/2022).    Medications:   Deb has a current medication list which includes the following prescription(s): albuterol, alendronate, amoxicillin-clavulanate 875-125mg, aspirin, atorvastatin, blood sugar diagnostic, blood-glucose meter, dexcom g6 , dexcom g6 sensor, calcium carbonate/vitamin d3, cholestyramine-aspartame, citalopram, trulicity, furosemide, gabapentin, guaifenesin, hydrocodone-acetaminophen, ipratropium, lactobacillus rhamnosus gg, lancets, latanoprost, losartan, meclizine, omega-3 fatty acids-vitamin e, omeprazole, pen needle, diabetic, pramoxine-hydrocortisone, primidone, anoro ellipta, [DISCONTINUED] irbesartan, and [DISCONTINUED] loratadine.    Allergies:   Review of patient's allergies indicates:   Allergen Reactions    Silicone      Burn skin    Adhesive Rash        Imaging, x-rays:  There is degenerative arthrosis of the medial compartments of both knees with mild joint space narrowing.  There is also degenerative arthrosis of the lateral compartment of the right knee with joint space narrowing.  There is chondrocalcinosis of the knee menisci consistent with CPPD.  No fracture or subluxation are identified.  There are postoperative changes of orthopedic fixation of a right patellar fracture.  There are no signs of joint effusion.  There has been no significant change.    Prior Therapy: Yes, while at SNF immediatley after injury  Social History:  lives with their family (granddaughter and great-grandson)  Occupation: retired  Prior Level of Function: Ambulation with rollator or cane without pain  Current Level of Function: Ambulation with rollator only with some ankle and hip pain    Pain:  Current  2/10, worst 6/10, best 0/10   Location: left hip and ankle   Description: Dull  Aggravating Factors: Standing  Easing Factors: rest    Pts goals: Improve pain and function    Objective     Gait: Step to gait pattern with rollator    Hip Range of Motion:   Left active Left Passive   Flexion WFL WFL   Abduction WFL WFL   Adduction WFL WFL   Extension WFL WFL   ER @ 90 WFL WFL   IR @ 90 WFL WFL     Lower Extremity Strength  Right LE  Left LE    Knee extension: 4/5 Knee extension: 4-/5   Knee flexion: 4/5 Knee flexion: 4-/5   Hip flexion: 4/5 Hip flexion: 4-/5   Hip Internal Rotation:  4/5 Hip Internal Rotation: 4/5   Hip External Rotation: 4/5 Hip External Rotation: 4/5   Hip extension:  4/5 Hip extension: 4/5   Hip abduction: 4-/5 Hip abduction: 4-/5   Hip adduction: 4-/5 Hip adduction 4-/5     TUG:   Trial 1: 50.05s  Trial 2: 57.58s  Trial 3: 49.10s  Avg= 52.24s    TREATMENT     See Daily Note    Assessment   Overall, Deb  has made good, steady progress with his/her PT treatments and has worked hard towards all of her PT goals as evidenced by subjective and objective improvements. Good improvements noted with her LE strength. Despite these improvements, she remains with deficits with balance, gross LE strength and will continue to benefit from skilled PT to address remaining limitations and increase functional mobility.    Pt prognosis is Fair.   Pt will benefit from skilled outpatient Physical Therapy to address the deficits stated above and in the chart below, provide pt/family education, and to maximize pt's level of independence.     Plan of care discussed with patient: Yes  Pt's spiritual, cultural and educational needs considered and patient is agreeable to the plan of care and goals as stated below:     Anticipated Barriers for therapy: None    Medical Necessity is demonstrated by the following  History  Co-morbidities and personal factors that may impact the plan of care Co-morbidities:   COPD, HTN,  Osteoporosis    Personal Factors:   age     high   Examination  Body Structures and Functions, activity limitations and participation restrictions that may impact the plan of care Body Regions:   lower extremities  trunk    Body Systems:    gross symmetry  ROM  strength  gross coordinated movement  balance  gait  transfers    Participation Restrictions:   ADL    Activity limitations:   Learning and applying knowledge  no deficits    General Tasks and Commands  no deficits    Communication  no deficits    Mobility  no deficits    Self care  no deficits    Domestic Life  no deficits    Interactions/Relationships  no deficits    Life Areas  no deficits    Community and Social Life  no deficits         high   Clinical Presentation unstable clinical presentation with unpredictable characteristics high   Decision Making/ Complexity Score: high     Goals:  Short-Term Goals: 4 weeks  - The patient will be independent with initial home exercise program. (Not met, progressing)  - The patient will increase strength to at least 4-/5 in muscles tested to indicate improvements in functional strength. (Met)    Long-Term Goals: 8 weeks  - The patient will increase strength to at least 4/5 to perform functional mobility including sit to stand, steps/curbs, and walking (Not met, progressing)  - The patient will increase ROM to WFL to perform ADL, vocational, and recreational tasks without pain. (Met)    Plan   Plan of care Certification: 11/21/2022 to 12/31/2022    Outpatient Physical Therapy 2 times weekly for 4 weeks to include the following interventions: Manual Therapy, Neuromuscular Re-ed, Patient Education, Therapeutic Activities, and Therapeutic Exercise.     John Sorensen, PT

## 2022-11-21 NOTE — PROGRESS NOTES
Please see POC note for Eval / Splint  results and tx performed this date.    KHALIDA Arriaza/ALIX

## 2022-11-22 NOTE — PLAN OF CARE
[facility]             OCCUPATIONAL THERAPY --- ORTHOSIS  EVALUATION - FITTING - TRAINING     Patient Name: Deb Webb  MRN: [id number]    Date: 11/21/2022  Physician: ERIK Villanueva  Orders:   Patient had a fall approximately 11 days ago, which injured her right hand/fingers.  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  Patient requires occupational therapy to be fitted for a thermoplastic orthosis extension splint of the PIP joint of the right middle finger, and possibly a orthosis extension splint of the D IP joint of the right ring finger.       Duration:  6 weeks      Frequency:  P.r.n.      Please fit the patient for a thermoplastic orthosis extension splint of the PIP joint of the right middle finger, and possibly a thermoplastic orthosis extension splint of the DIP joint of the right ring finger.  Thanks!  Primary Diagnosis:   S62.634A (ICD-10-CM) - Closed mallet fracture of distal phalanx of right ring finger   M20.021 (ICD-10-CM) - Boutonniere deformity of finger of right hand     Treatment Diagnosis:   1. Closed mallet fracture of distal phalanx of right ring finger  Ambulatory referral/consult to Physical/Occupational Therapy      2. Boutonniere deformity of finger of right hand  Ambulatory referral/consult to Physical/Occupational Therapy      3. Pain in joint of right hand        4. Deformity, hand, right          (and resulting condition that requires a custom orthosis)     Start time: 1730  End Time: 1810  Total Time: 30 min    SUBJECTIVE:   HISTORY/OCCUPATIONAL PROFILE:   Patient is a 72 year old, female who presents this day for orthotic fabrication/fitting/training.      Patient's chief complaint is deformity to her R long finger    Occupation: Retired      Pain: 0/10    Date of Onset/Injury/Surgery: Approx 11 days prior to Eval date      OBJECTIVE:     Observations:  skin intact R hand      Sensation:  Intact in all autonomous zones     Range of Motion :    Strength (in pounds): Deferred       Circumferential Edema Measurements (in cm): Deferred       ADLs/Function:      ASSESSMENT:   Type of custom fabricated Orthosis:   Static R long finger PIP ext splint for 30 min    L-code:  3913    Purpose of orthosis:   To decrease deformity/joint contracture      Initial OT Orthosis evaluation completed.  Fabricated custom static PIP ext split per orders for the purpose listed above.  Patient/caregiver were provided written instructions on orthosis purpose, wear schedule, care and precautions to monitor for increased pain/edema, pressure points, skin breakdown or redness/skin irritation.  Patient was IND in donning/doffing of orthosis while in clinic.  Patient/caregiver to contact clinic for adjustments as needed.  Patient may require skilled OT services for orthotic adjustments/modifications as needed.     Quality of Fit of orthotic fabricated:    Good Fit achieved  May require adjustments as needed to accommodate for reduction in edema,     Rehab Potential: good    Short Term/Long term goal (to be met in 1 visit)  1) Patient to be IND with orthotic use,donning/doffing, wear and care precautions and recommended Home program to maximize results. (MET)    PLAN:    Recommend Occupational therapy for 1-3 visits to allow for custom splint fabrication and home program instruction during the  Certification period of 11/21/2022 to 2/19/2023 in pursuit of OT goals and for orthosis modification/adjustments as needed.      Treatment to include orthotic fabrication/fitting/training, orthotic modification/adjustment as needed, and HEP    I CERTIFY THE NEED FOR THESE SERVICES FURNISHED UNDER THIS PLAN OF TREATMENT AND WHILE UNDER MY CARE  KHALIDA Arriaza/ALIX

## 2022-11-24 PROBLEM — R60.0 EDEMA OF HAND: Status: ACTIVE | Noted: 2022-11-24

## 2022-11-24 PROBLEM — M25.511 ACUTE PAIN OF RIGHT SHOULDER: Status: ACTIVE | Noted: 2022-11-24

## 2022-11-24 PROBLEM — J10.1 INFLUENZA A WITH RESPIRATORY MANIFESTATIONS: Status: ACTIVE | Noted: 2022-11-24

## 2022-11-24 PROBLEM — I48.91 ATRIAL FIBRILLATION WITH RAPID VENTRICULAR RESPONSE: Status: ACTIVE | Noted: 2022-11-24

## 2022-11-28 ENCOUNTER — PES CALL (OUTPATIENT)
Dept: ADMINISTRATIVE | Facility: CLINIC | Age: 72
End: 2022-11-28
Payer: MEDICARE

## 2022-11-30 NOTE — PROGRESS NOTES
"[facility]   Physical Therapy Treatment Note     Name: Deb Webb  Clinic Number: [id number]    Therapy Diagnosis:   Encounter Diagnosis   Name Primary?    Weakness of both hips Yes           Physician: Triston Valverde MD    Physician Orders: PT Eval and Treat  Medical Diagnosis from Referral: Closed displaced fracture of left pubis with routine healing  Evaluation Date: 05/16/2022  Authorization Period Expiration: 11/11/2022  Plan of Care Expiration: 12/31/2022  Progress Note Due: 12/18/2022  Visit # / Visits authorized: 20/21    PTA Visit #: 0/5     Time In: 1004  Time Out: 1045  Total Billable Time: 41     SUBJECTIVE     Pt reports: She is feeling better compared to last week. Still notes her legs feel weak and she is having difficulty with her (R) hand use. She was compliant with home exercise program.   Response to previous treatment: fatigue  Functional change: TBD    Pain: 4/10  Location: (R) hands     OBJECTIVE     Objective Measures updated at progress report unless specified.     Treatment     Deb received the treatments listed below:      Therapeutic exercises to develop strength, endurance, ROM, flexibility, posture and core stabilization for 34 minutes including:  Recumbent bike 8' L2  LAQ  3 x 10 each leg   Supine clamshell BTB 3x10  Hip adduction with ball 3x10  Sit to stand 1 x 15   Seated HS curls RTB 2 x 10   Glute set 3x10 5s hold  SAQ on large bolster 3 x 10 5'' hold 2#    Neuromuscular re-education activities to improve: Balance, Coordination, Kinesthetic, Sense and Proprioception for 7 minutes. The following activities were included:  Tandem 3x 30s each leg  Standing taps to 4" box  2 x 10 each leg    NP:  NBOS on foam 3 x 30s  Wide stance on foam with head turns    Patient Education and Home Exercises      Home Exercises Provided and Patient Education Provided     Education provided:   - HEP, reprinted for compliance    Written Home Exercises Provided: " Patient instructed to cont prior HEP. Exercises were reviewed and Deb was able to demonstrate them prior to the end of the session.  Deb demonstrated good  understanding of the education provided. See EMR under Patient Instructions for exercises provided during therapy sessions    ASSESSMENT     Deb escalona increased difficulty with sit to stands today and muscle fatigue noted after LE strengthening. Tremors noted throughout exercise today with UE support required for balance exercises. She will continue to benefit from progression of LE strengthening exercises and balance training.     Deb Is progressing well towards her goals.   Pt prognosis is Good.     Pt will continue to benefit from skilled outpatient physical therapy to address the deficits listed in the problem list box on initial evaluation, provide pt/family education and to maximize pt's level of independence in the home and community environment.     Pt's spiritual, cultural and educational needs considered and pt agreeable to plan of care and goals.     Anticipated barriers to physical therapy: None    Goals:  Short-Term Goals: 4 weeks  - The patient will be independent with initial home exercise program. (Not met, progressing)  - The patient will increase strength to at least 4-/5 in muscles tested to indicate improvements in functional strength. (Met)     Long-Term Goals: 8 weeks  - The patient will increase strength to at least 4/5 to perform functional mobility including sit to stand, steps/curbs, and walking (Not met, progressing)  - The patient will increase ROM to WFL to perform ADL, vocational, and recreational tasks without pain. (Met)    PLAN     Continue with balance exercises and LE strengthening    Scar Ward, PT

## 2022-12-02 DIAGNOSIS — S69.91XA INJURY OF RIGHT HAND, INITIAL ENCOUNTER: Primary | ICD-10-CM

## 2022-12-05 ENCOUNTER — CLINICAL SUPPORT (OUTPATIENT)
Dept: REHABILITATION | Facility: HOSPITAL | Age: 72
End: 2022-12-05
Attending: INTERNAL MEDICINE
Payer: MEDICARE

## 2022-12-05 ENCOUNTER — HOSPITAL ENCOUNTER (OUTPATIENT)
Dept: RADIOLOGY | Facility: HOSPITAL | Age: 72
Discharge: HOME | End: 2022-12-05
Attending: PHYSICIAN ASSISTANT
Payer: MEDICARE

## 2022-12-05 ENCOUNTER — OFFICE VISIT (OUTPATIENT)
Dept: ORTHOPEDICS | Facility: CLINIC | Age: 72
End: 2022-12-05
Payer: MEDICARE

## 2022-12-05 VITALS — WEIGHT: 101 LBS | HEIGHT: 59 IN | BODY MASS INDEX: 20.36 KG/M2

## 2022-12-05 DIAGNOSIS — S69.91XD INJURY OF RIGHT WRIST, SUBSEQUENT ENCOUNTER: ICD-10-CM

## 2022-12-05 DIAGNOSIS — S69.91XA INJURY OF RIGHT HAND, INITIAL ENCOUNTER: ICD-10-CM

## 2022-12-05 DIAGNOSIS — S62.634A CLOSED MALLET FRACTURE OF DISTAL PHALANX OF RIGHT RING FINGER: ICD-10-CM

## 2022-12-05 DIAGNOSIS — M20.021 BOUTONNIERE DEFORMITY OF FINGER OF RIGHT HAND: Primary | ICD-10-CM

## 2022-12-05 DIAGNOSIS — R29.898 WEAKNESS OF BOTH HIPS: Primary | ICD-10-CM

## 2022-12-05 DIAGNOSIS — S69.92XD INJURY OF LEFT WRIST, SUBSEQUENT ENCOUNTER: ICD-10-CM

## 2022-12-05 PROBLEM — K62.5 RECTAL BLEEDING: Status: RESOLVED | Noted: 2022-09-01 | Resolved: 2022-12-05

## 2022-12-05 PROBLEM — J96.21 ACUTE ON CHRONIC RESPIRATORY FAILURE WITH HYPOXIA: Status: RESOLVED | Noted: 2022-08-29 | Resolved: 2022-12-05

## 2022-12-05 PROCEDURE — 3008F PR BODY MASS INDEX (BMI) DOCUMENTED: ICD-10-PCS | Mod: CPTII,S$GLB,, | Performed by: PHYSICIAN ASSISTANT

## 2022-12-05 PROCEDURE — 4010F PR ACE/ARB THEARPY RXD/TAKEN: ICD-10-PCS | Mod: CPTII,S$GLB,, | Performed by: PHYSICIAN ASSISTANT

## 2022-12-05 PROCEDURE — 3072F PR LOW RISK FOR RETINOPATHY: ICD-10-PCS | Mod: CPTII,S$GLB,, | Performed by: PHYSICIAN ASSISTANT

## 2022-12-05 PROCEDURE — 3051F HG A1C>EQUAL 7.0%<8.0%: CPT | Mod: CPTII,S$GLB,, | Performed by: PHYSICIAN ASSISTANT

## 2022-12-05 PROCEDURE — 1111F PR DISCHARGE MEDS RECONCILED W/ CURRENT OUTPATIENT MED LIST: ICD-10-PCS | Mod: CPTII,S$GLB,, | Performed by: PHYSICIAN ASSISTANT

## 2022-12-05 PROCEDURE — 73140 X-RAY EXAM OF FINGER(S): CPT | Mod: 26,RT,, | Performed by: RADIOLOGY

## 2022-12-05 PROCEDURE — 99999 PR PBB SHADOW E&M-EST. PATIENT-LVL IV: CPT | Mod: PBBFAC,,, | Performed by: PHYSICIAN ASSISTANT

## 2022-12-05 PROCEDURE — 1160F PR REVIEW ALL MEDS BY PRESCRIBER/CLIN PHARMACIST DOCUMENTED: ICD-10-PCS | Mod: CPTII,S$GLB,, | Performed by: PHYSICIAN ASSISTANT

## 2022-12-05 PROCEDURE — 3072F LOW RISK FOR RETINOPATHY: CPT | Mod: CPTII,S$GLB,, | Performed by: PHYSICIAN ASSISTANT

## 2022-12-05 PROCEDURE — 3066F NEPHROPATHY DOC TX: CPT | Mod: CPTII,S$GLB,, | Performed by: PHYSICIAN ASSISTANT

## 2022-12-05 PROCEDURE — 1159F PR MEDICATION LIST DOCUMENTED IN MEDICAL RECORD: ICD-10-PCS | Mod: CPTII,S$GLB,, | Performed by: PHYSICIAN ASSISTANT

## 2022-12-05 PROCEDURE — 4010F ACE/ARB THERAPY RXD/TAKEN: CPT | Mod: CPTII,S$GLB,, | Performed by: PHYSICIAN ASSISTANT

## 2022-12-05 PROCEDURE — 97110 THERAPEUTIC EXERCISES: CPT | Mod: PO

## 2022-12-05 PROCEDURE — 3008F BODY MASS INDEX DOCD: CPT | Mod: CPTII,S$GLB,, | Performed by: PHYSICIAN ASSISTANT

## 2022-12-05 PROCEDURE — 3066F PR DOCUMENTATION OF TREATMENT FOR NEPHROPATHY: ICD-10-PCS | Mod: CPTII,S$GLB,, | Performed by: PHYSICIAN ASSISTANT

## 2022-12-05 PROCEDURE — 99213 OFFICE O/P EST LOW 20 MIN: CPT | Mod: S$GLB,,, | Performed by: PHYSICIAN ASSISTANT

## 2022-12-05 PROCEDURE — 1125F PR PAIN SEVERITY QUANTIFIED, PAIN PRESENT: ICD-10-PCS | Mod: CPTII,S$GLB,, | Performed by: PHYSICIAN ASSISTANT

## 2022-12-05 PROCEDURE — 99213 PR OFFICE/OUTPT VISIT, EST, LEVL III, 20-29 MIN: ICD-10-PCS | Mod: S$GLB,,, | Performed by: PHYSICIAN ASSISTANT

## 2022-12-05 PROCEDURE — 1159F MED LIST DOCD IN RCRD: CPT | Mod: CPTII,S$GLB,, | Performed by: PHYSICIAN ASSISTANT

## 2022-12-05 PROCEDURE — 3061F NEG MICROALBUMINURIA REV: CPT | Mod: CPTII,S$GLB,, | Performed by: PHYSICIAN ASSISTANT

## 2022-12-05 PROCEDURE — 73140 XR FINGER 2 OR MORE VIEWS RIGHT: ICD-10-PCS | Mod: 26,RT,, | Performed by: RADIOLOGY

## 2022-12-05 PROCEDURE — 73140 X-RAY EXAM OF FINGER(S): CPT | Mod: TC,PO,RT

## 2022-12-05 PROCEDURE — 3051F PR MOST RECENT HEMOGLOBIN A1C LEVEL 7.0 - < 8.0%: ICD-10-PCS | Mod: CPTII,S$GLB,, | Performed by: PHYSICIAN ASSISTANT

## 2022-12-05 PROCEDURE — 99999 PR PBB SHADOW E&M-EST. PATIENT-LVL IV: ICD-10-PCS | Mod: PBBFAC,,, | Performed by: PHYSICIAN ASSISTANT

## 2022-12-05 PROCEDURE — 1160F RVW MEDS BY RX/DR IN RCRD: CPT | Mod: CPTII,S$GLB,, | Performed by: PHYSICIAN ASSISTANT

## 2022-12-05 PROCEDURE — 1111F DSCHRG MED/CURRENT MED MERGE: CPT | Mod: CPTII,S$GLB,, | Performed by: PHYSICIAN ASSISTANT

## 2022-12-05 PROCEDURE — 1125F AMNT PAIN NOTED PAIN PRSNT: CPT | Mod: CPTII,S$GLB,, | Performed by: PHYSICIAN ASSISTANT

## 2022-12-05 PROCEDURE — 3061F PR NEG MICROALBUMINURIA RESULT DOCUMENTED/REVIEW: ICD-10-PCS | Mod: CPTII,S$GLB,, | Performed by: PHYSICIAN ASSISTANT

## 2022-12-05 NOTE — PROGRESS NOTES
12/5/2022    HPI:  Deb Webb is a 72 y.o. 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  States pain has improved since last visit.  States pain with activity can reach up to 8/10.  States pain is better with rest.  His pain is worst activity.  States she has worn her splint instructed.  Denies any other complaints at this time.    PMHX:  Past Medical History:   Diagnosis Date    Allergy     Arthritis     Cataract     Colon polyps     COPD (chronic obstructive pulmonary disease)     Diabetes mellitus type II     Diabetic neuropathy     Fever blister     Glaucoma (increased eye pressure)     Hyperlipidemia     Hypertension     Irritable bowel syndrome     Keloid cicatrix     Osteoporosis     Retained cholelithiasis following cholecystectomy(997.41)     Right patella fracture        PSHX:  Past Surgical History:   Procedure Laterality Date    BACK SURGERY  2006    CATARACT EXTRACTION W/  INTRAOCULAR LENS IMPLANT Bilateral     CHOLECYSTECTOMY      Laparoscopic    COLONOSCOPY      COLONOSCOPY N/A 8/31/2022    Procedure: COLONOSCOPY;  Surgeon: Salvatore Butler MD;  Location: [facility];  Service: Gastroenterology;  Laterality: N/A;    ESOPHAGEAL DILATION N/A 8/30/2022    Procedure: DILATION, ESOPHAGUS;  Surgeon: Salvatore Butler MD;  Location: [facility];  Service: Gastroenterology;  Laterality: N/A;    ESOPHAGOGASTRODUODENOSCOPY N/A 8/30/2022    Procedure: EGD (ESOPHAGOGASTRODUODENOSCOPY);  Surgeon: Salvatore Butler MD;  Location: [facility];  Service: Gastroenterology;  Laterality: N/A;    HERNIA REPAIR      HYSTERECTOMY      KNEE SURGERY Right 3/4/2015    Dr Weller     OOPHORECTOMY      ovarian tumor      Benign    TONSILLECTOMY, ADENOIDECTOMY         FMHX:  Family History   Problem Relation Age of Onset    Cancer Mother         Skin    Skin cancer Mother      Glaucoma Mother     Cataracts Mother     Diabetes Mother     Heart disease Father     Diabetes Father     Skin cancer Sister     Diabetes Sister     Diabetes Daughter     Breast cancer Maternal Aunt     Melanoma Neg Hx     Psoriasis Neg Hx     Eczema Neg Hx     Lupus Neg Hx     Amblyopia Neg Hx     Blindness Neg Hx     Macular degeneration Neg Hx     Retinal detachment Neg Hx     Strabismus Neg Hx        SOCHX:  Social History     Tobacco Use    Smoking status: Every Day     Packs/day: 0.50     Years: 40.00     Pack years: 20.00     Types: Cigarettes    Smokeless tobacco: Current   Substance Use Topics    Alcohol use: No       ALLERGIES:  Silicone and Adhesive    CURRENT MEDICATIONS:  Current Outpatient Medications on File Prior to Visit   Medication Sig Dispense Refill    gabapentin (NEURONTIN) 300 MG capsule TAKE 1 CAPSULE BY MOUTH THREE TIMES A  capsule 1    albuterol (PROAIR HFA) 90 mcg/actuation inhaler Inhale 2 puffs into the lungs every 6 (six) hours as needed for Wheezing. Rescue 18 g 2    albuterol-ipratropium (DUO-NEB) 2.5 mg-0.5 mg/3 mL nebulizer solution Take 3 mLs by nebulization every 6 (six) hours as needed for Wheezing or Shortness of Breath. Rescue 75 mL 0    alendronate (FOSAMAX) 70 MG tablet Take 1 tablet (70 mg total) by mouth once a week. 12 tablet 3    amiodarone (PACERONE) 200 MG Tab Take 2 tabs by mouth twice daily until 12/2/22 then take 1 tab by mouth twice daily 90 tablet 1    aspirin (ECOTRIN) 81 MG EC tablet Take 1 tablet (81 mg total) by mouth once daily.  0    atorvastatin (LIPITOR) 40 MG tablet TAKE 1 TABLET BY MOUTH EVERY DAY 90 tablet 1    blood sugar diagnostic Strp To check BG 2 times daily, to use with insurance preferred meter 200 each 3    blood-glucose meter kit To check BG 2 times daily, to use with insurance preferred meter 1 each 0    blood-glucose meter,continuous (DEXCOM G6 ) Misc Use as directed 1 each 1    blood-glucose sensor (DEXCOM G6 SENSOR) Emmie Use  as directed 10 each 2    calcium carbonate/vitamin D3 (CALCIUM 600 + D,3, ORAL) Take 1 tablet by mouth once daily.      cholestyramine-aspartame (PREVALITE) 4 gram PwPk TAKE 1 PACKET (4 G TOTAL) BY MOUTH 2 (TWO) TIMES DAILY. FOR DIARRHEA (Patient taking differently: Take 1 packet by mouth 2 (two) times daily.) 180 packet 0    citalopram (CELEXA) 20 MG tablet Take 1 tablet (20 mg total) by mouth once daily. 90 tablet 1    dulaglutide (TRULICITY) 1.5 mg/0.5 mL pen injector Inject 1.5 mg into the skin every 7 days. Through patient assistance 12 pen 3    furosemide (LASIX) 20 MG tablet Take 1 tablet (20 mg total) by mouth as needed (swelling).      guaiFENesin (MUCINEX) 600 mg 12 hr tablet Take 1 tablet (600 mg total) by mouth 2 (two) times daily.      HYDROcodone-acetaminophen (NORCO) 5-325 mg per tablet Take 1 tablet by mouth every 12 (twelve) hours as needed for Pain. 10 tablet 0    ipratropium (ATROVENT) 42 mcg (0.06 %) nasal spray 2 sprays by Nasal route 3 (three) times daily. Use 3 times per day if necessary for chronic nasal drip 15 mL 12    Lactobacillus rhamnosus GG (CULTURELLE) 10 billion cell capsule Take 1 capsule by mouth once daily. 60 capsule 0    lancets Misc To check BG 2 times daily, to use with insurance preferred meter 200 each 3    latanoprost 0.005 % ophthalmic solution INSTILL 1 DROP INTO BOTH EYES EVERY EVENING (Patient taking differently: Place 1 drop into both eyes every evening.) 7.5 mL 1    losartan (COZAAR) 25 MG tablet Take 0.5 tablets (12.5 mg total) by mouth once daily. 30 tablet 1    magnesium oxide (MAG-OX) 400 mg (241.3 mg magnesium) tablet Take 1 tablet (400 mg total) by mouth once daily. 30 tablet 0    meclizine (ANTIVERT) 12.5 mg tablet Take 1 tablet (12.5 mg total) by mouth 3 (three) times daily as needed for Dizziness. 30 tablet 0    omega-3 fatty acids-vitamin E 1,000 mg Cap Take 1 capsule by mouth once daily.      omeprazole (PRILOSEC) 40 MG capsule TAKE 1 CAPSULE (40 MG TOTAL)  "BY MOUTH BEFORE BREAKFAST. 90 capsule 1    pen needle, diabetic (BD ULTRA-FINE AGUS PEN NEEDLE) 32 gauge x 5/32" Ndle 1 Device by Misc.(Non-Drug; Combo Route) route 2 (two) times daily. 200 each 4    pramoxine-hydrocortisone (PROCTOCREAM-HC) 1-1 % rectal cream Place rectally 2 (two) times daily. 30 g 0    predniSONE (DELTASONE) 10 MG tablet Take 2 tabs daily by mouth for 5 days then 1 tab daily for 5 days 15 tablet 0    primidone (MYSOLINE) 50 MG Tab TAKE 2 TABLETS BY MOUTH 3 (THREE) TIMES DAILY. ONE HALF TABLET FOR ONE WEEK, THEN AS PRESCRIBED 540 tablet 3    rivaroxaban (XARELTO) 20 mg Tab Take 1 tablet (20 mg total) by mouth daily with dinner or evening meal. 30 tablet 0    umeclidinium-vilanteroL (ANORO ELLIPTA) 62.5-25 mcg/actuation DsDv Inhale 1 puff into the lungs once daily. Controller 1 each 5    [DISCONTINUED] irbesartan (AVAPRO) 75 MG tablet Take 1 tablet (75 mg total) by mouth once daily. 90 tablet 1    [DISCONTINUED] loratadine (CLARITIN) 10 mg tablet TAKE 1 TABLET (10 MG TOTAL) BY MOUTH DAILY AS NEEDED FOR ALLERGIES (OR RUNNY NOSE). 90 tablet 1     No current facility-administered medications on file prior to visit.       REVIEW OF SYSTEMS:  Review of Systems Complete; Negative, unless noted above.    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  Presence of a mallet finger of the right ring finger that is corrected via an oval 8 extension splint.  Presence of mild edema over the dorsum of the PIP joint of the right middle finger.  No erythema, ecchymosis, or skin breakdown noted.  Presence of minimal generalized tenderness throughout the right wrist.  Strength not tested.  Sensation is " grossly intact in the radial, ulnar, median nerve distributions.  Capillary refill less than 2 seconds in all fingers.   Examination of the right hand/wrist reveals no edema, erythema, ecchymosis, or skin breakdown.  Able make composite fist and fully extend all fingers.  Full intact range of motion of the right wrist.  Minimal generalized tenderness throughout the left wrist.  Strength not tested secondary to injury.  Sensation is grossly intact in the radial, ulnar, and median nerve distributions.  Capillary refill less than 2 seconds in all fingers.    RADIOLOGY:   X-rays of the fingers of the right hand were taken today in clinic.  X-rays reviewed by myself.  Imaging showed the presence of a mallet finger fracture of the distal phalanx of the right ring finger.  Presence of an avulsion off the dorsal base of the middle phalanx of the right middle finger with no significant change in position when compared to previous imaging.  No other significant bony abnormalities noted.    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 I discussed with Deb Webb that she is progressing appropriately in the treatment course.  We discussed the importance of wearing the oval 8 extension splint of the IP joint of the right ring finger at all times only to be removed for cleaning.  She was instructed how to appropriately removing cleaned the splint in clinic today.  We also discussed the importance of wearing her orthosis extension splint of the PIP joint of the right middle finger at all times.  We discussed importance of having limited weight-bearing of the right hand/arm until seen again in clinic.  She verbally agreed with treatment plan.    2.  I would like her follow up in clinic in 2 weeks at which time we will perform repeat x-rays  of the right middle and right ring fingers.  She was instructed to contact the clinic for any problems or concerns in the interim.

## 2022-12-12 ENCOUNTER — CLINICAL SUPPORT (OUTPATIENT)
Dept: REHABILITATION | Facility: HOSPITAL | Age: 72
End: 2022-12-12
Attending: INTERNAL MEDICINE
Payer: MEDICARE

## 2022-12-12 ENCOUNTER — OFFICE VISIT (OUTPATIENT)
Dept: CARDIOLOGY | Facility: CLINIC | Age: 72
End: 2022-12-12
Payer: MEDICARE

## 2022-12-12 VITALS
BODY MASS INDEX: 19.69 KG/M2 | DIASTOLIC BLOOD PRESSURE: 84 MMHG | HEIGHT: 59 IN | SYSTOLIC BLOOD PRESSURE: 121 MMHG | WEIGHT: 97.69 LBS | HEART RATE: 74 BPM

## 2022-12-12 DIAGNOSIS — G25.0 ESSENTIAL TREMOR: ICD-10-CM

## 2022-12-12 DIAGNOSIS — R55 NEAR SYNCOPE: Primary | ICD-10-CM

## 2022-12-12 DIAGNOSIS — I27.20 PULMONARY HYPERTENSION: ICD-10-CM

## 2022-12-12 DIAGNOSIS — I48.0 PAF (PAROXYSMAL ATRIAL FIBRILLATION): ICD-10-CM

## 2022-12-12 DIAGNOSIS — M21.941 DEFORMITY, HAND, RIGHT: ICD-10-CM

## 2022-12-12 DIAGNOSIS — M25.541 PAIN IN JOINT OF RIGHT HAND: Primary | ICD-10-CM

## 2022-12-12 PROCEDURE — 3072F LOW RISK FOR RETINOPATHY: CPT | Mod: CPTII,S$GLB,, | Performed by: INTERNAL MEDICINE

## 2022-12-12 PROCEDURE — 99999 PR PBB SHADOW E&M-EST. PATIENT-LVL IV: ICD-10-PCS | Mod: PBBFAC,,, | Performed by: INTERNAL MEDICINE

## 2022-12-12 PROCEDURE — 1160F PR REVIEW ALL MEDS BY PRESCRIBER/CLIN PHARMACIST DOCUMENTED: ICD-10-PCS | Mod: CPTII,S$GLB,, | Performed by: INTERNAL MEDICINE

## 2022-12-12 PROCEDURE — 1126F PR PAIN SEVERITY QUANTIFIED, NO PAIN PRESENT: ICD-10-PCS | Mod: CPTII,S$GLB,, | Performed by: INTERNAL MEDICINE

## 2022-12-12 PROCEDURE — 3079F DIAST BP 80-89 MM HG: CPT | Mod: CPTII,S$GLB,, | Performed by: INTERNAL MEDICINE

## 2022-12-12 PROCEDURE — 3051F HG A1C>EQUAL 7.0%<8.0%: CPT | Mod: CPTII,S$GLB,, | Performed by: INTERNAL MEDICINE

## 2022-12-12 PROCEDURE — 3051F PR MOST RECENT HEMOGLOBIN A1C LEVEL 7.0 - < 8.0%: ICD-10-PCS | Mod: CPTII,S$GLB,, | Performed by: INTERNAL MEDICINE

## 2022-12-12 PROCEDURE — 4010F ACE/ARB THERAPY RXD/TAKEN: CPT | Mod: CPTII,S$GLB,, | Performed by: INTERNAL MEDICINE

## 2022-12-12 PROCEDURE — 3061F PR NEG MICROALBUMINURIA RESULT DOCUMENTED/REVIEW: ICD-10-PCS | Mod: CPTII,S$GLB,, | Performed by: INTERNAL MEDICINE

## 2022-12-12 PROCEDURE — 99214 PR OFFICE/OUTPT VISIT, EST, LEVL IV, 30-39 MIN: ICD-10-PCS | Mod: S$GLB,,, | Performed by: INTERNAL MEDICINE

## 2022-12-12 PROCEDURE — 1111F DSCHRG MED/CURRENT MED MERGE: CPT | Mod: CPTII,S$GLB,, | Performed by: INTERNAL MEDICINE

## 2022-12-12 PROCEDURE — 4010F PR ACE/ARB THEARPY RXD/TAKEN: ICD-10-PCS | Mod: CPTII,S$GLB,, | Performed by: INTERNAL MEDICINE

## 2022-12-12 PROCEDURE — 3288F PR FALLS RISK ASSESSMENT DOCUMENTED: ICD-10-PCS | Mod: CPTII,S$GLB,, | Performed by: INTERNAL MEDICINE

## 2022-12-12 PROCEDURE — 3066F PR DOCUMENTATION OF TREATMENT FOR NEPHROPATHY: ICD-10-PCS | Mod: CPTII,S$GLB,, | Performed by: INTERNAL MEDICINE

## 2022-12-12 PROCEDURE — 1101F PT FALLS ASSESS-DOCD LE1/YR: CPT | Mod: CPTII,S$GLB,, | Performed by: INTERNAL MEDICINE

## 2022-12-12 PROCEDURE — 1111F PR DISCHARGE MEDS RECONCILED W/ CURRENT OUTPATIENT MED LIST: ICD-10-PCS | Mod: CPTII,S$GLB,, | Performed by: INTERNAL MEDICINE

## 2022-12-12 PROCEDURE — 1159F MED LIST DOCD IN RCRD: CPT | Mod: CPTII,S$GLB,, | Performed by: INTERNAL MEDICINE

## 2022-12-12 PROCEDURE — 3074F SYST BP LT 130 MM HG: CPT | Mod: CPTII,S$GLB,, | Performed by: INTERNAL MEDICINE

## 2022-12-12 PROCEDURE — 99214 OFFICE O/P EST MOD 30 MIN: CPT | Mod: S$GLB,,, | Performed by: INTERNAL MEDICINE

## 2022-12-12 PROCEDURE — 3008F PR BODY MASS INDEX (BMI) DOCUMENTED: ICD-10-PCS | Mod: CPTII,S$GLB,, | Performed by: INTERNAL MEDICINE

## 2022-12-12 PROCEDURE — 1101F PR PT FALLS ASSESS DOC 0-1 FALLS W/OUT INJ PAST YR: ICD-10-PCS | Mod: CPTII,S$GLB,, | Performed by: INTERNAL MEDICINE

## 2022-12-12 PROCEDURE — 3066F NEPHROPATHY DOC TX: CPT | Mod: CPTII,S$GLB,, | Performed by: INTERNAL MEDICINE

## 2022-12-12 PROCEDURE — 3079F PR MOST RECENT DIASTOLIC BLOOD PRESSURE 80-89 MM HG: ICD-10-PCS | Mod: CPTII,S$GLB,, | Performed by: INTERNAL MEDICINE

## 2022-12-12 PROCEDURE — 3072F PR LOW RISK FOR RETINOPATHY: ICD-10-PCS | Mod: CPTII,S$GLB,, | Performed by: INTERNAL MEDICINE

## 2022-12-12 PROCEDURE — 99999 PR PBB SHADOW E&M-EST. PATIENT-LVL IV: CPT | Mod: PBBFAC,,, | Performed by: INTERNAL MEDICINE

## 2022-12-12 PROCEDURE — 97110 THERAPEUTIC EXERCISES: CPT | Mod: 59,PO,CQ

## 2022-12-12 PROCEDURE — 3061F NEG MICROALBUMINURIA REV: CPT | Mod: CPTII,S$GLB,, | Performed by: INTERNAL MEDICINE

## 2022-12-12 PROCEDURE — 3008F BODY MASS INDEX DOCD: CPT | Mod: CPTII,S$GLB,, | Performed by: INTERNAL MEDICINE

## 2022-12-12 PROCEDURE — 1159F PR MEDICATION LIST DOCUMENTED IN MEDICAL RECORD: ICD-10-PCS | Mod: CPTII,S$GLB,, | Performed by: INTERNAL MEDICINE

## 2022-12-12 PROCEDURE — 3288F FALL RISK ASSESSMENT DOCD: CPT | Mod: CPTII,S$GLB,, | Performed by: INTERNAL MEDICINE

## 2022-12-12 PROCEDURE — 1160F RVW MEDS BY RX/DR IN RCRD: CPT | Mod: CPTII,S$GLB,, | Performed by: INTERNAL MEDICINE

## 2022-12-12 PROCEDURE — 3074F PR MOST RECENT SYSTOLIC BLOOD PRESSURE < 130 MM HG: ICD-10-PCS | Mod: CPTII,S$GLB,, | Performed by: INTERNAL MEDICINE

## 2022-12-12 PROCEDURE — 1126F AMNT PAIN NOTED NONE PRSNT: CPT | Mod: CPTII,S$GLB,, | Performed by: INTERNAL MEDICINE

## 2022-12-12 NOTE — PROGRESS NOTES
Subjective:    Patient ID:  Deb Webb is a 72 y.o. female who presents for follow-up of Hypertension and Atrial fibrillation with rapid ventricular response      HPI  She comes with no complaints, no chest pain, no shortness of breath  No cardiac complaints at this time    Review of Systems   Constitutional: Negative for decreased appetite, malaise/fatigue, weight gain and weight loss.   Cardiovascular:  Negative for chest pain, dyspnea on exertion, leg swelling, palpitations and syncope.   Respiratory:  Negative for cough and shortness of breath.    Gastrointestinal: Negative.    Neurological:  Negative for weakness.   All other systems reviewed and are negative.     Objective:      Physical Exam  Vitals and nursing note reviewed.   Constitutional:       Appearance: Normal appearance. She is well-developed.   HENT:      Head: Normocephalic.   Eyes:      Pupils: Pupils are equal, round, and reactive to light.   Neck:      Thyroid: No thyromegaly.      Vascular: No carotid bruit or JVD.   Cardiovascular:      Rate and Rhythm: Normal rate and regular rhythm.      Chest Wall: PMI is not displaced.      Pulses: Normal pulses and intact distal pulses.      Heart sounds: Normal heart sounds. No murmur heard.    No gallop.   Pulmonary:      Effort: Pulmonary effort is normal.      Breath sounds: Normal breath sounds.   Abdominal:      Palpations: Abdomen is soft. There is no mass.      Tenderness: There is no abdominal tenderness.   Musculoskeletal:         General: Normal range of motion.      Cervical back: Normal range of motion and neck supple.   Skin:     General: Skin is warm.   Neurological:      Mental Status: She is alert and oriented to person, place, and time.      Sensory: No sensory deficit.      Deep Tendon Reflexes: Reflexes are normal and symmetric.       Assessment:       1. Near syncope    2. Pulmonary hypertension    3. PAF (paroxysmal atrial fibrillation)         Plan:     Continue all cardiac  medications  Regular exercise program   six-month follow-up with Bessie Watts

## 2022-12-12 NOTE — PROGRESS NOTES
"[facility]   Physical Therapy Treatment Note     Name: Deb Webb  Clinic Number: [id number]    Therapy Diagnosis:   Encounter Diagnoses   Name Primary?    Pain in joint of right hand Yes    Deformity, hand, right              Physician: Triston Valverde MD  Physician Orders: PT Eval and Treat  Medical Diagnosis from Referral: Closed displaced fracture of left pubis with routine healing  Evaluation Date: 05/16/2022  Authorization Period Expiration: 11/11/2022  Plan of Care Expiration: 12/31/2022  Progress Note Due: 12/18/2022  Visit # / Visits authorized: 20/21    PTA Visit #: 1/5     Time In: 9:55  Time Out: 10:35  Total Billable Time: 38    SUBJECTIVE     Pt reports: She was doing ok today although, she felt a little weak.  Response to previous treatment: fatigue  Functional change: TBD    Pain: 4/10  Location: (R) hands     OBJECTIVE     Objective Measures updated at progress report unless specified.     Treatment     Deb received the treatments listed below:      Therapeutic exercises to develop strength, endurance, ROM, flexibility, posture and core stabilization for 40 minutes including:  Recumbent bike 10' L2  LAQ  3 x 10 each leg 2" hold  Supine clamshell BTB 3x10  Hip adduction with ball 3x10  Sit to stand 1 x 15   Seated HS curls RTB 2 x 10   Glute set 3x10 5s hold  SAQ on large bolster 3 x 10 5'' hold 2#    Neuromuscular re-education activities to improve: Balance, Coordination, Kinesthetic, Sense and Proprioception for 00 minutes. The following activities were included:  Tandem 3x 30s each leg  Standing taps to 4" box  2 x 10 each leg    NP:  NBOS on foam 3 x 30s  Wide stance on foam with head turns    Patient Education and Home Exercises      Home Exercises Provided and Patient Education Provided     Education provided:   - HEP, reprinted for compliance    Written Home Exercises Provided: Patient instructed to cont prior HEP. Exercises were reviewed and Deb " was able to demonstrate them prior to the end of the session.  Deb demonstrated good  understanding of the education provided. See EMR under Patient Instructions for exercises provided during therapy sessions    ASSESSMENT     Deb needed vc for forward lean to help with standing today. Pt could progress to 1# weight on LAQ next session due to easy for pt.  She will continue to benefit from progression of LE strengthening exercises and balance training.     Deb Is progressing well towards her goals.   Pt prognosis is Good.     Pt will continue to benefit from skilled outpatient physical therapy to address the deficits listed in the problem list box on initial evaluation, provide pt/family education and to maximize pt's level of independence in the home and community environment.     Pt's spiritual, cultural and educational needs considered and pt agreeable to plan of care and goals.     Anticipated barriers to physical therapy: None    Goals:  Short-Term Goals: 4 weeks  - The patient will be independent with initial home exercise program. (Not met, progressing)  - The patient will increase strength to at least 4-/5 in muscles tested to indicate improvements in functional strength. (Met)     Long-Term Goals: 8 weeks  - The patient will increase strength to at least 4/5 to perform functional mobility including sit to stand, steps/curbs, and walking (Not met, progressing)  - The patient will increase ROM to WFL to perform ADL, vocational, and recreational tasks without pain. (Met)    PLAN     Continue with balance exercises and LE strengthening    Evie Rene, DANIEL

## 2022-12-15 DIAGNOSIS — M20.021 BOUTONNIERE DEFORMITY OF FINGER OF RIGHT HAND: Primary | ICD-10-CM

## 2022-12-19 ENCOUNTER — HOSPITAL ENCOUNTER (OUTPATIENT)
Dept: RADIOLOGY | Facility: HOSPITAL | Age: 72
Discharge: HOME | End: 2022-12-19
Attending: PHYSICIAN ASSISTANT
Payer: MEDICARE

## 2022-12-19 ENCOUNTER — OFFICE VISIT (OUTPATIENT)
Dept: ORTHOPEDICS | Facility: CLINIC | Age: 72
End: 2022-12-19
Payer: MEDICARE

## 2022-12-19 ENCOUNTER — CLINICAL SUPPORT (OUTPATIENT)
Dept: REHABILITATION | Facility: HOSPITAL | Age: 72
End: 2022-12-19
Attending: INTERNAL MEDICINE
Payer: MEDICARE

## 2022-12-19 VITALS — HEIGHT: 59 IN | WEIGHT: 97.69 LBS | BODY MASS INDEX: 19.69 KG/M2

## 2022-12-19 DIAGNOSIS — M25.541 PAIN IN JOINT OF RIGHT HAND: Primary | ICD-10-CM

## 2022-12-19 DIAGNOSIS — S62.634A CLOSED MALLET FRACTURE OF DISTAL PHALANX OF RIGHT RING FINGER: ICD-10-CM

## 2022-12-19 DIAGNOSIS — M20.021 BOUTONNIERE DEFORMITY OF FINGER OF RIGHT HAND: ICD-10-CM

## 2022-12-19 DIAGNOSIS — M21.941 DEFORMITY, HAND, RIGHT: ICD-10-CM

## 2022-12-19 DIAGNOSIS — M20.021 BOUTONNIERE DEFORMITY OF FINGER OF RIGHT HAND: Primary | ICD-10-CM

## 2022-12-19 DIAGNOSIS — R29.898 WEAKNESS OF BOTH HIPS: Primary | ICD-10-CM

## 2022-12-19 PROCEDURE — 4010F PR ACE/ARB THEARPY RXD/TAKEN: ICD-10-PCS | Mod: CPTII,S$GLB,, | Performed by: PHYSICIAN ASSISTANT

## 2022-12-19 PROCEDURE — 3061F PR NEG MICROALBUMINURIA RESULT DOCUMENTED/REVIEW: ICD-10-PCS | Mod: CPTII,S$GLB,, | Performed by: PHYSICIAN ASSISTANT

## 2022-12-19 PROCEDURE — 73140 X-RAY EXAM OF FINGER(S): CPT | Mod: 26,RT,, | Performed by: RADIOLOGY

## 2022-12-19 PROCEDURE — 3066F NEPHROPATHY DOC TX: CPT | Mod: CPTII,S$GLB,, | Performed by: PHYSICIAN ASSISTANT

## 2022-12-19 PROCEDURE — 3051F PR MOST RECENT HEMOGLOBIN A1C LEVEL 7.0 - < 8.0%: ICD-10-PCS | Mod: CPTII,S$GLB,, | Performed by: PHYSICIAN ASSISTANT

## 2022-12-19 PROCEDURE — 3051F HG A1C>EQUAL 7.0%<8.0%: CPT | Mod: CPTII,S$GLB,, | Performed by: PHYSICIAN ASSISTANT

## 2022-12-19 PROCEDURE — 3288F PR FALLS RISK ASSESSMENT DOCUMENTED: ICD-10-PCS | Mod: CPTII,S$GLB,, | Performed by: PHYSICIAN ASSISTANT

## 2022-12-19 PROCEDURE — 1101F PT FALLS ASSESS-DOCD LE1/YR: CPT | Mod: CPTII,S$GLB,, | Performed by: PHYSICIAN ASSISTANT

## 2022-12-19 PROCEDURE — 3066F PR DOCUMENTATION OF TREATMENT FOR NEPHROPATHY: ICD-10-PCS | Mod: CPTII,S$GLB,, | Performed by: PHYSICIAN ASSISTANT

## 2022-12-19 PROCEDURE — 73140 X-RAY EXAM OF FINGER(S): CPT | Mod: TC,PO,RT

## 2022-12-19 PROCEDURE — 1125F AMNT PAIN NOTED PAIN PRSNT: CPT | Mod: CPTII,S$GLB,, | Performed by: PHYSICIAN ASSISTANT

## 2022-12-19 PROCEDURE — 1111F PR DISCHARGE MEDS RECONCILED W/ CURRENT OUTPATIENT MED LIST: ICD-10-PCS | Mod: CPTII,S$GLB,, | Performed by: PHYSICIAN ASSISTANT

## 2022-12-19 PROCEDURE — 99213 OFFICE O/P EST LOW 20 MIN: CPT | Mod: S$GLB,,, | Performed by: PHYSICIAN ASSISTANT

## 2022-12-19 PROCEDURE — 99999 PR PBB SHADOW E&M-EST. PATIENT-LVL IV: CPT | Mod: PBBFAC,,, | Performed by: PHYSICIAN ASSISTANT

## 2022-12-19 PROCEDURE — 1160F PR REVIEW ALL MEDS BY PRESCRIBER/CLIN PHARMACIST DOCUMENTED: ICD-10-PCS | Mod: CPTII,S$GLB,, | Performed by: PHYSICIAN ASSISTANT

## 2022-12-19 PROCEDURE — 3288F FALL RISK ASSESSMENT DOCD: CPT | Mod: CPTII,S$GLB,, | Performed by: PHYSICIAN ASSISTANT

## 2022-12-19 PROCEDURE — 3008F PR BODY MASS INDEX (BMI) DOCUMENTED: ICD-10-PCS | Mod: CPTII,S$GLB,, | Performed by: PHYSICIAN ASSISTANT

## 2022-12-19 PROCEDURE — 97110 THERAPEUTIC EXERCISES: CPT | Mod: PO,CQ

## 2022-12-19 PROCEDURE — 4010F ACE/ARB THERAPY RXD/TAKEN: CPT | Mod: CPTII,S$GLB,, | Performed by: PHYSICIAN ASSISTANT

## 2022-12-19 PROCEDURE — 3072F PR LOW RISK FOR RETINOPATHY: ICD-10-PCS | Mod: CPTII,S$GLB,, | Performed by: PHYSICIAN ASSISTANT

## 2022-12-19 PROCEDURE — 1159F PR MEDICATION LIST DOCUMENTED IN MEDICAL RECORD: ICD-10-PCS | Mod: CPTII,S$GLB,, | Performed by: PHYSICIAN ASSISTANT

## 2022-12-19 PROCEDURE — 97763 ORTHC/PROSTC MGMT SBSQ ENC: CPT | Mod: PO

## 2022-12-19 PROCEDURE — 73140 XR FINGER 2 OR MORE VIEWS RIGHT: ICD-10-PCS | Mod: 26,RT,, | Performed by: RADIOLOGY

## 2022-12-19 PROCEDURE — 1101F PR PT FALLS ASSESS DOC 0-1 FALLS W/OUT INJ PAST YR: ICD-10-PCS | Mod: CPTII,S$GLB,, | Performed by: PHYSICIAN ASSISTANT

## 2022-12-19 PROCEDURE — 1159F MED LIST DOCD IN RCRD: CPT | Mod: CPTII,S$GLB,, | Performed by: PHYSICIAN ASSISTANT

## 2022-12-19 PROCEDURE — 1125F PR PAIN SEVERITY QUANTIFIED, PAIN PRESENT: ICD-10-PCS | Mod: CPTII,S$GLB,, | Performed by: PHYSICIAN ASSISTANT

## 2022-12-19 PROCEDURE — 1111F DSCHRG MED/CURRENT MED MERGE: CPT | Mod: CPTII,S$GLB,, | Performed by: PHYSICIAN ASSISTANT

## 2022-12-19 PROCEDURE — 3072F LOW RISK FOR RETINOPATHY: CPT | Mod: CPTII,S$GLB,, | Performed by: PHYSICIAN ASSISTANT

## 2022-12-19 PROCEDURE — 99213 PR OFFICE/OUTPT VISIT, EST, LEVL III, 20-29 MIN: ICD-10-PCS | Mod: S$GLB,,, | Performed by: PHYSICIAN ASSISTANT

## 2022-12-19 PROCEDURE — 3008F BODY MASS INDEX DOCD: CPT | Mod: CPTII,S$GLB,, | Performed by: PHYSICIAN ASSISTANT

## 2022-12-19 PROCEDURE — 1160F RVW MEDS BY RX/DR IN RCRD: CPT | Mod: CPTII,S$GLB,, | Performed by: PHYSICIAN ASSISTANT

## 2022-12-19 PROCEDURE — 99999 PR PBB SHADOW E&M-EST. PATIENT-LVL IV: ICD-10-PCS | Mod: PBBFAC,,, | Performed by: PHYSICIAN ASSISTANT

## 2022-12-19 PROCEDURE — 3061F NEG MICROALBUMINURIA REV: CPT | Mod: CPTII,S$GLB,, | Performed by: PHYSICIAN ASSISTANT

## 2022-12-19 NOTE — PROGRESS NOTES
Occupational Therapy Daily Treatment Note     Visit Date: 12/19/2022  Name: Deb Webb  Clinic Number: [id number]    Therapy Diagnosis:   Encounter Diagnoses   Name Primary?    Pain in joint of right hand Yes    Deformity, hand, right      Physician: Dandre Odell PA-C    Orders:   Patient had a fall approximately 11 days ago, which injured her right hand/fingers.  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  Patient requires occupational therapy to be fitted for a thermoplastic orthosis extension splint of the PIP joint of the right middle finger, and possibly a orthosis extension splint of the D IP joint of the right ring finger.       Duration:  6 weeks      Frequency:  P.r.n.      Please fit the patient for a thermoplastic orthosis extension splint of the PIP joint of the right middle finger, and possibly a thermoplastic orthosis extension splint of the DIP joint of the right ring finger.  Thanks!  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 Closed mallet fracture of distal phalanx of right ring finger  Ambulatory referral/consult to Physical/Occupational Therapy       2. 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 Pain in joint of right hand          4. Deformity, hand, right             (and resulting condition that requires a custom orthosis)      Start time: 1140  End Time: 1210  Total Time: 30 min      Pain: 0/10   OBJECTIVE:      Observations:  skin intact R hand      Sensation:  Intact in all autonomous zones      Range of Motion :     Strength (in pounds): Deferred                                        Circumferential Edema Measurements (in cm): Deferred                                            ASSESSMENT:   Type of custom fabricated Orthosis:   Pt reports she discarded her splint earlier today because it had gotten soiled.    Static R long finger PIP ext splint for 30 min Orthotic check out for new PIP ext splint for R long finger..       Purpose of orthosis:   To decrease deformity/joint contracture      Quality of Fit of orthotic fabricated:    Good Fit     Rehab Potential: good     Short Term/Long term goal (to be met by end of session today)  1) Patient to be IND with orthotic use,donning/doffing, wear and care precautions and recommended Home program to maximize results. (MET)     PLAN:    Recommend Occupational therapy for 1-3 visits to allow for custom splint fabrication and home program instruction during the  Certification period of 11/21/2022 to 2/19/2023 in pursuit of OT goals and for orthosis modification/adjustments as needed.       Treatment to include orthotic fabrication/fitting/training, orthotic modification/adjustment as needed, and HEP

## 2022-12-19 NOTE — PROGRESS NOTES
12/19/2022    HPI:  Deb Webb is a 72 y.o. 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  States pain has slightly improved since last visit.  States pain can reach up to 7/10.  States pain is worse with activity.  States pain is better with rest.  States she has had trouble wearing her splints as instructed, as the splints would get very dirty.  Denies any other complaints this time.    PMHX:  Past Medical History:   Diagnosis Date    Allergy     Arthritis     Cataract     Colon polyps     COPD (chronic obstructive pulmonary disease)     Diabetes mellitus type II     Diabetic neuropathy     Fever blister     Glaucoma (increased eye pressure)     Hyperlipidemia     Hypertension     Irritable bowel syndrome     Keloid cicatrix     Osteoporosis     Retained cholelithiasis following cholecystectomy(997.41)     Right patella fracture        PSHX:  Past Surgical History:   Procedure Laterality Date    BACK SURGERY  2006    CATARACT EXTRACTION W/  INTRAOCULAR LENS IMPLANT Bilateral     CHOLECYSTECTOMY      Laparoscopic    COLONOSCOPY      COLONOSCOPY N/A 8/31/2022    Procedure: COLONOSCOPY;  Surgeon: Salvatore Butler MD;  Location: [facility];  Service: Gastroenterology;  Laterality: N/A;    ESOPHAGEAL DILATION N/A 8/30/2022    Procedure: DILATION, ESOPHAGUS;  Surgeon: Salvatore Butler MD;  Location: [facility];  Service: Gastroenterology;  Laterality: N/A;    ESOPHAGOGASTRODUODENOSCOPY N/A 8/30/2022    Procedure: EGD (ESOPHAGOGASTRODUODENOSCOPY);  Surgeon: Salvatore Butler MD;  Location: [facility];  Service: Gastroenterology;  Laterality: N/A;    HERNIA REPAIR      HYSTERECTOMY      KNEE SURGERY Right 3/4/2015    Dr Weller     OOPHORECTOMY      ovarian tumor      Benign    TONSILLECTOMY, ADENOIDECTOMY         FMHX:  Family History   Problem Relation Age of Onset     Cancer Mother         Skin    Skin cancer Mother     Glaucoma Mother     Cataracts Mother     Diabetes Mother     Heart disease Father     Diabetes Father     Skin cancer Sister     Diabetes Sister     Diabetes Daughter     Breast cancer Maternal Aunt     Melanoma Neg Hx     Psoriasis Neg Hx     Eczema Neg Hx     Lupus Neg Hx     Amblyopia Neg Hx     Blindness Neg Hx     Macular degeneration Neg Hx     Retinal detachment Neg Hx     Strabismus Neg Hx        SOCHX:  Social History     Tobacco Use    Smoking status: Every Day     Packs/day: 0.50     Years: 40.00     Pack years: 20.00     Types: Cigarettes    Smokeless tobacco: Current   Substance Use Topics    Alcohol use: No       ALLERGIES:  Silicone and Adhesive    CURRENT MEDICATIONS:  Current Outpatient Medications on File Prior to Visit   Medication Sig Dispense Refill    albuterol (PROAIR HFA) 90 mcg/actuation inhaler Inhale 2 puffs into the lungs every 6 (six) hours as needed for Wheezing. Rescue 18 g 2    albuterol-ipratropium (DUO-NEB) 2.5 mg-0.5 mg/3 mL nebulizer solution Take 3 mLs by nebulization every 6 (six) hours as needed for Wheezing or Shortness of Breath. Rescue 75 mL 0    alendronate (FOSAMAX) 70 MG tablet Take 1 tablet (70 mg total) by mouth once a week. 12 tablet 3    amiodarone (PACERONE) 200 MG Tab Take 2 tabs by mouth twice daily until 12/2/22 then take 1 tab by mouth twice daily 90 tablet 1    aspirin (ECOTRIN) 81 MG EC tablet Take 1 tablet (81 mg total) by mouth once daily.  0    atorvastatin (LIPITOR) 40 MG tablet TAKE 1 TABLET BY MOUTH EVERY DAY 90 tablet 1    blood sugar diagnostic Strp To check BG 2 times daily, to use with insurance preferred meter 200 each 3    blood-glucose meter kit To check BG 2 times daily, to use with insurance preferred meter 1 each 0    blood-glucose meter,continuous (DEXCOM G6 ) Misc Use as directed 1 each 1    blood-glucose sensor (DEXCOM G6 SENSOR) Emmie Use as directed 10 each 2    calcium  carbonate/vitamin D3 (CALCIUM 600 + D,3, ORAL) Take 1 tablet by mouth once daily.      cholestyramine-aspartame (PREVALITE) 4 gram PwPk TAKE 1 PACKET (4 G TOTAL) BY MOUTH 2 (TWO) TIMES DAILY. FOR DIARRHEA 180 packet 0    citalopram (CELEXA) 20 MG tablet Take 1 tablet (20 mg total) by mouth once daily. 90 tablet 1    dulaglutide (TRULICITY) 1.5 mg/0.5 mL pen injector Inject 1.5 mg into the skin every 7 days. Through patient assistance 12 pen 3    furosemide (LASIX) 20 MG tablet Take 1 tablet (20 mg total) by mouth as needed (swelling).      gabapentin (NEURONTIN) 300 MG capsule TAKE 1 CAPSULE BY MOUTH THREE TIMES A  capsule 1    guaiFENesin (MUCINEX) 600 mg 12 hr tablet Take 1 tablet (600 mg total) by mouth 2 (two) times daily.      HYDROcodone-acetaminophen (NORCO) 5-325 mg per tablet Take 1 tablet by mouth every 12 (twelve) hours as needed for Pain. 10 tablet 0    ipratropium (ATROVENT) 42 mcg (0.06 %) nasal spray 2 sprays by Nasal route 3 (three) times daily. Use 3 times per day if necessary for chronic nasal drip 15 mL 12    Lactobacillus rhamnosus GG (CULTURELLE) 10 billion cell capsule Take 1 capsule by mouth once daily. 60 capsule 0    lancets Misc To check BG 2 times daily, to use with insurance preferred meter 200 each 3    latanoprost 0.005 % ophthalmic solution INSTILL 1 DROP INTO BOTH EYES EVERY EVENING (Patient taking differently: Place 1 drop into both eyes every evening.) 7.5 mL 1    losartan (COZAAR) 25 MG tablet Take 0.5 tablets (12.5 mg total) by mouth once daily. 30 tablet 1    magnesium oxide (MAG-OX) 400 mg (241.3 mg magnesium) tablet Take 1 tablet (400 mg total) by mouth once daily. 30 tablet 0    meclizine (ANTIVERT) 12.5 mg tablet Take 1 tablet (12.5 mg total) by mouth 3 (three) times daily as needed for Dizziness. 30 tablet 0    omega-3 fatty acids-vitamin E 1,000 mg Cap Take 1 capsule by mouth once daily.      omeprazole (PRILOSEC) 40 MG capsule TAKE 1 CAPSULE (40 MG TOTAL) BY MOUTH  "BEFORE BREAKFAST. 90 capsule 1    pen needle, diabetic (BD ULTRA-FINE AGUS PEN NEEDLE) 32 gauge x 5/32" Ndle 1 Device by Misc.(Non-Drug; Combo Route) route 2 (two) times daily. 200 each 4    pramoxine-hydrocortisone (PROCTOCREAM-HC) 1-1 % rectal cream Place rectally 2 (two) times daily. 30 g 0    predniSONE (DELTASONE) 10 MG tablet Take 2 tabs daily by mouth for 5 days then 1 tab daily for 5 days 15 tablet 0    primidone (MYSOLINE) 50 MG Tab TAKE 2 TABLETS BY MOUTH 3 (THREE) TIMES DAILY. ONE HALF TABLET FOR ONE WEEK, THEN AS PRESCRIBED 540 tablet 3    rivaroxaban (XARELTO) 20 mg Tab Take 1 tablet (20 mg total) by mouth daily with dinner or evening meal. 30 tablet 6    umeclidinium-vilanteroL (ANORO ELLIPTA) 62.5-25 mcg/actuation DsDv Inhale 1 puff into the lungs once daily. Controller 1 each 5    [DISCONTINUED] irbesartan (AVAPRO) 75 MG tablet Take 1 tablet (75 mg total) by mouth once daily. 90 tablet 1    [DISCONTINUED] loratadine (CLARITIN) 10 mg tablet TAKE 1 TABLET (10 MG TOTAL) BY MOUTH DAILY AS NEEDED FOR ALLERGIES (OR RUNNY NOSE). 90 tablet 1     No current facility-administered medications on file prior to visit.       REVIEW OF SYSTEMS:  Review of Systems Complete; Negative, unless noted above.    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  Presence of a mallet finger of the right ring finger that is not corrected with the oval 8 extension splint, as it has slid down the finger and is not in the appropriate position.  Presence of mild edema over the dorsum of the PIP joint of the right middle finger.  No erythema, ecchymosis, or skin breakdown.  No significant tenderness to palpation of the wrist.  Strength not tested secondary to injury.  " Sensation is grossly intact in the radial, ulnar, median nerve distributions.  Capillary refill less than 2 seconds in all fingers.    RADIOLOGY:   X-rays of the fingers of the right hand were taken today in clinic.  X-rays were reviewed by myself.  Imaging showed the presence of an avulsion fracture off the dorsal base of the middle phalanx of the right middle finger with no significant change when compared to previous imaging.  Presence of a mallet finger fracture of the distal phalanx of the right ring finger.  Presence of degenerative changes throughout the IP joints of the fingers of the right hand.  No other significant bony abnormalities noted.    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 I discussed with Deb Webb the importance of wearing the oval 8 extension splint and PIP joint orthosis extension splint at all times only to be removed for cleaning of the fingers.  We did discuss the importance of keeping both the PIP joint of the right middle finger and D IP joint of the right ring finger in extension at all times to allow the fracture/injuries to heal appropriately.  We also discussed the importance of having continued limited weight-bearing of the right hand/arm until seen again in clinic.  She verbally agreed with the treatment plan.      2. I would like her follow up in clinic in 2 weeks at which time we will perform repeat x-rays of the right middle and right ring fingers.  She was instructed to contact the clinic for any problems or concerns in the interim.

## 2022-12-19 NOTE — PROGRESS NOTES
"  [facility]   Physical Therapy Treatment Note     Name: Deb Webb  Clinic Number: [id number]    Therapy Diagnosis:   Encounter Diagnosis   Name Primary?    Weakness of both hips Yes       Physician: Triston Valverde MD  Physician Orders: PT Eval and Treat  Medical Diagnosis from Referral: Closed displaced fracture of left pubis with routine healing  Evaluation Date: 05/16/2022  Authorization Period Expiration: 11/11/2022  Plan of Care Expiration: 12/31/2022  Progress Note Due: 12/18/2022  Visit # / Visits authorized: 21/21    PTA Visit #: 2/5     Time In: 10:00  Time Out: 10:45  Total Billable Time: 38    SUBJECTIVE     Pt reports: She was doing ok today although, she felt a little weak.  Response to previous treatment: fatigue  Functional change: TBD    Pain: 4/10  Location: (R) hands     OBJECTIVE     Objective Measures updated at progress report unless specified.     Treatment     Deb received the treatments listed below:      Therapeutic exercises to develop strength, endurance, ROM, flexibility, posture and core stabilization for 40 minutes including:  Recumbent bike 10' L2  LAQ  3 x 10 each leg 2" hold  Supine clamshell BTB 3x10  Hip adduction with ball 3x10  Sit to stand 1 x 15   Seated HS curls RTB 2 x 10   Glute set 3x10 5s hold  SAQ on large bolster 3 x 10 5'' hold 2#  Bridging 2 x 10x    Neuromuscular re-education activities to improve: Balance, Coordination, Kinesthetic, Sense and Proprioception for 00 minutes. The following activities were included:  Tandem 3x 30s each leg  Standing taps to 4" box  2 x 10 each leg    NP:  NBOS on foam 3 x 30s  Wide stance on foam with head turns    Patient Education and Home Exercises      Home Exercises Provided and Patient Education Provided     Education provided:   - HEP, reprinted for compliance    Written Home Exercises Provided: Patient instructed to cont prior HEP. Exercises were reviewed and Deb was able to " demonstrate them prior to the end of the session.  Deb demonstrated good  understanding of the education provided. See EMR under Patient Instructions for exercises provided during therapy sessions    ASSESSMENT     Deb a little unstable with balance activities today and required min A due to LOB. Added 2# to SAQ with no complaints.  She will continue to benefit from progression of LE strengthening exercises and balance training.     Deb Is progressing well towards her goals.   Pt prognosis is Good.     Pt will continue to benefit from skilled outpatient physical therapy to address the deficits listed in the problem list box on initial evaluation, provide pt/family education and to maximize pt's level of independence in the home and community environment.     Pt's spiritual, cultural and educational needs considered and pt agreeable to plan of care and goals.     Anticipated barriers to physical therapy: None    Goals:  Short-Term Goals: 4 weeks  - The patient will be independent with initial home exercise program. (Not met, progressing)  - The patient will increase strength to at least 4-/5 in muscles tested to indicate improvements in functional strength. (Met)     Long-Term Goals: 8 weeks  - The patient will increase strength to at least 4/5 to perform functional mobility including sit to stand, steps/curbs, and walking (Not met, progressing)  - The patient will increase ROM to WFL to perform ADL, vocational, and recreational tasks without pain. (Met)    PLAN     Continue with balance exercises and LE strengthening    Evie Rene, DANIEL
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
## 2023-01-05 DIAGNOSIS — M20.021 BOUTONNIERE DEFORMITY OF FINGER OF RIGHT HAND: Primary | ICD-10-CM

## 2023-01-09 ENCOUNTER — HOSPITAL ENCOUNTER (OUTPATIENT)
Dept: RADIOLOGY | Facility: HOSPITAL | Age: 73
Discharge: HOME | End: 2023-01-09
Attending: PHYSICIAN ASSISTANT
Payer: MEDICARE

## 2023-01-09 ENCOUNTER — CLINICAL SUPPORT (OUTPATIENT)
Dept: REHABILITATION | Facility: HOSPITAL | Age: 73
End: 2023-01-09
Attending: INTERNAL MEDICINE
Payer: MEDICARE

## 2023-01-09 ENCOUNTER — OFFICE VISIT (OUTPATIENT)
Dept: ORTHOPEDICS | Facility: CLINIC | Age: 73
End: 2023-01-09
Payer: MEDICARE

## 2023-01-09 ENCOUNTER — HOSPITAL ENCOUNTER (OUTPATIENT)
Dept: RADIOLOGY | Facility: HOSPITAL | Age: 73
Discharge: HOME | End: 2023-01-09
Attending: INTERNAL MEDICINE
Payer: MEDICARE

## 2023-01-09 VITALS — BODY MASS INDEX: 19.56 KG/M2 | HEIGHT: 59 IN | WEIGHT: 97 LBS

## 2023-01-09 DIAGNOSIS — R29.898 WEAKNESS OF BOTH HIPS: Primary | ICD-10-CM

## 2023-01-09 DIAGNOSIS — Z12.31 ENCOUNTER FOR SCREENING MAMMOGRAM FOR BREAST CANCER: ICD-10-CM

## 2023-01-09 DIAGNOSIS — M25.541 PAIN IN JOINT OF RIGHT HAND: Primary | ICD-10-CM

## 2023-01-09 DIAGNOSIS — M20.021 BOUTONNIERE DEFORMITY OF FINGER OF RIGHT HAND: ICD-10-CM

## 2023-01-09 DIAGNOSIS — S62.634A CLOSED MALLET FRACTURE OF DISTAL PHALANX OF RIGHT RING FINGER: ICD-10-CM

## 2023-01-09 DIAGNOSIS — M20.021 BOUTONNIERE DEFORMITY OF FINGER OF RIGHT HAND: Primary | ICD-10-CM

## 2023-01-09 DIAGNOSIS — M21.941 DEFORMITY, HAND, RIGHT: ICD-10-CM

## 2023-01-09 PROCEDURE — 97110 THERAPEUTIC EXERCISES: CPT | Mod: PO

## 2023-01-09 PROCEDURE — 99999 PR PBB SHADOW E&M-EST. PATIENT-LVL IV: ICD-10-PCS | Mod: PBBFAC,,, | Performed by: PHYSICIAN ASSISTANT

## 2023-01-09 PROCEDURE — 3008F PR BODY MASS INDEX (BMI) DOCUMENTED: ICD-10-PCS | Mod: CPTII,S$GLB,, | Performed by: PHYSICIAN ASSISTANT

## 2023-01-09 PROCEDURE — 77067 SCR MAMMO BI INCL CAD: CPT | Mod: TC,PO

## 2023-01-09 PROCEDURE — 99214 PR OFFICE/OUTPT VISIT, EST, LEVL IV, 30-39 MIN: ICD-10-PCS | Mod: S$GLB,,, | Performed by: PHYSICIAN ASSISTANT

## 2023-01-09 PROCEDURE — 3072F LOW RISK FOR RETINOPATHY: CPT | Mod: CPTII,S$GLB,, | Performed by: PHYSICIAN ASSISTANT

## 2023-01-09 PROCEDURE — 1125F AMNT PAIN NOTED PAIN PRSNT: CPT | Mod: CPTII,S$GLB,, | Performed by: PHYSICIAN ASSISTANT

## 2023-01-09 PROCEDURE — 1101F PT FALLS ASSESS-DOCD LE1/YR: CPT | Mod: CPTII,S$GLB,, | Performed by: PHYSICIAN ASSISTANT

## 2023-01-09 PROCEDURE — 3288F PR FALLS RISK ASSESSMENT DOCUMENTED: ICD-10-PCS | Mod: CPTII,S$GLB,, | Performed by: PHYSICIAN ASSISTANT

## 2023-01-09 PROCEDURE — 1159F PR MEDICATION LIST DOCUMENTED IN MEDICAL RECORD: ICD-10-PCS | Mod: CPTII,S$GLB,, | Performed by: PHYSICIAN ASSISTANT

## 2023-01-09 PROCEDURE — 97112 NEUROMUSCULAR REEDUCATION: CPT | Mod: PO

## 2023-01-09 PROCEDURE — 73140 X-RAY EXAM OF FINGER(S): CPT | Mod: 26,RT,, | Performed by: RADIOLOGY

## 2023-01-09 PROCEDURE — 77067 MAMMO DIGITAL SCREENING BILAT WITH TOMO: ICD-10-PCS | Mod: 26,,, | Performed by: RADIOLOGY

## 2023-01-09 PROCEDURE — 77063 MAMMO DIGITAL SCREENING BILAT WITH TOMO: ICD-10-PCS | Mod: 26,,, | Performed by: RADIOLOGY

## 2023-01-09 PROCEDURE — 99214 OFFICE O/P EST MOD 30 MIN: CPT | Mod: S$GLB,,, | Performed by: PHYSICIAN ASSISTANT

## 2023-01-09 PROCEDURE — 3288F FALL RISK ASSESSMENT DOCD: CPT | Mod: CPTII,S$GLB,, | Performed by: PHYSICIAN ASSISTANT

## 2023-01-09 PROCEDURE — 1125F PR PAIN SEVERITY QUANTIFIED, PAIN PRESENT: ICD-10-PCS | Mod: CPTII,S$GLB,, | Performed by: PHYSICIAN ASSISTANT

## 2023-01-09 PROCEDURE — 3008F BODY MASS INDEX DOCD: CPT | Mod: CPTII,S$GLB,, | Performed by: PHYSICIAN ASSISTANT

## 2023-01-09 PROCEDURE — 3072F PR LOW RISK FOR RETINOPATHY: ICD-10-PCS | Mod: CPTII,S$GLB,, | Performed by: PHYSICIAN ASSISTANT

## 2023-01-09 PROCEDURE — 1160F PR REVIEW ALL MEDS BY PRESCRIBER/CLIN PHARMACIST DOCUMENTED: ICD-10-PCS | Mod: CPTII,S$GLB,, | Performed by: PHYSICIAN ASSISTANT

## 2023-01-09 PROCEDURE — 1101F PR PT FALLS ASSESS DOC 0-1 FALLS W/OUT INJ PAST YR: ICD-10-PCS | Mod: CPTII,S$GLB,, | Performed by: PHYSICIAN ASSISTANT

## 2023-01-09 PROCEDURE — L3933 FO W/O JOINTS CF: HCPCS | Mod: PO

## 2023-01-09 PROCEDURE — 73140 X-RAY EXAM OF FINGER(S): CPT | Mod: TC,PO,RT

## 2023-01-09 PROCEDURE — 1160F RVW MEDS BY RX/DR IN RCRD: CPT | Mod: CPTII,S$GLB,, | Performed by: PHYSICIAN ASSISTANT

## 2023-01-09 PROCEDURE — 77067 SCR MAMMO BI INCL CAD: CPT | Mod: 26,,, | Performed by: RADIOLOGY

## 2023-01-09 PROCEDURE — 77063 BREAST TOMOSYNTHESIS BI: CPT | Mod: 26,,, | Performed by: RADIOLOGY

## 2023-01-09 PROCEDURE — 73140 XR FINGER 2 OR MORE VIEWS RIGHT: ICD-10-PCS | Mod: 26,RT,, | Performed by: RADIOLOGY

## 2023-01-09 PROCEDURE — 1159F MED LIST DOCD IN RCRD: CPT | Mod: CPTII,S$GLB,, | Performed by: PHYSICIAN ASSISTANT

## 2023-01-09 PROCEDURE — 99999 PR PBB SHADOW E&M-EST. PATIENT-LVL IV: CPT | Mod: PBBFAC,,, | Performed by: PHYSICIAN ASSISTANT

## 2023-01-09 NOTE — PLAN OF CARE
[facility]  Physical Therapy Reassessment and Plan of Care Update    Name: Deb Webb  Clinic Number: [id number]    Therapy Diagnosis:   Encounter Diagnosis   Name Primary?    Weakness of both hips Yes       Physician: Triston Valverde MD    Physician Orders: PT Eval and Treat  Medical Diagnosis from Referral: Closed displaced fracture of left pubis with routine healing  Evaluation Date: 05/16/2022  Authorization Period Expiration: 12/31/2023  Plan of Care Expiration: 02/10/2023  Progress Note Due: 2/10/2023  Visit # / Visits authorized: 1/20       Precautions: Standard and fall    Subjective   Date of onset: 3 months  History of current condition - Deb reports she a few months ago and injured her ankle and then shortly after fell and fractured part of her pelvis. She has been cleared to put weight on her leg and just this morning was cleared to start walking without her walking boot. She currently lives at her [facility] but is independent with everything.    Reassessment: She has been doing great with strength. She still has trouble with her balance which is why she fell last week. Overall she thinks she's improved a lot, she's been able to walk around the block and she hadn't been able to do that before. She still wants to work on both her strength and balance.    Reassessment on 01/09/2023: Deb reports continued LE weakness and balance impairments. She notes she has been unable to attend PT due to holidays occurring on Mondays, which is the only day she has transportation for PT. She reports she is motivated to continue with her LE strengthening and prevent any falls. She notes continued hand pain from her fall and fracture.      Medical History:   Past Medical History:   Diagnosis Date    Allergy     Arthritis     Cataract     Colon polyps     COPD (chronic obstructive pulmonary disease)     Diabetes mellitus type II     Diabetic neuropathy     Fever blister      Glaucoma (increased eye pressure)     Hyperlipidemia     Hypertension     Irritable bowel syndrome     Keloid cicatrix     Osteoporosis     Retained cholelithiasis following cholecystectomy(997.41)     Right patella fracture        Surgical History:   Deb Webb  has a past surgical history that includes Cholecystectomy; Hysterectomy; ovarian tumor; Colonoscopy; TONSILLECTOMY, ADENOIDECTOMY; Hernia repair; Knee surgery (Right, 3/4/2015); Oophorectomy; Back surgery (2006); Cataract extraction w/  intraocular lens implant (Bilateral); Esophagogastroduodenoscopy (N/A, 8/30/2022); Esophageal dilation (N/A, 8/30/2022); and Colonoscopy (N/A, 8/31/2022).    Medications:   Deb has a current medication list which includes the following prescription(s): albuterol, albuterol-ipratropium, alendronate, amiodarone, aspirin, atorvastatin, blood sugar diagnostic, blood-glucose meter, dexcom g6 , dexcom g6 sensor, calcium carbonate/vitamin d3, cholestyramine-aspartame, citalopram, trulicity, furosemide, gabapentin, guaifenesin, hydrocodone-acetaminophen, ipratropium, lactobacillus rhamnosus gg, lancets, latanoprost, losartan, meclizine, omega-3 fatty acids-vitamin e, omeprazole, pen needle, diabetic, pramoxine-hydrocortisone, prednisone, primidone, rivaroxaban, anoro ellipta, [DISCONTINUED] irbesartan, and [DISCONTINUED] loratadine.    Allergies:   Review of patient's allergies indicates:   Allergen Reactions    Silicone      Burn skin    Adhesive Rash        Imaging, x-rays:  There is degenerative arthrosis of the medial compartments of both knees with mild joint space narrowing.  There is also degenerative arthrosis of the lateral compartment of the right knee with joint space narrowing.  There is chondrocalcinosis of the knee menisci consistent with CPPD.  No fracture or subluxation are identified.  There are postoperative changes of orthopedic fixation of a right patellar fracture.  There are no signs of  joint effusion.  There has been no significant change.    Prior Therapy: Yes, while at SNF immediatley after injury  Social History:  lives with their family (granddaughter and great-grandson)  Occupation: retired  Prior Level of Function: Ambulation with rollator or cane without pain  Current Level of Function: Ambulation with rollator only with some ankle and hip pain    Pain:  Current 2/10, worst 6/10, best 0/10   Location: left hip and ankle   Description: Dull  Aggravating Factors: Standing  Easing Factors: rest    Pts goals: Improve pain and function    Objective     Gait: Step to gait pattern with rollator    Hip Range of Motion:   Left active Left Passive   Flexion WFL WFL   Abduction WFL WFL   Adduction WFL WFL   Extension WFL WFL   ER @ 90 WFL WFL   IR @ 90 WFL WFL     Lower Extremity Strength  Right LE  Left LE    Knee extension: 4/5 Knee extension: 4/5   Knee flexion: 4/5 Knee flexion: 4-/5   Hip flexion: 4/5 Hip flexion: 4-/5   Hip Internal Rotation:  4/5 Hip Internal Rotation: 4/5   Hip External Rotation: 4/5 Hip External Rotation: 4/5   Hip extension:  4/5 Hip extension: 4/5   Hip abduction: 4-/5 Hip abduction: 4-/5   Hip adduction: 4-/5 Hip adduction 4-/5     TUG:   Trial 1: 50.05s  Trial 2: 57.58s  Trial 3: 49.10s  Avg= 52.24s    TREATMENT     See Daily Note    Assessment     Deb continues to demo increased instability with balance exercises and continued LE weakness. She has missed multiple visits due to the holidays, which has limited her progression with PT treatment. She has attended 24 visits since initial eval on 05/16/2022. She will continue to benefit from progressive strengthening and balance exercises to improve balance and functional mobility at home. Goals and plan of care updated.     Pt prognosis is Fair.   Pt will benefit from skilled outpatient Physical Therapy to address the deficits stated above and in the chart below, provide pt/family education, and to maximize pt's level of  independence.     Plan of care discussed with patient: Yes  Pt's spiritual, cultural and educational needs considered and patient is agreeable to the plan of care and goals as stated below:     Anticipated Barriers for therapy: None    Medical Necessity is demonstrated by the following  History  Co-morbidities and personal factors that may impact the plan of care Co-morbidities:   COPD, HTN, Osteoporosis    Personal Factors:   age     high   Examination  Body Structures and Functions, activity limitations and participation restrictions that may impact the plan of care Body Regions:   lower extremities  trunk    Body Systems:    gross symmetry  ROM  strength  gross coordinated movement  balance  gait  transfers    Participation Restrictions:   ADL    Activity limitations:   Learning and applying knowledge  no deficits    General Tasks and Commands  no deficits    Communication  no deficits    Mobility  no deficits    Self care  no deficits    Domestic Life  no deficits    Interactions/Relationships  no deficits    Life Areas  no deficits    Community and Social Life  no deficits         high   Clinical Presentation unstable clinical presentation with unpredictable characteristics high   Decision Making/ Complexity Score: high     Goals:  Short-Term Goals: 4 weeks  - The patient will be independent with initial home exercise program. (Not met, progressing)  - The patient will increase strength to at least 4-/5 in muscles tested to indicate improvements in functional strength. (Met)    Long-Term Goals: 8 weeks  - The patient will increase strength to at least 4/5 to perform functional mobility including sit to stand, steps/curbs, and walking (Not met, progressing)  - The patient will increase ROM to WFL to perform ADL, vocational, and recreational tasks without pain. (Progressing, not met)    Plan   Plan of care Certification: 1/9/2023 to 02/10/2023    Outpatient Physical Therapy 1 times weekly for 4 weeks to include  the following interventions: Manual Therapy, Neuromuscular Re-ed, Patient Education, Therapeutic Activities, and Therapeutic Exercise.     Scar Ward, PT

## 2023-01-09 NOTE — PLAN OF CARE
Occupational Therapy Daily Treatment Note     Visit Date: 1/9/2023  Name: Deb Webb  Clinic Number: [id number]    Therapy Diagnosis:   Encounter Diagnoses   Name Primary?    Pain in joint of right hand Yes    Deformity, hand, right        Physician: Dandre Odell PA-C    Orders:   Patient had a fall approximately 11 days ago, which injured her right hand/fingers.  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  Patient requires occupational therapy to be fitted for a thermoplastic orthosis extension splint of the PIP joint of the right middle finger, and possibly a orthosis extension splint of the D IP joint of the right ring finger.       Duration:  6 weeks      Frequency:  P.r.n.      Please fit the patient for a thermoplastic orthosis extension splint of the PIP joint of the right middle finger, and possibly a thermoplastic orthosis extension splint of the DIP joint of the right ring finger.  Thanks!  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 Closed mallet fracture of distal phalanx of right ring finger  Ambulatory referral/consult to Physical/Occupational Therapy       2. 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 Pain in joint of right hand          4. Deformity, hand, right             (and resulting condition that requires a custom orthosis)      Start time: 1151 (pt worked into [facility] early this date (appt was for 1215)  End Time: 1211  Total Time: 20 min      Pain: 0/10   OBJECTIVE:      Observations:  skin intact R hand      Sensation:  Intact in all autonomous zones      Range of Motion : -30 degree flexion deformity / correctable with PROM/splinting.     Strength (in pounds): Deferred                                         Circumferential Edema Measurements (in cm): Deferred                                           ASSESSMENT:   Type of custom fabricated Orthosis:   Pt has had some problems with oval 8 splint wear and custom splint for her R ring finger DIP indicated/desired at this time.    Static R ring finger DIP ext splint for 20 min      Purpose of orthosis:   To decrease deformity/joint contracture      Quality of Fit of orthotic fabricated:    Good Fit     Rehab Potential: good     Short Term/Long term goal (to be met by end of session today)  1) Patient to be IND with orthotic use,donning/doffing, wear and care precautions and recommended Home program to maximize results. (MET)     PLAN:    Recommend Occupational therapy for 1-3 additional visits to allow for custom splint fabrication and home program instruction during the  Certification period of 11/21/2022 to 2/19/2023 in pursuit of OT goals and for orthosis modification/adjustments as needed.       Treatment to include orthotic fabrication/fitting/training, orthotic modification/adjustment as needed, and HEP

## 2023-01-10 NOTE — PROGRESS NOTES
1/10/2023    HPI:  Deb Webb is a 72 y.o. female, who presents to clinic today for continued evaluation of her boutonniere deformity with associated fracture of the right middle finger mallet finger fracture of the right ring finger.  She is approximately 6 weeks status post injury.  States pain continues to be 7/10.  States she is tried her best to keep her splints on appropriately.  States she continues to have trouble keeping him in the appropriate position.  Denies any other complaints at this time.    PMHX:  Past Medical History:   Diagnosis Date    Allergy     Arthritis     Cataract     Colon polyps     COPD (chronic obstructive pulmonary disease)     Diabetes mellitus type II     Diabetic neuropathy     Fever blister     Glaucoma (increased eye pressure)     Hyperlipidemia     Hypertension     Irritable bowel syndrome     Keloid cicatrix     Osteoporosis     Retained cholelithiasis following cholecystectomy(997.41)     Right patella fracture        PSHX:  Past Surgical History:   Procedure Laterality Date    BACK SURGERY  2006    CATARACT EXTRACTION W/  INTRAOCULAR LENS IMPLANT Bilateral     CHOLECYSTECTOMY      Laparoscopic    COLONOSCOPY      COLONOSCOPY N/A 8/31/2022    Procedure: COLONOSCOPY;  Surgeon: Salvatore Butler MD;  Location: [facility];  Service: Gastroenterology;  Laterality: N/A;    ESOPHAGEAL DILATION N/A 8/30/2022    Procedure: DILATION, ESOPHAGUS;  Surgeon: Salvatore Butler MD;  Location: [facility];  Service: Gastroenterology;  Laterality: N/A;    ESOPHAGOGASTRODUODENOSCOPY N/A 8/30/2022    Procedure: EGD (ESOPHAGOGASTRODUODENOSCOPY);  Surgeon: Salvatore Butler MD;  Location: [facility];  Service: Gastroenterology;  Laterality: N/A;    HERNIA REPAIR      HYSTERECTOMY      KNEE SURGERY Right 3/4/2015    Dr Weller     OOPHORECTOMY      ovarian tumor      Benign    TONSILLECTOMY, ADENOIDECTOMY         FMHX:  Family History   Problem Relation Age of Onset    Cancer Mother         Skin     Skin cancer Mother     Glaucoma Mother     Cataracts Mother     Diabetes Mother     Heart disease Father     Diabetes Father     Skin cancer Sister     Diabetes Sister     Diabetes Daughter     Breast cancer Maternal Aunt     Melanoma Neg Hx     Psoriasis Neg Hx     Eczema Neg Hx     Lupus Neg Hx     Amblyopia Neg Hx     Blindness Neg Hx     Macular degeneration Neg Hx     Retinal detachment Neg Hx     Strabismus Neg Hx        SOCHX:  Social History     Tobacco Use    Smoking status: Every Day     Packs/day: 0.50     Years: 40.00     Pack years: 20.00     Types: Cigarettes    Smokeless tobacco: Current   Substance Use Topics    Alcohol use: No       ALLERGIES:  Silicone and Adhesive    CURRENT MEDICATIONS:  Current Outpatient Medications on File Prior to Visit   Medication Sig Dispense Refill    albuterol (PROAIR HFA) 90 mcg/actuation inhaler Inhale 2 puffs into the lungs every 6 (six) hours as needed for Wheezing. Rescue 18 g 2    albuterol-ipratropium (DUO-NEB) 2.5 mg-0.5 mg/3 mL nebulizer solution Take 3 mLs by nebulization every 6 (six) hours as needed for Wheezing or Shortness of Breath. Rescue 75 mL 0    alendronate (FOSAMAX) 70 MG tablet Take 1 tablet (70 mg total) by mouth once a week. 12 tablet 3    amiodarone (PACERONE) 200 MG Tab Take 2 tabs by mouth twice daily until 12/2/22 then take 1 tab by mouth twice daily 90 tablet 1    aspirin (ECOTRIN) 81 MG EC tablet Take 1 tablet (81 mg total) by mouth once daily.  0    atorvastatin (LIPITOR) 40 MG tablet TAKE 1 TABLET BY MOUTH EVERY DAY 90 tablet 1    blood sugar diagnostic Strp To check BG 2 times daily, to use with insurance preferred meter 200 each 3    blood-glucose meter,continuous (DEXCOM G6 ) Misc Use as directed 1 each 1    blood-glucose sensor (DEXCOM G6 SENSOR) Emmie Use as directed 10 each 2    calcium carbonate/vitamin D3 (CALCIUM 600 + D,3, ORAL) Take 1 tablet by mouth once daily.      cholestyramine-aspartame (PREVALITE) 4 gram PwPk  "TAKE 1 PACKET (4 G TOTAL) BY MOUTH 2 (TWO) TIMES DAILY. FOR DIARRHEA 180 packet 0    citalopram (CELEXA) 20 MG tablet Take 1 tablet (20 mg total) by mouth once daily. 90 tablet 1    dulaglutide (TRULICITY) 1.5 mg/0.5 mL pen injector Inject 1.5 mg into the skin every 7 days. Through patient assistance 12 pen 3    furosemide (LASIX) 20 MG tablet Take 1 tablet (20 mg total) by mouth as needed (swelling).      gabapentin (NEURONTIN) 300 MG capsule TAKE 1 CAPSULE BY MOUTH THREE TIMES A  capsule 1    guaiFENesin (MUCINEX) 600 mg 12 hr tablet Take 1 tablet (600 mg total) by mouth 2 (two) times daily.      HYDROcodone-acetaminophen (NORCO) 5-325 mg per tablet Take 1 tablet by mouth every 12 (twelve) hours as needed for Pain. 10 tablet 0    ipratropium (ATROVENT) 42 mcg (0.06 %) nasal spray 2 sprays by Nasal route 3 (three) times daily. Use 3 times per day if necessary for chronic nasal drip 15 mL 12    Lactobacillus rhamnosus GG (CULTURELLE) 10 billion cell capsule Take 1 capsule by mouth once daily. 60 capsule 0    lancets Misc To check BG 2 times daily, to use with insurance preferred meter 200 each 3    latanoprost 0.005 % ophthalmic solution INSTILL 1 DROP INTO BOTH EYES EVERY EVENING (Patient taking differently: Place 1 drop into both eyes every evening.) 7.5 mL 1    losartan (COZAAR) 25 MG tablet Take 0.5 tablets (12.5 mg total) by mouth once daily. 30 tablet 1    meclizine (ANTIVERT) 12.5 mg tablet TAKE 1 TABLET BY MOUTH 3 TIMES DAILY AS NEEDED FOR DIZZINESS. 30 tablet 0    omega-3 fatty acids-vitamin E 1,000 mg Cap Take 1 capsule by mouth once daily.      omeprazole (PRILOSEC) 40 MG capsule TAKE 1 CAPSULE (40 MG TOTAL) BY MOUTH BEFORE BREAKFAST. 90 capsule 1    pen needle, diabetic (BD ULTRA-FINE AGUS PEN NEEDLE) 32 gauge x 5/32" Ndle 1 Device by Misc.(Non-Drug; Combo Route) route 2 (two) times daily. 200 each 4    pramoxine-hydrocortisone (PROCTOCREAM-HC) 1-1 % rectal cream Place rectally 2 (two) times " "daily. 30 g 0    predniSONE (DELTASONE) 10 MG tablet Take 2 tabs daily by mouth for 5 days then 1 tab daily for 5 days 15 tablet 0    primidone (MYSOLINE) 50 MG Tab TAKE 2 TABLETS BY MOUTH 3 (THREE) TIMES DAILY. ONE HALF TABLET FOR ONE WEEK, THEN AS PRESCRIBED 540 tablet 3    rivaroxaban (XARELTO) 20 mg Tab Take 1 tablet (20 mg total) by mouth daily with dinner or evening meal. 30 tablet 6    umeclidinium-vilanteroL (ANORO ELLIPTA) 62.5-25 mcg/actuation DsDv Inhale 1 puff into the lungs once daily. Controller 1 each 5    blood-glucose meter kit To check BG 2 times daily, to use with insurance preferred meter 1 each 0    [DISCONTINUED] irbesartan (AVAPRO) 75 MG tablet Take 1 tablet (75 mg total) by mouth once daily. 90 tablet 1    [DISCONTINUED] loratadine (CLARITIN) 10 mg tablet TAKE 1 TABLET (10 MG TOTAL) BY MOUTH DAILY AS NEEDED FOR ALLERGIES (OR RUNNY NOSE). 90 tablet 1     No current facility-administered medications on file prior to visit.       REVIEW OF SYSTEMS:  Review of Systems Complete; Negative, unless noted above.    GENERAL PHYSICAL EXAM:   Ht 4' 11" (1.499 m)   Wt 44 kg (97 lb)   BMI 19.59 kg/m²    GEN: well developed, well nourished, no acute distress   PULM: No wheezing, no respiratory distress   CV: RRR    ORTHO EXAM:   Examination of the right hand reveals a mild boutonniere deformity of the right middle finger in the presence of a mallet finger deformity of the right ring finger that is not corrected with the oval 8 extension splint, as it has slid down and is no longer in the appropriate position.  Presence of mild edema over the dorsum of the PIP joint of the right middle finger.  No erythema, ecchymosis, or skin breakdown noted.  Range of motion not tested secondary to injury.  Strength not tested secondary to injury.  Sensation is grossly intact in the radial, ulnar, median nerve distributions.  Capillary refill less than 2 seconds in all fingers.    RADIOLOGY:   X-rays of the right ring " and right middle fingers were taken today in clinic.  X-rays reviewed by myself.  Imaging showed the presence of an avulsion fracture of the dorsal base of the middle phalanx of the right middle finger with no significant change compared to previous imaging.  Unable to ascertain the mallet finger fracture due to poor positioning imaging.  Presence of degenerative changes of the IP joints of the fingers of the right hand.  No other significant bony abnormalities noted.    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 I discussed with Deb Webb that considering she is unable to wear the oval 8 extension splint properly, we will have a D IP joint orthosis extension splint made for.  We discussed the importance of continuing to wear the PIP joint orthosis extension splint as instructed.  She verbally agreed to treatment plan.    2.  She was sent to occupational therapy to have a D IP joint orthosis extension splint made today.      3.  I would like her follow up in clinic in 2 weeks at which time we will perform repeat x-rays of the right hand and consider starting formal occupational therapy to work on range of motion and function of the right hand.  She was instructed to contact the clinic for any problems or concerns in the interim.

## 2023-01-20 DIAGNOSIS — M20.021 BOUTONNIERE DEFORMITY OF FINGER OF RIGHT HAND: Primary | ICD-10-CM

## 2023-01-23 ENCOUNTER — HOSPITAL ENCOUNTER (OUTPATIENT)
Dept: RADIOLOGY | Facility: HOSPITAL | Age: 73
Discharge: HOME | End: 2023-01-23
Attending: PHYSICIAN ASSISTANT
Payer: MEDICARE

## 2023-01-23 ENCOUNTER — OFFICE VISIT (OUTPATIENT)
Dept: ORTHOPEDICS | Facility: CLINIC | Age: 73
End: 2023-01-23
Payer: MEDICARE

## 2023-01-23 ENCOUNTER — TELEPHONE (OUTPATIENT)
Dept: FAMILY MEDICINE | Facility: CLINIC | Age: 73
End: 2023-01-23
Payer: MEDICARE

## 2023-01-23 VITALS — WEIGHT: 97 LBS | HEIGHT: 59 IN | BODY MASS INDEX: 19.56 KG/M2

## 2023-01-23 DIAGNOSIS — M20.021 BOUTONNIERE DEFORMITY OF FINGER OF RIGHT HAND: Primary | ICD-10-CM

## 2023-01-23 DIAGNOSIS — M25.641 STIFFNESS OF FINGER JOINT OF RIGHT HAND: ICD-10-CM

## 2023-01-23 DIAGNOSIS — S62.634A CLOSED MALLET FRACTURE OF DISTAL PHALANX OF RIGHT RING FINGER: ICD-10-CM

## 2023-01-23 DIAGNOSIS — M20.021 BOUTONNIERE DEFORMITY OF FINGER OF RIGHT HAND: ICD-10-CM

## 2023-01-23 DIAGNOSIS — R13.10 SWALLOWING PROBLEM: Primary | ICD-10-CM

## 2023-01-23 PROCEDURE — 1101F PR PT FALLS ASSESS DOC 0-1 FALLS W/OUT INJ PAST YR: ICD-10-PCS | Mod: CPTII,S$GLB,, | Performed by: PHYSICIAN ASSISTANT

## 2023-01-23 PROCEDURE — 1101F PT FALLS ASSESS-DOCD LE1/YR: CPT | Mod: CPTII,S$GLB,, | Performed by: PHYSICIAN ASSISTANT

## 2023-01-23 PROCEDURE — 99999 PR PBB SHADOW E&M-EST. PATIENT-LVL IV: ICD-10-PCS | Mod: PBBFAC,,, | Performed by: PHYSICIAN ASSISTANT

## 2023-01-23 PROCEDURE — 1160F RVW MEDS BY RX/DR IN RCRD: CPT | Mod: CPTII,S$GLB,, | Performed by: PHYSICIAN ASSISTANT

## 2023-01-23 PROCEDURE — 99213 OFFICE O/P EST LOW 20 MIN: CPT | Mod: S$GLB,,, | Performed by: PHYSICIAN ASSISTANT

## 2023-01-23 PROCEDURE — 99213 PR OFFICE/OUTPT VISIT, EST, LEVL III, 20-29 MIN: ICD-10-PCS | Mod: S$GLB,,, | Performed by: PHYSICIAN ASSISTANT

## 2023-01-23 PROCEDURE — 73130 XR HAND COMPLETE 3 VIEW RIGHT: ICD-10-PCS | Mod: 26,RT,, | Performed by: RADIOLOGY

## 2023-01-23 PROCEDURE — 1125F PR PAIN SEVERITY QUANTIFIED, PAIN PRESENT: ICD-10-PCS | Mod: CPTII,S$GLB,, | Performed by: PHYSICIAN ASSISTANT

## 2023-01-23 PROCEDURE — 3008F PR BODY MASS INDEX (BMI) DOCUMENTED: ICD-10-PCS | Mod: CPTII,S$GLB,, | Performed by: PHYSICIAN ASSISTANT

## 2023-01-23 PROCEDURE — 3072F LOW RISK FOR RETINOPATHY: CPT | Mod: CPTII,S$GLB,, | Performed by: PHYSICIAN ASSISTANT

## 2023-01-23 PROCEDURE — 1159F MED LIST DOCD IN RCRD: CPT | Mod: CPTII,S$GLB,, | Performed by: PHYSICIAN ASSISTANT

## 2023-01-23 PROCEDURE — 73130 X-RAY EXAM OF HAND: CPT | Mod: 26,RT,, | Performed by: RADIOLOGY

## 2023-01-23 PROCEDURE — 1125F AMNT PAIN NOTED PAIN PRSNT: CPT | Mod: CPTII,S$GLB,, | Performed by: PHYSICIAN ASSISTANT

## 2023-01-23 PROCEDURE — 1159F PR MEDICATION LIST DOCUMENTED IN MEDICAL RECORD: ICD-10-PCS | Mod: CPTII,S$GLB,, | Performed by: PHYSICIAN ASSISTANT

## 2023-01-23 PROCEDURE — 99999 PR PBB SHADOW E&M-EST. PATIENT-LVL IV: CPT | Mod: PBBFAC,,, | Performed by: PHYSICIAN ASSISTANT

## 2023-01-23 PROCEDURE — 73130 X-RAY EXAM OF HAND: CPT | Mod: TC,PO,RT

## 2023-01-23 PROCEDURE — 3072F PR LOW RISK FOR RETINOPATHY: ICD-10-PCS | Mod: CPTII,S$GLB,, | Performed by: PHYSICIAN ASSISTANT

## 2023-01-23 PROCEDURE — 3288F FALL RISK ASSESSMENT DOCD: CPT | Mod: CPTII,S$GLB,, | Performed by: PHYSICIAN ASSISTANT

## 2023-01-23 PROCEDURE — 1160F PR REVIEW ALL MEDS BY PRESCRIBER/CLIN PHARMACIST DOCUMENTED: ICD-10-PCS | Mod: CPTII,S$GLB,, | Performed by: PHYSICIAN ASSISTANT

## 2023-01-23 PROCEDURE — 3008F BODY MASS INDEX DOCD: CPT | Mod: CPTII,S$GLB,, | Performed by: PHYSICIAN ASSISTANT

## 2023-01-23 PROCEDURE — 3288F PR FALLS RISK ASSESSMENT DOCUMENTED: ICD-10-PCS | Mod: CPTII,S$GLB,, | Performed by: PHYSICIAN ASSISTANT

## 2023-01-23 NOTE — PROGRESS NOTES
1/23/2023    HPI:  Deb Webb is a 72 y.o. 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  She is approximately 8 weeks status post immobilization of the right middle finger.  She is approximately 9 weeks status post injury.  States pain of the bilateral hands is around 8/10 on average.  States pain is better with rest.  States pain is worse with activity.  Denies any acute injuries since her last visit.  Denies any other complaints at this time.    PMHX:  Past Medical History:   Diagnosis Date    Allergy     Arthritis     Cataract     Colon polyps     COPD (chronic obstructive pulmonary disease)     Diabetes mellitus type II     Diabetic neuropathy     Fever blister     Glaucoma (increased eye pressure)     Hyperlipidemia     Hypertension     Irritable bowel syndrome     Keloid cicatrix     Osteoporosis     Retained cholelithiasis following cholecystectomy(997.41)     Right patella fracture        PSHX:  Past Surgical History:   Procedure Laterality Date    BACK SURGERY  2006    CATARACT EXTRACTION W/  INTRAOCULAR LENS IMPLANT Bilateral     CHOLECYSTECTOMY      Laparoscopic    COLONOSCOPY      COLONOSCOPY N/A 8/31/2022    Procedure: COLONOSCOPY;  Surgeon: Salvatore Butler MD;  Location: [facility];  Service: Gastroenterology;  Laterality: N/A;    ESOPHAGEAL DILATION N/A 8/30/2022    Procedure: DILATION, ESOPHAGUS;  Surgeon: Salvatore Butler MD;  Location: [facility];  Service: Gastroenterology;  Laterality: N/A;    ESOPHAGOGASTRODUODENOSCOPY N/A 8/30/2022    Procedure: EGD (ESOPHAGOGASTRODUODENOSCOPY);  Surgeon: Salvatore Butler MD;  Location: [facility];  Service: Gastroenterology;  Laterality: N/A;    HERNIA REPAIR      HYSTERECTOMY      KNEE SURGERY Right 3/4/2015    Dr Weller     OOPHORECTOMY      ovarian tumor      Benign    TONSILLECTOMY, ADENOIDECTOMY          FMHX:  Family History   Problem Relation Age of Onset    Cancer Mother         Skin    Skin cancer Mother     Glaucoma Mother     Cataracts Mother     Diabetes Mother     Heart disease Father     Diabetes Father     Skin cancer Sister     Diabetes Sister     Diabetes Daughter     Breast cancer Maternal Aunt     Melanoma Neg Hx     Psoriasis Neg Hx     Eczema Neg Hx     Lupus Neg Hx     Amblyopia Neg Hx     Blindness Neg Hx     Macular degeneration Neg Hx     Retinal detachment Neg Hx     Strabismus Neg Hx        SOCHX:  Social History     Tobacco Use    Smoking status: Every Day     Packs/day: 0.50     Years: 40.00     Pack years: 20.00     Types: Cigarettes    Smokeless tobacco: Current   Substance Use Topics    Alcohol use: No       ALLERGIES:  Silicone and Adhesive    CURRENT MEDICATIONS:  Current Outpatient Medications on File Prior to Visit   Medication Sig Dispense Refill    albuterol (PROAIR HFA) 90 mcg/actuation inhaler Inhale 2 puffs into the lungs every 6 (six) hours as needed for Wheezing. Rescue 18 g 2    albuterol-ipratropium (DUO-NEB) 2.5 mg-0.5 mg/3 mL nebulizer solution Take 3 mLs by nebulization every 6 (six) hours as needed for Wheezing or Shortness of Breath. Rescue 75 mL 0    alendronate (FOSAMAX) 70 MG tablet Take 1 tablet (70 mg total) by mouth once a week. 12 tablet 3    amiodarone (PACERONE) 200 MG Tab Take 2 tabs by mouth twice daily until 12/2/22 then take 1 tab by mouth twice daily 90 tablet 1    aspirin (ECOTRIN) 81 MG EC tablet Take 1 tablet (81 mg total) by mouth once daily.  0    atorvastatin (LIPITOR) 40 MG tablet TAKE 1 TABLET BY MOUTH EVERY DAY 90 tablet 1    blood sugar diagnostic Strp To check BG 2 times daily, to use with insurance preferred meter 200 each 3    blood-glucose meter,continuous (DEXCOM G6 ) Misc Use as directed 1 each 1    blood-glucose sensor (DEXCOM G6 SENSOR) Emmie Use as directed 10 each 2    calcium carbonate/vitamin D3 (CALCIUM 600 + D,3, ORAL)  "Take 1 tablet by mouth once daily.      cholestyramine-aspartame (PREVALITE) 4 gram PwPk TAKE 1 PACKET (4 G TOTAL) BY MOUTH 2 (TWO) TIMES DAILY. FOR DIARRHEA 180 packet 0    citalopram (CELEXA) 20 MG tablet Take 1 tablet (20 mg total) by mouth once daily. 90 tablet 1    dulaglutide (TRULICITY) 1.5 mg/0.5 mL pen injector Inject 1.5 mg into the skin every 7 days. Through patient assistance 12 pen 3    furosemide (LASIX) 20 MG tablet Take 1 tablet (20 mg total) by mouth as needed (swelling).      gabapentin (NEURONTIN) 300 MG capsule TAKE 1 CAPSULE BY MOUTH THREE TIMES A  capsule 1    guaiFENesin (MUCINEX) 600 mg 12 hr tablet Take 1 tablet (600 mg total) by mouth 2 (two) times daily.      HYDROcodone-acetaminophen (NORCO) 5-325 mg per tablet Take 1 tablet by mouth every 12 (twelve) hours as needed for Pain. 10 tablet 0    ipratropium (ATROVENT) 42 mcg (0.06 %) nasal spray 2 sprays by Nasal route 3 (three) times daily. Use 3 times per day if necessary for chronic nasal drip 15 mL 12    Lactobacillus rhamnosus GG (CULTURELLE) 10 billion cell capsule Take 1 capsule by mouth once daily. 60 capsule 0    lancets Misc To check BG 2 times daily, to use with insurance preferred meter 200 each 3    latanoprost 0.005 % ophthalmic solution INSTILL 1 DROP INTO BOTH EYES EVERY EVENING (Patient taking differently: Place 1 drop into both eyes every evening.) 7.5 mL 1    losartan (COZAAR) 25 MG tablet Take 0.5 tablets (12.5 mg total) by mouth once daily. 30 tablet 1    meclizine (ANTIVERT) 12.5 mg tablet TAKE 1 TABLET BY MOUTH 3 TIMES DAILY AS NEEDED FOR DIZZINESS. 30 tablet 0    omega-3 fatty acids-vitamin E 1,000 mg Cap Take 1 capsule by mouth once daily.      omeprazole (PRILOSEC) 40 MG capsule TAKE 1 CAPSULE (40 MG TOTAL) BY MOUTH BEFORE BREAKFAST. 90 capsule 1    pen needle, diabetic (BD ULTRA-FINE AGUS PEN NEEDLE) 32 gauge x 5/32" Ndle 1 Device by Misc.(Non-Drug; Combo Route) route 2 (two) times daily. 200 each 4    " "pramoxine-hydrocortisone (PROCTOCREAM-HC) 1-1 % rectal cream Place rectally 2 (two) times daily. 30 g 0    predniSONE (DELTASONE) 10 MG tablet Take 2 tabs daily by mouth for 5 days then 1 tab daily for 5 days 15 tablet 0    primidone (MYSOLINE) 50 MG Tab TAKE 2 TABLETS BY MOUTH 3 (THREE) TIMES DAILY. ONE HALF TABLET FOR ONE WEEK, THEN AS PRESCRIBED 540 tablet 3    umeclidinium-vilanteroL (ANORO ELLIPTA) 62.5-25 mcg/actuation DsDv Inhale 1 puff into the lungs once daily. Controller 1 each 5    blood-glucose meter kit To check BG 2 times daily, to use with insurance preferred meter 1 each 0    [DISCONTINUED] irbesartan (AVAPRO) 75 MG tablet Take 1 tablet (75 mg total) by mouth once daily. 90 tablet 1    [DISCONTINUED] loratadine (CLARITIN) 10 mg tablet TAKE 1 TABLET (10 MG TOTAL) BY MOUTH DAILY AS NEEDED FOR ALLERGIES (OR RUNNY NOSE). 90 tablet 1     No current facility-administered medications on file prior to visit.       REVIEW OF SYSTEMS:  Review of Systems Complete; Negative, unless noted above.    GENERAL PHYSICAL EXAM:   Ht 4' 11" (1.499 m)   Wt 44 kg (97 lb)   BMI 19.59 kg/m²    GEN: well developed, well nourished, no acute distress   PULM: No wheezing, no respiratory distress   CV: RRR    ORTHO EXAM:   Examination of the right hand reveals a minimal boutonniere deformity of the right middle finger.  The right ring finger DIP joint is held in the appropriate amount of extension with her thermoplastic DIP joint extension splint.  Moderately reduced range of motion regards to flexion of the PIP joint of the right middle finger.  Presence of mild edema over the PIP joint of the right middle finger.  No erythema, ecchymosis, or skin breakdown noted throughout the right hand.  Strength not tested.  Sensation is grossly intact in the radial, ulnar, median nerve distributions.  Capillary refill less than 2 seconds in all fingers.    RADIOLOGY:   X-rays of the right hand were taken today in clinic.  X-rays reviewed " by myself.  Imaging showed the presence of an avulsion fracture of the dorsal base of the middle phalanx of the right middle finger with no significant change when compared to previous imaging.  No significant boutonniere deformity noted on imaging of the right middle finger.  Presence of a mallet finger fracture of the right ring finger with routine healing.  Presence of degenerative changes throughout the IP joints of the right hand.  No other significant bony abnormalities noted.    ASSESSMENT:   Right middle finger stiffness, mallet finger fracture of the    PLAN:  1. I discussed with Deb Webb that the best course of action this time is to perform physical therapy to increase the function range of motion of the fingers of the right hand.  We discussed she may transition to wearing the PIP joint orthosis extension splint for activity and sleep only.  We discussed she should continue wearing the DIP joint orthosis extension splint at all times for the next 3 weeks.  She verbally agreed with the treatment plan.    2. She was referred to certified hand therapy in clinic today.      3. I would like her follow up in clinic in 3 weeks for repeat evaluation.  She was instructed to contact the clinic for any problems or concerns in the interim.

## 2023-02-06 ENCOUNTER — CLINICAL SUPPORT (OUTPATIENT)
Dept: REHABILITATION | Facility: HOSPITAL | Age: 73
End: 2023-02-06
Attending: INTERNAL MEDICINE
Payer: MEDICARE

## 2023-02-06 DIAGNOSIS — M25.641 JOINT STIFFNESS OF HAND, RIGHT: ICD-10-CM

## 2023-02-06 DIAGNOSIS — R29.898 WEAKNESS OF BOTH HIPS: Primary | ICD-10-CM

## 2023-02-06 DIAGNOSIS — M21.941 DEFORMITY, HAND, RIGHT: ICD-10-CM

## 2023-02-06 DIAGNOSIS — M25.541 PAIN IN JOINT OF RIGHT HAND: Primary | ICD-10-CM

## 2023-02-06 PROCEDURE — 97166 OT EVAL MOD COMPLEX 45 MIN: CPT | Mod: PO

## 2023-02-06 PROCEDURE — 97110 THERAPEUTIC EXERCISES: CPT | Mod: PO | Performed by: PHYSICAL THERAPIST

## 2023-02-06 PROCEDURE — 97110 THERAPEUTIC EXERCISES: CPT | Mod: PO

## 2023-02-06 PROCEDURE — 97140 MANUAL THERAPY 1/> REGIONS: CPT | Mod: PO

## 2023-02-06 NOTE — PLAN OF CARE
[facility] Occupational Therapy  Evaluation     Date: 2/6/2023  Patient: Deb Webb  Chart Number: [id number]    Treatment Diagnosis:   1. Pain in joint of right hand        2. Deformity, hand, right            Physician: Dandre Odell, BRITNEY    Physician Orders:  from 1/23/2023  Note    Patient has had her right middle finger PIP joint immobilized via a thermoplastic orthosis PIP extension splint for the past 8 weeks due to her boutonniere deformity with associated fracture.  Patient now has significant stiffness of the right middle finger PIP joint, and requires certified hand therapy to increase the range of motion and function of the right middle finger.  Additionally, the patient will continue to wear her DIP joint orthosis extension splint of the D IP joint of the right ring finger for 3 more weeks, at which time certified hand therapy may begin gentle range of motion of her right ring finger DIP joint.     Duration:  6 weeks      Frequency:  2-3 times per week     Please work on gentle range of motion of the right hand (protect the DIP joint of the right ring finger), edema control, therapeutic modalities, and eventually gentle strengthening.  Thanks!          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   S62.634A (ICD-10-CM) - Closed mallet fracture of distal phalanx of right ring finger   M25.641 (ICD-10-CM) - Stiffness of finger joint of right hand       Evaluation Date: 2/6/2023  Insurance Authorization period Expiration:  Calendar year  Plan of Care Expiration Period: 3/5/2023 (6 weeks from order placed on 1/23/2023)  Next Re-assessment: 30 days = 3/8/2023  Date of Return Referring Provider 2/13/2023    Visit # / Visits Authortized: 4 (3 for orthotic/splinting) / TBD  # No shows 0 / # Cancellations: 0  Time In: 0855  Time Out: 0940  Total Billable Time: 40 minutes    Precautions: Standard and falls  Surgery: N/A has been treated with  splinting, Cont with DIP splint through 3/12/2023, then can begin gentle ROM.     S/P: approx 8 weeks on 1/23/2022    Subjective     Involved Side: R  Dominant Side: Right  Date of Onset:  Chronic     Mechanism of Injury/ History of Current Condition: Fall with injury R hand    Other Surgical/PMHX involved UE:  None reported    Imaging: X rays:   See Epic    Previous Therapy: Here at [facility] for splinting    Patient's Goals for Therapy:  To get RUE/hand back to normal.    Pain:  Functional Pain Scale Rating 0-10:   8/10 on average  8/10 at best  8/10 at worst  Location: R hand/long, ring finger and thumb at times   Description: Aching, Tingling, Sharp, and Variable,   Aggravating Factors: N/A  Easing Factors: N/A    Occupation:    Title: Retired    Functional Limitations/Social History:    Previous functional status includes: Independent with all ADLs/IADLs.    Current FunctionalStatus   Home/Living environment : Pt lives at home.    Limitation of Functional Status as follows:   ADLs/IADLs: Max overall difficulty reported with basic ADL/IADL tasks.              See Quickdash results for further related to UE function.    Past Medical History/Physical Systems Review:   Deb Webb  has a past medical history of Allergy, Arthritis, Cataract, Colon polyps, COPD (chronic obstructive pulmonary disease), Diabetes mellitus type II, Diabetic neuropathy, Fever blister, Glaucoma (increased eye pressure), Hyperlipidemia, Hypertension, Irritable bowel syndrome, Keloid cicatrix, Osteoporosis, Retained cholelithiasis following cholecystectomy(997.41), and Right patella fracture.    Deb Webb  has a past surgical history that includes Cholecystectomy; Hysterectomy; ovarian tumor; Colonoscopy; TONSILLECTOMY, ADENOIDECTOMY; Hernia repair; Knee surgery (Right, 3/4/2015); Oophorectomy; Back surgery (2006); Cataract extraction w/  intraocular lens implant (Bilateral); Esophagogastroduodenoscopy (N/A, 8/30/2022);  Esophageal dilation (N/A, 2022); and Colonoscopy (N/A, 2022).    Deb has a current medication list which includes the following prescription(s): albuterol, albuterol-ipratropium, alendronate, amiodarone, aspirin, atorvastatin, blood sugar diagnostic, blood-glucose meter, dexcom g6 , dexcom g6 sensor, calcium carbonate/vitamin d3, cholestyramine-aspartame, citalopram, trulicity, furosemide, gabapentin, guaifenesin, hydrocodone-acetaminophen, ipratropium, lactobacillus rhamnosus gg, lancets, latanoprost, losartan, meclizine, omega-3 fatty acids-vitamin e, omeprazole, pen needle, diabetic, pramoxine-hydrocortisone, prednisone, primidone, anoro ellipta, [DISCONTINUED] irbesartan, and [DISCONTINUED] loratadine.    Review of patient's allergies indicates:   Allergen Reactions    Silicone      Burn skin    Adhesive Rash          Objective     CMS Impairment/Limitation/Restriction for Quick DASH Survey    Therapist reviewed Quick DASH scores for Deb Webb on 2023.   Quick DASH documents entered into [employer] - see Media section for original.    The Quick DASH Questionnaire- The following scores are based on patient reported assessment at the time OT Eval.    Activity:  1. Open a tight jar: 5 Difficulty  2. Do heavy household chores: 5 Difficulty  3. Carry a shopping bag or briefcase: 4 Difficulty  4. Wash your back: 3 Difficulty  5.Use a knife to cut food: 3 Difficulty  6.. Recreational activities requiring force through arm: 5 Difficulty  7. Social Limitation: 3 Limited  8. Work/ADL Limitation: 4 Limited  Severity of Symptoms (over the past week)  9. Pain: 4  10. Tinglin  11. Sleeping Limitation: 2 Difficulty    Limitation Score: 70.45%    Scale  1= NO (Difficulty or Limitatons)  2= Mild (Difficulty/Limitations)  3= Moderate (Difficulty/Limitations)  4= Severe (Difficulty/Limitations)  5= Extreme/Unable and/or can't sleep (Difficulty/Limitations)            Observation/Inspection/Wound/Incision/Scar   Mild to moderate edema,  non pitting, skin closed, no wounds moderate hypertrophy at long finger PIP dorsally    Sensation: Grossly WFL    Edema:          Circumferential (in cm) L R   Proximal Wrist Crease NT NT   MPs NT NT   Mid PIP of  Long Finger 5.6 6.5      AROM Hand:   PIP ext long finger 0 vs 0 on L  PIP flexion long finger 75 vs 104 degrees on R    AROM Wrist/Forearm: WFL    AROM Elbow:  WFL    Manual Muscle Test: NT                                       and Pinch Strength: NTat this time due to continued splinting needs for R ring finger.      Treatment     Treatment Time In:  0915  Treatment Time Out:  0940  Total Treatment time separate from Evaluation time: 25 minutes.    Deb received therapeutic exercises for 15 minutes including: Initial Home Exercise Program Instruction.  Tendon Gliding Exercises: Straight, intrinsic plus to long fist, fingerlifts, DIP flexion with jt blocking for long finger, with handout and return demo for x10 recommended 4-6 x day.    Manual Therapy: Pt seen for Retrograde Massage and Scar Massage to R long finger and PIP 10 min total  Instructed on Home Program Retrograde Massage 20 reps, then Scar Massage x20 reps each CW, CCW, across and lengthwise, followed by retrograde x20 reps, 1-2 x day as tolerated.    Home Exercise Program/Education:  Issued HEP (see patient instructions in EMR) and educated on modality use for pain management . Exercises were reviewed and Deb was able to demonstrate them prior to the end of the session.   Pt received a written copy of exercises to perform at home. Deb demonstrated good  understanding of the education provided.  Pt was advised to perform these exercises free of pain, and to stop performing them if pain occurs.    Patient/Family Education: role of OT, goals for OT, scheduling/cancellations - pt verbalized understanding.     Additional Education provided: Cont with DIP splint  for Ring finger through 3/12/2023    Assessment     Deb Webb is a 72 y.o. female referred to outpatient occupational/hand therapy and presents with a medical diagnosis of Stiffness of finger joint of right hand resulting in, pain, edema, decreased A/PROM, strength, and functional use of involved upper extremity/extremities) and demonstrates limitations as described in the chart below.     The patient's rehab potential is good for the goals listed below.    No anticipated barriers to occupational therapy.  Pt has no cultural, educational or language barriers to learning provided.    Profile and History Assessment of Occupational Performance Level of Clinical Decision Making Complexity Score   Occupational Profile:   Deb Webb is a 72 y.o. female who was Independent with all ADL/IADL prior to onset of symptoms/injury . Pt is currently  reporting Max difficulty with R hand use affecting her daily functional abilities. Her main goal for therapy is regain Independent use of RUE.    Comorbidities:    has a past medical history of Allergy, Arthritis, Cataract, Colon polyps, COPD (chronic obstructive pulmonary disease), Diabetes mellitus type II, Diabetic neuropathy, Fever blister, Glaucoma (increased eye pressure), Hyperlipidemia, Hypertension, Irritable bowel syndrome, Keloid cicatrix, Osteoporosis, Retained cholelithiasis following cholecystectomy(997.41), and Right patella fracture.  Medical and Therapy History Review:   Expanded               Performance Deficits    Physical:  Joint Mobility  Muscle Power/Strength  Muscle Endurance  Skin Integrity/Scar Formation  Edema   Strength  Fine Motor Coordination  Pain    Cognitive:  No Deficits  Attention    Psychosocial:    No Deficits     Clinical Decision Making:  moderate    Assessment Process:  Detailed Assessments    Modification/Need for Assistance:  Minimal-Moderate Modifications/Assistance    Intervention Selection:  Several Treatment Options        moderate  Based on PMHX, co morbidities , data from assessments and functional level of assistance required with task and clinical presentation directly impacting function.       The following goals were discussed with the patient and patient is in agreement with them as to be addressed in the treatment plan.     Goals:     Short Term Goals: (30 days, 3/6/2023, and/or 10th visit) unless otherwise noted below.  1. Pt will be independent with HEP in 2 visits.  2. Pt will report decreased pain to a 6/10 at worst in RUE/hand with ADLs in order to increase function/use of UE.   3. Pt will increase AROM R long finger flexion by 10 degrees to 85 degrees to increase function for ADL/IADL.    Long Term Goals: (by discharge)  1. Pt will report decreased pain to 1-2/10 with ADLs to allow for increased function/use of UE.   2. Pt will exhibit  grossly WNL AROM hand to allow for Independent use of for all ADL/IADL tasks.  3. Pt will exhibit WFL  strength to allow a firm grasp during ADL/IADL (cooking utensils, tool use, carrying groceries, steering wheel, etc.)    Plan   Certification Period/Plan of care expiration:  2/6/2023 to 5/07/2023 with POC expiring on 3/5/2023    Outpatient Occupational Therapy 2-3 times weekly (pt reports she may only be bale to attend 1 x week due to transportation issues) through current poc expiration date, to include the following interventions:     Moist heat, cold packs, paraffin, fluidotherapy, US, edema control, scar mobilization/scar massage, manual therapy/joint mobilizations,A/PROM, therapeutic exercises/activities, strengthening, desensitization/sensory re-education, orthotic fitting/fabrication/training  PRN, joint protections/energry conservation/adaptive equipment/activity modification,HEP/education as well as any other treatments deemed necessary based on the patient's needs or progress.     Pt may be discharged prior to poc expiration date if all goals/desired outcome met or if max  rehabilitation potential has been achieved.    Updates Next Visit: To review HEP understanding and compliance and progress with OT tx as tolerated.    KHALIDA Arriaza, JUNAT    I CERTIFY THE NEED FOR THESE SERVICES FURNISHED UNDER THIS PLAN OF TREATMENT AND WHILE UNDER MY CARE    Physician's comments prn:

## 2023-02-06 NOTE — PROGRESS NOTES
[facility]  Physical Therapy Reassessment and Plan of Care Update     Name: Deb Webb  Clinic Number: [id number]     Therapy Diagnosis:        Encounter Diagnosis   Name Primary?    Weakness of both hips Yes         Physician: Triston Valverde MD     Physician Orders: PT Eval and Treat  Medical Diagnosis from Referral: Closed displaced fracture of left pubis with routine healing  Evaluation Date: 05/16/2022  Authorization Period Expiration: 12/31/2023  Plan of Care Expiration: 02/10/2023, 4/4/2023  Progress Note Due: 2/10/2023, 3/9/2023  Visit # / Visits authorized: 2/20        Precautions: Standard and fall     Subjective   Date of onset: 3 months  History of current condition - Deb reports she a few months ago and injured her ankle and then shortly after fell and fractured part of her pelvis. She has been cleared to put weight on her leg and just this morning was cleared to start walking without her walking boot. She currently lives at her [facility] but is independent with everything.     Reassessment: She has been doing great with strength. She still has trouble with her balance which is why she fell last week. Overall she thinks she's improved a lot, she's been able to walk around the block and she hadn't been able to do that before. She still wants to work on both her strength and balance.     Reassessment on 01/09/2023: Deb reports continued LE weakness and balance impairments. She notes she has been unable to attend PT due to holidays occurring on Mondays, which is the only day she has transportation for PT. She reports she is motivated to continue with her LE strengthening and prevent any falls. She notes continued hand pain from her fall and fracture.     Reassessment on 2/6/2023: Patient has not been seen since her last reassessment. She is due for reassessment today. She is performing her home program. She is taking short walks about 1-2 blocks at  home. Deb reports continued LE weakness and balance impairments. She reports not having any falls since last seen and probably since December. She uses her walker only now. She has rocks in her driveway making it unsteady. She would like to be able to assist with dishes and cooking at home.     Medical History:        Past Medical History:   Diagnosis Date    Allergy      Arthritis      Cataract      Colon polyps      COPD (chronic obstructive pulmonary disease)      Diabetes mellitus type II      Diabetic neuropathy      Fever blister      Glaucoma (increased eye pressure)      Hyperlipidemia      Hypertension      Irritable bowel syndrome      Keloid cicatrix      Osteoporosis      Retained cholelithiasis following cholecystectomy(997.41)      Right patella fracture           Surgical History:   Deb Webb  has a past surgical history that includes Cholecystectomy; Hysterectomy; ovarian tumor; Colonoscopy; TONSILLECTOMY, ADENOIDECTOMY; Hernia repair; Knee surgery (Right, 3/4/2015); Oophorectomy; Back surgery (2006); Cataract extraction w/  intraocular lens implant (Bilateral); Esophagogastroduodenoscopy (N/A, 8/30/2022); Esophageal dilation (N/A, 8/30/2022); and Colonoscopy (N/A, 8/31/2022).     Medications:   Deb has a current medication list which includes the following prescription(s): albuterol, albuterol-ipratropium, alendronate, amiodarone, aspirin, atorvastatin, blood sugar diagnostic, blood-glucose meter, dexcom g6 , dexcom g6 sensor, calcium carbonate/vitamin d3, cholestyramine-aspartame, citalopram, trulicity, furosemide, gabapentin, guaifenesin, hydrocodone-acetaminophen, ipratropium, lactobacillus rhamnosus gg, lancets, latanoprost, losartan, meclizine, omega-3 fatty acids-vitamin e, omeprazole, pen needle, diabetic, pramoxine-hydrocortisone, prednisone, primidone, rivaroxaban, anoro ellipta, [DISCONTINUED] irbesartan, and [DISCONTINUED] loratadine.     Allergies:         Review of  patient's allergies indicates:   Allergen Reactions    Silicone         Burn skin    Adhesive Rash         Imaging, x-rays:  There is degenerative arthrosis of the medial compartments of both knees with mild joint space narrowing.  There is also degenerative arthrosis of the lateral compartment of the right knee with joint space narrowing.  There is chondrocalcinosis of the knee menisci consistent with CPPD.  No fracture or subluxation are identified.  There are postoperative changes of orthopedic fixation of a right patellar fracture.  There are no signs of joint effusion.  There has been no significant change.     Prior Therapy: Yes, while at SNF immediatley after injury  Social History:  lives with their family (granddaughter and great-grandson)  Occupation: retired  Prior Level of Function: Ambulation with rollator or cane without pain  Current Level of Function: Ambulation with rollator only with some ankle and hip pain     Pain:  Current 6/10, worst 7/10, best 0/10   Location: left hip and ankle   Description: Dull  Aggravating Factors: Standing  Easing Factors: rest     Pts goals: Improve pain and function     Objective      Gait: Step to gait pattern with rollator     Hip Range of Motion:    Left active Left Passive   Flexion WFL WFL   Abduction WFL WFL   Adduction WFL WFL   Extension WFL WFL   ER @ 90 WFL WFL   IR @ 90 WFL WFL      Lower Extremity Strength  Right LE   Left LE     Knee extension: 4/5 Knee extension: 4/5   Knee flexion: 4/5 Knee flexion: 4-/5   Hip flexion: 4/5 Hip flexion: 4-/5   Hip Internal Rotation:  4/5 Hip Internal Rotation: 4/5   Hip External Rotation: 4/5 Hip External Rotation: 4/5   Hip extension:  4/5 Hip extension: 4/5   Hip abduction: 4-/5 Hip abduction: 4-/5   Hip adduction: 4-/5 Hip adduction 4-/5      TUG:  using rollator  Trial 1: 25 s  Trial 2: 29 s  Trial 3: 24 s  Avg= 26 s    Timed Up and Go:    Normative Reference Values by Age  Age Group Time in Seconds   60 - 69 years   8.1 s   70 - 79 years  9.2 s  80 - 99 years 11.3 s    Cut-off Values Predictive of Falls by:   Group Time in Seconds Community Dwelling Frail Older Adults   >  14 associated with high fall risk   Post-op hip fracture patients at time of discharge  > 24 predictive of falls within 6 months after hip fracture   Frail older adults   > /= 30  predictive of requiring assistive device for ambulation and being dependent in ADLs       General Norms:  <10 s=freely mobile  >20 s=may need AD  >30 s=dependence    Estefany FLORES. Reference Values for Timed Up and Go test: a descriptive meta-analysis. J Geriatr Phys there. 2006;29(2):64-68.      TREATMENT      See Daily Note     Assessment      Deb continues to demo increased instability with balance exercises and continued LE weakness. She has missed multiple visits due to the holidays, which has limited her progression with PT treatment. However, the holidays have now been over for sometime. She has attended 24 visits since initial eval on 05/16/2022. Today is her 25 th visit. She will continue to benefit from progressive strengthening and balance exercises to improve balance and functional mobility at home. Prognosis is impaired due to her ability to attend once weekly. Continue to recommend rollator. Goals and plan of care updated.      Pt prognosis is Fair.   Pt will benefit from skilled outpatient Physical Therapy to address the deficits stated above and in the chart below, provide pt/family education, and to maximize pt's level of independence.      Plan of care discussed with patient: Yes  Pt's spiritual, cultural and educational needs considered and patient is agreeable to the plan of care and goals as stated below:      Anticipated Barriers for therapy: None     Medical Necessity is demonstrated by the following  History  Co-morbidities and personal factors that may impact the plan of care Co-morbidities:   COPD, HTN, Osteoporosis     Personal Factors:   age       high    Examination  Body Structures and Functions, activity limitations and participation restrictions that may impact the plan of care Body Regions:   lower extremities  trunk     Body Systems:    gross symmetry  ROM  strength  gross coordinated movement  balance  gait  transfers     Participation Restrictions:   ADL     Activity limitations:   Learning and applying knowledge  no deficits     General Tasks and Commands  no deficits     Communication  no deficits     Mobility  no deficits     Self care  no deficits     Domestic Life  no deficits     Interactions/Relationships  no deficits     Life Areas  no deficits     Community and Social Life  no deficits             high   Clinical Presentation unstable clinical presentation with unpredictable characteristics high   Decision Making/ Complexity Score: high      Goals:  Short-Term Goals: 4 weeks  - The patient will be independent with initial home exercise program. (Not met, progressing)  - The patient will increase strength to at least 4-/5 in muscles tested to indicate improvements in functional strength. (Met)     Long-Term Goals: 8 weeks  - The patient will increase strength to at least 4/5 to perform functional mobility including sit to stand, steps/curbs, and walking (Not met, progressing)  - The patient will increase ROM to WFL to perform ADL, vocational, and recreational tasks without pain. (Progressing, not met)     Plan   Plan of care Certification: 1/9/2023 to 02/10/2023.  Updated POC: 2/6/2023 to 4/4/2023.     Outpatient Physical Therapy 1 times weekly for 8 weeks to include the following interventions: Manual Therapy, Neuromuscular Re-ed, Patient Education, Therapeutic Activities, and Therapeutic Exercise.

## 2023-02-07 NOTE — PLAN OF CARE
[facility]  Physical Therapy Reassessment and Plan of Care Update     Name: Deb Webb  Clinic Number: [id number]     Therapy Diagnosis:           Encounter Diagnosis   Name Primary?    Weakness of both hips Yes         Physician: Triston Valverde MD     Physician Orders: PT Eval and Treat  Medical Diagnosis from Referral: Closed displaced fracture of left pubis with routine healing  Evaluation Date: 05/16/2022  Authorization Period Expiration: 12/31/2023  Plan of Care Expiration: 02/10/2023, 4/4/2023  Progress Note Due: 2/10/2023, 3/9/2023  Visit # / Visits authorized: 2/20        Precautions: Standard and fall     Subjective   Date of onset: 3 months  History of current condition - Deb reports she a few months ago and injured her ankle and then shortly after fell and fractured part of her pelvis. She has been cleared to put weight on her leg and just this morning was cleared to start walking without her walking boot. She currently lives at her [facility] but is independent with everything.     Reassessment: She has been doing great with strength. She still has trouble with her balance which is why she fell last week. Overall she thinks she's improved a lot, she's been able to walk around the block and she hadn't been able to do that before. She still wants to work on both her strength and balance.     Reassessment on 01/09/2023: Deb reports continued LE weakness and balance impairments. She notes she has been unable to attend PT due to holidays occurring on Mondays, which is the only day she has transportation for PT. She reports she is motivated to continue with her LE strengthening and prevent any falls. She notes continued hand pain from her fall and fracture.      Reassessment on 2/6/2023: Patient has not been seen since her last reassessment. She is due for reassessment today. She is performing her home program. She is taking short walks about 1-2 blocks  at home. Deb reports continued LE weakness and balance impairments. She reports not having any falls since last seen and probably since December. She uses her walker only now. She has rocks in her driveway making it unsteady. She would like to be able to assist with dishes and cooking at home.     Medical History:           Past Medical History:   Diagnosis Date    Allergy      Arthritis      Cataract      Colon polyps      COPD (chronic obstructive pulmonary disease)      Diabetes mellitus type II      Diabetic neuropathy      Fever blister      Glaucoma (increased eye pressure)      Hyperlipidemia      Hypertension      Irritable bowel syndrome      Keloid cicatrix      Osteoporosis      Retained cholelithiasis following cholecystectomy(997.41)      Right patella fracture           Surgical History:   Deb Webb  has a past surgical history that includes Cholecystectomy; Hysterectomy; ovarian tumor; Colonoscopy; TONSILLECTOMY, ADENOIDECTOMY; Hernia repair; Knee surgery (Right, 3/4/2015); Oophorectomy; Back surgery (2006); Cataract extraction w/  intraocular lens implant (Bilateral); Esophagogastroduodenoscopy (N/A, 8/30/2022); Esophageal dilation (N/A, 8/30/2022); and Colonoscopy (N/A, 8/31/2022).     Medications:   Deb has a current medication list which includes the following prescription(s): albuterol, albuterol-ipratropium, alendronate, amiodarone, aspirin, atorvastatin, blood sugar diagnostic, blood-glucose meter, dexcom g6 , dexcom g6 sensor, calcium carbonate/vitamin d3, cholestyramine-aspartame, citalopram, trulicity, furosemide, gabapentin, guaifenesin, hydrocodone-acetaminophen, ipratropium, lactobacillus rhamnosus gg, lancets, latanoprost, losartan, meclizine, omega-3 fatty acids-vitamin e, omeprazole, pen needle, diabetic, pramoxine-hydrocortisone, prednisone, primidone, rivaroxaban, anoro ellipta, [DISCONTINUED] irbesartan, and [DISCONTINUED] loratadine.     Allergies:              Review of patient's allergies indicates:   Allergen Reactions    Silicone         Burn skin    Adhesive Rash         Imaging, x-rays:  There is degenerative arthrosis of the medial compartments of both knees with mild joint space narrowing.  There is also degenerative arthrosis of the lateral compartment of the right knee with joint space narrowing.  There is chondrocalcinosis of the knee menisci consistent with CPPD.  No fracture or subluxation are identified.  There are postoperative changes of orthopedic fixation of a right patellar fracture.  There are no signs of joint effusion.  There has been no significant change.     Prior Therapy: Yes, while at SNF immediatley after injury  Social History:  lives with their family (granddaughter and great-grandson)  Occupation: retired  Prior Level of Function: Ambulation with rollator or cane without pain  Current Level of Function: Ambulation with rollator only with some ankle and hip pain     Pain:  Current 6/10, worst 7/10, best 0/10   Location: left hip and ankle   Description: Dull  Aggravating Factors: Standing  Easing Factors: rest     Pts goals: Improve pain and function     Objective      Gait: Step to gait pattern with rollator     Hip Range of Motion:    Left active Left Passive   Flexion WFL WFL   Abduction WFL WFL   Adduction WFL WFL   Extension WFL WFL   ER @ 90 WFL WFL   IR @ 90 WFL WFL      Lower Extremity Strength  Right LE   Left LE     Knee extension: 4/5 Knee extension: 4/5   Knee flexion: 4/5 Knee flexion: 4-/5   Hip flexion: 4/5 Hip flexion: 4-/5   Hip Internal Rotation:  4/5 Hip Internal Rotation: 4/5   Hip External Rotation: 4/5 Hip External Rotation: 4/5   Hip extension:  4/5 Hip extension: 4/5   Hip abduction: 4-/5 Hip abduction: 4-/5   Hip adduction: 4-/5 Hip adduction 4-/5      TUG:  using rollator  Trial 1: 25 s  Trial 2: 29 s  Trial 3: 24 s  Avg= 26 s     Timed Up and Go:     Normative Reference Values by Age  Age Group Time in Seconds    60 - 69 years  8.1 s   70 - 79 years  9.2 s  80 - 99 years 11.3 s     Cut-off Values Predictive of Falls by:   Group Time in Seconds Community Dwelling Frail Older Adults   >  14 associated with high fall risk   Post-op hip fracture patients at time of discharge  > 24 predictive of falls within 6 months after hip fracture   Frail older adults   > /= 30  predictive of requiring assistive device for ambulation and being dependent in ADLs        General Norms:  <10 s=freely mobile  >20 s=may need AD  >30 s=dependence     Estefany FLORES. Reference Values for Timed Up and Go test: a descriptive meta-analysis. J Geriatr Phys there. 2006;29(2):64-68.      TREATMENT      See Daily Note     Assessment      Deb continues to demo increased instability with balance exercises and continued LE weakness. She has missed multiple visits due to the holidays, which has limited her progression with PT treatment. However, the holidays have now been over for sometime. She has attended 24 visits since initial eval on 05/16/2022. Today is her 25 th visit. She will continue to benefit from progressive strengthening and balance exercises to improve balance and functional mobility at home. Prognosis is impaired due to her ability to attend once weekly. Continue to recommend rollator. Goals and plan of care updated.      Pt prognosis is Fair.   Pt will benefit from skilled outpatient Physical Therapy to address the deficits stated above and in the chart below, provide pt/family education, and to maximize pt's level of independence.      Plan of care discussed with patient: Yes  Pt's spiritual, cultural and educational needs considered and patient is agreeable to the plan of care and goals as stated below:      Anticipated Barriers for therapy: None     Medical Necessity is demonstrated by the following  History  Co-morbidities and personal factors that may impact the plan of care Co-morbidities:   COPD, HTN, Osteoporosis     Personal Factors:  "  age       high   Examination  Body Structures and Functions, activity limitations and participation restrictions that may impact the plan of care Body Regions:   lower extremities  trunk     Body Systems:    gross symmetry  ROM  strength  gross coordinated movement  balance  gait  transfers     Participation Restrictions:   ADL     Activity limitations:   Learning and applying knowledge  no deficits     General Tasks and Commands  no deficits     Communication  no deficits     Mobility  no deficits     Self care  no deficits     Domestic Life  no deficits     Interactions/Relationships  no deficits     Life Areas  no deficits     Community and Social Life  no deficits             high   Clinical Presentation unstable clinical presentation with unpredictable characteristics high   Decision Making/ Complexity Score: high      Goals:  Short-Term Goals: 4 weeks  - The patient will be independent with initial home exercise program. (Not met, progressing)  - The patient will increase strength to at least 4-/5 in muscles tested to indicate improvements in functional strength. (Met)   -TUG improved to 20" to demo improving mobility and justify continuing skilled care.    Long-Term Goals: 8 weeks  - The patient will increase strength to at least 4/5 to perform functional mobility including sit to stand, steps/curbs, and walking (Not met, progressing)  - The patient will increase ROM to WFL to perform ADL, vocational, and recreational tasks without pain. (Progressing, not met)  -TUG improved to 18" to demo improving mobility.     Plan   Plan of care Certification: 1/9/2023 to 02/10/2023.  Updated POC: 2/6/2023 to 4/4/2023.     Outpatient Physical Therapy 1 times weekly for 8 weeks to include the following interventions: Manual Therapy, Neuromuscular Re-ed, Patient Education, Therapeutic Activities, and Therapeutic Exercise.      "

## 2023-02-07 NOTE — PROGRESS NOTES
"    [facility] OUTPATIENT THERAPY AND WELLNESS   Physical Therapy Treatment Note     Name: Deb Webb  Clinic Number: [id number]    Therapy Diagnosis:   Encounter Diagnosis   Name Primary?    Weakness of both hips Yes     Physician: Triston Valverde MD  Physician Orders: PT Eval and Treat  Medical Diagnosis from Referral: Closed displaced fracture of left pubis with routine healing  Evaluation Date: 05/16/2022  Authorization Period Expiration: 12/31/2023  Plan of Care Expiration: 02/10/2023  Progress Note Due: 2/10/2023  Visit # / Visits authorized: 2/20    PTA Visit #: 0/5     Time In: 1100  Time Out: 1145  Total Billable Time: 30 minutes    SUBJECTIVE     Pt reports: See updated POC  Response to previous treatment: fatigue  Functional change: No change    Pain: 4/10  Location: (R) knee    OBJECTIVE     Objective Measures updated at progress report unless specified.     See updated POC.    Treatment     Deb received the treatments listed below:      Therapeutic exercises to develop strength, endurance, ROM, flexibility, posture and core stabilization for 45 minutes including:  Recumbent bike 10' L2  LAQ  3 x 10 each leg 2" hold  Supine clamshell BTB 3x10-np  Hip adduction with ball 3x10  Sit to stand 1 x 15, x10 -np  Seated HS curls RTB 2 x 10 -np  SAQ on large bolster 3 x 10 5'' hold 2#-np  Bridging 3 x 10    PT reassessment.    Neuromuscular re-education activities to improve: Balance, Coordination, Kinesthetic, Sense and Proprioception for 00 minutes. The following activities were included:  Tandem 3x 30s each leg  Standing taps to 4" box  2 x 10 each leg  NBOS on foam 3 x 30s    Patient Education and Home Exercises      Home Exercises Provided and Patient Education Provided     Education provided:   - HEP, Safety with mobility while in hospital     Written Home Exercises Provided: Patient instructed to cont prior HEP. Exercises were reviewed and Deb was able to demonstrate them prior to the end of " "the session.  Deb demonstrated good  understanding of the education provided. See EMR under Patient Instructions for exercises provided during therapy sessions    ASSESSMENT     Patient returned after 1 mo and was due for reassessment. Pt educated in how these gaps in tx will delay her tx. She continues to have increased sway with balance exercises and remains challenged with her LE strengthening. She reports walking more. She remains weak with her hip musculature limiting her functional mobility. She has only been able to attend PT 1x/week due to transportation. No additional goals met this date. Plan of care extended to continue to focus on her strength and mobility.     Deb Is progressing well towards her goals.   Pt prognosis is Fair.     Pt will continue to benefit from skilled outpatient physical therapy to address the deficits listed in the problem list box on initial evaluation, provide pt/family education and to maximize pt's level of independence in the home and community environment.     Pt's spiritual, cultural and educational needs considered and pt agreeable to plan of care and goals.     Anticipated barriers to physical therapy: transportation/compliance    Goals:  Short-Term Goals: 4 weeks  - The patient will be independent with initial home exercise program. (Not met, progressing)  - The patient will increase strength to at least 4-/5 in muscles tested to indicate improvements in functional strength. (Met)  -TUG improved to 20" to demo improving mobility and justify continuing skilled care.    Long-Term Goals: 8 weeks  - The patient will increase strength to at least 4/5 to perform functional mobility including sit to stand, steps/curbs, and walking (Not met, progressing)  - The patient will increase ROM to WFL to perform ADL, vocational, and recreational tasks without pain. (Met)  -TUG improved to 18" to demo improving mobility.    PLAN     Continue with balance exercises and LE " strengthening, plan of care extended    Charissa Raymundo, PT

## 2023-02-10 NOTE — PROGRESS NOTES
"    [facility]   Physical Therapy Treatment Note     Name: Deb Webb  Clinic Number: [id number]    Therapy Diagnosis:   Encounter Diagnosis   Name Primary?    Weakness of both hips Yes     Physician: Triston Valverde MD  Physician Orders: PT Eval and Treat  Medical Diagnosis from Referral: Closed displaced fracture of left pubis with routine healing  Evaluation Date: 05/16/2022  Authorization Period Expiration: 12/31/2023  Plan of Care Expiration: 02/10/2023  Progress Note Due: 2/10/2023  Visit # / Visits authorized: 3/20    PTA Visit #: 1/5     Time In: 1130 am  Time Out: 1215 pm  Total Billable Time: 45 minutes    SUBJECTIVE     Pt reports: No falls since last session. Noted decreased endurance.  Pt has been trying to walk to a block with her rollator everday.  Pt reports she just went to her Primary care doctor who said her oxygen,blood pressure, and HR were all good.  Response to previous treatment: fatigue  Functional change: No change    Pain: 6/10  Location: back    OBJECTIVE     Objective Measures updated at progress report unless specified.       Treatment     Deb received the treatments listed below:      *Exercises performed in parallel bars with gait belt and SBA    Therapeutic exercises to develop strength, endurance, ROM, flexibility, posture and core stabilization for 20 minutes including:    Recumbent bike x 7 min- for endurance and ROM  LAQ  2x10 1# each leg 2" hold  Standing hip abd x10  Standing hip ext x 10    Not performed:  Supine clamshell BTB 3x10-np  Hip adduction with ball 3x10  Bridging 3 x 10      Neuromuscular re-education activities to improve: Balance, Coordination, Kinesthetic, Sense and Proprioception for 25 minutes. The following activities were included:  Tandem stance 3x 30s each leg  Standing taps to 4" box  2 x 10 each leg  NBOS on foam 2x1 min  Marching x 10 on each- 1 finger UE support  Heel raises x 10- 1 finger UR support  Sit to " "stands from chair( foam pad) x 10  Static stance eyes closed 2x30 sec- minimal sway  Static stance with head turns 2x30 sec    Patient Education and Home Exercises      Home Exercises Provided and Patient Education Provided     Education provided:   - slowly increasing walking    Written Home Exercises Provided: Patient instructed to cont prior HEP. Exercises were reviewed and Deb was able to demonstrate them prior to the end of the session.  Deb demonstrated good  understanding of the education provided. See EMR under Patient Instructions for exercises provided during therapy sessions    ASSESSMENT     Pt did very well today.  Pt able to perform static stance with eyes closed with little to no sway.  Pt challenged with tandem stance but was able to perform a few seconds at a time without support.  Pt only required three rest breaks during the treatment session.  Patient seemed to do better with balance activities in the beginning of the session so will continue trying that in upcoming sessions.    Deb Is progressing well towards her goals.   Pt prognosis is Fair.     Pt will continue to benefit from skilled outpatient physical therapy to address the deficits listed in the problem list box on initial evaluation, provide pt/family education and to maximize pt's level of independence in the home and community environment.     Pt's spiritual, cultural and educational needs considered and pt agreeable to plan of care and goals.     Anticipated barriers to physical therapy: transportation/compliance    Goals:  Short-Term Goals: 4 weeks  - The patient will be independent with initial home exercise program. (Not met, progressing)  - The patient will increase strength to at least 4-/5 in muscles tested to indicate improvements in functional strength. (Met)  -TUG improved to 20" to demo improving mobility and justify continuing skilled care.    Long-Term Goals: 8 weeks  - The patient will increase strength to at " "least 4/5 to perform functional mobility including sit to stand, steps/curbs, and walking (Not met, progressing)  - The patient will increase ROM to WFL to perform ADL, vocational, and recreational tasks without pain. (Met)  -TUG improved to 18" to demo improving mobility.    PLAN     Continue with balance exercises and LE strengthening, plan of care extended    Perlita Lorenzo, DANIEL                                   "

## 2023-02-13 ENCOUNTER — CLINICAL SUPPORT (OUTPATIENT)
Dept: REHABILITATION | Facility: HOSPITAL | Age: 73
End: 2023-02-13
Attending: INTERNAL MEDICINE
Payer: MEDICARE

## 2023-02-13 ENCOUNTER — OFFICE VISIT (OUTPATIENT)
Dept: ORTHOPEDICS | Facility: CLINIC | Age: 73
End: 2023-02-13
Payer: MEDICARE

## 2023-02-13 ENCOUNTER — HOSPITAL ENCOUNTER (OUTPATIENT)
Dept: RADIOLOGY | Facility: HOSPITAL | Age: 73
Discharge: HOME | End: 2023-02-13
Attending: PHYSICIAN ASSISTANT
Payer: MEDICARE

## 2023-02-13 ENCOUNTER — OFFICE VISIT (OUTPATIENT)
Dept: FAMILY MEDICINE | Facility: CLINIC | Age: 73
End: 2023-02-13
Payer: MEDICARE

## 2023-02-13 DIAGNOSIS — S62.634A CLOSED MALLET FRACTURE OF DISTAL PHALANX OF RIGHT RING FINGER: ICD-10-CM

## 2023-02-13 DIAGNOSIS — M25.641 JOINT STIFFNESS OF HAND, RIGHT: ICD-10-CM

## 2023-02-13 DIAGNOSIS — M20.021 BOUTONNIERE DEFORMITY OF FINGER OF RIGHT HAND: ICD-10-CM

## 2023-02-13 DIAGNOSIS — I48.0 PAF (PAROXYSMAL ATRIAL FIBRILLATION): ICD-10-CM

## 2023-02-13 DIAGNOSIS — I70.0 AORTIC ATHEROSCLEROSIS: ICD-10-CM

## 2023-02-13 DIAGNOSIS — F33.42 RECURRENT MAJOR DEPRESSIVE DISORDER, IN FULL REMISSION: ICD-10-CM

## 2023-02-13 DIAGNOSIS — I27.20 PULMONARY HYPERTENSION: ICD-10-CM

## 2023-02-13 DIAGNOSIS — R29.898 WEAKNESS OF BOTH HIPS: Primary | ICD-10-CM

## 2023-02-13 DIAGNOSIS — E11.42 TYPE 2 DIABETES MELLITUS WITH DIABETIC POLYNEUROPATHY, WITHOUT LONG-TERM CURRENT USE OF INSULIN: Primary | ICD-10-CM

## 2023-02-13 DIAGNOSIS — M25.541 PAIN IN JOINT OF RIGHT HAND: Primary | ICD-10-CM

## 2023-02-13 DIAGNOSIS — J44.1 CHRONIC OBSTRUCTIVE PULMONARY DISEASE WITH (ACUTE) EXACERBATION: ICD-10-CM

## 2023-02-13 DIAGNOSIS — M20.021 BOUTONNIERE DEFORMITY OF FINGER OF RIGHT HAND: Primary | ICD-10-CM

## 2023-02-13 DIAGNOSIS — R13.10 DYSPHAGIA, UNSPECIFIED TYPE: ICD-10-CM

## 2023-02-13 DIAGNOSIS — G25.0 ESSENTIAL TREMOR: ICD-10-CM

## 2023-02-13 DIAGNOSIS — M21.941 DEFORMITY, HAND, RIGHT: ICD-10-CM

## 2023-02-13 PROBLEM — F33.1 MAJOR DEPRESSIVE DISORDER, RECURRENT, MODERATE: Status: ACTIVE | Noted: 2023-02-13

## 2023-02-13 PROCEDURE — 73130 X-RAY EXAM OF HAND: CPT | Mod: TC,PO,RT

## 2023-02-13 PROCEDURE — 3072F LOW RISK FOR RETINOPATHY: CPT | Mod: CPTII,S$GLB,, | Performed by: INTERNAL MEDICINE

## 2023-02-13 PROCEDURE — 3072F PR LOW RISK FOR RETINOPATHY: ICD-10-PCS | Mod: CPTII,S$GLB,, | Performed by: INTERNAL MEDICINE

## 2023-02-13 PROCEDURE — 1160F RVW MEDS BY RX/DR IN RCRD: CPT | Mod: CPTII,S$GLB,, | Performed by: PHYSICIAN ASSISTANT

## 2023-02-13 PROCEDURE — 73130 XR HAND COMPLETE 3 VIEW RIGHT: ICD-10-PCS | Mod: 26,RT,, | Performed by: RADIOLOGY

## 2023-02-13 PROCEDURE — 97140 MANUAL THERAPY 1/> REGIONS: CPT | Mod: PO

## 2023-02-13 PROCEDURE — 73130 X-RAY EXAM OF HAND: CPT | Mod: 26,RT,, | Performed by: RADIOLOGY

## 2023-02-13 PROCEDURE — 1160F PR REVIEW ALL MEDS BY PRESCRIBER/CLIN PHARMACIST DOCUMENTED: ICD-10-PCS | Mod: CPTII,S$GLB,, | Performed by: PHYSICIAN ASSISTANT

## 2023-02-13 PROCEDURE — 3072F PR LOW RISK FOR RETINOPATHY: ICD-10-PCS | Mod: CPTII,S$GLB,, | Performed by: PHYSICIAN ASSISTANT

## 2023-02-13 PROCEDURE — 1125F PR PAIN SEVERITY QUANTIFIED, PAIN PRESENT: ICD-10-PCS | Mod: CPTII,S$GLB,, | Performed by: PHYSICIAN ASSISTANT

## 2023-02-13 PROCEDURE — 99214 OFFICE O/P EST MOD 30 MIN: CPT | Mod: S$GLB,,, | Performed by: INTERNAL MEDICINE

## 2023-02-13 PROCEDURE — 1160F RVW MEDS BY RX/DR IN RCRD: CPT | Mod: CPTII,S$GLB,, | Performed by: INTERNAL MEDICINE

## 2023-02-13 PROCEDURE — 3288F PR FALLS RISK ASSESSMENT DOCUMENTED: ICD-10-PCS | Mod: CPTII,S$GLB,, | Performed by: INTERNAL MEDICINE

## 2023-02-13 PROCEDURE — 1101F PR PT FALLS ASSESS DOC 0-1 FALLS W/OUT INJ PAST YR: ICD-10-PCS | Mod: CPTII,S$GLB,, | Performed by: INTERNAL MEDICINE

## 2023-02-13 PROCEDURE — 1125F AMNT PAIN NOTED PAIN PRSNT: CPT | Mod: CPTII,S$GLB,, | Performed by: PHYSICIAN ASSISTANT

## 2023-02-13 PROCEDURE — 99999 PR PBB SHADOW E&M-EST. PATIENT-LVL III: CPT | Mod: PBBFAC,,, | Performed by: INTERNAL MEDICINE

## 2023-02-13 PROCEDURE — 97112 NEUROMUSCULAR REEDUCATION: CPT | Mod: PO,CQ

## 2023-02-13 PROCEDURE — 99999 PR PBB SHADOW E&M-EST. PATIENT-LVL III: ICD-10-PCS | Mod: PBBFAC,,, | Performed by: INTERNAL MEDICINE

## 2023-02-13 PROCEDURE — 1159F MED LIST DOCD IN RCRD: CPT | Mod: CPTII,S$GLB,, | Performed by: INTERNAL MEDICINE

## 2023-02-13 PROCEDURE — 99213 OFFICE O/P EST LOW 20 MIN: CPT | Mod: S$GLB,,, | Performed by: PHYSICIAN ASSISTANT

## 2023-02-13 PROCEDURE — 1159F PR MEDICATION LIST DOCUMENTED IN MEDICAL RECORD: ICD-10-PCS | Mod: CPTII,S$GLB,, | Performed by: INTERNAL MEDICINE

## 2023-02-13 PROCEDURE — 1160F PR REVIEW ALL MEDS BY PRESCRIBER/CLIN PHARMACIST DOCUMENTED: ICD-10-PCS | Mod: CPTII,S$GLB,, | Performed by: INTERNAL MEDICINE

## 2023-02-13 PROCEDURE — 97110 THERAPEUTIC EXERCISES: CPT | Mod: PO,CQ

## 2023-02-13 PROCEDURE — 99999 PR PBB SHADOW E&M-EST. PATIENT-LVL III: ICD-10-PCS | Mod: PBBFAC,,, | Performed by: PHYSICIAN ASSISTANT

## 2023-02-13 PROCEDURE — 1101F PT FALLS ASSESS-DOCD LE1/YR: CPT | Mod: CPTII,S$GLB,, | Performed by: INTERNAL MEDICINE

## 2023-02-13 PROCEDURE — 3288F FALL RISK ASSESSMENT DOCD: CPT | Mod: CPTII,S$GLB,, | Performed by: INTERNAL MEDICINE

## 2023-02-13 PROCEDURE — 99999 PR PBB SHADOW E&M-EST. PATIENT-LVL III: CPT | Mod: PBBFAC,,, | Performed by: PHYSICIAN ASSISTANT

## 2023-02-13 PROCEDURE — 99214 PR OFFICE/OUTPT VISIT, EST, LEVL IV, 30-39 MIN: ICD-10-PCS | Mod: S$GLB,,, | Performed by: INTERNAL MEDICINE

## 2023-02-13 PROCEDURE — 1159F MED LIST DOCD IN RCRD: CPT | Mod: CPTII,S$GLB,, | Performed by: PHYSICIAN ASSISTANT

## 2023-02-13 PROCEDURE — 3072F LOW RISK FOR RETINOPATHY: CPT | Mod: CPTII,S$GLB,, | Performed by: PHYSICIAN ASSISTANT

## 2023-02-13 PROCEDURE — 1159F PR MEDICATION LIST DOCUMENTED IN MEDICAL RECORD: ICD-10-PCS | Mod: CPTII,S$GLB,, | Performed by: PHYSICIAN ASSISTANT

## 2023-02-13 PROCEDURE — 99213 PR OFFICE/OUTPT VISIT, EST, LEVL III, 20-29 MIN: ICD-10-PCS | Mod: S$GLB,,, | Performed by: PHYSICIAN ASSISTANT

## 2023-02-13 RX ORDER — RIVAROXABAN 20 MG/1
TABLET, FILM COATED ORAL
COMMUNITY
Start: 2023-01-22 | End: 2023-07-03 | Stop reason: SDUPTHER

## 2023-02-13 NOTE — PROGRESS NOTES
Subjective     Deb Webb is a 72 y.o. old, female here for Follow-up    71 y/o with PMH of CVA, PAF on xarelto, Type 2 DM with neuropathy, HTN, HLD, Chronic respiratory failure, COPD, osteopenia, IBS, anxiety, tobacco use, Essential tremor, prior BZO dependence    PAF: started on xarelto by cardiology? She didn't realize she might have to stop taking aspirin.  Type 2 DM: controlled previously, neuropathy stable.  HTN: controlled.  HLD: due for labs  COPD: stable, oxygen prn, compliant with inhalers, smokes 1-3 c/day    Facial trauma: still needs to f/u with ENT, ordered previously.  Anxiety: Family stresses her out, remains on celexa.  ET: stable  Dysphagia: recurrent problem, has seen gastro in the past, worsening recently.    Review of Systems   Constitutional:  Positive for malaise/fatigue.   Respiratory: Negative.     Cardiovascular: Negative.    Medications     Outpatient Medications Marked as Taking for the 2/13/23 encounter (Office Visit) with Triston Valverde MD   Medication Sig Dispense Refill    albuterol (PROAIR HFA) 90 mcg/actuation inhaler Inhale 2 puffs into the lungs every 6 (six) hours as needed for Wheezing. Rescue 18 g 2    albuterol-ipratropium (DUO-NEB) 2.5 mg-0.5 mg/3 mL nebulizer solution Take 3 mLs by nebulization every 6 (six) hours as needed for Wheezing or Shortness of Breath. Rescue 75 mL 0    alendronate (FOSAMAX) 70 MG tablet Take 1 tablet (70 mg total) by mouth once a week. 12 tablet 3    amiodarone (PACERONE) 200 MG Tab Take 2 tabs by mouth twice daily until 12/2/22 then take 1 tab by mouth twice daily 90 tablet 1    atorvastatin (LIPITOR) 40 MG tablet TAKE 1 TABLET BY MOUTH EVERY DAY 90 tablet 1    blood sugar diagnostic Strp To check BG 2 times daily, to use with insurance preferred meter 200 each 3    blood-glucose meter,continuous (DEXCOM G6 ) Misc Use as directed 1 each 1    blood-glucose sensor (DEXCOM G6 SENSOR) Emmie Use as directed 10 each 2    calcium  "carbonate/vitamin D3 (CALCIUM 600 + D,3, ORAL) Take 1 tablet by mouth once daily.      cholestyramine-aspartame (PREVALITE) 4 gram PwPk TAKE 1 PACKET (4 G TOTAL) BY MOUTH 2 (TWO) TIMES DAILY. FOR DIARRHEA 180 packet 0    citalopram (CELEXA) 20 MG tablet Take 1 tablet (20 mg total) by mouth once daily. 90 tablet 1    dulaglutide (TRULICITY) 1.5 mg/0.5 mL pen injector Inject 1.5 mg into the skin every 7 days. Through patient assistance 12 pen 3    furosemide (LASIX) 20 MG tablet Take 1 tablet (20 mg total) by mouth as needed (swelling).      gabapentin (NEURONTIN) 300 MG capsule TAKE 1 CAPSULE BY MOUTH THREE TIMES A  capsule 1    guaiFENesin (MUCINEX) 600 mg 12 hr tablet Take 1 tablet (600 mg total) by mouth 2 (two) times daily.      HYDROcodone-acetaminophen (NORCO) 5-325 mg per tablet Take 1 tablet by mouth every 12 (twelve) hours as needed for Pain. 10 tablet 0    ipratropium (ATROVENT) 42 mcg (0.06 %) nasal spray 2 sprays by Nasal route 3 (three) times daily. Use 3 times per day if necessary for chronic nasal drip 15 mL 12    Lactobacillus rhamnosus GG (CULTURELLE) 10 billion cell capsule Take 1 capsule by mouth once daily. 60 capsule 0    lancets Misc To check BG 2 times daily, to use with insurance preferred meter 200 each 3    latanoprost 0.005 % ophthalmic solution INSTILL 1 DROP INTO BOTH EYES EVERY EVENING (Patient taking differently: Place 1 drop into both eyes every evening.) 7.5 mL 1    losartan (COZAAR) 25 MG tablet Take 0.5 tablets (12.5 mg total) by mouth once daily. 30 tablet 1    meclizine (ANTIVERT) 12.5 mg tablet TAKE 1 TABLET BY MOUTH 3 TIMES DAILY AS NEEDED FOR DIZZINESS. 30 tablet 0    omega-3 fatty acids-vitamin E 1,000 mg Cap Take 1 capsule by mouth once daily.      omeprazole (PRILOSEC) 40 MG capsule TAKE 1 CAPSULE (40 MG TOTAL) BY MOUTH BEFORE BREAKFAST. 90 capsule 1    pen needle, diabetic (BD ULTRA-FINE AGUS PEN NEEDLE) 32 gauge x 5/32" Ndle 1 Device by Misc.(Non-Drug; Combo Route) " route 2 (two) times daily. 200 each 4    primidone (MYSOLINE) 50 MG Tab TAKE 2 TABLETS BY MOUTH 3 (THREE) TIMES DAILY. ONE HALF TABLET FOR ONE WEEK, THEN AS PRESCRIBED 540 tablet 3    umeclidinium-vilanteroL (ANORO ELLIPTA) 62.5-25 mcg/actuation DsDv Inhale 1 puff into the lungs once daily. Controller 1 each 5    XARELTO 20 mg Tab TAKE 1 TABLET BY MOUTH EVERY DAY WITH DINNER OR EVENING MEAL      [DISCONTINUED] aspirin (ECOTRIN) 81 MG EC tablet Take 1 tablet (81 mg total) by mouth once daily.  0    [DISCONTINUED] pramoxine-hydrocortisone (PROCTOCREAM-HC) 1-1 % rectal cream Place rectally 2 (two) times daily. 30 g 0    [DISCONTINUED] predniSONE (DELTASONE) 10 MG tablet Take 2 tabs daily by mouth for 5 days then 1 tab daily for 5 days 15 tablet 0     Objective     There were no vitals taken for this visit.  Physical Exam  Constitutional:       General: She is not in acute distress.     Appearance: Normal appearance. She is well-developed.      Comments: Uses a walker   Cardiovascular:      Rate and Rhythm: Normal rate and regular rhythm.      Heart sounds: No murmur heard.  Pulmonary:      Effort: Pulmonary effort is normal. No respiratory distress.      Breath sounds: Normal breath sounds.   Musculoskeletal:      Right lower leg: No edema.      Left lower leg: No edema.     Assessment and Plan     Type 2 diabetes mellitus with diabetic polyneuropathy, without long-term current use of insulin  -     Comprehensive Metabolic Panel; Future; Expected date: 02/13/2023  -     Lipid Panel; Future; Expected date: 02/13/2023  -     CBC Without Differential; Future; Expected date: 02/13/2023  -     Hemoglobin A1C; Future; Expected date: 02/13/2023    Essential tremor    Dysphagia, unspecified type  -     Case Request Endoscopy: ESOPHAGOGASTRODUODENOSCOPY (EGD)    Recurrent major depressive disorder, in full remission    Aortic atherosclerosis    Pulmonary hypertension    PAF (paroxysmal atrial fibrillation)    Chronic  obstructive pulmonary disease with (acute) exacerbation      Discussed stopping aspirin unless told to do so by someone else. She has been on this plus xarelto.  Unable to view cardiology records, need to obtain these in the future.      Follow up in about 6 months (around 8/13/2023).  ___________________  Triston Valverde MD  Internal Medicine and Pediatrics

## 2023-02-13 NOTE — PROGRESS NOTES
Occupational Therapy Daily Treatment Note     Visit Date: 2/13/2023  Name: Deb Webb  Clinic Number: [id number]    Therapy Diagnosis:   Encounter Diagnoses   Name Primary?    Pain in joint of right hand Yes    Deformity, hand, right     Joint stiffness of hand, right      Physician: Dandre Odell, BRITNEY    Physician orders:   Note     Patient has had her right middle finger PIP joint immobilized via a thermoplastic orthosis PIP extension splint for the past 8 weeks due to her boutonniere deformity with associated fracture.  Patient now has significant stiffness of the right middle finger PIP joint, and requires certified hand therapy to increase the range of motion and function of the right middle finger.  Additionally, the patient will continue to wear her DIP joint orthosis extension splint of the D IP joint of the right ring finger for 3 more weeks, at which time certified hand therapy may begin gentle range of motion of her right ring finger DIP joint.     Duration:  6 weeks      Frequency:  2-3 times per week     Please work on gentle range of motion of the right hand (protect the DIP joint of the right ring finger), edema control, therapeutic modalities, and eventually gentle strengthening.  Thanks!           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   S62.634A (ICD-10-CM) - Closed mallet fracture of distal phalanx of right ring finger   M25.641 (ICD-10-CM) - Stiffness of finger joint of right hand     Evaluation Date: 2/6/2023  Insurance Authorization period Expiration:  Calendar year  Plan of Care Expiration Period: 3/5/2023 (6 weeks from order placed on 1/23/2023)  Next Re-assessment: 30 days = 3/8/2023  Date of Return Referring Provider 2/13/2023     Visit # / Visits Authortized: 5 (3 for orthotic/splinting) / TBD  # No shows 0 / # Cancellations: 0  Time In: 1215  Time Out: 1226  Total Billable Time: 8 minutes     Precautions: Standard and  falls  Surgery: N/A has been treated with splinting, Cont with DIP splint through 3/12/2023, then can begin gentle ROM.                            S/P: approx 8 weeks on 1/23/2022  Subjective     Pt reports: No new symptoms or complaints  she was compliant with HEP.   Response to previous treatment:very good  Functional change:Some improved use of her R hand/long finger  Pain:  Functional Pain Scale Rating 0-10:   8/10 on average  8/10 at best  8/10 at worst  Location: R hand/ring finger and thumb at times   Description: Aching, Tingling, Sharp, and Variable,   Aggravating Factors: N/A  Easing Factors: N/A       Objective   MEASUREMENTS  Last measurements below are from Eval unless otherwise noted.  Quick DASH Survey = 70.45% from Eval    TREATMENT  Deb received the following supervised modalities after being cleared for contradictions for 3 minutes:   - Moist heat to R hand    Deb received the following direct contact modalities after being cleared for contraindications for 0 minutes:  -NT    Deb received the following manual therapy techniques for 8 minutes:   -NT    Deb received therapeutic exercises for 0 minutes including:  -NT    Deb participated in dynamic functional therapeutic activities to improve functional performance for 0  minutes, including:  -NT    Home Exercises and Education Provided     Education provided:   -  Cont per previous.    Written Home Exercises Provided:  Patient instructed to cont prior HEP.    Exercises were reviewed and Deb was able to demonstrate them prior to the end of the session.  Deb demonstrated good  understanding of the education provided.     See EMR under Media for exercises provided today and/or prior visit.        Assessment     Pt would continue to benefit from skilled OT. Pt tolerated Tx well this date.completing her home program, and reports has been able ot wean out of her long finger splint.     - Progress towards goals:  STG #1 has been  Gilles Boyd is progressing well towards her goals . Pt continues with good rehab potential.     Pt will continue to benefit from skilled outpatient occupational therapy to address the deficits listed in the problem list on initial evaluation in order to maximize pt's level of independence in the home and community.     Anticipated barriers to occupational therapy: None    Pt's spiritual, cultural and educational needs considered and pt agreeable to plan of care and goals.       Goals:      Short Term Goals: (30 days, 3/6/2023, and/or 10th visit) unless otherwise noted below.  1. Pt will be independent with HEP in 2 visits.  2. Pt will report decreased pain to a 6/10 at worst in RUE/hand with ADLs in order to increase function/use of UE.   3. Pt will increase AROM R long finger flexion by 10 degrees to 85 degrees to increase function for ADL/IADL.     Long Term Goals: (by discharge)  1. Pt will report decreased pain to 1-2/10 with ADLs to allow for increased function/use of UE.   2. Pt will exhibit  grossly WNL AROM hand to allow for Independent use of for all ADL/IADL tasks.  3. Pt will exhibit WFL  strength to allow a firm grasp during ADL/    Plan   Continue Occupational Therapy 2-3 times per week through current [facility] expiration date of 3/5/2023 in order to to decrease pain and edema, and increase A/PROM, strength, and functional use of R upper extremity.    Updates/Grading for next session:  Progress with OT as tolerated.      KHALIDA Arriaza, JUANT

## 2023-02-13 NOTE — PROGRESS NOTES
2/13/2023    HPI:  Deb Webb is a 72 y.o. 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  States overall she is doing well.  States the range of motion of her right middle finger has significantly improved since last visit.  States the swelling of her PIP joint of the right middle finger as is also significantly improved.  States he has worn the thermoplastic orthosis DIP joint extension splint of the right ring finger as instructed.  Denies any other complaints at this time.    PMHX:  Past Medical History:   Diagnosis Date    Allergy     Arthritis     Cataract     Colon polyps     COPD (chronic obstructive pulmonary disease)     Diabetes mellitus type II     Diabetic neuropathy     Fever blister     Glaucoma (increased eye pressure)     Hyperlipidemia     Hypertension     Irritable bowel syndrome     Keloid cicatrix     Osteoporosis     Retained cholelithiasis following cholecystectomy(997.41)     Right patella fracture        PSHX:  Past Surgical History:   Procedure Laterality Date    BACK SURGERY  2006    CATARACT EXTRACTION W/  INTRAOCULAR LENS IMPLANT Bilateral     CHOLECYSTECTOMY      Laparoscopic    COLONOSCOPY      COLONOSCOPY N/A 8/31/2022    Procedure: COLONOSCOPY;  Surgeon: Salvatore Butler MD;  Location: [facility];  Service: Gastroenterology;  Laterality: N/A;    ESOPHAGEAL DILATION N/A 8/30/2022    Procedure: DILATION, ESOPHAGUS;  Surgeon: Salvatore Butler MD;  Location: [facility];  Service: Gastroenterology;  Laterality: N/A;    ESOPHAGOGASTRODUODENOSCOPY N/A 8/30/2022    Procedure: EGD (ESOPHAGOGASTRODUODENOSCOPY);  Surgeon: Salvatore Butler MD;  Location: [facility];  Service: Gastroenterology;  Laterality: N/A;    HERNIA REPAIR      HYSTERECTOMY      KNEE SURGERY Right 3/4/2015    Dr Weller     OOPHORECTOMY      ovarian tumor      Benign     TONSILLECTOMY, ADENOIDECTOMY         FMHX:  Family History   Problem Relation Age of Onset    Cancer Mother         Skin    Skin cancer Mother     Glaucoma Mother     Cataracts Mother     Diabetes Mother     Heart disease Father     Diabetes Father     Skin cancer Sister     Diabetes Sister     Diabetes Daughter     Breast cancer Maternal Aunt     Melanoma Neg Hx     Psoriasis Neg Hx     Eczema Neg Hx     Lupus Neg Hx     Amblyopia Neg Hx     Blindness Neg Hx     Macular degeneration Neg Hx     Retinal detachment Neg Hx     Strabismus Neg Hx        SOCHX:  Social History     Tobacco Use    Smoking status: Every Day     Packs/day: 0.50     Years: 40.00     Pack years: 20.00     Types: Cigarettes    Smokeless tobacco: Current   Substance Use Topics    Alcohol use: No       ALLERGIES:  Silicone and Adhesive    CURRENT MEDICATIONS:  Current Outpatient Medications on File Prior to Visit   Medication Sig Dispense Refill    albuterol (PROAIR HFA) 90 mcg/actuation inhaler Inhale 2 puffs into the lungs every 6 (six) hours as needed for Wheezing. Rescue 18 g 2    albuterol-ipratropium (DUO-NEB) 2.5 mg-0.5 mg/3 mL nebulizer solution Take 3 mLs by nebulization every 6 (six) hours as needed for Wheezing or Shortness of Breath. Rescue 75 mL 0    alendronate (FOSAMAX) 70 MG tablet Take 1 tablet (70 mg total) by mouth once a week. 12 tablet 3    amiodarone (PACERONE) 200 MG Tab Take 2 tabs by mouth twice daily until 12/2/22 then take 1 tab by mouth twice daily 90 tablet 1    atorvastatin (LIPITOR) 40 MG tablet TAKE 1 TABLET BY MOUTH EVERY DAY 90 tablet 1    blood sugar diagnostic Strp To check BG 2 times daily, to use with insurance preferred meter 200 each 3    blood-glucose meter,continuous (DEXCOM G6 ) Misc Use as directed 1 each 1    blood-glucose sensor (DEXCOM G6 SENSOR) Emmie Use as directed 10 each 2    calcium carbonate/vitamin D3 (CALCIUM 600 + D,3, ORAL) Take 1 tablet by mouth once daily.       "cholestyramine-aspartame (PREVALITE) 4 gram PwPk TAKE 1 PACKET (4 G TOTAL) BY MOUTH 2 (TWO) TIMES DAILY. FOR DIARRHEA 180 packet 0    citalopram (CELEXA) 20 MG tablet Take 1 tablet (20 mg total) by mouth once daily. 90 tablet 1    dulaglutide (TRULICITY) 1.5 mg/0.5 mL pen injector Inject 1.5 mg into the skin every 7 days. Through patient assistance 12 pen 3    furosemide (LASIX) 20 MG tablet Take 1 tablet (20 mg total) by mouth as needed (swelling).      gabapentin (NEURONTIN) 300 MG capsule TAKE 1 CAPSULE BY MOUTH THREE TIMES A  capsule 1    guaiFENesin (MUCINEX) 600 mg 12 hr tablet Take 1 tablet (600 mg total) by mouth 2 (two) times daily.      HYDROcodone-acetaminophen (NORCO) 5-325 mg per tablet Take 1 tablet by mouth every 12 (twelve) hours as needed for Pain. 10 tablet 0    ipratropium (ATROVENT) 42 mcg (0.06 %) nasal spray 2 sprays by Nasal route 3 (three) times daily. Use 3 times per day if necessary for chronic nasal drip 15 mL 12    Lactobacillus rhamnosus GG (CULTURELLE) 10 billion cell capsule Take 1 capsule by mouth once daily. 60 capsule 0    lancets Misc To check BG 2 times daily, to use with insurance preferred meter 200 each 3    latanoprost 0.005 % ophthalmic solution INSTILL 1 DROP INTO BOTH EYES EVERY EVENING (Patient taking differently: Place 1 drop into both eyes every evening.) 7.5 mL 1    losartan (COZAAR) 25 MG tablet Take 0.5 tablets (12.5 mg total) by mouth once daily. 30 tablet 1    meclizine (ANTIVERT) 12.5 mg tablet TAKE 1 TABLET BY MOUTH 3 TIMES DAILY AS NEEDED FOR DIZZINESS. 30 tablet 0    omega-3 fatty acids-vitamin E 1,000 mg Cap Take 1 capsule by mouth once daily.      omeprazole (PRILOSEC) 40 MG capsule TAKE 1 CAPSULE (40 MG TOTAL) BY MOUTH BEFORE BREAKFAST. 90 capsule 1    pen needle, diabetic (BD ULTRA-FINE AGUS PEN NEEDLE) 32 gauge x 5/32" Ndle 1 Device by Misc.(Non-Drug; Combo Route) route 2 (two) times daily. 200 each 4    primidone (MYSOLINE) 50 MG Tab TAKE 2 TABLETS " BY MOUTH 3 (THREE) TIMES DAILY. ONE HALF TABLET FOR ONE WEEK, THEN AS PRESCRIBED 540 tablet 3    umeclidinium-vilanteroL (ANORO ELLIPTA) 62.5-25 mcg/actuation DsDv Inhale 1 puff into the lungs once daily. Controller 1 each 5    XARELTO 20 mg Tab TAKE 1 TABLET BY MOUTH EVERY DAY WITH DINNER OR EVENING MEAL      blood-glucose meter kit To check BG 2 times daily, to use with insurance preferred meter 1 each 0    [DISCONTINUED] aspirin (ECOTRIN) 81 MG EC tablet Take 1 tablet (81 mg total) by mouth once daily.  0    [DISCONTINUED] irbesartan (AVAPRO) 75 MG tablet Take 1 tablet (75 mg total) by mouth once daily. 90 tablet 1    [DISCONTINUED] loratadine (CLARITIN) 10 mg tablet TAKE 1 TABLET (10 MG TOTAL) BY MOUTH DAILY AS NEEDED FOR ALLERGIES (OR RUNNY NOSE). 90 tablet 1    [DISCONTINUED] pramoxine-hydrocortisone (PROCTOCREAM-HC) 1-1 % rectal cream Place rectally 2 (two) times daily. 30 g 0    [DISCONTINUED] predniSONE (DELTASONE) 10 MG tablet Take 2 tabs daily by mouth for 5 days then 1 tab daily for 5 days 15 tablet 0     No current facility-administered medications on file prior to visit.       REVIEW OF SYSTEMS:  Review of Systems Complete; Negative, unless noted above.    GENERAL PHYSICAL EXAM:   There were no vitals taken for this visit.   GEN: well developed, well nourished, no acute distress   PULM: No wheezing, no respiratory distress   CV: RRR    ORTHO EXAM:     Examination of the right hand reveals minimal edema of the PIP joint of the right middle finger.  The right ring finger D IP joint is held in appropriate extension with the thermoplastic D IP joint extension splint.  Mildly reduced range of motion regards to flexion of the PIP joint of the right middle finger, which has significantly improved since last visit.  No erythema, ecchymosis, or skin breakdown noted.  Able to flex extend the fingers appropriately.  Strength not tested.  Sensation is grossly intact in the radial, ulnar, median nerve  distributions.  Capillary refill less than 2 seconds in all fingers.    RADIOLOGY:     X-rays of the right hand were taken today in clinic.  X-rays reviewed by myself.  Imaging showed the presence of an avulsion fracture of the dorsal base of the middle phalanx of the right middle finger.  Presence of a mallet finger fracture of the distal phalanx of the right ring finger.  Presence of degenerative changes throughout the IP joints of the right hand.  No other significant bony abnormalities noted.    ASSESSMENT:     Right middle finger stiffness, mallet finger fracture of the right ring finger    PLAN:  1. I discussed with Deb Webb  that she is progressing appropriately in the treatment course.  We discussed the best course of action this time is to continue wearing the DIP joint orthosis extension splint at all times for the next week, at which time she may transition to wearing it for activity and to sleep only.  We discussed the importance of continuing her occupational therapy.  She verbally agreed with the treatment plan.    2.  I would like him to follow up in clinic in 4 weeks for repeat evaluation.  She was instructed to contact clinic for any problems or concerns in the interim.

## 2023-02-14 ENCOUNTER — TELEPHONE (OUTPATIENT)
Dept: FAMILY MEDICINE | Facility: CLINIC | Age: 73
End: 2023-02-14

## 2023-02-14 NOTE — TELEPHONE ENCOUNTER
----- Message from Triston Valverde MD sent at 2/14/2023  8:58 AM CST -----  Please call the patient and let her know her diabetes is controlled and her labs are stable.

## 2023-02-20 ENCOUNTER — CLINICAL SUPPORT (OUTPATIENT)
Dept: REHABILITATION | Facility: HOSPITAL | Age: 73
End: 2023-02-20
Attending: INTERNAL MEDICINE
Payer: MEDICARE

## 2023-02-20 DIAGNOSIS — R29.898 WEAKNESS OF BOTH HIPS: Primary | ICD-10-CM

## 2023-02-20 DIAGNOSIS — M21.941 DEFORMITY, HAND, RIGHT: ICD-10-CM

## 2023-02-20 DIAGNOSIS — M25.541 PAIN IN JOINT OF RIGHT HAND: Primary | ICD-10-CM

## 2023-02-20 DIAGNOSIS — M25.641 JOINT STIFFNESS OF HAND, RIGHT: ICD-10-CM

## 2023-02-20 PROCEDURE — 97110 THERAPEUTIC EXERCISES: CPT | Mod: PO

## 2023-02-20 PROCEDURE — 97140 MANUAL THERAPY 1/> REGIONS: CPT | Mod: PO

## 2023-02-20 PROCEDURE — 97530 THERAPEUTIC ACTIVITIES: CPT | Mod: PO

## 2023-02-20 PROCEDURE — 97112 NEUROMUSCULAR REEDUCATION: CPT | Mod: PO,CQ

## 2023-02-20 PROCEDURE — 97110 THERAPEUTIC EXERCISES: CPT | Mod: PO,CQ

## 2023-02-20 NOTE — PROGRESS NOTES
Occupational Therapy Daily Treatment Note     Visit Date: 2/20/2023  Name: Deb Webb  Clinic Number: [id number]    Therapy Diagnosis:   Encounter Diagnoses   Name Primary?    Pain in joint of right hand Yes    Deformity, hand, right     Joint stiffness of hand, right        Physician: Dandre Odell, BRITNEY    Physician orders:   Note     Patient has had her right middle finger PIP joint immobilized via a thermoplastic orthosis PIP extension splint for the past 8 weeks due to her boutonniere deformity with associated fracture.  Patient now has significant stiffness of the right middle finger PIP joint, and requires certified hand therapy to increase the range of motion and function of the right middle finger.  Additionally, the patient will continue to wear her DIP joint orthosis extension splint of the D IP joint of the right ring finger for 3 more weeks, at which time certified hand therapy may begin gentle range of motion of her right ring finger DIP joint.     Duration:  6 weeks      Frequency:  2-3 times per week     Please work on gentle range of motion of the right hand (protect the DIP joint of the right ring finger), edema control, therapeutic modalities, and eventually gentle strengthening.  Thanks!           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   S62.634A (ICD-10-CM) - Closed mallet fracture of distal phalanx of right ring finger   M25.641 (ICD-10-CM) - Stiffness of finger joint of right hand     Evaluation Date: 2/6/2023  Insurance Authorization period Expiration:  Calendar year  Plan of Care Expiration Period: 3/5/2023 (6 weeks from order placed on 1/23/2023)  Next Re-assessment: 30 days = 3/8/2023  Date of Return Referring Provider 2/13/2023     Visit # / Visits Authortized: 6 (3 for orthotic/splinting) / TBD  # No shows 0 / # Cancellations: 0  Time In: 0945  Time Out: 1044   Total Billable Time: 58 minutes     Precautions: Standard and  falls  Surgery: N/A has been treated with splinting, Cont with DIP splint through 3/12/2023, then can begin gentle ROM.                            S/P: approx 8 weeks on 1/23/2022  Subjective     Pt reports: still has trouble opening caps / bottles.  she was compliant with HEP.   Response to previous treatment:very good  Functional change:Some improved use of her R hand/long finger  Pain:  Functional Pain Scale Rating 0-10:   7/10 on average  7/10 at best  7/10 at worst  Location: R hand/ring finger and thumb at times   Description: Aching, Tingling, Sharp, and Variable,   Aggravating Factors: N/A  Easing Factors: N/A     Objective   MEASUREMENTS  Last measurements below are from Eval unless otherwise noted.  Quick DASH Survey = 70.45% from Eval    TREATMENT  Deb received the following supervised modalities after being cleared for contradictions for: Please see under therapeutic exercises for further on heat and stretch ex with the use of Moist heat (pt was cleared for all contraindication/precautions prior) in order to increase: comfort, blood flow and soft tissue elasticity.      Deb received the following direct contact modalities after being cleared for contraindications for 0 minutes:  -NT    Deb received the following manual therapy techniques for 8 minutes:   - Manual Therapy: Pt seen for Retrograde Massage and Scar Massage to R long finger for and hand    Deb received therapeutic exercises for 20 minutes including:  - Long fist while on moist heat for heat and stretch x 5 min  - Gentle PROM composite flexion digits 2, 3, and 5  - Fingerlifts 5x10     Deb participated in dynamic functional therapeutic activities to improve functional performance for 30 minutes, including:  - Button  with tip to palm 5 buttons at  a time  - 9 hole pegs 3 pegs at a time for tip to palm and palm to tip.  - large [place] CCW rotation x25 and  10 x 5     Home Exercises and Education Provided      Education provided:   -  Cont per previous. Instructed on Dycem use to increase function / decreased strain on R hand with opening tasks.    Written Home Exercises Provided:  Patient instructed to cont prior HEP.    Exercises were reviewed and Deb was able to demonstrate them prior to the end of the session.  Deb demonstrated good  understanding of the education provided.     See EMR under Media for exercises provided today and/or prior visit.        Assessment     Pt would continue to benefit from skilled OT. Pt reports some decreased pain and demonstrates some increased functional use of R hand. Cont per current poc.    - Progress towards goals:  STG #1 has been met.    Deb is progressing well towards her goals . Pt continues with good rehab potential.     Pt will continue to benefit from skilled outpatient occupational therapy to address the deficits listed in the problem list on initial evaluation in order to maximize pt's level of independence in the home and community.     Anticipated barriers to occupational therapy: None    Pt's spiritual, cultural and educational needs considered and pt agreeable to plan of care and goals.       Goals:      Short Term Goals: (30 days, 3/6/2023, and/or 10th visit) unless otherwise noted below.  1. Pt will be independent with HEP in 2 visits.  2. Pt will report decreased pain to a 6/10 at worst in RUE/hand with ADLs in order to increase function/use of UE.   3. Pt will increase AROM R long finger flexion by 10 degrees to 85 degrees to increase function for ADL/IADL.     Long Term Goals: (by discharge)  1. Pt will report decreased pain to 1-2/10 with ADLs to allow for increased function/use of UE.   2. Pt will exhibit  grossly WNL AROM hand to allow for Independent use of for all ADL/IADL tasks.  3. Pt will exhibit WFL  strength to allow a firm grasp during ADL    Plan   Continue Occupational Therapy 2-3 times per week through current poc expiration date of  3/5/2023 in order to to decrease pain and edema, and increase A/PROM, strength, and functional use of R upper extremity.    Updates/Grading for next session:  Progress with OT as tolerated.      KHALIDA Arriaza, JUANT

## 2023-02-20 NOTE — PROGRESS NOTES
"    [facility]   Physical Therapy Treatment Note     Name: Deb Webb  Clinic Number: [id number]    Therapy Diagnosis:   Encounter Diagnosis   Name Primary?    Weakness of both hips Yes     Physician: Triston Valverde MD  Physician Orders: PT Eval and Treat  Medical Diagnosis from Referral: Closed displaced fracture of left pubis with routine healing  Evaluation Date: 05/16/2022  Authorization Period Expiration: 12/31/2023  Plan of Care Expiration: 02/10/2023  Progress Note Due: 2/10/2023  Visit # / Visits authorized: 4/20    PTA Visit #: 2/5     Time In: 1045 am  Time Out: 1130 am  Total Billable Time: 45 minutes    SUBJECTIVE     Pt reports: She was having increased pain in the R knee last night but it is feeling better today.  Her back is feeling a little better today.  Pt continues to try to walk each day. Pt feels like since the doctor changed her to xarelto, her walking speed and ability to walk has improved.  Response to previous treatment: fatigue  Functional change: No change    Pain: 7/10  Location: back    OBJECTIVE     Objective Measures updated at progress report unless specified.       Treatment     Deb received the treatments listed below:      *Exercises performed in parallel bars with gait belt and SBA    Therapeutic exercises to develop strength, endurance, ROM, flexibility, posture and core stabilization for 20 minutes including:    Recumbent bike x 7 min- for endurance and ROM Level 2  LAQ  2x10 1# each leg 2" hold  SL clamshells green band 2x10  Standing hip abd 2x10  Standing hip ext x 10    Not performed:  Supine clamshell BTB 3x10-np  Hip adduction with ball 3x10  Bridging 3 x 10      Neuromuscular re-education activities to improve: Balance, Coordination, Kinesthetic, Sense and Proprioception for 25 minutes. The following activities were included:  Tandem stance 3x 30s each leg  Standing taps to 4" box  2 x 10 each leg  NBOS on foam 2x1 min  Marching " "x 20 on each- 1 finger UE support  Heel raises x 20- 1 finger UR support  Sit to stands from mat 2x10  Static stance eyes closed 2x30 sec- minimal sway  Static stance with head turns 2x30 sec    Patient Education and Home Exercises      Home Exercises Provided and Patient Education Provided     Education provided:   - slowly increasing walking    Written Home Exercises Provided: Patient instructed to cont prior HEP. Exercises were reviewed and Deb was able to demonstrate them prior to the end of the session.  Deb demonstrated good  understanding of the education provided. See EMR under Patient Instructions for exercises provided during therapy sessions    ASSESSMENT     Pt did not require any rest breaks during standing balance activities.  Pt with visible improved endurance and able to increase reps in standing and sit to stands.  Will continue slowly progressing standing tolerance.    Deb Is progressing well towards her goals.   Pt prognosis is Fair.     Pt will continue to benefit from skilled outpatient physical therapy to address the deficits listed in the problem list box on initial evaluation, provide pt/family education and to maximize pt's level of independence in the home and community environment.     Pt's spiritual, cultural and educational needs considered and pt agreeable to plan of care and goals.     Anticipated barriers to physical therapy: transportation/compliance    Goals:  Short-Term Goals: 4 weeks  - The patient will be independent with initial home exercise program. (Not met, progressing)  - The patient will increase strength to at least 4-/5 in muscles tested to indicate improvements in functional strength. (Met)  -TUG improved to 20" to demo improving mobility and justify continuing skilled care.    Long-Term Goals: 8 weeks  - The patient will increase strength to at least 4/5 to perform functional mobility including sit to stand, steps/curbs, and walking (Not met, progressing)  - " "The patient will increase ROM to WFL to perform ADL, vocational, and recreational tasks without pain. (Met)  -TUG improved to 18" to demo improving mobility.    PLAN     Continue with balance exercises and LE strengthening, plan of care extended    Perlita Lorenzo PTA                                     "

## 2023-02-27 ENCOUNTER — CLINICAL SUPPORT (OUTPATIENT)
Dept: REHABILITATION | Facility: HOSPITAL | Age: 73
End: 2023-02-27
Attending: INTERNAL MEDICINE
Payer: MEDICARE

## 2023-02-27 DIAGNOSIS — M25.541 PAIN IN JOINT OF RIGHT HAND: Primary | ICD-10-CM

## 2023-02-27 DIAGNOSIS — R29.898 WEAKNESS OF BOTH HIPS: Primary | ICD-10-CM

## 2023-02-27 DIAGNOSIS — M21.941 DEFORMITY, HAND, RIGHT: ICD-10-CM

## 2023-02-27 DIAGNOSIS — M25.641 JOINT STIFFNESS OF HAND, RIGHT: ICD-10-CM

## 2023-02-27 PROCEDURE — 97112 NEUROMUSCULAR REEDUCATION: CPT | Mod: PO,CQ

## 2023-02-27 PROCEDURE — 97110 THERAPEUTIC EXERCISES: CPT | Mod: PO,CQ

## 2023-02-27 PROCEDURE — 97140 MANUAL THERAPY 1/> REGIONS: CPT | Mod: PO

## 2023-02-27 NOTE — PROGRESS NOTES
"    [facility]   Physical Therapy Treatment Note     Name: Deb Webb  Clinic Number: [id number]    Therapy Diagnosis:   Encounter Diagnosis   Name Primary?    Weakness of both hips Yes     Physician: Triston Valverde MD  Physician Orders: PT Eval and Treat  Medical Diagnosis from Referral: Closed displaced fracture of left pubis with routine healing  Evaluation Date: 05/16/2022  Authorization Period Expiration: 12/31/2023  Plan of Care Expiration: 4/4/2023  Progress Note Due: 3/9/2023  Visit # / Visits authorized: 5/20    PTA Visit #: 2/5     Time In: 1003 am  Time Out: 1045 am  Total Billable Time: 42 minutes    SUBJECTIVE     Pt reports: no new complaints  Response to previous treatment: she had some soreness but it was only that day  Functional change: No change    Pain: 6/10  Location: back    OBJECTIVE     Objective Measures updated at progress report unless specified.       Treatment     Deb received the treatments listed below:      *Exercises performed in parallel bars with gait belt and SBA    Therapeutic exercises to develop strength, endurance, ROM, flexibility, posture and core stabilization for 17 minutes including:    Recumbent bike x 7 min- for endurance and ROM Level 2  LAQ  3x10 1# each leg 2" hold  SL clamshells green band 2x10  Standing hip abd 2x10  Standing hip ext x 10    Not performed:  Supine clamshell BTB 3x10-np  Hip adduction with ball 3x10  Bridging 3 x 10      Neuromuscular re-education activities to improve: Balance, Coordination, Kinesthetic, Sense and Proprioception for 25 minutes. The following activities were included:  Tandem stance 3x 30s each leg  Standing taps to 4" box  2 x 10 each leg  NBOS on foam 2x1 min  Marching 2x30 sec  Heel raises x 20- 1 finger UR support  Sit to stands from mat 2x10- not performed  Static stance eyes closed 2x30 sec- minimal sway  Static stance eyes closed on foam 2x30 sec  Static stance with head turns 2x30 " "sec    Patient Education and Home Exercises      Home Exercises Provided and Patient Education Provided     Education provided:   - slowly increasing walking    Written Home Exercises Provided: Patient instructed to cont prior HEP. Exercises were reviewed and Deb was able to demonstrate them prior to the end of the session.  Deb demonstrated good  understanding of the education provided. See EMR under Patient Instructions for exercises provided during therapy sessions    ASSESSMENT     Pt required one rest break during exercises.  Pt with minmal sway when standing on the foam with eyes closed.  Pt still requires intermittent UE support with single leg balance activities. Will continue slowly progressing standing tolerance.    Deb Is progressing well towards her goals.   Pt prognosis is Fair.     Pt will continue to benefit from skilled outpatient physical therapy to address the deficits listed in the problem list box on initial evaluation, provide pt/family education and to maximize pt's level of independence in the home and community environment.     Pt's spiritual, cultural and educational needs considered and pt agreeable to plan of care and goals.     Anticipated barriers to physical therapy: transportation/compliance    Goals:  Short-Term Goals: 4 weeks  - The patient will be independent with initial home exercise program. (Not met, progressing)  - The patient will increase strength to at least 4-/5 in muscles tested to indicate improvements in functional strength. (Met)  -TUG improved to 20" to demo improving mobility and justify continuing skilled care.    Long-Term Goals: 8 weeks  - The patient will increase strength to at least 4/5 to perform functional mobility including sit to stand, steps/curbs, and walking (Not met, progressing)  - The patient will increase ROM to WFL to perform ADL, vocational, and recreational tasks without pain. (Met)  -TUG improved to 18" to demo improving mobility.    PLAN "     Continue with balance exercises and LE strengthening, plan of care extended    Perlita Lorenzo, PTA

## 2023-02-27 NOTE — PLAN OF CARE
Occupational Therapy Updated POC and Daily Treatment Note     Visit Date: 2/27/2023  Name: Deb Webb  Clinic Number: [id number]    Therapy Diagnosis:   Encounter Diagnoses   Name Primary?    Pain in joint of right hand Yes    Deformity, hand, right     Joint stiffness of hand, right          Physician: Dandre Odell, BRITNEY    Physician orders:   Note     Patient has had her right middle finger PIP joint immobilized via a thermoplastic orthosis PIP extension splint for the past 8 weeks due to her boutonniere deformity with associated fracture.  Patient now has significant stiffness of the right middle finger PIP joint, and requires certified hand therapy to increase the range of motion and function of the right middle finger.  Additionally, the patient will continue to wear her DIP joint orthosis extension splint of the D IP joint of the right ring finger for 3 more weeks, at which time certified hand therapy may begin gentle range of motion of her right ring finger DIP joint.     Duration:  6 weeks      Frequency:  2-3 times per week     Please work on gentle range of motion of the right hand (protect the DIP joint of the right ring finger), edema control, therapeutic modalities, and eventually gentle strengthening.  Thanks!           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   S62.634A (ICD-10-CM) - Closed mallet fracture of distal phalanx of right ring finger   M25.641 (ICD-10-CM) - Stiffness of finger joint of right hand     Evaluation Date: 2/6/2023  Insurance Authorization period Expiration:  Calendar year  Plan of Care Expiration Period: 4 more weeks effective 2/27/2023 = 3/26/2023  Next Re-assessment: 30 days = 3/6/2023  Date of Return Referring Provider: 3/13/2023     Visit # / Visits Authortized: 7 (3 for orthotic/splinting) / TBD  # No shows 0 / # Cancellations: 0  Time In: 1045  Time Out: 1101 (pt requested to leave early, because of possible EGD  vinod post OT today)  Total Billable Time: 15 minutes     Precautions: Standard and falls  Surgery: N/A has been treated with splinting, Cont with DIP splint through 3/12/2023, then can begin gentle ROM.                            S/P: approx 8 weeks on 1/23/2022  Subjective     Pt reports: No new issues or complaints.  she was compliant with HEP.   Response to previous treatment:very good  Functional change:Some improved use of her R hand/long finger  Pain:  Functional Pain Scale Rating 0-10:   7/10 on average  7/10 at best  7/10 at worst  Location: R hand/ring finger and thumb at times   Description: Aching, Tingling, Sharp, and Variable,   Aggravating Factors: N/A  Easing Factors: N/A     Objective   MEASUREMENTS  Last measurements below are from Eval unless otherwise noted.  Quick DASH Survey = 70.45% from Eval    TREATMENT  Deb received the following supervised modalities after being cleared for contradictions for: Please see under therapeutic exercises for further on heat and stretch ex with the use of Moist heat (pt was cleared for all contraindication/precautions prior) in order to increase: comfort, blood flow and soft tissue elasticity.      Deb received the following direct contact modalities after being cleared for contraindications for 0 minutes:  -NT    Deb received the following manual therapy techniques for 9 minutes:   - Manual Therapy: Pt seen for Retrograde Massage and Scar Massage to R long finger for and hand    Deb received therapeutic exercises for 6 minutes including:  - Long fist while on moist heat for heat and stretch x 5 min  - Gentle PROM composite flexion digits 2, 3, and 5  - Fingerlifts 5x10  (NT)    Deb participated in dynamic functional therapeutic activities to improve functional performance for 0 minutes, including:  - Button  with tip to palm 5 buttons at  a time (NT)  - 9 hole pegs 3 pegs at a time for tip to palm and palm to tip. (NT)  - large Pom Poms CCW  rotation x25 and  10 x 5 (NT)    Home Exercises and Education Provided     Education provided:   -  Cont per previous.   Written Home Exercises Provided:  Patient instructed to cont prior HEP.    Exercises were reviewed and Deb was able to demonstrate them prior to the end of the session.  Deb demonstrated good  understanding of the education provided.     See EMR under Media for exercises provided today and/or prior visit.        Assessment     Pt would continue to benefit from skilled OT. Pt has remained compliant with splint wear. Cont per current poc.    - Progress towards goals:  STG #1 has been met.    Deb is progressing well towards her goals . Pt continues with good rehab potential.     Pt will continue to benefit from skilled outpatient occupational therapy to address the deficits listed in the problem list on initial evaluation in order to maximize pt's level of independence in the home and community.     Anticipated barriers to occupational therapy: None    Pt's spiritual, cultural and educational needs considered and pt agreeable to plan of care and goals.       Goals:      Short Term Goals: (30 days, 3/6/2023, and/or 10th visit) unless otherwise noted below.  1. Pt will be independent with HEP in 2 visits.  2. Pt will report decreased pain to a 6/10 at worst in RUE/hand with ADLs in order to increase function/use of UE.   3. Pt will increase AROM R long finger flexion by 10 degrees to 85 degrees to increase function for ADL/IADL.     Long Term Goals: (by discharge)  1. Pt will report decreased pain to 1-2/10 with ADLs to allow for increased function/use of UE.   2. Pt will exhibit  grossly WNL AROM hand to allow for Independent use of for all ADL/IADL tasks.  3. Pt will exhibit WFL  strength to allow a firm grasp during ADL    Plan   Continue Occupational Therapy 1-2 times per week through  updated POC exp date of  3/26/2023 in order to to decrease pain and edema, and increase  A/PROM, strength, and functional use of R upper extremity.    Updates/Grading for next session:  Progress with OT as tolerated.      KHALIDA Arriaza, CHT

## 2023-03-06 ENCOUNTER — CLINICAL SUPPORT (OUTPATIENT)
Dept: REHABILITATION | Facility: HOSPITAL | Age: 73
End: 2023-03-06
Attending: INTERNAL MEDICINE
Payer: MEDICARE

## 2023-03-06 DIAGNOSIS — R29.898 WEAKNESS OF BOTH HIPS: Primary | ICD-10-CM

## 2023-03-06 DIAGNOSIS — M25.541 PAIN IN JOINT OF RIGHT HAND: Primary | ICD-10-CM

## 2023-03-06 DIAGNOSIS — M25.641 JOINT STIFFNESS OF HAND, RIGHT: ICD-10-CM

## 2023-03-06 DIAGNOSIS — M21.941 DEFORMITY, HAND, RIGHT: ICD-10-CM

## 2023-03-06 PROCEDURE — 97112 NEUROMUSCULAR REEDUCATION: CPT | Mod: PO | Performed by: PHYSICAL THERAPIST

## 2023-03-06 PROCEDURE — 97530 THERAPEUTIC ACTIVITIES: CPT | Mod: PO

## 2023-03-06 PROCEDURE — 97110 THERAPEUTIC EXERCISES: CPT | Mod: PO | Performed by: PHYSICAL THERAPIST

## 2023-03-06 PROCEDURE — 97110 THERAPEUTIC EXERCISES: CPT | Mod: PO

## 2023-03-06 NOTE — PROGRESS NOTES
[facility]   Physical Therapy Treatment Note     Name: Deb Webb  Clinic Number: [id number]    Therapy Diagnosis:   Encounter Diagnosis   Name Primary?    Weakness of both hips Yes       Physician: Triston Valverde MD  Physician Orders: PT Eval and Treat  Medical Diagnosis from Referral: Closed displaced fracture of left pubis with routine healing  Evaluation Date: 05/16/2022  Authorization Period Expiration: 3/6/2023  Plan of Care Expiration: 02/10/2023,  4/4/2023  Progress Note Due: 2/10/2023, 3/9/2023  Visit # / Visits authorized: 6/12    PTA Visit #: 0/5     Time In: 1000 am  Time Out: 1058 am  Total Billable Time: 54 minutes    SUBJECTIVE     Pt reports: no new complaints. Deb reports that she is walking better since last assessment. She states that her pain stays around 6-7/10 in multiple joints. (She is also receiving OT.) She denies any fall occurrence since last assessment. She made 1.5 blocks walking which is progress. Walks without AD at home but uses walls or furniture as HHA.  Response to previous treatment: she had some soreness but it was only that day  Functional change: No change in strength testing results, improved TUG by 6 secs, no recent falls but has experienced LOB with self recovery.     Pain: 7/10  Location: back    OBJECTIVE     Objective Measures updated at progress report unless specified.      Lower Extremity Strength-same  Right LE   Left LE     Knee extension: 4/5 Knee extension: 4/5   Knee flexion: 4/5 Knee flexion: 4-/5   Hip flexion: 4/5 Hip flexion: 4-/5   Hip Internal Rotation:  4/5 Hip Internal Rotation: 4/5   Hip External Rotation: 4/5 Hip External Rotation: 4/5   Hip extension:  4/5 Hip extension: 4/5   Hip abduction: 4-/5 Hip abduction: 4-/5   Hip adduction: 4-/5 Hip adduction 4-/5      TUG:  using rollator  Trial 1: 28 s  Trial 2: 20 s  Trial 3: 18 s  Avg= 20 s (improved)     Timed Up and Go:     Normative Reference Values by  "Age  Age Group Time in Seconds   60 - 69 years  8.1 s   70 - 79 years  9.2 s  80 - 99 years 11.3 s     Cut-off Values Predictive of Falls by:   Group Time in Seconds Community Dwelling Frail Older Adults   >  14 associated with high fall risk   Post-op hip fracture patients at time of discharge  > 24 predictive of falls within 6 months after hip fracture   Frail older adults   > /= 30  predictive of requiring assistive device for ambulation and being dependent in ADLs        General Norms:  <10 s=freely mobile  >20 s=may need AD  >30 s=dependence     Estefany FLORES. Reference Values for Timed Up and Go test: a descriptive meta-analysis. J Geriatr Phys there. 2006;29(2):64-68.     Treatment     Deb received the treatments listed below:      *Exercises performed in parallel bars with gait belt and SBA    Therapeutic exercises to develop strength, endurance, ROM, flexibility, posture and core stabilization for 50 minutes including:    Recumbent bike x 7 min- for endurance and ROM Level 2 (1.6 on new recumbent)  LAQ  3x10 3# each leg 2" hold  SL clamshells green band 2x10  Standing hip abd 2x10  Standing hip ext x 10  Bridging 3 x 10  Reassessment.    Not performed:  Supine clamshell BTB 3x10  Hip adduction with ball 3x10    Neuromuscular re-education activities to improve: Balance, Coordination, Kinesthetic, Sense and Proprioception for 10 minutes. The following activities were included:  Modified Tandem stance 3x 30s each leg (pt able to hold 8 s before using HHA)  Standing taps to 4" box  2 x 10 each leg  NBOS on foam 2x1 minimum    Not performed d/t reassessment:  Marching 2x30 sec-np  Heel raises x 20- 1 finger UR support-np  Sit to stands from mat 2x10- not performed  Static stance eyes closed 2x30 sec- minimal sway  Static stance eyes closed on foam 2x30 sec-np  Static stance with head turns 2x30 sec-np    Patient Education and Home Exercises      Home Exercises Provided and Patient Education Provided "     Education provided:   - slowly increasing walking  - cont with rollator for safety  - plan    Written Home Exercises Provided: Patient instructed to cont prior HEP. Exercises were reviewed and Deb was able to demonstrate them prior to the end of the session.  Deb demonstrated good  understanding of the education provided. See EMR under Patient Instructions for exercises provided during therapy sessions    ASSESSMENT     We discussed continuing to gradually inc walking distance, but always continue using the rollator for safety d/t permanent structural changes noted and using furniture around home while walking in home is not safe. Pt required one rest break during exercises.  Pt with minmal sway when standing on the foam with eyes closed.  Pt still requires intermittent UE support with single leg balance activities. Will continue slowly progressing standing tolerance. Tandem modified and pt able to perform for longer before needing HHA. LE strength is the same. Pt is able to attend once weekly d/t transportation. She is reaching a plateau and limited but spinal structure. Her TUG has improved. Progressing towards d/c at end of current POC.    Deb Is progressing well towards her goals.   Pt prognosis is Fair.     Pt will continue to benefit from skilled outpatient physical therapy to address the deficits listed in the problem list box on initial evaluation, provide pt/family education and to maximize pt's level of independence in the home and community environment.     Pt's spiritual, cultural and educational needs considered and pt agreeable to plan of care and goals.     Anticipated barriers to physical therapy: transportation/compliance    Goals:  Short-Term Goals: 4 weeks  - The patient will be independent with initial home exercise program.    -Partially met, once a day most days.  - The patient will increase strength to at least 4-/5 in muscles tested to indicate improvements in functional  "strength.   -Met  -TUG improved to 20" to demo improving mobility and justify continuing skilled care.   -MET    Long-Term Goals: 8 weeks  - The patient will increase strength to at least 4/5 to perform functional mobility including sit to stand, steps/curbs, and walking (Not met, progressing)   -ongoing  - The patient will increase ROM to WFL to perform ADL, vocational, and recreational tasks without pain. (Met)  -TUG improved to 18" to demo improving mobility.   -ongoing    PLAN     Continue with balance exercises and LE strengthening.    Charissa Raymundo, PT                                         "

## 2023-03-06 NOTE — PROGRESS NOTES
Occupational Therapy Reassessment and Daily Treatment Note     Visit Date: 3/6/2023  Name: Deb Webb  Clinic Number: [id number]    Therapy Diagnosis:   Encounter Diagnoses   Name Primary?    Pain in joint of right hand Yes    Deformity, hand, right     Joint stiffness of hand, right      Physician: Dandre Odell, BRITNEY    Physician orders:       Note     Patient has had her right middle finger PIP joint immobilized via a thermoplastic orthosis PIP extension splint for the past 8 weeks due to her boutonniere deformity with associated fracture.  Patient now has significant stiffness of the right middle finger PIP joint, and requires certified hand therapy to increase the range of motion and function of the right middle finger.  Additionally, the patient will continue to wear her DIP joint orthosis extension splint of the D IP joint of the right ring finger for 3 more weeks, at which time certified hand therapy may begin gentle range of motion of her right ring finger DIP joint.     Duration:  6 weeks      Frequency:  2-3 times per week     Please work on gentle range of motion of the right hand (protect the DIP joint of the right ring finger), edema control, therapeutic modalities, and eventually gentle strengthening.  Thanks!            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   S62.634A (ICD-10-CM) - Closed mallet fracture of distal phalanx of right ring finger   M25.641 (ICD-10-CM) - Stiffness of finger joint of right hand      Evaluation Date: 2/6/2023  Insurance Authorization period Expiration:  Calendar year  Plan of Care Expiration Period: 4 more weeks effective 2/27/2023 = 3/26/2023  Next Re-assessment: by 3/26/2023  Date of Return Referring Provider: 3/13/2023     Visit # / Visits Authortized: 8 (3 for orthotic/splinting) / TBD  # No shows 0 / # Cancellations: 0  Time In: 0917  Time Out: 0955   Total Billable Time: 40 minutes     Precautions: Standard  and falls  Surgery: N/A has been treated with splinting, Cont with DIP splint through 3/12/2023, then can begin gentle ROM.                            S/P: approx 8 weeks on 1/23/2022  Subjective      Pt reports: No new issues or complaints.  she was compliant with HEP.   Response to previous treatment:very good  Functional change:Some improved use of her R hand/long finger  Pain:  Functional Pain Scale Rating 0-10:   7/10 on average  7/10 at best  7/10 at worst  Location: R hand/ring finger and thumb at times   Description: Aching, Tingling, Sharp, and Variable,   Aggravating Factors: N/A  Easing Factors: N/A     Objective   MEASUREMENTS  Last measurements below are from Eval unless otherwise noted.      Quick DASH Survey = 70.45% from Eval    PIP flexion AROM R long  finger = 105 degrees     TREATMENT  Deb received the following supervised modalities after being cleared for contradictions for: Please see under therapeutic exercises for further on heat and stretch ex with the use of Moist heat (pt was cleared for all contraindication/precautions prior) in order to increase: comfort, blood flow and soft tissue elasticity.        Deb received the following direct contact modalities after being cleared for contraindications for 0 minutes:  -NT     Deb received the following manual therapy techniques for 0 minutes:   - Manual Therapy: Pt seen for Retrograde Massage and Scar Massage to R long finger for and hand (NT)     Deb received therapeutic exercises for 15 minutes including:  - PROM hook fist index and long fingers with coban wrap x 5 min while on moist heat for stretch  - Gentle PROM composite flexion digits 2, 3, and 5  - Fingerspreads 2x20     Deb participated in dynamic functional therapeutic activities to improve functional performance for 25 minutes, including:  - Isospheres 2 min CW R hand  - Juxiciser x 2 sets  - Yellow clothespin: lateral pinch and tripod pinch 5x5 each.  - Button   with tip to palm 2 buttons at a time with palm to tip also.  - 9 hole pegs 3 pegs at a time for tip to palm and palm to tip. (NT)  - large Pom Poms CCW rotation x25 and  10 x 5 (NT)     Home Exercises and Education Provided      Education provided:   -  Cont per previous.   Written Home Exercises Provided:  Patient instructed to cont prior HEP.     Exercises were reviewed and Deb was able to demonstrate them prior to the end of the session.  Deb demonstrated good  understanding of the education provided.      See EMR under Media for exercises provided today and/or prior visit.         Assessment      Pt would continue to benefit from skilled OT. Pt with excellent overall AROM of R long finger, Some continued pain limits pt, will cont to address as able and also begin some tx for her R ring DIP level beginning on or after 3/13/2023.     - Progress towards goals:  STG #1 has been met.     Deb is progressing well towards her goals . Pt continues with good rehab potential.      Pt will continue to benefit from skilled outpatient occupational therapy to address the deficits listed in the problem list on initial evaluation in order to maximize pt's level of independence in the home and community.      Anticipated barriers to occupational therapy: None     Pt's spiritual, cultural and educational needs considered and pt agreeable to plan of care and goals.        Goals:      Short Term Goals: (30 days, 3/6/2023, and/or 10th visit) unless otherwise noted below.  1. Pt will be independent with HEP in 2 visits.  2. Pt will report decreased pain to a 6/10 at worst in RUE/hand with ADLs in order to increase function/use of UE. Not MEt; still 7/10  3. Pt will increase AROM R long finger flexion by 10 degrees to 85 degrees to increase function for ADL/IADL.    Updated Short Term Goals: To be met by poc exp date of 3/26/2023  4. Pt to demo grossly WNL AROM R hand including R ring finger.     Long Term Goals: (by  discharge)  1. Pt will report decreased pain to 1-2/10 with ADLs to allow for increased function/use of UE.   2. Pt will exhibit  grossly WNL AROM hand to allow for Independent use of for all ADL/IADL tasks.  3. Pt will exhibit WFL  strength to allow a firm grasp during ADL     Plan   Continue Occupational Therapy 1-2 times per week through  updated POC exp date of  3/26/2023 in order to to decrease pain and edema, and increase A/PROM, strength, and functional use of R upper extremity.     Updates/Grading for next session:  Progress with OT as tolerated.        KHALIDA Arriaza, CHT

## 2023-03-13 ENCOUNTER — CLINICAL SUPPORT (OUTPATIENT)
Dept: REHABILITATION | Facility: HOSPITAL | Age: 73
End: 2023-03-13
Attending: INTERNAL MEDICINE
Payer: MEDICARE

## 2023-03-13 ENCOUNTER — OFFICE VISIT (OUTPATIENT)
Dept: ORTHOPEDICS | Facility: CLINIC | Age: 73
End: 2023-03-13
Payer: MEDICARE

## 2023-03-13 DIAGNOSIS — S62.634A CLOSED MALLET FRACTURE OF DISTAL PHALANX OF RIGHT RING FINGER: ICD-10-CM

## 2023-03-13 DIAGNOSIS — R29.898 WEAKNESS OF BOTH HIPS: Primary | ICD-10-CM

## 2023-03-13 DIAGNOSIS — M25.641 JOINT STIFFNESS OF HAND, RIGHT: ICD-10-CM

## 2023-03-13 DIAGNOSIS — M21.941 DEFORMITY, HAND, RIGHT: ICD-10-CM

## 2023-03-13 DIAGNOSIS — M25.541 PAIN IN JOINT OF RIGHT HAND: Primary | ICD-10-CM

## 2023-03-13 DIAGNOSIS — M20.021 BOUTONNIERE DEFORMITY OF FINGER OF RIGHT HAND: Primary | ICD-10-CM

## 2023-03-13 PROCEDURE — 3072F LOW RISK FOR RETINOPATHY: CPT | Mod: CPTII,S$GLB,, | Performed by: PHYSICIAN ASSISTANT

## 2023-03-13 PROCEDURE — 99213 PR OFFICE/OUTPT VISIT, EST, LEVL III, 20-29 MIN: ICD-10-PCS | Mod: S$GLB,,, | Performed by: PHYSICIAN ASSISTANT

## 2023-03-13 PROCEDURE — 1160F RVW MEDS BY RX/DR IN RCRD: CPT | Mod: CPTII,S$GLB,, | Performed by: PHYSICIAN ASSISTANT

## 2023-03-13 PROCEDURE — 1125F AMNT PAIN NOTED PAIN PRSNT: CPT | Mod: CPTII,S$GLB,, | Performed by: PHYSICIAN ASSISTANT

## 2023-03-13 PROCEDURE — 97110 THERAPEUTIC EXERCISES: CPT | Mod: PO

## 2023-03-13 PROCEDURE — 99999 PR PBB SHADOW E&M-EST. PATIENT-LVL III: ICD-10-PCS | Mod: PBBFAC,,, | Performed by: PHYSICIAN ASSISTANT

## 2023-03-13 PROCEDURE — 97112 NEUROMUSCULAR REEDUCATION: CPT | Mod: PO | Performed by: PHYSICAL THERAPIST

## 2023-03-13 PROCEDURE — 97530 THERAPEUTIC ACTIVITIES: CPT | Mod: PO

## 2023-03-13 PROCEDURE — 97110 THERAPEUTIC EXERCISES: CPT | Mod: PO | Performed by: PHYSICAL THERAPIST

## 2023-03-13 PROCEDURE — 3072F PR LOW RISK FOR RETINOPATHY: ICD-10-PCS | Mod: CPTII,S$GLB,, | Performed by: PHYSICIAN ASSISTANT

## 2023-03-13 PROCEDURE — 1160F PR REVIEW ALL MEDS BY PRESCRIBER/CLIN PHARMACIST DOCUMENTED: ICD-10-PCS | Mod: CPTII,S$GLB,, | Performed by: PHYSICIAN ASSISTANT

## 2023-03-13 PROCEDURE — 1125F PR PAIN SEVERITY QUANTIFIED, PAIN PRESENT: ICD-10-PCS | Mod: CPTII,S$GLB,, | Performed by: PHYSICIAN ASSISTANT

## 2023-03-13 PROCEDURE — 3044F PR MOST RECENT HEMOGLOBIN A1C LEVEL <7.0%: ICD-10-PCS | Mod: CPTII,S$GLB,, | Performed by: PHYSICIAN ASSISTANT

## 2023-03-13 PROCEDURE — 1159F MED LIST DOCD IN RCRD: CPT | Mod: CPTII,S$GLB,, | Performed by: PHYSICIAN ASSISTANT

## 2023-03-13 PROCEDURE — 3044F HG A1C LEVEL LT 7.0%: CPT | Mod: CPTII,S$GLB,, | Performed by: PHYSICIAN ASSISTANT

## 2023-03-13 PROCEDURE — 1159F PR MEDICATION LIST DOCUMENTED IN MEDICAL RECORD: ICD-10-PCS | Mod: CPTII,S$GLB,, | Performed by: PHYSICIAN ASSISTANT

## 2023-03-13 PROCEDURE — 99999 PR PBB SHADOW E&M-EST. PATIENT-LVL III: CPT | Mod: PBBFAC,,, | Performed by: PHYSICIAN ASSISTANT

## 2023-03-13 PROCEDURE — 99213 OFFICE O/P EST LOW 20 MIN: CPT | Mod: S$GLB,,, | Performed by: PHYSICIAN ASSISTANT

## 2023-03-13 NOTE — PROGRESS NOTES
Occupational Therapy Daily Treatment Note     Visit Date: 3/13/2023  Name: Deb Webb  Clinic Number: [id number]    Therapy Diagnosis:   Encounter Diagnoses   Name Primary?    Pain in joint of right hand Yes    Deformity, hand, right     Joint stiffness of hand, right      Physician: Triston Valverde MD    Physician orders:       Note     Patient has had her right middle finger PIP joint immobilized via a thermoplastic orthosis PIP extension splint for the past 8 weeks due to her boutonniere deformity with associated fracture.  Patient now has significant stiffness of the right middle finger PIP joint, and requires certified hand therapy to increase the range of motion and function of the right middle finger.  Additionally, the patient will continue to wear her DIP joint orthosis extension splint of the D IP joint of the right ring finger for 3 more weeks, at which time certified hand therapy may begin gentle range of motion of her right ring finger DIP joint.     Duration:  6 weeks      Frequency:  2-3 times per week     Please work on gentle range of motion of the right hand (protect the DIP joint of the right ring finger), edema control, therapeutic modalities, and eventually gentle strengthening.  Thanks!         Per ortho note from 3/13/2023:   PLAN:  1. I discussed with Deb Webb that she continues to progress in the treatment course.  We discussed the best course of action this time is to continue with occupational therapy to further increase the function range of motion of the right hand.  We did discuss she feels like she is failing to improve to contact the clinic for a reappointment. We did discuss for her right shoulder pain we referred to Dr. Barraza for further evaluation.  She verbally agreed with the treatment plan.      2. She was referred to Dr. Barraza in clinic today.      3. I would like to have her follow up in clinic on a p.r.n. basis for any persistent/worsening of her  symptoms or for any hand, wrist, or elbow problems/concerns.  She was instructed to contact the clinic for any problems or concerns in the interim.        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     Visit # / Visits Authortized: 9 (3 for orthotic/splinting) / TBD  # No shows 0 / # Cancellations: 0  Time In: 0910  Time Out: 0955   Total Billable Time: 40 minutes     Precautions: Standard and falls  Surgery: N/A has been treated with splinting, Cont with DIP splint through 3/12/2023, then can begin gentle ROM.                            S/P: approx 8 weeks on 1/23/2022  Subjective      Pt reports: No new issues or complaints.  she was compliant with HEP.   Response to previous treatment:very good  Functional change:Some improved use of her R hand/long finger  Pain:  Functional Pain Scale Rating 0-10:   7/10 on average  7/10 at best  7/10 at worst  Location: R hand/ring finger and thumb at times   Description: Aching, Tingling, Sharp, and Variable,   Aggravating Factors: N/A  Easing Factors: N/A     Objective   MEASUREMENTS  Last measurements below are from Eval unless otherwise noted.      Quick DASH Survey = 70.45% from Eval    PIP flexion AROM R long  finger = 105 degrees    3/13/2023    DIP AROM ext R ring finger = -15 degrees and resting posture -15 degrees  DIP AROM flexion R ring finger = 55 degrees    TREATMENT  Deb received the following supervised modalities after being cleared for contradictions for: Please see under therapeutic  exercises for further on heat and stretch ex with the use of Moist heat (pt was cleared for all contraindication/precautions prior) in order to increase: comfort, blood flow and soft tissue elasticity.        Deb received the following direct contact modalities after being cleared for contraindications for 0 minutes:  -NT     Deb received the following manual therapy techniques for 0 minutes:   - Manual Therapy: Pt seen for Retrograde Massage and Scar Massage to R long finger for and hand (NT)     Deb received therapeutic exercises for 25 minutes including:  - PROM hook fist index and long fingers with coban wrap x 5 min while on moist heat for stretch  - Wrist flexion and ext 3x10 with 1# DB.  - Gentle PROM composite flexion digits 2, 3, and 5 (NT)  - Full sequence tendon glides 2x10 and thumb slides down small finger 2x10 (also added to HEP 4-6 x day).  - Fingerspreads 2x20     Deb participated in dynamic functional therapeutic activities to improve functional performance for 15 minutes, including:  - Hand helper 1 red band 5x10.  - Gentle rolls over red flexbar 2 min  - Isospheres 2 min CW R hand (NT)  - Juxiciser x 2 sets (NT)  - Yellow clothespin: tripod pinch 5x10.  - Button  with tip to palm 2 buttons at a time with palm to tip also. (NT)  - 9 hole pegs 3 pegs at a time for tip to palm and palm to tip. (NT)  - large Pom Poms CCW rotation x25 and  10 x 5 (NT)     Home Exercises and Education Provided      Education provided:   -  Cont per previous. Add full tendon glides and thumb slides 2x10, 4-6 x day.    Written Home Exercises Provided:  Patient instructed to cont prior HEP. Add full tendon glides and thumb slides 2x10, 4-6 x day.     Exercises were reviewed and Deb was able to demonstrate them prior to the end of the session.  Deb demonstrated good  understanding of the education provided.      See EMR under Media for exercises provided today and/or prior visit.          Assessment      Pt would continue to benefit from skilled OT. Baseline measurements taken for Ring finger DIP flexion. Pt progressed with full tendon gliding ans tolerated this well as well as some light strengthening. Cont per current poc.     - Progress towards goals:  STG #1 has been met.     Deb is progressing well towards her goals . Pt continues with good rehab potential.      Pt will continue to benefit from skilled outpatient occupational therapy to address the deficits listed in the problem list on initial evaluation in order to maximize pt's level of independence in the home and community.      Anticipated barriers to occupational therapy: None     Pt's spiritual, cultural and educational needs considered and pt agreeable to plan of care and goals.        Goals:      Short Term Goals: (30 days, 3/6/2023, and/or 10th visit) unless otherwise noted below.  1. Pt will be independent with HEP in 2 visits.  2. Pt will report decreased pain to a 6/10 at worst in RUE/hand with ADLs in order to increase function/use of UE. Not MEt; still 7/10  3. Pt will increase AROM R long finger flexion by 10 degrees to 85 degrees to increase function for ADL/IADL.    Updated Short Term Goals: To be met by poc exp date of 3/26/2023  4. Pt to demo grossly WNL AROM R hand including R ring finger.     Long Term Goals: (by discharge)  1. Pt will report decreased pain to 1-2/10 with ADLs to allow for increased function/use of UE.   2. Pt will exhibit  grossly WNL AROM hand to allow for Independent use of for all ADL/IADL tasks.  3. Pt will exhibit WFL  strength to allow a firm grasp during ADL     Plan   Continue Occupational Therapy 1-2 times per week through  updated POC exp date of  3/26/2023 in order to to decrease pain and edema, and increase A/PROM, strength, and functional use of R upper extremity.     Updates/Grading for next session:  Progress with OT as tolerated.        KHALIDA Arriaza, JUANT

## 2023-03-13 NOTE — PROGRESS NOTES
3/13/2023    HPI:  Deb Webb is a 72 y.o. 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  States her hand continues to improve with therapy.  States her main concern is currently her right shoulder, as a few weeks prior to seeing me she fell and injured the shoulder.  States her hand was the main concern at the time, but as her hand has improved she started to notice her shoulder pain and dysfunction.  Denies any other complaints this time.    PMHX:  Past Medical History:   Diagnosis Date    Allergy     Arthritis     Cataract     Colon polyps     COPD (chronic obstructive pulmonary disease)     Diabetes mellitus type II     Diabetic neuropathy     Fever blister     Glaucoma (increased eye pressure)     Hyperlipidemia     Hypertension     Irritable bowel syndrome     Keloid cicatrix     Osteoporosis     Retained cholelithiasis following cholecystectomy(997.41)     Right patella fracture        PSHX:  Past Surgical History:   Procedure Laterality Date    BACK SURGERY  2006    CATARACT EXTRACTION W/  INTRAOCULAR LENS IMPLANT Bilateral     CHOLECYSTECTOMY      Laparoscopic    COLONOSCOPY      COLONOSCOPY N/A 8/31/2022    Procedure: COLONOSCOPY;  Surgeon: Salvatore Butler MD;  Location: [facility];  Service: Gastroenterology;  Laterality: N/A;    ESOPHAGEAL DILATION N/A 8/30/2022    Procedure: DILATION, ESOPHAGUS;  Surgeon: Salvatore Butler MD;  Location: [facility];  Service: Gastroenterology;  Laterality: N/A;    ESOPHAGOGASTRODUODENOSCOPY N/A 8/30/2022    Procedure: EGD (ESOPHAGOGASTRODUODENOSCOPY);  Surgeon: Salvatore Butler MD;  Location: [facility];  Service: Gastroenterology;  Laterality: N/A;    HERNIA REPAIR      HYSTERECTOMY      KNEE SURGERY Right 3/4/2015    Dr Weller     OOPHORECTOMY      ovarian tumor      Benign    TONSILLECTOMY, ADENOIDECTOMY          FMHX:  Family History   Problem Relation Age of Onset    Cancer Mother         Skin    Skin cancer Mother     Glaucoma Mother     Cataracts Mother     Diabetes Mother     Heart disease Father     Diabetes Father     Skin cancer Sister     Diabetes Sister     Diabetes Daughter     Breast cancer Maternal Aunt     Melanoma Neg Hx     Psoriasis Neg Hx     Eczema Neg Hx     Lupus Neg Hx     Amblyopia Neg Hx     Blindness Neg Hx     Macular degeneration Neg Hx     Retinal detachment Neg Hx     Strabismus Neg Hx        SOCHX:  Social History     Tobacco Use    Smoking status: Every Day     Packs/day: 0.50     Years: 40.00     Pack years: 20.00     Types: Cigarettes    Smokeless tobacco: Current   Substance Use Topics    Alcohol use: No       ALLERGIES:  Silicone and Adhesive    CURRENT MEDICATIONS:  Current Outpatient Medications on File Prior to Visit   Medication Sig Dispense Refill    albuterol (PROAIR HFA) 90 mcg/actuation inhaler Inhale 2 puffs into the lungs every 6 (six) hours as needed for Wheezing. Rescue 18 g 2    albuterol-ipratropium (DUO-NEB) 2.5 mg-0.5 mg/3 mL nebulizer solution Take 3 mLs by nebulization every 6 (six) hours as needed for Wheezing or Shortness of Breath. Rescue 75 mL 0    alendronate (FOSAMAX) 70 MG tablet Take 1 tablet (70 mg total) by mouth once a week. 12 tablet 3    amiodarone (PACERONE) 200 MG Tab Take 2 tabs by mouth twice daily until 12/2/22 then take 1 tab by mouth twice daily 90 tablet 1    atorvastatin (LIPITOR) 40 MG tablet TAKE 1 TABLET BY MOUTH EVERY DAY 90 tablet 1    blood sugar diagnostic Strp To check BG 2 times daily, to use with insurance preferred meter 200 each 3    blood-glucose meter,continuous (DEXCOM G6 ) Misc Use as directed 1 each 1    blood-glucose sensor (DEXCOM G6 SENSOR) Emmie Use as directed 10 each 2    calcium carbonate/vitamin D3 (CALCIUM 600 + D,3, ORAL) Take 1 tablet by mouth once daily.      cholestyramine-aspartame (QUESTRAN LIGHT) 4 gram  "PwPk TAKE 1 PACKET (4 G TOTAL) BY MOUTH 2 (TWO) TIMES DAILY. FOR DIARRHEA 180 packet 0    citalopram (CELEXA) 20 MG tablet Take 1 tablet (20 mg total) by mouth once daily. 90 tablet 1    dulaglutide (TRULICITY) 1.5 mg/0.5 mL pen injector Inject 1.5 mg into the skin every 7 days. Through patient assistance 12 pen 3    furosemide (LASIX) 20 MG tablet Take 1 tablet (20 mg total) by mouth as needed (swelling).      gabapentin (NEURONTIN) 300 MG capsule TAKE 1 CAPSULE BY MOUTH THREE TIMES A  capsule 1    guaiFENesin (MUCINEX) 600 mg 12 hr tablet Take 1 tablet (600 mg total) by mouth 2 (two) times daily.      HYDROcodone-acetaminophen (NORCO) 5-325 mg per tablet Take 1 tablet by mouth every 12 (twelve) hours as needed for Pain. 10 tablet 0    ipratropium (ATROVENT) 42 mcg (0.06 %) nasal spray 2 sprays by Nasal route 3 (three) times daily. Use 3 times per day if necessary for chronic nasal drip 15 mL 12    Lactobacillus rhamnosus GG (CULTURELLE) 10 billion cell capsule Take 1 capsule by mouth once daily. 60 capsule 0    lancets Misc To check BG 2 times daily, to use with insurance preferred meter 200 each 3    latanoprost 0.005 % ophthalmic solution INSTILL 1 DROP INTO BOTH EYES EVERY EVENING (Patient taking differently: Place 1 drop into both eyes every evening.) 7.5 mL 1    losartan (COZAAR) 25 MG tablet Take 0.5 tablets (12.5 mg total) by mouth once daily. 30 tablet 1    meclizine (ANTIVERT) 12.5 mg tablet TAKE 1 TABLET BY MOUTH 3 TIMES DAILY AS NEEDED FOR DIZZINESS. 30 tablet 0    omega-3 fatty acids-vitamin E 1,000 mg Cap Take 1 capsule by mouth once daily.      omeprazole (PRILOSEC) 40 MG capsule TAKE 1 CAPSULE (40 MG TOTAL) BY MOUTH BEFORE BREAKFAST. 90 capsule 1    pen needle, diabetic (BD ULTRA-FINE AGUS PEN NEEDLE) 32 gauge x 5/32" Ndle 1 Device by Misc.(Non-Drug; Combo Route) route 2 (two) times daily. 200 each 4    primidone (MYSOLINE) 50 MG Tab TAKE 2 TABLETS BY MOUTH 3 (THREE) TIMES DAILY. ONE HALF " TABLET FOR ONE WEEK, THEN AS PRESCRIBED 540 tablet 3    umeclidinium-vilanteroL (ANORO ELLIPTA) 62.5-25 mcg/actuation DsDv Inhale 1 puff into the lungs once daily. Controller 1 each 5    XARELTO 20 mg Tab TAKE 1 TABLET BY MOUTH EVERY DAY WITH DINNER OR EVENING MEAL      blood-glucose meter kit To check BG 2 times daily, to use with insurance preferred meter 1 each 0    [DISCONTINUED] irbesartan (AVAPRO) 75 MG tablet Take 1 tablet (75 mg total) by mouth once daily. 90 tablet 1    [DISCONTINUED] loratadine (CLARITIN) 10 mg tablet TAKE 1 TABLET (10 MG TOTAL) BY MOUTH DAILY AS NEEDED FOR ALLERGIES (OR RUNNY NOSE). 90 tablet 1     No current facility-administered medications on file prior to visit.       REVIEW OF SYSTEMS:  Review of Systems Complete; Negative, unless noted above.    GENERAL PHYSICAL EXAM:   There were no vitals taken for this visit.   GEN: well developed, well nourished, no acute distress   PULM: No wheezing, no respiratory distress   CV: RRR    ORTHO EXAM:   Examination of the right hand reveals mild edema over the dorsum of the PIP joint of the right middle finger.  No erythema, ecchymosis, or skin breakdown noted.  Presence of a mild boutonniere deformity of the right middle finger.  Presence of a mild mallet finger deformity of the right ring finger.  Mildly reduced range of motion in regards to flexion of the right ring finger.  Range of motion of the right middle and right ring fingers is noted to be significantly improved since her last visit.  Sensation is grossly intact in the radial, ulnar, median nerve distributions.  Capillary refill less than 2 seconds in all fingers.    RADIOLOGY:   None.    ASSESSMENT:   Right middle finger stiffness with associated boutonniere deformity, mallet finger of the right ring finger    PLAN:  1. I discussed with Deb Webb that she continues to progress in the treatment course.  We discussed the best course of action this time is to continue with  occupational therapy to further increase the function range of motion of the right hand.  We did discuss she feels like she is failing to improve to contact the clinic for a reappointment. We did discuss for her right shoulder pain we referred to Dr. Barraza for further evaluation.  She verbally agreed with the treatment plan.      2. She was referred to Dr. Barraza in clinic today.      3. I would like to have her follow up in clinic on a p.r.n. basis for any persistent/worsening of her symptoms or for any hand, wrist, or elbow problems/concerns.  She was instructed to contact the clinic for any problems or concerns in the interim.

## 2023-03-13 NOTE — PROGRESS NOTES
[facility]   Physical Therapy Treatment Note     Name: Deb Webb  Clinic Number: [id number]    Therapy Diagnosis:   Encounter Diagnosis   Name Primary?    Weakness of both hips Yes       Physician: Triston Valverde MD  Physician Orders: PT Eval and Treat  Medical Diagnosis from Referral: Closed displaced fracture of left pubis with routine healing  Evaluation Date: 05/16/2022  Authorization Period Expiration: 3/6/2023  Plan of Care Expiration: 02/10/2023,  4/4/2023  Progress Note Due: 2/10/2023, 3/9/2023  Visit # / Visits authorized: 7/12    PTA Visit #: 0/5     Time In: 1000 am  Time Out: 1058 am  Total Billable Time: 45 minutes (pt req frequent rest breaks to recover balance)    SUBJECTIVE     Pt reports: that she attended a [employer] parade yesterday. She went to sit on her rollator, but did not lock the brakes and took a fall resulting. She continues using furniture at times for balance but reports using rollator at home.  Response to previous treatment: she had some soreness but it was only that day  Functional change: No change in strength testing results, improved TUG by 6 secs, no recent falls but has experienced LOB with self recovery.     Pain: 7/10  Location: back    OBJECTIVE     Objective Measures updated at progress report unless specified.    Lower Extremity Strength-same  Right LE   Left LE     Knee extension: 4/5 Knee extension: 4/5   Knee flexion: 4/5 Knee flexion: 4-/5   Hip flexion: 4/5 Hip flexion: 4-/5   Hip Internal Rotation:  4/5 Hip Internal Rotation: 4/5   Hip External Rotation: 4/5 Hip External Rotation: 4/5   Hip extension:  4/5 Hip extension: 4/5   Hip abduction: 4-/5 Hip abduction: 4-/5   Hip adduction: 4-/5 Hip adduction 4-/5      TUG:  using rollator  Trial 1: 28 s  Trial 2: 20 s  Trial 3: 18 s  Avg= 20 s (improved)     Timed Up and Go:     Normative Reference Values by Age  Age Group Time in Seconds   60 - 69 years  8.1 s   70 - 79  "years  9.2 s  80 - 99 years 11.3 s     Cut-off Values Predictive of Falls by:   Group Time in Seconds Community Dwelling Frail Older Adults   >  14 associated with high fall risk   Post-op hip fracture patients at time of discharge  > 24 predictive of falls within 6 months after hip fracture   Frail older adults   > /= 30  predictive of requiring assistive device for ambulation and being dependent in ADLs        General Norms:  <10 s=freely mobile  >20 s=may need AD  >30 s=dependence     Estefany FLORES. Reference Values for Timed Up and Go test: a descriptive meta-analysis. J Geriatr Phys there. 2006;29(2):64-68.     Treatment     Deb received the treatments listed below:      *Exercises performed in parallel bars with gait belt and SBA    Therapeutic exercises to develop strength, endurance, ROM, flexibility, posture and core stabilization for 20 minutes including:    Recumbent bike x 7 min- for endurance and ROM Level 2 (1.6 on new recumbent)-np  Standing hip abd 2 x 15  Standing hip ext 2 x 15    Not performed:  Supine clamshell BTB 3x10  Hip adduction with ball 3x10  LAQ  3x10 3# each leg 2" hold  SL clamshells green band 2x10  Bridging 3 x 10    Neuromuscular re-education activities to improve: Balance, Coordination, Kinesthetic, Sense and Proprioception for 38 minutes. The following activities were included:  Modified Tandem stance 3x 30s each leg (pt able to hold 8 s before using HHA)  Standing taps to 4" box  2 x 10 each leg (hands in high guard throughout)  NBOS on foam 2x1 minimum  Static stance eyes closed on foam 2x30 sec  Step ups to airex foam 2x10  Standing on foam in normal side to side stance horizontal (blue) ball push x 10, over shoulder lift x 10  Marching 2x30 sec  TUG 5 trials focused on locking breaks at sit    Not performed d/t reassessment:  Heel raises x 20- 1 finger UR support-np  Sit to stands from mat 2x10- not performed  Static stance eyes closed 2x30 sec- minimal sway  Static stance " "with head turns 2x30 sec-np    Patient Education and Home Exercises      Home Exercises Provided and Patient Education Provided     Education provided:   - slowly increasing walking  - cont with rollator for safety  - plan    Written Home Exercises Provided: Patient instructed to cont prior HEP. Exercises were reviewed and Deb was able to demonstrate them prior to the end of the session.  Deb demonstrated good  understanding of the education provided. See EMR under Patient Instructions for exercises provided during therapy sessions    ASSESSMENT     Deb continues req use of HHA during dynamic balance challenges. She req few reminders to lock breaks during session. She requires consistent reminders to bend elbows to avoid walker to far in front. Progressing towards d/c at end of current POC.    Deb Is progressing well towards her goals.   Pt prognosis is Fair.     Pt will continue to benefit from skilled outpatient physical therapy to address the deficits listed in the problem list box on initial evaluation, provide pt/family education and to maximize pt's level of independence in the home and community environment.     Pt's spiritual, cultural and educational needs considered and pt agreeable to plan of care and goals.     Anticipated barriers to physical therapy: transportation/compliance    Goals:  Short-Term Goals: 4 weeks  - The patient will be independent with initial home exercise program.    -Partially met, once a day most days.  - The patient will increase strength to at least 4-/5 in muscles tested to indicate improvements in functional strength.   -Met  -TUG improved to 20" to demo improving mobility and justify continuing skilled care.   -MET    Long-Term Goals: 8 weeks  - The patient will increase strength to at least 4/5 to perform functional mobility including sit to stand, steps/curbs, and walking (Not met, progressing)   -ongoing  - The patient will increase ROM to WFL to perform ADL, " "vocational, and recreational tasks without pain. (Met)  -TUG improved to 18" to demo improving mobility.   -ongoing    PLAN     Continue with balance exercises and LE strengthening.    Charissa Raymundo, PT                                           "

## 2023-03-17 ENCOUNTER — TELEPHONE (OUTPATIENT)
Dept: GASTROENTEROLOGY | Facility: CLINIC | Age: 73
End: 2023-03-17
Payer: MEDICARE

## 2023-03-17 DIAGNOSIS — M25.511 RIGHT SHOULDER PAIN: Primary | ICD-10-CM

## 2023-03-17 NOTE — TELEPHONE ENCOUNTER
Spoke to patient and scheduled EGD. Prep instructions placed in mail to pt. Pt verbalized understanding      Patient scheduled for EGD with Dr. Duffy on 5/23. Can she hold Xarelto 2 days prior? Please advise thank you.

## 2023-03-20 ENCOUNTER — HOSPITAL ENCOUNTER (OUTPATIENT)
Dept: RADIOLOGY | Facility: HOSPITAL | Age: 73
Discharge: HOME | End: 2023-03-20
Attending: ORTHOPAEDIC SURGERY
Payer: MEDICARE

## 2023-03-20 ENCOUNTER — CLINICAL SUPPORT (OUTPATIENT)
Dept: REHABILITATION | Facility: HOSPITAL | Age: 73
End: 2023-03-20
Attending: INTERNAL MEDICINE
Payer: MEDICARE

## 2023-03-20 ENCOUNTER — OFFICE VISIT (OUTPATIENT)
Dept: ORTHOPEDICS | Facility: CLINIC | Age: 73
End: 2023-03-20
Payer: MEDICARE

## 2023-03-20 DIAGNOSIS — M25.511 RIGHT SHOULDER PAIN: Primary | ICD-10-CM

## 2023-03-20 DIAGNOSIS — M19.011 OSTEOARTHRITIS OF RIGHT SHOULDER, UNSPECIFIED OSTEOARTHRITIS TYPE: Primary | ICD-10-CM

## 2023-03-20 DIAGNOSIS — M75.101 ROTATOR CUFF TEAR, RIGHT: ICD-10-CM

## 2023-03-20 DIAGNOSIS — M25.511 RIGHT SHOULDER PAIN: ICD-10-CM

## 2023-03-20 DIAGNOSIS — M21.941 DEFORMITY, HAND, RIGHT: ICD-10-CM

## 2023-03-20 DIAGNOSIS — M25.541 PAIN IN JOINT OF RIGHT HAND: Primary | ICD-10-CM

## 2023-03-20 DIAGNOSIS — R29.898 WEAKNESS OF BOTH HIPS: Primary | ICD-10-CM

## 2023-03-20 DIAGNOSIS — M25.641 JOINT STIFFNESS OF HAND, RIGHT: ICD-10-CM

## 2023-03-20 PROCEDURE — 99213 PR OFFICE/OUTPT VISIT, EST, LEVL III, 20-29 MIN: ICD-10-PCS | Mod: S$GLB,,, | Performed by: ORTHOPAEDIC SURGERY

## 2023-03-20 PROCEDURE — 3044F HG A1C LEVEL LT 7.0%: CPT | Mod: CPTII,S$GLB,, | Performed by: ORTHOPAEDIC SURGERY

## 2023-03-20 PROCEDURE — 3044F PR MOST RECENT HEMOGLOBIN A1C LEVEL <7.0%: ICD-10-PCS | Mod: CPTII,S$GLB,, | Performed by: ORTHOPAEDIC SURGERY

## 2023-03-20 PROCEDURE — 1101F PR PT FALLS ASSESS DOC 0-1 FALLS W/OUT INJ PAST YR: ICD-10-PCS | Mod: CPTII,S$GLB,, | Performed by: ORTHOPAEDIC SURGERY

## 2023-03-20 PROCEDURE — 1160F PR REVIEW ALL MEDS BY PRESCRIBER/CLIN PHARMACIST DOCUMENTED: ICD-10-PCS | Mod: CPTII,S$GLB,, | Performed by: ORTHOPAEDIC SURGERY

## 2023-03-20 PROCEDURE — 3072F LOW RISK FOR RETINOPATHY: CPT | Mod: CPTII,S$GLB,, | Performed by: ORTHOPAEDIC SURGERY

## 2023-03-20 PROCEDURE — 3288F PR FALLS RISK ASSESSMENT DOCUMENTED: ICD-10-PCS | Mod: CPTII,S$GLB,, | Performed by: ORTHOPAEDIC SURGERY

## 2023-03-20 PROCEDURE — 1160F RVW MEDS BY RX/DR IN RCRD: CPT | Mod: CPTII,S$GLB,, | Performed by: ORTHOPAEDIC SURGERY

## 2023-03-20 PROCEDURE — 73030 X-RAY EXAM OF SHOULDER: CPT | Mod: 26,RT,, | Performed by: RADIOLOGY

## 2023-03-20 PROCEDURE — 1101F PT FALLS ASSESS-DOCD LE1/YR: CPT | Mod: CPTII,S$GLB,, | Performed by: ORTHOPAEDIC SURGERY

## 2023-03-20 PROCEDURE — 1159F PR MEDICATION LIST DOCUMENTED IN MEDICAL RECORD: ICD-10-PCS | Mod: CPTII,S$GLB,, | Performed by: ORTHOPAEDIC SURGERY

## 2023-03-20 PROCEDURE — 99999 PR PBB SHADOW E&M-EST. PATIENT-LVL III: CPT | Mod: PBBFAC,,, | Performed by: ORTHOPAEDIC SURGERY

## 2023-03-20 PROCEDURE — 99999 PR PBB SHADOW E&M-EST. PATIENT-LVL III: ICD-10-PCS | Mod: PBBFAC,,, | Performed by: ORTHOPAEDIC SURGERY

## 2023-03-20 PROCEDURE — 99213 OFFICE O/P EST LOW 20 MIN: CPT | Mod: S$GLB,,, | Performed by: ORTHOPAEDIC SURGERY

## 2023-03-20 PROCEDURE — 97110 THERAPEUTIC EXERCISES: CPT | Mod: PO

## 2023-03-20 PROCEDURE — 3288F FALL RISK ASSESSMENT DOCD: CPT | Mod: CPTII,S$GLB,, | Performed by: ORTHOPAEDIC SURGERY

## 2023-03-20 PROCEDURE — 1159F MED LIST DOCD IN RCRD: CPT | Mod: CPTII,S$GLB,, | Performed by: ORTHOPAEDIC SURGERY

## 2023-03-20 PROCEDURE — 97530 THERAPEUTIC ACTIVITIES: CPT | Mod: PO

## 2023-03-20 PROCEDURE — 73030 X-RAY EXAM OF SHOULDER: CPT | Mod: TC,PO,RT

## 2023-03-20 PROCEDURE — 97110 THERAPEUTIC EXERCISES: CPT | Mod: PO,CQ

## 2023-03-20 PROCEDURE — 1125F PR PAIN SEVERITY QUANTIFIED, PAIN PRESENT: ICD-10-PCS | Mod: CPTII,S$GLB,, | Performed by: ORTHOPAEDIC SURGERY

## 2023-03-20 PROCEDURE — 73030 XR SHOULDER TRAUMA 3 VIEW RIGHT: ICD-10-PCS | Mod: 26,RT,, | Performed by: RADIOLOGY

## 2023-03-20 PROCEDURE — 97112 NEUROMUSCULAR REEDUCATION: CPT | Mod: PO,CQ

## 2023-03-20 PROCEDURE — 3072F PR LOW RISK FOR RETINOPATHY: ICD-10-PCS | Mod: CPTII,S$GLB,, | Performed by: ORTHOPAEDIC SURGERY

## 2023-03-20 PROCEDURE — 1125F AMNT PAIN NOTED PAIN PRSNT: CPT | Mod: CPTII,S$GLB,, | Performed by: ORTHOPAEDIC SURGERY

## 2023-03-20 NOTE — PROGRESS NOTES
"    [facility]   Physical Therapy Treatment Note     Name: Deb Webb  Clinic Number: [id number]    Therapy Diagnosis:   Encounter Diagnosis   Name Primary?    Weakness of both hips Yes       Physician: Triston Valverde MD  Physician Orders: PT Eval and Treat  Medical Diagnosis from Referral: Closed displaced fracture of left pubis with routine healing  Evaluation Date: 05/16/2022  Authorization Period Expiration: 3/6/2023  Plan of Care Expiration: 02/10/2023,  4/4/2023  Progress Note Due: 2/10/2023, 3/9/2023  Visit # / Visits authorized: 8/12    PTA Visit #: 1/5     Time In: 1000 am  Time Out: 1039 am  Total Billable Time: 39 minutes     SUBJECTIVE     Pt reports: She had a near fall last week but she was able to catch herself before she fell.  Pt reports performing her kicking exercises at home holding onto the counter.  Response to previous treatment: she had some soreness but it was only that day  Functional change: No change in strength testing results, improved TUG by 6 secs, no recent falls but has experienced LOB with self recovery.     Pain: 8/10  Location: body    OBJECTIVE     Objective Measures updated at progress report unless specified.     Treatment     Deb received the treatments listed below:      *Exercises performed in parallel bars with gait belt and SBA    Therapeutic exercises to develop strength, endurance, ROM, flexibility, posture and core stabilization for 10 minutes including:    Nu step x 5 min  Standing hip abd 2 x 15  Standing hip ext 2 x 15    Not performed:  Supine clamshell BTB 3x10  Hip adduction with ball 3x10  LAQ  3x10 3# each leg 2" hold  SL clamshells green band 2x10  Bridging 3 x 10    Neuromuscular re-education activities to improve: Balance, Coordination, Kinesthetic, Sense and Proprioception for 29 minutes. The following activities were included:    Modified Tandem stance 2x30s each leg, on foam 30 sec  Standing taps to 4" box  2 x " 10 each leg- not performed  NBOS on foam x 1 min  Static stance eyes closed on foam 2x30 sec  Static stance eyes closed 2x30 sec- minimal sway  Step ups to airex foam 2x10  Standing on foam in normal side to side stance horizontal (blue) ball push x 10, over shoulder lift x 10  Marching 2x30 sec  Heel raises x 20- 1 finger UR support      Not performed:    Sit to stands from mat 2x10- not performed  Static stance with head turns 2x30 sec-np    Patient Education and Home Exercises      Home Exercises Provided and Patient Education Provided     Education provided:   - slowly increasing walking  - cont with rollator for safety      Written Home Exercises Provided: Patient instructed to cont prior HEP. Exercises were reviewed and Deb was able to demonstrate them prior to the end of the session.  Deb demonstrated good  understanding of the education provided. See EMR under Patient Instructions for exercises provided during therapy sessions    ASSESSMENT     Deb continues req use of HHA during dynamic balance challenges.  Pt did relatively well with eyes closed on the ground and on foam with no UE support needed.  Pt with decreased foot clearance for step ups onto foam.  Pt very challenged in tandem stance on foam requiring therapist and patient to grab onto bars to help prevent LOB.  Pt is performing exercises at home and would benefit from continued strengthening at home.  Deb Is progressing well towards her goals.   Pt prognosis is Fair.     Pt will continue to benefit from skilled outpatient physical therapy to address the deficits listed in the problem list box on initial evaluation, provide pt/family education and to maximize pt's level of independence in the home and community environment.     Pt's spiritual, cultural and educational needs considered and pt agreeable to plan of care and goals.     Anticipated barriers to physical therapy: transportation/compliance    Goals:  Short-Term Goals: 4 weeks  -  "The patient will be independent with initial home exercise program.    -Partially met, once a day most days.  - The patient will increase strength to at least 4-/5 in muscles tested to indicate improvements in functional strength.   -Met  -TUG improved to 20" to demo improving mobility and justify continuing skilled care.   -MET    Long-Term Goals: 8 weeks  - The patient will increase strength to at least 4/5 to perform functional mobility including sit to stand, steps/curbs, and walking (Not met, progressing)   -ongoing  - The patient will increase ROM to WFL to perform ADL, vocational, and recreational tasks without pain. (Met)  -TUG improved to 18" to demo improving mobility.   -ongoing    PLAN     Continue with balance exercises and LE strengthening.    Perlita Lorenzo, PTA                                             "

## 2023-03-20 NOTE — PROGRESS NOTES
72 years old right shoulder pain for about 3-4 months time had a fall has been hurting since then difficulty put her arm up overhead 5 on good days 8 on bad days topicals provided some relief    Exam shows weak and painful cuff strength positive Neer Upt[place] impingement sign, no signs infection     X-rays AC arthrosis, osteopenia no fractures identified     Assessment:  Probable rotator cuff tear right shoulder     Plan:  Physical therapy, consideration of Kenalog injection

## 2023-03-20 NOTE — TELEPHONE ENCOUNTER
Alison Metzger routed conversation to You 3 hours ago (9:27 AM)     Mg Washburn MD  [facility] Clinical Staff 2 days ago       Yes      Kendra Alan LPN routed conversation to Mg Washburn MD

## 2023-03-20 NOTE — PROGRESS NOTES
Occupational Therapy Daily Treatment Note     Visit Date: 3/20/2023  Name: Deb Webb  Clinic Number: [id number]    Therapy Diagnosis:   Encounter Diagnoses   Name Primary?    Pain in joint of right hand Yes    Deformity, hand, right     Joint stiffness of hand, right      Physician: Dandre Odell, BRITNEY    Physician orders:       Note     Patient has had her right middle finger PIP joint immobilized via a thermoplastic orthosis PIP extension splint for the past 8 weeks due to her boutonniere deformity with associated fracture.  Patient now has significant stiffness of the right middle finger PIP joint, and requires certified hand therapy to increase the range of motion and function of the right middle finger.  Additionally, the patient will continue to wear her DIP joint orthosis extension splint of the D IP joint of the right ring finger for 3 more weeks, at which time certified hand therapy may begin gentle range of motion of her right ring finger DIP joint.     Duration:  6 weeks      Frequency:  2-3 times per week     Please work on gentle range of motion of the right hand (protect the DIP joint of the right ring finger), edema control, therapeutic modalities, and eventually gentle strengthening.  Thanks!         Per ortho note from 3/13/2023:   PLAN:  1. I discussed with Deb Webb that she continues to progress in the treatment course.  We discussed the best course of action this time is to continue with occupational therapy to further increase the function range of motion of the right hand.  We did discuss she feels like she is failing to improve to contact the clinic for a reappointment. We did discuss for her right shoulder pain we referred to Dr. Barraza for further evaluation.  She verbally agreed with the treatment plan.      2. She was referred to Dr. Barraza in clinic today.      3. I would like to have her follow up in clinic on a p.r.n. basis for any persistent/worsening of her  symptoms or for any hand, wrist, or elbow problems/concerns.  She was instructed to contact the clinic for any problems or concerns in the interim.        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     Visit # / Visits Authortized: 10 (3 for orthotic/splinting) / TBD  # No shows 0 / # Cancellations: 0  Time In: 1050  Time Out: 1115 (pt requested to leave early due to MD appt post OT appt today)  Total Billable Time: 25 minutes     Precautions: Standard and falls  Surgery: N/A has been treated with splinting, Cont with DIP splint through 3/12/2023, then can begin gentle ROM.                            S/P: approx 8 weeks on 1/23/2022  Subjective      Pt reports: No new issues or complaints.  she was compliant with HEP.   Response to previous treatment:very good  Functional change:Some improved use of her R hand/long finger  Pain:  Functional Pain Scale Rating 0-10:   8/10 on average  8/10 at best  8/10 at worst  Location: R hand/ring finger and thumb at times   Description: Aching, Tingling, Sharp, and Variable,   Aggravating Factors: N/A  Easing Factors: N/A     Objective   MEASUREMENTS  Last measurements below are from Eval unless otherwise noted.      Quick DASH Survey = 70.45% from Eval    PIP flexion AROM R long  finger = 105 degrees    3/13/2023    DIP AROM ext R ring finger = -15 degrees and resting posture -15 degrees  DIP AROM flexion R ring finger = 55 degrees    TREATMENT  Deb received the following supervised modalities after  being cleared for contradictions for: Please see under therapeutic exercises for further on heat and stretch ex with the use of Moist heat (pt was cleared for all contraindication/precautions prior) in order to increase: comfort, blood flow and soft tissue elasticity.        Deb received the following direct contact modalities after being cleared for contraindications for 0 minutes:  -NT     Deb received the following manual therapy techniques for 0 minutes:   - Manual Therapy: Pt seen for Retrograde Massage and Scar Massage to R long finger for and hand (NT)     Deb received therapeutic exercises for 15 minutes including:  - PROM Composite fist x 5 min while on moist heat for stretch  - Wrist flexion and ext 3x10 with 1# DB. (NT)  - Gentle PROM composite flexion digits 2, 3, and 5 (NT)  - Full sequence tendon glides 2x10  - Fingerspreads 2x20  - Fingerlifts 3x10.      Deb participated in dynamic functional therapeutic activities to improve functional performance for 10 minutes, including:  - Hand helper 1 red and 1 yellow band 5x5.  - Towel srunches 3x10.  - Gentle rolls over red flexbar 2 min (NT)  - Isospheres 2 min CW R hand (NT)  - Juxiciser x 2 sets (NT)  - Yellow clothespin: tripod pinch 5x10.  - Button  with tip to palm 2 buttons at a time with palm to tip also. (NT)  - 9 hole pegs 3 pegs at a time for tip to palm and palm to tip. (NT)  - large Pom Poms CCW rotation x25 and  10 x 5 (NT)     Home Exercises and Education Provided      Education provided:   -  Cont per previous.   Written Home Exercises Provided:  Patient instructed to cont prior HEP.      Exercises were reviewed and Deb was able to demonstrate them prior to the end of the session.  Deb demonstrated good  understanding of the education provided.      See EMR under Media for exercises provided today and/or prior visit.         Assessment      Pt would continue to benefit from skilled OT. Pt with intact tip to palm  AROM digits 2-5 today. Pt presents with some swan neck posturing B hands, may benefit from a trial of oval 8 splint wear on L and R at PIP level. DIP ext lag has not worsened / remains -15 degrees.      Progress towards goals:  STG #1 has been met.     Deb is progressing well towards her goals . Pt continues with good rehab potential.      Pt will continue to benefit from skilled outpatient occupational therapy to address the deficits listed in the problem list on initial evaluation in order to maximize pt's level of independence in the home and community.      Anticipated barriers to occupational therapy: None     Pt's spiritual, cultural and educational needs considered and pt agreeable to plan of care and goals.        Goals:      Short Term Goals: (30 days, 3/6/2023, and/or 10th visit) unless otherwise noted below.  1. Pt will be independent with HEP in 2 visits.  2. Pt will report decreased pain to a 6/10 at worst in RUE/hand with ADLs in order to increase function/use of UE. Not MEt; still 7/10  3. Pt will increase AROM R long finger flexion by 10 degrees to 85 degrees to increase function for ADL/IADL.    Updated Short Term Goals: To be met by poc exp date of 3/26/2023  4. Pt to demo grossly WNL AROM R hand including R ring finger.     Long Term Goals: (by discharge)  1. Pt will report decreased pain to 1-2/10 with ADLs to allow for increased function/use of UE.   2. Pt will exhibit  grossly WNL AROM hand to allow for Independent use of for all ADL/IADL tasks.  3. Pt will exhibit WFL  strength to allow a firm grasp during ADL     Plan   Continue Occupational Therapy 1-2 times per week through  updated POC exp date of  3/26/2023 in order to to decrease pain and edema, and increase A/PROM, strength, and functional use of R upper extremity.     Updates/Grading for next session:  Trial Oval 8 splint for PIP level Ring finger(s)        KHALIDA Arriaza, JUANT

## 2023-03-27 ENCOUNTER — CLINICAL SUPPORT (OUTPATIENT)
Dept: REHABILITATION | Facility: HOSPITAL | Age: 73
End: 2023-03-27
Attending: INTERNAL MEDICINE
Payer: MEDICARE

## 2023-03-27 DIAGNOSIS — M25.541 PAIN IN JOINT OF RIGHT HAND: Primary | ICD-10-CM

## 2023-03-27 DIAGNOSIS — M25.641 JOINT STIFFNESS OF HAND, RIGHT: ICD-10-CM

## 2023-03-27 DIAGNOSIS — M21.941 DEFORMITY, HAND, RIGHT: ICD-10-CM

## 2023-03-27 DIAGNOSIS — R29.898 WEAKNESS OF BOTH HIPS: ICD-10-CM

## 2023-03-27 PROCEDURE — 97112 NEUROMUSCULAR REEDUCATION: CPT | Mod: 59,PO,CQ

## 2023-03-27 PROCEDURE — 97110 THERAPEUTIC EXERCISES: CPT | Mod: PO

## 2023-03-27 PROCEDURE — 97530 THERAPEUTIC ACTIVITIES: CPT | Mod: PO

## 2023-03-27 PROCEDURE — 97760 ORTHOTIC MGMT&TRAING 1ST ENC: CPT | Mod: PO

## 2023-03-27 NOTE — PROGRESS NOTES
[facility]   Reassessment: Physical Therapy Treatment Note     Name: Deb Webb  Clinic Number: [id number]    Therapy Diagnosis:   Encounter Diagnoses   Name Primary?    Pain in joint of right hand Yes    Deformity, hand, right     Joint stiffness of hand, right        Physician: Triston Valverde MD  Physician Orders: PT Eval and Treat  Medical Diagnosis from Referral: Closed displaced fracture of left pubis with routine healing  Evaluation Date: 05/16/2022  Authorization Period Expiration: 3/6/2023  Plan of Care Expiration: 02/10/2023,  4/4/2023  Progress Note Due: 2/10/2023, 3/9/2023  Visit # / Visits authorized: 8/12    PTA Visit #: 0/5     Time In: 1000 am  Time Out: 1039 am  Total Billable Time: 39 minutes     SUBJECTIVE     Pt reports: right shoulder pain x 3 weeks. She had a fall then resulting in this pain. She did fall at a parade 1-2 weeks ago, but does not remember injuring the shoulder. She is having trouble lifting and reaching her right arm. She is right hand dominant.  She needs to lay a certain way at night because of right shoulder pain.  Response to previous treatment: she had some soreness but it was only that day  Functional change: No change in strength testing results, improved TUG by 6 secs, no recent falls but has experienced LOB with self recovery.     Pain: 7/10  Location: body    OBJECTIVE     Objective Measures updated at progress report unless specified.          X-Ray Shoulder Trauma 3 view Right  Order: [id number]  Status: Final result     Visible to patient: No (inaccessible in Patient Portal)     Next appt: 04/03/2023 at 10:00 AM in Outpatient Rehab (Charissa Raymundo, PT)     Dx: Right shoulder pain     0 Result Notes  Details    Reading Physician Reading Date Result Priority   Jhon Olivas MD  [phone] 3/20/2023 Routine     Narrative & Impression  EXAMINATION:  XR SHOULDER TRAUMA 3 VIEW RIGHT     CLINICAL HISTORY:  Pain in right shoulder    "  TECHNIQUE:  Four views of the right shoulder were performed.     COMPARISON:  11/24/2022     FINDINGS:  No fracture or subluxation are identified.  There is chronic degenerative arthrosis of the acromioclavicular joint.  The glenohumeral joint is well maintained and well aligned.  Visualized soft tissues are unremarkable.     Impression:     Chronic degenerative arthrosis of the acromioclavicular joint.        Electronically signed by: Jhon Olivas MD  Date:                                            03/20/2023  Time:                                           11:33           Exam Ended: 03/20/23 10:52 Last Resulted: 03/20/23 11:33           Shoulder A/PROM:  Right:  Flexion: 87/105 deg +  Extension: 60 deg  Abduction: 87 deg +  INTERNAL ROTATION reach behind back: L1  External ROTATION reach over head: base of occiput    Limited by t/s structure and shoulder humeral head->ant  Crepitus noted right with PROM of right shoulder.    Left:  Flexion: 120 deg   Extension: 60 deg  Abduction: 115 deg   INTERNAL ROTATION reach behind back: T/s  External ROTATION reach over head: top of Left shoulder    Palpation: + right supraspinatus insertion, infraspinatus insertion, infraspinatus m. Belly.    Treatment     Deb received the treatments listed below:      *Exercises performed in parallel bars with gait belt and SBA    Therapeutic exercises to develop strength, endurance, ROM, flexibility, posture and core stabilization for 10 minutes including:    Shoulder assessment.  Scapular retraction in sitting x20  AAROM shoulder flexion x 10    Nu step x 5 min  Standing hip abd 2 x 15  Standing hip ext 2 x 15    Not performed:  Supine clamshell BTB 3x10  Hip adduction with ball 3x10  LAQ  3x10 3# each leg 2" hold  SL clamshells green band 2x10  Bridging 3 x 10    Neuromuscular re-education activities to improve: Balance, Coordination, Kinesthetic, Sense and Proprioception for 29 minutes. The following activities were " "included:    Modified Tandem stance 2x30s each leg, on foam 30 sec  Standing taps to 4" box  2 x 10 each leg- not performed  NBOS on foam x 1 min  Static stance eyes closed on foam 2x30 sec  Static stance eyes closed 2x30 sec- minimal sway  Step ups to airex foam 2x10  Standing on foam in normal side to side stance horizontal (blue) ball push x 10, over shoulder lift x 10  Marching 2x30 sec  Heel raises x 20- 1 finger UR support      Not performed:    Sit to stands from mat 2x10- not performed  Static stance with head turns 2x30 sec-np    Patient Education and Home Exercises      Home Exercises Provided and Patient Education Provided     Education provided:   - slowly increasing walking  - cont with rollator for safety      Written Home Exercises Provided: Patient instructed to cont prior HEP. Exercises were reviewed and Deb was able to demonstrate them prior to the end of the session.  Deb demonstrated good  understanding of the education provided. See EMR under Patient Instructions for exercises provided during therapy sessions    ASSESSMENT     Deb continues req use of HHA during dynamic balance challenges.  Pt did relatively well with eyes closed on the ground and on foam with no UE support needed.  Pt with decreased foot clearance for step ups onto foam.  Pt very challenged in tandem stance on foam requiring therapist and patient to grab onto bars to help prevent LOB.  Pt is performing exercises at home and would benefit from continued strengthening at home.  Deb Is progressing well towards her goals.   Pt prognosis is Fair.     Pt will continue to benefit from skilled outpatient physical therapy to address the deficits listed in the problem list box on initial evaluation, provide pt/family education and to maximize pt's level of independence in the home and community environment.     Pt's spiritual, cultural and educational needs considered and pt agreeable to plan of care and goals.   " "  Anticipated barriers to physical therapy: transportation/compliance    Goals:  Short-Term Goals: 4 weeks  - The patient will be independent with initial home exercise program.    -Partially met, once a day most days.  - The patient will increase strength to at least 4-/5 in muscles tested to indicate improvements in functional strength.   -Met  -TUG improved to 20" to demo improving mobility and justify continuing skilled care.   -MET    Long-Term Goals: 8 weeks  - The patient will increase strength to at least 4/5 to perform functional mobility including sit to stand, steps/curbs, and walking (Not met, progressing)   -ongoing  - The patient will increase ROM to WFL to perform ADL, vocational, and recreational tasks without pain. (Met)  -TUG improved to 18" to demo improving mobility.   -ongoing    PLAN     Continue with balance exercises and LE strengthening.    Charissa Raymundo, PT                                               "

## 2023-03-27 NOTE — PLAN OF CARE
Occupational Therapy Updated POC/Reassessment and Daily Treatment Note     Visit Date: 3/27/2023  Name: Deb Webb  Clinic Number: [id number]    Therapy Diagnosis:   Encounter Diagnoses   Name Primary?    Pain in joint of right hand Yes    Deformity, hand, right     Joint stiffness of hand, right        Physician: Dandre Odell PA-C    Physician orders:       Note     Patient has had her right middle finger PIP joint immobilized via a thermoplastic orthosis PIP extension splint for the past 8 weeks due to her boutonniere deformity with associated fracture.  Patient now has significant stiffness of the right middle finger PIP joint, and requires certified hand therapy to increase the range of motion and function of the right middle finger.  Additionally, the patient will continue to wear her DIP joint orthosis extension splint of the D IP joint of the right ring finger for 3 more weeks, at which time certified hand therapy may begin gentle range of motion of her right ring finger DIP joint.     Duration:  6 weeks      Frequency:  2-3 times per week     Please work on gentle range of motion of the right hand (protect the DIP joint of the right ring finger), edema control, therapeutic modalities, and eventually gentle strengthening.  Thanks!         Per ortho note from 3/13/2023:   PLAN:  1. I discussed with Deb Webb that she continues to progress in the treatment course.  We discussed the best course of action this time is to continue with occupational therapy to further increase the function range of motion of the right hand.  We did discuss she feels like she is failing to improve to contact the clinic for a reappointment. We did discuss for her right shoulder pain we referred to Dr. Barraza for further evaluation.  She verbally agreed with the treatment plan.      2. She was referred to Dr. Barraza in clinic today.      3. I would like to have her follow up in clinic on a p.r.n. basis for any  persistent/worsening of her symptoms or for any hand, wrist, or elbow problems/concerns.  She was instructed to contact the clinic for any problems or concerns in the interim.        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     Visit # / Visits Authortized: 10 (3 for orthotic/splinting) / TBD  # No shows 0 / # Cancellations: 0  Time In: 0915  Time Out: 1000    Total Billable Time: 40 minutes     Precautions: Standard and falls  Surgery: N/A has been treated with splinting, Cont with DIP splint through 3/12/2023, then can begin gentle ROM.                            S/P: approx 8 weeks on 1/23/2022  Subjective      Pt reports: No new issues or complaints.  she was compliant with HEP.   Response to previous treatment:very good  Functional change:Some improved use of her R hand/long finger  Pain:  Functional Pain Scale Rating 0-10:   8/10 on average  8/10 at best  8/10 at worst  Location: R hand/ring finger and thumb at times   Description: Aching, Tingling, Sharp, and Variable,   Aggravating Factors: N/A  Easing Factors: N/A     Objective   MEASUREMENTS  Last measurements below are from Eval unless otherwise noted.      Quick DASH Survey = 70.45% from Eval    PIP flexion AROM R long  finger = 105 degrees    3/27/2023  DIP AROM ext R ring finger = -20 degrees and resting posture -20 degrees  DIP AROM flexion R ring finger = 55 degrees    3/27/2023  strength R hand = 16# vs 20# on L    TREATMENT  Deb received the following supervised  modalities after being cleared for contradictions for: Please see under therapeutic exercises for further on heat and stretch ex with the use of Moist heat (pt was cleared for all contraindication/precautions prior) in order to increase: comfort, blood flow and soft tissue elasticity.        Deb received the following direct contact modalities after being cleared for contraindications for 0 minutes:  -NT     Deb received the following manual therapy techniques for 0 minutes:   - Manual Therapy: Pt seen for Retrograde Massage and Scar Massage to R long finger for and hand (NT)     Deb received therapeutic exercises for 15 minutes including:  - PROM Composite fist x 5 min while on moist heat for stretch  - Wrist flexion and ext, and pro/sup 3x10 with 1# DB.   - Gentle PROM Thumb flexion 3 min on moist heat     Deb participated in dynamic functional therapeutic activities to improve functional performance for 15 minutes, including:  - Yellow flexbar 3x10 palm down.  - Putty: grasp roll-outs and pinches 3x10 each with yellow. Also instructed home program for putty.    Orthotic mgmt and train: 10 min: Obal 8 use size 6 for Ext block to R ring finger.        Home Exercises and Education Provided      Education provided:   -  Cont per previous. Begin putty and use Oval 8 to R ring finger 2-3 hrs with 15 min breaks during the day.    Written Home Exercises Provided:  Patient instructed to cont prior HEP. Begin putty and use Oval 8 to R ring finger 2-3 hrs with 15 min breaks during the day.     Exercises were reviewed and Deb was able to demonstrate them prior to the end of the session.  Deb demonstrated good  understanding of the education provided.      See EMR under Media for exercises provided today and/or prior visit.         Assessment      See updated  goals below. Oval 8 issued to help with PIP level hyperext and to help reduce lad at DIP level. Pt tolerated initial putty ex well.      Progress  towards goals:  See below.     Deb is progressing well towards her goals . Pt continues with good rehab potential.      Pt will continue to benefit from skilled outpatient occupational therapy to address the deficits listed in the problem list on initial evaluation in order to maximize pt's level of independence in the home and community.      Anticipated barriers to occupational therapy: None     Pt's spiritual, cultural and educational needs considered and pt agreeable to plan of care and goals.        Goals:      Short Term Goals: (30 days, 3/6/2023, and/or 10th visit) unless otherwise noted below.  1. Pt will be independent with HEP in 2 visits.  2. Pt will report decreased pain to a 6/10 at worst in RUE/hand with ADLs in order to increase function/use of UE. Not MEt; still 7/10  3. Pt will increase AROM R long finger flexion by 10 degrees to 85 degrees to increase function for ADL/IADL.    Updated Short Term Goals: To be met by poc exp date of 3/26/2023  4. Pt to demo grossly WNL AROM R hand including R ring finger. (Not met yet: some lag at ring DIP will continue to progress as able)    Updated Short Term Goals: To be met by poc exp date of 4/26/2023  5 Pt will demo improved ext of R ring DIP to -10 degrees  6. Pt will demo improved  strength to at least 20# R hand.      Long Term Goals: (by discharge)  1. Pt will report decreased pain to 1-2/10 with ADLs to allow for increased function/use of UE.   2. Pt will exhibit  grossly WNL AROM hand to allow for Independent use of for all ADL/IADL tasks.  3. Pt will exhibit WFL  strength to allow a firm grasp during ADL     Plan   Continue Occupational Therapy 1-2 times per week through  updated POC exp date of 30 days effective 3/27/2023 = 4/26/2023 in order to to decrease pain and edema, and increase A/PROM, strength, and functional use of R upper extremity.     Updates/Grading for next session:  Cont to progress as tolerated.     KHALIDA Arriaza,  JUANT

## 2023-03-27 NOTE — PROGRESS NOTES
Please see POC note for Updated POC, current tx results and tx performed this date.    KHALIDA Arriaza/ALIX

## 2023-03-27 NOTE — PROGRESS NOTES
" [facility]   Physical Therapy Treatment Note     Name: Deb Webb  Clinic Number: [id number]    Therapy Diagnosis:   Encounter Diagnoses   Name Primary?    Pain in joint of right hand Yes    Deformity, hand, right     Joint stiffness of hand, right     Weakness of both hips        Physician: Triston Valverde MD  Physician Orders: PT Eval and Treat  Medical Diagnosis from Referral: Closed displaced fracture of left pubis with routine healing  Evaluation Date: 05/16/2022  Authorization Period Expiration: 3/6/2023  Plan of Care Expiration: 02/10/2023,  4/4/2023  Progress Note Due: 2/10/2023, 3/9/2023  Visit # / Visits authorized: 9/12    PTA Visit #: 1/5     Time In: 1031 am  Time Out: 1055 am  Total Billable Time: 24 minutes     SUBJECTIVE     Pt reports: No new complaints.  Having increased pain everywhere today but she thinks it may be due to the weather.  Pt with no falls since last session.  Response to previous treatment: she had some soreness but it was only that day  Functional change: none new reported    Pain: 8/10  Location: body    OBJECTIVE     Objective Measures updated at progress report unless specified.     Treatment     Deb received the treatments listed below:      *Exercises performed in parallel bars with gait belt and SBA    Therapeutic exercises to develop strength, endurance, ROM, flexibility, posture and core stabilization for 0 minutes including:    Nu step x 5 min  Standing hip abd 2 x 15  Standing hip ext 2 x 15    Not performed:  Supine clamshell BTB 3x10  Hip adduction with ball 3x10  LAQ  3x10 3# each leg 2" hold  SL clamshells green band 2x10  Bridging 3 x 10    Neuromuscular re-education activities to improve: Balance, Coordination, Kinesthetic, Sense and Proprioception for 24 minutes. The following activities were included:    Modified Tandem stance 2x30s each leg  Standing taps to 4" box  2 x 10 each leg- not performed  NBOS on foam x 1 " min  Static stance eyes closed on foam 2x30 sec  Static stance eyes closed x 30 sec  Step ups to airex foam 2x10  Standing on foam in normal side to side stance horizontal (blue) ball push x 10, over shoulder lift x 10- not performed  Marching on foam 2x30 sec  Heel raises x 20- 1 finger UR support      Not performed:    Sit to stands from mat 2x10- not performed  Static stance with head turns 2x30 sec-np    Patient Education and Home Exercises      Home Exercises Provided and Patient Education Provided     Education provided:   - slowly increasing walking  - cont with rollator for safety      Written Home Exercises Provided: Patient instructed to cont prior HEP. Exercises were reviewed and Deb was able to demonstrate them prior to the end of the session.  Deb demonstrated good  understanding of the education provided. See EMR under Patient Instructions for exercises provided during therapy sessions    ASSESSMENT     Deb continues to do better with static balance activities.  Pt with little if at all sway when performing eyes closed static balance activities.  Pt also displayed improvements in tandem stance and was able to perform with intermittent UE support.  Pt is aware she is safest with her walker and will continue to benefit from the extra support.  Deb Is progressing well towards her goals.   Pt prognosis is Fair.     Pt will continue to benefit from skilled outpatient physical therapy to address the deficits listed in the problem list box on initial evaluation, provide pt/family education and to maximize pt's level of independence in the home and community environment.     Pt's spiritual, cultural and educational needs considered and pt agreeable to plan of care and goals.     Anticipated barriers to physical therapy: transportation/compliance    Goals:  Short-Term Goals: 4 weeks  - The patient will be independent with initial home exercise program.    -Partially met, once a day most days.  - The  "patient will increase strength to at least 4-/5 in muscles tested to indicate improvements in functional strength.   -Met  -TUG improved to 20" to demo improving mobility and justify continuing skilled care.   -MET    Long-Term Goals: 8 weeks  - The patient will increase strength to at least 4/5 to perform functional mobility including sit to stand, steps/curbs, and walking (Not met, progressing)   -ongoing  - The patient will increase ROM to WFL to perform ADL, vocational, and recreational tasks without pain. (Met)  -TUG improved to 18" to demo improving mobility.   -ongoing    PLAN     Continue with balance exercises and LE strengthening.    Perlita Lorenzo, PTA                                               "

## 2023-04-03 ENCOUNTER — CLINICAL SUPPORT (OUTPATIENT)
Dept: REHABILITATION | Facility: HOSPITAL | Age: 73
End: 2023-04-03
Attending: INTERNAL MEDICINE
Payer: MEDICARE

## 2023-04-03 DIAGNOSIS — M25.641 JOINT STIFFNESS OF HAND, RIGHT: ICD-10-CM

## 2023-04-03 DIAGNOSIS — M21.941 DEFORMITY, HAND, RIGHT: ICD-10-CM

## 2023-04-03 DIAGNOSIS — M25.541 PAIN IN JOINT OF RIGHT HAND: Primary | ICD-10-CM

## 2023-04-03 PROBLEM — R29.898 WEAKNESS OF BOTH HIPS: Status: RESOLVED | Noted: 2022-05-23 | Resolved: 2023-04-03

## 2023-04-03 PROCEDURE — 97110 THERAPEUTIC EXERCISES: CPT | Mod: PO,CQ

## 2023-04-03 PROCEDURE — 97112 NEUROMUSCULAR REEDUCATION: CPT | Mod: PO,CQ

## 2023-04-03 NOTE — PROGRESS NOTES
"    [facility]   Physical Therapy Treatment Note     Name: Deb eWbb  Clinic Number: [id number]    Therapy Diagnosis:   No diagnosis found.      Physician: Dandre Odell, BRITNEY  Physician Orders: PT Eval and Treat  Medical Diagnosis from Referral: Closed displaced fracture of left pubis with routine healing  Evaluation Date: 05/16/2022  Authorization Period Expiration: 3/6/2023  Plan of Care Expiration: 02/10/2023,  4/4/2023  Progress Note Due: 2/10/2023, 3/9/2023  Visit # / Visits authorized: 9/12    PTA Visit #: 1/5     Time In: 1031 am  Time Out: 1055 am  Total Billable Time: 24 minutes     SUBJECTIVE     Pt reports: No new complaints.  Having increased pain everywhere today but she thinks it may be due to the weather.  Pt with no falls since last session.  Response to previous treatment: she had some soreness but it was only that day  Functional change: none new reported    Pain: 8/10  Location: body    OBJECTIVE     Objective Measures updated at progress report unless specified.     Treatment     Deb received the treatments listed below:      *Exercises performed in parallel bars with gait belt and SBA    Therapeutic exercises to develop strength, endurance, ROM, flexibility, posture and core stabilization for 0 minutes including:    Nu step x 5 min  Standing hip abd 2 x 15  Standing hip ext 2 x 15    Not performed:  Supine clamshell BTB 3x10  Hip adduction with ball 3x10  LAQ  3x10 3# each leg 2" hold  SL clamshells green band 2x10  Bridging 3 x 10    Neuromuscular re-education activities to improve: Balance, Coordination, Kinesthetic, Sense and Proprioception for 24 minutes. The following activities were included:    Modified Tandem stance 2x30s each leg  Standing taps to 4" box  2 x 10 each leg- not performed  NBOS on foam x 1 min  Static stance eyes closed on foam 2x30 sec  Static stance eyes closed x 30 sec  Step ups to airex foam 2x10  Standing on foam in normal side " "to side stance horizontal (blue) ball push x 10, over shoulder lift x 10- not performed  Marching on foam 2x30 sec  Heel raises x 20- 1 finger UR support      Not performed:    Sit to stands from mat 2x10- not performed  Static stance with head turns 2x30 sec-np    Patient Education and Home Exercises      Home Exercises Provided and Patient Education Provided     Education provided:   - slowly increasing walking  - cont with rollator for safety      Written Home Exercises Provided: Patient instructed to cont prior HEP. Exercises were reviewed and Deb was able to demonstrate them prior to the end of the session.  Deb demonstrated good  understanding of the education provided. See EMR under Patient Instructions for exercises provided during therapy sessions    ASSESSMENT     Deb continues to do better with static balance activities.  Pt with little if at all sway when performing eyes closed static balance activities.  Pt also displayed improvements in tandem stance and was able to perform with intermittent UE support.  Pt is aware she is safest with her walker and will continue to benefit from the extra support.  Deb Is progressing well towards her goals.   Pt prognosis is Fair.     Pt will continue to benefit from skilled outpatient physical therapy to address the deficits listed in the problem list box on initial evaluation, provide pt/family education and to maximize pt's level of independence in the home and community environment.     Pt's spiritual, cultural and educational needs considered and pt agreeable to plan of care and goals.     Anticipated barriers to physical therapy: transportation/compliance    Goals:  Short-Term Goals: 4 weeks  - The patient will be independent with initial home exercise program.    -Partially met, once a day most days.  - The patient will increase strength to at least 4-/5 in muscles tested to indicate improvements in functional strength.   -Met  -TUG improved to 20" " "to demo improving mobility and justify continuing skilled care.   -MET    Long-Term Goals: 8 weeks  - The patient will increase strength to at least 4/5 to perform functional mobility including sit to stand, steps/curbs, and walking (Not met, progressing)   -ongoing  - The patient will increase ROM to WFL to perform ADL, vocational, and recreational tasks without pain. (Met)  -TUG improved to 18" to demo improving mobility.   -ongoing    PLAN     Continue with balance exercises and LE strengthening.    Charissa Raymundo, PT                                                 "

## 2023-04-03 NOTE — PROGRESS NOTES
[facility]   Physical Therapy Treatment Note     Name: Deb Webb  Clinic Number: [id number]    Therapy Diagnosis:   Encounter Diagnoses   Name Primary?    Pain in joint of right hand Yes    Deformity, hand, right     Joint stiffness of hand, right          Physician: Dandre Odell, BRITNEY  Physician Orders: PT Eval and Treat  Medical Diagnosis from Referral: Closed displaced fracture of left pubis with routine healing  Evaluation Date: 05/16/2022  Authorization Period Expiration: 4/6/2023  Plan of Care Expiration: 02/10/2023,  4/4/2023  Progress Note Due: 2/10/2023, 3/9/2023  Visit # / Visits authorized: 10/12    PTA Visit #: 1/5     Time In: 1010 am  Time Out: 1045 am  Total Billable Time: 35 minutes     SUBJECTIVE     Pt reports: She tends to rub her shoulder because it helps with the pain for a little while.  Pt reports she has to put her arm close to her body so that she can sleep.  Pt reports her shoulder was bothering her a lot this weekend.  Response to previous treatment: she had some soreness but it was only that day  Functional change: none new reported    Pain: 6/10  Location: R shoulder    OBJECTIVE     Objective Measures updated at progress report unless specified.     Treatment     Deb received the treatments listed below:        Therapeutic exercises to develop strength, endurance, ROM, flexibility, posture and core stabilization for 25 minutes including:  Pulleys x 3'- Not performed  PROM of R shoulder x 10 minutes- Not performed  Supine AAROM shoulder flexion x 10  Supine shoulder press x 5  Shoulder ER with wand x 10  Seated shoulder extension with yellow band 2x10      Neuromuscular re-education activities to improve: Balance, Coordination, Kinesthetic, Sense and Proprioception for 10 minutes. The following activities were included:  Seated scapular retraction x 3' 5 sec hold- verbal cues to maintain upright posture  Brugger's postural exercise x 2 min 5  "sec hold  Table slides in flexion x 2 mins  Seated rows with red band 2x10      Patient Education and Home Exercises      Home Exercises Provided and Patient Education Provided     Education provided:   - HEP      Written Home Exercises Provided: Patient instructed to cont prior HEP. Exercises were reviewed and Deb was able to demonstrate them prior to the end of the session.  Deb demonstrated good  understanding of the education provided. See EMR under Patient Instructions for exercises provided during therapy sessions    ASSESSMENT     Deb requires multiple tactile and verbal cues to keep an upright posture when sitting on the side of the mat.  Pt often will start performing exercises correctly and then will become distracted and revert back to performing exercise incorrectly.  Pt unable to perform additional press ups secondary to pain.  Pt limited to less than 90 degrees of flexion ROM.  Will continue working on education and PROM within tolerance.  Deb Is progressing well towards her goals.   Pt prognosis is Fair.     Pt will continue to benefit from skilled outpatient physical therapy to address the deficits listed in the problem list box on initial evaluation, provide pt/family education and to maximize pt's level of independence in the home and community environment.     Pt's spiritual, cultural and educational needs considered and pt agreeable to plan of care and goals.     Anticipated barriers to physical therapy: transportation/compliance    Goals:  Short-Term Goals: 4 weeks  - The patient will be independent with initial home exercise program.    -Partially met, once a day most days.  - The patient will increase strength to at least 4-/5 in muscles tested to indicate improvements in functional strength.   -Met  -TUG improved to 20" to demo improving mobility and justify continuing skilled care.   -MET    Long-Term Goals: 8 weeks  - The patient will increase strength to at least 4/5 to perform " "functional mobility including sit to stand, steps/curbs, and walking (Not met, progressing)   -ongoing  - The patient will increase ROM to WFL to perform ADL, vocational, and recreational tasks without pain. (Met)  -TUG improved to 18" to demo improving mobility.   -ongoing    PLAN     Continue with balance exercises and LE strengthening.    Perlita Lorenzo, PTA           "

## 2023-04-14 NOTE — PROGRESS NOTES
Occupational Therapy Daily Treatment Note     Visit Date: 4/17/2023  Name: Deb Webb  Clinic Number: [id number]    Therapy Diagnosis:   Encounter Diagnoses   Name Primary?    Pain in joint of right hand Yes    Deformity, hand, right     Joint stiffness of hand, right      Physician: Dandre Odell, BRITNEY    Physician orders:         Note     Patient has had her right middle finger PIP joint immobilized via a thermoplastic orthosis PIP extension splint for the past 8 weeks due to her boutonniere deformity with associated fracture.  Patient now has significant stiffness of the right middle finger PIP joint, and requires certified hand therapy to increase the range of motion and function of the right middle finger.  Additionally, the patient will continue to wear her DIP joint orthosis extension splint of the D IP joint of the right ring finger for 3 more weeks, at which time certified hand therapy may begin gentle range of motion of her right ring finger DIP joint.     Duration:  6 weeks      Frequency:  2-3 times per week     Please work on gentle range of motion of the right hand (protect the DIP joint of the right ring finger), edema control, therapeutic modalities, and eventually gentle strengthening.  Thanks!         Per ortho note from 3/13/2023:   PLAN:  1. I discussed with Deb Webb that she continues to progress in the treatment course.  We discussed the best course of action this time is to continue with occupational therapy to further increase the function range of motion of the right hand.  We did discuss she feels like she is failing to improve to contact the clinic for a reappointment. We did discuss for her right shoulder pain we referred to Dr. Barraza for further evaluation.  She verbally agreed with the treatment plan.      2. She was referred to Dr. Barraza in clinic today.      3. I would like to have her follow up in clinic on a p.r.n. basis for any persistent/worsening of her  symptoms or for any hand, wrist, or elbow problems/concerns.  She was instructed to contact the clinic for any problems or concerns in the interim.        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     Visit # / Visits Authortized:  11 (3 for orthotic/splinting) / TBD  # No shows 0 / # Cancellations: 0  Time In: 1120  Time Out: 1200     Total Billable Time: 40 minutes     Precautions: Standard and falls  Surgery: N/A has been treated with splinting, Cont with DIP splint through 3/12/2023, then can begin gentle ROM.                            S/P: approx 8 weeks on 1/23/2022  Subjective      Pt reports: No new issues or complaints.  she was compliant with HEP.   Response to previous treatment:very good  Functional change:Some improved use of her R hand/long finger  Pain:  Functional Pain Scale Rating 0-10:   8/10 on average  8/10 at best  8/10 at worst  Location: R hand/ring finger and thumb at times   Description: Aching, Tingling, Sharp, and Variable,   Aggravating Factors: N/A  Easing Factors: N/A     Objective   MEASUREMENTS  Last measurements below are from Eval unless otherwise noted.        Quick DASH Survey = 70.45% from Eval     PIP flexion AROM R long  finger = 105 degrees     3/27/2023  DIP AROM ext R ring finger = -20 degrees and resting posture -20 degrees  DIP AROM flexion R ring finger = 55 degrees     3/27/2023  strength R hand = 16# vs 20# on L     TREATMENT  Deb received the following supervised modalities after being  cleared for contradictions for:   Fluidotehrapy: 14 min at 111 degrees      Deb received the following direct contact modalities after being cleared for contraindications for 0 minutes:  -NT     Deb received the following manual therapy techniques for 0 minutes:   - Manual Therapy: Pt seen for Retrograde Massage and Scar Massage to R long finger for and hand (NT)     Deb received therapeutic exercises for 10 minutes including:  - Wrist flexion and ext, and pro/sup 3x10 with 1# DB.   - Gentle PROM DIP ext 3 min      Deb participated in dynamic functional therapeutic activities to improve functional performance for 15 minutes, including:  -  Hand master 5x10 pen and close  -  Hand helper 1 red and 1 yellow band 5x10  -  Pinches 5x10 central digit pinches 5x10 with yellow clothespin   -  Lateral pinch 5x10 yellow clothespin.     Orthotic mgmt and train: 1 min: Oval 8  mole skin added to decreased slippage         Home Exercises and Education Provided      Education provided:   -  Cont per previous.      Written Home Exercises Provided:  Patient instructed to cont prior HEP.   Exercises were reviewed and Deb was able to demonstrate them prior to the end of the session.  Deb demonstrated good  understanding of the education provided.      See EMR under Media for exercises provided today and/or prior visit.         Assessment      See updated  goals below. Pt tolerated progression with Tx well this date. Cont per current POC.      Progress towards goals:  See below.     Deb is progressing well towards her goals . Pt continues with good rehab potential.      Pt will continue to benefit from skilled outpatient occupational therapy to address the deficits listed in the problem list on initial evaluation in order to maximize pt's level of independence in the home and community.      Anticipated barriers to occupational therapy: None     Pt's spiritual, cultural and educational needs considered and pt  agreeable to plan of care and goals.        Goals:      Short Term Goals: (30 days, 3/6/2023, and/or 10th visit) unless otherwise noted below.  1. Pt will be independent with HEP in 2 visits.  2. Pt will report decreased pain to a 6/10 at worst in RUE/hand with ADLs in order to increase function/use of UE. Not MEt; still 7/10  3. Pt will increase AROM R long finger flexion by 10 degrees to 85 degrees to increase function for ADL/IADL.     Updated Short Term Goals: To be met by poc exp date of 3/26/2023  4. Pt to demo grossly WNL AROM R hand including R ring finger. (Not met yet: some lag at ring DIP will continue to progress as able)     Updated Short Term Goals: To be met by poc exp date of 4/26/2023  5 Pt will demo improved ext of R ring DIP to -10 degrees  6. Pt will demo improved  strength to at least 20# R hand.      Long Term Goals: (by discharge)  1. Pt will report decreased pain to 1-2/10 with ADLs to allow for increased function/use of UE.   2. Pt will exhibit  grossly WNL AROM hand to allow for Independent use of for all ADL/IADL tasks.  3. Pt will exhibit WFL  strength to allow a firm grasp during ADL     Plan   Continue Occupational Therapy 1-2 times per week through  updated POC exp date of 30 days effective 3/27/2023 = 4/26/2023 in order to to decrease pain and edema, and increase A/PROM, strength, and functional use of R upper extremity.     Updates/Grading for next session:  Likely D/C if all goals or max rehab potential met.     KHALIDA Arriaza, JUANT

## 2023-04-17 ENCOUNTER — CLINICAL SUPPORT (OUTPATIENT)
Dept: REHABILITATION | Facility: HOSPITAL | Age: 73
End: 2023-04-17
Attending: INTERNAL MEDICINE
Payer: MEDICARE

## 2023-04-17 ENCOUNTER — OFFICE VISIT (OUTPATIENT)
Dept: OTOLARYNGOLOGY | Facility: CLINIC | Age: 73
End: 2023-04-17
Payer: MEDICARE

## 2023-04-17 VITALS — HEIGHT: 59 IN | BODY MASS INDEX: 19.56 KG/M2 | TEMPERATURE: 99 F | WEIGHT: 97 LBS

## 2023-04-17 DIAGNOSIS — M25.641 JOINT STIFFNESS OF HAND, RIGHT: ICD-10-CM

## 2023-04-17 DIAGNOSIS — G89.29 CHRONIC RIGHT SHOULDER PAIN: Primary | ICD-10-CM

## 2023-04-17 DIAGNOSIS — J32.1 CHRONIC FRONTAL SINUSITIS: ICD-10-CM

## 2023-04-17 DIAGNOSIS — H92.03 REFERRED OTALGIA, BILATERAL: Primary | ICD-10-CM

## 2023-04-17 DIAGNOSIS — M21.941 DEFORMITY, HAND, RIGHT: ICD-10-CM

## 2023-04-17 DIAGNOSIS — Z72.0 TOBACCO ABUSE: ICD-10-CM

## 2023-04-17 DIAGNOSIS — M25.541 PAIN IN JOINT OF RIGHT HAND: Primary | ICD-10-CM

## 2023-04-17 DIAGNOSIS — M26.621 ARTHRALGIA OF RIGHT TEMPOROMANDIBULAR JOINT: ICD-10-CM

## 2023-04-17 DIAGNOSIS — R13.10 SWALLOWING PROBLEM: ICD-10-CM

## 2023-04-17 DIAGNOSIS — M25.511 CHRONIC RIGHT SHOULDER PAIN: Primary | ICD-10-CM

## 2023-04-17 DIAGNOSIS — J34.89 NASAL PAIN: ICD-10-CM

## 2023-04-17 PROCEDURE — 3072F PR LOW RISK FOR RETINOPATHY: ICD-10-PCS | Mod: CPTII,S$GLB,, | Performed by: NURSE PRACTITIONER

## 2023-04-17 PROCEDURE — 97530 THERAPEUTIC ACTIVITIES: CPT | Mod: PO

## 2023-04-17 PROCEDURE — 97110 THERAPEUTIC EXERCISES: CPT | Mod: PO,CQ

## 2023-04-17 PROCEDURE — 3008F BODY MASS INDEX DOCD: CPT | Mod: CPTII,S$GLB,, | Performed by: NURSE PRACTITIONER

## 2023-04-17 PROCEDURE — 1125F PR PAIN SEVERITY QUANTIFIED, PAIN PRESENT: ICD-10-PCS | Mod: CPTII,S$GLB,, | Performed by: NURSE PRACTITIONER

## 2023-04-17 PROCEDURE — 97112 NEUROMUSCULAR REEDUCATION: CPT | Mod: PO,CQ

## 2023-04-17 PROCEDURE — 97022 WHIRLPOOL THERAPY: CPT | Mod: 59,PO

## 2023-04-17 PROCEDURE — 97110 THERAPEUTIC EXERCISES: CPT | Mod: PO

## 2023-04-17 PROCEDURE — 99214 PR OFFICE/OUTPT VISIT, EST, LEVL IV, 30-39 MIN: ICD-10-PCS | Mod: 25,S$GLB,, | Performed by: NURSE PRACTITIONER

## 2023-04-17 PROCEDURE — 99999 PR PBB SHADOW E&M-EST. PATIENT-LVL V: CPT | Mod: PBBFAC,,, | Performed by: NURSE PRACTITIONER

## 2023-04-17 PROCEDURE — 99999 PR PBB SHADOW E&M-EST. PATIENT-LVL V: ICD-10-PCS | Mod: PBBFAC,,, | Performed by: NURSE PRACTITIONER

## 2023-04-17 PROCEDURE — 1125F AMNT PAIN NOTED PAIN PRSNT: CPT | Mod: CPTII,S$GLB,, | Performed by: NURSE PRACTITIONER

## 2023-04-17 PROCEDURE — 3044F PR MOST RECENT HEMOGLOBIN A1C LEVEL <7.0%: ICD-10-PCS | Mod: CPTII,S$GLB,, | Performed by: NURSE PRACTITIONER

## 2023-04-17 PROCEDURE — 3072F LOW RISK FOR RETINOPATHY: CPT | Mod: CPTII,S$GLB,, | Performed by: NURSE PRACTITIONER

## 2023-04-17 PROCEDURE — 1159F MED LIST DOCD IN RCRD: CPT | Mod: CPTII,S$GLB,, | Performed by: NURSE PRACTITIONER

## 2023-04-17 PROCEDURE — 31575 DIAGNOSTIC LARYNGOSCOPY: CPT | Mod: S$GLB,,, | Performed by: NURSE PRACTITIONER

## 2023-04-17 PROCEDURE — 99214 OFFICE O/P EST MOD 30 MIN: CPT | Mod: 25,S$GLB,, | Performed by: NURSE PRACTITIONER

## 2023-04-17 PROCEDURE — 3008F PR BODY MASS INDEX (BMI) DOCUMENTED: ICD-10-PCS | Mod: CPTII,S$GLB,, | Performed by: NURSE PRACTITIONER

## 2023-04-17 PROCEDURE — 1159F PR MEDICATION LIST DOCUMENTED IN MEDICAL RECORD: ICD-10-PCS | Mod: CPTII,S$GLB,, | Performed by: NURSE PRACTITIONER

## 2023-04-17 PROCEDURE — 1101F PT FALLS ASSESS-DOCD LE1/YR: CPT | Mod: CPTII,S$GLB,, | Performed by: NURSE PRACTITIONER

## 2023-04-17 PROCEDURE — 31575 PR LARYNGOSCOPY, FLEXIBLE; DIAGNOSTIC: ICD-10-PCS | Mod: S$GLB,,, | Performed by: NURSE PRACTITIONER

## 2023-04-17 PROCEDURE — 3288F PR FALLS RISK ASSESSMENT DOCUMENTED: ICD-10-PCS | Mod: CPTII,S$GLB,, | Performed by: NURSE PRACTITIONER

## 2023-04-17 PROCEDURE — 3288F FALL RISK ASSESSMENT DOCD: CPT | Mod: CPTII,S$GLB,, | Performed by: NURSE PRACTITIONER

## 2023-04-17 PROCEDURE — 3044F HG A1C LEVEL LT 7.0%: CPT | Mod: CPTII,S$GLB,, | Performed by: NURSE PRACTITIONER

## 2023-04-17 PROCEDURE — 1101F PR PT FALLS ASSESS DOC 0-1 FALLS W/OUT INJ PAST YR: ICD-10-PCS | Mod: CPTII,S$GLB,, | Performed by: NURSE PRACTITIONER

## 2023-04-17 NOTE — PROGRESS NOTES
[facility]   Physical Therapy Treatment Note     Name: Deb Webb  Clinic Number: [id number]    Therapy Diagnosis:   Encounter Diagnosis   Name Primary?    Chronic right shoulder pain Yes         Physician: Triston Valverde MD  Physician Orders: PT Eval and Treat  Medical Diagnosis from Referral: M25.511 (ICD-10-CM) - Right shoulder pain  M75.101 (ICD-10-CM) - Rotator cuff tear, right  Evaluation Date: 05/16/2022  Authorization Period Expiration: 4/6/2023  Plan of Care Expiration: 02/10/2023,  4/4/2023  Progress Note Due: 2/10/2023, 3/9/2023  Visit # / Visits authorized: 11/12    PTA Visit #: 2/5     Time In: 905 am  Time Out: 1000 am  Total Billable Time: 55 minutes     SUBJECTIVE     Pt reports: She has been trying to walk with her rollator closer to her to help improve her upright posture.  Pt has been performing the band exercise at home.  Pt reports she did not sleep well last night.  Response to previous treatment: she had some soreness but it was only that day  Functional change: none new reported    Pain: 8/10  Location: R shoulder    OBJECTIVE     Objective Measures updated at progress report unless specified.     Treatment     Deb received the treatments listed below:        Therapeutic exercises to develop strength, endurance, ROM, flexibility, posture and core stabilization for 40 minutes including:  Pulleys x 3' in flexion  PROM of R shoulder x 5 minutes  Supine AAROM shoulder flexion 2 x 10  Supine shoulder press 2x10  Supine shoulder flexion AROM x 15- to 90 degrees  Supine horizontal abd with yellowband 2x10  Shoulder ER with wand x 10  Seated shoulder extension with yellow band 2x10  Seated shoulder press x 10      Neuromuscular re-education activities to improve: Balance, Coordination, Kinesthetic, Sense and Proprioception for 15 minutes. The following activities were included:    Seated scapular retraction x 3' 5 sec hold- verbal cues to maintain  "upright posture  Brugger's postural exercise 3x10 5 sec hold  Table slides in flexion x 2 mins  Seated rows with red band 3x10  Slouch/correct 2x10    Patient Education and Home Exercises      Home Exercises Provided and Patient Education Provided     Education provided:   - educated patient on using her hurrycane at home to perform AAROM      Written Home Exercises Provided: yes. Exercises were reviewed and Deb was able to demonstrate them prior to the end of the session.  Deb demonstrated good  understanding of the education provided. See EMR under Patient Instructions for exercises provided during therapy sessions    ASSESSMENT     Deb demonstrated supine shoulder flexion AAROM over 90 degrees today which she wasn't in previous session.  Pt appeared to have an improvement in overall tolerance to shoulder exercises with less pain despite coming in with higher pain levels.  Pt educated on performing exercises with her radharycane at home to help with ROM gains and exercises were printed. Will continue working on education and PROM within tolerance.  Deb Is progressing well towards her goals.   Pt prognosis is Fair.     Pt will continue to benefit from skilled outpatient physical therapy to address the deficits listed in the problem list box on initial evaluation, provide pt/family education and to maximize pt's level of independence in the home and community environment.     Pt's spiritual, cultural and educational needs considered and pt agreeable to plan of care and goals.     Anticipated barriers to physical therapy: transportation/compliance    Goals:  Short-Term Goals: 4 weeks  - The patient will be independent with initial home exercise program.    -Partially met, once a day most days.  - The patient will increase strength to at least 4-/5 in muscles tested to indicate improvements in functional strength.   -Met  -TUG improved to 20" to demo improving mobility and justify continuing skilled " "care.   -MET    Long-Term Goals: 8 weeks  - The patient will increase strength to at least 4/5 to perform functional mobility including sit to stand, steps/curbs, and walking (Not met, progressing)   -ongoing  - The patient will increase ROM to WFL to perform ADL, vocational, and recreational tasks without pain. (Met)  -TUG improved to 18" to demo improving mobility.   -ongoing    PLAN     Continue with balance exercises and LE strengthening.    Perlita Lorenzo, PTA             "

## 2023-04-17 NOTE — PATIENT INSTRUCTIONS
"The "gold standard" for determining the presence or absence of middle ear fluid is tympanometry.  You have normal, Type "A" tympanograms, which means no fluid behind your ear drums.  Since there is no middle ear fluid, there is no infection.     Other possible causes for continued ear pain can include but are not limited to:   dental pathology or TMJ (jaw joint arthritis) -- see your dentist  cervical spine arthritis (discuss possible imaging/MRI with PCP)  myofascial pain syndrome/headaches or neuralgias (see your neurologist)  GERD (anti-acid reflux medications twice daily and see your GI)  heck/neck neoplasms (CT neck w/contrast)        For sore throat, let's talk through some of the top considerations for recurrent throat discomfort one-at-a-time:     1. Nasal allergies -- Typical constellation of symptoms seen with nasal allergies: itchy, red, watery eyes; itchy, red, watery nose; excessive sneezing; excessive stuffiness. If this is the one that best describes your daily states, then consider seeing an allergist next to come up with a more aggressive anti-allergy regimen.       2. Silent reflux -- Typical constellation of symptoms seen with silent reflux: post-nasal drip sensation with absence of significant runny nose or nasal congestion, sensation of thick or too much mucus in the back of throat, raspy voice, frequent throat clearing, sensation of something in the back of throat, frequent throat irritation. If this one best describes your current state, see a gastroenterologist and continue your daily anti-reflux regimen, raise the head of your bed, avoid trigger foods, avoid eating anything 3 hours before going to bed, and shedding a few pounds may help improve reflux.     3. Sinus infection -- Typical constellation of symptoms seen with acute bacterial sinus infection are:  Green-gold, foul-smelling, foul-tasting mucus from nose and throat, inability to breathe through nose, inability to smell or taste " well, facial pain and swelling, dental pain, headaches around eyes, sore throat and productive cough. You should let me know if this one best describes your current state, and I will order sinus imaging.     4. Pharyngitis/Tonsillitis -- Typical constellation of symptoms seen with acute bacterial tonsillitis/pharyngitis are:  Smelly pus (exudate), very red inflamed throat, swollen lymph nodes, fever, general malaise, absence of cough. If these symptoms are present, you may need a throat swab (any urgent care or walk-in clinic can do this quickly for you).  Criteria for tonsillectomy:  At least seven episodes in the previous year, OR at least five episodes in each of the previous two years, OR at least three episodes in each of the previous three years.     5. Dryness from either dehydration, mouth-breathing, or side effects of medications -- Most people are not drinking enough water regularly to keep up with body's demand. Recommend AT LEAST 64 oz of water per day, and more for men (up to 100 oz.) unless a medical issue prevents this. Reduce intake of caffeine / tea / sugary drinks or soda. Chronic nasal obstruction should be addressed to minimize time spent breathing through the mouth which dries the mouth and throat leading to discomfort.

## 2023-04-17 NOTE — PROGRESS NOTES
Subjective:       Patient ID: Deb Webb is a 72 y.o. female.    Chief Complaint: Sinus Problem, Otalgia, and Sore Throat    HPI   Patient saw Dr. Dick in 2019 for presbycusis, TMJ, and ETD. Patient saw me 8 months ago for vasomotor rhinitis; treated with Atrovent nasal spray.  Patient returns today for dysphagia. Patient fell on 11/10/2022. CT maxillofacial showed nondisplaced fracture along the floor and posterolateral wall of the right maxillary sinus.  Fracture through the right 2nd mandibular premolar (series 6, image 53, series 4, image 70).  PA apical cyst around the root of the 1st right maxillary molar which is incomplete and possibly eroded though obscured by artifact from dental hardware.  She had teeth pulled. Still having a lot of pain in her nose at nasal bridge and c/o bilateral otalgia, AD>AS.   ALLERGIES:  (+)Itchy watery eyes, watery nose, and sneezing.  Denies nasal congestion. No allergist or allergy testing. Takes nothing for allergies.   SINUSES:  (+)Facial pain, headaches around the eyes, mucus from nose and throat, sore throat, and hyposmia.  Denies facial swelling or dental pain, nasal congestion, or productive cough.  11/10/2022 negative CT maxillofacial.   GERD:  (+)Dysphonia, frequent throat clearing, globus sensation, dysphagia, and frequent throat irritation. Patient scheduled for EGD with Dr. Duffy on 05/23/2023. No swallowing study. Patient takes omeprazole 40 mg QAM.     Review of Systems   Constitutional: Negative.  Negative for fever.   HENT:  Positive for rhinorrhea, sneezing, sore throat, trouble swallowing and voice change. Negative for nasal congestion, dental problem, facial swelling, postnasal drip and sinus pressure/congestion.         Hyposmia   Frequent throat clearing   Globus sensation (sensation of thick or too much mucus in the back of her throat)   Frequent throat irritation   Eyes:  Positive for discharge and itching. Negative for redness.   Respiratory:  Negative.  Negative for cough and choking.    Cardiovascular: Negative.    Gastrointestinal: Negative.    Musculoskeletal: Negative.    Integumentary:  Negative.   Neurological:  Positive for headaches.   Hematological: Negative.    Psychiatric/Behavioral: Negative.         Objective:      Physical Exam  Vitals and nursing note reviewed.   Constitutional:       General: She is not in acute distress.     Appearance: She is well-developed. She is not ill-appearing or diaphoretic.   HENT:      Head: Normocephalic and atraumatic.      Right Ear: Hearing, tympanic membrane, ear canal and external ear normal. No middle ear effusion. Tympanic membrane is not erythematous.      Left Ear: Hearing, tympanic membrane, ear canal and external ear normal.  No middle ear effusion. Tympanic membrane is not erythematous.      Nose: Nose normal.      Mouth/Throat:      Pharynx: Uvula midline.   Eyes:      General: Lids are normal. No scleral icterus.        Right eye: No discharge.         Left eye: No discharge.   Neck:      Trachea: Trachea normal. No tracheal deviation.   Cardiovascular:      Rate and Rhythm: Normal rate.   Pulmonary:      Effort: Pulmonary effort is normal. No respiratory distress.      Breath sounds: No stridor. No wheezing.   Musculoskeletal:         General: Normal range of motion.      Cervical back: Normal range of motion and neck supple.   Skin:     General: Skin is warm and dry.      Coloration: Skin is not pale.   Neurological:      Mental Status: She is alert and oriented to person, place, and time.      Coordination: Coordination normal.      Gait: Gait normal.   Psychiatric:         Speech: Speech normal.         Behavior: Behavior normal. Behavior is cooperative.         Thought Content: Thought content normal.         Judgment: Judgment normal.       Procedure: Flexible laryngoscopy    In order to fully examine the upper aerodigestive tract, including the larynx, in a patient with a hyperactive gag  reflex, and suboptimal visualization with indirect mirror exam,  flexible endoscopy is required.   After explaining the procedure and obtaining verbal consent, a timeout was performed with the patient's participation according to the universal protocol. Both nasal cavities were anesthetized with 4% Xylocaine spray mixed with Leonel-Synephrine. The flexible laryngoscope  was inserted into the nasal cavity and advanced to visualize the nasal cavity, nasopharynx, the posterior oropharynx, hypopharynx, and the endolarynx with the  findings noted. The scope was removed and the procedure terminated. The patient tolerated this procedure well without apparent complication.     OVERALL FINDINGS  Nasopharynx - the torus is clear. There are no lesions of the posterior wall.   Oropharynx - no lesions of the tongue base. There is no obvious fullness or asymmetry.  Hypopharynx - there are no lesions of the pyriform sinuses or postcricoid region   Larynx - there are no lesions of the supraglottic or glottic larynx.  Vocal fold mobility is normal.     SPECIFIC FINDINGS  Adenoid tissue - normal   Nasopharynx & eustachian tube orifices - normal   Posterior pharyngeal wall - normal   Base of tongue - normal   Epiglottis - normal   Valleculae - normal   Pyriform sinuses - normal   False vocal cords - normal   True vocal cords - normal  Arytenoids - normal   Interarytenoid space - normal  Left Base of Tongue    Right Base of Tongue    Larynx    Larynx    Assessment:       Problem List Items Addressed This Visit    None  Visit Diagnoses       Referred otalgia, bilateral    -  Primary    Swallowing problem        Arthralgia of right temporomandibular joint        Tobacco abuse        Chronic frontal sinusitis        Relevant Orders    CT Sinuses without Contrast    Nasal pain        Relevant Orders    CT Sinuses without Contrast              Plan:      CT sinus for frontal sinus pain and intranasal pain X several months -- will notify pt of  results as soon as available.    Counseled to quit smoking. States she is down to 1-3 cigarettes per day.   Reassured negative aural exam. Discussed referred otalgia secondary to musculoskeletal etiology.   Discussed typical constellation of symptoms seen with some of the more common differentials for recurrent throat pain may include but not limited to: allergic rhinitis (see allergist or take daily allergy meds), silent reflux (see GI or take daily reflux meds), sinusitis (CT imaging ordered), tonsillitis/pharyngitis (swab/labs), dryness/dehydration.   Patient encouraged to return to clinic if symptoms worsen/persist and as needed for further ENT symptoms or concerns.

## 2023-04-24 ENCOUNTER — CLINICAL SUPPORT (OUTPATIENT)
Dept: REHABILITATION | Facility: HOSPITAL | Age: 73
End: 2023-04-24
Attending: INTERNAL MEDICINE
Payer: MEDICARE

## 2023-04-24 ENCOUNTER — TELEPHONE (OUTPATIENT)
Dept: OTOLARYNGOLOGY | Facility: CLINIC | Age: 73
End: 2023-04-24
Payer: MEDICARE

## 2023-04-24 ENCOUNTER — OFFICE VISIT (OUTPATIENT)
Dept: ORTHOPEDICS | Facility: CLINIC | Age: 73
End: 2023-04-24
Payer: MEDICARE

## 2023-04-24 ENCOUNTER — HOSPITAL ENCOUNTER (OUTPATIENT)
Dept: RADIOLOGY | Facility: HOSPITAL | Age: 73
Discharge: HOME | End: 2023-04-24
Attending: NURSE PRACTITIONER
Payer: MEDICARE

## 2023-04-24 VITALS — BODY MASS INDEX: 19.56 KG/M2 | WEIGHT: 97 LBS | HEIGHT: 59 IN

## 2023-04-24 DIAGNOSIS — J32.1 CHRONIC FRONTAL SINUSITIS: ICD-10-CM

## 2023-04-24 DIAGNOSIS — M25.541 PAIN IN JOINT OF RIGHT HAND: Primary | ICD-10-CM

## 2023-04-24 DIAGNOSIS — M25.641 JOINT STIFFNESS OF HAND, RIGHT: ICD-10-CM

## 2023-04-24 DIAGNOSIS — J34.89 NASAL PAIN: ICD-10-CM

## 2023-04-24 DIAGNOSIS — M75.111 NONTRAUMATIC INCOMPLETE TEAR OF RIGHT ROTATOR CUFF: Primary | ICD-10-CM

## 2023-04-24 DIAGNOSIS — M25.511 RIGHT SHOULDER PAIN: Primary | ICD-10-CM

## 2023-04-24 DIAGNOSIS — M75.101 ROTATOR CUFF TEAR, RIGHT: ICD-10-CM

## 2023-04-24 DIAGNOSIS — M21.941 DEFORMITY, HAND, RIGHT: ICD-10-CM

## 2023-04-24 DIAGNOSIS — M25.511 RIGHT SHOULDER PAIN: ICD-10-CM

## 2023-04-24 PROCEDURE — 3008F PR BODY MASS INDEX (BMI) DOCUMENTED: ICD-10-PCS | Mod: CPTII,S$GLB,, | Performed by: ORTHOPAEDIC SURGERY

## 2023-04-24 PROCEDURE — 3072F PR LOW RISK FOR RETINOPATHY: ICD-10-PCS | Mod: CPTII,S$GLB,, | Performed by: ORTHOPAEDIC SURGERY

## 2023-04-24 PROCEDURE — 1159F MED LIST DOCD IN RCRD: CPT | Mod: CPTII,S$GLB,, | Performed by: ORTHOPAEDIC SURGERY

## 2023-04-24 PROCEDURE — 1101F PR PT FALLS ASSESS DOC 0-1 FALLS W/OUT INJ PAST YR: ICD-10-PCS | Mod: CPTII,S$GLB,, | Performed by: ORTHOPAEDIC SURGERY

## 2023-04-24 PROCEDURE — 3044F PR MOST RECENT HEMOGLOBIN A1C LEVEL <7.0%: ICD-10-PCS | Mod: CPTII,S$GLB,, | Performed by: ORTHOPAEDIC SURGERY

## 2023-04-24 PROCEDURE — 1160F RVW MEDS BY RX/DR IN RCRD: CPT | Mod: CPTII,S$GLB,, | Performed by: ORTHOPAEDIC SURGERY

## 2023-04-24 PROCEDURE — 1125F PR PAIN SEVERITY QUANTIFIED, PAIN PRESENT: ICD-10-PCS | Mod: CPTII,S$GLB,, | Performed by: ORTHOPAEDIC SURGERY

## 2023-04-24 PROCEDURE — 99999 PR PBB SHADOW E&M-EST. PATIENT-LVL IV: ICD-10-PCS | Mod: PBBFAC,,, | Performed by: ORTHOPAEDIC SURGERY

## 2023-04-24 PROCEDURE — 97110 THERAPEUTIC EXERCISES: CPT | Mod: PO | Performed by: PHYSICAL THERAPIST

## 2023-04-24 PROCEDURE — 97112 NEUROMUSCULAR REEDUCATION: CPT | Mod: PO | Performed by: PHYSICAL THERAPIST

## 2023-04-24 PROCEDURE — 3044F HG A1C LEVEL LT 7.0%: CPT | Mod: CPTII,S$GLB,, | Performed by: ORTHOPAEDIC SURGERY

## 2023-04-24 PROCEDURE — 1159F PR MEDICATION LIST DOCUMENTED IN MEDICAL RECORD: ICD-10-PCS | Mod: CPTII,S$GLB,, | Performed by: ORTHOPAEDIC SURGERY

## 2023-04-24 PROCEDURE — 70486 CT MAXILLOFACIAL W/O DYE: CPT | Mod: TC,PO

## 2023-04-24 PROCEDURE — 3288F FALL RISK ASSESSMENT DOCD: CPT | Mod: CPTII,S$GLB,, | Performed by: ORTHOPAEDIC SURGERY

## 2023-04-24 PROCEDURE — 70486 CT MAXILLOFACIAL W/O DYE: CPT | Mod: 26,,, | Performed by: RADIOLOGY

## 2023-04-24 PROCEDURE — 3288F PR FALLS RISK ASSESSMENT DOCUMENTED: ICD-10-PCS | Mod: CPTII,S$GLB,, | Performed by: ORTHOPAEDIC SURGERY

## 2023-04-24 PROCEDURE — 1101F PT FALLS ASSESS-DOCD LE1/YR: CPT | Mod: CPTII,S$GLB,, | Performed by: ORTHOPAEDIC SURGERY

## 2023-04-24 PROCEDURE — 99999 PR PBB SHADOW E&M-EST. PATIENT-LVL IV: CPT | Mod: PBBFAC,,, | Performed by: ORTHOPAEDIC SURGERY

## 2023-04-24 PROCEDURE — 97530 THERAPEUTIC ACTIVITIES: CPT | Mod: PO

## 2023-04-24 PROCEDURE — 70486 CT SINUSES WITHOUT CONTRAST: ICD-10-PCS | Mod: 26,,, | Performed by: RADIOLOGY

## 2023-04-24 PROCEDURE — 1125F AMNT PAIN NOTED PAIN PRSNT: CPT | Mod: CPTII,S$GLB,, | Performed by: ORTHOPAEDIC SURGERY

## 2023-04-24 PROCEDURE — 99213 OFFICE O/P EST LOW 20 MIN: CPT | Mod: S$GLB,,, | Performed by: ORTHOPAEDIC SURGERY

## 2023-04-24 PROCEDURE — 99213 PR OFFICE/OUTPT VISIT, EST, LEVL III, 20-29 MIN: ICD-10-PCS | Mod: S$GLB,,, | Performed by: ORTHOPAEDIC SURGERY

## 2023-04-24 PROCEDURE — 1160F PR REVIEW ALL MEDS BY PRESCRIBER/CLIN PHARMACIST DOCUMENTED: ICD-10-PCS | Mod: CPTII,S$GLB,, | Performed by: ORTHOPAEDIC SURGERY

## 2023-04-24 PROCEDURE — 3072F LOW RISK FOR RETINOPATHY: CPT | Mod: CPTII,S$GLB,, | Performed by: ORTHOPAEDIC SURGERY

## 2023-04-24 PROCEDURE — 3008F BODY MASS INDEX DOCD: CPT | Mod: CPTII,S$GLB,, | Performed by: ORTHOPAEDIC SURGERY

## 2023-04-24 PROCEDURE — 97110 THERAPEUTIC EXERCISES: CPT | Mod: PO

## 2023-04-24 NOTE — PROGRESS NOTES
72 years old follow-up of shoulder pain.  She has been going to therapy and is improving    Exam shows weak and painful cuff strength no signs infection positive Neer Upton impingement sign     X-rays show AC arthrosis osteopenia     Assessment:  Degenerative disease of the right shoulder    Plan: Continue with strengthening exercises through physical therapy, she wants to hold off on injection for now, follow-up as needed

## 2023-04-24 NOTE — PROGRESS NOTES
[facility]   Reassessment: Physical Therapy Treatment Note     Name: Deb Webb  Clinic Number: [id number]    Therapy Diagnosis:   Encounter Diagnoses   Name Primary?    Right shoulder pain     Rotator cuff tear, right        Physician: Dandre Odell PA-C  Physician Orders: PT Eval and Treat  Medical Diagnosis from Referral: M25.511 (ICD-10-CM) - Right shoulder pain  M75.101 (ICD-10-CM) - Rotator cuff tear, right  Evaluation Date: 05/16/2022  Authorization Period Expiration: 4/6/2023, 05/15/2023  Plan of Care Expiration: 02/10/2023,  4/4/2023, 6/23/2023  Progress Note Due: 2/10/2023, 3/9/2023, 4/8/2023, 5/7/2023  Visit # / Visits authorized: 12/24    PTA Visit #: 0/5     Time In: 900 am  Time Out: 1000 am  Total Billable Time: 30 minutes     SUBJECTIVE     Pt reports: her right shoulder continues painful. She appears to have improving ROM.  Response to previous treatment: she had some soreness but it was only that day  Functional change: none new reported    Pain: 8/10  Location: R shoulder    OBJECTIVE     Objective Measures updated at progress report unless specified.     Posture is unchanged and pt continues with significant difficulty correcting and maintaining upright postural correction. Inc t/s kyphosis is present limiting her awareness in sitting to back of chair.    Shoulder A/PROM:  Right:  Flexion: 125 deg  Extension: 60 deg  Abduction: 153 deg  INTERNAL ROTATION reach behind back: T12  External ROTATION reach over head: T1     Limited by t/s structure and shoulder humeral head->ant  Crepitus noted right with PROM of right shoulder.     Left:  Flexion: 120 deg   Extension: 60 deg  Abduction: 115 deg   INTERNAL ROTATION reach behind back: T/s  External ROTATION reach over head: top of Left shoulder     Palpation: + right supraspinatus insertion, infraspinatus insertion, infraspinatus m. Belly.    Shoulder strength: right/Left    Flexion: 4-/5, 4-/5  Extension: 4/5,  "4+/5  Abduction: 4-/5, 4-/5  INTERNAL ROTATION: 4-/5, 4/5  EXTERNAL ROTATION: 4-/5, 4/5    Treatment     Dbe received the treatments listed below:      Therapeutic exercises to develop strength, endurance, ROM, flexibility, posture and core stabilization for 40 minutes including:  Reassessment x 10 mins.    Tech assistance:  Pulleys x 3' in flexion  PROM of R shoulder x 5 minutes  Supine AAROM shoulder flexion 2 x 10  Supine shoulder press 2x10  Supine shoulder flexion AROM x 15- to 90 degrees  Supine horizontal abd with yellowband 2x10  Shoulder ER with wand x 10, 5"  Seated shoulder extension with yellow band 2x10  Seated shoulder press 2 x 10    Neuromuscular re-education activities to improve: Balance, Coordination, Kinesthetic, Sense and Proprioception for 15 minutes. The following activities were included:  With PT:  Seated scapular retraction 2 x 10 sec hold- verbal cues to maintain upright posture, feet on floor, "Sit up tall", "pinch your shoulder blades"  Brugger's postural exercise 3x10 5 sec hold- verbal cues to maintain upright posture, feet on floor, "Sit up tall"  Table slides in flexion x 2 mins  Seated rows with red band 3x10  Slouch/correct 2x10    Patient Education and Home Exercises      Home Exercises Provided and Patient Education Provided     Education provided:   - educated patient on using her hurrycane at home to perform AAROM      Written Home Exercises Provided: Patient instructed to cont prior HEP. Exercises were reviewed and Deb was able to demonstrate them prior to the end of the session.  Deb demonstrated good  understanding of the education provided. See EMR under Patient Instructions for exercises provided during therapy sessions    ASSESSMENT     Deb demonstrated improved sitting AROM of right shoulder with significant improvement and strength only mildly improved. Will continue working on education and PROM within tolerance. Dec right shoulder EXTERNAL ROTATION ROM " "with Brugger is noted.  Deb Is progressing well towards her goals.   Pt prognosis is Fair.     Pt will continue to benefit from skilled outpatient physical therapy to address the deficits listed in the problem list box on initial evaluation, provide pt/family education and to maximize pt's level of independence in the home and community environment.     Pt's spiritual, cultural and educational needs considered and pt agreeable to plan of care and goals.     Anticipated barriers to physical therapy: transportation/compliance    Goals:  Short-Term Goals: 4 weeks  - The patient will be independent with initial home exercise program.    -Partially met, once a day most days.  - The patient will increase strength to at least 4-/5 in muscles tested to indicate improvements in functional strength.   -Met  -TUG improved to 20" to demo improving mobility and justify continuing skilled care.   -MET    Long-Term Goals: 8 weeks  - The patient will increase strength to at least 4/5 to perform functional mobility including sit to stand, steps/curbs, and walking (Not met, progressing)   -ongoing  - The patient will increase ROM to WFL to perform ADL, vocational, and recreational tasks without pain. (Met)  -TUG improved to 18" to demo improving mobility.   -ongoing    PLAN     Continue with balance exercises and LE strengthening.    Charissa Raymundo, PT               "

## 2023-04-24 NOTE — PROGRESS NOTES
Occupational Therapy D/C and  Daily Treatment Note     Visit Date: 4/24/2023  Name: Deb Webb  Clinic Number: [id number]    Therapy Diagnosis:   Encounter Diagnoses   Name Primary?    Pain in joint of right hand Yes    Deformity, hand, right     Joint stiffness of hand, right      Physician: Dandre Odell, BRITNEY    Physician orders:         Note     Patient has had her right middle finger PIP joint immobilized via a thermoplastic orthosis PIP extension splint for the past 8 weeks due to her boutonniere deformity with associated fracture.  Patient now has significant stiffness of the right middle finger PIP joint, and requires certified hand therapy to increase the range of motion and function of the right middle finger.  Additionally, the patient will continue to wear her DIP joint orthosis extension splint of the D IP joint of the right ring finger for 3 more weeks, at which time certified hand therapy may begin gentle range of motion of her right ring finger DIP joint.     Duration:  6 weeks      Frequency:  2-3 times per week     Please work on gentle range of motion of the right hand (protect the DIP joint of the right ring finger), edema control, therapeutic modalities, and eventually gentle strengthening.  Thanks!         Per ortho note from 3/13/2023:   PLAN:  1. I discussed with Deb Webb that she continues to progress in the treatment course.  We discussed the best course of action this time is to continue with occupational therapy to further increase the function range of motion of the right hand.  We did discuss she feels like she is failing to improve to contact the clinic for a reappointment. We did discuss for her right shoulder pain we referred to Dr. Barraza for further evaluation.  She verbally agreed with the treatment plan.      2. She was referred to Dr. Barraza in clinic today.      3. I would like to have her follow up in clinic on a p.r.n. basis for any persistent/worsening of  her symptoms or for any hand, wrist, or elbow problems/concerns.  She was instructed to contact the clinic for any problems or concerns in the interim.        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     Visit # / Visits Authortized:  12 (3 for orthotic/splinting) / TBD  # No shows 0 / # Cancellations: 0  Time In: 1000  Time Out: 1043     Total Billable Time: 38 minutes     Precautions: Standard and falls  Surgery: N/A has been treated with splinting, Cont with DIP splint through 3/12/2023, then can begin gentle ROM.                            S/P: approx 8 weeks on 1/23/2022  Subjective      Pt reports: No new issues or complaints.  she was compliant with HEP.   Response to previous treatment:very good  Functional change:Some improved use of her R hand/long finger  Pain:  Functional Pain Scale Rating 0-10:   8/10 on average  8/10 at best  8/10 at worst  Location: R hand/ring finger and thumb at times   Description: Aching, Tingling, Sharp, and Variable,   Aggravating Factors: N/A  Easing Factors: N/A     Objective   MEASUREMENTS  Last measurements below are from Eval unless otherwise noted.        Quick DASH Survey = 70.45% from Eval    Quick DASH Survey = 43.18% from D/C.     PIP flexion AROM R long  finger = 105 degrees     4/24/2023  DIP AROM ext R ring finger = -20 degrees and resting posture -20 degrees (PROM to 0 degrees)  DIP AROM flexion R ring finger = 55 degrees     4/24/2023  Strength  R hand = 26# vs 20 on L.      TREATMENT  Deb received the following supervised modalities after being cleared for contradictions for:   Moist heat. Please see under therapeutic exercises for further on heat and stretch ex with the use of Moist heat (pt was cleared for all contraindication/precautions prior) in order to increase: comfort, blood flow and soft tissue elasticity.     Deb received the following direct contact modalities after being cleared for contraindications for 0 minutes:  -NT     Deb received the following manual therapy techniques for 0 minutes:   - Manual Therapy: Pt seen for Retrograde Massage and Scar Massage to R long finger for and hand (NT)     Deb received therapeutic exercises for 10 minutes including:  - hook fist R hand 8 min with coban wrap while on moist heat  - 1# DB wrist flex/ext 3x10 each.    Deb participated in dynamic functional therapeutic activities to improve functional performance for 28 minutes, including:  -  Hand master 5x10 pen and close  -  Hand helper 2 red bands 5x10   -  Buttons tip to palm x 3 at a time x 1 container     Orthotic mgmt and train: 0 min: (NT)    Home Exercises and Education Provided      Education provided:   -  Cont per previous.      Written Home Exercises Provided:  Patient instructed to cont prior HEP.   Exercises were reviewed and Deb was able to demonstrate them prior to the end of the session.  Deb demonstrated good  understanding of the education provided.      See EMR under Media for exercises provided today and/or prior visit.         Assessment      Pt has made good overall progress and is ready for D/C at this time.      Progress towards goals:  See below.     Anticipated barriers to occupational therapy: None     Pt's spiritual, cultural and educational needs considered and pt agreeable to plan of care and goals.        Goals:      Short Term Goals: (30 days, 3/6/2023, and/or 10th visit) unless otherwise noted below.  1. Pt will be independent  with HEP in 2 visits.  2. Pt will report decreased pain to a 6/10 at worst in RUE/hand with ADLs in order to increase function/use of UE. Not MEt; still 7/10  3. Pt will increase AROM R long finger flexion by 10 degrees to 85 degrees to increase function for ADL/IADL.     Updated Short Term Goals: To be met by poc exp date of 3/26/2023  4. Pt to demo grossly WNL AROM R hand including R ring finger. (Not met yet: some lag at ring DIP will continue to progress as able)     Updated Short Term Goals: To be met by poc exp date of 4/26/2023  5 Pt will demo improved ext of R ring DIP to -10 degrees (Not MET) Plateau  6. Pt will demo improved  strength to at least 20# R hand.  MET     Long Term Goals: (by discharge)  1. Pt will report decreased pain to 1-2/10 with ADLs to allow for increased function/use of UE. Not Met continued pain of 8/10.  2. Pt will exhibit  grossly WNL AROM hand to allow for Independent use of for all ADL/IADL tasks. (Not met some mild overall limitations remain.  3. Pt will exhibit WFL  strength to allow a firm grasp during ADL (MET)     Plan   Pt for D/C from OT services this date. To to cont HEP as tolerated and will follow up with referring provider for any further tx needs.       KHALIDA Arriaza, JUANT

## 2023-04-24 NOTE — TELEPHONE ENCOUNTER
----- Message from Dominique Werner NP sent at 4/24/2023  2:12 PM CDT -----  Please see your dentist as soon as possible for prominent periapical lucencies of the 1st and 2nd maxillary molars (dental disease).  Your sinuses are all open and clear. No fluid or infection.

## 2023-04-26 NOTE — PLAN OF CARE
[facility]  PT Plan of Care Note     Name: Deb Webb  Clinic Number: [id number]    Therapy Diagnosis:   Encounter Diagnoses   Name Primary?    Right shoulder pain     Rotator cuff tear, right      Physician: Dandre Odell PA-C    Visit Date: 4/24/2023    Physician: Dandre Odell PA-C  Physician Orders: PT Eval and Treat  Medical Diagnosis from Referral: M25.511 (ICD-10-CM) - Right shoulder pain  M75.101 (ICD-10-CM) - Rotator cuff tear, right  Evaluation Date: 05/16/2022  Authorization Period Expiration: 4/6/2023, 05/15/2023  Plan of Care Expiration: 02/10/2023,  4/4/2023, 6/23/2023  Progress Note Due: 2/10/2023, 3/9/2023, 4/8/2023, 5/7/2023  Visit # / Visits authorized: 12/24    Precautions: Standard  Functional Level Prior to Evaluation:  impaired shoulder ROM and strength of dominant hand    SUBJECTIVE     Update:   Pt reports: her right shoulder continues painful. She appears to have improving ROM.  Response to previous treatment: she had some soreness but it was only that day  Functional change: none new reported     Pain: 8/10  Location: R shoulder    OBJECTIVE     Update:   Objective Measures updated at progress report unless specified.      Posture is unchanged and pt continues with significant difficulty correcting and maintaining upright postural correction. Inc t/s kyphosis is present limiting her awareness in sitting to back of chair.     Shoulder A/PROM:  Right:  Flexion: 125 deg  Extension: 60 deg  Abduction: 153 deg  INTERNAL ROTATION reach behind back: T12  External ROTATION reach over head: T1     Limited by t/s structure and shoulder humeral head->ant  Crepitus noted right with PROM of right shoulder.     Left:  Flexion: 120 deg   Extension: 60 deg  Abduction: 115 deg   INTERNAL ROTATION reach behind back: T/s  External ROTATION reach over head: top of Left shoulder     Palpation: + right supraspinatus insertion, infraspinatus insertion, infraspinatus m.  "Belly.     Shoulder strength: right/Left     Flexion: 4-/5, 4-/5  Extension: 4/5, 4+/5  Abduction: 4-/5, 4-/5  INTERNAL ROTATION: 4-/5, 4/5  EXTERNAL ROTATION: 4-/5, 4/5     Treatment      Deb received the treatments listed below:       Therapeutic exercises to develop strength, endurance, ROM, flexibility, posture and core stabilization for 40 minutes including:  Reassessment x 10 mins.     Tech assistance:  Pulleys x 3' in flexion  PROM of R shoulder x 5 minutes  Supine AAROM shoulder flexion 2 x 10  Supine shoulder press 2x10  Supine shoulder flexion AROM x 15- to 90 degrees  Supine horizontal abd with yellowband 2x10  Shoulder ER with wand x 10, 5"  Seated shoulder extension with yellow band 2x10  Seated shoulder press 2 x 10     Neuromuscular re-education activities to improve: Balance, Coordination, Kinesthetic, Sense and Proprioception for 15 minutes. The following activities were included:  With PT:  Seated scapular retraction 2 x 10 sec hold- verbal cues to maintain upright posture, feet on floor, "Sit up tall", "pinch your shoulder blades"  Brugger's postural exercise 3x10 5 sec hold- verbal cues to maintain upright posture, feet on floor, "Sit up tall"  Table slides in flexion x 2 mins  Seated rows with red band 3x10  Slouch/correct 2x10     Patient Education and Home Exercises       Home Exercises Provided and Patient Education Provided      Education provided:   - educated patient on using her hurrycane at home to perform AAROM        Written Home Exercises Provided: Patient instructed to cont prior HEP. Exercises were reviewed and Deb was able to demonstrate them prior to the end of the session.  Deb demonstrated good  understanding of the education provided. See EMR under Patient Instructions for exercises provided during therapy sessions    ASSESSMENT     Update:   Deb demonstrated improved sitting AROM of right shoulder with significant improvement and strength only mildly improved. Will " "continue working on education and PROM within tolerance. Dec right shoulder EXTERNAL ROTATION ROM with Brugger is noted. Continue progressing ROM and strength as able. ROM is functional symmetrical or better. Strength is weak compared to non dominant side. Pt still only able to attend weekly and needs reminders to use full ROM available.  Deb Is progressing well towards her goals.   Pt prognosis is Fair.      Pt will continue to benefit from skilled outpatient physical therapy to address the deficits listed in the problem list box on initial evaluation, provide pt/family education and to maximize pt's level of independence in the home and community environment.      Pt's spiritual, cultural and educational needs considered and pt agreeable to plan of care and goals.     Anticipated barriers to physical therapy: transportation/compliance    Previous Short Term Goals Status:   Short-Term Goals: 4 weeks  - The patient will be independent with initial home exercise program.               -Partially met, once a day most days.  - The patient will increase strength to at least 4-/5 in muscles tested to indicate improvements in functional strength.   -Met  -TUG improved to 20" to demo improving mobility and justify continuing skilled care.              -MET  New Short Term Goals Status:     SHOULDER:  -Pt will demo right shoulder strength equal or better to Left non-dominant side.  -Pt will report condition is improved 50% or better for d/c to HEP.    Long Term Goal Status: see STGs.  Long-Term Goals: 8 weeks  - The patient will increase strength to at least 4/5 to perform functional mobility including sit to stand, steps/curbs, and walking (Not met, progressing)              -ongoing  - The patient will increase ROM to WFL to perform ADL, vocational, and recreational tasks without pain. (Met)  -TUG improved to 18" to demo improving mobility.              -ongoing  Reasons for Recertification of Therapy:   Pt is limited " by transportation in ability to attend therapy once a week. She has multiple sites of pain and would like to receive treatment and ongoing treatment. She was discharged from back therapy as she attended maximum reasonable visits and was no longer improving objectively or functionally. She is now being treated for right shoulder pain and progressing. Because of her pre-existing condition she will continue limited d/t to t/s kyphosis, postural and unchanged habits. Shoulder ROM is WFL. Focus on right shoulder and dominant strength with dec pn.    GOALS  New Short Term Goals Status:     SHOULDER:  -Pt will demo right shoulder strength equal or better to Left non-dominant side.  -Pt will report condition is improved 50% or better for d/c to HEP.    PLAN     Updated Certification Period: 4/24/2023 to 6/23/2023.   Recommended Treatment Plan: 1-2 times per week for 6-8 weeks:  Electrical Stimulation IFC, Manual Therapy, Moist Heat/ Ice, Neuromuscular Re-ed, Patient Education, Self Care, Therapeutic Activities, Therapeutic Exercise, Ultrasound, and dry needling  Other Recommendations: edith Raymundo, PT

## 2023-05-08 ENCOUNTER — CLINICAL SUPPORT (OUTPATIENT)
Dept: REHABILITATION | Facility: HOSPITAL | Age: 73
End: 2023-05-08
Payer: MEDICARE

## 2023-05-08 ENCOUNTER — TELEPHONE (OUTPATIENT)
Dept: GASTROENTEROLOGY | Facility: CLINIC | Age: 73
End: 2023-05-08
Payer: MEDICARE

## 2023-05-08 DIAGNOSIS — M75.111 NONTRAUMATIC INCOMPLETE TEAR OF RIGHT ROTATOR CUFF: ICD-10-CM

## 2023-05-08 DIAGNOSIS — M25.511 RIGHT SHOULDER PAIN, UNSPECIFIED CHRONICITY: Primary | ICD-10-CM

## 2023-05-08 PROCEDURE — 97110 THERAPEUTIC EXERCISES: CPT | Mod: PO,CQ

## 2023-05-08 PROCEDURE — 97112 NEUROMUSCULAR REEDUCATION: CPT | Mod: PO,CQ

## 2023-05-08 NOTE — TELEPHONE ENCOUNTER
Called and spoke with the patient, patient was asking about her arrival time, patient was advised that the surgery center will call her closer to the date of her procedure to assign her the arrival time, patient verbalized understanding of this.

## 2023-05-08 NOTE — TELEPHONE ENCOUNTER
----- Message from Katt Rod sent at 5/8/2023  9:39 AM CDT -----  Regarding: EGD  Patient needs to speak with someone about her upcoming procedure. She came to surgery center asking about details and wanting to make requests for procedure. Can someone please contact her at [phone]

## 2023-05-08 NOTE — PROGRESS NOTES
[facility]   Physical Therapy Treatment Note     Name: Deb Webb  Clinic Number: [id number]    Therapy Diagnosis:   Encounter Diagnoses   Name Primary?    Right shoulder pain, unspecified chronicity Yes    Nontraumatic incomplete tear of right rotator cuff          Physician: Triston Valverde MD  Physician Orders: PT Eval and Treat  Medical Diagnosis from Referral: M25.511 (ICD-10-CM) - Right shoulder pain  M75.101 (ICD-10-CM) - Rotator cuff tear, right  Evaluation Date: 05/16/2022  Authorization Period Expiration: 4/6/2023, 05/15/2023  Plan of Care Expiration: 02/10/2023,  4/4/2023, 6/23/2023  Progress Note Due: 2/10/2023, 3/9/2023, 4/8/2023, 5/7/2023  Visit # / Visits authorized: 13/24    PTA Visit #: 1/5     Time In: 1108 am  Time Out: 1208 pm  Total Billable Time: 60 minutes     SUBJECTIVE     Pt reports: She was going to stand and she slipped on her quilt and ended up sitting on her foot stool that she uses to get into her bed.  Pt reports she wasn't able to push herself up from the foot stool because of the decreased strength in her arms.  Pt had to lower herself to the ground and wait until her family came home to help her.  Pt reports her arm is feeling pretty good today which is better than it has been.  Pt reports her back wheel on her rollator is squeaking and the break will not go all the way down.  Response to previous treatment: soreness  Functional change: none new reported    Pain: 6/10  Location: R shoulder    OBJECTIVE     Objective Measures updated at progress report unless specified.     Posture is unchanged and pt continues with significant difficulty correcting and maintaining upright postural correction. Inc t/s kyphosis is present limiting her awareness in sitting to back of chair.    Shoulder A/PROM:  Right:  Flexion: 125 deg  Extension: 60 deg  Abduction: 153 deg  INTERNAL ROTATION reach behind back: T12  External ROTATION reach over head: T1    "  Limited by t/s structure and shoulder humeral head->ant  Crepitus noted right with PROM of right shoulder.     Left:  Flexion: 120 deg   Extension: 60 deg  Abduction: 115 deg   INTERNAL ROTATION reach behind back: T/s  External ROTATION reach over head: top of Left shoulder     Palpation: + right supraspinatus insertion, infraspinatus insertion, infraspinatus m. Belly.    Shoulder strength: right/Left    Flexion: 4-/5, 4-/5  Extension: 4/5, 4+/5  Abduction: 4-/5, 4-/5  INTERNAL ROTATION: 4-/5, 4/5  EXTERNAL ROTATION: 4-/5, 4/5    Treatment     Deb received the treatments listed below:      Therapeutic exercises to develop strength, endurance, ROM, flexibility, posture and core stabilization for 30 minutes including:    Pulleys x 3' in flexion  PROM of R shoulder x 5 minutes  Supine AAROM shoulder flexion 2 x 10 3#  Supine shoulder press 2x10 3# wand  Supine shoulder flexion AROM x 20- to 90 degrees  Supine horizontal abd with yellowband 2x10- cues to bring arms down  Shoulder ER with wand x 10, 5"- not performed  Seated shoulder press with 1# wand 3x10      Neuromuscular re-education activities to improve: Balance, Coordination, Kinesthetic, Sense and Proprioception for 30 minutes. The following activities were included:    Seated scapular retraction 2 x 10 sec hold- verbal cues to maintain upright posture, feet on floor, "Sit up tall", "pinch your shoulder blades"  Brugger's postural exercise 3x10 5 sec hold- verbal cues to maintain upright posture, feet on floor, "Sit up tall"  Table slides in flexion x 2 mins  Seated rows with red band 3x10  Slouch/correct 2x10  Seated shoulder extension with yellow band 2x10  Seated PNF patterns with blue ball x 10  Standing reaching up and out with rollator support 2x10      Patient Education and Home Exercises      Home Exercises Provided and Patient Education Provided     Education provided:   - educated patient on using her hurrycane at home to perform " "SHELLYOM      Written Home Exercises Provided: Patient instructed to cont prior HEP. Exercises were reviewed and Deb was able to demonstrate them prior to the end of the session.  Deb demonstrated good  understanding of the education provided. See EMR under Patient Instructions for exercises provided during therapy sessions    ASSESSMENT     Deb requires several cues to keep elbows in, arms down, and other cues for Brugger's postural exercise.  Pt tends to just bring her elbows back instead of engaging postural muscles.  Advised patient to contact rollator company/whoever gave her the rollator to have it properly inspected.  Implemented additional lifting techniques due to patient's improved ROM and tolerance to flexion.  Pt requires rest breaks between reps to complete exercise sets.  Pt has increased difficulty engaging back muscles but can perform for a limited time before fatigue.  Deb Is progressing well towards her goals.   Pt prognosis is Fair.     Pt will continue to benefit from skilled outpatient physical therapy to address the deficits listed in the problem list box on initial evaluation, provide pt/family education and to maximize pt's level of independence in the home and community environment.     Pt's spiritual, cultural and educational needs considered and pt agreeable to plan of care and goals.     Anticipated barriers to physical therapy: transportation/compliance    Goals:  Short-Term Goals: 4 weeks  - The patient will be independent with initial home exercise program.    -Partially met, once a day most days.  - The patient will increase strength to at least 4-/5 in muscles tested to indicate improvements in functional strength.   -Met  -TUG improved to 20" to demo improving mobility and justify continuing skilled care.   -MET    Long-Term Goals: 8 weeks  - The patient will increase strength to at least 4/5 to perform functional mobility including sit to stand, steps/curbs, and walking " "(Not met, progressing)   -ongoing  - The patient will increase ROM to WFL to perform ADL, vocational, and recreational tasks without pain. (Met)  -TUG improved to 18" to demo improving mobility.   -ongoing    PLAN     Continue with balance exercises and LE strengthening.    Perlita Lorenzo, PTA                 "

## 2023-05-15 ENCOUNTER — CLINICAL SUPPORT (OUTPATIENT)
Dept: REHABILITATION | Facility: HOSPITAL | Age: 73
End: 2023-05-15
Payer: MEDICARE

## 2023-05-15 DIAGNOSIS — R29.898 IMPAIRED STRENGTH OF SHOULDER MUSCLES: ICD-10-CM

## 2023-05-15 DIAGNOSIS — M25.611 DECREASED RIGHT SHOULDER RANGE OF MOTION: ICD-10-CM

## 2023-05-15 PROCEDURE — 97110 THERAPEUTIC EXERCISES: CPT | Mod: PO | Performed by: PHYSICAL THERAPIST

## 2023-05-15 PROCEDURE — 97112 NEUROMUSCULAR REEDUCATION: CPT | Mod: PO | Performed by: PHYSICAL THERAPIST

## 2023-05-15 NOTE — PROGRESS NOTES
[facility]   Physical Therapy Treatment Note     Name: Deb Webb  Clinic Number: [id number]    Therapy Diagnosis:   Encounter Diagnoses   Name Primary?    Decreased right shoulder range of motion     Impaired strength of shoulder muscles            Physician: Triston Valverde MD  Physician Orders: PT Eval and Treat  Medical Diagnosis from Referral: M25.511 (ICD-10-CM) - Right shoulder pain  M75.101 (ICD-10-CM) - Rotator cuff tear, right  Evaluation Date: 05/16/2022  Authorization Period Expiration: 4/6/2023, 05/15/2023  Plan of Care Expiration: 02/10/2023,  4/4/2023, 6/23/2023  Progress Note Due: 2/10/2023, 3/9/2023, 4/8/2023, 5/7/2023  Visit # / Visits authorized: 14/24    PTA Visit #: 0/5     Time In: 1100 am  Time Out: 1200 pm  Total Billable Time: 58 minutes     SUBJECTIVE     Pt reports:  pt is limited to attendance once a week d/t transportation. Pn is consistent at 6/10.  Response to previous treatment: soreness  Functional change: none new reported    Pain: 6/10  Location: R shoulder    OBJECTIVE     Objective Measures updated at progress report unless specified.     5/15/2023:  Posture is unchanged and pt continues with significant difficulty correcting and maintaining upright postural correction. Inc t/s kyphosis is present limiting her awareness in sitting to back of chair.    Shoulder A/PROM:  Right:  Flexion: 125 deg  Extension: 60 deg  Abduction: 153 deg  INTERNAL ROTATION reach behind back: T12  External ROTATION reach over head: T1     Limited by t/s structure and shoulder humeral head->ant  Crepitus noted right with PROM of right shoulder.  Functional right shoulder ROM, will focus on strength.     Left:  Flexion: 120 deg   Extension: 60 deg  Abduction: 115 deg   INTERNAL ROTATION reach behind back: T/s  External ROTATION reach over head: top of Left shoulder     Palpation: + right supraspinatus insertion, infraspinatus insertion, infraspinatus m.  Belly.    Shoulder strength: right/Left    Flexion: 4-/5, 4-/5  Extension: 4/5, 4+/5  Abduction: 4-/5, 4-/5  INTERNAL ROTATION: 4-/5, 4/5  EXTERNAL ROTATION: 4-/5, 4/5    Treatment     Deb received the treatments listed below:      Therapeutic exercises to develop strength, endurance, ROM, flexibility, posture and core stabilization for 30 minutes including:    Reassessment.  Pulleys x 3' in flexion  Seated shoulder press with 1# wand 3x10  Seated shoulder flexion 1# x 10  Seated shoulder scaption 1 # x 10  Seated shoulder protraction ytb around body 2 x 10  Seated no money yellow band 2 x 10  Seated Ts ytb band x 10 modified (pull apart)  Seated rows with red band 3 x 10  Seated shoulder extension with yellow band 2x10    Neuromuscular re-education activities to improve: Balance, Coordination, Kinesthetic, Sense and Proprioception for 15 minutes. The following activities were included:  Seated PNF patterns with blue ball x 10 each  Back of head functional reaches x 10  Hair brush reach x 8  Modified chair dips/shoulder depression into chair 2 x 10  Standing reaching up and out with rollator support 2x10-np    Patient Education and Home Exercises      Home Exercises Provided and Patient Education Provided     Education provided:   - educated patient on using her hurrycane at home to perform AAROM    Written Home Exercises Provided: Patient instructed to cont prior HEP. Exercises were reviewed and Deb was able to demonstrate them prior to the end of the session.  Deb demonstrated good  understanding of the education provided. See EMR under Patient Instructions for exercises provided during therapy sessions    ASSESSMENT     Deb requires several cues to keep elbows in, arms down, and other cues for Brugger's postural exercise.  Pt tends to just bring her elbows back instead of engaging postural muscles. Postural awareness unchanged. Implemented additional lifting techniques due to patient's improved ROM  "and tolerance to flexion.  Pt requires rest breaks between reps to complete exercise sets.  Pt has increased difficulty engaging back muscles but can perform for a limited time before fatigue. Pt having difficulty with reaching her back and head for washing and grooming. Strategies shown and discussed today to use a longer towel and back scratcher. We discussed focus on strength as ROM is functional at this point and the shoulder is limited by t/s kyphosis.  Deb Is progressing well towards her goals.   Pt prognosis is Fair.     Pt will continue to benefit from skilled outpatient physical therapy to address the deficits listed in the problem list box on initial evaluation, provide pt/family education and to maximize pt's level of independence in the home and community environment.     Pt's spiritual, cultural and educational needs considered and pt agreeable to plan of care and goals.     Anticipated barriers to physical therapy: transportation/compliance    Goals:  Short-Term Goals: 4 weeks  - The patient will be independent with initial home exercise program.    -Partially met, once a day most days.  - The patient will increase strength to at least 4-/5 in muscles tested to indicate improvements in functional strength.   -Met  -TUG improved to 20" to demo improving mobility and justify continuing skilled care.   -MET    Long-Term Goals: 8 weeks  - The patient will increase strength to at least 4/5 to perform functional mobility including sit to stand, steps/curbs, and walking (Not met, progressing)   -ongoing  - The patient will increase ROM to WFL to perform ADL, vocational, and recreational tasks without pain. (Met)  -TUG improved to 18" to demo improving mobility.   -ongoing    PLAN     Continue with balance exercises and LE strengthening.    Charissa Raymundo, PT                   "

## 2023-05-22 ENCOUNTER — OFFICE VISIT (OUTPATIENT)
Dept: ORTHOPEDICS | Facility: CLINIC | Age: 73
End: 2023-05-22
Payer: MEDICARE

## 2023-05-22 ENCOUNTER — CLINICAL SUPPORT (OUTPATIENT)
Dept: REHABILITATION | Facility: HOSPITAL | Age: 73
End: 2023-05-22
Payer: MEDICARE

## 2023-05-22 DIAGNOSIS — R29.898 IMPAIRED STRENGTH OF SHOULDER MUSCLES: ICD-10-CM

## 2023-05-22 DIAGNOSIS — M20.021 BOUTONNIERE DEFORMITY OF FINGER OF RIGHT HAND: Primary | ICD-10-CM

## 2023-05-22 DIAGNOSIS — M25.611 DECREASED RIGHT SHOULDER RANGE OF MOTION: Primary | ICD-10-CM

## 2023-05-22 DIAGNOSIS — S62.634A CLOSED MALLET FRACTURE OF DISTAL PHALANX OF RIGHT RING FINGER: ICD-10-CM

## 2023-05-22 PROCEDURE — 3044F PR MOST RECENT HEMOGLOBIN A1C LEVEL <7.0%: ICD-10-PCS | Mod: CPTII,S$GLB,, | Performed by: PHYSICIAN ASSISTANT

## 2023-05-22 PROCEDURE — 99213 OFFICE O/P EST LOW 20 MIN: CPT | Mod: PN | Performed by: PHYSICIAN ASSISTANT

## 2023-05-22 PROCEDURE — 3072F LOW RISK FOR RETINOPATHY: CPT | Mod: CPTII,S$GLB,, | Performed by: PHYSICIAN ASSISTANT

## 2023-05-22 PROCEDURE — 1159F PR MEDICATION LIST DOCUMENTED IN MEDICAL RECORD: ICD-10-PCS | Mod: CPTII,S$GLB,, | Performed by: PHYSICIAN ASSISTANT

## 2023-05-22 PROCEDURE — 97110 THERAPEUTIC EXERCISES: CPT | Mod: PO | Performed by: PHYSICAL THERAPIST

## 2023-05-22 PROCEDURE — 99213 OFFICE O/P EST LOW 20 MIN: CPT | Mod: S$GLB,,, | Performed by: PHYSICIAN ASSISTANT

## 2023-05-22 PROCEDURE — 99999 PR PBB SHADOW E&M-EST. PATIENT-LVL III: ICD-10-PCS | Mod: PBBFAC,,, | Performed by: PHYSICIAN ASSISTANT

## 2023-05-22 PROCEDURE — 1159F MED LIST DOCD IN RCRD: CPT | Mod: CPTII,S$GLB,, | Performed by: PHYSICIAN ASSISTANT

## 2023-05-22 PROCEDURE — 1160F RVW MEDS BY RX/DR IN RCRD: CPT | Mod: CPTII,S$GLB,, | Performed by: PHYSICIAN ASSISTANT

## 2023-05-22 PROCEDURE — 1101F PT FALLS ASSESS-DOCD LE1/YR: CPT | Mod: CPTII,S$GLB,, | Performed by: PHYSICIAN ASSISTANT

## 2023-05-22 PROCEDURE — 1160F PR REVIEW ALL MEDS BY PRESCRIBER/CLIN PHARMACIST DOCUMENTED: ICD-10-PCS | Mod: CPTII,S$GLB,, | Performed by: PHYSICIAN ASSISTANT

## 2023-05-22 PROCEDURE — 1125F AMNT PAIN NOTED PAIN PRSNT: CPT | Mod: CPTII,S$GLB,, | Performed by: PHYSICIAN ASSISTANT

## 2023-05-22 PROCEDURE — 3288F FALL RISK ASSESSMENT DOCD: CPT | Mod: CPTII,S$GLB,, | Performed by: PHYSICIAN ASSISTANT

## 2023-05-22 PROCEDURE — 97112 NEUROMUSCULAR REEDUCATION: CPT | Mod: PO | Performed by: PHYSICAL THERAPIST

## 2023-05-22 PROCEDURE — 3288F PR FALLS RISK ASSESSMENT DOCUMENTED: ICD-10-PCS | Mod: CPTII,S$GLB,, | Performed by: PHYSICIAN ASSISTANT

## 2023-05-22 PROCEDURE — 1125F PR PAIN SEVERITY QUANTIFIED, PAIN PRESENT: ICD-10-PCS | Mod: CPTII,S$GLB,, | Performed by: PHYSICIAN ASSISTANT

## 2023-05-22 PROCEDURE — 99999 PR PBB SHADOW E&M-EST. PATIENT-LVL III: CPT | Mod: PBBFAC,,, | Performed by: PHYSICIAN ASSISTANT

## 2023-05-22 PROCEDURE — 1101F PR PT FALLS ASSESS DOC 0-1 FALLS W/OUT INJ PAST YR: ICD-10-PCS | Mod: CPTII,S$GLB,, | Performed by: PHYSICIAN ASSISTANT

## 2023-05-22 PROCEDURE — 3072F PR LOW RISK FOR RETINOPATHY: ICD-10-PCS | Mod: CPTII,S$GLB,, | Performed by: PHYSICIAN ASSISTANT

## 2023-05-22 PROCEDURE — 3044F HG A1C LEVEL LT 7.0%: CPT | Mod: CPTII,S$GLB,, | Performed by: PHYSICIAN ASSISTANT

## 2023-05-22 PROCEDURE — 99213 PR OFFICE/OUTPT VISIT, EST, LEVL III, 20-29 MIN: ICD-10-PCS | Mod: S$GLB,,, | Performed by: PHYSICIAN ASSISTANT

## 2023-05-22 NOTE — PROGRESS NOTES
5/22/2023    HPI:  Deb Webb is a 72 y.o. female, who presents to clinic today for evaluation of her boot in deformity with associated fracture of the right little finger and mallet finger fracture right ring finger.  States he has not made any further improve with therapy and continues to have pain and dysfunction of the right hand.  Denies any other complaints this time.    PMHX:  Past Medical History:   Diagnosis Date    Allergy     Arthritis     Cataract     Colon polyps     COPD (chronic obstructive pulmonary disease)     Diabetes mellitus type II     Diabetic neuropathy     Fever blister     Glaucoma (increased eye pressure)     Hyperlipidemia     Hypertension     Irritable bowel syndrome     Keloid cicatrix     Osteoporosis     Retained cholelithiasis following cholecystectomy(997.41)     Right patella fracture        PSHX:  Past Surgical History:   Procedure Laterality Date    BACK SURGERY  2006    CATARACT EXTRACTION W/  INTRAOCULAR LENS IMPLANT Bilateral     CHOLECYSTECTOMY      Laparoscopic    COLONOSCOPY      COLONOSCOPY N/A 8/31/2022    Procedure: COLONOSCOPY;  Surgeon: Salvatore Butler MD;  Location: [facility];  Service: Gastroenterology;  Laterality: N/A;    ESOPHAGEAL DILATION N/A 8/30/2022    Procedure: DILATION, ESOPHAGUS;  Surgeon: Salvatore Butler MD;  Location: [facility];  Service: Gastroenterology;  Laterality: N/A;    ESOPHAGOGASTRODUODENOSCOPY N/A 8/30/2022    Procedure: EGD (ESOPHAGOGASTRODUODENOSCOPY);  Surgeon: Salvatore Butler MD;  Location: [facility];  Service: Gastroenterology;  Laterality: N/A;    HERNIA REPAIR      HYSTERECTOMY      KNEE SURGERY Right 3/4/2015    Dr Weller     OOPHORECTOMY      ovarian tumor      Benign    TONSILLECTOMY, ADENOIDECTOMY         FMHX:  Family History   Problem Relation Age of Onset    Cancer Mother         Skin    Skin cancer Mother     Glaucoma Mother     Cataracts Mother     Diabetes Mother     Heart disease Father     Diabetes Father      Skin cancer Sister     Diabetes Sister     Diabetes Daughter     Breast cancer Maternal Aunt     Melanoma Neg Hx     Psoriasis Neg Hx     Eczema Neg Hx     Lupus Neg Hx     Amblyopia Neg Hx     Blindness Neg Hx     Macular degeneration Neg Hx     Retinal detachment Neg Hx     Strabismus Neg Hx        SOCHX:  Social History     Tobacco Use    Smoking status: Every Day     Packs/day: 0.50     Years: 40.00     Pack years: 20.00     Types: Cigarettes    Smokeless tobacco: Current   Substance Use Topics    Alcohol use: No       ALLERGIES:  Silicone and Adhesive    CURRENT MEDICATIONS:  Current Outpatient Medications on File Prior to Visit   Medication Sig Dispense Refill    albuterol (PROAIR HFA) 90 mcg/actuation inhaler Inhale 2 puffs into the lungs every 6 (six) hours as needed for Wheezing. Rescue 18 g 2    albuterol-ipratropium (DUO-NEB) 2.5 mg-0.5 mg/3 mL nebulizer solution Take 3 mLs by nebulization every 6 (six) hours as needed for Wheezing or Shortness of Breath. Rescue 75 mL 0    alendronate (FOSAMAX) 70 MG tablet Take 1 tablet (70 mg total) by mouth once a week. 12 tablet 3    amiodarone (PACERONE) 200 MG Tab Take 2 tabs by mouth twice daily until 12/2/22 then take 1 tab by mouth twice daily 90 tablet 1    ANORO ELLIPTA 62.5-25 mcg/actuation DsDv INHALE 1 PUFF INTO THE LUNGS ONCE DAILY 60 each 5    atorvastatin (LIPITOR) 40 MG tablet TAKE 1 TABLET BY MOUTH EVERY DAY 90 tablet 1    blood sugar diagnostic Strp To check BG 2 times daily, to use with insurance preferred meter 200 each 3    blood-glucose meter,continuous (DEXCOM G6 ) Misc Use as directed 1 each 1    blood-glucose sensor (DEXCOM G6 SENSOR) Emmie Use as directed 10 each 2    calcium carbonate/vitamin D3 (CALCIUM 600 + D,3, ORAL) Take 1 tablet by mouth once daily.      cholestyramine-aspartame (QUESTRAN LIGHT) 4 gram PwPk TAKE 1 PACKET (4 G TOTAL) BY MOUTH 2 (TWO) TIMES DAILY. FOR DIARRHEA 180 packet 0    citalopram (CELEXA) 20 MG tablet Take 1  "tablet (20 mg total) by mouth once daily. 90 tablet 1    dulaglutide (TRULICITY) 1.5 mg/0.5 mL pen injector INJECT 1.5 MG INTO THE SKIN EVERY 7 DAYS. THROUGH PATIENT ASSISTANCE 12 pen 1    furosemide (LASIX) 20 MG tablet Take 1 tablet (20 mg total) by mouth as needed (swelling).      gabapentin (NEURONTIN) 300 MG capsule TAKE 1 CAPSULE BY MOUTH THREE TIMES A  capsule 1    guaiFENesin (MUCINEX) 600 mg 12 hr tablet Take 1 tablet (600 mg total) by mouth 2 (two) times daily.      HYDROcodone-acetaminophen (NORCO) 5-325 mg per tablet Take 1 tablet by mouth every 12 (twelve) hours as needed for Pain. 10 tablet 0    ipratropium (ATROVENT) 42 mcg (0.06 %) nasal spray 2 sprays by Nasal route 3 (three) times daily. Use 3 times per day if necessary for chronic nasal drip 15 mL 12    Lactobacillus rhamnosus GG (CULTURELLE) 10 billion cell capsule Take 1 capsule by mouth once daily. 60 capsule 0    lancets Misc To check BG 2 times daily, to use with insurance preferred meter 200 each 3    latanoprost 0.005 % ophthalmic solution INSTILL 1 DROP INTO BOTH EYES EVERY EVENING (Patient taking differently: Place 1 drop into both eyes every evening.) 7.5 mL 1    losartan (COZAAR) 25 MG tablet Take 0.5 tablets (12.5 mg total) by mouth once daily. 30 tablet 1    meclizine (ANTIVERT) 12.5 mg tablet TAKE 1 TABLET BY MOUTH 3 TIMES DAILY AS NEEDED FOR DIZZINESS. 30 tablet 0    omega-3 fatty acids-vitamin E 1,000 mg Cap Take 1 capsule by mouth once daily.      omeprazole (PRILOSEC) 40 MG capsule TAKE 1 CAPSULE (40 MG TOTAL) BY MOUTH BEFORE BREAKFAST. 90 capsule 1    pen needle, diabetic (BD ULTRA-FINE AGUS PEN NEEDLE) 32 gauge x 5/32" Ndle 1 Device by Misc.(Non-Drug; Combo Route) route 2 (two) times daily. 200 each 4    primidone (MYSOLINE) 50 MG Tab TAKE 2 TABLETS BY MOUTH 3 (THREE) TIMES DAILY. ONE HALF TABLET FOR ONE WEEK, THEN AS PRESCRIBED 540 tablet 3    XARELTO 20 mg Tab TAKE 1 TABLET BY MOUTH EVERY DAY WITH DINNER OR EVENING " MEAL      blood-glucose meter kit To check BG 2 times daily, to use with insurance preferred meter 1 each 0    [DISCONTINUED] irbesartan (AVAPRO) 75 MG tablet Take 1 tablet (75 mg total) by mouth once daily. 90 tablet 1    [DISCONTINUED] loratadine (CLARITIN) 10 mg tablet TAKE 1 TABLET (10 MG TOTAL) BY MOUTH DAILY AS NEEDED FOR ALLERGIES (OR RUNNY NOSE). 90 tablet 1     No current facility-administered medications on file prior to visit.       REVIEW OF SYSTEMS:  Review of Systems Complete; Negative, unless noted above.    GENERAL PHYSICAL EXAM:   There were no vitals taken for this visit.   GEN: well developed, well nourished, no acute distress   PULM: No wheezing, no respiratory distress   CV: RRR    ORTHO EXAM:   Examination of the right hand reveals a mild boutonniere deformity of the right middle finger.  Presence of a mallet finger deformity of the right ring finger.  No significant edema, erythema, ecchymosis, or skin breakdown.  Able make composite fist and extend the fingers.  Sensation is grossly intact in the radial, ulnar, median nerve distributions.  Capillary refill less than 2 seconds in all fingers.    RADIOLOGY:   None.    ASSESSMENT:   Right middle finger boutonniere deformity, right ring finger mallet finger deformity    PLAN:  1. I discussed with Deb Webb that considering since we have tried extensive conservative treatment with splinting and occupational therapy, the only remaining treatment option is surgical intervention.  We did discuss this would likely not significantly improve the function of her right hand.  She stated she would like to discuss the possibility of surgical intervention regardless.  We discussed the reasonable course of action.      2. I would like her follow up in clinic with Dr. Briggs as next available appointment to discuss the possibility of surgical intervention.  She was instructed to contact the clinic for any problems or concerns in the interim.

## 2023-05-22 NOTE — PROGRESS NOTES
[facility]   Physical Therapy Treatment Note     Name: Deb Webb  Clinic Number: [id number]    Therapy Diagnosis:   Encounter Diagnoses   Name Primary?    Decreased right shoulder range of motion Yes    Impaired strength of shoulder muscles        Physician: Triston Valverde MD  Physician Orders: PT Eval and Treat  Medical Diagnosis from Referral: M25.511 (ICD-10-CM) - Right shoulder pain  M75.101 (ICD-10-CM) - Rotator cuff tear, right  Evaluation Date: 4/2/2023, 05/16/2022  Authorization Period Expiration: 4/6/2023, 05/15/2023  Plan of Care Expiration: 02/10/2023,  4/4/2023, 6/23/2023  Progress Note Due: 2/10/2023, 3/9/2023, 4/8/2023, 5/7/2023  Visit # / Visits authorized: 15/24    PTA Visit #: 0/5     Time In: 1030 am  Time Out: 1130 am  Total Billable Time: 30 minutes     SUBJECTIVE     Pt reports: pt is limited to attendance once a week d/t transportation. Pn is consistent at 5/10. Left shoulder pain is also 5/10. She is working on getting a long handled scrub brush for bathing.  Response to previous treatment: soreness  Functional change: none new reported    Pain: 5/10  Location: R shoulder    OBJECTIVE     Objective Measures updated at progress report unless specified.     5/22/2023:  Posture is unchanged and pt continues with significant difficulty correcting and maintaining upright postural correction. Inc t/s kyphosis is present limiting her awareness in sitting to back of chair.    Treatment     Deb received the treatments listed below:      Therapeutic exercises to develop strength, endurance, ROM, flexibility, posture and core stabilization for 30 minutes including:    Pulleys x 3' in flexion  Seated shoulder press with 1# wand 3x10  Seated shoulder flexion 1# x 10  Seated shoulder scaption 1 # x 10  Seated shoulder protraction ytb around body 2 x 10 (discontinued, pt unable to perform correctly despite maximum cues.)  Seated no money yellow band 2 x  10  Seated Ts ytb band x 10 modified (pull apart)  Seated rows with red band 3 x 10  Seated shoulder extension with yellow band 2x10    Neuromuscular re-education activities to improve: Balance, Coordination, Kinesthetic, Sense and Proprioception for 15 minutes. The following activities were included:  Seated PNF patterns with blue ball x 10 each  Back of head functional reaches x 10  Hair brush reach x 10  Modified chair dips/shoulder depression into chair 2 x 10  Standing reaching up and out with rollator support 2x10-np    Patient Education and Home Exercises      Home Exercises Provided and Patient Education Provided     Education provided:   - educated patient on using her felipa at home to perform AAROM  -5/22/2023: Education on t/s kyphosis limiting shoulder ROM, efforts to effect change, and current goals to improve and focus on shoulder strength.    Written Home Exercises Provided: Patient instructed to cont prior HEP. Exercises were reviewed and Deb was able to demonstrate them prior to the end of the session.  Deb demonstrated good  understanding of the education provided. See EMR under Patient Instructions for exercises provided during therapy sessions    ASSESSMENT     Deb requires several cues to keep elbows in, arms down, and other cues for Brugger's postural exercise. Postural awareness unchanged. Implemented additional lifting techniques due to patient's improved ROM and tolerance to flexion.  Pt requires rest breaks between reps to complete exercise sets. Pt having difficulty with reaching her back and head for washing and grooming. Strategies shown and discussed today to use a longer towel and back scratcher. We discussed focus on strength as ROM at this point of the shoulder is limited by t/s kyphosis.  Deb Is progressing well towards her goals.   Pt prognosis is Fair.     Pt will continue to benefit from skilled outpatient physical therapy to address the deficits listed in the  problem list box on initial evaluation, provide pt/family education and to maximize pt's level of independence in the home and community environment.     Pt's spiritual, cultural and educational needs considered and pt agreeable to plan of care and goals.     Anticipated barriers to physical therapy: transportation/compliance    Goals:  GOALS  New Short Term Goals Status:     SHOULDER:  -Pt will demo right shoulder strength equal or better to Left non-dominant side.  -Pt will report condition is improved 50% or better for d/c to HEP.    PLAN     Continue with balance exercises and LE strengthening.    Charissa Raymundo, PT

## 2023-05-22 NOTE — H&P
History & Physical - Short Stay  Gastroenterology      SUBJECTIVE:     Procedure: Gastroscopy    Chief Complaint/Indication for Procedure: Dysphagia.    History of Present Illness:  See recent PCP's OV note:  Office Visit   2/13/2023  [place] [facility]    Triston Valverde MD  Family Medicine Type 2 diabetes mellitus with diabetic polyneuropathy, without long-term current use of insulin +7 more  Dx Follow-up  Reason for Visit     Progress Notes    Triston Valverde MD at 2/13/2023 10:00 AM    Status: Signed      Subjective      Deb Webb is a 72 y.o. old, female here for Follow-up     71 y/o with PMH of CVA, PAF on xarelto, Type 2 DM with neuropathy, HTN, HLD, Chronic respiratory failure, COPD, osteopenia, IBS, anxiety, tobacco use, Essential tremor, prior BZO dependence     PAF: started on xarelto by cardiology? She didn't realize she might have to stop taking aspirin.  Type 2 DM: controlled previously, neuropathy stable.  HTN: controlled.  HLD: due for labs  COPD: stable, oxygen prn, compliant with inhalers, smokes 1-3 c/day     Facial trauma: still needs to f/u with ENT, ordered previously.  Anxiety: Family stresses her out, remains on celexa.  ET: stable  Dysphagia: recurrent problem, has seen gastro in the past, worsening recently.     Review of Systems   Constitutional:  Positive for malaise/fatigue.   Respiratory: Negative.     Cardiovascular: Negative.      Assessment and Plan      Type 2 diabetes mellitus with diabetic polyneuropathy, without long-term current use of insulin  -     Comprehensive Metabolic Panel; Future; Expected date: 02/13/2023  -     Lipid Panel; Future; Expected date: 02/13/2023  -     CBC Without Differential; Future; Expected date: 02/13/2023  -     Hemoglobin A1C; Future; Expected date: 02/13/2023     Essential tremor     Dysphagia, unspecified type  -     Case Request Endoscopy: ESOPHAGOGASTRODUODENOSCOPY (EGD)     Recurrent major depressive disorder, in full  remission     Aortic atherosclerosis     Pulmonary hypertension     PAF (paroxysmal atrial fibrillation)     Chronic obstructive pulmonary disease with (acute) exacerbation        Discussed stopping aspirin unless told to do so by someone else. She has been on this plus xarelto.  Unable to view cardiology records, need to obtain these in the future.            See most recent Upper GI endoscopy   Indications:           Epigastric abdominal pain, Dysphagia   Providers:             Salvatore Butler MD  Findings:        The lower third of the esophagus was moderately tortuous. A pill was        seen in the esophagus. A guidewire was placed and the scope was        withdrawn. Dilation was performed with a Savary dilator with no        resistance at 54 Fr. The dilation site was examined following        endoscope reinsertion and showed no change.   A 2 cm hiatal hernia was present.   Pills seen in antrum.  Impression:    - Tortuous esophagus. Pill retained in esophagus.                          - Discolored mucosa in the esophagus. Biopsied.                          - Z-line regular, 38 cm from the incisors.                          - 2 cm hiatal hernia.                          - Gastritis. Biopsied.                          - Normal examined duodenum.   Recommendation:        - Clear liquid diet.                          - Continue present medications.                          - Perform a colonoscopy tomorrow for abnormal CT results.                          - [facility].                          - Return to my office at appointment to be                          scheduled. If dysphagia still present, will likely                          need esophageal manometry as concern for possible                          esophageal dysmotility.   Salvatore Butler MD   8/30/2022     1. Well controlled Warthin-Starry stain for H. pylori is negative.    Warthin-Starry stain was done to rule out  H. pylori organisms which    are associated with this patient's histologic and/or clinical signs    and symptoms.   2. No fungal hyphae are seen on well controlled PAS stain.       PTA Medications   Medication Sig    albuterol (PROAIR HFA) 90 mcg/actuation inhaler Inhale 2 puffs into the lungs every 6 (six) hours as needed for Wheezing. Rescue    albuterol-ipratropium (DUO-NEB) 2.5 mg-0.5 mg/3 mL nebulizer solution Take 3 mLs by nebulization every 6 (six) hours as needed for Wheezing or Shortness of Breath. Rescue    alendronate (FOSAMAX) 70 MG tablet Take 1 tablet (70 mg total) by mouth once a week.    amiodarone (PACERONE) 200 MG Tab Take 2 tabs by mouth twice daily until 12/2/22 then take 1 tab by mouth twice daily    ANORO ELLIPTA 62.5-25 mcg/actuation DsDv INHALE 1 PUFF INTO THE LUNGS ONCE DAILY    atorvastatin (LIPITOR) 40 MG tablet TAKE 1 TABLET BY MOUTH EVERY DAY    calcium carbonate/vitamin D3 (CALCIUM 600 + D,3, ORAL) Take 1 tablet by mouth once daily.    citalopram (CELEXA) 20 MG tablet Take 1 tablet (20 mg total) by mouth once daily.    dulaglutide (TRULICITY) 1.5 mg/0.5 mL pen injector INJECT 1.5 MG INTO THE SKIN EVERY 7 DAYS. THROUGH PATIENT ASSISTANCE    furosemide (LASIX) 20 MG tablet Take 1 tablet (20 mg total) by mouth as needed (swelling).    gabapentin (NEURONTIN) 300 MG capsule TAKE 1 CAPSULE BY MOUTH THREE TIMES A DAY    guaiFENesin (MUCINEX) 600 mg 12 hr tablet Take 1 tablet (600 mg total) by mouth 2 (two) times daily.    HYDROcodone-acetaminophen (NORCO) 5-325 mg per tablet Take 1 tablet by mouth every 12 (twelve) hours as needed for Pain.    ipratropium (ATROVENT) 42 mcg (0.06 %) nasal spray 2 sprays by Nasal route 3 (three) times daily. Use 3 times per day if necessary for chronic nasal drip    Lactobacillus rhamnosus GG (CULTURELLE) 10 billion cell capsule Take 1 capsule by mouth once daily.    latanoprost 0.005 % ophthalmic solution INSTILL 1 DROP INTO BOTH EYES EVERY EVENING (Patient  "taking differently: Place 1 drop into both eyes every evening.)    meclizine (ANTIVERT) 12.5 mg tablet TAKE 1 TABLET BY MOUTH 3 TIMES DAILY AS NEEDED FOR DIZZINESS.    omega-3 fatty acids-vitamin E 1,000 mg Cap Take 1 capsule by mouth once daily.    omeprazole (PRILOSEC) 40 MG capsule TAKE 1 CAPSULE (40 MG TOTAL) BY MOUTH BEFORE BREAKFAST.    primidone (MYSOLINE) 50 MG Tab TAKE 2 TABLETS BY MOUTH 3 (THREE) TIMES DAILY. ONE HALF TABLET FOR ONE WEEK, THEN AS PRESCRIBED    XARELTO 20 mg Tab TAKE 1 TABLET BY MOUTH EVERY DAY WITH DINNER OR EVENING MEAL    blood sugar diagnostic Strp To check BG 2 times daily, to use with insurance preferred meter    blood-glucose meter kit To check BG 2 times daily, to use with insurance preferred meter    blood-glucose meter,continuous (DEXCOM G6 ) Misc Use as directed    blood-glucose sensor (DEXCOM G6 SENSOR) Emmie Use as directed    cholestyramine-aspartame (QUESTRAN LIGHT) 4 gram PwPk TAKE 1 PACKET (4 G TOTAL) BY MOUTH 2 (TWO) TIMES DAILY. FOR DIARRHEA    lancets Misc To check BG 2 times daily, to use with insurance preferred meter    losartan (COZAAR) 25 MG tablet Take 0.5 tablets (12.5 mg total) by mouth once daily.    pen needle, diabetic (BD ULTRA-FINE AGUS PEN NEEDLE) 32 gauge x 5/32" Ndle 1 Device by Misc.(Non-Drug; Combo Route) route 2 (two) times daily.       Review of patient's allergies indicates:   Allergen Reactions    Silicone      Burn skin    Adhesive Rash        Past Medical History:   Diagnosis Date    Allergy     Arthritis     Cataract     Colon polyps     COPD (chronic obstructive pulmonary disease)     Diabetes mellitus type II     Diabetic neuropathy     Fever blister     Glaucoma (increased eye pressure)     Hyperlipidemia     Hypertension     Irritable bowel syndrome     Keloid cicatrix     Osteoporosis     Retained cholelithiasis following cholecystectomy(997.41)     Right patella fracture      Past Surgical History:   Procedure Laterality Date    BACK " "SURGERY  2006    CATARACT EXTRACTION W/  INTRAOCULAR LENS IMPLANT Bilateral     CHOLECYSTECTOMY      Laparoscopic    COLONOSCOPY      COLONOSCOPY N/A 8/31/2022    Procedure: COLONOSCOPY;  Surgeon: Salvatore Butler MD;  Location: [facility];  Service: Gastroenterology;  Laterality: N/A;    ESOPHAGEAL DILATION N/A 8/30/2022    Procedure: DILATION, ESOPHAGUS;  Surgeon: Salvatore Butler MD;  [facility];  Service: Gastroenterology;  Laterality: N/A;    ESOPHAGOGASTRODUODENOSCOPY N/A 8/30/2022    Procedure: EGD (ESOPHAGOGASTRODUODENOSCOPY);  Surgeon: Salvatore Butler MD;  Location: [facility];  Service: Gastroenterology;  Laterality: N/A;    HERNIA REPAIR      HYSTERECTOMY      KNEE SURGERY Right 3/4/2015    Dr Weller     OOPHORECTOMY      ovarian tumor      Benign    TONSILLECTOMY, ADENOIDECTOMY       Family History   Problem Relation Age of Onset    Cancer Mother         Skin    Skin cancer Mother     Glaucoma Mother     Cataracts Mother     Diabetes Mother     Heart disease Father     Diabetes Father     Skin cancer Sister     Diabetes Sister     Diabetes Daughter     Breast cancer Maternal Aunt     Melanoma Neg Hx     Psoriasis Neg Hx     Eczema Neg Hx     Lupus Neg Hx     Amblyopia Neg Hx     Blindness Neg Hx     Macular degeneration Neg Hx     Retinal detachment Neg Hx     Strabismus Neg Hx      Social History     Tobacco Use    Smoking status: Every Day     Packs/day: 0.50     Years: 40.00     Pack years: 20.00     Types: Cigarettes    Smokeless tobacco: Current   Substance Use Topics    Alcohol use: No    Drug use: No         OBJECTIVE:     Vital Signs (Most Recent)  Temp: 98 °F (36.7 °C) (05/23/23 1045)  Pulse: 105 (05/23/23 1045)  Resp: (!) 22 (05/23/23 1045)  BP: 109/73 (05/23/23 1045)  SpO2: (!) 93 % (05/23/23 1045)    Physical Exam:  :  Ht: 4' 11" (149.9 cm)   Wt: 44 kg (97 lb)   BMI: 19.59 kg/m² .                                                      GENERAL:  Comfortable, in no acute distress.     "                             HEENT EXAM:  Nonicteric.  No adenopathy.  Oropharynx is clear.               NECK:  Supple.                                                               LUNGS:  Clear.                                                               CARDIAC:  Regular rate and rhythm.  S1, S2.  No murmur.                      ABDOMEN:  Soft, positive bowel sounds, nontender.  No hepatosplenomegaly or masses.  No rebound or guarding.                                             EXTREMITIES:  No edema.     MENTAL STATUS:  Alert and oriented.    ASSESSMENT/PLAN:     Assessment: Dysphagia.    Plan: Gastroscopy    Anesthesia Plan:   MAC / General Anaesthesia    ASA Grade: ASA 2 - Patient with mild systemic disease with no functional limitations    MALLAMPATI SCORE: I (soft palate, uvula, fauces, and tonsillar pillars visible)

## 2023-05-23 ENCOUNTER — HOSPITAL ENCOUNTER (OUTPATIENT)
Facility: HOSPITAL | Age: 73
Discharge: HOME | End: 2023-05-23
Attending: INTERNAL MEDICINE | Admitting: INTERNAL MEDICINE
Payer: MEDICARE

## 2023-05-23 ENCOUNTER — ANESTHESIA (OUTPATIENT)
Dept: ENDOSCOPY | Facility: HOSPITAL | Age: 73
End: 2023-05-23
Payer: MEDICARE

## 2023-05-23 ENCOUNTER — ANESTHESIA EVENT (OUTPATIENT)
Dept: ENDOSCOPY | Facility: HOSPITAL | Age: 73
End: 2023-05-23
Payer: MEDICARE

## 2023-05-23 DIAGNOSIS — R13.10 DYSPHAGIA: ICD-10-CM

## 2023-05-23 LAB — GLUCOSE SERPL-MCNC: 87 MG/DL (ref 70–110)

## 2023-05-23 PROCEDURE — D9220A PRA ANESTHESIA: Mod: ANES,,, | Performed by: ANESTHESIOLOGY

## 2023-05-23 PROCEDURE — 63600175 PHARM REV CODE 636 W HCPCS: Mod: PO | Performed by: NURSE ANESTHETIST, CERTIFIED REGISTERED

## 2023-05-23 PROCEDURE — 88342 IMHCHEM/IMCYTCHM 1ST ANTB: CPT | Mod: PO | Performed by: PATHOLOGY

## 2023-05-23 PROCEDURE — 43239 EGD BIOPSY SINGLE/MULTIPLE: CPT | Mod: PO | Performed by: INTERNAL MEDICINE

## 2023-05-23 PROCEDURE — 43248 PR EGD, FLEX, W/DILATION OVER GUIDEWIRE: ICD-10-PCS | Mod: 59,,, | Performed by: INTERNAL MEDICINE

## 2023-05-23 PROCEDURE — 88305 TISSUE EXAM BY PATHOLOGIST: ICD-10-PCS | Mod: 26,,, | Performed by: PATHOLOGY

## 2023-05-23 PROCEDURE — D9220A PRA ANESTHESIA: ICD-10-PCS | Mod: CRNA,,, | Performed by: NURSE ANESTHETIST, CERTIFIED REGISTERED

## 2023-05-23 PROCEDURE — 43248 EGD GUIDE WIRE INSERTION: CPT | Mod: 59,PO | Performed by: INTERNAL MEDICINE

## 2023-05-23 PROCEDURE — 88342 IMHCHEM/IMCYTCHM 1ST ANTB: CPT | Mod: 26,,, | Performed by: PATHOLOGY

## 2023-05-23 PROCEDURE — 37000008 HC ANESTHESIA 1ST 15 MINUTES: Mod: PO | Performed by: INTERNAL MEDICINE

## 2023-05-23 PROCEDURE — 88305 TISSUE EXAM BY PATHOLOGIST: CPT | Mod: PO | Performed by: PATHOLOGY

## 2023-05-23 PROCEDURE — 88342 CHG IMMUNOCYTOCHEMISTRY: ICD-10-PCS | Mod: 26,,, | Performed by: PATHOLOGY

## 2023-05-23 PROCEDURE — 43239 EGD BIOPSY SINGLE/MULTIPLE: CPT | Mod: ,,, | Performed by: INTERNAL MEDICINE

## 2023-05-23 PROCEDURE — 37000009 HC ANESTHESIA EA ADD 15 MINS: Mod: PO | Performed by: INTERNAL MEDICINE

## 2023-05-23 PROCEDURE — D9220A PRA ANESTHESIA: Mod: CRNA,,, | Performed by: NURSE ANESTHETIST, CERTIFIED REGISTERED

## 2023-05-23 PROCEDURE — 25000003 PHARM REV CODE 250: Mod: PO | Performed by: NURSE ANESTHETIST, CERTIFIED REGISTERED

## 2023-05-23 PROCEDURE — 43248 EGD GUIDE WIRE INSERTION: CPT | Mod: 59,,, | Performed by: INTERNAL MEDICINE

## 2023-05-23 PROCEDURE — 63600175 PHARM REV CODE 636 W HCPCS: Mod: PO | Performed by: INTERNAL MEDICINE

## 2023-05-23 PROCEDURE — D9220A PRA ANESTHESIA: ICD-10-PCS | Mod: ANES,,, | Performed by: ANESTHESIOLOGY

## 2023-05-23 PROCEDURE — C1769 GUIDE WIRE: HCPCS | Mod: PO | Performed by: INTERNAL MEDICINE

## 2023-05-23 PROCEDURE — 43239 PR EGD, FLEX, W/BIOPSY, SGL/MULTI: ICD-10-PCS | Mod: ,,, | Performed by: INTERNAL MEDICINE

## 2023-05-23 PROCEDURE — 88305 TISSUE EXAM BY PATHOLOGIST: CPT | Mod: 26,,, | Performed by: PATHOLOGY

## 2023-05-23 RX ORDER — LIDOCAINE HYDROCHLORIDE 20 MG/ML
INJECTION INTRAVENOUS
Status: DISCONTINUED | OUTPATIENT
Start: 2023-05-23 | End: 2023-05-23

## 2023-05-23 RX ORDER — SODIUM CHLORIDE 0.9 % (FLUSH) 0.9 %
10 SYRINGE (ML) INJECTION
Status: DISCONTINUED | OUTPATIENT
Start: 2023-05-23 | End: 2023-05-23 | Stop reason: HOSPADM

## 2023-05-23 RX ORDER — SODIUM CHLORIDE, SODIUM LACTATE, POTASSIUM CHLORIDE, CALCIUM CHLORIDE 600; 310; 30; 20 MG/100ML; MG/100ML; MG/100ML; MG/100ML
INJECTION, SOLUTION INTRAVENOUS CONTINUOUS
Status: DISCONTINUED | OUTPATIENT
Start: 2023-05-23 | End: 2023-05-23 | Stop reason: HOSPADM

## 2023-05-23 RX ORDER — PROPOFOL 10 MG/ML
VIAL (ML) INTRAVENOUS
Status: DISCONTINUED | OUTPATIENT
Start: 2023-05-23 | End: 2023-05-23

## 2023-05-23 RX ORDER — PHENYLEPHRINE HYDROCHLORIDE 10 MG/ML
INJECTION INTRAVENOUS
Status: DISCONTINUED | OUTPATIENT
Start: 2023-05-23 | End: 2023-05-23

## 2023-05-23 RX ADMIN — PROPOFOL 25 MG: 10 INJECTION, EMULSION INTRAVENOUS at 11:05

## 2023-05-23 RX ADMIN — PHENYLEPHRINE HYDROCHLORIDE 100 MCG: 10 INJECTION, SOLUTION INTRAMUSCULAR; INTRAVENOUS; SUBCUTANEOUS at 11:05

## 2023-05-23 RX ADMIN — SODIUM CHLORIDE, POTASSIUM CHLORIDE, SODIUM LACTATE AND CALCIUM CHLORIDE: 600; 310; 30; 20 INJECTION, SOLUTION INTRAVENOUS at 11:05

## 2023-05-23 RX ADMIN — PROPOFOL 100 MG: 10 INJECTION, EMULSION INTRAVENOUS at 11:05

## 2023-05-23 RX ADMIN — PROPOFOL 50 MG: 10 INJECTION, EMULSION INTRAVENOUS at 11:05

## 2023-05-23 RX ADMIN — LIDOCAINE HYDROCHLORIDE 100 MG: 20 INJECTION INTRAVENOUS at 11:05

## 2023-05-23 NOTE — TRANSFER OF CARE
"Anesthesia Transfer of Care Note    Patient: Deb eWbb    Procedure(s) Performed: Procedure(s) (LRB):  ESOPHAGOGASTRODUODENOSCOPY (EGD) (N/A)    Patient location: PACU    Anesthesia Type: general    Transport from OR: Transported from OR on room air with adequate spontaneous ventilation    Post pain: adequate analgesia    Post assessment: no apparent anesthetic complications and tolerated procedure well    Post vital signs: stable    Level of consciousness: sedated and awake    Nausea/Vomiting: no nausea/vomiting    Complications: none    Transfer of care protocol was followed      Last vitals:   Visit Vitals  BP (!) 85/51 (BP Location: Right arm, Patient Position: Lying)   Pulse 98   Temp 36.6 °C (97.9 °F) (Skin)   Resp (!) 22   Ht 4' 11" (1.499 m)   Wt 44 kg (97 lb)   SpO2 96%   Breastfeeding No   BMI 19.59 kg/m²     "

## 2023-05-23 NOTE — ANESTHESIA POSTPROCEDURE EVALUATION
Anesthesia Post Evaluation    Patient: Deb Webb    Procedure(s) Performed: Procedure(s) (LRB):  ESOPHAGOGASTRODUODENOSCOPY (EGD) (N/A)    Final Anesthesia Type: general      Patient location during evaluation: PACU  Patient participation: Yes- Able to Participate  Level of consciousness: awake and alert  Post-procedure vital signs: reviewed and stable  Pain management: adequate  Airway patency: patent    PONV status at discharge: No PONV  Anesthetic complications: no      Cardiovascular status: hemodynamically stable  Respiratory status: unassisted and room air  Hydration status: euvolemic  Follow-up not needed.          Vitals Value Taken Time   /52 05/23/23 1205   Temp 36.6 °C (97.9 °F) 05/23/23 1134   Pulse 90 05/23/23 1205   Resp 18 05/23/23 1205   SpO2 98 % 05/23/23 1205         Event Time   Out of Recovery 12:11:31         Pain/Raquel Score: Raquel Score: 10 (5/23/2023 12:05 PM)

## 2023-05-23 NOTE — PROVATION PATIENT INSTRUCTIONS
Discharge Summary/Instructions after an Endoscopic Procedure  Patient Name: Deb Alexander  Patient MRN: [id number]  Patient YOB: 1950  Tuesday, May 23, 2023  Desmond Duffy MD  Dear patient,  As a result of recent federal legislation (The Federal Cures Act), you may   receive lab or pathology results from your procedure in your [facility]   account before your physician is able to contact you. Your physician or   their representative will relay the results to you with their   recommendations at their soonest availability.  Thank you,  RESTRICTIONS:  During your procedure today, you received medications for sedation.  These   medications may affect your judgment, balance and coordination.  Therefore,   for 24 hours, you have the following restrictions:   - DO NOT drive a car, operate machinery, make legal/financial decisions,   sign important papers or drink alcohol.    ACTIVITY:  Today: no heavy lifting, straining or running due to procedural   sedation/anesthesia.  The following day: return to full activity including work.  DIET:  Eat and drink normally unless instructed otherwise.     TREATMENT FOR COMMON SIDE EFFECTS:  - Mild abdominal pain, nausea, belching, bloating or excessive gas:  rest,   eat lightly and use a heating pad.  - Sore Throat: treat with throat lozenges and/or gargle with warm salt   water.  - Because air was used during the procedure, expelling large amounts of air   from your rectum or belching is normal.  - If a bowel prep was taken, you may not have a bowel movement for 1-3 days.    This is normal.  SYMPTOMS TO WATCH FOR AND REPORT TO YOUR PHYSICIAN:  1. Abdominal pain or bloating, other than gas cramps.  2. Chest pain.  3. Back pain.  4. Signs of infection such as: chills or fever occurring within 24 hours   after the procedure.  5. Rectal bleeding, which would show as bright red, maroon, or black stools.   (A tablespoon of blood from the rectum is not serious, especially  if   hemorrhoids are present.)  6. Vomiting.  7. Weakness or dizziness.  GO DIRECTLY TO THE NEAREST EMERGENCY ROOM IF YOU HAVE ANY OF THE FOLLOWING:      Difficulty breathing              Chills and/or fever over 101 F   Persistent vomiting and/or vomiting blood   Severe abdominal pain   Severe chest pain   Black, tarry stools   Bleeding- more than one tablespoon   Any other symptom or condition that you feel may need urgent attention  Your doctor recommends these additional instructions:  If any biopsies were taken, your doctors clinic will contact you in 1 to 2   weeks with any results.  Follow an antireflux regimen.  This includes:       - Do not lie down for at least 3 to 4 hours after meals.        - Raise the head of the bed 4 to 6 inches.        - Decrease excess weight.        - Avoid citrus juices and other acidic foods, alcohol, chocolate, mints,   coffee and other caffeinated beverages, carbonated beverages, fatty and   fried foods.        - Avoid tight-fitting clothing.        - Avoid cigarettes and other tobacco products.   Continue your present medications.   Take Prilosec (omeprazole) 40 mg by mouth once a day.   For questions, problems or results please call your physician - Desmond Duffy MD at Work:  [phone].  EMERGENCY PHONE NUMBER: [phone], LAB RESULTS: [phone]  IF A COMPLICATION OR EMERGENCY SITUATION ARISES AND YOU ARE UNABLE TO REACH   YOUR PHYSICIAN - GO DIRECTLY TO THE EMERGENCY ROOM.  ___________________________________________  Nurse Signature  ___________________________________________  Patient/Designated Responsible Party Signature  Desmond Duffy MD  5/23/2023 11:49:52 AM  This report has been verified and signed electronically.  Dear patient,  As a result of recent federal legislation (The Federal Cures Act), you may   receive lab or pathology results from your procedure in your [facility]   account before your physician is able to contact you. Your  physician or   their representative will relay the results to you with their   recommendations at their soonest availability.  Thank you.  PROVATION

## 2023-05-23 NOTE — BRIEF OP NOTE
Discharge Note  Short Stay      SUMMARY     Admit Date: 5/23/2023    Attending Physician: Desmond Duffy Jr., MD     Discharge Physician: Desmond Duffy Jr., MD    Discharge Date: 5/23/2023 11:51 AM    Final Diagnosis: Dysphagia, unspecified type [R13.10]    Impression:            - Normal oropharynx.                          - Normal larynx.                          - Normal cricopharyngeus.                          - Normal esophagus.                          - Z-line regular, 39 cm from the incisors.                          - Small hiatal hernia.                          - Normal gastric fundus and gastric body.                          - Gastric mucosal atrophy.                          - (Minimal) Antritis. Biopsied.                          - Normal pylorus.                          - Normal examined duodenum.                          - No endoscopic esophageal abnormality to explain                          patient's dysphagia. Esophagus dilated, 54 Fr.   Recommendation:        - Discharge patient to home.                          - Observe patient's clinical course following                          today's procedure with therapeutic intervention.                          - Await pathology results.                          - Follow an antireflux regimen.                          - Continue present medications.                          - Use Prilosec (omeprazole) 40 mg PO daily.                          - Call the [facility] clinic in 2 weeks for reports (if                          you haven't heard from us sooner) [phone].                          - If symptoms recur, next perform a modified                          barium swallow.                          - (She may also benefit from esophageal manometry).   Desmond Duffy MD   5/23/2023       Disposition: HOME OR SELF CARE    Patient Instructions:   Current Discharge Medication List        CONTINUE these medications which have NOT CHANGED     Details   albuterol (PROAIR HFA) 90 mcg/actuation inhaler Inhale 2 puffs into the lungs every 6 (six) hours as needed for Wheezing. Rescue  Qty: 18 g, Refills: 2    Comments: Proair or whichever albuterol is covered by insurance      albuterol-ipratropium (DUO-NEB) 2.5 mg-0.5 mg/3 mL nebulizer solution Take 3 mLs by nebulization every 6 (six) hours as needed for Wheezing or Shortness of Breath. Rescue  Qty: 75 mL, Refills: 0      alendronate (FOSAMAX) 70 MG tablet Take 1 tablet (70 mg total) by mouth once a week.  Qty: 12 tablet, Refills: 3    Associated Diagnoses: Osteoporosis, postmenopausal      amiodarone (PACERONE) 200 MG Tab Take 2 tabs by mouth twice daily until 12/2/22 then take 1 tab by mouth twice daily  Qty: 90 tablet, Refills: 1      ANORO ELLIPTA 62.5-25 mcg/actuation DsDv INHALE 1 PUFF INTO THE LUNGS ONCE DAILY  Qty: 60 each, Refills: 5      atorvastatin (LIPITOR) 40 MG tablet TAKE 1 TABLET BY MOUTH EVERY DAY  Qty: 90 tablet, Refills: 1    Associated Diagnoses: Type 2 diabetes mellitus with diabetic polyneuropathy, without long-term current use of insulin      calcium carbonate/vitamin D3 (CALCIUM 600 + D,3, ORAL) Take 1 tablet by mouth once daily.      citalopram (CELEXA) 20 MG tablet Take 1 tablet (20 mg total) by mouth once daily.  Qty: 90 tablet, Refills: 1    Associated Diagnoses: Anxiety      dulaglutide (TRULICITY) 1.5 mg/0.5 mL pen injector INJECT 1.5 MG INTO THE SKIN EVERY 7 DAYS. THROUGH PATIENT ASSISTANCE  Qty: 12 pen, Refills: 1    Comments: DX Code Needed  .  Associated Diagnoses: Type 2 diabetes mellitus with diabetic polyneuropathy, without long-term current use of insulin      furosemide (LASIX) 20 MG tablet Take 1 tablet (20 mg total) by mouth as needed (swelling).    Associated Diagnoses: Bilateral lower extremity edema      gabapentin (NEURONTIN) 300 MG capsule TAKE 1 CAPSULE BY MOUTH THREE TIMES A DAY  Qty: 270 capsule, Refills: 1    Comments: DX Code Needed  .  Associated  Diagnoses: Type 2 diabetes mellitus with diabetic polyneuropathy      guaiFENesin (MUCINEX) 600 mg 12 hr tablet Take 1 tablet (600 mg total) by mouth 2 (two) times daily.      HYDROcodone-acetaminophen (NORCO) 5-325 mg per tablet Take 1 tablet by mouth every 12 (twelve) hours as needed for Pain.  Qty: 10 tablet, Refills: 0    Comments: Quantity prescribed more than 7 day supply? No      ipratropium (ATROVENT) 42 mcg (0.06 %) nasal spray 2 sprays by Nasal route 3 (three) times daily. Use 3 times per day if necessary for chronic nasal drip  Qty: 15 mL, Refills: 12    Associated Diagnoses: VMR (vasomotor rhinitis)      Lactobacillus rhamnosus GG (CULTURELLE) 10 billion cell capsule Take 1 capsule by mouth once daily.  Qty: 60 capsule, Refills: 0      latanoprost 0.005 % ophthalmic solution INSTILL 1 DROP INTO BOTH EYES EVERY EVENING  Qty: 7.5 mL, Refills: 1    Associated Diagnoses: Primary open angle glaucoma of both eyes, severe stage      meclizine (ANTIVERT) 12.5 mg tablet TAKE 1 TABLET BY MOUTH 3 TIMES DAILY AS NEEDED FOR DIZZINESS.  Qty: 30 tablet, Refills: 0      omega-3 fatty acids-vitamin E 1,000 mg Cap Take 1 capsule by mouth once daily.      omeprazole (PRILOSEC) 40 MG capsule TAKE 1 CAPSULE (40 MG TOTAL) BY MOUTH BEFORE BREAKFAST.  Qty: 90 capsule, Refills: 1    Associated Diagnoses: GERD without esophagitis      primidone (MYSOLINE) 50 MG Tab TAKE 2 TABLETS BY MOUTH 3 (THREE) TIMES DAILY. ONE HALF TABLET FOR ONE WEEK, THEN AS PRESCRIBED  Qty: 540 tablet, Refills: 3    Associated Diagnoses: Essential tremor      XARELTO 20 mg Tab TAKE 1 TABLET BY MOUTH EVERY DAY WITH DINNER OR EVENING MEAL      blood sugar diagnostic Strp To check BG 2 times daily, to use with insurance preferred meter  Qty: 200 each, Refills: 3    Associated Diagnoses: Uncontrolled type 2 diabetes mellitus with diabetic neuropathy, without long-term current use of insulin      blood-glucose meter kit To check BG 2 times daily, to use with  "insurance preferred meter  Qty: 1 each, Refills: 0      blood-glucose meter,continuous (DEXCOM G6 ) Misc Use as directed  Qty: 1 each, Refills: 1    Associated Diagnoses: Type 2 diabetes mellitus with diabetic polyneuropathy, without long-term current use of insulin      blood-glucose sensor (DEXCOM G6 SENSOR) Emmie Use as directed  Qty: 10 each, Refills: 2    Associated Diagnoses: Type 2 diabetes mellitus with diabetic polyneuropathy, without long-term current use of insulin      cholestyramine-aspartame (QUESTRAN LIGHT) 4 gram PwPk TAKE 1 PACKET (4 G TOTAL) BY MOUTH 2 (TWO) TIMES DAILY. FOR DIARRHEA  Qty: 180 packet, Refills: 0    Associated Diagnoses: Irritable bowel syndrome with diarrhea      lancets Misc To check BG 2 times daily, to use with insurance preferred meter  Qty: 200 each, Refills: 3    Associated Diagnoses: Uncontrolled type 2 diabetes mellitus with diabetic neuropathy, without long-term current use of insulin      losartan (COZAAR) 25 MG tablet Take 0.5 tablets (12.5 mg total) by mouth once daily.  Qty: 30 tablet, Refills: 1    Comments: .      pen needle, diabetic (BD ULTRA-FINE AGUS PEN NEEDLE) 32 gauge x 5/32" Ndle 1 Device by Misc.(Non-Drug; Combo Route) route 2 (two) times daily.  Qty: 200 each, Refills: 4             Discharge Procedure Orders (must include Diet, Follow-up, Activity)    Follow Up:  Follow up with PCP as per your routine.  Please follow an anti reflux diet.  Activity as tolerated.    No driving day of procedure.    "

## 2023-05-23 NOTE — ANESTHESIA PREPROCEDURE EVALUATION
05/23/2023  Deb Webb is a 72 y.o., female.      Pre-op Assessment    I have reviewed the Patient Summary Reports.     I have reviewed the Nursing Notes. I have reviewed the NPO Status.   I have reviewed the Medications.     Review of Systems  Anesthesia Hx:  No problems with previous Anesthesia    Social:  Smoker    Cardiovascular:   Hypertension CHF hyperlipidemia    Pulmonary:   COPD Asthma Recent URI    Renal/:   Chronic Renal Disease    Hepatic/GI:   GERD Liver Disease,    Musculoskeletal:   Arthritis     Endocrine:   Diabetes    Psych:   Psychiatric History          Physical Exam  General: Well nourished, Cooperative, Alert and Oriented    Airway:  Mallampati: II   Mouth Opening: Normal  TM Distance: Normal  Tongue: Normal  Neck ROM: Normal ROM    Dental:  Intact        Anesthesia Plan  Type of Anesthesia, risks & benefits discussed:    Anesthesia Type: Gen Natural Airway  Intra-op Monitoring Plan: Standard ASA Monitors  Induction:  IV  Informed Consent: Informed consent signed with the Patient and all parties understand the risks and agree with anesthesia plan.  All questions answered.   ASA Score: 3    Ready For Surgery From Anesthesia Perspective.     .

## 2023-05-23 NOTE — PATIENT INSTRUCTIONS
Recovery After Procedural Sedation (Adult)   You have been given medicine by vein to make you sleep during your surgery. This may have included both a pain medicine and sleeping medicine. Most of the effects have worn off. But you may still have some drowsiness for the next 6 to 8 hours.  Home care  Follow these guidelines when you get home:  For the next 8 hours, you should be watched by a responsible adult. This person should make sure your condition is not getting worse.  Don't drink any alcohol for the next 24 hours.  Don't drive, operate dangerous machinery, or make important business or personal decisions during the next 24 hours.  To prevent injury or falls, use caution when standing and walking for at least 24 hours after your procedure.  Note: Your healthcare provider may tell you not to take any medicine by mouth for pain or sleep in the next 4 hours. These medicines may react with the medicines you were given in the hospital. This could cause a much stronger response than usual.  Follow-up care  Follow up with your healthcare provider if you are not alert and back to your usual level of activity within 12 hours.  When to seek medical advice  Call your healthcare provider right away if any of these occur:  Drowsiness gets worse  Weakness or dizziness gets worse  Repeated vomiting  You can't be awakened  Fever  New rash  [employer] last reviewed this educational content on 9/1/2019  © 7016-5198 The [employer], [employer]. [address] [place] [address], [place] [address]. All rights reserved. This information is not intended as a substitute for professional medical care. Always follow your healthcare professional's instructions         Tips to Control Acid Reflux    To control acid reflux, youll need to make some basic diet and lifestyle changes. The simple steps outlined below may be all youll need to ease discomfort.  Watch what you eat  Avoid fatty foods and spicy foods.  Eat fewer acidic foods, such as citrus  and tomato-based foods. These can increase symptoms.  Limit drinking alcohol, caffeine, and fizzy beverages. All increase acid reflux.  Try limiting chocolate, peppermint, and spearmint. These can worsen acid reflux in some people.  Watch when you eat  Avoid lying down for 3 hours after eating.  Do not snack before going to bed.  Raise your head  Raising your head and upper body by 4 to 6 inches helps limit reflux when youre lying down. Put blocks under the head of your bed frame to raise it.  Other changes  Lose weight, if you need to  Dont exercise near bedtime  Avoid tight-fitting clothes  Limit aspirin and ibuprofen  Stop smoking   Date Last Reviewed: 7/1/2016  © 4861-7559 [employer], [employer]. [address] [place] [address], [place] [address]. All rights reserved. This information is not intended as a substitute for professional medical care. Always follow your healthcare professional's instructions.

## 2023-05-24 VITALS
HEIGHT: 59 IN | WEIGHT: 97 LBS | OXYGEN SATURATION: 98 % | BODY MASS INDEX: 19.56 KG/M2 | HEART RATE: 90 BPM | SYSTOLIC BLOOD PRESSURE: 100 MMHG | TEMPERATURE: 98 F | DIASTOLIC BLOOD PRESSURE: 52 MMHG | RESPIRATION RATE: 18 BRPM

## 2023-05-25 ENCOUNTER — OFFICE VISIT (OUTPATIENT)
Dept: OPTOMETRY | Facility: CLINIC | Age: 73
End: 2023-05-25
Payer: MEDICARE

## 2023-05-25 DIAGNOSIS — E11.9 TYPE 2 DIABETES MELLITUS WITHOUT RETINOPATHY: ICD-10-CM

## 2023-05-25 DIAGNOSIS — H52.7 REFRACTIVE ERROR: ICD-10-CM

## 2023-05-25 DIAGNOSIS — H40.1133 PRIMARY OPEN ANGLE GLAUCOMA OF BOTH EYES, SEVERE STAGE: Primary | ICD-10-CM

## 2023-05-25 DIAGNOSIS — Z96.1 PSEUDOPHAKIA OF BOTH EYES: ICD-10-CM

## 2023-05-25 PROCEDURE — 1101F PR PT FALLS ASSESS DOC 0-1 FALLS W/OUT INJ PAST YR: ICD-10-PCS | Mod: CPTII,S$GLB,, | Performed by: OPTOMETRIST

## 2023-05-25 PROCEDURE — 2023F DILAT RTA XM W/O RTNOPTHY: CPT | Mod: CPTII,S$GLB,, | Performed by: OPTOMETRIST

## 2023-05-25 PROCEDURE — 1159F PR MEDICATION LIST DOCUMENTED IN MEDICAL RECORD: ICD-10-PCS | Mod: CPTII,S$GLB,, | Performed by: OPTOMETRIST

## 2023-05-25 PROCEDURE — 99214 OFFICE O/P EST MOD 30 MIN: CPT | Mod: S$GLB,,, | Performed by: OPTOMETRIST

## 2023-05-25 PROCEDURE — 1159F MED LIST DOCD IN RCRD: CPT | Mod: CPTII,S$GLB,, | Performed by: OPTOMETRIST

## 2023-05-25 PROCEDURE — 3044F PR MOST RECENT HEMOGLOBIN A1C LEVEL <7.0%: ICD-10-PCS | Mod: CPTII,S$GLB,, | Performed by: OPTOMETRIST

## 2023-05-25 PROCEDURE — 3288F PR FALLS RISK ASSESSMENT DOCUMENTED: ICD-10-PCS | Mod: CPTII,S$GLB,, | Performed by: OPTOMETRIST

## 2023-05-25 PROCEDURE — 3288F FALL RISK ASSESSMENT DOCD: CPT | Mod: CPTII,S$GLB,, | Performed by: OPTOMETRIST

## 2023-05-25 PROCEDURE — 3044F HG A1C LEVEL LT 7.0%: CPT | Mod: CPTII,S$GLB,, | Performed by: OPTOMETRIST

## 2023-05-25 PROCEDURE — 1126F AMNT PAIN NOTED NONE PRSNT: CPT | Mod: CPTII,S$GLB,, | Performed by: OPTOMETRIST

## 2023-05-25 PROCEDURE — 1126F PR PAIN SEVERITY QUANTIFIED, NO PAIN PRESENT: ICD-10-PCS | Mod: CPTII,S$GLB,, | Performed by: OPTOMETRIST

## 2023-05-25 PROCEDURE — 99999 PR PBB SHADOW E&M-EST. PATIENT-LVL III: ICD-10-PCS | Mod: PBBFAC,,, | Performed by: OPTOMETRIST

## 2023-05-25 PROCEDURE — 1101F PT FALLS ASSESS-DOCD LE1/YR: CPT | Mod: CPTII,S$GLB,, | Performed by: OPTOMETRIST

## 2023-05-25 PROCEDURE — 2023F PR DILATED RETINAL EXAM W/O EVID OF RETINOPATHY: ICD-10-PCS | Mod: CPTII,S$GLB,, | Performed by: OPTOMETRIST

## 2023-05-25 PROCEDURE — 1160F RVW MEDS BY RX/DR IN RCRD: CPT | Mod: CPTII,S$GLB,, | Performed by: OPTOMETRIST

## 2023-05-25 PROCEDURE — 99214 PR OFFICE/OUTPT VISIT, EST, LEVL IV, 30-39 MIN: ICD-10-PCS | Mod: S$GLB,,, | Performed by: OPTOMETRIST

## 2023-05-25 PROCEDURE — 99999 PR PBB SHADOW E&M-EST. PATIENT-LVL III: CPT | Mod: PBBFAC,,, | Performed by: OPTOMETRIST

## 2023-05-25 PROCEDURE — 1160F PR REVIEW ALL MEDS BY PRESCRIBER/CLIN PHARMACIST DOCUMENTED: ICD-10-PCS | Mod: CPTII,S$GLB,, | Performed by: OPTOMETRIST

## 2023-05-25 NOTE — PROGRESS NOTES
HPI     Blurred Vision    In left eye.  Onset was gradual.  Vision is blurred.  Severity is   moderate.  It is worse throughout the day.  Treatments tried include no   treatments.           Comments    Pt here for decrease in vision OS dls- 10/24/22    Pt states she has noticed a decrease in vision OS for a couple of months.   Pt never got her most RX for specs. Pt has been using latanoprost QHS OU   and another glaucoma GTTS QD OU.   Denies pain.            Last edited by Christiano Hdez, OD on 5/25/2023  3:34 PM.            Assessment /Plan     For exam results, see Encounter Report.    Primary open angle glaucoma of both eyes, severe stage    Type 2 diabetes mellitus without retinopathy    Pseudophakia of both eyes    Refractive error      Increased iop left eye, decreased vision but some recovered with refraction, pt to call with report of drops use at home, will add drop to left ey regimen. Pt prev got out of hospital, given drops in hospital, target at 12-14 no vision changes noted,  pachy normal, prev OCT with sup and inf loss OD, poor scan OS, RTC IOP ck in 1 month, pt missed last appt  No diabetic retinopathy, no csme. Return in 1 year for dilated eye exam.   3. Monitor condition. Patient to report any changes. RTC 1 year recheck.   4. Spectacle Rx given, discussed different options for glasses. RTC 1 year routine eye exam.          Addend 5/26/23 pt called and reported timoptic 2x/day ou  Brimonidine 2x/day ou and latanaprost once in evening ou    Cont all 3 drops and rtc for iop ck in 1 month

## 2023-05-26 ENCOUNTER — TELEPHONE (OUTPATIENT)
Dept: OPHTHALMOLOGY | Facility: CLINIC | Age: 73
End: 2023-05-26
Payer: MEDICARE

## 2023-05-26 RX ORDER — TIMOLOL MALEATE 5 MG/ML
1 SOLUTION/ DROPS OPHTHALMIC DAILY
Qty: 5 ML | Refills: 6 | Status: SHIPPED | OUTPATIENT
Start: 2023-05-26 | End: 2024-05-25

## 2023-05-26 RX ORDER — LATANOPROST 50 UG/ML
1 SOLUTION/ DROPS OPHTHALMIC NIGHTLY
Qty: 7.5 ML | Refills: 3 | Status: SHIPPED | OUTPATIENT
Start: 2023-05-26

## 2023-05-26 RX ORDER — BRIMONIDINE TARTRATE 2 MG/ML
1 SOLUTION/ DROPS OPHTHALMIC 2 TIMES DAILY
Qty: 5 ML | Refills: 6 | Status: SHIPPED | OUTPATIENT
Start: 2023-05-26 | End: 2023-11-24

## 2023-05-26 NOTE — TELEPHONE ENCOUNTER
Pt not sure about drops. At first said using timolol once a day and Latanoprost BID.  She then asked if that was correct.  I advised I needed her to tell me what drop she was using and how many times per day she was actually using it (not what the bottle says) and which eye.    Timolol BID OU  Latanoprost Qhs OU  Brimonidine BID OU        ----- Message from Christiano Hdez, GINETTE sent at 5/26/2023  9:06 AM CDT -----  Yes, just conforming what drops she is taking and how often, I need to add more.    thanks  ----- Message -----  From: Joan Stephenson  Sent: 5/26/2023   8:12 AM CDT  To: Christiano Hdez, GINETTE Hdez, is she just informing what drops she is on?  I am asking before I call her.  Please advise.  ----- Message -----  From: Fer Quintero  Sent: 5/26/2023   7:59 AM CDT  To: [facility] Staff    Type: Needs Medical Advice  Who Called:  Patient    Best Call Back Number: [phone]  Additional Information: Patient's eye medications: Timalol , latanoprost, dorzolamide

## 2023-05-29 ENCOUNTER — CLINICAL SUPPORT (OUTPATIENT)
Dept: REHABILITATION | Facility: HOSPITAL | Age: 73
End: 2023-05-29
Payer: MEDICARE

## 2023-05-29 ENCOUNTER — TELEPHONE (OUTPATIENT)
Dept: OPTOMETRY | Facility: CLINIC | Age: 73
End: 2023-05-29
Payer: MEDICARE

## 2023-05-29 DIAGNOSIS — R29.898 IMPAIRED STRENGTH OF SHOULDER MUSCLES: ICD-10-CM

## 2023-05-29 DIAGNOSIS — M25.611 DECREASED RIGHT SHOULDER RANGE OF MOTION: Primary | ICD-10-CM

## 2023-05-29 PROCEDURE — 97110 THERAPEUTIC EXERCISES: CPT | Mod: PO | Performed by: PHYSICAL THERAPIST

## 2023-05-29 PROCEDURE — 97112 NEUROMUSCULAR REEDUCATION: CPT | Mod: PO | Performed by: PHYSICAL THERAPIST

## 2023-05-29 NOTE — PROGRESS NOTES
[facility]  PT Discharge Note    Name: Deb Davis [facility] Number: [id number]    Therapy Diagnosis:   Encounter Diagnoses   Name Primary?    Decreased right shoulder range of motion Yes    Impaired strength of shoulder muscles      Physician: Triston Valverde MD    Physician Orders: PT Eval and Treat  Medical Diagnosis from Referral: M25.511 (ICD-10-CM) - Right shoulder pain  M75.101 (ICD-10-CM) - Rotator cuff tear, right  Evaluation Date: 4/2/2023, 05/16/2022    Date of Last visit: 5/29/2023  Total Visits Received: Shoulder: 8 visits  Pt has attended therapy x 1 yr for Closed displaced fracture of left pubis with routine healing and shoulder pain/weakness. The Closed displaced fracture of left pubis with routine healing was discharged around March of this yr to focus on the shoulder and d/t maximum potential met at that time.    ASSESSMENT      Pt has reached maximum potential d/t overall deconditioning and impaired integrity of joints. Her posture is unchanged and unable to reach greater shoulder ROM d/t this. Her strength is equal bilaterally and thus pt reached maximum potential and is discharged. She agrees taht she is doing her home program and can continue doing so independently. PT assistance with correct performance helps get pt back to correct performance. The home program and pictures should help with this at home. Pt encouraged to place HEPs in a red folder at bedside and use them.    Discharge reason: Patient has reached the maximum rehab potential for the present time.    Discharge FOTO Score: na    Goals: see below    PLAN   This patient is discharged from Physical Therapy.      Charissa Raymundo, PT    [facility]   Physical Therapy Treatment Note     Name: Deb Davis [facility]  Clinic Number: [id number]    Therapy Diagnosis:   Encounter Diagnoses   Name Primary?    Decreased right shoulder range of motion Yes    Impaired strength of shoulder  muscles        Physician: Triston Valverde MD  Physician Orders: PT Eval and Treat  Medical Diagnosis from Referral: M25.511 (ICD-10-CM) - Right shoulder pain  M75.101 (ICD-10-CM) - Rotator cuff tear, right  Evaluation Date: 4/2/2023, 05/16/2022  Authorization Period Expiration: 4/6/2023, 05/15/2023  Plan of Care Expiration: 02/10/2023,  4/4/2023, 6/23/2023  Progress Note Due: 2/10/2023, 3/9/2023, 4/8/2023, 5/7/2023  Visit # / Visits authorized: 16/24    PTA Visit #: 0/5     Time In: 1100 am  Time Out: 1150 am  Total Billable Time: 45 minutes     SUBJECTIVE     Pt reports: pt is limited to attendance once a week d/t transportation. Pn is consistent at 5/10. Left shoulder pain is also 5/10. She does feel improvement in her right shoulder strength.  Response to previous treatment: soreness  Functional change: improved ROM and strength to maximum potential    Pain: 5/10  Location: R shoulder    OBJECTIVE     Objective Measures updated at progress report unless specified.     5/29/2023  Posture is unchanged and pt continues with significant difficulty correcting and maintaining upright postural correction. Inc t/s kyphosis is present limiting her awareness in sitting to back of chair. See goals section and assessment.    Treatment     Deb received the treatments listed below:      Therapeutic exercises to develop strength, endurance, ROM, flexibility, posture and core stabilization for 30 minutes including:    Updated shoulder HEP given and reviewed with pt.  Reassessment/D/c planning.  Performed ex:  Seated shoulder press with 1# wand 3x10  Seated shoulder flexion 1# x 10  Seated shoulder scaption 1 # x 10  Seated no money no band 2 x 10  Seated Ts ytb band x 10 modified (pull apart)  Seated rows with red band 3 x 10  Seated shoulder extension with yellow band 2x10    Neuromuscular re-education activities to improve: Balance, Coordination, Kinesthetic, Sense and Proprioception for 15 minutes. The following  activities were included:  Seated PNF patterns with blue ball x 10 each  Hair brush reach x 10  Modified chair dips/shoulder depression into chair 2 x 10    Patient Education and Home Exercises      Home Exercises Provided and Patient Education Provided     Education provided:   - Pt encouraged to cont HEPs for core and LEs as well as shoulder.  - Pt educated in maximum potential reached for shoulder ROM and strength which is functional for pt age and given the wear of her RTC and extent of crepitus.    Written Home Exercises Provided: yes. Exercises were reviewed and Deb was able to demonstrate them prior to the end of the session.  Deb demonstrated good  understanding of the education provided. See EMR under Patient Instructions for exercises provided during therapy sessions.    ASSESSMENT     Deb requires several cues to keep elbows in, arms down, and other cues for Brugger's postural exercise. Postural awareness unchanged. Right shoulder AROM is significantly improved but maximum potential for pt given the extent of t/s kyphosis reaching bony block at end range. Given resistance, she continues weak, but encouraged to use tech of 1/2 gal vs whole gal for safety of remaining RTC and other shoulder ms. Strategies shown and discussed previously to use a longer towel and back scratcher. Tband given this date to support home shoulder program. We discussed focus on strength as ROM at this point of the shoulder is limited by t/s kyphosis. Pt stated that she looks forward to returning to therapy after MD reassessment of knees.    Goals:  GOALS  New Short Term Goals Status:     SHOULDER:  -Pt will demo right shoulder strength equal or better to Left non-dominant side.   -MET, GROSSLY 3+/5  -Pt will report condition is improved 50% or better for d/c to HEP.   -MET, 50%.   PLAN     Discharge to home program.    Charissa Raymundo, PT

## 2023-05-29 NOTE — PLAN OF CARE
[facility]  PT Discharge Note     Name: Deb Davis [facility] Number: [id number]     Therapy Diagnosis:        Encounter Diagnoses   Name Primary?    Decreased right shoulder range of motion Yes    Impaired strength of shoulder muscles        Physician: Triston Valverde MD     Physician Orders: PT Eval and Treat  Medical Diagnosis from Referral: M25.511 (ICD-10-CM) - Right shoulder pain  M75.101 (ICD-10-CM) - Rotator cuff tear, right  Evaluation Date: 4/2/2023, 05/16/2022     Date of Last visit: 5/29/2023  Total Visits Received: Shoulder: 8 visits  Pt has attended therapy x 1 yr for Closed displaced fracture of left pubis with routine healing and shoulder pain/weakness. The Closed displaced fracture of left pubis with routine healing was discharged around March of this yr to focus on the shoulder and d/t maximum potential met at that time.     ASSESSMENT       Pt has reached maximum potential d/t overall deconditioning and impaired integrity of joints. Her posture is unchanged and unable to reach greater shoulder ROM d/t this. Her strength is equal bilaterally and thus pt reached maximum potential and is discharged. She agrees taht she is doing her home program and can continue doing so independently. PT assistance with correct performance helps get pt back to correct performance. The home program and pictures should help with this at home. Pt encouraged to place HEPs in a red folder at bedside and use them.     Discharge reason: Patient has reached the maximum rehab potential for the present time.     Discharge FOTO Score: na     Goals: see below     PLAN   This patient is discharged from Physical Therapy.        Charissa Raymundo, PT    [facility]   Physical Therapy Treatment Note      Name: Deb Davis [facility]  Clinic Number: [id number]     Therapy Diagnosis:        Encounter Diagnoses   Name Primary?    Decreased right shoulder range of motion Yes    Impaired  strength of shoulder muscles           Physician: Triston Valverde MD  Physician Orders: PT Eval and Treat  Medical Diagnosis from Referral: M25.511 (ICD-10-CM) - Right shoulder pain  M75.101 (ICD-10-CM) - Rotator cuff tear, right  Evaluation Date: 4/2/2023, 05/16/2022  Authorization Period Expiration: 4/6/2023, 05/15/2023  Plan of Care Expiration: 02/10/2023,  4/4/2023, 6/23/2023  Progress Note Due: 2/10/2023, 3/9/2023, 4/8/2023, 5/7/2023  Visit # / Visits authorized: 16/24     PTA Visit #: 0/5      Time In: 1100 am  Time Out: 1150 am  Total Billable Time: 45 minutes      SUBJECTIVE      Pt reports: pt is limited to attendance once a week d/t transportation. Pn is consistent at 5/10. Left shoulder pain is also 5/10. She does feel improvement in her right shoulder strength.  Response to previous treatment: soreness  Functional change: improved ROM and strength to maximum potential     Pain: 5/10  Location: R shoulder     OBJECTIVE      Objective Measures updated at progress report unless specified.      5/29/2023  Posture is unchanged and pt continues with significant difficulty correcting and maintaining upright postural correction. Inc t/s kyphosis is present limiting her awareness in sitting to back of chair. See goals section and assessment.     Treatment      Deb received the treatments listed below:       Therapeutic exercises to develop strength, endurance, ROM, flexibility, posture and core stabilization for 30 minutes including:     Updated shoulder HEP given and reviewed with pt.  Reassessment/D/c planning.  Performed ex:  Seated shoulder press with 1# wand 3x10  Seated shoulder flexion 1# x 10  Seated shoulder scaption 1 # x 10  Seated no money no band 2 x 10  Seated Ts ytb band x 10 modified (pull apart)  Seated rows with red band 3 x 10  Seated shoulder extension with yellow band 2x10     Neuromuscular re-education activities to improve: Balance, Coordination, Kinesthetic, Sense and  Proprioception for 15 minutes. The following activities were included:  Seated PNF patterns with blue ball x 10 each  Hair brush reach x 10  Modified chair dips/shoulder depression into chair 2 x 10     Patient Education and Home Exercises       Home Exercises Provided and Patient Education Provided      Education provided:   - Pt encouraged to cont HEPs for core and LEs as well as shoulder.  - Pt educated in maximum potential reached for shoulder ROM and strength which is functional for pt age and given the wear of her RTC and extent of crepitus.     Written Home Exercises Provided: yes. Exercises were reviewed and Deb was able to demonstrate them prior to the end of the session.  Deb demonstrated good  understanding of the education provided. See EMR under Patient Instructions for exercises provided during therapy sessions.     ASSESSMENT      Deb requires several cues to keep elbows in, arms down, and other cues for Brugger's postural exercise. Postural awareness unchanged. Right shoulder AROM is significantly improved but maximum potential for pt given the extent of t/s kyphosis reaching bony block at end range. Given resistance, she continues weak, but encouraged to use tech of 1/2 gal vs whole gal for safety of remaining RTC and other shoulder ms. Strategies shown and discussed previously to use a longer towel and back scratcher. Tband given this date to support home shoulder program. We discussed focus on strength as ROM at this point of the shoulder is limited by t/s kyphosis. Pt stated that she looks forward to returning to therapy after MD reassessment of knees.     Goals:  GOALS  New Short Term Goals Status:     SHOULDER:  -Pt will demo right shoulder strength equal or better to Left non-dominant side.              -MET, GROSSLY 3+/5  -Pt will report condition is improved 50% or better for d/c to HEP.              -MET, 50%.   PLAN      Discharge to home program.     Charissa Raymundo, PT

## 2023-05-30 LAB
FINAL PATHOLOGIC DIAGNOSIS: NORMAL
GROSS: NORMAL
Lab: NORMAL

## 2023-05-31 DIAGNOSIS — M25.561 ACUTE PAIN OF BOTH KNEES: Primary | ICD-10-CM

## 2023-05-31 DIAGNOSIS — M25.562 ACUTE PAIN OF BOTH KNEES: Primary | ICD-10-CM

## 2023-06-05 ENCOUNTER — HOSPITAL ENCOUNTER (OUTPATIENT)
Dept: RADIOLOGY | Facility: HOSPITAL | Age: 73
Discharge: HOME | End: 2023-06-05
Attending: ORTHOPAEDIC SURGERY
Payer: MEDICARE

## 2023-06-05 ENCOUNTER — OFFICE VISIT (OUTPATIENT)
Dept: ORTHOPEDICS | Facility: CLINIC | Age: 73
End: 2023-06-05
Payer: MEDICARE

## 2023-06-05 VITALS — HEIGHT: 59 IN | WEIGHT: 97 LBS | BODY MASS INDEX: 19.56 KG/M2

## 2023-06-05 DIAGNOSIS — M17.12 OSTEOARTHRITIS OF LEFT KNEE, UNSPECIFIED OSTEOARTHRITIS TYPE: ICD-10-CM

## 2023-06-05 DIAGNOSIS — M17.12 OSTEOARTHRITIS OF LEFT KNEE: ICD-10-CM

## 2023-06-05 DIAGNOSIS — M25.562 LEFT KNEE PAIN, UNSPECIFIED CHRONICITY: ICD-10-CM

## 2023-06-05 DIAGNOSIS — M25.561 ACUTE PAIN OF BOTH KNEES: ICD-10-CM

## 2023-06-05 DIAGNOSIS — M25.561 RIGHT KNEE PAIN, UNSPECIFIED CHRONICITY: Primary | ICD-10-CM

## 2023-06-05 DIAGNOSIS — M17.11 OSTEOARTHRITIS OF RIGHT KNEE, UNSPECIFIED OSTEOARTHRITIS TYPE: ICD-10-CM

## 2023-06-05 DIAGNOSIS — M17.11 OSTEOARTHRITIS OF RIGHT KNEE: Primary | ICD-10-CM

## 2023-06-05 DIAGNOSIS — M25.562 ACUTE PAIN OF BOTH KNEES: ICD-10-CM

## 2023-06-05 PROCEDURE — 1159F PR MEDICATION LIST DOCUMENTED IN MEDICAL RECORD: ICD-10-PCS | Mod: CPTII,S$GLB,, | Performed by: ORTHOPAEDIC SURGERY

## 2023-06-05 PROCEDURE — 1160F RVW MEDS BY RX/DR IN RCRD: CPT | Mod: CPTII,S$GLB,, | Performed by: ORTHOPAEDIC SURGERY

## 2023-06-05 PROCEDURE — 3044F HG A1C LEVEL LT 7.0%: CPT | Mod: CPTII,S$GLB,, | Performed by: ORTHOPAEDIC SURGERY

## 2023-06-05 PROCEDURE — 1101F PR PT FALLS ASSESS DOC 0-1 FALLS W/OUT INJ PAST YR: ICD-10-PCS | Mod: CPTII,S$GLB,, | Performed by: ORTHOPAEDIC SURGERY

## 2023-06-05 PROCEDURE — 1125F PR PAIN SEVERITY QUANTIFIED, PAIN PRESENT: ICD-10-PCS | Mod: CPTII,S$GLB,, | Performed by: ORTHOPAEDIC SURGERY

## 2023-06-05 PROCEDURE — 99999 PR PBB SHADOW E&M-EST. PATIENT-LVL IV: CPT | Mod: PBBFAC,,, | Performed by: ORTHOPAEDIC SURGERY

## 2023-06-05 PROCEDURE — 73562 X-RAY EXAM OF KNEE 3: CPT | Mod: 26,50,, | Performed by: RADIOLOGY

## 2023-06-05 PROCEDURE — 99213 PR OFFICE/OUTPT VISIT, EST, LEVL III, 20-29 MIN: ICD-10-PCS | Mod: S$GLB,,, | Performed by: ORTHOPAEDIC SURGERY

## 2023-06-05 PROCEDURE — 1160F PR REVIEW ALL MEDS BY PRESCRIBER/CLIN PHARMACIST DOCUMENTED: ICD-10-PCS | Mod: CPTII,S$GLB,, | Performed by: ORTHOPAEDIC SURGERY

## 2023-06-05 PROCEDURE — 3008F BODY MASS INDEX DOCD: CPT | Mod: CPTII,S$GLB,, | Performed by: ORTHOPAEDIC SURGERY

## 2023-06-05 PROCEDURE — 1159F MED LIST DOCD IN RCRD: CPT | Mod: CPTII,S$GLB,, | Performed by: ORTHOPAEDIC SURGERY

## 2023-06-05 PROCEDURE — 3072F PR LOW RISK FOR RETINOPATHY: ICD-10-PCS | Mod: CPTII,S$GLB,, | Performed by: ORTHOPAEDIC SURGERY

## 2023-06-05 PROCEDURE — 73562 X-RAY EXAM OF KNEE 3: CPT | Mod: TC,50,PO

## 2023-06-05 PROCEDURE — 3008F PR BODY MASS INDEX (BMI) DOCUMENTED: ICD-10-PCS | Mod: CPTII,S$GLB,, | Performed by: ORTHOPAEDIC SURGERY

## 2023-06-05 PROCEDURE — 1125F AMNT PAIN NOTED PAIN PRSNT: CPT | Mod: CPTII,S$GLB,, | Performed by: ORTHOPAEDIC SURGERY

## 2023-06-05 PROCEDURE — 3072F LOW RISK FOR RETINOPATHY: CPT | Mod: CPTII,S$GLB,, | Performed by: ORTHOPAEDIC SURGERY

## 2023-06-05 PROCEDURE — 99999 PR PBB SHADOW E&M-EST. PATIENT-LVL IV: ICD-10-PCS | Mod: PBBFAC,,, | Performed by: ORTHOPAEDIC SURGERY

## 2023-06-05 PROCEDURE — 99213 OFFICE O/P EST LOW 20 MIN: CPT | Mod: S$GLB,,, | Performed by: ORTHOPAEDIC SURGERY

## 2023-06-05 PROCEDURE — 73562 XR KNEE ORTHO BILAT: ICD-10-PCS | Mod: 26,50,, | Performed by: RADIOLOGY

## 2023-06-05 PROCEDURE — 1101F PT FALLS ASSESS-DOCD LE1/YR: CPT | Mod: CPTII,S$GLB,, | Performed by: ORTHOPAEDIC SURGERY

## 2023-06-05 PROCEDURE — 3044F PR MOST RECENT HEMOGLOBIN A1C LEVEL <7.0%: ICD-10-PCS | Mod: CPTII,S$GLB,, | Performed by: ORTHOPAEDIC SURGERY

## 2023-06-05 PROCEDURE — 3288F PR FALLS RISK ASSESSMENT DOCUMENTED: ICD-10-PCS | Mod: CPTII,S$GLB,, | Performed by: ORTHOPAEDIC SURGERY

## 2023-06-05 PROCEDURE — 3288F FALL RISK ASSESSMENT DOCD: CPT | Mod: CPTII,S$GLB,, | Performed by: ORTHOPAEDIC SURGERY

## 2023-06-05 NOTE — PROGRESS NOTES
72 years old bilateral knee pain for about 2 months time no trauma 5 on the pain scale pain if she goes to get up after been seated for a while.  Oral medications provide partial relief     Exam shows well-healed midline incision from ORIF of the patella about 10 years ago extensor mechanism functioning well no instability good strength no signs infection     X-rays show relatively well-preserved joint spacing, well placed hardware with good position of the patella after ORIF     Assessment:  Bilateral knee pain synovitis chondrosis    Plan:  Physical therapy, follow up as needed

## 2023-06-07 ENCOUNTER — TELEPHONE (OUTPATIENT)
Dept: GASTROENTEROLOGY | Facility: CLINIC | Age: 73
End: 2023-06-07
Payer: MEDICARE

## 2023-06-12 DIAGNOSIS — E11.42 TYPE 2 DIABETES MELLITUS WITH DIABETIC POLYNEUROPATHY: ICD-10-CM

## 2023-06-12 RX ORDER — GABAPENTIN 300 MG/1
CAPSULE ORAL
Qty: 270 CAPSULE | Refills: 1 | Status: SHIPPED | OUTPATIENT
Start: 2023-06-12

## 2023-06-12 NOTE — TELEPHONE ENCOUNTER
Care Due:                  Date            Visit Type   Department     Provider  --------------------------------------------------------------------------------                                EP -                              [facility]  Last Visit: 02-      CARE ([facility])   AUGUST Valverde                              EP -                              PRIMARY      [facility]  Next Visit: 08-      CARE ([facility])   AUGUST Valverde                                                            Last  Test          Frequency    Reason                     Performed    Due Date  --------------------------------------------------------------------------------    HBA1C.......  6 months...  dulaglutide..............  02- 08-    [facility] Embedded Care Due Messages. Reference number: [id number].   6/12/2023 9:30:16 AM CDT

## 2023-07-03 ENCOUNTER — OFFICE VISIT (OUTPATIENT)
Dept: CARDIOLOGY | Facility: CLINIC | Age: 73
End: 2023-07-03
Payer: MEDICARE

## 2023-07-03 ENCOUNTER — OFFICE VISIT (OUTPATIENT)
Dept: ORTHOPEDICS | Facility: CLINIC | Age: 73
End: 2023-07-03
Payer: MEDICARE

## 2023-07-03 VITALS
BODY MASS INDEX: 20.66 KG/M2 | HEART RATE: 105 BPM | HEIGHT: 59 IN | RESPIRATION RATE: 22 BRPM | SYSTOLIC BLOOD PRESSURE: 111 MMHG | WEIGHT: 102.5 LBS | DIASTOLIC BLOOD PRESSURE: 78 MMHG

## 2023-07-03 DIAGNOSIS — M20.031 SWAN-NECK DEFORMITY OF FINGER OF RIGHT HAND: Primary | ICD-10-CM

## 2023-07-03 DIAGNOSIS — I48.0 PAF (PAROXYSMAL ATRIAL FIBRILLATION): ICD-10-CM

## 2023-07-03 DIAGNOSIS — E11.69 COMBINED HYPERLIPIDEMIA ASSOCIATED WITH TYPE 2 DIABETES MELLITUS: Chronic | ICD-10-CM

## 2023-07-03 DIAGNOSIS — I50.42 CHRONIC COMBINED SYSTOLIC AND DIASTOLIC CONGESTIVE HEART FAILURE: Chronic | ICD-10-CM

## 2023-07-03 DIAGNOSIS — E11.40 CONTROLLED TYPE 2 DIABETES MELLITUS WITH DIABETIC NEUROPATHY, WITHOUT LONG-TERM CURRENT USE OF INSULIN: Primary | ICD-10-CM

## 2023-07-03 DIAGNOSIS — I70.0 AORTIC ATHEROSCLEROSIS: ICD-10-CM

## 2023-07-03 DIAGNOSIS — E78.2 COMBINED HYPERLIPIDEMIA ASSOCIATED WITH TYPE 2 DIABETES MELLITUS: Chronic | ICD-10-CM

## 2023-07-03 PROCEDURE — 99214 PR OFFICE/OUTPT VISIT, EST, LEVL IV, 30-39 MIN: ICD-10-PCS | Mod: S$GLB,,,

## 2023-07-03 PROCEDURE — 3078F DIAST BP <80 MM HG: CPT | Mod: CPTII,S$GLB,,

## 2023-07-03 PROCEDURE — 3288F FALL RISK ASSESSMENT DOCD: CPT | Mod: CPTII,S$GLB,,

## 2023-07-03 PROCEDURE — 1101F PT FALLS ASSESS-DOCD LE1/YR: CPT | Mod: CPTII,S$GLB,,

## 2023-07-03 PROCEDURE — 3008F BODY MASS INDEX DOCD: CPT | Mod: CPTII,S$GLB,,

## 2023-07-03 PROCEDURE — 3008F PR BODY MASS INDEX (BMI) DOCUMENTED: ICD-10-PCS | Mod: CPTII,S$GLB,,

## 2023-07-03 PROCEDURE — 3288F FALL RISK ASSESSMENT DOCD: CPT | Mod: CPTII,S$GLB,, | Performed by: ORTHOPAEDIC SURGERY

## 2023-07-03 PROCEDURE — 3044F PR MOST RECENT HEMOGLOBIN A1C LEVEL <7.0%: ICD-10-PCS | Mod: CPTII,S$GLB,, | Performed by: ORTHOPAEDIC SURGERY

## 2023-07-03 PROCEDURE — 99999 PR PBB SHADOW E&M-EST. PATIENT-LVL IV: CPT | Mod: PBBFAC,,,

## 2023-07-03 PROCEDURE — 3044F PR MOST RECENT HEMOGLOBIN A1C LEVEL <7.0%: ICD-10-PCS | Mod: CPTII,S$GLB,,

## 2023-07-03 PROCEDURE — 3072F PR LOW RISK FOR RETINOPATHY: ICD-10-PCS | Mod: CPTII,S$GLB,,

## 2023-07-03 PROCEDURE — 99214 OFFICE O/P EST MOD 30 MIN: CPT | Mod: S$GLB,,,

## 2023-07-03 PROCEDURE — 3044F HG A1C LEVEL LT 7.0%: CPT | Mod: CPTII,S$GLB,,

## 2023-07-03 PROCEDURE — 1101F PR PT FALLS ASSESS DOC 0-1 FALLS W/OUT INJ PAST YR: ICD-10-PCS | Mod: CPTII,S$GLB,, | Performed by: ORTHOPAEDIC SURGERY

## 2023-07-03 PROCEDURE — 1159F MED LIST DOCD IN RCRD: CPT | Mod: CPTII,S$GLB,, | Performed by: ORTHOPAEDIC SURGERY

## 2023-07-03 PROCEDURE — 3072F LOW RISK FOR RETINOPATHY: CPT | Mod: CPTII,S$GLB,,

## 2023-07-03 PROCEDURE — 1125F PR PAIN SEVERITY QUANTIFIED, PAIN PRESENT: ICD-10-PCS | Mod: CPTII,S$GLB,, | Performed by: ORTHOPAEDIC SURGERY

## 2023-07-03 PROCEDURE — 3072F LOW RISK FOR RETINOPATHY: CPT | Mod: CPTII,S$GLB,, | Performed by: ORTHOPAEDIC SURGERY

## 2023-07-03 PROCEDURE — 3288F PR FALLS RISK ASSESSMENT DOCUMENTED: ICD-10-PCS | Mod: CPTII,S$GLB,, | Performed by: ORTHOPAEDIC SURGERY

## 2023-07-03 PROCEDURE — 3074F SYST BP LT 130 MM HG: CPT | Mod: CPTII,S$GLB,,

## 2023-07-03 PROCEDURE — 1101F PT FALLS ASSESS-DOCD LE1/YR: CPT | Mod: CPTII,S$GLB,, | Performed by: ORTHOPAEDIC SURGERY

## 2023-07-03 PROCEDURE — 99213 OFFICE O/P EST LOW 20 MIN: CPT | Mod: S$GLB,,, | Performed by: ORTHOPAEDIC SURGERY

## 2023-07-03 PROCEDURE — 1159F PR MEDICATION LIST DOCUMENTED IN MEDICAL RECORD: ICD-10-PCS | Mod: CPTII,S$GLB,,

## 2023-07-03 PROCEDURE — 1159F PR MEDICATION LIST DOCUMENTED IN MEDICAL RECORD: ICD-10-PCS | Mod: CPTII,S$GLB,, | Performed by: ORTHOPAEDIC SURGERY

## 2023-07-03 PROCEDURE — 1125F AMNT PAIN NOTED PAIN PRSNT: CPT | Mod: CPTII,S$GLB,, | Performed by: ORTHOPAEDIC SURGERY

## 2023-07-03 PROCEDURE — 3074F PR MOST RECENT SYSTOLIC BLOOD PRESSURE < 130 MM HG: ICD-10-PCS | Mod: CPTII,S$GLB,,

## 2023-07-03 PROCEDURE — 3044F HG A1C LEVEL LT 7.0%: CPT | Mod: CPTII,S$GLB,, | Performed by: ORTHOPAEDIC SURGERY

## 2023-07-03 PROCEDURE — 1101F PR PT FALLS ASSESS DOC 0-1 FALLS W/OUT INJ PAST YR: ICD-10-PCS | Mod: CPTII,S$GLB,,

## 2023-07-03 PROCEDURE — 99213 PR OFFICE/OUTPT VISIT, EST, LEVL III, 20-29 MIN: ICD-10-PCS | Mod: S$GLB,,, | Performed by: ORTHOPAEDIC SURGERY

## 2023-07-03 PROCEDURE — 1159F MED LIST DOCD IN RCRD: CPT | Mod: CPTII,S$GLB,,

## 2023-07-03 PROCEDURE — 3078F PR MOST RECENT DIASTOLIC BLOOD PRESSURE < 80 MM HG: ICD-10-PCS | Mod: CPTII,S$GLB,,

## 2023-07-03 PROCEDURE — 99999 PR PBB SHADOW E&M-EST. PATIENT-LVL III: CPT | Mod: PBBFAC,,, | Performed by: ORTHOPAEDIC SURGERY

## 2023-07-03 PROCEDURE — 3072F PR LOW RISK FOR RETINOPATHY: ICD-10-PCS | Mod: CPTII,S$GLB,, | Performed by: ORTHOPAEDIC SURGERY

## 2023-07-03 PROCEDURE — 99999 PR PBB SHADOW E&M-EST. PATIENT-LVL IV: ICD-10-PCS | Mod: PBBFAC,,,

## 2023-07-03 PROCEDURE — 3288F PR FALLS RISK ASSESSMENT DOCUMENTED: ICD-10-PCS | Mod: CPTII,S$GLB,,

## 2023-07-03 PROCEDURE — 99999 PR PBB SHADOW E&M-EST. PATIENT-LVL III: ICD-10-PCS | Mod: PBBFAC,,, | Performed by: ORTHOPAEDIC SURGERY

## 2023-07-03 RX ORDER — RIVAROXABAN 20 MG/1
TABLET, FILM COATED ORAL
Qty: 90 TABLET | Refills: 3 | Status: SHIPPED | OUTPATIENT
Start: 2023-07-03

## 2023-07-03 NOTE — PROGRESS NOTES
Ms Webb returns to clinic today.  She has a history of a right middle finger boutonniere deformity as well as a right ring finger mallet fracture.  The boutonniere has corrected but the mallet has not.  She does have a small amount of hyperextension which was noticed by the therapist and therefore she is here today to discuss further treatment     Physical exam:  Examination of her right middle and ring fingers reveals that there is no significant edema.  There is a deformity to the ring finger which is consistent with a mild swan-neck deformity.  She does have flexion at the D IP joint of approximately 30°.  The middle finger does have a mild amount of enlargement of the PIP but there is no boutonniere deformity noted.  She is able to flex the fingers.  The ring finger does touch the distal palmar crease.  The middle finger is half a cm short of the distal palmar crease.  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 We will discontinue all splinting and see if she has any progression of the swan-neck.  If she does have progression I will consider arthrodesis of the tip of the finger    2.  She can continue activity as tolerated    3.  She will follow up with me in 3 months for repeat evaluation at which point I will consider any need for further treatment of the ring finger.

## 2023-07-03 NOTE — PROGRESS NOTES
Subjective:    Patient ID:  Deb Webb is a 72 y.o. female patient here for evaluation No chief complaint on file.    History of Present Illness:     Mrs. Webb is a 72 year old F who follows with Dr. Washburn here today for a six month follow up. She has been doing well. No CP or SOB. No palps or bradycardia-- she monitors her HR closely at home. No bleeding tendencies on Xarelto.       Most Recent Echocardiogram Results  Results for orders placed during the hospital encounter of 07/14/22    Echo    Interpretation Summary  · The left ventricle is normal in size with normal systolic function.  · The estimated ejection fraction is 55%.  · Indeterminate left ventricular diastolic function.  · Normal right ventricular size with low normal right ventricular systolic function.  · Mild left atrial enlargement.  · Mild tricuspid regurgitation.  · Normal central venous pressure (3 mmHg).  · The estimated PA systolic pressure is 35 mmHg.      REVIEW OF SYSTEMS: As noted in HPI   CARDIOVASCULAR: No recent chest pain, palpitations, arm/neck/jaw pain, or edema.  RESPIRATORY: No recent fever, cough, SOB.  : No blood in the urine  GI: No reflux, nausea, vomiting, or blood in stool.   MUSCULOSKELETAL: No falls.   NEURO: No headaches, syncope, or dizziness.  EYES: No sudden changes in vision.     Past Medical History:   Diagnosis Date    Allergy     Arthritis     Cataract     Colon polyps     COPD (chronic obstructive pulmonary disease)     Diabetes mellitus type II     Diabetic neuropathy     Fever blister     Glaucoma (increased eye pressure)     Hyperlipidemia     Hypertension     Irritable bowel syndrome     Keloid cicatrix     Osteoporosis     Retained cholelithiasis following cholecystectomy(997.41)     Right patella fracture      Past Surgical History:   Procedure Laterality Date    BACK SURGERY  2006    CATARACT EXTRACTION W/  INTRAOCULAR LENS IMPLANT Bilateral     CHOLECYSTECTOMY      Laparoscopic     COLONOSCOPY      COLONOSCOPY N/A 8/31/2022    Procedure: COLONOSCOPY;  Surgeon: Salvatore Butler MD;  [email or web address];  Service: Gastroenterology;  Laterality: N/A;    ESOPHAGEAL DILATION N/A 8/30/2022    Procedure: DILATION, ESOPHAGUS;  Surgeon: Salvatore Butler MD;  Location: [email or web address] END[facility];  Service: Gastroenterology;  Laterality: N/A;    ESOPHAGOGASTRODUODENOSCOPY N/A 8/30/2022    Procedure: EGD (ESOPHAGOGASTRODUODENOSCOPY);  Surgeon: Salvatore Butler MD;  Location: [facility];  Service: Gastroenterology;  Laterality: N/A;    ESOPHAGOGASTRODUODENOSCOPY N/A 5/23/2023    Procedure: ESOPHAGOGASTRODUODENOSCOPY (EGD);  Surgeon: Desmond Duffy Jr., MD;  [facility];  Service: Endoscopy;  Laterality: N/A;    HERNIA REPAIR      HYSTERECTOMY      KNEE SURGERY Right 3/4/2015    Dr Weller     OOPHORECTOMY      ovarian tumor      Benign    TONSILLECTOMY, ADENOIDECTOMY       Social History     Tobacco Use    Smoking status: Every Day     Packs/day: 0.50     Years: 40.00     Pack years: 20.00     Types: Cigarettes    Smokeless tobacco: Current   Substance Use Topics    Alcohol use: No    Drug use: No         Objective      Vitals:    07/03/23 1332   BP: 111/78   Pulse: 105   Resp: (!) 22       LAST EKG  Results for orders placed or performed during the hospital encounter of 11/24/22   EKG 12-lead    Collection Time: 11/24/22  2:26 PM    Narrative    Test Reason : R06.02,    Vent. Rate : 102 BPM     Atrial Rate : 102 BPM     P-R Int : 000 ms          QRS Dur : 064 ms      QT Int : 316 ms       P-R-T Axes : 000 064 037 degrees     QTc Int : 411 ms    Undetermined rhythm  Otherwise normal ECG  When compared with ECG of 24-NOV-2022 12:52,  Current undetermined rhythm precludes rhythm comparison, needs review  Confirmed by Delbert Stevenson MD ([id number]) on 11/25/2022 4:21:11 PM    Referred By: [email or web address]   SELF           Confirmed By:Delbert Stevenson MD     LIPIDS - LAST 2   Lab Results   Component Value Date    CHOL 106  (L) 02/13/2023    CHOL 94 (L) 07/15/2022    HDL 49 02/13/2023    HDL 49 07/15/2022    LDLCALC 43.8 (L) 02/13/2023    LDLCALC 33.0 (L) 07/15/2022    TRIG 66 02/13/2023    TRIG 60 07/15/2022    CHOLHDL 46.2 02/13/2023    CHOLHDL 52.1 (H) 07/15/2022     CARDIAC PROFILE - LAST 2  Lab Results   Component Value Date    BNP 98 02/03/2016    CPK 78 01/25/2019    CPK 72 12/28/2010    CPKMB 1.5 12/28/2010    CPKMB 1.8 12/27/2010    TROPONINI <0.012 11/24/2022    TROPONINI <0.012 08/29/2022      CBC - LAST 2  Lab Results   Component Value Date    WBC 10.01 02/13/2023    WBC 8.66 11/27/2022    RBC 4.36 02/13/2023    RBC 3.80 (L) 11/27/2022    HGB 13.8 02/13/2023    HGB 11.8 (L) 11/27/2022    HCT 43.1 02/13/2023    HCT 36.3 (L) 11/27/2022     02/13/2023     11/27/2022     Lab Results   Component Value Date    LABPT 14.3 11/24/2022    LABPT 13.0 01/27/2022    INR 1.1 11/24/2022    INR 1.0 01/27/2022    APTT 27.8 11/24/2022    APTT 26.5 01/27/2022     CHEMISTRY - LAST 2  Lab Results   Component Value Date     02/13/2023     11/27/2022    K 4.5 02/13/2023    K 4.4 11/27/2022     02/13/2023     11/27/2022    CO2 24 02/13/2023    CO2 32 (H) 11/27/2022    ANIONGAP 12 02/13/2023    ANIONGAP 2 (L) 11/27/2022    BUN 15 02/13/2023    BUN 12 11/27/2022    CREATININE 0.8 02/13/2023    CREATININE 0.50 11/27/2022     (H) 02/13/2023     (H) 11/27/2022    CALCIUM 9.7 02/13/2023    CALCIUM 9.3 11/27/2022    PH 7.40 08/29/2022    PH 7.41 07/15/2022    MG 1.6 11/26/2022    MG 1.4 (L) 11/25/2022    ALBUMIN 3.7 02/13/2023    ALBUMIN 4.6 11/24/2022    PROT 7.2 02/13/2023    PROT 9.1 (H) 11/24/2022    ALKPHOS 102 02/13/2023    ALKPHOS 143 11/24/2022    ALT 19 02/13/2023    ALT 23 11/24/2022    AST 29 02/13/2023    AST 36 11/24/2022    BILITOT 0.3 02/13/2023    BILITOT 0.5 11/24/2022      ENDOCRINE - LAST 2  Lab Results   Component Value Date    HGBA1C 6.7 (H) 02/13/2023    HGBA1C 7.3 (H) 08/29/2022     TSH 1.650 11/24/2022    TSH 0.996 07/15/2022        PHYSICAL EXAM  CONSTITUTIONAL: Well built, well nourished in no apparent distress  NECK: no carotid bruit, no JVD  LUNGS: CTA  CHEST WALL: no tenderness  HEART: regular rate and rhythm, S1, S2 normal, no murmur, click, rub or gallop   ABDOMEN: soft, non-tender; bowel sounds normal; no masses,  no organomegaly  EXTREMITIES: Extremities normal, no edema, no calf tenderness noted  NEURO: AAO X 3    I HAVE REVIEWED :    The vital signs, most recent cardiac testing, and most recent pertinent non-cardiology provider notes.    Current Outpatient Medications   Medication Instructions    albuterol (PROAIR HFA) 90 mcg/actuation inhaler 2 puffs, Inhalation, Every 6 hours PRN, Rescue    albuterol-ipratropium (DUO-NEB) 2.5 mg-0.5 mg/3 mL nebulizer solution 3 mLs, Nebulization, Every 6 hours PRN, Rescue    alendronate (FOSAMAX) 70 mg, Oral, Weekly    amiodarone (PACERONE) 200 MG Tab Take 2 tabs by mouth twice daily until 12/2/22 then take 1 tab by mouth twice daily    ANORO ELLIPTA 62.5-25 mcg/actuation DsDv INHALE 1 PUFF INTO THE LUNGS ONCE DAILY    atorvastatin (LIPITOR) 40 MG tablet TAKE 1 TABLET BY MOUTH EVERY DAY    blood sugar diagnostic Strp To check BG 2 times daily, to use with insurance preferred meter    blood-glucose meter kit To check BG 2 times daily, to use with insurance preferred meter    blood-glucose meter,continuous (DEXCOM G6 ) Misc Use as directed    blood-glucose sensor (DEXCOM G6 SENSOR) Emmie Use as directed    brimonidine 0.2% (ALPHAGAN) 0.2 % Drop 1 drop, Both Eyes, 2 times daily    calcium carbonate/vitamin D3 (CALCIUM 600 + D,3, ORAL) 1 tablet, Oral, Daily    cholestyramine-aspartame (QUESTRAN LIGHT) 4 gram PwPk TAKE 1 PACKET (4 G TOTAL) BY MOUTH 2 (TWO) TIMES DAILY. FOR DIARRHEA    citalopram (CELEXA) 20 mg, Oral, Daily    furosemide (LASIX) 20 mg, Oral, As needed (PRN)    gabapentin (NEURONTIN) 300 MG capsule TAKE 1 CAPSULE BY MOUTH THREE  "TIMES A DAY    guaiFENesin (MUCINEX) 600 mg, Oral, 2 times daily    HYDROcodone-acetaminophen (NORCO) 5-325 mg per tablet 1 tablet, Oral, Every 12 hours PRN    ipratropium (ATROVENT) 42 mcg (0.06 %) nasal spray 2 sprays, Nasal, 3 times daily, Use 3 times per day if necessary for chronic nasal drip    Lactobacillus rhamnosus GG (CULTURELLE) 10 billion cell capsule 1 capsule, Oral, Daily    lancets [facility] To check BG 2 times daily, to use with insurance preferred meter    latanoprost 0.005 % ophthalmic solution 1 drop, Both Eyes, Nightly    losartan (COZAAR) 12.5 mg, Oral, Daily    meclizine (ANTIVERT) 12.5 mg tablet TAKE 1 TABLET BY MOUTH 3 TIMES DAILY AS NEEDED FOR DIZZINESS.    omega-3 fatty acids-vitamin E 1,000 mg Cap 1 capsule, Oral, Daily    omeprazole (PRILOSEC) 40 mg, Oral, Before breakfast    pen needle, diabetic (BD ULTRA-FINE AGUS PEN NEEDLE) 32 gauge x 5/32" Ndle 1 Device, Misc.(Non-Drug; Combo Route), 2 times daily    primidone (MYSOLINE) 50 MG Tab TAKE 2 TABLETS BY MOUTH 3 (THREE) TIMES DAILY. ONE HALF TABLET FOR ONE WEEK, THEN AS PRESCRIBED    timolol maleate 0.5% (TIMOPTIC) 0.5 % Drop 1 drop, Both Eyes, Daily    TRULICITY 1.5 mg, Subcutaneous, Every 7 days, Through patient assistance    XARELTO 20 mg Tab TAKE 1 TABLET BY MOUTH EVERY DAY WITH DINNER OR EVENING MEAL      Assessment & Plan     PAF (paroxysmal atrial fibrillation)  Continue on amiodarone 200 mg daily   Continue xarelto 20 mg nightly -- no bleeding tendencies noted     Chronic combined systolic and diastolic congestive heart failure  Has not been needing lasix 20 mg daily       Combined hyperlipidemia associated with type 2 diabetes mellitus  Continue statin     Aortic atherosclerosis  Continue statin     Amb ref to podiatry for nail care given diabetes and hard to care for at home.     Follow up in 6 months.     Naomi Peters, PA-C [facility]   Office: [phone]          "

## 2023-07-03 NOTE — ASSESSMENT & PLAN NOTE
Continue on amiodarone 200 mg daily   HR sensitive to BB  Continue xarelto 20 mg nightly -- no bleeding tendencies noted

## 2023-07-17 ENCOUNTER — CLINICAL SUPPORT (OUTPATIENT)
Dept: REHABILITATION | Facility: HOSPITAL | Age: 73
End: 2023-07-17
Attending: ORTHOPAEDIC SURGERY
Payer: MEDICARE

## 2023-07-17 DIAGNOSIS — M17.12 OSTEOARTHRITIS OF LEFT KNEE: ICD-10-CM

## 2023-07-17 DIAGNOSIS — M25.561 CHRONIC PAIN OF BOTH KNEES: ICD-10-CM

## 2023-07-17 DIAGNOSIS — M17.12 PRIMARY OSTEOARTHRITIS OF LEFT KNEE: Primary | ICD-10-CM

## 2023-07-17 DIAGNOSIS — M17.11 OSTEOARTHRITIS OF RIGHT KNEE: ICD-10-CM

## 2023-07-17 DIAGNOSIS — M25.562 CHRONIC PAIN OF BOTH KNEES: ICD-10-CM

## 2023-07-17 DIAGNOSIS — R26.9 GAIT ABNORMALITY: ICD-10-CM

## 2023-07-17 DIAGNOSIS — R29.898 WEAKNESS OF BOTH LOWER EXTREMITIES: ICD-10-CM

## 2023-07-17 DIAGNOSIS — G89.29 CHRONIC PAIN OF BOTH KNEES: ICD-10-CM

## 2023-07-17 PROCEDURE — 97112 NEUROMUSCULAR REEDUCATION: CPT | Mod: PO

## 2023-07-17 PROCEDURE — 97161 PT EVAL LOW COMPLEX 20 MIN: CPT | Mod: PO

## 2023-07-17 NOTE — PLAN OF CARE
"[facility]   Physical Therapy Initial Evaluation      Name: Deb Webb  Clinic Number: [id number]    Therapy Diagnosis:   Encounter Diagnoses   Name Primary?    Osteoarthritis of right knee     Osteoarthritis of left knee     Chronic pain of both knees     Weakness of both lower extremities     Gait abnormality         Physician: Blas Barraza MD    Physician Orders: PT Eval and Treat bilat knee OA  Medical Diagnosis from Referral:   M17.11 (ICD-10-CM) - Osteoarthritis of right knee   M17.12 (ICD-10-CM) - Osteoarthritis of left knee     Evaluation Date: 7/17/2023  Authorization Period Expiration: 8/23/2023  Plan of Care Expiration: 9/25/2023  Progress Note Due: 8/16/2023  Visit # / Visits authorized: 1/ 1   FOTO: 1/3    Precautions: Standard, Diabetes, and see PMHx      Time In: 11:55  Time Out: 1300  Total Billable Time: 65 minutes    Subjective     Date of onset: 2-3 months ago    History of current condition - Deb reports: a history of bilateral knee pain for the past couple of months with no known ANTONIETTA. She reports a past history of multiple falls and reports occasional feeling of "knee buckling." She also reports occasional feeling of being "off balance" but states that she feels much more confident in this area since receiving therapy for this over the years. She states she's been seen in physical therapy numerous times over the years for multiple other injuries and hopes we can help her again for her knee pain.    Falls: None recently, but multiple falls over the years    Imaging: : Please see EPIC    Prior Therapy: Previous Patient for weakness and balance.  Social History: Lives with granddaughter   Occupation: Retired  Prior Level of Function: Independent  Current Level of Function: Mod independent    Pain:  Current 7/10, worst 8/10, best 5/10   Location: bilateral knees   Description: Aching  Aggravating Factors: Walking and Night Time  Easing Factors: pain " medication    Patients goals: Improve pain and mobility     Medical History:   Past Medical History:   Diagnosis Date    Allergy     Arthritis     Cataract     Colon polyps     COPD (chronic obstructive pulmonary disease)     Diabetes mellitus type II     Diabetic neuropathy     Fever blister     Glaucoma (increased eye pressure)     Hyperlipidemia     Hypertension     Irritable bowel syndrome     Keloid cicatrix     Osteoporosis     Retained cholelithiasis following cholecystectomy(997.41)     Right patella fracture        Surgical History:   Deb Webb  has a past surgical history that includes Cholecystectomy; Hysterectomy; ovarian tumor; Colonoscopy; TONSILLECTOMY, ADENOIDECTOMY; Hernia repair; Knee surgery (Right, 3/4/2015); Oophorectomy; Back surgery (2006); Cataract extraction w/  intraocular lens implant (Bilateral); Esophagogastroduodenoscopy (N/A, 8/30/2022); Esophageal dilation (N/A, 8/30/2022); Colonoscopy (N/A, 8/31/2022); and Esophagogastroduodenoscopy (N/A, 5/23/2023).    Medications:   Deb has a current medication list which includes the following prescription(s): albuterol, albuterol-ipratropium, alendronate, amiodarone, anoro ellipta, atorvastatin, blood sugar diagnostic, blood-glucose meter, dexcom g6 , dexcom g6 sensor, brimonidine 0.2%, calcium carbonate/vitamin d3, cholestyramine-aspartame, citalopram, trulicity, furosemide, gabapentin, guaifenesin, hydrocodone-acetaminophen, ipratropium, lactobacillus rhamnosus gg, lancets, latanoprost, losartan, meclizine, omega-3 fatty acids-vitamin e, omeprazole, pen needle, diabetic, primidone, timolol maleate 0.5%, xarelto, [DISCONTINUED] irbesartan, and [DISCONTINUED] loratadine.    Allergies:   Review of patient's allergies indicates:   Allergen Reactions    Silicone      Burn skin    Adhesive Rash        Objective      Range of Motion:   Knee Right Left   Active 0-132 0-130     Lower Extremity Strength  Right LE  Left LE    Knee  extension: 4-/5 Knee extension: 4-/5   Knee flexion: 3+/5 Knee flexion: 3+/5   Hip flexion: 3+/5 Hip flexion: 3+/5   Hip extension:  NP 2/2 patient unable to lie prone Hip extension: NP 2/2 patient unable to lie prone   Hip abduction: 4-/5 Hip abduction: 4-/5   Hip adduction: 4-/5 Hip adduction 4-/5       5x sit to stand: 20.25sec c dynamic knee valgus noted       Intake Outcome Measure for FOTO knee Survey    Therapist reviewed FOTO scores for Deb Webb on 7/17/2023.   FOTO documents entered into [employer] - see Media section.    Intake Score: 53%         Treatment     Total Treatment time (time-based codes) separate from Evaluation: 25 minutes     Deb received the treatments listed below:      neuromuscular re-education activities to improve: Coordination, Kinesthetic, Sense, and Proprioception for 25 minutes. The following activities were included:  BLE quad sets c 5 sec hold  Hook lying clamshells c YTB 2x10  Supine marches c YTB  Supine bridges c YTB  LAQs 2x10    Patient Education and Home Exercises     Education provided:   - on HEP and POC    Written Home Exercises Provided: yes. Exercises were reviewed and Deb was able to demonstrate them prior to the end of the session.  Deb demonstrated fair  understanding of the education provided. See EMR under Patient Instructions for exercises provided during therapy sessions.    Assessment     Deb is a 72 y.o. female referred to outpatient Physical Therapy with a medical diagnosis of   M17.11 (ICD-10-CM) - Osteoarthritis of right knee   M17.12 (ICD-10-CM) - Osteoarthritis of left knee   Patient presents with increased pain and decreased strength and endurance. These deficits limit her ability to perform everyday activities to include walking, standing, bending, and navigating stairs without pain or limitations. Patient with significant strength deficits noted in her BLEs upon assessment today, likely contributing to her knee pain at this time. Patient  noted to demo normal BLE knee flexion and extension range of motion during today's assessment, but demonstrated significant endurance and power deficits that were assessed with a 5x sit to stand assessment. Patient will benefit from a flexibility assessment at next visit to rule out the possibility of increased muscle tightness as a contributing factor for her increased pain. Due to these deficits, patient will benefit from skilled therapy to improve mobility and overall function.    Patient prognosis is Fair.   Patient will benefit from skilled outpatient Physical Therapy to address the deficits stated above and in the chart below, provide patient /family education, and to maximize patientt's level of independence.     Plan of care discussed with patient: Yes  Patient's spiritual, cultural and educational needs considered and patient is agreeable to the plan of care and goals as stated below:     Anticipated Barriers for therapy: PMHx    Medical Necessity is demonstrated by the following  History  Co-morbidities and personal factors that may impact the plan of care [] LOW: no personal factors / co-morbidities  [x] MODERATE: 1-2 personal factors / co-morbidities  [] HIGH: 3+ personal factors / co-morbidities    Moderate / High Support Documentation:   Co-morbidities affecting plan of care: PMHx    Personal Factors:   no deficits     Examination  Body Structures and Functions, activity limitations and participation restrictions that may impact the plan of care [x] LOW: addressing 1-2 elements  [] MODERATE: 3+ elements  [] HIGH: 4+ elements (please support below)    Moderate / High Support Documentation:      Clinical Presentation [x] LOW: stable  [] MODERATE: Evolving  [] HIGH: Unstable     Decision Making/ Complexity Score: low       Goals:  Short Term Goals: 5 weeks   Patient to report worst pain 4/10  Patient to improve BLE strength by 1/2 grade in deficieint areas  Patient to improve 5x sit to stand by 5  seconds  Patient to tolerate flexibility assessment  Patient to tolerate balance assessment    Long Term Goals: 10 weeks   Patient to report worst pain 1/10  Patient to improve BLE strength by 1 grade in deficieint areas  Patient to improve 5x sit to stand by 10 seconds  Additional goals to come as patient progresses    Plan     Plan of care Certification: 7/17/2023 to 9/25/2023.    Outpatient Physical Therapy 1 times weekly for 10 weeks to include the following interventions: Electrical Stimulation per contraindications cleared, Gait Training, Manual Therapy, Moist Heat/ Ice, Neuromuscular Re-ed, Patient Education, Self Care, Therapeutic Activities, and Therapeutic Exercise.       René Price, PT

## 2023-07-19 ENCOUNTER — LAB VISIT (OUTPATIENT)
Dept: LAB | Facility: HOSPITAL | Age: 73
End: 2023-07-19
Attending: INTERNAL MEDICINE
Payer: MEDICARE

## 2023-07-19 ENCOUNTER — OFFICE VISIT (OUTPATIENT)
Dept: FAMILY MEDICINE | Facility: CLINIC | Age: 73
End: 2023-07-19
Payer: MEDICARE

## 2023-07-19 VITALS
OXYGEN SATURATION: 95 % | SYSTOLIC BLOOD PRESSURE: 104 MMHG | DIASTOLIC BLOOD PRESSURE: 68 MMHG | HEART RATE: 90 BPM | WEIGHT: 97.25 LBS | HEIGHT: 59 IN | BODY MASS INDEX: 19.6 KG/M2

## 2023-07-19 DIAGNOSIS — R26.2 DIFFICULTY WALKING: ICD-10-CM

## 2023-07-19 DIAGNOSIS — I50.42 CHRONIC COMBINED SYSTOLIC AND DIASTOLIC CONGESTIVE HEART FAILURE: Chronic | ICD-10-CM

## 2023-07-19 DIAGNOSIS — J96.11 CHRONIC RESPIRATORY FAILURE WITH HYPOXIA: ICD-10-CM

## 2023-07-19 DIAGNOSIS — N18.31 CHRONIC KIDNEY DISEASE, STAGE 3A: ICD-10-CM

## 2023-07-19 DIAGNOSIS — K58.9 IRRITABLE BOWEL SYNDROME WITHOUT DIARRHEA: ICD-10-CM

## 2023-07-19 DIAGNOSIS — Z86.73 HISTORY OF STROKE: Primary | Chronic | ICD-10-CM

## 2023-07-19 DIAGNOSIS — E11.40 CONTROLLED TYPE 2 DIABETES MELLITUS WITH DIABETIC NEUROPATHY, WITHOUT LONG-TERM CURRENT USE OF INSULIN: ICD-10-CM

## 2023-07-19 DIAGNOSIS — I48.0 PAF (PAROXYSMAL ATRIAL FIBRILLATION): ICD-10-CM

## 2023-07-19 PROBLEM — J10.1 INFLUENZA A WITH RESPIRATORY MANIFESTATIONS: Status: RESOLVED | Noted: 2022-11-24 | Resolved: 2023-07-19

## 2023-07-19 PROBLEM — D72.829 LEUKOCYTOSIS: Status: RESOLVED | Noted: 2022-03-21 | Resolved: 2023-07-19

## 2023-07-19 LAB
ALBUMIN/CREAT UR: 34.7 UG/MG (ref 0–30)
CREAT UR-MCNC: 75 MG/DL (ref 15–325)
MICROALBUMIN UR DL<=1MG/L-MCNC: 26 UG/ML

## 2023-07-19 PROCEDURE — 1126F PR PAIN SEVERITY QUANTIFIED, NO PAIN PRESENT: ICD-10-PCS | Mod: CPTII,S$GLB,, | Performed by: INTERNAL MEDICINE

## 2023-07-19 PROCEDURE — 3072F PR LOW RISK FOR RETINOPATHY: ICD-10-PCS | Mod: CPTII,S$GLB,, | Performed by: INTERNAL MEDICINE

## 2023-07-19 PROCEDURE — 3288F PR FALLS RISK ASSESSMENT DOCUMENTED: ICD-10-PCS | Mod: CPTII,S$GLB,, | Performed by: INTERNAL MEDICINE

## 2023-07-19 PROCEDURE — 1160F RVW MEDS BY RX/DR IN RCRD: CPT | Mod: CPTII,S$GLB,, | Performed by: INTERNAL MEDICINE

## 2023-07-19 PROCEDURE — 3008F PR BODY MASS INDEX (BMI) DOCUMENTED: ICD-10-PCS | Mod: CPTII,S$GLB,, | Performed by: INTERNAL MEDICINE

## 2023-07-19 PROCEDURE — 1126F AMNT PAIN NOTED NONE PRSNT: CPT | Mod: CPTII,S$GLB,, | Performed by: INTERNAL MEDICINE

## 2023-07-19 PROCEDURE — 3074F SYST BP LT 130 MM HG: CPT | Mod: CPTII,S$GLB,, | Performed by: INTERNAL MEDICINE

## 2023-07-19 PROCEDURE — 99999 PR PBB SHADOW E&M-EST. PATIENT-LVL V: CPT | Mod: PBBFAC,,, | Performed by: INTERNAL MEDICINE

## 2023-07-19 PROCEDURE — 3078F PR MOST RECENT DIASTOLIC BLOOD PRESSURE < 80 MM HG: ICD-10-PCS | Mod: CPTII,S$GLB,, | Performed by: INTERNAL MEDICINE

## 2023-07-19 PROCEDURE — 1160F PR REVIEW ALL MEDS BY PRESCRIBER/CLIN PHARMACIST DOCUMENTED: ICD-10-PCS | Mod: CPTII,S$GLB,, | Performed by: INTERNAL MEDICINE

## 2023-07-19 PROCEDURE — 1159F PR MEDICATION LIST DOCUMENTED IN MEDICAL RECORD: ICD-10-PCS | Mod: CPTII,S$GLB,, | Performed by: INTERNAL MEDICINE

## 2023-07-19 PROCEDURE — 3072F LOW RISK FOR RETINOPATHY: CPT | Mod: CPTII,S$GLB,, | Performed by: INTERNAL MEDICINE

## 2023-07-19 PROCEDURE — 82570 ASSAY OF URINE CREATININE: CPT | Performed by: INTERNAL MEDICINE

## 2023-07-19 PROCEDURE — 99214 PR OFFICE/OUTPT VISIT, EST, LEVL IV, 30-39 MIN: ICD-10-PCS | Mod: S$GLB,,, | Performed by: INTERNAL MEDICINE

## 2023-07-19 PROCEDURE — 3074F PR MOST RECENT SYSTOLIC BLOOD PRESSURE < 130 MM HG: ICD-10-PCS | Mod: CPTII,S$GLB,, | Performed by: INTERNAL MEDICINE

## 2023-07-19 PROCEDURE — 1159F MED LIST DOCD IN RCRD: CPT | Mod: CPTII,S$GLB,, | Performed by: INTERNAL MEDICINE

## 2023-07-19 PROCEDURE — 3078F DIAST BP <80 MM HG: CPT | Mod: CPTII,S$GLB,, | Performed by: INTERNAL MEDICINE

## 2023-07-19 PROCEDURE — 99214 OFFICE O/P EST MOD 30 MIN: CPT | Mod: S$GLB,,, | Performed by: INTERNAL MEDICINE

## 2023-07-19 PROCEDURE — 1101F PT FALLS ASSESS-DOCD LE1/YR: CPT | Mod: CPTII,S$GLB,, | Performed by: INTERNAL MEDICINE

## 2023-07-19 PROCEDURE — 3044F HG A1C LEVEL LT 7.0%: CPT | Mod: CPTII,S$GLB,, | Performed by: INTERNAL MEDICINE

## 2023-07-19 PROCEDURE — 1101F PR PT FALLS ASSESS DOC 0-1 FALLS W/OUT INJ PAST YR: ICD-10-PCS | Mod: CPTII,S$GLB,, | Performed by: INTERNAL MEDICINE

## 2023-07-19 PROCEDURE — 99999 PR PBB SHADOW E&M-EST. PATIENT-LVL V: ICD-10-PCS | Mod: PBBFAC,,, | Performed by: INTERNAL MEDICINE

## 2023-07-19 PROCEDURE — 3008F BODY MASS INDEX DOCD: CPT | Mod: CPTII,S$GLB,, | Performed by: INTERNAL MEDICINE

## 2023-07-19 PROCEDURE — 3288F FALL RISK ASSESSMENT DOCD: CPT | Mod: CPTII,S$GLB,, | Performed by: INTERNAL MEDICINE

## 2023-07-19 PROCEDURE — 3044F PR MOST RECENT HEMOGLOBIN A1C LEVEL <7.0%: ICD-10-PCS | Mod: CPTII,S$GLB,, | Performed by: INTERNAL MEDICINE

## 2023-07-19 NOTE — PROGRESS NOTES
Subjective     Deb Webb is a 72 y.o. old, female here for Follow-up (6 month )    73 y/o with PMH of CVA, PAF on xarelto, Type 2 DM with neuropathy, HTN, HLD, Chronic respiratory failure, COPD, osteopenia, IBS, anxiety, tobacco use, Essential tremor, prior BZO dependence    PAF: she states she feels good since starting xarelto, discussed the reason she was put on it. She denies CP/palpitations.  Type 2 DM: controlled recently, BG's at home are good. Neuropathy stable.  HTN: stable.  COPD: still smoking a few cpd, would like to quit, no recent exacerbations.  IBS: Having more problems with IBS with constipation lately. Recent diet change      Review of Systems   Constitutional: Negative.    Respiratory:  Positive for cough and wheezing. Negative for hemoptysis.    Cardiovascular: Negative.    Neurological:  Positive for tingling.   Medications     Outpatient Medications Marked as Taking for the 7/19/23 encounter (Office Visit) with Triston Valverde MD   Medication Sig Dispense Refill    albuterol (PROAIR HFA) 90 mcg/actuation inhaler Inhale 2 puffs into the lungs every 6 (six) hours as needed for Wheezing. Rescue 18 g 2    albuterol-ipratropium (DUO-NEB) 2.5 mg-0.5 mg/3 mL nebulizer solution Take 3 mLs by nebulization every 6 (six) hours as needed for Wheezing or Shortness of Breath. Rescue 75 mL 0    alendronate (FOSAMAX) 70 MG tablet Take 1 tablet (70 mg total) by mouth once a week. 12 tablet 3    amiodarone (PACERONE) 200 MG Tab Take 2 tabs by mouth twice daily until 12/2/22 then take 1 tab by mouth twice daily 90 tablet 1    ANORO ELLIPTA 62.5-25 mcg/actuation DsDv INHALE 1 PUFF INTO THE LUNGS ONCE DAILY 60 each 5    atorvastatin (LIPITOR) 40 MG tablet TAKE 1 TABLET BY MOUTH EVERY DAY 90 tablet 1    blood sugar diagnostic Strp To check BG 2 times daily, to use with insurance preferred meter 200 each 3    blood-glucose meter,continuous (DEXCOM G6 ) Misc Use as directed 1 each 1     "blood-glucose sensor (DEXCOM G6 SENSOR) Emmie Use as directed 10 each 2    brimonidine 0.2% (ALPHAGAN) 0.2 % Drop Place 1 drop into both eyes 2 (two) times a day. 5 mL 6    calcium carbonate/vitamin D3 (CALCIUM 600 + D,3, ORAL) Take 1 tablet by mouth once daily.      cholestyramine-aspartame (QUESTRAN LIGHT) 4 gram PwPk TAKE 1 PACKET (4 G TOTAL) BY MOUTH 2 (TWO) TIMES DAILY. FOR DIARRHEA 180 packet 0    citalopram (CELEXA) 20 MG tablet Take 1 tablet (20 mg total) by mouth once daily. 90 tablet 1    dulaglutide (TRULICITY) 1.5 mg/0.5 mL pen injector INJECT 1.5 MG INTO THE SKIN EVERY 7 DAYS. THROUGH PATIENT ASSISTANCE 12 pen 1    furosemide (LASIX) 20 MG tablet Take 1 tablet (20 mg total) by mouth as needed (swelling).      gabapentin (NEURONTIN) 300 MG capsule TAKE 1 CAPSULE BY MOUTH THREE TIMES A  capsule 1    guaiFENesin (MUCINEX) 600 mg 12 hr tablet Take 1 tablet (600 mg total) by mouth 2 (two) times daily.      HYDROcodone-acetaminophen (NORCO) 5-325 mg per tablet Take 1 tablet by mouth every 12 (twelve) hours as needed for Pain. 10 tablet 0    ipratropium (ATROVENT) 42 mcg (0.06 %) nasal spray 2 sprays by Nasal route 3 (three) times daily. Use 3 times per day if necessary for chronic nasal drip 15 mL 12    Lactobacillus rhamnosus GG (CULTURELLE) 10 billion cell capsule Take 1 capsule by mouth once daily. 60 capsule 0    lancets Misc To check BG 2 times daily, to use with insurance preferred meter 200 each 3    latanoprost 0.005 % ophthalmic solution Place 1 drop into both eyes every evening. 7.5 mL 3    meclizine (ANTIVERT) 12.5 mg tablet TAKE 1 TABLET BY MOUTH 3 TIMES DAILY AS NEEDED FOR DIZZINESS. 30 tablet 0    omega-3 fatty acids-vitamin E 1,000 mg Cap Take 1 capsule by mouth once daily.      omeprazole (PRILOSEC) 40 MG capsule TAKE 1 CAPSULE (40 MG TOTAL) BY MOUTH BEFORE BREAKFAST. 90 capsule 1    pen needle, diabetic (BD ULTRA-FINE AGUS PEN NEEDLE) 32 gauge x 5/32" Ndle 1 Device by Misc.(Non-Drug; " "Combo Route) route 2 (two) times daily. 200 each 4    primidone (MYSOLINE) 50 MG Tab TAKE 2 TABLETS BY MOUTH 3 (THREE) TIMES DAILY. ONE HALF TABLET FOR ONE WEEK, THEN AS PRESCRIBED 540 tablet 3    timolol maleate 0.5% (TIMOPTIC) 0.5 % Drop Place 1 drop into both eyes once daily. 5 mL 6    XARELTO 20 mg Tab TAKE 1 TABLET BY MOUTH EVERY DAY WITH DINNER OR EVENING MEAL 90 tablet 3     Objective     /68   Pulse 90   Ht 4' 11" (1.499 m)   Wt 44.1 kg (97 lb 3.6 oz)   SpO2 95%   BMI 19.64 kg/m²   Physical Exam  Constitutional:       General: She is not in acute distress.     Appearance: Normal appearance. She is well-developed.   Cardiovascular:      Rate and Rhythm: Normal rate. Rhythm irregular.      Heart sounds: No murmur heard.  Pulmonary:      Effort: Pulmonary effort is normal. No respiratory distress.      Breath sounds: Wheezing present.     Assessment and Plan     History of stroke    Chronic respiratory failure with hypoxia    Chronic combined systolic and diastolic congestive heart failure    Controlled type 2 diabetes mellitus with diabetic neuropathy, without long-term current use of insulin  -     Hemoglobin A1C; Future; Expected date: 07/19/2023  -     Comprehensive Metabolic Panel; Future; Expected date: 07/19/2023  -     Microalbumin/Creatinine Ratio, Urine; Future; Expected date: 07/19/2023    Chronic kidney disease, stage 3a    Difficulty walking    PAF (paroxysmal atrial fibrillation)    Irritable bowel syndrome without diarrhea        Follow up in about 6 months (around 1/19/2024).  ___________________  Triston Valverde MD  Internal Medicine and Pediatrics  "

## 2023-07-20 DIAGNOSIS — K58.0 IRRITABLE BOWEL SYNDROME WITH DIARRHEA: ICD-10-CM

## 2023-07-20 RX ORDER — CHOLESTYRAMINE 4 G/4.8G
POWDER, FOR SUSPENSION ORAL
Qty: 180 PACKET | Refills: 0 | Status: SHIPPED | OUTPATIENT
Start: 2023-07-20 | End: 2023-08-21 | Stop reason: SDUPTHER

## 2023-07-20 NOTE — TELEPHONE ENCOUNTER
Refill Routing Note   Medication(s) are not appropriate for processing by [facility] for the following reason(s):      Medication outside of protocol    [facility] action(s):  Route Care Due:  None identified              Appointments  past 12m or future 3m with PCP    Date Provider   Last Visit   7/19/2023 Triston Valverde MD   Next Visit   8/21/2023 Triston Valverde MD   ED visits in past 90 days: 0        Note composed:8:39 AM 07/20/2023

## 2023-07-20 NOTE — TELEPHONE ENCOUNTER
No care due was identified.  [facility] Embedded Care Due Messages. Reference number: [id number].   7/20/2023 1:00:48 AM CDT

## 2023-07-31 ENCOUNTER — OFFICE VISIT (OUTPATIENT)
Dept: OPTOMETRY | Facility: CLINIC | Age: 73
End: 2023-07-31
Payer: MEDICARE

## 2023-07-31 ENCOUNTER — CLINICAL SUPPORT (OUTPATIENT)
Dept: REHABILITATION | Facility: HOSPITAL | Age: 73
End: 2023-07-31
Attending: ORTHOPAEDIC SURGERY
Payer: MEDICARE

## 2023-07-31 DIAGNOSIS — Z96.1 PSEUDOPHAKIA OF BOTH EYES: ICD-10-CM

## 2023-07-31 DIAGNOSIS — R29.898 WEAKNESS OF BOTH LOWER EXTREMITIES: ICD-10-CM

## 2023-07-31 DIAGNOSIS — M25.561 CHRONIC PAIN OF BOTH KNEES: Primary | ICD-10-CM

## 2023-07-31 DIAGNOSIS — G89.29 CHRONIC PAIN OF BOTH KNEES: Primary | ICD-10-CM

## 2023-07-31 DIAGNOSIS — R26.9 GAIT ABNORMALITY: ICD-10-CM

## 2023-07-31 DIAGNOSIS — H40.1133 PRIMARY OPEN ANGLE GLAUCOMA OF BOTH EYES, SEVERE STAGE: Primary | ICD-10-CM

## 2023-07-31 DIAGNOSIS — M25.562 CHRONIC PAIN OF BOTH KNEES: Primary | ICD-10-CM

## 2023-07-31 PROCEDURE — 1126F AMNT PAIN NOTED NONE PRSNT: CPT | Mod: CPTII,S$GLB,, | Performed by: OPTOMETRIST

## 2023-07-31 PROCEDURE — 99213 OFFICE O/P EST LOW 20 MIN: CPT | Mod: S$GLB,,, | Performed by: OPTOMETRIST

## 2023-07-31 PROCEDURE — 1159F PR MEDICATION LIST DOCUMENTED IN MEDICAL RECORD: ICD-10-PCS | Mod: CPTII,S$GLB,, | Performed by: OPTOMETRIST

## 2023-07-31 PROCEDURE — 1160F PR REVIEW ALL MEDS BY PRESCRIBER/CLIN PHARMACIST DOCUMENTED: ICD-10-PCS | Mod: CPTII,S$GLB,, | Performed by: OPTOMETRIST

## 2023-07-31 PROCEDURE — 99213 PR OFFICE/OUTPT VISIT, EST, LEVL III, 20-29 MIN: ICD-10-PCS | Mod: S$GLB,,, | Performed by: OPTOMETRIST

## 2023-07-31 PROCEDURE — 1159F MED LIST DOCD IN RCRD: CPT | Mod: CPTII,S$GLB,, | Performed by: OPTOMETRIST

## 2023-07-31 PROCEDURE — 3044F HG A1C LEVEL LT 7.0%: CPT | Mod: CPTII,S$GLB,, | Performed by: OPTOMETRIST

## 2023-07-31 PROCEDURE — 1101F PT FALLS ASSESS-DOCD LE1/YR: CPT | Mod: CPTII,S$GLB,, | Performed by: OPTOMETRIST

## 2023-07-31 PROCEDURE — 3060F PR POS MICROALBUMINURIA RESULT DOCUMENTED/REVIEW: ICD-10-PCS | Mod: CPTII,S$GLB,, | Performed by: OPTOMETRIST

## 2023-07-31 PROCEDURE — 3044F PR MOST RECENT HEMOGLOBIN A1C LEVEL <7.0%: ICD-10-PCS | Mod: CPTII,S$GLB,, | Performed by: OPTOMETRIST

## 2023-07-31 PROCEDURE — 1160F RVW MEDS BY RX/DR IN RCRD: CPT | Mod: CPTII,S$GLB,, | Performed by: OPTOMETRIST

## 2023-07-31 PROCEDURE — 92133 POSTERIOR SEGMENT OCT OPTIC NERVE(OCULAR COHERENCE TOMOGRAPHY) - OU - BOTH EYES: ICD-10-PCS | Mod: S$GLB,,, | Performed by: OPTOMETRIST

## 2023-07-31 PROCEDURE — 3288F PR FALLS RISK ASSESSMENT DOCUMENTED: ICD-10-PCS | Mod: CPTII,S$GLB,, | Performed by: OPTOMETRIST

## 2023-07-31 PROCEDURE — 3060F POS MICROALBUMINURIA REV: CPT | Mod: CPTII,S$GLB,, | Performed by: OPTOMETRIST

## 2023-07-31 PROCEDURE — 1126F PR PAIN SEVERITY QUANTIFIED, NO PAIN PRESENT: ICD-10-PCS | Mod: CPTII,S$GLB,, | Performed by: OPTOMETRIST

## 2023-07-31 PROCEDURE — 92133 CPTRZD OPH DX IMG PST SGM ON: CPT | Mod: S$GLB,,, | Performed by: OPTOMETRIST

## 2023-07-31 PROCEDURE — 99999 PR PBB SHADOW E&M-EST. PATIENT-LVL III: ICD-10-PCS | Mod: PBBFAC,,, | Performed by: OPTOMETRIST

## 2023-07-31 PROCEDURE — 1101F PR PT FALLS ASSESS DOC 0-1 FALLS W/OUT INJ PAST YR: ICD-10-PCS | Mod: CPTII,S$GLB,, | Performed by: OPTOMETRIST

## 2023-07-31 PROCEDURE — 97110 THERAPEUTIC EXERCISES: CPT | Mod: PO,CQ

## 2023-07-31 PROCEDURE — 3066F PR DOCUMENTATION OF TREATMENT FOR NEPHROPATHY: ICD-10-PCS | Mod: CPTII,S$GLB,, | Performed by: OPTOMETRIST

## 2023-07-31 PROCEDURE — 2023F PR DILATED RETINAL EXAM W/O EVID OF RETINOPATHY: ICD-10-PCS | Mod: CPTII,S$GLB,, | Performed by: OPTOMETRIST

## 2023-07-31 PROCEDURE — 2023F DILAT RTA XM W/O RTNOPTHY: CPT | Mod: CPTII,S$GLB,, | Performed by: OPTOMETRIST

## 2023-07-31 PROCEDURE — 99999 PR PBB SHADOW E&M-EST. PATIENT-LVL III: CPT | Mod: PBBFAC,,, | Performed by: OPTOMETRIST

## 2023-07-31 PROCEDURE — 3288F FALL RISK ASSESSMENT DOCD: CPT | Mod: CPTII,S$GLB,, | Performed by: OPTOMETRIST

## 2023-07-31 PROCEDURE — 3066F NEPHROPATHY DOC TX: CPT | Mod: CPTII,S$GLB,, | Performed by: OPTOMETRIST

## 2023-07-31 NOTE — PROGRESS NOTES
HPI     Glaucoma    In both eyes.  Side effects of treatment include none.  Treatment   compliance is always.           Comments    Pt here for iop check dls- 05/25/23    Pt states her vision OS is not improving doesn't seem to be decreasing,   after she uses her GTTS vision clears up for about 5 minutes then will get   blurry again.   Pt states she us using 4 glaucoma gtts BID unsure of the names of them.   Denies eye pain, last few mornings she has had some burning OU.           Last edited by Christiano Hdez, OD on 7/31/2023 10:21 AM.            Assessment /Plan     For exam results, see Encounter Report.    Primary open angle glaucoma of both eyes, severe stage    Pseudophakia of both eyes      IOP lower today OS, cont drops, pt states 4 drops, will bring to next visit. Pt prev got out of hospital, given drops in hospital, target at 12-14 no vision changes noted,  pachy normal, OCT with rnfl thinning ou, RTC IOP ck in 2 month, pt missed last appt pt called and reported timoptic 2x/day ou  Brimonidine 2x/day ou and latanaprost once in evening ou, OCT rnfl today, due for vf exam needs scheduled next visit, will be difficult due to tremors  2. Monitor condition. Patient to report any changes. RTC 1 year recheck.

## 2023-08-04 DIAGNOSIS — K21.9 GERD WITHOUT ESOPHAGITIS: ICD-10-CM

## 2023-08-04 RX ORDER — OMEPRAZOLE 40 MG/1
40 CAPSULE, DELAYED RELEASE ORAL
Qty: 90 CAPSULE | Refills: 1 | Status: SHIPPED | OUTPATIENT
Start: 2023-08-04 | End: 2023-08-21 | Stop reason: SDUPTHER

## 2023-08-04 NOTE — TELEPHONE ENCOUNTER
Refill Routing Note     Refill Routing Note   Medication(s) are not appropriate for processing by [facility] for the following reason(s):      Due for refill >6 months ago    [facility] action(s):  Defer Care Due:  None identified            Appointments  past 12m or future 3m with PCP    Date Provider   Last Visit   7/19/2023 Triston Valverde MD   Next Visit   8/21/2023 Triston Valverde MD   ED visits in past 90 days: 0        Note composed:2:37 PM 08/04/2023

## 2023-08-04 NOTE — TELEPHONE ENCOUNTER
No care due was identified.  [facility] Embedded Care Due Messages. Reference number: [id number].   8/04/2023 11:49:47 AM CDT

## 2023-08-07 ENCOUNTER — CLINICAL SUPPORT (OUTPATIENT)
Dept: REHABILITATION | Facility: HOSPITAL | Age: 73
End: 2023-08-07
Attending: ORTHOPAEDIC SURGERY
Payer: MEDICARE

## 2023-08-07 ENCOUNTER — OFFICE VISIT (OUTPATIENT)
Dept: PODIATRY | Facility: CLINIC | Age: 73
End: 2023-08-07
Payer: MEDICARE

## 2023-08-07 DIAGNOSIS — B35.1 ONYCHOMYCOSIS DUE TO DERMATOPHYTE: ICD-10-CM

## 2023-08-07 DIAGNOSIS — M20.11 VALGUS DEFORMITY OF BOTH GREAT TOES: ICD-10-CM

## 2023-08-07 DIAGNOSIS — R26.9 GAIT ABNORMALITY: ICD-10-CM

## 2023-08-07 DIAGNOSIS — M21.42 ACQUIRED PES PLANOVALGUS OF LEFT FOOT: ICD-10-CM

## 2023-08-07 DIAGNOSIS — R29.898 WEAKNESS OF BOTH LOWER EXTREMITIES: ICD-10-CM

## 2023-08-07 DIAGNOSIS — M25.562 CHRONIC PAIN OF BOTH KNEES: Primary | ICD-10-CM

## 2023-08-07 DIAGNOSIS — G89.29 CHRONIC PAIN OF BOTH KNEES: Primary | ICD-10-CM

## 2023-08-07 DIAGNOSIS — E11.51 TYPE II DIABETES MELLITUS WITH PERIPHERAL CIRCULATORY DISORDER: Primary | ICD-10-CM

## 2023-08-07 DIAGNOSIS — M25.561 CHRONIC PAIN OF BOTH KNEES: Primary | ICD-10-CM

## 2023-08-07 DIAGNOSIS — M20.12 VALGUS DEFORMITY OF BOTH GREAT TOES: ICD-10-CM

## 2023-08-07 DIAGNOSIS — M21.41 ACQUIRED PES PLANOVALGUS OF RIGHT FOOT: ICD-10-CM

## 2023-08-07 PROCEDURE — 3044F HG A1C LEVEL LT 7.0%: CPT | Mod: CPTII,S$GLB,, | Performed by: PODIATRIST

## 2023-08-07 PROCEDURE — 3044F PR MOST RECENT HEMOGLOBIN A1C LEVEL <7.0%: ICD-10-PCS | Mod: CPTII,S$GLB,, | Performed by: PODIATRIST

## 2023-08-07 PROCEDURE — 99203 OFFICE O/P NEW LOW 30 MIN: CPT | Mod: S$GLB,,, | Performed by: PODIATRIST

## 2023-08-07 PROCEDURE — 1159F MED LIST DOCD IN RCRD: CPT | Mod: CPTII,S$GLB,, | Performed by: PODIATRIST

## 2023-08-07 PROCEDURE — 3060F PR POS MICROALBUMINURIA RESULT DOCUMENTED/REVIEW: ICD-10-PCS | Mod: CPTII,S$GLB,, | Performed by: PODIATRIST

## 2023-08-07 PROCEDURE — 99203 PR OFFICE/OUTPT VISIT, NEW, LEVL III, 30-44 MIN: ICD-10-PCS | Mod: S$GLB,,, | Performed by: PODIATRIST

## 2023-08-07 PROCEDURE — 3072F LOW RISK FOR RETINOPATHY: CPT | Mod: CPTII,S$GLB,, | Performed by: PODIATRIST

## 2023-08-07 PROCEDURE — 3060F POS MICROALBUMINURIA REV: CPT | Mod: CPTII,S$GLB,, | Performed by: PODIATRIST

## 2023-08-07 PROCEDURE — 3066F PR DOCUMENTATION OF TREATMENT FOR NEPHROPATHY: ICD-10-PCS | Mod: CPTII,S$GLB,, | Performed by: PODIATRIST

## 2023-08-07 PROCEDURE — 1160F RVW MEDS BY RX/DR IN RCRD: CPT | Mod: CPTII,S$GLB,, | Performed by: PODIATRIST

## 2023-08-07 PROCEDURE — 97110 THERAPEUTIC EXERCISES: CPT | Mod: PO,CQ

## 2023-08-07 PROCEDURE — 3066F NEPHROPATHY DOC TX: CPT | Mod: CPTII,S$GLB,, | Performed by: PODIATRIST

## 2023-08-07 PROCEDURE — 3072F PR LOW RISK FOR RETINOPATHY: ICD-10-PCS | Mod: CPTII,S$GLB,, | Performed by: PODIATRIST

## 2023-08-07 PROCEDURE — 99999 PR PBB SHADOW E&M-EST. PATIENT-LVL I: ICD-10-PCS | Mod: PBBFAC,,, | Performed by: PODIATRIST

## 2023-08-07 PROCEDURE — 99999 PR PBB SHADOW E&M-EST. PATIENT-LVL I: CPT | Mod: PBBFAC,,, | Performed by: PODIATRIST

## 2023-08-07 PROCEDURE — 1160F PR REVIEW ALL MEDS BY PRESCRIBER/CLIN PHARMACIST DOCUMENTED: ICD-10-PCS | Mod: CPTII,S$GLB,, | Performed by: PODIATRIST

## 2023-08-07 PROCEDURE — 1159F PR MEDICATION LIST DOCUMENTED IN MEDICAL RECORD: ICD-10-PCS | Mod: CPTII,S$GLB,, | Performed by: PODIATRIST

## 2023-08-07 NOTE — PROGRESS NOTES
"Physical Therapy Daily Treatment Note     Name: Deb Webb  Clinic Number: [id number]    Therapy Diagnosis:   Encounter Diagnoses   Name Primary?    Chronic pain of both knees Yes    Weakness of both lower extremities     Gait abnormality      Physician: Blas Barraza MD    Visit Date: 8/7/2023    Physician Orders: PT Eval and Treat bilat knee OA  Medical Diagnosis from Referral:   M17.11 (ICD-10-CM) - Osteoarthritis of right knee   M17.12 (ICD-10-CM) - Osteoarthritis of left knee      Evaluation Date: 7/17/2023  Authorization Period Expiration: 8/23/2023  Plan of Care Expiration: 9/25/2023  Progress Note Due: 8/16/2023  Visit # / Visits authorized: 2/ 24 +Eval  FOTO: 1/3    Time In: 0953 AM  Time Out: 1040 AM  Total Billable Time: 30 minutes    Precautions: Standard, Diabetes, and see PMHx      Subjective     Pt reports: her knees are feeling better today. Patient states she feels like the ice at the end of the session helps. She was not compliant with home exercise program.  Response to previous treatment: increase in muscular soreness  Functional change: None yet    Pain: 6/10  Location: bilateral knee      Objective     Treatment      Deb received therapeutic exercises to develop strength, endurance, ROM, flexibility, posture and core stabilization for 50 minutes including:    Recumbent bike x 8 minutes level 1  BLE quad sets x15 c 5 sec hold  +Hamstring stretch with strap 3x30"  Hook lying clamshells c Yellow TB 2x10  Supine marches c Yellow TB x20 ea  Supine bridges c Yellow TB 2x10  Supine hip add (ball squeeze) x20 3" hold  SLR 2x10  LAQs 2x10    Gastroc stretch on incline board 3x30"  Heel raises x30  Mini squats 2x10 (verbal cueing for proper form)    Deb received the following manual therapy techniques:  were applied to the:  for  minutes, including:    Deb participated in neuromuscular re-education activities to improve: Balance, Coordination, Proprioception and Posture for  minutes. " The following activities were included:    Deb received cold pack for 5 minutes to bilateral knees.    Home Exercises Provided and Patient Education Provided     Education provided:   - HEP review    Written Home Exercises Provided: Patient instructed to cont prior HEP.  Exercises were reviewed and Deb was able to demonstrate them prior to the end of the session.  Deb demonstrated good  understanding of the education provided.     See EMR under Patient Instructions for exercises provided prior visit.    Assessment     Deb demonstrating good tolerance to standing exercise but she requires SBA from PTA as well as HHA due to difficulty maintaining balance. Patient challenged by LE strengthening. No complaints of increased pain during treatment session.     Deb Is progressing well towards her goals.   Pt prognosis is Fair.     Pt will continue to benefit from skilled outpatient physical therapy to address the deficits listed in the problem list box on initial evaluation, provide pt/family education and to maximize pt's level of independence in the home and community environment.     Pt's spiritual, cultural and educational needs considered and pt agreeable to plan of care and goals.     Anticipated barriers to physical therapy: PMHx    Goals:   Short Term Goals: 5 weeks   Patient to report worst pain 4/10  Patient to improve BLE strength by 1/2 grade in deficieint areas  Patient to improve 5x sit to stand by 5 seconds  Patient to tolerate flexibility assessment  Patient to tolerate balance assessment     Long Term Goals: 10 weeks   Patient to report worst pain 1/10  Patient to improve BLE strength by 1 grade in deficieint areas  Patient to improve 5x sit to stand by 10 seconds  Additional goals to come as patient progresses    Plan     Plan of care Certification: 7/17/2023 to 9/25/2023.     Outpatient Physical Therapy 1 times weekly for 10 weeks to include the following interventions: Electrical  Stimulation per contraindications cleared, Gait Training, Manual Therapy, Moist Heat/ Ice, Neuromuscular Re-ed, Patient Education, Self Care, Therapeutic Activities, and Therapeutic Exercise.      Madeline Crawford, PTA

## 2023-08-07 NOTE — PROGRESS NOTES
Subjective:      Patient ID: Deb Webb is a 72 y.o. female.    Chief Complaint: No chief complaint on file.    Deb is a 72 y.o. female who presents to the clinic upon referral from Dr. Bingham  for evaluation and treatment of diabetic feet. Deb has a past medical history of Allergy, Arthritis, Cataract, Colon polyps, COPD (chronic obstructive pulmonary disease), Diabetes mellitus type II, Diabetic neuropathy, Fever blister, Glaucoma (increased eye pressure), Hyperlipidemia, Hypertension, Irritable bowel syndrome, Keloid cicatrix, Osteoporosis, Retained cholelithiasis following cholecystectomy(997.41), and Right patella fracture. Patient relates no major problem with feet. Only complaints today consist of long nails and needing diabetic shoe prescription.    PCP: Triston Valverde MD        Current shoe gear: Casual shoes    Hemoglobin A1C   Date Value Ref Range Status   07/19/2023 5.8 (H) 4.0 - 5.6 % Final     Comment:     ADA Screening Guidelines:  5.7-6.4%  Consistent with prediabetes  >or=6.5%  Consistent with diabetes    High levels of fetal hemoglobin interfere with the HbA1C  assay. Heterozygous hemoglobin variants (HbS, HgC, etc)do  not significantly interfere with this assay.   However, presence of multiple variants may affect accuracy.     02/13/2023 6.7 (H) 4.0 - 5.6 % Final     Comment:     ADA Screening Guidelines:  5.7-6.4%  Consistent with prediabetes  >or=6.5%  Consistent with diabetes    High levels of fetal hemoglobin interfere with the HbA1C  assay. Heterozygous hemoglobin variants (HbS, HgC, etc)do  not significantly interfere with this assay.   However, presence of multiple variants may affect accuracy.     08/29/2022 7.3 (H) 0.0 - 5.6 % Final     Comment:     Reference Interval:  5.0 - 5.6 Normal   5.7 - 6.4 High Risk   > 6.5 Diabetic      Hgb A1c results are standardized based on the (NGSP) National   Glycohemoglobin Standardization Program.      Hemoglobin A1C levels are related  to mean serum/plasma glucose   during the preceding 2-3 months.              Review of Systems   Constitutional: Negative for chills and fever.   Cardiovascular:  Negative for claudication and leg swelling.   Respiratory:  Negative for shortness of breath.    Skin:  Positive for nail changes. Negative for itching and rash.   Musculoskeletal:  Negative for muscle cramps, muscle weakness and myalgias.   Gastrointestinal:  Negative for nausea and vomiting.   Neurological:  Negative for focal weakness, loss of balance, numbness and paresthesias.           Objective:      Physical Exam  Constitutional:       General: She is not in acute distress.     Appearance: She is well-developed. She is not diaphoretic.   Cardiovascular:      Pulses:           Dorsalis pedis pulses are 2+ on the right side and 2+ on the left side.        Posterior tibial pulses are 2+ on the right side and 2+ on the left side.      Comments: < 3 sec capillary refill time to toes 1-5 bilateral. Toes and feet are warm to touch proximally with normal distal cooling b/l. There is some hair growth on the feet and toes b/l. There is no edema b/l. No spider veins or varicosities present b/l.     Musculoskeletal:      Comments: Equinus noted b/l ankles with < 10 deg DF noted. MMT 5/5 in DF/PF/Inv/Ev resistance with no reproduction of pain in any direction. Passive range of motion of ankle and pedal joints is painless b/l.     Feet:      Right foot:      Protective Sensation: 10 sites tested.  10 sites sensed.      Left foot:      Protective Sensation: 10 sites tested.  10 sites sensed.   Skin:     General: Skin is warm and dry.      Coloration: Skin is not pale.      Findings: No abrasion, bruising, burn, ecchymosis, erythema, laceration, lesion, petechiae or rash.      Nails: There is no clubbing.      Comments: Skin temperature, texture and turgor within normal limits.    Nails 1-5 bilateral are thick 3-4 mm, long 3-6 mm, and discolored with subungual  debris       Neurological:      Mental Status: She is alert and oriented to person, place, and time.      Sensory: No sensory deficit.      Motor: No tremor, atrophy or abnormal muscle tone.      Comments: Negative tinel sign bilateral.   Psychiatric:         Behavior: Behavior normal.               Assessment:       Encounter Diagnoses   Name Primary?    Type II diabetes mellitus with peripheral circulatory disorder Yes    Onychomycosis due to dermatophyte     Acquired pes planovalgus of left foot     Acquired pes planovalgus of right foot     Valgus deformity of both great toes          Plan:       Diagnoses and all orders for this visit:    Type II diabetes mellitus with peripheral circulatory disorder  -     DIABETIC SHOES FOR HOME USE    Onychomycosis due to dermatophyte    Acquired pes planovalgus of left foot  -     DIABETIC SHOES FOR HOME USE    Acquired pes planovalgus of right foot  -     DIABETIC SHOES FOR HOME USE    Valgus deformity of both great toes  -     DIABETIC SHOES FOR HOME USE      I counseled the patient on her conditions, their implications and medical management.    Shoe inspection. Diabetic Foot Education. Patient reminded of the importance of good nutrition and blood sugar control to help prevent podiatric complications of diabetes. Patient instructed on proper foot hygeine. We discussed wearing proper shoe gear, daily foot inspections, never walking without protective shoe gear, never putting sharp instruments to feet    Discussed that she does not qualify for routine nail care under medicare regulations and should go to a nail salon if she needs help trimming the nails on a routine basis    Diabetic foot exam performed and diabetic shoe prescription dispensed    Return 1 year diabetic foot check up or between as needed for wounds/pain etc    Isaiah Austin DPM

## 2023-08-14 ENCOUNTER — CLINICAL SUPPORT (OUTPATIENT)
Dept: REHABILITATION | Facility: HOSPITAL | Age: 73
End: 2023-08-14
Attending: ORTHOPAEDIC SURGERY
Payer: MEDICARE

## 2023-08-14 DIAGNOSIS — M25.562 CHRONIC PAIN OF BOTH KNEES: Primary | ICD-10-CM

## 2023-08-14 DIAGNOSIS — M25.561 CHRONIC PAIN OF BOTH KNEES: Primary | ICD-10-CM

## 2023-08-14 DIAGNOSIS — R29.898 WEAKNESS OF BOTH LOWER EXTREMITIES: ICD-10-CM

## 2023-08-14 DIAGNOSIS — G89.29 CHRONIC PAIN OF BOTH KNEES: Primary | ICD-10-CM

## 2023-08-14 DIAGNOSIS — R26.9 GAIT ABNORMALITY: ICD-10-CM

## 2023-08-14 PROCEDURE — 97110 THERAPEUTIC EXERCISES: CPT | Mod: PO

## 2023-08-14 NOTE — PROGRESS NOTES
Physical Therapy Re-assessment     Name: Deb Webb  Clinic Number: [id number]    Therapy Diagnosis:   Encounter Diagnoses   Name Primary?    Chronic pain of both knees Yes    Weakness of both lower extremities     Gait abnormality      Physician: Blas Barraza MD    Visit Date: 8/14/2023    Physician Orders: PT Eval and Treat bilat knee OA  Medical Diagnosis from Referral:   M17.11 (ICD-10-CM) - Osteoarthritis of right knee   M17.12 (ICD-10-CM) - Osteoarthritis of left knee      Evaluation Date: 7/17/2023  Authorization Period Expiration: 8/23/2023  Plan of Care Expiration: 9/25/2023  Progress Note Due: 9/14/2023  Visit # / Visits authorized: 3/ 24 +Eval  FOTO: 2/3    Time In: 1200 AM  Time Out: 1255 AM  Total Billable Time: 55 minutes    Precautions: Standard, Diabetes, and see PMHx      Subjective     Pt reports: her knees are feeling better today and feels like she is getting stronger from coming to PT overall.    She was not compliant with home exercise program.  Response to previous treatment: increase in muscular soreness  Functional change: None yet    Pain: 5/10  Location: bilateral knee      Objective     Range of Motion:   Knee Right Left   Active 0-132 0-130      Lower Extremity Strength  Right LE   Left LE     Knee extension: 4-/5 Knee extension: 4-/5   Knee flexion: 4/5 Knee flexion: 4/5   Hip flexion: 4-/5 Hip flexion: 3+/5   Hip extension:  NP 2/2 patient unable to lie prone Hip extension: NP 2/2 patient unable to lie prone   Hip abduction: 4/5 Hip abduction: 4/5   Hip adduction: 4/5 Hip adduction 4/5         5x sit to stand: 20.25sec c dynamic knee valgus noted        Intake Outcome Measure for FOTO knee Survey     Therapist reviewed FOTO scores for Deb Webb on 7/17/2023.   FOTO documents entered into [employer] - see Media section.     Intake Score: 54%            Treatment      Deb received therapeutic exercises to develop strength, endurance, ROM, flexibility, posture and core  "stabilization for 55 minutes including:    Recumbent bike x 5 minutes level 1  EOB sit to stands 4x5  BLE quad sets x15 c 5 sec hold  +Hamstring stretch with strap 3x30"  Hook lying clamshells c Yellow TB 2x10  Supine marches c Yellow TB x20 ea  Supine bridges c Yellow TB 2x10  Supine hip add (ball squeeze) x20 3"  SLR 2x10  LAQs 2x10  Gastroc stretch on incline board 3x30"-NP  Heel raises x30  Mini squats 2x10 (verbal cueing for proper form)-NP  Re-assessment    Deb received the following manual therapy techniques:  were applied to the:  for  minutes, including:    Deb participated in neuromuscular re-education activities to improve: Balance, Coordination, Proprioception and Posture for  minutes. The following activities were included:    Deb received cold pack for 5 minutes to bilateral knees.    Home Exercises Provided and Patient Education Provided     Education provided:   - HEP review    Written Home Exercises Provided: Patient instructed to cont prior HEP.  Exercises were reviewed and Deb was able to demonstrate them prior to the end of the session.  Deb demonstrated good  understanding of the education provided.     See EMR under Patient Instructions for exercises provided prior visit.    Assessment     Re-assessment performed today. Pt with improvements noted in range of motion and strength as a result of current POC. Although she presents with improvements in these areas, she continues to exhibit decreased strength, which limits her ability to perform everyday activities to include walking, standing, and navigating stairs without limitations. Pt with notable improvements in overall pain as a result of current POC, signifying success with mitigation of pain with current POC. Progressed today's BLE strengthening interventions by incorporating isotonic resistance interventions with good tolerance but fatigue noted. Due to these remaining deficits, pt will continue to benefit from skilled PT " services to improve mobility, control pain, and restore overall function.      Deb Is progressing well towards her goals.   Pt prognosis is Fair.     Pt will continue to benefit from skilled outpatient physical therapy to address the deficits listed in the problem list box on initial evaluation, provide pt/family education and to maximize pt's level of independence in the home and community environment.     Pt's spiritual, cultural and educational needs considered and pt agreeable to plan of care and goals.     Anticipated barriers to physical therapy: PMHx    Goals:   Short Term Goals: 5 weeks   Patient to report worst pain 4/10-NEARING  Patient to improve BLE strength by 1/2 grade in deficieint areas-NEARING  Patient to improve 5x sit to stand by 5 seconds-IN PROGRESS  Patient to tolerate flexibility assessment-IN PROGRESS  Patient to tolerate balance assessment-IN PROGRESS     Long Term Goals: 10 weeks   Patient to report worst pain 1/10  Patient to improve BLE strength by 1 grade in deficieint areas  Patient to improve 5x sit to stand by 10 seconds  Additional goals to come as patient progresses    Plan     Plan of care Certification: 7/17/2023 to 9/25/2023.     Outpatient Physical Therapy 1 times weekly for 10 weeks to include the following interventions: Electrical Stimulation per contraindications cleared, Gait Training, Manual Therapy, Moist Heat/ Ice, Neuromuscular Re-ed, Patient Education, Self Care, Therapeutic Activities, and Therapeutic Exercise.      René Price, PT

## 2023-08-21 ENCOUNTER — CLINICAL SUPPORT (OUTPATIENT)
Dept: REHABILITATION | Facility: HOSPITAL | Age: 73
End: 2023-08-21
Attending: ORTHOPAEDIC SURGERY
Payer: MEDICARE

## 2023-08-21 ENCOUNTER — OFFICE VISIT (OUTPATIENT)
Dept: FAMILY MEDICINE | Facility: CLINIC | Age: 73
End: 2023-08-21
Payer: MEDICARE

## 2023-08-21 VITALS
DIASTOLIC BLOOD PRESSURE: 68 MMHG | HEART RATE: 111 BPM | BODY MASS INDEX: 19.64 KG/M2 | SYSTOLIC BLOOD PRESSURE: 110 MMHG | WEIGHT: 97.25 LBS | OXYGEN SATURATION: 93 %

## 2023-08-21 DIAGNOSIS — J06.9 ACUTE URI: Primary | ICD-10-CM

## 2023-08-21 DIAGNOSIS — K21.9 GERD WITHOUT ESOPHAGITIS: ICD-10-CM

## 2023-08-21 DIAGNOSIS — R26.9 GAIT ABNORMALITY: ICD-10-CM

## 2023-08-21 DIAGNOSIS — K58.0 IRRITABLE BOWEL SYNDROME WITH DIARRHEA: ICD-10-CM

## 2023-08-21 DIAGNOSIS — M25.562 CHRONIC PAIN OF BOTH KNEES: Primary | ICD-10-CM

## 2023-08-21 DIAGNOSIS — G89.29 CHRONIC PAIN OF BOTH KNEES: Primary | ICD-10-CM

## 2023-08-21 DIAGNOSIS — J41.0 SIMPLE CHRONIC BRONCHITIS: ICD-10-CM

## 2023-08-21 DIAGNOSIS — M25.561 CHRONIC PAIN OF BOTH KNEES: Primary | ICD-10-CM

## 2023-08-21 DIAGNOSIS — R29.898 WEAKNESS OF BOTH LOWER EXTREMITIES: ICD-10-CM

## 2023-08-21 PROCEDURE — 3074F SYST BP LT 130 MM HG: CPT | Mod: CPTII,S$GLB,, | Performed by: INTERNAL MEDICINE

## 2023-08-21 PROCEDURE — 1159F PR MEDICATION LIST DOCUMENTED IN MEDICAL RECORD: ICD-10-PCS | Mod: CPTII,S$GLB,, | Performed by: INTERNAL MEDICINE

## 2023-08-21 PROCEDURE — 99214 OFFICE O/P EST MOD 30 MIN: CPT | Mod: S$GLB,,, | Performed by: INTERNAL MEDICINE

## 2023-08-21 PROCEDURE — 3078F DIAST BP <80 MM HG: CPT | Mod: CPTII,S$GLB,, | Performed by: INTERNAL MEDICINE

## 2023-08-21 PROCEDURE — 3288F FALL RISK ASSESSMENT DOCD: CPT | Mod: CPTII,S$GLB,, | Performed by: INTERNAL MEDICINE

## 2023-08-21 PROCEDURE — 3044F HG A1C LEVEL LT 7.0%: CPT | Mod: CPTII,S$GLB,, | Performed by: INTERNAL MEDICINE

## 2023-08-21 PROCEDURE — 1160F PR REVIEW ALL MEDS BY PRESCRIBER/CLIN PHARMACIST DOCUMENTED: ICD-10-PCS | Mod: CPTII,S$GLB,, | Performed by: INTERNAL MEDICINE

## 2023-08-21 PROCEDURE — 3066F NEPHROPATHY DOC TX: CPT | Mod: CPTII,S$GLB,, | Performed by: INTERNAL MEDICINE

## 2023-08-21 PROCEDURE — 1160F RVW MEDS BY RX/DR IN RCRD: CPT | Mod: CPTII,S$GLB,, | Performed by: INTERNAL MEDICINE

## 2023-08-21 PROCEDURE — 3078F PR MOST RECENT DIASTOLIC BLOOD PRESSURE < 80 MM HG: ICD-10-PCS | Mod: CPTII,S$GLB,, | Performed by: INTERNAL MEDICINE

## 2023-08-21 PROCEDURE — 3074F PR MOST RECENT SYSTOLIC BLOOD PRESSURE < 130 MM HG: ICD-10-PCS | Mod: CPTII,S$GLB,, | Performed by: INTERNAL MEDICINE

## 2023-08-21 PROCEDURE — 1159F MED LIST DOCD IN RCRD: CPT | Mod: CPTII,S$GLB,, | Performed by: INTERNAL MEDICINE

## 2023-08-21 PROCEDURE — 97110 THERAPEUTIC EXERCISES: CPT | Mod: KX,PO

## 2023-08-21 PROCEDURE — 3060F POS MICROALBUMINURIA REV: CPT | Mod: CPTII,S$GLB,, | Performed by: INTERNAL MEDICINE

## 2023-08-21 PROCEDURE — 1101F PT FALLS ASSESS-DOCD LE1/YR: CPT | Mod: CPTII,S$GLB,, | Performed by: INTERNAL MEDICINE

## 2023-08-21 PROCEDURE — 3008F BODY MASS INDEX DOCD: CPT | Mod: CPTII,S$GLB,, | Performed by: INTERNAL MEDICINE

## 2023-08-21 PROCEDURE — 99999 PR PBB SHADOW E&M-EST. PATIENT-LVL IV: CPT | Mod: PBBFAC,,, | Performed by: INTERNAL MEDICINE

## 2023-08-21 PROCEDURE — 99214 PR OFFICE/OUTPT VISIT, EST, LEVL IV, 30-39 MIN: ICD-10-PCS | Mod: S$GLB,,, | Performed by: INTERNAL MEDICINE

## 2023-08-21 PROCEDURE — 3044F PR MOST RECENT HEMOGLOBIN A1C LEVEL <7.0%: ICD-10-PCS | Mod: CPTII,S$GLB,, | Performed by: INTERNAL MEDICINE

## 2023-08-21 PROCEDURE — 1101F PR PT FALLS ASSESS DOC 0-1 FALLS W/OUT INJ PAST YR: ICD-10-PCS | Mod: CPTII,S$GLB,, | Performed by: INTERNAL MEDICINE

## 2023-08-21 PROCEDURE — 3288F PR FALLS RISK ASSESSMENT DOCUMENTED: ICD-10-PCS | Mod: CPTII,S$GLB,, | Performed by: INTERNAL MEDICINE

## 2023-08-21 PROCEDURE — 3008F PR BODY MASS INDEX (BMI) DOCUMENTED: ICD-10-PCS | Mod: CPTII,S$GLB,, | Performed by: INTERNAL MEDICINE

## 2023-08-21 PROCEDURE — 3060F PR POS MICROALBUMINURIA RESULT DOCUMENTED/REVIEW: ICD-10-PCS | Mod: CPTII,S$GLB,, | Performed by: INTERNAL MEDICINE

## 2023-08-21 PROCEDURE — 99999 PR PBB SHADOW E&M-EST. PATIENT-LVL IV: ICD-10-PCS | Mod: PBBFAC,,, | Performed by: INTERNAL MEDICINE

## 2023-08-21 PROCEDURE — 3066F PR DOCUMENTATION OF TREATMENT FOR NEPHROPATHY: ICD-10-PCS | Mod: CPTII,S$GLB,, | Performed by: INTERNAL MEDICINE

## 2023-08-21 RX ORDER — OMEPRAZOLE 40 MG/1
40 CAPSULE, DELAYED RELEASE ORAL
Qty: 90 CAPSULE | Refills: 1 | Status: SHIPPED | OUTPATIENT
Start: 2023-08-21

## 2023-08-21 RX ORDER — CHOLESTYRAMINE 4 G/4.8G
POWDER, FOR SUSPENSION ORAL
Qty: 180 PACKET | Refills: 3 | Status: SHIPPED | OUTPATIENT
Start: 2023-08-21

## 2023-08-21 RX ORDER — AMOXICILLIN AND CLAVULANATE POTASSIUM 875; 125 MG/1; MG/1
1 TABLET, FILM COATED ORAL EVERY 12 HOURS
Qty: 14 TABLET | Refills: 0 | Status: SHIPPED | OUTPATIENT
Start: 2023-08-21 | End: 2023-08-28

## 2023-08-21 NOTE — PROGRESS NOTES
Subjective     Deb Webb is a 72 y.o. old, female here for Follow-up    73 y/o with PMH of CVA, PAF on xarelto, Type 2 DM with neuropathy, HTN, HLD, Chronic respiratory failure, COPD, osteopenia, IBS, anxiety, tobacco use, Essential tremor, prior BZO dependence    Patient is here with complaints of URI symptoms the past week: congestion, rhinorrhea, headache, sore throat. No recent covid exposure. So far only mild cough, not like a typical COPD exacerbation.    IBS-D: needs prevalite refilled, does not do as well on generic medication. Diarrhea controlled when she takes it regularly.    GERD: needs a refill of her daily PPI, pharmacy would not fill it for her despite me refilling it last time I saw her.    Tobacco use: smoking less, family giving her fewer c/day.    Review of Systems   Constitutional:  Positive for malaise/fatigue.   HENT:  Positive for congestion and sore throat.    Respiratory: Negative.     Cardiovascular: Negative.    Gastrointestinal:  Positive for diarrhea.     Medications     Outpatient Medications Marked as Taking for the 8/21/23 encounter (Office Visit) with Triston Valverde MD   Medication Sig Dispense Refill    albuterol (PROAIR HFA) 90 mcg/actuation inhaler Inhale 2 puffs into the lungs every 6 (six) hours as needed for Wheezing. Rescue 18 g 2    albuterol-ipratropium (DUO-NEB) 2.5 mg-0.5 mg/3 mL nebulizer solution Take 3 mLs by nebulization every 6 (six) hours as needed for Wheezing or Shortness of Breath. Rescue 75 mL 0    alendronate (FOSAMAX) 70 MG tablet Take 1 tablet (70 mg total) by mouth once a week. 12 tablet 3    amiodarone (PACERONE) 200 MG Tab Take 2 tabs by mouth twice daily until 12/2/22 then take 1 tab by mouth twice daily 90 tablet 1    ANORO ELLIPTA 62.5-25 mcg/actuation DsDv INHALE 1 PUFF INTO THE LUNGS ONCE DAILY 60 each 5    atorvastatin (LIPITOR) 40 MG tablet TAKE 1 TABLET BY MOUTH EVERY DAY 90 tablet 1    blood sugar diagnostic Strp To check BG 2  "times daily, to use with insurance preferred meter 200 each 3    blood-glucose meter,continuous (DEXCOM G6 ) Misc Use as directed 1 each 1    blood-glucose sensor (DEXCOM G6 SENSOR) Emmie Use as directed 10 each 2    brimonidine 0.2% (ALPHAGAN) 0.2 % Drop Place 1 drop into both eyes 2 (two) times a day. 5 mL 6    calcium carbonate/vitamin D3 (CALCIUM 600 + D,3, ORAL) Take 1 tablet by mouth once daily.      citalopram (CELEXA) 20 MG tablet Take 1 tablet (20 mg total) by mouth once daily. 90 tablet 1    dulaglutide (TRULICITY) 1.5 mg/0.5 mL pen injector INJECT 1.5 MG INTO THE SKIN EVERY 7 DAYS. THROUGH PATIENT ASSISTANCE 12 pen 1    furosemide (LASIX) 20 MG tablet Take 1 tablet (20 mg total) by mouth as needed (swelling).      gabapentin (NEURONTIN) 300 MG capsule TAKE 1 CAPSULE BY MOUTH THREE TIMES A  capsule 1    guaiFENesin (MUCINEX) 600 mg 12 hr tablet Take 1 tablet (600 mg total) by mouth 2 (two) times daily.      HYDROcodone-acetaminophen (NORCO) 5-325 mg per tablet Take 1 tablet by mouth every 12 (twelve) hours as needed for Pain. 10 tablet 0    Lactobacillus rhamnosus GG (CULTURELLE) 10 billion cell capsule Take 1 capsule by mouth once daily. 60 capsule 0    lancets Misc To check BG 2 times daily, to use with insurance preferred meter 200 each 3    latanoprost 0.005 % ophthalmic solution Place 1 drop into both eyes every evening. 7.5 mL 3    meclizine (ANTIVERT) 12.5 mg tablet TAKE 1 TABLET BY MOUTH 3 TIMES DAILY AS NEEDED FOR DIZZINESS. 30 tablet 0    omega-3 fatty acids-vitamin E 1,000 mg Cap Take 1 capsule by mouth once daily.      pen needle, diabetic (BD ULTRA-FINE AGUS PEN NEEDLE) 32 gauge x 5/32" Ndle 1 Device by Misc.(Non-Drug; Combo Route) route 2 (two) times daily. 200 each 4    primidone (MYSOLINE) 50 MG Tab TAKE 2 TABLETS BY MOUTH 3 (THREE) TIMES DAILY. ONE HALF TABLET FOR ONE WEEK, THEN AS PRESCRIBED 540 tablet 3    timolol maleate 0.5% (TIMOPTIC) 0.5 % Drop Place 1 drop into both " eyes once daily. 5 mL 6    XARELTO 20 mg Tab TAKE 1 TABLET BY MOUTH EVERY DAY WITH DINNER OR EVENING MEAL 90 tablet 3    [DISCONTINUED] cholestyramine-aspartame (PREVALITE) 4 gram PwPk TAKE 1 PACKET (4 G TOTAL) BY MOUTH 2 (TWO) TIMES DAILY. FOR DIARRHEA 180 packet 0    [DISCONTINUED] omeprazole (PRILOSEC) 40 MG capsule TAKE 1 CAPSULE BY MOUTH BEFORE BREAKFAST. 90 capsule 1     Objective     /68   Pulse (!) 111   Wt 44.1 kg (97 lb 3.6 oz)   SpO2 (!) 93%   BMI 19.64 kg/m²   Physical Exam  Constitutional:       General: She is not in acute distress.     Appearance: Normal appearance. She is well-developed.   HENT:      Head: Normocephalic and atraumatic.      Mouth/Throat:      Pharynx: Posterior oropharyngeal erythema present. No oropharyngeal exudate.   Eyes:      General:         Right eye: No discharge.         Left eye: No discharge.      Conjunctiva/sclera: Conjunctivae normal.   Cardiovascular:      Rate and Rhythm: Normal rate and regular rhythm.      Heart sounds: No murmur heard.  Pulmonary:      Effort: Pulmonary effort is normal. No respiratory distress.      Breath sounds: Normal breath sounds.       Assessment and Plan     Acute URI  -     POCT COVID-19 Rapid Screening    GERD without esophagitis  -     omeprazole (PRILOSEC) 40 MG capsule; Take 1 capsule (40 mg total) by mouth before breakfast.  Dispense: 90 capsule; Refill: 1    Irritable bowel syndrome with diarrhea  -     cholestyramine-aspartame (PREVALITE) 4 gram PwPk; TAKE 1 PACKET (4 G TOTAL) BY MOUTH 2 (TWO) TIMES DAILY. FOR DIARRHEA  Dispense: 180 packet; Refill: 3    COPD    Other orders  -     amoxicillin-clavulanate 875-125mg (AUGMENTIN) 875-125 mg per tablet; Take 1 tablet by mouth every 12 (twelve) hours. for 7 days  Dispense: 14 tablet; Refill: 0      If COPD symptoms worsen start augmentin and albuterol prn, would hold off for now and just treat URI supportively.    No follow-ups on file.  ___________________  Triston Valverde  MD  Internal Medicine and Pediatrics

## 2023-08-21 NOTE — PROGRESS NOTES
Physical Therapy Treatment Note     Name: Deb Webb  Clinic Number: [id number]    Therapy Diagnosis:   Encounter Diagnoses   Name Primary?    Chronic pain of both knees Yes    Weakness of both lower extremities     Gait abnormality      Physician: Blas Barraza MD    Visit Date: 8/21/2023    Physician Orders: PT Eval and Treat bilat knee OA  Medical Diagnosis from Referral:   M17.11 (ICD-10-CM) - Osteoarthritis of right knee   M17.12 (ICD-10-CM) - Osteoarthritis of left knee      Evaluation Date: 7/17/2023  Authorization Period Expiration: 8/23/2023  Plan of Care Expiration: 9/25/2023  Progress Note Due: 9/14/2023  Visit # / Visits authorized: 5/ 24 +Eval  FOTO: 2/3    Time In: 1055 AM  Time Out: 11:49 AM  Total Billable Time: 54 minutes    Precautions: Standard, Diabetes, and see PMHx      Subjective     Pt reports: suffering with a sinus infection today which is taking her energy out of her. She states her pain is about the same despite this    She was not compliant with home exercise program.  Response to previous treatment: increase in muscular soreness  Functional change: None yet    Pain: 5/10  Location: bilateral knee      Objective     NP today:  Range of Motion:   Knee Right Left   Active 0-132 0-130      Lower Extremity Strength  Right LE   Left LE     Knee extension: 4-/5 Knee extension: 4-/5   Knee flexion: 4/5 Knee flexion: 4/5   Hip flexion: 4-/5 Hip flexion: 3+/5   Hip extension:  NP 2/2 patient unable to lie prone Hip extension: NP 2/2 patient unable to lie prone   Hip abduction: 4/5 Hip abduction: 4/5   Hip adduction: 4/5 Hip adduction 4/5         5x sit to stand: 20.25sec c dynamic knee valgus noted        Intake Outcome Measure for FOTO knee Survey     Therapist reviewed FOTO scores for Deb Webb on 7/17/2023.   FOTO documents entered into [employer] - see Media section.     Intake Score: 54%            Treatment      Deb received therapeutic exercises to develop strength,  "endurance, ROM, flexibility, posture and core stabilization for 54 minutes including:    Recumbent bike x 5 minutes level 1  EOB sit to stands 3x10  BLE quad sets x15 c 5 sec hold  +Hamstring stretch with strap 3x30"  Hook lying clamshells c Yellow TB 2x10  Supine marches c Yellow TB x30 ea  Supine bridges c Yellow TB 2x10  Supine hip add (ball squeeze) x20 3"  SLR 3x10  LAQs 2x10  Gastroc stretch on incline board 3x30"-NP  Heel raises x30  Mini squats 2x10 (verbal cueing for proper form)-NP      Deb received the following manual therapy techniques:  were applied to the:  for  minutes, including:    Deb participated in neuromuscular re-education activities to improve: Balance, Coordination, Proprioception and Posture for  minutes. The following activities were included:    Deb received cold pack for 5 minutes to bilateral knees.    Home Exercises Provided and Patient Education Provided     Education provided:   - HEP review    Written Home Exercises Provided: Patient instructed to cont prior HEP.  Exercises were reviewed and Deb was able to demonstrate them prior to the end of the session.  Deb demonstrated good  understanding of the education provided.     See EMR under Patient Instructions for exercises provided prior visit.    Assessment     Due to patient report of feeling ill today and low on energy, kept interventions to bed level strengthening interventions with good tolerance noted. Pt with notable improved ability to perform functional sit to stands without use of hands today, signifying improved overall functional strength from current POC. Will continue to progress to tolerance going forward to improve functional strength and restore mobility as sessions continue.    Deb Is progressing well towards her goals.   Pt prognosis is Fair.     Pt will continue to benefit from skilled outpatient physical therapy to address the deficits listed in the problem list box on initial evaluation, " provide pt/family education and to maximize pt's level of independence in the home and community environment.     Pt's spiritual, cultural and educational needs considered and pt agreeable to plan of care and goals.     Anticipated barriers to physical therapy: PMHx    Goals:   Short Term Goals: 5 weeks   Patient to report worst pain 4/10-NEARING  Patient to improve BLE strength by 1/2 grade in deficieint areas-NEARING  Patient to improve 5x sit to stand by 5 seconds-IN PROGRESS  Patient to tolerate flexibility assessment-IN PROGRESS  Patient to tolerate balance assessment-IN PROGRESS     Long Term Goals: 10 weeks   Patient to report worst pain 1/10  Patient to improve BLE strength by 1 grade in deficieint areas  Patient to improve 5x sit to stand by 10 seconds  Additional goals to come as patient progresses    Plan     Plan of care Certification: 7/17/2023 to 9/25/2023.     Outpatient Physical Therapy 1 times weekly for 10 weeks to include the following interventions: Electrical Stimulation per contraindications cleared, Gait Training, Manual Therapy, Moist Heat/ Ice, Neuromuscular Re-ed, Patient Education, Self Care, Therapeutic Activities, and Therapeutic Exercise.      René Price, PT

## 2023-08-28 ENCOUNTER — CLINICAL SUPPORT (OUTPATIENT)
Dept: REHABILITATION | Facility: HOSPITAL | Age: 73
End: 2023-08-28
Attending: ORTHOPAEDIC SURGERY
Payer: MEDICARE

## 2023-08-28 DIAGNOSIS — M25.562 CHRONIC PAIN OF BOTH KNEES: Primary | ICD-10-CM

## 2023-08-28 DIAGNOSIS — R29.898 WEAKNESS OF BOTH LOWER EXTREMITIES: ICD-10-CM

## 2023-08-28 DIAGNOSIS — R26.9 GAIT ABNORMALITY: ICD-10-CM

## 2023-08-28 DIAGNOSIS — M25.561 CHRONIC PAIN OF BOTH KNEES: Primary | ICD-10-CM

## 2023-08-28 DIAGNOSIS — G89.29 CHRONIC PAIN OF BOTH KNEES: Primary | ICD-10-CM

## 2023-08-28 PROCEDURE — 97110 THERAPEUTIC EXERCISES: CPT | Mod: PO,CQ

## 2023-08-28 NOTE — PROGRESS NOTES
"Physical Therapy Treatment Note     Name: Deb Webb  Clinic Number: [id number]    Therapy Diagnosis:   Encounter Diagnoses   Name Primary?    Chronic pain of both knees Yes    Weakness of both lower extremities     Gait abnormality        Physician: Blas Barraza MD    Visit Date: 8/28/2023    Physician Orders: PT Eval and Treat bilat knee OA  Medical Diagnosis from Referral:   M17.11 (ICD-10-CM) - Osteoarthritis of right knee   M17.12 (ICD-10-CM) - Osteoarthritis of left knee      Evaluation Date: 7/17/2023  Authorization Period Expiration: 8/23/2023  Plan of Care Expiration: 9/25/2023  Progress Note Due: 9/14/2023  Visit # / Visits authorized: 5/ 24 +Eval  FOTO: 2/3    Time In: 0955 AM  Time Out: 1045 AM  Total Billable Time: 30 minutes    Precautions: Standard, Diabetes, and see PMHx      Subjective     Pt reports: she is going well today. Patient states the dong jumped on her in bed and she feels like it may have hurt her ankle. She was not compliant with home exercise program.  Response to previous treatment: increase in muscular soreness  Functional change: None yet    Pain: 5/10  Location: bilateral knee      Objective     Treatment      Deb received therapeutic exercises to develop strength, endurance, ROM, flexibility, posture and core stabilization for 54 minutes including:  Recumbent bike x 5 minutes level 1  EOB sit to stands 3x10  Standing heel raises 3x10  Standing hip abduction 2x10  Standing march 2x10  LAQ 2x10    BLE quad sets x15 c 5 sec hold  SLR 3x10  Hamstring stretch with strap 3x30"  Hook lying clamshells c red TB 2x10  Supine marches c red TB x30 ea  Supine bridges c red TB 2x10  Supine hip add (ball squeeze) x 20 3"  Gastroc stretch on incline board 3x30"-NP  Mini squats 2x10 (verbal cueing for proper form)-NP    Deb received the following manual therapy techniques:  were applied to the:  for  minutes, including:    Deb participated in neuromuscular re-education " activities to improve: Balance, Coordination, Proprioception and Posture for  minutes. The following activities were included:    Deb received cold pack for 5 minutes to bilateral knees.    Home Exercises Provided and Patient Education Provided     Education provided:   - HEP review    Written Home Exercises Provided: Patient instructed to cont prior HEP.  Exercises were reviewed and Deb was able to demonstrate them prior to the end of the session.  Deb demonstrated good  understanding of the education provided.     See EMR under Patient Instructions for exercises provided prior visit.    Assessment     Deb demonstrating improving tolerance to standing activities today and she was able to progress volume without difficulty. Patient able to perform sit to stand without UE assist but she does require for postural awareness and utilization of gluteals. Patient without complaints of knee pain during treatment session.     Deb Is progressing well towards her goals.   Pt prognosis is Fair.     Pt will continue to benefit from skilled outpatient physical therapy to address the deficits listed in the problem list box on initial evaluation, provide pt/family education and to maximize pt's level of independence in the home and community environment.     Pt's spiritual, cultural and educational needs considered and pt agreeable to plan of care and goals.     Anticipated barriers to physical therapy: PMHx    Goals:   Short Term Goals: 5 weeks   Patient to report worst pain 4/10-NEARING  Patient to improve BLE strength by 1/2 grade in deficieint areas-NEARING  Patient to improve 5x sit to stand by 5 seconds-IN PROGRESS  Patient to tolerate flexibility assessment-IN PROGRESS  Patient to tolerate balance assessment-IN PROGRESS     Long Term Goals: 10 weeks   Patient to report worst pain 1/10  Patient to improve BLE strength by 1 grade in deficieint areas  Patient to improve 5x sit to stand by 10 seconds  Additional  goals to come as patient progresses    Plan     Plan of care Certification: 7/17/2023 to 9/25/2023.     Outpatient Physical Therapy 1 times weekly for 10 weeks to include the following interventions: Electrical Stimulation per contraindications cleared, Gait Training, Manual Therapy, Moist Heat/ Ice, Neuromuscular Re-ed, Patient Education, Self Care, Therapeutic Activities, and Therapeutic Exercise.      Madeline Crawford, PTA

## 2023-09-01 DIAGNOSIS — J30.0 VMR (VASOMOTOR RHINITIS): ICD-10-CM

## 2023-09-01 RX ORDER — IPRATROPIUM BROMIDE 42 UG/1
2 SPRAY, METERED NASAL 3 TIMES DAILY
Qty: 15 ML | Refills: 4 | Status: ON HOLD | OUTPATIENT
Start: 2023-09-01 | End: 2023-12-13 | Stop reason: SDUPTHER

## 2023-09-05 DIAGNOSIS — E11.42 TYPE 2 DIABETES MELLITUS WITH DIABETIC POLYNEUROPATHY, WITHOUT LONG-TERM CURRENT USE OF INSULIN: ICD-10-CM

## 2023-09-05 NOTE — TELEPHONE ENCOUNTER
No care due was identified.  [facility] Embedded Care Due Messages. Reference number: [id number].   9/05/2023 2:03:59 PM CDT

## 2023-09-06 RX ORDER — DULAGLUTIDE 1.5 MG/.5ML
1.5 INJECTION, SOLUTION SUBCUTANEOUS
Qty: 12 PEN | Refills: 1 | Status: ON HOLD | OUTPATIENT
Start: 2023-09-06 | End: 2024-01-04

## 2023-09-06 NOTE — TELEPHONE ENCOUNTER
Refill Decision Note   Deb Webb  is requesting a refill authorization.  Brief Assessment and Rationale for Refill:  Approve     Medication Therapy Plan:         Pharmacist review requested: Yes   Extended chart review required: Yes   Comments:     Note composed:3:33 AM 09/06/2023

## 2023-09-06 NOTE — TELEPHONE ENCOUNTER
Refill Routing Note   Medication(s) are not appropriate for processing by [facility] for the following reason(s):      Drug-disease interaction     [facility] action(s):  Defer Care Due:  None identified            Pharmacist review requested: Yes     Appointments  past 12m or future 3m with PCP    Date Provider   Last Visit   8/21/2023 Triston Valverde MD   Next Visit   1/19/2024 Triston Valverde MD   ED visits in past 90 days: 0        Note composed:11:14 PM 09/05/2023

## 2023-09-13 ENCOUNTER — CLINICAL SUPPORT (OUTPATIENT)
Dept: REHABILITATION | Facility: HOSPITAL | Age: 73
End: 2023-09-13
Payer: MEDICARE

## 2023-09-13 DIAGNOSIS — R26.9 GAIT ABNORMALITY: ICD-10-CM

## 2023-09-13 DIAGNOSIS — R29.898 WEAKNESS OF BOTH LOWER EXTREMITIES: ICD-10-CM

## 2023-09-13 DIAGNOSIS — G89.29 CHRONIC PAIN OF BOTH KNEES: Primary | ICD-10-CM

## 2023-09-13 DIAGNOSIS — M25.562 CHRONIC PAIN OF BOTH KNEES: Primary | ICD-10-CM

## 2023-09-13 DIAGNOSIS — M25.561 CHRONIC PAIN OF BOTH KNEES: Primary | ICD-10-CM

## 2023-09-13 PROCEDURE — 97110 THERAPEUTIC EXERCISES: CPT | Mod: PO,CQ

## 2023-09-13 NOTE — PROGRESS NOTES
"Physical Therapy Treatment Note     Name: Deb Webb  Clinic Number: [id number]    Therapy Diagnosis:   Encounter Diagnoses   Name Primary?    Chronic pain of both knees Yes    Weakness of both lower extremities     Gait abnormality        Physician: Blas Barraza MD    Visit Date: 9/13/2023    Physician Orders: PT Eval and Treat bilat knee OA  Medical Diagnosis from Referral:   M17.11 (ICD-10-CM) - Osteoarthritis of right knee   M17.12 (ICD-10-CM) - Osteoarthritis of left knee      Evaluation Date: 7/17/2023  Authorization Period Expiration: 8/23/2023  Plan of Care Expiration: 9/25/2023  Progress Note Due: 9/14/2023  Visit # / Visits authorized: 6/ 24 +Eval  FOTO: 2/3    Time In: 9:00 AM  Time Out: 9:50 AM  Total Billable Time: 50 minutes    Precautions: Standard, Diabetes, and see PMHx      Subjective     Pt reports: Continued bilateral knee pain and experienced slight increase in muscular soreness following exercise progressions made last visit.    She was not compliant with home exercise program.  Response to previous treatment: increase in muscular soreness  Functional change: None yet    Pain: 5/10  Location: bilateral knee      Objective     Treatment      Deb received therapeutic exercises to develop strength, endurance, ROM, flexibility, posture and core stabilization for 50 minutes including:    Recumbent bike x 5 minutes level 1  Hamstring stretch with strap 3x30"  BLE quad sets x15 c 5 sec hold  SLR #1 3x10  Hook lying clamshells c red TB 3x10  Supine marches c red TB x30 ea  Supine bridges c red TB 3x10  Supine hip add (ball squeeze) x 20 3"  EOB sit to stands 3x10  Standing heel raises 3x10  Standing hip abduction 2x10  Standing march 2x10  LAQ #1 2x10  Gastroc stretch on incline board 3x30"  Mini squats 2x10 (verbal cueing for proper form)-NP    Deb received the following manual therapy techniques:  were applied to the:  for  minutes, including:    Deb participated in neuromuscular " re-education activities to improve: Balance, Coordination, Proprioception and Posture for  minutes. The following activities were included:    Deb received cold pack for 5 minutes to bilateral knees.    Home Exercises Provided and Patient Education Provided     Education provided:   - HEP review    Written Home Exercises Provided: Patient instructed to cont prior HEP.  Exercises were reviewed and Deb was able to demonstrate them prior to the end of the session.  Deb demonstrated good  understanding of the education provided.     See EMR under Patient Instructions for exercises provided prior visit.    Assessment   Deb returned reporting continued bilateral knee pain upon arrival for treatment. Increase in volume and resistance on most exercises were tolerated well with no increase in knee pain. She did require increase rest breaks due to increase in muscular fatigue. Will monitor and attempt to progress per pt's tolerance.    Deb Is progressing well towards her goals.   Pt prognosis is Fair.     Pt will continue to benefit from skilled outpatient physical therapy to address the deficits listed in the problem list box on initial evaluation, provide pt/family education and to maximize pt's level of independence in the home and community environment.     Pt's spiritual, cultural and educational needs considered and pt agreeable to plan of care and goals.     Anticipated barriers to physical therapy: PMHx    Goals:   Short Term Goals: 5 weeks   Patient to report worst pain 4/10-NEARING  Patient to improve BLE strength by 1/2 grade in deficieint areas-NEARING  Patient to improve 5x sit to stand by 5 seconds-IN PROGRESS  Patient to tolerate flexibility assessment-IN PROGRESS  Patient to tolerate balance assessment-IN PROGRESS     Long Term Goals: 10 weeks   Patient to report worst pain 1/10  Patient to improve BLE strength by 1 grade in deficieint areas  Patient to improve 5x sit to stand by 10  seconds  Additional goals to come as patient progresses    Plan     Plan of care Certification: 7/17/2023 to 9/25/2023.     Outpatient Physical Therapy 1 times weekly for 10 weeks to include the following interventions: Electrical Stimulation per contraindications cleared, Gait Training, Manual Therapy, Moist Heat/ Ice, Neuromuscular Re-ed, Patient Education, Self Care, Therapeutic Activities, and Therapeutic Exercise.      Eleazar Zurita, PTA

## 2023-09-18 ENCOUNTER — CLINICAL SUPPORT (OUTPATIENT)
Dept: REHABILITATION | Facility: HOSPITAL | Age: 73
End: 2023-09-18
Payer: MEDICARE

## 2023-09-18 DIAGNOSIS — M25.562 CHRONIC PAIN OF BOTH KNEES: Primary | ICD-10-CM

## 2023-09-18 DIAGNOSIS — M25.561 CHRONIC PAIN OF BOTH KNEES: Primary | ICD-10-CM

## 2023-09-18 DIAGNOSIS — G89.29 CHRONIC PAIN OF BOTH KNEES: Primary | ICD-10-CM

## 2023-09-18 DIAGNOSIS — R26.9 GAIT ABNORMALITY: ICD-10-CM

## 2023-09-18 DIAGNOSIS — R29.898 WEAKNESS OF BOTH LOWER EXTREMITIES: ICD-10-CM

## 2023-09-18 PROCEDURE — 97110 THERAPEUTIC EXERCISES: CPT | Mod: PO,CQ

## 2023-09-18 NOTE — PROGRESS NOTES
"Physical Therapy Treatment Note     Name: Deb Webb  Clinic Number: [id number]    Therapy Diagnosis:   Encounter Diagnoses   Name Primary?    Chronic pain of both knees Yes    Weakness of both lower extremities     Gait abnormality        Physician: Blas Barraza MD    Visit Date: 9/18/2023    Physician Orders: PT Eval and Treat bilat knee OA  Medical Diagnosis from Referral:   M17.11 (ICD-10-CM) - Osteoarthritis of right knee   M17.12 (ICD-10-CM) - Osteoarthritis of left knee      Evaluation Date: 7/17/2023  Authorization Period Expiration: 8/23/2023  Plan of Care Expiration: 9/25/2023  Progress Note Due: 9/14/2023  Visit # / Visits authorized: 7/ 24 +Eval  FOTO: 2/3    Time In: 10:00 AM  Time Out: 10:58 AM  Total Billable Time: 58 minutes    Precautions: Standard, Diabetes, and see PMHx      Subjective     Pt reports: Continued bilateral knee pain and experienced slight increase in muscular soreness following exercise progressions made last visit.    She was not compliant with home exercise program.  Response to previous treatment: increase in muscular soreness  Functional change: None yet    Pain: 6/10  Location: bilateral knee      Objective     Treatment      Deb received therapeutic exercises to develop strength, endurance, ROM, flexibility, posture and core stabilization for 58 minutes including:    Recumbent bike x 8 minutes level 1  Hamstring stretch with strap 3x30"  BLE quad sets x15 c 5 sec hold  SLR #1 3x10  Hook lying clamshells c GTB   3x10  Supine marches c GTB x30 ea  Supine bridges c GTB 3x10  Supine hip add (ball squeeze) x 20 3"  EOB sit to stands 3x10  Standing heel raises 3x10  Standing hip abduction 2x10  Standing march 2x10  LAQ #1 2x10  Gastroc stretch on incline board 3x30"  Mini squats 2x10 (verbal cueing for proper form)-NP  +Step ups 6" step 2x10      Deb received the following manual therapy techniques:  were applied to the:  for  minutes, including:    Deb " participated in neuromuscular re-education activities to improve: Balance, Coordination, Proprioception and Posture for  minutes. The following activities were included:    Deb received cold pack for 0 minutes to bilateral knees.    Home Exercises Provided and Patient Education Provided     Education provided:   - HEP review    Written Home Exercises Provided: Patient instructed to cont prior HEP.  Exercises were reviewed and Deb was able to demonstrate them prior to the end of the session.  Deb demonstrated good  understanding of the education provided.     See EMR under Patient Instructions for exercises provided prior visit.    Assessment   Deb returned reporting continued bilateral knee pain upon arrival for treatment Continuation and progression of LE flexibility and strengthening ex's were tolerated well with no increase in knee pain. Step ups were introduced to increase functional LE strengthening challenge. Will monitor and attempt to progress per pt's tolerance.    Deb Is progressing well towards her goals.   Pt prognosis is Fair.     Pt will continue to benefit from skilled outpatient physical therapy to address the deficits listed in the problem list box on initial evaluation, provide pt/family education and to maximize pt's level of independence in the home and community environment.     Pt's spiritual, cultural and educational needs considered and pt agreeable to plan of care and goals.     Anticipated barriers to physical therapy: PMHx    Goals:   Short Term Goals: 5 weeks   Patient to report worst pain 4/10-NEARING  Patient to improve BLE strength by 1/2 grade in deficieint areas-NEARING  Patient to improve 5x sit to stand by 5 seconds-IN PROGRESS  Patient to tolerate flexibility assessment-IN PROGRESS  Patient to tolerate balance assessment-IN PROGRESS     Long Term Goals: 10 weeks   Patient to report worst pain 1/10  Patient to improve BLE strength by 1 grade in deficieint  areas  Patient to improve 5x sit to stand by 10 seconds  Additional goals to come as patient progresses    Plan     Plan of care Certification: 7/17/2023 to 9/25/2023.     Outpatient Physical Therapy 1 times weekly for 10 weeks to include the following interventions: Electrical Stimulation per contraindications cleared, Gait Training, Manual Therapy, Moist Heat/ Ice, Neuromuscular Re-ed, Patient Education, Self Care, Therapeutic Activities, and Therapeutic Exercise.      Eleazar Zurita, PTA

## 2023-09-19 ENCOUNTER — TELEPHONE (OUTPATIENT)
Dept: PODIATRY | Facility: CLINIC | Age: 73
End: 2023-09-19
Payer: MEDICARE

## 2023-09-19 NOTE — TELEPHONE ENCOUNTER
Regarding: Patient Needing Prescription/Order for Shoes  Mrs. Webb is requesting a copy of her prescription/order for diabetic shoes. Please reach out to patient to follow up.    [phone]    Thank you.    Spoke with pt and let her know I will have prescription with the  for pt to .

## 2023-09-25 ENCOUNTER — CLINICAL SUPPORT (OUTPATIENT)
Dept: REHABILITATION | Facility: HOSPITAL | Age: 73
End: 2023-09-25
Payer: MEDICARE

## 2023-09-25 ENCOUNTER — OFFICE VISIT (OUTPATIENT)
Dept: OPTOMETRY | Facility: CLINIC | Age: 73
End: 2023-09-25
Payer: MEDICARE

## 2023-09-25 DIAGNOSIS — H40.1133 PRIMARY OPEN ANGLE GLAUCOMA OF BOTH EYES, SEVERE STAGE: Primary | ICD-10-CM

## 2023-09-25 DIAGNOSIS — R29.898 WEAKNESS OF BOTH LOWER EXTREMITIES: ICD-10-CM

## 2023-09-25 DIAGNOSIS — G25.0 ESSENTIAL TREMOR: ICD-10-CM

## 2023-09-25 DIAGNOSIS — M25.562 CHRONIC PAIN OF BOTH KNEES: Primary | ICD-10-CM

## 2023-09-25 DIAGNOSIS — Z96.1 PSEUDOPHAKIA OF BOTH EYES: ICD-10-CM

## 2023-09-25 DIAGNOSIS — E11.9 TYPE 2 DIABETES MELLITUS WITHOUT RETINOPATHY: ICD-10-CM

## 2023-09-25 DIAGNOSIS — R26.9 GAIT ABNORMALITY: ICD-10-CM

## 2023-09-25 DIAGNOSIS — G89.29 CHRONIC PAIN OF BOTH KNEES: Primary | ICD-10-CM

## 2023-09-25 DIAGNOSIS — M25.561 CHRONIC PAIN OF BOTH KNEES: Primary | ICD-10-CM

## 2023-09-25 PROCEDURE — 1160F RVW MEDS BY RX/DR IN RCRD: CPT | Mod: CPTII,S$GLB,, | Performed by: OPTOMETRIST

## 2023-09-25 PROCEDURE — 1159F PR MEDICATION LIST DOCUMENTED IN MEDICAL RECORD: ICD-10-PCS | Mod: CPTII,S$GLB,, | Performed by: OPTOMETRIST

## 2023-09-25 PROCEDURE — 1159F MED LIST DOCD IN RCRD: CPT | Mod: CPTII,S$GLB,, | Performed by: OPTOMETRIST

## 2023-09-25 PROCEDURE — 3044F HG A1C LEVEL LT 7.0%: CPT | Mod: CPTII,S$GLB,, | Performed by: OPTOMETRIST

## 2023-09-25 PROCEDURE — 3288F FALL RISK ASSESSMENT DOCD: CPT | Mod: CPTII,S$GLB,, | Performed by: OPTOMETRIST

## 2023-09-25 PROCEDURE — 1160F PR REVIEW ALL MEDS BY PRESCRIBER/CLIN PHARMACIST DOCUMENTED: ICD-10-PCS | Mod: CPTII,S$GLB,, | Performed by: OPTOMETRIST

## 2023-09-25 PROCEDURE — 3066F NEPHROPATHY DOC TX: CPT | Mod: CPTII,S$GLB,, | Performed by: OPTOMETRIST

## 2023-09-25 PROCEDURE — 99213 OFFICE O/P EST LOW 20 MIN: CPT | Mod: S$GLB,,, | Performed by: OPTOMETRIST

## 2023-09-25 PROCEDURE — 3288F PR FALLS RISK ASSESSMENT DOCUMENTED: ICD-10-PCS | Mod: CPTII,S$GLB,, | Performed by: OPTOMETRIST

## 2023-09-25 PROCEDURE — 3044F PR MOST RECENT HEMOGLOBIN A1C LEVEL <7.0%: ICD-10-PCS | Mod: CPTII,S$GLB,, | Performed by: OPTOMETRIST

## 2023-09-25 PROCEDURE — 1125F PR PAIN SEVERITY QUANTIFIED, PAIN PRESENT: ICD-10-PCS | Mod: CPTII,S$GLB,, | Performed by: OPTOMETRIST

## 2023-09-25 PROCEDURE — 2023F DILAT RTA XM W/O RTNOPTHY: CPT | Mod: CPTII,S$GLB,, | Performed by: OPTOMETRIST

## 2023-09-25 PROCEDURE — 1101F PT FALLS ASSESS-DOCD LE1/YR: CPT | Mod: CPTII,S$GLB,, | Performed by: OPTOMETRIST

## 2023-09-25 PROCEDURE — 3060F POS MICROALBUMINURIA REV: CPT | Mod: CPTII,S$GLB,, | Performed by: OPTOMETRIST

## 2023-09-25 PROCEDURE — 99213 PR OFFICE/OUTPT VISIT, EST, LEVL III, 20-29 MIN: ICD-10-PCS | Mod: S$GLB,,, | Performed by: OPTOMETRIST

## 2023-09-25 PROCEDURE — 99999 PR PBB SHADOW E&M-EST. PATIENT-LVL IV: ICD-10-PCS | Mod: PBBFAC,,, | Performed by: OPTOMETRIST

## 2023-09-25 PROCEDURE — 3060F PR POS MICROALBUMINURIA RESULT DOCUMENTED/REVIEW: ICD-10-PCS | Mod: CPTII,S$GLB,, | Performed by: OPTOMETRIST

## 2023-09-25 PROCEDURE — 1101F PR PT FALLS ASSESS DOC 0-1 FALLS W/OUT INJ PAST YR: ICD-10-PCS | Mod: CPTII,S$GLB,, | Performed by: OPTOMETRIST

## 2023-09-25 PROCEDURE — 1125F AMNT PAIN NOTED PAIN PRSNT: CPT | Mod: CPTII,S$GLB,, | Performed by: OPTOMETRIST

## 2023-09-25 PROCEDURE — 97110 THERAPEUTIC EXERCISES: CPT | Mod: PO

## 2023-09-25 PROCEDURE — 2023F PR DILATED RETINAL EXAM W/O EVID OF RETINOPATHY: ICD-10-PCS | Mod: CPTII,S$GLB,, | Performed by: OPTOMETRIST

## 2023-09-25 PROCEDURE — 3066F PR DOCUMENTATION OF TREATMENT FOR NEPHROPATHY: ICD-10-PCS | Mod: CPTII,S$GLB,, | Performed by: OPTOMETRIST

## 2023-09-25 PROCEDURE — 99999 PR PBB SHADOW E&M-EST. PATIENT-LVL IV: CPT | Mod: PBBFAC,,, | Performed by: OPTOMETRIST

## 2023-09-25 NOTE — PROGRESS NOTES
Physical Therapy Re-assessment and POC Extension     Name: Deb Webb  Clinic Number: [id number]    Therapy Diagnosis:   Encounter Diagnoses   Name Primary?    Chronic pain of both knees Yes    Weakness of both lower extremities     Gait abnormality        Physician: Blas Barraza MD    Visit Date: 9/25/2023    Physician Orders: PT Eval and Treat bilat knee OA  Medical Diagnosis from Referral:   M17.11 (ICD-10-CM) - Osteoarthritis of right knee   M17.12 (ICD-10-CM) - Osteoarthritis of left knee      Evaluation Date: 7/17/2023  Authorization Period Expiration: 8/23/2023  Plan of Care Expiration: 11/6/2023  Progress Note Due: 10/25/2023  Visit # / Visits authorized: 8/ 24 +Eval  FOTO: 2/3    Time In: 1200  Time Out: 1300  Total Billable Time: 60 minutes    Precautions: Standard, Diabetes, and see PMHx      Subjective     Pt reports: Feeling like she is getting stronger but is not where she needs to be just yet    She was not compliant with home exercise program.  Response to previous treatment: increase in muscular soreness  Functional change: None yet    Pain: 5/10  Location: bilateral knee      Objective     Range of Motion:   Knee Right Left   Active 0-132 0-135      Lower Extremity Strength  Right LE   Left LE     Knee extension: 4/5 Knee extension: 4/5   Knee flexion: 4/5 Knee flexion: 4/5   Hip flexion: 4/5 Hip flexion: 4/5   Hip extension:  4-/5 Hip extension: 4-/5   Hip abduction: 4-/5 Hip abduction: 4-/5   Hip adduction: 4/5 Hip adduction 4/5         5x sit to stand: 19.9 sec c dynamic knee valgus noted        Intake Outcome Measure for FOTO knee Survey     Therapist reviewed FOTO scores for Deb Webb on 7/17/2023.   FOTO documents entered into [employer] - see Media section.     Intake Score: 64%           Treatment      Deb received therapeutic exercises to develop strength, endurance, ROM, flexibility, posture and core stabilization for 60 minutes including:    Recumbent bike x 8 minutes  "level 1  Hamstring stretch with strap 3x30"  BLE quad sets x15 c 5 sec hold  SLR #1 3x10  Hook lying clamshells c GTB   3x10  Supine marches c GTB x30 ea  Supine bridges c GTB 3x10  Supine hip add (ball squeeze) x 20 3"  EOB sit to stands 3x10  Standing heel raises 3x10  Standing hip abduction 2x10 c 2# weights  Standing march 2x10 c 2# weights  LAQ 2# 2x10  Gastroc stretch on incline board 3x30"-NP  Mini squats 2x10 (verbal cueing for proper form)   +Step ups 6" step 2x10-NP  Re-assessment      Deb received the following manual therapy techniques:  were applied to the:  for  minutes, including:    Deb participated in neuromuscular re-education activities to improve: Balance, Coordination, Proprioception and Posture for  minutes. The following activities were included:    Deb received cold pack for 0 minutes to bilateral knees.    Home Exercises Provided and Patient Education Provided     Education provided:   - HEP review    Written Home Exercises Provided: Patient instructed to cont prior HEP.  Exercises were reviewed and Deb was able to demonstrate them prior to the end of the session.  Deb demonstrated good  understanding of the education provided.     See EMR under Patient Instructions for exercises provided prior visit.    Assessment     Re-assessment performed today. Pt with improvements noted in B knee range of motion and BLE strength as a result of current POC. Although she presents with improvements with these deficits, she continues to demonstrate gross BLE weakness end endurance deficits that limits her ability to perform everyday home and community mobility without complications. At this time, pt appears  to be making slow but steady progress and will benefit from extending current POC for 6 additional weeks to continue to address remaining strength and endurance deficits going forward. Will continue to progress to tolerance as sessions continue to improve mobility and restore overall " PULMONARY PROGRESS NOTE    DAMARI GUERRERO  MRN-[id number]    Patient is a 73y old  Female who presents with a chief complaint of mitral regurgitation, heart failure (24 Sep 2020 15:29)      HPI:   states she used BPAP overnight  tolerated it     ROS:   -    ACTIVE MEDICATION LIST:  MEDICATIONS  (STANDING):  albuterol/ipratropium for Nebulization 3 milliLiter(s) Nebulizer every 6 hours  allopurinol 100 milliGRAM(s) Oral daily  atorvastatin 40 milliGRAM(s) Oral at bedtime  buMETAnide 2 milliGRAM(s) Oral daily  heparin  Infusion. 1300 Unit(s)/Hr (13 mL/Hr) IV Continuous <Continuous>  lidocaine   Patch 1 Patch Transdermal every 24 hours  metoprolol tartrate 25 milliGRAM(s) Oral two times a day  pantoprazole    Tablet 40 milliGRAM(s) Oral before breakfast    MEDICATIONS  (PRN):  acetaminophen   Tablet .. 650 milliGRAM(s) Oral every 6 hours PRN Temp greater or equal to 38.5C (101.3F), Mild Pain (1 - 3)  heparin   Injectable 9000 Unit(s) IV Push every 6 hours PRN For aPTT less than 40  heparin   Injectable 4000 Unit(s) IV Push every 6 hours PRN For aPTT between 40 - 57      EXAM:  Vital Signs Last 24 Hrs  T(C): 36.8 (25 Sep 2020 12:14), Max: 37 (24 Sep 2020 19:34)  T(F): 98.2 (25 Sep 2020 12:14), Max: 98.6 (24 Sep 2020 19:34)  HR: 84 (25 Sep 2020 12:14) (57 - 84)  BP: 127/66 (25 Sep 2020 12:14) (106/56 - 134/76)  BP(mean): --  RR: 18 (25 Sep 2020 12:14) (18 - 18)  SpO2: 100% (25 Sep 2020 12:14) (94% - 100%)    GENERAL: The patient is awake and alert in no apparent distress.     LUNGS:  respirations unlabored    HEART: Regular rate and rhythm without murmur.                            8.7    7.83  )-----------( 301      ( 25 Sep 2020 07:23 )             31.4       09-25    141  |  94<L>  |  70<H>  ----------------------------<  104<H>  4.1   |  35<H>  |  1.52<H>    Ca    10.7<H>      25 Sep 2020 07:22  Phos  3.5     09-24  Mg     2.2     09-24    < from: Xray Chest 1 View- PORTABLE-Urgent (Xray Chest 1 View- PORTABLE-Urgent .) (09.18.20 @ 21:07) >    EXAM:  XR CHEST PORTABLE URGENT 1V                            PROCEDURE DATE:  09/18/2020            INTERPRETATION:  CLINICAL HISTORY: CHF.    TECHNIQUE: Single upright AP chest radiograph.    COMPARISON: Comparison with previous from 6/20/2020.    COMMENT:  Sternotomy wires are noted.    There is mild pulmonary vascular congestion.  There is no focal airspace consolidation.  There are no pleural effusions.    The mediastinal contour is markedly enlarged.  The trachea is midline.    The osseous structures are intact.    IMPRESSION:  Marked cardiomegaly with mild pulmonary vascular congestion likely representing mild fluid overload        < end of copied text >       PROBLEM LIST:  73y Female with HEALTH ISSUES - PROBLEM Dx:  Shortness of breath  Shortness of breath    Chronic kidney disease (CKD), stage III (moderate)  Chronic kidney disease (CKD), stage III (moderate)    Tricuspid valve replaced  Tricuspid valve replaced    Mitral valve replaced  Mitral valve replaced    Atrial fibrillation, unspecified type  Atrial fibrillation, unspecified type    Essential hypertension  Essential hypertension    Acute on chronic heart failure with preserved ejection fraction  Acute on chronic heart failure with preserved ejection fraction    Mitral valve stenosis, unspecified etiology  Mitral valve stenosis, unspecified etiology    CHF (congestive heart failure)  CHF (congestive heart failure)    Gout  Gout    MIGUEL (obstructive sleep apnea)  MIGUEL (obstructive sleep apnea)    Atrial fibrillation  Atrial fibrillation              RECS:    probable MIGUEL/OHS  trial of BPAP 10/5cmh20 fi02- 30%  trial of 3L 02 c during the day  she would need formal pfts   formal sleep study      Please call with any questions.    Irma Eaton,   [facility] Pulmonary/Sleep Medicine  [phone]   function.    Deb Is progressing well towards her goals.   Pt prognosis is Fair.     Pt will continue to benefit from skilled outpatient physical therapy to address the deficits listed in the problem list box on initial evaluation, provide pt/family education and to maximize pt's level of independence in the home and community environment.     Pt's spiritual, cultural and educational needs considered and pt agreeable to plan of care and goals.     Anticipated barriers to physical therapy: PMHx    Goals:   Short Term Goals: 5 weeks   Patient to report worst pain 4/10-NEARING  Patient to improve BLE strength by 1/2 grade in deficieint areas-MET  Patient to improve 5x sit to stand by 5 seconds-IN PROGRESS  Patient to tolerate flexibility assessment-IN PROGRESS  Patient to tolerate balance assessment-IN PROGRESS     Long Term Goals: 10 weeks   Patient to report worst pain 1/10-IN PROGRESS  Patient to improve BLE strength by 1 grade in deficieint areas-NEARING  Patient to improve 5x sit to stand by 10 seconds-IN PROGRESS  Additional goals to come as patient progresses    Plan     Plan of care Certification: 7/17/2023 to 9/25/2023.     Outpatient Physical Therapy 1 times weekly for 10 weeks to include the following interventions: Electrical Stimulation per contraindications cleared, Gait Training, Manual Therapy, Moist Heat/ Ice, Neuromuscular Re-ed, Patient Education, Self Care, Therapeutic Activities, and Therapeutic Exercise.      René Price, PT

## 2023-09-25 NOTE — PROGRESS NOTES
HPI     Glaucoma    In both eyes.  Vision is blurred.  Side effects of treatment include none.    Treatment compliance is always.           Comments    Pt here for 2 mo IOP check. Brimonidine BID OU, Latanoprost BID OU,   Timolol Maleate BID OU. Pt states OS VA has improved some. OD has fbs and   pain.           Last edited by Christiano Hdez, OD on 9/25/2023 10:14 AM.            Assessment /Plan     For exam results, see Encounter Report.    Primary open angle glaucoma of both eyes, severe stage    Pseudophakia of both eyes    Type 2 diabetes mellitus without retinopathy    Essential tremor      IOP staying low, cont drops, pt states 4 drops, will bring to next visit., target at 12-14 no vision changes noted,  pachy normal, prev OCT with rnfl thinning ou, RTC IOP ck in 2 month with Yonathan glaucoma specialist, pt missed last appt pt called and reported timoptic 2x/day ou Brimonidine 2x/day ou and latanaprost once in evening ou,  due for vf exam needs scheduled next visit, will be difficult due to tremors  Pt feels film over eye left, explained it is related to glaucoma, will get IOL checked with glaucoma eval with Yonathan  No diabetic ret at last exam  Difficult fields candidate

## 2023-09-28 ENCOUNTER — TELEPHONE (OUTPATIENT)
Dept: OPHTHALMOLOGY | Facility: CLINIC | Age: 73
End: 2023-09-28
Payer: MEDICARE

## 2023-09-28 NOTE — TELEPHONE ENCOUNTER
----- Message from Myah Cohen sent at 9/25/2023 10:32 AM CDT -----  Jena [facility] Staff  Pt needs appt with Yonathan in [place]. Next available for severe glaucoma. Per Dr. Hdez

## 2023-10-02 ENCOUNTER — OFFICE VISIT (OUTPATIENT)
Dept: ORTHOPEDICS | Facility: CLINIC | Age: 73
End: 2023-10-02
Payer: MEDICARE

## 2023-10-02 ENCOUNTER — CLINICAL SUPPORT (OUTPATIENT)
Dept: REHABILITATION | Facility: HOSPITAL | Age: 73
End: 2023-10-02
Payer: MEDICARE

## 2023-10-02 VITALS — WEIGHT: 97 LBS | BODY MASS INDEX: 19.56 KG/M2 | HEIGHT: 59 IN

## 2023-10-02 DIAGNOSIS — M25.561 CHRONIC PAIN OF BOTH KNEES: Primary | ICD-10-CM

## 2023-10-02 DIAGNOSIS — M25.562 CHRONIC PAIN OF BOTH KNEES: Primary | ICD-10-CM

## 2023-10-02 DIAGNOSIS — M20.031 SWAN-NECK DEFORMITY OF FINGER OF RIGHT HAND: Primary | ICD-10-CM

## 2023-10-02 DIAGNOSIS — G89.29 CHRONIC PAIN OF BOTH KNEES: Primary | ICD-10-CM

## 2023-10-02 DIAGNOSIS — R29.898 WEAKNESS OF BOTH LOWER EXTREMITIES: ICD-10-CM

## 2023-10-02 DIAGNOSIS — R26.9 GAIT ABNORMALITY: ICD-10-CM

## 2023-10-02 PROCEDURE — 3044F HG A1C LEVEL LT 7.0%: CPT | Mod: CPTII,S$GLB,, | Performed by: ORTHOPAEDIC SURGERY

## 2023-10-02 PROCEDURE — 99999 PR PBB SHADOW E&M-EST. PATIENT-LVL IV: ICD-10-PCS | Mod: PBBFAC,,, | Performed by: ORTHOPAEDIC SURGERY

## 2023-10-02 PROCEDURE — 3066F PR DOCUMENTATION OF TREATMENT FOR NEPHROPATHY: ICD-10-PCS | Mod: CPTII,S$GLB,, | Performed by: ORTHOPAEDIC SURGERY

## 2023-10-02 PROCEDURE — 1101F PR PT FALLS ASSESS DOC 0-1 FALLS W/OUT INJ PAST YR: ICD-10-PCS | Mod: CPTII,S$GLB,, | Performed by: ORTHOPAEDIC SURGERY

## 2023-10-02 PROCEDURE — 3066F NEPHROPATHY DOC TX: CPT | Mod: CPTII,S$GLB,, | Performed by: ORTHOPAEDIC SURGERY

## 2023-10-02 PROCEDURE — 3060F POS MICROALBUMINURIA REV: CPT | Mod: CPTII,S$GLB,, | Performed by: ORTHOPAEDIC SURGERY

## 2023-10-02 PROCEDURE — 1159F PR MEDICATION LIST DOCUMENTED IN MEDICAL RECORD: ICD-10-PCS | Mod: CPTII,S$GLB,, | Performed by: ORTHOPAEDIC SURGERY

## 2023-10-02 PROCEDURE — 3008F PR BODY MASS INDEX (BMI) DOCUMENTED: ICD-10-PCS | Mod: CPTII,S$GLB,, | Performed by: ORTHOPAEDIC SURGERY

## 2023-10-02 PROCEDURE — 3288F FALL RISK ASSESSMENT DOCD: CPT | Mod: CPTII,S$GLB,, | Performed by: ORTHOPAEDIC SURGERY

## 2023-10-02 PROCEDURE — 3044F PR MOST RECENT HEMOGLOBIN A1C LEVEL <7.0%: ICD-10-PCS | Mod: CPTII,S$GLB,, | Performed by: ORTHOPAEDIC SURGERY

## 2023-10-02 PROCEDURE — 1101F PT FALLS ASSESS-DOCD LE1/YR: CPT | Mod: CPTII,S$GLB,, | Performed by: ORTHOPAEDIC SURGERY

## 2023-10-02 PROCEDURE — 99213 PR OFFICE/OUTPT VISIT, EST, LEVL III, 20-29 MIN: ICD-10-PCS | Mod: S$GLB,,, | Performed by: ORTHOPAEDIC SURGERY

## 2023-10-02 PROCEDURE — 1159F MED LIST DOCD IN RCRD: CPT | Mod: CPTII,S$GLB,, | Performed by: ORTHOPAEDIC SURGERY

## 2023-10-02 PROCEDURE — 99999 PR PBB SHADOW E&M-EST. PATIENT-LVL IV: CPT | Mod: PBBFAC,,, | Performed by: ORTHOPAEDIC SURGERY

## 2023-10-02 PROCEDURE — 97110 THERAPEUTIC EXERCISES: CPT | Mod: PO

## 2023-10-02 PROCEDURE — 3008F BODY MASS INDEX DOCD: CPT | Mod: CPTII,S$GLB,, | Performed by: ORTHOPAEDIC SURGERY

## 2023-10-02 PROCEDURE — 3288F PR FALLS RISK ASSESSMENT DOCUMENTED: ICD-10-PCS | Mod: CPTII,S$GLB,, | Performed by: ORTHOPAEDIC SURGERY

## 2023-10-02 PROCEDURE — 3060F PR POS MICROALBUMINURIA RESULT DOCUMENTED/REVIEW: ICD-10-PCS | Mod: CPTII,S$GLB,, | Performed by: ORTHOPAEDIC SURGERY

## 2023-10-02 PROCEDURE — 1125F AMNT PAIN NOTED PAIN PRSNT: CPT | Mod: CPTII,S$GLB,, | Performed by: ORTHOPAEDIC SURGERY

## 2023-10-02 PROCEDURE — 1125F PR PAIN SEVERITY QUANTIFIED, PAIN PRESENT: ICD-10-PCS | Mod: CPTII,S$GLB,, | Performed by: ORTHOPAEDIC SURGERY

## 2023-10-02 PROCEDURE — 99213 OFFICE O/P EST LOW 20 MIN: CPT | Mod: S$GLB,,, | Performed by: ORTHOPAEDIC SURGERY

## 2023-10-02 NOTE — PROGRESS NOTES
Physical Therapy Treatment Note     Name: Deb Webb  Clinic Number: [id number]    Therapy Diagnosis:   Encounter Diagnoses   Name Primary?    Chronic pain of both knees Yes    Weakness of both lower extremities     Gait abnormality        Physician: Blas Barraza MD    Visit Date: 10/2/2023    Physician Orders: PT Eval and Treat bilat knee OA  Medical Diagnosis from Referral:   M17.11 (ICD-10-CM) - Osteoarthritis of right knee   M17.12 (ICD-10-CM) - Osteoarthritis of left knee      Evaluation Date: 7/17/2023  Authorization Period Expiration: 8/23/2023  Plan of Care Expiration: 11/6/2023  Progress Note Due: 10/25/2023  Visit # / Visits authorized: 9/ 24 +Eval  FOTO: 2/3    Time In: 1200  Time Out: 1257  Total Billable Time: 57 minutes    Precautions: Standard, Diabetes, and see PMHx      Subjective     Pt reports: Feeling that her knee pain is getting better as a result of coming to PT    She was not compliant with home exercise program.  Response to previous treatment: increase in muscular soreness  Functional change: None yet    Pain: 5/10  Location: bilateral knee      Objective     Range of Motion:   Knee Right Left   Active 0-132 0-135      Lower Extremity Strength  Right LE   Left LE     Knee extension: 4/5 Knee extension: 4/5   Knee flexion: 4/5 Knee flexion: 4/5   Hip flexion: 4/5 Hip flexion: 4/5   Hip extension:  4-/5 Hip extension: 4-/5   Hip abduction: 4-/5 Hip abduction: 4-/5   Hip adduction: 4/5 Hip adduction 4/5         5x sit to stand: 19.9 sec c dynamic knee valgus noted        Intake Outcome Measure for FOTO knee Survey     Therapist reviewed FOTO scores for Deb Webb on 7/17/2023.   FOTO documents entered into EP[employer] - see Media section.     Intake Score: 64%           Treatment      Deb received therapeutic exercises to develop strength, endurance, ROM, flexibility, posture and core stabilization for 57 minutes including:    Recumbent bike x 8 minutes level 1  Hamstring stretch  "with strap 3x30"  BLE quad sets x15 c 5 sec hold  SLR #1 3x10  Hook lying clamshells c GTB   3x10  Supine marches c GTB x30 ea  Supine bridges c GTB 3x10  Supine hip add (ball squeeze) x 20 5"  EOB sit to stands 3x10  Standing heel raises 3x10  Standing hip abduction 2x10 c GTB  Standing march 2x10   LAQ  2x10  Gastroc stretch on incline board 3x30"-NP  Mini squats 2x10 (verbal cueing for proper form)   +Step ups 6" step 2x10-NP      Deb received the following manual therapy techniques:  were applied to the:  for  minutes, including:    Deb participated in neuromuscular re-education activities to improve: Balance, Coordination, Proprioception and Posture for  minutes. The following activities were included:    Deb received cold pack for 0 minutes to bilateral knees.    Home Exercises Provided and Patient Education Provided     Education provided:   - HEP review    Written Home Exercises Provided: Patient instructed to cont prior HEP.  Exercises were reviewed and Deb was able to demonstrate them prior to the end of the session.  Deb demonstrated good  understanding of the education provided.     See EMR under Patient Instructions for exercises provided prior visit.    Assessment     Continued to emphasize global BLE strengthening today with good tolerance noted. Pt requires verbal cues for proper quad activation during SLR, but demonstrated ability to self correct. Will continue to focus on improving functional BLE strength going forward to improve overall mobility.    Deb Is progressing well towards her goals.   Pt prognosis is Fair.     Pt will continue to benefit from skilled outpatient physical therapy to address the deficits listed in the problem list box on initial evaluation, provide pt/family education and to maximize pt's level of independence in the home and community environment.     Pt's spiritual, cultural and educational needs considered and pt agreeable to plan of care and goals.   "   Anticipated barriers to physical therapy: PMHx    Goals:   Short Term Goals: 5 weeks   Patient to report worst pain 4/10-NEARING  Patient to improve BLE strength by 1/2 grade in deficieint areas-MET  Patient to improve 5x sit to stand by 5 seconds-IN PROGRESS  Patient to tolerate flexibility assessment-IN PROGRESS  Patient to tolerate balance assessment-IN PROGRESS     Long Term Goals: 10 weeks   Patient to report worst pain 1/10-IN PROGRESS  Patient to improve BLE strength by 1 grade in deficieint areas-NEARING  Patient to improve 5x sit to stand by 10 seconds-IN PROGRESS  Additional goals to come as patient progresses    Plan     Plan of care Certification: 7/17/2023 to 9/25/2023.     Outpatient Physical Therapy 1 times weekly for 10 weeks to include the following interventions: Electrical Stimulation per contraindications cleared, Gait Training, Manual Therapy, Moist Heat/ Ice, Neuromuscular Re-ed, Patient Education, Self Care, Therapeutic Activities, and Therapeutic Exercise.      René Price, PT

## 2023-10-02 NOTE — PATIENT INSTRUCTIONS
Surgery Instructions:     Your surgery is scheduled on 10/26/23 at the surgery center: [facility], 1st floor, second entrance.    The pre-op department will be in contact with you prior to your procedure to review medications and instructions.       Nothing to eat or drink after midnight prior to day of surgery.    You should STOP taking any blood thinners 5 days prior to surgery.     The surgery center will contact you the day prior to surgery to advise you of your arrival time for surgery.     Your post op appointment is scheduled on 11/13/23 @ 1:00pm.

## 2023-10-04 ENCOUNTER — TELEPHONE (OUTPATIENT)
Dept: CARDIOLOGY | Facility: CLINIC | Age: 73
End: 2023-10-04
Payer: MEDICARE

## 2023-10-04 NOTE — PROGRESS NOTES
10/4/2023    Chief Complaint:  Chief Complaint   Patient presents with    Right Hand - Pain       HPI:  Deb Webb is a 72 y.o. female, who presents to clinic today she has a history of right ring finger swan-neck deformity.  She states that this is causing some dysfunction and a significant amount of pain mainly about the distal interphalangeal joint.  She is here today to discuss further treatment options    PMHX:  Past Medical History:   Diagnosis Date    Allergy     Arthritis     Cataract     Colon polyps     COPD (chronic obstructive pulmonary disease)     Diabetes mellitus type II     Diabetic neuropathy     Fever blister     Glaucoma (increased eye pressure)     Hyperlipidemia     Hypertension     Irritable bowel syndrome     Keloid cicatrix     Osteoporosis     Retained cholelithiasis following cholecystectomy(997.41)     Right patella fracture        PSHX:  Past Surgical History:   Procedure Laterality Date    BACK SURGERY  2006    CATARACT EXTRACTION W/  INTRAOCULAR LENS IMPLANT Bilateral     CHOLECYSTECTOMY      Laparoscopic    COLONOSCOPY      COLONOSCOPY N/A 8/31/2022    Procedure: COLONOSCOPY;  Surgeon: Salvatore Butler MD;  Location: [facility];  Service: Gastroenterology;  Laterality: N/A;    ESOPHAGEAL DILATION N/A 8/30/2022    Procedure: DILATION, ESOPHAGUS;  Surgeon: Salvatore Butler MD;  Location: [facility];  Service: Gastroenterology;  Laterality: N/A;    ESOPHAGOGASTRODUODENOSCOPY N/A 8/30/2022    Procedure: EGD (ESOPHAGOGASTRODUODENOSCOPY);  Surgeon: Salvatore Butler MD;  Location: [facility];  Service: Gastroenterology;  Laterality: N/A;    ESOPHAGOGASTRODUODENOSCOPY N/A 5/23/2023    Procedure: ESOPHAGOGASTRODUODENOSCOPY (EGD);  Surgeon: Desmond Duffy Jr., MD;  Location: [facility];  Service: Endoscopy;  Laterality: N/A;    HERNIA REPAIR      HYSTERECTOMY      KNEE SURGERY Right 3/4/2015    Dr Weller     OOPHORECTOMY      ovarian tumor      Benign    TONSILLECTOMY, ADENOIDECTOMY          FMHX:  Family History   Problem Relation Age of Onset    Cancer Mother         Skin    Skin cancer Mother     Glaucoma Mother     Cataracts Mother     Diabetes Mother     Heart disease Father     Diabetes Father     Skin cancer Sister     Diabetes Sister     Diabetes Daughter     Breast cancer Maternal Aunt     Melanoma Neg Hx     Psoriasis Neg Hx     Eczema Neg Hx     Lupus Neg Hx     Amblyopia Neg Hx     Blindness Neg Hx     Macular degeneration Neg Hx     Retinal detachment Neg Hx     Strabismus Neg Hx        SOCHX:  Social History     Tobacco Use    Smoking status: Every Day     Current packs/day: 0.50     Average packs/day: 0.5 packs/day for 40.0 years (20.0 ttl pk-yrs)     Types: Cigarettes    Smokeless tobacco: Former    Tobacco comments:     Not ready to quit smoking. Cut down on cigarettes but enjoys having a couple cigarettes a day.   Substance Use Topics    Alcohol use: No       ALLERGIES:  Silicone and Adhesive    CURRENT MEDICATIONS:  Current Outpatient Medications on File Prior to Visit   Medication Sig Dispense Refill    albuterol (PROAIR HFA) 90 mcg/actuation inhaler Inhale 2 puffs into the lungs every 6 (six) hours as needed for Wheezing. Rescue 18 g 2    albuterol-ipratropium (DUO-NEB) 2.5 mg-0.5 mg/3 mL nebulizer solution Take 3 mLs by nebulization every 6 (six) hours as needed for Wheezing or Shortness of Breath. Rescue 75 mL 0    alendronate (FOSAMAX) 70 MG tablet Take 1 tablet (70 mg total) by mouth once a week. 12 tablet 3    amiodarone (PACERONE) 200 MG Tab Take 2 tabs by mouth twice daily until 12/2/22 then take 1 tab by mouth twice daily 90 tablet 1    ANORO ELLIPTA 62.5-25 mcg/actuation DsDv INHALE 1 PUFF INTO THE LUNGS ONCE DAILY 60 each 5    atorvastatin (LIPITOR) 40 MG tablet TAKE 1 TABLET BY MOUTH EVERY DAY 90 tablet 1    blood sugar diagnostic Strp To check BG 2 times daily, to use with insurance preferred meter 200 each 3    blood-glucose meter,continuous (DEXCOM G6 )  "Misc Use as directed 1 each 1    blood-glucose sensor (DEXCOM G6 SENSOR) Emmie Use as directed 10 each 2    brimonidine 0.2% (ALPHAGAN) 0.2 % Drop Place 1 drop into both eyes 2 (two) times a day. 5 mL 6    calcium carbonate/vitamin D3 (CALCIUM 600 + D,3, ORAL) Take 1 tablet by mouth once daily.      cholestyramine-aspartame (PREVALITE) 4 gram PwPk TAKE 1 PACKET (4 G TOTAL) BY MOUTH 2 (TWO) TIMES DAILY. FOR DIARRHEA 180 packet 3    citalopram (CELEXA) 20 MG tablet Take 1 tablet (20 mg total) by mouth once daily. 90 tablet 1    furosemide (LASIX) 20 MG tablet Take 1 tablet (20 mg total) by mouth as needed (swelling).      gabapentin (NEURONTIN) 300 MG capsule TAKE 1 CAPSULE BY MOUTH THREE TIMES A  capsule 1    guaiFENesin (MUCINEX) 600 mg 12 hr tablet Take 1 tablet (600 mg total) by mouth 2 (two) times daily.      HYDROcodone-acetaminophen (NORCO) 5-325 mg per tablet Take 1 tablet by mouth every 12 (twelve) hours as needed for Pain. 10 tablet 0    ipratropium (ATROVENT) 42 mcg (0.06 %) nasal spray 2 SPRAYS BY NASAL ROUTE 3 (THREE) TIMES DAILY. USE 3 TIMES PER DAY IF NECESSARY FOR CHRONIC NASAL DRIP 15 mL 4    Lactobacillus rhamnosus GG (CULTURELLE) 10 billion cell capsule Take 1 capsule by mouth once daily. 60 capsule 0    lancets Misc To check BG 2 times daily, to use with insurance preferred meter 200 each 3    latanoprost 0.005 % ophthalmic solution Place 1 drop into both eyes every evening. 7.5 mL 3    meclizine (ANTIVERT) 12.5 mg tablet TAKE 1 TABLET BY MOUTH 3 TIMES DAILY AS NEEDED FOR DIZZINESS. 30 tablet 0    omega-3 fatty acids-vitamin E 1,000 mg Cap Take 1 capsule by mouth once daily.      omeprazole (PRILOSEC) 40 MG capsule Take 1 capsule (40 mg total) by mouth before breakfast. 90 capsule 1    pen needle, diabetic (BD ULTRA-FINE AGUS PEN NEEDLE) 32 gauge x 5/32" Ndle 1 Device by Misc.(Non-Drug; Combo Route) route 2 (two) times daily. 200 each 4    primidone (MYSOLINE) 50 MG Tab TAKE 2 TABLETS BY " "MOUTH 3 (THREE) TIMES DAILY. ONE HALF TABLET FOR ONE WEEK, THEN AS PRESCRIBED 540 tablet 3    timolol maleate 0.5% (TIMOPTIC) 0.5 % Drop Place 1 drop into both eyes once daily. 5 mL 6    TRULICITY 1.5 mg/0.5 mL pen injector INJECT 1.5 MG INTO THE SKIN EVERY 7 DAYS. THROUGH PATIENT ASSISTANCE 12 pen 1    XARELTO 20 mg Tab TAKE 1 TABLET BY MOUTH EVERY DAY WITH DINNER OR EVENING MEAL 90 tablet 3    blood-glucose meter kit To check BG 2 times daily, to use with insurance preferred meter 1 each 0    losartan (COZAAR) 25 MG tablet Take 0.5 tablets (12.5 mg total) by mouth once daily. (Patient not taking: Reported on 7/3/2023) 30 tablet 1    [DISCONTINUED] irbesartan (AVAPRO) 75 MG tablet Take 1 tablet (75 mg total) by mouth once daily. 90 tablet 1    [DISCONTINUED] loratadine (CLARITIN) 10 mg tablet TAKE 1 TABLET (10 MG TOTAL) BY MOUTH DAILY AS NEEDED FOR ALLERGIES (OR RUNNY NOSE). 90 tablet 1     No current facility-administered medications on file prior to visit.       REVIEW OF SYSTEMS:  ROS    GENERAL PHYSICAL EXAM:   Ht 4' 11" (1.499 m)   Wt 44 kg (97 lb)   BMI 19.59 kg/m²    GEN: well developed, well nourished, no acute distress   HENT: Normocephalic, atraumatic   EYES: No discharge, conjunctiva normal   NECK: Supple, non-tender   PULM: No wheezing, no respiratory distress   CV: RRR   ABD: Soft, non-tender    ORTHO EXAM:   Examination of the right ring finger reveals that there is an obvious swan-neck deformity.  There is no edema.  Palpation does produce tenderness over the distal interphalangeal joint.  There is a 30 degree extensor lag at the joint.  She is able to make a composite fist but there is some difficulty upon flexion.  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 I have discussed treatment with the patient.  She states that she is having a significant amount of pain " and dysfunction.  I discussed the possibility of fusion of the distal interphalangeal joint.  After discussion of the risks and benefits of the procedure informed consent has been obtained    2. Will send the patient for preoperative clearance and she does have to stop her Xarelto prior to the surgical procedure    3. Will proceed with right ring finger distal interphalangeal joint arthrodesis under local anesthesia

## 2023-10-04 NOTE — TELEPHONE ENCOUNTER
Pt scheduled for right ring finger DIP arthrodesis or fusion with  on 10/26/2023 under general anesthesia.   is requesting pt hold her Xarelto prior to surgery. Please advise.    Last OV with Meaghan Boucher on 7/3/2023    's Fax: [phone]  (Staff- Shruti Carlisle LPN)

## 2023-10-09 ENCOUNTER — CLINICAL SUPPORT (OUTPATIENT)
Dept: REHABILITATION | Facility: HOSPITAL | Age: 73
End: 2023-10-09
Payer: MEDICARE

## 2023-10-09 DIAGNOSIS — R29.898 WEAKNESS OF BOTH LOWER EXTREMITIES: ICD-10-CM

## 2023-10-09 DIAGNOSIS — R26.9 GAIT ABNORMALITY: ICD-10-CM

## 2023-10-09 DIAGNOSIS — G89.29 CHRONIC PAIN OF BOTH KNEES: Primary | ICD-10-CM

## 2023-10-09 DIAGNOSIS — M25.562 CHRONIC PAIN OF BOTH KNEES: Primary | ICD-10-CM

## 2023-10-09 DIAGNOSIS — M25.561 CHRONIC PAIN OF BOTH KNEES: Primary | ICD-10-CM

## 2023-10-09 PROCEDURE — 97110 THERAPEUTIC EXERCISES: CPT | Mod: PO,CQ

## 2023-10-09 NOTE — PROGRESS NOTES
"Physical Therapy Treatment Note     Name: Deb Webb  Clinic Number: [id number]    Therapy Diagnosis:   Encounter Diagnoses   Name Primary?    Chronic pain of both knees Yes    Weakness of both lower extremities     Gait abnormality        Physician: Blas Barraza MD    Visit Date: 10/9/2023    Physician Orders: PT Eval and Treat bilat knee OA  Medical Diagnosis from Referral:   M17.11 (ICD-10-CM) - Osteoarthritis of right knee   M17.12 (ICD-10-CM) - Osteoarthritis of left knee      Evaluation Date: 7/17/2023  Authorization Period Expiration: 8/23/2023  Plan of Care Expiration: 11/6/2023  Progress Note Due: 10/25/2023  Visit # / Visits authorized: 10/ 24 +Eval  FOTO: 2/3    Time In: 0900 AM  Time Out: 0955 AM  Total Billable Time: 55 minutes    Precautions: Standard, Diabetes, and see PMHx      Subjective     Pt reports: her knees are sore today but this is her usual soreness. Patients states she is having trouble with her sinuses today. She was not compliant with home exercise program.  Response to previous treatment: increase in muscular soreness  Functional change: None yet    Pain: 5/10  Location: bilateral knee      Objective     Treatment      Deb received therapeutic exercises to develop strength, endurance, ROM, flexibility, posture and core stabilization for 57 minutes including:    Nu step x 8 minutes level 1  Hamstring stretch with strap 3x30"  BLE quad sets x20 c 5 sec hold  SLR #1 3x10  Hook lying clamshells c Green TB   3x10  Supine marches c Green TB x30 ea  Supine bridges c Green TB 3x10  Supine hip add (ball squeeze) x 20, 5"    EOB sit to stands 3x10  Standing heel raises 3x10  Standing hip abduction 2x10 c Green TB  Standing march 2x10   LAQ 2x10  Gastroc stretch on incline board 3x30"-NP  Mini squats 2x10 (verbal cueing for proper form)   Step ups 6" step 2x10    Deb received the following manual therapy techniques:  were applied to the:  for  minutes, including:    Deb " participated in neuromuscular re-education activities to improve: Balance, Coordination, Proprioception and Posture for  minutes. The following activities were included:    Deb received cold pack for 0 minutes to bilateral knees.    Home Exercises Provided and Patient Education Provided     Education provided:   - HEP review    Written Home Exercises Provided: Patient instructed to cont prior HEP.  Exercises were reviewed and Deb was able to demonstrate them prior to the end of the session.  Deb demonstrated good  understanding of the education provided.     See EMR under Patient Instructions for exercises provided prior visit.    Assessment     Deb appropriately challenged by current strengthening program and often requires cueing for proper exercise form. Postural awareness poor when sitting and standing. Will continue to focus on improving functional BLE strength going forward to improve overall mobility.    Deb Is progressing well towards her goals.   Pt prognosis is Fair.     Pt will continue to benefit from skilled outpatient physical therapy to address the deficits listed in the problem list box on initial evaluation, provide pt/family education and to maximize pt's level of independence in the home and community environment.     Pt's spiritual, cultural and educational needs considered and pt agreeable to plan of care and goals.     Anticipated barriers to physical therapy: PMHx    Goals:   Short Term Goals: 5 weeks   Patient to report worst pain 4/10-NEARING  Patient to improve BLE strength by 1/2 grade in deficieint areas-MET  Patient to improve 5x sit to stand by 5 seconds-IN PROGRESS  Patient to tolerate flexibility assessment-IN PROGRESS  Patient to tolerate balance assessment-IN PROGRESS     Long Term Goals: 10 weeks   Patient to report worst pain 1/10-IN PROGRESS  Patient to improve BLE strength by 1 grade in deficieint areas-NEARING  Patient to improve 5x sit to stand by 10 seconds-IN  PROGRESS  Additional goals to come as patient progresses    Plan     Plan of care Certification: 7/17/2023 to 9/25/2023.     Outpatient Physical Therapy 1 times weekly for 10 weeks to include the following interventions: Electrical Stimulation per contraindications cleared, Gait Training, Manual Therapy, Moist Heat/ Ice, Neuromuscular Re-ed, Patient Education, Self Care, Therapeutic Activities, and Therapeutic Exercise.      Madeline Crawford, PTA

## 2023-10-16 ENCOUNTER — TELEPHONE (OUTPATIENT)
Dept: ORTHOPEDICS | Facility: CLINIC | Age: 73
End: 2023-10-16
Payer: MEDICARE

## 2023-10-16 ENCOUNTER — CLINICAL SUPPORT (OUTPATIENT)
Dept: REHABILITATION | Facility: HOSPITAL | Age: 73
End: 2023-10-16
Payer: MEDICARE

## 2023-10-16 ENCOUNTER — OFFICE VISIT (OUTPATIENT)
Dept: FAMILY MEDICINE | Facility: CLINIC | Age: 73
End: 2023-10-16
Payer: MEDICARE

## 2023-10-16 VITALS
BODY MASS INDEX: 19.64 KG/M2 | OXYGEN SATURATION: 92 % | HEART RATE: 118 BPM | SYSTOLIC BLOOD PRESSURE: 124 MMHG | WEIGHT: 97.25 LBS | DIASTOLIC BLOOD PRESSURE: 80 MMHG

## 2023-10-16 DIAGNOSIS — M20.031 SWAN-NECK DEFORMITY OF FINGER OF RIGHT HAND: Primary | ICD-10-CM

## 2023-10-16 DIAGNOSIS — M25.561 CHRONIC PAIN OF BOTH KNEES: Primary | ICD-10-CM

## 2023-10-16 DIAGNOSIS — G25.0 ESSENTIAL TREMOR: ICD-10-CM

## 2023-10-16 DIAGNOSIS — R26.9 GAIT ABNORMALITY: ICD-10-CM

## 2023-10-16 DIAGNOSIS — G89.29 CHRONIC PAIN OF BOTH KNEES: Primary | ICD-10-CM

## 2023-10-16 DIAGNOSIS — R29.898 WEAKNESS OF BOTH LOWER EXTREMITIES: ICD-10-CM

## 2023-10-16 DIAGNOSIS — J31.0 RHINITIS, UNSPECIFIED TYPE: Primary | ICD-10-CM

## 2023-10-16 DIAGNOSIS — M25.562 CHRONIC PAIN OF BOTH KNEES: Primary | ICD-10-CM

## 2023-10-16 PROCEDURE — 3060F POS MICROALBUMINURIA REV: CPT | Mod: CPTII,S$GLB,, | Performed by: INTERNAL MEDICINE

## 2023-10-16 PROCEDURE — 1101F PR PT FALLS ASSESS DOC 0-1 FALLS W/OUT INJ PAST YR: ICD-10-PCS | Mod: CPTII,S$GLB,, | Performed by: INTERNAL MEDICINE

## 2023-10-16 PROCEDURE — 3288F FALL RISK ASSESSMENT DOCD: CPT | Mod: CPTII,S$GLB,, | Performed by: INTERNAL MEDICINE

## 2023-10-16 PROCEDURE — 3066F NEPHROPATHY DOC TX: CPT | Mod: CPTII,S$GLB,, | Performed by: INTERNAL MEDICINE

## 2023-10-16 PROCEDURE — 3074F SYST BP LT 130 MM HG: CPT | Mod: CPTII,S$GLB,, | Performed by: INTERNAL MEDICINE

## 2023-10-16 PROCEDURE — 97110 THERAPEUTIC EXERCISES: CPT | Mod: PO,CQ

## 2023-10-16 PROCEDURE — 3044F HG A1C LEVEL LT 7.0%: CPT | Mod: CPTII,S$GLB,, | Performed by: INTERNAL MEDICINE

## 2023-10-16 PROCEDURE — 99213 OFFICE O/P EST LOW 20 MIN: CPT | Mod: S$GLB,,, | Performed by: INTERNAL MEDICINE

## 2023-10-16 PROCEDURE — 3008F PR BODY MASS INDEX (BMI) DOCUMENTED: ICD-10-PCS | Mod: CPTII,S$GLB,, | Performed by: INTERNAL MEDICINE

## 2023-10-16 PROCEDURE — 3079F DIAST BP 80-89 MM HG: CPT | Mod: CPTII,S$GLB,, | Performed by: INTERNAL MEDICINE

## 2023-10-16 PROCEDURE — 1159F MED LIST DOCD IN RCRD: CPT | Mod: CPTII,S$GLB,, | Performed by: INTERNAL MEDICINE

## 2023-10-16 PROCEDURE — 1160F PR REVIEW ALL MEDS BY PRESCRIBER/CLIN PHARMACIST DOCUMENTED: ICD-10-PCS | Mod: CPTII,S$GLB,, | Performed by: INTERNAL MEDICINE

## 2023-10-16 PROCEDURE — 99213 PR OFFICE/OUTPT VISIT, EST, LEVL III, 20-29 MIN: ICD-10-PCS | Mod: S$GLB,,, | Performed by: INTERNAL MEDICINE

## 2023-10-16 PROCEDURE — 1101F PT FALLS ASSESS-DOCD LE1/YR: CPT | Mod: CPTII,S$GLB,, | Performed by: INTERNAL MEDICINE

## 2023-10-16 PROCEDURE — 99999 PR PBB SHADOW E&M-EST. PATIENT-LVL IV: ICD-10-PCS | Mod: PBBFAC,,, | Performed by: INTERNAL MEDICINE

## 2023-10-16 PROCEDURE — 3060F PR POS MICROALBUMINURIA RESULT DOCUMENTED/REVIEW: ICD-10-PCS | Mod: CPTII,S$GLB,, | Performed by: INTERNAL MEDICINE

## 2023-10-16 PROCEDURE — 99999 PR PBB SHADOW E&M-EST. PATIENT-LVL IV: CPT | Mod: PBBFAC,,, | Performed by: INTERNAL MEDICINE

## 2023-10-16 PROCEDURE — 1159F PR MEDICATION LIST DOCUMENTED IN MEDICAL RECORD: ICD-10-PCS | Mod: CPTII,S$GLB,, | Performed by: INTERNAL MEDICINE

## 2023-10-16 PROCEDURE — 1160F RVW MEDS BY RX/DR IN RCRD: CPT | Mod: CPTII,S$GLB,, | Performed by: INTERNAL MEDICINE

## 2023-10-16 PROCEDURE — 3079F PR MOST RECENT DIASTOLIC BLOOD PRESSURE 80-89 MM HG: ICD-10-PCS | Mod: CPTII,S$GLB,, | Performed by: INTERNAL MEDICINE

## 2023-10-16 PROCEDURE — 3074F PR MOST RECENT SYSTOLIC BLOOD PRESSURE < 130 MM HG: ICD-10-PCS | Mod: CPTII,S$GLB,, | Performed by: INTERNAL MEDICINE

## 2023-10-16 PROCEDURE — 3066F PR DOCUMENTATION OF TREATMENT FOR NEPHROPATHY: ICD-10-PCS | Mod: CPTII,S$GLB,, | Performed by: INTERNAL MEDICINE

## 2023-10-16 PROCEDURE — 3044F PR MOST RECENT HEMOGLOBIN A1C LEVEL <7.0%: ICD-10-PCS | Mod: CPTII,S$GLB,, | Performed by: INTERNAL MEDICINE

## 2023-10-16 PROCEDURE — 3008F BODY MASS INDEX DOCD: CPT | Mod: CPTII,S$GLB,, | Performed by: INTERNAL MEDICINE

## 2023-10-16 PROCEDURE — 3288F PR FALLS RISK ASSESSMENT DOCUMENTED: ICD-10-PCS | Mod: CPTII,S$GLB,, | Performed by: INTERNAL MEDICINE

## 2023-10-16 RX ORDER — MUPIROCIN 20 MG/G
OINTMENT TOPICAL
Status: CANCELLED | OUTPATIENT
Start: 2023-10-16

## 2023-10-16 RX ORDER — MONTELUKAST SODIUM 10 MG/1
10 TABLET ORAL NIGHTLY
Qty: 90 TABLET | Refills: 3 | Status: SHIPPED | OUTPATIENT
Start: 2023-10-16 | End: 2023-12-06 | Stop reason: CLARIF

## 2023-10-16 RX ORDER — PRIMIDONE 50 MG/1
TABLET ORAL
Qty: 540 TABLET | Refills: 3 | Status: ON HOLD | OUTPATIENT
Start: 2023-10-16 | End: 2024-01-04

## 2023-10-16 NOTE — TELEPHONE ENCOUNTER
Contacted patient. Informed Ms. Webb her sx date has been rescheduled to Nov 2nd. Patient verbalized understanding.

## 2023-10-16 NOTE — PROGRESS NOTES
Subjective     Deb Webb is a 72 y.o. old, female here for Sinusitis    73 y/o with PMH of CVA, PAF on xarelto, Type 2 DM with neuropathy, HTN, HLD, Chronic respiratory failure, COPD, osteopenia, IBS, anxiety, tobacco use, Essential tremor, prior BZO dependence    Here for f/u on chronic sinusitis.  She complains of congestion and PND chronically and year round.  She has seen ENT multiple times. Antihistamines only help short term  There are several cats at home, no clear seasonal exacerbations. She had a CT that showed no sig sinusitis. Atrovent doesn't help long term.  Type 2 DM has been well controlled.  COPD seems stable currently. She smokes a few cpd.    ROS  Medications     Outpatient Medications Marked as Taking for the 10/16/23 encounter (Office Visit) with Triston Valverde MD   Medication Sig Dispense Refill    albuterol (PROVENTIL/VENTOLIN HFA) 90 mcg/actuation inhaler INHALE 2 PUFFS INTO THE LUNGS EVERY 6 HOURS AS NEEDED FOR WHEEZING OR SHORTNESS OF BREATH RESCUE 18 g 1    albuterol-ipratropium (DUO-NEB) 2.5 mg-0.5 mg/3 mL nebulizer solution Take 3 mLs by nebulization every 6 (six) hours as needed for Wheezing or Shortness of Breath. Rescue 75 mL 0    alendronate (FOSAMAX) 70 MG tablet Take 1 tablet (70 mg total) by mouth once a week. 12 tablet 3    amiodarone (PACERONE) 200 MG Tab Take 2 tabs by mouth twice daily until 12/2/22 then take 1 tab by mouth twice daily 90 tablet 1    ANORO ELLIPTA 62.5-25 mcg/actuation DsDv INHALE 1 PUFF INTO THE LUNGS ONCE DAILY 60 each 5    atorvastatin (LIPITOR) 40 MG tablet TAKE 1 TABLET BY MOUTH EVERY DAY 90 tablet 1    blood sugar diagnostic Strp To check BG 2 times daily, to use with insurance preferred meter 200 each 3    blood-glucose meter,continuous (DEXCOM G6 ) Misc Use as directed 1 each 1    blood-glucose sensor (DEXCOM G6 SENSOR) Emmie Use as directed 10 each 2    brimonidine 0.2% (ALPHAGAN) 0.2 % Drop Place 1 drop into both eyes 2 (two)  "times a day. 5 mL 6    calcium carbonate/vitamin D3 (CALCIUM 600 + D,3, ORAL) Take 1 tablet by mouth once daily.      cholestyramine-aspartame (PREVALITE) 4 gram PwPk TAKE 1 PACKET (4 G TOTAL) BY MOUTH 2 (TWO) TIMES DAILY. FOR DIARRHEA 180 packet 3    citalopram (CELEXA) 20 MG tablet Take 1 tablet (20 mg total) by mouth once daily. 90 tablet 1    flu vac 2023 65up-[id number],PF, (FLUAD QUAD 2023-24,65Y UP,,PF,) 60 mcg (15 mcg x 4)/0.5 mL Syrg Inject 0.5 mLs into the muscle once. for 1 dose 0.5 mL 0    gabapentin (NEURONTIN) 300 MG capsule TAKE 1 CAPSULE BY MOUTH THREE TIMES A  capsule 1    guaiFENesin (MUCINEX) 600 mg 12 hr tablet Take 1 tablet (600 mg total) by mouth 2 (two) times daily.      HYDROcodone-acetaminophen (NORCO) 5-325 mg per tablet Take 1 tablet by mouth every 12 (twelve) hours as needed for Pain. 10 tablet 0    ipratropium (ATROVENT) 42 mcg (0.06 %) nasal spray 2 SPRAYS BY NASAL ROUTE 3 (THREE) TIMES DAILY. USE 3 TIMES PER DAY IF NECESSARY FOR CHRONIC NASAL DRIP 15 mL 4    Lactobacillus rhamnosus GG (CULTURELLE) 10 billion cell capsule Take 1 capsule by mouth once daily. 60 capsule 0    lancets Misc To check BG 2 times daily, to use with insurance preferred meter 200 each 3    latanoprost 0.005 % ophthalmic solution Place 1 drop into both eyes every evening. 7.5 mL 3    meclizine (ANTIVERT) 12.5 mg tablet TAKE 1 TABLET BY MOUTH 3 TIMES DAILY AS NEEDED FOR DIZZINESS. 30 tablet 0    omega-3 fatty acids-vitamin E 1,000 mg Cap Take 1 capsule by mouth once daily.      omeprazole (PRILOSEC) 40 MG capsule Take 1 capsule (40 mg total) by mouth before breakfast. 90 capsule 1    pen needle, diabetic (BD ULTRA-FINE AGUS PEN NEEDLE) 32 gauge x 5/32" Ndle 1 Device by Misc.(Non-Drug; Combo Route) route 2 (two) times daily. 200 each 4    timolol maleate 0.5% (TIMOPTIC) 0.5 % Drop Place 1 drop into both eyes once daily. 5 mL 6    TRULICITY 1.5 mg/0.5 mL pen injector INJECT 1.5 MG INTO THE SKIN EVERY 7 DAYS. " THROUGH PATIENT ASSISTANCE 12 pen 1    XARELTO 20 mg Tab TAKE 1 TABLET BY MOUTH EVERY DAY WITH DINNER OR EVENING MEAL 90 tablet 3    [DISCONTINUED] furosemide (LASIX) 20 MG tablet Take 1 tablet (20 mg total) by mouth as needed (swelling).      [DISCONTINUED] primidone (MYSOLINE) 50 MG Tab TAKE 2 TABLETS BY MOUTH 3 (THREE) TIMES DAILY. ONE HALF TABLET FOR ONE WEEK, THEN AS PRESCRIBED 540 tablet 3     Objective     /80   Pulse (!) 118   Wt 44.1 kg (97 lb 3.6 oz)   SpO2 (!) 92%   BMI 19.64 kg/m²   Physical Exam  Constitutional:       General: She is not in acute distress.     Appearance: Normal appearance. She is well-developed.   Neurological:      Mental Status: She is alert.       Assessment and Plan     Rhinitis, unspecified type    Other orders  -     montelukast (SINGULAIR) 10 mg tablet; Take 1 tablet (10 mg total) by mouth every evening.  Dispense: 90 tablet; Refill: 3      I'm not sure I have much to offer her that ENT and others haven't tried, consider allergy testing/referral in the future.  Okay to cycle through antihistamines monthly and start montelukast.  ___________________  Triston Valverde MD  Internal Medicine and Pediatrics

## 2023-10-16 NOTE — TELEPHONE ENCOUNTER
----- Message from Connie Santo sent at 10/16/2023 10:47 AM CDT -----  Regarding: Discuss Surgery Date  Patient is currently scheduled for surgery with Dr. Briggs on 10/26/23.  She will be out of town and is looking to reschedule it for some time in the beginning of November.      Contact #: [phone]    Thank you

## 2023-10-16 NOTE — TELEPHONE ENCOUNTER
----- Message from Ian Norris sent at 10/16/2023  2:52 PM CDT -----  Regarding: results  PT needs someone to call her about setting an appt before her surgery date.

## 2023-10-16 NOTE — PROGRESS NOTES
"Physical Therapy Treatment Note     Name: Deb Webb  Clinic Number: [id number]    Therapy Diagnosis:   Encounter Diagnoses   Name Primary?    Chronic pain of both knees Yes    Weakness of both lower extremities     Gait abnormality        Physician: Blas Barraza MD    Visit Date: 10/16/2023    Physician Orders: PT Eval and Treat bilat knee OA  Medical Diagnosis from Referral:   M17.11 (ICD-10-CM) - Osteoarthritis of right knee   M17.12 (ICD-10-CM) - Osteoarthritis of left knee      Evaluation Date: 7/17/2023  Authorization Period Expiration: 8/23/2023  Plan of Care Expiration: 11/6/2023  Progress Note Due: 10/25/2023  Visit # / Visits authorized: 11/ 24 +Eval  FOTO: 2/3    Time In: 9:00 AM  Time Out: 10:00 AM  Total Billable Time: 60 minutes 30 minutes 1:1    Precautions: Standard, Diabetes, and see PMHx      Subjective     Pt reports continued bilateral knee pain but states her sinuses are really bothering her this morning.    She was not compliant with home exercise program.  Response to previous treatment: increase in muscular soreness  Functional change: None yet    Pain: 5/10  Location: bilateral knee      Objective     Treatment      Deb received therapeutic exercises to develop strength, endurance, ROM, flexibility, posture and core stabilization for 60 minutes including:    Nu step x 8 minutes level 1  Hamstring stretch with strap 3x30"  BLE quad sets x20 c 5 sec hold  SLR #1 3x10  Hook lying clamshells c Green TB   3x10  Supine marches c Green TB x30 ea  Supine bridges c Green TB 3x10  Supine hip add (ball squeeze) x 20, 5"    EOB sit to stands 3x10  Standing heel raises 3x10  Standing hip abduction 2x10 c Green TB  Standing march 2x10   LAQ 2x10  Gastroc stretch on incline board 3x30"-NP  Mini squats 2x10 (verbal cueing for proper form)   Step ups 6" step 2x10    Deb received the following manual therapy techniques:  were applied to the:  for  minutes, including:    Deb participated in " "neuromuscular re-education activities to improve: Balance, Coordination, Proprioception and Posture for  minutes. The following activities were included:    Deb received cold pack for 0 minutes to bilateral knees.    Home Exercises Provided and Patient Education Provided     Education provided:   - HEP review    Written Home Exercises Provided: Patient instructed to cont prior HEP.  Exercises were reviewed and Deb was able to demonstrate them prior to the end of the session.  Deb demonstrated good  understanding of the education provided.     See EMR under Patient Instructions for exercises provided prior visit.    Assessment   Deb returned reporting continued moderate bilateral knee pain. She reports feeling sick as her sinuses "are acting up this morning". Continuation of global LE strengthening ex's were tolerated well with no adverse effects. Will continue to progress per pt's tolerance to improve functional BLE strength.    Deb Is progressing well towards her goals.   Pt prognosis is Fair.     Pt will continue to benefit from skilled outpatient physical therapy to address the deficits listed in the problem list box on initial evaluation, provide pt/family education and to maximize pt's level of independence in the home and community environment.     Pt's spiritual, cultural and educational needs considered and pt agreeable to plan of care and goals.     Anticipated barriers to physical therapy: PMHx    Goals:   Short Term Goals: 5 weeks   Patient to report worst pain 4/10-NEARING  Patient to improve BLE strength by 1/2 grade in deficieint areas-MET  Patient to improve 5x sit to stand by 5 seconds-IN PROGRESS  Patient to tolerate flexibility assessment-IN PROGRESS  Patient to tolerate balance assessment-IN PROGRESS     Long Term Goals: 10 weeks   Patient to report worst pain 1/10-IN PROGRESS  Patient to improve BLE strength by 1 grade in deficieint areas-NEARING  Patient to improve 5x sit to stand " by 10 seconds-IN PROGRESS  Additional goals to come as patient progresses    Plan     Plan of care Certification: 7/17/2023 to 9/25/2023.     Outpatient Physical Therapy 1 times weekly for 10 weeks to include the following interventions: Electrical Stimulation per contraindications cleared, Gait Training, Manual Therapy, Moist Heat/ Ice, Neuromuscular Re-ed, Patient Education, Self Care, Therapeutic Activities, and Therapeutic Exercise.      Eleazar Zurita, PTA

## 2023-10-17 DIAGNOSIS — E11.42 TYPE 2 DIABETES MELLITUS WITH DIABETIC POLYNEUROPATHY, WITHOUT LONG-TERM CURRENT USE OF INSULIN: ICD-10-CM

## 2023-10-17 RX ORDER — ATORVASTATIN CALCIUM 40 MG/1
40 TABLET, FILM COATED ORAL DAILY
Qty: 90 TABLET | Refills: 1 | Status: ON HOLD | OUTPATIENT
Start: 2023-10-17 | End: 2024-01-04 | Stop reason: HOSPADM

## 2023-10-17 NOTE — TELEPHONE ENCOUNTER
Care Due:                  Date            Visit Type   Department     Provider  --------------------------------------------------------------------------------                                EP -                              [facility]  Last Visit: 10-      CARE ([facility])   AUGUST Valverde                              EP -                              PRIMARY      [facility]  Next Visit: 01-      CARE ([facility])   AUGUST Valverde                                                            Last  Test          Frequency    Reason                     Performed    Due Date  --------------------------------------------------------------------------------    Mg Level....  12 months..  alendronate..............  11- 11-    Phosphate...  12 months..  alendronate..............  07- 07-    Vitamin D...  12 months..  alendronate..............  Not Found    Overdue    [employer] Embedded Care Due Messages. Reference number: [id number].   10/17/2023 1:11:00 PM CDT

## 2023-10-23 ENCOUNTER — CLINICAL SUPPORT (OUTPATIENT)
Dept: REHABILITATION | Facility: HOSPITAL | Age: 73
End: 2023-10-23
Payer: MEDICARE

## 2023-10-23 DIAGNOSIS — M25.562 CHRONIC PAIN OF BOTH KNEES: Primary | ICD-10-CM

## 2023-10-23 DIAGNOSIS — R26.9 GAIT ABNORMALITY: ICD-10-CM

## 2023-10-23 DIAGNOSIS — R29.898 WEAKNESS OF BOTH LOWER EXTREMITIES: ICD-10-CM

## 2023-10-23 DIAGNOSIS — M25.561 CHRONIC PAIN OF BOTH KNEES: Primary | ICD-10-CM

## 2023-10-23 DIAGNOSIS — G89.29 CHRONIC PAIN OF BOTH KNEES: Primary | ICD-10-CM

## 2023-10-23 PROCEDURE — 97110 THERAPEUTIC EXERCISES: CPT | Mod: PO,CQ

## 2023-10-23 NOTE — PROGRESS NOTES
"Physical Therapy Treatment Note     Name: Deb Webb  Clinic Number: [id number]    Therapy Diagnosis:   Encounter Diagnoses   Name Primary?    Chronic pain of both knees Yes    Weakness of both lower extremities     Gait abnormality        Physician: Blas Barraza MD    Visit Date: 10/23/2023    Physician Orders: PT Eval and Treat bilat knee OA  Medical Diagnosis from Referral:   M17.11 (ICD-10-CM) - Osteoarthritis of right knee   M17.12 (ICD-10-CM) - Osteoarthritis of left knee      Evaluation Date: 7/17/2023  Authorization Period Expiration: 8/23/2023  Plan of Care Expiration: 11/6/2023  Progress Note Due: 10/25/2023  Visit # / Visits authorized: 12/ 24 +Eval  FOTO: 2/3    Time In: 10:55 AM  Time Out: 11:55 AM  Total Billable Time: 60 minutes    Precautions: Standard, Diabetes, and see PMHx      Subjective     Pt reports "I'm doing a little better". She reports taking daily walks now around her neighborhood.    She was not compliant with home exercise program.  Response to previous treatment: increase in muscular soreness  Functional change: None yet    Pain: 5/10  Location: bilateral knee      Objective     Treatment      Deb received therapeutic exercises to develop strength, endurance, ROM, flexibility, posture and core stabilization for 60 minutes including:    Nu step x 8 minutes level 1  Hamstring stretch with strap 3x30"  IT band stretch 3x30"  BLE quad sets x20 c 5 sec hold  SLR #1 3x10  Hook lying clamshells c Green TB   3x10  Supine marches c Green TB x30 ea  Supine bridges c Green TB 3x10  Supine hip add (ball squeeze) x 20, 5"    EOB sit to stands 3x10  Standing heel raises 3x10  Standing hip abduction 2x10 c Green TB  Standing march 2x10   LAQ 2x10  Gastroc stretch on incline board 3x30"-NP  Mini squats 2x10 (verbal cueing for proper form)   Step ups 6" step 2x10    Deb received the following manual therapy techniques:  were applied to the:  for  minutes, including:    Deb " "participated in neuromuscular re-education activities to improve: Balance, Coordination, Proprioception and Posture for  minutes. The following activities were included:    Deb received cold pack for 0 minutes to bilateral knees.    Home Exercises Provided and Patient Education Provided     Education provided:   - HEP review    Written Home Exercises Provided: Patient instructed to cont prior HEP.  Exercises were reviewed and Deb was able to demonstrate them prior to the end of the session.  Deb demonstrated good  understanding of the education provided.     See EMR under Patient Instructions for exercises provided prior visit.    Assessment   Deb returned reporting "doing a little better" and increased walking tolerance recently. Continuation and progression of global LE strengthening ex's were tolerated well with no adverse effects. Will continue to progress per pt's tolerance to improve functional BLE strength.    Deb Is progressing well towards her goals.   Pt prognosis is Fair.     Pt will continue to benefit from skilled outpatient physical therapy to address the deficits listed in the problem list box on initial evaluation, provide pt/family education and to maximize pt's level of independence in the home and community environment.     Pt's spiritual, cultural and educational needs considered and pt agreeable to plan of care and goals.     Anticipated barriers to physical therapy: PMHx    Goals:   Short Term Goals: 5 weeks   Patient to report worst pain 4/10-NEARING  Patient to improve BLE strength by 1/2 grade in deficieint areas-MET  Patient to improve 5x sit to stand by 5 seconds-IN PROGRESS  Patient to tolerate flexibility assessment-IN PROGRESS  Patient to tolerate balance assessment-IN PROGRESS     Long Term Goals: 10 weeks   Patient to report worst pain 1/10-IN PROGRESS  Patient to improve BLE strength by 1 grade in deficieint areas-NEARING  Patient to improve 5x sit to stand by 10 " seconds-IN PROGRESS  Additional goals to come as patient progresses    Plan     Plan of care Certification: 7/17/2023 to 9/25/2023.     Outpatient Physical Therapy 1 times weekly for 10 weeks to include the following interventions: Electrical Stimulation per contraindications cleared, Gait Training, Manual Therapy, Moist Heat/ Ice, Neuromuscular Re-ed, Patient Education, Self Care, Therapeutic Activities, and Therapeutic Exercise.      Eleazar Zurita, PTA

## 2023-10-24 DIAGNOSIS — M20.031 SWAN-NECK DEFORMITY OF FINGER OF RIGHT HAND: Primary | ICD-10-CM

## 2023-10-30 ENCOUNTER — CLINICAL SUPPORT (OUTPATIENT)
Dept: REHABILITATION | Facility: HOSPITAL | Age: 73
End: 2023-10-30
Payer: MEDICARE

## 2023-10-30 DIAGNOSIS — G89.29 CHRONIC PAIN OF BOTH KNEES: Primary | ICD-10-CM

## 2023-10-30 DIAGNOSIS — R26.9 GAIT ABNORMALITY: ICD-10-CM

## 2023-10-30 DIAGNOSIS — M25.562 CHRONIC PAIN OF BOTH KNEES: Primary | ICD-10-CM

## 2023-10-30 DIAGNOSIS — R29.898 WEAKNESS OF BOTH LOWER EXTREMITIES: ICD-10-CM

## 2023-10-30 DIAGNOSIS — M25.561 CHRONIC PAIN OF BOTH KNEES: Primary | ICD-10-CM

## 2023-10-30 PROCEDURE — 97110 THERAPEUTIC EXERCISES: CPT | Mod: PO

## 2023-10-30 NOTE — PROGRESS NOTES
"Physical Therapy Treatment Note     Name: Deb Webb  Clinic Number: [id number]    Therapy Diagnosis:   Encounter Diagnoses   Name Primary?    Chronic pain of both knees Yes    Weakness of both lower extremities     Gait abnormality        Physician: Blas Barraza MD    Visit Date: 10/30/2023    Physician Orders: PT Eval and Treat bilat knee OA  Medical Diagnosis from Referral:   M17.11 (ICD-10-CM) - Osteoarthritis of right knee   M17.12 (ICD-10-CM) - Osteoarthritis of left knee      Evaluation Date: 7/17/2023  Authorization Period Expiration: 8/23/2023  Plan of Care Expiration: 11/6/2023  Progress Note Due: 11/30/2023  Visit # / Visits authorized: 14/ 24 +Eval  FOTO: 2/3    Time In: 940 AM  Time Out: 1040 AM  Total Billable Time: 60 minutes  TE 4    Precautions: Standard, Diabetes, and see PMHx      Subjective     Pt reports: better pain relief overall slightly better than last time.     She was not compliant with home exercise program.  Response to previous treatment: better pain relief.   Functional change: feels safer with standing activities feel exercises have helped with safety and mobility.     Pain: 4/10 pre and    3/10 post.  Location: bilateral knee      Objective   10/30/23:  B knee AROM 0-124  B knee extension 5/5  B knee flexion 4+/5    Treatment      Deb received therapeutic exercises to develop strength, endurance, ROM, flexibility, posture and core stabilization for 60 minutes including:    Nu step x 8 minutes level 1  Hamstring stretch with strap 3x30" deferred for sitting EOM HS today  IT band stretch 3x30"  BLE quad sets x20 c 5 sec hold  SLR #1 3x10  Hook lying clamshells c Green TB   3x10  Supine marches c Green TB x30 ea  Supine bridges c Green TB 3x10  Supine hip add (ball squeeze) x 20, 5"    EOB sit to stands 3x10  Standing heel raises 3x10  Standing hip abduction 2x10 c Green TB  Standing march 2x10   LAQ 2x10  Gastroc stretch on incline board 3x30"-NP  Mini squats 2x10 (verbal " "cueing for proper form)   Step ups 6" step 2x10    Deb received the following manual therapy techniques:  were applied to the:  for  minutes, including:    Deb participated in neuromuscular re-education activities to improve: Balance, Coordination, Proprioception and Posture for  minutes. The following activities were included:    Deb received cold pack for 0 minutes to bilateral knees.    Home Exercises Provided and Patient Education Provided     Education provided:   - HEP review    Written Home Exercises Provided: Patient instructed to cont prior HEP.  Exercises were reviewed and Deb was able to demonstrate them prior to the end of the session.  Deb demonstrated good  understanding of the education provided.     See EMR under Patient Instructions for exercises provided prior visit.    Assessment   Deb without issues in session. PT modified HS for sitting and flexion forward as she was having difficulty with BUEs.  Pt education with use of hands as needed for standing exercise to ensure proper safety.  Pt requested use of ice at end of session to relax her knees. Pt achieved 2 of 9 set goals with 4+/5 into flexion and 5/5 into extension B knee and WFL/WNL with B AROM of knees. Pt with mild reduction in pain with session and finished with 3/10 to achieve STG for pain. Evaluating therapist to determine continuation of care after 11/6/23 current POC.     Deb Is progressing well towards her goals.   Pt prognosis is Fair.     Pt will continue to benefit from skilled outpatient physical therapy to address the deficits listed in the problem list box on initial evaluation, provide pt/family education and to maximize pt's level of independence in the home and community environment.     Pt's spiritual, cultural and educational needs considered and pt agreeable to plan of care and goals.     Anticipated barriers to physical therapy: PMHx    Goals:   Short Term Goals: 5 weeks   Patient to report worst " pain 4/10-MET 10/30/23  Patient to improve BLE strength by 1/2 grade in deficieint areas-MET  Patient to improve 5x sit to stand by 5 seconds-IN PROGRESS  Patient to tolerate flexibility assessment-IN PROGRESS  Patient to tolerate balance assessment-IN PROGRESS     Long Term Goals: 10 weeks   Patient to report worst pain 1/10-IN PROGRESS  Patient to improve BLE strength by 1 grade in deficieint areas-NEARING  Patient to improve 5x sit to stand by 10 seconds-IN PROGRESS  Additional goals to come as patient progresses    Plan     Plan of care Certification: 7/17/2023 to 11/6/23     Outpatient Physical Therapy 1 times weekly for 10 weeks to include the following interventions: Electrical Stimulation per contraindications cleared, Gait Training, Manual Therapy, Moist Heat/ Ice, Neuromuscular Re-ed, Patient Education, Self Care, Therapeutic Activities, and Therapeutic Exercise.      Yadira Goldberg, PT

## 2023-11-01 ENCOUNTER — TELEPHONE (OUTPATIENT)
Dept: SURGERY | Facility: HOSPITAL | Age: 73
End: 2023-11-01
Payer: MEDICARE

## 2023-11-01 ENCOUNTER — TELEPHONE (OUTPATIENT)
Dept: ORTHOPEDICS | Facility: CLINIC | Age: 73
End: 2023-11-01
Payer: MEDICARE

## 2023-11-01 NOTE — TELEPHONE ENCOUNTER
Returned meryl to pt, she stated she needs sx date moved back to 11/2, informed we can not guarantee and certain time. She stated that was fine.

## 2023-11-01 NOTE — TELEPHONE ENCOUNTER
Good morning,     Ms. Webb states she no longer has a ride home following surgery on Friday, 11/3. She would like to move her surgery date to 11/28, some time around 8 or  9AM if this is feasable.     Please call Ms. Webb to discuss.     Thank you!

## 2023-11-01 NOTE — TELEPHONE ENCOUNTER
----- Message from Kaiser Sawyer sent at 11/1/2023 10:17 AM CDT -----  Regarding: wants Sx tomorrow, call pt   Contact: pt   wants Sx tomorrow, call pt

## 2023-11-01 NOTE — TELEPHONE ENCOUNTER
----- Message from Joanne Felipe sent at 11/1/2023  8:51 AM CDT -----  Regarding: cancel procedure  Contact: patient  Type:  Sooner Appointment Request    Caller is requesting a sooner appointment.  Caller declined first available appointment listed below.  Caller will not accept being placed on the waitlist and is requesting a message be sent to doctor.    Name of Caller:  patient  When is the first available appointment?  11/02/23  Symptoms:    Would the patient rather a call back or a response via [facility]? Call  Best Call Back Number: [phone]  Additional Information:  Patient needs to change the time of her procedure in the morning.  Please call patient to advise.  Thanks!

## 2023-11-04 DIAGNOSIS — M81.0 OSTEOPOROSIS, POSTMENOPAUSAL: ICD-10-CM

## 2023-11-04 NOTE — TELEPHONE ENCOUNTER
Care Due:                  Date            Visit Type   Department     Provider  --------------------------------------------------------------------------------                                EP -                              [facility]  Last Visit: 10-      CARE ([facility])   AUGUST Valverde                              EP -                              PRIMARY      [facility]  Next Visit: 01-      CARE ([facility])   AUGUST Valverde                                                            Last  Test          Frequency    Reason                     Performed    Due Date  --------------------------------------------------------------------------------    HBA1C.......  6 months...  TRULICITY................  07- 01-    [facility] Embedded Care Due Messages. Reference number: [id number].   11/04/2023 10:53:59 AM CDT

## 2023-11-05 NOTE — TELEPHONE ENCOUNTER
Refill Routing Note   Medication(s) are not appropriate for processing by [facility] for the following reason(s):      Medication outside of protocol    [facility] action(s):  Route Care Due:  Labs due          Appointments  past 12m or future 3m with PCP    Date Provider   Last Visit   10/16/2023 Triston Valverde MD   Next Visit   1/19/2024 Triston Valverde MD   ED visits in past 90 days: 0        Note composed:11:58 PM 11/04/2023

## 2023-11-06 RX ORDER — ALENDRONATE SODIUM 70 MG/1
70 TABLET ORAL WEEKLY
Qty: 12 TABLET | Refills: 3 | Status: ON HOLD | OUTPATIENT
Start: 2023-11-06 | End: 2024-01-04

## 2023-11-08 ENCOUNTER — CLINICAL SUPPORT (OUTPATIENT)
Dept: REHABILITATION | Facility: HOSPITAL | Age: 73
End: 2023-11-08
Payer: MEDICARE

## 2023-11-08 DIAGNOSIS — G89.29 CHRONIC PAIN OF BOTH KNEES: Primary | ICD-10-CM

## 2023-11-08 DIAGNOSIS — R29.898 WEAKNESS OF BOTH LOWER EXTREMITIES: ICD-10-CM

## 2023-11-08 DIAGNOSIS — M25.562 CHRONIC PAIN OF BOTH KNEES: Primary | ICD-10-CM

## 2023-11-08 DIAGNOSIS — M25.561 CHRONIC PAIN OF BOTH KNEES: Primary | ICD-10-CM

## 2023-11-08 DIAGNOSIS — R26.9 GAIT ABNORMALITY: ICD-10-CM

## 2023-11-08 PROCEDURE — 97110 THERAPEUTIC EXERCISES: CPT | Mod: PO

## 2023-11-08 NOTE — PLAN OF CARE
[facility]  Physical Therapy Plan of Care Note     Name: Deb Webb  Clinic Number: [id number]    Therapy Diagnosis:   Encounter Diagnoses   Name Primary?    Chronic pain of both knees Yes    Weakness of both lower extremities     Gait abnormality      Physician: Blas Barraza MD    Visit Date: 11/8/2023      Visit Date: 11/8/2023    Physician Orders: PT Eval and Treat bilat knee OA  Medical Diagnosis from Referral:   M17.11 (ICD-10-CM) - Osteoarthritis of right knee   M17.12 (ICD-10-CM) - Osteoarthritis of left knee      Evaluation Date: 7/17/2023  Authorization Period Expiration: 8/23/2023  Plan of Care Expiration: 11/6/2023  Progress Note Due: 11/30/2023  Visit # / Visits authorized: 14/ 24 +Eval  FOTO: 2/3    Time In: 940 AM  Time Out: 1040 AM  Total Billable Time: 55 mins    Precautions: Standard, Diabetes, and see PMHx        SUBJECTIVE     Update: Continued improvements form current POC and would like to extend for a few more weeks to continue getting stronger    OBJECTIVE     Update: B knee AROM 0-124  B knee extension 5/5  B knee flexion 4+/5    5xSTS:   22.1 sec    6 minute walk test: 680.5 feet standby assist c rollator    ASSESSMENT     Update: Pt presented today with improvements in overall pain and discomfort reported. Assesses gait speed and BLE power with 6 minute walk test and 5x sit to stand with good tolerance but increased fatigue easily generated following performance of these tests. Due to endurance continuing to be a problem, pt will benefit from an extension of current POC to 1x per week for 4 additional weeks to improve overall function and enhance mobility.    Previous Short Term Goals Status:   Continue per initial POC  New Short Term Goals Status:   Continue per initial POC  Long Term Goal Status: continue per initial plan of care.  Reasons for Recertification of Therapy:   Not all goals met    GOALS  Short Term Goals: 5 weeks   Patient to report  worst pain 4/10-MET 10/30/23  Patient to improve BLE strength by 1/2 grade in deficieint areas-MET  Patient to improve 5x sit to stand by 5 seconds-IN PROGRESS  Patient to tolerate flexibility assessment-MET  Patient to tolerate balance assessment-MET  Pt to improve 6 min walk test to >800 feet     Long Term Goals: 10 weeks   Patient to report worst pain 1/10-IN PROGRESS  Patient to improve BLE strength by 1 grade in deficieint areas-NEARING  Patient to improve 5x sit to stand by 10 seconds-IN PROGRESS  Pt to improve 6 min walk test to >900 feet- In progress  10. Pt to demo independence with final HEP- In PROGRESS    PLAN     Updated Certification Period: 11/8/2023 to 12/6/2023   Recommended Treatment Plan: 1 times per week for 4 weeks:  Gait Training, Manual Therapy, Moist Heat/ Ice, Neuromuscular Re-ed, Patient Education, Self Care, Therapeutic Activities, and Therapeutic Exercise    René Price, PT

## 2023-11-08 NOTE — PROGRESS NOTES
"Physical Therapy Treatment Note     Name: Deb Webb  Clinic Number: [id number]    Therapy Diagnosis:   Encounter Diagnoses   Name Primary?    Chronic pain of both knees Yes    Weakness of both lower extremities     Gait abnormality        Physician: Blas Barraza MD    Visit Date: 11/8/2023    Physician Orders: PT Eval and Treat bilat knee OA  Medical Diagnosis from Referral:   M17.11 (ICD-10-CM) - Osteoarthritis of right knee   M17.12 (ICD-10-CM) - Osteoarthritis of left knee      Evaluation Date: 7/17/2023  Authorization Period Expiration: 8/23/2023  Plan of Care Expiration: 12/8/2023  Progress Note Due: 12/6//2023  Visit # / Visits authorized: 13/ 24 +Eval  FOTO: 2/3    Time In: 1020 AM  Time Out: 1115 AM  Total Billable Time: 55 minutes     Precautions: Standard, Diabetes, and see PMHx      Subjective     Pt reports: Continued improvements form current POC and would like to extend for a few more weeks to continue getting stronger    She was not compliant with home exercise program.  Response to previous treatment: better pain relief.   Functional change: feels safer with standing activities feel exercises have helped with safety and mobility.     Pain: 4/10 pre and    3/10 post.  Location: bilateral knee      Objective   10/30/23:  B knee AROM 0-124  B knee extension 5/5  B knee flexion 4+/5    5xSTS:   22.1 sec    6 minute walk test: 680.5 feet standby assist c rollator    Treatment      Deb received therapeutic exercises to develop strength, endurance, ROM, flexibility, posture and core stabilization for 55 minutes including:    Nu step x 8 minutes level 1  Hamstring stretch with strap 3x30" deferred for sitting EOM HS today  IT band stretch 3x30"  BLE quad sets x20 c 5 sec hold  SLR #1 3x10  Hook lying clamshells c Green TB   3x10  Supine marches c Green TB x30 ea  Supine bridges c Green TB 3x10  Supine hip add (ball squeeze) x 20, 5"    EOB sit to stands 3x10  Standing heel raises 3x10  Standing " "hip abduction 2x10 c Green TB  Standing march 2x10   LAQ 2x10  Gastroc stretch on incline board 3x30"-NP  Mini squats 2x10 (verbal cueing for proper form)   Step ups 6" step 2x10    Deb received the following manual therapy techniques:  were applied to the:  for  minutes, including:    Deb participated in neuromuscular re-education activities to improve: Balance, Coordination, Proprioception and Posture for  minutes. The following activities were included:    Deb received cold pack for 0 minutes to bilateral knees.    Home Exercises Provided and Patient Education Provided     Education provided:   - HEP review    Written Home Exercises Provided: Patient instructed to cont prior HEP.  Exercises were reviewed and Deb was able to demonstrate them prior to the end of the session.  Deb demonstrated good  understanding of the education provided.     See EMR under Patient Instructions for exercises provided prior visit.    Assessment     Pt presented today with improvements in overall pain and discomfort reported. Assesses gait speed and BLE power with 6 minute walk test and 5x sit to stand with good tolerance but increased fatigue easily generated following performance of these tests. Due to endurance continuing to be a problem, pt will benefit from an extension of current POC to 1x per week for 4 additional weeks to improve overall function and enhance mobility.    Deb Is progressing well towards her goals.   Pt prognosis is Fair.     Pt will continue to benefit from skilled outpatient physical therapy to address the deficits listed in the problem list box on initial evaluation, provide pt/family education and to maximize pt's level of independence in the home and community environment.     Pt's spiritual, cultural and educational needs considered and pt agreeable to plan of care and goals.     Anticipated barriers to physical therapy: PMHx    Goals:   Short Term Goals: 5 weeks   Patient to report worst " pain 4/10-MET 10/30/23  Patient to improve BLE strength by 1/2 grade in deficieint areas-MET  Patient to improve 5x sit to stand by 5 seconds-IN PROGRESS  Patient to tolerate flexibility assessment-MET  Patient to tolerate balance assessment-MET  Pt to improve 6 min walk test to >800 feet     Long Term Goals: 10 weeks   Patient to report worst pain 1/10-IN PROGRESS  Patient to improve BLE strength by 1 grade in deficieint areas-NEARING  Patient to improve 5x sit to stand by 10 seconds-IN PROGRESS  Pt to improve 6 min walk test to >900 feet- In progress  10. Pt to demo independence with final HEP- In PROGRESS    Plan     Plan of care Certification: 7/17/2023 to 11/6/23     Outpatient Physical Therapy 1 times weekly for 10 weeks to include the following interventions: Electrical Stimulation per contraindications cleared, Gait Training, Manual Therapy, Moist Heat/ Ice, Neuromuscular Re-ed, Patient Education, Self Care, Therapeutic Activities, and Therapeutic Exercise.      René Price, PT

## 2023-11-13 DIAGNOSIS — R55 NEAR SYNCOPE: ICD-10-CM

## 2023-11-13 RX ORDER — MECLIZINE HCL 12.5 MG 12.5 MG/1
12.5 TABLET ORAL
Qty: 30 TABLET | Refills: 0 | Status: ON HOLD | OUTPATIENT
Start: 2023-11-13 | End: 2024-01-04

## 2023-11-13 NOTE — TELEPHONE ENCOUNTER
Care Due:                  Date            Visit Type   Department     Provider  --------------------------------------------------------------------------------                                EP -                              [facility]  Last Visit: 10-      CARE ([facility])   AUGUST Valverde                              EP -                              PRIMARY      [facility]  Next Visit: 01-      CARE ([facility])   AUGUST Valverde                                                            Last  Test          Frequency    Reason                     Performed    Due Date  --------------------------------------------------------------------------------    Lipid Panel.  12 months..  atorvastatin,              02- 02-                             cholestyramine-aspartame.    [facility] Embedded Care Due Messages. Reference number: [id number].   11/13/2023 10:00:49 AM CST

## 2023-11-15 ENCOUNTER — TELEPHONE (OUTPATIENT)
Dept: ORTHOPEDICS | Facility: CLINIC | Age: 73
End: 2023-11-15
Payer: MEDICARE

## 2023-11-15 ENCOUNTER — CLINICAL SUPPORT (OUTPATIENT)
Dept: REHABILITATION | Facility: HOSPITAL | Age: 73
End: 2023-11-15
Payer: MEDICARE

## 2023-11-15 DIAGNOSIS — R29.898 WEAKNESS OF BOTH LOWER EXTREMITIES: ICD-10-CM

## 2023-11-15 DIAGNOSIS — G89.29 CHRONIC PAIN OF BOTH KNEES: Primary | ICD-10-CM

## 2023-11-15 DIAGNOSIS — M25.561 CHRONIC PAIN OF BOTH KNEES: Primary | ICD-10-CM

## 2023-11-15 DIAGNOSIS — R26.9 GAIT ABNORMALITY: ICD-10-CM

## 2023-11-15 DIAGNOSIS — M25.562 CHRONIC PAIN OF BOTH KNEES: Primary | ICD-10-CM

## 2023-11-15 PROCEDURE — 97110 THERAPEUTIC EXERCISES: CPT | Mod: KX,PO,CQ

## 2023-11-15 NOTE — TELEPHONE ENCOUNTER
Returned call to pt, confirmed she is cancelling her sx on 11/30/23. Pt stated she no longer wants the sx done.

## 2023-11-15 NOTE — TELEPHONE ENCOUNTER
----- Message from Kenny Angeles sent at 11/15/2023  9:53 AM CST -----  Ms. Webb stopped at the surgery center this morning and wanted to leave a message for Dr. Briggs.  She is scheduled for a procedure on 11/30 but wants to cancel the procedure.  She wants him to call her [phone].  Thank you

## 2023-11-15 NOTE — PROGRESS NOTES
"Physical Therapy Treatment Note     Name: Deb Webb  Clinic Number: [id number]    Therapy Diagnosis:   Encounter Diagnoses   Name Primary?    Chronic pain of both knees Yes    Weakness of both lower extremities     Gait abnormality        Physician: Blas Barraza MD    Visit Date: 11/15/2023    Physician Orders: PT Eval and Treat bilat knee OA  Medical Diagnosis from Referral:   M17.11 (ICD-10-CM) - Osteoarthritis of right knee   M17.12 (ICD-10-CM) - Osteoarthritis of left knee      Evaluation Date: 7/17/2023  Authorization Period Expiration: 8/23/2023  Plan of Care Expiration: 12/8/2023  Progress Note Due: 12/6//2023  Visit # / Visits authorized:1/20, 16 visits total  FOTO: 2/3    Time In: 0848 AM  Time Out: 0940 AM  Total Billable Time: 30 minutes     Precautions: Standard, Diabetes, and see PMHx      Subjective     Pt reports: tripped over her sister's dog this morning. Patient states she has some soreness in her due the fall. She was not compliant with home exercise program.  Response to previous treatment: better pain relief.   Functional change: feels safer with standing activities feel exercises have helped with safety and mobility.     Pain: 2/10   Location: (L) hip     Objective   10/30/23:  B knee AROM 0-124  B knee extension 5/5  B knee flexion 4+/5    5xSTS:   22.1 sec    6 minute walk test: 680.5 feet standby assist c rollator    Treatment      Deb received therapeutic exercises to develop strength, endurance, ROM, flexibility, posture and core stabilization for 55 minutes including:    Nu step x 8 minutes level 1  Sitting EOM HS stretch x 30 sec x 3   IT band stretch 3x30"  BLE quad sets x 20 c 5 sec hold  SLR #1 3x10  Hook lying clamshells c Green TB   3x10  Supine marches c Green TB x 30 ea  Supine bridges c Green TB 3x10  Supine hip add (ball squeeze) x 20, 5"    EOB sit to stands 3x10  Standing heel raises 3x10  Standing hip abduction 2x10 c Green TB  Standing march 2x10   LAQ 2x10, 3# " "  Gastroc stretch on incline board 3x30"-NP  Mini squats 2x10 (verbal cueing for proper form)   Step ups 6" step 2x10    Deb received the following manual therapy techniques:  were applied to the:  for  minutes, including:    Deb participated in neuromuscular re-education activities to improve: Balance, Coordination, Proprioception and Posture for  minutes. The following activities were included:    Deb received cold pack for 0 minutes to bilateral knees.    Home Exercises Provided and Patient Education Provided     Education provided:   - HEP review    Written Home Exercises Provided: Patient instructed to cont prior HEP.  Exercises were reviewed and Deb was able to demonstrate them prior to the end of the session.  Deb demonstrated good  understanding of the education provided.     See EMR under Patient Instructions for exercises provided prior visit.    Assessment     Deb is fatigued by current exercise program especially with weightbearing activities. Patient vocal that (B) UEs aren't strong and she is worried about picking herself up off of floor in event of a fall. No complaints of discomfort with exercise today.      Deb Is progressing well towards her goals.   Pt prognosis is Fair.     Pt will continue to benefit from skilled outpatient physical therapy to address the deficits listed in the problem list box on initial evaluation, provide pt/family education and to maximize pt's level of independence in the home and community environment.     Pt's spiritual, cultural and educational needs considered and pt agreeable to plan of care and goals.     Anticipated barriers to physical therapy: PMHx    Goals:   Short Term Goals: 5 weeks   Patient to report worst pain 4/10-MET 10/30/23  Patient to improve BLE strength by 1/2 grade in deficieint areas-MET  Patient to improve 5x sit to stand by 5 seconds-IN PROGRESS  Patient to tolerate flexibility assessment-MET  Patient to tolerate balance " assessment-MET  Pt to improve 6 min walk test to >800 feet     Long Term Goals: 10 weeks   Patient to report worst pain 1/10-IN PROGRESS  Patient to improve BLE strength by 1 grade in deficieint areas-NEARING  Patient to improve 5x sit to stand by 10 seconds-IN PROGRESS  Pt to improve 6 min walk test to >900 feet- In progress  10. Pt to demo independence with final HEP- In PROGRESS    Plan     Plan of care Certification: 7/17/2023 to 11/6/23     Outpatient Physical Therapy 1 times weekly for 10 weeks to include the following interventions: Electrical Stimulation per contraindications cleared, Gait Training, Manual Therapy, Moist Heat/ Ice, Neuromuscular Re-ed, Patient Education, Self Care, Therapeutic Activities, and Therapeutic Exercise.      Madeline Crawford, PTA

## 2023-11-24 DIAGNOSIS — H40.1133 PRIMARY OPEN ANGLE GLAUCOMA OF BOTH EYES, SEVERE STAGE: ICD-10-CM

## 2023-11-24 RX ORDER — BRIMONIDINE TARTRATE 2 MG/ML
SOLUTION/ DROPS OPHTHALMIC
Qty: 5 ML | Refills: 6 | Status: SHIPPED | OUTPATIENT
Start: 2023-11-24

## 2023-11-29 ENCOUNTER — CLINICAL SUPPORT (OUTPATIENT)
Dept: REHABILITATION | Facility: HOSPITAL | Age: 73
End: 2023-11-29
Payer: MEDICARE

## 2023-11-29 DIAGNOSIS — M25.562 CHRONIC PAIN OF BOTH KNEES: Primary | ICD-10-CM

## 2023-11-29 DIAGNOSIS — R29.898 WEAKNESS OF BOTH LOWER EXTREMITIES: ICD-10-CM

## 2023-11-29 DIAGNOSIS — M25.561 CHRONIC PAIN OF BOTH KNEES: Primary | ICD-10-CM

## 2023-11-29 DIAGNOSIS — R26.9 GAIT ABNORMALITY: ICD-10-CM

## 2023-11-29 DIAGNOSIS — G89.29 CHRONIC PAIN OF BOTH KNEES: Primary | ICD-10-CM

## 2023-11-29 PROCEDURE — 97112 NEUROMUSCULAR REEDUCATION: CPT | Mod: PO,CQ

## 2023-11-29 PROCEDURE — 97110 THERAPEUTIC EXERCISES: CPT | Mod: PO,CQ

## 2023-11-29 NOTE — PROGRESS NOTES
"Physical Therapy Treatment Note     Name: Deb Webb  Clinic Number: [id number]    Therapy Diagnosis:   Encounter Diagnoses   Name Primary?    Chronic pain of both knees Yes    Weakness of both lower extremities     Gait abnormality        Physician: Blas Barraza MD    Visit Date: 11/29/2023    Physician Orders: PT Eval and Treat bilat knee OA  Medical Diagnosis from Referral:   M17.11 (ICD-10-CM) - Osteoarthritis of right knee   M17.12 (ICD-10-CM) - Osteoarthritis of left knee      Evaluation Date: 7/17/2023  Authorization Period Expiration: 8/23/2023  Plan of Care Expiration: 12/8/2023  Progress Note Due: 12/6//2023  Visit # / Visits authorized: 15/36  FOTO: 2/3    Time In: 9:45 AM  Time Out: 10:40 AM  Total Billable Time: 55 minutes     Precautions: Standard, Diabetes, and see PMHx      Subjective     Pt reports: min bilateral knee pain currently and states "I thought I would be feeling much worse with this cold weather; I can tell the therapy is definitely working".    She was not compliant with home exercise program.  Response to previous treatment: better pain relief.   Functional change: feels safer with standing activities feel exercises have helped with safety and mobility.     Pain: 1/10   Location: (B) knees    Objective   10/30/23:  B knee AROM 0-124  B knee extension 5/5  B knee flexion 4+/5    5xSTS:   22.1 sec    6 minute walk test: 680.5 feet standby assist c rollator    Treatment      Deb received therapeutic exercises to develop strength, endurance, ROM, flexibility, posture and core stabilization for 45 minutes including:    Nu step x 8 minutes level 1-NP  Recumbent bike level 1 x 10 minutes  Sitting EOM HS stretch x 30 sec x 3   IT band stretch 3x30"  BLE quad sets x 20 c 5 sec hold-NP  SLR 2# 3x10  Hook lying clamshells c Green TB   3x10  Supine marches c Green TB x 30 ea-NP  Supine bridges c Green TB 3x10  Supine hip add (ball squeeze) x 20, 5"    EOB sit to stands 3x10  Standing " "heel raises 3x10  Standing hip abduction 2x10 c Green TB  Standing march 2x10   LAQ 2x10, 3#   Gastroc stretch on incline board 3x30"-NP  Mini squats 2x10 (verbal cueing for proper form)   Step ups 6" step 2x10    Deb received the following manual therapy techniques:  were applied to the:  for  minutes, including:    Deb participated in neuromuscular re-education activities to improve: Balance, Coordination, Proprioception and Posture for 10 minutes. The following activities were included:    NBOS on airex 2x30"  Tandem stance 2x30"  SLS 2x30"    Deb received cold pack for 0 minutes to bilateral knees.    Home Exercises Provided and Patient Education Provided     Education provided:   - HEP review    Written Home Exercises Provided: Patient instructed to cont prior HEP.  Exercises were reviewed and Deb was able to demonstrate them prior to the end of the session.  Deb demonstrated good  understanding of the education provided.     See EMR under Patient Instructions for exercises provided prior visit.    Assessment   Deb returned reporting only min bilateral knee. She tolerated today's global LE strengthening ex's well; demonstrating improved cardiovascular/muscular endurance with increased time on bike and improved hip flexor strength able to perform SLR with increased resistance.  Static balance challenges were reincorporated today as well. Will continue to progress per pt's tolerance to improved overall function.       Deb Is progressing well towards her goals.   Pt prognosis is Fair.     Pt will continue to benefit from skilled outpatient physical therapy to address the deficits listed in the problem list box on initial evaluation, provide pt/family education and to maximize pt's level of independence in the home and community environment.     Pt's spiritual, cultural and educational needs considered and pt agreeable to plan of care and goals.     Anticipated barriers to physical therapy: " PMHx    Goals:   Short Term Goals: 5 weeks   Patient to report worst pain 4/10-MET 10/30/23  Patient to improve BLE strength by 1/2 grade in deficieint areas-MET  Patient to improve 5x sit to stand by 5 seconds-IN PROGRESS  Patient to tolerate flexibility assessment-MET  Patient to tolerate balance assessment-MET  Pt to improve 6 min walk test to >800 feet     Long Term Goals: 10 weeks   Patient to report worst pain 1/10-IN PROGRESS  Patient to improve BLE strength by 1 grade in deficieint areas-NEARING  Patient to improve 5x sit to stand by 10 seconds-IN PROGRESS  Pt to improve 6 min walk test to >900 feet- In progress  10. Pt to demo independence with final HEP- In PROGRESS    Plan     Plan of care Certification: 11/8/2023 to 12/8/23     Outpatient Physical Therapy 1 times weekly for 10 weeks to include the following interventions: Electrical Stimulation per contraindications cleared, Gait Training, Manual Therapy, Moist Heat/ Ice, Neuromuscular Re-ed, Patient Education, Self Care, Therapeutic Activities, and Therapeutic Exercise.      Eleazar Zurita, PTA

## 2023-12-04 ENCOUNTER — OFFICE VISIT (OUTPATIENT)
Dept: ORTHOPEDICS | Facility: CLINIC | Age: 73
End: 2023-12-04
Payer: MEDICARE

## 2023-12-04 ENCOUNTER — HOSPITAL ENCOUNTER (OUTPATIENT)
Dept: RADIOLOGY | Facility: HOSPITAL | Age: 73
Discharge: HOME | End: 2023-12-04
Attending: PHYSICIAN ASSISTANT
Payer: MEDICARE

## 2023-12-04 DIAGNOSIS — M20.031 SWAN-NECK DEFORMITY OF FINGER OF RIGHT HAND: Primary | ICD-10-CM

## 2023-12-04 DIAGNOSIS — G56.03 BILATERAL CARPAL TUNNEL SYNDROME: ICD-10-CM

## 2023-12-04 DIAGNOSIS — M25.561 RIGHT KNEE PAIN, UNSPECIFIED CHRONICITY: ICD-10-CM

## 2023-12-04 DIAGNOSIS — S89.91XA INJURY OF RIGHT KNEE, INITIAL ENCOUNTER: ICD-10-CM

## 2023-12-04 PROCEDURE — 1100F PR PT FALLS ASSESS DOC 2+ FALLS/FALL W/INJURY/YR: ICD-10-PCS | Mod: CPTII,S$GLB,, | Performed by: PHYSICIAN ASSISTANT

## 2023-12-04 PROCEDURE — 3288F PR FALLS RISK ASSESSMENT DOCUMENTED: ICD-10-PCS | Mod: CPTII,S$GLB,, | Performed by: PHYSICIAN ASSISTANT

## 2023-12-04 PROCEDURE — 3066F NEPHROPATHY DOC TX: CPT | Mod: CPTII,S$GLB,, | Performed by: PHYSICIAN ASSISTANT

## 2023-12-04 PROCEDURE — 1160F PR REVIEW ALL MEDS BY PRESCRIBER/CLIN PHARMACIST DOCUMENTED: ICD-10-PCS | Mod: CPTII,S$GLB,, | Performed by: PHYSICIAN ASSISTANT

## 2023-12-04 PROCEDURE — 3044F PR MOST RECENT HEMOGLOBIN A1C LEVEL <7.0%: ICD-10-PCS | Mod: CPTII,S$GLB,, | Performed by: PHYSICIAN ASSISTANT

## 2023-12-04 PROCEDURE — 3066F PR DOCUMENTATION OF TREATMENT FOR NEPHROPATHY: ICD-10-PCS | Mod: CPTII,S$GLB,, | Performed by: PHYSICIAN ASSISTANT

## 2023-12-04 PROCEDURE — 1159F PR MEDICATION LIST DOCUMENTED IN MEDICAL RECORD: ICD-10-PCS | Mod: CPTII,S$GLB,, | Performed by: PHYSICIAN ASSISTANT

## 2023-12-04 PROCEDURE — 73560 X-RAY EXAM OF KNEE 1 OR 2: CPT | Mod: 26,RT,, | Performed by: RADIOLOGY

## 2023-12-04 PROCEDURE — 73560 X-RAY EXAM OF KNEE 1 OR 2: CPT | Mod: TC,PO,RT

## 2023-12-04 PROCEDURE — 3060F PR POS MICROALBUMINURIA RESULT DOCUMENTED/REVIEW: ICD-10-PCS | Mod: CPTII,S$GLB,, | Performed by: PHYSICIAN ASSISTANT

## 2023-12-04 PROCEDURE — 73560 XR KNEE 1 OR 2 VIEW RIGHT: ICD-10-PCS | Mod: 26,RT,, | Performed by: RADIOLOGY

## 2023-12-04 PROCEDURE — 3044F HG A1C LEVEL LT 7.0%: CPT | Mod: CPTII,S$GLB,, | Performed by: PHYSICIAN ASSISTANT

## 2023-12-04 PROCEDURE — 99213 PR OFFICE/OUTPT VISIT, EST, LEVL III, 20-29 MIN: ICD-10-PCS | Mod: S$GLB,,, | Performed by: PHYSICIAN ASSISTANT

## 2023-12-04 PROCEDURE — 1125F AMNT PAIN NOTED PAIN PRSNT: CPT | Mod: CPTII,S$GLB,, | Performed by: PHYSICIAN ASSISTANT

## 2023-12-04 PROCEDURE — 1160F RVW MEDS BY RX/DR IN RCRD: CPT | Mod: CPTII,S$GLB,, | Performed by: PHYSICIAN ASSISTANT

## 2023-12-04 PROCEDURE — 3060F POS MICROALBUMINURIA REV: CPT | Mod: CPTII,S$GLB,, | Performed by: PHYSICIAN ASSISTANT

## 2023-12-04 PROCEDURE — 1125F PR PAIN SEVERITY QUANTIFIED, PAIN PRESENT: ICD-10-PCS | Mod: CPTII,S$GLB,, | Performed by: PHYSICIAN ASSISTANT

## 2023-12-04 PROCEDURE — 99999 PR PBB SHADOW E&M-EST. PATIENT-LVL IV: ICD-10-PCS | Mod: PBBFAC,,, | Performed by: PHYSICIAN ASSISTANT

## 2023-12-04 PROCEDURE — 3288F FALL RISK ASSESSMENT DOCD: CPT | Mod: CPTII,S$GLB,, | Performed by: PHYSICIAN ASSISTANT

## 2023-12-04 PROCEDURE — 99213 OFFICE O/P EST LOW 20 MIN: CPT | Mod: S$GLB,,, | Performed by: PHYSICIAN ASSISTANT

## 2023-12-04 PROCEDURE — 1100F PTFALLS ASSESS-DOCD GE2>/YR: CPT | Mod: CPTII,S$GLB,, | Performed by: PHYSICIAN ASSISTANT

## 2023-12-04 PROCEDURE — 99999 PR PBB SHADOW E&M-EST. PATIENT-LVL IV: CPT | Mod: PBBFAC,,, | Performed by: PHYSICIAN ASSISTANT

## 2023-12-04 PROCEDURE — 1159F MED LIST DOCD IN RCRD: CPT | Mod: CPTII,S$GLB,, | Performed by: PHYSICIAN ASSISTANT

## 2023-12-04 NOTE — PROGRESS NOTES
12/4/2023    HPI:  Deb Webb is a 73 y.o. female, who presents to clinic today for  weakness, dysfunction, numbness, and tingling of the bilateral hands.  States recently she is noticed she has dropping things more often and has dysfunction of the bilateral hands.  Additionally, states she fell approximately 2 weeks ago on her right knee.  States the knee has been mildly sore since that time, and she would like to have it evaluated.  Denies any other complaints at this time.    PMHX:  Past Medical History:   Diagnosis Date    Allergy     Arthritis     Cataract     Colon polyps     COPD (chronic obstructive pulmonary disease)     Diabetes mellitus type II     Diabetic neuropathy     Fever blister     Glaucoma (increased eye pressure)     Hyperlipidemia     Hypertension     Irritable bowel syndrome     Keloid cicatrix     Osteoporosis     Retained cholelithiasis following cholecystectomy(997.41)     Right patella fracture        PSHX:  Past Surgical History:   Procedure Laterality Date    BACK SURGERY  2006    CATARACT EXTRACTION W/  INTRAOCULAR LENS IMPLANT Bilateral     CHOLECYSTECTOMY      Laparoscopic    COLONOSCOPY      COLONOSCOPY N/A 8/31/2022    Procedure: COLONOSCOPY;  Surgeon: Salvatore Butler MD;  [facility]  Service: [facility]ology;  Laterality: N/A;    ESOPHAGEAL DILATION N/A 8/30/2022    Procedure: DILATION, ESOPHAGUS;  Surgeon: Salvatore Butler MD;  [facility];  Service: Gastroenterology;  Laterality: N/A;    ESOPHAGOGASTRODUODENOSCOPY N/A 8/30/2022    Procedure: EGD (ESOPHAGOGASTRODUODENOSCOPY);  Surgeon: Salvatore Butler MD;  [facility];  Service: Gastroenterology;  Laterality: N/A;    ESOPHAGOGASTRODUODENOSCOPY N/A 5/23/2023    Procedure: ESOPHAGOGASTRODUODENOSCOPY (EGD);  Surgeon: Desmond Duffy Jr., MD;  Location: [facility];  Service: Endoscopy;  Laterality: N/A;    HERNIA REPAIR      HYSTERECTOMY      KNEE SURGERY Right 3/4/2015    Dr Weller      OOPHORECTOMY      ovarian tumor      Benign    TONSILLECTOMY, ADENOIDECTOMY         FMHX:  Family History   Problem Relation Age of Onset    Cancer Mother         Skin    Skin cancer Mother     Glaucoma Mother     Cataracts Mother     Diabetes Mother     Heart disease Father     Diabetes Father     Skin cancer Sister     Diabetes Sister     Diabetes Daughter     Breast cancer Maternal Aunt     Melanoma Neg Hx     Psoriasis Neg Hx     Eczema Neg Hx     Lupus Neg Hx     Amblyopia Neg Hx     Blindness Neg Hx     Macular degeneration Neg Hx     Retinal detachment Neg Hx     Strabismus Neg Hx        SOCHX:  Social History     Tobacco Use    Smoking status: Every Day     Current packs/day: 0.50     Average packs/day: 0.5 packs/day for 40.0 years (20.0 ttl pk-yrs)     Types: Cigarettes    Smokeless tobacco: Former    Tobacco comments:     Not ready to quit smoking. Cut down on cigarettes but enjoys having a couple cigarettes a day.   Substance Use Topics    Alcohol use: No       ALLERGIES:  Silicone and Adhesive    CURRENT MEDICATIONS:  Current Outpatient Medications on File Prior to Visit   Medication Sig Dispense Refill    albuterol (PROVENTIL/VENTOLIN HFA) 90 mcg/actuation inhaler INHALE 2 PUFFS INTO THE LUNGS EVERY 6 HOURS AS NEEDED FOR WHEEZING OR SHORTNESS OF BREATH RESCUE 18 g 1    alendronate (FOSAMAX) 70 MG tablet TAKE 1 TABLET (70 MG TOTAL) BY MOUTH ONCE A WEEK 12 tablet 3    amiodarone (PACERONE) 200 MG Tab Take 2 tabs by mouth twice daily until 12/2/22 then take 1 tab by mouth twice daily 90 tablet 1    ANORO ELLIPTA 62.5-25 mcg/actuation DsDv INHALE 1 PUFF INTO THE LUNGS ONCE DAILY. 60 each 0    atorvastatin (LIPITOR) 40 MG tablet Take 1 tablet (40 mg total) by mouth once daily. 90 tablet 1    blood sugar diagnostic Strp To check BG 2 times daily, to use with insurance preferred meter 200 each 3    blood-glucose meter,continuous (DEXCOM G6 ) Misc Use as directed 1 each 1    blood-glucose sensor (DEXCOM  "G6 SENSOR) Emmie Use as directed 10 each 2    brimonidine 0.2% (ALPHAGAN) 0.2 % Drop PLACE 1 DROP INTO BOTH EYES 2 TIMES A DAY. 5 mL 6    calcium carbonate/vitamin D3 (CALCIUM 600 + D,3, ORAL) Take 1 tablet by mouth once daily.      cholestyramine-aspartame (PREVALITE) 4 gram PwPk TAKE 1 PACKET (4 G TOTAL) BY MOUTH 2 (TWO) TIMES DAILY. FOR DIARRHEA 180 packet 3    citalopram (CELEXA) 20 MG tablet Take 1 tablet (20 mg total) by mouth once daily. 90 tablet 1    gabapentin (NEURONTIN) 300 MG capsule TAKE 1 CAPSULE BY MOUTH THREE TIMES A  capsule 1    HYDROcodone-acetaminophen (NORCO) 5-325 mg per tablet Take 1 tablet by mouth every 12 (twelve) hours as needed for Pain. 10 tablet 0    ipratropium (ATROVENT) 42 mcg (0.06 %) nasal spray 2 SPRAYS BY NASAL ROUTE 3 (THREE) TIMES DAILY. USE 3 TIMES PER DAY IF NECESSARY FOR CHRONIC NASAL DRIP 15 mL 4    Lactobacillus rhamnosus GG (CULTURELLE) 10 billion cell capsule Take 1 capsule by mouth once daily. 60 capsule 0    lancets Misc To check BG 2 times daily, to use with insurance preferred meter 200 each 3    latanoprost 0.005 % ophthalmic solution Place 1 drop into both eyes every evening. 7.5 mL 3    meclizine (ANTIVERT) 12.5 mg tablet TAKE 1 TABLET BY MOUTH 3 TIMES A DAY AS NEEDED FOR DIZZINESS 30 tablet 0    montelukast (SINGULAIR) 10 mg tablet Take 1 tablet (10 mg total) by mouth every evening. 90 tablet 3    omega-3 fatty acids-vitamin E 1,000 mg Cap Take 1 capsule by mouth once daily.      omeprazole (PRILOSEC) 40 MG capsule Take 1 capsule (40 mg total) by mouth before breakfast. 90 capsule 1    pen needle, diabetic (BD ULTRA-FINE AGUS PEN NEEDLE) 32 gauge x 5/32" Ndle 1 Device by Misc.(Non-Drug; Combo Route) route 2 (two) times daily. 200 each 4    primidone (MYSOLINE) 50 MG Tab TAKE 2 TABLETS BY MOUTH 3 (THREE) TIMES DAILY. ONE HALF TABLET FOR ONE WEEK, THEN AS PRESCRIBED 540 tablet 3    timolol maleate 0.5% (TIMOPTIC) 0.5 % Drop Place 1 drop into both eyes once " daily. 5 mL 6    TRULICITY 1.5 mg/0.5 mL pen injector INJECT 1.5 MG INTO THE SKIN EVERY 7 DAYS. THROUGH PATIENT ASSISTANCE 12 pen 1    XARELTO 20 mg Tab TAKE 1 TABLET BY MOUTH EVERY DAY WITH DINNER OR EVENING MEAL 90 tablet 3    albuterol-ipratropium (DUO-NEB) 2.5 mg-0.5 mg/3 mL nebulizer solution Take 3 mLs by nebulization every 6 (six) hours as needed for Wheezing or Shortness of Breath. Rescue 75 mL 0    blood-glucose meter kit To check BG 2 times daily, to use with insurance preferred meter 1 each 0    [DISCONTINUED] irbesartan (AVAPRO) 75 MG tablet Take 1 tablet (75 mg total) by mouth once daily. 90 tablet 1    [DISCONTINUED] loratadine (CLARITIN) 10 mg tablet TAKE 1 TABLET (10 MG TOTAL) BY MOUTH DAILY AS NEEDED FOR ALLERGIES (OR RUNNY NOSE). 90 tablet 1     No current facility-administered medications on file prior to visit.       REVIEW OF SYSTEMS:  Review of Systems Complete; Negative, unless noted above.    GENERAL PHYSICAL EXAM:   There were no vitals taken for this visit.   GEN: well developed, well nourished, no acute distress   PULM: No wheezing, no respiratory distress   CV: RRR    ORTHO EXAM:     Examination of the bilateral hands reveals a swan-neck deformity of the right ring finger.  Able make composite fist bilaterally and extend all fingers.  4/5  / intrinsic strength.  Mildly positive carpal Tinel's test  Bilaterally.  Negative Durkan's test bilaterally. Normal sensation in the radial, ulnar, median nerve distributions.  Capillary refill less than 2 seconds in all fingers.    RADIOLOGY:     X-rays of the right knee were taken today in clinic.  X-rays reviewed by myself.  Imaging showed the presence of a chronic / remote fracture of the patella that has been stabilized via  orthopedic screw and wire fixation. No orthopedic hardware complications noted.  No acute fracture noted.  Presence of degenerative changes of the right knee joint.  No other significant bony abnormalities  noted.    ASSESSMENT:     Right ring finger swan-neck deformity, mild bilateral carpal tunnel syndrome, right knee injury    PLAN:  1. I discussed with Deb Webb  the best course of action this time is to perform a course of occupational therapy for the bilateral hands and begin wearing bilateral carpal tunnel splints nightly.  We did discuss if her right knee soreness does not continue to improve over the next few weeks to contact the clinic so she can be referred to a orthopedic knee specialist.  She verbally agreed with the treatment plan    2.  She was provided bilateral carpal tunnel splints in clinic today.  She was instructed to wear them every night until seen again in clinic.  She verbalized understanding     3.  She was referred occupational therapy in clinic today.      4.  I would like him to follow up in clinic on a p.r.n. basis for any worsening of his symptoms or for any hand, wrist, or elbow problems/ concerns.  She was instructed to contact the clinic for any problems or concerns in the interim.

## 2023-12-06 PROBLEM — I95.9 HYPOTENSION: Status: ACTIVE | Noted: 2023-12-06

## 2023-12-06 PROBLEM — E83.42 HYPOMAGNESEMIA: Status: ACTIVE | Noted: 2023-12-06

## 2023-12-06 PROBLEM — R64 CACHEXIA: Status: ACTIVE | Noted: 2023-12-06

## 2023-12-06 PROBLEM — S32.501A CLOSED FRACTURE OF RIGHT PUBIS: Status: ACTIVE | Noted: 2023-12-06

## 2023-12-06 PROBLEM — R55 SYNCOPE AND COLLAPSE: Status: ACTIVE | Noted: 2023-12-06

## 2023-12-07 PROBLEM — Z02.9 DISCHARGE PLANNING ISSUES: Status: ACTIVE | Noted: 2023-12-07

## 2023-12-07 PROBLEM — Z75.8 DISCHARGE PLANNING ISSUES: Status: ACTIVE | Noted: 2023-12-07

## 2023-12-09 PROBLEM — N30.00 ACUTE CYSTITIS WITHOUT HEMATURIA: Status: ACTIVE | Noted: 2023-12-09

## 2023-12-25 PROBLEM — J15.69 GRAM-NEGATIVE PNEUMONIA: Status: ACTIVE | Noted: 2023-12-25

## 2023-12-26 PROBLEM — Z71.89 ACP (ADVANCE CARE PLANNING): Status: RESOLVED | Noted: 2022-01-27 | Resolved: 2023-12-26

## 2023-12-28 PROBLEM — R53.81 DEBILITY: Status: ACTIVE | Noted: 2023-12-28

## 2024-01-10 ENCOUNTER — HOSPITAL ENCOUNTER (INPATIENT)
Facility: HOSPITAL | Age: 74
LOS: 9 days | Discharge: HOME | DRG: 393 | End: 2024-01-19
Attending: EMERGENCY MEDICINE | Admitting: INTERNAL MEDICINE
Payer: MEDICARE

## 2024-01-10 DIAGNOSIS — I48.92 ATRIAL FLUTTER WITH RAPID VENTRICULAR RESPONSE: ICD-10-CM

## 2024-01-10 DIAGNOSIS — R00.0 TACHYCARDIA: ICD-10-CM

## 2024-01-10 DIAGNOSIS — K92.1 MELENA: ICD-10-CM

## 2024-01-10 DIAGNOSIS — K92.1 HEMATOCHEZIA: ICD-10-CM

## 2024-01-10 DIAGNOSIS — I49.9 ARRHYTHMIA: ICD-10-CM

## 2024-01-10 DIAGNOSIS — K92.2 GASTROINTESTINAL HEMORRHAGE, UNSPECIFIED GASTROINTESTINAL HEMORRHAGE TYPE: ICD-10-CM

## 2024-01-10 DIAGNOSIS — J18.9 PNEUMONIA OF LEFT LOWER LOBE DUE TO INFECTIOUS ORGANISM: Primary | ICD-10-CM

## 2024-01-10 DIAGNOSIS — R00.0 TACHYARRHYTHMIA: ICD-10-CM

## 2024-01-10 PROBLEM — D68.9 COAGULOPATHY: Status: ACTIVE | Noted: 2024-01-10

## 2024-01-10 LAB
ABO + RH BLD: NORMAL
ADENOVIRUS: NOT DETECTED
ALBUMIN SERPL BCP-MCNC: 2.6 G/DL (ref 3.5–5.2)
ALLENS TEST: ABNORMAL
ALP SERPL-CCNC: 118 U/L (ref 55–135)
ALT SERPL W/O P-5'-P-CCNC: 21 U/L (ref 10–44)
ANION GAP SERPL CALC-SCNC: 12 MMOL/L (ref 8–16)
ANISOCYTOSIS BLD QL SMEAR: SLIGHT
APTT PPP: 21.1 SEC (ref 21–32)
AST SERPL-CCNC: 18 U/L (ref 10–40)
BASOPHILS # BLD AUTO: 0.04 K/UL (ref 0–0.2)
BASOPHILS # BLD AUTO: 0.04 K/UL (ref 0–0.2)
BASOPHILS NFR BLD: 0.2 % (ref 0–1.9)
BASOPHILS NFR BLD: 0.2 % (ref 0–1.9)
BILIRUB SERPL-MCNC: 0.5 MG/DL (ref 0.1–1)
BLD GP AB SCN CELLS X3 SERPL QL: NORMAL
BLD PROD TYP BPU: NORMAL
BLOOD UNIT EXPIRATION DATE: NORMAL
BLOOD UNIT TYPE CODE: 9500
BLOOD UNIT TYPE: NORMAL
BORDETELLA PARAPERTUSSIS (IS1001): NOT DETECTED
BORDETELLA PERTUSSIS (PTXP): NOT DETECTED
BUN SERPL-MCNC: 30 MG/DL (ref 6–30)
BUN SERPL-MCNC: 32 MG/DL (ref 8–23)
CALCIUM SERPL-MCNC: 8.5 MG/DL (ref 8.7–10.5)
CHLAMYDIA PNEUMONIAE: NOT DETECTED
CHLORIDE SERPL-SCNC: 105 MMOL/L (ref 95–110)
CHLORIDE SERPL-SCNC: 99 MMOL/L (ref 95–110)
CO2 SERPL-SCNC: 20 MMOL/L (ref 23–29)
CODING SYSTEM: NORMAL
CORONAVIRUS 229E, COMMON COLD VIRUS: NOT DETECTED
CORONAVIRUS HKU1, COMMON COLD VIRUS: NOT DETECTED
CORONAVIRUS NL63, COMMON COLD VIRUS: NOT DETECTED
CORONAVIRUS OC43, COMMON COLD VIRUS: NOT DETECTED
CREAT SERPL-MCNC: 0.8 MG/DL (ref 0.5–1.4)
CREAT SERPL-MCNC: 0.9 MG/DL (ref 0.5–1.4)
CROSSMATCH INTERPRETATION: NORMAL
DELSYS: ABNORMAL
DIFFERENTIAL METHOD BLD: ABNORMAL
DIFFERENTIAL METHOD BLD: ABNORMAL
DISPENSE STATUS: NORMAL
EOSINOPHIL # BLD AUTO: 0 K/UL (ref 0–0.5)
EOSINOPHIL # BLD AUTO: 0 K/UL (ref 0–0.5)
EOSINOPHIL NFR BLD: 0.2 % (ref 0–8)
EOSINOPHIL NFR BLD: 0.2 % (ref 0–8)
ERYTHROCYTE [DISTWIDTH] IN BLOOD BY AUTOMATED COUNT: 17.4 % (ref 11.5–14.5)
ERYTHROCYTE [DISTWIDTH] IN BLOOD BY AUTOMATED COUNT: 17.7 % (ref 11.5–14.5)
EST. GFR  (NO RACE VARIABLE): >60 ML/MIN/1.73 M^2
FLUBV RNA NPH QL NAA+NON-PROBE: NOT DETECTED
GLUCOSE SERPL-MCNC: 350 MG/DL (ref 70–110)
GLUCOSE SERPL-MCNC: 364 MG/DL (ref 70–110)
HCT VFR BLD AUTO: 26.4 % (ref 37–48.5)
HCT VFR BLD AUTO: 28.2 % (ref 37–48.5)
HCT VFR BLD AUTO: 32.2 % (ref 37–48.5)
HCT VFR BLD CALC: 34 %PCV (ref 36–54)
HGB BLD-MCNC: 10.2 G/DL (ref 12–16)
HGB BLD-MCNC: 7.9 G/DL (ref 12–16)
HGB BLD-MCNC: 8.5 G/DL (ref 12–16)
HPIV1 RNA NPH QL NAA+NON-PROBE: NOT DETECTED
HPIV2 RNA NPH QL NAA+NON-PROBE: NOT DETECTED
HPIV3 RNA NPH QL NAA+NON-PROBE: NOT DETECTED
HPIV4 RNA NPH QL NAA+NON-PROBE: NOT DETECTED
HUMAN METAPNEUMOVIRUS: NOT DETECTED
HYPOCHROMIA BLD QL SMEAR: ABNORMAL
IMM GRANULOCYTES # BLD AUTO: 0.13 K/UL (ref 0–0.04)
IMM GRANULOCYTES # BLD AUTO: 0.22 K/UL (ref 0–0.04)
IMM GRANULOCYTES NFR BLD AUTO: 0.6 % (ref 0–0.5)
IMM GRANULOCYTES NFR BLD AUTO: 0.9 % (ref 0–0.5)
INFLUENZA A (SUBTYPES H1,H1-2009,H3): NOT DETECTED
INR PPP: 1.2 (ref 0.8–1.2)
INR PPP: 1.4 (ref 0.8–1.2)
LACTATE SERPL-SCNC: 2.6 MMOL/L (ref 0.5–2.2)
LDH SERPL L TO P-CCNC: 4.28 MMOL/L (ref 0.5–2.2)
LYMPHOCYTES # BLD AUTO: 2.8 K/UL (ref 1–4.8)
LYMPHOCYTES # BLD AUTO: 3.2 K/UL (ref 1–4.8)
LYMPHOCYTES NFR BLD: 12.6 % (ref 18–48)
LYMPHOCYTES NFR BLD: 13.3 % (ref 18–48)
MCH RBC QN AUTO: 30 PG (ref 27–31)
MCH RBC QN AUTO: 31.1 PG (ref 27–31)
MCHC RBC AUTO-ENTMCNC: 30.1 G/DL (ref 32–36)
MCHC RBC AUTO-ENTMCNC: 31.7 G/DL (ref 32–36)
MCV RBC AUTO: 103 FL (ref 82–98)
MCV RBC AUTO: 95 FL (ref 82–98)
MODE: ABNORMAL
MONOCYTES # BLD AUTO: 1.5 K/UL (ref 0.3–1)
MONOCYTES # BLD AUTO: 2.2 K/UL (ref 0.3–1)
MONOCYTES NFR BLD: 7 % (ref 4–15)
MONOCYTES NFR BLD: 8.7 % (ref 4–15)
MYCOPLASMA PNEUMONIAE: NOT DETECTED
NEUTROPHILS # BLD AUTO: 16.7 K/UL (ref 1.8–7.7)
NEUTROPHILS # BLD AUTO: 19.7 K/UL (ref 1.8–7.7)
NEUTROPHILS NFR BLD: 77.4 % (ref 38–73)
NEUTROPHILS NFR BLD: 78.7 % (ref 38–73)
NRBC BLD-RTO: 0 /100 WBC
NRBC BLD-RTO: 0 /100 WBC
OVALOCYTES BLD QL SMEAR: ABNORMAL
PLATELET # BLD AUTO: 161 K/UL (ref 150–450)
PLATELET # BLD AUTO: 244 K/UL (ref 150–450)
PLATELET BLD QL SMEAR: ABNORMAL
PMV BLD AUTO: 10.5 FL (ref 9.2–12.9)
PMV BLD AUTO: 9.8 FL (ref 9.2–12.9)
POC IONIZED CALCIUM: 1.29 MMOL/L (ref 1.06–1.42)
POC TCO2 (MEASURED): 26 MMOL/L (ref 23–29)
POCT GLUCOSE: 333 MG/DL (ref 70–110)
POCT GLUCOSE: 358 MG/DL (ref 70–110)
POIKILOCYTOSIS BLD QL SMEAR: SLIGHT
POLYCHROMASIA BLD QL SMEAR: ABNORMAL
POTASSIUM BLD-SCNC: 4.1 MMOL/L (ref 3.5–5.1)
POTASSIUM SERPL-SCNC: 4.3 MMOL/L (ref 3.5–5.1)
PROT SERPL-MCNC: 5.2 G/DL (ref 6–8.4)
PROTHROMBIN TIME: 12.7 SEC (ref 9–12.5)
PROTHROMBIN TIME: 14.6 SEC (ref 9–12.5)
RBC # BLD AUTO: 2.73 M/UL (ref 4–5.4)
RBC # BLD AUTO: 3.4 M/UL (ref 4–5.4)
RESPIRATORY INFECTION PANEL SOURCE: NORMAL
RSV RNA NPH QL NAA+NON-PROBE: NOT DETECTED
RV+EV RNA NPH QL NAA+NON-PROBE: NOT DETECTED
SAMPLE: ABNORMAL
SAMPLE: ABNORMAL
SARS-COV-2 RNA RESP QL NAA+PROBE: NOT DETECTED
SITE: ABNORMAL
SODIUM BLD-SCNC: 138 MMOL/L (ref 136–145)
SODIUM SERPL-SCNC: 137 MMOL/L (ref 136–145)
SPECIMEN OUTDATE: NORMAL
TOXIC GRANULES BLD QL SMEAR: PRESENT
TRANS ERYTHROCYTES VOL PATIENT: NORMAL ML
TROPONIN I SERPL DL<=0.01 NG/ML-MCNC: 0.03 NG/ML (ref 0–0.03)
WBC # BLD AUTO: 21.21 K/UL (ref 3.9–12.7)
WBC # BLD AUTO: 25.41 K/UL (ref 3.9–12.7)

## 2024-01-10 PROCEDURE — 36430 TRANSFUSION BLD/BLD COMPNT: CPT

## 2024-01-10 PROCEDURE — 87040 BLOOD CULTURE FOR BACTERIA: CPT | Mod: 59

## 2024-01-10 PROCEDURE — 96360 HYDRATION IV INFUSION INIT: CPT

## 2024-01-10 PROCEDURE — 25000003 PHARM REV CODE 250: Performed by: EMERGENCY MEDICINE

## 2024-01-10 PROCEDURE — 30233N1 TRANSFUSION OF NONAUTOLOGOUS RED BLOOD CELLS INTO PERIPHERAL VEIN, PERCUTANEOUS APPROACH: ICD-10-PCS | Performed by: INTERNAL MEDICINE

## 2024-01-10 PROCEDURE — 94761 N-INVAS EAR/PLS OXIMETRY MLT: CPT | Mod: XB

## 2024-01-10 PROCEDURE — 85014 HEMATOCRIT: CPT

## 2024-01-10 PROCEDURE — 99900035 HC TECH TIME PER 15 MIN (STAT)

## 2024-01-10 PROCEDURE — 84484 ASSAY OF TROPONIN QUANT: CPT

## 2024-01-10 PROCEDURE — 85018 HEMOGLOBIN: CPT

## 2024-01-10 PROCEDURE — 86920 COMPATIBILITY TEST SPIN: CPT

## 2024-01-10 PROCEDURE — 99285 EMERGENCY DEPT VISIT HI MDM: CPT | Mod: 25

## 2024-01-10 PROCEDURE — 96375 TX/PRO/DX INJ NEW DRUG ADDON: CPT

## 2024-01-10 PROCEDURE — 83735 ASSAY OF MAGNESIUM: CPT

## 2024-01-10 PROCEDURE — 25000003 PHARM REV CODE 250

## 2024-01-10 PROCEDURE — 27000221 HC OXYGEN, UP TO 24 HOURS

## 2024-01-10 PROCEDURE — 85610 PROTHROMBIN TIME: CPT | Mod: 91

## 2024-01-10 PROCEDURE — 80047 BASIC METABLC PNL IONIZED CA: CPT

## 2024-01-10 PROCEDURE — P9016 RBC LEUKOCYTES REDUCED: HCPCS

## 2024-01-10 PROCEDURE — 85610 PROTHROMBIN TIME: CPT

## 2024-01-10 PROCEDURE — P9021 RED BLOOD CELLS UNIT: HCPCS

## 2024-01-10 PROCEDURE — 87070 CULTURE OTHR SPECIMN AEROBIC: CPT

## 2024-01-10 PROCEDURE — 51702 INSERT TEMP BLADDER CATH: CPT

## 2024-01-10 PROCEDURE — 83605 ASSAY OF LACTIC ACID: CPT | Mod: 91

## 2024-01-10 PROCEDURE — 96365 THER/PROPH/DIAG IV INF INIT: CPT

## 2024-01-10 PROCEDURE — 85025 COMPLETE CBC W/AUTO DIFF WBC: CPT

## 2024-01-10 PROCEDURE — 85730 THROMBOPLASTIN TIME PARTIAL: CPT

## 2024-01-10 PROCEDURE — 87205 SMEAR GRAM STAIN: CPT

## 2024-01-10 PROCEDURE — 20000000 HC ICU ROOM

## 2024-01-10 PROCEDURE — C9113 INJ PANTOPRAZOLE SODIUM, VIA: HCPCS

## 2024-01-10 PROCEDURE — 93010 ELECTROCARDIOGRAM REPORT: CPT | Mod: ,,, | Performed by: INTERNAL MEDICINE

## 2024-01-10 PROCEDURE — 63600175 PHARM REV CODE 636 W HCPCS

## 2024-01-10 PROCEDURE — 85025 COMPLETE CBC W/AUTO DIFF WBC: CPT | Mod: 91

## 2024-01-10 PROCEDURE — 87798 DETECT AGENT NOS DNA AMP: CPT

## 2024-01-10 PROCEDURE — 25500020 PHARM REV CODE 255: Performed by: EMERGENCY MEDICINE

## 2024-01-10 PROCEDURE — 80053 COMPREHEN METABOLIC PANEL: CPT

## 2024-01-10 PROCEDURE — 86901 BLOOD TYPING SEROLOGIC RH(D): CPT

## 2024-01-10 PROCEDURE — 82803 BLOOD GASES ANY COMBINATION: CPT

## 2024-01-10 PROCEDURE — 96361 HYDRATE IV INFUSION ADD-ON: CPT

## 2024-01-10 PROCEDURE — 84132 ASSAY OF SERUM POTASSIUM: CPT

## 2024-01-10 PROCEDURE — 63600175 PHARM REV CODE 636 W HCPCS: Performed by: EMERGENCY MEDICINE

## 2024-01-10 PROCEDURE — 93005 ELECTROCARDIOGRAM TRACING: CPT

## 2024-01-10 PROCEDURE — 83605 ASSAY OF LACTIC ACID: CPT

## 2024-01-10 PROCEDURE — 82962 GLUCOSE BLOOD TEST: CPT

## 2024-01-10 RX ORDER — LATANOPROST 50 UG/ML
1 SOLUTION/ DROPS OPHTHALMIC NIGHTLY
Status: DISCONTINUED | OUTPATIENT
Start: 2024-01-10 | End: 2024-01-19 | Stop reason: HOSPADM

## 2024-01-10 RX ORDER — TIMOLOL MALEATE 5 MG/ML
1 SOLUTION/ DROPS OPHTHALMIC DAILY
Status: DISCONTINUED | OUTPATIENT
Start: 2024-01-11 | End: 2024-01-19 | Stop reason: HOSPADM

## 2024-01-10 RX ORDER — METOPROLOL TARTRATE 1 MG/ML
5 INJECTION, SOLUTION INTRAVENOUS ONCE
Status: COMPLETED | OUTPATIENT
Start: 2024-01-10 | End: 2024-01-10

## 2024-01-10 RX ORDER — PANTOPRAZOLE SODIUM 40 MG/10ML
40 INJECTION, POWDER, LYOPHILIZED, FOR SOLUTION INTRAVENOUS 2 TIMES DAILY
Status: DISCONTINUED | OUTPATIENT
Start: 2024-01-10 | End: 2024-01-13

## 2024-01-10 RX ORDER — HYDROCODONE BITARTRATE AND ACETAMINOPHEN 500; 5 MG/1; MG/1
TABLET ORAL
Status: DISCONTINUED | OUTPATIENT
Start: 2024-01-10 | End: 2024-01-11

## 2024-01-10 RX ORDER — HYDROCODONE BITARTRATE AND ACETAMINOPHEN 500; 5 MG/1; MG/1
TABLET ORAL
Status: DISCONTINUED | OUTPATIENT
Start: 2024-01-10 | End: 2024-01-19 | Stop reason: HOSPADM

## 2024-01-10 RX ORDER — PANTOPRAZOLE SODIUM 40 MG/10ML
80 INJECTION, POWDER, LYOPHILIZED, FOR SOLUTION INTRAVENOUS
Status: COMPLETED | OUTPATIENT
Start: 2024-01-10 | End: 2024-01-10

## 2024-01-10 RX ORDER — SODIUM CHLORIDE 0.9 % (FLUSH) 0.9 %
10 SYRINGE (ML) INJECTION
Status: DISCONTINUED | OUTPATIENT
Start: 2024-01-10 | End: 2024-01-19 | Stop reason: HOSPADM

## 2024-01-10 RX ORDER — INSULIN ASPART 100 [IU]/ML
0-5 INJECTION, SOLUTION INTRAVENOUS; SUBCUTANEOUS EVERY 6 HOURS PRN
Status: DISCONTINUED | OUTPATIENT
Start: 2024-01-10 | End: 2024-01-11

## 2024-01-10 RX ORDER — BRIMONIDINE TARTRATE 2 MG/ML
1 SOLUTION/ DROPS OPHTHALMIC 2 TIMES DAILY
Status: DISCONTINUED | OUTPATIENT
Start: 2024-01-10 | End: 2024-01-19 | Stop reason: HOSPADM

## 2024-01-10 RX ORDER — METOPROLOL TARTRATE 25 MG/1
12.5 TABLET ORAL 2 TIMES DAILY
Status: DISCONTINUED | OUTPATIENT
Start: 2024-01-10 | End: 2024-01-10

## 2024-01-10 RX ORDER — GLUCAGON 1 MG
1 KIT INJECTION
Status: DISCONTINUED | OUTPATIENT
Start: 2024-01-10 | End: 2024-01-19 | Stop reason: HOSPADM

## 2024-01-10 RX ORDER — METOPROLOL TARTRATE 1 MG/ML
5 INJECTION, SOLUTION INTRAVENOUS ONCE
Status: DISCONTINUED | OUTPATIENT
Start: 2024-01-10 | End: 2024-01-11

## 2024-01-10 RX ORDER — TAMSULOSIN HYDROCHLORIDE 0.4 MG/1
0.4 CAPSULE ORAL DAILY
Status: DISCONTINUED | OUTPATIENT
Start: 2024-01-11 | End: 2024-01-19 | Stop reason: HOSPADM

## 2024-01-10 RX ORDER — GABAPENTIN 300 MG/1
300 CAPSULE ORAL 3 TIMES DAILY
Status: DISCONTINUED | OUTPATIENT
Start: 2024-01-10 | End: 2024-01-19 | Stop reason: HOSPADM

## 2024-01-10 RX ADMIN — PIPERACILLIN SODIUM AND TAZOBACTAM SODIUM 4.5 G: 4; .5 INJECTION, POWDER, FOR SOLUTION INTRAVENOUS at 03:01

## 2024-01-10 RX ADMIN — PANTOPRAZOLE SODIUM 40 MG: 40 INJECTION, POWDER, FOR SOLUTION INTRAVENOUS at 08:01

## 2024-01-10 RX ADMIN — IOHEXOL 100 ML: 350 INJECTION, SOLUTION INTRAVENOUS at 03:01

## 2024-01-10 RX ADMIN — GABAPENTIN 300 MG: 300 CAPSULE ORAL at 08:01

## 2024-01-10 RX ADMIN — BRIMONIDINE TARTRATE 1 DROP: 2 SOLUTION OPHTHALMIC at 09:01

## 2024-01-10 RX ADMIN — INSULIN DETEMIR 8 UNITS: 100 INJECTION, SOLUTION SUBCUTANEOUS at 09:01

## 2024-01-10 RX ADMIN — SODIUM CHLORIDE, POTASSIUM CHLORIDE, SODIUM LACTATE AND CALCIUM CHLORIDE 500 ML: 600; 310; 30; 20 INJECTION, SOLUTION INTRAVENOUS at 11:01

## 2024-01-10 RX ADMIN — VANCOMYCIN HYDROCHLORIDE 1000 MG: 1 INJECTION, POWDER, LYOPHILIZED, FOR SOLUTION INTRAVENOUS at 04:01

## 2024-01-10 RX ADMIN — METOROPROLOL TARTRATE 5 MG: 5 INJECTION, SOLUTION INTRAVENOUS at 08:01

## 2024-01-10 RX ADMIN — PANTOPRAZOLE SODIUM 80 MG: 40 INJECTION, POWDER, FOR SOLUTION INTRAVENOUS at 02:01

## 2024-01-10 RX ADMIN — LATANOPROST 1 DROP: 50 SOLUTION OPHTHALMIC at 09:01

## 2024-01-10 RX ADMIN — PIPERACILLIN SODIUM AND TAZOBACTAM SODIUM 4.5 G: 4; .5 INJECTION, POWDER, FOR SOLUTION INTRAVENOUS at 11:01

## 2024-01-10 RX ADMIN — SODIUM CHLORIDE, POTASSIUM CHLORIDE, SODIUM LACTATE AND CALCIUM CHLORIDE 500 ML: 600; 310; 30; 20 INJECTION, SOLUTION INTRAVENOUS at 10:01

## 2024-01-10 RX ADMIN — SODIUM CHLORIDE 1000 ML: 9 INJECTION, SOLUTION INTRAVENOUS at 02:01

## 2024-01-10 RX ADMIN — METOROPROLOL TARTRATE 5 MG: 5 INJECTION, SOLUTION INTRAVENOUS at 09:01

## 2024-01-10 NOTE — ED PROVIDER NOTES
Encounter Date: 1/10/2024       History     Chief Complaint   Patient presents with    GI Bleeding     From [facility]     74 y/o female pmh of arthritis, COPD w/ 6L O2 at baseline and history of CVA , diabetes mellitus type 2, hyperlipidemia, hypertension, paroxysmal AFib on Xarelto presents to the emergency department via EMS secondary to bright red blood per rectum noticed by staff at facility today.  However patient reports she was noticed rectal bleeding over the last 3 days, she thought it was her hemorrhoids.  She also states that she has been feeling very fatigued and hard to stay up.  Patient was currently on Xarelto and has a history of paroxysmal atrial fibrillation.  She was recently discharged from due to episode of dyspnea on 5 days ago.  Patient does endorse a mild chest discomfort.      Review of patient's allergies indicates:   Allergen Reactions    Silicone      Burn skin    Adhesive Rash     Past Medical History:   Diagnosis Date    Allergy     Arthritis     Cataract     Colon polyps     COPD (chronic obstructive pulmonary disease)     Diabetes mellitus type II     Diabetic neuropathy     Fever blister     Glaucoma (increased eye pressure)     Hyperlipidemia     Hypertension     Irritable bowel syndrome     Keloid cicatrix     Osteoporosis     Retained cholelithiasis following cholecystectomy(997.41)     Right patella fracture      Past Surgical History:   Procedure Laterality Date    BACK SURGERY  2006    CATARACT EXTRACTION W/  INTRAOCULAR LENS IMPLANT Bilateral     CHOLECYSTECTOMY      Laparoscopic    COLONOSCOPY      COLONOSCOPY N/A 8/31/2022    Procedure: COLONOSCOPY;  Surgeon: Salvatore Butler MD;  Location: [facility];  Service: Gastroenterology;  Laterality: N/A;    ESOPHAGEAL DILATION N/A 8/30/2022    Procedure: DILATION, ESOPHAGUS;  Surgeon: Salvatore Butler MD;  Location: [facility];  Service: Gastroenterology;  Laterality: N/A;    ESOPHAGOGASTRODUODENOSCOPY N/A 8/30/2022    Procedure:  EGD (ESOPHAGOGASTRODUODENOSCOPY);  Surgeon: Salvatore Butler MD;  Location: [facility];  Service: Gastroenterology;  Laterality: N/A;    ESOPHAGOGASTRODUODENOSCOPY N/A 5/23/2023    Procedure: ESOPHAGOGASTRODUODENOSCOPY (EGD);  Surgeon: Desmond Duffy Jr., MD;  Location: [facility];  Service: Endoscopy;  Laterality: N/A;    HERNIA REPAIR      HYSTERECTOMY      KNEE SURGERY Right 3/4/2015    Dr Weller     OOPHORECTOMY      ovarian tumor      Benign    TONSILLECTOMY, ADENOIDECTOMY       Family History   Problem Relation Age of Onset    Cancer Mother         Skin    Skin cancer Mother     Glaucoma Mother     Cataracts Mother     Diabetes Mother     Heart disease Father     Diabetes Father     Skin cancer Sister     Diabetes Sister     Diabetes Daughter     Breast cancer Maternal Aunt     Melanoma Neg Hx     Psoriasis Neg Hx     Eczema Neg Hx     Lupus Neg Hx     Amblyopia Neg Hx     Blindness Neg Hx     Macular degeneration Neg Hx     Retinal detachment Neg Hx     Strabismus Neg Hx      Social History     Tobacco Use    Smoking status: Every Day     Current packs/day: 0.50     Average packs/day: 0.5 packs/day for 40.0 years (20.0 ttl pk-yrs)     Types: Cigarettes    Smokeless tobacco: Former    Tobacco comments:     Not ready to quit smoking. Cut down on cigarettes but enjoys having a couple cigarettes a day.   Substance Use Topics    Alcohol use: No    Drug use: No     Review of Systems  See HPI  Physical Exam     Initial Vitals [01/10/24 1415]   BP Pulse Resp Temp SpO2   101/76 (!) 162 (!) 25 98.5 °F (36.9 °C) 99 %      MAP       --         Physical Exam    Constitutional:   Ill-appearing   HENT:   Head: Normocephalic and atraumatic.   Eyes: Conjunctivae and EOM are normal.   Neck:   Normal range of motion.  Cardiovascular:            Patient is tachycardic   Pulmonary/Chest: No respiratory distress.   Abdominal: Abdomen is soft. She exhibits no distension. There is abdominal tenderness (Suprapubic). There is no  rebound.   Genitourinary:    Genitourinary Comments: Bright red blood per rectum, with large amount of soft stool protruding     Musculoskeletal:         General: No edema. Normal range of motion.      Cervical back: Normal range of motion.     Neurological: She is alert and oriented to person, place, and time.   Patient is alert to situation, answers questions appropriately     Skin: There is pallor.   Pale appearing   Psychiatric: She has a normal mood and affect. Thought content normal.         ED Course   Procedures  Labs Reviewed   CBC W/ AUTO DIFFERENTIAL - Abnormal; Notable for the following components:       Result Value    WBC 25.41 (*)     RBC 2.73 (*)     Hemoglobin 8.5 (*)     Hematocrit 28.2 (*)      (*)     MCH 31.1 (*)     MCHC 30.1 (*)     RDW 17.4 (*)     Immature Granulocytes 0.9 (*)     Gran # (ANC) 19.7 (*)     Immature Grans (Abs) 0.22 (*)     Mono # 2.2 (*)     Gran % 77.4 (*)     Lymph % 12.6 (*)     All other components within normal limits   COMPREHENSIVE METABOLIC PANEL - Abnormal; Notable for the following components:    CO2 20 (*)     Glucose 364 (*)     BUN 32 (*)     Calcium 8.5 (*)     Total Protein 5.2 (*)     Albumin 2.6 (*)     All other components within normal limits   PROTIME-INR - Abnormal; Notable for the following components:    Prothrombin Time 12.7 (*)     All other components within normal limits   HEMOGLOBIN - Abnormal; Notable for the following components:    Hemoglobin 7.9 (*)     All other components within normal limits   HEMATOCRIT - Abnormal; Notable for the following components:    Hematocrit 26.4 (*)     All other components within normal limits   PROTIME-INR - Abnormal; Notable for the following components:    Prothrombin Time 14.6 (*)     INR 1.4 (*)     All other components within normal limits   POCT GLUCOSE - Abnormal; Notable for the following components:    POCT Glucose 358 (*)     All other components within normal limits   ISTAT PROCEDURE -  Abnormal; Notable for the following components:    POC Glucose 350 (*)     POC Hematocrit 34 (*)     All other components within normal limits   ISTAT LACTATE - Abnormal; Notable for the following components:    POC Lactate 4.28 (*)     All other components within normal limits   RESPIRATORY INFECTION PANEL (PCR), NASOPHARYNGEAL    Narrative:     For all other respiratory sources, order [id number] -  Respiratory Viral Panel by PCR   CULTURE, BLOOD   CULTURE, BLOOD   CULTURE, RESPIRATORY   APTT   URINALYSIS, REFLEX TO URINE CULTURE   CBC W/ AUTO DIFFERENTIAL   TROPONIN I   LACTIC ACID, PLASMA   TYPE & SCREEN   ISTAT CHEM8   POCT GLUCOSE MONITORING CONTINUOUS   PREPARE RBC SOFT   PREPARE RBC SOFT        ECG Results              EKG 12-lead (Final result)  Result time 01/10/24 16:34:07      Final result by Interface, Lab In [facility] (01/10/24 16:34:07)                   Narrative:    Test Reason : R00.0,    Vent. Rate : 153 BPM     Atrial Rate : 306 BPM     P-R Int : 000 ms          QRS Dur : 070 ms      QT Int : 306 ms       P-R-T Axes : 261 086 -82 degrees     QTc Int : 488 ms    Atrial flutter with 2:1 A-V conduction  Nonspecific ST and/or T wave abnormalities  Abnormal ECG  When compared with ECG of 25-DEC-2023 18:51,  Atrial flutter has replaced Atrial fibrillation  Confirmed by Delbert Alcaraz MD ([id number]) on 1/10/2024 4:33:57 PM    Referred By: System System           Confirmed By:Delbert Alcaraz MD                                  Imaging Results              X-Ray Chest AP Portable (Final result)  Result time 01/10/24 16:06:09      Final result by Jayme Inman MD (01/10/24 16:06:09)                   Impression:      As above.      Electronically signed by: Jayme Inman MD  Date:    01/10/2024  Time:    16:06               Narrative:    EXAMINATION:  XR CHEST AP PORTABLE    CLINICAL HISTORY:  Tachycardia, unspecified    TECHNIQUE:  Single frontal view of the chest was performed.    COMPARISON:  Chest radiograph  01/01/2024, CT thorax 12/08/2023    FINDINGS:  Monitoring leads overlie the chest.  Patient is rotated.  Prominent skin fold overlies the upper chest, more so on the right.    Cardiomediastinal silhouette appears unchanged.  Bilateral nonspecific interstitial coarsening with patchy subsegmental opacities more so on the right, to a slightly lesser degree from prior.  There is slight improved aeration of the left lung base suggesting improving left basilar atelectasis/infiltrate.  Suspected trace left pleural effusion.  No sizable pleural effusion on the right.  No definite pneumothorax.  Otherwise no change.                                       CTA Acute GI [place], Abdomen and Pelvis (Final result)  Result time 01/10/24 15:47:09      Final result by Heidi Helm MD (01/10/24 15:47:09)                   Impression:      No evidence of acute GI bleed.  Few prominent perirectal vessels possibly hemorrhoids.    Large amount of retained stool within the sigmoid colon and rectum.    Left inguinal hernia containing loops bowel without obstructive change.    New, airspace consolidation involving a subsegmental region of the anteromedial segment of the left lower lobe with bronchial wall thickening at the left lung base concerning for aspiration or pneumonia.    Healing fracture of the right inferior-superior pubic rami and right pubic symphysis with callus formation.  Remote left inferior-superior-pubic symphysis fracture.  Remote sacral ala fracture.  Remote, stable T11 fracture.    Severe stenosis of the left renal artery.  The accessory left renal artery is patent.      Electronically signed by: Heidi Helm MD  Date:    01/10/2024  Time:    15:47               Narrative:    EXAMINATION:  CTA acute GI bleed, abdomen and pelvis without and with contrast    CLINICAL HISTORY:  Acute lower GI.    TECHNIQUE:  3.75 mm axial images of the abdomen and pelvis obtained before and after the administration of  contrast.  Delayed 3 minutes axial images also acquired.  Coronal and sagittal images reformatted.  The patient received 100 cc of Omni 350 IV contrast.    COMPARISON:  CT abdomen pelvis 08/29/2022, CTA chest 12/08/2023    FINDINGS:  Abnormal airspace consolidation involving the subsegmental anterior and medial basal segment of the left lower lobe.  There is bronchial wall thickening at the left lung base.  Small clusters micro nodules at the right lung base.  No pleural effusion.  No pericardial effusion.    The liver appears mildly enlarged measuring 18 cm craniocaudad.  No liver lesion seen.  Slight prominence of the intrahepatic biliary ducts, the common bile duct is upper normal in caliber.  Cholecystectomy clips noted.  The hepatic vasculature is normal.    The stomach is nondistended.  The distal esophagus appears normal.    The pancreas, spleen and right adrenal gland appear normal.  There is a left adrenal gland mass is measuring 1.7 cm suggestive of an adenoma.    The right kidney and right ureter appears normal.  The left kidney and left ureter appear normal.  The bilateral kidneys enhance normally, no hydronephrosis, no definite forming stones.    The abdominal aorta tapers normally, there is moderate atherosclerotic plaque.  No para-aortic lymphadenopathy.  The celiac trunk, SMA and right renal artery are patent.  The left renal artery is very small 1-2 mm diameter, there is a patent accessory left renal artery.  The GREG is patent.  The bilateral iliac vessels and proximal femoral vessels are patent.  Several small enhancing vessels in the perirectal region, no abnormal blood accumulation to suggest an acute extravasation.    No intra-abdominal free air or free fluid.    There is a large amount of retained stool within the descending colon, sigmoid and rectum.  There is a left inguinal hernia containing loops of sigmoid without bowel obstruction.  The small bowel is nondilated.  The appendix is not  definitely identified.  Several small enhancing vessels in the perirectal region, no abnormal blood accumulation to suggest an acute extravasation.    The osseous structures demonstrate a severe compression fracture deformity L1, unchanged.  Postoperative changes of fusion L4-5.  Remote sacral fracture.  Remote left inferior-superior pubic ramus fracture.  Healing fracture of the right inferior-superior pubic rami fracture with callus formation.  The bilateral proximal femur are intact.    The patient has a known sacral decubitus ulceration.  No osseous erosions to suggest osteomyelitis.                                       Medications   0.9%  NaCl infusion (for blood administration) (has no administration in time range)   0.9%  NaCl infusion (for blood administration) (has no administration in time range)   sodium chloride 0.9% flush 10 mL (has no administration in time range)   pantoprazole injection 40 mg (has no administration in time range)   brimonidine 0.2% ophthalmic solution 1 drop (has no administration in time range)   gabapentin capsule 300 mg (has no administration in time range)   latanoprost 0.005 % ophthalmic solution 1 drop (has no administration in time range)   tamsulosin 24 hr capsule 0.4 mg (has no administration in time range)   timolol maleate 0.5% ophthalmic solution 1 drop (has no administration in time range)   insulin detemir U-100 (Levemir) pen 8 Units (has no administration in time range)   glucagon (human recombinant) injection 1 mg (has no administration in time range)   insulin aspart U-100 pen 0-5 Units (has no administration in time range)   dextrose 10% bolus 125 mL 125 mL (has no administration in time range)   dextrose 10% bolus 250 mL 250 mL (has no administration in time range)   piperacillin-tazobactam (ZOSYN) 4.5 g in dextrose 5 % in water (D5W) 100 mL IVPB (MB+) (has no administration in time range)   vancomycin - pharmacy to dose (has no administration in time range)    sodium chloride 0.9% bolus 1,000 mL 1,000 mL (0 mLs Intravenous Stopped 1/10/24 1626)   pantoprazole injection 80 mg (80 mg Intravenous Given 1/10/24 1435)   iohexoL (OMNIPAQUE 350) injection 100 mL (100 mLs Intravenous Given 1/10/24 1502)   piperacillin-tazobactam (ZOSYN) 4.5 g in dextrose 5 % in water (D5W) 100 mL IVPB (MB+) (0 g Intravenous Stopped 1/10/24 1618)   vancomycin (VANCOCIN) 1,000 mg in dextrose 5 % (D5W) 250 mL IVPB (Vial-Mate) (1,000 mg Intravenous New Bag 1/10/24 1627)     Medical Decision Making  73-year-old female presents emergency department due to bright red blood per rectum.  Concern for acute GI hemorrhage, sepsis.  Due to patient's slightly hypotensive and in a flutter with RVR patient given emergent 0- transfusion.  Patient also required IV bolus of fluid to achieve adequate blood pressure.  CTA was negative therefore placed consult to gastroenterology due to findings of prominent vessels in the perirectal region secondary to possible hemorrhoids.  Patient also has increased O2 requirements my overall impression is sepsis with likely source of respiratory due to findings of pneumonia on chest x-ray.  Antibiotics given- Antibiotics (72h ago, onward)    Start     Stop Route Frequency Ordered    01/10/24 1900  piperacillin-tazobactam (ZOSYN) 4.5 g in dextrose 5 % in   water (D5W) 100 mL IVPB (MB+)         -- IV Every 8 hours (non-standard times) 01/10/24 1747    01/10/24 1847  vancomycin - pharmacy to dose  (vancomycin IVPB (PEDS and   ADULTS))        See Hyperspace for full Linked Orders Report.    -- IV pharmacy to manage frequency 01/10/24 1751    01/10/24 1530  vancomycin (VANCOCIN) 1,000 mg in dextrose 5 % (D5W) 250   mL IVPB (Vial-Mate)         01/11/24 0329 IV ED 1 Time 01/10/24 1517      Latest lactate reviewed-  Lab             01/10/24                       1432          POCLAC       4.28*         Organ dysfunction indicated by unclear  Fluid challenge Actual Body weight- Patient  will receive 30ml/kg actual body weight to calculate fluid bolus for treatment of septic shock.   Post- resuscitation assessment Yes Perfusion exam was performed within 6 hours of septic shock presentation after bolus shows Adequate tissue perfusion assessed by non-invasive monitoring   Will Not start Pressors- Levophed for MAP of 65  Source control achieved by:  Broad-spectrum antibiotics     Discussed with Cardiology to determine need of rate controlled there were assess patient and recommendations provided.  I also discussed with critical care patient presentation, as patient will likely need a higher level of care due to uncontrolled rate and sepsis.  Patient admitted to their service for continued management.  Pt discussed with supervising physician      Amount and/or Complexity of Data Reviewed  Labs: ordered. Decision-making details documented in ED Course.  Radiology: ordered.    Risk  Prescription drug management.  Decision regarding hospitalization.               ED Course as of 01/10/24 1804   Wed Rudy 10, 2024   1430 Patient states that she was not want to die at this time and wants to be full code. [CR]   1432 On reassessment patient still remains alert although fatigued appearing.  Mild improvement of blood pressure and tachycardia with initiation of fluids.  We will continue to reassess. [CR]   1449 Spoke with Gastroenterology recommendations CTA is negative will consult to assess patient. [CR]   1450 Spoke to CT to expedite CTA due to acute GI bleed.  They report imaging will be performed very soon. [CR]   1602 WBC(!): 25.41  Acute increase [GK]   1602 Hemoglobin(!): 8.5  Acute drop [GK]   1602 POC Lactate(!!): 4.28 [GK]      ED Course User Index  [CR] Thelma De Dios PA-C  [GK] Di Bolton MD                             Clinical Impression:  Final diagnoses:  [R00.0] Tachycardia  [J18.9] Pneumonia of left lower lobe due to infectious organism (Primary)  [K92.2] Gastrointestinal  hemorrhage, unspecified gastrointestinal hemorrhage type  [I48.92] Atrial flutter with rapid ventricular response          ED Disposition Condition    Admit                 Thelma De Dios PA-C  01/10/24 1800

## 2024-01-10 NOTE — CLINICAL REVIEW
IP Sepsis Screen (most recent)       Sepsis Screen (IP) - 01/10/24 1043       Is the patient's history or complaint suggestive of a possible infection? Yes  -CB    Are there at least two of the following signs and symptoms present? Yes  -CB    Sepsis signs/symptoms - Tachycardia Tachycardia     >90  -CB    Sepsis signs/symptoms - Tachypnea Tachypnea     >20  -CB    Sepsis signs/symptoms - WBC WBC < 4,000 or WBC > 12,000  -CB    Are any of the following organ dysfunction criteria present and not considered to be due to a chronic condition? Yes  -CB    Organ Dysfunction Criteria Lactate > 2.0  -CB    Initiate Sepsis Protocol No  -CB    Reason sepsis not considered Pt. receiving appropriate management  -CB              User Key  (r) = Recorded By, (t) = Taken By, (c) = Cosigned By      Initials Name    CB Rekha Miguel, RN

## 2024-01-10 NOTE — H&P
"  [facility] - Emergency Dept  Critical Care Medicine  History & Physical    Patient Name: Deb Webb  MRN: [id number]  Admission Date: 1/10/2024  Hospital Length of Stay: 0 days  Code Status: Full Code  Attending Physician: Trevor Haney*   Primary Care Provider: Triston Valverde MD   Principal Problem: GI bleed    Subjective:     HPI:  Ms. Webb is a 74 y/o woman with pmh of arthritis, COPD w/ 6L O2 at baseline and history of CVA , diabetes mellitus type 2, hyperlipidemia, hypertension, paroxysmal AFib on Xarelto. She presented to the emergency department via EMS secondary to bright red blood per rectum. She reports three days of bloody stools. She quantifies the amount as "three cups of blood" per day for three days. She has never had this happen to her before. She also reports fatigue, pale coloration of skin, productive cough, mild diffuse abdominal pain and moderate rectal pain. She was recently discharged from [facility] 5 days ago after admission for URI and COPD exacerbation. Of note, recent colonoscopy reports bleeding could be due to rectal prolapse. She denies headaches, dizziness, shortness of breath, chest pain, nausea, vomiting, recent NSAID use/goody powder, weight loss, fevers, night sweats, hematuria.    In the ED, the patient received 1L IVF, 1u RBCs, Vanc, Zosyn, 80 IV pantoprazole, BC x2. Her blood pressures have been maintained 90s-100s/60s-70s with HR 150s-180s and EKG noting aflutter with RVR. GI and cardiology consulted. Labs notable for Hgb of 8.5 (11 7 days ago), wbc 25, Bicarb 20, lactate 4.28, PTT 21.1, PT 12.7, INR of 1.2. CTA with no evidence of acute GI bleed but with prominent perirectal vessels, possible hemorrhoids. Large amount of retained stool within sigmoid colon/rectum. New airspace consolidation in LLL with bronchial thickening concerning for PNA vs atelectasis.     Hospital/ICU Course:  No notes on file     Past Medical History:   Diagnosis Date    Allergy     " Arthritis     Cataract     Colon polyps     COPD (chronic obstructive pulmonary disease)     Diabetes mellitus type II     Diabetic neuropathy     Fever blister     Glaucoma (increased eye pressure)     Hyperlipidemia     Hypertension     Irritable bowel syndrome     Keloid cicatrix     Osteoporosis     Retained cholelithiasis following cholecystectomy(997.41)     Right patella fracture        Past Surgical History:   Procedure Laterality Date    BACK SURGERY  2006    CATARACT EXTRACTION W/  INTRAOCULAR LENS IMPLANT Bilateral     CHOLECYSTECTOMY      Laparoscopic    COLONOSCOPY      COLONOSCOPY N/A 8/31/2022    Procedure: COLONOSCOPY;  Surgeon: Salvatore Butler MD;  Location: [facility];  Service: Gastroenterology;  Laterality: N/A;    ESOPHAGEAL DILATION N/A 8/30/2022    Procedure: DILATION, ESOPHAGUS;  Surgeon: Salvatore Butler MD;  Location: [facility];  Service: Gastroenterology;  Laterality: N/A;    ESOPHAGOGASTRODUODENOSCOPY N/A 8/30/2022    Procedure: EGD (ESOPHAGOGASTRODUODENOSCOPY);  Surgeon: Salvatore Butler MD;  Location: [facility];  Service: Gastroenterology;  Laterality: N/A;    ESOPHAGOGASTRODUODENOSCOPY N/A 5/23/2023    Procedure: ESOPHAGOGASTRODUODENOSCOPY (EGD);  Surgeon: Desmond Duffy Jr., MD;  Location: [facility];  Service: Endoscopy;  Laterality: N/A;    HERNIA REPAIR      HYSTERECTOMY      KNEE SURGERY Right 3/4/2015    Dr Weller     OOPHORECTOMY      ovarian tumor      Benign    TONSILLECTOMY, ADENOIDECTOMY         Review of patient's allergies indicates:   Allergen Reactions    Silicone      Burn skin    Adhesive Rash       Family History       Problem Relation (Age of Onset)    Breast cancer Maternal Aunt    Cancer Mother    Cataracts Mother    Diabetes Mother, Father, Sister, Daughter    Glaucoma Mother    Heart disease Father    Skin cancer Mother, Sister          Tobacco Use    Smoking status: Every Day     Current packs/day: 0.50     Average packs/day: 0.5 packs/day for 40.0 years  (20.0 ttl pk-yrs)     Types: Cigarettes    Smokeless tobacco: Former    Tobacco comments:     Not ready to quit smoking. Cut down on cigarettes but enjoys having a couple cigarettes a day.   Substance and Sexual Activity    Alcohol use: No    Drug use: No    Sexual activity: Never      Review of Systems   Constitutional:  Positive for fatigue. Negative for appetite change, chills, fever and unexpected weight change.   Eyes:  Negative for visual disturbance.   Respiratory:  Positive for cough. Negative for shortness of breath.    Cardiovascular:  Positive for palpitations. Negative for chest pain and leg swelling.   Gastrointestinal:  Positive for abdominal pain, blood in stool and rectal pain. Negative for constipation, diarrhea, nausea and vomiting.   Genitourinary:  Negative for difficulty urinating and hematuria.   Skin:  Positive for color change (pale).   Neurological:  Positive for weakness. Negative for dizziness and headaches.   Psychiatric/Behavioral:  Negative for confusion.      Objective:     Vital Signs (Most Recent):  Temp: 98 °F (36.7 °C) (01/10/24 1627)  Pulse: (!) 156 (01/10/24 1627)  Resp: (!) 23 (01/10/24 1627)  BP: 115/75 (01/10/24 1627)  SpO2: 100 % (01/10/24 1627) Vital Signs (24h Range):  Temp:  [98 °F (36.7 °C)-98.5 °F (36.9 °C)] 98 °F (36.7 °C)  Pulse:  [152-180] 156  Resp:  [22-29] 23  SpO2:  [94 %-100 %] 100 %  BP: ()/(64-76) 115/75      There is no height or weight on file to calculate BMI.      Intake/Output Summary (Last 24 hours) at 1/10/2024 1712  Last data filed at 1/10/2024 1627  Gross per 24 hour   Intake 1361.75 ml   Output --   Net 1361.75 ml          Physical Exam  Vitals and nursing note reviewed.   Constitutional:       General: She is not in acute distress.  HENT:      Head: Normocephalic and atraumatic.      Nose: Nose normal.      Mouth/Throat:      Mouth: Mucous membranes are dry.   Eyes:      Pupils: Pupils are equal, round, and reactive to light.   Cardiovascular:  "     Rate and Rhythm: Tachycardia present. Rhythm irregular.      Pulses: Normal pulses.   Pulmonary:      Effort: Pulmonary effort is normal.      Breath sounds: Wheezing (scattered expiratory wheezing) present.   Abdominal:      General: Abdomen is flat. There is no distension.      Palpations: Abdomen is soft. There is no mass.      Tenderness: There is abdominal tenderness. There is no guarding or rebound.   Musculoskeletal:      Cervical back: Neck supple.      Right lower leg: No edema.      Left lower leg: No edema.   Skin:     General: Skin is warm and dry.      Coloration: Skin is pale.      Findings: Bruising present.   Neurological:      General: No focal deficit present.      Mental Status: She is alert and oriented to person, place, and time.            Vents:     Lines/Drains/Airways       Drain  Duration                  Urethral Catheter 01/10/24 1429 16 Fr. <1 day              Peripheral Intravenous Line  Duration                  Peripheral IV - Single Lumen 01/10/24 1356 20 G Left Antecubital <1 day         Peripheral IV - Single Lumen 01/10/24 1416 18 G;1 3/4 in Anterior;Proximal;Right Upper Arm <1 day         Peripheral IV - Single Lumen 01/10/24 1436 20 G;1 3/4 in Anterior;Right Forearm <1 day                  Significant Labs:    CBC/Anemia Profile:  Recent Labs   Lab 01/10/24  1411 01/10/24  1416   WBC 25.41*  --    HGB 8.5*  --    HCT 28.2* 34*     --    *  --    RDW 17.4*  --         Chemistries:  Recent Labs   Lab 01/10/24  1411      K 4.3      CO2 20*   BUN 32*   CREATININE 0.9   CALCIUM 8.5*   ALBUMIN 2.6*   PROT 5.2*   BILITOT 0.5   ALKPHOS 118   ALT 21   AST 18       Coagulation:   Recent Labs   Lab 01/10/24  1411   INR 1.2   APTT 21.1     Lactic Acid: No results for input(s): "LACTATE" in the last 48 hours.    Significant Imaging: I have reviewed all pertinent imaging results/findings within the past 24 hours.  Assessment/Plan:     Pulmonary  Pneumonia  - " Patient has been bed bound due to recent pelvic and reports cough x 1 month w/ 'ice cream' colored sputum  - CT A/P showed new airspace consolidation in the LLL  - Will empirically start Vanc (recent hospitalization) and Zosyn. De-escalate pending culture data   - Sputum culture ordered     COPD exacerbation  - resume home inhalers     Cardiac/Vascular  Atrial flutter  - EKG w/ Aflutter RVF in the ED. Otherwise HDS  - Cardiology consulted; appreciate recs  - Optimize resuscitation in the setting of acute GIB followed by consideration of rate control if patient's HR continues to be uncontrolled. Will start w/ PO BB. If unable to tolerate, will consider digoxin  - Telemetry  - EKG PRN  - Mg>2 and K >4    Hematology  Coagulopathy  - INR 1.4  - Daily PT/INR    Endocrine  Controlled type 2 diabetes mellitus with diabetic neuropathy, without long-term current use of insulin  Lab Results   Component Value Date    HGBA1C 6.6 (H) 12/25/2023     -  upon arrival  - Basal/bolus insulin regimen to target inpatient BG goal of 140-180; uptitrate as needed     GI  * GI bleed  72 y/o woman with pmh of arthritis, COPD w/ 6L O2 at baseline and history of CVA , diabetes mellitus type 2, hyperlipidemia, hypertension, paroxysmal AFib on Xarelto presenting w/ BRBPR. Of note, recent colonoscopy reports bleeding could be due to rectal prolapse. Denies use of NSAIDS, BC powder. Labs notable for Hgb of 8.5 (11 7 days ago). CTA with no evidence of acute GI bleed but with prominent perirectal vessels, possible hemorrhoids. Large amount of retained stool within sigmoid colon/rectum. Admitted to MICU for acute GIB.     - GI consulted  - NPO at MN   - Trend Hgb and transfuse for goal Hgb > 7, unless otherwise indicated  - Maintain IV access with 2 large bore IV's  - Intravascular resuscitation/support with IVF's   - Hold all NSAIDs and anticoagulants, unless contraindicated  - Bolus IV pantoprazole 80 mg followed by 40 mg BID  - Correct any  coagulopathy with platelets and FFP for goal of platelets >50K and INR <2.0        Other  Debility  - Consult PT/OT when consulted         Critical Care Daily Checklist:    A: Awake: RASS Goal/Actual Goal:    Actual:     B: Spontaneous Breathing Trial Performed?     C: SAT & SBT Coordinated?  N/A                      D: Delirium: CAM-ICU     E: Early Mobility Performed? Yes   F: Feeding Goal:    Status:     Current Diet Order   Procedures    Diet clear liquid    Diet NPO      AS: Analgesia/Sedation N/A   T: Thromboembolic Prophylaxis Actively bleeding   H: HOB > 300 Yes   U: Stress Ulcer Prophylaxis (if needed) PPI   G: Glucose Control Basal/bolus   B: Bowel Function     I: Indwelling Catheter (Lines & Hernandez) Necessity PIVx3, hernandez   D: De-escalation of Antimicrobials/Pharmacotherapies Vanc/Zosyn    Plan for the day/ETD Transfuse, rate control    Code Status:  Family/Goals of Care: Full Code       Critical secondary to Patient has a condition that poses threat to life and bodily function: Severe GI bleed, RVR    Critical care was time spent personally by me on the following activities: development of treatment plan with patient or surrogate and bedside caregivers, discussions with consultants, evaluation of patient's response to treatment, examination of patient, ordering and performing treatments and interventions, ordering and review of laboratory studies, ordering and review of radiographic studies, pulse oximetry, re-evaluation of patient's condition. This critical care time did not overlap with that of any other provider or involve time for any procedures.     Alejandra Ecsoto MD  Critical Care Medicine  [facility] - Emergency Dept

## 2024-01-10 NOTE — ASSESSMENT & PLAN NOTE
- Patient has been bed bound due to recent pelvic and reports cough x 1 month w/ 'ice cream' colored sputum  - CT A/P showed new airspace consolidation in the LLL  - Will empirically start Vanc (recent hospitalization) and Zosyn. De-escalate pending culture data   - Sputum culture ordered

## 2024-01-10 NOTE — ASSESSMENT & PLAN NOTE
- EKG w/ Aflutter RVF in the ED. Otherwise HDS  - Cardiology consulted; appreciate recs  - Optimize resuscitation in the setting of acute GIB followed by consideration of rate control if patient's HR continues to be uncontrolled. Will start w/ PO BB. If unable to tolerate, will consider digoxin  - Telemetry  - EKG PRN  - Mg>2 and K >4

## 2024-01-10 NOTE — HPI
"73-year-old female with history of AFib on Xarelto, COPD (chronically on 6L oxygen), pulmonary hypertension, diabetes presented to the emergency department via EMS  from [facility] for bright red blood per rectum noticed by staff at facility today. Patient reports she was noticed rectal bleeding over the last 3 days and that she thought it was her hemorrhoids.  She also states that she has been feeling very fatigued and hard to stay awake.      In ER, patient had low-normal systolic pressures with HR of 153 bpm. EKG showed typical atrial flutter with RVR. Lactic acid 4.3 on admission and hemoglobin 8.5 (from 11 at most recent discharge). Labs notable for leukocytosis 17.45, Hgb is currently stable at 9.4 after receiving 2 units of blood, INR 1.4, thrombocytopenia with platelets 150, increased BUN 32 on admission now 21.  CTA- GI bleed was obtained showing "No evidence of acute GI bleed.  Few prominent perirectal vessels possibly hemorrhoids." Large amount of retained stool within the sigmoid colon and rectum. CXR with new airspace consolidation in LLL with bronchial thickening concerning for PNA vs atelectasis. Patient is being treated with vanc/Zosyn for her sepsis.  She was admitted to the MICU for further management of her atrial flutter and hypotension.      She has had multiple recent admissions and was most recently discharged 5 days ago from [facility] from due to episode of dyspnea that required ICU level care. Prior to that she was recently discharged on 12/13 after stay for non operable pubic rami fracture.     EGD 5/2023 for dysphagia  - Small hiatal hernia.   - Normal gastric fundus and gastric body.   - Gastric mucosal atrophy.   - (Minimal) Antritis. Biopsied.  - Esophagus dilated, 54 Fr.      Colonscopy 8/2022  - Two 4 to 8 mm polyps in the ascending colon, removed with a cold snare. Resected and retrieved.   - One 3 mm polyp in the descending colon, removed with a cold snare. Resected and " retrieved.   - Altered vascular, erythematous and nodular mucosa in the rectum. Biopsied. Possibly secondary to rectal prolapse.   - Non-bleeding internal hemorrhoids.   - Bleeding could be due to rectal prolapse, hemorrhoids, or depending on what the abnormal mucosa is.

## 2024-01-10 NOTE — SUBJECTIVE & OBJECTIVE
Past Medical History:   Diagnosis Date    Allergy     Arthritis     Cataract     Colon polyps     COPD (chronic obstructive pulmonary disease)     Diabetes mellitus type II     Diabetic neuropathy     Fever blister     Glaucoma (increased eye pressure)     Hyperlipidemia     Hypertension     Irritable bowel syndrome     Keloid cicatrix     Osteoporosis     Retained cholelithiasis following cholecystectomy(997.41)     Right patella fracture        Past Surgical History:   Procedure Laterality Date    BACK SURGERY  2006    CATARACT EXTRACTION W/  INTRAOCULAR LENS IMPLANT Bilateral     CHOLECYSTECTOMY      Laparoscopic    COLONOSCOPY      COLONOSCOPY N/A 8/31/2022    Procedure: COLONOSCOPY;  Surgeon: Salvatore Butler MD;  Location: [facility];  Service: Gastroenterology;  Laterality: N/A;    ESOPHAGEAL DILATION N/A 8/30/2022    Procedure: DILATION, ESOPHAGUS;  Surgeon: Salvatore Butler MD;  Location: [facility];  Service: Gastroenterology;  Laterality: N/A;    ESOPHAGOGASTRODUODENOSCOPY N/A 8/30/2022    Procedure: EGD (ESOPHAGOGASTRODUODENOSCOPY);  Surgeon: Salvatore Butler MD;  Location: [facility];  Service: Gastroenterology;  Laterality: N/A;    ESOPHAGOGASTRODUODENOSCOPY N/A 5/23/2023    Procedure: ESOPHAGOGASTRODUODENOSCOPY (EGD);  Surgeon: Desmond Duffy Jr., MD;  Location: [facility];  Service: Endoscopy;  Laterality: N/A;    HERNIA REPAIR      HYSTERECTOMY      KNEE SURGERY Right 3/4/2015    Dr Weller     OOPHORECTOMY      ovarian tumor      Benign    TONSILLECTOMY, ADENOIDECTOMY         Review of patient's allergies indicates:   Allergen Reactions    Silicone      Burn skin    Adhesive Rash       Family History       Problem Relation (Age of Onset)    Breast cancer Maternal Aunt    Cancer Mother    Cataracts Mother    Diabetes Mother, Father, Sister, Daughter    Glaucoma Mother    Heart disease Father    Skin cancer Mother, Sister          Tobacco Use    Smoking status: Every Day     Current packs/day: 0.50      Average packs/day: 0.5 packs/day for 40.0 years (20.0 ttl pk-yrs)     Types: Cigarettes    Smokeless tobacco: Former    Tobacco comments:     Not ready to quit smoking. Cut down on cigarettes but enjoys having a couple cigarettes a day.   Substance and Sexual Activity    Alcohol use: No    Drug use: No    Sexual activity: Never      Review of Systems   Constitutional:  Positive for fatigue. Negative for appetite change, chills, fever and unexpected weight change.   Eyes:  Negative for visual disturbance.   Respiratory:  Positive for cough. Negative for shortness of breath.    Cardiovascular:  Positive for palpitations. Negative for chest pain and leg swelling.   Gastrointestinal:  Positive for abdominal pain, blood in stool and rectal pain. Negative for constipation, diarrhea, nausea and vomiting.   Genitourinary:  Negative for difficulty urinating and hematuria.   Skin:  Positive for color change (pale).   Neurological:  Positive for weakness. Negative for dizziness and headaches.   Psychiatric/Behavioral:  Negative for confusion.      Objective:     Vital Signs (Most Recent):  Temp: 98 °F (36.7 °C) (01/10/24 1627)  Pulse: (!) 156 (01/10/24 1627)  Resp: (!) 23 (01/10/24 1627)  BP: 115/75 (01/10/24 1627)  SpO2: 100 % (01/10/24 1627) Vital Signs (24h Range):  Temp:  [98 °F (36.7 °C)-98.5 °F (36.9 °C)] 98 °F (36.7 °C)  Pulse:  [152-180] 156  Resp:  [22-29] 23  SpO2:  [94 %-100 %] 100 %  BP: ()/(64-76) 115/75      There is no height or weight on file to calculate BMI.      Intake/Output Summary (Last 24 hours) at 1/10/2024 1712  Last data filed at 1/10/2024 1627  Gross per 24 hour   Intake 1361.75 ml   Output --   Net 1361.75 ml          Physical Exam  Vitals and nursing note reviewed.   Constitutional:       General: She is not in acute distress.  HENT:      Head: Normocephalic and atraumatic.      Nose: Nose normal.      Mouth/Throat:      Mouth: Mucous membranes are dry.   Eyes:      Pupils: Pupils are  "equal, round, and reactive to light.   Cardiovascular:      Rate and Rhythm: Tachycardia present. Rhythm irregular.      Pulses: Normal pulses.   Pulmonary:      Effort: Pulmonary effort is normal.      Breath sounds: Wheezing (scattered expiratory wheezing) present.   Abdominal:      General: Abdomen is flat. There is no distension.      Palpations: Abdomen is soft. There is no mass.      Tenderness: There is abdominal tenderness. There is no guarding or rebound.   Musculoskeletal:      Cervical back: Neck supple.      Right lower leg: No edema.      Left lower leg: No edema.   Skin:     General: Skin is warm and dry.      Coloration: Skin is pale.      Findings: Bruising present.   Neurological:      General: No focal deficit present.      Mental Status: She is alert and oriented to person, place, and time.            Vents:     Lines/Drains/Airways       Drain  Duration                  Urethral Catheter 01/10/24 1429 16 Fr. <1 day              Peripheral Intravenous Line  Duration                  Peripheral IV - Single Lumen 01/10/24 1356 20 G Left Antecubital <1 day         Peripheral IV - Single Lumen 01/10/24 1416 18 G;1 3/4 in Anterior;Proximal;Right Upper Arm <1 day         Peripheral IV - Single Lumen 01/10/24 1436 20 G;1 3/4 in Anterior;Right Forearm <1 day                  Significant Labs:    CBC/Anemia Profile:  Recent Labs   Lab 01/10/24  1411 01/10/24  1416   WBC 25.41*  --    HGB 8.5*  --    HCT 28.2* 34*     --    *  --    RDW 17.4*  --         Chemistries:  Recent Labs   Lab 01/10/24  1411      K 4.3      CO2 20*   BUN 32*   CREATININE 0.9   CALCIUM 8.5*   ALBUMIN 2.6*   PROT 5.2*   BILITOT 0.5   ALKPHOS 118   ALT 21   AST 18       Coagulation:   Recent Labs   Lab 01/10/24  1411   INR 1.2   APTT 21.1     Lactic Acid: No results for input(s): "LACTATE" in the last 48 hours.    Significant Imaging: I have reviewed all pertinent imaging results/findings within the past 24 " hours.

## 2024-01-10 NOTE — CONSULTS
Consult received, chart reviewed, and patient evaluated at bedside. Patient to be admitted to MICU. Full H&P to follow.    Makayla Bryson MD  Medical Resident  [facility]

## 2024-01-10 NOTE — ASSESSMENT & PLAN NOTE
72 y/o woman with pmh of arthritis, COPD w/ 6L O2 at baseline and history of CVA , diabetes mellitus type 2, hyperlipidemia, hypertension, paroxysmal AFib on Xarelto presenting w/ BRBPR. Of note, recent colonoscopy reports bleeding could be due to rectal prolapse. Denies use of NSAIDS, BC powder. Labs notable for Hgb of 8.5 (11 7 days ago). CTA with no evidence of acute GI bleed but with prominent perirectal vessels, possible hemorrhoids. Large amount of retained stool within sigmoid colon/rectum. Admitted to MICU for acute GIB.     - GI consulted  - NPO at MN   - Trend Hgb and transfuse for goal Hgb > 7, unless otherwise indicated  - Maintain IV access with 2 large bore IV's  - Intravascular resuscitation/support with IVF's   - Hold all NSAIDs and anticoagulants, unless contraindicated  - Bolus IV pantoprazole 80 mg followed by 40 mg BID  - Correct any coagulopathy with platelets and FFP for goal of platelets >50K and INR <2.0

## 2024-01-10 NOTE — ASSESSMENT & PLAN NOTE
Lab Results   Component Value Date    HGBA1C 6.6 (H) 12/25/2023     -  upon arrival  - Basal/bolus insulin regimen to target inpatient BG goal of 140-180; uptitrate as needed

## 2024-01-10 NOTE — ASSESSMENT & PLAN NOTE
"3-year-old female with history of AFib on Xarelto, COPD (chronically on 6L oxygen), pulmonary hypertension, diabetes presented to the emergency department via EMS  from [facility] for bright red blood per rectum noticed by staff at facility today. Patient reports she was noticed rectal bleeding over the last 3 days and that she thought it was her hemorrhoids.    EKG showed typical atrial flutter with RVR. Lactic acid 4.3 on admission and hemoglobin 8.5 (from 11 at most recent discharge). Labs notable for leukocytosis 17.45, Hgb is currently stable at 9.4 after receiving 2 units of blood, INR 1.4, thrombocytopenia with platelets 150, increased BUN 32 on admission now 21.      CTA- GI bleed was obtained showing "No evidence of acute GI bleed.  Few prominent perirectal vessels possibly hemorrhoids." Large amount of retained stool within the sigmoid colon and rectum. CXR with new airspace consolidation in LLL with bronchial thickening concerning for PNA vs atelectasis. Her stool is very dark and appears melanic. With elevated BUN on admission, bleed could be from an upper source or lower source.     Our recommendations are as follows:     Maintain IV access with at least 2 large IVs (18 gauge or larger)  Continued IVF resuscitation as tolerated with attention to active co-morbidities.   RBC transfusion as indicated if Hgb <7 g/dL.   Please correct any coagulopathy with platelets and FFP to a goal of platelets >50K and INR <2.0.   Discontinue all antiplatelet, anti-coagulation, and NSAID products.   Keep NPO today and medically optimize/resuscitate patient with tentative plans for EGD today.   Pending EGD findings, will consider if colonoscopy is warranted.  Please notify GI team if there is significant change in patient's clinical status with concern for hemodynamic compromise in the setting of overt GI bleeding. Patient may warrant urgent EGD or CTA with possible IR intervention.    "

## 2024-01-10 NOTE — CONSULTS
[facility]  Cardiology  Consult Note       HPI: Deb Webb is a 73 y.o. female with past medical history of:  pAF on Xarelto  Pulmonary hypertension, likely mixed Group II/III  COPD on 6 L O2 at home  Multiple recent falls resulting in pelvic and rib fractures  Type II DM  Severe TR    Who presented to [facility] ER due to bright red blood in the toilet. She was staying at [place] and has noticed the blood over the past few days. She also states she has been very fatigued. She reports that she just found out she had atrial fibrillation several weeks ago and that she has taken Xarelto every night since; however, chart review shows that Xarelto was held while she was recently in the hospital for pelvic fracture earlier in December 2023.     In ER, patient had low-normal systolic pressures with HR of 153 bpm. EKG showed typical atrial flutter with RVR. Lactic acid 4.3 and hemoglobin 8.5 (from 11). She received 1 u pRBC and 1 L LR. Cardiology consulted for medication regimens for her atrial flutter.         ROS: Patient denies angina, ROEPR, lower extremity edema, orthopnea, PND, palpitations, presyncope or syncope. All other ROS negative except as stated above.    PMHx:  As above    PSHx:   As above    Family Hx:  Family History   Problem Relation Age of Onset    Cancer Mother         Skin    Skin cancer Mother     Glaucoma Mother     Cataracts Mother     Diabetes Mother     Heart disease Father     Diabetes Father     Skin cancer Sister     Diabetes Sister     Diabetes Daughter     Breast cancer Maternal Aunt     Melanoma Neg Hx     Psoriasis Neg Hx     Eczema Neg Hx     Lupus Neg Hx     Amblyopia Neg Hx     Blindness Neg Hx     Macular degeneration Neg Hx     Retinal detachment Neg Hx     Strabismus Neg Hx        Social Hx:   Social History     Tobacco Use    Smoking status: Every Day     Current packs/day: 0.50     Average packs/day: 0.5 packs/day for 40.0 years (20.0 ttl pk-yrs)     Types:  Cigarettes    Smokeless tobacco: Former    Tobacco comments:     Not ready to quit smoking. Cut down on cigarettes but enjoys having a couple cigarettes a day.   Substance Use Topics    Alcohol use: No       Allergies:  Review of patient's allergies indicates:   Allergen Reactions    Silicone      Burn skin    Adhesive Rash       Home Meds:  Current Outpatient Medications   Medication Instructions    albuterol-ipratropium (DUO-NEB) 2.5 mg-0.5 mg/3 mL nebulizer solution 3 mLs, Nebulization, Every 6 hours PRN, Rescue    ANORO ELLIPTA 62.5-25 mcg/actuation DsDv INHALE 1 PUFF INTO THE LUNGS ONCE DAILY.    brimonidine 0.2% (ALPHAGAN) 0.2 % Drop PLACE 1 DROP INTO BOTH EYES 2 TIMES A DAY.    cholestyramine-aspartame (PREVALITE) 4 gram PwPk TAKE 1 PACKET (4 G TOTAL) BY MOUTH 2 (TWO) TIMES DAILY. FOR DIARRHEA    citalopram (CELEXA) 20 mg, Oral, Daily    gabapentin (NEURONTIN) 300 MG capsule TAKE 1 CAPSULE BY MOUTH THREE TIMES A DAY    guaiFENesin 100 mg/5 ml (ROBITUSSIN) 200 mg, Oral, Every 6 hours PRN    HYDROcodone-acetaminophen (NORCO) 5-325 mg per tablet 1 tablet, Oral, Every 6 hours PRN    latanoprost 0.005 % ophthalmic solution 1 drop, Both Eyes, Nightly    levalbuterol (XOPENEX) 0.63 mg, Nebulization, Every 8 hours, Rescue    LEVEMIR FLEXPEN 10 Units, Subcutaneous, Nightly    metoprolol tartrate (LOPRESSOR) 12.5 mg, Oral, 2 times daily    NovoLOG Flexpen U-100 Insulin 0-8 Units, Subcutaneous, As needed (PRN), BG:<BR>0 to 149 mg/dL = 0 units<BR>150-199 mg/dL = 2 units<BR>200 to 249 mg/dL = 4 units<BR>250 to 299 mg/dL = 6 units<BR>300 to 999 mg/dL = 8 units<BR>Notify Provider if BG is more than 400 mg/dL.    omeprazole (PRILOSEC) 40 mg, Oral, Before breakfast    predniSONE (DELTASONE) 5 MG tablet Take 10 tablets (50 mg total) by mouth once daily for 7 days, THEN 8 tablets (40 mg total) once daily for 7 days, THEN 6 tablets (30 mg total) once daily for 7 days, THEN 4 tablets (20 mg total) once daily for 7 days, THEN 2  tablets (10 mg total) once daily for 7 days.    senna (SENOKOT) 8.6 mg tablet 1 tablet, Oral, Daily    tamsulosin (FLOMAX) 0.4 mg, Oral, Daily    timolol maleate 0.5% (TIMOPTIC) 0.5 % Drop 1 drop, Both Eyes, Daily    XARELTO 20 mg Tab TAKE 1 TABLET BY MOUTH EVERY DAY WITH DINNER OR EVENING MEAL       Vitals:  Temp:  [98.3 °F (36.8 °C)-98.5 °F (36.9 °C)] 98.3 °F (36.8 °C)  Pulse:  [157-180] 164  Resp:  [22-28] 28  SpO2:  [95 %-100 %] 95 %  BP: ()/(64-76) 96/64    Physical Exam  Gen: No apparent distress, resting comfortably  HEENT: Pupils equal and reactive to light  Cardio: Regular rate, point of maximal impulse not displaced, no murmur noted, 2+ radial pulses bilaterally, 2+ DP pulses bilaterally  Resp: CTAB, no wheezing  Abd: Soft, non-tender, non-distended  Skin: Warm, dry, no peripheral edema noted  Neuro: Alert and oriented x3  Psych: Normal mood and affect    ASA: 2  Mallampati: 3    Labs:  Recent Labs   Lab 01/10/24  1411 01/10/24  1416   WBC 25.41*  --    HGB 8.5*  --    HCT 28.2* 34*     --      Recent Labs   Lab 01/10/24  1411      K 4.3      CO2 20*   BUN 32*   CREATININE 0.9   *   CALCIUM 8.5*         Current Meds:   vancomycin (VANCOCIN) IV (PEDS and ADULTS)  20 mg/kg (Order-Specific) Intravenous ED 1 Time       Assessment and Plan:    1. Atrial flutter with RVR  -EF normal in December 2023  -Precipitating factor is lower GI bleed, electrolytes normal  -CHADS-VASC 4 (female sex, age, HTN, DM)  -HAS-BLED 3  -Would recommend rate control strategy with PO Lopressor 12.5 BID (25 mg BID if BP improves after fluid resuscitation) with IV Lopressor 5 mg up to 3 times for HR >130 bpm  -If beta blocker not effective or there is concern for hypotension, can load with digoxin with  mcg x1 then 250 mcg six hours later then another 250 mcg six hours after that  -Would not recommend Amiodarone as she is unable to be anticoagulated at this time due to her GI bleed and multiple  recent falls  -Recommend continued fluid resuscitation for GI bleed        Case discussed with Dr. Alcaraz. Cardiology will continue to follow    Cornelio Aguirre MD  [facility] PGY-6    Staff attestation to follow.    This note was prepared using voice recognition system and may have sound alike errors that may have been overlooked even with proof reading.

## 2024-01-10 NOTE — HPI
"Ms. Webb is a 74 y/o woman with pmh of arthritis, COPD w/ 6L O2 at baseline and history of CVA , diabetes mellitus type 2, hyperlipidemia, hypertension, paroxysmal AFib on Xarelto. She presented to the emergency department via EMS secondary to bright red blood per rectum. She reports three days of bloody stools. She quantifies the amount as "three cups of blood" per day for three days. She has never had this happen to her before. She also reports fatigue, pale coloration of skin, productive cough, mild diffuse abdominal pain and moderate rectal pain. She was recently discharged from [facility] days ago after admission for URI and COPD exacerbation. Of note, recent colonoscopy reports bleeding could be due to rectal prolapse. She denies headaches, dizziness, shortness of breath, chest pain, nausea, vomiting, recent NSAID use/goody powder, weight loss, fevers, night sweats, hematuria.    In the ED, the patient received 1L IVF, 1u RBCs, Vanc, Zosyn, 80 IV pantoprazole, BC x2. Her blood pressures have been maintained 90s-100s/60s-70s with HR 150s-180s and EKG noting aflutter with RVR. GI and cardiology consulted. Labs notable for Hgb of 8.5 (11 7 days ago), wbc 25, Bicarb 20, lactate 4.28, PTT 21.1, PT 12.7, INR of 1.2. CTA with no evidence of acute GI bleed but with prominent perirectal vessels, possible hemorrhoids. Large amount of retained stool within sigmoid colon/rectum. New airspace consolidation in LLL with bronchial thickening concerning for PNA vs atelectasis.   "

## 2024-01-10 NOTE — ED NOTES
Patient identifiers for Deb Webb 73 y.o. female checked and correct.  Chief Complaint   Patient presents with    GI Bleeding     From [facility]     Past Medical History:   Diagnosis Date    Allergy     Arthritis     Cataract     Colon polyps     COPD (chronic obstructive pulmonary disease)     Diabetes mellitus type II     Diabetic neuropathy     Fever blister     Glaucoma (increased eye pressure)     Hyperlipidemia     Hypertension     Irritable bowel syndrome     Keloid cicatrix     Osteoporosis     Retained cholelithiasis following cholecystectomy(997.41)     Right patella fracture      Allergies reported:   Review of patient's allergies indicates:   Allergen Reactions    Silicone      Burn skin    Adhesive Rash         LOC: Patient is awake, alert, and aware of environment with an appropriate affect. Patient is oriented x 4 and speaking appropriately.  APPEARANCE: Patient resting comfortably and in no acute distress. Patient is in hospital gown with hair greasy and tangled.  HEENT: - JVD, + midline trach  SKIN: The skin is warm and dry. Patient has normal skin turgor and moist mucus membranes.   MUSKULOSKELETAL: Patient is moving all extremities well, no obvious deformities noted. Pulses intact. PMS x 4  RESPIRATORY: Airway is open and patent. Respirations are spontaneous and non-labored with normal effort and rate. Bilateral crackles. Arrives on 10 lpm O2 via NRB  CARDIAC: Patient has a tachycardic rate and A-Fib rhythm of 131 on cardiac monitor. No peripheral edema noted.   ABDOMEN: No distention noted. Non-tender upon palpation. Firm in midepigastric region. Bright red bleeding with noticeable feces at rectum.   NEUROLOGICAL: pupils 4 mm, PERRL. Facial expression is symmetrical. Hand grasps are equal bilaterally. Normal sensation in all extremities when touched with finger.

## 2024-01-10 NOTE — ED TRIAGE NOTES
72 y/o F presents to ER with c/c rectal bleeding x 3 days. Pt states it is bright red, endorses weakness, and noticed a lot more this morning. Pt denies c/p, SOB, N/V/D, fevers and chills. Pt found by EMS with pulse ox 89% on 6 lpm which is her normal oxygen rate. Pt [id number] on 10 lpm via NRB.

## 2024-01-11 ENCOUNTER — ANESTHESIA (OUTPATIENT)
Dept: ENDOSCOPY | Facility: HOSPITAL | Age: 74
DRG: 393 | End: 2024-01-11
Payer: MEDICARE

## 2024-01-11 ENCOUNTER — ANESTHESIA EVENT (OUTPATIENT)
Dept: ENDOSCOPY | Facility: HOSPITAL | Age: 74
DRG: 393 | End: 2024-01-11
Payer: MEDICARE

## 2024-01-11 LAB
ALBUMIN SERPL BCP-MCNC: 2.3 G/DL (ref 3.5–5.2)
ALP SERPL-CCNC: 91 U/L (ref 55–135)
ALT SERPL W/O P-5'-P-CCNC: 18 U/L (ref 10–44)
ANION GAP SERPL CALC-SCNC: 8 MMOL/L (ref 8–16)
AST SERPL-CCNC: 12 U/L (ref 10–40)
BASOPHILS # BLD AUTO: 0.02 K/UL (ref 0–0.2)
BASOPHILS # BLD AUTO: 0.02 K/UL (ref 0–0.2)
BASOPHILS # BLD AUTO: 0.03 K/UL (ref 0–0.2)
BASOPHILS # BLD AUTO: 0.03 K/UL (ref 0–0.2)
BASOPHILS NFR BLD: 0.1 % (ref 0–1.9)
BASOPHILS NFR BLD: 0.1 % (ref 0–1.9)
BASOPHILS NFR BLD: 0.2 % (ref 0–1.9)
BASOPHILS NFR BLD: 0.2 % (ref 0–1.9)
BILIRUB SERPL-MCNC: 1 MG/DL (ref 0.1–1)
BLD PROD TYP BPU: NORMAL
BLD PROD TYP BPU: NORMAL
BLOOD UNIT EXPIRATION DATE: NORMAL
BLOOD UNIT EXPIRATION DATE: NORMAL
BLOOD UNIT TYPE CODE: 5100
BLOOD UNIT TYPE CODE: 5100
BLOOD UNIT TYPE: NORMAL
BLOOD UNIT TYPE: NORMAL
BUN SERPL-MCNC: 21 MG/DL (ref 8–23)
CALCIUM SERPL-MCNC: 8 MG/DL (ref 8.7–10.5)
CHLORIDE SERPL-SCNC: 109 MMOL/L (ref 95–110)
CO2 SERPL-SCNC: 24 MMOL/L (ref 23–29)
CODING SYSTEM: NORMAL
CODING SYSTEM: NORMAL
CREAT SERPL-MCNC: 0.6 MG/DL (ref 0.5–1.4)
CROSSMATCH INTERPRETATION: NORMAL
CROSSMATCH INTERPRETATION: NORMAL
DIFFERENTIAL METHOD BLD: ABNORMAL
DISPENSE STATUS: NORMAL
DISPENSE STATUS: NORMAL
EOSINOPHIL # BLD AUTO: 0 K/UL (ref 0–0.5)
EOSINOPHIL # BLD AUTO: 0 K/UL (ref 0–0.5)
EOSINOPHIL # BLD AUTO: 0.1 K/UL (ref 0–0.5)
EOSINOPHIL # BLD AUTO: 0.2 K/UL (ref 0–0.5)
EOSINOPHIL NFR BLD: 0.2 % (ref 0–8)
EOSINOPHIL NFR BLD: 0.2 % (ref 0–8)
EOSINOPHIL NFR BLD: 0.5 % (ref 0–8)
EOSINOPHIL NFR BLD: 1.3 % (ref 0–8)
ERYTHROCYTE [DISTWIDTH] IN BLOOD BY AUTOMATED COUNT: 17.9 % (ref 11.5–14.5)
ERYTHROCYTE [DISTWIDTH] IN BLOOD BY AUTOMATED COUNT: 18 % (ref 11.5–14.5)
ERYTHROCYTE [DISTWIDTH] IN BLOOD BY AUTOMATED COUNT: 18 % (ref 11.5–14.5)
ERYTHROCYTE [DISTWIDTH] IN BLOOD BY AUTOMATED COUNT: 18.2 % (ref 11.5–14.5)
EST. GFR  (NO RACE VARIABLE): >60 ML/MIN/1.73 M^2
GLUCOSE SERPL-MCNC: 262 MG/DL (ref 70–110)
HCT VFR BLD AUTO: 26.4 % (ref 37–48.5)
HCT VFR BLD AUTO: 29.5 % (ref 37–48.5)
HCT VFR BLD AUTO: 29.8 % (ref 37–48.5)
HCT VFR BLD AUTO: 31.4 % (ref 37–48.5)
HGB BLD-MCNC: 10.4 G/DL (ref 12–16)
HGB BLD-MCNC: 8.1 G/DL (ref 12–16)
HGB BLD-MCNC: 9.4 G/DL (ref 12–16)
HGB BLD-MCNC: 9.4 G/DL (ref 12–16)
IMM GRANULOCYTES # BLD AUTO: 0.04 K/UL (ref 0–0.04)
IMM GRANULOCYTES # BLD AUTO: 0.08 K/UL (ref 0–0.04)
IMM GRANULOCYTES # BLD AUTO: 0.08 K/UL (ref 0–0.04)
IMM GRANULOCYTES # BLD AUTO: 0.09 K/UL (ref 0–0.04)
IMM GRANULOCYTES NFR BLD AUTO: 0.3 % (ref 0–0.5)
IMM GRANULOCYTES NFR BLD AUTO: 0.5 % (ref 0–0.5)
LACTATE SERPL-SCNC: 1.5 MMOL/L (ref 0.5–2.2)
LACTATE SERPL-SCNC: 2.1 MMOL/L (ref 0.5–2.2)
LACTATE SERPL-SCNC: 3 MMOL/L (ref 0.5–2.2)
LYMPHOCYTES # BLD AUTO: 1.3 K/UL (ref 1–4.8)
LYMPHOCYTES # BLD AUTO: 2.9 K/UL (ref 1–4.8)
LYMPHOCYTES # BLD AUTO: 3.2 K/UL (ref 1–4.8)
LYMPHOCYTES # BLD AUTO: 3.2 K/UL (ref 1–4.8)
LYMPHOCYTES NFR BLD: 14.5 % (ref 18–48)
LYMPHOCYTES NFR BLD: 18.4 % (ref 18–48)
LYMPHOCYTES NFR BLD: 23.4 % (ref 18–48)
LYMPHOCYTES NFR BLD: 7.6 % (ref 18–48)
MAGNESIUM SERPL-MCNC: 1.3 MG/DL (ref 1.6–2.6)
MAGNESIUM SERPL-MCNC: 1.4 MG/DL (ref 1.6–2.6)
MCH RBC QN AUTO: 30 PG (ref 27–31)
MCH RBC QN AUTO: 30 PG (ref 27–31)
MCH RBC QN AUTO: 30.4 PG (ref 27–31)
MCH RBC QN AUTO: 30.4 PG (ref 27–31)
MCHC RBC AUTO-ENTMCNC: 30.7 G/DL (ref 32–36)
MCHC RBC AUTO-ENTMCNC: 31.5 G/DL (ref 32–36)
MCHC RBC AUTO-ENTMCNC: 31.9 G/DL (ref 32–36)
MCHC RBC AUTO-ENTMCNC: 33.1 G/DL (ref 32–36)
MCV RBC AUTO: 91 FL (ref 82–98)
MCV RBC AUTO: 96 FL (ref 82–98)
MCV RBC AUTO: 96 FL (ref 82–98)
MCV RBC AUTO: 98 FL (ref 82–98)
MONOCYTES # BLD AUTO: 0.6 K/UL (ref 0.3–1)
MONOCYTES # BLD AUTO: 0.8 K/UL (ref 0.3–1)
MONOCYTES # BLD AUTO: 1.1 K/UL (ref 0.3–1)
MONOCYTES # BLD AUTO: 1.3 K/UL (ref 0.3–1)
MONOCYTES NFR BLD: 3.3 % (ref 4–15)
MONOCYTES NFR BLD: 6.2 % (ref 4–15)
MONOCYTES NFR BLD: 6.3 % (ref 4–15)
MONOCYTES NFR BLD: 6.4 % (ref 4–15)
NEUTROPHILS # BLD AUTO: 12.9 K/UL (ref 1.8–7.7)
NEUTROPHILS # BLD AUTO: 15.3 K/UL (ref 1.8–7.7)
NEUTROPHILS # BLD AUTO: 15.5 K/UL (ref 1.8–7.7)
NEUTROPHILS # BLD AUTO: 9.3 K/UL (ref 1.8–7.7)
NEUTROPHILS NFR BLD: 68.7 % (ref 38–73)
NEUTROPHILS NFR BLD: 74.1 % (ref 38–73)
NEUTROPHILS NFR BLD: 78.2 % (ref 38–73)
NEUTROPHILS NFR BLD: 88.3 % (ref 38–73)
NRBC BLD-RTO: 0 /100 WBC
NUM UNITS TRANS PACKED RBC: NORMAL
NUM UNITS TRANS PACKED RBC: NORMAL
PHOSPHATE SERPL-MCNC: 2.3 MG/DL (ref 2.7–4.5)
PLATELET # BLD AUTO: 127 K/UL (ref 150–450)
PLATELET # BLD AUTO: 139 K/UL (ref 150–450)
PLATELET # BLD AUTO: 140 K/UL (ref 150–450)
PLATELET # BLD AUTO: 150 K/UL (ref 150–450)
PMV BLD AUTO: 9.3 FL (ref 9.2–12.9)
PMV BLD AUTO: 9.8 FL (ref 9.2–12.9)
PMV BLD AUTO: 9.8 FL (ref 9.2–12.9)
PMV BLD AUTO: 9.9 FL (ref 9.2–12.9)
POCT GLUCOSE: 117 MG/DL (ref 70–110)
POCT GLUCOSE: 150 MG/DL (ref 70–110)
POCT GLUCOSE: 156 MG/DL (ref 70–110)
POCT GLUCOSE: 179 MG/DL (ref 70–110)
POCT GLUCOSE: 291 MG/DL (ref 70–110)
POCT GLUCOSE: 306 MG/DL (ref 70–110)
POTASSIUM SERPL-SCNC: 3.8 MMOL/L (ref 3.5–5.1)
POTASSIUM SERPL-SCNC: 4.1 MMOL/L (ref 3.5–5.1)
PROT SERPL-MCNC: 4.5 G/DL (ref 6–8.4)
RBC # BLD AUTO: 2.7 M/UL (ref 4–5.4)
RBC # BLD AUTO: 3.09 M/UL (ref 4–5.4)
RBC # BLD AUTO: 3.09 M/UL (ref 4–5.4)
RBC # BLD AUTO: 3.47 M/UL (ref 4–5.4)
SODIUM SERPL-SCNC: 141 MMOL/L (ref 136–145)
VANCOMYCIN SERPL-MCNC: 8.9 UG/ML
WBC # BLD AUTO: 13.51 K/UL (ref 3.9–12.7)
WBC # BLD AUTO: 17.34 K/UL (ref 3.9–12.7)
WBC # BLD AUTO: 17.45 K/UL (ref 3.9–12.7)
WBC # BLD AUTO: 19.75 K/UL (ref 3.9–12.7)

## 2024-01-11 PROCEDURE — 63600175 PHARM REV CODE 636 W HCPCS

## 2024-01-11 PROCEDURE — 94761 N-INVAS EAR/PLS OXIMETRY MLT: CPT

## 2024-01-11 PROCEDURE — 83735 ASSAY OF MAGNESIUM: CPT

## 2024-01-11 PROCEDURE — 63600175 PHARM REV CODE 636 W HCPCS: Mod: JG | Performed by: STUDENT IN AN ORGANIZED HEALTH CARE EDUCATION/TRAINING PROGRAM

## 2024-01-11 PROCEDURE — 36430 TRANSFUSION BLD/BLD COMPNT: CPT

## 2024-01-11 PROCEDURE — 99291 CRITICAL CARE FIRST HOUR: CPT | Mod: GC,,, | Performed by: INTERNAL MEDICINE

## 2024-01-11 PROCEDURE — P9016 RBC LEUKOCYTES REDUCED: HCPCS

## 2024-01-11 PROCEDURE — 43235 EGD DIAGNOSTIC BRUSH WASH: CPT | Performed by: INTERNAL MEDICINE

## 2024-01-11 PROCEDURE — 63600175 PHARM REV CODE 636 W HCPCS: Performed by: NURSE PRACTITIONER

## 2024-01-11 PROCEDURE — 25000003 PHARM REV CODE 250: Performed by: NURSE PRACTITIONER

## 2024-01-11 PROCEDURE — 25000003 PHARM REV CODE 250

## 2024-01-11 PROCEDURE — 25000003 PHARM REV CODE 250: Performed by: INTERNAL MEDICINE

## 2024-01-11 PROCEDURE — 27000221 HC OXYGEN, UP TO 24 HOURS

## 2024-01-11 PROCEDURE — 25000003 PHARM REV CODE 250: Performed by: STUDENT IN AN ORGANIZED HEALTH CARE EDUCATION/TRAINING PROGRAM

## 2024-01-11 PROCEDURE — C9113 INJ PANTOPRAZOLE SODIUM, VIA: HCPCS

## 2024-01-11 PROCEDURE — 0DJ08ZZ INSPECTION OF UPPER INTESTINAL TRACT, VIA NATURAL OR ARTIFICIAL OPENING ENDOSCOPIC: ICD-10-PCS | Performed by: INTERNAL MEDICINE

## 2024-01-11 PROCEDURE — 63600175 PHARM REV CODE 636 W HCPCS: Performed by: INTERNAL MEDICINE

## 2024-01-11 PROCEDURE — 37000008 HC ANESTHESIA 1ST 15 MINUTES: Performed by: INTERNAL MEDICINE

## 2024-01-11 PROCEDURE — 20000000 HC ICU ROOM

## 2024-01-11 PROCEDURE — 80202 ASSAY OF VANCOMYCIN: CPT | Performed by: INTERNAL MEDICINE

## 2024-01-11 PROCEDURE — 99222 1ST HOSP IP/OBS MODERATE 55: CPT | Mod: GC,,, | Performed by: INTERNAL MEDICINE

## 2024-01-11 PROCEDURE — 43235 EGD DIAGNOSTIC BRUSH WASH: CPT | Mod: 51,,, | Performed by: INTERNAL MEDICINE

## 2024-01-11 PROCEDURE — 84100 ASSAY OF PHOSPHORUS: CPT

## 2024-01-11 PROCEDURE — D9220A PRA ANESTHESIA: Mod: CRNA,,, | Performed by: STUDENT IN AN ORGANIZED HEALTH CARE EDUCATION/TRAINING PROGRAM

## 2024-01-11 PROCEDURE — 85025 COMPLETE CBC W/AUTO DIFF WBC: CPT | Mod: 91

## 2024-01-11 PROCEDURE — 80053 COMPREHEN METABOLIC PANEL: CPT

## 2024-01-11 PROCEDURE — 86920 COMPATIBILITY TEST SPIN: CPT | Performed by: STUDENT IN AN ORGANIZED HEALTH CARE EDUCATION/TRAINING PROGRAM

## 2024-01-11 PROCEDURE — 99223 1ST HOSP IP/OBS HIGH 75: CPT | Mod: 25,,, | Performed by: INTERNAL MEDICINE

## 2024-01-11 PROCEDURE — D9220A PRA ANESTHESIA: Mod: ANES,,, | Performed by: ANESTHESIOLOGY

## 2024-01-11 PROCEDURE — 83605 ASSAY OF LACTIC ACID: CPT | Mod: 91

## 2024-01-11 RX ORDER — MUPIROCIN 20 MG/G
OINTMENT TOPICAL 2 TIMES DAILY
Status: DISPENSED | OUTPATIENT
Start: 2024-01-11 | End: 2024-01-16

## 2024-01-11 RX ORDER — PROPOFOL 10 MG/ML
VIAL (ML) INTRAVENOUS CONTINUOUS PRN
Status: DISCONTINUED | OUTPATIENT
Start: 2024-01-11 | End: 2024-01-11

## 2024-01-11 RX ORDER — POLYETHYLENE GLYCOL 3350, SODIUM SULFATE ANHYDROUS, SODIUM BICARBONATE, SODIUM CHLORIDE, POTASSIUM CHLORIDE 236; 22.74; 6.74; 5.86; 2.97 G/4L; G/4L; G/4L; G/4L; G/4L
4000 POWDER, FOR SOLUTION ORAL ONCE
Status: COMPLETED | OUTPATIENT
Start: 2024-01-11 | End: 2024-01-11

## 2024-01-11 RX ORDER — CITALOPRAM 20 MG/1
20 TABLET, FILM COATED ORAL DAILY
Status: DISCONTINUED | OUTPATIENT
Start: 2024-01-11 | End: 2024-01-19 | Stop reason: HOSPADM

## 2024-01-11 RX ORDER — VASOPRESSIN 20 [USP'U]/ML
INJECTION, SOLUTION INTRAMUSCULAR; SUBCUTANEOUS
Status: DISCONTINUED | OUTPATIENT
Start: 2024-01-11 | End: 2024-01-11

## 2024-01-11 RX ORDER — MAGNESIUM SULFATE HEPTAHYDRATE 40 MG/ML
2 INJECTION, SOLUTION INTRAVENOUS ONCE
Status: COMPLETED | OUTPATIENT
Start: 2024-01-11 | End: 2024-01-11

## 2024-01-11 RX ORDER — LIDOCAINE HYDROCHLORIDE 20 MG/ML
INJECTION INTRAVENOUS
Status: DISCONTINUED | OUTPATIENT
Start: 2024-01-11 | End: 2024-01-11

## 2024-01-11 RX ORDER — SODIUM,POTASSIUM PHOSPHATES 280-250MG
2 POWDER IN PACKET (EA) ORAL ONCE
Status: COMPLETED | OUTPATIENT
Start: 2024-01-11 | End: 2024-01-11

## 2024-01-11 RX ORDER — PROPOFOL 10 MG/ML
VIAL (ML) INTRAVENOUS
Status: DISCONTINUED | OUTPATIENT
Start: 2024-01-11 | End: 2024-01-11

## 2024-01-11 RX ORDER — METOPROLOL TARTRATE 25 MG/1
12.5 TABLET ORAL 2 TIMES DAILY
Status: DISCONTINUED | OUTPATIENT
Start: 2024-01-11 | End: 2024-01-12

## 2024-01-11 RX ORDER — NOREPINEPHRINE BITARTRATE/D5W 4MG/250ML
0-.2 PLASTIC BAG, INJECTION (ML) INTRAVENOUS CONTINUOUS
Status: DISCONTINUED | OUTPATIENT
Start: 2024-01-11 | End: 2024-01-12

## 2024-01-11 RX ORDER — INSULIN ASPART 100 [IU]/ML
0-10 INJECTION, SOLUTION INTRAVENOUS; SUBCUTANEOUS EVERY 6 HOURS PRN
Status: DISCONTINUED | OUTPATIENT
Start: 2024-01-11 | End: 2024-01-19 | Stop reason: HOSPADM

## 2024-01-11 RX ADMIN — PROPOFOL 100 MCG/KG/MIN: 10 INJECTION, EMULSION INTRAVENOUS at 03:01

## 2024-01-11 RX ADMIN — POTASSIUM & SODIUM PHOSPHATES POWDER PACK 280-160-250 MG 2 PACKET: 280-160-250 PACK at 11:01

## 2024-01-11 RX ADMIN — PIPERACILLIN SODIUM AND TAZOBACTAM SODIUM 4.5 G: 4; .5 INJECTION, POWDER, FOR SOLUTION INTRAVENOUS at 05:01

## 2024-01-11 RX ADMIN — BENZOCAINE 1 CAN: 200 SPRAY DENTAL; ORAL; PERIODONTAL at 03:01

## 2024-01-11 RX ADMIN — PROPOFOL 30 MG: 10 INJECTION, EMULSION INTRAVENOUS at 03:01

## 2024-01-11 RX ADMIN — VASOPRESSIN 1 UNITS: 20 INJECTION INTRAVENOUS at 04:01

## 2024-01-11 RX ADMIN — VANCOMYCIN HYDROCHLORIDE 1000 MG: 1 INJECTION, POWDER, LYOPHILIZED, FOR SOLUTION INTRAVENOUS at 09:01

## 2024-01-11 RX ADMIN — LATANOPROST 1 DROP: 50 SOLUTION OPHTHALMIC at 08:01

## 2024-01-11 RX ADMIN — VASOPRESSIN 1 UNITS: 20 INJECTION INTRAVENOUS at 03:01

## 2024-01-11 RX ADMIN — PANTOPRAZOLE SODIUM 40 MG: 40 INJECTION, POWDER, FOR SOLUTION INTRAVENOUS at 08:01

## 2024-01-11 RX ADMIN — SODIUM CHLORIDE, SODIUM GLUCONATE, SODIUM ACETATE, POTASSIUM CHLORIDE, MAGNESIUM CHLORIDE, SODIUM PHOSPHATE, DIBASIC, AND POTASSIUM PHOSPHATE: .53; .5; .37; .037; .03; .012; .00082 INJECTION, SOLUTION INTRAVENOUS at 03:01

## 2024-01-11 RX ADMIN — MUPIROCIN: 20 OINTMENT TOPICAL at 08:01

## 2024-01-11 RX ADMIN — MAGNESIUM SULFATE 2 G: 2 INJECTION INTRAVENOUS at 05:01

## 2024-01-11 RX ADMIN — INSULIN ASPART 2 UNITS: 100 INJECTION, SOLUTION INTRAVENOUS; SUBCUTANEOUS at 12:01

## 2024-01-11 RX ADMIN — MUPIROCIN: 20 OINTMENT TOPICAL at 01:01

## 2024-01-11 RX ADMIN — INSULIN ASPART 3 UNITS: 100 INJECTION, SOLUTION INTRAVENOUS; SUBCUTANEOUS at 06:01

## 2024-01-11 RX ADMIN — PIPERACILLIN SODIUM AND TAZOBACTAM SODIUM 4.5 G: 4; .5 INJECTION, POWDER, FOR SOLUTION INTRAVENOUS at 09:01

## 2024-01-11 RX ADMIN — BRIMONIDINE TARTRATE 1 DROP: 2 SOLUTION OPHTHALMIC at 08:01

## 2024-01-11 RX ADMIN — NOREPINEPHRINE BITARTRATE 0.02 MCG/KG/MIN: 4 INJECTION, SOLUTION INTRAVENOUS at 12:01

## 2024-01-11 RX ADMIN — GABAPENTIN 300 MG: 300 CAPSULE ORAL at 08:01

## 2024-01-11 RX ADMIN — BRIMONIDINE TARTRATE 1 DROP: 2 SOLUTION OPHTHALMIC at 09:01

## 2024-01-11 RX ADMIN — TIMOLOL MALEATE 1 DROP: 5 SOLUTION OPHTHALMIC at 09:01

## 2024-01-11 RX ADMIN — SODIUM CHLORIDE 1000 ML: 9 INJECTION, SOLUTION INTRAVENOUS at 11:01

## 2024-01-11 RX ADMIN — PANTOPRAZOLE SODIUM 40 MG: 40 INJECTION, POWDER, FOR SOLUTION INTRAVENOUS at 09:01

## 2024-01-11 RX ADMIN — LIDOCAINE HYDROCHLORIDE 60 MG: 20 INJECTION INTRAVENOUS at 03:01

## 2024-01-11 RX ADMIN — HYDROCORTISONE SODIUM SUCCINATE 100 MG: 100 INJECTION, POWDER, FOR SOLUTION INTRAMUSCULAR; INTRAVENOUS at 12:01

## 2024-01-11 RX ADMIN — PIPERACILLIN SODIUM AND TAZOBACTAM SODIUM 4.5 G: 4; .5 INJECTION, POWDER, FOR SOLUTION INTRAVENOUS at 11:01

## 2024-01-11 RX ADMIN — METOPROLOL TARTRATE 12.5 MG: 25 TABLET, FILM COATED ORAL at 08:01

## 2024-01-11 RX ADMIN — INSULIN DETEMIR 8 UNITS: 100 INJECTION, SOLUTION SUBCUTANEOUS at 09:01

## 2024-01-11 RX ADMIN — POLYETHYLENE GLYCOL 3350, SODIUM SULFATE ANHYDROUS, SODIUM BICARBONATE, SODIUM CHLORIDE, POTASSIUM CHLORIDE 4000 ML: 236; 22.74; 6.74; 5.86; 2.97 POWDER, FOR SOLUTION ORAL at 06:01

## 2024-01-11 RX ADMIN — PROPOFOL 10 MG: 10 INJECTION, EMULSION INTRAVENOUS at 03:01

## 2024-01-11 RX ADMIN — HYDROCORTISONE SODIUM SUCCINATE 100 MG: 100 INJECTION, POWDER, FOR SOLUTION INTRAMUSCULAR; INTRAVENOUS at 11:01

## 2024-01-11 NOTE — ASSESSMENT & PLAN NOTE
74 y/o woman with pmh of arthritis, COPD w/ 6L O2 at baseline and history of CVA , diabetes mellitus type 2, hyperlipidemia, hypertension, paroxysmal AFib on Xarelto presenting w/ BRBPR. Of note, recent colonoscopy reports bleeding could be due to rectal prolapse. Denies use of NSAIDS, BC powder. Labs notable for Hgb of 8.5 (11 7 days ago). CTA with no evidence of acute GI bleed but with prominent perirectal vessels, possible hemorrhoids. Large amount of retained stool within sigmoid colon/rectum. Admitted to MICU for acute GIB.     - GI consulted - Plans for EGD today  - Trend Hgb and transfuse for goal Hgb > 7, unless otherwise indicated  - Maintain IV access with 2 large bore IV's  - Intravascular resuscitation/support with IVF's   - Hold all NSAIDs and anticoagulants, unless contraindicated  - Bolus IV pantoprazole 80 mg followed by 40 mg BID  - Correct any coagulopathy with platelets and FFP for goal of platelets >50K and INR <2.0

## 2024-01-11 NOTE — HOSPITAL COURSE
72 yo F with Afib on Xarelto, pulmonary HTN, COPD on 6L at home, T2DM and severe tricuspid regurgitation that presented for 3 days of rectal bleeding from [place]. She endorsed fatigue and more frequent bowel movements that she thought was diarrhea at first. In the ED, she had atrial flutter with RVR (), lactic acid 4.3, Hg 8.5 (baseline 11). She received 1 unit pRBC and 1L LR. She was admitted to the MICU for a GI bleed. Cardiology was consulted for Aflutter management.

## 2024-01-11 NOTE — HPI
72 yo F with Afib on Xarelto, pulmonary HTN, COPD on 6L at home, T2DM and severe tricuspid regurgitation that presented for 3 days of rectal bleeding from [place]. She endorsed fatigue and more frequent bowel movements that she thought was diarrhea at first. In the ED, she had atrial flutter with RVR (), lactic acid 4.3, Hg 8.5 (baseline 11). She received 1 unit pRBC and 1L LR. She was admitted to the MICU for a GI bleed. Cardiology was consulted for Aflutter management.

## 2024-01-11 NOTE — ASSESSMENT & PLAN NOTE
EKG w/ Aflutter RVF in the ED. Otherwise HDS. Cardiology consulted; appreciate recs.     - Optimize resuscitation in the setting of acute GIB followed by consideration of rate control if patient's HR continues to be uncontrolled. Will start w/ PO BB. If unable to tolerate, will consider digoxin.  - Telemetry  - EKG PRN  - Mg>2 and K >4

## 2024-01-11 NOTE — ANESTHESIA PREPROCEDURE EVALUATION
[facility]  Anesthesia Pre-Operative Evaluation         Patient Name/: Deb Webb, 1950  MRN: [id number]    SUBJECTIVE:     Pre-operative evaluation for Procedure(s) (LRB):  EGD (ESOPHAGOGASTRODUODENOSCOPY) (N/A)     2024    Deb Webb is a 73 y.o. female w/ a significant PMHx of hematochezia, melena, NIDDMT2, combined systolic and diastolic congestive heart failure, COPD, pulmonary HTN, well controlled GERD, afib, and CVA. Currently receiving third unit pRBC due to drop in hemoglobin (9.4 > 8.1). Currently in afib w/ RVR. Patient now presents for the above procedure(s).    Patient denies any other known cardiopulmonary disease.     Level of Care: MICU    NPO status: Stated she last had anything to eat/drink several days ago     Access: 2 20G PIV    Hemodynamics: Currently HDS without pressor requirements. Was on levo which was turned off 14:08    Previous Airway: None prior in our records      ________________________________________  Results for orders placed during the hospital encounter of 23    Echo    Interpretation Summary    Left Ventricle: The left ventricle is normal in size. Normal wall thickness. There is normal systolic function with a visually estimated ejection fraction of 65 - 70%.    Right Ventricle: Mild right ventricular enlargement. Systolic function is normal.    Left Atrium: Left atrium is mildly dilated.    Right Atrium: Right atrium is severely dilated.    Mitral Valve: There is mild regurgitation.    Tricuspid Valve: There is severe regurgitation.    Pulmonary Artery: There is severe pulmonary hypertension. The estimated pulmonary artery systolic pressure is 82 mmHg.    IVC/SVC: Intermediate venous pressure at 8 mmHg.    ________________________________________    LDA:       Peripheral IV - Single Lumen 01/10/24 1436 20 G;1 3/4 in Anterior;Right Forearm (Active)   Site Assessment Clean;Dry;Intact;No redness;No swelling 24 1300  "  Extremity Assessment Distal to IV No warmth;No swelling;No redness;No abnormal discoloration 01/11/24 1300   Line Status Flushed;Saline locked 01/11/24 1300   Dressing Status Clean;Dry;Intact 01/11/24 1300   Dressing Intervention Integrity maintained 01/11/24 1300   Dressing Change Due 01/14/24 01/11/24 1300   Site Change Due 01/14/24 01/11/24 0715   Reason Not Rotated Not due 01/11/24 1300   Number of days: 1            Peripheral IV - Single Lumen 01/10/24 1356 20 G Left Antecubital (Active)   Site Assessment Clean;Dry;Intact;No redness;No swelling 01/11/24 1300   Extremity Assessment Distal to IV No warmth;No swelling;No redness;No abnormal discoloration 01/11/24 1300   Line Status Flushed;Saline locked 01/11/24 1300   Dressing Status Clean;Dry;Intact 01/11/24 1300   Dressing Intervention Integrity maintained 01/11/24 1300   Dressing Change Due 01/14/24 01/11/24 1300   Site Change Due 01/14/24 01/11/24 0715   Reason Not Rotated Not due 01/11/24 1300   Number of days: 1            Urethral Catheter 01/10/24 1429 16 Fr. (Active)   $ Saini Insertion Bedside Insertion Performed 01/10/24 2100   Site Assessment Clean;Intact 01/11/24 1300   Collection Container Urimeter 01/11/24 1300   Securement Method secured to top of thigh w/ adhesive device 01/11/24 1300   Catheter Care Performed yes 01/11/24 1300   Reason for Continuing Urinary Catheterization Critically ill in ICU and requiring hourly monitoring of intake/output 01/11/24 1300   CAUTI Prevention Bundle Securement Device in place with 1" slack;Intact seal between catheter & drainage tubing;Drainage bag/urimeter off the floor;Sheeting clip in use;No dependent loops or kinks;Drainage bag/urimeter not overfilled (<2/3 full);Drainage bag/urimeter below bladder 01/11/24 0715   Output (mL) 400 mL 01/11/24 1324   Number of days: 1       Drips:    NORepinephrine bitartrate-D5W 0.02 mcg/kg/min (01/11/24 1355)       Patient Active Problem List   Diagnosis    Osteoporosis, " postmenopausal    Controlled type 2 diabetes mellitus with diabetic neuropathy, without long-term current use of insulin    Combined hyperlipidemia associated with type 2 diabetes mellitus    Primary osteoarthritis involving multiple joints    Anxiety    COPD exacerbation    Tobacco use disorder    IBS (irritable bowel syndrome)    S/P R knee surgery, ORIF patella (3/4/15)    Chronic allergic rhinitis    Essential tremor    Primary open angle glaucoma of both eyes, severe stage    Open angle with borderline findings and high glaucoma risk in both eyes    Near syncope    History of stroke    Severe Pulmonary hypertension    Chronic combined systolic and diastolic congestive heart failure    Chronic left shoulder pain    Essential hypertension    GERD without esophagitis    Chronic kidney disease, stage 3a    Hepatic steatosis    Acute hypoxemic respiratory failure    Chronic respiratory failure with hypoxia    Closed displaced fracture of left pubis with routine healing    Displaced fracture of lateral end of left clavicle, initial encounter for closed fracture    Interstitial lung disease    Recurrent upper respiratory infection (URI)    PAF (paroxysmal atrial fibrillation)    Ventricular tachycardia    Atrial fibrillation with RVR    Edema of hand    Acute pain of right shoulder    Major depressive disorder, recurrent, moderate    Aortic atherosclerosis    Dysphagia    Chronic pain of both knees    Weakness of both lower extremities    Gait abnormality    Syncope and collapse    Hypomagnesemia    Hypotension    Closed fracture of right pubis    Cachexia    Discharge planning issues    Acute cystitis without hematuria    Gram-negative pneumonia    Debility    GI bleed    Atrial flutter    Coagulopathy    Pneumonia       Review of patient's allergies indicates:   Allergen Reactions    Silicone      Burn skin    Adhesive Rash       Current Inpatient Medications:    brimonidine 0.2%  1 drop Both Eyes BID    citalopram   20 mg Oral Daily    gabapentin  300 mg Oral TID    hydrocortisone sodium succinate  100 mg Intravenous Q8H    insulin detemir U-100  8 Units Subcutaneous QHS    latanoprost  1 drop Both Eyes QHS    metoprolol tartrate  12.5 mg Oral BID    mupirocin   Nasal BID    pantoprazole  40 mg Intravenous BID    piperacillin-tazobactam (Zosyn) IV (PEDS and ADULTS) (extended infusion is not appropriate)  4.5 g Intravenous Q8H    tamsulosin  0.4 mg Oral Daily    timolol maleate 0.5%  1 drop Both Eyes Daily    vancomycin (VANCOCIN) IV (PEDS and ADULTS)  1,000 mg Intravenous Q24H       No current facility-administered medications on file prior to encounter.     Current Outpatient Medications on File Prior to Encounter   Medication Sig Dispense Refill    albuterol-ipratropium (DUO-NEB) 2.5 mg-0.5 mg/3 mL nebulizer solution Take 3 mLs by nebulization every 6 (six) hours as needed for Wheezing. Rescue      ANORO ELLIPTA 62.5-25 mcg/actuation DsDv INHALE 1 PUFF INTO THE LUNGS ONCE DAILY. 60 each 0    brimonidine 0.2% (ALPHAGAN) 0.2 % Drop PLACE 1 DROP INTO BOTH EYES 2 TIMES A DAY. 5 mL 6    cholestyramine-aspartame (PREVALITE) 4 gram PwPk TAKE 1 PACKET (4 G TOTAL) BY MOUTH 2 (TWO) TIMES DAILY. FOR DIARRHEA 180 packet 3    citalopram (CELEXA) 20 MG tablet Take 1 tablet (20 mg total) by mouth once daily. 90 tablet 1    gabapentin (NEURONTIN) 300 MG capsule TAKE 1 CAPSULE BY MOUTH THREE TIMES A  capsule 1    latanoprost 0.005 % ophthalmic solution Place 1 drop into both eyes every evening. 7.5 mL 3    levalbuterol (XOPENEX) 0.63 mg/3 mL nebulizer solution Take 3 mLs (0.63 mg total) by nebulization every 8 (eight) hours. Rescue  0    LEVEMIR FLEXPEN 100 unit/mL (3 mL) InPn pen Inject 10 Units into the skin every evening.      metoprolol tartrate (LOPRESSOR) 25 MG tablet Take 0.5 tablets (12.5 mg total) by mouth 2 (two) times daily. 30 tablet 11    NOVOLOG FLEXPEN U-100 INSULIN 100 unit/mL (3 mL) InPn pen Inject 0-8 Units into the  skin as needed (sliding scale). B to 149 mg/dL = 0 units  150-199 mg/dL = 2 units  200 to 249 mg/dL = 4 units  250 to 299 mg/dL = 6 units  300 to 999 mg/dL = 8 units  Notify Provider if BG is more than 400 mg/dL.      omeprazole (PRILOSEC) 40 MG capsule Take 1 capsule (40 mg total) by mouth before breakfast. 90 capsule 1    predniSONE (DELTASONE) 5 MG tablet Take 10 tablets (50 mg total) by mouth once daily for 7 days, THEN 8 tablets (40 mg total) once daily for 7 days, THEN 6 tablets (30 mg total) once daily for 7 days, THEN 4 tablets (20 mg total) once daily for 7 days, THEN 2 tablets (10 mg total) once daily for 7 days. 210 tablet 0    tamsulosin (FLOMAX) 0.4 mg Cap Take 1 capsule (0.4 mg total) by mouth once daily. 30 capsule 11    timolol maleate 0.5% (TIMOPTIC) 0.5 % Drop Place 1 drop into both eyes once daily. 5 mL 6    XARELTO 20 mg Tab TAKE 1 TABLET BY MOUTH EVERY DAY WITH DINNER OR EVENING MEAL 90 tablet 3    guaiFENesin 100 mg/5 ml (ROBITUSSIN) 100 mg/5 mL syrup Take 10 mLs (200 mg total) by mouth every 6 (six) hours as needed for Congestion.  0    senna (SENOKOT) 8.6 mg tablet Take 1 tablet by mouth once daily.      [DISCONTINUED] irbesartan (AVAPRO) 75 MG tablet Take 1 tablet (75 mg total) by mouth once daily. 90 tablet 1    [DISCONTINUED] loratadine (CLARITIN) 10 mg tablet TAKE 1 TABLET (10 MG TOTAL) BY MOUTH DAILY AS NEEDED FOR ALLERGIES (OR RUNNY NOSE). 90 tablet 1       Past Surgical History:   Procedure Laterality Date    BACK SURGERY      CATARACT EXTRACTION W/  INTRAOCULAR LENS IMPLANT Bilateral     CHOLECYSTECTOMY      Laparoscopic    COLONOSCOPY      COLONOSCOPY N/A 2022    Procedure: COLONOSCOPY;  Surgeon: Salvatore Butler MD;  Location: [facility];  Service: Gastroenterology;  Laterality: N/A;    ESOPHAGEAL DILATION N/A 2022    Procedure: DILATION, ESOPHAGUS;  Surgeon: Salvatore Butler MD;  Location: [facility];  Service: Gastroenterology;  Laterality: N/A;     ESOPHAGOGASTRODUODENOSCOPY N/A 8/30/2022    Procedure: EGD (ESOPHAGOGASTRODUODENOSCOPY);  Surgeon: Salvatore Butler MD;  Location[facility];  Service: Gastroenterology;  Laterality: N/A;    ESOPHAGOGASTRODUODENOSCOPY N/A 5/23/2023    Procedure: ESOPHAGOGASTRODUODENOSCOPY (EGD);  Surgeon: Desmond Duffy Jr., MD;  Location: [facility];  Service: Endoscopy;  Laterality: N/A;    HERNIA REPAIR      HYSTERECTOMY      KNEE SURGERY Right 3/4/2015    Dr Weller     OOPHORECTOMY      ovarian tumor      Benign    TONSILLECTOMY, ADENOIDECTOMY         Social History:  Tobacco Use: High Risk (1/10/2024)    Patient History     Smoking Tobacco Use: Every Day     Smokeless Tobacco Use: Former     Passive Exposure: Not on file       Alcohol Use: Not on file       OBJECTIVE:     Vital Signs Range:  BMI Readings from Last 1 Encounters:   01/10/24 22.26 kg/m²       Temp:  [36.1 °C (97 °F)-36.6 °C (97.8 °F)]   Pulse:  [108-157]   Resp:  [16-33]   BP: ()/(48-75)   SpO2:  [87 %-99 %]        Significant Labs:        Component Value Date/Time    WBC 13.51 (H) 01/11/2024 1107    HGB 8.1 (L) 01/11/2024 1107    HCT 26.4 (L) 01/11/2024 1107    HCT 34 (L) 01/10/2024 1416     (L) 01/11/2024 1107     01/11/2024 0330    K 3.8 01/11/2024 0330     01/11/2024 0330    CO2 24 01/11/2024 0330     (H) 01/11/2024 0330    BUN 21 01/11/2024 0330    CREATININE 0.6 01/11/2024 0330    MG 1.4 (L) 01/11/2024 0330    PHOS 2.3 (L) 01/11/2024 0330    CALCIUM 8.0 (L) 01/11/2024 0330    ALBUMIN 2.3 (L) 01/11/2024 0330    PROT 4.5 (L) 01/11/2024 0330    ALKPHOS 91 01/11/2024 0330    BILITOT 1.0 01/11/2024 0330    AST 12 01/11/2024 0330    ALT 18 01/11/2024 0330    INR 1.4 (H) 01/10/2024 1709    HGBA1C 6.6 (H) 12/25/2023 1234        Please see Results Review for additional labs.     Diagnostic Studies: No relevant studies.    EKG:   Results for orders placed or performed during the hospital encounter of 01/10/24   EKG 12-lead     Collection Time: 01/10/24  7:01 PM    Narrative    Test Reason :     Vent. Rate : 159 BPM     Atrial Rate : 318 BPM     P-R Int : 000 ms          QRS Dur : 066 ms      QT Int : 290 ms       P-R-T Axes : 086 056 259 degrees     QTc Int : 471 ms    Atrial flutter with 2:1 A-V conduction  Nonspecific ST and T wave abnormality  Abnormal ECG  When compared with ECG of 10-KAYY-2024 13:36,  T wave inversion less evident in Anterior leads  Confirmed by Rob Leslie MD ([id number]) on 1/11/2024 10:16:09 AM    Referred By: [email or web address]   SELF           Confirmed By:Rob Leslie MD       ECHO:  See subjective, if available.      ASSESSMENT/PLAN:           Pre-op Assessment    I have reviewed the Patient Summary Reports.     I have reviewed the Nursing Notes. I have reviewed the NPO Status.   I have reviewed the Medications.     Review of Systems  Anesthesia Hx:  No problems with previous Anesthesia             Denies Family Hx of Anesthesia complications.    Denies Personal Hx of Anesthesia complications.                    Social:  Smoker       Cardiovascular:     Hypertension    Dysrhythmias atrial fibrillation  CHF    hyperlipidemia                             Pulmonary:   COPD Asthma   Recent URI                 Renal/:  Chronic Renal Disease                Hepatic/GI:     GERD Liver Disease,            Musculoskeletal:  Arthritis               Neurological:    Denies CVA.                                    Endocrine:  Diabetes, type 2         Denies Obesity / BMI > 30  Psych:  Psychiatric History                  Physical Exam  General: Cooperative, Alert and Oriented    Airway:  Mallampati: II   Mouth Opening: Normal  TM Distance: Normal  Neck ROM: Normal ROM    Dental:  Intact        Anesthesia Plan  Type of Anesthesia, risks & benefits discussed:    Anesthesia Type: Gen Natural Airway, MAC, Gen ETT  Intra-op Monitoring Plan: Standard ASA Monitors  Post Op Pain Control Plan: multimodal analgesia and IV/PO Opioids  PRN  Induction:  IV  Informed Consent: Informed consent signed with the Patient and all parties understand the risks and agree with anesthesia plan.  All questions answered.   ASA Score: 4  Day of Surgery Review of History & Physical: H&P Update referred to the surgeon/provider.    Ready For Surgery From Anesthesia Perspective.     .

## 2024-01-11 NOTE — CONSULTS
"[facility] - Cardiac Medical ICU  Gastroenterology  Consult Note    Patient Name: Deb Webb  MRN: [id number]  Admission Date: 1/10/2024  Hospital Length of Stay: 1 days  Code Status: Full Code   Attending Provider: Trevor Haney*   Consulting Provider: Lito Haile MD  Primary Care Physician: Triston Valverde MD  Principal Problem:GI bleed    Inpatient consult to Gastroenterology  Consult performed by: Lito Haile MD  Consult ordered by: Thelma De Dios PA-C        Subjective:     HPI:  73-year-old female with history of AFib on Xarelto, COPD (chronically on 6L oxygen), pulmonary hypertension, diabetes presented to the emergency department via EMS  from [facility] for bright red blood per rectum noticed by staff at facility today. Patient reports she was noticed rectal bleeding over the last 3 days and that she thought it was her hemorrhoids.  She also states that she has been feeling very fatigued and hard to stay awake.      In ER, patient had low-normal systolic pressures with HR of 153 bpm. EKG showed typical atrial flutter with RVR. Lactic acid 4.3 on admission and hemoglobin 8.5 (from 11 at most recent discharge). Labs notable for leukocytosis 17.45, Hgb is currently stable at 9.4 after receiving 2 units of blood, INR 1.4, thrombocytopenia with platelets 150, increased BUN 32 on admission now 21.  CTA- GI bleed was obtained showing "No evidence of acute GI bleed.  Few prominent perirectal vessels possibly hemorrhoids." Large amount of retained stool within the sigmoid colon and rectum. CXR with new airspace consolidation in LLL with bronchial thickening concerning for PNA vs atelectasis. Patient is being treated with vanc/Zosyn for her sepsis.  She was admitted to the MICU for further management of her atrial flutter and hypotension.      She has had multiple recent admissions and was most recently discharged 5 days ago from [facility] from due to episode of dyspnea that " required ICU level care. Prior to that she was recently discharged on 12/13 after stay for non operable pubic rami fracture.     EGD 5/2023 for dysphagia  - Small hiatal hernia.   - Normal gastric fundus and gastric body.   - Gastric mucosal atrophy.   - (Minimal) Antritis. Biopsied.  - Esophagus dilated, 54 Fr.      Colonscopy 8/2022  - Two 4 to 8 mm polyps in the ascending colon, removed with a cold snare. Resected and retrieved.   - One 3 mm polyp in the descending colon, removed with a cold snare. Resected and retrieved.   - Altered vascular, erythematous and nodular mucosa in the rectum. Biopsied. Possibly secondary to rectal prolapse.   - Non-bleeding internal hemorrhoids.   - Bleeding could be due to rectal prolapse, hemorrhoids, or depending on what the abnormal mucosa is.     Past Medical History:   Diagnosis Date    Allergy     Arthritis     Cataract     Colon polyps     COPD (chronic obstructive pulmonary disease)     Diabetes mellitus type II     Diabetic neuropathy     Fever blister     Glaucoma (increased eye pressure)     Hyperlipidemia     Hypertension     Irritable bowel syndrome     Keloid cicatrix     Osteoporosis     Retained cholelithiasis following cholecystectomy(997.41)     Right patella fracture        Past Surgical History:   Procedure Laterality Date    BACK SURGERY  2006    CATARACT EXTRACTION W/  INTRAOCULAR LENS IMPLANT Bilateral     CHOLECYSTECTOMY      Laparoscopic    COLONOSCOPY      COLONOSCOPY N/A 8/31/2022    Procedure: COLONOSCOPY;  Surgeon: Salvatore Butler MD;  Location: [facility];  Service: Gastroenterology;  Laterality: N/A;    ESOPHAGEAL DILATION N/A 8/30/2022    Procedure: DILATION, ESOPHAGUS;  Surgeon: Salvatore Butler MD;  Location: [facility];  Service: Gastroenterology;  Laterality: N/A;    ESOPHAGOGASTRODUODENOSCOPY N/A 8/30/2022    Procedure: EGD (ESOPHAGOGASTRODUODENOSCOPY);  Surgeon: Salvatore Butler MD;  Location: [facility];  Service: Gastroenterology;  Laterality:  N/A;    ESOPHAGOGASTRODUODENOSCOPY N/A 5/23/2023    Procedure: ESOPHAGOGASTRODUODENOSCOPY (EGD);  Surgeon: Desmond Duffy Jr., MD;  Location: [facility];  Service: Endoscopy;  Laterality: N/A;    HERNIA REPAIR      HYSTERECTOMY      KNEE SURGERY Right 3/4/2015    Dr Weller     OOPHORECTOMY      ovarian tumor      Benign    TONSILLECTOMY, ADENOIDECTOMY         Review of patient's allergies indicates:   Allergen Reactions    Silicone      Burn skin    Adhesive Rash     Family History       Problem Relation (Age of Onset)    Breast cancer Maternal Aunt    Cancer Mother    Cataracts Mother    Diabetes Mother, Father, Sister, Daughter    Glaucoma Mother    Heart disease Father    Skin cancer Mother, Sister          Tobacco Use    Smoking status: Every Day     Current packs/day: 0.50     Average packs/day: 0.5 packs/day for 40.0 years (20.0 ttl pk-yrs)     Types: Cigarettes    Smokeless tobacco: Former    Tobacco comments:     Not ready to quit smoking. Cut down on cigarettes but enjoys having a couple cigarettes a day.   Substance and Sexual Activity    Alcohol use: No    Drug use: No    Sexual activity: Never     Review of Systems  Objective:     Vital Signs (Most Recent):  Temp: 97 °F (36.1 °C) (01/11/24 0300)  Pulse: (!) 120 (01/11/24 0700)  Resp: (!) 23 (01/11/24 0700)  BP: (!) 95/52 (01/11/24 0700)  SpO2: (!) 94 % (01/11/24 0700) Vital Signs (24h Range):  Temp:  [97 °F (36.1 °C)-98.5 °F (36.9 °C)] 97 °F (36.1 °C)  Pulse:  [113-180] 120  Resp:  [18-32] 23  SpO2:  [94 %-100 %] 94 %  BP: ()/(50-76) 95/52     Weight: 50 kg (110 lb 3.7 oz) (01/10/24 1813)  Body mass index is 22.26 kg/m².      Intake/Output Summary (Last 24 hours) at 1/11/2024 0903  Last data filed at 1/11/2024 0625  Gross per 24 hour   Intake 3108.93 ml   Output 775 ml   Net 2333.93 ml       Lines/Drains/Airways       Drain  Duration                  Urethral Catheter 01/10/24 1429 16 Fr. <1 day              Peripheral Intravenous Line   Duration                  Peripheral IV - Single Lumen 01/10/24 1356 20 G Left Antecubital <1 day         Peripheral IV - Single Lumen 01/10/24 1436 20 G;1 3/4 in Anterior;Right Forearm <1 day                     Physical Exam  Vitals and nursing note reviewed.   Constitutional:       General: She is not in acute distress.  HENT:      Head: Normocephalic and atraumatic.      Nose: Nose normal.      Mouth/Throat:      Mouth: Mucous membranes are dry.   Eyes:      Pupils: Pupils are equal, round, and reactive to light.   Cardiovascular:      Rate and Rhythm: Tachycardia present. Rhythm irregular.      Pulses: Normal pulses.   Pulmonary:      Effort: Pulmonary effort is normal.      Breath sounds: Wheezing (scattered expiratory wheezing) present.   Abdominal:      General: Abdomen is flat. There is no distension.      Palpations: Abdomen is soft. There is no mass.      Tenderness: There is abdominal tenderness. There is no guarding or rebound.   Genitourinary:     Comments: Chaperoned by Gonzalez-  Large volume liquidy maroon stool. Rectum not protruding past external sphincter, some rectal tenderness with light palpation, and visible hemorrhoids.   Picture uploaded to media.   Musculoskeletal:      Cervical back: Neck supple.      Right lower leg: No edema.      Left lower leg: No edema.   Skin:     General: Skin is warm and dry.      Coloration: Skin is pale.      Findings: Bruising present.   Neurological:      General: No focal deficit present.      Mental Status: She is alert and oriented to person, place, and time.          Significant Labs:  CBC:   Recent Labs   Lab 01/10/24  2159 01/10/24  2356 01/11/24  0432   WBC 21.21* 19.75* 17.45*   HGB 10.2* 9.4* 9.4*   HCT 32.2* 29.8* 29.5*    139* 150     CMP:   Recent Labs   Lab 01/11/24  0330   *   CALCIUM 8.0*   ALBUMIN 2.3*   PROT 4.5*      K 3.8   CO2 24      BUN 21   CREATININE 0.6   ALKPHOS 91   ALT 18   AST 12   BILITOT 1.0  "      Significant Imaging:  Imaging results within the past 24 hours have been reviewed.    CTA GI Bleed  Impression:     No evidence of acute GI bleed.  Few prominent perirectal vessels possibly hemorrhoids.     Large amount of retained stool within the sigmoid colon and rectum.     Left inguinal hernia containing loops bowel without obstructive change.     New, airspace consolidation involving a subsegmental region of the anteromedial segment of the left lower lobe with bronchial wall thickening at the left lung base concerning for aspiration or pneumonia.     Healing fracture of the right inferior-superior pubic rami and right pubic symphysis with callus formation.  Remote left inferior-superior-pubic symphysis fracture.  Remote sacral ala fracture.  Remote, stable T11 fracture.     Severe stenosis of the left renal artery.  The accessory left renal artery is patent.  Assessment/Plan:     GI  * GI bleed  3-year-old female with history of AFib on Xarelto, COPD (chronically on 6L oxygen), pulmonary hypertension, diabetes presented to the emergency department via EMS  from [facility] for bright red blood per rectum noticed by staff at facility today. Patient reports she was noticed rectal bleeding over the last 3 days and that she thought it was her hemorrhoids.    EKG showed typical atrial flutter with RVR. Lactic acid 4.3 on admission and hemoglobin 8.5 (from 11 at most recent discharge). Labs notable for leukocytosis 17.45, Hgb is currently stable at 9.4 after receiving 2 units of blood, INR 1.4, thrombocytopenia with platelets 150, increased BUN 32 on admission now 21.      CTA- GI bleed was obtained showing "No evidence of acute GI bleed.  Few prominent perirectal vessels possibly hemorrhoids." Large amount of retained stool within the sigmoid colon and rectum. CXR with new airspace consolidation in LLL with bronchial thickening concerning for PNA vs atelectasis. Her stool is very dark and appears melanic. " With elevated BUN on admission, bleed could be from an upper source or lower source.     Our recommendations are as follows:     Maintain IV access with at least 2 large IVs (18 gauge or larger)  Continued IVF resuscitation as tolerated with attention to active co-morbidities.   RBC transfusion as indicated if Hgb <7 g/dL.   Please correct any coagulopathy with platelets and FFP to a goal of platelets >50K and INR <2.0.   Discontinue all antiplatelet, anti-coagulation, and NSAID products.   Keep NPO today and medically optimize/resuscitate patient with tentative plans for EGD today.   Pending EGD findings, will consider if colonoscopy is warranted.  Please notify GI team if there is significant change in patient's clinical status with concern for hemodynamic compromise in the setting of overt GI bleeding. Patient may warrant urgent EGD or CTA with possible IR intervention.          Thank you for your consult. I will follow-up with patient. Please contact us if you have any additional questions.    Lito Haile MD  Gastroenterology  [facility] - Cardiac Medical ICU

## 2024-01-11 NOTE — SUBJECTIVE & OBJECTIVE
Past Medical History:   Diagnosis Date    Allergy     Arthritis     Cataract     Colon polyps     COPD (chronic obstructive pulmonary disease)     Diabetes mellitus type II     Diabetic neuropathy     Fever blister     Glaucoma (increased eye pressure)     Hyperlipidemia     Hypertension     Irritable bowel syndrome     Keloid cicatrix     Osteoporosis     Retained cholelithiasis following cholecystectomy(997.41)     Right patella fracture        Past Surgical History:   Procedure Laterality Date    BACK SURGERY  2006    CATARACT EXTRACTION W/  INTRAOCULAR LENS IMPLANT Bilateral     CHOLECYSTECTOMY      Laparoscopic    COLONOSCOPY      COLONOSCOPY N/A 8/31/2022    Procedure: COLONOSCOPY;  Surgeon: Salvatore Butler MD;  Location: [facility];  Service: Gastroenterology;  Laterality: N/A;    ESOPHAGEAL DILATION N/A 8/30/2022    Procedure: DILATION, ESOPHAGUS;  Surgeon: Salvatore Butler MD;  Location: [facility];  Service: Gastroenterology;  Laterality: N/A;    ESOPHAGOGASTRODUODENOSCOPY N/A 8/30/2022    Procedure: EGD (ESOPHAGOGASTRODUODENOSCOPY);  Surgeon: Salvatore Butler MD;  Location: [facility];  Service: Gastroenterology;  Laterality: N/A;    ESOPHAGOGASTRODUODENOSCOPY N/A 5/23/2023    Procedure: ESOPHAGOGASTRODUODENOSCOPY (EGD);  Surgeon: Desmond Duffy Jr., MD;  Location: [facility];  Service: Endoscopy;  Laterality: N/A;    HERNIA REPAIR      HYSTERECTOMY      KNEE SURGERY Right 3/4/2015    Dr Weller     OOPHORECTOMY      ovarian tumor      Benign    TONSILLECTOMY, ADENOIDECTOMY         Review of patient's allergies indicates:   Allergen Reactions    Silicone      Burn skin    Adhesive Rash     Family History       Problem Relation (Age of Onset)    Breast cancer Maternal Aunt    Cancer Mother    Cataracts Mother    Diabetes Mother, Father, Sister, Daughter    Glaucoma Mother    Heart disease Father    Skin cancer Mother, Sister          Tobacco Use    Smoking status: Every Day     Current packs/day: 0.50      Average packs/day: 0.5 packs/day for 40.0 years (20.0 ttl pk-yrs)     Types: Cigarettes    Smokeless tobacco: Former    Tobacco comments:     Not ready to quit smoking. Cut down on cigarettes but enjoys having a couple cigarettes a day.   Substance and Sexual Activity    Alcohol use: No    Drug use: No    Sexual activity: Never     Review of Systems  Objective:     Vital Signs (Most Recent):  Temp: 97 °F (36.1 °C) (01/11/24 0300)  Pulse: (!) 120 (01/11/24 0700)  Resp: (!) 23 (01/11/24 0700)  BP: (!) 95/52 (01/11/24 0700)  SpO2: (!) 94 % (01/11/24 0700) Vital Signs (24h Range):  Temp:  [97 °F (36.1 °C)-98.5 °F (36.9 °C)] 97 °F (36.1 °C)  Pulse:  [113-180] 120  Resp:  [18-32] 23  SpO2:  [94 %-100 %] 94 %  BP: ()/(50-76) 95/52     Weight: 50 kg (110 lb 3.7 oz) (01/10/24 1813)  Body mass index is 22.26 kg/m².      Intake/Output Summary (Last 24 hours) at 1/11/2024 0903  Last data filed at 1/11/2024 0625  Gross per 24 hour   Intake 3108.93 ml   Output 775 ml   Net 2333.93 ml       Lines/Drains/Airways       Drain  Duration                  Urethral Catheter 01/10/24 1429 16 Fr. <1 day              Peripheral Intravenous Line  Duration                  Peripheral IV - Single Lumen 01/10/24 1356 20 G Left Antecubital <1 day         Peripheral IV - Single Lumen 01/10/24 1436 20 G;1 3/4 in Anterior;Right Forearm <1 day                     Physical Exam  Vitals and nursing note reviewed.   Constitutional:       General: She is not in acute distress.  HENT:      Head: Normocephalic and atraumatic.      Nose: Nose normal.      Mouth/Throat:      Mouth: Mucous membranes are dry.   Eyes:      Pupils: Pupils are equal, round, and reactive to light.   Cardiovascular:      Rate and Rhythm: Tachycardia present. Rhythm irregular.      Pulses: Normal pulses.   Pulmonary:      Effort: Pulmonary effort is normal.      Breath sounds: Wheezing (scattered expiratory wheezing) present.   Abdominal:      General: Abdomen is flat. There  is no distension.      Palpations: Abdomen is soft. There is no mass.      Tenderness: There is abdominal tenderness. There is no guarding or rebound.   Genitourinary:     Comments: Chaperoned by Gonzalez-  Large volume liquidy maroon stool. Rectum not protruding past external sphincter, some rectal tenderness with light palpation, and visible hemorrhoids.   Picture uploaded to media.   Musculoskeletal:      Cervical back: Neck supple.      Right lower leg: No edema.      Left lower leg: No edema.   Skin:     General: Skin is warm and dry.      Coloration: Skin is pale.      Findings: Bruising present.   Neurological:      General: No focal deficit present.      Mental Status: She is alert and oriented to person, place, and time.          Significant Labs:  CBC:   Recent Labs   Lab 01/10/24  2159 01/10/24  2356 01/11/24  0432   WBC 21.21* 19.75* 17.45*   HGB 10.2* 9.4* 9.4*   HCT 32.2* 29.8* 29.5*    139* 150     CMP:   Recent Labs   Lab 01/11/24  0330   *   CALCIUM 8.0*   ALBUMIN 2.3*   PROT 4.5*      K 3.8   CO2 24      BUN 21   CREATININE 0.6   ALKPHOS 91   ALT 18   AST 12   BILITOT 1.0       Significant Imaging:  Imaging results within the past 24 hours have been reviewed.    CTA GI Bleed  Impression:     No evidence of acute GI bleed.  Few prominent perirectal vessels possibly hemorrhoids.     Large amount of retained stool within the sigmoid colon and rectum.     Left inguinal hernia containing loops bowel without obstructive change.     New, airspace consolidation involving a subsegmental region of the anteromedial segment of the left lower lobe with bronchial wall thickening at the left lung base concerning for aspiration or pneumonia.     Healing fracture of the right inferior-superior pubic rami and right pubic symphysis with callus formation.  Remote left inferior-superior-pubic symphysis fracture.  Remote sacral ala fracture.  Remote, stable T11 fracture.     Severe stenosis of  the left renal artery.  The accessory left renal artery is patent.

## 2024-01-11 NOTE — ASSESSMENT & PLAN NOTE
Patient has been bed bound due to recent pelvic fracture and reports cough x 1 month w/ 'ice cream' colored sputum. CT A/P showed new airspace consolidation in the LLL.     - Repeat CXR malrotated, repeat tomorrow  - Continue Vanc (recent hospitalization) and Zosyn. De-escalate pending culture data   - Sputum culture ordered

## 2024-01-11 NOTE — PROGRESS NOTES
Pharmacokinetic Assessment Follow Up: IV Vancomycin    Vancomycin serum concentration assessment(s):    Vancomycin random level resulted 8.9 mcg/mL. Goal level is 10 to 20 mcg/mL.     Drug levels (last 3 results):  Recent Labs   Lab Result Units 01/11/24  0330   Vancomycin, Random ug/mL 8.9     Vancomycin Regimen Plan:    Schedule vancomycin 1000 mg IV every 24 hours with next serum trough concentration measured on 1/13 0830.     Pharmacy will continue to follow and monitor vancomycin.    Please contact pharmacy at extension [phone] for questions regarding this assessment.    Thank you for the consult,   Daria Mondragon, PharmD, BCCCP                 Patient brief summary:  Deb Webb is a 73 y.o. female initiated on antimicrobial therapy with IV vancomycin for treatment of sepsis    Drug Allergies:   Review of patient's allergies indicates:   Allergen Reactions    Silicone      Burn skin    Adhesive Rash       Actual Body Weight:   50 kg     Renal Function:   Estimated Creatinine Clearance: 65.3 mL/min (based on SCr of 0.6 mg/dL).    Dialysis Method (if applicable):  N/A    CBC (last 72 hours):  Recent Labs   Lab Result Units 01/10/24  1411 01/10/24  1709 01/10/24  2159 01/10/24  2356 01/11/24  0432 01/11/24  1107   WBC K/uL 25.41*  --  21.21* 19.75* 17.45* 13.51*   Hemoglobin g/dL 8.5* 7.9* 10.2* 9.4* 9.4* 8.1*   Hematocrit % 28.2* 26.4* 32.2* 29.8* 29.5* 26.4*   Platelets K/uL 244  --  161 139* 150 127*   Gran % % 77.4*  --  78.7* 78.2* 74.1* 68.7   Lymph % % 12.6*  --  13.3* 14.5* 18.4 23.4   Mono % % 8.7  --  7.0 6.4 6.3 6.2   Eosinophil % % 0.2  --  0.2 0.2 0.5 1.3   Basophil % % 0.2  --  0.2 0.2 0.2 0.1   Differential Method  Automated  --  Automated Automated Automated Automated       Metabolic Panel (last 72 hours):  Recent Labs   Lab Result Units 01/10/24  1411 01/10/24  2356 01/11/24  0330   Sodium mmol/L 137  --  141   Potassium mmol/L 4.3 4.1 3.8   Chloride mmol/L 105  --  109   CO2 mmol/L 20*  --   24   Glucose mg/dL 364*  --  262*   BUN mg/dL 32*  --  21   Creatinine mg/dL 0.9  --  0.6   Albumin g/dL 2.6*  --  2.3*   Total Bilirubin mg/dL 0.5  --  1.0   Alkaline Phosphatase U/L 118  --  91   AST U/L 18  --  12   ALT U/L 21  --  18   Magnesium mg/dL  --  1.3* 1.4*   Phosphorus mg/dL  --   --  2.3*       Vancomycin Administrations:  vancomycin given in the last 96 hours                     vancomycin (VANCOCIN) 1,000 mg in dextrose 5 % (D5W) 250 mL IVPB (Vial-Mate) (mg) 1,000 mg New Bag 01/11/24 0912    vancomycin (VANCOCIN) 1,000 mg in dextrose 5 % (D5W) 250 mL IVPB (Vial-Mate) (mg) 1,000 mg New Bag 01/10/24 1627                    Microbiologic Results:  Microbiology Results (last 7 days)       Procedure Component Value Units Date/Time    Culture, Respiratory with Gram Stain [[id number]] Collected: 01/10/24 2028    Order Status: Completed Specimen: Sputum Updated: 01/11/24 0209     Gram Stain (Respiratory) <10 epithelial cells per low power field.     Gram Stain (Respiratory) No WBC's     Gram Stain (Respiratory) Many Gram positive cocci     Gram Stain (Respiratory) Moderate Gram negative diplococci    Blood Culture #1 **CANNOT BE ORDERED STAT** [[id number]] Collected: 01/10/24 1411    Order Status: Completed Specimen: Blood from Peripheral, Upper Arm, Right Updated: 01/11/24 0115     Blood Culture, Routine No Growth to date    Blood Culture #2 **CANNOT BE ORDERED STAT** [[id number]] Collected: 01/10/24 1411    Order Status: Completed Specimen: Blood from Peripheral, Antecubital, Right Updated: 01/11/24 0115     Blood Culture, Routine No Growth to date    Respiratory Infection Panel (PCR), Nasopharyngeal [[id number]] Collected: 01/10/24 1725    Order Status: Completed Specimen: Nasopharyngeal Swab Updated: 01/10/24 1909     Respiratory Infection Panel Source NP Swab     Adenovirus Not Detected     Coronavirus 229E, Common Cold Virus Not Detected     Coronavirus HKU1, Common Cold Virus Not Detected      Coronavirus NL63, Common Cold Virus Not Detected     Coronavirus OC43, Common Cold Virus Not Detected     Comment: The Coronavirus strains detected in this test cause the common cold.  These strains are not the COVID-19 (novel Coronavirus)strain   associated with the respiratory disease outbreak.          SARS-CoV2 (COVID-19) Qualitative PCR Not Detected     Human Metapneumovirus Not Detected     Human Rhinovirus/Enterovirus Not Detected     Influenza A (subtypes H1, H1-2009,H3) Not Detected     Influenza B Not Detected     Parainfluenza Virus 1 Not Detected     Parainfluenza Virus 2 Not Detected     Parainfluenza Virus 3 Not Detected     Parainfluenza Virus 4 Not Detected     Respiratory Syncytial Virus Not Detected     Bordetella Parapertussis (HP6906) Not Detected     Bordetella pertussis (ptxP) Not Detected     Chlamydia pneumoniae Not Detected     Mycoplasma pneumoniae Not Detected    Narrative:      For all other respiratory sources, order [id number] -  Respiratory Viral Panel by PCR

## 2024-01-11 NOTE — PLAN OF CARE
[facility] - Cardiac Medical ICU  Initial Discharge Assessment       Primary Care Provider: Triston Valverde MD    Admission Diagnosis: Tachycardia [R00.0]  Atrial flutter with rapid ventricular response [I48.92]  Gastrointestinal hemorrhage, unspecified gastrointestinal hemorrhage type [K92.2]  Pneumonia of left lower lobe due to infectious organism [J18.9]    Admission Date: 1/10/2024  Expected Discharge Date: 1/16/2024    Transition of Care Barriers: None    Payor: [employer] [facility] / Plan: [employer] SECURE SNP / Product Type: Medicare Advantage /     Extended Emergency Contact Information  Primary Emergency Contact: Halle Greenwood  Address: [address] [place] [address] [facility]  Home Phone: [phone]  Mobile Phone: [phone]  Relation: Daughter  Secondary Emergency Contact: TeshaBrielle  Mobile Phone: [phone]  Relation: Grandchild    Discharge Plan A: Return to nursing home ([facility])  Discharge Plan B: Return to Nursing Home ([facility])      [facility] - [place] - [address]  [address]  [facility] [address]  Phone: [phone] Fax: [phone]    [id number] - [place], [place] - [address]  [address]  [facility] [address]  Phone: [phone] Fax: [phone]    [facility] [place]  [address] [facility] [address] [place] [address]  Phone: [phone] Fax: [phone]      Transferred from:     Past Medical History:   Diagnosis Date    Allergy     Arthritis     Cataract     Colon polyps     COPD (chronic obstructive pulmonary disease)     Diabetes mellitus type II     Diabetic neuropathy     Fever blister     Glaucoma (increased eye pressure)     Hyperlipidemia     Hypertension     Irritable bowel syndrome     Keloid cicatrix     Osteoporosis     Retained cholelithiasis following cholecystectomy(997.41)     Right patella fracture          CM met with patient in room for Discharge  Planning Assessment.  Patient was able to answer questions.  Per patient, she is a resident at [facility].   Per patient, she was dependent with ADLS and is bed bound. Per patient, she is not on dialysis and does not take Coumadin.  Patient will return to [facility] upon discharge.   Discharge Planning discussed with patient.  All questions addressed.  CM will follow for needs.      Discharge Plan A and Plan B have been determined by review of patient's clinical status, future medical and therapeutic needs, and coverage/benefits for post-acute care in coordination with multidisciplinary team members.        Initial Assessment (most recent)       Adult Discharge Assessment - 01/11/24 1444          Discharge Assessment    Assessment Type Discharge Planning Assessment     Confirmed/corrected address, phone number and insurance Yes     Confirmed Demographics Correct on Facesheet     Source of Information patient     When was your last doctors appointment? 12/04/23     Communicated NATHALY with patient/caregiver Date not available/Unable to determine     Reason For Admission GI Bleed     People in Home facility resident     Facility Arrived From: [facility]     Do you expect to return to your current living situation? Yes     Do you have help at home or someone to help you manage your care at home? Yes     Who are your caregiver(s) and their phone number(s)? Staff at [facility]     Prior to hospitilization cognitive status: Alert/Oriented;Not Oriented to Time     Current cognitive status: Alert/Oriented;Not Oriented to Time     Walking or Climbing Stairs Difficulty yes     Walking or Climbing Stairs ambulation difficulty, dependent;transferring difficulty, dependent;stair climbing difficulty, dependent     Mobility Management patient is bed bound     Dressing/Bathing Difficulty yes     Dressing/Bathing bathing difficulty, dependent;dressing difficulty, dependent      Dressing/Bathing Management Staff at [facility] assists with these tasks     Equipment Currently Used at Home none     Readmission within 30 days? Yes     Patient currently being followed by outpatient case management? No     Do you currently have service(s) that help you manage your care at home? No     Do you take prescription medications? Yes     Do you have prescription coverage? Yes     Coverage [facility]GD [facility] SECURE SNP     Do you have any problems affording any of your prescribed medications? No     Is the patient taking medications as prescribed? yes     Who is going to help you get home at discharge? patient will return to [facility] via hospital provided transport.     How do you get to doctors appointments? other (see comments)   facility resident    Are you on dialysis? No     Do you take coumadin? No     Discharge Plan A Return to nursing [facility]    Discharge Plan B Return to [facility]    DME Needed Upon Discharge  none     Discharge Plan discussed with: Patient     Transition of Care Barriers None        Physical Activity    On average, how many days per week do you engage in moderate to strenuous exercise (like a brisk walk)? 0 days     On average, how many minutes do you engage in exercise at this level? 0 min        Financial Resource Strain    How hard is it for you to pay for the very basics like food, housing, medical care, and heating? Not hard at all   facility resident       Housing Stability    In the last 12 months, was there a time when you were not able to pay the mortgage or rent on time? No   facility resident    In the last 12 months, how many places have you lived? 1   facility resident    In the last 12 months, was there a time when you did not have a steady place to sleep or slept in a shelter (including now)? No   facility resident       Transportation Needs    In the past 12  months, has lack of transportation kept you from medical appointments or from getting medications? No   facility resident    In the past 12 months, has lack of transportation kept you from meetings, work, or from getting things needed for daily living? No   facility resident       Food Insecurity    Within the past 12 months, you worried that your food would run out before you got the money to buy more. Never true   facility resident    Within the past 12 months, the food you bought just didn't last and you didn't have money to get more. Never true   facility resident       Stress    Do you feel stress - tense, restless, nervous, or anxious, or unable to sleep at night because your mind is troubled all the time - these days? To some extent        Social Connections    In a typical week, how many times do you talk on the phone with family, friends, or neighbors? More than three times a week     How often do you get together with friends or relatives? More than three times a week     How often do you attend [employer] or [employer] services? 1 to 4 times per year     Do you belong to any clubs or organizations such as [employer] groups, unions, fraternal or athletic groups, or school groups? No     How often do you attend meetings of the clubs or organizations you belong to? Never     Are you , , , , never , or living with a partner?         Alcohol Use    Q1: How often do you have a drink containing alcohol? Monthly or less     Q2: How many drinks containing alcohol do you have on a typical day when you are drinking? 1 or 2     Q3: How often do you have six or more drinks on one occasion? Never        OTHER    Name(s) of People in Home Resident at [facility]                            PCP:  Triston Valverde MD  [phone]        Pharmacy:    [facility] [place] - [address]  [address]  [place] [address]  Phone: [phone] Fax:  [phone]    [id number] [place], [place] [facility] [address]  [address]  [facility] [address]  Phone: [phone] Fax: [phone]    [facility]  [address]  [facility] [address]  Phone: [phone] Fax: [phone]        Emergency Contacts:  Extended Emergency Contact Information  Primary Emergency Contact: Halle Greenwood  Address: [address] [place] [address] [facility]  Home Phone: [phone]  Mobile Phone: [phone]  Relation: Daughter  Secondary Emergency Contact: Brielle Parkinson  Mobile Phone: [phone]  Relation: Grandchild      Insurance:    Payor: [employer] MGD [facility] / Plan: [employer] [facility] SNP / Product Type: Medicare Advantage /     Gavi Ramirez RN     [phone]      01/11/2024  2:58 PM

## 2024-01-11 NOTE — TREATMENT PLAN
Please make sure patient starts Golytely prep at 6PM. Prep should be completed within 2 hours for best results, but must be completed within 4 hours of starting the prep.    Katelyn Dee, DO  Gastroenterology PGY-4

## 2024-01-11 NOTE — PROGRESS NOTES
[facility] - Cardiac Medical ICU  Cardiology  Progress Note    Patient Name: Deb Webb  MRN: [id number]  Admission Date: 1/10/2024  Hospital Length of Stay: 1 days  Code Status: Full Code   Attending Physician: Trevor Haney*   Primary Care Physician: Triston Valverde MD  Expected Discharge Date:   Principal Problem:GI bleed    Subjective:     Hospital Course:   74 yo F with Afib on Xarelto, pulmonary HTN, COPD on 6L at home, T2DM and severe tricuspid regurgitation that presented for 3 days of rectal bleeding from [place]. She endorsed fatigue and more frequent bowel movements that she thought was diarrhea at first. In the ED, she had atrial flutter with RVR (), lactic acid 4.3, Hg 8.5 (baseline 11). She received 1 unit pRBC and 1L LR. She was admitted to the MICU for a GI bleed. Cardiology was consulted for Aflutter management.     Interval History: She is on 3L NC, , BP 84/59. In Afib this morning. Rhythm control contraindicated until she is able to take AC again. She still has bright red rectal bleeding this morning. Pending GI evaluation.    ROS  Objective:     Vital Signs (Most Recent):  Temp: 97 °F (36.1 °C) (01/11/24 0300)  Pulse: (!) 120 (01/11/24 0700)  Resp: (!) 23 (01/11/24 0700)  BP: (!) 95/52 (01/11/24 0700)  SpO2: (!) 94 % (01/11/24 0700) Vital Signs (24h Range):  Temp:  [97 °F (36.1 °C)-98.5 °F (36.9 °C)] 97 °F (36.1 °C)  Pulse:  [113-180] 120  Resp:  [18-32] 23  SpO2:  [94 %-100 %] 94 %  BP: ()/(50-76) 95/52     Weight: 50 kg (110 lb 3.7 oz)  Body mass index is 22.26 kg/m².     SpO2: (!) 94 %         Intake/Output Summary (Last 24 hours) at 1/11/2024 0840  Last data filed at 1/11/2024 0625  Gross per 24 hour   Intake 3108.93 ml   Output 775 ml   Net 2333.93 ml       Lines/Drains/Airways       Drain  Duration                  Urethral Catheter 01/10/24 1429 16 Fr. <1 day              Peripheral Intravenous Line  Duration                  Peripheral IV - Single  Lumen 01/10/24 1356 20 G Left Antecubital <1 day         Peripheral IV - Single Lumen 01/10/24 1436 20 G;1 3/4 in Anterior;Right Forearm <1 day                       Physical Exam  Constitutional:       Appearance: Normal appearance.   HENT:      Head: Normocephalic and atraumatic.   Eyes:      Extraocular Movements: Extraocular movements intact.      Pupils: Pupils are equal, round, and reactive to light.   Cardiovascular:      Rate and Rhythm: Regular rhythm. Tachycardia present.   Pulmonary:      Effort: Pulmonary effort is normal. No respiratory distress.      Breath sounds: Normal breath sounds.   Musculoskeletal:      Right lower leg: No edema.      Left lower leg: No edema.   Skin:     Coloration: Skin is pale.   Neurological:      General: No focal deficit present.      Mental Status: She is alert and oriented to person, place, and time.   Psychiatric:         Mood and Affect: Mood normal.         Behavior: Behavior normal.            Significant Labs: All pertinent lab results from the last 24 hours have been reviewed.      Assessment and Plan:     Atrial flutter  Patient with GI bleed found to have atrial flutter RVR in the ED. Arrhythmia likely triggered by active GI bleed. Echo 12/9/23 showed preserved EF 65% and severe TR and pulmonary HTN (82 mmHg). Chadsvasc is 4 (female, age, HTN, DM). Holding anticoagulation in the setting of active bleed. Rhythm control (Amio, cardioversion) contraindicated 2/2 to not being able to be on AC. Pending GI eval. Cr 0.6 and GFR >60. Recommend Lopressor 12.5 BID with uptitration. Start Digoxin if unable to maintain MAP with Lopressor    --Recommend Lopressor 12.5 BID  --Alternative: Digoxin  mcg x1 then 250 mcg six hours later then another 250 mcg six hours after that          VTE Risk Mitigation (From admission, onward)           Ordered     Place sequential compression device  Until discontinued         01/10/24 1703     IP VTE HIGH RISK PATIENT  Once          01/10/24 1703                  Cardiology will sign off. Please contact us for additional questions.    Zaina Hernández,   Cardiology  [facility] - Cardiac Medical ICU

## 2024-01-11 NOTE — PROVATION PATIENT INSTRUCTIONS
Discharge Summary/Instructions after an Endoscopic Procedure  Patient Name: Deb Alexander  Patient MRN: [id number]  Patient YOB: 1950  Thursday, January 11, 2024  Jose Manuel Gilmore MD  Dear patient,  As a result of recent federal legislation (The Federal Cures Act), you may   receive lab or pathology results from your procedure in your [facility]   account before your physician is able to contact you. Your physician or   their representative will relay the results to you with their   recommendations at their soonest availability.  Thank you,  RESTRICTIONS:  During your procedure today, you received medications for sedation.  These   medications may affect your judgment, balance and coordination.  Therefore,   for 24 hours, you have the following restrictions:   - DO NOT drive a car, operate machinery, make legal/financial decisions,   sign important papers or drink alcohol.    ACTIVITY:  Today: no heavy lifting, straining or running due to procedural   sedation/anesthesia.  The following day: return to full activity including work.  DIET:  Eat and drink normally unless instructed otherwise.     TREATMENT FOR COMMON SIDE EFFECTS:  - Mild abdominal pain, nausea, belching, bloating or excessive gas:  rest,   eat lightly and use a heating pad.  - Sore Throat: treat with throat lozenges and/or gargle with warm salt   water.  - Because air was used during the procedure, expelling large amounts of air   from your rectum or belching is normal.  - If a bowel prep was taken, you may not have a bowel movement for 1-3 days.    This is normal.  SYMPTOMS TO WATCH FOR AND REPORT TO YOUR PHYSICIAN:  1. Abdominal pain or bloating, other than gas cramps.  2. Chest pain.  3. Back pain.  4. Signs of infection such as: chills or fever occurring within 24 hours   after the procedure.  5. Rectal bleeding, which would show as bright red, maroon, or black stools.   (A tablespoon of blood from the rectum is not serious, especially  if   hemorrhoids are present.)  6. Vomiting.  7. Weakness or dizziness.  GO DIRECTLY TO THE NEAREST EMERGENCY ROOM IF YOU HAVE ANY OF THE FOLLOWING:      Difficulty breathing              Chills and/or fever over 101 F   Persistent vomiting and/or vomiting blood   Severe abdominal pain   Severe chest pain   Black, tarry stools   Bleeding- more than one tablespoon   Any other symptom or condition that you feel may need urgent attention  Your doctor recommends these additional instructions:  If any biopsies were taken, your doctors clinic will contact you in 1 to 2   weeks with any results.  - Observe patient in ICU.   - Clear liquid diet today.   - Perform a colonoscopy tomorrow.   For questions, problems or results please call your physician - Jose Manuel Gilmore MD at Work:  [phone].  [facility], EMERGENCY ROOM PHONE NUMBER: [phone]  IF A COMPLICATION OR EMERGENCY SITUATION ARISES AND YOU ARE UNABLE TO REACH   YOUR PHYSICIAN - GO DIRECTLY TO THE EMERGENCY ROOM.  Jose Manuel Gilmore MD  1/11/2024 4:24:26 PM  This report has been verified and signed electronically.  Dear patient,  As a result of recent federal legislation (The Federal Cures Act), you may   receive lab or pathology results from your procedure in your [facility]   account before your physician is able to contact you. Your physician or   their representative will relay the results to you with their   recommendations at their soonest availability.  Thank you,  PROVATION

## 2024-01-11 NOTE — INTERVAL H&P NOTE
The patient has been examined and the H&P has been reviewed:    Pre-Procedure H and P Addendum    Patient seen and examined.  History and exam unchanged from prior history and physical.      Procedure: EGD  Indication: Melena  ASA Class: per anesthesiology  Airway: normal  Neck Mobility: full range of motion  Mallampatti score: per anesthesia  History of anesthesia problems: no  Family history of anesthesia problems: no  Anesthesia Plan: MAC/General      Active Hospital Problems    Diagnosis  POA    *GI bleed [K92.2]  Unknown    Atrial flutter [I48.92]  Unknown    Coagulopathy [D68.9]  Unknown    Pneumonia [J18.9]  Unknown    Debility [R53.81]  Yes    COPD exacerbation [J44.1]  Yes    Controlled type 2 diabetes mellitus with diabetic neuropathy, without long-term current use of insulin [E11.40]  Yes      Resolved Hospital Problems   No resolved problems to display.

## 2024-01-11 NOTE — ASSESSMENT & PLAN NOTE
- resume home inhalers   - start hydrocortisone in case of adrenal insufficiency as patient was taking oral steroids after last admission for COPD exacerbation

## 2024-01-11 NOTE — ED NOTES
Patient transported from ED 01 to MICU 6091 with RN. On continuous cardiac, NIBP, SpO2 monitoring, 5L supplemental O2. Bedside handoff given to CASI Murillo

## 2024-01-11 NOTE — H&P (VIEW-ONLY)
"[facility] - Cardiac Medical ICU  Gastroenterology  Consult Note    Patient Name: Deb Webb  MRN: [id number]  Admission Date: 1/10/2024  Hospital Length of Stay: 1 days  Code Status: Full Code   Attending Provider: Trevor Haney*   Consulting Provider: Lito Haile MD  Primary Care Physician: Triston Valverde MD  Principal Problem:GI bleed    Inpatient consult to Gastroenterology  Consult performed by: Lito Haile MD  Consult ordered by: Thelma De Dios PA-C        Subjective:     HPI:  73-year-old female with history of AFib on Xarelto, COPD (chronically on 6L oxygen), pulmonary hypertension, diabetes presented to the emergency department via EMS  from [facility] for bright red blood per rectum noticed by staff at facility today. Patient reports she was noticed rectal bleeding over the last 3 days and that she thought it was her hemorrhoids.  She also states that she has been feeling very fatigued and hard to stay awake.      In ER, patient had low-normal systolic pressures with HR of 153 bpm. EKG showed typical atrial flutter with RVR. Lactic acid 4.3 on admission and hemoglobin 8.5 (from 11 at most recent discharge). Labs notable for leukocytosis 17.45, Hgb is currently stable at 9.4 after receiving 2 units of blood, INR 1.4, thrombocytopenia with platelets 150, increased BUN 32 on admission now 21.  CTA- GI bleed was obtained showing "No evidence of acute GI bleed.  Few prominent perirectal vessels possibly hemorrhoids." Large amount of retained stool within the sigmoid colon and rectum. CXR with new airspace consolidation in LLL with bronchial thickening concerning for PNA vs atelectasis. Patient is being treated with vanc/Zosyn for her sepsis.  She was admitted to the MICU for further management of her atrial flutter and hypotension.      She has had multiple recent admissions and was most recently discharged 5 days ago from [facility] from due to episode of dyspnea that " required ICU level care. Prior to that she was recently discharged on 12/13 after stay for non operable pubic rami fracture.     EGD 5/2023 for dysphagia  - Small hiatal hernia.   - Normal gastric fundus and gastric body.   - Gastric mucosal atrophy.   - (Minimal) Antritis. Biopsied.  - Esophagus dilated, 54 Fr.      Colonscopy 8/2022  - Two 4 to 8 mm polyps in the ascending colon, removed with a cold snare. Resected and retrieved.   - One 3 mm polyp in the descending colon, removed with a cold snare. Resected and retrieved.   - Altered vascular, erythematous and nodular mucosa in the rectum. Biopsied. Possibly secondary to rectal prolapse.   - Non-bleeding internal hemorrhoids.   - Bleeding could be due to rectal prolapse, hemorrhoids, or depending on what the abnormal mucosa is.     Past Medical History:   Diagnosis Date    Allergy     Arthritis     Cataract     Colon polyps     COPD (chronic obstructive pulmonary disease)     Diabetes mellitus type II     Diabetic neuropathy     Fever blister     Glaucoma (increased eye pressure)     Hyperlipidemia     Hypertension     Irritable bowel syndrome     Keloid cicatrix     Osteoporosis     Retained cholelithiasis following cholecystectomy(997.41)     Right patella fracture        Past Surgical History:   Procedure Laterality Date    BACK SURGERY  2006    CATARACT EXTRACTION W/  INTRAOCULAR LENS IMPLANT Bilateral     CHOLECYSTECTOMY      Laparoscopic    COLONOSCOPY      COLONOSCOPY N/A 8/31/2022    Procedure: COLONOSCOPY;  Surgeon: Salvatore Butler MD;  [facility];  Service: Gastroenterology;  Laterality: N/A;    ESOPHAGEAL DILATION N/A 8/30/2022    Procedure: DILATION, ESOPHAGUS;  Surgeon: Salvatore Butler MD;  Location: [facility];  Service: Gastroenterology;  Laterality: N/A;    ESOPHAGOGASTRODUODENOSCOPY N/A 8/30/2022    Procedure: EGD (ESOPHAGOGASTRODUODENOSCOPY);  Surgeon: Salvatore Butler MD;  Location: [facility];  Service: Gastroenterology;  Laterality:  N/A;    ESOPHAGOGASTRODUODENOSCOPY N/A 5/23/2023    Procedure: ESOPHAGOGASTRODUODENOSCOPY (EGD);  Surgeon: Desmond Duffy Jr., MD;  Location: [facility];  Service: Endoscopy;  Laterality: N/A;    HERNIA REPAIR      HYSTERECTOMY      KNEE SURGERY Right 3/4/2015    Dr Weller     OOPHORECTOMY      ovarian tumor      Benign    TONSILLECTOMY, ADENOIDECTOMY         Review of patient's allergies indicates:   Allergen Reactions    Silicone      Burn skin    Adhesive Rash     Family History       Problem Relation (Age of Onset)    Breast cancer Maternal Aunt    Cancer Mother    Cataracts Mother    Diabetes Mother, Father, Sister, Daughter    Glaucoma Mother    Heart disease Father    Skin cancer Mother, Sister          Tobacco Use    Smoking status: Every Day     Current packs/day: 0.50     Average packs/day: 0.5 packs/day for 40.0 years (20.0 ttl pk-yrs)     Types: Cigarettes    Smokeless tobacco: Former    Tobacco comments:     Not ready to quit smoking. Cut down on cigarettes but enjoys having a couple cigarettes a day.   Substance and Sexual Activity    Alcohol use: No    Drug use: No    Sexual activity: Never     Review of Systems  Objective:     Vital Signs (Most Recent):  Temp: 97 °F (36.1 °C) (01/11/24 0300)  Pulse: (!) 120 (01/11/24 0700)  Resp: (!) 23 (01/11/24 0700)  BP: (!) 95/52 (01/11/24 0700)  SpO2: (!) 94 % (01/11/24 0700) Vital Signs (24h Range):  Temp:  [97 °F (36.1 °C)-98.5 °F (36.9 °C)] 97 °F (36.1 °C)  Pulse:  [113-180] 120  Resp:  [18-32] 23  SpO2:  [94 %-100 %] 94 %  BP: ()/(50-76) 95/52     Weight: 50 kg (110 lb 3.7 oz) (01/10/24 1813)  Body mass index is 22.26 kg/m².      Intake/Output Summary (Last 24 hours) at 1/11/2024 0903  Last data filed at 1/11/2024 0625  Gross per 24 hour   Intake 3108.93 ml   Output 775 ml   Net 2333.93 ml       Lines/Drains/Airways       Drain  Duration                  Urethral Catheter 01/10/24 1429 16 Fr. <1 day              Peripheral Intravenous Line   Duration                  Peripheral IV - Single Lumen 01/10/24 1356 20 G Left Antecubital <1 day         Peripheral IV - Single Lumen 01/10/24 1436 20 G;1 3/4 in Anterior;Right Forearm <1 day                     Physical Exam  Vitals and nursing note reviewed.   Constitutional:       General: She is not in acute distress.  HENT:      Head: Normocephalic and atraumatic.      Nose: Nose normal.      Mouth/Throat:      Mouth: Mucous membranes are dry.   Eyes:      Pupils: Pupils are equal, round, and reactive to light.   Cardiovascular:      Rate and Rhythm: Tachycardia present. Rhythm irregular.      Pulses: Normal pulses.   Pulmonary:      Effort: Pulmonary effort is normal.      Breath sounds: Wheezing (scattered expiratory wheezing) present.   Abdominal:      General: Abdomen is flat. There is no distension.      Palpations: Abdomen is soft. There is no mass.      Tenderness: There is abdominal tenderness. There is no guarding or rebound.   Genitourinary:     Comments: Chaperoned by Gonzalez-  Large volume liquidy maroon stool. Rectum not protruding past external sphincter, some rectal tenderness with light palpation, and visible hemorrhoids.   Picture uploaded to media.   Musculoskeletal:      Cervical back: Neck supple.      Right lower leg: No edema.      Left lower leg: No edema.   Skin:     General: Skin is warm and dry.      Coloration: Skin is pale.      Findings: Bruising present.   Neurological:      General: No focal deficit present.      Mental Status: She is alert and oriented to person, place, and time.          Significant Labs:  CBC:   Recent Labs   Lab 01/10/24  2159 01/10/24  2356 01/11/24  0432   WBC 21.21* 19.75* 17.45*   HGB 10.2* 9.4* 9.4*   HCT 32.2* 29.8* 29.5*    139* 150     CMP:   Recent Labs   Lab 01/11/24  0330   *   CALCIUM 8.0*   ALBUMIN 2.3*   PROT 4.5*      K 3.8   CO2 24      BUN 21   CREATININE 0.6   ALKPHOS 91   ALT 18   AST 12   BILITOT 1.0  "      Significant Imaging:  Imaging results within the past 24 hours have been reviewed.    CTA GI Bleed  Impression:     No evidence of acute GI bleed.  Few prominent perirectal vessels possibly hemorrhoids.     Large amount of retained stool within the sigmoid colon and rectum.     Left inguinal hernia containing loops bowel without obstructive change.     New, airspace consolidation involving a subsegmental region of the anteromedial segment of the left lower lobe with bronchial wall thickening at the left lung base concerning for aspiration or pneumonia.     Healing fracture of the right inferior-superior pubic rami and right pubic symphysis with callus formation.  Remote left inferior-superior-pubic symphysis fracture.  Remote sacral ala fracture.  Remote, stable T11 fracture.     Severe stenosis of the left renal artery.  The accessory left renal artery is patent.  Assessment/Plan:     GI  * GI bleed  3-year-old female with history of AFib on Xarelto, COPD (chronically on 6L oxygen), pulmonary hypertension, diabetes presented to the emergency department via EMS  from [facility] for bright red blood per rectum noticed by staff at facility today. Patient reports she was noticed rectal bleeding over the last 3 days and that she thought it was her hemorrhoids.    EKG showed typical atrial flutter with RVR. Lactic acid 4.3 on admission and hemoglobin 8.5 (from 11 at most recent discharge). Labs notable for leukocytosis 17.45, Hgb is currently stable at 9.4 after receiving 2 units of blood, INR 1.4, thrombocytopenia with platelets 150, increased BUN 32 on admission now 21.      CTA- GI bleed was obtained showing "No evidence of acute GI bleed.  Few prominent perirectal vessels possibly hemorrhoids." Large amount of retained stool within the sigmoid colon and rectum. CXR with new airspace consolidation in LLL with bronchial thickening concerning for PNA vs atelectasis. Her stool is very dark and appears melanic. " With elevated BUN on admission, bleed could be from an upper source or lower source.     Our recommendations are as follows:     Maintain IV access with at least 2 large IVs (18 gauge or larger)  Continued IVF resuscitation as tolerated with attention to active co-morbidities.   RBC transfusion as indicated if Hgb <7 g/dL.   Please correct any coagulopathy with platelets and FFP to a goal of platelets >50K and INR <2.0.   Discontinue all antiplatelet, anti-coagulation, and NSAID products.   Keep NPO today and medically optimize/resuscitate patient with tentative plans for EGD today.   Pending EGD findings, will consider if colonoscopy is warranted.  Please notify GI team if there is significant change in patient's clinical status with concern for hemodynamic compromise in the setting of overt GI bleeding. Patient may warrant urgent EGD or CTA with possible IR intervention.          Thank you for your consult. I will follow-up with patient. Please contact us if you have any additional questions.    Lito Haile MD  Gastroenterology  [facility] - Cardiac Medical ICU

## 2024-01-11 NOTE — PROGRESS NOTES
"[facility] - Cardiac Medical ICU  Critical Care Medicine  Progress Note    Patient Name: Deb Webb  MRN: [id number]  Admission Date: 1/10/2024  Hospital Length of Stay: 1 days  Code Status: Full Code  Attending Provider: Trevor Haney*  Primary Care Provider: Triston Valverde MD   Principal Problem: GI bleed    Subjective:     HPI:  Ms. Webb is a 72 y/o woman with pmh of arthritis, COPD w/ 6L O2 at baseline and history of CVA , diabetes mellitus type 2, hyperlipidemia, hypertension, paroxysmal AFib on Xarelto. She presented to the emergency department via EMS secondary to bright red blood per rectum. She reports three days of bloody stools. She quantifies the amount as "three cups of blood" per day for three days. She has never had this happen to her before. She also reports fatigue, pale coloration of skin, productive cough, mild diffuse abdominal pain and moderate rectal pain. She was recently discharged from [facility] 5 days ago after admission for URI and COPD exacerbation. Of note, recent colonoscopy reports bleeding could be due to rectal prolapse. She denies headaches, dizziness, shortness of breath, chest pain, nausea, vomiting, recent NSAID use/goody powder, weight loss, fevers, night sweats, hematuria.    In the ED, the patient received 1L IVF, 1u RBCs, Vanc, Zosyn, 80 IV pantoprazole, BC x2. Her blood pressures have been maintained 90s-100s/60s-70s with HR 150s-180s and EKG noting aflutter with RVR. GI and cardiology consulted. Labs notable for Hgb of 8.5 (11 7 days ago), wbc 25, Bicarb 20, lactate 4.28, PTT 21.1, PT 12.7, INR of 1.2. CTA with no evidence of acute GI bleed but with prominent perirectal vessels, possible hemorrhoids. Large amount of retained stool within sigmoid colon/rectum. New airspace consolidation in LLL with bronchial thickening concerning for PNA vs atelectasis.     Hospital/ICU Course:  Admitted for GI bleed with aflutter RVR. GI and cardiology consulted. RVR " improved with IV pushes of metoprolol. Cardiology suggesting po metoprolol after GI bleed stabilized or digoxin if necessary while hypotensive. GI with plans for EGD. S/p IVF resuscitation and RBC transfusions.    Interval History/Significant Events: Ms. Webb is doing well today despite continued bloody bowel movements. She was discharged from her previous admission with steroids 2/2 COPD exacerbation. Will restart in case of hypotension being in-part due to adrenal insufficiency. Continuing to transfuse RBCs as necessary with serial CBCs. GI with plans for EGD today. Will initiate PO metoprolol when able, digoxin if necessary while hypotensive per cards. Vanc/Zosyn for PNA or other infectious etiology.    Review of Systems   Constitutional:  Positive for fatigue. Negative for appetite change, chills, fever and unexpected weight change.   Eyes:  Negative for visual disturbance.   Respiratory:  Positive for cough. Negative for shortness of breath.    Cardiovascular:  Positive for palpitations. Negative for chest pain and leg swelling.   Gastrointestinal:  Positive for abdominal pain, blood in stool and rectal pain. Negative for constipation, diarrhea, nausea and vomiting.   Genitourinary:  Negative for difficulty urinating and hematuria.   Skin:  Positive for color change (pale).   Neurological:  Positive for weakness. Negative for dizziness and headaches.   Psychiatric/Behavioral:  Negative for confusion.      Objective:     Vital Signs (Most Recent):  Temp: 97.2 °F (36.2 °C) (01/11/24 1500)  Pulse: (!) 126 (01/11/24 1500)  Resp: 18 (01/11/24 1500)  BP: 98/61 (01/11/24 1500)  SpO2: 99 % (01/11/24 1500) Vital Signs (24h Range):  Temp:  [97 °F (36.1 °C)-98.5 °F (36.9 °C)] 97.2 °F (36.2 °C)  Pulse:  [108-162] 126  Resp:  [16-33] 18  SpO2:  [87 %-100 %] 99 %  BP: ()/(48-75) 98/61   Weight: 50 kg (110 lb 3.7 oz)  Body mass index is 22.26 kg/m².      Intake/Output Summary (Last 24 hours) at 1/11/2024 3964  Last  data filed at 1/11/2024 1447  Gross per 24 hour   Intake 4440.91 ml   Output 1175 ml   Net 3265.91 ml       Physical Exam  Vitals and nursing note reviewed.   Constitutional:       General: She is not in acute distress.  HENT:      Head: Normocephalic and atraumatic.      Nose: Nose normal.   Eyes:      Pupils: Pupils are equal, round, and reactive to light.   Cardiovascular:      Rate and Rhythm: Tachycardia present. Rhythm irregular.      Pulses: Normal pulses.   Pulmonary:      Effort: Pulmonary effort is normal.      Breath sounds: No wheezing.   Abdominal:      General: Abdomen is flat. There is no distension.      Palpations: Abdomen is soft. There is no mass.      Tenderness: There is some abdominal tenderness. There is no guarding or rebound.       Red stool noted at rectum  Musculoskeletal:      Cervical back: Neck supple.      Right lower leg: No edema.      Left lower leg: No edema.   Skin:     General: Skin is warm and dry.      Coloration: Skin is pale.      Findings: Bruising present.   Neurological:      General: No focal deficit present.      Mental Status: She is alert and oriented to person, place, and time.        Vents:     Lines/Drains/Airways       Drain  Duration                  Urethral Catheter 01/10/24 1429 16 Fr. 1 day              Peripheral Intravenous Line  Duration                  Peripheral IV - Single Lumen 01/10/24 1356 20 G Left Antecubital 1 day         Peripheral IV - Single Lumen 01/10/24 1436 20 G;1 3/4 in Anterior;Right Forearm 1 day                  Significant Labs:    CBC/Anemia Profile:  Recent Labs   Lab 01/10/24  2356 01/11/24  0432 01/11/24  1107   WBC 19.75* 17.45* 13.51*   HGB 9.4* 9.4* 8.1*   HCT 29.8* 29.5* 26.4*   * 150 127*   MCV 96 96 98   RDW 17.9* 18.0* 18.0*        Chemistries:  Recent Labs   Lab 01/10/24  1411 01/10/24  2356 01/11/24  0330     --  141   K 4.3 4.1 3.8     --  109   CO2 20*  --  24   BUN 32*  --  21   CREATININE 0.9  --   "0.6   CALCIUM 8.5*  --  8.0*   ALBUMIN 2.6*  --  2.3*   PROT 5.2*  --  4.5*   BILITOT 0.5  --  1.0   ALKPHOS 118  --  91   ALT 21  --  18   AST 18  --  12   MG  --  1.3* 1.4*   PHOS  --   --  2.3*           Significant Imaging:  I have reviewed all pertinent imaging results/findings within the past 24 hours.    ABG  No results for input(s): "PH", "PO2", "PCO2", "HCO3", "BE" in the last 168 hours.  Assessment/Plan:     Pulmonary  Pneumonia  Patient has been bed bound due to recent pelvic fracture and reports cough x 1 month w/ 'ice cream' colored sputum. CT A/P showed new airspace consolidation in the LLL.     - Repeat CXR malrotated, repeat tomorrow  - Continue Vanc (recent hospitalization) and Zosyn. De-escalate pending culture data   - Sputum culture ordered     COPD exacerbation  - resume home inhalers   - start hydrocortisone in case of adrenal insufficiency as patient was taking oral steroids after last admission for COPD exacerbation    Cardiac/Vascular  Atrial flutter  EKG w/ Aflutter RVF in the ED. Otherwise HDS. Cardiology consulted; appreciate recs.     - Optimize resuscitation in the setting of acute GIB followed by consideration of rate control if patient's HR continues to be uncontrolled. Will start w/ PO BB. If unable to tolerate, will consider digoxin.  - Telemetry  - EKG PRN  - Mg>2 and K >4    Hematology  Coagulopathy  - INR 1.4  - Daily PT/INR    Endocrine  Controlled type 2 diabetes mellitus with diabetic neuropathy, without long-term current use of insulin  Lab Results   Component Value Date    HGBA1C 6.6 (H) 12/25/2023     -  upon arrival  - Basal/bolus insulin regimen to target inpatient BG goal of 140-180; uptitrate as needed     GI  * GI bleed  72 y/o woman with pmh of arthritis, COPD w/ 6L O2 at baseline and history of CVA , diabetes mellitus type 2, hyperlipidemia, hypertension, paroxysmal AFib on Xarelto presenting w/ BRBPR. Of note, recent colonoscopy reports bleeding could be due " to rectal prolapse. Denies use of NSAIDS, BC powder. Labs notable for Hgb of 8.5 (11 7 days ago). CTA with no evidence of acute GI bleed but with prominent perirectal vessels, possible hemorrhoids. Large amount of retained stool within sigmoid colon/rectum. Admitted to MICU for acute GIB.     - GI consulted - Plans for EGD today  - Trend Hgb and transfuse for goal Hgb > 7, unless otherwise indicated  - Maintain IV access with 2 large bore IV's  - Intravascular resuscitation/support with IVF's   - Hold all NSAIDs and anticoagulants, unless contraindicated  - Bolus IV pantoprazole 80 mg followed by 40 mg BID  - Correct any coagulopathy with platelets and FFP for goal of platelets >50K and INR <2.0        Other  Debility  - Consult PT/OT when appropriate       Critical Care Daily Checklist:    A: Awake: RASS Goal/Actual Goal: RASS Goal: 0-->alert and calm  Actual:     B: Spontaneous Breathing Trial Performed?     C: SAT & SBT Coordinated?  N/A                      D: Delirium: CAM-ICU Overall CAM-ICU: Negative   E: Early Mobility Performed? Patient unable to walk since previous admission   F: Feeding Goal:    Status:     Current Diet Order   Procedures    Diet NPO      AS: Analgesia/Sedation N/A   T: Thromboembolic Prophylaxis Actively bleeding   H: HOB > 300 Yes   U: Stress Ulcer Prophylaxis (if needed) PPI   G: Glucose Control Basal/bolus   B: Bowel Function Stool Occurrence: 1   I: Indwelling Catheter (Lines & Hernandez) Necessity PIV x3, hernandez   D: De-escalation of Antimicrobials/Pharmacotherapies Vanc/Zosyn    Plan for the day/ETD EGD, rate control    Code Status:  Family/Goals of Care: Full Code       Critical secondary to Patient has a condition that poses threat to life and bodily function: GI bleed, RVR      Critical care was time spent personally by me on the following activities: development of treatment plan with patient or surrogate and bedside caregivers, discussions with consultants, evaluation of patient's  response to treatment, examination of patient, ordering and performing treatments and interventions, ordering and review of laboratory studies, ordering and review of radiographic studies, pulse oximetry, re-evaluation of patient's condition. This critical care time did not overlap with that of any other provider or involve time for any procedures.     Alejnadra Escoto MD  Critical Care Medicine  [facility] - Cardiac Medical [facility]

## 2024-01-11 NOTE — ASSESSMENT & PLAN NOTE
Patient with GI bleed found to have atrial flutter RVR in the ED. Arrhythmia likely triggered by active GI bleed. Echo 12/9/23 showed preserved EF 65% and severe TR and pulmonary HTN (82 mmHg). Chadsvasc is 4 (female, age, HTN, DM). Holding anticoagulation in the setting of active bleed. Blood pressure has dropped with Lopressor. Amiodarone contraindicated due to her unable to be on AC. Cr 0.6 and GFR >60. Can start Digoxin for rate control.     --Digoxin  mcg x1 then 250 mcg six hours later then another 250 mcg six hours after that

## 2024-01-11 NOTE — TRANSFER OF CARE
Anesthesia Transfer of Care Note    Patient: Deb Webb    Procedure(s) Performed: Procedure(s) (LRB):  EGD (ESOPHAGOGASTRODUODENOSCOPY) (N/A)    Patient location: ICU    Anesthesia Type: general    Post pain: adequate analgesia    Post assessment: no apparent anesthetic complications and tolerated procedure well    Post vital signs: stable    Level of consciousness: sedated    Nausea/Vomiting: no nausea/vomiting    Complications: none    Transfer of care protocol was followedComments: Procedure done in ICU,updated ICU RN at bedside after procedure (no transport required - continuously monitored throughout procedure and post procedure)      Last vitals: Visit Vitals  /77   Pulse (!) 122   Temp 36.2 °C (97.2 °F) (Oral)   Resp 18   Wt 50 kg (110 lb 3.7 oz)   SpO2 100%   BMI 22.26 kg/m²

## 2024-01-11 NOTE — PROGRESS NOTES
"Pharmacokinetic Initial Assessment: IV Vancomycin    Assessment/Plan:    Initiate intravenous vancomycin with loading dose of 1000 mg once, done in ED.  Since SCr increased from baseline, check random vancomycin level with AM labs to determine if scheduling subsequent doses appropriate or if intermittent dosing indicated.  Desired empiric serum trough concentration is 15 to 20 mcg/mL.  Draw vancomycin random level with AM labs on 01/11/2024.  Pharmacy will continue to follow and monitor vancomycin.      Please contact pharmacy at extension [phone] with any questions regarding this assessment.     Thank you for the consult,   Neyr Alvarez       Patient brief summary:  Deb Webb is a 73 y.o. female initiated on antimicrobial therapy with IV Vancomycin for treatment of suspected lower respiratory infection.    Drug Allergies:   Review of patient's allergies indicates:   Allergen Reactions    Silicone      Burn skin    Adhesive Rash       Actual Body Weight:   50 kg    Renal Function:   Estimated Creatinine Clearance: 43.5 mL/min (based on SCr of 0.9 mg/dL).    CBC (last 72 hours):  Recent Labs   Lab Result Units 01/10/24  1411 01/10/24  1709   WBC K/uL 25.41*  --    Hemoglobin g/dL 8.5* 7.9*   Hematocrit % 28.2* 26.4*   Platelets K/uL 244  --    Gran % % 77.4*  --    Lymph % % 12.6*  --    Mono % % 8.7  --    Eosinophil % % 0.2  --    Basophil % % 0.2  --    Differential Method  Automated  --        Metabolic Panel (last 72 hours):  Recent Labs   Lab Result Units 01/10/24  1411   Sodium mmol/L 137   Potassium mmol/L 4.3   Chloride mmol/L 105   CO2 mmol/L 20*   Glucose mg/dL 364*   BUN mg/dL 32*   Creatinine mg/dL 0.9   Albumin g/dL 2.6*   Total Bilirubin mg/dL 0.5   Alkaline Phosphatase U/L 118   AST U/L 18   ALT U/L 21       Drug levels (last 3 results):  No results for input(s): "VANCOMYCINRA", "VANCORANDOM", "VANCOMYCINPE", "VANCOPEAK", "VANCOMYCINTR", "VANCOTROUGH" in the last 72 hours.    Microbiologic " Called patient for refill reminder.    Will mail prescriptions address has been verified.       Last Filled on: 06/15/20   Follow-up Date: 08/10/20    Julisa Lucas CPhT  [facility]  [phone]        Results:  Microbiology Results (last 7 days)       Procedure Component Value Units Date/Time    Culture, Respiratory with Gram Stain [[id number]]     Order Status: No result Specimen: Sputum     Respiratory Infection Panel (PCR), Nasopharyngeal [[id number]] Collected: 01/10/24 1725    Order Status: Completed Specimen: Nasopharyngeal Swab Updated: 01/10/24 1730     Respiratory Infection Panel Source NP Swab    Narrative:      For all other respiratory sources, order [id number] -  Respiratory Viral Panel by PCR    Blood Culture #1 **CANNOT BE ORDERED STAT** [[id number]] Collected: 01/10/24 1411    Order Status: Sent Specimen: Blood from Peripheral, Upper Arm, Right Updated: 01/10/24 1450    Blood Culture #2 **CANNOT BE ORDERED STAT** [[id number]] Collected: 01/10/24 1411    Order Status: Sent Specimen: Blood from Peripheral, Antecubital, Right Updated: 01/10/24 1450

## 2024-01-11 NOTE — ANESTHESIA PREPROCEDURE EVALUATION
[facility]  Anesthesia Pre-Operative Evaluation         Patient Name/: Deb Webb, 1950  MRN: [id number]    SUBJECTIVE:     Pre-operative evaluation for Procedure(s) (LRB):  COLONOSCOPY (N/A)     2024    Deb Webb is a 73 y.o. female w/ a significant PMHx of hematochezia, melena, combined systolic and diastolic heart failure, NIDDMT2, COPD w/ 6L O2 at baseline, CVA,  pulmonary HTN, HTN, CKD3a, hepatic steatosis, interstitial lung disease, PAF, vtach, aortic atherosclerosis, and anxiety.     Admitted for GI bleed with aflutter RVR. GI and cardiology consulted. RVR improved with IV pushes of metoprolol. Cardiology suggesting po metoprolol after GI bleed stabilized or digoxin if necessary while hypotensive. S/p IVF resuscitation and RBC transfusions. S/p EGD     Patient now presents for the above procedure(s).    Patient denies any other known cardiopulmonary disease.     Level of Care: MICU    NPO status: Instructed to be NPO at midnight prior to procedure    Access: 2 20G PIV    Hemodynamics: Requiring 0.03 levo    Previous Airway: None in our records      ________________________________________  Results for orders placed during the hospital encounter of 23    Echo    Interpretation Summary    Left Ventricle: The left ventricle is normal in size. Normal wall thickness. There is normal systolic function with a visually estimated ejection fraction of 65 - 70%.    Right Ventricle: Mild right ventricular enlargement. Systolic function is normal.    Left Atrium: Left atrium is mildly dilated.    Right Atrium: Right atrium is severely dilated.    Mitral Valve: There is mild regurgitation.    Tricuspid Valve: There is severe regurgitation.    Pulmonary Artery: There is severe pulmonary hypertension. The estimated pulmonary artery systolic pressure is 82 mmHg.    IVC/SVC: Intermediate venous pressure at 8 mmHg.    ________________________________________    LDA:        " Peripheral IV - Single Lumen 01/10/24 1436 20 G;1 3/4 in Anterior;Right Forearm (Active)   Site Assessment Clean;Dry;Intact;No redness;No swelling 01/11/24 1500   Extremity Assessment Distal to IV No warmth;No swelling;No redness;No abnormal discoloration 01/11/24 1500   Line Status Flushed;Saline locked 01/11/24 1500   Dressing Status Clean;Dry;Intact 01/11/24 1500   Dressing Intervention Integrity maintained 01/11/24 1500   Dressing Change Due 01/14/24 01/11/24 1500   Site Change Due 01/14/24 01/11/24 0715   Reason Not Rotated Not due 01/11/24 1500   Number of days: 1            Peripheral IV - Single Lumen 01/10/24 1356 20 G Left Antecubital (Active)   Site Assessment Clean;Dry;Intact;No redness;No swelling 01/11/24 1500   Extremity Assessment Distal to IV No warmth;No swelling;No redness;No abnormal discoloration 01/11/24 1500   Line Status Flushed;Saline locked 01/11/24 1500   Dressing Status Clean;Dry;Intact 01/11/24 1500   Dressing Intervention Integrity maintained 01/11/24 1500   Dressing Change Due 01/14/24 01/11/24 1500   Site Change Due 01/14/24 01/11/24 0715   Reason Not Rotated Not due 01/11/24 1500   Number of days: 1            Urethral Catheter 01/10/24 1429 16 Fr. (Active)   $ Saini Insertion Bedside Insertion Performed 01/10/24 2100   Site Assessment Clean;Intact 01/11/24 1500   Collection Container Urimeter 01/11/24 1500   Securement Method secured to top of thigh w/ adhesive device 01/11/24 1500   Catheter Care Performed yes 01/11/24 1500   Reason for Continuing Urinary Catheterization Critically ill in ICU and requiring hourly monitoring of intake/output 01/11/24 1500   CAUTI Prevention Bundle Securement Device in place with 1" slack;Intact seal between catheter & drainage tubing;Drainage bag/urimeter off the floor;Sheeting clip in use;No dependent loops or kinks;Drainage bag/urimeter not overfilled (<2/3 full);Drainage bag/urimeter below bladder 01/11/24 0715   Output (mL) 400 mL 01/11/24 1324 "   Number of days: 1       Drips:    NORepinephrine bitartrate-D5W 0.03 mcg/kg/min (01/11/24 2686)       Patient Active Problem List   Diagnosis    Osteoporosis, postmenopausal    Controlled type 2 diabetes mellitus with diabetic neuropathy, without long-term current use of insulin    Combined hyperlipidemia associated with type 2 diabetes mellitus    Primary osteoarthritis involving multiple joints    Anxiety    COPD exacerbation    Tobacco use disorder    IBS (irritable bowel syndrome)    S/P R knee surgery, ORIF patella (3/4/15)    Chronic allergic rhinitis    Essential tremor    Primary open angle glaucoma of both eyes, severe stage    Open angle with borderline findings and high glaucoma risk in both eyes    Near syncope    History of stroke    Severe Pulmonary hypertension    Chronic combined systolic and diastolic congestive heart failure    Chronic left shoulder pain    Essential hypertension    GERD without esophagitis    Chronic kidney disease, stage 3a    Hepatic steatosis    Acute hypoxemic respiratory failure    Chronic respiratory failure with hypoxia    Closed displaced fracture of left pubis with routine healing    Displaced fracture of lateral end of left clavicle, initial encounter for closed fracture    Interstitial lung disease    Recurrent upper respiratory infection (URI)    PAF (paroxysmal atrial fibrillation)    Ventricular tachycardia    Atrial fibrillation with RVR    Edema of hand    Acute pain of right shoulder    Major depressive disorder, recurrent, moderate    Aortic atherosclerosis    Dysphagia    Chronic pain of both knees    Weakness of both lower extremities    Gait abnormality    Syncope and collapse    Hypomagnesemia    Hypotension    Closed fracture of right pubis    Cachexia    Discharge planning issues    Acute cystitis without hematuria    Gram-negative pneumonia    Debility    GI bleed    Atrial flutter    Coagulopathy    Pneumonia       Review of patient's allergies  indicates:   Allergen Reactions    Silicone      Burn skin    Adhesive Rash       Current Inpatient Medications:    brimonidine 0.2%  1 drop Both Eyes BID    citalopram  20 mg Oral Daily    gabapentin  300 mg Oral TID    hydrocortisone sodium succinate  100 mg Intravenous Q8H    insulin detemir U-100  8 Units Subcutaneous QHS    latanoprost  1 drop Both Eyes QHS    metoprolol tartrate  12.5 mg Oral BID    mupirocin   Nasal BID    pantoprazole  40 mg Intravenous BID    piperacillin-tazobactam (Zosyn) IV (PEDS and ADULTS) (extended infusion is not appropriate)  4.5 g Intravenous Q8H    polyethylene glycol  4,000 mL Oral Once    tamsulosin  0.4 mg Oral Daily    timolol maleate 0.5%  1 drop Both Eyes Daily    vancomycin (VANCOCIN) IV (PEDS and ADULTS)  1,000 mg Intravenous Q24H       No current facility-administered medications on file prior to encounter.     Current Outpatient Medications on File Prior to Encounter   Medication Sig Dispense Refill    albuterol-ipratropium (DUO-NEB) 2.5 mg-0.5 mg/3 mL nebulizer solution Take 3 mLs by nebulization every 6 (six) hours as needed for Wheezing. Rescue      ANORO ELLIPTA 62.5-25 mcg/actuation DsDv INHALE 1 PUFF INTO THE LUNGS ONCE DAILY. 60 each 0    brimonidine 0.2% (ALPHAGAN) 0.2 % Drop PLACE 1 DROP INTO BOTH EYES 2 TIMES A DAY. 5 mL 6    cholestyramine-aspartame (PREVALITE) 4 gram PwPk TAKE 1 PACKET (4 G TOTAL) BY MOUTH 2 (TWO) TIMES DAILY. FOR DIARRHEA 180 packet 3    citalopram (CELEXA) 20 MG tablet Take 1 tablet (20 mg total) by mouth once daily. 90 tablet 1    gabapentin (NEURONTIN) 300 MG capsule TAKE 1 CAPSULE BY MOUTH THREE TIMES A  capsule 1    latanoprost 0.005 % ophthalmic solution Place 1 drop into both eyes every evening. 7.5 mL 3    levalbuterol (XOPENEX) 0.63 mg/3 mL nebulizer solution Take 3 mLs (0.63 mg total) by nebulization every 8 (eight) hours. Rescue  0    LEVEMIR FLEXPEN 100 unit/mL (3 mL) InPn pen Inject 10 Units into the skin every evening.       metoprolol tartrate (LOPRESSOR) 25 MG tablet Take 0.5 tablets (12.5 mg total) by mouth 2 (two) times daily. 30 tablet 11    NOVOLOG FLEXPEN U-100 INSULIN 100 unit/mL (3 mL) InPn pen Inject 0-8 Units into the skin as needed (sliding scale). B to 149 mg/dL = 0 units  150-199 mg/dL = 2 units  200 to 249 mg/dL = 4 units  250 to 299 mg/dL = 6 units  300 to 999 mg/dL = 8 units  Notify Provider if BG is more than 400 mg/dL.      omeprazole (PRILOSEC) 40 MG capsule Take 1 capsule (40 mg total) by mouth before breakfast. 90 capsule 1    predniSONE (DELTASONE) 5 MG tablet Take 10 tablets (50 mg total) by mouth once daily for 7 days, THEN 8 tablets (40 mg total) once daily for 7 days, THEN 6 tablets (30 mg total) once daily for 7 days, THEN 4 tablets (20 mg total) once daily for 7 days, THEN 2 tablets (10 mg total) once daily for 7 days. 210 tablet 0    tamsulosin (FLOMAX) 0.4 mg Cap Take 1 capsule (0.4 mg total) by mouth once daily. 30 capsule 11    timolol maleate 0.5% (TIMOPTIC) 0.5 % Drop Place 1 drop into both eyes once daily. 5 mL 6    XARELTO 20 mg Tab TAKE 1 TABLET BY MOUTH EVERY DAY WITH DINNER OR EVENING MEAL 90 tablet 3    guaiFENesin 100 mg/5 ml (ROBITUSSIN) 100 mg/5 mL syrup Take 10 mLs (200 mg total) by mouth every 6 (six) hours as needed for Congestion.  0    senna (SENOKOT) 8.6 mg tablet Take 1 tablet by mouth once daily.      [DISCONTINUED] irbesartan (AVAPRO) 75 MG tablet Take 1 tablet (75 mg total) by mouth once daily. 90 tablet 1    [DISCONTINUED] loratadine (CLARITIN) 10 mg tablet TAKE 1 TABLET (10 MG TOTAL) BY MOUTH DAILY AS NEEDED FOR ALLERGIES (OR RUNNY NOSE). 90 tablet 1       Past Surgical History:   Procedure Laterality Date    BACK SURGERY      CATARACT EXTRACTION W/  INTRAOCULAR LENS IMPLANT Bilateral     CHOLECYSTECTOMY      Laparoscopic    COLONOSCOPY      COLONOSCOPY N/A 2022    Procedure: COLONOSCOPY;  Surgeon: Salvatore Butler MD;  Location: [facility];  Service:  Gastroenterology;  Laterality: N/A;    ESOPHAGEAL DILATION N/A 8/30/2022    Procedure: DILATION, ESOPHAGUS;  Surgeon: Salvatore Butler MD;  Location: [facility];  Service: Gastroenterology;  Laterality: N/A;    ESOPHAGOGASTRODUODENOSCOPY N/A 8/30/2022    Procedure: EGD (ESOPHAGOGASTRODUODENOSCOPY);  Surgeon: Salvatore Butler MD;  Location: [facility];  Service: Gastroenterology;  Laterality: N/A;    ESOPHAGOGASTRODUODENOSCOPY N/A 5/23/2023    Procedure: ESOPHAGOGASTRODUODENOSCOPY (EGD);  Surgeon: Desmond Duffy Jr., MD;  Location: [facility];  Service: Endoscopy;  Laterality: N/A;    HERNIA REPAIR      HYSTERECTOMY      KNEE SURGERY Right 3/4/2015    Dr Weller     OOPHORECTOMY      ovarian tumor      Benign    TONSILLECTOMY, ADENOIDECTOMY         Social History:  Tobacco Use: High Risk (1/11/2024)    Patient History     Smoking Tobacco Use: Every Day     Smokeless Tobacco Use: Former     Passive Exposure: Not on file       Alcohol Use: Not At Risk (1/11/2024)    AUDIT-C     Frequency of Alcohol Consumption: Monthly or less     Average Number of Drinks: 1 or 2     Frequency of Binge Drinking: Never       OBJECTIVE:     Vital Signs Range:  BMI Readings from Last 1 Encounters:   01/10/24 22.26 kg/m²       Temp:  [36.2 °C (97.2 °F)-36.6 °C (97.8 °F)]   Pulse:  [108-157]   Resp:  [16-33]   BP: ()/(48-71)   SpO2:  [87 %-99 %]        Significant Labs:        Component Value Date/Time    WBC 13.51 (H) 01/11/2024 1107    HGB 8.1 (L) 01/11/2024 1107    HCT 26.4 (L) 01/11/2024 1107    HCT 34 (L) 01/10/2024 1416     (L) 01/11/2024 1107     01/11/2024 0330    K 3.8 01/11/2024 0330     01/11/2024 0330    CO2 24 01/11/2024 0330     (H) 01/11/2024 0330    BUN 21 01/11/2024 0330    CREATININE 0.6 01/11/2024 0330    MG 1.4 (L) 01/11/2024 0330    PHOS 2.3 (L) 01/11/2024 0330    CALCIUM 8.0 (L) 01/11/2024 0330    ALBUMIN 2.3 (L) 01/11/2024 0330    PROT 4.5 (L) 01/11/2024 0330    ALKPHOS 91 01/11/2024  0330    BILITOT 1.0 01/11/2024 0330    AST 12 01/11/2024 0330    ALT 18 01/11/2024 0330    INR 1.4 (H) 01/10/2024 1709    HGBA1C 6.6 (H) 12/25/2023 1234        Please see Results Review for additional labs.     Diagnostic Studies: No relevant studies.    EKG:   Results for orders placed or performed during the hospital encounter of 01/10/24   EKG 12-lead    Collection Time: 01/10/24  7:01 PM    Narrative    Test Reason :     Vent. Rate : 159 BPM     Atrial Rate : 318 BPM     P-R Int : 000 ms          QRS Dur : 066 ms      QT Int : 290 ms       P-R-T Axes : 086 056 259 degrees     QTc Int : 471 ms    Atrial flutter with 2:1 A-V conduction  Nonspecific ST and T wave abnormality  Abnormal ECG  When compared with ECG of 10-KAYY-2024 13:36,  T wave inversion less evident in Anterior leads  Confirmed by Rob Leslie MD ([id number]) on 1/11/2024 10:16:09 AM    Referred By: [email or web address]   SELF           Confirmed By:Rob Leslie MD       ECHO:  See subjective, if available.      ASSESSMENT/PLAN:           Pre-op Assessment    I have reviewed the Patient Summary Reports.     I have reviewed the Nursing Notes.    I have reviewed the Medications.     Review of Systems  Anesthesia Hx:  No problems with previous Anesthesia             Denies Family Hx of Anesthesia complications.    Denies Personal Hx of Anesthesia complications.                    Social:  Smoker       Cardiovascular:     Hypertension    Dysrhythmias atrial fibrillation  CHF    hyperlipidemia                             Pulmonary:   COPD Asthma   Recent URI                 Renal/:  Chronic Renal Disease                Hepatic/GI:     GERD Liver Disease,            Musculoskeletal:  Arthritis               Neurological:   CVA    Denies Seizures.                                Endocrine:  Diabetes, type 2         Denies Obesity / BMI > 30  Psych:  Psychiatric History                  Physical Exam  General: Cooperative, Alert and Oriented    Airway:  Mallampati: II    Mouth Opening: Normal  TM Distance: Normal  Neck ROM: Normal ROM    Dental:  Intact        Anesthesia Plan  Type of Anesthesia, risks & benefits discussed:    Anesthesia Type: Gen Supraglottic Airway, MAC, Gen Natural Airway  Intra-op Monitoring Plan: Standard ASA Monitors  Post Op Pain Control Plan: multimodal analgesia and IV/PO Opioids PRN  Induction:  IV  Informed Consent: Informed consent signed with the Patient and all parties understand the risks and agree with anesthesia plan.  All questions answered.   ASA Score: 4  Day of Surgery Review of History & Physical: H&P Update referred to the surgeon/provider.    Ready For Surgery From Anesthesia Perspective.     .

## 2024-01-11 NOTE — PLAN OF CARE
01/11/24 1501   Readmission   Why were you hospitalized in the last 30 days? Acute hypoxemic respiratory failure   Why were you readmitted? New medical problem   When you left the hospital how did you feel? ok   When you left the hospital where did you go? Nursing Home   Did patient/caregiver refused recommended DC plan? No   Tell me about what happened between when you left the hospital and the day you returned. unknown   When did you start not feeling well? yesterday   Did you try to manage your symptoms your self? No   Did you call anyone? No   Did you try to see or did see a doctor or nurse before you came? No   Was this a planned readmission? No     Patient facility resident at [facility].      Gavi Ramirez RN     [phone]

## 2024-01-11 NOTE — SUBJECTIVE & OBJECTIVE
Interval History: She is on 3L NC, , BP 84/59. She still has bright red rectal bleeding this morning.     ROS  Objective:     Vital Signs (Most Recent):  Temp: 97 °F (36.1 °C) (01/11/24 0300)  Pulse: (!) 120 (01/11/24 0700)  Resp: (!) 23 (01/11/24 0700)  BP: (!) 95/52 (01/11/24 0700)  SpO2: (!) 94 % (01/11/24 0700) Vital Signs (24h Range):  Temp:  [97 °F (36.1 °C)-98.5 °F (36.9 °C)] 97 °F (36.1 °C)  Pulse:  [113-180] 120  Resp:  [18-32] 23  SpO2:  [94 %-100 %] 94 %  BP: ()/(50-76) 95/52     Weight: 50 kg (110 lb 3.7 oz)  Body mass index is 22.26 kg/m².     SpO2: (!) 94 %         Intake/Output Summary (Last 24 hours) at 1/11/2024 0840  Last data filed at 1/11/2024 0625  Gross per 24 hour   Intake 3108.93 ml   Output 775 ml   Net 2333.93 ml       Lines/Drains/Airways       Drain  Duration                  Urethral Catheter 01/10/24 1429 16 Fr. <1 day              Peripheral Intravenous Line  Duration                  Peripheral IV - Single Lumen 01/10/24 1356 20 G Left Antecubital <1 day         Peripheral IV - Single Lumen 01/10/24 1436 20 G;1 3/4 in Anterior;Right Forearm <1 day                       Physical Exam  Constitutional:       Appearance: Normal appearance.   HENT:      Head: Normocephalic and atraumatic.   Eyes:      Extraocular Movements: Extraocular movements intact.      Pupils: Pupils are equal, round, and reactive to light.   Cardiovascular:      Rate and Rhythm: Regular rhythm. Tachycardia present.   Pulmonary:      Effort: Pulmonary effort is normal. No respiratory distress.      Breath sounds: Normal breath sounds.   Musculoskeletal:      Right lower leg: No edema.      Left lower leg: No edema.   Skin:     Coloration: Skin is pale.   Neurological:      General: No focal deficit present.      Mental Status: She is alert and oriented to person, place, and time.   Psychiatric:         Mood and Affect: Mood normal.         Behavior: Behavior normal.            Significant Labs: All  pertinent lab results from the last 24 hours have been reviewed.

## 2024-01-11 NOTE — HOSPITAL COURSE
Admitted for GI bleed with aflutter RVR. GI and cardiology consulted. RVR improved with IV pushes of metoprolol. Cardiology suggesting po metoprolol after GI bleed stabilized or digoxin if necessary while hypotensive. S/p IVF resuscitation and RBC transfusions. Underwent EGD on 1/11 without evident bleeding, colonoscopy 1/12 remarkable for 3 nonbleeding rectal ulcers suspected to be source of GIB. Uptitrating po lopressor for atrial fibrillation/flutter. Stable for stepdown.

## 2024-01-11 NOTE — SUBJECTIVE & OBJECTIVE
Interval History/Significant Events: Ms. Webb is doing well today despite continued bloody bowel movements. She was discharged from her previous admission with steroids 2/2 COPD exacerbation. Will restart in case of hypotension being in-part due to adrenal insufficiency. Continuing to transfuse RBCs as necessary with serial CBCs. GI with plans for EGD today. Will initiate PO metoprolol when able, digoxin if necessary while hypotensive per cards. Vanc/Zosyn for PNA or other infectious etiology.    Review of Systems   Constitutional:  Positive for fatigue. Negative for appetite change, chills, fever and unexpected weight change.   Eyes:  Negative for visual disturbance.   Respiratory:  Positive for cough. Negative for shortness of breath.    Cardiovascular:  Positive for palpitations. Negative for chest pain and leg swelling.   Gastrointestinal:  Positive for abdominal pain, blood in stool and rectal pain. Negative for constipation, diarrhea, nausea and vomiting.   Genitourinary:  Negative for difficulty urinating and hematuria.   Skin:  Positive for color change (pale).   Neurological:  Positive for weakness. Negative for dizziness and headaches.   Psychiatric/Behavioral:  Negative for confusion.      Objective:     Vital Signs (Most Recent):  Temp: 97.2 °F (36.2 °C) (01/11/24 1500)  Pulse: (!) 126 (01/11/24 1500)  Resp: 18 (01/11/24 1500)  BP: 98/61 (01/11/24 1500)  SpO2: 99 % (01/11/24 1500) Vital Signs (24h Range):  Temp:  [97 °F (36.1 °C)-98.5 °F (36.9 °C)] 97.2 °F (36.2 °C)  Pulse:  [108-162] 126  Resp:  [16-33] 18  SpO2:  [87 %-100 %] 99 %  BP: ()/(48-75) 98/61   Weight: 50 kg (110 lb 3.7 oz)  Body mass index is 22.26 kg/m².      Intake/Output Summary (Last 24 hours) at 1/11/2024 1554  Last data filed at 1/11/2024 1447  Gross per 24 hour   Intake 4440.91 ml   Output 1175 ml   Net 3265.91 ml          Physical Exam       Vents:     Lines/Drains/Airways       Drain  Duration                  Urethral  Catheter 01/10/24 1429 16 Fr. 1 day              Peripheral Intravenous Line  Duration                  Peripheral IV - Single Lumen 01/10/24 1356 20 G Left Antecubital 1 day         Peripheral IV - Single Lumen 01/10/24 1436 20 G;1 3/4 in Anterior;Right Forearm 1 day                  Significant Labs:    CBC/Anemia Profile:  Recent Labs   Lab 01/10/24  2356 01/11/24  0432 01/11/24  1107   WBC 19.75* 17.45* 13.51*   HGB 9.4* 9.4* 8.1*   HCT 29.8* 29.5* 26.4*   * 150 127*   MCV 96 96 98   RDW 17.9* 18.0* 18.0*        Chemistries:  Recent Labs   Lab 01/10/24  1411 01/10/24  2356 01/11/24  0330     --  141   K 4.3 4.1 3.8     --  109   CO2 20*  --  24   BUN 32*  --  21   CREATININE 0.9  --  0.6   CALCIUM 8.5*  --  8.0*   ALBUMIN 2.6*  --  2.3*   PROT 5.2*  --  4.5*   BILITOT 0.5  --  1.0   ALKPHOS 118  --  91   ALT 21  --  18   AST 18  --  12   MG  --  1.3* 1.4*   PHOS  --   --  2.3*           Significant Imaging:  I have reviewed all pertinent imaging results/findings within the past 24 hours.

## 2024-01-12 ENCOUNTER — ANESTHESIA (OUTPATIENT)
Dept: ENDOSCOPY | Facility: HOSPITAL | Age: 74
DRG: 393 | End: 2024-01-12
Payer: MEDICARE

## 2024-01-12 VITALS
SYSTOLIC BLOOD PRESSURE: 101 MMHG | RESPIRATION RATE: 29 BRPM | OXYGEN SATURATION: 80 % | HEART RATE: 71 BPM | DIASTOLIC BLOOD PRESSURE: 61 MMHG

## 2024-01-12 PROBLEM — D62 ACUTE BLOOD LOSS ANEMIA: Status: ACTIVE | Noted: 2024-01-12

## 2024-01-12 LAB
ALBUMIN SERPL BCP-MCNC: 2.7 G/DL (ref 3.5–5.2)
ALP SERPL-CCNC: 136 U/L (ref 55–135)
ALT SERPL W/O P-5'-P-CCNC: 22 U/L (ref 10–44)
ANION GAP SERPL CALC-SCNC: 11 MMOL/L (ref 8–16)
ANION GAP SERPL CALC-SCNC: 7 MMOL/L (ref 8–16)
AST SERPL-CCNC: 26 U/L (ref 10–40)
BASOPHILS # BLD AUTO: 0.01 K/UL (ref 0–0.2)
BASOPHILS # BLD AUTO: 0.02 K/UL (ref 0–0.2)
BASOPHILS NFR BLD: 0.1 % (ref 0–1.9)
BASOPHILS NFR BLD: 0.1 % (ref 0–1.9)
BILIRUB SERPL-MCNC: 1 MG/DL (ref 0.1–1)
BUN SERPL-MCNC: 12 MG/DL (ref 8–23)
BUN SERPL-MCNC: 9 MG/DL (ref 8–23)
CALCIUM SERPL-MCNC: 8.3 MG/DL (ref 8.7–10.5)
CALCIUM SERPL-MCNC: 8.7 MG/DL (ref 8.7–10.5)
CHLORIDE SERPL-SCNC: 104 MMOL/L (ref 95–110)
CHLORIDE SERPL-SCNC: 109 MMOL/L (ref 95–110)
CO2 SERPL-SCNC: 22 MMOL/L (ref 23–29)
CO2 SERPL-SCNC: 26 MMOL/L (ref 23–29)
CREAT SERPL-MCNC: 0.5 MG/DL (ref 0.5–1.4)
CREAT SERPL-MCNC: 0.6 MG/DL (ref 0.5–1.4)
DIFFERENTIAL METHOD BLD: ABNORMAL
DIFFERENTIAL METHOD BLD: ABNORMAL
EOSINOPHIL # BLD AUTO: 0 K/UL (ref 0–0.5)
EOSINOPHIL # BLD AUTO: 0 K/UL (ref 0–0.5)
EOSINOPHIL NFR BLD: 0.1 % (ref 0–8)
EOSINOPHIL NFR BLD: 0.2 % (ref 0–8)
ERYTHROCYTE [DISTWIDTH] IN BLOOD BY AUTOMATED COUNT: 18.6 % (ref 11.5–14.5)
ERYTHROCYTE [DISTWIDTH] IN BLOOD BY AUTOMATED COUNT: 18.6 % (ref 11.5–14.5)
EST. GFR  (NO RACE VARIABLE): >60 ML/MIN/1.73 M^2
EST. GFR  (NO RACE VARIABLE): >60 ML/MIN/1.73 M^2
GLUCOSE SERPL-MCNC: 152 MG/DL (ref 70–110)
GLUCOSE SERPL-MCNC: 158 MG/DL (ref 70–110)
HCT VFR BLD AUTO: 34 % (ref 37–48.5)
HCT VFR BLD AUTO: 35.9 % (ref 37–48.5)
HGB BLD-MCNC: 11.1 G/DL (ref 12–16)
HGB BLD-MCNC: 11.6 G/DL (ref 12–16)
IMM GRANULOCYTES # BLD AUTO: 0.07 K/UL (ref 0–0.04)
IMM GRANULOCYTES # BLD AUTO: 0.07 K/UL (ref 0–0.04)
IMM GRANULOCYTES NFR BLD AUTO: 0.4 % (ref 0–0.5)
IMM GRANULOCYTES NFR BLD AUTO: 0.5 % (ref 0–0.5)
LYMPHOCYTES # BLD AUTO: 1.8 K/UL (ref 1–4.8)
LYMPHOCYTES # BLD AUTO: 2.1 K/UL (ref 1–4.8)
LYMPHOCYTES NFR BLD: 11.3 % (ref 18–48)
LYMPHOCYTES NFR BLD: 13.4 % (ref 18–48)
MAGNESIUM SERPL-MCNC: 1.6 MG/DL (ref 1.6–2.6)
MCH RBC QN AUTO: 30 PG (ref 27–31)
MCH RBC QN AUTO: 30.1 PG (ref 27–31)
MCHC RBC AUTO-ENTMCNC: 32.3 G/DL (ref 32–36)
MCHC RBC AUTO-ENTMCNC: 32.6 G/DL (ref 32–36)
MCV RBC AUTO: 92 FL (ref 82–98)
MCV RBC AUTO: 93 FL (ref 82–98)
MONOCYTES # BLD AUTO: 0.4 K/UL (ref 0.3–1)
MONOCYTES # BLD AUTO: 0.8 K/UL (ref 0.3–1)
MONOCYTES NFR BLD: 2.6 % (ref 4–15)
MONOCYTES NFR BLD: 4.8 % (ref 4–15)
NEUTROPHILS # BLD AUTO: 12.5 K/UL (ref 1.8–7.7)
NEUTROPHILS # BLD AUTO: 13.8 K/UL (ref 1.8–7.7)
NEUTROPHILS NFR BLD: 81 % (ref 38–73)
NEUTROPHILS NFR BLD: 85.5 % (ref 38–73)
NRBC BLD-RTO: 0 /100 WBC
NRBC BLD-RTO: 0 /100 WBC
PHOSPHATE SERPL-MCNC: 2.8 MG/DL (ref 2.7–4.5)
PLATELET # BLD AUTO: 153 K/UL (ref 150–450)
PLATELET # BLD AUTO: 154 K/UL (ref 150–450)
PMV BLD AUTO: 10.3 FL (ref 9.2–12.9)
PMV BLD AUTO: 10.4 FL (ref 9.2–12.9)
POCT GLUCOSE: 167 MG/DL (ref 70–110)
POCT GLUCOSE: 190 MG/DL (ref 70–110)
POTASSIUM SERPL-SCNC: 2.8 MMOL/L (ref 3.5–5.1)
POTASSIUM SERPL-SCNC: 3.5 MMOL/L (ref 3.5–5.1)
PROT SERPL-MCNC: 5.5 G/DL (ref 6–8.4)
RBC # BLD AUTO: 3.7 M/UL (ref 4–5.4)
RBC # BLD AUTO: 3.86 M/UL (ref 4–5.4)
SODIUM SERPL-SCNC: 138 MMOL/L (ref 136–145)
SODIUM SERPL-SCNC: 141 MMOL/L (ref 136–145)
WBC # BLD AUTO: 15.48 K/UL (ref 3.9–12.7)
WBC # BLD AUTO: 16.13 K/UL (ref 3.9–12.7)

## 2024-01-12 PROCEDURE — 63600175 PHARM REV CODE 636 W HCPCS: Performed by: INTERNAL MEDICINE

## 2024-01-12 PROCEDURE — 37000009 HC ANESTHESIA EA ADD 15 MINS: Performed by: INTERNAL MEDICINE

## 2024-01-12 PROCEDURE — 80048 BASIC METABOLIC PNL TOTAL CA: CPT | Mod: XB

## 2024-01-12 PROCEDURE — D9220A PRA ANESTHESIA: Mod: ANES,,, | Performed by: INTERNAL MEDICINE

## 2024-01-12 PROCEDURE — 25000003 PHARM REV CODE 250

## 2024-01-12 PROCEDURE — C9113 INJ PANTOPRAZOLE SODIUM, VIA: HCPCS

## 2024-01-12 PROCEDURE — 63600175 PHARM REV CODE 636 W HCPCS

## 2024-01-12 PROCEDURE — 20000000 HC ICU ROOM

## 2024-01-12 PROCEDURE — 45378 DIAGNOSTIC COLONOSCOPY: CPT | Mod: ,,, | Performed by: INTERNAL MEDICINE

## 2024-01-12 PROCEDURE — 37000008 HC ANESTHESIA 1ST 15 MINUTES: Performed by: INTERNAL MEDICINE

## 2024-01-12 PROCEDURE — 27000221 HC OXYGEN, UP TO 24 HOURS

## 2024-01-12 PROCEDURE — 25000003 PHARM REV CODE 250: Performed by: INTERNAL MEDICINE

## 2024-01-12 PROCEDURE — 63600175 PHARM REV CODE 636 W HCPCS: Mod: JG | Performed by: NURSE ANESTHETIST, CERTIFIED REGISTERED

## 2024-01-12 PROCEDURE — 99900035 HC TECH TIME PER 15 MIN (STAT)

## 2024-01-12 PROCEDURE — 25000003 PHARM REV CODE 250: Performed by: STUDENT IN AN ORGANIZED HEALTH CARE EDUCATION/TRAINING PROGRAM

## 2024-01-12 PROCEDURE — 99233 SBSQ HOSP IP/OBS HIGH 50: CPT | Mod: GC,,, | Performed by: INTERNAL MEDICINE

## 2024-01-12 PROCEDURE — 45378 DIAGNOSTIC COLONOSCOPY: CPT | Performed by: INTERNAL MEDICINE

## 2024-01-12 PROCEDURE — 94761 N-INVAS EAR/PLS OXIMETRY MLT: CPT

## 2024-01-12 PROCEDURE — 25000003 PHARM REV CODE 250: Performed by: NURSE ANESTHETIST, CERTIFIED REGISTERED

## 2024-01-12 PROCEDURE — 83735 ASSAY OF MAGNESIUM: CPT

## 2024-01-12 PROCEDURE — 85025 COMPLETE CBC W/AUTO DIFF WBC: CPT | Mod: 91

## 2024-01-12 PROCEDURE — 0DJD8ZZ INSPECTION OF LOWER INTESTINAL TRACT, VIA NATURAL OR ARTIFICIAL OPENING ENDOSCOPIC: ICD-10-PCS | Performed by: INTERNAL MEDICINE

## 2024-01-12 PROCEDURE — 84100 ASSAY OF PHOSPHORUS: CPT

## 2024-01-12 PROCEDURE — 80053 COMPREHEN METABOLIC PANEL: CPT

## 2024-01-12 PROCEDURE — D9220A PRA ANESTHESIA: Mod: CRNA,,, | Performed by: NURSE ANESTHETIST, CERTIFIED REGISTERED

## 2024-01-12 RX ORDER — VASOPRESSIN 20 [USP'U]/ML
INJECTION, SOLUTION INTRAMUSCULAR; SUBCUTANEOUS
Status: DISCONTINUED | OUTPATIENT
Start: 2024-01-12 | End: 2024-01-12

## 2024-01-12 RX ORDER — METOPROLOL TARTRATE 25 MG/1
25 TABLET, FILM COATED ORAL 2 TIMES DAILY
Status: DISCONTINUED | OUTPATIENT
Start: 2024-01-12 | End: 2024-01-15

## 2024-01-12 RX ORDER — LIDOCAINE HYDROCHLORIDE 20 MG/ML
INJECTION INTRAVENOUS
Status: DISCONTINUED | OUTPATIENT
Start: 2024-01-12 | End: 2024-01-12

## 2024-01-12 RX ORDER — POTASSIUM CHLORIDE 7.45 MG/ML
10 INJECTION INTRAVENOUS
Status: COMPLETED | OUTPATIENT
Start: 2024-01-12 | End: 2024-01-12

## 2024-01-12 RX ORDER — MAGNESIUM SULFATE HEPTAHYDRATE 40 MG/ML
2 INJECTION, SOLUTION INTRAVENOUS ONCE
Status: COMPLETED | OUTPATIENT
Start: 2024-01-12 | End: 2024-01-12

## 2024-01-12 RX ORDER — MILRINONE LACTATE 0.2 MG/ML
INJECTION, SOLUTION INTRAVENOUS
Status: DISPENSED
Start: 2024-01-12 | End: 2024-01-13

## 2024-01-12 RX ORDER — MAGNESIUM SULFATE HEPTAHYDRATE 40 MG/ML
2 INJECTION, SOLUTION INTRAVENOUS ONCE
Status: DISCONTINUED | OUTPATIENT
Start: 2024-01-12 | End: 2024-01-12

## 2024-01-12 RX ORDER — METOPROLOL TARTRATE 25 MG/1
12.5 TABLET ORAL ONCE
Status: COMPLETED | OUTPATIENT
Start: 2024-01-12 | End: 2024-01-12

## 2024-01-12 RX ORDER — PROPOFOL 10 MG/ML
VIAL (ML) INTRAVENOUS
Status: DISCONTINUED | OUTPATIENT
Start: 2024-01-12 | End: 2024-01-12

## 2024-01-12 RX ORDER — METOPROLOL TARTRATE 25 MG/1
25 TABLET, FILM COATED ORAL ONCE
Status: DISCONTINUED | OUTPATIENT
Start: 2024-01-12 | End: 2024-01-12

## 2024-01-12 RX ADMIN — GABAPENTIN 300 MG: 300 CAPSULE ORAL at 09:01

## 2024-01-12 RX ADMIN — VANCOMYCIN HYDROCHLORIDE 1000 MG: 1 INJECTION, POWDER, LYOPHILIZED, FOR SOLUTION INTRAVENOUS at 08:01

## 2024-01-12 RX ADMIN — POTASSIUM CHLORIDE 10 MEQ: 7.46 INJECTION, SOLUTION INTRAVENOUS at 10:01

## 2024-01-12 RX ADMIN — GABAPENTIN 300 MG: 300 CAPSULE ORAL at 03:01

## 2024-01-12 RX ADMIN — HYDROCORTISONE SODIUM SUCCINATE 100 MG: 100 INJECTION, POWDER, FOR SOLUTION INTRAMUSCULAR; INTRAVENOUS at 03:01

## 2024-01-12 RX ADMIN — HYDROCORTISONE SODIUM SUCCINATE 100 MG: 100 INJECTION, POWDER, FOR SOLUTION INTRAMUSCULAR; INTRAVENOUS at 05:01

## 2024-01-12 RX ADMIN — HYDROCORTISONE SODIUM SUCCINATE 100 MG: 100 INJECTION, POWDER, FOR SOLUTION INTRAMUSCULAR; INTRAVENOUS at 09:01

## 2024-01-12 RX ADMIN — PANTOPRAZOLE SODIUM 40 MG: 40 INJECTION, POWDER, FOR SOLUTION INTRAVENOUS at 09:01

## 2024-01-12 RX ADMIN — MUPIROCIN: 20 OINTMENT TOPICAL at 08:01

## 2024-01-12 RX ADMIN — POTASSIUM CHLORIDE 10 MEQ: 7.46 INJECTION, SOLUTION INTRAVENOUS at 12:01

## 2024-01-12 RX ADMIN — CITALOPRAM HYDROBROMIDE 20 MG: 20 TABLET ORAL at 08:01

## 2024-01-12 RX ADMIN — BRIMONIDINE TARTRATE 1 DROP: 2 SOLUTION OPHTHALMIC at 09:01

## 2024-01-12 RX ADMIN — BRIMONIDINE TARTRATE 1 DROP: 2 SOLUTION OPHTHALMIC at 08:01

## 2024-01-12 RX ADMIN — GABAPENTIN 300 MG: 300 CAPSULE ORAL at 08:01

## 2024-01-12 RX ADMIN — LATANOPROST 1 DROP: 50 SOLUTION OPHTHALMIC at 09:01

## 2024-01-12 RX ADMIN — METOPROLOL TARTRATE 12.5 MG: 25 TABLET, FILM COATED ORAL at 11:01

## 2024-01-12 RX ADMIN — LIDOCAINE HYDROCHLORIDE 60 MG: 20 INJECTION INTRAVENOUS at 01:01

## 2024-01-12 RX ADMIN — POTASSIUM CHLORIDE 10 MEQ: 7.46 INJECTION, SOLUTION INTRAVENOUS at 01:01

## 2024-01-12 RX ADMIN — TIMOLOL MALEATE 1 DROP: 5 SOLUTION OPHTHALMIC at 08:01

## 2024-01-12 RX ADMIN — POTASSIUM CHLORIDE 10 MEQ: 7.46 INJECTION, SOLUTION INTRAVENOUS at 11:01

## 2024-01-12 RX ADMIN — MAGNESIUM SULFATE 2 G: 2 INJECTION INTRAVENOUS at 07:01

## 2024-01-12 RX ADMIN — PIPERACILLIN SODIUM AND TAZOBACTAM SODIUM 4.5 G: 4; .5 INJECTION, POWDER, FOR SOLUTION INTRAVENOUS at 10:01

## 2024-01-12 RX ADMIN — METOPROLOL TARTRATE 12.5 MG: 25 TABLET, FILM COATED ORAL at 08:01

## 2024-01-12 RX ADMIN — PROPOFOL 30 MG: 10 INJECTION, EMULSION INTRAVENOUS at 01:01

## 2024-01-12 RX ADMIN — NOREPINEPHRINE BITARTRATE 0.02 MCG/KG/MIN: 1 INJECTION, SOLUTION, CONCENTRATE INTRAVENOUS at 01:01

## 2024-01-12 RX ADMIN — POTASSIUM CHLORIDE 10 MEQ: 7.46 INJECTION, SOLUTION INTRAVENOUS at 09:01

## 2024-01-12 RX ADMIN — PIPERACILLIN SODIUM AND TAZOBACTAM SODIUM 4.5 G: 4; .5 INJECTION, POWDER, FOR SOLUTION INTRAVENOUS at 08:01

## 2024-01-12 RX ADMIN — INSULIN DETEMIR 8 UNITS: 100 INJECTION, SOLUTION SUBCUTANEOUS at 09:01

## 2024-01-12 RX ADMIN — TAMSULOSIN HYDROCHLORIDE 0.4 MG: 0.4 CAPSULE ORAL at 08:01

## 2024-01-12 RX ADMIN — SODIUM CHLORIDE: 9 INJECTION, SOLUTION INTRAVENOUS at 01:01

## 2024-01-12 RX ADMIN — VASOPRESSIN 1 UNITS: 20 INJECTION INTRAVENOUS at 01:01

## 2024-01-12 RX ADMIN — MUPIROCIN: 20 OINTMENT TOPICAL at 09:01

## 2024-01-12 RX ADMIN — POTASSIUM BICARBONATE 40 MEQ: 391 TABLET, EFFERVESCENT ORAL at 06:01

## 2024-01-12 RX ADMIN — METOPROLOL TARTRATE 25 MG: 25 TABLET, FILM COATED ORAL at 09:01

## 2024-01-12 RX ADMIN — POTASSIUM CHLORIDE 10 MEQ: 7.46 INJECTION, SOLUTION INTRAVENOUS at 07:01

## 2024-01-12 RX ADMIN — PANTOPRAZOLE SODIUM 40 MG: 40 INJECTION, POWDER, FOR SOLUTION INTRAVENOUS at 08:01

## 2024-01-12 RX ADMIN — PIPERACILLIN SODIUM AND TAZOBACTAM SODIUM 4.5 G: 4; .5 INJECTION, POWDER, FOR SOLUTION INTRAVENOUS at 03:01

## 2024-01-12 NOTE — ANESTHESIA POSTPROCEDURE EVALUATION
Anesthesia Post Evaluation    Patient: Deb Webb    Procedure(s) Performed: Procedure(s) (LRB):  COLONOSCOPY (N/A)    Final Anesthesia Type: general      Patient location during evaluation: ICU  Patient participation: Yes- Able to Participate  Level of consciousness: awake  Post-procedure vital signs: reviewed and stable  Pain management: adequate  Airway patency: patent    PONV status at discharge: No PONV  Anesthetic complications: no      Cardiovascular status: blood pressure returned to baseline  Respiratory status: unassisted  Hydration status: euvolemic  Follow-up not needed.              Vitals Value Taken Time   /76 01/12/24 1359   Temp 35.7 °C (96.3 °F) 01/12/24 0715   Pulse 107 01/12/24 1359   Resp 28 01/12/24 1359   SpO2 94 % 01/12/24 1359   Vitals shown include unvalidated device data.      No case tracking events are documented in the log.      Pain/Raquel Score: No data recorded

## 2024-01-12 NOTE — ANESTHESIA POSTPROCEDURE EVALUATION
Anesthesia Post Evaluation    Patient: Deb Webb    Procedure(s) Performed: Procedure(s) (LRB):  EGD (ESOPHAGOGASTRODUODENOSCOPY) (N/A)    Final Anesthesia Type: general      Patient location during evaluation: PACU  Patient participation: Yes- Able to Participate  Level of consciousness: awake and alert  Post-procedure vital signs: reviewed and stable  Pain management: adequate  Airway patency: patent  MARVIN mitigation strategies: Multimodal analgesia  PONV status at discharge: No PONV  Anesthetic complications: no      Cardiovascular status: blood pressure returned to baseline  Respiratory status: unassisted  Hydration status: euvolemic  Follow-up not needed.              Vitals Value Taken Time   /81 01/12/24 1132   Temp 35.7 °C (96.3 °F) 01/12/24 0715   Pulse 106 01/12/24 1211   Resp 26 01/12/24 1211   SpO2 98 % 01/12/24 1211   Vitals shown include unvalidated device data.      No case tracking events are documented in the log.      Pain/Raquel Score: No data recorded

## 2024-01-12 NOTE — PROVATION PATIENT INSTRUCTIONS
Discharge Summary/Instructions after an Endoscopic Procedure  Patient Name: Deb Alexander  Patient MRN: [id number]  Patient YOB: 1950  Friday, January 12, 2024  Jose Manuel Gilmore MD  Dear patient,  As a result of recent federal legislation (The Federal Cures Act), you may   receive lab or pathology results from your procedure in your [facility]   account before your physician is able to contact you. Your physician or   their representative will relay the results to you with their   recommendations at their soonest availability.  Thank you,  RESTRICTIONS:  During your procedure today, you received medications for sedation.  These   medications may affect your judgment, balance and coordination.  Therefore,   for 24 hours, you have the following restrictions:   - DO NOT drive a car, operate machinery, make legal/financial decisions,   sign important papers or drink alcohol.    ACTIVITY:  Today: no heavy lifting, straining or running due to procedural   sedation/anesthesia.  The following day: return to full activity including work.  DIET:  Eat and drink normally unless instructed otherwise.     TREATMENT FOR COMMON SIDE EFFECTS:  - Mild abdominal pain, nausea, belching, bloating or excessive gas:  rest,   eat lightly and use a heating pad.  - Sore Throat: treat with throat lozenges and/or gargle with warm salt   water.  - Because air was used during the procedure, expelling large amounts of air   from your rectum or belching is normal.  - If a bowel prep was taken, you may not have a bowel movement for 1-3 days.    This is normal.  SYMPTOMS TO WATCH FOR AND REPORT TO YOUR PHYSICIAN:  1. Abdominal pain or bloating, other than gas cramps.  2. Chest pain.  3. Back pain.  4. Signs of infection such as: chills or fever occurring within 24 hours   after the procedure.  5. Rectal bleeding, which would show as bright red, maroon, or black stools.   (A tablespoon of blood from the rectum is not serious, especially  if   hemorrhoids are present.)  6. Vomiting.  7. Weakness or dizziness.  GO DIRECTLY TO THE NEAREST EMERGENCY ROOM IF YOU HAVE ANY OF THE FOLLOWING:      Difficulty breathing              Chills and/or fever over 101 F   Persistent vomiting and/or vomiting blood   Severe abdominal pain   Severe chest pain   Black, tarry stools   Bleeding- more than one tablespoon   Any other symptom or condition that you feel may need urgent attention  Your doctor recommends these additional instructions:  If any biopsies were taken, your doctors clinic will contact you in 1 to 2   weeks with any results.  - Observe patient in ICU.   - Advance diet as tolerated.   - Continue present medications.   - No recommendation at this time regarding repeat colonoscopy.   For questions, problems or results please call your physician - Jose Manuel Gilmore MD at Work:  [phone].  [facility] [place], EMERGENCY ROOM PHONE NUMBER: [phone]  IF A COMPLICATION OR EMERGENCY SITUATION ARISES AND YOU ARE UNABLE TO REACH   YOUR PHYSICIAN - GO DIRECTLY TO THE EMERGENCY ROOM.  Jose Manuel Gilmore MD  1/12/2024 3:05:44 PM  This report has been verified and signed electronically.  Dear patient,  As a result of recent federal legislation (The Federal Cures Act), you may   receive lab or pathology results from your procedure in your [facility]   account before your physician is able to contact you. Your physician or   their representative will relay the results to you with their   recommendations at their soonest availability.  Thank you,  PROVATION

## 2024-01-12 NOTE — ASSESSMENT & PLAN NOTE
Patient has been bed bound due to recent pelvic fracture and reports cough x 1 month w/ 'ice cream' colored sputum. CT A/P showed new airspace consolidation in the LLL.     - Repeat CXR  - Continue Vanc (recent hospitalization) and Zosyn. De-escalate pending culture data   - Sputum culture ordered

## 2024-01-12 NOTE — TREATMENT PLAN
GI Treatment Plan Note:     Impression:            - Decreased sphincter tone found on digital rectal                          exam.                          - Three ulcers in the distal rectum. Much improved                          appearances compared to last colonoscopy. Suspect                          there is a component of rectal prolapse.                          - Diverticulosis in the sigmoid colon and in the                          descending colon.                          - One 4 mm polyp in the descending colon.                          Resection not attempted.                          - Localized mild inflammation was found at the                          splenic flexure secondary to ischemic colitis.                          - The examination was otherwise normal.                          - No specimens collected.     Recommendation:        - Observe patient in ICU.                          - Advance diet as tolerated.                          - Continue present medications.                          - No recommendation at this time regarding repeat                          colonoscopy.     We will sign off at this time.     Katelyn Dee,   Gastroenterology PGY-4

## 2024-01-12 NOTE — SUBJECTIVE & OBJECTIVE
Interval History/Significant Events: Ms. Webb is doing well this morning. Still tachy to the 150s. 12.5 oral metoprolol initiated last night will administer again this morning and assess rate. Colonoscopy with GI today. She reports improvement in bloody bowel movements and hgb stable.    Review of Systems   Constitutional:  Positive for fatigue. Negative for appetite change, chills, fever and unexpected weight change.   Eyes:  Negative for visual disturbance.   Respiratory:  Negative for cough and shortness of breath.    Cardiovascular:  Positive for palpitations. Negative for chest pain and leg swelling.   Gastrointestinal:  Positive for abdominal pain and rectal pain. Negative for blood in stool, constipation, diarrhea, nausea and vomiting.   Genitourinary:  Negative for difficulty urinating and hematuria.   Skin:  Positive for color change (pale).   Neurological:  Positive for weakness. Negative for dizziness and headaches.   Psychiatric/Behavioral:  Negative for confusion.      Objective:     Vital Signs (Most Recent):  Temp: 96.3 °F (35.7 °C) (01/12/24 0715)  Pulse: (!) 126 (01/12/24 0845)  Resp: (!) 25 (01/12/24 0845)  BP: 123/75 (01/12/24 0830)  SpO2: 98 % (01/12/24 0845) Vital Signs (24h Range):  Temp:  [96 °F (35.6 °C)-97.8 °F (36.6 °C)] 96.3 °F (35.7 °C)  Pulse:  [] 126  Resp:  [13-35] 25  SpO2:  [67 %-100 %] 98 %  BP: ()/(48-96) 123/75   Weight: 50 kg (110 lb 3.7 oz)  Body mass index is 22.26 kg/m².      Intake/Output Summary (Last 24 hours) at 1/12/2024 0912  Last data filed at 1/12/2024 0800  Gross per 24 hour   Intake 6166.72 ml   Output 1500 ml   Net 4666.72 ml          Physical Exam  Vitals and nursing note reviewed.   Constitutional:       General: She is not in acute distress.  HENT:      Head: Normocephalic and atraumatic.      Nose: Nose normal.   Eyes:      Pupils: Pupils are equal, round, and reactive to light.   Cardiovascular:      Rate and Rhythm: Tachycardia present. Rhythm  irregular.      Pulses: Normal pulses.   Pulmonary:      Effort: Pulmonary effort is normal.      Breath sounds: No wheezing.   Abdominal:      General: Abdomen is flat. There is no distension.      Palpations: Abdomen is soft. There is no mass.      Tenderness: There is no abdominal tenderness. There is no guarding or rebound.   Musculoskeletal:      Cervical back: Neck supple.      Right lower leg: No edema.      Left lower leg: No edema.   Skin:     General: Skin is warm and dry.      Coloration: Skin is pale.      Findings: Bruising present.   Neurological:      General: No focal deficit present.      Mental Status: She is alert and oriented to person, place, and time.            Vents:     Lines/Drains/Airways       Drain  Duration                  Urethral Catheter 01/10/24 1429 16 Fr. 1 day              Peripheral Intravenous Line  Duration                  Peripheral IV - Single Lumen 01/10/24 20 G Right Antecubital 2 days         Peripheral IV - Single Lumen 01/10/24 1356 20 G Left Antecubital 1 day         Peripheral IV - Single Lumen 01/10/24 1436 20 G;1 3/4 in Anterior;Left Forearm 1 day                  Significant Labs:    CBC/Anemia Profile:  Recent Labs   Lab 01/11/24  1822 01/12/24  0105 01/12/24  0549   WBC 17.34* 15.48* 16.13*   HGB 10.4* 11.1* 11.6*   HCT 31.4* 34.0* 35.9*   * 154 153   MCV 91 92 93   RDW 18.2* 18.6* 18.6*        Chemistries:  Recent Labs   Lab 01/10/24  1411 01/10/24  2356 01/11/24  0330 01/12/24  0549     --  141 141   K 4.3 4.1 3.8 2.8*     --  109 104   CO2 20*  --  24 26   BUN 32*  --  21 12   CREATININE 0.9  --  0.6 0.6   CALCIUM 8.5*  --  8.0* 8.7   ALBUMIN 2.6*  --  2.3* 2.7*   PROT 5.2*  --  4.5* 5.5*   BILITOT 0.5  --  1.0 1.0   ALKPHOS 118  --  91 136*   ALT 21  --  18 22   AST 18  --  12 26   MG  --  1.3* 1.4* 1.6   PHOS  --   --  2.3* 2.8         Significant Imaging:  I have reviewed all pertinent imaging results/findings within the past 24  hours.

## 2024-01-12 NOTE — TRANSFER OF CARE
Anesthesia Transfer of Care Note    Patient: Deb Webb    Procedure(s) Performed: Procedure(s) (LRB):  COLONOSCOPY (N/A)    Patient location: ICU    Anesthesia Type: general    Transport from OR: Continuous ECG monitoring in transport. Continuous SpO2 monitoring in transport    Post pain: adequate analgesia    Post assessment: no apparent anesthetic complications and tolerated procedure well    Post vital signs: stable    Level of consciousness: awake    Nausea/Vomiting: no nausea/vomiting    Complications: none    Transfer of care protocol was followedComments: Report to RN @ BS, VSS, SV    Last vitals: Visit Vitals  /67   Pulse 79   Temp 35.7 °C (96.3 °F) (Axillary)   Resp (!) 25   Wt 50 kg (110 lb 3.7 oz)   SpO2 96%   BMI 22.26 kg/m²

## 2024-01-12 NOTE — PROGRESS NOTES
"[facility] - Cardiac Medical ICU  Critical Care Medicine  Progress Note    Patient Name: Deb eWbb  MRN: [id number]  Admission Date: 1/10/2024  Hospital Length of Stay: 2 days  Code Status: Full Code  Attending Provider: Trevor Haney*  Primary Care Provider: Trisotn Valverde MD   Principal Problem: GI bleed    Subjective:     HPI:  Ms. Webb is a 72 y/o woman with pmh of arthritis, COPD w/ 6L O2 at baseline and history of CVA , diabetes mellitus type 2, hyperlipidemia, hypertension, paroxysmal AFib on Xarelto. She presented to the emergency department via EMS secondary to bright red blood per rectum. She reports three days of bloody stools. She quantifies the amount as "three cups of blood" per day for three days. She has never had this happen to her before. She also reports fatigue, pale coloration of skin, productive cough, mild diffuse abdominal pain and moderate rectal pain. She was [place] [facility] [place] after admission for URI and COPD exacerbation. Of note, recent colonoscopy reports bleeding could be due to rectal prolapse. She denies headaches, dizziness, shortness of breath, chest pain, nausea, vomiting, recent NSAID use/goody powder, weight loss, fevers, night sweats, hematuria.    In the ED, the patient received 1L IVF, 1u RBCs, Vanc, Zosyn, 80 IV pantoprazole, BC x2. Her blood pressures have been maintained 90s-100s/60s-70s with HR 150s-180s and EKG noting aflutter with RVR. GI and cardiology consulted. Labs notable for Hgb of 8.5 (11 7 days ago), wbc 25, Bicarb 20, lactate 4.28, PTT 21.1, PT 12.7, INR of 1.2. CTA with no evidence of acute GI bleed but with prominent perirectal vessels, possible hemorrhoids. Large amount of retained stool within sigmoid colon/rectum. New airspace consolidation in LLL with bronchial thickening concerning for PNA vs atelectasis.     Hospital/ICU Course:  Admitted for GI bleed with aflutter RVR. GI and cardiology consulted. RVR " improved with IV pushes of metoprolol. Cardiology suggesting po metoprolol after GI bleed stabilized or digoxin if necessary while hypotensive. S/p IVF resuscitation and RBC transfusions. Underwent EGD on 1/11 without evident bleeding, colonoscopy 1/12.    Interval History/Significant Events: Ms. Webb is doing well this morning. Still tachy to the 150s. 12.5 oral metoprolol initiated last night will administer again this morning and assess rate. Colonoscopy with GI today. She reports improvement in bloody bowel movements and hgb stable.    Review of Systems   Constitutional:  Positive for fatigue. Negative for appetite change, chills, fever and unexpected weight change.   Eyes:  Negative for visual disturbance.   Respiratory:  Negative for cough and shortness of breath.    Cardiovascular:  Positive for palpitations. Negative for chest pain and leg swelling.   Gastrointestinal:  Positive for abdominal pain and rectal pain. Negative for blood in stool, constipation, diarrhea, nausea and vomiting.   Genitourinary:  Negative for difficulty urinating and hematuria.   Skin:  Positive for color change (pale).   Neurological:  Positive for weakness. Negative for dizziness and headaches.   Psychiatric/Behavioral:  Negative for confusion.      Objective:     Vital Signs (Most Recent):  Temp: 96.3 °F (35.7 °C) (01/12/24 0715)  Pulse: (!) 126 (01/12/24 0845)  Resp: (!) 25 (01/12/24 0845)  BP: 123/75 (01/12/24 0830)  SpO2: 98 % (01/12/24 0845) Vital Signs (24h Range):  Temp:  [96 °F (35.6 °C)-97.8 °F (36.6 °C)] 96.3 °F (35.7 °C)  Pulse:  [] 126  Resp:  [13-35] 25  SpO2:  [67 %-100 %] 98 %  BP: ()/(48-96) 123/75   Weight: 50 kg (110 lb 3.7 oz)  Body mass index is 22.26 kg/m².      Intake/Output Summary (Last 24 hours) at 1/12/2024 0912  Last data filed at 1/12/2024 0800  Gross per 24 hour   Intake 6166.72 ml   Output 1500 ml   Net 4666.72 ml          Physical Exam  Vitals and nursing note reviewed.   Constitutional:        General: She is not in acute distress.  HENT:      Head: Normocephalic and atraumatic.      Nose: Nose normal.   Eyes:      Pupils: Pupils are equal, round, and reactive to light.   Cardiovascular:      Rate and Rhythm: Tachycardia present. Rhythm irregular.      Pulses: Normal pulses.   Pulmonary:      Effort: Pulmonary effort is normal.      Breath sounds: No wheezing.   Abdominal:      General: Abdomen is flat. There is no distension.      Palpations: Abdomen is soft. There is no mass.      Tenderness: There is no abdominal tenderness. There is no guarding or rebound.   Musculoskeletal:      Cervical back: Neck supple.      Right lower leg: No edema.      Left lower leg: No edema.   Skin:     General: Skin is warm and dry.      Coloration: Skin is pale.      Findings: Bruising present.   Neurological:      General: No focal deficit present.      Mental Status: She is alert and oriented to person, place, and time.            Vents:     Lines/Drains/Airways       Drain  Duration                  Urethral Catheter 01/10/24 1429 16 Fr. 1 day              Peripheral Intravenous Line  Duration                  Peripheral IV - Single Lumen 01/10/24 20 G Right Antecubital 2 days         Peripheral IV - Single Lumen 01/10/24 1356 20 G Left Antecubital 1 day         Peripheral IV - Single Lumen 01/10/24 1436 20 G;1 3/4 in Anterior;Left Forearm 1 day                  Significant Labs:    CBC/Anemia Profile:  Recent Labs   Lab 01/11/24  1822 01/12/24  0105 01/12/24  0549   WBC 17.34* 15.48* 16.13*   HGB 10.4* 11.1* 11.6*   HCT 31.4* 34.0* 35.9*   * 154 153   MCV 91 92 93   RDW 18.2* 18.6* 18.6*        Chemistries:  Recent Labs   Lab 01/10/24  1411 01/10/24  2356 01/11/24  0330 01/12/24  0549     --  141 141   K 4.3 4.1 3.8 2.8*     --  109 104   CO2 20*  --  24 26   BUN 32*  --  21 12   CREATININE 0.9  --  0.6 0.6   CALCIUM 8.5*  --  8.0* 8.7   ALBUMIN 2.6*  --  2.3* 2.7*   PROT 5.2*  --  4.5* 5.5*  "  BILITOT 0.5  --  1.0 1.0   ALKPHOS 118  --  91 136*   ALT 21  --  18 22   AST 18  --  12 26   MG  --  1.3* 1.4* 1.6   PHOS  --   --  2.3* 2.8         Significant Imaging:  I have reviewed all pertinent imaging results/findings within the past 24 hours.    ABG  No results for input(s): "PH", "PO2", "PCO2", "HCO3", "BE" in the last 168 hours.  Assessment/Plan:     Pulmonary  Pneumonia  Patient has been bed bound due to recent pelvic fracture and reports cough x 1 month w/ 'ice cream' colored sputum. CT A/P showed new airspace consolidation in the LLL.     - Repeat CXR  - Continue Vanc (recent hospitalization) and Zosyn. De-escalate pending culture data   - Sputum culture ordered     COPD exacerbation  - resume home inhalers   - start hydrocortisone in case of adrenal insufficiency as patient was taking oral steroids after last admission for COPD exacerbation    Cardiac/Vascular  Atrial flutter  EKG w/ Aflutter RVF in the ED. Otherwise HDS. Cardiology consulted; appreciate recs.     - Optimize resuscitation in the setting of acute GIB followed by consideration of rate control if patient's HR continues to be uncontrolled. Will start w/ PO BB. If unable to tolerate, will consider digoxin.  - Telemetry  - EKG PRN  - Mg>2 and K >4    Hematology  Coagulopathy  - INR 1.4  - Daily PT/INR    Endocrine  Controlled type 2 diabetes mellitus with diabetic neuropathy, without long-term current use of insulin  Lab Results   Component Value Date    HGBA1C 6.6 (H) 12/25/2023     -  upon arrival  - Basal/bolus insulin regimen to target inpatient BG goal of 140-180; uptitrate as needed     GI  * GI bleed  72 y/o woman with pmh of arthritis, COPD w/ 6L O2 at baseline and history of CVA , diabetes mellitus type 2, hyperlipidemia, hypertension, paroxysmal AFib on Xarelto presenting w/ BRBPR. Of note, recent colonoscopy reports bleeding could be due to rectal prolapse. Denies use of NSAIDS, BC powder. Labs notable for Hgb of 8.5 (11 " 7 days ago). CTA with no evidence of acute GI bleed but with prominent perirectal vessels, possible hemorrhoids. Large amount of retained stool within sigmoid colon/rectum. Admitted to MICU for acute GIB. S/p EGD without source of bleeding noted.    - GI consulted, appreciate recs - colonoscopy today  - Trend Hgb and transfuse for goal Hgb > 7, unless otherwise indicated  - Maintain IV access with 2 large bore IV's  - Intravascular resuscitation/support with IVF's   - Hold all NSAIDs and anticoagulants, unless contraindicated  - Bolus IV pantoprazole 80 mg followed by 40 mg BID  - Correct any coagulopathy with platelets and FFP for goal of platelets >50K and INR <2.    Other  Debility  - Consult PT/OT when appropriate       Critical Care Daily Checklist:    A: Awake: RASS Goal/Actual Goal: RASS Goal: 0-->alert and calm  Actual:     B: Spontaneous Breathing Trial Performed?     C: SAT & SBT Coordinated?  N/A                      D: Delirium: CAM-ICU Overall CAM-ICU: Negative   E: Early Mobility Performed? Yes   F: Feeding Goal:    Status:     Current Diet Order   Procedures    Diet NPO      AS: Analgesia/Sedation N/A   T: Thromboembolic Prophylaxis Actively bleeding   H: HOB > 300 Yes   U: Stress Ulcer Prophylaxis (if needed) PPI   G: Glucose Control Basal/bolus   B: Bowel Function Stool Occurrence: 1   I: Indwelling Catheter (Lines & Hernandez) Necessity PIV x3, hernandez   D: De-escalation of Antimicrobials/Pharmacotherapies Vanc/Zosyn    Plan for the day/ETD Colonoscopy, rate control    Code Status:  Family/Goals of Care: Full Code       Critical secondary to Patient has a condition that poses threat to life and bodily function: GI bleed/RVR      Critical care was time spent personally by me on the following activities: development of treatment plan with patient or surrogate and bedside caregivers, discussions with consultants, evaluation of patient's response to treatment, examination of patient, ordering and performing  treatments and interventions, ordering and review of laboratory studies, ordering and review of radiographic studies, pulse oximetry, re-evaluation of patient's condition. This critical care time did not overlap with that of any other provider or involve time for any procedures.     Alejandra Escoto MD  Critical Care Medicine  [facility] - Cardiac Medical ICU

## 2024-01-12 NOTE — CONSULTS
[facility] - [facility]  Wound Care    Patient Name:  Dbe Webb   MRN:  [id number]  Date: 1/12/2024  Diagnosis: GI bleed    History:     Past Medical History:   Diagnosis Date    Allergy     Arthritis     Cataract     Colon polyps     COPD (chronic obstructive pulmonary disease)     Diabetes mellitus type II     Diabetic neuropathy     Fever blister     Glaucoma (increased eye pressure)     Hyperlipidemia     Hypertension     Irritable bowel syndrome     Keloid cicatrix     Osteoporosis     Retained cholelithiasis following cholecystectomy(997.41)     Right patella fracture        Social History     Socioeconomic History    Marital status:    Occupational History     Employer: [employer]   Tobacco Use    Smoking status: Every Day     Current packs/day: 0.50     Average packs/day: 0.5 packs/day for 40.0 years (20.0 ttl pk-yrs)     Types: Cigarettes    Smokeless tobacco: Former    Tobacco comments:     Not ready to quit smoking. Cut down on cigarettes but enjoys having a couple cigarettes a day.   Substance and Sexual Activity    Alcohol use: No    Drug use: No    Sexual activity: Never   Other Topics Concern    Are you pregnant or think you may be? No    Breast-feeding No     Social Determinants of Health     Financial Resource Strain: Low Risk  (1/11/2024)    Overall Financial Resource Strain (CARDIA)     Difficulty of Paying Living Expenses: Not hard at all   Food Insecurity: No Food Insecurity (1/11/2024)    Hunger Vital Sign     Worried About Running Out of Food in the Last Year: Never true     Ran Out of Food in the Last Year: Never true   Recent Concern: Food Insecurity - Food Insecurity Present (12/26/2023)    Hunger Vital Sign     Worried About Running Out of Food in the Last Year: Sometimes true     Ran Out of Food in the Last Year: Sometimes true   Transportation Needs: No Transportation Needs (1/11/2024)    PRAPARE - Transportation     Lack of Transportation (Medical): No      Lack of Transportation (Non-Medical): No   Physical Activity: Inactive (1/11/2024)    Exercise Vital Sign     Days of Exercise per Week: 0 days     Minutes of Exercise per Session: 0 min   Stress: Stress Concern Present (1/11/2024)    [place] [employer] [employer] - Occupational Stress Questionnaire     Feeling of Stress : To some extent   Social Connections: Moderately Isolated (1/11/2024)    Social Connection and Isolation Panel [NHANES]     Frequency of Communication with Friends and Family: More than three times a week     Frequency of Social Gatherings with Friends and Family: More than three times a week     Attends Christian Services: 1 to 4 times per year     Active Member of Clubs or Organizations: No     Attends Club or Organization Meetings: Never     Marital Status:    Housing Stability: Low Risk  (1/11/2024)    Housing Stability Vital Sign     Unable to Pay for Housing in the Last Year: No     Number of Places Lived in the Last Year: 1     Unstable Housing in the Last Year: No       Precautions:     Allergies as of 01/10/2024 - Reviewed 01/10/2024   Allergen Reaction Noted    Silicone  09/07/2012    Adhesive Rash 09/07/2012       WOC Assessment Details/Treatment     Patient seen for wound care consultation.   Reviewed chart for this encounter.   See Flow Sheet for findings.      RECOMMENDATIONS: Pt Aox4 and agreeable to IP wound care at this time. Primary RN at bedside. IP wound care consulted for perirectal and perineum for moisture associated breakdown due to incontinence from GI bleed and colonoscopy prep. Triad twice daily and PRN with incontinence recommended. Pt tolerated well with no pain. Due to current brock score and risk, immerse bed ordered, heel protective boots ordered. No other needs or concerns at this time.    Discussed POC with patient and primary nurse.   See EMR for orders & patient education.    Discussed nutrition and the role of protein in wound healing with  the patient. Instructed patient to optimize protein for wound healing.    Bedside nursing to continue care & monitoring.  Bedside nursing to maintain pressure injury prevention interventions.            01/12/24 0845   WOCN Assessment   WOCN Total Time (mins) 30   Visit Date 01/12/24   Visit Time 0845   Consult Type New   WOCN Speciality Wound   Wound moisture   Continence Type Fecal   Intervention assessed;applied;chart review;coordination of care;orders   Teaching on-going   Positioning   Body Position turned;heels elevated   Head of Bed (HOB) Positioning HOB at 30-45 degrees   Pressure Injury Prevention    Check Moisture Management Pad Done   Sacral Foam Dressing Peel back sacral foam dressing, assess skin and reapply   Heel protection technique Heel boot   Heel preventative measures Peel back dressing/boot, assess skin and reapply   Check Medical Devices Done        Altered Skin Integrity 01/12/24 0845 Right midline Buttocks   Date First Assessed/Time First Assessed: 01/12/24 0845   Altered Skin Integrity Present on Admission - Did Patient arrive to the hospital with altered skin?: yes  Side: Right  Orientation: midline  Location: Buttocks  Is this injury device related?: No   Wound Image    Description of Altered Skin Integrity Intact skin with non-blanchable redness of localized area   Dressing Appearance Open to air;Intact   Appearance Intact;Pink;Red   Care Cleansed with:;Soap and water;Moisturizing agent       Orders placed.   Edmar Centeno RN  01/12/2024

## 2024-01-12 NOTE — PLAN OF CARE
Problem: Infection  Goal: Absence of Infection Signs and Symptoms  Outcome: Ongoing, Progressing  Intervention: Prevent or Manage Infection  Flowsheets (Taken 1/11/2024 1900)  Infection Management: aseptic technique maintained     Problem: Adult Inpatient Plan of Care  Goal: Plan of Care Review  Outcome: Ongoing, Progressing  Flowsheets (Taken 1/12/2024 0409)  Plan of Care Reviewed With: patient  Goal: Patient-Specific Goal (Individualized)  Outcome: Ongoing, Progressing  Flowsheets (Taken 1/12/2024 0409)  Anxieties, Fears or Concerns: anxiety regarding use of bedpan. anxiety with drinking 4L of bowel prep.  Individualized Care Needs: frequent bathing. pt with multiple BM's. buttocks excoriated and frequent protective ointment needed.     Problem: Diabetes Comorbidity  Goal: Blood Glucose Level Within Targeted Range  Outcome: Ongoing, Progressing  Intervention: Monitor and Manage Glycemia  Flowsheets (Taken 1/12/2024 0409)  Glycemic Management:   blood glucose monitored   supplemental insulin given     Problem: Skin Injury Risk Increased  Goal: Skin Health and Integrity  Outcome: Ongoing, Progressing  Intervention: Optimize Skin Protection  Flowsheets (Taken 1/11/2024 1930)  Head of Bed (HOB) Positioning: HOB at 30 degrees

## 2024-01-12 NOTE — INTERVAL H&P NOTE
The patient has been examined and the H&P has been reviewed:    Pre-Procedure H and P Addendum    Patient seen and examined.  History and exam unchanged from prior history and physical.  EGD negative for source of bleeding.     Procedure: Colonoscopy   Indication: Hematochezia   ASA Class: per anesthesiology  Airway: normal  Neck Mobility: full range of motion  Mallampatti score: per anesthesia  History of anesthesia problems: no  Family history of anesthesia problems: no  Anesthesia Plan: MAC      Active Hospital Problems    Diagnosis  POA    *GI bleed [K92.2]  Unknown    Atrial flutter [I48.92]  Unknown    Coagulopathy [D68.9]  Unknown    Pneumonia [J18.9]  Unknown    Debility [R53.81]  Yes    COPD exacerbation [J44.1]  Yes    Controlled type 2 diabetes mellitus with diabetic neuropathy, without long-term current use of insulin [E11.40]  Yes      Resolved Hospital Problems   No resolved problems to display.

## 2024-01-12 NOTE — ASSESSMENT & PLAN NOTE
74 y/o woman with pmh of arthritis, COPD w/ 6L O2 at baseline and history of CVA , diabetes mellitus type 2, hyperlipidemia, hypertension, paroxysmal AFib on Xarelto presenting w/ BRBPR. Of note, recent colonoscopy reports bleeding could be due to rectal prolapse. Denies use of NSAIDS, BC powder. Labs notable for Hgb of 8.5 (11 7 days ago). CTA with no evidence of acute GI bleed but with prominent perirectal vessels, possible hemorrhoids. Large amount of retained stool within sigmoid colon/rectum. Admitted to MICU for acute GIB. S/p EGD without source of bleeding noted.    - GI consulted, appreciate recs - colonoscopy today  - Trend Hgb and transfuse for goal Hgb > 7, unless otherwise indicated  - Maintain IV access with 2 large bore IV's  - Intravascular resuscitation/support with IVF's   - Hold all NSAIDs and anticoagulants, unless contraindicated  - Bolus IV pantoprazole 80 mg followed by 40 mg BID  - Correct any coagulopathy with platelets and FFP for goal of platelets >50K and INR <2.

## 2024-01-12 NOTE — PROGRESS NOTES
2015- MICU MD's at bedside for rounds. Pt continues A-Fib RVR. BP stable. Pt A&O x 4. Plan is to give scheduled 2100 dose of metoprolol 12.5mg tab and monitor for decrease in HR. Will monitor HR closely and follow up with MD's if intervention not effective.        01/11/24 2000 01/11/24 2015 01/11/24 2030   Vital Signs   Pulse (!) 155 (!) (S)  155 (!) 154   Heart Rate Source Monitor Monitor Monitor   Resp (!) 29 (!) 24 (!) 26   SpO2 96 % (!) 92 % (!) 94 %   /73  --  115/65  (MAP = 86)   MAP (mmHg) 86  --  86

## 2024-01-13 LAB
ALBUMIN SERPL BCP-MCNC: 2.3 G/DL (ref 3.5–5.2)
ALBUMIN SERPL BCP-MCNC: 2.3 G/DL (ref 3.5–5.2)
ALP SERPL-CCNC: 92 U/L (ref 55–135)
ALP SERPL-CCNC: 92 U/L (ref 55–135)
ALT SERPL W/O P-5'-P-CCNC: 15 U/L (ref 10–44)
ALT SERPL W/O P-5'-P-CCNC: 15 U/L (ref 10–44)
ANION GAP SERPL CALC-SCNC: 6 MMOL/L (ref 8–16)
ANION GAP SERPL CALC-SCNC: 7 MMOL/L (ref 8–16)
AST SERPL-CCNC: 11 U/L (ref 10–40)
AST SERPL-CCNC: 24 U/L (ref 10–40)
BACTERIA SPEC AEROBE CULT: NORMAL
BACTERIA SPEC AEROBE CULT: NORMAL
BASOPHILS # BLD AUTO: 0 K/UL (ref 0–0.2)
BASOPHILS # BLD AUTO: 0.01 K/UL (ref 0–0.2)
BASOPHILS NFR BLD: 0 % (ref 0–1.9)
BASOPHILS NFR BLD: 0.1 % (ref 0–1.9)
BILIRUB SERPL-MCNC: 0.2 MG/DL (ref 0.1–1)
BILIRUB SERPL-MCNC: 0.6 MG/DL (ref 0.1–1)
BUN SERPL-MCNC: 13 MG/DL (ref 8–23)
BUN SERPL-MCNC: 9 MG/DL (ref 8–23)
CALCIUM SERPL-MCNC: 8.1 MG/DL (ref 8.7–10.5)
CALCIUM SERPL-MCNC: 8.3 MG/DL (ref 8.7–10.5)
CHLORIDE SERPL-SCNC: 107 MMOL/L (ref 95–110)
CHLORIDE SERPL-SCNC: 108 MMOL/L (ref 95–110)
CO2 SERPL-SCNC: 23 MMOL/L (ref 23–29)
CO2 SERPL-SCNC: 25 MMOL/L (ref 23–29)
CREAT SERPL-MCNC: 0.6 MG/DL (ref 0.5–1.4)
CREAT SERPL-MCNC: 0.7 MG/DL (ref 0.5–1.4)
DIFFERENTIAL METHOD BLD: ABNORMAL
DIFFERENTIAL METHOD BLD: ABNORMAL
EOSINOPHIL # BLD AUTO: 0 K/UL (ref 0–0.5)
EOSINOPHIL # BLD AUTO: 0 K/UL (ref 0–0.5)
EOSINOPHIL NFR BLD: 0 % (ref 0–8)
EOSINOPHIL NFR BLD: 0 % (ref 0–8)
ERYTHROCYTE [DISTWIDTH] IN BLOOD BY AUTOMATED COUNT: 18.2 % (ref 11.5–14.5)
ERYTHROCYTE [DISTWIDTH] IN BLOOD BY AUTOMATED COUNT: 18.3 % (ref 11.5–14.5)
EST. GFR  (NO RACE VARIABLE): >60 ML/MIN/1.73 M^2
EST. GFR  (NO RACE VARIABLE): >60 ML/MIN/1.73 M^2
GLUCOSE SERPL-MCNC: 188 MG/DL (ref 70–110)
GLUCOSE SERPL-MCNC: 284 MG/DL (ref 70–110)
GRAM STN SPEC: NORMAL
HCT VFR BLD AUTO: 30.4 % (ref 37–48.5)
HCT VFR BLD AUTO: 30.7 % (ref 37–48.5)
HGB BLD-MCNC: 9.8 G/DL (ref 12–16)
HGB BLD-MCNC: 9.9 G/DL (ref 12–16)
IMM GRANULOCYTES # BLD AUTO: 0.07 K/UL (ref 0–0.04)
IMM GRANULOCYTES # BLD AUTO: 0.08 K/UL (ref 0–0.04)
IMM GRANULOCYTES NFR BLD AUTO: 0.5 % (ref 0–0.5)
IMM GRANULOCYTES NFR BLD AUTO: 0.5 % (ref 0–0.5)
LYMPHOCYTES # BLD AUTO: 1.3 K/UL (ref 1–4.8)
LYMPHOCYTES # BLD AUTO: 1.4 K/UL (ref 1–4.8)
LYMPHOCYTES NFR BLD: 10.8 % (ref 18–48)
LYMPHOCYTES NFR BLD: 7.9 % (ref 18–48)
MAGNESIUM SERPL-MCNC: 1.5 MG/DL (ref 1.6–2.6)
MAGNESIUM SERPL-MCNC: 1.6 MG/DL (ref 1.6–2.6)
MCH RBC QN AUTO: 30.5 PG (ref 27–31)
MCH RBC QN AUTO: 30.6 PG (ref 27–31)
MCHC RBC AUTO-ENTMCNC: 31.9 G/DL (ref 32–36)
MCHC RBC AUTO-ENTMCNC: 32.6 G/DL (ref 32–36)
MCV RBC AUTO: 94 FL (ref 82–98)
MCV RBC AUTO: 96 FL (ref 82–98)
MONOCYTES # BLD AUTO: 0.5 K/UL (ref 0.3–1)
MONOCYTES # BLD AUTO: 0.9 K/UL (ref 0.3–1)
MONOCYTES NFR BLD: 3.8 % (ref 4–15)
MONOCYTES NFR BLD: 5.5 % (ref 4–15)
NEUTROPHILS # BLD AUTO: 11.2 K/UL (ref 1.8–7.7)
NEUTROPHILS # BLD AUTO: 13.6 K/UL (ref 1.8–7.7)
NEUTROPHILS NFR BLD: 84.9 % (ref 38–73)
NEUTROPHILS NFR BLD: 86 % (ref 38–73)
NRBC BLD-RTO: 0 /100 WBC
NRBC BLD-RTO: 0 /100 WBC
PHOSPHATE SERPL-MCNC: 2.8 MG/DL (ref 2.7–4.5)
PHOSPHATE SERPL-MCNC: 2.9 MG/DL (ref 2.7–4.5)
PLATELET # BLD AUTO: 166 K/UL (ref 150–450)
PLATELET # BLD AUTO: 170 K/UL (ref 150–450)
PMV BLD AUTO: 10.2 FL (ref 9.2–12.9)
PMV BLD AUTO: 10.3 FL (ref 9.2–12.9)
POCT GLUCOSE: 205 MG/DL (ref 70–110)
POCT GLUCOSE: 305 MG/DL (ref 70–110)
POCT GLUCOSE: 306 MG/DL (ref 70–110)
POTASSIUM SERPL-SCNC: 3.6 MMOL/L (ref 3.5–5.1)
POTASSIUM SERPL-SCNC: 4 MMOL/L (ref 3.5–5.1)
PROT SERPL-MCNC: 4.5 G/DL (ref 6–8.4)
PROT SERPL-MCNC: 4.7 G/DL (ref 6–8.4)
RBC # BLD AUTO: 3.21 M/UL (ref 4–5.4)
RBC # BLD AUTO: 3.24 M/UL (ref 4–5.4)
SODIUM SERPL-SCNC: 138 MMOL/L (ref 136–145)
SODIUM SERPL-SCNC: 138 MMOL/L (ref 136–145)
VANCOMYCIN TROUGH SERPL-MCNC: 10.9 UG/ML (ref 10–22)
WBC # BLD AUTO: 13.15 K/UL (ref 3.9–12.7)
WBC # BLD AUTO: 15.84 K/UL (ref 3.9–12.7)

## 2024-01-13 PROCEDURE — 85025 COMPLETE CBC W/AUTO DIFF WBC: CPT | Mod: 91 | Performed by: STUDENT IN AN ORGANIZED HEALTH CARE EDUCATION/TRAINING PROGRAM

## 2024-01-13 PROCEDURE — 20600001 HC STEP DOWN PRIVATE ROOM

## 2024-01-13 PROCEDURE — 84100 ASSAY OF PHOSPHORUS: CPT

## 2024-01-13 PROCEDURE — 63600175 PHARM REV CODE 636 W HCPCS: Performed by: INTERNAL MEDICINE

## 2024-01-13 PROCEDURE — 25000003 PHARM REV CODE 250: Performed by: STUDENT IN AN ORGANIZED HEALTH CARE EDUCATION/TRAINING PROGRAM

## 2024-01-13 PROCEDURE — 63600175 PHARM REV CODE 636 W HCPCS: Performed by: STUDENT IN AN ORGANIZED HEALTH CARE EDUCATION/TRAINING PROGRAM

## 2024-01-13 PROCEDURE — 36415 COLL VENOUS BLD VENIPUNCTURE: CPT | Performed by: STUDENT IN AN ORGANIZED HEALTH CARE EDUCATION/TRAINING PROGRAM

## 2024-01-13 PROCEDURE — 80053 COMPREHEN METABOLIC PANEL: CPT | Mod: 91 | Performed by: STUDENT IN AN ORGANIZED HEALTH CARE EDUCATION/TRAINING PROGRAM

## 2024-01-13 PROCEDURE — 84100 ASSAY OF PHOSPHORUS: CPT | Mod: 91 | Performed by: STUDENT IN AN ORGANIZED HEALTH CARE EDUCATION/TRAINING PROGRAM

## 2024-01-13 PROCEDURE — 25000003 PHARM REV CODE 250

## 2024-01-13 PROCEDURE — 63600175 PHARM REV CODE 636 W HCPCS

## 2024-01-13 PROCEDURE — 80053 COMPREHEN METABOLIC PANEL: CPT

## 2024-01-13 PROCEDURE — 85025 COMPLETE CBC W/AUTO DIFF WBC: CPT

## 2024-01-13 PROCEDURE — 99233 SBSQ HOSP IP/OBS HIGH 50: CPT | Mod: GC,,, | Performed by: INTERNAL MEDICINE

## 2024-01-13 PROCEDURE — 93005 ELECTROCARDIOGRAM TRACING: CPT

## 2024-01-13 PROCEDURE — 25000003 PHARM REV CODE 250: Performed by: INTERNAL MEDICINE

## 2024-01-13 PROCEDURE — 83735 ASSAY OF MAGNESIUM: CPT | Mod: 91 | Performed by: STUDENT IN AN ORGANIZED HEALTH CARE EDUCATION/TRAINING PROGRAM

## 2024-01-13 PROCEDURE — 27000221 HC OXYGEN, UP TO 24 HOURS

## 2024-01-13 PROCEDURE — C9113 INJ PANTOPRAZOLE SODIUM, VIA: HCPCS

## 2024-01-13 PROCEDURE — 94761 N-INVAS EAR/PLS OXIMETRY MLT: CPT

## 2024-01-13 PROCEDURE — 93010 ELECTROCARDIOGRAM REPORT: CPT | Mod: ,,, | Performed by: INTERNAL MEDICINE

## 2024-01-13 PROCEDURE — 83735 ASSAY OF MAGNESIUM: CPT

## 2024-01-13 PROCEDURE — 80202 ASSAY OF VANCOMYCIN: CPT | Performed by: INTERNAL MEDICINE

## 2024-01-13 RX ORDER — LOPERAMIDE HYDROCHLORIDE 2 MG/1
2 CAPSULE ORAL 4 TIMES DAILY PRN
Status: DISCONTINUED | OUTPATIENT
Start: 2024-01-13 | End: 2024-01-19 | Stop reason: HOSPADM

## 2024-01-13 RX ORDER — MAGNESIUM SULFATE HEPTAHYDRATE 40 MG/ML
2 INJECTION, SOLUTION INTRAVENOUS ONCE
Status: COMPLETED | OUTPATIENT
Start: 2024-01-13 | End: 2024-01-13

## 2024-01-13 RX ORDER — PREDNISONE 20 MG/1
40 TABLET ORAL DAILY
Status: COMPLETED | OUTPATIENT
Start: 2024-01-14 | End: 2024-01-18

## 2024-01-13 RX ORDER — PREDNISONE 20 MG/1
20 TABLET ORAL DAILY
Status: DISCONTINUED | OUTPATIENT
Start: 2024-01-26 | End: 2024-01-19 | Stop reason: HOSPADM

## 2024-01-13 RX ORDER — PREDNISONE 5 MG/1
10 TABLET ORAL DAILY
Status: DISCONTINUED | OUTPATIENT
Start: 2024-02-02 | End: 2024-01-19 | Stop reason: HOSPADM

## 2024-01-13 RX ORDER — METOPROLOL TARTRATE 1 MG/ML
INJECTION, SOLUTION INTRAVENOUS
Status: COMPLETED
Start: 2024-01-13 | End: 2024-01-13

## 2024-01-13 RX ORDER — PANTOPRAZOLE SODIUM 40 MG/1
40 TABLET, DELAYED RELEASE ORAL
Status: DISCONTINUED | OUTPATIENT
Start: 2024-01-13 | End: 2024-01-19 | Stop reason: HOSPADM

## 2024-01-13 RX ORDER — METOPROLOL TARTRATE 1 MG/ML
5 INJECTION, SOLUTION INTRAVENOUS ONCE
Status: COMPLETED | OUTPATIENT
Start: 2024-01-13 | End: 2024-01-13

## 2024-01-13 RX ADMIN — MAGNESIUM SULFATE HEPTAHYDRATE 2 G: 40 INJECTION, SOLUTION INTRAVENOUS at 08:01

## 2024-01-13 RX ADMIN — CITALOPRAM HYDROBROMIDE 20 MG: 20 TABLET ORAL at 08:01

## 2024-01-13 RX ADMIN — INSULIN DETEMIR 8 UNITS: 100 INJECTION, SOLUTION SUBCUTANEOUS at 09:01

## 2024-01-13 RX ADMIN — PIPERACILLIN SODIUM AND TAZOBACTAM SODIUM 4.5 G: 4; .5 INJECTION, POWDER, FOR SOLUTION INTRAVENOUS at 08:01

## 2024-01-13 RX ADMIN — CEFTRIAXONE 2 G: 2 INJECTION, POWDER, FOR SOLUTION INTRAMUSCULAR; INTRAVENOUS at 05:01

## 2024-01-13 RX ADMIN — SODIUM CHLORIDE 500 ML: 9 INJECTION, SOLUTION INTRAVENOUS at 07:01

## 2024-01-13 RX ADMIN — SODIUM CHLORIDE, POTASSIUM CHLORIDE, SODIUM LACTATE AND CALCIUM CHLORIDE 500 ML: 600; 310; 30; 20 INJECTION, SOLUTION INTRAVENOUS at 09:01

## 2024-01-13 RX ADMIN — HYDROCORTISONE SODIUM SUCCINATE 100 MG: 100 INJECTION, POWDER, FOR SOLUTION INTRAMUSCULAR; INTRAVENOUS at 06:01

## 2024-01-13 RX ADMIN — GABAPENTIN 300 MG: 300 CAPSULE ORAL at 09:01

## 2024-01-13 RX ADMIN — INSULIN ASPART 8 UNITS: 100 INJECTION, SOLUTION INTRAVENOUS; SUBCUTANEOUS at 11:01

## 2024-01-13 RX ADMIN — GABAPENTIN 300 MG: 300 CAPSULE ORAL at 08:01

## 2024-01-13 RX ADMIN — PANTOPRAZOLE SODIUM 40 MG: 40 INJECTION, POWDER, FOR SOLUTION INTRAVENOUS at 08:01

## 2024-01-13 RX ADMIN — BRIMONIDINE TARTRATE 1 DROP: 2 SOLUTION OPHTHALMIC at 08:01

## 2024-01-13 RX ADMIN — TIMOLOL MALEATE 1 DROP: 5 SOLUTION OPHTHALMIC at 08:01

## 2024-01-13 RX ADMIN — METOROPROLOL TARTRATE 5 MG: 5 INJECTION, SOLUTION INTRAVENOUS at 07:01

## 2024-01-13 RX ADMIN — GABAPENTIN 300 MG: 300 CAPSULE ORAL at 03:01

## 2024-01-13 RX ADMIN — METOPROLOL TARTRATE 25 MG: 25 TABLET, FILM COATED ORAL at 08:01

## 2024-01-13 RX ADMIN — LOPERAMIDE HYDROCHLORIDE 2 MG: 2 CAPSULE ORAL at 09:01

## 2024-01-13 RX ADMIN — LATANOPROST 1 DROP: 50 SOLUTION OPHTHALMIC at 09:01

## 2024-01-13 RX ADMIN — VANCOMYCIN HYDROCHLORIDE 1000 MG: 1 INJECTION, POWDER, LYOPHILIZED, FOR SOLUTION INTRAVENOUS at 10:01

## 2024-01-13 RX ADMIN — METOPROLOL TARTRATE 25 MG: 25 TABLET, FILM COATED ORAL at 07:01

## 2024-01-13 RX ADMIN — HYDROCORTISONE SODIUM SUCCINATE 100 MG: 100 INJECTION, POWDER, FOR SOLUTION INTRAMUSCULAR; INTRAVENOUS at 01:01

## 2024-01-13 RX ADMIN — PANTOPRAZOLE SODIUM 40 MG: 40 TABLET, DELAYED RELEASE ORAL at 05:01

## 2024-01-13 RX ADMIN — TAMSULOSIN HYDROCHLORIDE 0.4 MG: 0.4 CAPSULE ORAL at 08:01

## 2024-01-13 RX ADMIN — MUPIROCIN: 20 OINTMENT TOPICAL at 09:01

## 2024-01-13 RX ADMIN — INSULIN ASPART 4 UNITS: 100 INJECTION, SOLUTION INTRAVENOUS; SUBCUTANEOUS at 06:01

## 2024-01-13 NOTE — PROGRESS NOTES
Pharmacokinetic Assessment Follow Up: IV Vancomycin    Vancomycin serum concentration assessment(s):    Vancomycin trough level resulted at 10.9 mcg/mL, drawn appropriately. Goal level is 10 to 20 mcg/mL.     Drug levels (last 3 results):  Recent Labs   Lab Result Units 01/11/24  0330 01/13/24  0822   Vancomycin, Random ug/mL 8.9  --    Vancomycin-Trough ug/mL  --  10.9     Vancomycin Regimen Plan:    Since at lower end of goal, increase to vancomycin 1250 mg IV every 24 hours with next serum trough concentration measured on 1/16 0800.     Pharmacy will continue to follow and monitor vancomycin.    Please contact pharmacy at extension [phone] for questions regarding this assessment.    Thank you for the consult,   Daria Mondragon, PharmD, BCCCP                 Patient brief summary:  Deb Webb is a 73 y.o. female initiated on antimicrobial therapy with IV vancomycin for treatment of sepsis    Drug Allergies:   Review of patient's allergies indicates:   Allergen Reactions    Silicone      Burn skin    Adhesive Rash       Actual Body Weight:   50 kg     Renal Function:   Estimated Creatinine Clearance: 65.3 mL/min (based on SCr of 0.6 mg/dL).    Dialysis Method (if applicable):  N/A    CBC (last 72 hours):  Recent Labs   Lab Result Units 01/10/24  1411 01/10/24  1709 01/10/24  2159 01/10/24  2356 01/11/24  0432 01/11/24  1107 01/11/24  1822 01/12/24  0105 01/12/24  0549 01/13/24  0434   WBC K/uL 25.41*  --  21.21* 19.75* 17.45* 13.51* 17.34* 15.48* 16.13* 13.15*   Hemoglobin g/dL 8.5* 7.9* 10.2* 9.4* 9.4* 8.1* 10.4* 11.1* 11.6* 9.9*   Hematocrit % 28.2* 26.4* 32.2* 29.8* 29.5* 26.4* 31.4* 34.0* 35.9* 30.4*   Platelets K/uL 244  --  161 139* 150 127* 140* 154 153 166   Gran % % 77.4*  --  78.7* 78.2* 74.1* 68.7 88.3* 81.0* 85.5* 84.9*   Lymph % % 12.6*  --  13.3* 14.5* 18.4 23.4 7.6* 13.4* 11.3* 10.8*   Mono % % 8.7  --  7.0 6.4 6.3 6.2 3.3* 4.8 2.6* 3.8*   Eosinophil % % 0.2  --  0.2 0.2 0.5 1.3 0.2 0.2 0.1 0.0    Basophil % % 0.2  --  0.2 0.2 0.2 0.1 0.1 0.1 0.1 0.0   Differential Method  Automated  --  Automated Automated Automated Automated Automated Automated Automated Automated       Metabolic Panel (last 72 hours):  Recent Labs   Lab Result Units 01/10/24  1411 01/10/24  2356 01/11/24  0330 01/12/24  0549 01/12/24  1650 01/13/24  0434   Sodium mmol/L 137  --  141 141 138 138   Potassium mmol/L 4.3 4.1 3.8 2.8* 3.5 4.0   Chloride mmol/L 105  --  109 104 109 108   CO2 mmol/L 20*  --  24 26 22* 23   Glucose mg/dL 364*  --  262* 158* 152* 188*   BUN mg/dL 32*  --  21 12 9 9   Creatinine mg/dL 0.9  --  0.6 0.6 0.5 0.6   Albumin g/dL 2.6*  --  2.3* 2.7*  --  2.3*   Total Bilirubin mg/dL 0.5  --  1.0 1.0  --  0.6   Alkaline Phosphatase U/L 118  --  91 136*  --  92   AST U/L 18  --  12 26  --  24   ALT U/L 21  --  18 22  --  15   Magnesium mg/dL  --  1.3* 1.4* 1.6  --  1.5*   Phosphorus mg/dL  --   --  2.3* 2.8  --  2.8       Vancomycin Administrations:  vancomycin given in the last 96 hours                     vancomycin (VANCOCIN) 1,000 mg in dextrose 5 % (D5W) 250 mL IVPB (Vial-Mate) (mg) 1,000 mg New Bag 01/13/24 1039     1,000 mg New Bag 01/12/24 0825     1,000 mg New Bag 01/11/24 0912    vancomycin (VANCOCIN) 1,000 mg in dextrose 5 % (D5W) 250 mL IVPB (Vial-Mate) (mg) 1,000 mg New Bag 01/10/24 1627                    Microbiologic Results:  Microbiology Results (last 7 days)       Procedure Component Value Units Date/Time    Culture, Respiratory with Gram Stain [[id number]] Collected: 01/10/24 2028    Order Status: Completed Specimen: Sputum Updated: 01/13/24 1034     Respiratory Culture Normal respiratory chandan      No S aureus or Pseudomonas isolated.     Gram Stain (Respiratory) <10 epithelial cells per low power field.     Gram Stain (Respiratory) No WBC's     Gram Stain (Respiratory) Many Gram positive cocci     Gram Stain (Respiratory) Moderate Gram negative diplococci    Blood Culture #1 **CANNOT BE ORDERED  STAT** [[id number]] Collected: 01/10/24 1411    Order Status: Completed Specimen: Blood from Peripheral, Upper Arm, Right Updated: 01/12/24 1812     Blood Culture, Routine No Growth to date      No Growth to date      No Growth to date    Blood Culture #2 **CANNOT BE ORDERED STAT** [[id number]] Collected: 01/10/24 1411    Order Status: Completed Specimen: Blood from Peripheral, Antecubital, Right Updated: 01/12/24 1812     Blood Culture, Routine No Growth to date      No Growth to date      No Growth to date    Respiratory Infection Panel (PCR), Nasopharyngeal [[id number]] Collected: 01/10/24 1725    Order Status: Completed Specimen: Nasopharyngeal Swab Updated: 01/10/24 1909     Respiratory Infection Panel Source NP Swab     Adenovirus Not Detected     Coronavirus 229E, Common Cold Virus Not Detected     Coronavirus HKU1, Common Cold Virus Not Detected     Coronavirus NL63, Common Cold Virus Not Detected     Coronavirus OC43, Common Cold Virus Not Detected     Comment: The Coronavirus strains detected in this test cause the common cold.  These strains are not the COVID-19 (novel Coronavirus)strain   associated with the respiratory disease outbreak.          SARS-CoV2 (COVID-19) Qualitative PCR Not Detected     Human Metapneumovirus Not Detected     Human Rhinovirus/Enterovirus Not Detected     Influenza A (subtypes H1, H1-2009,H3) Not Detected     Influenza B Not Detected     Parainfluenza Virus 1 Not Detected     Parainfluenza Virus 2 Not Detected     Parainfluenza Virus 3 Not Detected     Parainfluenza Virus 4 Not Detected     Respiratory Syncytial Virus Not Detected     Bordetella Parapertussis (PF8877) Not Detected     Bordetella pertussis (ptxP) Not Detected     Chlamydia pneumoniae Not Detected     Mycoplasma pneumoniae Not Detected    Narrative:      For all other respiratory sources, order [id number] -  Respiratory Viral Panel by PCR

## 2024-01-13 NOTE — PROGRESS NOTES
Pharmacokinetic Assessment Follow Up: IV Vancomycin    Vancomycin order has been discontinued. Pharmacy will sign off. Please reconsult as needed! Thank you!    Please contact pharmacy for questions regarding this assessment.    Thank you for the consult,   Roxann Parada

## 2024-01-13 NOTE — ASSESSMENT & PLAN NOTE
72 y/o woman with pmh of arthritis, COPD w/ 6L O2 at baseline and history of CVA , diabetes mellitus type 2, hyperlipidemia, hypertension, paroxysmal AFib on Xarelto presenting w/ BRBPR. Of note, recent colonoscopy reports bleeding could be due to rectal prolapse. Denies use of NSAIDS, BC powder. Labs notable for Hgb of 8.5 (11 7 days ago). CTA with no evidence of acute GI bleed but with prominent perirectal vessels, possible hemorrhoids. Large amount of retained stool within sigmoid colon/rectum. Admitted to MICU for acute GIB. S/p EGD without source of bleeding noted.    - GI consulted, appreciate recs - colonoscopy today  - Trend Hgb and transfuse for goal Hgb > 7, unless otherwise indicated  - Maintain IV access with 2 large bore IV's  - Intravascular resuscitation/support with IVF's   - Hold all NSAIDs and anticoagulants, unless contraindicated  - Bolus IV pantoprazole 80 mg followed by 40 mg BID IV. Transitioned to po today.  - Correct any coagulopathy with platelets and FFP for goal of platelets >50K and INR <2.

## 2024-01-13 NOTE — PLAN OF CARE
"Hospital Medicine ICU Acceptance Note    Date of Admit: 1/10/2024  Date of Transfer / Stepdown: 1/13/2024  Aleksandra, JAILYN/J, L, Onc (IV chemo w/in 1 month), Gyn/Onc, or other special case?: No  ICU team stepping patient down: MICU   ICU team member giving handoff: Rex Oro MD   Accepting  team: B    History of Present Illness:     Ms. Webb is a 74 y/o woman with pmh of arthritis, COPD w/ 6L O2 at baseline and history of CVA , diabetes mellitus type 2, hyperlipidemia, hypertension, paroxysmal AFib on Xarelto. She presented to the emergency department via EMS secondary to bright red blood per rectum. She reports three days of bloody stools. She quantifies the amount as "three cups of blood" per day for three days. She has never had this happen to her before. She also reports fatigue, pale coloration of skin, productive cough, mild diffuse abdominal pain and moderate rectal pain. She was recently discharged from [facility] 5 days ago after admission for URI and COPD exacerbation. Of note, recent colonoscopy reports bleeding could be due to rectal prolapse. She denies headaches, dizziness, shortness of breath, chest pain, nausea, vomiting, recent NSAID use/goody powder, weight loss, fevers, night sweats, hematuria.     In the ED, the patient received 1L IVF, 1u RBCs, Vanc, Zosyn, 80 IV pantoprazole, BC x2. Her blood pressures have been maintained 90s-100s/60s-70s with HR 150s-180s and EKG noting aflutter with RVR. GI and cardiology consulted. Labs notable for Hgb of 8.5 (11 7 days ago), wbc 25, Bicarb 20, lactate 4.28, PTT 21.1, PT 12.7, INR of 1.2. CTA with no evidence of acute GI bleed but with prominent perirectal vessels, possible hemorrhoids. Large amount of retained stool within sigmoid colon/rectum. New airspace consolidation in LLL with bronchial thickening concerning for PNA vs atelectasis.     Hospital/ICU Course:     This is a 73-year-old female with a history of COPD on 6 L NC at home, CVA, PAF on Xarelto " who originally presented with 3 days of hematochezia.  HB on presentation was 8.5.  CTA without evidence of acute GI be however did showed prominent perirectal vessels and possible hemorrhoids with large amount of retained stool within the sigmoid colon/rectum.  Stool noted to be melanotic on arrival.  Also did show left lower lobe airspace consolidation concerning for pneumonia, started on empiric IV vancomycin, Zosyn along with IV hydrocortisone for COPD exacerbation.  On arrival, was significantly tachycardic with rates of 150s, found to be in atrial flutter with RVR.  Cardiology was consulted, attributed elevated rate in the setting of pneumonia and GI be.  Initially recommended digoxin but primary team elected for BB due to hemodynamic stability.  Eventually underwent EGD/colonoscopy 1/12, found to have 3 ulcers in the distal rectum, much improved since compared to last colonoscopy, sigmoid and descending colon diverticulosis, mild splenic flexure inflammation.  On PPI IV b.i.d.    Deemed appropriate for transfer to the floor on 1/13/2024, and was accepted to  team B for further care and management.    Consultants and Procedures:     Consultants:  Consults (From admission, onward)          Status Ordering Provider     Pharmacy to dose Vancomycin consult  Once        Provider:  (Not yet assigned)   See [facility] for full Linked Orders Report.    Acknowledged ELAINA MALDONADO     Inpatient consult to Gastroenterology  Once        Provider:  (Not yet assigned)    Completed FRANSICO BAUER     Inpatient consult to Critical Care Medicine  Once        Provider:  (Not yet assigned)    Completed FRANSICO BAUER     Inpatient consult to Cardiology  Once        Provider:  (Not yet assigned)    Completed FRANSICO BAUER            Procedures:    As documented    Transfer Information:     Diet:  As ordered    Physical Activity:  As tolerated      Pending plan at time of transfer to the floor:  Continue current  plan initiated by ICU, will further monitor and adjust as clinically indicated upon arrival to the floor.    Patient has been accepted by Hospital Medicine Team B, who will assume care of the patient upon arrival to the floor from the ICU. Please contact ICU team with any concerns prior to transfer to the floor.      Estevan Daly DO  01/13/2024

## 2024-01-13 NOTE — ASSESSMENT & PLAN NOTE
Patient has been bed bound due to recent pelvic fracture and reports cough x 1 month w/ 'ice cream' colored sputum. CT A/P showed new airspace consolidation in the LLL.     - Transitioned to CTX from Vanc/Zosyn considering 2 days of no growth on respiratory culture  - f/u respiratory culture

## 2024-01-13 NOTE — SUBJECTIVE & OBJECTIVE
Interval History/Significant Events: Ms. Webb continues to do well. No further report of bleeding. Rates in low 100s-110s; asymptomatic and maintaining MAPs. Stable for stepdown today.    Review of Systems   Constitutional:  Positive for fatigue. Negative for appetite change, chills, fever and unexpected weight change.   Eyes:  Negative for visual disturbance.   Respiratory:  Negative for cough and shortness of breath.    Cardiovascular:  Positive for palpitations. Negative for chest pain and leg swelling.   Gastrointestinal:  Positive for abdominal pain and rectal pain. Negative for blood in stool, constipation, diarrhea, nausea and vomiting.   Genitourinary:  Negative for difficulty urinating and hematuria.   Skin:  Positive for color change (pale).   Neurological:  Positive for weakness. Negative for dizziness and headaches.   Psychiatric/Behavioral:  Negative for confusion.      Objective:     Vital Signs (Most Recent):  Temp: 98.1 °F (36.7 °C) (01/13/24 1445)  Pulse: 92 (01/13/24 1445)  Resp: (!) 22 (01/13/24 1445)  BP: 102/81 (01/13/24 1445)  SpO2: 98 % (01/13/24 1445) Vital Signs (24h Range):  Temp:  [97.6 °F (36.4 °C)-98.1 °F (36.7 °C)] 98.1 °F (36.7 °C)  Pulse:  [] 92  Resp:  [12-31] 22  SpO2:  [87 %-99 %] 98 %  BP: ()/(56-81) 102/81   Weight: 50 kg (110 lb 3.7 oz)  Body mass index is 22.26 kg/m².      Intake/Output Summary (Last 24 hours) at 1/13/2024 1539  Last data filed at 1/13/2024 1223  Gross per 24 hour   Intake 578.54 ml   Output 535 ml   Net 43.54 ml            Physical Exam  Vitals and nursing note reviewed.   Constitutional:       General: She is not in acute distress.  HENT:      Head: Normocephalic and atraumatic.      Nose: Nose normal.   Eyes:      Pupils: Pupils are equal, round, and reactive to light.   Cardiovascular:      Rate and Rhythm: Tachycardia present. Rhythm irregular.      Pulses: Normal pulses.   Pulmonary:      Effort: Pulmonary effort is normal.      Breath  sounds: No wheezing.   Abdominal:      General: Abdomen is flat. There is no distension.      Palpations: Abdomen is soft. There is no mass.      Tenderness: There is no abdominal tenderness. There is no guarding or rebound.   Musculoskeletal:      Cervical back: Neck supple.      Right lower leg: No edema.      Left lower leg: No edema.   Skin:     General: Skin is warm and dry.      Coloration: Skin is pale.      Findings: Bruising present.   Neurological:      General: No focal deficit present.      Mental Status: She is alert and oriented to person, place, and time.            Vents:     Lines/Drains/Airways       Drain  Duration                  Urethral Catheter 01/10/24 1429 16 Fr. 3 days              Peripheral Intravenous Line  Duration                  Peripheral IV - Single Lumen 01/10/24 1356 20 G Left Antecubital 3 days         Peripheral IV - Single Lumen 01/10/24 1436 20 G;1 3/4 in Anterior;Left Forearm 3 days                  Significant Labs:    CBC/Anemia Profile:  Recent Labs   Lab 01/12/24  0105 01/12/24  0549 01/13/24  0434   WBC 15.48* 16.13* 13.15*   HGB 11.1* 11.6* 9.9*   HCT 34.0* 35.9* 30.4*    153 166   MCV 92 93 94   RDW 18.6* 18.6* 18.3*          Chemistries:  Recent Labs   Lab 01/12/24  0549 01/12/24  1650 01/13/24  0434    138 138   K 2.8* 3.5 4.0    109 108   CO2 26 22* 23   BUN 12 9 9   CREATININE 0.6 0.5 0.6   CALCIUM 8.7 8.3* 8.1*   ALBUMIN 2.7*  --  2.3*   PROT 5.5*  --  4.7*   BILITOT 1.0  --  0.6   ALKPHOS 136*  --  92   ALT 22  --  15   AST 26  --  24   MG 1.6  --  1.5*   PHOS 2.8  --  2.8           Significant Imaging:  I have reviewed all pertinent imaging results/findings within the past 24 hours.

## 2024-01-13 NOTE — NURSING
Patient received from MICU , AAO*4 . Skin assessment  done . Saini's  catheter continue . Had bowel movement . Call light within reach , safety precaution maintained . Will continue monitoring .

## 2024-01-13 NOTE — PLAN OF CARE
MICU DAILY GOALS     Family/Goals of care/Code Status   Code Status: Full Code    24H Vital Sign Range  Temp:  [96.3 °F (35.7 °C)-97.8 °F (36.6 °C)]   Pulse:  []   Resp:  [12-47]   BP: ()/(56-93)   SpO2:  [80 %-100 %]      Shift Events (include procedures and significant events)   No acute events throughout shift. MD notified of Mag of 1.5.     AWAKE RASS: Goal - RASS Goal: 0-->alert and calm  Actual - RASS (Gonzales Agitation-Sedation Scale): alert and calm    Restraint necessity: Not necessary   BREATHE SBT: Not intubated    Coordinate A & B, analgesics/sedatives Pain: managed   SAT: Not intubated   Delirium CAM-ICU: Overall CAM-ICU: Negative   Early(intubated/ Progressive (non-intubated) Mobility MOVE Screen (INTUBATED ONLY): Not intubated    Activity: Activity Management: Rolling - L1   Feeding/Nutrition Diet order: Diet/Nutrition Received: NPO,     Thrombus DVT prophylaxis: VTE Required Core Measure: (SCDs) Sequential compression device initiated/maintained   HOB Elevation Head of Bed (HOB) Positioning: HOB at 30-45 degrees   Ulcer Prophylaxis GI: yes   Glucose control managed Glycemic Management: blood glucose monitored   Skin Skin assessed during: Daily Assessment    Sacrum intact/not altered? No  Heels intact/not altered? Yes  Surgical wound? No    Check one (no altered skin or altered skin) and sub boxes:  [] No Altered Skin Integrity Present    []Prevention Measures Documented    [] Altered Skin Integrity Present or Discovered   [] LDA present in EPIC              [] LDA added in EPIC   [] Wound Image Taken (required on admit,                   transfer/discharge and every Tuesday)    Wound Care Consulted? Yes    Attending Nurse:     Second RN/Staff Member:    Bowel Function no issues    Indwelling Catheter Necessity      Urethral Catheter 01/10/24 1429 16 Fr.-Reason for Continuing Urinary Catheterization: Critically ill in ICU and requiring hourly monitoring of intake/output           De-escalation Antibiotics Yes       VS and assessment per flow sheet, patient progressing towards goals as tolerated, plan of care reviewed with  Ms. Webb , all concerns addressed, will continue to monitor.

## 2024-01-13 NOTE — ASSESSMENT & PLAN NOTE
EKG w/ Aflutter RVF in the ED. Otherwise HDS. Cardiology consulted; appreciate recs.     - Optimize resuscitation in the setting of acute GIB followed by consideration of rate control if patient's HR continues to be uncontrolled. Will start w/ PO BB. If unable to tolerate, will consider digoxin.   -lopressor 25mg BID po  - Telemetry  - EKG PRN  - Mg>2 and K >4

## 2024-01-13 NOTE — PROGRESS NOTES
"[facility] ([place] [id number])  Critical Care Medicine  Progress Note    Patient Name: Deb Webb  MRN: [id number]  Admission Date: 1/10/2024  Hospital Length of Stay: 3 days  Code Status: Full Code  Attending Provider: Estevan Daly DO  Primary Care Provider: Triston Valverde MD   Principal Problem: GI bleed    Subjective:     HPI:  Ms. Webb is a 74 y/o woman with pmh of arthritis, COPD w/ 6L O2 at baseline and history of CVA , diabetes mellitus type 2, hyperlipidemia, hypertension, paroxysmal AFib on Xarelto. She presented to the emergency department via EMS secondary to bright red blood per rectum. She reports three days of bloody stools. She quantifies the amount as "three cups of blood" per day for three days. She has never had this happen to her before. She also reports fatigue, pale coloration of skin, productive cough, mild diffuse abdominal pain and moderate rectal pain. She was recently discharged from [facility] 5 days ago after admission for URI and COPD exacerbation. Of note, recent colonoscopy reports bleeding could be due to rectal prolapse. She denies headaches, dizziness, shortness of breath, chest pain, nausea, vomiting, recent NSAID use/goody powder, weight loss, fevers, night sweats, hematuria.    In the ED, the patient received 1L IVF, 1u RBCs, Vanc, Zosyn, 80 IV pantoprazole, BC x2. Her blood pressures have been maintained 90s-100s/60s-70s with HR 150s-180s and EKG noting aflutter with RVR. GI and cardiology consulted. Labs notable for Hgb of 8.5 (11 7 days ago), wbc 25, Bicarb 20, lactate 4.28, PTT 21.1, PT 12.7, INR of 1.2. CTA with no evidence of acute GI bleed but with prominent perirectal vessels, possible hemorrhoids. Large amount of retained stool within sigmoid colon/rectum. New airspace consolidation in LLL with bronchial thickening concerning for PNA vs atelectasis.     Hospital/ICU Course:  Admitted for GI bleed with aflutter RVR. GI and cardiology consulted. RVR " improved with IV pushes of metoprolol. Cardiology suggesting po metoprolol after GI bleed stabilized or digoxin if necessary while hypotensive. S/p IVF resuscitation and RBC transfusions. Underwent EGD on 1/11 without evident bleeding, colonoscopy 1/12 remarkable for 3 nonbleeding rectal ulcers suspected to be source of GIB. Uptitrating po lopressor for atrial fibrillation/flutter. Stable for stepdown.    Interval History/Significant Events: Ms. eWbb continues to do well. No further report of bleeding. Rates in low 100s-110s; asymptomatic and maintaining MAPs. Stable for stepdown today.    Review of Systems   Constitutional:  Positive for fatigue. Negative for appetite change, chills, fever and unexpected weight change.   Eyes:  Negative for visual disturbance.   Respiratory:  Negative for cough and shortness of breath.    Cardiovascular:  Positive for palpitations. Negative for chest pain and leg swelling.   Gastrointestinal:  Positive for abdominal pain and rectal pain. Negative for blood in stool, constipation, diarrhea, nausea and vomiting.   Genitourinary:  Negative for difficulty urinating and hematuria.   Skin:  Positive for color change (pale).   Neurological:  Positive for weakness. Negative for dizziness and headaches.   Psychiatric/Behavioral:  Negative for confusion.      Objective:     Vital Signs (Most Recent):  Temp: 98.1 °F (36.7 °C) (01/13/24 1445)  Pulse: 92 (01/13/24 1445)  Resp: (!) 22 (01/13/24 1445)  BP: 102/81 (01/13/24 1445)  SpO2: 98 % (01/13/24 1445) Vital Signs (24h Range):  Temp:  [97.6 °F (36.4 °C)-98.1 °F (36.7 °C)] 98.1 °F (36.7 °C)  Pulse:  [] 92  Resp:  [12-31] 22  SpO2:  [87 %-99 %] 98 %  BP: ()/(56-81) 102/81   Weight: 50 kg (110 lb 3.7 oz)  Body mass index is 22.26 kg/m².      Intake/Output Summary (Last 24 hours) at 1/13/2024 1539  Last data filed at 1/13/2024 1223  Gross per 24 hour   Intake 578.54 ml   Output 535 ml   Net 43.54 ml            Physical Exam  Vitals  and nursing note reviewed.   Constitutional:       General: She is not in acute distress.  HENT:      Head: Normocephalic and atraumatic.      Nose: Nose normal.   Eyes:      Pupils: Pupils are equal, round, and reactive to light.   Cardiovascular:      Rate and Rhythm: Tachycardia present. Rhythm irregular.      Pulses: Normal pulses.   Pulmonary:      Effort: Pulmonary effort is normal.      Breath sounds: No wheezing.   Abdominal:      General: Abdomen is flat. There is no distension.      Palpations: Abdomen is soft. There is no mass.      Tenderness: There is no abdominal tenderness. There is no guarding or rebound.   Musculoskeletal:      Cervical back: Neck supple.      Right lower leg: No edema.      Left lower leg: No edema.   Skin:     General: Skin is warm and dry.      Coloration: Skin is pale.      Findings: Bruising present.   Neurological:      General: No focal deficit present.      Mental Status: She is alert and oriented to person, place, and time.            Vents:     Lines/Drains/Airways       Drain  Duration                  Urethral Catheter 01/10/24 1429 16 Fr. 3 days              Peripheral Intravenous Line  Duration                  Peripheral IV - Single Lumen 01/10/24 1356 20 G Left Antecubital 3 days         Peripheral IV - Single Lumen 01/10/24 1436 20 G;1 3/4 in Anterior;Left Forearm 3 days                  Significant Labs:    CBC/Anemia Profile:  Recent Labs   Lab 01/12/24  0105 01/12/24  0549 01/13/24  0434   WBC 15.48* 16.13* 13.15*   HGB 11.1* 11.6* 9.9*   HCT 34.0* 35.9* 30.4*    153 166   MCV 92 93 94   RDW 18.6* 18.6* 18.3*          Chemistries:  Recent Labs   Lab 01/12/24  0549 01/12/24  1650 01/13/24  0434    138 138   K 2.8* 3.5 4.0    109 108   CO2 26 22* 23   BUN 12 9 9   CREATININE 0.6 0.5 0.6   CALCIUM 8.7 8.3* 8.1*   ALBUMIN 2.7*  --  2.3*   PROT 5.5*  --  4.7*   BILITOT 1.0  --  0.6   ALKPHOS 136*  --  92   ALT 22  --  15   AST 26  --  24   MG 1.6   "--  1.5*   PHOS 2.8  --  2.8           Significant Imaging:  I have reviewed all pertinent imaging results/findings within the past 24 hours.    ABG  No results for input(s): "PH", "PO2", "PCO2", "HCO3", "BE" in the last 168 hours.  Assessment/Plan:     Pulmonary  Pneumonia  Patient has been bed bound due to recent pelvic fracture and reports cough x 1 month w/ 'ice cream' colored sputum. CT A/P showed new airspace consolidation in the LLL.     - Transitioned to CTX from Vanc/Zosyn considering 2 days of no growth on respiratory culture  - f/u respiratory culture    COPD exacerbation  - resume home inhalers   - Plan to transition to po steroid taper that patient was previously taking prior to hospitalization for COPD exacerbation  - Transitioned Vanc/Zosyn to CTX considering 2 days of negative growth on respiratory culture    Cardiac/Vascular  Atrial flutter  EKG w/ Aflutter RVF in the ED. Otherwise HDS. Cardiology consulted; appreciate recs.     - Optimize resuscitation in the setting of acute GIB followed by consideration of rate control if patient's HR continues to be uncontrolled. Will start w/ PO BB. If unable to tolerate, will consider digoxin.   -lopressor 25mg BID po  - Telemetry  - EKG PRN  - Mg>2 and K >4    Hematology  Coagulopathy  - INR 1.4  - Daily PT/INR    Oncology  Acute blood loss anemia  Patient presented with acute GIB and decrease in Hgb by 3 points from 3 days prior. Transfused 2 units on admission to MICU. Stable since without further evidence of ongoing bleeding    - Trending CBC  - Transfuse as necessary to Hgb >7    Endocrine  Controlled type 2 diabetes mellitus with diabetic neuropathy, without long-term current use of insulin  Lab Results   Component Value Date    HGBA1C 6.6 (H) 12/25/2023     -  upon arrival  - Basal/bolus insulin regimen to target inpatient BG goal of 140-180; uptitrate as needed     GI  * GI bleed  74 y/o woman with pmh of arthritis, COPD w/ 6L O2 at baseline and " history of CVA , diabetes mellitus type 2, hyperlipidemia, hypertension, paroxysmal AFib on Xarelto presenting w/ BRBPR. Of note, recent colonoscopy reports bleeding could be due to rectal prolapse. Denies use of NSAIDS, BC powder. Labs notable for Hgb of 8.5 (11 7 days ago). CTA with no evidence of acute GI bleed but with prominent perirectal vessels, possible hemorrhoids. Large amount of retained stool within sigmoid colon/rectum. Admitted to MICU for acute GIB. S/p EGD without source of bleeding noted.    - GI consulted, appreciate recs - colonoscopy today  - Trend Hgb and transfuse for goal Hgb > 7, unless otherwise indicated  - Maintain IV access with 2 large bore IV's  - Intravascular resuscitation/support with IVF's   - Hold all NSAIDs and anticoagulants, unless contraindicated  - Bolus IV pantoprazole 80 mg followed by 40 mg BID IV. Transitioned to po today.  - Correct any coagulopathy with platelets and FFP for goal of platelets >50K and INR <2.    Other  Debility  - Consulted PT/OT       Critical Care Daily Checklist:    A: Awake: RASS Goal/Actual Goal: RASS Goal: 0-->alert and calm  Actual:     B: Spontaneous Breathing Trial Performed?     C: SAT & SBT Coordinated?  N/A                      D: Delirium: CAM-ICU Overall CAM-ICU: Negative   E: Early Mobility Performed? Yes   F: Feeding Goal:    Status:     Current Diet Order   Procedures    Diet diabetic 1500 Calorie; Thin; Standard Tray     Add diabetic protein shake     Order Specific Question:   Total calories:     Answer:   1500 Calorie     Order Specific Question:   Fluid consistency:     Answer:   Thin     Order Specific Question:   Tray type:     Answer:   Standard Tray      AS: Analgesia/Sedation N/a   T: Thromboembolic Prophylaxis contraindicated   H: HOB > 300 Yes   U: Stress Ulcer Prophylaxis (if needed) protonix   G: Glucose Control Detemir 8u qhs, MDSSI   B: Bowel Function Stool Occurrence: 1   I: Indwelling Catheter (Lines & Saini) Necessity  PIVx2  Saini 3days   D: De-escalation of Antimicrobials/Pharmacotherapies CTX    Plan for the day/ETD Stepdown    Code Status:  Family/Goals of Care: Full Code         Critical secondary to Patient has a condition that poses threat to life and bodily function: acute GIB w/ atrial fibrillation/flutter      Critical care was time spent personally by me on the following activities: development of treatment plan with patient or surrogate and bedside caregivers, discussions with consultants, evaluation of patient's response to treatment, examination of patient, ordering and performing treatments and interventions, ordering and review of laboratory studies, ordering and review of radiographic studies, pulse oximetry, re-evaluation of patient's condition. This critical care time did not overlap with that of any other provider or involve time for any procedures.     Rex Oro MD  Critical Care Medicine  [facility] ([place])

## 2024-01-13 NOTE — ASSESSMENT & PLAN NOTE
Patient presented with acute GIB and decrease in Hgb by 3 points from 3 days prior. Transfused 2 units on admission to MICU. Stable since without further evidence of ongoing bleeding    - Trending CBC  - Transfuse as necessary to Hgb >7   Please clarify metoprolol dosage. Pts. PCP is recommending he double his dosage due to tachycardia.

## 2024-01-13 NOTE — RESIDENT HANDOFF
Handoff     Primary Team: [facility] CRITICAL CARE MEDICINE TEAM 1 Room Number: [id number] A     Patient Name: Deb Webb MRN: [id number]     Date of Birth: [id number] Allergies: Silicone and Adhesive     Age: 73 y.o. Admit Date: 1/10/2024     Sex: female  BMI: Body mass index is 22.26 kg/m².     Code Status: Full Code        Illness Level (current clinical status): Watcher - Yes - atrial flutter    Reason for Admission: GI bleed    Brief HPI/hospital course:     Ms. Webb is a 72 y/o lady with a history of arthritis, COPD on 6LNC at home, hx of CVA, DM2, HDL, HTN, pAfib on xarelto that presented with 3 days of bright red blood per rectum. On presentation hgb was 8.5 and she was in atrial flutter with rates in 150s. Cards consulted, and recommended po BB w/ IV pushes PRN. We've uptitrated po lopressor to 25 BID. Rates remain in 110s-120s, but patient is HD stable and asymptomatic. GI consulted on presentation. EGD unremarkable and colonoscopy (1/12) noted 3 rectal ulcers w/ component of rectal prolapse. They suspect the rectal ulcers were the source of GIB. Patient still on protonix 40 IV BID. lastly, patient complained of increased cough w/ sputum and CT displayed LLL consolidation. On vanc/zosyn/hydrocortisone IV for copd exac, deescalation of abx pending resp cultures.     Procedure Date:   EGD 1/11/24 - negative for source of bleeding  Colonoscopy 1/12/24 - 3 nonbleeding rectal ulcers w/ suspicion for rectal prolapse. Rectal ulcers are suspected to be the previous source of bleeding    Tasks (specific, using if-then statements):     - continue Protonix 40 IV BID and monitor for blood loss. No reported BRBPR since admission. Will need GI follow up on d/c.  - Patient uses 6L at home PRN (couple of times per week for a few hours). Finish course of steroids/abx, de-escalating abx pending resp cultures, and wean oxygen  - Continue titrating PO lopressor for atrial flutter. IV PRN    Estimated Discharge Date:  1/15/24    Discharge Disposition: Home or Self Care

## 2024-01-13 NOTE — ASSESSMENT & PLAN NOTE
- resume home inhalers   - Plan to transition to po steroid taper that patient was previously taking prior to hospitalization for COPD exacerbation  - Transitioned Vanc/Zosyn to CTX considering 2 days of negative growth on respiratory culture

## 2024-01-14 LAB
ALBUMIN SERPL BCP-MCNC: 2.4 G/DL (ref 3.5–5.2)
ALP SERPL-CCNC: 97 U/L (ref 55–135)
ALT SERPL W/O P-5'-P-CCNC: 15 U/L (ref 10–44)
ANION GAP SERPL CALC-SCNC: 3 MMOL/L (ref 8–16)
AST SERPL-CCNC: 15 U/L (ref 10–40)
BASOPHILS # BLD AUTO: 0.02 K/UL (ref 0–0.2)
BASOPHILS NFR BLD: 0.1 % (ref 0–1.9)
BILIRUB SERPL-MCNC: 0.3 MG/DL (ref 0.1–1)
BLD PROD TYP BPU: NORMAL
BLOOD UNIT EXPIRATION DATE: NORMAL
BLOOD UNIT TYPE CODE: 5100
BLOOD UNIT TYPE: NORMAL
BUN SERPL-MCNC: 11 MG/DL (ref 8–23)
CALCIUM SERPL-MCNC: 8.5 MG/DL (ref 8.7–10.5)
CHLORIDE SERPL-SCNC: 110 MMOL/L (ref 95–110)
CO2 SERPL-SCNC: 22 MMOL/L (ref 23–29)
CODING SYSTEM: NORMAL
CREAT SERPL-MCNC: 0.6 MG/DL (ref 0.5–1.4)
CROSSMATCH INTERPRETATION: NORMAL
DIFFERENTIAL METHOD BLD: ABNORMAL
DISPENSE STATUS: NORMAL
EOSINOPHIL # BLD AUTO: 0.1 K/UL (ref 0–0.5)
EOSINOPHIL NFR BLD: 0.7 % (ref 0–8)
ERYTHROCYTE [DISTWIDTH] IN BLOOD BY AUTOMATED COUNT: 18.5 % (ref 11.5–14.5)
EST. GFR  (NO RACE VARIABLE): >60 ML/MIN/1.73 M^2
GLUCOSE SERPL-MCNC: 140 MG/DL (ref 70–110)
HCT VFR BLD AUTO: 34.6 % (ref 37–48.5)
HGB BLD-MCNC: 10.1 G/DL (ref 12–16)
IMM GRANULOCYTES # BLD AUTO: 0.05 K/UL (ref 0–0.04)
IMM GRANULOCYTES NFR BLD AUTO: 0.4 % (ref 0–0.5)
LYMPHOCYTES # BLD AUTO: 3 K/UL (ref 1–4.8)
LYMPHOCYTES NFR BLD: 21.7 % (ref 18–48)
MAGNESIUM SERPL-MCNC: 1.6 MG/DL (ref 1.6–2.6)
MCH RBC QN AUTO: 30.6 PG (ref 27–31)
MCHC RBC AUTO-ENTMCNC: 29.2 G/DL (ref 32–36)
MCV RBC AUTO: 105 FL (ref 82–98)
MONOCYTES # BLD AUTO: 1 K/UL (ref 0.3–1)
MONOCYTES NFR BLD: 7.3 % (ref 4–15)
NEUTROPHILS # BLD AUTO: 9.6 K/UL (ref 1.8–7.7)
NEUTROPHILS NFR BLD: 69.8 % (ref 38–73)
NRBC BLD-RTO: 0 /100 WBC
PHOSPHATE SERPL-MCNC: 3 MG/DL (ref 2.7–4.5)
PLATELET # BLD AUTO: 158 K/UL (ref 150–450)
PMV BLD AUTO: 10.2 FL (ref 9.2–12.9)
POCT GLUCOSE: 119 MG/DL (ref 70–110)
POCT GLUCOSE: 210 MG/DL (ref 70–110)
POCT GLUCOSE: 339 MG/DL (ref 70–110)
POCT GLUCOSE: 399 MG/DL (ref 70–110)
POTASSIUM SERPL-SCNC: 3.8 MMOL/L (ref 3.5–5.1)
PROT SERPL-MCNC: 4.6 G/DL (ref 6–8.4)
RBC # BLD AUTO: 3.3 M/UL (ref 4–5.4)
SODIUM SERPL-SCNC: 135 MMOL/L (ref 136–145)
TRANS ERYTHROCYTES VOL PATIENT: NORMAL ML
WBC # BLD AUTO: 13.76 K/UL (ref 3.9–12.7)

## 2024-01-14 PROCEDURE — 97162 PT EVAL MOD COMPLEX 30 MIN: CPT

## 2024-01-14 PROCEDURE — 97530 THERAPEUTIC ACTIVITIES: CPT

## 2024-01-14 PROCEDURE — 36415 COLL VENOUS BLD VENIPUNCTURE: CPT

## 2024-01-14 PROCEDURE — 20600001 HC STEP DOWN PRIVATE ROOM

## 2024-01-14 PROCEDURE — 25000003 PHARM REV CODE 250: Performed by: INTERNAL MEDICINE

## 2024-01-14 PROCEDURE — 63600175 PHARM REV CODE 636 W HCPCS

## 2024-01-14 PROCEDURE — 85025 COMPLETE CBC W/AUTO DIFF WBC: CPT

## 2024-01-14 PROCEDURE — 25000003 PHARM REV CODE 250: Performed by: STUDENT IN AN ORGANIZED HEALTH CARE EDUCATION/TRAINING PROGRAM

## 2024-01-14 PROCEDURE — 97166 OT EVAL MOD COMPLEX 45 MIN: CPT

## 2024-01-14 PROCEDURE — 83735 ASSAY OF MAGNESIUM: CPT

## 2024-01-14 PROCEDURE — 25000003 PHARM REV CODE 250

## 2024-01-14 PROCEDURE — 80053 COMPREHEN METABOLIC PANEL: CPT

## 2024-01-14 PROCEDURE — 97112 NEUROMUSCULAR REEDUCATION: CPT

## 2024-01-14 PROCEDURE — 84100 ASSAY OF PHOSPHORUS: CPT

## 2024-01-14 RX ORDER — DOXYCYCLINE HYCLATE 100 MG
100 TABLET ORAL EVERY 12 HOURS
Status: DISCONTINUED | OUTPATIENT
Start: 2024-01-14 | End: 2024-01-19 | Stop reason: HOSPADM

## 2024-01-14 RX ORDER — TALC
6 POWDER (GRAM) TOPICAL NIGHTLY PRN
Status: DISCONTINUED | OUTPATIENT
Start: 2024-01-14 | End: 2024-01-19 | Stop reason: HOSPADM

## 2024-01-14 RX ORDER — METOPROLOL TARTRATE 1 MG/ML
5 INJECTION, SOLUTION INTRAVENOUS EVERY 6 HOURS PRN
Status: DISCONTINUED | OUTPATIENT
Start: 2024-01-14 | End: 2024-01-19 | Stop reason: HOSPADM

## 2024-01-14 RX ORDER — OXYCODONE HYDROCHLORIDE 5 MG/1
5 TABLET ORAL EVERY 4 HOURS PRN
Status: DISCONTINUED | OUTPATIENT
Start: 2024-01-14 | End: 2024-01-19 | Stop reason: HOSPADM

## 2024-01-14 RX ADMIN — INSULIN ASPART 10 UNITS: 100 INJECTION, SOLUTION INTRAVENOUS; SUBCUTANEOUS at 05:01

## 2024-01-14 RX ADMIN — BRIMONIDINE TARTRATE 1 DROP: 2 SOLUTION OPHTHALMIC at 09:01

## 2024-01-14 RX ADMIN — TIMOLOL MALEATE 1 DROP: 5 SOLUTION OPHTHALMIC at 09:01

## 2024-01-14 RX ADMIN — PANTOPRAZOLE SODIUM 40 MG: 40 TABLET, DELAYED RELEASE ORAL at 05:01

## 2024-01-14 RX ADMIN — INSULIN ASPART 4 UNITS: 100 INJECTION, SOLUTION INTRAVENOUS; SUBCUTANEOUS at 12:01

## 2024-01-14 RX ADMIN — METOPROLOL TARTRATE 25 MG: 25 TABLET, FILM COATED ORAL at 09:01

## 2024-01-14 RX ADMIN — LOPERAMIDE HYDROCHLORIDE 2 MG: 2 CAPSULE ORAL at 03:01

## 2024-01-14 RX ADMIN — OXYCODONE HYDROCHLORIDE 5 MG: 5 TABLET ORAL at 10:01

## 2024-01-14 RX ADMIN — GABAPENTIN 300 MG: 300 CAPSULE ORAL at 09:01

## 2024-01-14 RX ADMIN — OXYCODONE HYDROCHLORIDE 5 MG: 5 TABLET ORAL at 03:01

## 2024-01-14 RX ADMIN — INSULIN DETEMIR 8 UNITS: 100 INJECTION, SOLUTION SUBCUTANEOUS at 09:01

## 2024-01-14 RX ADMIN — PREDNISONE 40 MG: 20 TABLET ORAL at 09:01

## 2024-01-14 RX ADMIN — CITALOPRAM HYDROBROMIDE 20 MG: 20 TABLET ORAL at 08:01

## 2024-01-14 RX ADMIN — MUPIROCIN: 20 OINTMENT TOPICAL at 09:01

## 2024-01-14 RX ADMIN — DOXYCYCLINE HYCLATE 100 MG: 100 TABLET, COATED ORAL at 09:01

## 2024-01-14 RX ADMIN — LATANOPROST 1 DROP: 50 SOLUTION OPHTHALMIC at 09:01

## 2024-01-14 RX ADMIN — GABAPENTIN 300 MG: 300 CAPSULE ORAL at 03:01

## 2024-01-14 RX ADMIN — GABAPENTIN 300 MG: 300 CAPSULE ORAL at 08:01

## 2024-01-14 RX ADMIN — PANTOPRAZOLE SODIUM 40 MG: 40 TABLET, DELAYED RELEASE ORAL at 03:01

## 2024-01-14 RX ADMIN — CEFTRIAXONE 2 G: 2 INJECTION, POWDER, FOR SOLUTION INTRAMUSCULAR; INTRAVENOUS at 05:01

## 2024-01-14 RX ADMIN — METOPROLOL TARTRATE 25 MG: 25 TABLET, FILM COATED ORAL at 08:01

## 2024-01-14 RX ADMIN — TAMSULOSIN HYDROCHLORIDE 0.4 MG: 0.4 CAPSULE ORAL at 08:01

## 2024-01-14 NOTE — NURSING
Nurses Note -- 4 Eyes      1/13/2024   6:28 PM      Skin assessed during: Transfer      [] No Altered Skin Integrity Present    []Prevention Measures Documented      [x] Yes- Altered Skin Integrity Present or Discovered   [x] LDA Added if Not in Epic (Describe Wound)   [] New Altered Skin Integrity was Present on Admit and Documented in LDA   [] Wound Image Taken    Wound Care Consulted? Yes    Attending Nurse:  Fani Santos RN/Staff Member:  Nidhi

## 2024-01-14 NOTE — HOSPITAL COURSE
This is a 73-year-old female with a history of COPD on 6 L NC at home, CVA, PAF on Xarelto who originally presented with 3 days of hematochezia.  HB on presentation was 8.5.  CTA without evidence of acute GI be however did showed prominent perirectal vessels and possible hemorrhoids with large amount of retained stool within the sigmoid colon/rectum.  Stool noted to be melanotic on arrival.  Also did show left lower lobe airspace consolidation concerning for pneumonia, started on empiric IV vancomycin, Zosyn along with IV hydrocortisone for COPD exacerbation.  On arrival, was significantly tachycardic with rates of 150s, found to be in atrial flutter with RVR.  Cardiology was consulted, attributed elevated rate in the setting of pneumonia and GI bleed.  Initially recommended digoxin but primary team elected for BB due to hemodynamic stability.  Eventually underwent EGD/colonoscopy 1/12, found to have 3 ulcers in the distal rectum, much improved since compared to last colonoscopy, sigmoid and descending colon diverticulosis, mild splenic flexure inflammation.  On PPI b.i.d. HR has been difficult to control, discussed w cardiology, now controlled on metoprolol q6 dosing.  Patient with episode of borderline hypotension and dizziness.  Symptoms improved with small bolus of IV fluids.  Blood pressure stable prior to discharge.  Hemoglobin stable, no evidence of repeat bleed.  Patient stable for discharge 1/19 to nursing facility.

## 2024-01-14 NOTE — ASSESSMENT & PLAN NOTE
Patient's COPD is controlled currently.  Patient is currently on COPD Pathway. Continue scheduled inhalers Steroids, Antibiotics, and Supplemental oxygen and monitor respiratory status closely.

## 2024-01-14 NOTE — ASSESSMENT & PLAN NOTE
Patient with Acute on chronic debility due to fracture/prosthesis and chronic unspecified fatigue. Latest AMPAC and GEMS scores have not been reviewed. Evaluation for etiology is complete. Plan includes PT/OT consulted.

## 2024-01-14 NOTE — PLAN OF CARE
PT evaluation complete and appropriate goals established.    Problem: Physical Therapy  Goal: Physical Therapy Goal  Description: Goals to be met by: 2024     Patient will increase functional independence with mobility by performin. Supine to sit with Contact Guard Assistance  2. Sit to supine with Stand-by Assistance  3. Sit to stand transfer with Minimal Assistance  4. Bed to chair transfer with Minimal Assistance using LRAD  5. Gait  x 25 feet with Moderate Assistance using LRAD.   6. Lower extremity exercise program x15 reps per handout, with assistance as needed    Outcome: Ongoing, Progressing     2024

## 2024-01-14 NOTE — ASSESSMENT & PLAN NOTE
-Continues to be in RVR, likely exacerbated by pneumonia, recent GIB, COPD exacerbation  -Metoprolol 25mg BID (increased from home dose)  -IV metoprolol 5mg q6 prn ordered  -Cardio reconsultation if rates uncontrolled

## 2024-01-14 NOTE — CODE/ RAPID DOCUMENTATION
RAPID RESPONSE NURSE NOTE        Admit Date: 1/10/2024  LOS: 3  Code Status: Full Code   Date of Consult: 2024  : 1950  Age: 73 y.o.  Weight:   Wt Readings from Last 1 Encounters:   01/10/24 50 kg (110 lb 3.7 oz)     Sex: female  Race: White   Bed: 99590/97752 A:   MRN: [id number]  Time Rapid Response Team page Received:   Time Rapid Response Team at Bedside:   Time Rapid Response Team left Bedside:   Was the patient discharged from an ICU this admission? Yes   Was the patient discharged from a PACU within last 24 hours? No   Did the patient receive conscious sedation/general anesthesia in last 24 hours? No  Was the patient in the ED within the past 24 hours? No  Was the patient on NIPPV within the past 24 hours? No   Did this progress into an ARC or CPA: No  Attending Physician: Estevan Daly DO  Primary Service: Hospitalist,Internal Medicine       SITUATION    Notified by bedside RN via phone call.  Reason for alert: Tachycardia, Hypotension  Called to evaluate the patient for Circulatory    BACKGROUND     Why is the patient in the hospital?: GI bleed    Patient has a past medical history of Allergy, Arthritis, Cataract, Colon polyps, COPD (chronic obstructive pulmonary disease), Diabetes mellitus type II, Diabetic neuropathy, Fever blister, Glaucoma (increased eye pressure), Hyperlipidemia, Hypertension, Irritable bowel syndrome, Keloid cicatrix, Osteoporosis, Retained cholelithiasis following cholecystectomy(997.41), and Right patella fracture.    Last Vitals:  Temp: 97.7 °F (36.5 °C) (1925)  Pulse: 110 (1958)  Resp: 39 (1958)  BP: 101/71 (1958)  SpO2: 89 % (1958)    24 Hours Vitals Range:  Temp:  [97.6 °F (36.4 °C)-98.4 °F (36.9 °C)]   Pulse:  []   Resp:  [12-39]   BP: ()/(57-81)   SpO2:  [87 %-99 %]     Labs:  Recent Labs     24  0105 24  0549 24  0434   WBC 15.48* 16.13* 13.15*   HGB 11.1* 11.6* 9.9*   HCT 34.0* 35.9* 30.4*  "   153 166       Recent Labs     01/11/24  0330 01/12/24  0549 01/12/24  1650 01/13/24  0434    141 138 138   K 3.8 2.8* 3.5 4.0    104 109 108   CO2 24 26 22* 23   BUN 21 12 9 9   CREATININE 0.6 0.6 0.5 0.6   * 158* 152* 188*   PHOS 2.3* 2.8  --  2.8   MG 1.4* 1.6  --  1.5*        No results for input(s): "PH", "PCO2", "PO2", "HCO3", "POCSATURATED", "BE" in the last 72 hours.     ASSESSMENT    Physical Exam  Constitutional:       Appearance: She is ill-appearing.   Cardiovascular:      Rate and Rhythm: Tachycardia present. Rhythm irregular.      Pulses:           Radial pulses are 2+ on the right side and 2+ on the left side.   Pulmonary:      Effort: Pulmonary effort is normal. No tachypnea, accessory muscle usage or respiratory distress.   Abdominal:      General: Abdomen is flat.   Skin:     General: Skin is warm.   Neurological:      Mental Status: She is alert.      GCS: GCS eye subscore is 4. GCS verbal subscore is 5. GCS motor subscore is 6.     Pt lying in hospital bed on RRT arrival, pt alert, speaking in clear sentences, answering questions appropriately and holding conversation. Pt c/o midsternal chest pain 6/10, described as aching. Pt also c/o palpitations. Pt denies SOB, reports using 5L NC chronically at home. Pt currently on 4L NC, SpO2 95%.     INTERVENTIONS    The patient was seen for Cardiac problem. Staff concerns included tachycardia and hypotension. The following interventions were performed: portable chest x-ray, EKG, continuous cardiac monitoring continued, continuous pulse ox monitoring continued, and normal saline 500 ml IV bolus .    Pt's BP 88/66 (73),  A. Fib. Dr. Maier consulted, VORB to administer 5mg IV metoprolol X3 Q5 minutes.     500mL NS IV bolus administered. BP 98/62 (74).     Pt reports decrease in chest pain to 5/10 and relief of palpitations post metoprolol administration.     After last dose, /71 (81) and  A Fib.     Pt's PO " metoprolol 25mg administered.     RECOMMENDATIONS    We recommend: Continue continuous cardiac monitoring and pulse oximetry. Notify primary team and RRT for MAP <65, A. Fib RVR, and if pt c/o new or increased chest pain, palpitations, SOB, dizzness. Please call for any concerns.     PROVIDER ESCALATION    Orders received and case discussed with Dr. Maier .    Primary team arrival time: N/A    Disposition: Remain in room 87159.    FOLLOW UP    Charge Sandy LANCE  updated on plan of care. Instructed to call the Rapid Response Nurse, Araseli Horn RN at [phone] for additional questions or concerns.

## 2024-01-14 NOTE — AI DETERIORATION ALERT
"RAPID RESPONSE NURSE AI ALERT       AI alert received.    Chart Reviewed: 01/14/2024, 3:25 PM    MRN: [id number]  Bed: 12589/57737 A    Dx: GI bleed    Deb Webb has a past medical history of Allergy, Arthritis, Cataract, Colon polyps, COPD (chronic obstructive pulmonary disease), Diabetes mellitus type II, Diabetic neuropathy, Fever blister, Glaucoma (increased eye pressure), Hyperlipidemia, Hypertension, Irritable bowel syndrome, Keloid cicatrix, Osteoporosis, Retained cholelithiasis following cholecystectomy(997.41), and Right patella fracture.    Last VS: BP 96/64 (BP Location: Right arm, Patient Position: Lying)   Pulse 110   Temp 97.9 °F (36.6 °C) (Oral)   Resp (!) 22   Wt 50 kg (110 lb 3.7 oz)   SpO2 (!) 90%   BMI 22.26 kg/m²     24H Vital Sign Range:  Temp:  [97.6 °F (36.4 °C)-98.4 °F (36.9 °C)]   Pulse:  []   Resp:  [11-39]   BP: ()/(57-75)   SpO2:  [89 %-100 %]     Level of Consciousness (AVPU): alert    Recent Labs     01/13/24  0434 01/13/24 2152 01/14/24  0646   WBC 13.15* 15.84* 13.76*   HGB 9.9* 9.8* 10.1*   HCT 30.4* 30.7* 34.6*    170 158       Recent Labs     01/13/24  0434 01/13/24 2152 01/14/24  0646    138 135*   K 4.0 3.6 3.8    107 110   CO2 23 25 22*   BUN 9 13 11   CREATININE 0.6 0.7 0.6   * 284* 140*   PHOS 2.8 2.9 3.0   MG 1.5* 1.6 1.6        No results for input(s): "PH", "PCO2", "PO2", "HCO3", "POCSATURATED", "BE" in the last 72 hours.     OXYGEN:  Flow (L/min): 5          MEWS score: 3    Bedside RNSandi  contacted. No concerns verbalized at this time. Instructed to call [phone] for further concerns or assistance.    Juliette Kenney RN        "

## 2024-01-14 NOTE — CARE UPDATE
RAPID RESPONSE NURSE FOLLOW-UP NOTE       Followed up with patient for a rapid response.  No acute issues at this time. Reviewed plan of care with bedside RNRachel .   Team will continue to follow.  Please call Rapid Response RN, Araseli Horn RN with any questions or concerns at [phone].

## 2024-01-14 NOTE — CLINICAL REVIEW
RAPID RESPONSE NURSE CHART REVIEW        Chart Reviewed: 01/14/2024, 7:08 AM    MRN: [id number]  Bed: 04250/96953 A    Dx: GI bleed    Deb Webb has a past medical history of Allergy, Arthritis, Cataract, Colon polyps, COPD (chronic obstructive pulmonary disease), Diabetes mellitus type II, Diabetic neuropathy, Fever blister, Glaucoma (increased eye pressure), Hyperlipidemia, Hypertension, Irritable bowel syndrome, Keloid cicatrix, Osteoporosis, Retained cholelithiasis following cholecystectomy(997.41), and Right patella fracture.    Last VS: /75   Pulse 85   Temp 97.6 °F (36.4 °C) (Oral)   Resp 11   Wt 50 kg (110 lb 3.7 oz)   SpO2 99%   BMI 22.26 kg/m²     24H Vital Sign Range:  Temp:  [97.6 °F (36.4 °C)-98.4 °F (36.9 °C)]   Pulse:  []   Resp:  [11-39]   BP: ()/(57-81)   SpO2:  [89 %-100 %]     Level of Consciousness (AVPU): alert    Recent Labs     01/12/24  0549 01/13/24  0434 01/13/24  2152   WBC 16.13* 13.15* 15.84*   HGB 11.6* 9.9* 9.8*   HCT 35.9* 30.4* 30.7*    166 170       Recent Labs     01/12/24  0549 01/12/24  1650 01/13/24  0434 01/13/24  2152    138 138 138   K 2.8* 3.5 4.0 3.6    109 108 107   CO2 26 22* 23 25   BUN 12 9 9 13   CREATININE 0.6 0.5 0.6 0.7   * 152* 188* 284*   PHOS 2.8  --  2.8 2.9   MG 1.6  --  1.5* 1.6          OXYGEN:  Flow (L/min): 5          MEWS score: 0    Charge Kassidy LANCE  contacted for AI alert within 24 hours, afib rvr overnight. Reports currently rate controled. No additional concerns verbalized at this time. Instructed to call [phone] for further concerns or assistance.    Sheela Fish RN

## 2024-01-14 NOTE — ASSESSMENT & PLAN NOTE
Patient's anemia is currently controlled. Has not received any PRBCs to date. Etiology likely d/t acute blood loss which was from GI bleed  Current CBC reviewed-   Lab Results   Component Value Date    HGB 10.1 (L) 01/14/2024    HCT 34.6 (L) 01/14/2024     Monitor serial CBC and transfuse if patient becomes hemodynamically unstable, symptomatic or H/H drops below 7/21.

## 2024-01-14 NOTE — ASSESSMENT & PLAN NOTE
#Rectal ulcerations  -History of PAF on Xarelto, presents w [facility], previously on hospice at NH  -CTA: no evidence of acute GIB, however showing prominent perirectal vessels w possible hemorrhoids  -EGD/colonoscopy 1/12: 3 ulcers in the distal rectum, much improved since compared to last colonoscopy, sigmoid and descending colon diverticulosis, mild splenic flexure inflammation  Plan:  -Continue PPI BID  -Contacted GI for clearance to restart Xarelto  -Monitor CBC  - Trend Hgb and transfuse for goal Hgb > 7, unless otherwise indicated  - Maintain IV access with 2 large bore IV's  - Intravascular resuscitation/support with IVF's   - Correct any coagulopathy with platelets and FFP for goal of platelets >50K and INR <2.

## 2024-01-14 NOTE — ASSESSMENT & PLAN NOTE
Patient's FSGs are controlled on current medication regimen.  Last A1c reviewed-   Lab Results   Component Value Date    HGBA1C 6.6 (H) 12/25/2023     Most recent fingerstick glucose reviewed-   Recent Labs   Lab 01/13/24  1613 01/13/24  2057 01/14/24  0830 01/14/24  1136   POCTGLUCOSE 205* 305* 119* 210*     Current correctional scale  Medium  Maintain anti-hyperglycemic dose as follows-   Antihyperglycemics (From admission, onward)      Start     Stop Route Frequency Ordered    01/11/24 1404  insulin aspart U-100 pen 0-10 Units         -- SubQ Every 6 hours PRN 01/11/24 1404    01/10/24 2100  insulin detemir U-100 (Levemir) pen 8 Units         -- SubQ Nightly 01/10/24 1725          Hold Oral hypoglycemics while patient is in the hospital.

## 2024-01-14 NOTE — CARE UPDATE
"RAPID RESPONSE NURSE AI ALERT       AI alert received.    Chart Reviewed: 01/14/2024, 6:07 AM    MRN: [id number]  Bed: 81653/21010 A    Dx: GI bleed    Deb Webb has a past medical history of Allergy, Arthritis, Cataract, Colon polyps, COPD (chronic obstructive pulmonary disease), Diabetes mellitus type II, Diabetic neuropathy, Fever blister, Glaucoma (increased eye pressure), Hyperlipidemia, Hypertension, Irritable bowel syndrome, Keloid cicatrix, Osteoporosis, Retained cholelithiasis following cholecystectomy(997.41), and Right patella fracture.    Last VS: /75   Pulse 85   Temp 97.6 °F (36.4 °C) (Oral)   Resp 11   Wt 50 kg (110 lb 3.7 oz)   SpO2 99%   BMI 22.26 kg/m²     24H Vital Sign Range:  Temp:  [97.6 °F (36.4 °C)-98.4 °F (36.9 °C)]   Pulse:  []   Resp:  [11-39]   BP: ()/(57-81)   SpO2:  [89 %-100 %]     Level of Consciousness (AVPU): alert    Recent Labs     01/12/24  0549 01/13/24  0434 01/13/24  2152   WBC 16.13* 13.15* 15.84*   HGB 11.6* 9.9* 9.8*   HCT 35.9* 30.4* 30.7*    166 170       Recent Labs     01/12/24  0549 01/12/24  1650 01/13/24  0434 01/13/24  2152    138 138 138   K 2.8* 3.5 4.0 3.6    109 108 107   CO2 26 22* 23 25   BUN 12 9 9 13   CREATININE 0.6 0.5 0.6 0.7   * 152* 188* 284*   PHOS 2.8  --  2.8 2.9   MG 1.6  --  1.5* 1.6        No results for input(s): "PH", "PCO2", "PO2", "HCO3", "POCSATURATED", "BE" in the last 72 hours.     OXYGEN:  Flow (L/min): 5          MEWS score: 0    Bedside RNRachel  contacted. No concerns verbalized at this time. SpO2 probe assessed and changed, SpO2 95%. Instructed to call [phone] for further concerns or assistance.    Araseli Horn RN       "

## 2024-01-14 NOTE — SUBJECTIVE & OBJECTIVE
Interval History: Seen at bedside this AM. States that shortness of breath and cough are unchanged. Endorsing occasional palpitations    Review of Systems  Objective:     Vital Signs (Most Recent):  Temp: 97.9 °F (36.6 °C) (01/14/24 1200)  Pulse: 110 (01/14/24 1200)  Resp: (!) 22 (01/14/24 1200)  BP: 96/64 (01/14/24 1200)  SpO2: (!) 90 % (01/14/24 1200) Vital Signs (24h Range):  Temp:  [97.6 °F (36.4 °C)-98.4 °F (36.9 °C)] 97.9 °F (36.6 °C)  Pulse:  [] 110  Resp:  [11-39] 22  SpO2:  [89 %-100 %] 90 %  BP: ()/(57-81) 96/64     Weight: 50 kg (110 lb 3.7 oz)  Body mass index is 22.26 kg/m².    Intake/Output Summary (Last 24 hours) at 1/14/2024 1340  Last data filed at 1/14/2024 0631  Gross per 24 hour   Intake --   Output 1050 ml   Net -1050 ml         Physical Exam  Vitals and nursing note reviewed.   Constitutional:       General: She is not in acute distress.     Appearance: Normal appearance.   HENT:      Head: Normocephalic and atraumatic.   Eyes:      Pupils: Pupils are equal, round, and reactive to light.   Cardiovascular:      Rate and Rhythm: Tachycardia present. Rhythm irregular.      Pulses: Normal pulses.      Heart sounds: No murmur heard.  Pulmonary:      Effort: Pulmonary effort is normal.      Breath sounds: No wheezing.   Abdominal:      General: Abdomen is flat. There is no distension.      Palpations: Abdomen is soft. There is no mass.      Tenderness: There is no abdominal tenderness. There is no guarding or rebound.   Musculoskeletal:      Cervical back: Neck supple.      Right lower leg: No edema.      Left lower leg: No edema.   Skin:     General: Skin is warm and dry.      Coloration: Skin is not pale.   Neurological:      General: No focal deficit present.      Mental Status: She is alert and oriented to person, place, and time.             Significant Labs: All pertinent labs within the past 24 hours have been reviewed.    Significant Imaging: I have reviewed all pertinent imaging  results/findings within the past 24 hours.

## 2024-01-14 NOTE — HPI
"Ms. Webb is a 74 y/o woman with pmh of arthritis, COPD w/ 6L O2 at baseline and history of CVA , diabetes mellitus type 2, hyperlipidemia, hypertension, paroxysmal AFib on Xarelto. She presented to the emergency department via EMS secondary to bright red blood per rectum. She reports three days of bloody stools. She quantifies the amount as "three cups of blood" per day for three days. She has never had this happen to her before. She also reports fatigue, pale coloration of skin, productive cough, mild diffuse abdominal pain and moderate rectal pain. [place] [facility] for URI and COPD exacerbation. Of note, recent colonoscopy reports bleeding could be due to rectal prolapse. She denies headaches, dizziness, shortness of breath, chest pain, nausea, vomiting, recent NSAID use/goody powder, weight loss, fevers, night sweats, hematuria.     In the ED, the patient received 1L IVF, 1u RBCs, Vanc, Zosyn, 80 IV pantoprazole, BC x2. Her blood pressures have been maintained 90s-100s/60s-70s with HR 150s-180s and EKG noting aflutter with RVR. GI and cardiology consulted. Labs notable for Hgb of 8.5 (11 7 days ago), wbc 25, Bicarb 20, lactate 4.28, PTT 21.1, PT 12.7, INR of 1.2. CTA with no evidence of acute GI bleed but with prominent perirectal vessels, possible hemorrhoids. Large amount of retained stool within sigmoid colon/rectum. New airspace consolidation in LLL with bronchial thickening concerning for PNA vs atelectasis.   "

## 2024-01-14 NOTE — PT/OT/SLP EVAL
"Occupational Therapy   Co-Evaluation & Co-Treatment    Name: Deb Webb  MRN: [id number]  Admitting Diagnosis: GI bleed  Recent Surgery: Procedure(s) (LRB):  COLONOSCOPY (N/A) 2 Days Post-Op    Recommendations:     Discharge Recommendations: Moderate Intensity Therapy  Discharge Equipment Recommendations:  hospital bed, wheelchair, bedside commode, lift device  Barriers to discharge:  None    Assessment:     Deb Webb is a 73 y.o. female with a medical diagnosis of GI bleed.  She presents with fair activity tolerance, able to sit EOB with SBA today. Patient limited by orthopedic precautions (BLE WBAT and RLE NWB) due to recent falls; although poor documentation/communication available due to transfer from outside hospital. Performance deficits affecting function: weakness, impaired endurance, impaired self care skills, impaired functional mobility, decreased lower extremity function, decreased upper extremity function, decreased coordination, impaired fine motor, pain, decreased safety awareness, impaired skin, impaired cardiopulmonary response to activity.  Patient reports prior to falls in December, she was independent and is highly motivated to make functional gains. Patient currently demonstrates a need for moderate intensity therapy on a daily basis post acute secondary to a decline in functional status due to illness and injury.     Rehab Prognosis: Good; patient would benefit from acute skilled OT services to address these deficits and reach maximum level of function.       Plan:     Patient to be seen 3 x/week to address the above listed problems via self-care/home management, therapeutic activities, therapeutic exercises, neuromuscular re-education  Plan of Care Expires: 01/28/24  Plan of Care Reviewed with: patient, daughter    Subjective     Chief Complaint: pain on sacral wound  Patient/Family Comments/goals: "they had me on hospice, but I don't want to die" ; patient's goals are return to " PLOF (independent)     Occupational Profile:  Living Environment: Patient was previous resident at [facility]; per family, they would like to seek further therapy and discharge to her daughter's home   Previous level of function: Patient previously NH resident, but reports she was supposed to receive therapy at [facility] but was not. She was primarily bed bound over past month at nursing [place]. Patient was independent, but with frequent falls in December 2023.   Roles and Routines: none verbalized  Equipment Used at Home: none  Assistance upon Discharge: patient requiring high level of skilled assistance at current level    Pain/Comfort:  Pain Rating 1: other (see comments) (unrated)  Location - Orientation 1: generalized  Location 1: sacral spine  Pain Addressed 1: Distraction, Reposition  Pain Rating Post-Intervention 1: other (see comments)    Patients cultural, spiritual, Faith conflicts given the current situation: no    Objective:     Communicated with: nursing prior to session who enterred to assess Spo2 due to poor reading on monitor.  Patient found HOB elevated with blood pressure cuff, telemetry, oxygen, hernandez catheter upon OT entry to room. Patient's daughter present in room during session.     General Precautions: Standard, fall  Orthopedic Precautions: RUE non weight bearing, LLE weight bearing as tolerated, RLE weight bearing as tolerated  Braces: N/A  Respiratory Status: Nasal cannula    Occupational Performance:    Bed Mobility:    Patient completed Rolling/Turning to Left with  minimum assistance  Patient completed Rolling/Turning to Right with minimum assistance  Patient completed Supine to Sit with moderate assistance  Patient completed Sit to Supine with minimum assistance    Functional Mobility/Transfers:  Patient completed Sit <> Stand Transfer with maximal assistance and of 2 persons  with  hand-held assist     Activities of Daily Living:  Lower Body Dressing: total  assistance to don socks  Toileting: dependence to clean at bed level    Cognitive/Visual Perceptual:  Cognitive/Psychosocial Skills:     -       Oriented to: Person, Place, Time, and Situation   -       Follows Commands/attention:Follows one-step commands  -       Communication: clear/fluent  -       Memory: No Deficits noted  -       Safety awareness/insight to disability: intact   -       Mood/Affect/Coping skills/emotional control: Appropriate to situation    Physical Exam:  Dominant hand:    -       R; although resorting to L hand dominance due to RUE NWB  Upper Extremity Range of Motion:     -       Right Upper Extremity: only assessed ROM at hand, wrist and elbow; >50% ROM of these joints  -       Left Upper Extremity: WFL  Upper Extremity Strength:    -       Right Upper Extremity: not assessed  -       Left Upper Extremity: 3+/5   Strength:    -       Right Upper Extremity: poor  -       Left Upper Extremity: WFL    AMPAC 6 Click ADL:  AMPAC Total Score: 15    Treatment & Education:    Patient educated on:   -need for turning schedule to offload weight to sacral spine to improve healing  -purpose of OT and OT POC  -facilitation and education on proper body mechanics, energy conservation, and safety  -importance of early mobility and out of bed activities with staff assist  -overall benefits of therapy     All questions answered within OT scope and to patient's satisfaction    Patient left right sidelying with all lines intact, call button in reach, and daughter present    GOALS:   Multidisciplinary Problems       Occupational Therapy Goals          Problem: Occupational Therapy    Goal Priority Disciplines Outcome Interventions   Occupational Therapy Goal     OT, PT/OT Ongoing, Progressing    Description: Goals to be met by: 2/14/24     Patient will increase functional independence with ADLs by performing:    UE Dressing with Contact Guard Assistance.  LE Dressing with Minimal Assistance.  Grooming while  seated with Set-up Assistance.  Supine to sit with Minimal Assistance.  Stand pivot transfers with Moderate Assistance.                         History:     Past Medical History:   Diagnosis Date    Allergy     Arthritis     Cataract     Colon polyps     COPD (chronic obstructive pulmonary disease)     Diabetes mellitus type II     Diabetic neuropathy     Fever blister     Glaucoma (increased eye pressure)     Hyperlipidemia     Hypertension     Irritable bowel syndrome     Keloid cicatrix     Osteoporosis     Retained cholelithiasis following cholecystectomy(997.41)     Right patella fracture          Past Surgical History:   Procedure Laterality Date    BACK SURGERY  2006    CATARACT EXTRACTION W/  INTRAOCULAR LENS IMPLANT Bilateral     CHOLECYSTECTOMY      Laparoscopic    COLONOSCOPY      COLONOSCOPY N/A 8/31/2022    Procedure: COLONOSCOPY;  Surgeon: Salvatore Butler MD;  Location: [facility];  Service: Gastroenterology;  Laterality: N/A;    COLONOSCOPY N/A 1/12/2024    Procedure: COLONOSCOPY;  Surgeon: Jose Manuel Gilmore MD;  Location: [facility] (2ND FLR);  Service: Endoscopy;  Laterality: N/A;    ESOPHAGEAL DILATION N/A 8/30/2022    Procedure: DILATION, ESOPHAGUS;  Surgeon: Salvatore Butler MD;  Location: [facility];  Service: Gastroenterology;  Laterality: N/A;    ESOPHAGOGASTRODUODENOSCOPY N/A 8/30/2022    Procedure: EGD (ESOPHAGOGASTRODUODENOSCOPY);  Surgeon: Salvatore Butler MD;  Location: [facility];  Service: Gastroenterology;  Laterality: N/A;    ESOPHAGOGASTRODUODENOSCOPY N/A 5/23/2023    Procedure: ESOPHAGOGASTRODUODENOSCOPY (EGD);  Surgeon: Desmond Duffy Jr., MD;  Location: [facility];  Service: Endoscopy;  Laterality: N/A;    ESOPHAGOGASTRODUODENOSCOPY N/A 1/11/2024    Procedure: EGD (ESOPHAGOGASTRODUODENOSCOPY);  Surgeon: Jose Manuel Gilmore MD;  [facility]  Service: Endoscopy;  Laterality: N/A;    HERNIA REPAIR      HYSTERECTOMY      KNEE SURGERY Right 3/4/2015    Dr Weller      OOPHORECTOMY      ovarian tumor      Benign    TONSILLECTOMY, ADENOIDECTOMY         Time Tracking:     OT Date of Treatment: 01/14/24  OT Start Time: 1204  OT Stop Time: 1229  OT Total Time (min): 25 min    Billable Minutes:Evaluation 10  Therapeutic Activity 15    1/14/2024

## 2024-01-14 NOTE — PT/OT/SLP EVAL
Physical Therapy Co-Evaluation and Co-Treatment    Patient Name:  Deb Webb   MRN:  [id number]    Co-evaluation and co-treatment performed for this visit due to suspected patient need for two skilled therapists to ensure patient and staff safety and to accommodate for patient activity tolerance/pain management   Recommendations:     Discharge Recommendations: Moderate Intensity Therapy   Discharge Equipment Recommendations: hospital bed, lift device, wheelchair, bedside commode   Barriers to discharge: Increased level of assist and Decreased caregiver support    Assessment:     Deb Webb is a 73 y.o. female admitted with a medical diagnosis of GI bleed. She presents with the following impairments/functional limitations: impaired endurance, weakness, impaired sensation, impaired self care skills, impaired functional mobility, gait instability, impaired balance, impaired coordination, decreased lower extremity function, decreased upper extremity function, decreased safety awareness, pain, decreased ROM, impaired fine motor, impaired skin, impaired cardiopulmonary response to activity, orthopedic precautions. Pt with good tolerance to therapy evaluation on this date. Pt highly motivated and eager to participate in EOB/OOB mobility. Pt requiring increased assistance with functional mobility 2/2 poor activity tolerance, decreased BLE and RUE motor activation, RUE NWB status, and decreased motor sequencing. Therapists adhered to WB status from previous hospital admission (WBAT BLE, NWB RUE) for fractures as current medical team did not have updated recommendations. Request for orthopedics consult sent to pt's primary team. Pt with good potential for progress to be made at this time. Recommend moderate intensity therapy following discharge once medically stable in order to reduce fall risk, reduce caregiver burden, improve quality of life, and maximize functional independence.  Pt would continue to benefit  "from skilled acute PT in order to address current deficits and progress functional mobility.     Rehab Prognosis: Good; patient would benefit from acute skilled PT services 3 x/week to address these deficits and reach maximum level of function.  Recent Surgery: Procedure(s) (LRB):  COLONOSCOPY (N/A) 2 Days Post-Op    Plan:     During this hospitalization, patient to be seen 3 x/week to address the identified rehab impairments via gait training, therapeutic activities, therapeutic exercises, neuromuscular re-education and progress toward the following goals:    Plan of Care Expires:  02/14/24    Subjective     Chief Complaint: Pain at sacral wound  Patient/Family Comments/Goals: "I don't want to be on hospice. I want to be able to walk and go back home."  Pain/Comfort:  Pain Rating 1:  (unrated)  Location - Orientation 1: generalized  Location 1: gluteal  Pain Addressed 1: Reposition, Distraction  Pain Rating Post-Intervention 1:  (unrated)    Patients cultural, spiritual, Islam conflicts given the current situation: no    Living Environment:  Living Environment: Patient was previous resident at [facility]; per family, they would like to seek further therapy and discharge to her daughter's home   Previous level of function: Patient previously NH resident, but reports she was supposed to receive therapy at [facility] but was not. She was primarily bed bound over past month at nursing home. Patient was independent, but with frequent falls in December 2023.   Equipment Used at Home: none  Assistance upon Discharge: patient requiring high level of skilled assistance at current level    Objective:     Communicated with nursing prior to session. Patient found supine with blood pressure cuff, pulse ox (continuous), telemetry, oxygen, hernandez catheter upon PT entry to room.    General Precautions: Standard, fall  Orthopedic Precautions:RUE non weight bearing, RLE weight bearing as tolerated, LLE weight " bearing as tolerated    Braces: N/A    Exams:  Cognitive Exam:  Patient is oriented to Person, Place, Time, Situation, follows commands 100% of the time  RLE ROM: WFL  RLE Strength: Hip flexion 1/5, Knee flexion/extension 3/5, and ankle DF/PF 3/5  LLE ROM: WFL  LLE Strength: Hip flexion 1/5, Knee flexion/extension 3/5, and ankle DF/PF 3/5  Sensation: Intact light touch to BLEs    Functional Mobility:  Bed Mobility:  Verbal cues for sequencing and technique  Rolling Left:  minimum assistance  Rolling Right: minimum assistance  Supine to Sit: moderate assistance for LE management and trunk management  Sit to Supine: minimum assistance for LE management  Transfers:    Sit to Stand: maximal assistance of 2 persons with no AD with cues for hand placement and foot placement  Balance:   Static Sitting: Good, able to maintain for 10 minute(s) with stand by assistance  Dynamic Sitting: Good: Patient accepts moderate challenge, contact guard assistance  Static Standing: Poor, able to maintain for 1 minute(s) with maximal assistance of 2 persons  Dynamic Standing: not assessed this visit    Therapeutic Activities and Exercises:  Patient educated on role of acute care PT and PT POC, safety while in hospital including calling nurse for mobility, and call light usage  Pt educated on importance of maximal participation in therapy session in order to reduce negative effects of prolonged sedentary positioning.   Answered all questions within PT scope of practice and addressed functional mobility concerns.  Pt educated on importance of adherence to turning schedule to promote positive healing to sacral wound. Pt verbalized understanding.  Pt with incontinent BM at start of session. Pericare completed and chuckpads changed. RN notified.    AM-PAC 6 CLICK MOBILITY  Total Score:10     Patient left left sidelying with HOB elevated with all lines intact, call button in reach, RN notified, and daughter present.    GOALS:    Multidisciplinary Problems       Physical Therapy Goals          Problem: Physical Therapy    Goal Priority Disciplines Outcome Goal Variances Interventions   Physical Therapy Goal     PT, PT/OT Ongoing, Progressing     Description: Goals to be met by: 2024     Patient will increase functional independence with mobility by performin. Supine to sit with Contact Guard Assistance  2. Sit to supine with Stand-by Assistance  3. Sit to stand transfer with Minimal Assistance  4. Bed to chair transfer with Minimal Assistance using LRAD  5. Gait  x 25 feet with Moderate Assistance using LRAD.   6. Lower extremity exercise program x15 reps per handout, with assistance as needed                         History:     Past Medical History:   Diagnosis Date    Allergy     Arthritis     Cataract     Colon polyps     COPD (chronic obstructive pulmonary disease)     Diabetes mellitus type II     Diabetic neuropathy     Fever blister     Glaucoma (increased eye pressure)     Hyperlipidemia     Hypertension     Irritable bowel syndrome     Keloid cicatrix     Osteoporosis     Retained cholelithiasis following cholecystectomy(997.41)     Right patella fracture        Past Surgical History:   Procedure Laterality Date    BACK SURGERY      CATARACT EXTRACTION W/  INTRAOCULAR LENS IMPLANT Bilateral     CHOLECYSTECTOMY      Laparoscopic    COLONOSCOPY      COLONOSCOPY N/A 2022    Procedure: COLONOSCOPY;  Surgeon: Salvatore Butler MD;  Location: [facility];  Service: Gastroenterology;  Laterality: N/A;    COLONOSCOPY N/A 2024    Procedure: COLONOSCOPY;  Surgeon: Jose Manuel Gilmore MD;  Location: [facility] ([address] [place] [address]);  Service: Endoscopy;  Laterality: N/A;    ESOPHAGEAL DILATION N/A 2022    Procedure: DILATION, ESOPHAGUS;  Surgeon: Salvatore Butler MD;  Location: [facility];  Service: Gastroenterology;  Laterality: N/A;    ESOPHAGOGASTRODUODENOSCOPY N/A 2022    Procedure: EGD (ESOPHAGOGASTRODUODENOSCOPY);   Surgeon: Salvatore Butler MD;  [facility];  Service: Gastroenterology;  Laterality: N/A;    ESOPHAGOGASTRODUODENOSCOPY N/A 5/23/2023    Procedure: ESOPHAGOGASTRODUODENOSCOPY (EGD);  Surgeon: Desmond Duffy Jr., MD;  Location: [facility];  Service: Endoscopy;  Laterality: N/A;    ESOPHAGOGASTRODUODENOSCOPY N/A 1/11/2024    Procedure: EGD (ESOPHAGOGASTRODUODENOSCOPY);  Surgeon: Jose Manuel Gilmore MD;  Location: [facility] ([address] [place] [address]);  Service: Endoscopy;  Laterality: N/A;    HERNIA REPAIR      HYSTERECTOMY      KNEE SURGERY Right 3/4/2015    Dr Weller     OOPHORECTOMY      ovarian tumor      Benign    TONSILLECTOMY, ADENOIDECTOMY         Time Tracking:     PT Received On: 01/14/24  PT Start Time: 1204     PT Stop Time: 1229  PT Total Time (min): 25 min     Billable Minutes: Evaluation 10 Neuromuscular Re-education 15      01/14/2024

## 2024-01-14 NOTE — NURSING
Patient's  HR  was  in 130-140s  , Provider and charge nurse   notified . Patient  had two bowel movement  after arriving in unit . Patient is asymptomatic .Patient is monitoring , Call light within reach ,safety precaution maintained . Will continue monitoring .

## 2024-01-14 NOTE — PROGRESS NOTES
"[facility] Step[facility] ([place] [id number])  [facility] Medicine  Progress Note    Patient Name: Deb Webb  MRN: [id number]  Patient Class: IP- Inpatient   Admission Date: 1/10/2024  Length of Stay: 4 days  Attending Physician: Estevan Daly DO  Primary Care Provider: Triston Valverde MD        Subjective:     Principal Problem:GI bleed        HPI:  Ms. Webb is a 72 y/o woman with pmh of arthritis, COPD w/ 6L O2 at baseline and history of CVA , diabetes mellitus type 2, hyperlipidemia, hypertension, paroxysmal AFib on Xarelto. She presented to the emergency department via EMS secondary to bright red blood per rectum. She reports three days of bloody stools. She quantifies the amount as "three cups of blood" per day for three days. She has never had this happen to her before. She also reports fatigue, pale coloration of skin, productive cough, mild diffuse abdominal pain and moderate rectal pain. She was recently discharged from [facility] 5 days ago after admission for URI and COPD exacerbation. Of note, recent colonoscopy reports bleeding could be due to rectal prolapse. She denies headaches, dizziness, shortness of breath, chest pain, nausea, vomiting, recent NSAID use/goody powder, weight loss, fevers, night sweats, hematuria.     In the ED, the patient received 1L IVF, 1u RBCs, Vanc, Zosyn, 80 IV pantoprazole, BC x2. Her blood pressures have been maintained 90s-100s/60s-70s with HR 150s-180s and EKG noting aflutter with RVR. GI and cardiology consulted. Labs notable for Hgb of 8.5 (11 7 days ago), wbc 25, Bicarb 20, lactate 4.28, PTT 21.1, PT 12.7, INR of 1.2. CTA with no evidence of acute GI bleed but with prominent perirectal vessels, possible hemorrhoids. Large amount of retained stool within sigmoid colon/rectum. New airspace consolidation in LLL with bronchial thickening concerning for PNA vs atelectasis.     Overview/Hospital Course:  This is a 73-year-old female with a history of COPD on 6 L NC at " home, CVA, PAF on Xarelto who originally presented with 3 days of hematochezia.  HB on presentation was 8.5.  CTA without evidence of acute GI be however did showed prominent perirectal vessels and possible hemorrhoids with large amount of retained stool within the sigmoid colon/rectum.  Stool noted to be melanotic on arrival.  Also did show left lower lobe airspace consolidation concerning for pneumonia, started on empiric IV vancomycin, Zosyn along with IV hydrocortisone for COPD exacerbation.  On arrival, was significantly tachycardic with rates of 150s, found to be in atrial flutter with RVR.  Cardiology was consulted, attributed elevated rate in the setting of pneumonia and GI be.  Initially recommended digoxin but primary team elected for BB due to hemodynamic stability.  Eventually underwent EGD/colonoscopy 1/12, found to have 3 ulcers in the distal rectum, much improved since compared to last colonoscopy, sigmoid and descending colon diverticulosis, mild splenic flexure inflammation.  On PPI b.i.d.     Deemed appropriate for transfer to the floor on 1/13/2024, and was accepted to  team B for further care and management.    Interval History: Seen at bedside this AM. States that shortness of breath and cough are unchanged. Endorsing occasional palpitations    Review of Systems  Objective:     Vital Signs (Most Recent):  Temp: 97.9 °F (36.6 °C) (01/14/24 1200)  Pulse: 110 (01/14/24 1200)  Resp: (!) 22 (01/14/24 1200)  BP: 96/64 (01/14/24 1200)  SpO2: (!) 90 % (01/14/24 1200) Vital Signs (24h Range):  Temp:  [97.6 °F (36.4 °C)-98.4 °F (36.9 °C)] 97.9 °F (36.6 °C)  Pulse:  [] 110  Resp:  [11-39] 22  SpO2:  [89 %-100 %] 90 %  BP: ()/(57-81) 96/64     Weight: 50 kg (110 lb 3.7 oz)  Body mass index is 22.26 kg/m².    Intake/Output Summary (Last 24 hours) at 1/14/2024 1340  Last data filed at 1/14/2024 0631  Gross per 24 hour   Intake --   Output 1050 ml   Net -1050 ml         Physical Exam  Vitals and  nursing note reviewed.   Constitutional:       General: She is not in acute distress.     Appearance: Normal appearance.   HENT:      Head: Normocephalic and atraumatic.   Eyes:      Pupils: Pupils are equal, round, and reactive to light.   Cardiovascular:      Rate and Rhythm: Tachycardia present. Rhythm irregular.      Pulses: Normal pulses.      Heart sounds: No murmur heard.  Pulmonary:      Effort: Pulmonary effort is normal.      Breath sounds: No wheezing.   Abdominal:      General: Abdomen is flat. There is no distension.      Palpations: Abdomen is soft. There is no mass.      Tenderness: There is no abdominal tenderness. There is no guarding or rebound.   Musculoskeletal:      Cervical back: Neck supple.      Right lower leg: No edema.      Left lower leg: No edema.   Skin:     General: Skin is warm and dry.      Coloration: Skin is not pale.   Neurological:      General: No focal deficit present.      Mental Status: She is alert and oriented to person, place, and time.             Significant Labs: All pertinent labs within the past 24 hours have been reviewed.    Significant Imaging: I have reviewed all pertinent imaging results/findings within the past 24 hours.    Assessment/Plan:      * GI bleed  #Rectal ulcerations  -History of PAF on Xarelto, presents w [facility], previously on hospice at NH  -CTA: no evidence of acute GIB, however showing prominent perirectal vessels w possible hemorrhoids  -EGD/colonoscopy 1/12: 3 ulcers in the distal rectum, much improved since compared to last colonoscopy, sigmoid and descending colon diverticulosis, mild splenic flexure inflammation  Plan:  -Continue PPI BID  -Contacted GI for clearance to restart Xarelto  -Monitor CBC  - Trend Hgb and transfuse for goal Hgb > 7, unless otherwise indicated  - Maintain IV access with 2 large bore IV's  - Intravascular resuscitation/support with IVF's   - Correct any coagulopathy with platelets and FFP for goal of platelets >50K and  INR <2.          Atrial flutter with rapid ventricular response  -Continues to be in RVR, likely exacerbated by pneumonia, recent GIB, COPD exacerbation  -Metoprolol 25mg BID (increased from home dose)  -IV metoprolol 5mg q6 prn ordered  -Cardio reconsultation if rates uncontrolled      COPD exacerbation  Patient's COPD is controlled currently.  Patient is currently on COPD Pathway. Continue scheduled inhalers Steroids, Antibiotics, and Supplemental oxygen and monitor respiratory status closely.     Acute blood loss anemia  Patient's anemia is currently controlled. Has not received any PRBCs to date. Etiology likely d/t acute blood loss which was from GI bleed  Current CBC reviewed-   Lab Results   Component Value Date    HGB 10.1 (L) 01/14/2024    HCT 34.6 (L) 01/14/2024     Monitor serial CBC and transfuse if patient becomes hemodynamically unstable, symptomatic or H/H drops below 7/21.    Pneumonia  -Most recent CXR from 1/13 showing stable bilateral opacities  -Continue IV CTX, doxycycline      Debility  Patient with Acute on chronic debility due to fracture/prosthesis and chronic unspecified fatigue. Latest AMPAC and GEMS scores have not been reviewed. Evaluation for etiology is complete. Plan includes PT/OT consulted.    Advance care planning  Spoke w nadine Neri over the phone regarding recent hospice status after last hospitalization. Stating that it is no longer pt's wishes to transition back to hospice. Would like to dc to home at some point      Controlled type 2 diabetes mellitus with diabetic neuropathy, without long-term current use of insulin  Patient's FSGs are controlled on current medication regimen.  Last A1c reviewed-   Lab Results   Component Value Date    HGBA1C 6.6 (H) 12/25/2023     Most recent fingerstick glucose reviewed-   Recent Labs   Lab 01/13/24  1613 01/13/24  2057 01/14/24  0830 01/14/24  1136   POCTGLUCOSE 205* 305* 119* 210*     Current correctional scale  Medium  Maintain  anti-hyperglycemic dose as follows-   Antihyperglycemics (From admission, onward)      Start     Stop Route Frequency Ordered    01/11/24 1404  insulin aspart U-100 pen 0-10 Units         -- SubQ Every 6 hours PRN 01/11/24 1404    01/10/24 2100  insulin detemir U-100 (Levemir) pen 8 Units         -- SubQ Nightly 01/10/24 1725          Hold Oral hypoglycemics while patient is in the hospital.      VTE Risk Mitigation (From admission, onward)           Ordered     Place sequential compression device  Until discontinued         01/10/24 1703     IP VTE HIGH RISK PATIENT  Once         01/10/24 1703                    Discharge Planning   NATHALY: 1/16/2024     Code Status: Full Code   Is the patient medically ready for discharge?: No    Reason for patient still in hospital (select all that apply): Patient trending condition  Discharge Plan A: Return to nursing home ([facility])                  Estevan Daly DO  Department of Hospital Medicine   [facility] - Stepdown Flex ([place])

## 2024-01-14 NOTE — PLAN OF CARE
Problem: Infection  Goal: Absence of Infection Signs and Symptoms  Outcome: Ongoing, Progressing     Problem: Adult Inpatient Plan of Care  Goal: Plan of Care Review  Outcome: Ongoing, Progressing  Goal: Patient-Specific Goal (Individualized)  Outcome: Ongoing, Progressing  Goal: Absence of Hospital-Acquired Illness or Injury  Outcome: Ongoing, Progressing  Goal: Optimal Comfort and Wellbeing  Outcome: Ongoing, Progressing  Goal: Readiness for Transition of Care  Outcome: Ongoing, Progressing     Problem: Diabetes Comorbidity  Goal: Blood Glucose Level Within Targeted Range  Outcome: Ongoing, Progressing     Problem: Fluid Imbalance (Pneumonia)  Goal: Fluid Balance  Outcome: Ongoing, Progressing     Problem: Infection (Pneumonia)  Goal: Resolution of Infection Signs and Symptoms  Outcome: Ongoing, Progressing     Problem: Respiratory Compromise (Pneumonia)  Goal: Effective Oxygenation and Ventilation  Outcome: Ongoing, Progressing     Problem: Impaired Wound Healing  Goal: Optimal Wound Healing  Outcome: Ongoing, Progressing     Problem: Skin Injury Risk Increased  Goal: Skin Health and Integrity  Outcome: Ongoing, Progressing

## 2024-01-14 NOTE — PLAN OF CARE
Problem: Occupational Therapy  Goal: Occupational Therapy Goal  Description: Goals to be met by: 2/14/24     Patient will increase functional independence with ADLs by performing:    UE Dressing with Contact Guard Assistance.  LE Dressing with Minimal Assistance.  Grooming while seated with Set-up Assistance.  Supine to sit with Minimal Assistance.  Stand pivot transfers with Moderate Assistance.    Outcome: Ongoing, Progressing     Patient tolerated OT eval. Goals and POC established

## 2024-01-14 NOTE — ASSESSMENT & PLAN NOTE
Spoke w daughter Halle over the phone regarding recent hospice status after last hospitalization. Stating that it is no longer pt's wishes to transition back to hospice. Would like to dc to home at some point

## 2024-01-15 LAB
ALBUMIN SERPL BCP-MCNC: 2.5 G/DL (ref 3.5–5.2)
ALP SERPL-CCNC: 154 U/L (ref 55–135)
ALT SERPL W/O P-5'-P-CCNC: 23 U/L (ref 10–44)
ANION GAP SERPL CALC-SCNC: 7 MMOL/L (ref 8–16)
AST SERPL-CCNC: 15 U/L (ref 10–40)
BACTERIA BLD CULT: NORMAL
BACTERIA BLD CULT: NORMAL
BASOPHILS # BLD AUTO: 0.01 K/UL (ref 0–0.2)
BASOPHILS NFR BLD: 0.1 % (ref 0–1.9)
BILIRUB SERPL-MCNC: 0.3 MG/DL (ref 0.1–1)
BUN SERPL-MCNC: 12 MG/DL (ref 8–23)
CALCIUM SERPL-MCNC: 8.9 MG/DL (ref 8.7–10.5)
CHLORIDE SERPL-SCNC: 109 MMOL/L (ref 95–110)
CO2 SERPL-SCNC: 23 MMOL/L (ref 23–29)
CREAT SERPL-MCNC: 0.6 MG/DL (ref 0.5–1.4)
DIFFERENTIAL METHOD BLD: ABNORMAL
EOSINOPHIL # BLD AUTO: 0.1 K/UL (ref 0–0.5)
EOSINOPHIL NFR BLD: 0.8 % (ref 0–8)
ERYTHROCYTE [DISTWIDTH] IN BLOOD BY AUTOMATED COUNT: 17.9 % (ref 11.5–14.5)
EST. GFR  (NO RACE VARIABLE): >60 ML/MIN/1.73 M^2
GLUCOSE SERPL-MCNC: 156 MG/DL (ref 70–110)
HCT VFR BLD AUTO: 33.4 % (ref 37–48.5)
HGB BLD-MCNC: 10.9 G/DL (ref 12–16)
IMM GRANULOCYTES # BLD AUTO: 0.07 K/UL (ref 0–0.04)
IMM GRANULOCYTES NFR BLD AUTO: 0.5 % (ref 0–0.5)
LYMPHOCYTES # BLD AUTO: 2.7 K/UL (ref 1–4.8)
LYMPHOCYTES NFR BLD: 17.8 % (ref 18–48)
MAGNESIUM SERPL-MCNC: 1.5 MG/DL (ref 1.6–2.6)
MCH RBC QN AUTO: 30.8 PG (ref 27–31)
MCHC RBC AUTO-ENTMCNC: 32.6 G/DL (ref 32–36)
MCV RBC AUTO: 94 FL (ref 82–98)
MONOCYTES # BLD AUTO: 1.2 K/UL (ref 0.3–1)
MONOCYTES NFR BLD: 7.7 % (ref 4–15)
NEUTROPHILS # BLD AUTO: 11 K/UL (ref 1.8–7.7)
NEUTROPHILS NFR BLD: 73.1 % (ref 38–73)
NRBC BLD-RTO: 0 /100 WBC
PHOSPHATE SERPL-MCNC: 3 MG/DL (ref 2.7–4.5)
PLATELET # BLD AUTO: 149 K/UL (ref 150–450)
PMV BLD AUTO: 10.6 FL (ref 9.2–12.9)
POCT GLUCOSE: 118 MG/DL (ref 70–110)
POCT GLUCOSE: 150 MG/DL (ref 70–110)
POCT GLUCOSE: 311 MG/DL (ref 70–110)
POCT GLUCOSE: 381 MG/DL (ref 70–110)
POTASSIUM SERPL-SCNC: 4 MMOL/L (ref 3.5–5.1)
PROT SERPL-MCNC: 4.9 G/DL (ref 6–8.4)
RBC # BLD AUTO: 3.54 M/UL (ref 4–5.4)
SODIUM SERPL-SCNC: 139 MMOL/L (ref 136–145)
WBC # BLD AUTO: 15 K/UL (ref 3.9–12.7)

## 2024-01-15 PROCEDURE — 63600175 PHARM REV CODE 636 W HCPCS: Performed by: STUDENT IN AN ORGANIZED HEALTH CARE EDUCATION/TRAINING PROGRAM

## 2024-01-15 PROCEDURE — 25000003 PHARM REV CODE 250: Performed by: INTERNAL MEDICINE

## 2024-01-15 PROCEDURE — 20600001 HC STEP DOWN PRIVATE ROOM

## 2024-01-15 PROCEDURE — 25000003 PHARM REV CODE 250: Performed by: STUDENT IN AN ORGANIZED HEALTH CARE EDUCATION/TRAINING PROGRAM

## 2024-01-15 PROCEDURE — 94761 N-INVAS EAR/PLS OXIMETRY MLT: CPT

## 2024-01-15 PROCEDURE — 84100 ASSAY OF PHOSPHORUS: CPT

## 2024-01-15 PROCEDURE — 25000003 PHARM REV CODE 250

## 2024-01-15 PROCEDURE — 80053 COMPREHEN METABOLIC PANEL: CPT

## 2024-01-15 PROCEDURE — 27000221 HC OXYGEN, UP TO 24 HOURS

## 2024-01-15 PROCEDURE — 93010 ELECTROCARDIOGRAM REPORT: CPT | Mod: ,,, | Performed by: INTERNAL MEDICINE

## 2024-01-15 PROCEDURE — 93005 ELECTROCARDIOGRAM TRACING: CPT

## 2024-01-15 PROCEDURE — 83735 ASSAY OF MAGNESIUM: CPT

## 2024-01-15 PROCEDURE — 63600175 PHARM REV CODE 636 W HCPCS

## 2024-01-15 PROCEDURE — 85025 COMPLETE CBC W/AUTO DIFF WBC: CPT

## 2024-01-15 PROCEDURE — 36415 COLL VENOUS BLD VENIPUNCTURE: CPT

## 2024-01-15 RX ORDER — METOPROLOL TARTRATE 50 MG/1
50 TABLET ORAL 2 TIMES DAILY
Status: DISCONTINUED | OUTPATIENT
Start: 2024-01-15 | End: 2024-01-15

## 2024-01-15 RX ORDER — METOPROLOL TARTRATE 1 MG/ML
5 INJECTION, SOLUTION INTRAVENOUS ONCE
Status: COMPLETED | OUTPATIENT
Start: 2024-01-15 | End: 2024-01-15

## 2024-01-15 RX ORDER — MAGNESIUM SULFATE HEPTAHYDRATE 40 MG/ML
2 INJECTION, SOLUTION INTRAVENOUS ONCE
Status: COMPLETED | OUTPATIENT
Start: 2024-01-15 | End: 2024-01-15

## 2024-01-15 RX ORDER — METOPROLOL TARTRATE 25 MG/1
25 TABLET, FILM COATED ORAL EVERY 6 HOURS
Status: DISCONTINUED | OUTPATIENT
Start: 2024-01-15 | End: 2024-01-19 | Stop reason: HOSPADM

## 2024-01-15 RX ADMIN — LATANOPROST 1 DROP: 50 SOLUTION OPHTHALMIC at 08:01

## 2024-01-15 RX ADMIN — DOXYCYCLINE HYCLATE 100 MG: 100 TABLET, COATED ORAL at 09:01

## 2024-01-15 RX ADMIN — PANTOPRAZOLE SODIUM 40 MG: 40 TABLET, DELAYED RELEASE ORAL at 05:01

## 2024-01-15 RX ADMIN — OXYCODONE HYDROCHLORIDE 5 MG: 5 TABLET ORAL at 09:01

## 2024-01-15 RX ADMIN — MUPIROCIN: 20 OINTMENT TOPICAL at 09:01

## 2024-01-15 RX ADMIN — METOPROLOL TARTRATE 25 MG: 25 TABLET, FILM COATED ORAL at 11:01

## 2024-01-15 RX ADMIN — METOROPROLOL TARTRATE 5 MG: 5 INJECTION, SOLUTION INTRAVENOUS at 09:01

## 2024-01-15 RX ADMIN — METOROPROLOL TARTRATE 5 MG: 5 INJECTION, SOLUTION INTRAVENOUS at 05:01

## 2024-01-15 RX ADMIN — DOXYCYCLINE HYCLATE 100 MG: 100 TABLET, COATED ORAL at 08:01

## 2024-01-15 RX ADMIN — PREDNISONE 40 MG: 20 TABLET ORAL at 09:01

## 2024-01-15 RX ADMIN — LOPERAMIDE HYDROCHLORIDE 2 MG: 2 CAPSULE ORAL at 06:01

## 2024-01-15 RX ADMIN — METOPROLOL TARTRATE 25 MG: 25 TABLET, FILM COATED ORAL at 04:01

## 2024-01-15 RX ADMIN — GABAPENTIN 300 MG: 300 CAPSULE ORAL at 04:01

## 2024-01-15 RX ADMIN — CEFTRIAXONE 2 G: 2 INJECTION, POWDER, FOR SOLUTION INTRAMUSCULAR; INTRAVENOUS at 04:01

## 2024-01-15 RX ADMIN — BRIMONIDINE TARTRATE 1 DROP: 2 SOLUTION OPHTHALMIC at 08:01

## 2024-01-15 RX ADMIN — MAGNESIUM SULFATE HEPTAHYDRATE 2 G: 40 INJECTION, SOLUTION INTRAVENOUS at 09:01

## 2024-01-15 RX ADMIN — BRIMONIDINE TARTRATE 1 DROP: 2 SOLUTION OPHTHALMIC at 09:01

## 2024-01-15 RX ADMIN — PANTOPRAZOLE SODIUM 40 MG: 40 TABLET, DELAYED RELEASE ORAL at 04:01

## 2024-01-15 RX ADMIN — TIMOLOL MALEATE 1 DROP: 5 SOLUTION OPHTHALMIC at 09:01

## 2024-01-15 RX ADMIN — LOPERAMIDE HYDROCHLORIDE 2 MG: 2 CAPSULE ORAL at 09:01

## 2024-01-15 RX ADMIN — LOPERAMIDE HYDROCHLORIDE 2 MG: 2 CAPSULE ORAL at 05:01

## 2024-01-15 RX ADMIN — INSULIN ASPART 8 UNITS: 100 INJECTION, SOLUTION INTRAVENOUS; SUBCUTANEOUS at 05:01

## 2024-01-15 RX ADMIN — MUPIROCIN: 20 OINTMENT TOPICAL at 08:01

## 2024-01-15 RX ADMIN — METOPROLOL TARTRATE 25 MG: 25 TABLET, FILM COATED ORAL at 09:01

## 2024-01-15 RX ADMIN — GABAPENTIN 300 MG: 300 CAPSULE ORAL at 08:01

## 2024-01-15 RX ADMIN — TAMSULOSIN HYDROCHLORIDE 0.4 MG: 0.4 CAPSULE ORAL at 09:01

## 2024-01-15 RX ADMIN — GABAPENTIN 300 MG: 300 CAPSULE ORAL at 09:01

## 2024-01-15 RX ADMIN — CITALOPRAM HYDROBROMIDE 20 MG: 20 TABLET ORAL at 10:01

## 2024-01-15 RX ADMIN — INSULIN DETEMIR 8 UNITS: 100 INJECTION, SOLUTION SUBCUTANEOUS at 09:01

## 2024-01-15 NOTE — ASSESSMENT & PLAN NOTE
Patient with Hypoxic Respiratory failure which is Acute on chronic.  she is on home oxygen at 6 LPM. Supplemental oxygen was provided and noted-      .   Signs/symptoms of respiratory failure include- tachypnea, increased work of breathing, respiratory distress, and wheezing. Contributing diagnoses includes - COPD Labs and images were reviewed. Patient Has recent ABG, which has been reviewed. Will treat underlying causes and adjust management of respiratory failure as follows- see plan for copd above

## 2024-01-15 NOTE — ASSESSMENT & PLAN NOTE
Improved control 1/18, HR below goal 120  Intermittently in afib RVR, likely exacerbated by pneumonia, recent GIB, COPD exacerbation  -Home med: metoprolol 12.5mg BID  -12 lead 1/15 obtained, atrial flutter with rvr  Plan:  -Discussed lack of rate control with cardiology, metoprolol 25mg increased to q6. Titrate to goal rate <110  -consider cardizem as EF wnl  -Digoxin may be optionif BP soft/amio if borderline unstable  -IV metoprolol 5mg q6 prn ordered  -Monitor on tele  -Xarelto restarted

## 2024-01-15 NOTE — PLAN OF CARE
Problem: Infection  Goal: Absence of Infection Signs and Symptoms  Outcome: Ongoing, Progressing     Problem: Adult Inpatient Plan of Care  Goal: Plan of Care Review  Outcome: Ongoing, Progressing  Goal: Patient-Specific Goal (Individualized)  Outcome: Ongoing, Progressing  Goal: Absence of Hospital-Acquired Illness or Injury  Outcome: Ongoing, Progressing  Goal: Optimal Comfort and Wellbeing  Outcome: Ongoing, Progressing  Goal: Readiness for Transition of Care  Outcome: Ongoing, Progressing     Problem: Diabetes Comorbidity  Goal: Blood Glucose Level Within Targeted Range  Outcome: Ongoing, Progressing     Problem: Fluid Imbalance (Pneumonia)  Goal: Fluid Balance  Outcome: Ongoing, Progressing     Problem: Infection (Pneumonia)  Goal: Resolution of Infection Signs and Symptoms  Outcome: Ongoing, Progressing     Problem: Respiratory Compromise (Pneumonia)  Goal: Effective Oxygenation and Ventilation  Outcome: Ongoing, Progressing     Problem: Impaired Wound Healing  Goal: Optimal Wound Healing  Outcome: Ongoing, Progressing     Problem: Skin Injury Risk Increased  Goal: Skin Health and Integrity  Outcome: Ongoing, Progressing     Problem: Fall Injury Risk  Goal: Absence of Fall and Fall-Related Injury  Outcome: Ongoing, Progressing   Pt Aox4, PRNs given see MAR. Call light within reach, safety measures in place, rounded per facility policy.

## 2024-01-15 NOTE — AI DETERIORATION ALERT
"RAPID RESPONSE NURSE AI ALERT       AI alert received.    Chart Reviewed: 01/15/2024, 9:20 AM    MRN: [id number]  Bed: 70583/17416 A    Dx: GI bleed    Deb Webb has a past medical history of Allergy, Arthritis, Cataract, Colon polyps, COPD (chronic obstructive pulmonary disease), Diabetes mellitus type II, Diabetic neuropathy, Fever blister, Glaucoma (increased eye pressure), Hyperlipidemia, Hypertension, Irritable bowel syndrome, Keloid cicatrix, Osteoporosis, Retained cholelithiasis following cholecystectomy(997.41), and Right patella fracture.    Last VS: /70   Pulse 83   Temp 97.8 °F (36.6 °C) (Oral)   Resp (!) 22   Wt 50 kg (110 lb 3.7 oz)   SpO2 97%   BMI 22.26 kg/m²     24H Vital Sign Range:  Temp:  [97 °F (36.1 °C)-98.1 °F (36.7 °C)]   Pulse:  []   Resp:  [17-23]   BP: ()/()   SpO2:  [90 %-100 %]     Level of Consciousness (AVPU): alert    Recent Labs     01/13/24 2152 01/14/24  0646 01/15/24  0551   WBC 15.84* 13.76* 15.00*   HGB 9.8* 10.1* 10.9*   HCT 30.7* 34.6* 33.4*    158 149*       Recent Labs     01/13/24 2152 01/14/24  0646 01/15/24  0551    135* 139   K 3.6 3.8 4.0    110 109   CO2 25 22* 23   BUN 13 11 12   CREATININE 0.7 0.6 0.6   * 140* 156*   PHOS 2.9 3.0 3.0   MG 1.6 1.6 1.5*        No results for input(s): "PH", "PCO2", "PO2", "HCO3", "POCSATURATED", "BE" in the last 72 hours.     OXYGEN:  Flow (L/min): 3          MEWS score: 1    In patient room when AI alert received. Pt with tachycardia. MD notified. One time dose IV lopressor ordered.    Leslie Layne RN        "

## 2024-01-15 NOTE — PLAN OF CARE
01/15/24 0857   Post-Acute Status   Post-Acute Authorization Placement   Post-Acute Placement Status Referrals Sent   Coverage N   Patient choice form signed by patient/caregiver List from System Post-Acute Care   Discharge Delays (!) Change in Medical Condition   Discharge Plan   Discharge Plan A Return to nursing home   Discharge Plan B Return to Nursing Home     A Return to NH referral sent to [facility].    LASHAUN Wells LMSW  [facility]  [id number]

## 2024-01-15 NOTE — ASSESSMENT & PLAN NOTE
Patient's COPD is controlled currently.  Patient is currently on COPD Pathway. Continue scheduled inhalers Steroids, Antibiotics, and Supplemental oxygen and monitor respiratory status closely.     -Currently on steroid taper  -O2 requirement is below baseline

## 2024-01-15 NOTE — CARE UPDATE
RAPID RESPONSE NURSE PROACTIVE ROUNDING NOTE       Time of Visit: 920    Admit Date: 1/10/2024  LOS: 5  Code Status: Full Code   Date of Visit: 01/15/2024  : 1950  Age: 73 y.o.  Sex: female  Race: White  Bed: 12606/30979 A:   MRN: [id number]  Was the patient discharged from an ICU this admission? Yes   Was the patient discharged from a PACU within last 24 hours? No   Did the patient receive conscious sedation/general anesthesia in last 24 hours? No  Was the patient in the ED within the past 24 hours? No  Was the patient on NIPPV within the past 24 hours? No   Attending Physician: Estevan Daly DO  Primary Service: [facility] [place] [place] [facility]OSP MED B   Time spent at the bedside: 15 -30 min    SITUATION    Notified by [facility] patient alert.  Reason for alert: tachycardia  Called to evaluate the patient for Circulatory    BACKGROUND     Why is the patient in the hospital?: GI bleed    Patient has a past medical history of Allergy, Arthritis, Cataract, Colon polyps, COPD (chronic obstructive pulmonary disease), Diabetes mellitus type II, Diabetic neuropathy, Fever blister, Glaucoma (increased eye pressure), Hyperlipidemia, Hypertension, Irritable bowel syndrome, Keloid cicatrix, Osteoporosis, Retained cholelithiasis following cholecystectomy(997.41), and Right patella fracture.    Last Vitals:  Temp: 97.8 °F (36.6 °C) (01/15 0830)  Pulse: 103 (01/15 1115)  Resp: 22 (01/15 0932)  BP: 107/70 (01/15 0931)  SpO2: 98 % (01/15 1115)    24 Hours Vitals Range:  Temp:  [97 °F (36.1 °C)-98.1 °F (36.7 °C)]   Pulse:  []   Resp:  [17-23]   BP: ()/()   SpO2:  [90 %-100 %]     Labs:  Recent Labs     24  0646 01/15/24  0551   WBC 15.84* 13.76* 15.00*   HGB 9.8* 10.1* 10.9*   HCT 30.7* 34.6* 33.4*    158 149*       Recent Labs     24  0646 01/15/24  0551    135* 139   K 3.6 3.8 4.0    110 109   CO2 25 22* 23   BUN 13 11 12   CREATININE 0.7 0.6 0.6   * 140* 156*  "  PHOS 2.9 3.0 3.0   MG 1.6 1.6 1.5*        No results for input(s): "PH", "PCO2", "PO2", "HCO3", "POCSATURATED", "BE" in the last 72 hours.     ASSESSMENT    Physical Exam  Constitutional:       General: She is awake. She is not in acute distress.  HENT:      Head: Normocephalic.      Nose: Nose normal.   Eyes:      Pupils: Pupils are equal, round, and reactive to light.   Cardiovascular:      Rate and Rhythm: Tachycardia present. Rhythm irregular.      Comments: Patient states she can feel her heart beating fast  Pulmonary:      Effort: Pulmonary effort is normal. No respiratory distress.   Neurological:      Mental Status: She is alert and oriented to person, place, and time.       INTERVENTIONS    The patient was seen for Cardiac problem. Staff concerns included tachycardia. The following interventions were performed: continuous cardiac monitoring continued, continuous pulse ox monitoring continued, and 5mg IV Lopressor ordered by MD.    RECOMMENDATIONS    Administer ordered Lopressor and closely monitor BP and HR.    PROVIDER ESCALATION    Yes/No  Yes    Orders received and case discussed with Dr. Daly .    Disposition: Remain in room 81292.    FOLLOW-UP    Charge Shaye LANCE  updated on plan of care. Instructed to call the Rapid Response Nurse, Leslie Layne RN at [phone] for additional questions or concerns.            "

## 2024-01-15 NOTE — SUBJECTIVE & OBJECTIVE
Interval History: Patient dizzy on exam this morning. BP soft, MAPs around 60. HR < 120. Patient given 500cc bolus with improvement in BP.     Review of Systems  Objective:     Vital Signs (Most Recent):  Temp: 97.7 °F (36.5 °C) (01/18/24 1045)  Pulse: 105 (01/18/24 1517)  Resp: 19 (01/18/24 1400)  BP: 118/81 (01/18/24 1400)  SpO2: 96 % (01/18/24 1517) Vital Signs (24h Range):  Temp:  [97.7 °F (36.5 °C)-98.3 °F (36.8 °C)] 97.7 °F (36.5 °C)  Pulse:  [] 105  Resp:  [10-29] 19  SpO2:  [86 %-100 %] 96 %  BP: ()/(50-91) 118/81     Weight: 51.7 kg (114 lb)  Body mass index is 23.03 kg/m².    Intake/Output Summary (Last 24 hours) at 1/18/2024 1617  Last data filed at 1/18/2024 1258  Gross per 24 hour   Intake 1790.37 ml   Output 1824 ml   Net -33.63 ml         Physical Exam  Vitals and nursing note reviewed.   Constitutional:       General: She is not in acute distress.     Appearance: Normal appearance.   HENT:      Head: Normocephalic and atraumatic.   Eyes:      Pupils: Pupils are equal, round, and reactive to light.   Cardiovascular:      Rate and Rhythm: Normal rate and regular rhythm.      Pulses: Normal pulses.      Heart sounds: No murmur heard.  Pulmonary:      Effort: Pulmonary effort is normal.      Breath sounds: No wheezing.   Abdominal:      General: Abdomen is flat. There is no distension.      Palpations: Abdomen is soft. There is no mass.      Tenderness: There is no abdominal tenderness. There is no guarding or rebound.   Musculoskeletal:      Cervical back: Neck supple.      Right lower leg: No edema.      Left lower leg: No edema.   Skin:     General: Skin is warm and dry.      Coloration: Skin is not pale.   Neurological:      General: No focal deficit present.      Mental Status: She is alert and oriented to person, place, and time.             Significant Labs: All pertinent labs within the past 24 hours have been reviewed.    Significant Imaging: I have reviewed all pertinent imaging  results/findings within the past 24 hours.

## 2024-01-15 NOTE — PROGRESS NOTES
"[facility] ([place] [address])  [facility]  Progress Note    Patient Name: Deb Webb  MRN: [id number]  Patient Class: IP- Inpatient   Admission Date: 1/10/2024  Length of Stay: 5 days  Attending Physician: Estevan Daly DO  Primary Care Provider: Triston Valverde MD        Subjective:     Principal Problem:GI bleed        HPI:  Ms. Webb is a 72 y/o woman with pmh of arthritis, COPD w/ 6L O2 at baseline and history of CVA , diabetes mellitus type 2, hyperlipidemia, hypertension, paroxysmal AFib on Xarelto. She presented to the emergency department via EMS secondary to bright red blood per rectum. She reports three days of bloody stools. She quantifies the amount as "three cups of blood" per day for three days. She has never had this happen to her before. She also reports fatigue, pale coloration of skin, productive cough, mild diffuse abdominal pain and moderate rectal pain. [place] [facility] [place] for URI and COPD exacerbation. Of note, recent colonoscopy reports bleeding could be due to rectal prolapse. She denies headaches, dizziness, shortness of breath, chest pain, nausea, vomiting, recent NSAID use/goody powder, weight loss, fevers, night sweats, hematuria.     In the ED, the patient received 1L IVF, 1u RBCs, Vanc, Zosyn, 80 IV pantoprazole, BC x2. Her blood pressures have been maintained 90s-100s/60s-70s with HR 150s-180s and EKG noting aflutter with RVR. GI and cardiology consulted. Labs notable for Hgb of 8.5 (11 7 days ago), wbc 25, Bicarb 20, lactate 4.28, PTT 21.1, PT 12.7, INR of 1.2. CTA with no evidence of acute GI bleed but with prominent perirectal vessels, possible hemorrhoids. Large amount of retained stool within sigmoid colon/rectum. New airspace consolidation in LLL with bronchial thickening concerning for PNA vs atelectasis.     Overview/Hospital Course:  This is a 73-year-old female with a history of COPD on 6 L NC at " home, CVA, PAF on Xarelto who originally presented with 3 days of hematochezia.  HB on presentation was 8.5.  CTA without evidence of acute GI be however did showed prominent perirectal vessels and possible hemorrhoids with large amount of retained stool within the sigmoid colon/rectum.  Stool noted to be melanotic on arrival.  Also did show left lower lobe airspace consolidation concerning for pneumonia, started on empiric IV vancomycin, Zosyn along with IV hydrocortisone for COPD exacerbation.  On arrival, was significantly tachycardic with rates of 150s, found to be in atrial flutter with RVR.  Cardiology was consulted, attributed elevated rate in the setting of pneumonia and GI bleed.  Initially recommended digoxin but primary team elected for BB due to hemodynamic stability.  Eventually underwent EGD/colonoscopy 1/12, found to have 3 ulcers in the distal rectum, much improved since compared to last colonoscopy, sigmoid and descending colon diverticulosis, mild splenic flexure inflammation.  On PPI b.i.d. HR has been difficult to control, discussed w cardiology     Deemed appropriate for transfer to the floor on 1/13/2024, and was accepted to  team B for further care and management.    Interval History: Seen at bedside this AM. States that shortness of breath and cough are unchanged. Endorsing occasional palpitations/fluttering sensation of the chest. HR occasionally in 150s today with associated light headedness    Review of Systems  Objective:     Vital Signs (Most Recent):  Temp: 97.8 °F (36.6 °C) (01/15/24 0830)  Pulse: 103 (01/15/24 1115)  Resp: (!) 22 (01/15/24 0932)  BP: 107/70 (01/15/24 0931)  SpO2: 98 % (01/15/24 1115) Vital Signs (24h Range):  Temp:  [97 °F (36.1 °C)-98.1 °F (36.7 °C)] 97.8 °F (36.6 °C)  Pulse:  [] 103  Resp:  [17-23] 22  SpO2:  [90 %-100 %] 98 %  BP: (104-156)/() 107/70     Weight: 50 kg (110 lb 3.7 oz)  Body mass index is 22.26 kg/m².    Intake/Output Summary (Last 24  hours) at 1/15/2024 1304  Last data filed at 1/15/2024 0900  Gross per 24 hour   Intake 118 ml   Output 800 ml   Net -682 ml           Physical Exam  Vitals and nursing note reviewed.   Constitutional:       General: She is not in acute distress.     Appearance: Normal appearance.   HENT:      Head: Normocephalic and atraumatic.   Eyes:      Pupils: Pupils are equal, round, and reactive to light.   Cardiovascular:      Rate and Rhythm: Tachycardia present. Rhythm irregular.      Pulses: Normal pulses.      Heart sounds: No murmur heard.  Pulmonary:      Effort: Pulmonary effort is normal.      Breath sounds: No wheezing.   Abdominal:      General: Abdomen is flat. There is no distension.      Palpations: Abdomen is soft. There is no mass.      Tenderness: There is no abdominal tenderness. There is no guarding or rebound.   Musculoskeletal:      Cervical back: Neck supple.      Right lower leg: No edema.      Left lower leg: No edema.   Skin:     General: Skin is warm and dry.      Coloration: Skin is not pale.   Neurological:      General: No focal deficit present.      Mental Status: She is alert and oriented to person, place, and time.             Significant Labs: All pertinent labs within the past 24 hours have been reviewed.    Significant Imaging: I have reviewed all pertinent imaging results/findings within the past 24 hours.    Assessment/Plan:      * GI bleed  #Rectal ulcerations  -History of PAF on Xarelto, presents w [facility], previously on hospice at NH  -CTA: no evidence of acute GIB, however showing prominent perirectal vessels w possible hemorrhoids  -EGD/colonoscopy 1/12: 3 ulcers in the distal rectum, much improved since compared to last colonoscopy, sigmoid and descending colon diverticulosis, mild splenic flexure inflammation  Plan:  -Continue PPI BID  -Contacted GI for clearance to restart Xarelto  -Monitor CBC  - Trend Hgb and transfuse for goal Hgb > 7, unless otherwise indicated  - Maintain IV  access with 2 large bore IV's  - Intravascular resuscitation/support with IVF's   - Correct any coagulopathy with platelets and FFP for goal of platelets >50K and INR <2.          Atrial flutter with rapid ventricular response  -Continues to be in RVR, likely exacerbated by pneumonia, recent GIB, COPD exacerbation  -Home med: metoprolol 12.5mg BID  -12 lead 1/15 obtained, atrial flutter with rvr  Plan:  -Discussed lack of rate control with cardiology, metoprolol 25mg increased to q6. Titrate to goal rate <110  -consider cardizem as EF wnl  -Digoxin may be optionif BP soft/amio if borderline unstable  -IV metoprolol 5mg q6 prn ordered  -Monitor on tele        COPD exacerbation  Patient's COPD is controlled currently.  Patient is currently on COPD Pathway. Continue scheduled inhalers Steroids, Antibiotics, and Supplemental oxygen and monitor respiratory status closely.     -Currently on steroid taper  -O2 requirement is below baseline    Acute blood loss anemia  Patient's anemia is currently controlled. Has not received any PRBCs to date. Etiology likely d/t acute blood loss which was from GI bleed  Current CBC reviewed-   Lab Results   Component Value Date    HGB 10.1 (L) 01/14/2024    HCT 34.6 (L) 01/14/2024     Monitor serial CBC and transfuse if patient becomes hemodynamically unstable, symptomatic or H/H drops below 7/21.    Pneumonia  -Most recent CXR from 1/13 showing stable bilateral opacities  -Continue IV CTX, doxycycline      Debility  Patient with Acute on chronic debility due to fracture/prosthesis and chronic unspecified fatigue. Latest AMPAC and GEMS scores have not been reviewed. Evaluation for etiology is complete. Plan includes PT/OT consulted.    Acute on chronic hypoxic respiratory failure  Patient with Hypoxic Respiratory failure which is Acute on chronic.  she is on home oxygen at 6 LPM. Supplemental oxygen was provided and noted-      .   Signs/symptoms of respiratory failure include- tachypnea,  increased work of breathing, respiratory distress, and wheezing. Contributing diagnoses includes - COPD Labs and images were reviewed. Patient Has recent ABG, which has been reviewed. Will treat underlying causes and adjust management of respiratory failure as follows- see plan for copd above    Advance care planning  Spoke w nadine Neri over the phone regarding recent hospice status after last hospitalization. Stating that it is no longer pt's wishes to transition back to hospice. Would like to dc to home at some point      Controlled type 2 diabetes mellitus with diabetic neuropathy, without long-term current use of insulin  Patient's FSGs are controlled on current medication regimen.  Last A1c reviewed-   Lab Results   Component Value Date    HGBA1C 6.6 (H) 12/25/2023     Most recent fingerstick glucose reviewed-   Recent Labs   Lab 01/13/24  1613 01/13/24  2057 01/14/24  0830 01/14/24  1136   POCTGLUCOSE 205* 305* 119* 210*     Current correctional scale  Medium  Maintain anti-hyperglycemic dose as follows-   Antihyperglycemics (From admission, onward)      Start     Stop Route Frequency Ordered    01/11/24 1404  insulin aspart U-100 pen 0-10 Units         -- SubQ Every 6 hours PRN 01/11/24 1404    01/10/24 2100  insulin detemir U-100 (Levemir) pen 8 Units         -- SubQ Nightly 01/10/24 1725          Hold Oral hypoglycemics while patient is in the hospital.      VTE Risk Mitigation (From admission, onward)           Ordered     Place sequential compression device  Until discontinued         01/10/24 1703     IP VTE HIGH RISK PATIENT  Once         01/10/24 1703                    Discharge Planning   NATHALY: 1/16/2024     Code Status: Full Code   Is the patient medically ready for discharge?: No    Reason for patient still in hospital (select all that apply): Patient trending condition  Discharge Plan A: Return to nursing home   Discharge Delays: (!) Change in Medical Condition              Estevan Daly  DO  Department of Hospital Medicine   [facility] - Stepdown [facility] ([place]-[facility])

## 2024-01-16 LAB
ALBUMIN SERPL BCP-MCNC: 2.4 G/DL (ref 3.5–5.2)
ALP SERPL-CCNC: 199 U/L (ref 55–135)
ALT SERPL W/O P-5'-P-CCNC: 21 U/L (ref 10–44)
ANION GAP SERPL CALC-SCNC: 5 MMOL/L (ref 8–16)
AST SERPL-CCNC: 11 U/L (ref 10–40)
BASOPHILS # BLD AUTO: 0.01 K/UL (ref 0–0.2)
BASOPHILS NFR BLD: 0.1 % (ref 0–1.9)
BILIRUB SERPL-MCNC: 0.2 MG/DL (ref 0.1–1)
BUN SERPL-MCNC: 18 MG/DL (ref 8–23)
CALCIUM SERPL-MCNC: 8.7 MG/DL (ref 8.7–10.5)
CHLORIDE SERPL-SCNC: 108 MMOL/L (ref 95–110)
CO2 SERPL-SCNC: 27 MMOL/L (ref 23–29)
CREAT SERPL-MCNC: 0.6 MG/DL (ref 0.5–1.4)
DIFFERENTIAL METHOD BLD: ABNORMAL
EOSINOPHIL # BLD AUTO: 0.1 K/UL (ref 0–0.5)
EOSINOPHIL NFR BLD: 0.4 % (ref 0–8)
ERYTHROCYTE [DISTWIDTH] IN BLOOD BY AUTOMATED COUNT: 17.7 % (ref 11.5–14.5)
EST. GFR  (NO RACE VARIABLE): >60 ML/MIN/1.73 M^2
GLUCOSE SERPL-MCNC: 203 MG/DL (ref 70–110)
HCT VFR BLD AUTO: 32.5 % (ref 37–48.5)
HGB BLD-MCNC: 10.1 G/DL (ref 12–16)
IMM GRANULOCYTES # BLD AUTO: 0.06 K/UL (ref 0–0.04)
IMM GRANULOCYTES NFR BLD AUTO: 0.4 % (ref 0–0.5)
LYMPHOCYTES # BLD AUTO: 2.2 K/UL (ref 1–4.8)
LYMPHOCYTES NFR BLD: 15.7 % (ref 18–48)
MAGNESIUM SERPL-MCNC: 1.5 MG/DL (ref 1.6–2.6)
MCH RBC QN AUTO: 30.5 PG (ref 27–31)
MCHC RBC AUTO-ENTMCNC: 31.1 G/DL (ref 32–36)
MCV RBC AUTO: 98 FL (ref 82–98)
MONOCYTES # BLD AUTO: 0.9 K/UL (ref 0.3–1)
MONOCYTES NFR BLD: 6.6 % (ref 4–15)
NEUTROPHILS # BLD AUTO: 10.8 K/UL (ref 1.8–7.7)
NEUTROPHILS NFR BLD: 76.8 % (ref 38–73)
NRBC BLD-RTO: 0 /100 WBC
PHOSPHATE SERPL-MCNC: 3 MG/DL (ref 2.7–4.5)
PLATELET # BLD AUTO: 157 K/UL (ref 150–450)
PMV BLD AUTO: 10.1 FL (ref 9.2–12.9)
POCT GLUCOSE: 149 MG/DL (ref 70–110)
POCT GLUCOSE: 199 MG/DL (ref 70–110)
POCT GLUCOSE: 229 MG/DL (ref 70–110)
POCT GLUCOSE: 274 MG/DL (ref 70–110)
POTASSIUM SERPL-SCNC: 3.9 MMOL/L (ref 3.5–5.1)
PROT SERPL-MCNC: 4.6 G/DL (ref 6–8.4)
RBC # BLD AUTO: 3.31 M/UL (ref 4–5.4)
SODIUM SERPL-SCNC: 140 MMOL/L (ref 136–145)
WBC # BLD AUTO: 14.1 K/UL (ref 3.9–12.7)

## 2024-01-16 PROCEDURE — 80053 COMPREHEN METABOLIC PANEL: CPT

## 2024-01-16 PROCEDURE — 97110 THERAPEUTIC EXERCISES: CPT

## 2024-01-16 PROCEDURE — 97530 THERAPEUTIC ACTIVITIES: CPT

## 2024-01-16 PROCEDURE — 36415 COLL VENOUS BLD VENIPUNCTURE: CPT

## 2024-01-16 PROCEDURE — 25000003 PHARM REV CODE 250: Performed by: INTERNAL MEDICINE

## 2024-01-16 PROCEDURE — 85025 COMPLETE CBC W/AUTO DIFF WBC: CPT

## 2024-01-16 PROCEDURE — 25000003 PHARM REV CODE 250

## 2024-01-16 PROCEDURE — 25000003 PHARM REV CODE 250: Performed by: STUDENT IN AN ORGANIZED HEALTH CARE EDUCATION/TRAINING PROGRAM

## 2024-01-16 PROCEDURE — 63600175 PHARM REV CODE 636 W HCPCS: Performed by: STUDENT IN AN ORGANIZED HEALTH CARE EDUCATION/TRAINING PROGRAM

## 2024-01-16 PROCEDURE — 84100 ASSAY OF PHOSPHORUS: CPT

## 2024-01-16 PROCEDURE — 83735 ASSAY OF MAGNESIUM: CPT

## 2024-01-16 PROCEDURE — 63600175 PHARM REV CODE 636 W HCPCS

## 2024-01-16 PROCEDURE — 20600001 HC STEP DOWN PRIVATE ROOM

## 2024-01-16 RX ORDER — METOPROLOL TARTRATE 25 MG/1
25 TABLET, FILM COATED ORAL EVERY 6 HOURS
Qty: 30 TABLET | Refills: 11
Start: 2024-01-16 | End: 2025-01-15

## 2024-01-16 RX ORDER — MAGNESIUM SULFATE HEPTAHYDRATE 40 MG/ML
2 INJECTION, SOLUTION INTRAVENOUS ONCE
Status: COMPLETED | OUTPATIENT
Start: 2024-01-16 | End: 2024-01-16

## 2024-01-16 RX ORDER — PREDNISONE 10 MG/1
10 TABLET ORAL DAILY
Qty: 7 TABLET | Refills: 0 | Status: SHIPPED | OUTPATIENT
Start: 2024-02-02 | End: 2024-02-09

## 2024-01-16 RX ORDER — GUAIFENESIN 100 MG/5ML
200 SOLUTION ORAL EVERY 6 HOURS PRN
Refills: 0
Start: 2024-01-16 | End: 2024-01-26

## 2024-01-16 RX ORDER — PREDNISONE 20 MG/1
40 TABLET ORAL DAILY
Qty: 8 TABLET | Refills: 0
Start: 2024-01-17 | End: 2024-01-21

## 2024-01-16 RX ORDER — PREDNISONE 10 MG/1
30 TABLET ORAL DAILY
Qty: 22 TABLET | Refills: 0
Start: 2024-01-19 | End: 2024-01-26

## 2024-01-16 RX ORDER — PREDNISONE 20 MG/1
20 TABLET ORAL DAILY
Qty: 7 TABLET | Refills: 0
Start: 2024-01-26 | End: 2024-02-02

## 2024-01-16 RX ADMIN — GABAPENTIN 300 MG: 300 CAPSULE ORAL at 08:01

## 2024-01-16 RX ADMIN — BRIMONIDINE TARTRATE 1 DROP: 2 SOLUTION OPHTHALMIC at 09:01

## 2024-01-16 RX ADMIN — CITALOPRAM HYDROBROMIDE 20 MG: 20 TABLET ORAL at 09:01

## 2024-01-16 RX ADMIN — PANTOPRAZOLE SODIUM 40 MG: 40 TABLET, DELAYED RELEASE ORAL at 06:01

## 2024-01-16 RX ADMIN — METOPROLOL TARTRATE 25 MG: 25 TABLET, FILM COATED ORAL at 05:01

## 2024-01-16 RX ADMIN — BRIMONIDINE TARTRATE 1 DROP: 2 SOLUTION OPHTHALMIC at 08:01

## 2024-01-16 RX ADMIN — LOPERAMIDE HYDROCHLORIDE 2 MG: 2 CAPSULE ORAL at 08:01

## 2024-01-16 RX ADMIN — TAMSULOSIN HYDROCHLORIDE 0.4 MG: 0.4 CAPSULE ORAL at 09:01

## 2024-01-16 RX ADMIN — INSULIN ASPART 4 UNITS: 100 INJECTION, SOLUTION INTRAVENOUS; SUBCUTANEOUS at 09:01

## 2024-01-16 RX ADMIN — OXYCODONE HYDROCHLORIDE 5 MG: 5 TABLET ORAL at 11:01

## 2024-01-16 RX ADMIN — METOPROLOL TARTRATE 25 MG: 25 TABLET, FILM COATED ORAL at 11:01

## 2024-01-16 RX ADMIN — OXYCODONE HYDROCHLORIDE 5 MG: 5 TABLET ORAL at 03:01

## 2024-01-16 RX ADMIN — GABAPENTIN 300 MG: 300 CAPSULE ORAL at 03:01

## 2024-01-16 RX ADMIN — METOPROLOL TARTRATE 25 MG: 25 TABLET, FILM COATED ORAL at 06:01

## 2024-01-16 RX ADMIN — LATANOPROST 1 DROP: 50 SOLUTION OPHTHALMIC at 08:01

## 2024-01-16 RX ADMIN — LOPERAMIDE HYDROCHLORIDE 2 MG: 2 CAPSULE ORAL at 03:01

## 2024-01-16 RX ADMIN — CEFTRIAXONE 2 G: 2 INJECTION, POWDER, FOR SOLUTION INTRAMUSCULAR; INTRAVENOUS at 05:01

## 2024-01-16 RX ADMIN — INSULIN DETEMIR 8 UNITS: 100 INJECTION, SOLUTION SUBCUTANEOUS at 11:01

## 2024-01-16 RX ADMIN — GABAPENTIN 300 MG: 300 CAPSULE ORAL at 09:01

## 2024-01-16 RX ADMIN — TIMOLOL MALEATE 1 DROP: 5 SOLUTION OPHTHALMIC at 09:01

## 2024-01-16 RX ADMIN — MAGNESIUM SULFATE HEPTAHYDRATE 2 G: 40 INJECTION, SOLUTION INTRAVENOUS at 09:01

## 2024-01-16 RX ADMIN — DOXYCYCLINE HYCLATE 100 MG: 100 TABLET, COATED ORAL at 09:01

## 2024-01-16 RX ADMIN — OXYCODONE HYDROCHLORIDE 5 MG: 5 TABLET ORAL at 08:01

## 2024-01-16 RX ADMIN — LOPERAMIDE HYDROCHLORIDE 2 MG: 2 CAPSULE ORAL at 09:01

## 2024-01-16 RX ADMIN — PANTOPRAZOLE SODIUM 40 MG: 40 TABLET, DELAYED RELEASE ORAL at 03:01

## 2024-01-16 RX ADMIN — DOXYCYCLINE HYCLATE 100 MG: 100 TABLET, COATED ORAL at 08:01

## 2024-01-16 RX ADMIN — PREDNISONE 40 MG: 20 TABLET ORAL at 09:01

## 2024-01-16 NOTE — CARE UPDATE
RAPID RESPONSE NURSE CHART REVIEW        Chart Reviewed: 01/16/2024, 11:09 AM    MRN: [id number]  Bed: 51878/09208 A    Dx: GI bleed    Deb Webb has a past medical history of Allergy, Arthritis, Cataract, Colon polyps, COPD (chronic obstructive pulmonary disease), Diabetes mellitus type II, Diabetic neuropathy, Fever blister, Glaucoma (increased eye pressure), Hyperlipidemia, Hypertension, Irritable bowel syndrome, Keloid cicatrix, Osteoporosis, Retained cholelithiasis following cholecystectomy(997.41), and Right patella fracture.    Last VS: BP (!) 173/106   Pulse 99   Temp 97.7 °F (36.5 °C) (Oral)   Resp (!) 25   Wt 50 kg (110 lb 3.7 oz)   SpO2 97%   BMI 22.26 kg/m²     24H Vital Sign Range:  Temp:  [97.7 °F (36.5 °C)-98.2 °F (36.8 °C)]   Pulse:  []   Resp:  [18-25]   BP: ()/()   SpO2:  [94 %-98 %]     Level of Consciousness (AVPU): alert    Recent Labs     01/14/24  0646 01/15/24  0551 01/16/24  0456   WBC 13.76* 15.00* 14.10*   HGB 10.1* 10.9* 10.1*   HCT 34.6* 33.4* 32.5*    149* 157       Recent Labs     01/14/24  0646 01/15/24  0551 01/16/24  0456   * 139 140   K 3.8 4.0 3.9    109 108   CO2 22* 23 27   BUN 11 12 18   CREATININE 0.6 0.6 0.6   * 156* 203*   PHOS 3.0 3.0 3.0   MG 1.6 1.5* 1.5*        OXYGEN:  Flow (L/min): 2        MEWS score: 2    Rounding completed with unit TOR Johnny Garcia. VSS. No additional concerns verbalized at this time. Instructed to call [phone] for further concerns or assistance.    Jannette Martel RN

## 2024-01-16 NOTE — PLAN OF CARE
Problem: Infection  Goal: Absence of Infection Signs and Symptoms  Outcome: Ongoing, Progressing     Problem: Adult Inpatient Plan of Care  Goal: Plan of Care Review  Outcome: Ongoing, Progressing  Goal: Patient-Specific Goal (Individualized)  Outcome: Ongoing, Progressing  Goal: Absence of Hospital-Acquired Illness or Injury  Outcome: Ongoing, Progressing  Goal: Optimal Comfort and Wellbeing  Outcome: Ongoing, Progressing  Goal: Readiness for Transition of Care  Outcome: Ongoing, Progressing     Problem: Diabetes Comorbidity  Goal: Blood Glucose Level Within Targeted Range  Outcome: Ongoing, Progressing     Problem: Fluid Imbalance (Pneumonia)  Goal: Fluid Balance  Outcome: Ongoing, Progressing     Problem: Infection (Pneumonia)  Goal: Resolution of Infection Signs and Symptoms  Outcome: Ongoing, Progressing     Problem: Respiratory Compromise (Pneumonia)  Goal: Effective Oxygenation and Ventilation  Outcome: Ongoing, Progressing     Problem: Impaired Wound Healing  Goal: Optimal Wound Healing  Outcome: Ongoing, Progressing     Problem: Skin Injury Risk Increased  Goal: Skin Health and Integrity  Outcome: Ongoing, Progressing     Problem: Fall Injury Risk  Goal: Absence of Fall and Fall-Related Injury  Outcome: Ongoing, Progressing   Pt Aox4, pt turned per protocol. Pt denies any current pain. Call light within reach, rounded per facility policy, safety measures In place.

## 2024-01-16 NOTE — PROGRESS NOTES
"[facility] ([place] [id number])  [facility] Medicine  Progress Note    Patient Name: Deb Webb  MRN: [id number]  Patient Class: IP- Inpatient   Admission Date: 1/10/2024  Length of Stay: 6 days  Attending Physician: Estevan Daly DO  Primary Care Provider: Triston Valverde MD        Subjective:     Principal Problem:GI bleed        HPI:  Ms. Webb is a 74 y/o woman with pmh of arthritis, COPD w/ 6L O2 at baseline and history of CVA , diabetes mellitus type 2, hyperlipidemia, hypertension, paroxysmal AFib on Xarelto. She presented to the emergency department via EMS secondary to bright red blood per rectum. She reports three days of bloody stools. She quantifies the amount as "three cups of blood" per day for three days. She has never had this happen to her before. She also reports fatigue, pale coloration of skin, productive cough, mild diffuse abdominal pain and moderate rectal pain. She was recently discharged from [facility] 5 days ago after admission for URI and COPD exacerbation. Of note, recent colonoscopy reports bleeding could be due to rectal prolapse. She denies headaches, dizziness, shortness of breath, chest pain, nausea, vomiting, recent NSAID use/goody powder, weight loss, fevers, night sweats, hematuria.     In the ED, the patient received 1L IVF, 1u RBCs, Vanc, Zosyn, 80 IV pantoprazole, BC x2. Her blood pressures have been maintained 90s-100s/60s-70s with HR 150s-180s and EKG noting aflutter with RVR. GI and cardiology consulted. Labs notable for Hgb of 8.5 (11 7 days ago), wbc 25, Bicarb 20, lactate 4.28, PTT 21.1, PT 12.7, INR of 1.2. CTA with no evidence of acute GI bleed but with prominent perirectal vessels, possible hemorrhoids. Large amount of retained stool within sigmoid colon/rectum. New airspace consolidation in LLL with bronchial thickening concerning for PNA vs atelectasis.     Overview/Hospital Course:  This is a 73-year-old female with a history of COPD on 6 L NC at " home, CVA, PAF on Xarelto who originally presented with 3 days of hematochezia.  HB on presentation was 8.5.  CTA without evidence of acute GI be however did showed prominent perirectal vessels and possible hemorrhoids with large amount of retained stool within the sigmoid colon/rectum.  Stool noted to be melanotic on arrival.  Also did show left lower lobe airspace consolidation concerning for pneumonia, started on empiric IV vancomycin, Zosyn along with IV hydrocortisone for COPD exacerbation.  On arrival, was significantly tachycardic with rates of 150s, found to be in atrial flutter with RVR.  Cardiology was consulted, attributed elevated rate in the setting of pneumonia and GI bleed.  Initially recommended digoxin but primary team elected for BB due to hemodynamic stability.  Eventually underwent EGD/colonoscopy 1/12, found to have 3 ulcers in the distal rectum, much improved since compared to last colonoscopy, sigmoid and descending colon diverticulosis, mild splenic flexure inflammation.  On PPI b.i.d. HR has been difficult to control, discussed w cardiology     Deemed appropriate for transfer to the floor on 1/13/2024, and was accepted to  team B for further care and management.    Interval History: Seen at bedside this AM. States that shortness of breath, palpitations have improved     Review of Systems  Objective:     Vital Signs (Most Recent):  Temp: 97.8 °F (36.6 °C) (01/15/24 0830)  Pulse: 103 (01/15/24 1115)  Resp: (!) 22 (01/15/24 0932)  BP: 107/70 (01/15/24 0931)  SpO2: 98 % (01/15/24 1115) Vital Signs (24h Range):  Temp:  [97 °F (36.1 °C)-98.1 °F (36.7 °C)] 97.8 °F (36.6 °C)  Pulse:  [] 103  Resp:  [17-23] 22  SpO2:  [90 %-100 %] 98 %  BP: (104-156)/() 107/70     Weight: 50 kg (110 lb 3.7 oz)  Body mass index is 22.26 kg/m².    Intake/Output Summary (Last 24 hours) at 1/15/2024 1304  Last data filed at 1/15/2024 0900  Gross per 24 hour   Intake 118 ml   Output 800 ml   Net -682 ml            Physical Exam  Vitals and nursing note reviewed.   Constitutional:       General: She is not in acute distress.     Appearance: Normal appearance.   HENT:      Head: Normocephalic and atraumatic.   Eyes:      Pupils: Pupils are equal, round, and reactive to light.   Cardiovascular:      Rate and Rhythm: Normal rate and regular rhythm.      Pulses: Normal pulses.      Heart sounds: No murmur heard.  Pulmonary:      Effort: Pulmonary effort is normal.      Breath sounds: No wheezing.   Abdominal:      General: Abdomen is flat. There is no distension.      Palpations: Abdomen is soft. There is no mass.      Tenderness: There is no abdominal tenderness. There is no guarding or rebound.   Musculoskeletal:      Cervical back: Neck supple.      Right lower leg: No edema.      Left lower leg: No edema.   Skin:     General: Skin is warm and dry.      Coloration: Skin is not pale.   Neurological:      General: No focal deficit present.      Mental Status: She is alert and oriented to person, place, and time.             Significant Labs: All pertinent labs within the past 24 hours have been reviewed.    Significant Imaging: I have reviewed all pertinent imaging results/findings within the past 24 hours.    Assessment/Plan:      * GI bleed  #Rectal ulcerations  -History of PAF on Xarelto, presents w [facility], previously on hospice at NH  -CTA: no evidence of acute GIB, however showing prominent perirectal vessels w possible hemorrhoids  -EGD/colonoscopy 1/12: 3 ulcers in the distal rectum, much improved since compared to last colonoscopy, sigmoid and descending colon diverticulosis, mild splenic flexure inflammation  Plan:  -Continue PPI BID  -Contacted GI for clearance to restart Xarelto  -Monitor CBC  - Trend Hgb and transfuse for goal Hgb > 7, unless otherwise indicated  - Maintain IV access with 2 large bore IV's  - Intravascular resuscitation/support with IVF's   - Correct any coagulopathy with platelets and FFP for  goal of platelets >50K and INR <2.          Atrial flutter with rapid ventricular response  -Continues to be in RVR, likely exacerbated by pneumonia, recent GIB, COPD exacerbation  -Home med: metoprolol 12.5mg BID  -12 lead 1/15 obtained, atrial flutter with rvr  Plan:  -Discussed lack of rate control with cardiology, metoprolol 25mg increased to q6. Titrate to goal rate <110  -consider cardizem as EF wnl  -Digoxin may be optionif BP soft/amio if borderline unstable  -IV metoprolol 5mg q6 prn ordered  -Monitor on tele        COPD exacerbation  Patient's COPD is controlled currently.  Patient is currently on COPD Pathway. Continue scheduled inhalers Steroids, Antibiotics, and Supplemental oxygen and monitor respiratory status closely.     -Currently on steroid taper  -O2 requirement is below baseline    Acute blood loss anemia  Patient's anemia is currently controlled. Has not received any PRBCs to date. Etiology likely d/t acute blood loss which was from GI bleed  Current CBC reviewed-   Lab Results   Component Value Date    HGB 10.1 (L) 01/14/2024    HCT 34.6 (L) 01/14/2024     Monitor serial CBC and transfuse if patient becomes hemodynamically unstable, symptomatic or H/H drops below 7/21.    Pneumonia  -Most recent CXR from 1/13 showing stable bilateral opacities  -Continue IV CTX, doxycycline      Debility  Patient with Acute on chronic debility due to fracture/prosthesis and chronic unspecified fatigue. Latest AMPAC and GEMS scores have not been reviewed. Evaluation for etiology is complete. Plan includes PT/OT consulted.    Acute on chronic hypoxic respiratory failure  Patient with Hypoxic Respiratory failure which is Acute on chronic.  she is on home oxygen at 6 LPM. Supplemental oxygen was provided and noted-      .   Signs/symptoms of respiratory failure include- tachypnea, increased work of breathing, respiratory distress, and wheezing. Contributing diagnoses includes - COPD Labs and images were reviewed.  Patient Has recent ABG, which has been reviewed. Will treat underlying causes and adjust management of respiratory failure as follows- see plan for copd above    Advance care planning  Spoke w daughter Halle over the phone regarding recent hospice status after last hospitalization. Stating that it is no longer pt's wishes to transition back to hospice. Would like to dc to home at some point      Controlled type 2 diabetes mellitus with diabetic neuropathy, without long-term current use of insulin  Patient's FSGs are controlled on current medication regimen.  Last A1c reviewed-   Lab Results   Component Value Date    HGBA1C 6.6 (H) 12/25/2023     Most recent fingerstick glucose reviewed-   Recent Labs   Lab 01/13/24  1613 01/13/24  2057 01/14/24  0830 01/14/24  1136   POCTGLUCOSE 205* 305* 119* 210*     Current correctional scale  Medium  Maintain anti-hyperglycemic dose as follows-   Antihyperglycemics (From admission, onward)      Start     Stop Route Frequency Ordered    01/11/24 1404  insulin aspart U-100 pen 0-10 Units         -- SubQ Every 6 hours PRN 01/11/24 1404    01/10/24 2100  insulin detemir U-100 (Levemir) pen 8 Units         -- SubQ Nightly 01/10/24 1725          Hold Oral hypoglycemics while patient is in the hospital.      VTE Risk Mitigation (From admission, onward)           Ordered     Place sequential compression device  Until discontinued         01/10/24 1703     IP VTE HIGH RISK PATIENT  Once         01/10/24 1703                    Discharge Planning   NATHALY: 1/17/2024     Code Status: Full Code   Is the patient medically ready for discharge?: Yes    Reason for patient still in hospital (select all that apply): Patient trending condition  Discharge Plan A: Return to nursing home   Discharge Delays: (!) Change in Medical Condition              Estevan Daly DO  Department of Hospital Medicine   [facility] - Stepdown Flex ([place])

## 2024-01-16 NOTE — PLAN OF CARE
Pt was care home at [facility] SHAILESH spoke with [facility] and they stated that SW have to submit auth in order for pt to attend SNF as requested by therapist and .   SHAILESH faxed pt information to [facility]'s [email or web address] for auth.   Will follow up.    Yuni Carrillo MSW, CSW

## 2024-01-16 NOTE — PT/OT/SLP PROGRESS
Physical Therapy Treatment    Patient Name:  Deb Webb   MRN:  [id number]  Admit Date: 1/10/2024  Admitting Diagnosis:  GI bleed   Length of Stay: 6 days  Recent Surgery: Procedure(s) (LRB):  COLONOSCOPY (N/A) 4 Days Post-Op    Recommendations:     Discharge Recommendations:  Moderate Intensity Therapy   Discharge Equipment Recommendations: hospital bed, wheelchair, bedside commode, lift device     Plan:     During this hospitalization, patient to be seen 3 x/week to address the listed problems via gait training, therapeutic activities, therapeutic exercises, neuromuscular re-education  Plan of Care Expires:  24  Plan of Care Reviewed with: patient    Assessment:     Deb Webb is a 73 y.o. female admitted with a medical diagnosis of GI bleed.       Problem List: weakness, impaired endurance, impaired self care skills, impaired functional mobility, impaired cardiopulmonary response to activity, pain.  Rehab Prognosis: Good     GOALS:   Multidisciplinary Problems       Physical Therapy Goals          Problem: Physical Therapy    Goal Priority Disciplines Outcome Goal Variances Interventions   Physical Therapy Goal     PT, PT/OT Ongoing, Progressing     Description: Goals to be met by: 2024     Patient will increase functional independence with mobility by performin. Supine to sit with Contact Guard Assistance  2. Sit to supine with Stand-by Assistance  3. Sit to stand transfer with Minimal Assistance  4. Bed to chair transfer with Minimal Assistance using LRAD  5. Gait  x 25 feet with Moderate Assistance using LRAD.   6. Lower extremity exercise program x15 reps per handout, with assistance as needed                         Subjective   Communicated with RN prior to session.  Patient found HOB elevated upon PT entry to room, agreeable to evaluation. Deb Webb's alone during session.    Chief Complaint: debility   Patient/Family Comments/goals: to get better   Pain/Comfort:  Pain  Rating 1: 6/10  Location 1:  (R side)  Pain Addressed 1: Reposition, Distraction  Pain Rating Post-Intervention 1: 6/10    Objective:   Patient found with: telemetry, peripheral IV, hernandez catheter, oxygen, pulse ox (continuous), blood pressure cuff   General Precautions: Standard, Cardiac fall   Orthopedic Precautions:N/A   Braces: N/A   Oxygen Device: Nasal Cannula   Vitals: BP (!) 173/106   Pulse 99   Temp 98.6 °F (37 °C) (Oral)   Resp 18   Wt 50 kg (110 lb 3.7 oz)   SpO2 (!) 90%   BMI 22.26 kg/m²     Outcome Measures:  AM-PAC 6 CLICK MOBILITY  Turning over in bed (including adjusting bedclothes, sheets and blankets)?: 2  Sitting down on and standing up from a chair with arms (e.g., wheelchair, bedside commode, etc.): 2  Moving from lying on back to sitting on the side of the bed?: 2  Moving to and from a bed to a chair (including a wheelchair)?: 1  Need to walk in hospital room?: 1  Climbing 3-5 steps with a railing?: 1  Basic Mobility Total Score: 9     Functional Mobility:  Additional staff present: NA  Bed Mobility:   Supine to Sit: moderate assistance; HOB elevated  Scooting anteriorly to EOB to have both feet planted on floor: maximal assistance  Sit to Supine: maximal assistance; HOB flat    Sitting Balance at Edge of Bed:  Assistance Level Required: Stand-by Assistance  Time: 8 minutes  Comments:   Worked on activity tolerance sitting EOB and worked on tolerance to positional change     Transfers:   Sit <> Stand Transfer: maximal assistance with no assistive device from EOB x 2 trials  Anterior approach       Gait:  Not performed 2nd to pt still working on standing. Pt with significant fear of falling.      Therapeutic Activities, Exercises, and Education:   Educated pt on PT role/POC  Pt verbalized understanding     Therapeutic Exercise  Mobility for generalized strengthening  Sit to stand x 2 reps to increase B LE strength and increase standing tolerance   Initiation and use of core and postural  muscles during sitting and standing trials     Patient left HOB elevated with all lines intact, call button in reach, RN notified, and PCT present..    Time Tracking:     PT Received On: 01/16/24  PT Start Time: 1425     PT Stop Time: 1504  PT Total Time (min): 39 min       Billable Minutes:   Therapeutic Activity 23 and Therapeutic Exercise 15    Treatment Type: Treatment  PT/PTA: PT

## 2024-01-16 NOTE — CLINICAL REVIEW
Sepsis Screen (most recent)       Sepsis Screen (IP) - 01/16/24 1670       Is the patient's history or complaint suggestive of a possible infection? Yes  -    Are there at least two of the following signs and symptoms present? Yes  -    Sepsis signs/symptoms - Tachycardia Tachycardia     >90  -    Sepsis signs/symptoms - WBC WBC < 4,000 or WBC > 12,000  -    Are any of the following organ dysfunction criteria present and not considered to be due to a chronic condition? Yes  -    Organ Dysfunction Criteria - O2 O2 Saturation < 95% on room air  -    Initiate Sepsis Protocol No  -    Reason sepsis not considered Pt. receiving appropriate management  -              User Key  (r) = Recorded By, (t) = Taken By, (c) = Cosigned By      Initials Name    Ann Spaulding RN

## 2024-01-16 NOTE — PLAN OF CARE
SKILLED NURSING FACILITY ORDERS    01/19/2024  [facility]  [facility] - STEPDOWN FLEX ([place])  [address]  [facility] [facility] [address]  Dept: [phone]  Loc: [phone]     Admit to Skilled Nursing Facility:  Home or Self Care    Diagnoses:  Active Hospital Problems    Diagnosis  POA    *GI bleed [K92.2]  Yes     Priority: 1 - High    Atrial flutter with rapid ventricular response [I48.92]  Yes     Priority: 2     Acute blood loss anemia [D62]  Yes    Coagulopathy [D68.9]  Yes    Pneumonia [J18.9]  Yes    Debility [R53.81]  Yes    Acute on chronic hypoxic respiratory failure [J96.21]  Yes    Advance care planning [Z71.89]  Not Applicable    COPD exacerbation [J44.1]  Yes    Controlled type 2 diabetes mellitus with diabetic neuropathy, without long-term current use of insulin [E11.40]  Yes      Resolved Hospital Problems   No resolved problems to display.       Patient is homebound due to:  GI bleed    Allergies:  Review of patient's allergies indicates:   Allergen Reactions    Silicone      Burn skin    Adhesive Rash       Vitals:  Routine    Diet: diabetic diet: 2000 calorie    Activities:   Activity as tolerated    Goals of Care Treatment Preferences:  Code Status: Full Code          What is most important right now is to focus on remaining as independent as possible, improvement in condition but with limits to invasive therapies.  Accordingly, we have decided that the best plan to meet the patient's goals includes continuing with treatment.      Labs:      Nursing Precautions:  Fall    Consults:   PT to evaluate and treat- 5 times a week, OT to evaluate and treat- 5 times a week, and Nutrition to evaluate and recommend diet     Miscellaneous Care: Diabetes Care: Diabetes: Check blood sugar. Fingerstick blood sugar AC and HS                   Diabetes Care:  SN to perform and educate Diabetic management with blood glucose monitoring:, Fingerstick blood sugar AC and HS, and  Report CBG < 60 or > 350 to physician.      Medications: Discontinue all previous medication orders, if any. See new list below.     Medication List        START taking these medications      famotidine 20 MG tablet  Commonly known as: PEPCID  Take 1 tablet (20 mg total) by mouth once daily.     ondansetron 4 MG Tbdl  Commonly known as: ZOFRAN-ODT  Take 1 tablet (4 mg total) by mouth every 8 (eight) hours as needed.     oxyCODONE 5 MG immediate release tablet  Commonly known as: ROXICODONE  Take 1 tablet (5 mg total) by mouth every 8 (eight) hours as needed for Pain.            CHANGE how you take these medications      metoprolol tartrate 25 MG tablet  Commonly known as: LOPRESSOR  Take 1 tablet (25 mg total) by mouth every 6 (six) hours.  What changed:   how much to take  when to take this     * predniSONE 20 MG tablet  Commonly known as: DELTASONE  Take 2 tablets (40 mg total) by mouth once daily. for 4 days  What changed:   medication strength  See the new instructions.     * predniSONE 10 MG tablet  Commonly known as: DELTASONE  Take 3 tablets (30 mg total) by mouth once daily. for 7 days  What changed: You were already taking a medication with the same name, and this prescription was added. Make sure you understand how and when to take each.     * predniSONE 20 MG tablet  Commonly known as: DELTASONE  Take 1 tablet (20 mg total) by mouth once daily. for 7 days  Start taking on: January 26, 2024  What changed: You were already taking a medication with the same name, and this prescription was added. Make sure you understand how and when to take each.     * predniSONE 10 MG tablet  Commonly known as: DELTASONE  Take 1 tablet (10 mg total) by mouth once daily. for 7 days  Start taking on: February 2, 2024  What changed: You were already taking a medication with the same name, and this prescription was added. Make sure you understand how and when to take each.           * This list has 4 medication(s) that are the  same as other medications prescribed for you. Read the directions carefully, and ask your doctor or other care provider to review them with you.                CONTINUE taking these medications      albuterol-ipratropium 2.5 mg-0.5 mg/3 mL nebulizer solution  Commonly known as: DUO-NEB  Take 3 mLs by nebulization every 6 (six) hours as needed for Wheezing. Rescue     ANORO ELLIPTA 62.5-25 mcg/actuation Dsdv  Generic drug: umeclidinium-vilanteroL  INHALE 1 PUFF INTO THE LUNGS ONCE DAILY.     brimonidine 0.2% 0.2 % Drop  Commonly known as: ALPHAGAN  PLACE 1 DROP INTO BOTH EYES 2 TIMES A DAY.     cholestyramine-aspartame 4 gram Pwpk  Commonly known as: PREVALITE  TAKE 1 PACKET (4 G TOTAL) BY MOUTH 2 (TWO) TIMES DAILY. FOR DIARRHEA     citalopram 20 MG tablet  Commonly known as: CeleXA  Take 1 tablet (20 mg total) by mouth once daily.     gabapentin 300 MG capsule  Commonly known as: NEURONTIN  TAKE 1 CAPSULE BY MOUTH THREE TIMES A DAY     guaiFENesin 100 mg/5 ml 100 mg/5 mL syrup  Commonly known as: ROBITUSSIN  Take 10 mLs (200 mg total) by mouth every 6 (six) hours as needed for Congestion.     latanoprost 0.005 % ophthalmic solution  Place 1 drop into both eyes every evening.     levalbuterol 0.63 mg/3 mL nebulizer solution  Commonly known as: XOPENEX  Take 3 mLs (0.63 mg total) by nebulization every 8 (eight) hours. Rescue     LEVEMIR FLEXPEN 100 unit/mL (3 mL) Inpn pen  Generic drug: insulin detemir U-100 (Levemir)  Inject 10 Units into the skin every evening.     NovoLOG Flexpen U-100 Insulin 100 unit/mL (3 mL) Inpn pen  Generic drug: insulin aspart U-100  Inject 0-8 Units into the skin as needed (sliding scale). B to 149 mg/dL = 0 units  150-199 mg/dL = 2 units  200 to 249 mg/dL = 4 units  250 to 299 mg/dL = 6 units  300 to 999 mg/dL = 8 units  Notify Provider if BG is more than 400 mg/dL.     omeprazole 40 MG capsule  Commonly known as: PRILOSEC  Take 1 capsule (40 mg total) by mouth before breakfast.      senna 8.6 mg tablet  Commonly known as: SENOKOT  Take 1 tablet by mouth once daily.     tamsulosin 0.4 mg Cap  Commonly known as: FLOMAX  Take 1 capsule (0.4 mg total) by mouth once daily.     timolol maleate 0.5% 0.5 % Drop  Commonly known as: TIMOPTIC  Place 1 drop into both eyes once daily.     XARELTO 20 mg Tab  Generic drug: rivaroxaban  TAKE 1 TABLET BY MOUTH EVERY DAY WITH DINNER OR EVENING MEAL            STOP taking these medications      HYDROcodone-acetaminophen 5-325 mg per tablet  Commonly known as: NORCO                Immunizations Administered as of 1/19/2024       Name Date Dose VIS Date Route Exp Date    COVID-19, MRNA, LN-S, PF (Pfizer) (Purple Cap) 2/3/2022 10:47 AM 0.3 mL 9/22/2021 Intramuscular 6/30/2022    Site: Right deltoid     Given By: Connie Collins RN     : [employer]     Lot: [id number]     COVID-19, MRNA, LN-S, PF (Pfizer) (Purple Cap) 4/2/2021  9:53 AM 0.3 mL 12/12/2020 Intramuscular 7/31/2021    Site: Left deltoid     Given By: Ashley Morrison MA     : [employer]     Lot: [id number]     COVID-19, MRNA, LN-S, PF (Pfizer) (Purple Cap) 3/12/2021  9:02 AM 0.3 mL 12/12/2020 Intramuscular 6/30/2021    Site: Left deltoid     Given By: Genny Melendez RN     : [employer]     Lot: [id number]                  Some patients may experience side effects after vaccination.  These may include fever, headache, muscle or joint aches.  Most symptoms resolve with 24-48 hours and do not require urgent medical evaluation unless they persist for more than 72 hours or symptoms are concerning for an unrelated medical condition.          _________________________________  Shirin Pereira MD  01/19/2024

## 2024-01-16 NOTE — PLAN OF CARE
"SW went to speak with pt at bedside in regards to DC planning. Pt stated that she was at [facility]. Pt stated, " they had me where people die at."  OT reached out to SW and mentioned that pt is able to go to SNF. Pt was at [facility] but She said she was supposed to be receiving therapy services there but was not due to a clerical error.  Pt would like to go to a SNF .    SW also spoke with daughter and went over the same information and they are interested in [facility] or [facility].   SW will send referral through [facility].  Will follow up.     Yuni Carrillo MSW, CSW      "

## 2024-01-17 LAB
ALBUMIN SERPL BCP-MCNC: 2.6 G/DL (ref 3.5–5.2)
ALP SERPL-CCNC: 132 U/L (ref 55–135)
ALT SERPL W/O P-5'-P-CCNC: 20 U/L (ref 10–44)
ANION GAP SERPL CALC-SCNC: 6 MMOL/L (ref 8–16)
AST SERPL-CCNC: 13 U/L (ref 10–40)
BASOPHILS # BLD AUTO: 0.02 K/UL (ref 0–0.2)
BASOPHILS NFR BLD: 0.1 % (ref 0–1.9)
BILIRUB SERPL-MCNC: 0.2 MG/DL (ref 0.1–1)
BUN SERPL-MCNC: 18 MG/DL (ref 8–23)
CALCIUM SERPL-MCNC: 8.6 MG/DL (ref 8.7–10.5)
CHLORIDE SERPL-SCNC: 105 MMOL/L (ref 95–110)
CO2 SERPL-SCNC: 29 MMOL/L (ref 23–29)
CREAT SERPL-MCNC: 0.6 MG/DL (ref 0.5–1.4)
DIFFERENTIAL METHOD BLD: ABNORMAL
EOSINOPHIL # BLD AUTO: 0.1 K/UL (ref 0–0.5)
EOSINOPHIL NFR BLD: 0.5 % (ref 0–8)
ERYTHROCYTE [DISTWIDTH] IN BLOOD BY AUTOMATED COUNT: 17.4 % (ref 11.5–14.5)
EST. GFR  (NO RACE VARIABLE): >60 ML/MIN/1.73 M^2
GLUCOSE SERPL-MCNC: 157 MG/DL (ref 70–110)
HCT VFR BLD AUTO: 34.4 % (ref 37–48.5)
HGB BLD-MCNC: 10.8 G/DL (ref 12–16)
IMM GRANULOCYTES # BLD AUTO: 0.08 K/UL (ref 0–0.04)
IMM GRANULOCYTES NFR BLD AUTO: 0.5 % (ref 0–0.5)
LYMPHOCYTES # BLD AUTO: 2.2 K/UL (ref 1–4.8)
LYMPHOCYTES NFR BLD: 12.6 % (ref 18–48)
MAGNESIUM SERPL-MCNC: 1.6 MG/DL (ref 1.6–2.6)
MCH RBC QN AUTO: 30.4 PG (ref 27–31)
MCHC RBC AUTO-ENTMCNC: 31.4 G/DL (ref 32–36)
MCV RBC AUTO: 97 FL (ref 82–98)
MONOCYTES # BLD AUTO: 0.9 K/UL (ref 0.3–1)
MONOCYTES NFR BLD: 4.9 % (ref 4–15)
NEUTROPHILS # BLD AUTO: 14 K/UL (ref 1.8–7.7)
NEUTROPHILS NFR BLD: 81.4 % (ref 38–73)
NRBC BLD-RTO: 0 /100 WBC
PHOSPHATE SERPL-MCNC: 2.7 MG/DL (ref 2.7–4.5)
PLATELET # BLD AUTO: 184 K/UL (ref 150–450)
PMV BLD AUTO: 10.2 FL (ref 9.2–12.9)
POCT GLUCOSE: 113 MG/DL (ref 70–110)
POCT GLUCOSE: 163 MG/DL (ref 70–110)
POCT GLUCOSE: 191 MG/DL (ref 70–110)
POCT GLUCOSE: 257 MG/DL (ref 70–110)
POTASSIUM SERPL-SCNC: 4 MMOL/L (ref 3.5–5.1)
PROT SERPL-MCNC: 4.9 G/DL (ref 6–8.4)
RBC # BLD AUTO: 3.55 M/UL (ref 4–5.4)
SODIUM SERPL-SCNC: 140 MMOL/L (ref 136–145)
WBC # BLD AUTO: 17.24 K/UL (ref 3.9–12.7)

## 2024-01-17 PROCEDURE — 20600001 HC STEP DOWN PRIVATE ROOM

## 2024-01-17 PROCEDURE — 25000003 PHARM REV CODE 250: Performed by: STUDENT IN AN ORGANIZED HEALTH CARE EDUCATION/TRAINING PROGRAM

## 2024-01-17 PROCEDURE — 63600175 PHARM REV CODE 636 W HCPCS

## 2024-01-17 PROCEDURE — 97530 THERAPEUTIC ACTIVITIES: CPT

## 2024-01-17 PROCEDURE — 80053 COMPREHEN METABOLIC PANEL: CPT

## 2024-01-17 PROCEDURE — 25000003 PHARM REV CODE 250

## 2024-01-17 PROCEDURE — 36415 COLL VENOUS BLD VENIPUNCTURE: CPT

## 2024-01-17 PROCEDURE — 63600175 PHARM REV CODE 636 W HCPCS: Performed by: STUDENT IN AN ORGANIZED HEALTH CARE EDUCATION/TRAINING PROGRAM

## 2024-01-17 PROCEDURE — 85025 COMPLETE CBC W/AUTO DIFF WBC: CPT

## 2024-01-17 PROCEDURE — 25000003 PHARM REV CODE 250: Performed by: INTERNAL MEDICINE

## 2024-01-17 PROCEDURE — 84100 ASSAY OF PHOSPHORUS: CPT

## 2024-01-17 PROCEDURE — 97535 SELF CARE MNGMENT TRAINING: CPT

## 2024-01-17 PROCEDURE — 83735 ASSAY OF MAGNESIUM: CPT

## 2024-01-17 RX ORDER — FAMOTIDINE 20 MG/1
20 TABLET, FILM COATED ORAL DAILY PRN
Status: DISCONTINUED | OUTPATIENT
Start: 2024-01-17 | End: 2024-01-19 | Stop reason: HOSPADM

## 2024-01-17 RX ORDER — FAMOTIDINE 20 MG/1
20 TABLET, FILM COATED ORAL DAILY
Qty: 30 TABLET | Refills: 11
Start: 2024-01-17 | End: 2025-01-16

## 2024-01-17 RX ADMIN — DOXYCYCLINE HYCLATE 100 MG: 100 TABLET, COATED ORAL at 11:01

## 2024-01-17 RX ADMIN — TAMSULOSIN HYDROCHLORIDE 0.4 MG: 0.4 CAPSULE ORAL at 08:01

## 2024-01-17 RX ADMIN — DOXYCYCLINE HYCLATE 100 MG: 100 TABLET, COATED ORAL at 09:01

## 2024-01-17 RX ADMIN — SODIUM CHLORIDE 500 ML: 9 INJECTION, SOLUTION INTRAVENOUS at 12:01

## 2024-01-17 RX ADMIN — CITALOPRAM HYDROBROMIDE 20 MG: 20 TABLET ORAL at 08:01

## 2024-01-17 RX ADMIN — INSULIN ASPART 2 UNITS: 100 INJECTION, SOLUTION INTRAVENOUS; SUBCUTANEOUS at 05:01

## 2024-01-17 RX ADMIN — GABAPENTIN 300 MG: 300 CAPSULE ORAL at 04:01

## 2024-01-17 RX ADMIN — CEFTRIAXONE 2 G: 2 INJECTION, POWDER, FOR SOLUTION INTRAMUSCULAR; INTRAVENOUS at 11:01

## 2024-01-17 RX ADMIN — INSULIN DETEMIR 8 UNITS: 100 INJECTION, SOLUTION SUBCUTANEOUS at 11:01

## 2024-01-17 RX ADMIN — GABAPENTIN 300 MG: 300 CAPSULE ORAL at 11:01

## 2024-01-17 RX ADMIN — BRIMONIDINE TARTRATE 1 DROP: 2 SOLUTION OPHTHALMIC at 09:01

## 2024-01-17 RX ADMIN — METOPROLOL TARTRATE 25 MG: 25 TABLET, FILM COATED ORAL at 11:01

## 2024-01-17 RX ADMIN — OXYCODONE HYDROCHLORIDE 5 MG: 5 TABLET ORAL at 08:01

## 2024-01-17 RX ADMIN — METOPROLOL TARTRATE 25 MG: 25 TABLET, FILM COATED ORAL at 04:01

## 2024-01-17 RX ADMIN — PREDNISONE 40 MG: 20 TABLET ORAL at 08:01

## 2024-01-17 RX ADMIN — LOPERAMIDE HYDROCHLORIDE 2 MG: 2 CAPSULE ORAL at 08:01

## 2024-01-17 RX ADMIN — LATANOPROST 1 DROP: 50 SOLUTION OPHTHALMIC at 11:01

## 2024-01-17 RX ADMIN — INSULIN ASPART 3 UNITS: 100 INJECTION, SOLUTION INTRAVENOUS; SUBCUTANEOUS at 11:01

## 2024-01-17 RX ADMIN — PANTOPRAZOLE SODIUM 40 MG: 40 TABLET, DELAYED RELEASE ORAL at 04:01

## 2024-01-17 RX ADMIN — METOPROLOL TARTRATE 25 MG: 25 TABLET, FILM COATED ORAL at 06:01

## 2024-01-17 RX ADMIN — TIMOLOL MALEATE 1 DROP: 5 SOLUTION OPHTHALMIC at 09:01

## 2024-01-17 RX ADMIN — PANTOPRAZOLE SODIUM 40 MG: 40 TABLET, DELAYED RELEASE ORAL at 06:01

## 2024-01-17 RX ADMIN — RIVAROXABAN 20 MG: 20 TABLET, FILM COATED ORAL at 05:01

## 2024-01-17 RX ADMIN — GABAPENTIN 300 MG: 300 CAPSULE ORAL at 08:01

## 2024-01-17 RX ADMIN — LOPERAMIDE HYDROCHLORIDE 2 MG: 2 CAPSULE ORAL at 09:01

## 2024-01-17 RX ADMIN — INSULIN ASPART 2 UNITS: 100 INJECTION, SOLUTION INTRAVENOUS; SUBCUTANEOUS at 12:01

## 2024-01-17 RX ADMIN — METOROPROLOL TARTRATE 5 MG: 5 INJECTION, SOLUTION INTRAVENOUS at 08:01

## 2024-01-17 RX ADMIN — BRIMONIDINE TARTRATE 1 DROP: 2 SOLUTION OPHTHALMIC at 11:01

## 2024-01-17 NOTE — PLAN OF CARE
SHAILESH sent over new referrals to SNF as requested by  and therapy.  Will follow up.    Yuni Carrillo MSW, CSW

## 2024-01-17 NOTE — PLAN OF CARE
Problem: Infection  Goal: Absence of Infection Signs and Symptoms  Outcome: Ongoing, Progressing     Problem: Adult Inpatient Plan of Care  Goal: Plan of Care Review  Outcome: Ongoing, Progressing  Goal: Patient-Specific Goal (Individualized)  Outcome: Ongoing, Progressing  Goal: Absence of Hospital-Acquired Illness or Injury  Outcome: Ongoing, Progressing  Goal: Optimal Comfort and Wellbeing  Outcome: Ongoing, Progressing  Goal: Readiness for Transition of Care  Outcome: Ongoing, Progressing     Problem: Diabetes Comorbidity  Goal: Blood Glucose Level Within Targeted Range  Outcome: Ongoing, Progressing     Problem: Fluid Imbalance (Pneumonia)  Goal: Fluid Balance  Outcome: Ongoing, Progressing     Problem: Infection (Pneumonia)  Goal: Resolution of Infection Signs and Symptoms  Outcome: Ongoing, Progressing     Problem: Respiratory Compromise (Pneumonia)  Goal: Effective Oxygenation and Ventilation  Outcome: Ongoing, Progressing     Problem: Impaired Wound Healing  Goal: Optimal Wound Healing  Outcome: Ongoing, Progressing     Problem: Skin Injury Risk Increased  Goal: Skin Health and Integrity  Outcome: Ongoing, Progressing     Problem: Fall Injury Risk  Goal: Absence of Fall and Fall-Related Injury  Outcome: Ongoing, Progressing   Pt Aox4, no PRNs given. Call light within reach, safety measures in place, rounded per facility policy.

## 2024-01-17 NOTE — PT/OT/SLP PROGRESS
"Occupational Therapy   Treatment    Name: Deb Webb  MRN: [id number]  Admitting Diagnosis:  GI bleed  5 Days Post-Op    Recommendations:     Discharge Recommendations: Moderate Intensity Therapy  Discharge Equipment Recommendations:  wheelchair, bedside commode, lift device, hospital bed  Barriers to discharge:  None    Assessment:     Deb Webb is a 73 y.o. female with a medical diagnosis of GI bleed.  Patient c/o dizziness with transitional movements lasting ~30s to 1 minute with BP taken (101/68 (80)) during on episode but resolving with rest. She presents with improving R hand ROM/ strength. Performance deficits affecting function are weakness, impaired endurance, impaired self care skills, impaired functional mobility, decreased lower extremity function, decreased upper extremity function, decreased ROM, impaired skin, impaired fine motor, impaired cardiopulmonary response to activity. Patient is highly motivated and significantly below PLOF. Patient continues to demonstrate the need for moderate intensity therapy on a daily basis post acute exhibited by decreased independence with self-care and functional mobility     Rehab Prognosis:  Good; patient would benefit from acute skilled OT services to address these deficits and reach maximum level of function.       Plan:     Patient to be seen 3 x/week to address the above listed problems via self-care/home management, therapeutic activities, therapeutic exercises, neuromuscular re-education  Plan of Care Expires: 01/28/24  Plan of Care Reviewed with: patient    Subjective     Chief Complaint: dizziness  Patient/Family Comments/goals: "I'm still trav scared of falling"  Pain/Comfort:  Pain Rating 1: 0/10  Pain Rating Post-Intervention 1: 0/10    Objective:     Communicated with: nursing prior to session.  Patient found HOB elevated with telemetry, peripheral IV, hernandez catheter, pulse ox (continuous) upon OT entry to room.    General Precautions: " Standard, fall    Orthopedic Precautions:RUE non weight bearing, LLE weight bearing as tolerated, RLE weight bearing as tolerated  Braces: N/A  Respiratory Status: Nasal cannula, flow 4 L/min     Occupational Performance:     Bed Mobility:    Patient completed Rolling/Turning to Left with  minimum assistance  Patient completed Rolling/Turning to Right with minimum assistance  Patient completed Supine to Sit with moderate assistance  Patient completed Sit to Supine with maximal assistance   Patient sat EOB for ~ 20 minutes with Supervision       Functional Mobility/Transfers: Gait belt utilized during session for patient safety  Patient completed Sit <> Stand Transfer with maximal assistance  with  no assistive device ; patient able to achieve fully upright position and maintain static stand for ~15 seconds   2 unsuccessful attempts secondary to posterior lean during transfers; step by step counseling and increased time provided to comfort patient due to fear of falling    Activities of Daily Living:  Grooming: stand by assistance to brush teeth sitting EOB   Toileting: total assistance for silva hygiene at bed level after bowel movement      [facility] 6 Click ADL: 15    Treatment & Education:    Patient educated on:   -importance of positioning to reduce pressure wounds  -RUE hand and wrist strengthening due to debility secondary to nonuse  -purpose of OT and OT POC  -facilitation and education on proper body mechanics, energy conservation, and safety  -importance of early mobility and out of bed activities with staff assist  -overall benefits of therapy     All questions answered within OT scope and to patient's satisfaction    Patient left right sidelying with all lines intact, call button in reach, and nursing notified    GOALS:   Multidisciplinary Problems       Occupational Therapy Goals          Problem: Occupational Therapy    Goal Priority Disciplines Outcome Interventions   Occupational Therapy Goal     OT,  PT/OT Ongoing, Progressing    Description: Goals to be met by: 2/14/24     Patient will increase functional independence with ADLs by performing:    UE Dressing with Contact Guard Assistance.  LE Dressing with Minimal Assistance.  Grooming while seated with Set-up Assistance.  Supine to sit with Minimal Assistance.  Stand pivot transfers with Moderate Assistance.                         Time Tracking:     OT Date of Treatment: 01/17/24  OT Start Time: 1045  OT Stop Time: 1118  OT Total Time (min): 33 min    Billable Minutes:Self Care/Home Management 18  Therapeutic Activity 15    OT/CHRISS: OT          1/17/2024

## 2024-01-17 NOTE — PROGRESS NOTES
"[facility] - Stepdown Flex ([place] [id number])  [facility] Medicine  Progress Note    Patient Name: Deb Webb  MRN: [id number]  Patient Class: IP- Inpatient   Admission Date: 1/10/2024  Length of Stay: 7 days  Attending Physician: Estevan Daly DO  Primary Care Provider: Triston Valverde MD        Subjective:     Principal Problem:GI bleed        HPI:  Ms. Webb is a 72 y/o woman with pmh of arthritis, COPD w/ 6L O2 at baseline and history of CVA , diabetes mellitus type 2, hyperlipidemia, hypertension, paroxysmal AFib on Xarelto. She presented to the emergency department via EMS secondary to bright red blood per rectum. She reports three days of bloody stools. She quantifies the amount as "three cups of blood" per day for three days. She has never had this happen to her before. She also reports fatigue, pale coloration of skin, productive cough, mild diffuse abdominal pain and moderate rectal pain. [place] [facility] [place] for URI and COPD exacerbation. Of note, recent colonoscopy reports bleeding could be due to rectal prolapse. She denies headaches, dizziness, shortness of breath, chest pain, nausea, vomiting, recent NSAID use/goody powder, weight loss, fevers, night sweats, hematuria.     In the ED, the patient received 1L IVF, 1u RBCs, Vanc, Zosyn, 80 IV pantoprazole, BC x2. Her blood pressures have been maintained 90s-100s/60s-70s with HR 150s-180s and EKG noting aflutter with RVR. GI and cardiology consulted. Labs notable for Hgb of 8.5 (11 7 days ago), wbc 25, Bicarb 20, lactate 4.28, PTT 21.1, PT 12.7, INR of 1.2. CTA with no evidence of acute GI bleed but with prominent perirectal vessels, possible hemorrhoids. Large amount of retained stool within sigmoid colon/rectum. New airspace consolidation in LLL with bronchial thickening concerning for PNA vs atelectasis.     Overview/Hospital Course:  This is a 73-year-old female with a history of COPD on 6 L NC at " home, CVA, PAF on Xarelto who originally presented with 3 days of hematochezia.  HB on presentation was 8.5.  CTA without evidence of acute GI be however did showed prominent perirectal vessels and possible hemorrhoids with large amount of retained stool within the sigmoid colon/rectum.  Stool noted to be melanotic on arrival.  Also did show left lower lobe airspace consolidation concerning for pneumonia, started on empiric IV vancomycin, Zosyn along with IV hydrocortisone for COPD exacerbation.  On arrival, was significantly tachycardic with rates of 150s, found to be in atrial flutter with RVR.  Cardiology was consulted, attributed elevated rate in the setting of pneumonia and GI bleed.  Initially recommended digoxin but primary team elected for BB due to hemodynamic stability.  Eventually underwent EGD/colonoscopy 1/12, found to have 3 ulcers in the distal rectum, much improved since compared to last colonoscopy, sigmoid and descending colon diverticulosis, mild splenic flexure inflammation.  On PPI b.i.d. HR has been difficult to control, discussed w cardiology, now controlled on metoprolol q6 dosing. Pending placement to SNF    Interval History: Seen at bedside this AM. States that shortness of breath, palpitations have improved     Review of Systems  Objective:     Vital Signs (Most Recent):  Temp: 97.8 °F (36.6 °C) (01/15/24 0830)  Pulse: 103 (01/15/24 1115)  Resp: (!) 22 (01/15/24 0932)  BP: 107/70 (01/15/24 0931)  SpO2: 98 % (01/15/24 1115) Vital Signs (24h Range):  Temp:  [97 °F (36.1 °C)-98.1 °F (36.7 °C)] 97.8 °F (36.6 °C)  Pulse:  [] 103  Resp:  [17-23] 22  SpO2:  [90 %-100 %] 98 %  BP: (104-156)/() 107/70     Weight: 50 kg (110 lb 3.7 oz)  Body mass index is 22.26 kg/m².    Intake/Output Summary (Last 24 hours) at 1/15/2024 1304  Last data filed at 1/15/2024 0900  Gross per 24 hour   Intake 118 ml   Output 800 ml   Net -682 ml           Physical Exam  Vitals and nursing note reviewed.    Constitutional:       General: She is not in acute distress.     Appearance: Normal appearance.   HENT:      Head: Normocephalic and atraumatic.   Eyes:      Pupils: Pupils are equal, round, and reactive to light.   Cardiovascular:      Rate and Rhythm: Normal rate and regular rhythm.      Pulses: Normal pulses.      Heart sounds: No murmur heard.  Pulmonary:      Effort: Pulmonary effort is normal.      Breath sounds: No wheezing.   Abdominal:      General: Abdomen is flat. There is no distension.      Palpations: Abdomen is soft. There is no mass.      Tenderness: There is no abdominal tenderness. There is no guarding or rebound.   Musculoskeletal:      Cervical back: Neck supple.      Right lower leg: No edema.      Left lower leg: No edema.   Skin:     General: Skin is warm and dry.      Coloration: Skin is not pale.   Neurological:      General: No focal deficit present.      Mental Status: She is alert and oriented to person, place, and time.             Significant Labs: All pertinent labs within the past 24 hours have been reviewed.    Significant Imaging: I have reviewed all pertinent imaging results/findings within the past 24 hours.    Assessment/Plan:      * GI bleed  #Rectal ulcerations  -History of PAF on Xarelto, presents w [facility], previously on hospice at NH  -CTA: no evidence of acute GIB, however showing prominent perirectal vessels w possible hemorrhoids  -EGD/colonoscopy 1/12: 3 ulcers in the distal rectum, much improved since compared to last colonoscopy, sigmoid and descending colon diverticulosis, mild splenic flexure inflammation  Plan:  -Continue PPI BID  -Contacted GI for clearance to restart Xarelto  -Monitor CBC  - Trend Hgb and transfuse for goal Hgb > 7, unless otherwise indicated  - Maintain IV access with 2 large bore IV's  - Intravascular resuscitation/support with IVF's   - Correct any coagulopathy with platelets and FFP for goal of platelets >50K and INR <2.          Atrial  flutter with rapid ventricular response  -Continues to be in RVR, likely exacerbated by pneumonia, recent GIB, COPD exacerbation  -Home med: metoprolol 12.5mg BID  -12 lead 1/15 obtained, atrial flutter with rvr  Plan:  -Discussed lack of rate control with cardiology, metoprolol 25mg increased to q6. Titrate to goal rate <110  -consider cardizem as EF wnl  -Digoxin may be optionif BP soft/amio if borderline unstable  -IV metoprolol 5mg q6 prn ordered  -Monitor on tele  -Xarelto restarted        COPD exacerbation  Patient's COPD is controlled currently.  Patient is currently on COPD Pathway. Continue scheduled inhalers Steroids, Antibiotics, and Supplemental oxygen and monitor respiratory status closely.     -Currently on steroid taper  -O2 requirement is below baseline    Acute blood loss anemia  Patient's anemia is currently controlled. Has not received any PRBCs to date. Etiology likely d/t acute blood loss which was from GI bleed  Current CBC reviewed-   Lab Results   Component Value Date    HGB 10.1 (L) 01/14/2024    HCT 34.6 (L) 01/14/2024     Monitor serial CBC and transfuse if patient becomes hemodynamically unstable, symptomatic or H/H drops below 7/21.    Pneumonia  -Most recent CXR from 1/13 showing stable bilateral opacities  -Continue IV CTX, doxycycline      Debility  Patient with Acute on chronic debility due to fracture/prosthesis and chronic unspecified fatigue. Latest AMPAC and GEMS scores have not been reviewed. Evaluation for etiology is complete. Plan includes PT/OT consulted.    Acute on chronic hypoxic respiratory failure  Patient with Hypoxic Respiratory failure which is Acute on chronic.  she is on home oxygen at 6 LPM. Supplemental oxygen was provided and noted-      .   Signs/symptoms of respiratory failure include- tachypnea, increased work of breathing, respiratory distress, and wheezing. Contributing diagnoses includes - COPD Labs and images were reviewed. Patient Has recent ABG, which  has been reviewed. Will treat underlying causes and adjust management of respiratory failure as follows- see plan for copd above    Advance care planning  Spoke w daughter Halle over the phone regarding recent hospice status after last hospitalization. Stating that it is no longer pt's wishes to transition back to hospice. Would like to dc to home at some point      Controlled type 2 diabetes mellitus with diabetic neuropathy, without long-term current use of insulin  Patient's FSGs are controlled on current medication regimen.  Last A1c reviewed-   Lab Results   Component Value Date    HGBA1C 6.6 (H) 12/25/2023     Most recent fingerstick glucose reviewed-   Recent Labs   Lab 01/13/24  1613 01/13/24  2057 01/14/24  0830 01/14/24  1136   POCTGLUCOSE 205* 305* 119* 210*     Current correctional scale  Medium  Maintain anti-hyperglycemic dose as follows-   Antihyperglycemics (From admission, onward)      Start     Stop Route Frequency Ordered    01/11/24 1404  insulin aspart U-100 pen 0-10 Units         -- SubQ Every 6 hours PRN 01/11/24 1404    01/10/24 2100  insulin detemir U-100 (Levemir) pen 8 Units         -- SubQ Nightly 01/10/24 1725          Hold Oral hypoglycemics while patient is in the hospital.      VTE Risk Mitigation (From admission, onward)           Ordered     rivaroxaban tablet 20 mg  with dinner         01/17/24 1654     Place sequential compression device  Until discontinued         01/10/24 1703     IP VTE HIGH RISK PATIENT  Once         01/10/24 1703                    Discharge Planning   NATHALY: 1/18/2024     Code Status: Full Code   Is the patient medically ready for discharge?: Yes    Reason for patient still in hospital (select all that apply): Patient trending condition  Discharge Plan A: Return to nursing home   Discharge Delays: (!) Change in Medical Condition              Estevan Daly DO  Department of Hospital Medicine   [facility] - [facility] ([place])

## 2024-01-17 NOTE — PLAN OF CARE
Problem: Adult Inpatient Plan of Care  Goal: Plan of Care Review  Outcome: Ongoing, Progressing  Flowsheets (Taken 1/16/2024 1842)  Plan of Care Reviewed With: patient  Goal: Patient-Specific Goal (Individualized)  Outcome: Ongoing, Progressing  Goal: Absence of Hospital-Acquired Illness or Injury  Outcome: Ongoing, Progressing  Intervention: Identify and Manage Fall Risk  Flowsheets (Taken 1/16/2024 1842)  Safety Promotion/Fall Prevention:   assistive device/personal item within reach   side rails raised x 2   instructed to call staff for mobility  Intervention: Prevent Skin Injury  Flowsheets (Taken 1/16/2024 1842)  Body Position: turned  Skin Protection: incontinence pads utilized  Intervention: Prevent and Manage VTE (Venous Thromboembolism) Risk  Flowsheets (Taken 1/16/2024 1842)  Activity Management: Rolling - L1  VTE Prevention/Management: bleeding precations maintained  Range of Motion: active ROM (range of motion) encouraged  Intervention: Prevent Infection  Flowsheets (Taken 1/16/2024 1842)  Infection Prevention: single patient room provided  Goal: Optimal Comfort and Wellbeing  Outcome: Ongoing, Progressing  Intervention: Monitor Pain and Promote Comfort  Flowsheets (Taken 1/16/2024 1842)  Pain Management Interventions: pain management plan reviewed with patient/caregiver      Patient alert and oriented x4; care plan discussed with patient; free from falls/injuries during the shift; call light and personal belongings within reach; pain managed via oral pain medicine; immodium given x2 doses; 1 BM during the shift; IV abx given without issue; patient able to express needs, no needs at this time; will continue with poc.

## 2024-01-17 NOTE — PLAN OF CARE
SKILLED NURSING FACILITY ORDERS    01/17/2024  [facility]  [facility]RIGUE[facility] ([address])  [address]  [place] [address]  Dept: [phone]  Loc: [phone]     Admit to Skilled Nursing Facility:      Diagnoses:  Active Hospital Problems    Diagnosis  POA    *GI bleed [K92.2]  Yes     Priority: 1 - High    Atrial flutter with rapid ventricular response [I48.92]  Yes     Priority: 2     COPD exacerbation [J44.1]  Yes     Priority: 3     Acute blood loss anemia [D62]  Yes    Coagulopathy [D68.9]  Yes    Pneumonia [J18.9]  Yes    Debility [R53.81]  Yes    Acute on chronic hypoxic respiratory failure [J96.21]  Yes    Advance care planning [Z71.89]  Not Applicable    Controlled type 2 diabetes mellitus with diabetic neuropathy, without long-term current use of insulin [E11.40]  Yes      Resolved Hospital Problems   No resolved problems to display.       Patient is homebound due to:  GI bleed    Allergies:  Review of patient's allergies indicates:   Allergen Reactions    Silicone      Burn skin    Adhesive Rash       Vitals:  Routine    Diet: diabetic diet: 2000 calorie    Activities:   Activity as tolerated    Goals of Care Treatment Preferences:  Code Status: Full Code          What is most important right now is to focus on remaining as independent as possible, improvement in condition but with limits to invasive therapies.  Accordingly, we have decided that the best plan to meet the patient's goals includes continuing with treatment.      Labs:      Nursing Precautions:  Fall    Consults:   PT to evaluate and treat- 5 times a week, OT to evaluate and treat- 5 times a week, and Nutrition to evaluate and recommend diet     Miscellaneous Care: Diabetes Care: Diabetes: Check blood sugar. Fingerstick blood sugar AC and HS                   Diabetes Care:  SN to perform and educate Diabetic management with blood glucose monitoring:, Fingerstick blood sugar AC and HS, and  Report CBG < 60 or > 350 to physician.      Medications: Discontinue all previous medication orders, if any. See new list below.     Medication List        START taking these medications      famotidine 20 MG tablet  Commonly known as: PEPCID  Take 1 tablet (20 mg total) by mouth once daily.            CHANGE how you take these medications      metoprolol tartrate 25 MG tablet  Commonly known as: LOPRESSOR  Take 1 tablet (25 mg total) by mouth every 6 (six) hours.  What changed:   how much to take  when to take this     * predniSONE 20 MG tablet  Commonly known as: DELTASONE  Take 2 tablets (40 mg total) by mouth once daily. for 4 days  What changed:   medication strength  See the new instructions.     * predniSONE 10 MG tablet  Commonly known as: DELTASONE  Take 3 tablets (30 mg total) by mouth once daily. for 7 days  Start taking on: January 19, 2024  What changed: You were already taking a medication with the same name, and this prescription was added. Make sure you understand how and when to take each.     * predniSONE 20 MG tablet  Commonly known as: DELTASONE  Take 1 tablet (20 mg total) by mouth once daily. for 7 days  Start taking on: January 26, 2024  What changed: You were already taking a medication with the same name, and this prescription was added. Make sure you understand how and when to take each.     * predniSONE 10 MG tablet  Commonly known as: DELTASONE  Take 1 tablet (10 mg total) by mouth once daily. for 7 days  Start taking on: February 2, 2024  What changed: You were already taking a medication with the same name, and this prescription was added. Make sure you understand how and when to take each.           * This list has 4 medication(s) that are the same as other medications prescribed for you. Read the directions carefully, and ask your doctor or other care provider to review them with you.                CONTINUE taking these medications      albuterol-ipratropium 2.5 mg-0.5 mg/3 mL  nebulizer solution  Commonly known as: DUO-NEB  Take 3 mLs by nebulization every 6 (six) hours as needed for Wheezing. Rescue     ANORO ELLIPTA 62.5-25 mcg/actuation Dsdv  Generic drug: umeclidinium-vilanteroL  INHALE 1 PUFF INTO THE LUNGS ONCE DAILY.     brimonidine 0.2% 0.2 % Drop  Commonly known as: ALPHAGAN  PLACE 1 DROP INTO BOTH EYES 2 TIMES A DAY.     cholestyramine-aspartame 4 gram Pwpk  Commonly known as: PREVALITE  TAKE 1 PACKET (4 G TOTAL) BY MOUTH 2 (TWO) TIMES DAILY. FOR DIARRHEA     citalopram 20 MG tablet  Commonly known as: CeleXA  Take 1 tablet (20 mg total) by mouth once daily.     gabapentin 300 MG capsule  Commonly known as: NEURONTIN  TAKE 1 CAPSULE BY MOUTH THREE TIMES A DAY     guaiFENesin 100 mg/5 ml 100 mg/5 mL syrup  Commonly known as: ROBITUSSIN  Take 10 mLs (200 mg total) by mouth every 6 (six) hours as needed for Congestion.     latanoprost 0.005 % ophthalmic solution  Place 1 drop into both eyes every evening.     levalbuterol 0.63 mg/3 mL nebulizer solution  Commonly known as: XOPENEX  Take 3 mLs (0.63 mg total) by nebulization every 8 (eight) hours. Rescue     LEVEMIR FLEXPEN 100 unit/mL (3 mL) Inpn pen  Generic drug: insulin detemir U-100 (Levemir)  Inject 10 Units into the skin every evening.     NovoLOG Flexpen U-100 Insulin 100 unit/mL (3 mL) Inpn pen  Generic drug: insulin aspart U-100  Inject 0-8 Units into the skin as needed (sliding scale). B to 149 mg/dL = 0 units  150-199 mg/dL = 2 units  200 to 249 mg/dL = 4 units  250 to 299 mg/dL = 6 units  300 to 999 mg/dL = 8 units  Notify Provider if BG is more than 400 mg/dL.     omeprazole 40 MG capsule  Commonly known as: PRILOSEC  Take 1 capsule (40 mg total) by mouth before breakfast.     senna 8.6 mg tablet  Commonly known as: SENOKOT  Take 1 tablet by mouth once daily.     tamsulosin 0.4 mg Cap  Commonly known as: FLOMAX  Take 1 capsule (0.4 mg total) by mouth once daily.     timolol maleate 0.5% 0.5 % Drop  Commonly known  as: TIMOPTIC  Place 1 drop into both eyes once daily.     XARELTO 20 mg Tab  Generic drug: rivaroxaban  TAKE 1 TABLET BY MOUTH EVERY DAY WITH DINNER OR EVENING MEAL            STOP taking these medications      HYDROcodone-acetaminophen 5-325 mg per tablet  Commonly known as: NORCO                Immunizations Administered as of 1/17/2024       Name Date Dose VIS Date Route Exp Date    COVID-19, MRNA, LN-S, PF (Pfizer) (Purple Cap) 2/3/2022 10:47 AM 0.3 mL 9/22/2021 Intramuscular 6/30/2022    Site: Right deltoid     Given By: Connie Collins RN     : [employer]     Lot: [id number]     COVID-19, MRNA, LN-S, PF (Pfizer) (Purple Cap) 4/2/2021  9:53 AM 0.3 mL 12/12/2020 Intramuscular 7/31/2021    Site: Left deltoid     Given By: Ashley Morrison MA     : [employer]     Lot: [id number]     COVID-19, MRNA, LN-S, PF (Pfizer) (Purple Cap) 3/12/2021  9:02 AM 0.3 mL 12/12/2020 Intramuscular 6/30/2021    Site: Left deltoid     Given By: Genny Melendez RN     : [employer]     Lot: [id number]                  Some patients may experience side effects after vaccination.  These may include fever, headache, muscle or joint aches.  Most symptoms resolve with 24-48 hours and do not require urgent medical evaluation unless they persist for more than 72 hours or symptoms are concerning for an unrelated medical condition.          _________________________________  Estevan Daly DO  01/17/2024

## 2024-01-18 LAB
ALBUMIN SERPL BCP-MCNC: 2.5 G/DL (ref 3.5–5.2)
ALP SERPL-CCNC: 126 U/L (ref 55–135)
ALT SERPL W/O P-5'-P-CCNC: 20 U/L (ref 10–44)
ANION GAP SERPL CALC-SCNC: 6 MMOL/L (ref 8–16)
AST SERPL-CCNC: 12 U/L (ref 10–40)
BASOPHILS # BLD AUTO: 0.02 K/UL (ref 0–0.2)
BASOPHILS NFR BLD: 0.1 % (ref 0–1.9)
BILIRUB SERPL-MCNC: 0.3 MG/DL (ref 0.1–1)
BUN SERPL-MCNC: 16 MG/DL (ref 8–23)
CALCIUM SERPL-MCNC: 8.7 MG/DL (ref 8.7–10.5)
CHLORIDE SERPL-SCNC: 105 MMOL/L (ref 95–110)
CO2 SERPL-SCNC: 31 MMOL/L (ref 23–29)
CREAT SERPL-MCNC: 0.6 MG/DL (ref 0.5–1.4)
DIFFERENTIAL METHOD BLD: ABNORMAL
EOSINOPHIL # BLD AUTO: 0.1 K/UL (ref 0–0.5)
EOSINOPHIL NFR BLD: 0.8 % (ref 0–8)
ERYTHROCYTE [DISTWIDTH] IN BLOOD BY AUTOMATED COUNT: 17.2 % (ref 11.5–14.5)
EST. GFR  (NO RACE VARIABLE): >60 ML/MIN/1.73 M^2
GLUCOSE SERPL-MCNC: 129 MG/DL (ref 70–110)
HCT VFR BLD AUTO: 35 % (ref 37–48.5)
HGB BLD-MCNC: 10.6 G/DL (ref 12–16)
IMM GRANULOCYTES # BLD AUTO: 0.07 K/UL (ref 0–0.04)
IMM GRANULOCYTES NFR BLD AUTO: 0.4 % (ref 0–0.5)
LYMPHOCYTES # BLD AUTO: 2.6 K/UL (ref 1–4.8)
LYMPHOCYTES NFR BLD: 16.2 % (ref 18–48)
MAGNESIUM SERPL-MCNC: 1.4 MG/DL (ref 1.6–2.6)
MCH RBC QN AUTO: 29.9 PG (ref 27–31)
MCHC RBC AUTO-ENTMCNC: 30.3 G/DL (ref 32–36)
MCV RBC AUTO: 99 FL (ref 82–98)
MONOCYTES # BLD AUTO: 0.8 K/UL (ref 0.3–1)
MONOCYTES NFR BLD: 4.9 % (ref 4–15)
NEUTROPHILS # BLD AUTO: 12.4 K/UL (ref 1.8–7.7)
NEUTROPHILS NFR BLD: 77.6 % (ref 38–73)
NRBC BLD-RTO: 0 /100 WBC
PHOSPHATE SERPL-MCNC: 2.7 MG/DL (ref 2.7–4.5)
PLATELET # BLD AUTO: 194 K/UL (ref 150–450)
PMV BLD AUTO: 9.5 FL (ref 9.2–12.9)
POCT GLUCOSE: 150 MG/DL (ref 70–110)
POCT GLUCOSE: 221 MG/DL (ref 70–110)
POCT GLUCOSE: 295 MG/DL (ref 70–110)
POCT GLUCOSE: 328 MG/DL (ref 70–110)
POTASSIUM SERPL-SCNC: 3.9 MMOL/L (ref 3.5–5.1)
PROT SERPL-MCNC: 4.7 G/DL (ref 6–8.4)
RBC # BLD AUTO: 3.55 M/UL (ref 4–5.4)
SODIUM SERPL-SCNC: 142 MMOL/L (ref 136–145)
WBC # BLD AUTO: 15.94 K/UL (ref 3.9–12.7)

## 2024-01-18 PROCEDURE — 63600175 PHARM REV CODE 636 W HCPCS

## 2024-01-18 PROCEDURE — 97112 NEUROMUSCULAR REEDUCATION: CPT | Mod: CQ

## 2024-01-18 PROCEDURE — 83735 ASSAY OF MAGNESIUM: CPT

## 2024-01-18 PROCEDURE — 63600175 PHARM REV CODE 636 W HCPCS: Performed by: STUDENT IN AN ORGANIZED HEALTH CARE EDUCATION/TRAINING PROGRAM

## 2024-01-18 PROCEDURE — 97535 SELF CARE MNGMENT TRAINING: CPT

## 2024-01-18 PROCEDURE — 25000003 PHARM REV CODE 250: Performed by: STUDENT IN AN ORGANIZED HEALTH CARE EDUCATION/TRAINING PROGRAM

## 2024-01-18 PROCEDURE — 80053 COMPREHEN METABOLIC PANEL: CPT

## 2024-01-18 PROCEDURE — 25000003 PHARM REV CODE 250: Performed by: INTERNAL MEDICINE

## 2024-01-18 PROCEDURE — 25000003 PHARM REV CODE 250

## 2024-01-18 PROCEDURE — 84100 ASSAY OF PHOSPHORUS: CPT

## 2024-01-18 PROCEDURE — 85025 COMPLETE CBC W/AUTO DIFF WBC: CPT

## 2024-01-18 PROCEDURE — 94761 N-INVAS EAR/PLS OXIMETRY MLT: CPT

## 2024-01-18 PROCEDURE — 36415 COLL VENOUS BLD VENIPUNCTURE: CPT

## 2024-01-18 PROCEDURE — 97110 THERAPEUTIC EXERCISES: CPT

## 2024-01-18 PROCEDURE — 20600001 HC STEP DOWN PRIVATE ROOM

## 2024-01-18 PROCEDURE — 97530 THERAPEUTIC ACTIVITIES: CPT | Mod: CQ

## 2024-01-18 RX ORDER — MAGNESIUM SULFATE HEPTAHYDRATE 40 MG/ML
2 INJECTION, SOLUTION INTRAVENOUS ONCE
Status: COMPLETED | OUTPATIENT
Start: 2024-01-18 | End: 2024-01-18

## 2024-01-18 RX ADMIN — LOPERAMIDE HYDROCHLORIDE 2 MG: 2 CAPSULE ORAL at 06:01

## 2024-01-18 RX ADMIN — TAMSULOSIN HYDROCHLORIDE 0.4 MG: 0.4 CAPSULE ORAL at 10:01

## 2024-01-18 RX ADMIN — METOPROLOL TARTRATE 25 MG: 25 TABLET, FILM COATED ORAL at 12:01

## 2024-01-18 RX ADMIN — OXYCODONE HYDROCHLORIDE 5 MG: 5 TABLET ORAL at 05:01

## 2024-01-18 RX ADMIN — GABAPENTIN 300 MG: 300 CAPSULE ORAL at 10:01

## 2024-01-18 RX ADMIN — BRIMONIDINE TARTRATE 1 DROP: 2 SOLUTION OPHTHALMIC at 08:01

## 2024-01-18 RX ADMIN — OXYCODONE HYDROCHLORIDE 5 MG: 5 TABLET ORAL at 08:01

## 2024-01-18 RX ADMIN — MAGNESIUM SULFATE HEPTAHYDRATE 2 G: 40 INJECTION, SOLUTION INTRAVENOUS at 10:01

## 2024-01-18 RX ADMIN — LOPERAMIDE HYDROCHLORIDE 2 MG: 2 CAPSULE ORAL at 05:01

## 2024-01-18 RX ADMIN — LATANOPROST 1 DROP: 50 SOLUTION OPHTHALMIC at 08:01

## 2024-01-18 RX ADMIN — PREDNISONE 40 MG: 20 TABLET ORAL at 10:01

## 2024-01-18 RX ADMIN — PANTOPRAZOLE SODIUM 40 MG: 40 TABLET, DELAYED RELEASE ORAL at 06:01

## 2024-01-18 RX ADMIN — BRIMONIDINE TARTRATE 1 DROP: 2 SOLUTION OPHTHALMIC at 10:01

## 2024-01-18 RX ADMIN — GABAPENTIN 300 MG: 300 CAPSULE ORAL at 08:01

## 2024-01-18 RX ADMIN — CITALOPRAM HYDROBROMIDE 20 MG: 20 TABLET ORAL at 10:01

## 2024-01-18 RX ADMIN — GABAPENTIN 300 MG: 300 CAPSULE ORAL at 06:01

## 2024-01-18 RX ADMIN — INSULIN ASPART 8 UNITS: 100 INJECTION, SOLUTION INTRAVENOUS; SUBCUTANEOUS at 06:01

## 2024-01-18 RX ADMIN — PANTOPRAZOLE SODIUM 40 MG: 40 TABLET, DELAYED RELEASE ORAL at 05:01

## 2024-01-18 RX ADMIN — DOXYCYCLINE HYCLATE 100 MG: 100 TABLET, COATED ORAL at 10:01

## 2024-01-18 RX ADMIN — Medication 6 MG: at 08:01

## 2024-01-18 RX ADMIN — INSULIN DETEMIR 8 UNITS: 100 INJECTION, SOLUTION SUBCUTANEOUS at 08:01

## 2024-01-18 RX ADMIN — SODIUM CHLORIDE, POTASSIUM CHLORIDE, SODIUM LACTATE AND CALCIUM CHLORIDE 500 ML: 600; 310; 30; 20 INJECTION, SOLUTION INTRAVENOUS at 12:01

## 2024-01-18 RX ADMIN — DOXYCYCLINE HYCLATE 100 MG: 100 TABLET, COATED ORAL at 08:01

## 2024-01-18 RX ADMIN — METOPROLOL TARTRATE 25 MG: 25 TABLET, FILM COATED ORAL at 05:01

## 2024-01-18 RX ADMIN — RIVAROXABAN 20 MG: 20 TABLET, FILM COATED ORAL at 06:01

## 2024-01-18 RX ADMIN — TIMOLOL MALEATE 1 DROP: 5 SOLUTION OPHTHALMIC at 10:01

## 2024-01-18 RX ADMIN — METOPROLOL TARTRATE 25 MG: 25 TABLET, FILM COATED ORAL at 06:01

## 2024-01-18 RX ADMIN — INSULIN ASPART 4 UNITS: 100 INJECTION, SOLUTION INTRAVENOUS; SUBCUTANEOUS at 12:01

## 2024-01-18 NOTE — NURSING
Shift summary    Patient alert and oriented x4. Complains of pain- PRN pain meds given. A fib on monitor, rate 90-110s. On 3L NC and sating well- no signs of respiratory distress. Dizzy and hypotensive post therapy- IV fluid bolus given. Q2 turning provided. Incontinence care provided. X2 incontinent stools episodes. Saini care provided. Safety precautions in place. Call light in reach.

## 2024-01-18 NOTE — ASSESSMENT & PLAN NOTE
Patient's FSGs are controlled on current medication regimen.  Last A1c reviewed-   Lab Results   Component Value Date    HGBA1C 6.6 (H) 12/25/2023     Most recent fingerstick glucose reviewed-   Recent Labs   Lab 01/17/24 2032 01/18/24  0741 01/18/24  1044 01/18/24  1539   POCTGLUCOSE 257* 150* 221* 328*       Current correctional scale  Medium  Maintain anti-hyperglycemic dose as follows-   Antihyperglycemics (From admission, onward)    Start     Stop Route Frequency Ordered    01/11/24 1404  insulin aspart U-100 pen 0-10 Units         -- SubQ Every 6 hours PRN 01/11/24 1404    01/10/24 2100  insulin detemir U-100 (Levemir) pen 8 Units         -- SubQ Nightly 01/10/24 1725        Hold Oral hypoglycemics while patient is in the hospital.

## 2024-01-18 NOTE — PT/OT/SLP PROGRESS
Occupational Therapy  Co- Treatment    Name: Deb Webb  MRN: [id number]  Admitting Diagnosis:  GI bleed  6 Days Post-Op    Recommendations:     Discharge Recommendations: Moderate Intensity Therapy  Discharge Equipment Recommendations:  wheelchair, bedside commode, lift device, hospital bed  Barriers to discharge:  None    Assessment:     Deb Webb is a 73 y.o. female with a medical diagnosis of GI bleed.  She presents with improving activity tolerance. Patient verbalizing compliance with turning schedule and is highly motivated for therapy. Minimal dizziness with transitional movements again and BP monitored (see below for details). Performance deficits affecting function are weakness, impaired endurance, impaired self care skills, impaired functional mobility, decreased lower extremity function, decreased safety awareness, impaired balance, gait instability, impaired cardiopulmonary response to activity, decreased ROM. Patient continues to demonstrate the need for moderate intensity therapy on a daily basis post acute exhibited by decreased independence with self-care and functional mobility     Rehab Prognosis:  Good; patient would benefit from acute skilled OT services to address these deficits and reach maximum level of function.       Plan:     Patient to be seen 3 x/week to address the above listed problems via self-care/home management, therapeutic activities, therapeutic exercises, neuromuscular re-education  Plan of Care Expires: 01/28/24  Plan of Care Reviewed with: patient    Subjective     Chief Complaint: pain (unrated) on sacral wound while cleaning  Patient/Family Comments/goals: get better  Pain/Comfort:  Pain Rating 1: 0/10  Pain Rating Post-Intervention 1: 0/10    Objective:   Co-treatment with PT performed for patient safety, education, and facilitation of treatment to maximize activity tolerance and progression towards goals from two skilled therapy disciplines.    Communicated  with: nursing prior to session.  Patient found HOB elevated with telemetry, hernandez catheter, pulse ox (continuous), oxygen upon OT entry to room.    General Precautions: Standard, fall    Orthopedic Precautions:RUE non weight bearing, LLE weight bearing as tolerated, RLE weight bearing as tolerated  Braces: N/A  Respiratory Status: Nasal cannula, flow 3 L/min     Occupational Performance:     Bed Mobility:    Patient completed Rolling/Turning to Left with  minimum assistance  Patient completed Rolling/Turning to Right with minimum assistance  Patient completed Scooting/Bridging with moderate assistance  Patient completed Supine to Sit with minimum assistance  Patient completed Sit to Supine with maximal assistance   Patient sat EOB for ~ 15 minutes with Stand-by Assistance      Functional Mobility/Transfers:  Patient completed Sit <> Stand Transfer with maximal assistance and of 2 persons  with L  hand-held assist   B foot and knee block for safety; x3 trials  Functional Mobility: patient took 1 side step with maximal assistance    Activities of Daily Living:  Feeding:  set up assistance with meal tray at end of session  Grooming: stand by assistance to wash face while EOB  Lower Body Dressing: total assistance to don socks  Toileting: total assistance to clean silva in standing; continued incontinent bowel movements throughout session      AMPAC 6 Click ADL: 14    Treatment & Education:    BP as follows:   At initiation of session: 117/76 (93)  Reports of dizziness prior to movement: 119/77 (94)   Sitting EOB: 120/70 (88)  Unable to obtain standing BP due to length of standing tolerance     Therapeutic exercise facilitated by edge of bed activities, postural control, and participation in functional standing tasks and transfers to improve activity tolerance. Patient maintained no visual signs of impaired cardiopulmonary responses with no SOB or reported dizziness.      Patient educated on:   -purpose of OT and OT  POC  -facilitation and education on proper body mechanics, energy conservation, and safety  -importance of early mobility and out of bed activities with staff assist  -overall benefits of therapy     All questions answered within OT scope and to patient's satisfaction    Patient left HOB elevated with all lines intact, call button in reach, and nursing present    GOALS:   Multidisciplinary Problems       Occupational Therapy Goals          Problem: Occupational Therapy    Goal Priority Disciplines Outcome Interventions   Occupational Therapy Goal     OT, PT/OT Ongoing, Progressing    Description: Goals to be met by: 2/14/24     Patient will increase functional independence with ADLs by performing:    UE Dressing with Contact Guard Assistance.  LE Dressing with Minimal Assistance.  Grooming while seated with Set-up Assistance.  Supine to sit with Minimal Assistance.  Stand pivot transfers with Moderate Assistance.                         Time Tracking:     OT Date of Treatment: 01/18/24  OT Start Time: 0910  OT Stop Time: 0939  OT Total Time (min): 29 min    Billable Minutes:Self Care/Home Management 15  Therapeutic Exercise 14    OT/CHRISS: OT          1/18/2024

## 2024-01-18 NOTE — CARE UPDATE
RAPID RESPONSE NURSE ROUND       Rounding completed with charge RNSandy for afib rvr. Ordered Metoprolol IVP given by primary RN and rate now controlled. No additional concerns verbalized at this time. Instructed to call [phone] for further concerns or assistance.

## 2024-01-18 NOTE — PLAN OF CARE
Problem: Infection  Goal: Absence of Infection Signs and Symptoms  Outcome: Ongoing, Progressing     Problem: Adult Inpatient Plan of Care  Goal: Plan of Care Review  Outcome: Ongoing, Progressing     Problem: Diabetes Comorbidity  Goal: Blood Glucose Level Within Targeted Range  Outcome: Ongoing, Progressing     Problem: Infection (Pneumonia)  Goal: Resolution of Infection Signs and Symptoms  Outcome: Ongoing, Progressing

## 2024-01-18 NOTE — PT/OT/SLP PROGRESS
Physical Therapy Treatment    Co-treatment with OT due to acuity of condition, level of skilled assist needed for assessment of safety with mobility and potential of not tolerating a second treatment session.     Patient Name:  Deb Webb   MRN:  [id number]    Recommendations:     Discharge Recommendations: Moderate Intensity Therapy  Discharge Equipment Recommendations: hospital bed, wheelchair, bedside commode, lift device  Barriers to discharge: None    Assessment:     Deb Webb is a 73 y.o. female admitted with a medical diagnosis of GI bleed.  She presents with the following impairments/functional limitations: weakness, impaired endurance, impaired self care skills, impaired functional mobility, decreased safety awareness, impaired balance, decreased lower extremity function, decreased upper extremity function, impaired skin, impaired cardiopulmonary response to activity Pt tolerated treatment session well today. Pt was able to tolerate 3 standing trials and was able to take 1 lateral step towards HOB. Pt BP as follows during treatment session layin/76 & 119/77 sitting EOB: 120/70, BP unable to be recorded during standing. Patient remains appropriate for continued skilled services within the acute environment and goals remain appropriate.   .    Rehab Prognosis: Good; patient would benefit from acute skilled PT services to address these deficits and reach maximum level of function.    Recent Surgery: Procedure(s) (LRB):  COLONOSCOPY (N/A) 6 Days Post-Op    Plan:     During this hospitalization, patient to be seen 3 x/week to address the identified rehab impairments via gait training, therapeutic activities, therapeutic exercises, neuromuscular re-education and progress toward the following goals:    Plan of Care Expires:  24    Subjective     Chief Complaint: none stated   Patient/Family Comments/goals: Pt agreeable to PT   Pain/Comfort:  Pain Rating 1: 0/10      Objective:      Communicated with RN prior to session.  Patient found supine with telemetry, oxygen, pulse ox (continuous), hernandez catheter upon PT entry to room.     General Precautions: Standard, fall  Orthopedic Precautions: N/A  Braces: N/A  Respiratory Status: Nasal cannula, flow 3 L/min     Functional Mobility:  Bed Mobility:     Rolling Right: minimum assistance  Supine to Sit: minimum assistance  Pt sat EOB ~ 15 mins SBA while working with OT   Sit to Supine: maximal assistance  Transfers:     Sit to Stand x 3 trails:  maximal assistance and of 2 persons with hand-held assist  Pt required B foot and knee block for safety   Pt only able to tolerate a few seconds of static stance before having to return to sitting   Gait: 1 side step towards HOB (L) MaxA       AM-PAC 6 CLICK MOBILITY  Turning over in bed (including adjusting bedclothes, sheets and blankets)?: 2  Sitting down on and standing up from a chair with arms (e.g., wheelchair, bedside commode, etc.): 2  Moving from lying on back to sitting on the side of the bed?: 2  Moving to and from a bed to a chair (including a wheelchair)?: 2  Need to walk in hospital room?: 1  Climbing 3-5 steps with a railing?: 1  Basic Mobility Total Score: 10       Treatment & Education:  Therapist provided instruction and educated for safety during transfers and gait training. As well as proper body mechanics, energy conservation, and fall prevention strategies during tasks listed above, and the effects of prolonged immobility and the importance of performing EOB/OOB activity and exercises to promote healing and reduce recovery time.       Patient left supine with all lines intact, call button in reach, and RN notified..    GOALS:   Multidisciplinary Problems       Physical Therapy Goals          Problem: Physical Therapy    Goal Priority Disciplines Outcome Goal Variances Interventions   Physical Therapy Goal     PT, PT/OT Ongoing, Progressing     Description: Goals to be met by:  2024     Patient will increase functional independence with mobility by performin. Supine to sit with Contact Guard Assistance  2. Sit to supine with Stand-by Assistance  3. Sit to stand transfer with Minimal Assistance  4. Bed to chair transfer with Minimal Assistance using LRAD  5. Gait  x 25 feet with Moderate Assistance using LRAD.   6. Lower extremity exercise program x15 reps per handout, with assistance as needed                         Time Tracking:     PT Received On: 24  PT Start Time: 910     PT Stop Time: 941  PT Total Time (min): 31 min     Billable Minutes: Therapeutic Activity 16 and Neuromuscular Re-education 15    Treatment Type: Treatment  PT/PTA: PTA     Number of PTA visits since last PT visit: 2024

## 2024-01-18 NOTE — ASSESSMENT & PLAN NOTE
Patient's anemia is currently controlled. Has not received any PRBCs to date. Etiology likely d/t acute blood loss which was from GI bleed  Current CBC reviewed-   Lab Results   Component Value Date    HGB 10.6 (L) 01/18/2024    HCT 35.0 (L) 01/18/2024     Monitor serial CBC and transfuse if patient becomes hemodynamically unstable, symptomatic or H/H drops below 7/21.

## 2024-01-18 NOTE — CARE UPDATE
"RAPID RESPONSE NURSE CHART REVIEW        Chart Reviewed: 01/18/2024, 1:53 PM    MRN: [id number]  Bed: 43791/98815 A    Dx: GI bleed    Deb Webb has a past medical history of Allergy, Arthritis, Cataract, Colon polyps, COPD (chronic obstructive pulmonary disease), Diabetes mellitus type II, Diabetic neuropathy, Fever blister, Glaucoma (increased eye pressure), Hyperlipidemia, Hypertension, Irritable bowel syndrome, Keloid cicatrix, Osteoporosis, Retained cholelithiasis following cholecystectomy(997.41), and Right patella fracture.    Last VS: BP (!) 87/57 (BP Location: Left arm, Patient Position: Lying)   Pulse 105   Temp 97.7 °F (36.5 °C) (Oral)   Resp 12   Ht 4' 11" (1.499 m)   Wt 51.7 kg (114 lb)   SpO2 96%   BMI 23.03 kg/m²     24H Vital Sign Range:  Temp:  [97.7 °F (36.5 °C)-98.3 °F (36.8 °C)]   Pulse:  []   Resp:  [10-34]   BP: ()/(50-91)   SpO2:  [79 %-100 %]     Level of Consciousness (AVPU): alert    Recent Labs     01/16/24  0456 01/17/24  0341 01/18/24  0401   WBC 14.10* 17.24* 15.94*   HGB 10.1* 10.8* 10.6*   HCT 32.5* 34.4* 35.0*    184 194       Recent Labs     01/16/24  0456 01/17/24  0341 01/18/24  0401    140 142   K 3.9 4.0 3.9    105 105   CO2 27 29 31*   BUN 18 18 16   CREATININE 0.6 0.6 0.6   * 157* 129*   PHOS 3.0 2.7 2.7   MG 1.5* 1.6 1.4*        No results for input(s): "PH", "PCO2", "PO2", "HCO3", "POCSATURATED", "BE" in the last 72 hours.     OXYGEN:  Flow (L/min): 3          MEWS score: 3    Bedside RNVinicius  contacted for hypotension and persistent aflutter. HR 90s-100s. 500 mL LR bolus given per MD orders. BP improved to 107/80 (MAP 90). No additional concerns verbalized at this time. Instructed to call [facility] for further concerns or assistance.  Juliette Kenney RN       "

## 2024-01-18 NOTE — PROGRESS NOTES
"[facility] - Stepdown [facility] ([place] [id number])  [facility] Medicine  Progress Note    Patient Name: Deb Webb  MRN: [id number]  Patient Class: IP- Inpatient   Admission Date: 1/10/2024  Length of Stay: 8 days  Attending Physician: Shirin Pereira MD  Primary Care Provider: Triston Valverde MD        Subjective:     Principal Problem:GI bleed        HPI:  Ms. Webb is a 74 y/o woman with pmh of arthritis, COPD w/ 6L O2 at baseline and history of CVA , diabetes mellitus type 2, hyperlipidemia, hypertension, paroxysmal AFib on Xarelto. She presented to the emergency department via EMS secondary to bright red blood per rectum. She reports three days of bloody stools. She quantifies the amount as "three cups of blood" per day for three days. She has never had this happen to her before. She also reports fatigue, pale coloration of skin, productive cough, mild diffuse abdominal pain and moderate rectal pain. She was recently discharged from [facility] 5 days ago after admission for URI and COPD exacerbation. Of note, recent colonoscopy reports bleeding could be due to rectal prolapse. She denies headaches, dizziness, shortness of breath, chest pain, nausea, vomiting, recent NSAID use/goody powder, weight loss, fevers, night sweats, hematuria.     In the ED, the patient received 1L IVF, 1u RBCs, Vanc, Zosyn, 80 IV pantoprazole, BC x2. Her blood pressures have been maintained 90s-100s/60s-70s with HR 150s-180s and EKG noting aflutter with RVR. GI and cardiology consulted. Labs notable for Hgb of 8.5 (11 7 days ago), wbc 25, Bicarb 20, lactate 4.28, PTT 21.1, PT 12.7, INR of 1.2. CTA with no evidence of acute GI bleed but with prominent perirectal vessels, possible hemorrhoids. Large amount of retained stool within sigmoid colon/rectum. New airspace consolidation in LLL with bronchial thickening concerning for PNA vs atelectasis.     Overview/Hospital Course:  This is a 73-year-old female with a history of COPD on 6 L NC at " home, CVA, PAF on Xarelto who originally presented with 3 days of hematochezia.  HB on presentation was 8.5.  CTA without evidence of acute GI be however did showed prominent perirectal vessels and possible hemorrhoids with large amount of retained stool within the sigmoid colon/rectum.  Stool noted to be melanotic on arrival.  Also did show left lower lobe airspace consolidation concerning for pneumonia, started on empiric IV vancomycin, Zosyn along with IV hydrocortisone for COPD exacerbation.  On arrival, was significantly tachycardic with rates of 150s, found to be in atrial flutter with RVR.  Cardiology was consulted, attributed elevated rate in the setting of pneumonia and GI bleed.  Initially recommended digoxin but primary team elected for BB due to hemodynamic stability.  Eventually underwent EGD/colonoscopy 1/12, found to have 3 ulcers in the distal rectum, much improved since compared to last colonoscopy, sigmoid and descending colon diverticulosis, mild splenic flexure inflammation.  On PPI b.i.d. HR has been difficult to control, discussed w cardiology, now controlled on metoprolol q6 dosing. Pending placement to SNF    Interval History: Patient dizzy on exam this morning. BP soft, MAPs around 60. HR < 120. Patient given 500cc bolus with improvement in BP.     Review of Systems  Objective:     Vital Signs (Most Recent):  Temp: 97.7 °F (36.5 °C) (01/18/24 1045)  Pulse: 105 (01/18/24 1517)  Resp: 19 (01/18/24 1400)  BP: 118/81 (01/18/24 1400)  SpO2: 96 % (01/18/24 1517) Vital Signs (24h Range):  Temp:  [97.7 °F (36.5 °C)-98.3 °F (36.8 °C)] 97.7 °F (36.5 °C)  Pulse:  [] 105  Resp:  [10-29] 19  SpO2:  [86 %-100 %] 96 %  BP: ()/(50-91) 118/81     Weight: 51.7 kg (114 lb)  Body mass index is 23.03 kg/m².    Intake/Output Summary (Last 24 hours) at 1/18/2024 1617  Last data filed at 1/18/2024 1258  Gross per 24 hour   Intake 1790.37 ml   Output 1824 ml   Net -33.63 ml         Physical Exam  Vitals  and nursing note reviewed.   Constitutional:       General: She is not in acute distress.     Appearance: Normal appearance.   HENT:      Head: Normocephalic and atraumatic.   Eyes:      Pupils: Pupils are equal, round, and reactive to light.   Cardiovascular:      Rate and Rhythm: Normal rate and regular rhythm.      Pulses: Normal pulses.      Heart sounds: No murmur heard.  Pulmonary:      Effort: Pulmonary effort is normal.      Breath sounds: No wheezing.   Abdominal:      General: Abdomen is flat. There is no distension.      Palpations: Abdomen is soft. There is no mass.      Tenderness: There is no abdominal tenderness. There is no guarding or rebound.   Musculoskeletal:      Cervical back: Neck supple.      Right lower leg: No edema.      Left lower leg: No edema.   Skin:     General: Skin is warm and dry.      Coloration: Skin is not pale.   Neurological:      General: No focal deficit present.      Mental Status: She is alert and oriented to person, place, and time.             Significant Labs: All pertinent labs within the past 24 hours have been reviewed.    Significant Imaging: I have reviewed all pertinent imaging results/findings within the past 24 hours.    Assessment/Plan:      * GI bleed  #Rectal ulcerations  -History of PAF on Xarelto, presents w [facility], previously on hospice at NH  -CTA: no evidence of acute GIB, however showing prominent perirectal vessels w possible hemorrhoids  -EGD/colonoscopy 1/12: 3 ulcers in the distal rectum, much improved since compared to last colonoscopy, sigmoid and descending colon diverticulosis, mild splenic flexure inflammation  Plan:  -Continue PPI BID  -Contacted GI for clearance to restart Xarelto  -Monitor CBC  - Trend Hgb and transfuse for goal Hgb > 7, unless otherwise indicated  - Maintain IV access with 2 large bore IV's  - Intravascular resuscitation/support with IVF's   - Correct any coagulopathy with platelets and FFP for goal of platelets >50K and INR  <2.          Atrial flutter with rapid ventricular response  Improved control 1/18, HR below goal 120  Intermittently in afib RVR, likely exacerbated by pneumonia, recent GIB, COPD exacerbation  -Home med: metoprolol 12.5mg BID  -12 lead 1/15 obtained, atrial flutter with rvr  Plan:  -Discussed lack of rate control with cardiology, metoprolol 25mg increased to q6. Titrate to goal rate <110  -consider cardizem as EF wnl  -Digoxin may be optionif BP soft/amio if borderline unstable  -IV metoprolol 5mg q6 prn ordered  -Monitor on tele  -Xarelto restarted        Acute blood loss anemia  Patient's anemia is currently controlled. Has not received any PRBCs to date. Etiology likely d/t acute blood loss which was from GI bleed  Current CBC reviewed-   Lab Results   Component Value Date    HGB 10.6 (L) 01/18/2024    HCT 35.0 (L) 01/18/2024     Monitor serial CBC and transfuse if patient becomes hemodynamically unstable, symptomatic or H/H drops below 7/21.    Pneumonia  -Most recent CXR from 1/13 showing stable bilateral opacities  -s/p CTX, doxycycline      Debility  Patient with Acute on chronic debility due to fracture/prosthesis and chronic unspecified fatigue. Latest AMPAC and GEMS scores have not been reviewed. Evaluation for etiology is complete. Plan includes PT/OT consulted.    Acute on chronic hypoxic respiratory failure  Patient with Hypoxic Respiratory failure which is Acute on chronic.  she is on home oxygen at 6 LPM. Supplemental oxygen was provided and noted-      .   Signs/symptoms of respiratory failure include- tachypnea, increased work of breathing, respiratory distress, and wheezing. Contributing diagnoses includes - COPD Labs and images were reviewed. Patient Has recent ABG, which has been reviewed. Will treat underlying causes and adjust management of respiratory failure as follows- see plan for copd above    Advance care planning  Spoke w daughter Halle over the phone regarding recent hospice status  after last hospitalization. Stating that it is no longer pt's wishes to transition back to hospice. Would like to dc to home at some point      COPD exacerbation  Patient's COPD is controlled currently.  Patient is currently on COPD Pathway. Continue scheduled inhalers Steroids, Antibiotics, and Supplemental oxygen and monitor respiratory status closely.     -Currently on steroid taper  -O2 requirement is below baseline    Controlled type 2 diabetes mellitus with diabetic neuropathy, without long-term current use of insulin  Patient's FSGs are controlled on current medication regimen.  Last A1c reviewed-   Lab Results   Component Value Date    HGBA1C 6.6 (H) 12/25/2023     Most recent fingerstick glucose reviewed-   Recent Labs   Lab 01/17/24  2032 01/18/24  0741 01/18/24  1044 01/18/24  1539   POCTGLUCOSE 257* 150* 221* 328*       Current correctional scale  Medium  Maintain anti-hyperglycemic dose as follows-   Antihyperglycemics (From admission, onward)      Start     Stop Route Frequency Ordered    01/11/24 1404  insulin aspart U-100 pen 0-10 Units         -- SubQ Every 6 hours PRN 01/11/24 1404    01/10/24 2100  insulin detemir U-100 (Levemir) pen 8 Units         -- SubQ Nightly 01/10/24 1725          Hold Oral hypoglycemics while patient is in the hospital.      VTE Risk Mitigation (From admission, onward)           Ordered     rivaroxaban tablet 20 mg  with dinner         01/17/24 1654     Place sequential compression device  Until discontinued         01/10/24 1703     IP VTE HIGH RISK PATIENT  Once         01/10/24 1703                    Discharge Planning   NATHALY: 1/19/2024     Code Status: Full Code   Is the patient medically ready for discharge?: No    Reason for patient still in hospital (select all that apply): Patient trending condition and Pending disposition  Discharge Plan A: Return to nursing home   Discharge Delays: (!) Change in Medical Condition              Shirin Pereira MD  Department of  [facility]   [facility] - Stepdown Flex ([place]-[facility])

## 2024-01-19 VITALS
OXYGEN SATURATION: 97 % | RESPIRATION RATE: 24 BRPM | HEART RATE: 100 BPM | TEMPERATURE: 98 F | HEIGHT: 59 IN | DIASTOLIC BLOOD PRESSURE: 70 MMHG | BODY MASS INDEX: 22.98 KG/M2 | SYSTOLIC BLOOD PRESSURE: 105 MMHG | WEIGHT: 114 LBS

## 2024-01-19 LAB
ALBUMIN SERPL BCP-MCNC: 2.3 G/DL (ref 3.5–5.2)
ALP SERPL-CCNC: 125 U/L (ref 55–135)
ALT SERPL W/O P-5'-P-CCNC: 14 U/L (ref 10–44)
ANION GAP SERPL CALC-SCNC: 4 MMOL/L (ref 8–16)
AST SERPL-CCNC: 9 U/L (ref 10–40)
BASOPHILS # BLD AUTO: 0.01 K/UL (ref 0–0.2)
BASOPHILS NFR BLD: 0.1 % (ref 0–1.9)
BILIRUB SERPL-MCNC: 0.5 MG/DL (ref 0.1–1)
BUN SERPL-MCNC: 21 MG/DL (ref 8–23)
CALCIUM SERPL-MCNC: 8.6 MG/DL (ref 8.7–10.5)
CHLORIDE SERPL-SCNC: 103 MMOL/L (ref 95–110)
CO2 SERPL-SCNC: 30 MMOL/L (ref 23–29)
CREAT SERPL-MCNC: 0.6 MG/DL (ref 0.5–1.4)
DIFFERENTIAL METHOD BLD: ABNORMAL
EOSINOPHIL # BLD AUTO: 0.1 K/UL (ref 0–0.5)
EOSINOPHIL NFR BLD: 0.5 % (ref 0–8)
ERYTHROCYTE [DISTWIDTH] IN BLOOD BY AUTOMATED COUNT: 17.2 % (ref 11.5–14.5)
EST. GFR  (NO RACE VARIABLE): >60 ML/MIN/1.73 M^2
GLUCOSE SERPL-MCNC: 184 MG/DL (ref 70–110)
HCT VFR BLD AUTO: 32.3 % (ref 37–48.5)
HGB BLD-MCNC: 10 G/DL (ref 12–16)
IMM GRANULOCYTES # BLD AUTO: 0.07 K/UL (ref 0–0.04)
IMM GRANULOCYTES NFR BLD AUTO: 0.5 % (ref 0–0.5)
LYMPHOCYTES # BLD AUTO: 1.9 K/UL (ref 1–4.8)
LYMPHOCYTES NFR BLD: 12.7 % (ref 18–48)
MAGNESIUM SERPL-MCNC: 1.5 MG/DL (ref 1.6–2.6)
MCH RBC QN AUTO: 30.2 PG (ref 27–31)
MCHC RBC AUTO-ENTMCNC: 31 G/DL (ref 32–36)
MCV RBC AUTO: 98 FL (ref 82–98)
MONOCYTES # BLD AUTO: 0.7 K/UL (ref 0.3–1)
MONOCYTES NFR BLD: 4.7 % (ref 4–15)
NEUTROPHILS # BLD AUTO: 12 K/UL (ref 1.8–7.7)
NEUTROPHILS NFR BLD: 81.5 % (ref 38–73)
NRBC BLD-RTO: 0 /100 WBC
PHOSPHATE SERPL-MCNC: 2.8 MG/DL (ref 2.7–4.5)
PLATELET # BLD AUTO: 185 K/UL (ref 150–450)
PMV BLD AUTO: 10.1 FL (ref 9.2–12.9)
POCT GLUCOSE: 134 MG/DL (ref 70–110)
POCT GLUCOSE: 175 MG/DL (ref 70–110)
POCT GLUCOSE: 316 MG/DL (ref 70–110)
POTASSIUM SERPL-SCNC: 3.8 MMOL/L (ref 3.5–5.1)
PROT SERPL-MCNC: 4.4 G/DL (ref 6–8.4)
RBC # BLD AUTO: 3.31 M/UL (ref 4–5.4)
SODIUM SERPL-SCNC: 137 MMOL/L (ref 136–145)
WBC # BLD AUTO: 14.77 K/UL (ref 3.9–12.7)

## 2024-01-19 PROCEDURE — 85025 COMPLETE CBC W/AUTO DIFF WBC: CPT

## 2024-01-19 PROCEDURE — 36415 COLL VENOUS BLD VENIPUNCTURE: CPT

## 2024-01-19 PROCEDURE — 25000003 PHARM REV CODE 250

## 2024-01-19 PROCEDURE — 25000003 PHARM REV CODE 250: Performed by: INTERNAL MEDICINE

## 2024-01-19 PROCEDURE — 63600175 PHARM REV CODE 636 W HCPCS: Performed by: STUDENT IN AN ORGANIZED HEALTH CARE EDUCATION/TRAINING PROGRAM

## 2024-01-19 PROCEDURE — 25000003 PHARM REV CODE 250: Performed by: STUDENT IN AN ORGANIZED HEALTH CARE EDUCATION/TRAINING PROGRAM

## 2024-01-19 PROCEDURE — 83735 ASSAY OF MAGNESIUM: CPT

## 2024-01-19 PROCEDURE — 80053 COMPREHEN METABOLIC PANEL: CPT

## 2024-01-19 PROCEDURE — 84100 ASSAY OF PHOSPHORUS: CPT

## 2024-01-19 PROCEDURE — 63600175 PHARM REV CODE 636 W HCPCS

## 2024-01-19 RX ORDER — ONDANSETRON 4 MG/1
4 TABLET, ORALLY DISINTEGRATING ORAL EVERY 8 HOURS PRN
Qty: 12 TABLET | Refills: 0
Start: 2024-01-19 | End: 2024-01-23

## 2024-01-19 RX ORDER — ONDANSETRON 4 MG/1
4 TABLET, ORALLY DISINTEGRATING ORAL EVERY 6 HOURS PRN
Status: DISCONTINUED | OUTPATIENT
Start: 2024-01-19 | End: 2024-01-19 | Stop reason: HOSPADM

## 2024-01-19 RX ORDER — MAGNESIUM SULFATE HEPTAHYDRATE 40 MG/ML
2 INJECTION, SOLUTION INTRAVENOUS ONCE
Status: COMPLETED | OUTPATIENT
Start: 2024-01-19 | End: 2024-01-19

## 2024-01-19 RX ORDER — OXYCODONE HYDROCHLORIDE 5 MG/1
5 TABLET ORAL EVERY 4 HOURS PRN
Qty: 12 TABLET | Refills: 0 | Status: SHIPPED | OUTPATIENT
Start: 2024-01-19 | End: 2024-01-19

## 2024-01-19 RX ORDER — OXYCODONE HYDROCHLORIDE 5 MG/1
5 TABLET ORAL EVERY 8 HOURS PRN
Qty: 9 TABLET | Refills: 0 | Status: SHIPPED | OUTPATIENT
Start: 2024-01-19 | End: 2024-01-22

## 2024-01-19 RX ADMIN — PANTOPRAZOLE SODIUM 40 MG: 40 TABLET, DELAYED RELEASE ORAL at 05:01

## 2024-01-19 RX ADMIN — METOPROLOL TARTRATE 25 MG: 25 TABLET, FILM COATED ORAL at 12:01

## 2024-01-19 RX ADMIN — PREDNISONE 30 MG: 20 TABLET ORAL at 10:01

## 2024-01-19 RX ADMIN — DOXYCYCLINE HYCLATE 100 MG: 100 TABLET, COATED ORAL at 10:01

## 2024-01-19 RX ADMIN — INSULIN ASPART 2 UNITS: 100 INJECTION, SOLUTION INTRAVENOUS; SUBCUTANEOUS at 10:01

## 2024-01-19 RX ADMIN — PANTOPRAZOLE SODIUM 40 MG: 40 TABLET, DELAYED RELEASE ORAL at 03:01

## 2024-01-19 RX ADMIN — INSULIN ASPART 8 UNITS: 100 INJECTION, SOLUTION INTRAVENOUS; SUBCUTANEOUS at 11:01

## 2024-01-19 RX ADMIN — CITALOPRAM HYDROBROMIDE 20 MG: 20 TABLET ORAL at 10:01

## 2024-01-19 RX ADMIN — BRIMONIDINE TARTRATE 1 DROP: 2 SOLUTION OPHTHALMIC at 10:01

## 2024-01-19 RX ADMIN — METOPROLOL TARTRATE 25 MG: 25 TABLET, FILM COATED ORAL at 11:01

## 2024-01-19 RX ADMIN — RIVAROXABAN 20 MG: 20 TABLET, FILM COATED ORAL at 06:01

## 2024-01-19 RX ADMIN — LOPERAMIDE HYDROCHLORIDE 2 MG: 2 CAPSULE ORAL at 03:01

## 2024-01-19 RX ADMIN — METOPROLOL TARTRATE 25 MG: 25 TABLET, FILM COATED ORAL at 06:01

## 2024-01-19 RX ADMIN — TIMOLOL MALEATE 1 DROP: 5 SOLUTION OPHTHALMIC at 10:01

## 2024-01-19 RX ADMIN — MAGNESIUM SULFATE HEPTAHYDRATE 2 G: 40 INJECTION, SOLUTION INTRAVENOUS at 10:01

## 2024-01-19 RX ADMIN — GABAPENTIN 300 MG: 300 CAPSULE ORAL at 03:01

## 2024-01-19 RX ADMIN — LOPERAMIDE HYDROCHLORIDE 2 MG: 2 CAPSULE ORAL at 10:01

## 2024-01-19 RX ADMIN — GABAPENTIN 300 MG: 300 CAPSULE ORAL at 10:01

## 2024-01-19 RX ADMIN — TAMSULOSIN HYDROCHLORIDE 0.4 MG: 0.4 CAPSULE ORAL at 10:01

## 2024-01-19 NOTE — PT/OT/SLP PROGRESS
Occupational Therapy      Patient Name:  Deb Webb   MRN:  [id number]    Patient not seen today secondary to pt declined tx d/t n/v. Will follow-up tomorrow.    1/19/2024

## 2024-01-19 NOTE — PLAN OF CARE
01/19/24 1747   Final Note   Assessment Type Final Discharge Note   Anticipated Discharge Disposition SNF   What phone number can be called within the next 1-3 days to see how you are doing after discharge? [phone]   Hospital Resources/Appts/Education Provided Provided patient/caregiver with written discharge plan information   Post-Acute Status   Post-Acute Authorization Placement   Post-Acute Placement Status Set-up Complete/Auth obtained   Coverage PHN   Discharge Delays None known at this time     Pt will be DC and returning to [facility].  Pt was skilled nursing there. SW set up transportation.  No additional assistance needed.    Yuni Carrillo MSW, CSW

## 2024-01-19 NOTE — DISCHARGE SUMMARY
"[facility] - Stepdown Flex ([place] [id number])  [facility] Medicine  Discharge Summary      Patient Name: Deb Webb  MRN: [id number]  [facility]: [id number]  Patient Class: IP- Inpatient  Admission Date: 1/10/2024  Hospital Length of Stay: 9 days  Discharge Date and Time: 1/19/2024  7:20 PM  Attending Physician: Shirin Pereira MD   Discharging Provider: Shirin Pereira MD  Primary Care Provider: Triston Valverde MD  Hospital Medicine Team: [facility] HOSP MED  Shirin Pereira MD  Primary Care Team: [facility]    HPI:   Ms. Webb is a 72 y/o woman with pmh of arthritis, COPD w/ 6L O2 at baseline and history of CVA , diabetes mellitus type 2, hyperlipidemia, hypertension, paroxysmal AFib on Xarelto. She presented to the emergency department via EMS secondary to bright red blood per rectum. She reports three days of bloody stools. She quantifies the amount as "three cups of blood" per day for three days. She has never had this happen to her before. She also reports fatigue, pale coloration of skin, productive cough, mild diffuse abdominal pain and moderate rectal pain. She was recently discharged from [facility] 5 days ago after admission for URI and COPD exacerbation. Of note, recent colonoscopy reports bleeding could be due to rectal prolapse. She denies headaches, dizziness, shortness of breath, chest pain, nausea, vomiting, recent NSAID use/goody powder, weight loss, fevers, night sweats, hematuria.     In the ED, the patient received 1L IVF, 1u RBCs, Vanc, Zosyn, 80 IV pantoprazole, BC x2. Her blood pressures have been maintained 90s-100s/60s-70s with HR 150s-180s and EKG noting aflutter with RVR. GI and cardiology consulted. Labs notable for Hgb of 8.5 (11 7 days ago), wbc 25, Bicarb 20, lactate 4.28, PTT 21.1, PT 12.7, INR of 1.2. CTA with no evidence of acute GI bleed but with prominent perirectal vessels, possible hemorrhoids. Large amount of retained stool within sigmoid colon/rectum. New airspace consolidation in " LLL with bronchial thickening concerning for PNA vs atelectasis.     Procedure(s) (LRB):  COLONOSCOPY (N/A)      Hospital Course:   This is a 73-year-old female with a history of COPD on 6 L NC at home, CVA, PAF on Xarelto who originally presented with 3 days of hematochezia.  HB on presentation was 8.5.  CTA without evidence of acute GI be however did showed prominent perirectal vessels and possible hemorrhoids with large amount of retained stool within the sigmoid colon/rectum.  Stool noted to be melanotic on arrival.  Also did show left lower lobe airspace consolidation concerning for pneumonia, started on empiric IV vancomycin, Zosyn along with IV hydrocortisone for COPD exacerbation.  On arrival, was significantly tachycardic with rates of 150s, found to be in atrial flutter with RVR.  Cardiology was consulted, attributed elevated rate in the setting of pneumonia and GI bleed.  Initially recommended digoxin but primary team elected for BB due to hemodynamic stability.  Eventually underwent EGD/colonoscopy 1/12, found to have 3 ulcers in the distal rectum, much improved since compared to last colonoscopy, sigmoid and descending colon diverticulosis, mild splenic flexure inflammation.  On PPI b.i.d. HR has been difficult to control, discussed w cardiology, now controlled on metoprolol q6 dosing.  Patient with episode of borderline hypotension and dizziness.  Symptoms improved with small bolus of IV fluids.  Blood pressure stable prior to discharge.  Hemoglobin stable, no evidence of repeat bleed.  Patient stable for discharge 1/19 to nursing facility.     Goals of Care Treatment Preferences:  Code Status: Full Code          What is most important right now is to focus on remaining as independent as possible, improvement in condition but with limits to invasive therapies.  Accordingly, we have decided that the best plan to meet the patient's goals includes continuing with treatment.      Consults:   Consults (From  admission, onward)          Status Ordering Provider     Inpatient consult to Skin Integrity  Practitioner  Once        Provider:  Duyen Raya, NP    Acknowledged MORALES ESPOSITO     Inpatient consult to PICC team ([facility])  Once        Provider:  (Not yet assigned)    Completed GERALDINE PARK     Inpatient consult to Gastroenterology  Once        Provider:  (Not yet assigned)    Completed FRANSICO BAUER     Inpatient consult to Critical Care Medicine  Once        Provider:  (Not yet assigned)    Completed FRANSICO BAUER     Inpatient consult to Cardiology  Once        Provider:  (Not yet assigned)    Completed FRANSICO BAUER            Pulmonary  Pneumonia  -Most recent CXR from 1/13 showing stable bilateral opacities  -s/p CTX, doxycycline      COPD exacerbation  Patient's COPD is controlled currently.  Patient is currently on COPD Pathway. Continue scheduled inhalers Steroids, Antibiotics, and Supplemental oxygen and monitor respiratory status closely.     -Currently on steroid taper  -O2 requirement is below baseline    Cardiac/Vascular  Atrial flutter with rapid ventricular response  Improved control 1/18, HR below goal 120  Intermittently in afib RVR, likely exacerbated by pneumonia, recent GIB, COPD exacerbation  -Home med: metoprolol 12.5mg BID  -12 lead 1/15 obtained, atrial flutter with rvr  Plan:  -Discussed lack of rate control with cardiology, metoprolol 25mg increased to q6. Titrate to goal rate <110  -consider cardizem as EF wnl  -Digoxin may be optionif BP soft/amio if borderline unstable  -IV metoprolol 5mg q6 prn ordered  -Monitor on tele  -Xarelto restarted        Oncology  Acute blood loss anemia  Patient's anemia is currently controlled. Has not received any PRBCs to date. Etiology likely d/t acute blood loss which was from GI bleed  Current CBC reviewed-   Lab Results   Component Value Date    HGB 10.0 (L) 01/19/2024    HCT 32.3 (L) 01/19/2024     Monitor serial CBC and  transfuse if patient becomes hemodynamically unstable, symptomatic or H/H drops below 7/21.    Endocrine  Controlled type 2 diabetes mellitus with diabetic neuropathy, without long-term current use of insulin  Patient's FSGs are controlled on current medication regimen.  Last A1c reviewed-   Lab Results   Component Value Date    HGBA1C 6.6 (H) 12/25/2023     Most recent fingerstick glucose reviewed-   Recent Labs   Lab 01/19/24  1543   POCTGLUCOSE 134*       Current correctional scale  Medium  Maintain anti-hyperglycemic dose as follows-   Antihyperglycemics (From admission, onward)      None          Hold Oral hypoglycemics while patient is in the hospital.    GI  * GI bleed  #Rectal ulcerations  -History of PAF on Xarelto, presents w [facility], previously on hospice at NH  -CTA: no evidence of acute GIB, however showing prominent perirectal vessels w possible hemorrhoids  -EGD/colonoscopy 1/12: 3 ulcers in the distal rectum, much improved since compared to last colonoscopy, sigmoid and descending colon diverticulosis, mild splenic flexure inflammation            Palliative Care  Advance care planning  Spoke w nadine Neri over the phone regarding recent hospice status after last hospitalization. Stating that it is no longer pt's wishes to transition back to hospice. Would like to dc to home at some point      Other  Acute on chronic hypoxic respiratory failure  Patient with Hypoxic Respiratory failure which is Acute on chronic.  she is on home oxygen at 6 LPM. Supplemental oxygen was provided and noted-      .   Signs/symptoms of respiratory failure include- tachypnea, increased work of breathing, respiratory distress, and wheezing. Contributing diagnoses includes - COPD Labs and images were reviewed. Patient Has recent ABG, which has been reviewed. Will treat underlying causes and adjust management of respiratory failure as follows- see plan for copd above      Final Active Diagnoses:    Diagnosis Date Noted POA     PRINCIPAL PROBLEM:  GI bleed [K92.2] 01/10/2024 Yes    Atrial flutter with rapid ventricular response [I48.92] 01/10/2024 Yes    Acute blood loss anemia [D62] 01/12/2024 Yes    Coagulopathy [D68.9] 01/10/2024 Yes    Pneumonia [J18.9] 01/10/2024 Yes    Debility [R53.81] 12/28/2023 Yes    Acute on chronic hypoxic respiratory failure [J96.21] 08/29/2022 Yes    Advance care planning [Z71.89] 01/27/2022 Not Applicable    COPD exacerbation [J44.1] 08/07/2014 Yes    Controlled type 2 diabetes mellitus with diabetic neuropathy, without long-term current use of insulin [E11.40] 03/21/2013 Yes      Problems Resolved During this Admission:       Discharged Condition: fair    Disposition: Home or Self Care    Follow Up:   Follow-up Information       Triston Valverde MD Follow up in 1 week(s).    Specialty: Family Medicine  Contact information:  [address] [place] [address] [phone]                           Patient Instructions:      Diet diabetic     Notify your health care provider if you experience any of the following:  temperature >100.4     Notify your health care provider if you experience any of the following:  persistent nausea and vomiting or diarrhea     Notify your health care provider if you experience any of the following:  severe uncontrolled pain     Notify your health care provider if you experience any of the following:  redness, tenderness, or signs of infection (pain, swelling, redness, odor or green/yellow discharge around incision site)     Notify your health care provider if you experience any of the following:  difficulty breathing or increased cough     Notify your health care provider if you experience any of the following:  severe persistent headache     Notify your health care provider if you experience any of the following:  worsening rash     Notify your health care provider if you experience any of the following:  persistent dizziness, light-headedness, or visual disturbances      Notify your health care provider if you experience any of the following:  increased confusion or weakness     Activity as tolerated       Significant Diagnostic Studies: Labs: BMP:   Recent Labs   Lab 01/19/24  0529   *      K 3.8      CO2 30*   BUN 21   CREATININE 0.6   CALCIUM 8.6*   MG 1.5*       Pending Diagnostic Studies:       None           Medications:  Reconciled Home Medications:      Medication List        START taking these medications      famotidine 20 MG tablet  Commonly known as: PEPCID  Take 1 tablet (20 mg total) by mouth once daily.     ondansetron 4 MG Tbdl  Commonly known as: ZOFRAN-ODT  Take 1 tablet (4 mg total) by mouth every 8 (eight) hours as needed.     oxyCODONE 5 MG immediate release tablet  Commonly known as: ROXICODONE  Take 1 tablet (5 mg total) by mouth every 8 (eight) hours as needed for Pain.            CHANGE how you take these medications      metoprolol tartrate 25 MG tablet  Commonly known as: LOPRESSOR  Take 1 tablet (25 mg total) by mouth every 6 (six) hours.  What changed:   how much to take  when to take this     * predniSONE 20 MG tablet  Commonly known as: DELTASONE  Take 2 tablets (40 mg total) by mouth once daily. for 4 days  What changed:   medication strength  See the new instructions.     * predniSONE 10 MG tablet  Commonly known as: DELTASONE  Take 3 tablets (30 mg total) by mouth once daily. for 7 days  What changed: You were already taking a medication with the same name, and this prescription was added. Make sure you understand how and when to take each.     * predniSONE 20 MG tablet  Commonly known as: DELTASONE  Take 1 tablet (20 mg total) by mouth once daily. for 7 days  Start taking on: January 26, 2024  What changed: You were already taking a medication with the same name, and this prescription was added. Make sure you understand how and when to take each.     * predniSONE 10 MG tablet  Commonly known as: DELTASONE  Take 1 tablet (10  mg total) by mouth once daily. for 7 days  Start taking on: 2024  What changed: You were already taking a medication with the same name, and this prescription was added. Make sure you understand how and when to take each.           * This list has 4 medication(s) that are the same as other medications prescribed for you. Read the directions carefully, and ask your doctor or other care provider to review them with you.                CONTINUE taking these medications      albuterol-ipratropium 2.5 mg-0.5 mg/3 mL nebulizer solution  Commonly known as: DUO-NEB  Take 3 mLs by nebulization every 6 (six) hours as needed for Wheezing. Rescue     ANORO ELLIPTA 62.5-25 mcg/actuation Dsdv  Generic drug: umeclidinium-vilanteroL  INHALE 1 PUFF INTO THE LUNGS ONCE DAILY.     brimonidine 0.2% 0.2 % Drop  Commonly known as: ALPHAGAN  PLACE 1 DROP INTO BOTH EYES 2 TIMES A DAY.     cholestyramine-aspartame 4 gram Pwpk  Commonly known as: PREVALITE  TAKE 1 PACKET (4 G TOTAL) BY MOUTH 2 (TWO) TIMES DAILY. FOR DIARRHEA     citalopram 20 MG tablet  Commonly known as: CeleXA  Take 1 tablet (20 mg total) by mouth once daily.     gabapentin 300 MG capsule  Commonly known as: NEURONTIN  TAKE 1 CAPSULE BY MOUTH THREE TIMES A DAY     guaiFENesin 100 mg/5 ml 100 mg/5 mL syrup  Commonly known as: ROBITUSSIN  Take 10 mLs (200 mg total) by mouth every 6 (six) hours as needed for Congestion.     latanoprost 0.005 % ophthalmic solution  Place 1 drop into both eyes every evening.     levalbuterol 0.63 mg/3 mL nebulizer solution  Commonly known as: XOPENEX  Take 3 mLs (0.63 mg total) by nebulization every 8 (eight) hours. Rescue     LEVEMIR FLEXPEN 100 unit/mL (3 mL) Inpn pen  Generic drug: insulin detemir U-100 (Levemir)  Inject 10 Units into the skin every evening.     NovoLOG Flexpen U-100 Insulin 100 unit/mL (3 mL) Inpn pen  Generic drug: insulin aspart U-100  Inject 0-8 Units into the skin as needed (sliding scale). B to 149  mg/dL = 0 units  150-199 mg/dL = 2 units  200 to 249 mg/dL = 4 units  250 to 299 mg/dL = 6 units  300 to 999 mg/dL = 8 units  Notify Provider if BG is more than 400 mg/dL.     omeprazole 40 MG capsule  Commonly known as: PRILOSEC  Take 1 capsule (40 mg total) by mouth before breakfast.     senna 8.6 mg tablet  Commonly known as: SENOKOT  Take 1 tablet by mouth once daily.     tamsulosin 0.4 mg Cap  Commonly known as: FLOMAX  Take 1 capsule (0.4 mg total) by mouth once daily.     timolol maleate 0.5% 0.5 % Drop  Commonly known as: TIMOPTIC  Place 1 drop into both eyes once daily.     XARELTO 20 mg Tab  Generic drug: rivaroxaban  TAKE 1 TABLET BY MOUTH EVERY DAY WITH DINNER OR EVENING MEAL            STOP taking these medications      HYDROcodone-acetaminophen 5-325 mg per tablet  Commonly known as: NORCO              Indwelling Lines/Drains at time of discharge:   Lines/Drains/Airways       Drain  Duration                  Urethral Catheter 01/10/24 1429 16 Fr. 8 days                    Time spent on the discharge of patient: 35 minutes         Shirin Pereira MD  Department of Hospital Medicine  [facility] - Stepdown Flex ([place]-)

## 2024-01-19 NOTE — NURSING
Pt AAO x4, no c/o pain. Pt hypotensive, felt dizzy, BP 87/57,  at 1200. MD notified. Ordered, ordered  bolus. Bolus given per order, BP recheck 107/73, . Skin care was done during the shift, truned frequently, supported with a wedge. Side rails upx 2, call light within reach.

## 2024-01-19 NOTE — PROGRESS NOTES
[facility] - Stepdown Flex ([place] [address])  Wound Care    Patient Name:  Deb Webb   MRN:  [id number]  Date: 1/19/2024  Diagnosis: GI bleed    History:     Past Medical History:   Diagnosis Date    Allergy     Arthritis     Cataract     Colon polyps     COPD (chronic obstructive pulmonary disease)     Diabetes mellitus type II     Diabetic neuropathy     Fever blister     Glaucoma (increased eye pressure)     Hyperlipidemia     Hypertension     Irritable bowel syndrome     Keloid cicatrix     Osteoporosis     Retained cholelithiasis following cholecystectomy(997.41)     Right patella fracture        Social History     Socioeconomic History    Marital status:    Occupational History     Employer: [employer]   Tobacco Use    Smoking status: Every Day     Current packs/day: 0.50     Average packs/day: 0.5 packs/day for 40.0 years (20.0 ttl pk-yrs)     Types: Cigarettes    Smokeless tobacco: Former    Tobacco comments:     Not ready to quit smoking. Cut down on cigarettes but enjoys having a couple cigarettes a day.   Substance and Sexual Activity    Alcohol use: No    Drug use: No    Sexual activity: Never   Other Topics Concern    Are you pregnant or think you may be? No    Breast-feeding No     Social Determinants of Health     Financial Resource Strain: Low Risk  (1/11/2024)    Overall Financial Resource Strain (CARDIA)     Difficulty of Paying Living Expenses: Not hard at all   Food Insecurity: No Food Insecurity (1/11/2024)    Hunger Vital Sign     Worried About Running Out of Food in the Last Year: Never true     Ran Out of Food in the Last Year: Never true   Recent Concern: Food Insecurity - Food Insecurity Present (12/26/2023)    Hunger Vital Sign     Worried About Running Out of Food in the Last Year: Sometimes true     Ran Out of Food in the Last Year: Sometimes true   Transportation Needs: No Transportation Needs (1/11/2024)    PRAPARE - Transportation     Lack of Transportation  (Medical): No     Lack of Transportation (Non-Medical): No   Physical Activity: Inactive (1/11/2024)    Exercise Vital Sign     Days of Exercise per Week: 0 days     Minutes of Exercise per Session: 0 min   Stress: Stress Concern Present (1/11/2024)    [employer] [place] [employer] - Occupational Stress Questionnaire     Feeling of Stress : To some extent   Social Connections: Moderately Isolated (1/11/2024)    Social Connection and Isolation Panel [NHANES]     Frequency of Communication with Friends and Family: More than three times a week     Frequency of Social Gatherings with Friends and Family: More than three times a week     Attends Anglican Services: 1 to 4 times per year     Active Member of Clubs or Organizations: No     Attends Club or Organization Meetings: Never     Marital Status:    Housing Stability: Low Risk  (1/11/2024)    Housing Stability Vital Sign     Unable to Pay for Housing in the Last Year: No     Number of Places Lived in the Last Year: 1     Unstable Housing in the Last Year: No       Precautions:     Allergies as of 01/10/2024 - Reviewed 01/10/2024   Allergen Reaction Noted    Silicone  09/07/2012    Adhesive Rash 09/07/2012       WOC Assessment Details/Treatment     Patient is Aox4 and agreeable to inpatient wound care. Patient is a follow up for sacrum. Moisture associated breakdown due to incontinence. Patient is currently on an immerse bed, foam heel dressings, ehob boots, and wedge for Q2 hour turns for pressure prevention. Due to cooling affects, Orders switched from [facility]ad to calmoseptine BID and PRN if soiled. Skin integrity TOR consulted. Will follow up with patient 1/22/2024. Continue current wound care orders.       01/19/24 1005   WOCN Assessment   WOCN Total Time (mins) 30   Visit Date 01/19/24   Visit Time 1005   Consult Type Follow Up   WOCN Speciality Wound   Intervention assessed;changed;applied;chart review;coordination of care;orders   Teaching  on-going   Positioning   Head of Bed (HOB) Positioning HOB at 30-45 degrees   Positioning/Transfer Devices wedge;in use;pillows   Pressure Injury Prevention    Check Moisture Management Pad Done   Sacral Foam Dressing Patient incontinent, barrier cream applied   Heel protection technique Heel boot   Heel preventative measures Peel back dressing/boot, assess skin and reapply   Check Medical Devices Done        Altered Skin Integrity 01/12/24 0845 Right midline Buttocks   Date First Assessed/Time First Assessed: 01/12/24 0845   Altered Skin Integrity Present on Admission - Did Patient arrive to the hospital with altered skin?: yes  Side: Right  Orientation: midline  Location: Buttocks  Is this injury device related?: No   Wound Image    Dressing Appearance Open to air;Clean;Intact;Dry   Drainage Amount None   Appearance Pink;Red   Care Cleansed with:;Sterile normal saline       Orders placed.   Edmar Centeno RN  01/19/2024

## 2024-01-19 NOTE — PLAN OF CARE
A&O, 3L NC, in A fib low 100's, afebrile, soft bp, pain and glucose treated with prn's, q4h turns left and right only, patient intermittently refused repositioning, no bm, 200 ml's output. No signs of bleeding, reports dizziness has resolved.     Problem: Infection  Goal: Absence of Infection Signs and Symptoms  Outcome: Ongoing, Progressing     Problem: Adult Inpatient Plan of Care  Goal: Plan of Care Review  Outcome: Ongoing, Progressing  Goal: Patient-Specific Goal (Individualized)  Outcome: Ongoing, Progressing  Goal: Absence of Hospital-Acquired Illness or Injury  Outcome: Ongoing, Progressing  Goal: Optimal Comfort and Wellbeing  Outcome: Ongoing, Progressing  Goal: Readiness for Transition of Care  Outcome: Ongoing, Progressing     Problem: Diabetes Comorbidity  Goal: Blood Glucose Level Within Targeted Range  Outcome: Ongoing, Progressing     Problem: Fluid Imbalance (Pneumonia)  Goal: Fluid Balance  Outcome: Ongoing, Progressing     Problem: Infection (Pneumonia)  Goal: Resolution of Infection Signs and Symptoms  Outcome: Ongoing, Progressing     Problem: Respiratory Compromise (Pneumonia)  Goal: Effective Oxygenation and Ventilation  Outcome: Ongoing, Progressing     Problem: Impaired Wound Healing  Goal: Optimal Wound Healing  Outcome: Ongoing, Progressing     Problem: Skin Injury Risk Increased  Goal: Skin Health and Integrity  Outcome: Ongoing, Progressing     Problem: Fall Injury Risk  Goal: Absence of Fall and Fall-Related Injury  Outcome: Ongoing, Progressing

## 2024-01-20 NOTE — NURSING
Pt AAO x4, no c/o pain. Wound care followed up with the pt during the shift, skin care completed per order. Pt D/C to [facility]. IV, tele removed per order. Report given to Nena. Pt left with all of her belongings with EMS on 3L O2 per NC. Saini maintained per order.

## 2024-01-20 NOTE — ASSESSMENT & PLAN NOTE
Patient's FSGs are controlled on current medication regimen.  Last A1c reviewed-   Lab Results   Component Value Date    HGBA1C 6.6 (H) 12/25/2023     Most recent fingerstick glucose reviewed-   Recent Labs   Lab 01/19/24  1543   POCTGLUCOSE 134*       Current correctional scale  Medium  Maintain anti-hyperglycemic dose as follows-   Antihyperglycemics (From admission, onward)    None        Hold Oral hypoglycemics while patient is in the hospital.

## 2024-01-20 NOTE — ASSESSMENT & PLAN NOTE
Patient's anemia is currently controlled. Has not received any PRBCs to date. Etiology likely d/t acute blood loss which was from GI bleed  Current CBC reviewed-   Lab Results   Component Value Date    HGB 10.0 (L) 01/19/2024    HCT 32.3 (L) 01/19/2024     Monitor serial CBC and transfuse if patient becomes hemodynamically unstable, symptomatic or H/H drops below 7/21.

## 2024-01-22 ENCOUNTER — PATIENT OUTREACH (OUTPATIENT)
Dept: ADMINISTRATIVE | Facility: CLINIC | Age: 74
End: 2024-01-22
Payer: MEDICARE

## 2024-02-16 ENCOUNTER — TELEPHONE (OUTPATIENT)
Dept: OPTOMETRY | Facility: CLINIC | Age: 74
End: 2024-02-16
Payer: MEDICARE

## 2024-02-16 ENCOUNTER — TELEPHONE (OUTPATIENT)
Dept: OPHTHALMOLOGY | Facility: CLINIC | Age: 74
End: 2024-02-16
Payer: MEDICARE

## 2024-02-16 NOTE — TELEPHONE ENCOUNTER
----- Message from Ernestina Aragon sent at 2/16/2024 11:13 AM CST -----  Regarding: Appt R/s  [facility] nursing facility called about r/s pts 2/26 appt to [facility]. Nursing home is closer to [facility].    Call back-[phone] Debi

## 2024-02-16 NOTE — TELEPHONE ENCOUNTER
Tried calling Ms. Carrera back from the nursing facility but she was unavailable at the time but was able to leave a message with another staff member.

## 2024-03-13 DIAGNOSIS — E11.9 TYPE 2 DIABETES MELLITUS WITHOUT COMPLICATION: ICD-10-CM

## 2024-03-25 PROBLEM — J96.01 ACUTE HYPOXEMIC RESPIRATORY FAILURE: Status: RESOLVED | Noted: 2022-01-27 | Resolved: 2024-03-25

## 2024-03-25 PROBLEM — J15.69 GRAM-NEGATIVE PNEUMONIA: Status: RESOLVED | Noted: 2023-12-25 | Resolved: 2024-03-25

## 2024-04-15 PROBLEM — K92.2 GI BLEED: Status: RESOLVED | Noted: 2024-01-10 | Resolved: 2024-04-15

## 2024-04-15 PROBLEM — J18.9 PNEUMONIA: Status: RESOLVED | Noted: 2024-01-10 | Resolved: 2024-04-15

## 2024-04-15 PROBLEM — J96.21 ACUTE ON CHRONIC HYPOXIC RESPIRATORY FAILURE: Status: RESOLVED | Noted: 2022-08-29 | Resolved: 2024-04-15

## 2024-05-27 DIAGNOSIS — Z12.31 VISIT FOR SCREENING MAMMOGRAM: Primary | ICD-10-CM

## 2024-05-29 ENCOUNTER — HOSPITAL ENCOUNTER (OUTPATIENT)
Dept: RADIOLOGY | Facility: HOSPITAL | Age: 74
Discharge: HOME | End: 2024-05-29
Attending: INTERNAL MEDICINE
Payer: MEDICARE

## 2024-05-29 DIAGNOSIS — Z12.31 VISIT FOR SCREENING MAMMOGRAM: ICD-10-CM

## 2024-05-29 DIAGNOSIS — N63.13 UNSPECIFIED LUMP IN THE RIGHT BREAST, LOWER OUTER QUADRANT: ICD-10-CM

## 2024-05-29 PROCEDURE — 76642 ULTRASOUND BREAST LIMITED: CPT | Mod: TC,RT

## 2024-05-29 PROCEDURE — 77066 DX MAMMO INCL CAD BI: CPT | Mod: 26,,, | Performed by: RADIOLOGY

## 2024-05-29 PROCEDURE — 77062 BREAST TOMOSYNTHESIS BI: CPT | Mod: 26,,, | Performed by: RADIOLOGY

## 2024-05-29 PROCEDURE — 76642 ULTRASOUND BREAST LIMITED: CPT | Mod: 26,RT,, | Performed by: RADIOLOGY

## 2024-05-29 PROCEDURE — 77066 DX MAMMO INCL CAD BI: CPT | Mod: TC

## 2024-07-30 ENCOUNTER — TELEPHONE (OUTPATIENT)
Dept: OPHTHALMOLOGY | Facility: CLINIC | Age: 74
End: 2024-07-30
Payer: MEDICARE

## 2024-07-30 NOTE — TELEPHONE ENCOUNTER
----- Message from Juhi Alvarado sent at 7/30/2024 12:12 PM CDT -----  Regarding: FW: appointment access  Contact: [phone]  Glaucoma pt living in [place] currently. Needs a f/u at . Please call to schedule. TY!  ----- Message -----  From: Melvina Baez  Sent: 7/30/2024  11:52 AM CDT  To: Lemuel WILKS Staff  Subject: FW: appointment access                             ----- Message -----  From: Salvatore Lao  Sent: 7/30/2024  11:50 AM CDT  To: Taina Ferreira Staff  Subject: appointment access                               Pt nurse is calling to set pt up with an eye exam . Pt is having blurry vision. please contact pt

## 2024-08-23 ENCOUNTER — TELEPHONE (OUTPATIENT)
Dept: PODIATRY | Facility: CLINIC | Age: 74
End: 2024-08-23
Payer: MEDICARE

## 2024-08-23 NOTE — TELEPHONE ENCOUNTER
Called patient to confirm appointment no answer someone picks up and doesn't say anything unable to leave a message.

## 2024-08-27 ENCOUNTER — OFFICE VISIT (OUTPATIENT)
Dept: PODIATRY | Facility: CLINIC | Age: 74
End: 2024-08-27
Payer: MEDICARE

## 2024-08-27 VITALS
RESPIRATION RATE: 17 BRPM | SYSTOLIC BLOOD PRESSURE: 101 MMHG | HEIGHT: 59 IN | HEART RATE: 83 BPM | WEIGHT: 101 LBS | DIASTOLIC BLOOD PRESSURE: 71 MMHG | BODY MASS INDEX: 20.36 KG/M2

## 2024-08-27 DIAGNOSIS — E11.51 TYPE II DIABETES MELLITUS WITH PERIPHERAL CIRCULATORY DISORDER: Primary | ICD-10-CM

## 2024-08-27 DIAGNOSIS — B35.1 ONYCHOMYCOSIS DUE TO DERMATOPHYTE: ICD-10-CM

## 2024-08-27 PROCEDURE — 99999 PR PBB SHADOW E&M-EST. PATIENT-LVL IV: CPT | Mod: PBBFAC,,, | Performed by: PODIATRIST

## 2024-08-27 PROCEDURE — 11721 DEBRIDE NAIL 6 OR MORE: CPT | Mod: Q9,S$GLB,, | Performed by: PODIATRIST

## 2024-08-27 PROCEDURE — 99499 UNLISTED E&M SERVICE: CPT | Mod: S$GLB,,, | Performed by: PODIATRIST

## 2024-08-27 NOTE — PROGRESS NOTES
Subjective:     Patient ID: Deb Webb is a 73 y.o. female.    Chief Complaint: Diabetic Foot Exam ([facility] ) and Nail Care    Deb is a 73 y.o. female who presents to the clinic for evaluation and treatment of high risk feet. Deb has a past medical history of Allergy, Arthritis, Cataract, Colon polyps, COPD (chronic obstructive pulmonary disease), Diabetes mellitus type II, Diabetic neuropathy, Fever blister, Glaucoma (increased eye pressure), Hyperlipidemia, Hypertension, Irritable bowel syndrome, Keloid cicatrix, Osteoporosis, Retained cholelithiasis following cholecystectomy(997.41), and Right patella fracture. The patient's chief complaint is long, thick toenails. This patient has documented high risk feet requiring routine maintenance secondary to diabetes mellitis and those secondary complications of diabetes, as mentioned..    PCP: Triston Valverde MD    Date Last Seen by PCP:   Chief Complaint   Patient presents with    Diabetic Foot Exam     [facility]     Nail Care     Hemoglobin A1C   Date Value Ref Range Status   12/25/2023 6.6 (H) 0.0 - 5.6 % Final     Comment:     Reference Interval:  5.0 - 5.6 Normal   5.7 - 6.4 High Risk   > 6.5 Diabetic      Hgb A1c results are standardized based on the (NGSP) National   Glycohemoglobin Standardization Program.      Hemoglobin A1C levels are related to mean serum/plasma glucose   during the preceding 2-3 months.        07/19/2023 5.8 (H) 4.0 - 5.6 % Final     Comment:     ADA Screening Guidelines:  5.7-6.4%  Consistent with prediabetes  >or=6.5%  Consistent with diabetes    High levels of fetal hemoglobin interfere with the HbA1C  assay. Heterozygous hemoglobin variants (HbS, HgC, etc)do  not significantly interfere with this assay.   However, presence of multiple variants may affect accuracy.     02/13/2023 6.7 (H) 4.0 - 5.6 % Final     Comment:     ADA Screening Guidelines:  5.7-6.4%  Consistent with  prediabetes  >or=6.5%  Consistent with diabetes    High levels of fetal hemoglobin interfere with the HbA1C  assay. Heterozygous hemoglobin variants (HbS, HgC, etc)do  not significantly interfere with this assay.   However, presence of multiple variants may affect accuracy.         Review of Systems   Constitutional: Negative for chills, decreased appetite and fever.   Cardiovascular:  Negative for leg swelling.   Skin:  Positive for dry skin.   Musculoskeletal:  Positive for arthritis, muscle weakness and stiffness. Negative for joint pain, joint swelling and myalgias.   Gastrointestinal:  Negative for nausea and vomiting.   Neurological:  Negative for loss of balance, numbness and paresthesias.          Patient Active Problem List   Diagnosis    Osteoporosis, postmenopausal    Controlled type 2 diabetes mellitus with diabetic neuropathy, without long-term current use of insulin    Combined hyperlipidemia associated with type 2 diabetes mellitus    Primary osteoarthritis involving multiple joints    Anxiety    COPD exacerbation    Tobacco use disorder    IBS (irritable bowel syndrome)    S/P R knee surgery, ORIF patella (3/4/15)    Chronic allergic rhinitis    Essential tremor    Primary open angle glaucoma of both eyes, severe stage    Open angle with borderline findings and high glaucoma risk in both eyes    Near syncope    History of stroke    Severe Pulmonary hypertension    Chronic combined systolic and diastolic congestive heart failure    Chronic left shoulder pain    Essential hypertension    GERD without esophagitis    Chronic kidney disease, stage 3a    Hepatic steatosis    Advance care planning    Chronic respiratory failure with hypoxia    Closed displaced fracture of left pubis with routine healing    Displaced fracture of lateral end of left clavicle, initial encounter for closed fracture    Interstitial lung disease    Recurrent upper respiratory infection (URI)    PAF (paroxysmal atrial  fibrillation)    Ventricular tachycardia    Atrial fibrillation with RVR    Edema of hand    Acute pain of right shoulder    Major depressive disorder, recurrent, moderate    Aortic atherosclerosis    Dysphagia    Chronic pain of both knees    Weakness of both lower extremities    Gait abnormality    Syncope and collapse    Hypomagnesemia    Hypotension    Closed fracture of right pubis    Cachexia    Discharge planning issues    Acute cystitis without hematuria    Debility    Atrial flutter with rapid ventricular response    Coagulopathy    Acute blood loss anemia       Current Outpatient Medications on File Prior to Visit   Medication Sig Dispense Refill    albuterol-ipratropium (DUO-NEB) 2.5 mg-0.5 mg/3 mL nebulizer solution Take 3 mLs by nebulization every 6 (six) hours as needed for Wheezing. Rescue      ANORO ELLIPTA 62.5-25 mcg/actuation DsDv INHALE 1 PUFF INTO THE LUNGS ONCE DAILY. 60 each 0    brimonidine 0.2% (ALPHAGAN) 0.2 % Drop PLACE 1 DROP INTO BOTH EYES 2 TIMES A DAY. 5 mL 6    cholestyramine-aspartame (PREVALITE) 4 gram PwPk TAKE 1 PACKET (4 G TOTAL) BY MOUTH 2 (TWO) TIMES DAILY. FOR DIARRHEA 180 packet 3    citalopram (CELEXA) 20 MG tablet Take 1 tablet (20 mg total) by mouth once daily. 90 tablet 1    famotidine (PEPCID) 20 MG tablet Take 1 tablet (20 mg total) by mouth once daily. 30 tablet 11    gabapentin (NEURONTIN) 300 MG capsule TAKE 1 CAPSULE BY MOUTH THREE TIMES A  capsule 1    latanoprost 0.005 % ophthalmic solution Place 1 drop into both eyes every evening. 7.5 mL 3    levalbuterol (XOPENEX) 0.63 mg/3 mL nebulizer solution Take 3 mLs (0.63 mg total) by nebulization every 8 (eight) hours. Rescue  0    LEVEMIR FLEXPEN 100 unit/mL (3 mL) InPn pen Inject 10 Units into the skin every evening.      metoprolol tartrate (LOPRESSOR) 25 MG tablet Take 1 tablet (25 mg total) by mouth every 6 (six) hours. 30 tablet 11    NOVOLOG FLEXPEN U-100 INSULIN 100 unit/mL (3 mL) InPn pen Inject 0-8  Units into the skin as needed (sliding scale). B to 149 mg/dL = 0 units  150-199 mg/dL = 2 units  200 to 249 mg/dL = 4 units  250 to 299 mg/dL = 6 units  300 to 999 mg/dL = 8 units  Notify Provider if BG is more than 400 mg/dL.      omeprazole (PRILOSEC) 40 MG capsule Take 1 capsule (40 mg total) by mouth before breakfast. 90 capsule 1    senna (SENOKOT) 8.6 mg tablet Take 1 tablet by mouth once daily.      tamsulosin (FLOMAX) 0.4 mg Cap Take 1 capsule (0.4 mg total) by mouth once daily. 30 capsule 11    XARELTO 20 mg Tab TAKE 1 TABLET BY MOUTH EVERY DAY WITH DINNER OR EVENING MEAL 90 tablet 3    timolol maleate 0.5% (TIMOPTIC) 0.5 % Drop Place 1 drop into both eyes once daily. 5 mL 6    [DISCONTINUED] irbesartan (AVAPRO) 75 MG tablet Take 1 tablet (75 mg total) by mouth once daily. 90 tablet 1    [DISCONTINUED] loratadine (CLARITIN) 10 mg tablet TAKE 1 TABLET (10 MG TOTAL) BY MOUTH DAILY AS NEEDED FOR ALLERGIES (OR RUNNY NOSE). 90 tablet 1     No current facility-administered medications on file prior to visit.       Review of patient's allergies indicates:   Allergen Reactions    Silicone      Burn skin    Adhesive Rash       Past Surgical History:   Procedure Laterality Date    BACK SURGERY      CATARACT EXTRACTION W/  INTRAOCULAR LENS IMPLANT Bilateral     CHOLECYSTECTOMY      Laparoscopic    COLONOSCOPY      COLONOSCOPY N/A 2022    Procedure: COLONOSCOPY;  Surgeon: Salvatore Butler MD;  Location: [facility];  Service: Gastroenterology;  Laterality: N/A;    COLONOSCOPY N/A 2024    Procedure: COLONOSCOPY;  Surgeon: Jose Manuel Gilmore MD;  Location: [facility] ([address] [place] [address]);  Service: Endoscopy;  Laterality: N/A;    ESOPHAGEAL DILATION N/A 2022    Procedure: DILATION, ESOPHAGUS;  Surgeon: Salvatore Butler MD;  Location: [facility];  Service: Gastroenterology;  Laterality: N/A;    ESOPHAGOGASTRODUODENOSCOPY N/A 2022    Procedure: EGD (ESOPHAGOGASTRODUODENOSCOPY);  Surgeon: Salvatore Butler MD;   Location: [facility] ENDO;  Service: Gastroenterology;  Laterality: N/A;    ESOPHAGOGASTRODUODENOSCOPY N/A 5/23/2023    Procedure: ESOPHAGOGASTRODUODENOSCOPY (EGD);  Surgeon: Desmond Duffy Jr., MD;  Location: [facility];  Service: Endoscopy;  Laterality: N/A;    ESOPHAGOGASTRODUODENOSCOPY N/A 1/11/2024    Procedure: EGD (ESOPHAGOGASTRODUODENOSCOPY);  Surgeon: Jose Manuel Gilmore MD;  [facility]  Service: Endoscopy;  Laterality: N/A;    HERNIA REPAIR      HYSTERECTOMY      KNEE SURGERY Right 3/4/2015    Dr Weller     OOPHORECTOMY      ovarian tumor      Benign    TONSILLECTOMY, ADENOIDECTOMY         Family History   Problem Relation Name Age of Onset    Cancer Mother age 81         Skin    Skin cancer Mother age 81     Glaucoma Mother age 81     Cataracts Mother age 81     Diabetes Mother age 81     Heart disease Father age 76     Diabetes Father age 76     Skin cancer Sister      Diabetes Sister      Diabetes Daughter      Breast cancer Maternal Aunt      Melanoma Neg Hx      Psoriasis Neg Hx      Eczema Neg Hx      Lupus Neg Hx      Amblyopia Neg Hx      Blindness Neg Hx      Macular degeneration Neg Hx      Retinal detachment Neg Hx      Strabismus Neg Hx         Social History     Socioeconomic History    Marital status:    Occupational History     Employer: [employer]   Tobacco Use    Smoking status: Every Day     Current packs/day: 0.50     Average packs/day: 0.5 packs/day for 40.0 years (20.0 ttl pk-yrs)     Types: Cigarettes    Smokeless tobacco: Former    Tobacco comments:     Not ready to quit smoking. Cut down on cigarettes but enjoys having a couple cigarettes a day.   Substance and Sexual Activity    Alcohol use: No    Drug use: No    Sexual activity: Never   Other Topics Concern    Are you pregnant or think you may be? No    Breast-feeding No     Social Determinants of Health     Financial Resource Strain: Low Risk  (1/11/2024)    Overall Financial Resource Strain  "(CARDIA)     Difficulty of Paying Living Expenses: Not hard at all   Food Insecurity: No Food Insecurity (1/11/2024)    Hunger Vital Sign     Worried About Running Out of Food in the Last Year: Never true     Ran Out of Food in the Last Year: Never true   Recent Concern: Food Insecurity - Food Insecurity Present (12/26/2023)    Hunger Vital Sign     Worried About Running Out of Food in the Last Year: Sometimes true     Ran Out of Food in the Last Year: Sometimes true   Transportation Needs: No Transportation Needs (1/11/2024)    PRAPARE - Transportation     Lack of Transportation (Medical): No     Lack of Transportation (Non-Medical): No   Physical Activity: Inactive (1/11/2024)    Exercise Vital Sign     Days of Exercise per Week: 0 days     Minutes of Exercise per Session: 0 min   Stress: Stress Concern Present (1/11/2024)    [employer] - Occupational Stress Questionnaire     Feeling of Stress : To some extent   Housing Stability: Low Risk  (1/11/2024)    Housing Stability Vital Sign     Unable to Pay for Housing in the Last Year: No     Number of Places Lived in the Last Year: 1     Unstable Housing in the Last Year: No           Objective:     Vitals:    08/27/24 1332   BP: 101/71   Pulse: 83   Resp: 17   Weight: 45.8 kg (101 lb)   Height: 4' 11" (1.499 m)   PainSc: 0-No pain        Physical Exam  Vitals reviewed.   Constitutional:       Appearance: She is well-developed.   Cardiovascular:      Comments: Dorsalis pedis and posterior tibial pulses are diminished Bilaterally. Toes are cool to touch. Feet are warm proximally.There is decreased digital hair. Skin is atrophic, slightly hyperpigmented, and mildly edematous      Musculoskeletal:         General: No tenderness. Normal range of motion.      Comments: Adequate joint range of motion without pain, limitation, nor crepitation Bilateral feet and ankle joints. Muscle strength is 5/5 in all groups bilaterally.         Skin:     " General: Skin is warm and dry.      Findings: No ecchymosis, erythema or lesion.      Comments: Nails x 10  are elongated by  2-5mm's, thickened by 2-3 mm's, dystrophic, and are whitish in  coloration . Xerosis Bilaterally. No open lesions noted.       Neurological:      Mental Status: She is alert and oriented to person, place, and time.      Comments: [place]-Jenni 5.07 monofilamant testing is diminished Tc feet. Sharp/dull sensation diminished Bilaterally. Light touch absent Bilaterally.       Psychiatric:         Behavior: Behavior normal.           Assessment:      Encounter Diagnoses   Name Primary?    Type II diabetes mellitus with peripheral circulatory disorder Yes    Onychomycosis due to dermatophyte      Plan:     Deb was seen today for diabetic foot exam and nail care.    Diagnoses and all orders for this visit:    Type II diabetes mellitus with peripheral circulatory disorder    Onychomycosis due to dermatophyte      I counseled the patient on her conditions, their implications and medical management.        - Shoe inspection. Diabetic Foot Education. Patient reminded of the importance of good nutrition and blood sugar control to help prevent podiatric complications of diabetes. Patient instructed on proper foot hygeine. We discussed wearing proper shoe gear, daily foot inspections, never walking without protective shoe gear, never putting sharp instruments to feet, routine podiatric nail visits every 2-3 months.      - With patient's permission, nails were aggressively reduced and debrided x 10 to their soft tissue attachment mechanically , removing all offending nail and debris. Patient relates relief following the procedure. She will continue to monitor the areas daily, inspect her feet, wear protective shoe gear when ambulatory, moisturizer to maintain skin integrity and follow in this office in approximately 2-3 months, sooner p.r.n.

## 2024-08-30 ENCOUNTER — CLINICAL SUPPORT (OUTPATIENT)
Dept: OPHTHALMOLOGY | Facility: CLINIC | Age: 74
End: 2024-08-30
Payer: MEDICARE

## 2024-08-30 NOTE — PROGRESS NOTES
VISUAL FIELD TEST 24-2 SS-OU-DONE/AB  OU-REL-FIX-COOP-GOOD/AB    PT HAS KNOWN ALLERGIES TO ADHESIVES. USED PIRATE PATCH./AB    MRX: OD -1.75            OS -1.00 +1.25 X 90

## 2024-09-03 ENCOUNTER — OFFICE VISIT (OUTPATIENT)
Dept: OPHTHALMOLOGY | Facility: CLINIC | Age: 74
End: 2024-09-03
Payer: MEDICARE

## 2024-09-03 DIAGNOSIS — Z96.1 PSEUDOPHAKIA OF BOTH EYES: ICD-10-CM

## 2024-09-03 DIAGNOSIS — H40.1133 PRIMARY OPEN ANGLE GLAUCOMA OF BOTH EYES, SEVERE STAGE: Primary | ICD-10-CM

## 2024-09-03 PROCEDURE — 99999 PR PBB SHADOW E&M-EST. PATIENT-LVL III: CPT | Mod: PBBFAC,,, | Performed by: STUDENT IN AN ORGANIZED HEALTH CARE EDUCATION/TRAINING PROGRAM

## 2024-09-03 NOTE — PROGRESS NOTES
"Subjective:  HPI    Chief complaint: 73 y.o. female presents for glaucoma evaluation, referred   by Dr. Hdez. Patient complains of poor vision OS>OD, states she has   noticed vision worsening over the last 3 years. Complains of occasional   burning OU. Patient states she has a hard time opening OS. Complains of   headaches 2-3 times weekly. Complains of floaters OU, stable. Denies   flashes.    Past medical history?    Diabetes Type II  Past ocular history?    Pseudophakia OU   POAG OU     Glaucoma history:  Diagnosed with glaucoma when? 1990s  Hx eye surgery? Cataract sx OU 2000  Hx eye lasers? Possible YAG CAP OS 2002  History of low blood pressure? Yes   History of migraines? No   History of blunt trauma to eye? States she got lead in OS at age 14-15,   states she had "lead poisoning"   History of steroid use? Steroids after fall in March 2024, IV   Family history of glaucoma? Sister, mother, daughter   What is the highest your eye pressure has been? OS, 40-50    Eye drops?   Lumigan qhs OU  Dorzolamide BID OU  Alphagan QD OU     Last edited by Luci Finnegan MD on 9/3/2024  3:16 PM.        Exam:  See encounter report for full exam    Assessment:  1. Primary open angle glaucoma of both eyes, severe stage  RAPD OS  - Synopsis: Referred by Dr. Hdez. Diagnosed with glaucoma 1990s. PMH: CVA, PAF on xarelto, Type 2 DM with neuropathy, HTN, HLD, Chronic respiratory failure, COPD, osteopenia, IBS, anxiety, tobacco use, Essential tremor, prior BZO dependence  - H/o low BP  - Surg hx: phaco/IOL OU 2000s   - Laser hx: ?YAG OS  - Glaucoma FHx: sister, mother, daughter  -  / 566   - Gonio: CBB/pseudophakic OU (Sivan 9/2024)  - Tmax: 30/39  - Target IOP: low teens OU  - Med adverse effects: BB (chronic respiratory failure)    Baseline HVF 8/2024 -- VFI 51/7  Baseline RNFL 11/2021 -- avg 62/154 (poor scan OS)  Baseline photos pending    09/03/2024  IOP 15/20 above target on regimen below  HVF nasal step " involving central OD, extinguished centrally OS, VFI 51/7  OCT diffusely thin avg 47/45  RAPD OS    2. Pseudophakia of both eyes  - Good lens position, monitor  - Update MRX with optometry -- OS very limited by glaucomatous damage    Plan:  Tilted nerves make assessment more challenging but there is significant cupping OU. IOP above goal OD<OS. Cannot take BB due to lung condition. Recommend SLT OS then OD.  - Continue Brim 2/2, Dorz 2/2, Lumigan qhs/qhs  - Schedule SLT OS then OD  - Optom for MRX    Today's visit is associated with current and anticipated ongoing medical care related to this patient's single serious/complex condition (glaucoma). Follow up is to be continued indefinitely to monitor the condition.    Return September SLT OS, October SLT OD    Luci Finnegan MD  [facility], Glaucoma

## 2024-09-03 NOTE — PATIENT INSTRUCTIONS
Selective Laser Trabeculoplasty  Selective Laser Trabeculoplasty (SLT) is a laser treatment that is used to lower the pressure of the eye.  Laser energy stimulates the internal drainage tissue of the eye (called the trabecular meshwork) and lowering of the eye pressure. The full effects of the laser treatment may not be apparent until 1-2 months after the laser treatment.    Indications  Patients with open-angle glaucoma who are in need of eye pressure lowering.  This can be in addition to eye drops or sometimes in place of eye drops.    Benefits  When used as initial therapy, SLT can lower the eye pressure by about 30%.  The effect may be reduced in patients who are already on medical treatment with eye drops.  The laser is effective in lowering eye pressure in 75-80% of eyes and the effect of the laser can last up to 1-5 years. Some studies have shown effects lasting up to 3 years in 50% of patients. The procedure is non-invasive with minimal discomfort and is performed in an outpatient clinic.  Treatment of each eye is approximately 5-10 minutes and there are no restrictions of activities afterward.  The laser treatment can be repeated. Laser treatment does not affect the success rates of other medical or surgical treatments.    Risks  In about 10% of procedures, there is a temporary elevation of eye pressure immediately following the laser treatment that is managed with glaucoma medications and generally goes away within 24 hours.  Some patients also report a mild temporary eye discomfort from the lens used during the procedure that can be treated with over-the-counter artificial tears.    What to expect on the day of SLT  Check into clinic as you typically would for a routine clinic appointment.  A technician will then check your vision and your eye pressure and review any medications and eye drops you might be taking.  They will then review a consent form for the SLT procedure with you.      The doctor will  then review your exam findings and confirm that an SLT procedure is still the appropriate treatment.  The doctor will put a drop of anesthetic to the eye(s) that are to have the SLT.  The doctor will then position you in the laser machine and place a special contact lens on the surface of the eye.  The lens is coated with a special jelly to help focus the light properly into the eye.  You will then hear a series of clicks and may see flashes of red light as the laser treats the drainage tissue of the eye.  There is virtually no pain with the procedure.  You will feel the lens slowly rotating on the surface of the eye as the doctor treats various areas within the eye.  The procedure generally takes 5-10 minutes per eye.  At the conclusion of the laser treatment, the lens will be removed from the eye.  Because of the jelly used on the lens, it is normal for the vision to be temporarily blurred.      After the laser treatment, you will be asked to wait in the waiting area and your eye pressure will be checked after 20 minutes. If the pressure has not gone up, you will be released home after scheduling an appropriate follow up.       There are typically no restrictions in activities following laser treatment.  You may return to your normal daily routine.  While side effects of SLT are rare, should you experience any decrease in vision, worsening pain or worsening redness in the eye following your laser treatment, please contact your eye doctor for further instruction or come to the Emergency Room.

## 2024-09-20 NOTE — PROGRESS NOTES
Subjective:  HPI    Pt here for SLT laser OS. SLT laser OD (2nd eye) scheduled 10/22/2024.    DLS: 09/03/2024 with Dr. Finnegan    Meds:  Brimonidine BID OU  Dorzolamide BID OU  Lumigan qhs OU  Last edited by Denisha Oliveira on 9/24/2024 10:22 AM.        Exam:  See encounter report for full exam    Assessment:  1. Primary open angle glaucoma of both eyes, severe stage  RAPD OS  - Synopsis: Referred by Dr. Hdez. Diagnosed with glaucoma 1990s. PMH: CVA, PAF on xarelto, Type 2 DM with neuropathy, HTN, HLD, Chronic respiratory failure, COPD, osteopenia, IBS, anxiety, tobacco use, Essential tremor, prior BZO dependence  - H/o low BP  - Surg hx: phaco/IOL OU 2000s   - Laser hx: ?YAG OS  - Glaucoma FHx: sister, mother, daughter  -  / 566   - Gonio: CBB/pseudophakic OU (Sivan 9/2024)  - Tmax: 30/39  - Target IOP: low teens OU  - Med adverse effects: BB (chronic respiratory failure)    Baseline HVF 8/2024 -- VFI 51/7  Baseline RNFL 11/2021 -- avg 62/154 (poor scan OS)  Baseline photos pending    Last:  HVF nasal step involving central OD, extinguished centrally OS, VFI 51/7  OCT diffusely thin avg 47/45  RAPD OS    09/24/2024  SLT OS completed today 09/24/2024 without complication    2. Pseudophakia of both eyes  - Good lens position, monitor  - Update MRX with optometry -- OS very limited by glaucomatous damage    Plan:  Tilted nerves make assessment more challenging but there is significant cupping OU. IOP above goal OD<OS. Cannot take BB due to lung condition. Recommend SLT OS then OD.    SLT OS completed today 09/24/2024 without complication  - Acular QID x 7 days then stop  - Continue Brim 2/2, Dorz 2/2, Lumigan qhs/qhs  - Optom for MRX    Today's visit is associated with current and anticipated ongoing medical care related to this patient's single serious/complex condition (glaucoma). Follow up is to be continued indefinitely to monitor the condition.    Return October SLT OD    Luci Finnegan MD  [facility], Glaucoma

## 2024-09-24 ENCOUNTER — OFFICE VISIT (OUTPATIENT)
Dept: OPHTHALMOLOGY | Facility: CLINIC | Age: 74
End: 2024-09-24
Payer: MEDICARE

## 2024-09-24 ENCOUNTER — TELEPHONE (OUTPATIENT)
Dept: OPHTHALMOLOGY | Facility: CLINIC | Age: 74
End: 2024-09-24

## 2024-09-24 DIAGNOSIS — H40.1133 PRIMARY OPEN ANGLE GLAUCOMA OF BOTH EYES, SEVERE STAGE: Primary | ICD-10-CM

## 2024-09-24 DIAGNOSIS — Z96.1 PSEUDOPHAKIA OF BOTH EYES: ICD-10-CM

## 2024-09-24 PROCEDURE — 99499 UNLISTED E&M SERVICE: CPT | Mod: S$GLB,,, | Performed by: STUDENT IN AN ORGANIZED HEALTH CARE EDUCATION/TRAINING PROGRAM

## 2024-09-24 PROCEDURE — 65855 TRABECULOPLASTY LASER SURG: CPT | Mod: LT,S$GLB,, | Performed by: STUDENT IN AN ORGANIZED HEALTH CARE EDUCATION/TRAINING PROGRAM

## 2024-09-24 RX ORDER — KETOROLAC TROMETHAMINE 5 MG/ML
1 SOLUTION OPHTHALMIC 4 TIMES DAILY
Qty: 5 ML | Refills: 0 | Status: SHIPPED | OUTPATIENT
Start: 2024-09-24 | End: 2025-09-24

## 2024-09-24 RX ORDER — KETOROLAC TROMETHAMINE 5 MG/ML
1 SOLUTION OPHTHALMIC 4 TIMES DAILY
Qty: 5 ML | Refills: 0 | Status: SHIPPED | OUTPATIENT
Start: 2024-09-24 | End: 2024-09-24

## 2024-09-24 NOTE — TELEPHONE ENCOUNTER
Clinic spoke to [facility] to confirm that patient is a resident of [facility] for Ketorolac medication.

## 2024-09-24 NOTE — TELEPHONE ENCOUNTER
----- Message from Diego Mandujano sent at 9/24/2024 11:27 AM CDT -----  Regarding: Consult/Advisory  Contact: [phone]  Consult/Advisory     Name Of Caller: [facility]        Contact Preference: [phone]     Nature of call: [facility] calling to advise that they received RX ketorolac 0.5% (ACULAR) 0.5 % Drop but only fill scripts for the residents, will be pt be admitted is her question, please call to advise

## 2024-10-21 NOTE — PROGRESS NOTES
Subjective:  HPI    Pt here for laser SLT OD     Meds: Brimonidine BID OU              Dorzolamide BID OU             Lumigan QHS OU     No visual changes since last exam.     1. POAG  2. Pseudophakia OU  Last edited by Jazmine Silver MA on 10/22/2024 10:09 AM.        Exam:  See encounter report for full exam    Assessment:  1. Primary open angle glaucoma of both eyes, severe stage  RAPD OS  - Synopsis: Referred by Dr. Hdez. Diagnosed with glaucoma 1990s. PMH: CVA, PAF on xarelto, Type 2 DM with neuropathy, HTN, HLD, Chronic respiratory failure, COPD, osteopenia, IBS, anxiety, tobacco use, Essential tremor, prior BZO dependence  - H/o low BP  - Surg hx: phaco/IOL OU 2000s   - Laser hx:   ?YAG OS  SLT OS 9/24/24  SLT OD 10/22/24  - Glaucoma FHx: sister, mother, daughter  -  / 566    - Gonio: CBB/pseudophakic OU ([place] 9/2024)  - Tmax: 30/39  - Target IOP: low teens OU  - Med adverse effects: BB (chronic respiratory failure)    Baseline HVF 8/2024 -- VFI 51/7  Baseline RNFL 11/2021 -- avg 62/154 (poor scan OS)  Baseline photos pending    Last:  HVF nasal step involving central OD, extinguished centrally OS, VFI 51/7  OCT diffusely thin avg 47/45  RAPD OS    10/22/2024  SLT OD completed today 10/22/2024 without complication    2. Pseudophakia of both eyes  - Good lens position, monitor  - Update MRX with optometry -- OS very limited by glaucomatous damage    Plan:  Tilted nerves make assessment more challenging but there is significant cupping OU. IOP above goal OD<OS. Cannot take BB due to lung condition. Recommend SLT OS then OD.    SLT OD completed today 10/22/2024 without complication  - Acular QID x 7 days then stop OD  - Continue Brim 2/2, Dorz 2/2, Lumigan qhs/qhs  - Optom for MRX    Today's visit is associated with current and anticipated ongoing medical care related to this patient's single serious/complex condition (glaucoma). Follow up is to be continued indefinitely to monitor the  condition.    Return 6 weeks -- VA, IOP, sooner PRN    Luci Finnegan MD  [facility], Glaucoma

## 2024-10-22 ENCOUNTER — OFFICE VISIT (OUTPATIENT)
Dept: OPHTHALMOLOGY | Facility: CLINIC | Age: 74
End: 2024-10-22
Payer: MEDICARE

## 2024-10-22 DIAGNOSIS — Z96.1 PSEUDOPHAKIA OF BOTH EYES: ICD-10-CM

## 2024-10-22 DIAGNOSIS — H40.1133 PRIMARY OPEN ANGLE GLAUCOMA OF BOTH EYES, SEVERE STAGE: Primary | ICD-10-CM

## 2024-10-22 PROCEDURE — 99999 PR PBB SHADOW E&M-EST. PATIENT-LVL III: CPT | Mod: PBBFAC,,, | Performed by: STUDENT IN AN ORGANIZED HEALTH CARE EDUCATION/TRAINING PROGRAM

## 2024-10-22 PROCEDURE — 99499 UNLISTED E&M SERVICE: CPT | Mod: S$GLB,,, | Performed by: STUDENT IN AN ORGANIZED HEALTH CARE EDUCATION/TRAINING PROGRAM

## 2024-10-22 PROCEDURE — 65855 TRABECULOPLASTY LASER SURG: CPT | Mod: RT,S$GLB,, | Performed by: STUDENT IN AN ORGANIZED HEALTH CARE EDUCATION/TRAINING PROGRAM

## 2024-10-31 ENCOUNTER — OFFICE VISIT (OUTPATIENT)
Dept: OPTOMETRY | Facility: CLINIC | Age: 74
End: 2024-10-31
Payer: MEDICARE

## 2024-10-31 DIAGNOSIS — Z96.1 PSEUDOPHAKIA OF BOTH EYES: ICD-10-CM

## 2024-10-31 DIAGNOSIS — H52.7 REFRACTIVE ERROR: Primary | ICD-10-CM

## 2024-10-31 DIAGNOSIS — H40.1133 PRIMARY OPEN ANGLE GLAUCOMA OF BOTH EYES, SEVERE STAGE: ICD-10-CM

## 2024-10-31 PROCEDURE — 1126F AMNT PAIN NOTED NONE PRSNT: CPT | Mod: CPTII,S$GLB,, | Performed by: OPTOMETRIST

## 2024-10-31 PROCEDURE — 1101F PT FALLS ASSESS-DOCD LE1/YR: CPT | Mod: CPTII,S$GLB,, | Performed by: OPTOMETRIST

## 2024-10-31 PROCEDURE — 1159F MED LIST DOCD IN RCRD: CPT | Mod: CPTII,S$GLB,, | Performed by: OPTOMETRIST

## 2024-10-31 PROCEDURE — 99999 PR PBB SHADOW E&M-EST. PATIENT-LVL III: CPT | Mod: PBBFAC,,, | Performed by: OPTOMETRIST

## 2024-10-31 PROCEDURE — 3288F FALL RISK ASSESSMENT DOCD: CPT | Mod: CPTII,S$GLB,, | Performed by: OPTOMETRIST

## 2024-10-31 PROCEDURE — 92015 DETERMINE REFRACTIVE STATE: CPT | Mod: S$GLB,,, | Performed by: OPTOMETRIST

## 2024-10-31 PROCEDURE — 99212 OFFICE O/P EST SF 10 MIN: CPT | Mod: S$GLB,,, | Performed by: OPTOMETRIST

## 2024-11-12 NOTE — PROGRESS NOTES
HPI    EDWIGE: 09/25/2023 Dr. Hdez   Chief complaint (CC): 74 y.o. f presents to clinic for Mrx. Pt c/o   decreased vision. Pt also c/o occasional headaches, burning eyes and   tearing of eyes.   Glasses? +  H/o eye surgery, injections or laser:   SLT OS 9/24/24  SLT OD 10/22/24    H/o eye injury: -  Known eye conditions?   1. Primary open angle glaucoma of both eyes, severe stage  2. Pseudophakia of both eyes  3.Type 2 diabetes mellitus without retinopathy    Family h/o eye conditions? -  Eye gtts?  Brimonidine BID OU, Lumigan QHS OU , Timolol Maleate BID OU.     (-) Flashes (+)  Floaters (-) Mucous   (+)  Tearing (+) Itching (+) Burning   (+) Headaches (+) Eye Pain/discomfort (-) Irritation   (+)  Redness (-) Double vision (+) Blurry vision    Diabetic? +  Hemoglobin A1C (%)       Date                     Value                 12/25/2023               6.6 (H)               07/19/2023               5.8 (H)               02/13/2023               6.7 (H)          ----------         Last edited by Kathy Gomez MA on 10/31/2024 11:00 AM.            Assessment /Plan     For exam results, see Encounter Report.    Refractive error    Primary open angle glaucoma of both eyes, severe stage    Pseudophakia of both eyes      MONITOR. ED PT ON ALL EXAM FINDINGS  RX FINAL SPECS   OCULAR HEALTH STABLE OD, OS  RTC 1 YR//PRN FOR REE/DFE

## 2024-11-26 ENCOUNTER — OFFICE VISIT (OUTPATIENT)
Dept: PODIATRY | Facility: CLINIC | Age: 74
End: 2024-11-26
Payer: MEDICARE

## 2024-11-26 VITALS
HEIGHT: 59 IN | WEIGHT: 110 LBS | DIASTOLIC BLOOD PRESSURE: 78 MMHG | HEART RATE: 123 BPM | RESPIRATION RATE: 17 BRPM | SYSTOLIC BLOOD PRESSURE: 132 MMHG | BODY MASS INDEX: 22.18 KG/M2

## 2024-11-26 DIAGNOSIS — B35.1 ONYCHOMYCOSIS DUE TO DERMATOPHYTE: ICD-10-CM

## 2024-11-26 DIAGNOSIS — E11.51 TYPE II DIABETES MELLITUS WITH PERIPHERAL CIRCULATORY DISORDER: Primary | ICD-10-CM

## 2024-11-26 PROCEDURE — 99999 PR PBB SHADOW E&M-EST. PATIENT-LVL II: CPT | Mod: PBBFAC,,, | Performed by: PODIATRIST

## 2024-11-26 PROCEDURE — 99499 UNLISTED E&M SERVICE: CPT | Mod: S$GLB,,, | Performed by: PODIATRIST

## 2024-11-26 PROCEDURE — 11721 DEBRIDE NAIL 6 OR MORE: CPT | Mod: Q9,S$GLB,, | Performed by: PODIATRIST

## 2024-12-02 NOTE — PROGRESS NOTES
Subjective:  HPI    DLS: 10/22/24 w/ Dr. Finnegan    74 y.o. female presents for IOP Check. Patient reports improved VA OD with   new specs. Denies eye pain, flashes, and floaters. Has occasional   headaches, relieved with tylenol.    Meds:   Brimonidine BID OU   Dorzolamide BID OU  Lumigan QHS OU     Last edited by Toyin Kee on 12/3/2024 10:37 AM.        Exam:  See encounter report for full exam    Assessment:  1. Primary open angle glaucoma of both eyes, severe stage  RAPD OS  - Synopsis: Referred by Dr. Hdez. Diagnosed with glaucoma 1990s. PMH: CVA, PAF on xarelto, Type 2 DM with neuropathy, HTN, HLD, Chronic respiratory failure, COPD, osteopenia, IBS, anxiety, tobacco use, Essential tremor, prior BZO dependence  - H/o low BP  - Surg hx: phaco/IOL OU 2000s   - Laser hx:   ?YAG OS  SLT OS 9/24/24  SLT OD 10/22/24  - Glaucoma FHx: sister, mother, daughter  -  / 566    - Gonio: CBB/pseudophakic OU (Sivan 9/2024)  - Tmax: 30/39  - Target IOP: low teens OU  - Med adverse effects: BB (chronic respiratory failure)    Baseline HVF 8/2024 -- VFI 51/7  Baseline RNFL 11/2021 -- avg 62/154 (poor scan OS)  Baseline photos pending    Last:  HVF nasal step involving central OD, extinguished centrally OS, VFI 51/7  OCT diffusely thin avg 47/45  RAPD OS    12/03/2024  IOP 12/13 on regimen below    2. Pseudophakia of both eyes  - Good lens position, monitor  - Update MRX with optometry -- OS very limited by glaucomatous damage    Plan:  Tilted nerves make assessment more challenging but there is significant cupping OU. IOP above goal OD<OS. Cannot take BB due to lung condition.   Now s/p SLT OU with decent response  - Continue Brim 2/2, Dorz 2/2, Lumigan qhs/qhs    Today's visit is associated with current and anticipated ongoing medical care related to this patient's single serious/complex condition (glaucoma). Follow up is to be continued indefinitely to monitor the condition.    Return 3 months -- HVF 24-2 OD,  10-2 OS OS; VA, IOP    Luci Finnegan MD  [facility], Glaucoma

## 2024-12-03 ENCOUNTER — OFFICE VISIT (OUTPATIENT)
Dept: OPHTHALMOLOGY | Facility: CLINIC | Age: 74
End: 2024-12-03
Payer: MEDICARE

## 2024-12-03 DIAGNOSIS — H40.1133 PRIMARY OPEN ANGLE GLAUCOMA OF BOTH EYES, SEVERE STAGE: Primary | ICD-10-CM

## 2024-12-03 DIAGNOSIS — Z96.1 PSEUDOPHAKIA OF BOTH EYES: ICD-10-CM

## 2024-12-03 PROCEDURE — 99214 OFFICE O/P EST MOD 30 MIN: CPT | Mod: S$GLB,,, | Performed by: STUDENT IN AN ORGANIZED HEALTH CARE EDUCATION/TRAINING PROGRAM

## 2024-12-03 PROCEDURE — 1126F AMNT PAIN NOTED NONE PRSNT: CPT | Mod: CPTII,S$GLB,, | Performed by: STUDENT IN AN ORGANIZED HEALTH CARE EDUCATION/TRAINING PROGRAM

## 2024-12-03 PROCEDURE — 1159F MED LIST DOCD IN RCRD: CPT | Mod: CPTII,S$GLB,, | Performed by: STUDENT IN AN ORGANIZED HEALTH CARE EDUCATION/TRAINING PROGRAM

## 2024-12-03 PROCEDURE — G2211 COMPLEX E/M VISIT ADD ON: HCPCS | Mod: S$GLB,,, | Performed by: STUDENT IN AN ORGANIZED HEALTH CARE EDUCATION/TRAINING PROGRAM

## 2024-12-03 PROCEDURE — 99999 PR PBB SHADOW E&M-EST. PATIENT-LVL III: CPT | Mod: PBBFAC,,, | Performed by: STUDENT IN AN ORGANIZED HEALTH CARE EDUCATION/TRAINING PROGRAM

## 2024-12-03 NOTE — PATIENT INSTRUCTIONS
INSTRUCTIONS  Please use your eyedrops as follows:  - The color refers to the bottle top.  - Some generic medications come in bottle with white tops. If you have any questions about your drops, please ask your doctor.    Drug Eye Top Color Breakfast Lunch Dinner Bedtime   Alphagan:  Brimonidine both Purple X  X    Lumigan Both  Teal    X   Dorzolamide both Orange X  X      - Always wait at least 5 minutes between drops to allow them to work.  - It is very important to take your eye drops every day as instructed, including the day of your next visit.  - Please remember to bring your eye drops to your next visit.  - If you run out of any of your drops before your next visit, please have the pharmacy call your doctor to authorize a refill.    Office number: [phone]

## 2024-12-06 ENCOUNTER — PATIENT OUTREACH (OUTPATIENT)
Dept: ADMINISTRATIVE | Facility: HOSPITAL | Age: 74
End: 2024-12-06
Payer: MEDICARE

## 2024-12-06 DIAGNOSIS — F17.210 CIGARETTE SMOKER: Primary | ICD-10-CM

## 2024-12-06 NOTE — PROGRESS NOTES
Population Health Chart Review & Patient Outreach Details      Additional [facility] Health Notes:               Updates Requested / Reviewed:      Updated Care Coordination Note         Health Maintenance Topics Overdue:      VB Score: 3     Urine Screening  Lipid Panel  Foot Exam    RSV Vaccine                  Health Maintenance Topic(s) Outreach Outcomes & Actions Taken:    Low Dose CT Screening - Outreach Outcomes & Actions Taken  : Attempted to schedule, patient is currently in a facility on the [facility]

## 2024-12-10 NOTE — PROGRESS NOTES
Subjective:     Patient ID: Deb Webb is a 74 y.o. female.    Chief Complaint: Diabetic Foot Exam ([facility] ), Nail Care, Foot Problem (Toes are turning under the big toes ), and Ankle Pain    Deb is a 74 y.o. female who presents to the clinic for evaluation and treatment of high risk feet. Deb has a past medical history of Allergy, Arthritis, Cataract, Colon polyps, COPD (chronic obstructive pulmonary disease), Diabetes mellitus type II, Diabetic neuropathy, Fever blister, Glaucoma (increased eye pressure), Hyperlipidemia, Hypertension, Irritable bowel syndrome, Keloid cicatrix, Osteoporosis, Retained cholelithiasis following cholecystectomy(997.41), and Right patella fracture. The patient's chief complaint is long, thick toenails. This patient has documented high risk feet requiring routine maintenance secondary to diabetes mellitis and those secondary complications of diabetes, as mentioned..    PCP: Triston Valverde MD    Date Last Seen by PCP:   Chief Complaint   Patient presents with    Diabetic Foot Exam     [facility]     Nail Care    Foot Problem     Toes are turning under the big toes     Ankle Pain     Hemoglobin A1C   Date Value Ref Range Status   12/25/2023 6.6 (H) 0.0 - 5.6 % Final     Comment:     Reference Interval:  5.0 - 5.6 Normal   5.7 - 6.4 High Risk   > 6.5 Diabetic      Hgb A1c results are standardized based on the (NGSP) National   Glycohemoglobin Standardization Program.      Hemoglobin A1C levels are related to mean serum/plasma glucose   during the preceding 2-3 months.        07/19/2023 5.8 (H) 4.0 - 5.6 % Final     Comment:     ADA Screening Guidelines:  5.7-6.4%  Consistent with prediabetes  >or=6.5%  Consistent with diabetes    High levels of fetal hemoglobin interfere with the HbA1C  assay. Heterozygous hemoglobin variants (HbS, HgC, etc)do  not significantly interfere with this assay.   However, presence of multiple variants may affect accuracy.      02/13/2023 6.7 (H) 4.0 - 5.6 % Final     Comment:     ADA Screening Guidelines:  5.7-6.4%  Consistent with prediabetes  >or=6.5%  Consistent with diabetes    High levels of fetal hemoglobin interfere with the HbA1C  assay. Heterozygous hemoglobin variants (HbS, HgC, etc)do  not significantly interfere with this assay.   However, presence of multiple variants may affect accuracy.         Review of Systems   Constitutional: Negative for chills, decreased appetite and fever.   Cardiovascular:  Negative for leg swelling.   Skin:  Positive for dry skin. Negative for itching.   Musculoskeletal:  Positive for arthritis, muscle weakness and stiffness. Negative for joint pain, joint swelling and myalgias.   Gastrointestinal:  Negative for nausea and vomiting.   Neurological:  Negative for loss of balance, numbness and paresthesias.          Patient Active Problem List   Diagnosis    Osteoporosis, postmenopausal    Controlled type 2 diabetes mellitus with diabetic neuropathy, without long-term current use of insulin    Combined hyperlipidemia associated with type 2 diabetes mellitus    Primary osteoarthritis involving multiple joints    Anxiety    COPD exacerbation    Tobacco use disorder    IBS (irritable bowel syndrome)    S/P R knee surgery, ORIF patella (3/4/15)    Chronic allergic rhinitis    Essential tremor    Primary open angle glaucoma of both eyes, severe stage    Open angle with borderline findings and high glaucoma risk in both eyes    Near syncope    History of stroke    Severe Pulmonary hypertension    Chronic combined systolic and diastolic congestive heart failure    Chronic left shoulder pain    Essential hypertension    GERD without esophagitis    Chronic kidney disease, stage 3a    Hepatic steatosis    Advance care planning    Chronic respiratory failure with hypoxia    Closed displaced fracture of left pubis with routine healing    Displaced fracture of lateral end of left clavicle, initial encounter for  closed fracture    Interstitial lung disease    Recurrent upper respiratory infection (URI)    PAF (paroxysmal atrial fibrillation)    Ventricular tachycardia    Atrial fibrillation with RVR    Edema of hand    Acute pain of right shoulder    Major depressive disorder, recurrent, moderate    Aortic atherosclerosis    Dysphagia    Chronic pain of both knees    Weakness of both lower extremities    Gait abnormality    Syncope and collapse    Hypomagnesemia    Hypotension    Closed fracture of right pubis    Cachexia    Discharge planning issues    Acute cystitis without hematuria    Debility    Atrial flutter with rapid ventricular response    Coagulopathy    Acute blood loss anemia       Current Outpatient Medications on File Prior to Visit   Medication Sig Dispense Refill    albuterol-ipratropium (DUO-NEB) 2.5 mg-0.5 mg/3 mL nebulizer solution Take 3 mLs by nebulization every 6 (six) hours as needed for Wheezing. Rescue      ANORO ELLIPTA 62.5-25 mcg/actuation DsDv INHALE 1 PUFF INTO THE LUNGS ONCE DAILY. 60 each 0    brimonidine 0.2% (ALPHAGAN) 0.2 % Drop PLACE 1 DROP INTO BOTH EYES 2 TIMES A DAY. 5 mL 6    cholestyramine-aspartame (PREVALITE) 4 gram PwPk TAKE 1 PACKET (4 G TOTAL) BY MOUTH 2 (TWO) TIMES DAILY. FOR DIARRHEA 180 packet 3    citalopram (CELEXA) 20 MG tablet Take 1 tablet (20 mg total) by mouth once daily. 90 tablet 1    famotidine (PEPCID) 20 MG tablet Take 1 tablet (20 mg total) by mouth once daily. 30 tablet 11    gabapentin (NEURONTIN) 300 MG capsule TAKE 1 CAPSULE BY MOUTH THREE TIMES A  capsule 1    ketorolac 0.5% (ACULAR) 0.5 % Drop Place 1 drop into the left eye 4 (four) times daily. (Patient not taking: Reported on 12/3/2024) 5 mL 0    latanoprost 0.005 % ophthalmic solution Place 1 drop into both eyes every evening. 7.5 mL 3    levalbuterol (XOPENEX) 0.63 mg/3 mL nebulizer solution Take 3 mLs (0.63 mg total) by nebulization every 8 (eight) hours. Rescue  0    LEVEMIR FLEXPEN 100  unit/mL (3 mL) InPn pen Inject 10 Units into the skin every evening.      metoprolol tartrate (LOPRESSOR) 25 MG tablet Take 1 tablet (25 mg total) by mouth every 6 (six) hours. 30 tablet 11    NOVOLOG FLEXPEN U-100 INSULIN 100 unit/mL (3 mL) InPn pen Inject 0-8 Units into the skin as needed (sliding scale). B to 149 mg/dL = 0 units  150-199 mg/dL = 2 units  200 to 249 mg/dL = 4 units  250 to 299 mg/dL = 6 units  300 to 999 mg/dL = 8 units  Notify Provider if BG is more than 400 mg/dL.      omeprazole (PRILOSEC) 40 MG capsule Take 1 capsule (40 mg total) by mouth before breakfast. 90 capsule 1    senna (SENOKOT) 8.6 mg tablet Take 1 tablet by mouth once daily.      tamsulosin (FLOMAX) 0.4 mg Cap Take 1 capsule (0.4 mg total) by mouth once daily. 30 capsule 11    timolol maleate 0.5% (TIMOPTIC) 0.5 % Drop Place 1 drop into both eyes once daily. 5 mL 6    XARELTO 20 mg Tab TAKE 1 TABLET BY MOUTH EVERY DAY WITH DINNER OR EVENING MEAL 90 tablet 3    [DISCONTINUED] irbesartan (AVAPRO) 75 MG tablet Take 1 tablet (75 mg total) by mouth once daily. 90 tablet 1    [DISCONTINUED] loratadine (CLARITIN) 10 mg tablet TAKE 1 TABLET (10 MG TOTAL) BY MOUTH DAILY AS NEEDED FOR ALLERGIES (OR RUNNY NOSE). 90 tablet 1     No current facility-administered medications on file prior to visit.       Review of patient's allergies indicates:   Allergen Reactions    Silicone      Burn skin    Adhesive Rash       Past Surgical History:   Procedure Laterality Date    BACK SURGERY      CATARACT EXTRACTION W/  INTRAOCULAR LENS IMPLANT Bilateral     CHOLECYSTECTOMY      Laparoscopic    COLONOSCOPY      COLONOSCOPY N/A 2022    Procedure: COLONOSCOPY;  Surgeon: Salvatore Butler MD;  Location: [facility];  Service: Gastroenterology;  Laterality: N/A;    COLONOSCOPY N/A 2024    Procedure: COLONOSCOPY;  Surgeon: Jose Manuel Gilmore MD;  Location: [facility] ([address] [place] [address]);  Service: Endoscopy;  Laterality: N/A;    ESOPHAGEAL DILATION N/A  8/30/2022    Procedure: DILATION, ESOPHAGUS;  Surgeon: Salvatore Butler MD;  [facility] ENDO;  Service: Gastroenterology;  Laterality: N/A;    ESOPHAGOGASTRODUODENOSCOPY N/A 8/30/2022    Procedure: EGD (ESOPHAGOGASTRODUODENOSCOPY);  Surgeon: Salvatore Butler MD;  Location: [facility];  Service: Gastroenterology;  Laterality: N/A;    ESOPHAGOGASTRODUODENOSCOPY N/A 5/23/2023    Procedure: ESOPHAGOGASTRODUODENOSCOPY (EGD);  Surgeon: Desmond Duffy Jr., MD;  Location: [facility];  Service: Endoscopy;  Laterality: N/A;    ESOPHAGOGASTRODUODENOSCOPY N/A 1/11/2024    Procedure: EGD (ESOPHAGOGASTRODUODENOSCOPY);  Surgeon: Jose Manuel Gilmore MD;  [facility]  Service: Endoscopy;  Laterality: N/A;    HERNIA REPAIR      HYSTERECTOMY      KNEE SURGERY Right 3/4/2015    Dr Weller     OOPHORECTOMY      ovarian tumor      Benign    TONSILLECTOMY, ADENOIDECTOMY         Family History   Problem Relation Name Age of Onset    Cancer Mother age 81         Skin    Skin cancer Mother age 81     Glaucoma Mother age 81     Cataracts Mother age 81     Diabetes Mother age 81     Heart disease Father age 76     Diabetes Father age 76     Skin cancer Sister      Diabetes Sister      Diabetes Daughter      Breast cancer Maternal Aunt      Melanoma Neg Hx      Psoriasis Neg Hx      Eczema Neg Hx      Lupus Neg Hx      Amblyopia Neg Hx      Blindness Neg Hx      Macular degeneration Neg Hx      Retinal detachment Neg Hx      Strabismus Neg Hx         Social History     Socioeconomic History    Marital status:    Occupational History     Employer: [employer]   Tobacco Use    Smoking status: Every Day     Current packs/day: 0.50     Average packs/day: 0.5 packs/day for 40.0 years (20.0 ttl pk-yrs)     Types: Cigarettes    Smokeless tobacco: Former    Tobacco comments:     Not ready to quit smoking. Cut down on cigarettes but enjoys having a couple cigarettes a day.   Substance and Sexual Activity     "Alcohol use: No    Drug use: No    Sexual activity: Never   Other Topics Concern    Are you pregnant or think you may be? No    Breast-feeding No     Social Drivers of Health     Financial Resource Strain: Low Risk  (1/11/2024)    Overall Financial Resource Strain (CARDIA)     Difficulty of Paying Living Expenses: Not hard at all   Food Insecurity: No Food Insecurity (1/11/2024)    Hunger Vital Sign     Worried About Running Out of Food in the Last Year: Never true     Ran Out of Food in the Last Year: Never true   Recent Concern: Food Insecurity - Food Insecurity Present (12/26/2023)    Hunger Vital Sign     Worried About Running Out of Food in the Last Year: Sometimes true     Ran Out of Food in the Last Year: Sometimes true   Transportation Needs: No Transportation Needs (1/11/2024)    PRAPARE - Transportation     Lack of Transportation (Medical): No     Lack of Transportation (Non-Medical): No   Physical Activity: Inactive (1/11/2024)    Exercise Vital Sign     Days of Exercise per Week: 0 days     Minutes of Exercise per Session: 0 min   Stress: Stress Concern Present (1/11/2024)    [employer] - Occupational Stress Questionnaire     Feeling of Stress : To some extent   Housing Stability: Low Risk  (1/11/2024)    Housing Stability Vital Sign     Unable to Pay for Housing in the Last Year: No     Number of Places Lived in the Last Year: 1     Unstable Housing in the Last Year: No           Objective:     Vitals:    11/26/24 1451   BP: 132/78   Pulse: (!) 123   Resp: 17   Weight: 49.9 kg (110 lb)   Height: 4' 11" (1.499 m)   PainSc: 0-No pain        Physical Exam  Vitals reviewed.   Constitutional:       Appearance: She is well-developed.   Cardiovascular:      Comments: Dorsalis pedis and posterior tibial pulses are diminished Bilaterally. Toes are cool to touch. Feet are warm proximally.There is decreased digital hair. Skin is atrophic, slightly hyperpigmented, and mildly " edematous      Musculoskeletal:         General: No tenderness. Normal range of motion.      Comments: Adequate joint range of motion without pain, limitation, nor crepitation Bilateral feet and ankle joints. Muscle strength is 5/5 in all groups bilaterally.         Skin:     General: Skin is warm and dry.      Findings: No ecchymosis, erythema or lesion.      Comments: Nails x 10  are elongated by  2-7mm's, thickened by 2-3 mm's, dystrophic, and are whitish in  coloration . Xerosis Bilaterally. No open lesions noted.       Neurological:      Mental Status: She is alert and oriented to person, place, and time.      Comments: [place] 5.07 monofilamant testing is diminished Tc feet. Sharp/dull sensation diminished Bilaterally. Light touch absent Bilaterally.       Psychiatric:         Behavior: Behavior normal.           Assessment:      Encounter Diagnoses   Name Primary?    Type II diabetes mellitus with peripheral circulatory disorder Yes    Onychomycosis due to dermatophyte      Plan:     Deb was seen today for diabetic foot exam, nail care, foot problem and ankle pain.    Diagnoses and all orders for this visit:    Type II diabetes mellitus with peripheral circulatory disorder    Onychomycosis due to dermatophyte      I counseled the patient on her conditions, their implications and medical management.        - Shoe inspection. Diabetic Foot Education. Patient reminded of the importance of good nutrition and blood sugar control to help prevent podiatric complications of diabetes. Patient instructed on proper foot hygeine. We discussed wearing proper shoe gear, daily foot inspections, never walking without protective shoe gear, never putting sharp instruments to feet, routine podiatric nail visits every 2-3 months.      - With patient's permission, nails were aggressively reduced and debrided x 10 to their soft tissue attachment mechanically , removing all offending nail and debris. Patient relates  relief following the procedure. She will continue to monitor the areas daily, inspect her feet, wear protective shoe gear when ambulatory, moisturizer to maintain skin integrity and follow in this office in approximately 2-3 months, sooner p.r.n.

## 2024-12-27 NOTE — TELEPHONE ENCOUNTER
Patient called earlier today and thought that she may have COVID but she has tested and is negative.  She is experiencing issues with her sinuses and would like to see if something can be sent in to there pharmacy for her.  Please call patient if questions.    Reviewed medication request - refill request sent to PCP for continuity

## 2025-01-15 ENCOUNTER — HOSPITAL ENCOUNTER (INPATIENT)
Facility: HOSPITAL | Age: 75
LOS: 3 days | DRG: 309 | End: 2025-01-19
Attending: EMERGENCY MEDICINE | Admitting: HOSPITALIST
Payer: MEDICARE

## 2025-01-15 DIAGNOSIS — E87.70 HYPERVOLEMIA, UNSPECIFIED HYPERVOLEMIA TYPE: ICD-10-CM

## 2025-01-15 DIAGNOSIS — R07.9 CHEST PAIN: ICD-10-CM

## 2025-01-15 DIAGNOSIS — I48.91 ATRIAL FIBRILLATION: ICD-10-CM

## 2025-01-15 DIAGNOSIS — I48.91 ATRIAL FIBRILLATION WITH RVR: Primary | ICD-10-CM

## 2025-01-15 DIAGNOSIS — M81.0 OSTEOPOROSIS, POSTMENOPAUSAL: ICD-10-CM

## 2025-01-15 DIAGNOSIS — J44.1 COPD EXACERBATION: ICD-10-CM

## 2025-01-15 DIAGNOSIS — S22.000A COMPRESSION FRACTURE OF BODY OF THORACIC VERTEBRA: ICD-10-CM

## 2025-01-15 DIAGNOSIS — M54.9 BACK PAIN, UNSPECIFIED BACK LOCATION, UNSPECIFIED BACK PAIN LATERALITY, UNSPECIFIED CHRONICITY: ICD-10-CM

## 2025-01-15 DIAGNOSIS — R00.0 TACHYCARDIA: ICD-10-CM

## 2025-01-15 DIAGNOSIS — R06.02 SHORTNESS OF BREATH: ICD-10-CM

## 2025-01-15 LAB
ALBUMIN SERPL BCP-MCNC: 2.9 G/DL (ref 3.5–5.2)
ALP SERPL-CCNC: 149 U/L (ref 40–150)
ALT SERPL W/O P-5'-P-CCNC: 10 U/L (ref 10–44)
ANION GAP SERPL CALC-SCNC: 7 MMOL/L (ref 8–16)
APTT PPP: 24.8 SEC (ref 21–32)
AST SERPL-CCNC: 13 U/L (ref 10–40)
BASOPHILS # BLD AUTO: 0.04 K/UL (ref 0–0.2)
BASOPHILS NFR BLD: 0.3 % (ref 0–1.9)
BILIRUB SERPL-MCNC: 0.3 MG/DL (ref 0.1–1)
BNP SERPL-MCNC: 536 PG/ML (ref 0–99)
BUN SERPL-MCNC: 12 MG/DL (ref 8–23)
CALCIUM SERPL-MCNC: 10 MG/DL (ref 8.7–10.5)
CHLORIDE SERPL-SCNC: 109 MMOL/L (ref 95–110)
CO2 SERPL-SCNC: 25 MMOL/L (ref 23–29)
CREAT SERPL-MCNC: 0.6 MG/DL (ref 0.5–1.4)
DIFFERENTIAL METHOD BLD: ABNORMAL
EOSINOPHIL # BLD AUTO: 0.1 K/UL (ref 0–0.5)
EOSINOPHIL NFR BLD: 0.8 % (ref 0–8)
ERYTHROCYTE [DISTWIDTH] IN BLOOD BY AUTOMATED COUNT: 14.8 % (ref 11.5–14.5)
EST. GFR  (NO RACE VARIABLE): >60 ML/MIN/1.73 M^2
GLUCOSE SERPL-MCNC: 57 MG/DL (ref 70–110)
HCT VFR BLD AUTO: 38.6 % (ref 37–48.5)
HCV AB SERPL QL IA: NORMAL
HGB BLD-MCNC: 11.9 G/DL (ref 12–16)
HIV 1+2 AB+HIV1 P24 AG SERPL QL IA: NORMAL
IMM GRANULOCYTES # BLD AUTO: 0.06 K/UL (ref 0–0.04)
IMM GRANULOCYTES NFR BLD AUTO: 0.5 % (ref 0–0.5)
INR PPP: 1.2 (ref 0.8–1.2)
LYMPHOCYTES # BLD AUTO: 1.9 K/UL (ref 1–4.8)
LYMPHOCYTES NFR BLD: 16.3 % (ref 18–48)
MCH RBC QN AUTO: 29 PG (ref 27–31)
MCHC RBC AUTO-ENTMCNC: 30.8 G/DL (ref 32–36)
MCV RBC AUTO: 94 FL (ref 82–98)
MONOCYTES # BLD AUTO: 0.9 K/UL (ref 0.3–1)
MONOCYTES NFR BLD: 7.4 % (ref 4–15)
NEUTROPHILS # BLD AUTO: 8.8 K/UL (ref 1.8–7.7)
NEUTROPHILS NFR BLD: 74.7 % (ref 38–73)
NRBC BLD-RTO: 0 /100 WBC
PLATELET # BLD AUTO: 235 K/UL (ref 150–450)
PMV BLD AUTO: 10.1 FL (ref 9.2–12.9)
POTASSIUM SERPL-SCNC: 5 MMOL/L (ref 3.5–5.1)
PROT SERPL-MCNC: 7.2 G/DL (ref 6–8.4)
PROTHROMBIN TIME: 13.1 SEC (ref 9–12.5)
RBC # BLD AUTO: 4.11 M/UL (ref 4–5.4)
SODIUM SERPL-SCNC: 141 MMOL/L (ref 136–145)
WBC # BLD AUTO: 11.78 K/UL (ref 3.9–12.7)

## 2025-01-15 PROCEDURE — 27000221 HC OXYGEN, UP TO 24 HOURS

## 2025-01-15 PROCEDURE — 96376 TX/PRO/DX INJ SAME DRUG ADON: CPT

## 2025-01-15 PROCEDURE — 83880 ASSAY OF NATRIURETIC PEPTIDE: CPT

## 2025-01-15 PROCEDURE — 94640 AIRWAY INHALATION TREATMENT: CPT | Mod: XB

## 2025-01-15 PROCEDURE — 96375 TX/PRO/DX INJ NEW DRUG ADDON: CPT

## 2025-01-15 PROCEDURE — 87389 HIV-1 AG W/HIV-1&-2 AB AG IA: CPT | Performed by: EMERGENCY MEDICINE

## 2025-01-15 PROCEDURE — 80053 COMPREHEN METABOLIC PANEL: CPT

## 2025-01-15 PROCEDURE — 86803 HEPATITIS C AB TEST: CPT | Performed by: EMERGENCY MEDICINE

## 2025-01-15 PROCEDURE — 25000003 PHARM REV CODE 250

## 2025-01-15 PROCEDURE — 83036 HEMOGLOBIN GLYCOSYLATED A1C: CPT

## 2025-01-15 PROCEDURE — 93010 ELECTROCARDIOGRAM REPORT: CPT | Mod: GW,,, | Performed by: INTERNAL MEDICINE

## 2025-01-15 PROCEDURE — 99285 EMERGENCY DEPT VISIT HI MDM: CPT | Mod: 25

## 2025-01-15 PROCEDURE — 85610 PROTHROMBIN TIME: CPT

## 2025-01-15 PROCEDURE — 96361 HYDRATE IV INFUSION ADD-ON: CPT

## 2025-01-15 PROCEDURE — 85025 COMPLETE CBC W/AUTO DIFF WBC: CPT

## 2025-01-15 PROCEDURE — 85730 THROMBOPLASTIN TIME PARTIAL: CPT

## 2025-01-15 PROCEDURE — 63600175 PHARM REV CODE 636 W HCPCS: Mod: JZ,TB

## 2025-01-15 PROCEDURE — 25000242 PHARM REV CODE 250 ALT 637 W/ HCPCS

## 2025-01-15 PROCEDURE — 94761 N-INVAS EAR/PLS OXIMETRY MLT: CPT

## 2025-01-15 PROCEDURE — 93005 ELECTROCARDIOGRAM TRACING: CPT

## 2025-01-15 PROCEDURE — 99900035 HC TECH TIME PER 15 MIN (STAT)

## 2025-01-15 RX ORDER — KETOROLAC TROMETHAMINE 30 MG/ML
10 INJECTION, SOLUTION INTRAMUSCULAR; INTRAVENOUS
Status: COMPLETED | OUTPATIENT
Start: 2025-01-15 | End: 2025-01-15

## 2025-01-15 RX ORDER — METOPROLOL TARTRATE 1 MG/ML
5 INJECTION, SOLUTION INTRAVENOUS
Status: DISCONTINUED | OUTPATIENT
Start: 2025-01-15 | End: 2025-01-15

## 2025-01-15 RX ORDER — DILTIAZEM HYDROCHLORIDE 5 MG/ML
10 INJECTION INTRAVENOUS
Status: COMPLETED | OUTPATIENT
Start: 2025-01-15 | End: 2025-01-15

## 2025-01-15 RX ORDER — DILTIAZEM HYDROCHLORIDE 5 MG/ML
5 INJECTION INTRAVENOUS
Status: DISCONTINUED | OUTPATIENT
Start: 2025-01-15 | End: 2025-01-15

## 2025-01-15 RX ORDER — IPRATROPIUM BROMIDE AND ALBUTEROL SULFATE 2.5; .5 MG/3ML; MG/3ML
3 SOLUTION RESPIRATORY (INHALATION)
Status: COMPLETED | OUTPATIENT
Start: 2025-01-15 | End: 2025-01-15

## 2025-01-15 RX ORDER — METHYLPREDNISOLONE SOD SUCC 125 MG
125 VIAL (EA) INJECTION
Status: COMPLETED | OUTPATIENT
Start: 2025-01-15 | End: 2025-01-15

## 2025-01-15 RX ORDER — DILTIAZEM HYDROCHLORIDE 5 MG/ML
INJECTION INTRAVENOUS
Status: DISPENSED
Start: 2025-01-15 | End: 2025-01-16

## 2025-01-15 RX ORDER — FUROSEMIDE 10 MG/ML
40 INJECTION INTRAMUSCULAR; INTRAVENOUS
Status: COMPLETED | OUTPATIENT
Start: 2025-01-15 | End: 2025-01-15

## 2025-01-15 RX ADMIN — DILTIAZEM HYDROCHLORIDE 10 MG: 5 INJECTION INTRAVENOUS at 10:01

## 2025-01-15 RX ADMIN — METHYLPREDNISOLONE SODIUM SUCCINATE 125 MG: 125 INJECTION, POWDER, FOR SOLUTION INTRAMUSCULAR; INTRAVENOUS at 11:01

## 2025-01-15 RX ADMIN — DILTIAZEM HYDROCHLORIDE 10 MG: 5 INJECTION INTRAVENOUS at 11:01

## 2025-01-15 RX ADMIN — FUROSEMIDE 40 MG: 10 INJECTION, SOLUTION INTRAVENOUS at 10:01

## 2025-01-15 RX ADMIN — IPRATROPIUM BROMIDE AND ALBUTEROL SULFATE 3 ML: 2.5; .5 SOLUTION RESPIRATORY (INHALATION) at 08:01

## 2025-01-15 RX ADMIN — KETOROLAC TROMETHAMINE 10 MG: 30 INJECTION, SOLUTION INTRAMUSCULAR; INTRAVENOUS at 08:01

## 2025-01-15 RX ADMIN — SODIUM CHLORIDE 500 ML: 9 INJECTION, SOLUTION INTRAVENOUS at 11:01

## 2025-01-16 PROBLEM — S32.000A LUMBAR COMPRESSION FRACTURE: Status: ACTIVE | Noted: 2025-01-16

## 2025-01-16 PROBLEM — S22.000A COMPRESSION FRACTURE OF BODY OF THORACIC VERTEBRA: Status: ACTIVE | Noted: 2025-01-16

## 2025-01-16 PROBLEM — J96.10 CHRONIC RESPIRATORY FAILURE: Status: ACTIVE | Noted: 2022-03-21

## 2025-01-16 PROBLEM — J96.10 CHRONIC RESPIRATORY FAILURE: Status: RESOLVED | Noted: 2022-03-21 | Resolved: 2025-01-16

## 2025-01-16 PROBLEM — H40.9 GLAUCOMA: Status: ACTIVE | Noted: 2025-01-16

## 2025-01-16 PROBLEM — Z79.4 TYPE 2 DIABETES MELLITUS, WITH LONG-TERM CURRENT USE OF INSULIN: Status: ACTIVE | Noted: 2017-07-14

## 2025-01-16 LAB
ALBUMIN SERPL BCP-MCNC: 2 G/DL (ref 3.5–5.2)
ALP SERPL-CCNC: 99 U/L (ref 40–150)
ALT SERPL W/O P-5'-P-CCNC: 5 U/L (ref 10–44)
ANION GAP SERPL CALC-SCNC: 11 MMOL/L (ref 8–16)
ANION GAP SERPL CALC-SCNC: 8 MMOL/L (ref 8–16)
ANISOCYTOSIS BLD QL SMEAR: SLIGHT
ASCENDING AORTA: 2.58 CM
AST SERPL-CCNC: 9 U/L (ref 10–40)
AV AREA BY CONTINUOUS VTI: 2.1 CM2
AV INDEX (PROSTH): 0.68
AV LVOT MEAN GRADIENT: 1 MMHG
AV LVOT PEAK GRADIENT: 2 MMHG
AV MEAN GRADIENT: 2 MMHG
AV PEAK GRADIENT: 3 MMHG
AV VALVE AREA BY VELOCITY RATIO: 2.1 CM²
AV VALVE AREA: 2.1 CM2
AV VELOCITY RATIO: 0.67
BACTERIA #/AREA URNS AUTO: ABNORMAL /HPF
BASOPHILS # BLD AUTO: 0.01 K/UL (ref 0–0.2)
BASOPHILS # BLD AUTO: 0.02 K/UL (ref 0–0.2)
BASOPHILS NFR BLD: 0.1 % (ref 0–1.9)
BASOPHILS NFR BLD: 0.1 % (ref 0–1.9)
BILIRUB SERPL-MCNC: 0.2 MG/DL (ref 0.1–1)
BILIRUB UR QL STRIP: NEGATIVE
BSA FOR ECHO PROCEDURE: 1.44 M2
BUN SERPL-MCNC: 13 MG/DL (ref 8–23)
BUN SERPL-MCNC: 31 MG/DL (ref 8–23)
CALCIUM SERPL-MCNC: 6.8 MG/DL (ref 8.7–10.5)
CALCIUM SERPL-MCNC: 9.6 MG/DL (ref 8.7–10.5)
CHLORIDE SERPL-SCNC: 102 MMOL/L (ref 95–110)
CHLORIDE SERPL-SCNC: 117 MMOL/L (ref 95–110)
CLARITY UR REFRACT.AUTO: CLEAR
CO2 SERPL-SCNC: 17 MMOL/L (ref 23–29)
CO2 SERPL-SCNC: 24 MMOL/L (ref 23–29)
COLOR UR AUTO: COLORLESS
CREAT SERPL-MCNC: 0.5 MG/DL (ref 0.5–1.4)
CREAT SERPL-MCNC: 1 MG/DL (ref 0.5–1.4)
CV ECHO LV RWT: 0.37 CM
DIFFERENTIAL METHOD BLD: ABNORMAL
DIFFERENTIAL METHOD BLD: ABNORMAL
DOP CALC AO PEAK VEL: 0.9 M/S
DOP CALC AO VTI: 18.7 CM
DOP CALC LVOT AREA: 3.1 CM2
DOP CALC LVOT DIAMETER: 2 CM
DOP CALC LVOT PEAK VEL: 0.6 M/S
DOP CALC LVOT STROKE VOLUME: 40.2 CM3
DOP CALCLVOT PEAK VEL VTI: 12.8 CM
E/E' RATIO: 6 M/S
ECHO EF ESTIMATED: 56 %
ECHO LV POSTERIOR WALL: 0.7 CM (ref 0.6–1.1)
EJECTION FRACTION: 55 %
EOSINOPHIL # BLD AUTO: 0 K/UL (ref 0–0.5)
EOSINOPHIL # BLD AUTO: 0 K/UL (ref 0–0.5)
EOSINOPHIL NFR BLD: 0 % (ref 0–8)
EOSINOPHIL NFR BLD: 0 % (ref 0–8)
ERYTHROCYTE [DISTWIDTH] IN BLOOD BY AUTOMATED COUNT: 14.6 % (ref 11.5–14.5)
ERYTHROCYTE [DISTWIDTH] IN BLOOD BY AUTOMATED COUNT: 14.8 % (ref 11.5–14.5)
EST. GFR  (NO RACE VARIABLE): 59.5 ML/MIN/1.73 M^2
EST. GFR  (NO RACE VARIABLE): >60 ML/MIN/1.73 M^2
ESTIMATED AVG GLUCOSE: 146 MG/DL (ref 68–131)
FRACTIONAL SHORTENING: 28.9 % (ref 28–44)
GLUCOSE SERPL-MCNC: 130 MG/DL (ref 70–110)
GLUCOSE SERPL-MCNC: 269 MG/DL (ref 70–110)
GLUCOSE UR QL STRIP: NEGATIVE
HBA1C MFR BLD: 6.7 % (ref 4–5.6)
HCT VFR BLD AUTO: 23.5 % (ref 37–48.5)
HCT VFR BLD AUTO: 40.7 % (ref 37–48.5)
HGB BLD-MCNC: 12.1 G/DL (ref 12–16)
HGB BLD-MCNC: 7.1 G/DL (ref 12–16)
HGB UR QL STRIP: NEGATIVE
HYALINE CASTS UR QL AUTO: 4 /LPF
HYPOCHROMIA BLD QL SMEAR: ABNORMAL
IMM GRANULOCYTES # BLD AUTO: 0.06 K/UL (ref 0–0.04)
IMM GRANULOCYTES # BLD AUTO: 0.1 K/UL (ref 0–0.04)
IMM GRANULOCYTES NFR BLD AUTO: 0.7 % (ref 0–0.5)
IMM GRANULOCYTES NFR BLD AUTO: 0.7 % (ref 0–0.5)
INFLUENZA A, MOLECULAR: NOT DETECTED
INFLUENZA B, MOLECULAR: NOT DETECTED
INTERVENTRICULAR SEPTUM: 0.8 CM (ref 0.6–1.1)
IVC DIAMETER: 2.42 CM
IVRT: 88 MS
KETONES UR QL STRIP: NEGATIVE
LA MAJOR: 6 CM
LA MINOR: 5.8 CM
LA WIDTH: 4.1 CM
LACTATE SERPL-SCNC: 1 MMOL/L (ref 0.5–2.2)
LACTATE SERPL-SCNC: 1.4 MMOL/L (ref 0.5–2.2)
LEFT ATRIUM SIZE: 3.8 CM
LEFT ATRIUM VOLUME INDEX MOD: 57 ML/M2
LEFT ATRIUM VOLUME INDEX: 55 ML/M2
LEFT ATRIUM VOLUME MOD: 82 ML
LEFT ATRIUM VOLUME: 78 CM3
LEFT INTERNAL DIMENSION IN SYSTOLE: 2.7 CM (ref 2.1–4)
LEFT VENTRICLE DIASTOLIC VOLUME INDEX: 43.03 ML/M2
LEFT VENTRICLE DIASTOLIC VOLUME: 61.54 ML
LEFT VENTRICLE MASS INDEX: 55.1 G/M2
LEFT VENTRICLE SYSTOLIC VOLUME INDEX: 19 ML/M2
LEFT VENTRICLE SYSTOLIC VOLUME: 27.23 ML
LEFT VENTRICULAR INTERNAL DIMENSION IN DIASTOLE: 3.8 CM (ref 3.5–6)
LEFT VENTRICULAR MASS: 78.8 G
LEUKOCYTE ESTERASE UR QL STRIP: ABNORMAL
LV LATERAL E/E' RATIO: 5.5 M/S
LV SEPTAL E/E' RATIO: 6.7 M/S
LYMPHOCYTES # BLD AUTO: 0.6 K/UL (ref 1–4.8)
LYMPHOCYTES # BLD AUTO: 1 K/UL (ref 1–4.8)
LYMPHOCYTES NFR BLD: 6.8 % (ref 18–48)
LYMPHOCYTES NFR BLD: 7.4 % (ref 18–48)
MAGNESIUM SERPL-MCNC: 0.9 MG/DL (ref 1.6–2.6)
MCH RBC QN AUTO: 27.9 PG (ref 27–31)
MCH RBC QN AUTO: 29.1 PG (ref 27–31)
MCHC RBC AUTO-ENTMCNC: 29.7 G/DL (ref 32–36)
MCHC RBC AUTO-ENTMCNC: 30.2 G/DL (ref 32–36)
MCV RBC AUTO: 94 FL (ref 82–98)
MCV RBC AUTO: 96 FL (ref 82–98)
MICROSCOPIC COMMENT: ABNORMAL
MONOCYTES # BLD AUTO: 0.1 K/UL (ref 0.3–1)
MONOCYTES # BLD AUTO: 0.1 K/UL (ref 0.3–1)
MONOCYTES NFR BLD: 0.7 % (ref 4–15)
MONOCYTES NFR BLD: 0.8 % (ref 4–15)
MV PEAK E VEL: 0.6 M/S
NEUTROPHILS # BLD AUTO: 13.3 K/UL (ref 1.8–7.7)
NEUTROPHILS # BLD AUTO: 7.6 K/UL (ref 1.8–7.7)
NEUTROPHILS NFR BLD: 91.1 % (ref 38–73)
NEUTROPHILS NFR BLD: 91.6 % (ref 38–73)
NITRITE UR QL STRIP: NEGATIVE
NRBC BLD-RTO: 0 /100 WBC
NRBC BLD-RTO: 0 /100 WBC
OHS CV RV/LV RATIO: 1.13 CM
OHS QRS DURATION: 68 MS
OHS QRS DURATION: 70 MS
OHS QTC CALCULATION: 364 MS
OHS QTC CALCULATION: 451 MS
OVALOCYTES BLD QL SMEAR: ABNORMAL
PH UR STRIP: 5 [PH] (ref 5–8)
PHOSPHATE SERPL-MCNC: 2.8 MG/DL (ref 2.7–4.5)
PISA TR MAX VEL: 3.5 M/S
PLATELET # BLD AUTO: 152 K/UL (ref 150–450)
PLATELET # BLD AUTO: 220 K/UL (ref 150–450)
PLATELET BLD QL SMEAR: ABNORMAL
PMV BLD AUTO: 10 FL (ref 9.2–12.9)
PMV BLD AUTO: 10.1 FL (ref 9.2–12.9)
POCT GLUCOSE: 195 MG/DL (ref 70–110)
POIKILOCYTOSIS BLD QL SMEAR: SLIGHT
POTASSIUM SERPL-SCNC: 3.3 MMOL/L (ref 3.5–5.1)
POTASSIUM SERPL-SCNC: 5.5 MMOL/L (ref 3.5–5.1)
PROT SERPL-MCNC: 5 G/DL (ref 6–8.4)
PROT UR QL STRIP: NEGATIVE
RA MAJOR: 6.43 CM
RA PRESSURE ESTIMATED: 15 MMHG
RA WIDTH: 4.83 CM
RBC # BLD AUTO: 2.44 M/UL (ref 4–5.4)
RBC # BLD AUTO: 4.33 M/UL (ref 4–5.4)
RBC #/AREA URNS AUTO: 1 /HPF (ref 0–4)
RIGHT ATRIAL AREA: 30.9 CM2
RIGHT VENTRICLE DIASTOLIC BASEL DIMENSION: 4.3 CM
RSV AG BY MOLECULAR METHOD: NOT DETECTED
RV TB RVSP: 19 MMHG
RV TISSUE DOPPLER FREE WALL SYSTOLIC VELOCITY 1 (APICAL 4 CHAMBER VIEW): 9.74 CM/S
SARS-COV-2 RNA RESP QL NAA+PROBE: NOT DETECTED
SINUS: 3.41 CM
SODIUM SERPL-SCNC: 137 MMOL/L (ref 136–145)
SODIUM SERPL-SCNC: 142 MMOL/L (ref 136–145)
SP GR UR STRIP: 1.01 (ref 1–1.03)
STJ: 2.66 CM
TDI LATERAL: 0.11 M/S
TDI SEPTAL: 0.09 M/S
TDI: 0.1 M/S
TR MAX PG: 49 MMHG
TRICUSPID ANNULAR PLANE SYSTOLIC EXCURSION: 2.26 CM
TROPONIN I SERPL DL<=0.01 NG/ML-MCNC: 8 NG/L (ref 0–14)
TV PEAK GRADIENT: 48 MMHG
TV REST PULMONARY ARTERY PRESSURE: 64 MMHG
URN SPEC COLLECT METH UR: ABNORMAL
WBC # BLD AUTO: 14.5 K/UL (ref 3.9–12.7)
WBC # BLD AUTO: 8.39 K/UL (ref 3.9–12.7)
WBC #/AREA URNS AUTO: 6 /HPF (ref 0–5)
Z-SCORE OF LEFT VENTRICULAR DIMENSION IN END DIASTOLE: -1.39
Z-SCORE OF LEFT VENTRICULAR DIMENSION IN END SYSTOLE: -0.04

## 2025-01-16 PROCEDURE — 80048 BASIC METABOLIC PNL TOTAL CA: CPT | Mod: XB

## 2025-01-16 PROCEDURE — 99222 1ST HOSP IP/OBS MODERATE 55: CPT | Mod: GW,,, | Performed by: INTERNAL MEDICINE

## 2025-01-16 PROCEDURE — 85025 COMPLETE CBC W/AUTO DIFF WBC: CPT

## 2025-01-16 PROCEDURE — 80053 COMPREHEN METABOLIC PANEL: CPT | Performed by: STUDENT IN AN ORGANIZED HEALTH CARE EDUCATION/TRAINING PROGRAM

## 2025-01-16 PROCEDURE — 83735 ASSAY OF MAGNESIUM: CPT | Performed by: STUDENT IN AN ORGANIZED HEALTH CARE EDUCATION/TRAINING PROGRAM

## 2025-01-16 PROCEDURE — 25000242 PHARM REV CODE 250 ALT 637 W/ HCPCS: Performed by: STUDENT IN AN ORGANIZED HEALTH CARE EDUCATION/TRAINING PROGRAM

## 2025-01-16 PROCEDURE — 84100 ASSAY OF PHOSPHORUS: CPT | Performed by: STUDENT IN AN ORGANIZED HEALTH CARE EDUCATION/TRAINING PROGRAM

## 2025-01-16 PROCEDURE — 25000003 PHARM REV CODE 250

## 2025-01-16 PROCEDURE — 21400001 HC TELEMETRY ROOM

## 2025-01-16 PROCEDURE — 94761 N-INVAS EAR/PLS OXIMETRY MLT: CPT

## 2025-01-16 PROCEDURE — 81001 URINALYSIS AUTO W/SCOPE: CPT | Performed by: STUDENT IN AN ORGANIZED HEALTH CARE EDUCATION/TRAINING PROGRAM

## 2025-01-16 PROCEDURE — 84484 ASSAY OF TROPONIN QUANT: CPT | Performed by: STUDENT IN AN ORGANIZED HEALTH CARE EDUCATION/TRAINING PROGRAM

## 2025-01-16 PROCEDURE — 63600175 PHARM REV CODE 636 W HCPCS: Mod: TB

## 2025-01-16 PROCEDURE — 25000003 PHARM REV CODE 250: Performed by: STUDENT IN AN ORGANIZED HEALTH CARE EDUCATION/TRAINING PROGRAM

## 2025-01-16 PROCEDURE — 96365 THER/PROPH/DIAG IV INF INIT: CPT

## 2025-01-16 PROCEDURE — 83605 ASSAY OF LACTIC ACID: CPT | Performed by: STUDENT IN AN ORGANIZED HEALTH CARE EDUCATION/TRAINING PROGRAM

## 2025-01-16 PROCEDURE — 63700000 PHARM REV CODE 250 ALT 637 W/O HCPCS: Performed by: STUDENT IN AN ORGANIZED HEALTH CARE EDUCATION/TRAINING PROGRAM

## 2025-01-16 PROCEDURE — 27000221 HC OXYGEN, UP TO 24 HOURS

## 2025-01-16 PROCEDURE — 25000003 PHARM REV CODE 250: Performed by: HOSPITALIST

## 2025-01-16 PROCEDURE — 99900035 HC TECH TIME PER 15 MIN (STAT)

## 2025-01-16 PROCEDURE — 0241U SARS-COV2 (COVID) WITH FLU/RSV BY PCR: CPT | Performed by: STUDENT IN AN ORGANIZED HEALTH CARE EDUCATION/TRAINING PROGRAM

## 2025-01-16 PROCEDURE — 94640 AIRWAY INHALATION TREATMENT: CPT

## 2025-01-16 PROCEDURE — 85025 COMPLETE CBC W/AUTO DIFF WBC: CPT | Mod: 91 | Performed by: STUDENT IN AN ORGANIZED HEALTH CARE EDUCATION/TRAINING PROGRAM

## 2025-01-16 PROCEDURE — 63600175 PHARM REV CODE 636 W HCPCS

## 2025-01-16 RX ORDER — IBUPROFEN 200 MG
16 TABLET ORAL
Status: DISCONTINUED | OUTPATIENT
Start: 2025-01-16 | End: 2025-01-19 | Stop reason: HOSPADM

## 2025-01-16 RX ORDER — SODIUM CHLORIDE 0.9 % (FLUSH) 0.9 %
10 SYRINGE (ML) INJECTION EVERY 12 HOURS PRN
Status: DISCONTINUED | OUTPATIENT
Start: 2025-01-16 | End: 2025-01-19 | Stop reason: HOSPADM

## 2025-01-16 RX ORDER — TALC
6 POWDER (GRAM) TOPICAL NIGHTLY PRN
Status: DISCONTINUED | OUTPATIENT
Start: 2025-01-16 | End: 2025-01-19 | Stop reason: HOSPADM

## 2025-01-16 RX ORDER — BRIMONIDINE TARTRATE 2 MG/ML
1 SOLUTION/ DROPS OPHTHALMIC 2 TIMES DAILY
Status: DISCONTINUED | OUTPATIENT
Start: 2025-01-16 | End: 2025-01-19 | Stop reason: HOSPADM

## 2025-01-16 RX ORDER — IPRATROPIUM BROMIDE AND ALBUTEROL SULFATE 2.5; .5 MG/3ML; MG/3ML
3 SOLUTION RESPIRATORY (INHALATION)
Status: DISCONTINUED | OUTPATIENT
Start: 2025-01-16 | End: 2025-01-19 | Stop reason: HOSPADM

## 2025-01-16 RX ORDER — IBUPROFEN 200 MG
24 TABLET ORAL
Status: DISCONTINUED | OUTPATIENT
Start: 2025-01-16 | End: 2025-01-19 | Stop reason: HOSPADM

## 2025-01-16 RX ORDER — TIMOLOL MALEATE 5 MG/ML
1 SOLUTION/ DROPS OPHTHALMIC DAILY
Status: DISCONTINUED | OUTPATIENT
Start: 2025-01-16 | End: 2025-01-19 | Stop reason: HOSPADM

## 2025-01-16 RX ORDER — ACETAMINOPHEN 325 MG/1
650 TABLET ORAL EVERY 4 HOURS PRN
Status: DISCONTINUED | OUTPATIENT
Start: 2025-01-16 | End: 2025-01-19 | Stop reason: HOSPADM

## 2025-01-16 RX ORDER — ACETAMINOPHEN 500 MG
1000 TABLET ORAL ONCE
Status: COMPLETED | OUTPATIENT
Start: 2025-01-16 | End: 2025-01-16

## 2025-01-16 RX ORDER — BUSPIRONE HYDROCHLORIDE 7.5 MG/1
7.5 TABLET ORAL 2 TIMES DAILY
COMMUNITY
Start: 2025-01-08

## 2025-01-16 RX ORDER — PROCHLORPERAZINE MALEATE 5 MG
10 TABLET ORAL EVERY 6 HOURS PRN
Status: DISCONTINUED | OUTPATIENT
Start: 2025-01-16 | End: 2025-01-19 | Stop reason: HOSPADM

## 2025-01-16 RX ORDER — FLUTICASONE FUROATE AND VILANTEROL 100; 25 UG/1; UG/1
1 POWDER RESPIRATORY (INHALATION) DAILY
Status: DISCONTINUED | OUTPATIENT
Start: 2025-01-16 | End: 2025-01-19 | Stop reason: HOSPADM

## 2025-01-16 RX ORDER — PREDNISONE 20 MG/1
40 TABLET ORAL DAILY
Status: DISCONTINUED | OUTPATIENT
Start: 2025-01-17 | End: 2025-01-19 | Stop reason: HOSPADM

## 2025-01-16 RX ORDER — GABAPENTIN 300 MG/1
300 CAPSULE ORAL 3 TIMES DAILY
Status: DISCONTINUED | OUTPATIENT
Start: 2025-01-16 | End: 2025-01-19 | Stop reason: HOSPADM

## 2025-01-16 RX ORDER — NALOXONE HCL 0.4 MG/ML
0.2 VIAL (ML) INJECTION
Status: DISCONTINUED | OUTPATIENT
Start: 2025-01-16 | End: 2025-01-19 | Stop reason: HOSPADM

## 2025-01-16 RX ORDER — PANTOPRAZOLE SODIUM 40 MG/1
40 TABLET, DELAYED RELEASE ORAL DAILY
Status: DISCONTINUED | OUTPATIENT
Start: 2025-01-16 | End: 2025-01-19 | Stop reason: HOSPADM

## 2025-01-16 RX ORDER — GLUCAGON 1 MG
1 KIT INJECTION
Status: DISCONTINUED | OUTPATIENT
Start: 2025-01-16 | End: 2025-01-19 | Stop reason: HOSPADM

## 2025-01-16 RX ORDER — AZITHROMYCIN 250 MG/1
500 TABLET, FILM COATED ORAL DAILY
Status: COMPLETED | OUTPATIENT
Start: 2025-01-16 | End: 2025-01-18

## 2025-01-16 RX ORDER — POTASSIUM CHLORIDE 750 MG/1
30 CAPSULE, EXTENDED RELEASE ORAL ONCE
Status: DISCONTINUED | OUTPATIENT
Start: 2025-01-16 | End: 2025-01-17

## 2025-01-16 RX ORDER — FUROSEMIDE 10 MG/ML
20 INJECTION INTRAMUSCULAR; INTRAVENOUS ONCE
Status: COMPLETED | OUTPATIENT
Start: 2025-01-16 | End: 2025-01-16

## 2025-01-16 RX ORDER — GUAIFENESIN AND DEXTROMETHORPHAN HYDROBROMIDE 10; 100 MG/5ML; MG/5ML
10 SYRUP ORAL EVERY 4 HOURS PRN
Status: DISCONTINUED | OUTPATIENT
Start: 2025-01-16 | End: 2025-01-19 | Stop reason: HOSPADM

## 2025-01-16 RX ORDER — ONDANSETRON 8 MG/1
8 TABLET, ORALLY DISINTEGRATING ORAL EVERY 8 HOURS PRN
Status: DISCONTINUED | OUTPATIENT
Start: 2025-01-16 | End: 2025-01-19 | Stop reason: HOSPADM

## 2025-01-16 RX ORDER — MAGNESIUM SULFATE HEPTAHYDRATE 40 MG/ML
2 INJECTION, SOLUTION INTRAVENOUS ONCE
Status: COMPLETED | OUTPATIENT
Start: 2025-01-16 | End: 2025-01-16

## 2025-01-16 RX ORDER — LATANOPROST 50 UG/ML
1 SOLUTION/ DROPS OPHTHALMIC NIGHTLY
Status: DISCONTINUED | OUTPATIENT
Start: 2025-01-16 | End: 2025-01-19 | Stop reason: HOSPADM

## 2025-01-16 RX ORDER — METOPROLOL TARTRATE 25 MG/1
25 TABLET, FILM COATED ORAL 2 TIMES DAILY
Status: DISCONTINUED | OUTPATIENT
Start: 2025-01-16 | End: 2025-01-19 | Stop reason: HOSPADM

## 2025-01-16 RX ORDER — KETOROLAC TROMETHAMINE 30 MG/ML
10 INJECTION, SOLUTION INTRAMUSCULAR; INTRAVENOUS
Status: COMPLETED | OUTPATIENT
Start: 2025-01-16 | End: 2025-01-16

## 2025-01-16 RX ORDER — INSULIN ASPART 100 [IU]/ML
0-5 INJECTION, SOLUTION INTRAVENOUS; SUBCUTANEOUS
Status: DISCONTINUED | OUTPATIENT
Start: 2025-01-16 | End: 2025-01-19 | Stop reason: HOSPADM

## 2025-01-16 RX ORDER — LIDOCAINE 50 MG/G
1 PATCH TOPICAL
Status: DISCONTINUED | OUTPATIENT
Start: 2025-01-16 | End: 2025-01-19 | Stop reason: HOSPADM

## 2025-01-16 RX ADMIN — FLUTICASONE FUROATE AND VILANTEROL TRIFENATATE 1 PUFF: 100; 25 POWDER RESPIRATORY (INHALATION) at 08:01

## 2025-01-16 RX ADMIN — PANTOPRAZOLE SODIUM 40 MG: 40 TABLET, DELAYED RELEASE ORAL at 09:01

## 2025-01-16 RX ADMIN — ACETAMINOPHEN 1000 MG: 500 TABLET ORAL at 10:01

## 2025-01-16 RX ADMIN — MAGNESIUM SULFATE HEPTAHYDRATE 2 G: 40 INJECTION, SOLUTION INTRAVENOUS at 09:01

## 2025-01-16 RX ADMIN — KETOROLAC TROMETHAMINE 10 MG: 30 INJECTION, SOLUTION INTRAMUSCULAR; INTRAVENOUS at 03:01

## 2025-01-16 RX ADMIN — IPRATROPIUM BROMIDE AND ALBUTEROL SULFATE 3 ML: 2.5; .5 SOLUTION RESPIRATORY (INHALATION) at 03:01

## 2025-01-16 RX ADMIN — AZITHROMYCIN DIHYDRATE 500 MG: 250 TABLET ORAL at 09:01

## 2025-01-16 RX ADMIN — SODIUM CHLORIDE 250 ML: 9 INJECTION, SOLUTION INTRAVENOUS at 05:01

## 2025-01-16 RX ADMIN — IPRATROPIUM BROMIDE AND ALBUTEROL SULFATE 3 ML: 2.5; .5 SOLUTION RESPIRATORY (INHALATION) at 08:01

## 2025-01-16 RX ADMIN — METOPROLOL TARTRATE 25 MG: 25 TABLET, FILM COATED ORAL at 09:01

## 2025-01-16 RX ADMIN — LATANOPROST 1 DROP: 50 SOLUTION OPHTHALMIC at 11:01

## 2025-01-16 RX ADMIN — GUAIFENESIN AND DEXTROMETHORPHAN 10 ML: 100; 10 SYRUP ORAL at 10:01

## 2025-01-16 RX ADMIN — LIDOCAINE 1 PATCH: 50 PATCH CUTANEOUS at 10:01

## 2025-01-16 RX ADMIN — TIMOLOL MALEATE 1 DROP: 5 SOLUTION OPHTHALMIC at 09:01

## 2025-01-16 RX ADMIN — BUSPIRONE HYDROCHLORIDE 7.5 MG: 5 TABLET ORAL at 11:01

## 2025-01-16 RX ADMIN — GABAPENTIN 300 MG: 300 CAPSULE ORAL at 03:01

## 2025-01-16 RX ADMIN — RIVAROXABAN 20 MG: 20 TABLET, FILM COATED ORAL at 05:01

## 2025-01-16 RX ADMIN — GABAPENTIN 300 MG: 300 CAPSULE ORAL at 09:01

## 2025-01-16 RX ADMIN — ONDANSETRON 8 MG: 8 TABLET, ORALLY DISINTEGRATING ORAL at 06:01

## 2025-01-16 RX ADMIN — FUROSEMIDE 20 MG: 10 INJECTION, SOLUTION INTRAMUSCULAR; INTRAVENOUS at 05:01

## 2025-01-16 RX ADMIN — ONDANSETRON 8 MG: 8 TABLET, ORALLY DISINTEGRATING ORAL at 09:01

## 2025-01-16 RX ADMIN — DILTIAZEM HYDROCHLORIDE 5 MG/HR: 5 INJECTION INTRAVENOUS at 01:01

## 2025-01-16 RX ADMIN — GABAPENTIN 300 MG: 300 CAPSULE ORAL at 10:01

## 2025-01-16 NOTE — ASSESSMENT & PLAN NOTE
Pt's BG 57 on initial labs, no current hypoglycemic sxs.    Plan  - [facility] glucose checks and SSI for now.  - Pt's home regimen is 10u long acting, SSI with meals, and trulicity. Would like to see her BG a little higher before restarting her full regimen at this time.

## 2025-01-16 NOTE — HPI
"Deb Webb is a 74 y.o. with afib, COPD (on 2-3L NC at home for comfort, does not require always but usually likes to have it on), DM II, HTN, HLD, glaucoma, and osteoporosis who presents for worsening acute on chronic low thoracic and lumbar back pain over the last 2ish weeks. She denied any known trauma or falls at that time but has had falls in the past and has reportedly been on vitamin D/calcium but off other osteoporosis meds for about a year. She denies any saddle anesthesia, bowel or bladder incontinence, focal weakness, numbness, tingling, syncope, chest pain, fever, abd pain, hematochezia, hematuria, dysuria, nausea, vomiting. Reports her she sometimes feels like her legs are "shaky" under her but this has been going for "a long time" and has not acutely changed. She has been able to walk as normal. She reports increased dyspnea starting around the time of her acute onset of pain as well as worsening cough. Has not increased her O2 use at home but felt like she needed to. She lives at a facility and denies any recent known medication regimen changes or non-adherence although she is not entirely sure of her complete regimen.    In ED, notably went into afib with RVR and had some hypotension requiring IVF and dilt pushes before a gtt to control her rate and improve her BP.  "

## 2025-01-16 NOTE — CONSULTS
[facility] - Emergency Dept  Cardiology  Consult Note    Patient Name: Deb Webb  MRN: [id number]  Admission Date: 1/15/2025  Hospital Length of Stay: 0 days  Code Status: Full Code   Attending Provider: Jared Caldera MD   Consulting Provider: Evie Smith DO  Primary Care Physician: Triston Valverde MD  Principal Problem:Atrial fibrillation with RVR    Patient information was obtained from patient, past medical records, ER records, and primary team.     Inpatient consult to Cardiology  Consult performed by: Evie Smith DO  Consult ordered by: Felicity Yap DO  Reason for consult: A. Fib with RVR      Subjective:     Chief Complaint:  Back Pain; Consulted for A.Fib with RVR     HPI:   Ms. Deb Webb is a 74 y.o. with past medical history that includes paroxysmal A. Fib, COPD (on 2-3 L normally ), T2DM, hx of CVA, osteoporosis, IBS, GERD, CKD3, and glaucoma. Came to the ED due to thoracic and lumbar pain and was found to have compression fractures of the spine and found to be in A. Fib with RVR. Cardiology was consulted due to A. Fib with RVR. While in the ED she was given diltiazem. She is currently in NSR with frequent PACs. ECHO done today. Results below. EF preserved.      ECHO:   Left Ventricle: The left ventricle is normal in size. Ventricular mass is normal. Normal wall thickness. Septal motion is abnormal. There is normal systolic function with a visually estimated ejection fraction of 55 - 60%. Ejection fraction is approximately 55%. There is indeterminate diastolic function.    Right Ventricle: Mild-moderate right ventricular enlargement. Systolic function is borderline low.    Left Atrium: Left atrium is severely dilated.    Right Atrium: Right atrium is moderate-severely dilated.    Aortic Valve: There is mild aortic valve sclerosis.    Tricuspid Valve: There is moderate regurgitation.    Pulmonary Artery: There is moderate to severe pulmonary hypertension. The estimated  pulmonary artery systolic pressure is 64 mmHg.    IVC/SVC: Elevated venous pressure at 15 mmHg.       Past Medical History:   Diagnosis Date    Allergy     Arthritis     Cataract     Colon polyps     COPD (chronic obstructive pulmonary disease)     Diabetes mellitus type II     Diabetic neuropathy     Fever blister     Glaucoma (increased eye pressure)     Hyperlipidemia     Hypertension     Irritable bowel syndrome     Keloid cicatrix     Osteoporosis     Retained cholelithiasis following cholecystectomy(997.41)     Right patella fracture        Past Surgical History:   Procedure Laterality Date    BACK SURGERY  2006    CATARACT EXTRACTION W/  INTRAOCULAR LENS IMPLANT Bilateral     CHOLECYSTECTOMY      Laparoscopic    COLONOSCOPY      COLONOSCOPY N/A 8/31/2022    Procedure: COLONOSCOPY;  Surgeon: Salvatore Butler MD;  Location: [facility];  Service: Gastroenterology;  Laterality: N/A;    COLONOSCOPY N/A 1/12/2024    Procedure: COLONOSCOPY;  Surgeon: Jose Manuel Gilmore MD;  Location: [facility] ([address] [place] [address]);  Service: Endoscopy;  Laterality: N/A;    ESOPHAGEAL DILATION N/A 8/30/2022    Procedure: DILATION, ESOPHAGUS;  Surgeon: Salvatore Butler MD;  Location: [facility];  Service: Gastroenterology;  Laterality: N/A;    ESOPHAGOGASTRODUODENOSCOPY N/A 8/30/2022    Procedure: EGD (ESOPHAGOGASTRODUODENOSCOPY);  Surgeon: Salvatore Butler MD;  Location: [facility];  Service: Gastroenterology;  Laterality: N/A;    ESOPHAGOGASTRODUODENOSCOPY N/A 5/23/2023    Procedure: ESOPHAGOGASTRODUODENOSCOPY (EGD);  Surgeon: Desmond Duffy Jr., MD;  Location: [facility];  Service: Endoscopy;  Laterality: N/A;    ESOPHAGOGASTRODUODENOSCOPY N/A 1/11/2024    Procedure: EGD (ESOPHAGOGASTRODUODENOSCOPY);  Surgeon: Jose Manuel Gilmore MD;  Location: [facility] ([address] [place] [address]);  Service: Endoscopy;  Laterality: N/A;    HERNIA REPAIR      HYSTERECTOMY      KNEE SURGERY Right 3/4/2015    Dr Weller     OOPHORECTOMY      ovarian  tumor      Benign    TONSILLECTOMY, ADENOIDECTOMY         Review of patient's allergies indicates:   Allergen Reactions    Silicone      Burn skin    Adhesive Rash       No current facility-administered medications on file prior to encounter.     Current Outpatient Medications on File Prior to Encounter   Medication Sig    busPIRone (BUSPAR) 7.5 MG tablet Take 7.5 mg by mouth 2 (two) times daily.    albuterol-ipratropium (DUO-NEB) 2.5 mg-0.5 mg/3 mL nebulizer solution Take 3 mLs by nebulization every 6 (six) hours as needed for Wheezing. Rescue    ANORO ELLIPTA 62.5-25 mcg/actuation DsDv INHALE 1 PUFF INTO THE LUNGS ONCE DAILY.    brimonidine 0.2% (ALPHAGAN) 0.2 % Drop PLACE 1 DROP INTO BOTH EYES 2 TIMES A DAY.    cholestyramine-aspartame (PREVALITE) 4 gram PwPk TAKE 1 PACKET (4 G TOTAL) BY MOUTH 2 (TWO) TIMES DAILY. FOR DIARRHEA    citalopram (CELEXA) 20 MG tablet Take 1 tablet (20 mg total) by mouth once daily.    famotidine (PEPCID) 20 MG tablet Take 1 tablet (20 mg total) by mouth once daily.    gabapentin (NEURONTIN) 300 MG capsule TAKE 1 CAPSULE BY MOUTH THREE TIMES A DAY    ketorolac 0.5% (ACULAR) 0.5 % Drop Place 1 drop into the left eye 4 (four) times daily. (Patient not taking: Reported on 12/3/2024)    latanoprost 0.005 % ophthalmic solution Place 1 drop into both eyes every evening.    levalbuterol (XOPENEX) 0.63 mg/3 mL nebulizer solution Take 3 mLs (0.63 mg total) by nebulization every 8 (eight) hours. Rescue    LEVEMIR FLEXPEN 100 unit/mL (3 mL) InPn pen Inject 10 Units into the skin every evening.    metoprolol tartrate (LOPRESSOR) 25 MG tablet Take 1 tablet (25 mg total) by mouth every 6 (six) hours.    NOVOLOG FLEXPEN U-100 INSULIN 100 unit/mL (3 mL) InPn pen Inject 0-8 Units into the skin as needed (sliding scale). B to 149 mg/dL = 0 units  150-199 mg/dL = 2 units  200 to 249 mg/dL = 4 units  250 to 299 mg/dL = 6 units  300 to 999 mg/dL = 8 units  Notify Provider if BG is  more than 400 mg/dL.    omeprazole (PRILOSEC) 40 MG capsule Take 1 capsule (40 mg total) by mouth before breakfast.    senna (SENOKOT) 8.6 mg tablet Take 1 tablet by mouth once daily.    tamsulosin (FLOMAX) 0.4 mg Cap Take 1 capsule (0.4 mg total) by mouth once daily.    timolol maleate 0.5% (TIMOPTIC) 0.5 % Drop Place 1 drop into both eyes once daily.    XARELTO 20 mg Tab TAKE 1 TABLET BY MOUTH EVERY DAY WITH DINNER OR EVENING MEAL    [DISCONTINUED] irbesartan (AVAPRO) 75 MG tablet Take 1 tablet (75 mg total) by mouth once daily.    [DISCONTINUED] loratadine (CLARITIN) 10 mg tablet TAKE 1 TABLET (10 MG TOTAL) BY MOUTH DAILY AS NEEDED FOR ALLERGIES (OR RUNNY NOSE).     Family History       Problem Relation (Age of Onset)    Breast cancer Maternal Aunt    Cancer Mother    Cataracts Mother    Diabetes Mother, Father, Sister, Daughter    Glaucoma Mother    Heart disease Father    Skin cancer Mother, Sister          Tobacco Use    Smoking status: Every Day     Current packs/day: 0.50     Average packs/day: 0.5 packs/day for 40.0 years (20.0 ttl pk-yrs)     Types: Cigarettes    Smokeless tobacco: Former    Tobacco comments:     Not ready to quit smoking. Cut down on cigarettes but enjoys having a couple cigarettes a day.   Substance and Sexual Activity    Alcohol use: No    Drug use: No    Sexual activity: Never     Review of Systems   Cardiovascular:  Positive for palpitations. Negative for chest pain, leg swelling and orthopnea.   Respiratory:  Positive for cough and shortness of breath (chronic; on oxygen at home).    Musculoskeletal:  Positive for back pain.   Gastrointestinal:  Negative for nausea.   Neurological:  Negative for dizziness and headaches.     Objective:     Vital Signs (Most Recent):  Temp: 97.6 °F (36.4 °C) (01/16/25 0630)  Pulse: 73 (01/16/25 1503)  Resp: (!) 24 (01/16/25 1503)  BP: 103/67 (01/16/25 1330)  SpO2: 97 % (01/16/25 1330) Vital Signs (24h Range):  Temp:  [97.3 °F (36.3  °C)-98.2 °F (36.8 °C)] 97.6 °F (36.4 °C)  Pulse:  [] 73  Resp:  [18-39] 24  SpO2:  [89 %-99 %] 97 %  BP: ()/(55-89) 103/67     Weight: 49.9 kg (110 lb 0.2 oz)  Body mass index is 22.22 kg/m².    SpO2: 97 %         Intake/Output Summary (Last 24 hours) at 1/16/2025 1619  Last data filed at 1/16/2025 0354  Gross per 24 hour   Intake 550 ml   Output 500 ml   Net 50 ml       Lines/Drains/Airways       Peripheral Intravenous Line  Duration                  Peripheral IV - Single Lumen 01/15/25 2049 20 G Distal;Left;Posterior Forearm <1 day         Peripheral IV - Single Lumen 01/16/25 0035 20 G Anterior;Distal;Left Upper Arm <1 day         Peripheral IV - Single Lumen 01/16/25 0325 20 G Anterior;Right Forearm <1 day                     Physical Exam  Constitutional:       General: She is not in acute distress.     Appearance: She is ill-appearing.   HENT:      Head: Normocephalic and atraumatic.   Eyes:      General: No scleral icterus.  Cardiovascular:      Heart sounds: No murmur heard.     Comments: NSR with frequent PACs  Pulmonary:      Breath sounds: Wheezing present.   Neurological:      Mental Status: She is alert and oriented to person, place, and time.          Significant Labs: All pertinent lab results from the last 24 hours have been reviewed.    Significant Imaging:  Reviewed   Assessment and Plan:     * Atrial fibrillation with RVR  Patient has paroxysmal (<7 days) atrial fibrillation. Patient is currently in sinus rhythm with frequent PACs. [id number] Score: 3. The patients heart rate in the last 24 hours is as follows:  Pulse  Min: 59  Max: 143     Antiarrhythmics  metoprolol tartrate (LOPRESSOR) tablet 25 mg, 2 times daily, Oral    Anticoagulants  rivaroxaban tablet 20 mg, with dinner, Oral    Plan  - Replete lytes with a goal of K>4, Mg >2  - Patient is anticoagulated, see medications listed above.  - Patient's afib is currently controlled  - Up titrate beta-blocker as tolerated due to  continued PACs  -Continue oral anticoagulation  -follow up with EP on discharge  -Cardiology will sign off; please contact if further concerns/questions               VTE Risk Mitigation (From admission, onward)           Ordered     rivaroxaban tablet 20 mg  with dinner         01/16/25 0552     IP VTE HIGH RISK PATIENT  Once         01/16/25 0359     Place sequential compression device  Until discontinued         01/16/25 0359                    Thank you for your consult. I will sign off. Please contact us if you have any additional questions.    Evie Smith, DO  Cardiology   [facility] - Emergency Dept

## 2025-01-16 NOTE — ED NOTES
Bed: [facility]  Expected date:   Expected time:   Means of arrival:   Comments:  Jon

## 2025-01-16 NOTE — SUBJECTIVE & OBJECTIVE
Past Medical History:   Diagnosis Date    Allergy     Arthritis     Cataract     Colon polyps     COPD (chronic obstructive pulmonary disease)     Diabetes mellitus type II     Diabetic neuropathy     Fever blister     Glaucoma (increased eye pressure)     Hyperlipidemia     Hypertension     Irritable bowel syndrome     Keloid cicatrix     Osteoporosis     Retained cholelithiasis following cholecystectomy(997.41)     Right patella fracture        Past Surgical History:   Procedure Laterality Date    BACK SURGERY  2006    CATARACT EXTRACTION W/  INTRAOCULAR LENS IMPLANT Bilateral     CHOLECYSTECTOMY      Laparoscopic    COLONOSCOPY      COLONOSCOPY N/A 8/31/2022    Procedure: COLONOSCOPY;  Surgeon: Salvatore Butler MD;  Location: [facility];  Service: Gastroenterology;  Laterality: N/A;    COLONOSCOPY N/A 1/12/2024    Procedure: COLONOSCOPY;  Surgeon: Jose Manuel Gilmore MD;  Location: [facility] ([address] [place] [address]);  Service: Endoscopy;  Laterality: N/A;    ESOPHAGEAL DILATION N/A 8/30/2022    Procedure: DILATION, ESOPHAGUS;  Surgeon: Salvatore Butler MD;  Location: [facility];  Service: Gastroenterology;  Laterality: N/A;    ESOPHAGOGASTRODUODENOSCOPY N/A 8/30/2022    Procedure: EGD (ESOPHAGOGASTRODUODENOSCOPY);  Surgeon: Salvatore Butler MD;  Location: [facility];  Service: Gastroenterology;  Laterality: N/A;    ESOPHAGOGASTRODUODENOSCOPY N/A 5/23/2023    Procedure: ESOPHAGOGASTRODUODENOSCOPY (EGD);  Surgeon: Desmond Duffy Jr., MD;  Location: [facility];  Service: Endoscopy;  Laterality: N/A;    ESOPHAGOGASTRODUODENOSCOPY N/A 1/11/2024    Procedure: EGD (ESOPHAGOGASTRODUODENOSCOPY);  Surgeon: Jose Manuel Gilmore MD;  Location: [facility] ([address] [place] [address]);  Service: Endoscopy;  Laterality: N/A;    HERNIA REPAIR      HYSTERECTOMY      KNEE SURGERY Right 3/4/2015    Dr Weller     OOPHORECTOMY      ovarian tumor      Benign    TONSILLECTOMY, ADENOIDECTOMY         Review of patient's allergies indicates:   Allergen Reactions    Silicone       Burn skin    Adhesive Rash       No current facility-administered medications on file prior to encounter.     Current Outpatient Medications on File Prior to Encounter   Medication Sig    busPIRone (BUSPAR) 7.5 MG tablet Take 7.5 mg by mouth 2 (two) times daily.    albuterol-ipratropium (DUO-NEB) 2.5 mg-0.5 mg/3 mL nebulizer solution Take 3 mLs by nebulization every 6 (six) hours as needed for Wheezing. Rescue    ANORO ELLIPTA 62.5-25 mcg/actuation DsDv INHALE 1 PUFF INTO THE LUNGS ONCE DAILY.    brimonidine 0.2% (ALPHAGAN) 0.2 % Drop PLACE 1 DROP INTO BOTH EYES 2 TIMES A DAY.    cholestyramine-aspartame (PREVALITE) 4 gram PwPk TAKE 1 PACKET (4 G TOTAL) BY MOUTH 2 (TWO) TIMES DAILY. FOR DIARRHEA    citalopram (CELEXA) 20 MG tablet Take 1 tablet (20 mg total) by mouth once daily.    famotidine (PEPCID) 20 MG tablet Take 1 tablet (20 mg total) by mouth once daily.    gabapentin (NEURONTIN) 300 MG capsule TAKE 1 CAPSULE BY MOUTH THREE TIMES A DAY    ketorolac 0.5% (ACULAR) 0.5 % Drop Place 1 drop into the left eye 4 (four) times daily. (Patient not taking: Reported on 12/3/2024)    latanoprost 0.005 % ophthalmic solution Place 1 drop into both eyes every evening.    levalbuterol (XOPENEX) 0.63 mg/3 mL nebulizer solution Take 3 mLs (0.63 mg total) by nebulization every 8 (eight) hours. Rescue    LEVEMIR FLEXPEN 100 unit/mL (3 mL) InPn pen Inject 10 Units into the skin every evening.    metoprolol tartrate (LOPRESSOR) 25 MG tablet Take 1 tablet (25 mg total) by mouth every 6 (six) hours.    NOVOLOG FLEXPEN U-100 INSULIN 100 unit/mL (3 mL) InPn pen Inject 0-8 Units into the skin as needed (sliding scale). B to 149 mg/dL = 0 units  150-199 mg/dL = 2 units  200 to 249 mg/dL = 4 units  250 to 299 mg/dL = 6 units  300 to 999 mg/dL = 8 units  Notify Provider if BG is more than 400 mg/dL.    omeprazole (PRILOSEC) 40 MG capsule Take 1 capsule (40 mg total) by mouth before breakfast.    senna (SENOKOT) 8.6 mg tablet Take  1 tablet by mouth once daily.    tamsulosin (FLOMAX) 0.4 mg Cap Take 1 capsule (0.4 mg total) by mouth once daily.    timolol maleate 0.5% (TIMOPTIC) 0.5 % Drop Place 1 drop into both eyes once daily.    XARELTO 20 mg Tab TAKE 1 TABLET BY MOUTH EVERY DAY WITH DINNER OR EVENING MEAL    [DISCONTINUED] irbesartan (AVAPRO) 75 MG tablet Take 1 tablet (75 mg total) by mouth once daily.    [DISCONTINUED] loratadine (CLARITIN) 10 mg tablet TAKE 1 TABLET (10 MG TOTAL) BY MOUTH DAILY AS NEEDED FOR ALLERGIES (OR RUNNY NOSE).     Family History       Problem Relation (Age of Onset)    Breast cancer Maternal Aunt    Cancer Mother    Cataracts Mother    Diabetes Mother, Father, Sister, Daughter    Glaucoma Mother    Heart disease Father    Skin cancer Mother, Sister          Tobacco Use    Smoking status: Every Day     Current packs/day: 0.50     Average packs/day: 0.5 packs/day for 40.0 years (20.0 ttl pk-yrs)     Types: Cigarettes    Smokeless tobacco: Former    Tobacco comments:     Not ready to quit smoking. Cut down on cigarettes but enjoys having a couple cigarettes a day.   Substance and Sexual Activity    Alcohol use: No    Drug use: No    Sexual activity: Never     Review of Systems   Cardiovascular:  Positive for palpitations. Negative for chest pain, leg swelling and orthopnea.   Respiratory:  Positive for cough and shortness of breath (chronic; on oxygen at home).    Musculoskeletal:  Positive for back pain.   Gastrointestinal:  Negative for nausea.   Neurological:  Negative for dizziness and headaches.     Objective:     Vital Signs (Most Recent):  Temp: 97.6 °F (36.4 °C) (01/16/25 0630)  Pulse: 73 (01/16/25 1503)  Resp: (!) 24 (01/16/25 1503)  BP: 103/67 (01/16/25 1330)  SpO2: 97 % (01/16/25 1330) Vital Signs (24h Range):  Temp:  [97.3 °F (36.3 °C)-98.2 °F (36.8 °C)] 97.6 °F (36.4 °C)  Pulse:  [] 73  Resp:  [18-39] 24  SpO2:  [89 %-99 %] 97 %  BP: ()/(55-89) 103/67     Weight: 49.9 kg (110 lb 0.2  oz)  Body mass index is 22.22 kg/m².    SpO2: 97 %         Intake/Output Summary (Last 24 hours) at 1/16/2025 1619  Last data filed at 1/16/2025 0354  Gross per 24 hour   Intake 550 ml   Output 500 ml   Net 50 ml       Lines/Drains/Airways       Peripheral Intravenous Line  Duration                  Peripheral IV - Single Lumen 01/15/25 2049 20 G Distal;Left;Posterior Forearm <1 day         Peripheral IV - Single Lumen 01/16/25 0035 20 G Anterior;Distal;Left Upper Arm <1 day         Peripheral IV - Single Lumen 01/16/25 0325 20 G Anterior;Right Forearm <1 day                     Physical Exam  Constitutional:       General: She is not in acute distress.     Appearance: She is ill-appearing.   HENT:      Head: Normocephalic and atraumatic.   Eyes:      General: No scleral icterus.  Cardiovascular:      Heart sounds: No murmur heard.     Comments: NSR with frequent PACs  Pulmonary:      Breath sounds: Wheezing present.   Neurological:      Mental Status: She is alert and oriented to person, place, and time.          Significant Labs: All pertinent lab results from the last 24 hours have been reviewed.    Significant Imaging:  Reviewed

## 2025-01-16 NOTE — ASSESSMENT & PLAN NOTE
Indeterminate age fractures although some appear newer per radiology. Pt w/o acute neurologic symptoms at this time. Notably hx of osteoporosis and reports she used to be on medicines other than just vitamin D and calcium but has not been on them in at least 1yr.    Plan  - Might benefit from spinal evaluation once acute afib is controlled.

## 2025-01-16 NOTE — HOSPITAL COURSE
"Echo with CVP 15 continued judicious diuresis in setting of soft BP with MAP>65. Cardiology recommends medical management of AF with outpatient EP follow up. Pt reports "heavy urine" at NH with bacteriuria. Uncertain of contribution of steroids vs infective source, treating UTI with Augmentin. Insulin regimen increased given steroid-associated hyperglycemia. Pt's respiratory status improved and HR within normal limits prior to DC. Pt discharged with EP follow up on metoprolol tartrate 25mg BID dosing for AF, Ortho and Endocrinology for osteoporosis and sub-acute compression fractures confirmed on MRI, Augmentin for UTI and completion of CAP coverage, and PCP follow up.   "

## 2025-01-16 NOTE — PROVIDER PROGRESS NOTES - EMERGENCY DEPT.
"Encounter Date: 1/15/2025    ED Physician Progress Notes        Physician Note:   Physician Note:   Signout Note  I received signout from the previous providers, Dr Rahel Jordan and Dr Catherine Curran.      Chief complaint: Back pain     Per sign out and chart review:  Per initial provider's HPI: "74-year-old female with past medical history arthritis, COPD w/ 6L O2 at baseline and history of CVA , diabetes mellitus type 2, hyperlipidemia, hypertension, paroxysmal AFib on Xarelto now presenting with a chief complaint of lower back pain.  Patient reports that for the past 4-5 weeks she has experienced worsening back pain and weakness of her bilateral lower legs causing difficulty to ambulate.  Patient denies any new trauma to her back, numbness of her lower extremities, difficulty urinating, or fevers.  Additionally patient tells me that she has experienced worsening shortness of breath and is dyspneic on exertion.  Patient also tells me that she has been losing a large amount of weight."    During ED stay patient received:  Medications  albuterol-ipratropium 2.5 mg-0.5 mg/3 mL nebulizer solution 3 mL (3 mLs Nebulization Given 1/15/25 2010)  ketorolac injection 9.999 mg (9.999 mg Intravenous Given 1/15/25 2050)  furosemide injection 40 mg (40 mg Intravenous Given 1/15/25 2201)  diltiaZEM injection 10 mg (10 mg Intravenous Given 1/15/25 2201)     Pt signed out to me pending:  Diltiazem dose for elevated heart rate concerning for AFib with RVR, reassessment following.  Also pending CT read.       Update/ Disposition: CT showing multiple age indeterminate vertebral compression fractures without specifically noted factors concerning for underlying malignancy or pathological fracture.  Diltiazem 10 mg given with heart rate dropping from 130s to 110, quickly recovered to 130.  Patient also hypotensive with systolics in the 90s to high 80s.  Reordered repeat diltiazem with fluid bolus and diltiazem drip for treatment of " AFib with RVR, after repeat bolus dose heart rates in the 80s.  After repeat fluid boluses and up titration of diltiazem drip to 15, rates controlled in the 90s and blood pressure stabilized in the 100s.  Discussed with Hospital Medicine for admission for AFib management and further treatment of her back pain     Patient, caregiver and/or family understands the plan and verbalized agreement. All questions answered.      Diagnostic Impression:    :  Shortness of breath  Tachycardia   Atrial fibrillation    Signed,    Kameron Noland MD  Emergency Medicine, PGY-1  [facility]

## 2025-01-16 NOTE — SUBJECTIVE & OBJECTIVE
Past Medical History:   Diagnosis Date    Allergy     Arthritis     Cataract     Colon polyps     COPD (chronic obstructive pulmonary disease)     Diabetes mellitus type II     Diabetic neuropathy     Fever blister     Glaucoma (increased eye pressure)     Hyperlipidemia     Hypertension     Irritable bowel syndrome     Keloid cicatrix     Osteoporosis     Retained cholelithiasis following cholecystectomy(997.41)     Right patella fracture        Past Surgical History:   Procedure Laterality Date    BACK SURGERY  2006    CATARACT EXTRACTION W/  INTRAOCULAR LENS IMPLANT Bilateral     CHOLECYSTECTOMY      Laparoscopic    COLONOSCOPY      COLONOSCOPY N/A 8/31/2022    Procedure: COLONOSCOPY;  Surgeon: Salvatore Butler MD;  Location: [facility];  Service: Gastroenterology;  Laterality: N/A;    COLONOSCOPY N/A 1/12/2024    Procedure: COLONOSCOPY;  Surgeon: Jose Manuel Gilmore MD;  Location: [facility] ([address] [place] [address]);  Service: Endoscopy;  Laterality: N/A;    ESOPHAGEAL DILATION N/A 8/30/2022    Procedure: DILATION, ESOPHAGUS;  Surgeon: Salvatore Butler MD;  Location: [facility];  Service: Gastroenterology;  Laterality: N/A;    ESOPHAGOGASTRODUODENOSCOPY N/A 8/30/2022    Procedure: EGD (ESOPHAGOGASTRODUODENOSCOPY);  Surgeon: Salvatore Butler MD;  Location: [facility];  Service: Gastroenterology;  Laterality: N/A;    ESOPHAGOGASTRODUODENOSCOPY N/A 5/23/2023    Procedure: ESOPHAGOGASTRODUODENOSCOPY (EGD);  Surgeon: Desmond Duffy Jr., MD;  Location: [facility];  Service: Endoscopy;  Laterality: N/A;    ESOPHAGOGASTRODUODENOSCOPY N/A 1/11/2024    Procedure: EGD (ESOPHAGOGASTRODUODENOSCOPY);  Surgeon: Jose Manuel Gilmore MD;  Location: [facility] ([address] [place] [address]);  Service: Endoscopy;  Laterality: N/A;    HERNIA REPAIR      HYSTERECTOMY      KNEE SURGERY Right 3/4/2015    Dr Weller     OOPHORECTOMY      ovarian tumor      Benign    TONSILLECTOMY, ADENOIDECTOMY         Review of patient's allergies indicates:   Allergen Reactions    Silicone       Burn skin    Adhesive Rash       No current facility-administered medications on file prior to encounter.     Current Outpatient Medications on File Prior to Encounter   Medication Sig    albuterol-ipratropium (DUO-NEB) 2.5 mg-0.5 mg/3 mL nebulizer solution Take 3 mLs by nebulization every 6 (six) hours as needed for Wheezing. Rescue    ANORO ELLIPTA 62.5-25 mcg/actuation DsDv INHALE 1 PUFF INTO THE LUNGS ONCE DAILY.    brimonidine 0.2% (ALPHAGAN) 0.2 % Drop PLACE 1 DROP INTO BOTH EYES 2 TIMES A DAY.    cholestyramine-aspartame (PREVALITE) 4 gram PwPk TAKE 1 PACKET (4 G TOTAL) BY MOUTH 2 (TWO) TIMES DAILY. FOR DIARRHEA    citalopram (CELEXA) 20 MG tablet Take 1 tablet (20 mg total) by mouth once daily.    famotidine (PEPCID) 20 MG tablet Take 1 tablet (20 mg total) by mouth once daily.    gabapentin (NEURONTIN) 300 MG capsule TAKE 1 CAPSULE BY MOUTH THREE TIMES A DAY    ketorolac 0.5% (ACULAR) 0.5 % Drop Place 1 drop into the left eye 4 (four) times daily. (Patient not taking: Reported on 12/3/2024)    latanoprost 0.005 % ophthalmic solution Place 1 drop into both eyes every evening.    levalbuterol (XOPENEX) 0.63 mg/3 mL nebulizer solution Take 3 mLs (0.63 mg total) by nebulization every 8 (eight) hours. Rescue    LEVEMIR FLEXPEN 100 unit/mL (3 mL) InPn pen Inject 10 Units into the skin every evening.    metoprolol tartrate (LOPRESSOR) 25 MG tablet Take 1 tablet (25 mg total) by mouth every 6 (six) hours.    NOVOLOG FLEXPEN U-100 INSULIN 100 unit/mL (3 mL) InPn pen Inject 0-8 Units into the skin as needed (sliding scale). B to 149 mg/dL = 0 units  150-199 mg/dL = 2 units  200 to 249 mg/dL = 4 units  250 to 299 mg/dL = 6 units  300 to 999 mg/dL = 8 units  Notify Provider if BG is more than 400 mg/dL.    omeprazole (PRILOSEC) 40 MG capsule Take 1 capsule (40 mg total) by mouth before breakfast.    senna (SENOKOT) 8.6 mg tablet Take 1 tablet by mouth once daily.    tamsulosin (FLOMAX) 0.4 mg Cap Take 1  capsule (0.4 mg total) by mouth once daily.    timolol maleate 0.5% (TIMOPTIC) 0.5 % Drop Place 1 drop into both eyes once daily.    XARELTO 20 mg Tab TAKE 1 TABLET BY MOUTH EVERY DAY WITH DINNER OR EVENING MEAL    [DISCONTINUED] irbesartan (AVAPRO) 75 MG tablet Take 1 tablet (75 mg total) by mouth once daily.    [DISCONTINUED] loratadine (CLARITIN) 10 mg tablet TAKE 1 TABLET (10 MG TOTAL) BY MOUTH DAILY AS NEEDED FOR ALLERGIES (OR RUNNY NOSE).     Family History       Problem Relation (Age of Onset)    Breast cancer Maternal Aunt    Cancer Mother    Cataracts Mother    Diabetes Mother, Father, Sister, Daughter    Glaucoma Mother    Heart disease Father    Skin cancer Mother, Sister          Tobacco Use    Smoking status: Every Day     Current packs/day: 0.50     Average packs/day: 0.5 packs/day for 40.0 years (20.0 ttl pk-yrs)     Types: Cigarettes    Smokeless tobacco: Former    Tobacco comments:     Not ready to quit smoking. Cut down on cigarettes but enjoys having a couple cigarettes a day.   Substance and Sexual Activity    Alcohol use: No    Drug use: No    Sexual activity: Never     Review of Systems   Constitutional:  Negative for chills and fever.   HENT:  Positive for congestion and rhinorrhea. Negative for sore throat.    Eyes:  Negative for pain and redness.   Respiratory:  Positive for cough (Chronic cough, worse than baseline) and shortness of breath (Chronic but worse than baseline).    Cardiovascular:  Negative for chest pain and leg swelling.   Gastrointestinal:  Negative for abdominal pain, blood in stool, constipation, diarrhea, nausea and vomiting.   Genitourinary:  Negative for dysuria and hematuria.   Musculoskeletal:  Positive for back pain. Negative for arthralgias.   Skin:  Negative for rash.   Allergic/Immunologic: Positive for environmental allergies.   Neurological:  Negative for dizziness, syncope, numbness and headaches.        No saddle anesthesia, bowel or bladder  "incontinence.    Sometimes feels like her legs are "shaky" under her but this has been going for "a long time" and has not acutely changed.     Objective:     Vital Signs (Most Recent):  Temp: 97.5 °F (36.4 °C) (01/16/25 0352)  Pulse: 89 (01/16/25 0412)  Resp: (!) 23 (01/16/25 0412)  BP: 104/67 (01/16/25 0412)  SpO2: (!) 94 % (01/16/25 0412) Vital Signs (24h Range):  Temp:  [97.3 °F (36.3 °C)-98.2 °F (36.8 °C)] 97.5 °F (36.4 °C)  Pulse:  [] 89  Resp:  [18-39] 23  SpO2:  [92 %-99 %] 94 %  BP: ()/(58-89) 104/67     Weight: 49.9 kg (110 lb 0.2 oz)  Body mass index is 22.22 kg/m².     Physical Exam  Vitals reviewed.   Constitutional:       General: She is not in acute distress.  HENT:      Head: Normocephalic and atraumatic.      Nose: No rhinorrhea.      Mouth/Throat:      Mouth: Mucous membranes are moist.   Eyes:      Conjunctiva/sclera: Conjunctivae normal.      Pupils: Pupils are equal, round, and reactive to light.   Cardiovascular:      Rate and Rhythm: Normal rate. Rhythm irregular.      Heart sounds: Normal heart sounds.   Pulmonary:      Effort: Pulmonary effort is normal. No respiratory distress.      Breath sounds: Wheezing present. No rales.      Comments: Talking and breathing comfortably on 3L (usually uses 2-3L at home for comfort).  Abdominal:      General: Bowel sounds are normal. There is no distension.      Palpations: Abdomen is soft.      Tenderness: There is no abdominal tenderness. There is no guarding or rebound.   Musculoskeletal:      Cervical back: Normal range of motion and neck supple.      Right lower leg: No edema.      Left lower leg: No edema.   Skin:     General: Skin is warm and dry.   Neurological:      Mental Status: She is alert and oriented to person, place, and time.      Cranial Nerves: No dysarthria or facial asymmetry.      Sensory: Sensation is intact.      Comments: BL UE and LE 5/5 strength.              CRANIAL NERVES     CN III, IV, VI   Pupils are equal, " round, and reactive to light.       Significant Labs: All pertinent labs within the past 24 hours have been reviewed.  Recent Lab Results         01/15/25  2050        Albumin 2.9              ALT 10       Anion Gap 7       PTT 24.8  Comment: Refer to local heparin nomogram for intensity/dose specific   therapeutic   range.         AST 13       Baso # 0.04       Basophil % 0.3       BILIRUBIN TOTAL 0.3  Comment: For infants and newborns, interpretation of results should be based  on gestational age, weight and in agreement with clinical  observations.    Premature Infant recommended reference ranges:  Up to 24 hours.............<8.0 mg/dL  Up to 48 hours............<12.0 mg/dL  3-5 days..................<15.0 mg/dL  6-29 days.................<15.0 mg/dL           Comment: Values of less than 100 pg/ml are consistent with non-CHF populations.       BUN 12       Calcium 10.0       Chloride 109       CO2 25       Creatinine 0.6       Differential Method Automated       eGFR >60.0       Eos # 0.1       Eos % 0.8       Glucose 57       Gran # (ANC) 8.8       Gran % 74.7       Hematocrit 38.6       Hemoglobin 11.9       Hepatitis C Ab Non-reactive       HIV 1/2 Ag/Ab Non-reactive       Immature Grans (Abs) 0.06  Comment: Mild elevation in immature granulocytes is non specific and   can be seen in a variety of conditions including stress response,   acute inflammation, trauma and pregnancy. Correlation with other   laboratory and clinical findings is essential.         Immature Granulocytes 0.5       INR 1.2  Comment: Coumadin Therapy:  2.0 - 3.0 for INR for all indicators except mechanical heart valves  and antiphospholipid syndromes which should use 2.5 - 3.5.         Lymph # 1.9       Lymph % 16.3       MCH 29.0       MCHC 30.8       MCV 94       Mono # 0.9       Mono % 7.4       MPV 10.1       nRBC 0       Platelet Count 235       Potassium 5.0       PROTEIN TOTAL 7.2       PT 13.1       RBC 4.11        RDW 14.8       Sodium 141       WBC 11.78               Significant Imaging: I have reviewed all pertinent imaging results/findings within the past 24 hours.    Imaging Results              X-Ray Chest AP Portable (Final result)  Result time 01/15/25 23:31:13      Final result by Mario Alberto Adam MD (01/15/25 23:31:13)                   Impression:      Persistent prominence of the interstitium likely chronic.    No convincing evidence of acute pulmonary infiltrate.      Electronically signed by: Mario Alberto Adam  Date:    01/15/2025  Time:    23:31               Narrative:    EXAMINATION:  XR CHEST AP PORTABLE    CLINICAL HISTORY:  Shortness of breath    TECHNIQUE:  Single frontal view of the chest was performed.    COMPARISON:  01/13/2024    FINDINGS:  Heart mediastinal contours appear stable.  Prominence of the interstitium without consolidation or effusion.  Remote healed right rib fractures.  Degenerative changes in the shoulders and spine.  Orthopedic hardware in the lower lumbar region.                                        CT Chest Abdomen Pelvis Without Contrast (XPD) (Final result)  Result time 01/15/25 22:09:03      Final result by Jayme Inman MD (01/15/25 22:09:03)                   Impression:      Multiple age-indeterminate compression fractures of the thoracic and lumbar spine, some which are new or worse compared to CTA 01/10/2024.  No suspicious features to the indicate underlying malignancy.  Further evaluation with contrast enhanced MRI would be beneficial.    Multiple pulmonary nodules, some which are stable and some which are new measuring up to 0.6 cm.  For multiple solid nodules with any 6 mm or greater, Fleischner Society guidelines recommend follow up with non-contrast chest CT at 3-6 months and 18-24 months after discovery.    Enlarged mediastinal and hilar lymph nodes, which appear worse compared to prior CTA 12/08/2023.  Underlying malignancy is not excluded.    Severe  emphysema.    Hepatomegaly.    Stable left adrenal nodule.    Other incidental/nonemergent findings in the body of the report.    This report was flagged in Epic as abnormal.    Electronically signed by resident: Mak Joyner  Date:    01/15/2025  Time:    20:36    Electronically signed by: Jayme Inman MD  Date:    01/15/2025  Time:    22:09               Narrative:    EXAMINATION:  CT CHEST ABDOMEN PELVIS WITHOUT CONTRAST(XPD)    CLINICAL HISTORY:  back pain concerning for pathological fracture and concern for neoplasm;    TECHNIQUE:  Low dose axial images, sagittal and coronal reformations were obtained from the thoracic inlet to the pubic symphysis.  No contrast was administered.    COMPARISON:  CTA abdomen pelvis 01/10/2024; CTA chest 12/08/2023; PET-CT 01/24/2022    FINDINGS:  CHEST:    Lower neck and thoracic soft tissues: No axillary or subpectoral lymphadenopathy.  Visualized thyroid is unremarkable.    Vasculature: Left sided aortic arch. Thoracic aorta is nonaneurysmal. Moderate atherosclerosis of the thoracic aorta.    Heart: Upper limits normal in size.  Multi-vessel coronary artery atherosclerosis.  Trace pericardial effusion, new from prior CTA    Airways: Trachea is clear.  Mild bronchial wall thickening of the distal airways.    Lungs/Pleura: Severe emphysema.  Multiple bilateral pulmonary nodules.  For example right upper lobe pulmonary nodule measures 0.7 cm (series 6, image 241), which is stable compared to prior PET.  New pulmonary nodule within the right lower lobe measuring 0.6 cm (series 2, image 74).  Bilateral trace pleural effusions with associated passive atelectasis.  No consolidation or pneumothorax.    Hilum/Mediastinum: Prominent enlarged mediastinal and hilar lymph nodes.  Precarinal lymph node measures 1.3 cm.  Right hilar lymph node measures 1.5 cm.    Esophagus: Unremarkable.    ABDOMEN/PELVIS:    Liver: Enlarged measuring 20 cm.  No focal abnormality.    Biliary: Cholecystectomy.   No intrahepatic or extrahepatic biliary ductal dilatation.    Pancreas: No mass or ductal dilatation.  No peripancreatic fat stranding.    Spleen: Normal size.    Adrenals: Left adrenal nodule measures 1.7 cm, unchanged.    Renal/Ureters: No stones.  No hydronephrosis.  The urinary bladder is unremarkable.    Reproductive: Hysterectomy.  No adnexal mass.    Stomach/Bowel: Small hiatal hernia.  Duodenum is within normal limits.  No evidence of obstruction or inflammatory changes.  Appendix is within normal limits.  No bowel pneumatosis.    Peritoneum: No free fluid. No intraperitoneal free air.    Lymph Nodes: No abdominopelvic lymphadenopathy    Vasculature: Abdominal aorta tapers normally with moderate atherosclerosis.    Bones: Bones are demineralized.  Postoperative changes of posterior instrumented fusion of L4-L5.  Hardware appears intact.  Multiple chronic and new compression fractures of the thoracic and lumbar spine.  For example new height loss, now approximately 50% height loss of the T4 vertebral body.  Similar appearing height loss the anterior aspect of the T11 vertebral body.  New height loss of the L3 and L5 vertebral bodies.  No significant posterior cortical bulge or extension into the posterior elements.  Similar appearing cortical deformity of S2.  Remote fractures of several posterior right ribs.  Remote fractures of the bilateral pubic rami.  Degenerative changes of the spine and bilateral hips.    Soft Tissues: Small right fat containing inguinal hernia.  Moderate left fat and large bowel containing inguinal hernia, similar to prior.  No evidence of strangulation.  Small fat containing umbilical hernia.

## 2025-01-16 NOTE — ED PROVIDER NOTES
Encounter Date: 1/15/2025       History     Chief Complaint   Patient presents with    Back Pain     Pt. Is a 74 yr old  female presenting with generalized back pain x 2 weeks. Walks with walker at baseline, Coming from [place].      74-year-old female with past medical history arthritis, COPD w/ 6L O2 at baseline and history of CVA , diabetes mellitus type 2, hyperlipidemia, hypertension, paroxysmal AFib on Xarelto now presenting with a chief complaint of lower back pain.  Patient reports that for the past 4-5 weeks she has experienced worsening back pain and weakness of her bilateral lower legs causing difficulty to ambulate.  Patient denies any new trauma to her back, numbness of her lower extremities, difficulty urinating, or fevers.  Additionally patient tells me that she has experienced worsening shortness of breath and is dyspneic on exertion.  Patient also tells me that she has been losing a large amount of weight.      Review of patient's allergies indicates:   Allergen Reactions    Silicone      Burn skin    Adhesive Rash     Past Medical History:   Diagnosis Date    Allergy     Arthritis     Cataract     Colon polyps     COPD (chronic obstructive pulmonary disease)     Diabetes mellitus type II     Diabetic neuropathy     Fever blister     Glaucoma (increased eye pressure)     Hyperlipidemia     Hypertension     Irritable bowel syndrome     Keloid cicatrix     Osteoporosis     Retained cholelithiasis following cholecystectomy(997.41)     Right patella fracture      Past Surgical History:   Procedure Laterality Date    BACK SURGERY  2006    CATARACT EXTRACTION W/  INTRAOCULAR LENS IMPLANT Bilateral     CHOLECYSTECTOMY      Laparoscopic    COLONOSCOPY      COLONOSCOPY N/A 8/31/2022    Procedure: COLONOSCOPY;  Surgeon: Salvatore Butler MD;  Location: [facility];  Service: Gastroenterology;  Laterality: N/A;    COLONOSCOPY N/A 1/12/2024    Procedure: COLONOSCOPY;  Surgeon: Jose Manuel Gilmore MD;   Location: [facility] (2ND FLR);  Service: Endoscopy;  Laterality: N/A;    ESOPHAGEAL DILATION N/A 8/30/2022    Procedure: DILATION, ESOPHAGUS;  Surgeon: Salvatore Butler MD;  Location: [facility];  Service: Gastroenterology;  Laterality: N/A;    ESOPHAGOGASTRODUODENOSCOPY N/A 8/30/2022    Procedure: EGD (ESOPHAGOGASTRODUODENOSCOPY);  Surgeon: Salvatore Butler MD;  Location: [facility];  Service: Gastroenterology;  Laterality: N/A;    ESOPHAGOGASTRODUODENOSCOPY N/A 5/23/2023    Procedure: ESOPHAGOGASTRODUODENOSCOPY (EGD);  Surgeon: Desmond Duffy Jr., MD;  Location: [facility];  Service: Endoscopy;  Laterality: N/A;    ESOPHAGOGASTRODUODENOSCOPY N/A 1/11/2024    Procedure: EGD (ESOPHAGOGASTRODUODENOSCOPY);  Surgeon: Jose Manuel Gilmore MD;  [facility]  Service: Endoscopy;  Laterality: N/A;    HERNIA REPAIR      HYSTERECTOMY      KNEE SURGERY Right 3/4/2015    Dr Weller     OOPHORECTOMY      ovarian tumor      Benign    TONSILLECTOMY, ADENOIDECTOMY       Family History   Problem Relation Name Age of Onset    Cancer Mother age 81         Skin    Skin cancer Mother age 81     Glaucoma Mother age 81     Cataracts Mother age 81     Diabetes Mother age 81     Heart disease Father age 76     Diabetes Father age 76     Skin cancer Sister      Diabetes Sister      Diabetes Daughter      Breast cancer Maternal Aunt      Melanoma Neg Hx      Psoriasis Neg Hx      Eczema Neg Hx      Lupus Neg Hx      Amblyopia Neg Hx      Blindness Neg Hx      Macular degeneration Neg Hx      Retinal detachment Neg Hx      Strabismus Neg Hx       Social History     Tobacco Use    Smoking status: Every Day     Current packs/day: 0.50     Average packs/day: 0.5 packs/day for 40.0 years (20.0 ttl pk-yrs)     Types: Cigarettes    Smokeless tobacco: Former    Tobacco comments:     Not ready to quit smoking. Cut down on cigarettes but enjoys having a couple cigarettes a day.   Substance Use Topics    Alcohol use: No    Drug use: No      Review of Systems  See HPI    Physical Exam     Initial Vitals [01/15/25 1731]   BP Pulse Resp Temp SpO2   (!) 146/89 82 18 98.2 °F (36.8 °C) 99 %      MAP       --         Physical Exam    Nursing note and vitals reviewed.      Gen: AxOx3, well nourished, appears stated age, no pallor, no jaundice, appears well hydrated  Eye: EOMI, no scleral icterus, no periorbital edema or ecchymosis  Head: Normocephalic, atraumatic, no lesions, scalp appears normal  ENT: Neck supple, no stridor, no masses, no drooling or voice changes  CVS: All distal pulses intact with normal rate and rhythm, no JVD, normal S1/S2, no murmur  Pulm:  Mild increased work of breathing with bibasilar crackles and diffuse wheeze Abd:  Nondistended, soft, nontender, no organomegaly, no CVAT  Ext: No edema, no lesions, rashes, or deformity  Tenderness to palpation over thoracolumbar spine with bony irregularities doubt crepitus  Bilateral straight leg raise test negative  Neuro: GCS15  Face grossly symmetric  RLE  - power 3/5  - tone/sensation intact   LLE  - power 3/5  -tone/sensation intact   no DDK.     Psych: normal affect, cooperative, well groomed, makes good eye contact      ED Course   Procedures  Labs Reviewed   CBC W/ AUTO DIFFERENTIAL - Abnormal       Result Value    WBC 11.78      RBC 4.11      Hemoglobin 11.9 (*)     Hematocrit 38.6      MCV 94      MCH 29.0      MCHC 30.8 (*)     RDW 14.8 (*)     Platelets 235      MPV 10.1      Immature Granulocytes 0.5      Gran # (ANC) 8.8 (*)     Immature Grans (Abs) 0.06 (*)     Lymph # 1.9      Mono # 0.9      Eos # 0.1      Baso # 0.04      nRBC 0      Gran % 74.7 (*)     Lymph % 16.3 (*)     Mono % 7.4      Eosinophil % 0.8      Basophil % 0.3      Differential Method Automated     COMPREHENSIVE METABOLIC PANEL - Abnormal    Sodium 141      Potassium 5.0      Chloride 109      CO2 25      Glucose 57 (*)     BUN 12      Creatinine 0.6      Calcium 10.0      Total Protein 7.2      Albumin 2.9  (*)     Total Bilirubin 0.3      Alkaline Phosphatase 149      AST 13      ALT 10      eGFR >60.0      Anion Gap 7 (*)    PROTIME-INR - Abnormal    Prothrombin Time 13.1 (*)     INR 1.2     B-TYPE NATRIURETIC PEPTIDE - Abnormal     (*)    HEPATITIS C ANTIBODY    Hepatitis C Ab Non-reactive      Narrative:     Release to patient->Immediate   HIV 1 / 2 ANTIBODY    HIV 1/2 Ag/Ab Non-reactive      Narrative:     Release to patient->Immediate   APTT    aPTT 24.8       EKG Readings: (Independently Interpreted)   Initial Reading: No STEMI.   As per my independent interpretation atrial fibrillation with rate 141.  No ST depressions or elevations.       Imaging Results              X-Ray Chest AP Portable (In process)                       CT Chest Abdomen Pelvis Without Contrast (XPD) (Final result)  Result time 01/15/25 22:09:03      Final result by Jayme Inman MD (01/15/25 22:09:03)                   Impression:      Multiple age-indeterminate compression fractures of the thoracic and lumbar spine, some which are new or worse compared to CTA 01/10/2024.  No suspicious features to the indicate underlying malignancy.  Further evaluation with contrast enhanced MRI would be beneficial.    Multiple pulmonary nodules, some which are stable and some which are new measuring up to 0.6 cm.  For multiple solid nodules with any 6 mm or greater, Fleischner Society guidelines recommend follow up with non-contrast chest CT at 3-6 months and 18-24 months after discovery.    Enlarged mediastinal and hilar lymph nodes, which appear worse compared to prior CTA 12/08/2023.  Underlying malignancy is not excluded.    Severe emphysema.    Hepatomegaly.    Stable left adrenal nodule.    Other incidental/nonemergent findings in the body of the report.    This report was flagged in Epic as abnormal.    Electronically signed by resident: Mak Joyner  Date:    01/15/2025  Time:    20:36    Electronically signed by: Jayme Inman  MD  Date:    01/15/2025  Time:    22:09               Narrative:    EXAMINATION:  CT CHEST ABDOMEN PELVIS WITHOUT CONTRAST(XPD)    CLINICAL HISTORY:  back pain concerning for pathological fracture and concern for neoplasm;    TECHNIQUE:  Low dose axial images, sagittal and coronal reformations were obtained from the thoracic inlet to the pubic symphysis.  No contrast was administered.    COMPARISON:  CTA abdomen pelvis 01/10/2024; CTA chest 12/08/2023; PET-CT 01/24/2022    FINDINGS:  CHEST:    Lower neck and thoracic soft tissues: No axillary or subpectoral lymphadenopathy.  Visualized thyroid is unremarkable.    Vasculature: Left sided aortic arch. Thoracic aorta is nonaneurysmal. Moderate atherosclerosis of the thoracic aorta.    Heart: Upper limits normal in size.  Multi-vessel coronary artery atherosclerosis.  Trace pericardial effusion, new from prior CTA    Airways: Trachea is clear.  Mild bronchial wall thickening of the distal airways.    Lungs/Pleura: Severe emphysema.  Multiple bilateral pulmonary nodules.  For example right upper lobe pulmonary nodule measures 0.7 cm (series 6, image 241), which is stable compared to prior PET.  New pulmonary nodule within the right lower lobe measuring 0.6 cm (series 2, image 74).  Bilateral trace pleural effusions with associated passive atelectasis.  No consolidation or pneumothorax.    Hilum/Mediastinum: Prominent enlarged mediastinal and hilar lymph nodes.  Precarinal lymph node measures 1.3 cm.  Right hilar lymph node measures 1.5 cm.    Esophagus: Unremarkable.    ABDOMEN/PELVIS:    Liver: Enlarged measuring 20 cm.  No focal abnormality.    Biliary: Cholecystectomy.  No intrahepatic or extrahepatic biliary ductal dilatation.    Pancreas: No mass or ductal dilatation.  No peripancreatic fat stranding.    Spleen: Normal size.    Adrenals: Left adrenal nodule measures 1.7 cm, unchanged.    Renal/Ureters: No stones.  No hydronephrosis.  The urinary bladder is  unremarkable.    Reproductive: Hysterectomy.  No adnexal mass.    Stomach/Bowel: Small hiatal hernia.  Duodenum is within normal limits.  No evidence of obstruction or inflammatory changes.  Appendix is within normal limits.  No bowel pneumatosis.    Peritoneum: No free fluid. No intraperitoneal free air.    Lymph Nodes: No abdominopelvic lymphadenopathy    Vasculature: Abdominal aorta tapers normally with moderate atherosclerosis.    Bones: Bones are demineralized.  Postoperative changes of posterior instrumented fusion of L4-L5.  Hardware appears intact.  Multiple chronic and new compression fractures of the thoracic and lumbar spine.  For example new height loss, now approximately 50% height loss of the T4 vertebral body.  Similar appearing height loss the anterior aspect of the T11 vertebral body.  New height loss of the L3 and L5 vertebral bodies.  No significant posterior cortical bulge or extension into the posterior elements.  Similar appearing cortical deformity of S2.  Remote fractures of several posterior right ribs.  Remote fractures of the bilateral pubic rami.  Degenerative changes of the spine and bilateral hips.    Soft Tissues: Small right fat containing inguinal hernia.  Moderate left fat and large bowel containing inguinal hernia, similar to prior.  No evidence of strangulation.  Small fat containing umbilical hernia.                                       Medications   methylPREDNISolone sodium succinate injection 125 mg (has no administration in time range)   albuterol-ipratropium 2.5 mg-0.5 mg/3 mL nebulizer solution 3 mL (3 mLs Nebulization Given 1/15/25 2010)   ketorolac injection 9.999 mg (9.999 mg Intravenous Given 1/15/25 2050)   furosemide injection 40 mg (40 mg Intravenous Given 1/15/25 2201)   diltiaZEM injection 10 mg (10 mg Intravenous Given 1/15/25 2201)     Medical Decision Making  Initial assessment  74-year-old female with past medical history arthritis, COPD w/ 6L O2 at baseline  and history of CVA , diabetes mellitus type 2, hyperlipidemia, hypertension, paroxysmal AFib on Xarelto now presenting with a chief complaint of lower back pain. Patient is able to vocalise, breathing spontaneously, hemodynamically stable, oriented, moving all 4 limbs spontaneously.  Examination consistent with tenderness to palpation over thoracolumbar spine with bilateral lower limb weakness.  Bibasilar crackles and diffuse wheeze      Differential diagnosis  Chronic back pain  Pathological fracture  Epidural hematoma but lower suspicion given absence of trauma  Neoplasm   Electrolyte/metabolic derangements  COPD exacerbation  Pneumonia  CHF exacerbation      ED management  On arrival patient was stable however given history of back pain with bilateral lower leg weakness and weight loss I was concerned for neoplasm possibly causing pathological fracture.  Given this I decided to obtain CT abdomen pelvis to evaluate for neoplasm while also evaluating spine.  I had concern for COPD exacerbation versus CHF and patient was given DuoNeb, methylprednisolone and workup for above-mentioned differential.  While in the emergency department the patient received Toradol for pain and tells me that she feels much better.    Workup consistent with mild hypoalbuminemia chronic anemia (11.9).   and patient was given 40 mg IV Lasix.  Patient  experienced worsening tachycardia with repeat EKG showing AFib RVR which I suspect is due to the DuoNeb and patient was given 10 mg IV diltiazem.  Patient signed out to oncoming team pending results of CT abdomen pelvis and re-evaluation.  I anticipate patient requiring admission for AFib RVR and worsening back pain.      Amount and/or Complexity of Data Reviewed  Labs: ordered. Decision-making details documented in ED Course.  Radiology: ordered. Decision-making details documented in ED Course.    Risk  Prescription drug management.               ED Course as of 01/15/25 2243   Wed Jan  15, 2025   2158 BP(!): 146/89 [PM]   2159 Temp: 98.2 °F (36.8 °C) [PM]   2159 Pulse: 82 [PM]   2159 Resp: 18 [PM]   2159 SpO2: 99 % [PM]   2159 Sodium: 141 [PM]   2159 Potassium: 5.0 [PM]   2159 BUN: 12 [PM]   2159 Creatinine: 0.6 [PM]   2159 Albumin(!): 2.9 [PM]   2159 PT(!): 13.1 [PM]   2159 WBC: 11.78 [PM]   2159 Hemoglobin(!): 11.9 [PM]   2159 Platelet Count: 235 [PM]   2159 BNP(!): 536 [PM]   2159 PT(!): 13.1 [PM]   2159 INR: 1.2 [PM]   2159 Patient noted to be tachycardic and EKG obtained showing atrial fibrillation with RVR rate 137.  Patient given diltiazem 10 mg [PM]   2241 CT Chest Abdomen Pelvis Without Contrast (XPD)(!)  As per my independent interpretation multiple compression fractures which are new from prior scans.  Multiple pulmonary nodules noted. [PM]      ED Course User Index  [PM] Catherine Curran MD                           Clinical Impression:  Final diagnoses:  [R06.02] Shortness of breath  [R00.0] Tachycardia  [I48.91] Atrial fibrillation with RVR (Primary)  [J44.1] COPD exacerbation  [E87.70] Hypervolemia, unspecified hypervolemia type  [M54.9] Back pain, unspecified back location, unspecified back pain laterality, unspecified chronicity                     Catherine Curran MD  Resident  01/15/25 0493

## 2025-01-16 NOTE — HPI
Ms. Deb Webb is a 74 y.o. with past medical history that includes paroxysmal A. Fib, COPD (on 2-3 L normally ), T2DM, hx of CVA, osteoporosis, IBS, GERD, CKD3, and glaucoma. Came to the ED due to thoracic and lumbar pain and was found to have compression fractures of the spine and found to be in A. Fib with RVR. Cardiology was consulted due to A. Fib with RVR. While in the ED she was given diltiazem. She is currently in NSR with frequent PACs. ECHO done today. Results below. EF preserved.      ECHO:   Left Ventricle: The left ventricle is normal in size. Ventricular mass is normal. Normal wall thickness. Septal motion is abnormal. There is normal systolic function with a visually estimated ejection fraction of 55 - 60%. Ejection fraction is approximately 55%. There is indeterminate diastolic function.    Right Ventricle: Mild-moderate right ventricular enlargement. Systolic function is borderline low.    Left Atrium: Left atrium is severely dilated.    Right Atrium: Right atrium is moderate-severely dilated.    Aortic Valve: There is mild aortic valve sclerosis.    Tricuspid Valve: There is moderate regurgitation.    Pulmonary Artery: There is moderate to severe pulmonary hypertension. The estimated pulmonary artery systolic pressure is 64 mmHg.    IVC/SVC: Elevated venous pressure at 15 mmHg.

## 2025-01-16 NOTE — ED TRIAGE NOTES
Deb Davis Jon, a 74 y.o. female presents to the ED w/ complaint of generalized back pain and SOB started today. Pt uses walker at baseline and 2L of O2.     Triage note:  Chief Complaint   Patient presents with    Back Pain     Pt. Is a 74 yr old  female presenting with generalized back pain x 2 weeks. Walks with walker at baseline, Coming from [place].      Review of patient's allergies indicates:   Allergen Reactions    Silicone      Burn skin    Adhesive Rash     Past Medical History:   Diagnosis Date    Allergy     Arthritis     Cataract     Colon polyps     COPD (chronic obstructive pulmonary disease)     Diabetes mellitus type II     Diabetic neuropathy     Fever blister     Glaucoma (increased eye pressure)     Hyperlipidemia     Hypertension     Irritable bowel syndrome     Keloid cicatrix     Osteoporosis     Retained cholelithiasis following cholecystectomy(997.41)     Right patella fracture       Pediatrician Nurse

## 2025-01-16 NOTE — ASSESSMENT & PLAN NOTE
Patient has paroxysmal (<7 days) atrial fibrillation. Patient is currently in sinus rhythm with frequent PACs. [id number] Score: 3. The patients heart rate in the last 24 hours is as follows:  Pulse  Min: 59  Max: 143     Antiarrhythmics  metoprolol tartrate (LOPRESSOR) tablet 25 mg, 2 times daily, Oral    Anticoagulants  rivaroxaban tablet 20 mg, with dinner, Oral    Plan  - Replete lytes with a goal of K>4, Mg >2  - Patient is anticoagulated, see medications listed above.  - Patient's afib is currently controlled  - Up titrate beta-blocker as tolerated due to continued PACs  -Continue oral anticoagulation  -follow up with EP on discharge  -Cardiology will sign off; please contact if further concerns/questions

## 2025-01-16 NOTE — CARE UPDATE
Unit TOR Care Support Interaction      I have reviewed the chart of Deb Webb who is hospitalized for Atrial fibrillation with RVR. The patient is currently located in the following unit: ED     I have seen and examined the patient and provided the following support:     Clinical support - confirmed care plan  Proactive rounds - patient is stable  Moved O2 sensor to nose, O2 sat 92% with copd at baseline on 3 L.      Antonino Krause PA-C  Unit Based TOR

## 2025-01-16 NOTE — ED NOTES
MD Caldera notified of pt's vitals. Pt asymptomatic at this time. Sitting up in bed talking to  visitor. Care on going

## 2025-01-16 NOTE — ASSESSMENT & PLAN NOTE
Patient has paroxysmal (<7 days) atrial fibrillation. Patient is currently in atrial fibrillation. [id number] Score: 3. The patients heart rate in the last 24 hours is as follows:  Pulse  Min: 59  Max: 143.   Pt noted to go into afib with in ED undergoing evaluation for back pain. Reports she has been feeling like she has been going into and out of afib since her pains started. Required pushes of dilt then gtt as remained uncontrolled. Received some gentle IVF.    Antiarrhythmics  diltiaZEM (CARDIZEM) 5 mg/mL injection, ,   metoprolol tartrate (LOPRESSOR) tablet 25 mg, 2 times daily, Oral    Anticoagulants  rivaroxaban tablet 20 mg, with dinner, Oral    Plan  - Replete lytes with a goal of K>4, Mg >2  - Patient is anticoagulated, see medications listed above.  - Will pause dilt gtt for now as pt HR ~60s and SBP upper 80s to 90s. She continues to intermittently be in afib. She is uncertain of her home rate control medicine although lopressor 25 q6hr is listed and no other medicines on outside reconcillation. Will order 25mg lopressor BID for now to start at 0900 as long as her BP and HR stay stable. Pharmacy consulted for [facility].  - Monitor on tele.  - Consult cardiology.  - Does not look overtly overloaded but will monitor closely.

## 2025-01-16 NOTE — CARE UPDATE
In NSR with frequent PACs this AM. Up titrate BB as tolerated given continued PACs. Continue OAC. Follow up with EP on discharge. Will sign off

## 2025-01-16 NOTE — AI DETERIORATION ALERT
"RAPID RESPONSE NURSE AI ALERT       AI alert received.    Chart Reviewed: 01/16/2025, 11:58 AM    MRN: [id number]  Bed: ED 13/13    Dx: Atrial fibrillation with RVR    Deb Webb has a past medical history of Allergy, Arthritis, Cataract, Colon polyps, COPD (chronic obstructive pulmonary disease), Diabetes mellitus type II, Diabetic neuropathy, Fever blister, Glaucoma (increased eye pressure), Hyperlipidemia, Hypertension, Irritable bowel syndrome, Keloid cicatrix, Osteoporosis, Retained cholelithiasis following cholecystectomy(997.41), and Right patella fracture.    Last VS: BP (!) 88/61   Pulse 85   Temp 97.6 °F (36.4 °C) (Oral)   Resp 20   Ht 4' 11" (1.499 m)   Wt 49.9 kg (110 lb 0.2 oz)   SpO2 (!) 89%   Breastfeeding No   BMI 22.22 kg/m²     24H Vital Sign Range:  Temp:  [97.3 °F (36.3 °C)-98.2 °F (36.8 °C)]   Pulse:  []   Resp:  [18-39]   BP: ()/(55-89)   SpO2:  [89 %-99 %]     Level of Consciousness (AVPU): alert    Recent Labs     01/15/25  2050 01/16/25  0607   WBC 11.78 8.39   HGB 11.9* 7.1*   HCT 38.6 23.5*    152       Recent Labs     01/15/25  2050 01/16/25  0607    142   K 5.0 3.3*    117*   CO2 25 17*   BUN 12 13   CREATININE 0.6 0.5   GLU 57* 130*   PHOS  --  2.8   MG  --  0.9*          MEWS score: 2    LASHAUN WOODS PA contacted, mildly tachycardic getting metoprolol  No additional concerns verbalized at this time. Instructed to call [phone] for further concerns or assistance.    Sheela Fish RN        "

## 2025-01-16 NOTE — ASSESSMENT & PLAN NOTE
Patient's COPD is with exacerbation noted by continued dyspnea and worsened cough currently. May be part of etiology of onset of afib RVR. Patient is currently off COPD Pathway. Continue scheduled inhalers Steroids, Antibiotics, and Supplemental oxygen and monitor respiratory status closely. Uses 2-3L NC for comfort at home, currently on chronic 2L

## 2025-01-16 NOTE — H&P
"[facility] - Emergency Dept  [facility] Medicine  History & Physical    Patient Name: Deb Webb  MRN: [id number]  Patient Class: IP- Inpatient  Admission Date: 1/15/2025  Attending Physician: Jared Caldera MD   Primary Care Provider: Triston Valverde MD         Patient information was obtained from patient, past medical records, and ER records.     Subjective:     Principal Problem:Atrial fibrillation with RVR    Chief Complaint:   Chief Complaint   Patient presents with    Back Pain     Pt. Is a 74 yr old  female presenting with generalized back pain x 2 weeks. Walks with walker at baseline, Coming from [place].         HPI: Deb Webb is a 74 y.o. with afib, COPD (on 2-3L NC at home for comfort, does not require always but usually likes to have it on), DM II, HTN, HLD, glaucoma, and osteoporosis who presents for worsening acute on chronic low thoracic and lumbar back pain over the last 2ish weeks. She denied any known trauma or falls at that time but has had falls in the past and has reportedly been on vitamin D/calcium but off other osteoporosis meds for about a year. She denies any saddle anesthesia, bowel or bladder incontinence, focal weakness, numbness, tingling, syncope, chest pain, fever, abd pain, hematochezia, hematuria, dysuria, nausea, vomiting. Reports her she sometimes feels like her legs are "shaky" under her but this has been going for "a long time" and has not acutely changed. She has been able to walk as normal. She reports increased dyspnea starting around the time of her acute onset of pain as well as worsening cough. Has not increased her O2 use at home but felt like she needed to. She lives at a facility and denies any recent known medication regimen changes or non-adherence although she is not entirely sure of her complete regimen.    Past Medical History:   Diagnosis Date    Allergy     Arthritis     Cataract     Colon polyps     COPD (chronic obstructive " pulmonary disease)     Diabetes mellitus type II     Diabetic neuropathy     Fever blister     Glaucoma (increased eye pressure)     Hyperlipidemia     Hypertension     Irritable bowel syndrome     Keloid cicatrix     Osteoporosis     Retained cholelithiasis following cholecystectomy(997.41)     Right patella fracture        Past Surgical History:   Procedure Laterality Date    BACK SURGERY  2006    CATARACT EXTRACTION W/  INTRAOCULAR LENS IMPLANT Bilateral     CHOLECYSTECTOMY      Laparoscopic    COLONOSCOPY      COLONOSCOPY N/A 8/31/2022    Procedure: COLONOSCOPY;  Surgeon: Salvatore Butler MD;  Location: [facility];  Service: Gastroenterology;  Laterality: N/A;    COLONOSCOPY N/A 1/12/2024    Procedure: COLONOSCOPY;  Surgeon: Jose Manuel Gilmore MD;  Location: [facility] (2ND FLR);  Service: Endoscopy;  Laterality: N/A;    ESOPHAGEAL DILATION N/A 8/30/2022    Procedure: DILATION, ESOPHAGUS;  Surgeon: Salvatore Butler MD;  Location: [facility];  Service: Gastroenterology;  Laterality: N/A;    ESOPHAGOGASTRODUODENOSCOPY N/A 8/30/2022    Procedure: EGD (ESOPHAGOGASTRODUODENOSCOPY);  Surgeon: Salvatore Butler MD;  Location: [facility];  Service: Gastroenterology;  Laterality: N/A;    ESOPHAGOGASTRODUODENOSCOPY N/A 5/23/2023    Procedure: ESOPHAGOGASTRODUODENOSCOPY (EGD);  Surgeon: Desmond Duffy Jr., MD;  Location: [facility];  Service: Endoscopy;  Laterality: N/A;    ESOPHAGOGASTRODUODENOSCOPY N/A 1/11/2024    Procedure: EGD (ESOPHAGOGASTRODUODENOSCOPY);  Surgeon: Jose Manuel Gilmore MD;  [facility]  Service: Endoscopy;  Laterality: N/A;    HERNIA REPAIR      HYSTERECTOMY      KNEE SURGERY Right 3/4/2015    Dr Weller     OOPHORECTOMY      ovarian tumor      Benign    TONSILLECTOMY, ADENOIDECTOMY         Review of patient's allergies indicates:   Allergen Reactions    Silicone      Burn skin    Adhesive Rash       No current facility-administered medications on file prior to encounter.     Current  Outpatient Medications on File Prior to Encounter   Medication Sig    albuterol-ipratropium (DUO-NEB) 2.5 mg-0.5 mg/3 mL nebulizer solution Take 3 mLs by nebulization every 6 (six) hours as needed for Wheezing. Rescue    ANORO ELLIPTA 62.5-25 mcg/actuation DsDv INHALE 1 PUFF INTO THE LUNGS ONCE DAILY.    brimonidine 0.2% (ALPHAGAN) 0.2 % Drop PLACE 1 DROP INTO BOTH EYES 2 TIMES A DAY.    cholestyramine-aspartame (PREVALITE) 4 gram PwPk TAKE 1 PACKET (4 G TOTAL) BY MOUTH 2 (TWO) TIMES DAILY. FOR DIARRHEA    citalopram (CELEXA) 20 MG tablet Take 1 tablet (20 mg total) by mouth once daily.    famotidine (PEPCID) 20 MG tablet Take 1 tablet (20 mg total) by mouth once daily.    gabapentin (NEURONTIN) 300 MG capsule TAKE 1 CAPSULE BY MOUTH THREE TIMES A DAY    ketorolac 0.5% (ACULAR) 0.5 % Drop Place 1 drop into the left eye 4 (four) times daily. (Patient not taking: Reported on 12/3/2024)    latanoprost 0.005 % ophthalmic solution Place 1 drop into both eyes every evening.    levalbuterol (XOPENEX) 0.63 mg/3 mL nebulizer solution Take 3 mLs (0.63 mg total) by nebulization every 8 (eight) hours. Rescue    LEVEMIR FLEXPEN 100 unit/mL (3 mL) InPn pen Inject 10 Units into the skin every evening.    metoprolol tartrate (LOPRESSOR) 25 MG tablet Take 1 tablet (25 mg total) by mouth every 6 (six) hours.    NOVOLOG FLEXPEN U-100 INSULIN 100 unit/mL (3 mL) InPn pen Inject 0-8 Units into the skin as needed (sliding scale). B to 149 mg/dL = 0 units  150-199 mg/dL = 2 units  200 to 249 mg/dL = 4 units  250 to 299 mg/dL = 6 units  300 to 999 mg/dL = 8 units  Notify Provider if BG is more than 400 mg/dL.    omeprazole (PRILOSEC) 40 MG capsule Take 1 capsule (40 mg total) by mouth before breakfast.    senna (SENOKOT) 8.6 mg tablet Take 1 tablet by mouth once daily.    tamsulosin (FLOMAX) 0.4 mg Cap Take 1 capsule (0.4 mg total) by mouth once daily.    timolol maleate 0.5% (TIMOPTIC) 0.5 % Drop Place 1 drop into both eyes once  "daily.    XARELTO 20 mg Tab TAKE 1 TABLET BY MOUTH EVERY DAY WITH DINNER OR EVENING MEAL    [DISCONTINUED] irbesartan (AVAPRO) 75 MG tablet Take 1 tablet (75 mg total) by mouth once daily.    [DISCONTINUED] loratadine (CLARITIN) 10 mg tablet TAKE 1 TABLET (10 MG TOTAL) BY MOUTH DAILY AS NEEDED FOR ALLERGIES (OR RUNNY NOSE).     Family History       Problem Relation (Age of Onset)    Breast cancer Maternal Aunt    Cancer Mother    Cataracts Mother    Diabetes Mother, Father, Sister, Daughter    Glaucoma Mother    Heart disease Father    Skin cancer Mother, Sister          Tobacco Use    Smoking status: Every Day     Current packs/day: 0.50     Average packs/day: 0.5 packs/day for 40.0 years (20.0 ttl pk-yrs)     Types: Cigarettes    Smokeless tobacco: Former    Tobacco comments:     Not ready to quit smoking. Cut down on cigarettes but enjoys having a couple cigarettes a day.   Substance and Sexual Activity    Alcohol use: No    Drug use: No    Sexual activity: Never     Review of Systems   Constitutional:  Negative for chills and fever.   HENT:  Positive for congestion and rhinorrhea. Negative for sore throat.    Eyes:  Negative for pain and redness.   Respiratory:  Positive for cough (Chronic cough, worse than baseline) and shortness of breath (Chronic but worse than baseline).    Cardiovascular:  Negative for chest pain and leg swelling.   Gastrointestinal:  Negative for abdominal pain, blood in stool, constipation, diarrhea, nausea and vomiting.   Genitourinary:  Negative for dysuria and hematuria.   Musculoskeletal:  Positive for back pain. Negative for arthralgias.   Skin:  Negative for rash.   Allergic/Immunologic: Positive for environmental allergies.   Neurological:  Negative for dizziness, syncope, numbness and headaches.        No saddle anesthesia, bowel or bladder incontinence.    Sometimes feels like her legs are "shaky" under her but this has been going for "a long time" and has not acutely changed. "     Objective:     Vital Signs (Most Recent):  Temp: 97.5 °F (36.4 °C) (01/16/25 0352)  Pulse: 89 (01/16/25 0412)  Resp: (!) 23 (01/16/25 0412)  BP: 104/67 (01/16/25 0412)  SpO2: (!) 94 % (01/16/25 0412) Vital Signs (24h Range):  Temp:  [97.3 °F (36.3 °C)-98.2 °F (36.8 °C)] 97.5 °F (36.4 °C)  Pulse:  [] 89  Resp:  [18-39] 23  SpO2:  [92 %-99 %] 94 %  BP: ()/(58-89) 104/67     Weight: 49.9 kg (110 lb 0.2 oz)  Body mass index is 22.22 kg/m².     Physical Exam  Vitals reviewed.   Constitutional:       General: She is not in acute distress.  HENT:      Head: Normocephalic and atraumatic.      Nose: No rhinorrhea.      Mouth/Throat:      Mouth: Mucous membranes are moist.   Eyes:      Conjunctiva/sclera: Conjunctivae normal.      Pupils: Pupils are equal, round, and reactive to light.   Cardiovascular:      Rate and Rhythm: Normal rate. Rhythm irregular.      Heart sounds: Normal heart sounds.   Pulmonary:      Effort: Pulmonary effort is normal. No respiratory distress.      Breath sounds: Wheezing present. No rales.      Comments: Talking and breathing comfortably on 3L (usually uses 2-3L at home for comfort).  Abdominal:      General: Bowel sounds are normal. There is no distension.      Palpations: Abdomen is soft.      Tenderness: There is no abdominal tenderness. There is no guarding or rebound.   Musculoskeletal:      Cervical back: Normal range of motion and neck supple.      Right lower leg: No edema.      Left lower leg: No edema.   Skin:     General: Skin is warm and dry.   Neurological:      Mental Status: She is alert and oriented to person, place, and time.      Cranial Nerves: No dysarthria or facial asymmetry.      Sensory: Sensation is intact.      Comments: BL UE and LE 5/5 strength.              CRANIAL NERVES     CN III, IV, VI   Pupils are equal, round, and reactive to light.       Significant Labs: All pertinent labs within the past 24 hours have been reviewed.  Recent Lab Results          01/15/25  2050        Albumin 2.9              ALT 10       Anion Gap 7       PTT 24.8  Comment: Refer to local heparin nomogram for intensity/dose specific   therapeutic   range.         AST 13       Baso # 0.04       Basophil % 0.3       BILIRUBIN TOTAL 0.3  Comment: For infants and newborns, interpretation of results should be based  on gestational age, weight and in agreement with clinical  observations.    Premature Infant recommended reference ranges:  Up to 24 hours.............<8.0 mg/dL  Up to 48 hours............<12.0 mg/dL  3-5 days..................<15.0 mg/dL  6-29 days.................<15.0 mg/dL           Comment: Values of less than 100 pg/ml are consistent with non-CHF populations.       BUN 12       Calcium 10.0       Chloride 109       CO2 25       Creatinine 0.6       Differential Method Automated       eGFR >60.0       Eos # 0.1       Eos % 0.8       Glucose 57       Gran # (ANC) 8.8       Gran % 74.7       Hematocrit 38.6       Hemoglobin 11.9       Hepatitis C Ab Non-reactive       HIV 1/2 Ag/Ab Non-reactive       Immature Grans (Abs) 0.06  Comment: Mild elevation in immature granulocytes is non specific and   can be seen in a variety of conditions including stress response,   acute inflammation, trauma and pregnancy. Correlation with other   laboratory and clinical findings is essential.         Immature Granulocytes 0.5       INR 1.2  Comment: Coumadin Therapy:  2.0 - 3.0 for INR for all indicators except mechanical heart valves  and antiphospholipid syndromes which should use 2.5 - 3.5.         Lymph # 1.9       Lymph % 16.3       MCH 29.0       MCHC 30.8       MCV 94       Mono # 0.9       Mono % 7.4       MPV 10.1       nRBC 0       Platelet Count 235       Potassium 5.0       PROTEIN TOTAL 7.2       PT 13.1       RBC 4.11       RDW 14.8       Sodium 141       WBC 11.78               Significant Imaging: I have reviewed all pertinent imaging results/findings within the past  24 hours.    Imaging Results              X-Ray Chest AP Portable (Final result)  Result time 01/15/25 23:31:13      Final result by Mario Alberto Adam MD (01/15/25 23:31:13)                   Impression:      Persistent prominence of the interstitium likely chronic.    No convincing evidence of acute pulmonary infiltrate.      Electronically signed by: Mario Alberto Adam  Date:    01/15/2025  Time:    23:31               Narrative:    EXAMINATION:  XR CHEST AP PORTABLE    CLINICAL HISTORY:  Shortness of breath    TECHNIQUE:  Single frontal view of the chest was performed.    COMPARISON:  01/13/2024    FINDINGS:  Heart mediastinal contours appear stable.  Prominence of the interstitium without consolidation or effusion.  Remote healed right rib fractures.  Degenerative changes in the shoulders and spine.  Orthopedic hardware in the lower lumbar region.                                        CT Chest Abdomen Pelvis Without Contrast (XPD) (Final result)  Result time 01/15/25 22:09:03      Final result by Jayme Inman MD (01/15/25 22:09:03)                   Impression:      Multiple age-indeterminate compression fractures of the thoracic and lumbar spine, some which are new or worse compared to CTA 01/10/2024.  No suspicious features to the indicate underlying malignancy.  Further evaluation with contrast enhanced MRI would be beneficial.    Multiple pulmonary nodules, some which are stable and some which are new measuring up to 0.6 cm.  For multiple solid nodules with any 6 mm or greater, Fleischner Society guidelines recommend follow up with non-contrast chest CT at 3-6 months and 18-24 months after discovery.    Enlarged mediastinal and hilar lymph nodes, which appear worse compared to prior CTA 12/08/2023.  Underlying malignancy is not excluded.    Severe emphysema.    Hepatomegaly.    Stable left adrenal nodule.    Other incidental/nonemergent findings in the body of the report.    This report was flagged in  Epic as abnormal.    Electronically signed by resident: Mak Joyner  Date:    01/15/2025  Time:    20:36    Electronically signed by: Jayme Inman MD  Date:    01/15/2025  Time:    22:09               Narrative:    EXAMINATION:  CT CHEST ABDOMEN PELVIS WITHOUT CONTRAST(XPD)    CLINICAL HISTORY:  back pain concerning for pathological fracture and concern for neoplasm;    TECHNIQUE:  Low dose axial images, sagittal and coronal reformations were obtained from the thoracic inlet to the pubic symphysis.  No contrast was administered.    COMPARISON:  CTA abdomen pelvis 01/10/2024; CTA chest 12/08/2023; PET-CT 01/24/2022    FINDINGS:  CHEST:    Lower neck and thoracic soft tissues: No axillary or subpectoral lymphadenopathy.  Visualized thyroid is unremarkable.    Vasculature: Left sided aortic arch. Thoracic aorta is nonaneurysmal. Moderate atherosclerosis of the thoracic aorta.    Heart: Upper limits normal in size.  Multi-vessel coronary artery atherosclerosis.  Trace pericardial effusion, new from prior CTA    Airways: Trachea is clear.  Mild bronchial wall thickening of the distal airways.    Lungs/Pleura: Severe emphysema.  Multiple bilateral pulmonary nodules.  For example right upper lobe pulmonary nodule measures 0.7 cm (series 6, image 241), which is stable compared to prior PET.  New pulmonary nodule within the right lower lobe measuring 0.6 cm (series 2, image 74).  Bilateral trace pleural effusions with associated passive atelectasis.  No consolidation or pneumothorax.    Hilum/Mediastinum: Prominent enlarged mediastinal and hilar lymph nodes.  Precarinal lymph node measures 1.3 cm.  Right hilar lymph node measures 1.5 cm.    Esophagus: Unremarkable.    ABDOMEN/PELVIS:    Liver: Enlarged measuring 20 cm.  No focal abnormality.    Biliary: Cholecystectomy.  No intrahepatic or extrahepatic biliary ductal dilatation.    Pancreas: No mass or ductal dilatation.  No peripancreatic fat stranding.    Spleen: Normal  size.    Adrenals: Left adrenal nodule measures 1.7 cm, unchanged.    Renal/Ureters: No stones.  No hydronephrosis.  The urinary bladder is unremarkable.    Reproductive: Hysterectomy.  No adnexal mass.    Stomach/Bowel: Small hiatal hernia.  Duodenum is within normal limits.  No evidence of obstruction or inflammatory changes.  Appendix is within normal limits.  No bowel pneumatosis.    Peritoneum: No free fluid. No intraperitoneal free air.    Lymph Nodes: No abdominopelvic lymphadenopathy    Vasculature: Abdominal aorta tapers normally with moderate atherosclerosis.    Bones: Bones are demineralized.  Postoperative changes of posterior instrumented fusion of L4-L5.  Hardware appears intact.  Multiple chronic and new compression fractures of the thoracic and lumbar spine.  For example new height loss, now approximately 50% height loss of the T4 vertebral body.  Similar appearing height loss the anterior aspect of the T11 vertebral body.  New height loss of the L3 and L5 vertebral bodies.  No significant posterior cortical bulge or extension into the posterior elements.  Similar appearing cortical deformity of S2.  Remote fractures of several posterior right ribs.  Remote fractures of the bilateral pubic rami.  Degenerative changes of the spine and bilateral hips.    Soft Tissues: Small right fat containing inguinal hernia.  Moderate left fat and large bowel containing inguinal hernia, similar to prior.  No evidence of strangulation.  Small fat containing umbilical hernia.                                      Assessment/Plan:     * Atrial fibrillation with RVR  Patient has paroxysmal (<7 days) atrial fibrillation. Patient is currently in atrial fibrillation. [id number] Score: 3. The patients heart rate in the last 24 hours is as follows:  Pulse  Min: 59  Max: 143.   Pt noted to go into afib with in ED undergoing evaluation for back pain. Reports she has been feeling like she has been going into and out of afib  since her pains started. Required pushes of dilt then gtt as remained uncontrolled. Received some gentle IVF.    Antiarrhythmics  diltiaZEM (CARDIZEM) 5 mg/mL injection, ,   metoprolol tartrate (LOPRESSOR) tablet 25 mg, 2 times daily, Oral    Anticoagulants  rivaroxaban tablet 20 mg, with dinner, Oral    Plan  - Replete lytes with a goal of K>4, Mg >2  - Patient is anticoagulated, see medications listed above.  - Will pause dilt gtt for now as pt HR ~60s and SBP upper 80s to 90s. She continues to intermittently be in afib. She is uncertain of her home rate control medicine although lopressor 25 q6hr is listed and no other medicines on outside reconcillation. Will order 25mg lopressor BID for now to start at 0900 as long as her BP and HR stay stable. Pharmacy consulted for med rec.  - Monitor on tele.  - Consult cardiology.  - Does not look overtly overloaded but will monitor closely.        Lumbar compression fracture  Indeterminate age fractures although some appear newer per radiology. Pt w/o acute neurologic symptoms at this time. Notably hx of osteoporosis and reports she used to be on medicines other than just vitamin D and calcium but has not been on them in at least 1yr.    Plan  - Might benefit from spinal evaluation once acute afib is controlled.    Compression fracture of body of thoracic vertebra  Indeterminate age fractures although some appear newer per radiology. Pt w/o acute neurologic symptoms at this time. Notably hx of osteoporosis and reports she used to be on medicines other than just vitamin D and calcium but has not been on them in at least 1yr.    Plan  - Might benefit from spinal evaluation once acute afib is controlled.    Glaucoma  Plan  - Continue home eye drops.    Type 2 diabetes mellitus, with long-term current use of insulin  Pt's BG 57 on initial labs, no current hypoglycemic sxs.    Plan  - ACHS glucose checks and SSI for now.  - Pt's home regimen is 10u long acting, SSI with meals,  and trulicity. Would like to see her BG a little higher before restarting her full regimen at this time.    COPD exacerbation  Patient's COPD is with exacerbation noted by continued dyspnea and worsened cough currently. May be part of etiology of onset of afib RVR. Patient is currently off COPD Pathway. Continue scheduled inhalers Steroids, Antibiotics, and Supplemental oxygen and monitor respiratory status closely. Uses 2-3L NC for comfort at home, currently on chronic 2L      VTE Risk Mitigation (From admission, onward)           Ordered     rivaroxaban tablet 20 mg  with dinner         01/16/25 0552     IP VTE HIGH RISK PATIENT  Once         01/16/25 0359     Place sequential compression device  Until discontinued         01/16/25 0359                                    Felicity Yap DO  Department of Hospital Medicine  [facility] - Emergency Dept

## 2025-01-17 LAB
ALBUMIN SERPL BCP-MCNC: 3 G/DL (ref 3.5–5.2)
ALP SERPL-CCNC: 139 U/L (ref 40–150)
ALT SERPL W/O P-5'-P-CCNC: 8 U/L (ref 10–44)
ANION GAP SERPL CALC-SCNC: 9 MMOL/L (ref 8–16)
AST SERPL-CCNC: 9 U/L (ref 10–40)
BASOPHILS # BLD AUTO: 0.04 K/UL (ref 0–0.2)
BASOPHILS NFR BLD: 0.2 % (ref 0–1.9)
BILIRUB SERPL-MCNC: 0.2 MG/DL (ref 0.1–1)
BUN SERPL-MCNC: 44 MG/DL (ref 8–23)
CALCIUM SERPL-MCNC: 9.2 MG/DL (ref 8.7–10.5)
CHLORIDE SERPL-SCNC: 104 MMOL/L (ref 95–110)
CO2 SERPL-SCNC: 26 MMOL/L (ref 23–29)
CREAT SERPL-MCNC: 1.1 MG/DL (ref 0.5–1.4)
DIFFERENTIAL METHOD BLD: ABNORMAL
EOSINOPHIL # BLD AUTO: 0 K/UL (ref 0–0.5)
EOSINOPHIL NFR BLD: 0 % (ref 0–8)
ERYTHROCYTE [DISTWIDTH] IN BLOOD BY AUTOMATED COUNT: 15 % (ref 11.5–14.5)
EST. GFR  (NO RACE VARIABLE): 53.1 ML/MIN/1.73 M^2
GLUCOSE SERPL-MCNC: 164 MG/DL (ref 70–110)
HCT VFR BLD AUTO: 36.6 % (ref 37–48.5)
HGB BLD-MCNC: 11.4 G/DL (ref 12–16)
IMM GRANULOCYTES # BLD AUTO: 0.29 K/UL (ref 0–0.04)
IMM GRANULOCYTES NFR BLD AUTO: 1.6 % (ref 0–0.5)
LYMPHOCYTES # BLD AUTO: 2.4 K/UL (ref 1–4.8)
LYMPHOCYTES NFR BLD: 12.9 % (ref 18–48)
MAGNESIUM SERPL-MCNC: 1.8 MG/DL (ref 1.6–2.6)
MCH RBC QN AUTO: 28.7 PG (ref 27–31)
MCHC RBC AUTO-ENTMCNC: 31.1 G/DL (ref 32–36)
MCV RBC AUTO: 92 FL (ref 82–98)
MONOCYTES # BLD AUTO: 1.3 K/UL (ref 0.3–1)
MONOCYTES NFR BLD: 6.8 % (ref 4–15)
NEUTROPHILS # BLD AUTO: 14.6 K/UL (ref 1.8–7.7)
NEUTROPHILS NFR BLD: 78.5 % (ref 38–73)
NRBC BLD-RTO: 0 /100 WBC
PHOSPHATE SERPL-MCNC: 3.7 MG/DL (ref 2.7–4.5)
PLATELET # BLD AUTO: 246 K/UL (ref 150–450)
PMV BLD AUTO: 10.1 FL (ref 9.2–12.9)
POCT GLUCOSE: 212 MG/DL (ref 70–110)
POCT GLUCOSE: 227 MG/DL (ref 70–110)
POCT GLUCOSE: 280 MG/DL (ref 70–110)
POCT GLUCOSE: 343 MG/DL (ref 70–110)
POCT GLUCOSE: 353 MG/DL (ref 70–110)
POTASSIUM SERPL-SCNC: 4.4 MMOL/L (ref 3.5–5.1)
PROT SERPL-MCNC: 7.2 G/DL (ref 6–8.4)
RBC # BLD AUTO: 3.97 M/UL (ref 4–5.4)
SODIUM SERPL-SCNC: 139 MMOL/L (ref 136–145)
WBC # BLD AUTO: 18.56 K/UL (ref 3.9–12.7)

## 2025-01-17 PROCEDURE — 63700000 PHARM REV CODE 250 ALT 637 W/O HCPCS: Performed by: STUDENT IN AN ORGANIZED HEALTH CARE EDUCATION/TRAINING PROGRAM

## 2025-01-17 PROCEDURE — 36415 COLL VENOUS BLD VENIPUNCTURE: CPT | Performed by: STUDENT IN AN ORGANIZED HEALTH CARE EDUCATION/TRAINING PROGRAM

## 2025-01-17 PROCEDURE — 25000003 PHARM REV CODE 250: Performed by: STUDENT IN AN ORGANIZED HEALTH CARE EDUCATION/TRAINING PROGRAM

## 2025-01-17 PROCEDURE — 21400001 HC TELEMETRY ROOM

## 2025-01-17 PROCEDURE — 63600175 PHARM REV CODE 636 W HCPCS: Performed by: HOSPITALIST

## 2025-01-17 PROCEDURE — 94640 AIRWAY INHALATION TREATMENT: CPT

## 2025-01-17 PROCEDURE — 63600175 PHARM REV CODE 636 W HCPCS

## 2025-01-17 PROCEDURE — 85025 COMPLETE CBC W/AUTO DIFF WBC: CPT | Performed by: STUDENT IN AN ORGANIZED HEALTH CARE EDUCATION/TRAINING PROGRAM

## 2025-01-17 PROCEDURE — 80053 COMPREHEN METABOLIC PANEL: CPT | Performed by: STUDENT IN AN ORGANIZED HEALTH CARE EDUCATION/TRAINING PROGRAM

## 2025-01-17 PROCEDURE — 83735 ASSAY OF MAGNESIUM: CPT | Performed by: STUDENT IN AN ORGANIZED HEALTH CARE EDUCATION/TRAINING PROGRAM

## 2025-01-17 PROCEDURE — 25000242 PHARM REV CODE 250 ALT 637 W/ HCPCS: Performed by: STUDENT IN AN ORGANIZED HEALTH CARE EDUCATION/TRAINING PROGRAM

## 2025-01-17 PROCEDURE — 25000003 PHARM REV CODE 250: Performed by: HOSPITALIST

## 2025-01-17 PROCEDURE — 63600175 PHARM REV CODE 636 W HCPCS: Performed by: STUDENT IN AN ORGANIZED HEALTH CARE EDUCATION/TRAINING PROGRAM

## 2025-01-17 PROCEDURE — 94761 N-INVAS EAR/PLS OXIMETRY MLT: CPT

## 2025-01-17 PROCEDURE — 99900035 HC TECH TIME PER 15 MIN (STAT)

## 2025-01-17 PROCEDURE — 25000003 PHARM REV CODE 250

## 2025-01-17 PROCEDURE — 84100 ASSAY OF PHOSPHORUS: CPT | Performed by: STUDENT IN AN ORGANIZED HEALTH CARE EDUCATION/TRAINING PROGRAM

## 2025-01-17 PROCEDURE — 27000221 HC OXYGEN, UP TO 24 HOURS

## 2025-01-17 RX ORDER — AMOXICILLIN AND CLAVULANATE POTASSIUM 875; 125 MG/1; MG/1
1 TABLET, FILM COATED ORAL EVERY 12 HOURS
Status: DISCONTINUED | OUTPATIENT
Start: 2025-01-17 | End: 2025-01-19 | Stop reason: HOSPADM

## 2025-01-17 RX ORDER — FUROSEMIDE 10 MG/ML
40 INJECTION INTRAMUSCULAR; INTRAVENOUS ONCE
Status: COMPLETED | OUTPATIENT
Start: 2025-01-17 | End: 2025-01-17

## 2025-01-17 RX ORDER — INSULIN GLARGINE 100 [IU]/ML
12 INJECTION, SOLUTION SUBCUTANEOUS NIGHTLY
Status: DISCONTINUED | OUTPATIENT
Start: 2025-01-17 | End: 2025-01-18

## 2025-01-17 RX ORDER — ONDANSETRON 4 MG/1
4 TABLET, FILM COATED ORAL EVERY 8 HOURS PRN
COMMUNITY

## 2025-01-17 RX ORDER — FUROSEMIDE 20 MG/1
20 TABLET ORAL DAILY
Status: ON HOLD | COMMUNITY
End: 2025-01-19

## 2025-01-17 RX ORDER — FUROSEMIDE 10 MG/ML
40 INJECTION INTRAMUSCULAR; INTRAVENOUS DAILY
Status: DISCONTINUED | OUTPATIENT
Start: 2025-01-17 | End: 2025-01-19 | Stop reason: HOSPADM

## 2025-01-17 RX ORDER — POTASSIUM CHLORIDE 20 MEQ/1
20 TABLET, EXTENDED RELEASE ORAL DAILY
COMMUNITY

## 2025-01-17 RX ORDER — INSULIN ASPART 100 [IU]/ML
7 INJECTION, SOLUTION INTRAVENOUS; SUBCUTANEOUS ONCE
Status: COMPLETED | OUTPATIENT
Start: 2025-01-17 | End: 2025-01-17

## 2025-01-17 RX ORDER — INSULIN ASPART 100 [IU]/ML
4 INJECTION, SOLUTION INTRAVENOUS; SUBCUTANEOUS
Status: DISCONTINUED | OUTPATIENT
Start: 2025-01-18 | End: 2025-01-18

## 2025-01-17 RX ORDER — ACETAMINOPHEN 325 MG/1
325 TABLET ORAL EVERY 6 HOURS PRN
COMMUNITY

## 2025-01-17 RX ORDER — DICLOFENAC SODIUM 10 MG/G
GEL TOPICAL
COMMUNITY

## 2025-01-17 RX ADMIN — AMOXICILLIN AND CLAVULANATE POTASSIUM 1 TABLET: 875; 125 TABLET, FILM COATED ORAL at 09:01

## 2025-01-17 RX ADMIN — FUROSEMIDE 40 MG: 10 INJECTION, SOLUTION INTRAVENOUS at 02:01

## 2025-01-17 RX ADMIN — BRIMONIDINE TARTRATE 1 DROP: 2 SOLUTION OPHTHALMIC at 10:01

## 2025-01-17 RX ADMIN — GABAPENTIN 300 MG: 300 CAPSULE ORAL at 02:01

## 2025-01-17 RX ADMIN — LIDOCAINE 1 PATCH: 50 PATCH CUTANEOUS at 10:01

## 2025-01-17 RX ADMIN — FUROSEMIDE 40 MG: 10 INJECTION, SOLUTION INTRAVENOUS at 12:01

## 2025-01-17 RX ADMIN — LATANOPROST 1 DROP: 50 SOLUTION OPHTHALMIC at 09:01

## 2025-01-17 RX ADMIN — INSULIN GLARGINE 12 UNITS: 100 INJECTION, SOLUTION SUBCUTANEOUS at 10:01

## 2025-01-17 RX ADMIN — BRIMONIDINE TARTRATE 1 DROP: 2 SOLUTION OPHTHALMIC at 09:01

## 2025-01-17 RX ADMIN — GUAIFENESIN AND DEXTROMETHORPHAN 10 ML: 100; 10 SYRUP ORAL at 04:01

## 2025-01-17 RX ADMIN — AZITHROMYCIN DIHYDRATE 500 MG: 250 TABLET ORAL at 08:01

## 2025-01-17 RX ADMIN — GABAPENTIN 300 MG: 300 CAPSULE ORAL at 09:01

## 2025-01-17 RX ADMIN — PANTOPRAZOLE SODIUM 40 MG: 40 TABLET, DELAYED RELEASE ORAL at 08:01

## 2025-01-17 RX ADMIN — RIVAROXABAN 15 MG: 15 TABLET, FILM COATED ORAL at 04:01

## 2025-01-17 RX ADMIN — INSULIN ASPART 4 UNITS: 100 INJECTION, SOLUTION INTRAVENOUS; SUBCUTANEOUS at 12:01

## 2025-01-17 RX ADMIN — BUSPIRONE HYDROCHLORIDE 7.5 MG: 5 TABLET ORAL at 10:01

## 2025-01-17 RX ADMIN — GABAPENTIN 300 MG: 300 CAPSULE ORAL at 08:01

## 2025-01-17 RX ADMIN — AMOXICILLIN AND CLAVULANATE POTASSIUM 1 TABLET: 875; 125 TABLET, FILM COATED ORAL at 02:01

## 2025-01-17 RX ADMIN — FLUTICASONE FUROATE AND VILANTEROL TRIFENATATE 1 PUFF: 100; 25 POWDER RESPIRATORY (INHALATION) at 07:01

## 2025-01-17 RX ADMIN — INSULIN ASPART 7 UNITS: 100 INJECTION, SOLUTION INTRAVENOUS; SUBCUTANEOUS at 10:01

## 2025-01-17 RX ADMIN — Medication 6 MG: at 09:01

## 2025-01-17 RX ADMIN — METOPROLOL TARTRATE 25 MG: 25 TABLET, FILM COATED ORAL at 08:01

## 2025-01-17 RX ADMIN — IPRATROPIUM BROMIDE AND ALBUTEROL SULFATE 3 ML: 2.5; .5 SOLUTION RESPIRATORY (INHALATION) at 02:01

## 2025-01-17 RX ADMIN — IPRATROPIUM BROMIDE AND ALBUTEROL SULFATE 3 ML: 2.5; .5 SOLUTION RESPIRATORY (INHALATION) at 07:01

## 2025-01-17 RX ADMIN — ACETAMINOPHEN 650 MG: 325 TABLET ORAL at 09:01

## 2025-01-17 RX ADMIN — PREDNISONE 40 MG: 20 TABLET ORAL at 08:01

## 2025-01-17 RX ADMIN — INSULIN ASPART 3 UNITS: 100 INJECTION, SOLUTION INTRAVENOUS; SUBCUTANEOUS at 05:01

## 2025-01-17 RX ADMIN — ACETAMINOPHEN 650 MG: 325 TABLET ORAL at 11:01

## 2025-01-17 RX ADMIN — TIMOLOL MALEATE 1 DROP: 5 SOLUTION OPHTHALMIC at 08:01

## 2025-01-17 RX ADMIN — INSULIN ASPART 2 UNITS: 100 INJECTION, SOLUTION INTRAVENOUS; SUBCUTANEOUS at 08:01

## 2025-01-17 NOTE — ASSESSMENT & PLAN NOTE
Patient has paroxysmal (<7 days) atrial fibrillation. Patient is currently in atrial fibrillation. [id number] Score: 3. The patients heart rate in the last 24 hours is as follows:  Pulse  Min: 73  Max: 115.   Pt noted to go into afib with in ED undergoing evaluation for back pain. Reports she has been feeling like she has been going into and out of afib since her pains started. Required pushes of dilt then gtt as remained uncontrolled. Received some gentle IVF.    Antiarrhythmics  metoprolol tartrate (LOPRESSOR) tablet 25 mg, 2 times daily, Oral    Anticoagulants  rivaroxaban tablet 15 mg, with dinner, Oral    Plan  - Replete lytes with a goal of K>4, Mg >2  - Patient is anticoagulated, see medications listed above.  - Will pause dilt gtt for now as pt HR ~60s and SBP upper 80s to 90s. She continues to intermittently be in afib. She is uncertain of her home rate control medicine although lopressor 25 q6hr is listed and no other medicines on outside reconcillation. Will order 25mg lopressor BID for now to start at 0900 as long as her BP and HR stay stable. Pharmacy consulted for med rec.  - Monitor on tele.  - Cardiology recs for uptitration of beta blockade as tolerated, EP follow up.   - Not overtly hypervolemic on exam, but CVP15 on Echo. Judicious diuresis with IV Lasix 40mg qd given soft Bps.       no murmur/regular rate and rhythm/no rub

## 2025-01-17 NOTE — PLAN OF CARE
Jero Granado - Med Surg  Initial Discharge Assessment       Primary Care Provider: Triston Valverde MD    Admission Diagnosis: Atrial fibrillation [I48.91]  Shortness of breath [R06.02]  Tachycardia [R00.0]  COPD exacerbation [J44.1]  Chest pain [R07.9]  Atrial fibrillation with RVR [I48.91]  Hypervolemia, unspecified hypervolemia type [E87.70]  Back pain, unspecified back location, unspecified back pain laterality, unspecified chronicity [M54.9]    Admission Date: 1/15/2025  Expected Discharge Date: 1/21/2025    Transition of Care Barriers: None    Payor: [employer] [facility] / Plan: [employer] / Product Type: Medicare Advantage /     Extended Emergency Contact Information  Primary Emergency Contact: Halle Greenwood  Address: [address] [place] [address] [facility]  Home Phone: [phone]  Mobile Phone: [phone]  Relation: Daughter  Secondary Emergency Contact: Brielle Parkinson  Mobile Phone: [phone]  Relation: Grandchild    Discharge Plan A: Return to nursing home  Discharge Plan B: Return to Nursing Home      [facility] - [place], [place] - [address] [facility] [address]  [facility] [address]  Phone: [phone] Fax: [phone]    [facility] - [place], [place] - [address]  [address]  [facility] [address]  Phone: [phone] Fax: [phone]    [id number] - [place], [place] - [address]  [address]  [facility] [address]  Phone: [phone] Fax: [phone]    [facility] [place]  [address] [facility] [address]  Phone: [phone] Fax: [phone]      Initial Assessment (most recent)       Adult Discharge Assessment - 01/17/25 1115          Discharge Assessment    Assessment Type Discharge Planning Assessment     Confirmed/corrected address, phone number and insurance Yes     Confirmed Demographics Correct on Facesheet     Source of Information patient     When was your last doctors  appointment? --   Pt stated she needs to find a new PCP.    Reason For Admission Atrial fibrillation with RVR     People in Home facility resident     Facility Arrived From: [facility] shelter     Do you expect to return to your current living situation? Yes     Do you have help at home or someone to help you manage your care at home? Yes     Who are your caregiver(s) and their phone number(s)? [facility] staff     Prior to hospitilization cognitive status: Alert/Oriented     Current cognitive status: Alert/Oriented     Walking or Climbing Stairs Difficulty yes     Walking or Climbing Stairs ambulation difficulty, requires equipment     Mobility Management Pt reports she is able to transfer to wheelchair and uses a wheelchair to mobilize.  pt reported she gets short of Breath and becomes unable to ambulate.     Dressing/Bathing Difficulty yes     Dressing/Bathing bathing difficulty, assistance 1 person     Dressing/Bathing Management Pt reported she requires assistance getting in the shower/tub but is able to bath herself.     Equipment Currently Used at Home wheelchair;other (see comments);oxygen   Pt reported she has a rolling walker but has not been able to use.    Readmission within 30 days? No     Patient currently being followed by outpatient case management? No     Do you currently have service(s) that help you manage your care at home? Yes     Name and Contact number of agency [facility] Staff     Do you take prescription medications? Yes     Do you have prescription coverage? Yes     Coverage [employer]     Do you have any problems affording any of your prescribed medications? No     Is the patient taking medications as prescribed? yes     Who is going to help you get home at discharge? Pt will require transportation back to [facility]     How do you get to doctors appointments? agency     Are you on dialysis? No     Do you take coumadin?  No     Discharge Plan A Return to nursing home     Discharge Plan B Return to Nursing Home     DME Needed Upon Discharge  none     Discharge Plan discussed with: Patient     Transition of Care Barriers None        Physical Activity    On average, how many days per week do you engage in moderate to strenuous exercise (like a brisk walk)? 0 days     On average, how many minutes do you engage in exercise at this level? 0 min        Financial Resource Strain    How hard is it for you to pay for the very basics like food, housing, medical care, and heating? Not hard at all        Housing Stability    In the last 12 months, was there a time when you were not able to pay the mortgage or rent on time? No     At any time in the past 12 months, were you homeless or living in a shelter (including now)? No        Transportation Needs    Has the lack of transportation kept you from medical appointments, meetings, work or from getting things needed for daily living? No        Food Insecurity    Within the past 12 months, you worried that your food would run out before you got the money to buy more. Never true     Within the past 12 months, the food you bought just didn't last and you didn't have money to get more. Never true        Stress    Do you feel stress - tense, restless, nervous, or anxious, or unable to sleep at night because your mind is troubled all the time - these days? Not at all        Social Isolation    How often do you feel lonely or isolated from those around you?  Never        Alcohol Use    Q1: How often do you have a drink containing alcohol? --   Pt does not drink or use substances.    Q2: How many drinks containing alcohol do you have on a typical day when you are drinking? --   Pt does not drink or use substances.       Utilities    In the past 12 months has the electric, gas, oil, or water company threatened to shut off services in your home? No        Health Literacy    How often do you need to have  someone help you when you read instructions, pamphlets, or other written material from your doctor or pharmacy? Sometimes        OTHER    Name(s) of People in Home Resident at [facility].                   SW completed initial discharge assessment with pt.  No hospital readmissions within the last 30 days.  Pt was transferred from [facility] to [facility].  Pt will need transportation back to facility.     Pt is a resident of [facility].  Pt reported she requires assistance with mobility and ADLs.  Pt reported she is unable to ambulates and uses a wheelchair.  Reported she requires assistance get in tub/shower but is able to bath herself.  DME:  O2, wheelchair.  Pt reported she has a rolling walker but unable to use.  Pt has support from family and [facility] nursing staff.      Pt does not receive coumadin, dialysis, or HH services.      Disp:  Pt not medically ready for d/c.  1. Return to [facility].  2. Return to nursing home.      Discharge Plan A and Plan B have been determined by review of patient's clinical status, future medical and therapeutic needs, and coverage/benefits for post-acute care in coordination with multidisciplinary team members.    Ghada Rojas LMSW  Part-Time-  [facility]  Ext. [phone]

## 2025-01-17 NOTE — PROGRESS NOTES
"[facility] Medicine  Progress Note    Patient Name: Deb Webb  MRN: [id number]  Patient Class: IP- Inpatient   Admission Date: 1/15/2025  Length of Stay: 1 days  Attending Physician: Jared Caldera MD  Primary Care Provider: Triston Valverde MD        Subjective     Principal Problem:Atrial fibrillation with RVR        HPI:  Deb Webb is a 74 y.o. with afib, COPD (on 2-3L NC at home for comfort, does not require always but usually likes to have it on), DM II, HTN, HLD, glaucoma, and osteoporosis who presents for worsening acute on chronic low thoracic and lumbar back pain over the last 2ish weeks. She denied any known trauma or falls at that time but has had falls in the past and has reportedly been on vitamin D/calcium but off other osteoporosis meds for about a year. She denies any saddle anesthesia, bowel or bladder incontinence, focal weakness, numbness, tingling, syncope, chest pain, fever, abd pain, hematochezia, hematuria, dysuria, nausea, vomiting. Reports her she sometimes feels like her legs are "shaky" under her but this has been going for "a long time" and has not acutely changed. She has been able to walk as normal. She reports increased dyspnea starting around the time of her acute onset of pain as well as worsening cough. Has not increased her O2 use at home but felt like she needed to. She lives at a facility and denies any recent known medication regimen changes or non-adherence although she is not entirely sure of her complete regimen.    In ED, notably went into afib with RVR and had some hypotension requiring IVF and dilt pushes before a gtt to control her rate and improve her BP.    Overview/Hospital Course:  Echo with CVP 15 continued judicious diuresis in setting of soft BP with MAP>65. Cardiology recommends medical management of AF with outpatient EP follow up. Pt reports "heavy urine" at NH with bacteriuria. Uncertain of contribution of steroids vs " infective source, treating UTI with Augmentin.     Interval History: rhonda CAMARA says back pain about the same.     Review of Systems  Objective:     Vital Signs (Most Recent):  Temp: 97.3 °F (36.3 °C) (01/17/25 1136)  Pulse: 91 (01/17/25 1136)  Resp: 18 (01/17/25 1136)  BP: 100/70 (01/17/25 1136)  SpO2: 95 % (01/17/25 1136) Vital Signs (24h Range):  Temp:  [97.3 °F (36.3 °C)-97.5 °F (36.4 °C)] 97.3 °F (36.3 °C)  Pulse:  [] 91  Resp:  [18-30] 18  SpO2:  [89 %-98 %] 95 %  BP: ()/(48-85) 100/70     Weight: 49.9 kg (110 lb 0.2 oz)  Body mass index is 22.22 kg/m².    Intake/Output Summary (Last 24 hours) at 1/17/2025 1401  Last data filed at 1/17/2025 0716  Gross per 24 hour   Intake --   Output 650 ml   Net -650 ml         Physical Exam  Vitals and nursing note reviewed.   Constitutional:       General: She is not in acute distress.  HENT:      Head: Normocephalic and atraumatic.      Nose: No rhinorrhea.      Mouth/Throat:      Mouth: Mucous membranes are moist.   Eyes:      Conjunctiva/sclera: Conjunctivae normal.      Pupils: Pupils are equal, round, and reactive to light.   Cardiovascular:      Rate and Rhythm: Normal rate. Rhythm irregular.      Heart sounds: Normal heart sounds.   Pulmonary:      Effort: Pulmonary effort is normal. No respiratory distress.      Breath sounds: Wheezing present. No rales.      Comments: Talking and breathing comfortably on 3L (usually uses 2-3L at home for comfort).  Abdominal:      General: Bowel sounds are normal. There is no distension.      Palpations: Abdomen is soft.      Tenderness: There is no abdominal tenderness. There is no guarding or rebound.   Musculoskeletal:      Cervical back: Normal range of motion and neck supple.      Right lower leg: No edema.      Left lower leg: No edema.   Skin:     General: Skin is warm and dry.   Neurological:      Mental Status: She is alert and oriented to person, place, and time.      Cranial Nerves: No dysarthria or facial  asymmetry.      Sensory: Sensation is intact.      Comments: BL UE and LE 5/5 strength.             Significant Labs: All pertinent labs within the past 24 hours have been reviewed.    Significant Imaging: I have reviewed all pertinent imaging results/findings within the past 24 hours.    Assessment and Plan     * Atrial fibrillation with RVR  Patient has paroxysmal (<7 days) atrial fibrillation. Patient is currently in atrial fibrillation. [id number] Score: 3. The patients heart rate in the last 24 hours is as follows:  Pulse  Min: 73  Max: 115.   Pt noted to go into afib with in ED undergoing evaluation for back pain. Reports she has been feeling like she has been going into and out of afib since her pains started. Required pushes of dilt then gtt as remained uncontrolled. Received some gentle IVF.    Antiarrhythmics  metoprolol tartrate (LOPRESSOR) tablet 25 mg, 2 times daily, Oral    Anticoagulants  rivaroxaban tablet 15 mg, with dinner, Oral    Plan  - Replete lytes with a goal of K>4, Mg >2  - Patient is anticoagulated, see medications listed above.  - Will pause dilt gtt for now as pt HR ~60s and SBP upper 80s to 90s. She continues to intermittently be in afib. She is uncertain of her home rate control medicine although lopressor 25 q6hr is listed and no other medicines on outside reconcillation. Will order 25mg lopressor BID for now to start at 0900 as long as her BP and HR stay stable. Pharmacy consulted for med rec.  - Monitor on tele.  - Cardiology recs for uptitration of beta blockade as tolerated, EP follow up.   - Not overtly hypervolemic on exam, but CVP15 on Echo. Judicious diuresis with IV Lasix 40mg qd given soft Bps.        Lumbar compression fracture  Indeterminate age fractures although some appear newer per radiology. Pt w/o acute neurologic symptoms at this time. Notably hx of osteoporosis and reports she used to be on medicines other than just vitamin D and calcium but has not been on them in  at least 1yr.    Plan  - Might benefit from spinal evaluation, MRI ordered to evaluate for compression fractures    Compression fracture of body of thoracic vertebra  Indeterminate age fractures although some appear newer per radiology. Pt w/o acute neurologic symptoms at this time. Notably hx of osteoporosis and reports she used to be on medicines other than just vitamin D and calcium but has not been on them in at least 1yr.    Plan  - Might benefit from spinal evaluation once acute afib is controlled.  - MRI ordered to evaluate for compression fractures    Glaucoma  Plan  - Continue home eye drops.    Severe Pulmonary hypertension  Per Echo on 01/16/25    Type 2 diabetes mellitus, with long-term current use of insulin  Pt's BG 57 on initial labs, no current hypoglycemic sxs.    Plan  - ACHS glucose checks and SSI for now.  - Pt's home regimen is 10u long acting, SSI with meals, and trulicity.    COPD exacerbation  Patient's COPD is with exacerbation noted by continued dyspnea and worsened cough currently. May be part of etiology of onset of afib RVR. Patient is currently off COPD Pathway. Continue scheduled inhalers Steroids, Antibiotics, and Supplemental oxygen and monitor respiratory status closely. Uses 2-3L NC for comfort at home, currently on chronic 2L      VTE Risk Mitigation (From admission, onward)           Ordered     rivaroxaban tablet 15 mg  with dinner         01/17/25 1357     IP VTE HIGH RISK PATIENT  Once         01/16/25 0359     Place sequential compression device  Until discontinued         01/16/25 0359                    Discharge Planning   NATHALY: 1/21/2025     Code Status: Full Code   Medical Readiness for Discharge Date:   Discharge Plan A: Return to nursing home                        Jared Caldera MD  Department of Hospital Medicine   [facility] Surg

## 2025-01-17 NOTE — CONSULTS
Hospital Medicine Pharmacy Consult Note    PharmD Consult Received For:     Pharmacy to perform an admission medication history and reconciliation  Medication history completed by Toyin Atkinson, pharmacy technician. Please see the pharmacy med rec note documented on 2025 for the updated medication list.      PTA Medications   Medication Sig    acetaminophen (TYLENOL) 325 MG tablet Take 325 mg by mouth every 6 (six) hours as needed for Pain.    albuterol-ipratropium (DUO-NEB) 2.5 mg-0.5 mg/3 mL nebulizer solution Take 3 mLs by nebulization every 6 (six) hours as needed for Wheezing. Rescue    ANORO ELLIPTA 62.5-25 mcg/actuation DsDv INHALE 1 PUFF INTO THE LUNGS ONCE DAILY.    brimonidine 0.2% (ALPHAGAN) 0.2 % Drop PLACE 1 DROP INTO BOTH EYES 2 TIMES A DAY.    busPIRone (BUSPAR) 7.5 MG tablet Take 7.5 mg by mouth 2 (two) times daily.    calcium carbonate/vitamin D3 (CALCIUM 600 + D,3, ORAL) Take 1 tablet by mouth Daily.    cholestyramine-aspartame (PREVALITE) 4 gram PwPk TAKE 1 PACKET (4 G TOTAL) BY MOUTH 2 (TWO) TIMES DAILY. FOR DIARRHEA    citalopram (CELEXA) 20 MG tablet Take 1 tablet (20 mg total) by mouth once daily.    diclofenac sodium (VOLTAREN) 1 % Gel Apply to hands and ankles topically twice daily as needed for pain    famotidine (PEPCID) 20 MG tablet Take 1 tablet (20 mg total) by mouth once daily.    furosemide (LASIX) 20 MG tablet Take 20 mg by mouth once daily.    gabapentin (NEURONTIN) 300 MG capsule TAKE 1 CAPSULE BY MOUTH THREE TIMES A DAY    latanoprost 0.005 % ophthalmic solution Place 1 drop into both eyes every evening.    LEVEMIR FLEXPEN 100 unit/mL (3 mL) InPn pen Inject 10 Units into the skin every evening.    NOVOLOG FLEXPEN U-100 INSULIN 100 unit/mL (3 mL) InPn pen Inject 0-8 Units into the skin as needed (sliding scale). B to 149 mg/dL = 0 units  150-199 mg/dL = 2 units  200 to 249 mg/dL = 4 units  250 to 299 mg/dL = 6 units  300 to 999 mg/dL = 8 units  Notify Provider if BG is  more than 400 mg/dL.    ondansetron (ZOFRAN) 4 MG tablet Take 4 mg by mouth every 8 (eight) hours as needed for Nausea.    potassium chloride SA (K-DUR,KLOR-CON) 20 MEQ tablet Take 20 mEq by mouth once daily.    senna (SENOKOT) 8.6 mg tablet Take 1 tablet by mouth once daily.    tamsulosin (FLOMAX) 0.4 mg Cap Take 1 capsule (0.4 mg total) by mouth once daily.    timolol maleate 0.5% (TIMOPTIC) 0.5 % Drop Place 1 drop into both eyes once daily.    XARELTO 20 mg Tab TAKE 1 TABLET BY MOUTH EVERY DAY WITH DINNER OR EVENING MEAL     Pharmacy will sign-off, please re-consult as needed    Thank you for the consult,  Daria Hamilton  Extension [phone]    **Note: Consults are reviewed Monday-Friday 7:00am-3:30pm. The above recommendations are only suggested. The recommendations should be considered in conjunction with all patient factors.**

## 2025-01-17 NOTE — SUBJECTIVE & OBJECTIVE
Interval History: rhonda CAMARA says back pain about the same.     Review of Systems  Objective:     Vital Signs (Most Recent):  Temp: 97.3 °F (36.3 °C) (01/17/25 1136)  Pulse: 91 (01/17/25 1136)  Resp: 18 (01/17/25 1136)  BP: 100/70 (01/17/25 1136)  SpO2: 95 % (01/17/25 1136) Vital Signs (24h Range):  Temp:  [97.3 °F (36.3 °C)-97.5 °F (36.4 °C)] 97.3 °F (36.3 °C)  Pulse:  [] 91  Resp:  [18-30] 18  SpO2:  [89 %-98 %] 95 %  BP: ()/(48-85) 100/70     Weight: 49.9 kg (110 lb 0.2 oz)  Body mass index is 22.22 kg/m².    Intake/Output Summary (Last 24 hours) at 1/17/2025 1401  Last data filed at 1/17/2025 0716  Gross per 24 hour   Intake --   Output 650 ml   Net -650 ml         Physical Exam  Vitals and nursing note reviewed.   Constitutional:       General: She is not in acute distress.  HENT:      Head: Normocephalic and atraumatic.      Nose: No rhinorrhea.      Mouth/Throat:      Mouth: Mucous membranes are moist.   Eyes:      Conjunctiva/sclera: Conjunctivae normal.      Pupils: Pupils are equal, round, and reactive to light.   Cardiovascular:      Rate and Rhythm: Normal rate. Rhythm irregular.      Heart sounds: Normal heart sounds.   Pulmonary:      Effort: Pulmonary effort is normal. No respiratory distress.      Breath sounds: Wheezing present. No rales.      Comments: Talking and breathing comfortably on 3L (usually uses 2-3L at home for comfort).  Abdominal:      General: Bowel sounds are normal. There is no distension.      Palpations: Abdomen is soft.      Tenderness: There is no abdominal tenderness. There is no guarding or rebound.   Musculoskeletal:      Cervical back: Normal range of motion and neck supple.      Right lower leg: No edema.      Left lower leg: No edema.   Skin:     General: Skin is warm and dry.   Neurological:      Mental Status: She is alert and oriented to person, place, and time.      Cranial Nerves: No dysarthria or facial asymmetry.      Sensory: Sensation is intact.       Comments: MARIEL UE and JURGEN 5/5 strength.             Significant Labs: All pertinent labs within the past 24 hours have been reviewed.    Significant Imaging: I have reviewed all pertinent imaging results/findings within the past 24 hours.

## 2025-01-17 NOTE — ASSESSMENT & PLAN NOTE
Indeterminate age fractures although some appear newer per radiology. Pt w/o acute neurologic symptoms at this time. Notably hx of osteoporosis and reports she used to be on medicines other than just vitamin D and calcium but has not been on them in at least 1yr.    Plan  - Might benefit from spinal evaluation once acute afib is controlled.  - MRI ordered to evaluate for compression fractures

## 2025-01-17 NOTE — ASSESSMENT & PLAN NOTE
Pt's BG 57 on initial labs, no current hypoglycemic sxs.    Plan  - ACHS glucose checks and SSI for now.  - Pt's home regimen is 10u long acting, SSI with meals, and trulicity.

## 2025-01-17 NOTE — ASSESSMENT & PLAN NOTE
Indeterminate age fractures although some appear newer per radiology. Pt w/o acute neurologic symptoms at this time. Notably hx of osteoporosis and reports she used to be on medicines other than just vitamin D and calcium but has not been on them in at least 1yr.    Plan  - Might benefit from spinal evaluation, MRI ordered to evaluate for compression fractures

## 2025-01-18 LAB
ALBUMIN SERPL BCP-MCNC: 3 G/DL (ref 3.5–5.2)
ALP SERPL-CCNC: 160 U/L (ref 40–150)
ALT SERPL W/O P-5'-P-CCNC: 8 U/L (ref 10–44)
ANION GAP SERPL CALC-SCNC: 10 MMOL/L (ref 8–16)
AST SERPL-CCNC: 16 U/L (ref 10–40)
BASOPHILS # BLD AUTO: 0.04 K/UL (ref 0–0.2)
BASOPHILS NFR BLD: 0.3 % (ref 0–1.9)
BILIRUB SERPL-MCNC: 0.2 MG/DL (ref 0.1–1)
BUN SERPL-MCNC: 42 MG/DL (ref 8–23)
CALCIUM SERPL-MCNC: 9.3 MG/DL (ref 8.7–10.5)
CHLORIDE SERPL-SCNC: 102 MMOL/L (ref 95–110)
CO2 SERPL-SCNC: 20 MMOL/L (ref 23–29)
CREAT SERPL-MCNC: 0.9 MG/DL (ref 0.5–1.4)
DIFFERENTIAL METHOD BLD: ABNORMAL
EOSINOPHIL # BLD AUTO: 0 K/UL (ref 0–0.5)
EOSINOPHIL NFR BLD: 0 % (ref 0–8)
ERYTHROCYTE [DISTWIDTH] IN BLOOD BY AUTOMATED COUNT: 14.8 % (ref 11.5–14.5)
EST. GFR  (NO RACE VARIABLE): >60 ML/MIN/1.73 M^2
GLUCOSE SERPL-MCNC: 217 MG/DL (ref 70–110)
HCT VFR BLD AUTO: 38.3 % (ref 37–48.5)
HGB BLD-MCNC: 11.2 G/DL (ref 12–16)
IMM GRANULOCYTES # BLD AUTO: 0.1 K/UL (ref 0–0.04)
IMM GRANULOCYTES NFR BLD AUTO: 0.7 % (ref 0–0.5)
LYMPHOCYTES # BLD AUTO: 1.4 K/UL (ref 1–4.8)
LYMPHOCYTES NFR BLD: 10.5 % (ref 18–48)
MAGNESIUM SERPL-MCNC: 1.6 MG/DL (ref 1.6–2.6)
MCH RBC QN AUTO: 28.3 PG (ref 27–31)
MCHC RBC AUTO-ENTMCNC: 29.2 G/DL (ref 32–36)
MCV RBC AUTO: 97 FL (ref 82–98)
MONOCYTES # BLD AUTO: 0.7 K/UL (ref 0.3–1)
MONOCYTES NFR BLD: 5.2 % (ref 4–15)
NEUTROPHILS # BLD AUTO: 11.4 K/UL (ref 1.8–7.7)
NEUTROPHILS NFR BLD: 83.3 % (ref 38–73)
NRBC BLD-RTO: 0 /100 WBC
PHOSPHATE SERPL-MCNC: 2.7 MG/DL (ref 2.7–4.5)
PLATELET # BLD AUTO: 168 K/UL (ref 150–450)
PMV BLD AUTO: 11 FL (ref 9.2–12.9)
POCT GLUCOSE: 212 MG/DL (ref 70–110)
POCT GLUCOSE: 247 MG/DL (ref 70–110)
POCT GLUCOSE: 293 MG/DL (ref 70–110)
POCT GLUCOSE: 337 MG/DL (ref 70–110)
POTASSIUM SERPL-SCNC: 5.1 MMOL/L (ref 3.5–5.1)
PROT SERPL-MCNC: 7.4 G/DL (ref 6–8.4)
RBC # BLD AUTO: 3.96 M/UL (ref 4–5.4)
SODIUM SERPL-SCNC: 132 MMOL/L (ref 136–145)
WBC # BLD AUTO: 13.68 K/UL (ref 3.9–12.7)

## 2025-01-18 PROCEDURE — 83735 ASSAY OF MAGNESIUM: CPT | Performed by: STUDENT IN AN ORGANIZED HEALTH CARE EDUCATION/TRAINING PROGRAM

## 2025-01-18 PROCEDURE — 25000003 PHARM REV CODE 250

## 2025-01-18 PROCEDURE — 94761 N-INVAS EAR/PLS OXIMETRY MLT: CPT

## 2025-01-18 PROCEDURE — 25000003 PHARM REV CODE 250: Performed by: HOSPITALIST

## 2025-01-18 PROCEDURE — 27000221 HC OXYGEN, UP TO 24 HOURS

## 2025-01-18 PROCEDURE — 21400001 HC TELEMETRY ROOM

## 2025-01-18 PROCEDURE — A9585 GADOBUTROL INJECTION: HCPCS

## 2025-01-18 PROCEDURE — 94640 AIRWAY INHALATION TREATMENT: CPT

## 2025-01-18 PROCEDURE — 63600175 PHARM REV CODE 636 W HCPCS: Performed by: HOSPITALIST

## 2025-01-18 PROCEDURE — 85025 COMPLETE CBC W/AUTO DIFF WBC: CPT | Performed by: STUDENT IN AN ORGANIZED HEALTH CARE EDUCATION/TRAINING PROGRAM

## 2025-01-18 PROCEDURE — 63600175 PHARM REV CODE 636 W HCPCS: Performed by: STUDENT IN AN ORGANIZED HEALTH CARE EDUCATION/TRAINING PROGRAM

## 2025-01-18 PROCEDURE — 36415 COLL VENOUS BLD VENIPUNCTURE: CPT | Performed by: STUDENT IN AN ORGANIZED HEALTH CARE EDUCATION/TRAINING PROGRAM

## 2025-01-18 PROCEDURE — 99900035 HC TECH TIME PER 15 MIN (STAT)

## 2025-01-18 PROCEDURE — 80053 COMPREHEN METABOLIC PANEL: CPT | Performed by: STUDENT IN AN ORGANIZED HEALTH CARE EDUCATION/TRAINING PROGRAM

## 2025-01-18 PROCEDURE — 84100 ASSAY OF PHOSPHORUS: CPT | Performed by: STUDENT IN AN ORGANIZED HEALTH CARE EDUCATION/TRAINING PROGRAM

## 2025-01-18 PROCEDURE — 25000003 PHARM REV CODE 250: Performed by: STUDENT IN AN ORGANIZED HEALTH CARE EDUCATION/TRAINING PROGRAM

## 2025-01-18 PROCEDURE — 25000242 PHARM REV CODE 250 ALT 637 W/ HCPCS: Performed by: STUDENT IN AN ORGANIZED HEALTH CARE EDUCATION/TRAINING PROGRAM

## 2025-01-18 PROCEDURE — 25500020 PHARM REV CODE 255

## 2025-01-18 PROCEDURE — 63600175 PHARM REV CODE 636 W HCPCS

## 2025-01-18 PROCEDURE — 63700000 PHARM REV CODE 250 ALT 637 W/O HCPCS: Performed by: STUDENT IN AN ORGANIZED HEALTH CARE EDUCATION/TRAINING PROGRAM

## 2025-01-18 RX ORDER — CALCITONIN SALMON 200 [IU]/.09ML
1 SPRAY, METERED NASAL DAILY
Status: DISCONTINUED | OUTPATIENT
Start: 2025-01-18 | End: 2025-01-19 | Stop reason: HOSPADM

## 2025-01-18 RX ORDER — INSULIN GLARGINE 100 [IU]/ML
14 INJECTION, SOLUTION SUBCUTANEOUS NIGHTLY
Status: DISCONTINUED | OUTPATIENT
Start: 2025-01-18 | End: 2025-01-19 | Stop reason: HOSPADM

## 2025-01-18 RX ORDER — INSULIN ASPART 100 [IU]/ML
6 INJECTION, SOLUTION INTRAVENOUS; SUBCUTANEOUS
Status: DISCONTINUED | OUTPATIENT
Start: 2025-01-18 | End: 2025-01-19 | Stop reason: HOSPADM

## 2025-01-18 RX ORDER — GADOBUTROL 604.72 MG/ML
5 INJECTION INTRAVENOUS
Status: COMPLETED | OUTPATIENT
Start: 2025-01-18 | End: 2025-01-18

## 2025-01-18 RX ORDER — IBUPROFEN 400 MG/1
400 TABLET ORAL ONCE
Status: COMPLETED | OUTPATIENT
Start: 2025-01-18 | End: 2025-01-18

## 2025-01-18 RX ADMIN — AZITHROMYCIN DIHYDRATE 500 MG: 250 TABLET ORAL at 08:01

## 2025-01-18 RX ADMIN — CALCITONIN SALMON 1 SPRAY: 200 SPRAY, METERED NASAL at 10:01

## 2025-01-18 RX ADMIN — INSULIN ASPART 4 UNITS: 100 INJECTION, SOLUTION INTRAVENOUS; SUBCUTANEOUS at 01:01

## 2025-01-18 RX ADMIN — LIDOCAINE 1 PATCH: 50 PATCH CUTANEOUS at 10:01

## 2025-01-18 RX ADMIN — TIMOLOL MALEATE 1 DROP: 5 SOLUTION OPHTHALMIC at 08:01

## 2025-01-18 RX ADMIN — IPRATROPIUM BROMIDE AND ALBUTEROL SULFATE 3 ML: 2.5; .5 SOLUTION RESPIRATORY (INHALATION) at 07:01

## 2025-01-18 RX ADMIN — AMOXICILLIN AND CLAVULANATE POTASSIUM 1 TABLET: 875; 125 TABLET, FILM COATED ORAL at 08:01

## 2025-01-18 RX ADMIN — INSULIN ASPART 6 UNITS: 100 INJECTION, SOLUTION INTRAVENOUS; SUBCUTANEOUS at 04:01

## 2025-01-18 RX ADMIN — INSULIN ASPART 2 UNITS: 100 INJECTION, SOLUTION INTRAVENOUS; SUBCUTANEOUS at 01:01

## 2025-01-18 RX ADMIN — METOPROLOL TARTRATE 25 MG: 25 TABLET, FILM COATED ORAL at 08:01

## 2025-01-18 RX ADMIN — BRIMONIDINE TARTRATE 1 DROP: 2 SOLUTION OPHTHALMIC at 08:01

## 2025-01-18 RX ADMIN — INSULIN ASPART 2 UNITS: 100 INJECTION, SOLUTION INTRAVENOUS; SUBCUTANEOUS at 08:01

## 2025-01-18 RX ADMIN — GABAPENTIN 300 MG: 300 CAPSULE ORAL at 09:01

## 2025-01-18 RX ADMIN — FLUTICASONE FUROATE AND VILANTEROL TRIFENATATE 1 PUFF: 100; 25 POWDER RESPIRATORY (INHALATION) at 07:01

## 2025-01-18 RX ADMIN — GABAPENTIN 300 MG: 300 CAPSULE ORAL at 08:01

## 2025-01-18 RX ADMIN — BUSPIRONE HYDROCHLORIDE 7.5 MG: 5 TABLET ORAL at 09:01

## 2025-01-18 RX ADMIN — BUSPIRONE HYDROCHLORIDE 7.5 MG: 5 TABLET ORAL at 08:01

## 2025-01-18 RX ADMIN — PREDNISONE 40 MG: 20 TABLET ORAL at 08:01

## 2025-01-18 RX ADMIN — RIVAROXABAN 15 MG: 15 TABLET, FILM COATED ORAL at 04:01

## 2025-01-18 RX ADMIN — LATANOPROST 1 DROP: 50 SOLUTION OPHTHALMIC at 09:01

## 2025-01-18 RX ADMIN — ACETAMINOPHEN 650 MG: 325 TABLET ORAL at 05:01

## 2025-01-18 RX ADMIN — INSULIN GLARGINE 14 UNITS: 100 INJECTION, SOLUTION SUBCUTANEOUS at 09:01

## 2025-01-18 RX ADMIN — IPRATROPIUM BROMIDE AND ALBUTEROL SULFATE 3 ML: 2.5; .5 SOLUTION RESPIRATORY (INHALATION) at 02:01

## 2025-01-18 RX ADMIN — BRIMONIDINE TARTRATE 1 DROP: 2 SOLUTION OPHTHALMIC at 09:01

## 2025-01-18 RX ADMIN — FUROSEMIDE 40 MG: 10 INJECTION, SOLUTION INTRAVENOUS at 08:01

## 2025-01-18 RX ADMIN — INSULIN ASPART 3 UNITS: 100 INJECTION, SOLUTION INTRAVENOUS; SUBCUTANEOUS at 04:01

## 2025-01-18 RX ADMIN — AMOXICILLIN AND CLAVULANATE POTASSIUM 1 TABLET: 875; 125 TABLET, FILM COATED ORAL at 09:01

## 2025-01-18 RX ADMIN — ACETAMINOPHEN 650 MG: 325 TABLET ORAL at 09:01

## 2025-01-18 RX ADMIN — GABAPENTIN 300 MG: 300 CAPSULE ORAL at 02:01

## 2025-01-18 RX ADMIN — ACETAMINOPHEN 650 MG: 325 TABLET ORAL at 03:01

## 2025-01-18 RX ADMIN — INSULIN ASPART 4 UNITS: 100 INJECTION, SOLUTION INTRAVENOUS; SUBCUTANEOUS at 08:01

## 2025-01-18 RX ADMIN — Medication 6 MG: at 09:01

## 2025-01-18 RX ADMIN — GUAIFENESIN AND DEXTROMETHORPHAN 10 ML: 100; 10 SYRUP ORAL at 02:01

## 2025-01-18 RX ADMIN — GADOBUTROL 5 ML: 604.72 INJECTION INTRAVENOUS at 01:01

## 2025-01-18 RX ADMIN — PANTOPRAZOLE SODIUM 40 MG: 40 TABLET, DELAYED RELEASE ORAL at 08:01

## 2025-01-18 RX ADMIN — METOPROLOL TARTRATE 25 MG: 25 TABLET, FILM COATED ORAL at 09:01

## 2025-01-18 RX ADMIN — IBUPROFEN 400 MG: 400 TABLET ORAL at 11:01

## 2025-01-18 RX ADMIN — INSULIN ASPART 2 UNITS: 100 INJECTION, SOLUTION INTRAVENOUS; SUBCUTANEOUS at 09:01

## 2025-01-18 NOTE — ASSESSMENT & PLAN NOTE
Patient has paroxysmal (<7 days) atrial fibrillation. Patient is currently in atrial fibrillation. [id number] Score: 3. The patients heart rate in the last 24 hours is as follows:  Pulse  Min: 70  Max: 92.   Pt noted to go into afib with in ED undergoing evaluation for back pain. Reports she has been feeling like she has been going into and out of afib since her pains started. Required pushes of dilt then gtt as remained uncontrolled. Received some gentle IVF.    Antiarrhythmics  metoprolol tartrate (LOPRESSOR) tablet 25 mg, 2 times daily, Oral    Anticoagulants  rivaroxaban tablet 15 mg, with dinner, Oral    Plan  - Replete lytes with a goal of K>4, Mg >2  - Patient is anticoagulated, see medications listed above.  - Will pause dilt gtt for now as pt HR ~60s and SBP upper 80s to 90s. She continues to intermittently be in afib. She is uncertain of her home rate control medicine although lopressor 25 q6hr is listed and no other medicines on outside reconcillation. Will order 25mg lopressor BID for now to start at 0900 as long as her BP and HR stay stable. Pharmacy consulted for med rec.  - Monitor on tele.  - Cardiology recs for uptitration of beta blockade as tolerated, EP follow up.   - Not overtly hypervolemic on exam, but CVP15 on Echo. Judicious diuresis with IV Lasix 40mg qd given soft Bps.

## 2025-01-18 NOTE — PLAN OF CARE
Problem: Skin Injury Risk Increased  Goal: Skin Health and Integrity  Outcome: Progressing     Problem: Adult Inpatient Plan of Care  Goal: Plan of Care Review  Outcome: Progressing  Goal: Patient-Specific Goal (Individualized)  Outcome: Progressing  Goal: Absence of Hospital-Acquired Illness or Injury  Outcome: Progressing  Goal: Optimal Comfort and Wellbeing  Outcome: Progressing  Goal: Readiness for Transition of Care  Outcome: Progressing     Problem: Diabetes Comorbidity  Goal: Blood Glucose Level Within Targeted Range  Outcome: Progressing     Problem: Wound  Goal: Optimal Coping  Outcome: Progressing  Goal: Optimal Functional Ability  Outcome: Progressing  Goal: Absence of Infection Signs and Symptoms  Outcome: Progressing  Goal: Improved Oral Intake  Outcome: Progressing  Goal: Optimal Pain Control and Function  Outcome: Progressing  Goal: Skin Health and Integrity  Outcome: Progressing  Goal: Optimal Wound Healing  Outcome: Progressing

## 2025-01-18 NOTE — PLAN OF CARE
Problem: Skin Injury Risk Increased  Goal: Skin Health and Integrity  Outcome: Progressing     Problem: Diabetes Comorbidity  Goal: Blood Glucose Level Within Targeted Range  Outcome: Progressing     Problem: Adult Inpatient Plan of Care  Goal: Plan of Care Review  Outcome: Progressing  Goal: Patient-Specific Goal (Individualized)  Outcome: Progressing  Goal: Absence of Hospital-Acquired Illness or Injury  Outcome: Progressing  Goal: Optimal Comfort and Wellbeing  Outcome: Progressing  Goal: Readiness for Transition of Care  Outcome: Progressing

## 2025-01-18 NOTE — ASSESSMENT & PLAN NOTE
Pt's BG 57 on initial labs, no current hypoglycemic sxs.    Plan  - ACHS glucose checks and SSI along with glargine 14u qhs and aspart 6 u TIDWM  - Pt's home regimen is 10u long acting, SSI with meals, and trulicity.

## 2025-01-18 NOTE — PROGRESS NOTES
"[facility] Medicine  Progress Note    Patient Name: Deb Webb  MRN: [id number]  Patient Class: IP- Inpatient   Admission Date: 1/15/2025  Length of Stay: 2 days  Attending Physician: Jared Caldera MD  Primary Care Provider: Triston Valverde MD        Subjective     Principal Problem:Atrial fibrillation with RVR        HPI:  Deb Webb is a 74 y.o. with afib, COPD (on 2-3L NC at home for comfort, does not require always but usually likes to have it on), DM II, HTN, HLD, glaucoma, and osteoporosis who presents for worsening acute on chronic low thoracic and lumbar back pain over the last 2ish weeks. She denied any known trauma or falls at that time but has had falls in the past and has reportedly been on vitamin D/calcium but off other osteoporosis meds for about a year. She denies any saddle anesthesia, bowel or bladder incontinence, focal weakness, numbness, tingling, syncope, chest pain, fever, abd pain, hematochezia, hematuria, dysuria, nausea, vomiting. Reports her she sometimes feels like her legs are "shaky" under her but this has been going for "a long time" and has not acutely changed. She has been able to walk as normal. She reports increased dyspnea starting around the time of her acute onset of pain as well as worsening cough. Has not increased her O2 use at home but felt like she needed to. She lives at a facility and denies any recent known medication regimen changes or non-adherence although she is not entirely sure of her complete regimen.    In ED, notably went into afib with RVR and had some hypotension requiring IVF and dilt pushes before a gtt to control her rate and improve her BP.    Overview/Hospital Course:  Echo with CVP 15 continued judicious diuresis in setting of soft BP with MAP>65. Cardiology recommends medical management of AF with outpatient EP follow up. Pt reports "heavy urine" at NH with bacteriuria. Uncertain of contribution of steroids vs " infective source, treating UTI with Augmentin. Insulin regimen increased given steroid-associated hyperglycemia.     Interval History: Increased insulin overnight for hyperglycemia. Pt reports improvement in breathing today. MRI today.    Review of Systems  Objective:     Vital Signs (Most Recent):  Temp: 98.7 °F (37.1 °C) (01/18/25 1151)  Pulse: 86 (01/18/25 1151)  Resp: 18 (01/18/25 1151)  BP: 113/71 (01/18/25 1151)  SpO2: (!) 91 % (01/18/25 1151) Vital Signs (24h Range):  Temp:  [97.2 °F (36.2 °C)-98.7 °F (37.1 °C)] 98.7 °F (37.1 °C)  Pulse:  [70-92] 86  Resp:  [16-18] 18  SpO2:  [90 %-96 %] 91 %  BP: ()/(58-80) 113/71     Weight: 49.9 kg (110 lb 0.2 oz)  Body mass index is 22.22 kg/m².    Intake/Output Summary (Last 24 hours) at 1/18/2025 1230  Last data filed at 1/18/2025 1104  Gross per 24 hour   Intake --   Output 2500 ml   Net -2500 ml         Physical Exam  Vitals and nursing note reviewed.   Constitutional:       General: She is not in acute distress.  HENT:      Head: Normocephalic and atraumatic.      Nose: No rhinorrhea.      Mouth/Throat:      Mouth: Mucous membranes are moist.   Eyes:      Conjunctiva/sclera: Conjunctivae normal.      Pupils: Pupils are equal, round, and reactive to light.   Cardiovascular:      Rate and Rhythm: Normal rate. Rhythm irregular.      Heart sounds: Normal heart sounds.   Pulmonary:      Effort: Pulmonary effort is normal. No respiratory distress.      Breath sounds: No wheezing or rales.      Comments: Talking and breathing comfortably on 3L (usually uses 2-3L at home for comfort).  Abdominal:      General: Bowel sounds are normal. There is no distension.      Palpations: Abdomen is soft.      Tenderness: There is no abdominal tenderness. There is no guarding or rebound.   Musculoskeletal:      Cervical back: Normal range of motion and neck supple.      Right lower leg: No edema.      Left lower leg: No edema.   Skin:     General: Skin is warm and dry.    Neurological:      General: No focal deficit present.      Mental Status: She is alert and oriented to person, place, and time.      Cranial Nerves: No dysarthria or facial asymmetry.      Sensory: Sensation is intact.      Comments: BL UE and LE 5/5 strength.             Significant Labs: All pertinent labs within the past 24 hours have been reviewed.    Significant Imaging: I have reviewed all pertinent imaging results/findings within the past 24 hours.    Assessment and Plan     * Atrial fibrillation with RVR  Patient has paroxysmal (<7 days) atrial fibrillation. Patient is currently in atrial fibrillation. [id number] Score: 3. The patients heart rate in the last 24 hours is as follows:  Pulse  Min: 70  Max: 92.   Pt noted to go into afib with in ED undergoing evaluation for back pain. Reports she has been feeling like she has been going into and out of afib since her pains started. Required pushes of dilt then gtt as remained uncontrolled. Received some gentle IVF.    Antiarrhythmics  metoprolol tartrate (LOPRESSOR) tablet 25 mg, 2 times daily, Oral    Anticoagulants  rivaroxaban tablet 15 mg, with dinner, Oral    Plan  - Replete lytes with a goal of K>4, Mg >2  - Patient is anticoagulated, see medications listed above.  - Will pause dilt gtt for now as pt HR ~60s and SBP upper 80s to 90s. She continues to intermittently be in afib. She is uncertain of her home rate control medicine although lopressor 25 q6hr is listed and no other medicines on outside reconcillation. Will order 25mg lopressor BID for now to start at 0900 as long as her BP and HR stay stable. Pharmacy consulted for med rec.  - Monitor on tele.  - Cardiology recs for uptitration of beta blockade as tolerated, EP follow up.   - Not overtly hypervolemic on exam, but CVP15 on Echo. Judicious diuresis with IV Lasix 40mg qd given soft Bps.        Lumbar compression fracture  Indeterminate age fractures although some appear newer per radiology. Pt  w/o acute neurologic symptoms at this time. Notably hx of osteoporosis and reports she used to be on medicines other than just vitamin D and calcium but has not been on them in at least 1yr.    Plan  - Might benefit from spinal evaluation, MRI ordered to evaluate for compression fractures    Compression fracture of body of thoracic vertebra  Indeterminate age fractures although some appear newer per radiology. Pt w/o acute neurologic symptoms at this time. Notably hx of osteoporosis and reports she used to be on medicines other than just vitamin D and calcium but has not been on them in at least 1yr.    Plan    - MRI ordered to evaluate for compression fractures    Glaucoma  Plan  - Continue home eye drops.    Severe Pulmonary hypertension  Per Echo on 01/16/25    Type 2 diabetes mellitus, with long-term current use of insulin  Pt's BG 57 on initial labs, no current hypoglycemic sxs.    Plan  - ACHS glucose checks and SSI along with glargine 14u qhs and aspart 6 u TIDWM  - Pt's home regimen is 10u long acting, SSI with meals, and trulicity.    COPD exacerbation  Patient's COPD is with exacerbation noted by continued dyspnea and worsened cough currently. May be part of etiology of onset of afib RVR. Patient is currently off COPD Pathway. Continue scheduled inhalers Steroids, Antibiotics, and Supplemental oxygen and monitor respiratory status closely. Uses 2-3L NC for comfort at home, currently on chronic 2L      VTE Risk Mitigation (From admission, onward)           Ordered     rivaroxaban tablet 15 mg  with dinner         01/17/25 1357     IP VTE HIGH RISK PATIENT  Once         01/16/25 0359     Place sequential compression device  Until discontinued         01/16/25 0359                    Discharge Planning   NATHALY: 1/21/2025     Code Status: Full Code   Medical Readiness for Discharge Date:   Discharge Plan A: Return to nursing home                        Jared Caldera MD  Department of Hospital Medicine   [address]  [facility] [facility]

## 2025-01-18 NOTE — PROGRESS NOTES
"[facility] Medicine  Progress Note    Patient Name: Deb Webb  MRN: [id number]  Patient Class: IP- Inpatient   Admission Date: 1/15/2025  Length of Stay: 2 days  Attending Physician: Jared Caldera MD  Primary Care Provider: Triston Valverde MD        Subjective     Principal Problem:Atrial fibrillation with RVR        HPI:  Deb Webb is a 74 y.o. with afib, COPD (on 2-3L NC at home for comfort, does not require always but usually likes to have it on), DM II, HTN, HLD, glaucoma, and osteoporosis who presents for worsening acute on chronic low thoracic and lumbar back pain over the last 2ish weeks. She denied any known trauma or falls at that time but has had falls in the past and has reportedly been on vitamin D/calcium but off other osteoporosis meds for about a year. She denies any saddle anesthesia, bowel or bladder incontinence, focal weakness, numbness, tingling, syncope, chest pain, fever, abd pain, hematochezia, hematuria, dysuria, nausea, vomiting. Reports her she sometimes feels like her legs are "shaky" under her but this has been going for "a long time" and has not acutely changed. She has been able to walk as normal. She reports increased dyspnea starting around the time of her acute onset of pain as well as worsening cough. Has not increased her O2 use at home but felt like she needed to. She lives at a facility and denies any recent known medication regimen changes or non-adherence although she is not entirely sure of her complete regimen.    In ED, notably went into afib with RVR and had some hypotension requiring IVF and dilt pushes before a gtt to control her rate and improve her BP.    Overview/Hospital Course:  Echo with CVP 15 continued judicious diuresis in setting of soft BP with MAP>65. Cardiology recommends medical management of AF with outpatient EP follow up. Pt reports "heavy urine" at NH with bacteriuria. Uncertain of contribution of steroids vs " infective source, treating UTI with Augmentin. Insulin regimen increased given steroid-associated hyperglycemia.     No new subjective & objective note has been filed under this hospital service since the last note was generated.      Assessment and Plan     * Atrial fibrillation with RVR  Patient has paroxysmal (<7 days) atrial fibrillation. Patient is currently in atrial fibrillation. [id number] Score: 3. The patients heart rate in the last 24 hours is as follows:  Pulse  Min: 70  Max: 92.   Pt noted to go into afib with in ED undergoing evaluation for back pain. Reports she has been feeling like she has been going into and out of afib since her pains started. Required pushes of dilt then gtt as remained uncontrolled. Received some gentle IVF.    Antiarrhythmics  metoprolol tartrate (LOPRESSOR) tablet 25 mg, 2 times daily, Oral    Anticoagulants  rivaroxaban tablet 15 mg, with dinner, Oral    Plan  - Replete lytes with a goal of K>4, Mg >2  - Patient is anticoagulated, see medications listed above.  - Will pause dilt gtt for now as pt HR ~60s and SBP upper 80s to 90s. She continues to intermittently be in afib. She is uncertain of her home rate control medicine although lopressor 25 q6hr is listed and no other medicines on outside reconcillation. Will order 25mg lopressor BID for now to start at 0900 as long as her BP and HR stay stable. Pharmacy consulted for med rec.  - Monitor on tele.  - Cardiology recs for uptitration of beta blockade as tolerated, EP follow up.   - Not overtly hypervolemic on exam, but CVP15 on Echo. Judicious diuresis with IV Lasix 40mg qd given soft Bps.        Lumbar compression fracture  Indeterminate age fractures although some appear newer per radiology. Pt w/o acute neurologic symptoms at this time. Notably hx of osteoporosis and reports she used to be on medicines other than just vitamin D and calcium but has not been on them in at least 1yr.    Plan  - Might benefit from spinal  evaluation, MRI ordered to evaluate for compression fractures    Compression fracture of body of thoracic vertebra  Indeterminate age fractures although some appear newer per radiology. Pt w/o acute neurologic symptoms at this time. Notably hx of osteoporosis and reports she used to be on medicines other than just vitamin D and calcium but has not been on them in at least 1yr.    Plan    - MRI ordered to evaluate for compression fractures    Glaucoma  Plan  - Continue home eye drops.    Severe Pulmonary hypertension  Per Echo on 01/16/25    Type 2 diabetes mellitus, with long-term current use of insulin  Pt's BG 57 on initial labs, no current hypoglycemic sxs.    Plan  - ACHS glucose checks and SSI along with glargine 14u qhs and aspart 6 u TIDWM  - Pt's home regimen is 10u long acting, SSI with meals, and trulicity.    COPD exacerbation  Patient's COPD is with exacerbation noted by continued dyspnea and worsened cough currently. May be part of etiology of onset of afib RVR. Patient is currently off COPD Pathway. Continue scheduled inhalers Steroids, Antibiotics, and Supplemental oxygen and monitor respiratory status closely. Uses 2-3L NC for comfort at home, currently on chronic 2L      VTE Risk Mitigation (From admission, onward)           Ordered     rivaroxaban tablet 15 mg  with dinner         01/17/25 1357     IP VTE HIGH RISK PATIENT  Once         01/16/25 0359     Place sequential compression device  Until discontinued         01/16/25 0359                    Discharge Planning   NATHALY: 1/21/2025     Code Status: Full Code   Medical Readiness for Discharge Date:   Discharge Plan A: Return to nursing home                        Jared Caldera MD  Department of Hospital Medicine   [facility]

## 2025-01-18 NOTE — PLAN OF CARE
Problem: Skin Injury Risk Increased  Goal: Skin Health and Integrity  Outcome: Progressing     Problem: Adult Inpatient Plan of Care  Goal: Plan of Care Review  Outcome: Progressing  Goal: Patient-Specific Goal (Individualized)  Outcome: Progressing  Goal: Absence of Hospital-Acquired Illness or Injury  Outcome: Progressing  Goal: Optimal Comfort and Wellbeing  Outcome: Progressing  Goal: Readiness for Transition of Care  Outcome: Progressing     Problem: Diabetes Comorbidity  Goal: Blood Glucose Level Within Targeted Range  Outcome: Progressing     Problem: Wound  Goal: Optimal Coping  Outcome: Progressing  Goal: Optimal Functional Ability  Outcome: Progressing  Goal: Absence of Infection Signs and Symptoms  Outcome: Progressing  Goal: Improved Oral Intake  Outcome: Progressing  Goal: Optimal Pain Control and Function  Outcome: Progressing  Goal: Skin Health and Integrity  Outcome: Progressing  Goal: Optimal Wound Healing  Outcome: Progressing     Problem: Fall Injury Risk  Goal: Absence of Fall and Fall-Related Injury  Outcome: Progressing    AAOx4, oral antibiotics given, pain medication given, bed in lowest position, locked, side rails up x 2 and call light within reach.

## 2025-01-18 NOTE — ASSESSMENT & PLAN NOTE
Indeterminate age fractures although some appear newer per radiology. Pt w/o acute neurologic symptoms at this time. Notably hx of osteoporosis and reports she used to be on medicines other than just vitamin D and calcium but has not been on them in at least 1yr.    Plan    - MRI ordered to evaluate for compression fractures

## 2025-01-18 NOTE — ASSESSMENT & PLAN NOTE
Patient has paroxysmal (<7 days) atrial fibrillation. Patient is currently in atrial fibrillation. [id number] Score: 3. The patients heart rate in the last 24 hours is as follows:  Pulse  Min: 70  Max: 92.   Pt noted to go into afib with in ED undergoing evaluation for back pain. Reports she has been feeling like she has been going into and out of afib since her pains started. Required pushes of dilt then gtt as remained uncontrolled. Received some gentle IVF.    Antiarrhythmics  metoprolol tartrate (LOPRESSOR) tablet 25 mg, 2 times daily, Oral    Anticoagulants  rivaroxaban tablet 15 mg, with dinner, Oral    Plan  - Replete lytes with a goal of K>4, Mg >2  - Patient is anticoagulated, see medications listed above.  - Will pause dilt gtt for now as pt HR ~60s and SBP upper 80s to 90s. She continues to intermittently be in afib. She is uncertain of her home rate control medicine although lopressor 25 q6hr is listed and no other medicines on outside reconcillation. Will order 25mg lopressor BID for now to start at 0900 as long as her BP and HR stay stable. Pharmacy consulted for med rec.  - Monitor on tele.  - Cardiology recs for uptitration of beta blockade as tolerated, EP follow up.   - Not overtly hypervolemic on exam, but CVP15 on Echo. Judicious diuresis with IV Lasix 40mg qd given soft Bps.

## 2025-01-18 NOTE — SUBJECTIVE & OBJECTIVE
Interval History: Increased insulin overnight for hyperglycemia. Pt reports improvement in breathing today. MRI today.    Review of Systems  Objective:     Vital Signs (Most Recent):  Temp: 98.7 °F (37.1 °C) (01/18/25 1151)  Pulse: 86 (01/18/25 1151)  Resp: 18 (01/18/25 1151)  BP: 113/71 (01/18/25 1151)  SpO2: (!) 91 % (01/18/25 1151) Vital Signs (24h Range):  Temp:  [97.2 °F (36.2 °C)-98.7 °F (37.1 °C)] 98.7 °F (37.1 °C)  Pulse:  [70-92] 86  Resp:  [16-18] 18  SpO2:  [90 %-96 %] 91 %  BP: ()/(58-80) 113/71     Weight: 49.9 kg (110 lb 0.2 oz)  Body mass index is 22.22 kg/m².    Intake/Output Summary (Last 24 hours) at 1/18/2025 1230  Last data filed at 1/18/2025 1104  Gross per 24 hour   Intake --   Output 2500 ml   Net -2500 ml         Physical Exam  Vitals and nursing note reviewed.   Constitutional:       General: She is not in acute distress.  HENT:      Head: Normocephalic and atraumatic.      Nose: No rhinorrhea.      Mouth/Throat:      Mouth: Mucous membranes are moist.   Eyes:      Conjunctiva/sclera: Conjunctivae normal.      Pupils: Pupils are equal, round, and reactive to light.   Cardiovascular:      Rate and Rhythm: Normal rate. Rhythm irregular.      Heart sounds: Normal heart sounds.   Pulmonary:      Effort: Pulmonary effort is normal. No respiratory distress.      Breath sounds: No wheezing or rales.      Comments: Talking and breathing comfortably on 3L (usually uses 2-3L at home for comfort).  Abdominal:      General: Bowel sounds are normal. There is no distension.      Palpations: Abdomen is soft.      Tenderness: There is no abdominal tenderness. There is no guarding or rebound.   Musculoskeletal:      Cervical back: Normal range of motion and neck supple.      Right lower leg: No edema.      Left lower leg: No edema.   Skin:     General: Skin is warm and dry.   Neurological:      General: No focal deficit present.      Mental Status: She is alert and oriented to person, place, and time.       Cranial Nerves: No dysarthria or facial asymmetry.      Sensory: Sensation is intact.      Comments: BL UE and LE 5/5 strength.             Significant Labs: All pertinent labs within the past 24 hours have been reviewed.    Significant Imaging: I have reviewed all pertinent imaging results/findings within the past 24 hours.

## 2025-01-18 NOTE — NURSING
Patient's night glucose check is 353. MD Roberson stated just hold the PRN since it always underdoses pts at nighttime and to give the scheduled doses/new orders that were just placed.

## 2025-01-18 NOTE — ASSESSMENT & PLAN NOTE
Pt's BG 57 on initial labs, no current hypoglycemic sxs.    Plan  - ACHS glucose checks and SSI along with glargine 14u qhs and aspart 6 u TIDWM  - Pt's home regimen is 10u long acting, SSI with meals, and trulicity.   no concerns

## 2025-01-19 VITALS
DIASTOLIC BLOOD PRESSURE: 86 MMHG | OXYGEN SATURATION: 95 % | BODY MASS INDEX: 22.18 KG/M2 | HEIGHT: 59 IN | HEART RATE: 78 BPM | TEMPERATURE: 98 F | SYSTOLIC BLOOD PRESSURE: 129 MMHG | WEIGHT: 110 LBS | RESPIRATION RATE: 18 BRPM

## 2025-01-19 LAB
BASOPHILS # BLD AUTO: 0.04 K/UL (ref 0–0.2)
BASOPHILS NFR BLD: 0.2 % (ref 0–1.9)
DIFFERENTIAL METHOD BLD: ABNORMAL
EOSINOPHIL # BLD AUTO: 0 K/UL (ref 0–0.5)
EOSINOPHIL NFR BLD: 0.1 % (ref 0–8)
ERYTHROCYTE [DISTWIDTH] IN BLOOD BY AUTOMATED COUNT: 14.7 % (ref 11.5–14.5)
HCT VFR BLD AUTO: 38.2 % (ref 37–48.5)
HGB BLD-MCNC: 11.6 G/DL (ref 12–16)
IMM GRANULOCYTES # BLD AUTO: 0.16 K/UL (ref 0–0.04)
IMM GRANULOCYTES NFR BLD AUTO: 1 % (ref 0–0.5)
LYMPHOCYTES # BLD AUTO: 2.1 K/UL (ref 1–4.8)
LYMPHOCYTES NFR BLD: 12.5 % (ref 18–48)
MCH RBC QN AUTO: 28.6 PG (ref 27–31)
MCHC RBC AUTO-ENTMCNC: 30.4 G/DL (ref 32–36)
MCV RBC AUTO: 94 FL (ref 82–98)
MONOCYTES # BLD AUTO: 0.8 K/UL (ref 0.3–1)
MONOCYTES NFR BLD: 4.9 % (ref 4–15)
NEUTROPHILS # BLD AUTO: 13.6 K/UL (ref 1.8–7.7)
NEUTROPHILS NFR BLD: 81.3 % (ref 38–73)
NRBC BLD-RTO: 0 /100 WBC
PLATELET # BLD AUTO: 243 K/UL (ref 150–450)
PMV BLD AUTO: 10.5 FL (ref 9.2–12.9)
POCT GLUCOSE: 179 MG/DL (ref 70–110)
POCT GLUCOSE: 251 MG/DL (ref 70–110)
POCT GLUCOSE: 306 MG/DL (ref 70–110)
RBC # BLD AUTO: 4.06 M/UL (ref 4–5.4)
WBC # BLD AUTO: 16.77 K/UL (ref 3.9–12.7)

## 2025-01-19 PROCEDURE — 25000003 PHARM REV CODE 250: Performed by: STUDENT IN AN ORGANIZED HEALTH CARE EDUCATION/TRAINING PROGRAM

## 2025-01-19 PROCEDURE — 94761 N-INVAS EAR/PLS OXIMETRY MLT: CPT

## 2025-01-19 PROCEDURE — 25000003 PHARM REV CODE 250: Performed by: HOSPITALIST

## 2025-01-19 PROCEDURE — 25000003 PHARM REV CODE 250

## 2025-01-19 PROCEDURE — 94640 AIRWAY INHALATION TREATMENT: CPT

## 2025-01-19 PROCEDURE — 36415 COLL VENOUS BLD VENIPUNCTURE: CPT

## 2025-01-19 PROCEDURE — 99900035 HC TECH TIME PER 15 MIN (STAT)

## 2025-01-19 PROCEDURE — 25000242 PHARM REV CODE 250 ALT 637 W/ HCPCS: Performed by: STUDENT IN AN ORGANIZED HEALTH CARE EDUCATION/TRAINING PROGRAM

## 2025-01-19 PROCEDURE — 63600175 PHARM REV CODE 636 W HCPCS: Performed by: STUDENT IN AN ORGANIZED HEALTH CARE EDUCATION/TRAINING PROGRAM

## 2025-01-19 PROCEDURE — 63600175 PHARM REV CODE 636 W HCPCS

## 2025-01-19 PROCEDURE — 27000221 HC OXYGEN, UP TO 24 HOURS

## 2025-01-19 PROCEDURE — 85025 COMPLETE CBC W/AUTO DIFF WBC: CPT

## 2025-01-19 RX ORDER — AMOXICILLIN AND CLAVULANATE POTASSIUM 875; 125 MG/1; MG/1
1 TABLET, FILM COATED ORAL EVERY 12 HOURS
Qty: 6 TABLET | Refills: 0 | Status: SHIPPED | OUTPATIENT
Start: 2025-01-19 | End: 2025-01-22

## 2025-01-19 RX ORDER — FUROSEMIDE 20 MG/1
40 TABLET ORAL DAILY
Qty: 90 TABLET | Refills: 1 | Status: SHIPPED | OUTPATIENT
Start: 2025-01-19

## 2025-01-19 RX ORDER — METOPROLOL TARTRATE 25 MG/1
25 TABLET, FILM COATED ORAL 2 TIMES DAILY
Qty: 180 TABLET | Refills: 3 | Status: SHIPPED | OUTPATIENT
Start: 2025-01-19 | End: 2026-01-19

## 2025-01-19 RX ADMIN — GABAPENTIN 300 MG: 300 CAPSULE ORAL at 08:01

## 2025-01-19 RX ADMIN — TIMOLOL MALEATE 1 DROP: 5 SOLUTION OPHTHALMIC at 08:01

## 2025-01-19 RX ADMIN — PANTOPRAZOLE SODIUM 40 MG: 40 TABLET, DELAYED RELEASE ORAL at 08:01

## 2025-01-19 RX ADMIN — BUSPIRONE HYDROCHLORIDE 7.5 MG: 5 TABLET ORAL at 08:01

## 2025-01-19 RX ADMIN — GABAPENTIN 300 MG: 300 CAPSULE ORAL at 02:01

## 2025-01-19 RX ADMIN — ACETAMINOPHEN 650 MG: 325 TABLET ORAL at 07:01

## 2025-01-19 RX ADMIN — IPRATROPIUM BROMIDE AND ALBUTEROL SULFATE 3 ML: 2.5; .5 SOLUTION RESPIRATORY (INHALATION) at 02:01

## 2025-01-19 RX ADMIN — RIVAROXABAN 15 MG: 15 TABLET, FILM COATED ORAL at 03:01

## 2025-01-19 RX ADMIN — BRIMONIDINE TARTRATE 1 DROP: 2 SOLUTION OPHTHALMIC at 08:01

## 2025-01-19 RX ADMIN — AMOXICILLIN AND CLAVULANATE POTASSIUM 1 TABLET: 875; 125 TABLET, FILM COATED ORAL at 08:01

## 2025-01-19 RX ADMIN — INSULIN ASPART 6 UNITS: 100 INJECTION, SOLUTION INTRAVENOUS; SUBCUTANEOUS at 07:01

## 2025-01-19 RX ADMIN — INSULIN ASPART 6 UNITS: 100 INJECTION, SOLUTION INTRAVENOUS; SUBCUTANEOUS at 11:01

## 2025-01-19 RX ADMIN — PREDNISONE 40 MG: 20 TABLET ORAL at 08:01

## 2025-01-19 RX ADMIN — INSULIN ASPART 4 UNITS: 100 INJECTION, SOLUTION INTRAVENOUS; SUBCUTANEOUS at 11:01

## 2025-01-19 RX ADMIN — METOPROLOL TARTRATE 25 MG: 25 TABLET, FILM COATED ORAL at 08:01

## 2025-01-19 RX ADMIN — ACETAMINOPHEN 650 MG: 325 TABLET ORAL at 12:01

## 2025-01-19 RX ADMIN — IPRATROPIUM BROMIDE AND ALBUTEROL SULFATE 3 ML: 2.5; .5 SOLUTION RESPIRATORY (INHALATION) at 07:01

## 2025-01-19 RX ADMIN — GUAIFENESIN AND DEXTROMETHORPHAN 10 ML: 100; 10 SYRUP ORAL at 08:01

## 2025-01-19 RX ADMIN — FLUTICASONE FUROATE AND VILANTEROL TRIFENATATE 1 PUFF: 100; 25 POWDER RESPIRATORY (INHALATION) at 07:01

## 2025-01-19 RX ADMIN — FUROSEMIDE 40 MG: 10 INJECTION, SOLUTION INTRAVENOUS at 08:01

## 2025-01-19 NOTE — PLAN OF CARE
Problem: Skin Injury Risk Increased  Goal: Skin Health and Integrity  1/19/2025 0516 by Colleen Escobar, RN  Outcome: Progressing  1/19/2025 0226 by Colleen Escobar RN  Outcome: Progressing  1/19/2025 0225 by Colleen Escobar RN  Outcome: Progressing     Problem: Adult Inpatient Plan of Care  Goal: Plan of Care Review  1/19/2025 0516 by Colleen Escobar RN  Outcome: Progressing  1/19/2025 0226 by Colleen Escobar RN  Outcome: Progressing  1/19/2025 0225 by Colleen Escobar RN  Outcome: Progressing  Goal: Patient-Specific Goal (Individualized)  1/19/2025 0516 by Colleen Escobar RN  Outcome: Progressing  1/19/2025 0226 by Colleen Escobar RN  Outcome: Progressing  1/19/2025 0225 by Colleen Escobar RN  Outcome: Progressing  Goal: Absence of Hospital-Acquired Illness or Injury  1/19/2025 0516 by Colleen Escobar RN  Outcome: Progressing  1/19/2025 0226 by Colleen Escobar RN  Outcome: Progressing  1/19/2025 0225 by Colleen Escobar RN  Outcome: Progressing  Goal: Optimal Comfort and Wellbeing  1/19/2025 0516 by Colleen Escobar RN  Outcome: Progressing  1/19/2025 0226 by Colleen Escobar RN  Outcome: Progressing  1/19/2025 0225 by Colleen Escobar RN  Outcome: Progressing  Goal: Readiness for Transition of Care  1/19/2025 0516 by Colleen Escobar RN  Outcome: Progressing  1/19/2025 0226 by Colleen Escobar RN  Outcome: Progressing  1/19/2025 0225 by Colleen Escobar RN  Outcome: Progressing     Problem: Diabetes Comorbidity  Goal: Blood Glucose Level Within Targeted Range  1/19/2025 0516 by Colleen Escobar, RN  Outcome: Progressing  1/19/2025 0226 by Colleen Escobar RN  Outcome: Progressing  1/19/2025 0225 by Shawn, Colleen R., RN  Outcome: Progressing     Problem: Wound  Goal: Optimal Coping  1/19/2025 0516 by Colleen Escobar, RN  Outcome: Progressing  1/19/2025 0226 by Colleen Escobar, RN  Outcome: Progressing  1/19/2025  0225 by Colleen Escobar RN  Outcome: Progressing  Goal: Optimal Functional Ability  1/19/2025 0516 by Colleen Escobar, RN  Outcome: Progressing  1/19/2025 0226 by Colleen Escobar RN  Outcome: Progressing  1/19/2025 0225 by Colleen Escobar, RN  Outcome: Progressing  Goal: Absence of Infection Signs and Symptoms  1/19/2025 0516 by Colleen Escobar, RN  Outcome: Progressing  1/19/2025 0226 by Colleen Escobar, RN  Outcome: Progressing  1/19/2025 0225 by Colleen Escobar RN  Outcome: Progressing  Goal: Improved Oral Intake  1/19/2025 0516 by Colleen Escobar, RN  Outcome: Progressing  1/19/2025 0226 by Colleen Escobar RN  Outcome: Progressing  1/19/2025 0225 by Colleen Escobar, RN  Outcome: Progressing  Goal: Optimal Pain Control and Function  1/19/2025 0516 by Colleen Escobar, RN  Outcome: Progressing  1/19/2025 0226 by Colleen Escobar RN  Outcome: Progressing  1/19/2025 0225 by Colleen Escobar RN  Outcome: Progressing  Goal: Skin Health and Integrity  1/19/2025 0516 by Colleen Escobar RN  Outcome: Progressing  1/19/2025 0226 by Colleen Escobar, RN  Outcome: Progressing  1/19/2025 0225 by Colleen Escobar, RN  Outcome: Progressing  Goal: Optimal Wound Healing  1/19/2025 0516 by Colleen Escobar, RN  Outcome: Progressing  1/19/2025 0226 by Colleen Escobar, RN  Outcome: Progressing  1/19/2025 0225 by Colleen Escobar, RN  Outcome: Progressing     Problem: Fall Injury Risk  Goal: Absence of Fall and Fall-Related Injury  1/19/2025 0516 by Colleen Escobar RN  Outcome: Progressing  1/19/2025 0226 by Colleen Escobar RN  Outcome: Progressing  1/19/2025 0225 by Colleen Escobar RN  Outcome: Progressing

## 2025-01-19 NOTE — NURSING
Patient left via stretcher to [facility] with all personal belongings. Telemetry and IV removed.

## 2025-01-19 NOTE — PLAN OF CARE
NURSING HOME ORDERS    01/19/2025  [facility] - MED SURG  [address] [facility] [address]  Dept: [phone]  Loc: [phone]     Admit to Nursing Home:  [facility]    Diagnoses:  Active Hospital Problems    Diagnosis  POA    *Atrial fibrillation with RVR [I48.91]  Yes    Glaucoma [H40.9]  Yes    Compression fracture of body of thoracic vertebra [S22.000A]  Yes    Lumbar compression fracture [S32.000A]  Yes    Severe Pulmonary hypertension [I27.20]  Yes    Type 2 diabetes mellitus, with long-term current use of insulin [E11.9, Z79.4]  Not Applicable    COPD exacerbation [J44.1]  Yes      Resolved Hospital Problems    Diagnosis Date Resolved POA    Chronic respiratory failure [J96.10] 01/16/2025 Yes       Patient is homebound due to:  Atrial fibrillation with RVR    Allergies:  Review of patient's allergies indicates:   Allergen Reactions    Silicone      Burn skin    Adhesive Rash       Vitals:  Routine    Diet: cardiac diet    Activities:   Activity as tolerated    Goals of Care Treatment Preferences:  Code Status: Full Code      Labs:  Per facility protocol    Nursing Precautions:  Fall    Consults:   PT to evaluate and treat- 3 times a week and OT to evaluate and treat- 3 times a week         Diabetes Care:  SN to perform and educate Diabetic management with blood glucose monitoring:      Medications: Discontinue all previous medication orders, if any. See new list below.     Medication List        START taking these medications      amoxicillin-clavulanate 875-125mg 875-125 mg per tablet  Commonly known as: AUGMENTIN  Take 1 tablet by mouth every 12 (twelve) hours. for 3 days     metoprolol tartrate 25 MG tablet  Commonly known as: LOPRESSOR  Take 1 tablet (25 mg total) by mouth 2 (two) times daily.            CHANGE how you take these medications      furosemide 20 MG tablet  Commonly known as: LASIX  Take 2 tablets (40 mg total) by mouth once  daily.  What changed: how much to take            CONTINUE taking these medications      acetaminophen 325 MG tablet  Commonly known as: TYLENOL  Take 325 mg by mouth every 6 (six) hours as needed for Pain.     albuterol-ipratropium 2.5 mg-0.5 mg/3 mL nebulizer solution  Commonly known as: DUO-NEB  Take 3 mLs by nebulization every 6 (six) hours as needed for Wheezing. Rescue     ANORO ELLIPTA 62.5-25 mcg/actuation Dsdv  Generic drug: umeclidinium-vilanteroL  INHALE 1 PUFF INTO THE LUNGS ONCE DAILY.     brimonidine 0.2% 0.2 % Drop  Commonly known as: ALPHAGAN  PLACE 1 DROP INTO BOTH EYES 2 TIMES A DAY.     busPIRone 7.5 MG tablet  Commonly known as: BUSPAR  Take 7.5 mg by mouth 2 (two) times daily.     CALCIUM 600 + D(3) ORAL  Take 1 tablet by mouth Daily.     cholestyramine-aspartame 4 gram Pwpk  Commonly known as: PREVALITE  TAKE 1 PACKET (4 G TOTAL) BY MOUTH 2 (TWO) TIMES DAILY. FOR DIARRHEA     citalopram 20 MG tablet  Commonly known as: CeleXA  Take 1 tablet (20 mg total) by mouth once daily.     diclofenac sodium 1 % Gel  Commonly known as: VOLTAREN  Apply to hands and ankles topically twice daily as needed for pain     famotidine 20 MG tablet  Commonly known as: PEPCID  Take 1 tablet (20 mg total) by mouth once daily.     gabapentin 300 MG capsule  Commonly known as: NEURONTIN  TAKE 1 CAPSULE BY MOUTH THREE TIMES A DAY     latanoprost 0.005 % ophthalmic solution  Place 1 drop into both eyes every evening.     levalbuterol 0.63 mg/3 mL nebulizer solution  Commonly known as: XOPENEX  Take 3 mLs (0.63 mg total) by nebulization every 8 (eight) hours. Rescue     LEVEMIR FLEXPEN 100 unit/mL (3 mL) Inpn pen  Generic drug: insulin detemir U-100 (Levemir)  Inject 10 Units into the skin every evening.     NovoLOG Flexpen U-100 Insulin 100 unit/mL (3 mL) Inpn pen  Generic drug: insulin aspart U-100  Inject 0-8 Units into the skin as needed (sliding scale). B to 149 mg/dL = 0 units  150-199 mg/dL = 2 units  200 to  249 mg/dL = 4 units  250 to 299 mg/dL = 6 units  300 to 999 mg/dL = 8 units  Notify Provider if BG is more than 400 mg/dL.     ondansetron 4 MG tablet  Commonly known as: ZOFRAN  Take 4 mg by mouth every 8 (eight) hours as needed for Nausea.     potassium chloride SA 20 MEQ tablet  Commonly known as: K-DUR,KLOR-CON  Take 20 mEq by mouth once daily.     senna 8.6 mg tablet  Commonly known as: SENOKOT  Take 1 tablet by mouth once daily.     tamsulosin 0.4 mg Cap  Commonly known as: FLOMAX  Take 1 capsule (0.4 mg total) by mouth once daily.     timolol maleate 0.5% 0.5 % Drop  Commonly known as: TIMOPTIC  Place 1 drop into both eyes once daily.     XARELTO 20 mg Tab  Generic drug: rivaroxaban  TAKE 1 TABLET BY MOUTH EVERY DAY WITH DINNER OR EVENING MEAL                Immunizations Administered as of 1/19/2025       Name Date Dose VIS Date Route Exp Date    COVID-19, MRNA, LN-S, PF (Pfizer) (Purple Cap) 2/3/2022 10:47 AM 0.3 mL 9/22/2021 Intramuscular 6/30/2022    Site: Right deltoid     Given By: Connie Collins RN     : [employer]     Lot: [id number]     COVID-19, MRNA, LN-S, PF (Pfizer) (Purple Cap) 4/2/2021  9:53 AM 0.3 mL 12/12/2020 Intramuscular 7/31/2021    Site: Left deltoid     Given By: Ashley Morrison MA     : [employer]     Lot: [id number]     COVID-19, MRNA, LN-S, PF (Pfizer) (Purple Cap) 3/12/2021  9:02 AM 0.3 mL 12/12/2020 Intramuscular 6/30/2021    Site: Left deltoid     Given By: Genny Melendez RN     : [employer]     Lot: [id number]             This patient has had both covid vaccinations    Some patients may experience side effects after vaccination.  These may include fever, headache, muscle or joint aches.  Most symptoms resolve with 24-48 hours and do not require urgent medical evaluation unless they persist for more than 72 hours or symptoms are concerning for an unrelated medical condition.          _________________________________  Jared Caldera MD  01/19/2025

## 2025-01-19 NOTE — PLAN OF CARE
CM spoke with nurse Dias at [facility]. Orders faxed to [phone]    Your fax has been successfully sent to [phone] at [phone].  ------------------------------------------------------------  From: [id number]  ------------------------------------------------------------  1/19/2025 11:24:58 AM Transmission Record          Sent to [phone] with remote ID "          Result: (0/339;0/0) Success          Page record: 1 - 10          Elapsed time: 04:35 on channel 8

## 2025-01-19 NOTE — SUBJECTIVE & OBJECTIVE
Interval History: NAEON,     Review of Systems  Objective:     Vital Signs (Most Recent):  Temp: 97.9 °F (36.6 °C) (01/19/25 1125)  Pulse: 84 (01/19/25 1125)  Resp: 18 (01/19/25 1125)  BP: 112/78 (01/19/25 1125)  SpO2: 95 % (01/19/25 1125) Vital Signs (24h Range):  Temp:  [97.4 °F (36.3 °C)-98.2 °F (36.8 °C)] 97.9 °F (36.6 °C)  Pulse:  [63-85] 84  Resp:  [18-20] 18  SpO2:  [91 %-98 %] 95 %  BP: (105-128)/(74-84) 112/78     Weight: 49.9 kg (110 lb 0.2 oz)  Body mass index is 22.22 kg/m².    Intake/Output Summary (Last 24 hours) at 1/19/2025 1348  Last data filed at 1/19/2025 1309  Gross per 24 hour   Intake 840 ml   Output 1300 ml   Net -460 ml         Physical Exam  Vitals and nursing note reviewed.   Constitutional:       General: She is not in acute distress.  HENT:      Head: Normocephalic and atraumatic.      Nose: No rhinorrhea.      Mouth/Throat:      Mouth: Mucous membranes are moist.   Eyes:      Conjunctiva/sclera: Conjunctivae normal.      Pupils: Pupils are equal, round, and reactive to light.   Cardiovascular:      Rate and Rhythm: Normal rate. Rhythm irregular.      Heart sounds: Normal heart sounds.   Pulmonary:      Effort: Pulmonary effort is normal. No respiratory distress.      Breath sounds: No wheezing or rales.      Comments: Talking and breathing comfortably on 3L (usually uses 2-3L at home for comfort).  Abdominal:      General: Bowel sounds are normal. There is no distension.      Palpations: Abdomen is soft.      Tenderness: There is no abdominal tenderness. There is no guarding or rebound.   Musculoskeletal:      Cervical back: Normal range of motion and neck supple.      Right lower leg: No edema.      Left lower leg: No edema.   Skin:     General: Skin is warm and dry.   Neurological:      General: No focal deficit present.      Mental Status: She is alert and oriented to person, place, and time.      Cranial Nerves: No dysarthria or facial asymmetry.      Sensory: Sensation is intact.       Comments: MARIEL UE and JURGEN 5/5 strength.             Significant Labs: All pertinent labs within the past 24 hours have been reviewed.    Significant Imaging: I have reviewed all pertinent imaging results/findings within the past 24 hours.

## 2025-01-19 NOTE — PLAN OF CARE
> 10min hold to speak with DON/Charge nurse at [facility] to notify patient ready to return. Message left with Vani that return phone call needed.

## 2025-01-19 NOTE — DISCHARGE SUMMARY
"[facility] Medicine  Discharge Summary      Patient Name: Deb Webb  MRN: [id number]  DANIKA: [id number]  Patient Class: IP- Inpatient  Admission Date: 1/15/2025  Hospital Length of Stay: 3 days  Discharge Date and Time:  01/19/2025 1:50 PM  Attending Physician: Jared Caldera MD   Discharging Provider: Jared Caldera MD  Primary Care Provider: Triston Valverde MD  Hospital Medicine Team: [facility] MED R Jared Caldera MD  Primary Care Team: [facility]    HPI:   Deb Webb is a 74 y.o. with afib, COPD (on 2-3L NC at home for comfort, does not require always but usually likes to have it on), DM II, HTN, HLD, glaucoma, and osteoporosis who presents for worsening acute on chronic low thoracic and lumbar back pain over the last 2ish weeks. She denied any known trauma or falls at that time but has had falls in the past and has reportedly been on vitamin D/calcium but off other osteoporosis meds for about a year. She denies any saddle anesthesia, bowel or bladder incontinence, focal weakness, numbness, tingling, syncope, chest pain, fever, abd pain, hematochezia, hematuria, dysuria, nausea, vomiting. Reports her she sometimes feels like her legs are "shaky" under her but this has been going for "a long time" and has not acutely changed. She has been able to walk as normal. She reports increased dyspnea starting around the time of her acute onset of pain as well as worsening cough. Has not increased her O2 use at home but felt like she needed to. She lives at a facility and denies any recent known medication regimen changes or non-adherence although she is not entirely sure of her complete regimen.    In ED, notably went into afib with RVR and had some hypotension requiring IVF and dilt pushes before a gtt to control her rate and improve her BP.    * No surgery found *      Hospital Course:   Echo with CVP 15 continued judicious diuresis in setting of soft BP with MAP>65. Cardiology " "recommends medical management of AF with outpatient EP follow up. Pt reports "heavy urine" at NH with bacteriuria. Uncertain of contribution of steroids vs infective source, treating UTI with Augmentin. Insulin regimen increased given steroid-associated hyperglycemia. Pt's respiratory status improved and HR within normal limits prior to DC. Pt discharged with EP follow up on metoprolol tartrate 25mg BID dosing for AF, Ortho and Endocrinology for osteoporosis and sub-acute compression fractures confirmed on MRI, Augmentin for UTI and completion of CAP coverage, and PCP follow up.      Goals of Care Treatment Preferences:  Code Status: Full Code      SDOH Screening:  The patient was screened for utility difficulties, food insecurity, transport difficulties, housing insecurity, and interpersonal safety and there were no concerns identified this admission.     Consults:   Consults (From admission, onward)          Status Ordering Provider     [facility] Medicine PharmD Consult  Once        Provider:  (Not yet assigned)    Completed SUSAN MUELLER     Inpatient consult to Cardiology  Once        Provider:  (Not yet assigned)    Completed SUSAN MUELLER          Interval History: HOA,     Review of Systems  Objective:     Vital Signs (Most Recent):  Temp: 97.9 °F (36.6 °C) (01/19/25 1125)  Pulse: 84 (01/19/25 1125)  Resp: 18 (01/19/25 1125)  BP: 112/78 (01/19/25 1125)  SpO2: 95 % (01/19/25 1125) Vital Signs (24h Range):  Temp:  [97.4 °F (36.3 °C)-98.2 °F (36.8 °C)] 97.9 °F (36.6 °C)  Pulse:  [63-85] 84  Resp:  [18-20] 18  SpO2:  [91 %-98 %] 95 %  BP: (105-128)/(74-84) 112/78     Weight: 49.9 kg (110 lb 0.2 oz)  Body mass index is 22.22 kg/m².    Intake/Output Summary (Last 24 hours) at 1/19/2025 1348  Last data filed at 1/19/2025 1309  Gross per 24 hour   Intake 840 ml   Output 1300 ml   Net -460 ml         Physical Exam  Vitals and nursing note reviewed.   Constitutional:       General: She is not in acute " distress.  HENT:      Head: Normocephalic and atraumatic.      Nose: No rhinorrhea.      Mouth/Throat:      Mouth: Mucous membranes are moist.   Eyes:      Conjunctiva/sclera: Conjunctivae normal.      Pupils: Pupils are equal, round, and reactive to light.   Cardiovascular:      Rate and Rhythm: Normal rate. Rhythm irregular.      Heart sounds: Normal heart sounds.   Pulmonary:      Effort: Pulmonary effort is normal. No respiratory distress.      Breath sounds: No wheezing or rales.      Comments: Talking and breathing comfortably on 3L (usually uses 2-3L at home for comfort).  Abdominal:      General: Bowel sounds are normal. There is no distension.      Palpations: Abdomen is soft.      Tenderness: There is no abdominal tenderness. There is no guarding or rebound.   Musculoskeletal:      Cervical back: Normal range of motion and neck supple.      Right lower leg: No edema.      Left lower leg: No edema.   Skin:     General: Skin is warm and dry.   Neurological:      General: No focal deficit present.      Mental Status: She is alert and oriented to person, place, and time.      Cranial Nerves: No dysarthria or facial asymmetry.      Sensory: Sensation is intact.      Comments: BL UE and LE 5/5 strength.             Significant Labs: All pertinent labs within the past 24 hours have been reviewed.    Significant Imaging: I have reviewed all pertinent imaging results/findings within the past 24 hours.    * Atrial fibrillation with RVR  Patient has paroxysmal (<7 days) atrial fibrillation. Patient is currently in atrial fibrillation. [id number] Score: 3. The patients heart rate in the last 24 hours is as follows:  Pulse  Min: 70  Max: 92.   Pt noted to go into afib with in ED undergoing evaluation for back pain. Reports she has been feeling like she has been going into and out of afib since her pains started. Required pushes of dilt then gtt as remained uncontrolled. Received some gentle  IVF.    Antiarrhythmics  metoprolol tartrate (LOPRESSOR) tablet 25 mg, 2 times daily, Oral    Anticoagulants  rivaroxaban tablet 15 mg, with dinner, Oral    Plan  - Replete lytes with a goal of K>4, Mg >2  - Patient is anticoagulated, see medications listed above.  - Will pause dilt gtt for now as pt HR ~60s and SBP upper 80s to 90s. She continues to intermittently be in afib. She is uncertain of her home rate control medicine although lopressor 25 q6hr is listed and no other medicines on outside reconcillation. Will order 25mg lopressor BID for now to start at 0900 as long as her BP and HR stay stable. Pharmacy consulted for med rec.  - Monitor on tele.  - Cardiology recs for uptitration of beta blockade as tolerated, EP follow up.   - Not overtly hypervolemic on exam, but CVP15 on Echo. Judicious diuresis with IV Lasix 40mg qd given soft Bps.        Lumbar compression fracture  Indeterminate age fractures although some appear newer per radiology. Pt w/o acute neurologic symptoms at this time. Notably hx of osteoporosis and reports she used to be on medicines other than just vitamin D and calcium but has not been on them in at least 1yr.    Plan  - Might benefit from spinal evaluation, MRI ordered to evaluate for compression fractures    Compression fracture of body of thoracic vertebra  Indeterminate age fractures although some appear newer per radiology. Pt w/o acute neurologic symptoms at this time. Notably hx of osteoporosis and reports she used to be on medicines other than just vitamin D and calcium but has not been on them in at least 1yr.    Plan    - MRI ordered to evaluate for compression fractures    Glaucoma  Plan  - Continue home eye drops.    Severe Pulmonary hypertension  Per Echo on 01/16/25    Type 2 diabetes mellitus, with long-term current use of insulin  Pt's BG 57 on initial labs, no current hypoglycemic sxs.    Plan  - ACHS glucose checks and SSI along with glargine 14u qhs and aspart 6 u  TIDWM  - Pt's home regimen is 10u long acting, SSI with meals, and trulicity.    COPD exacerbation  Patient's COPD is with exacerbation noted by continued dyspnea and worsened cough currently. May be part of etiology of onset of afib RVR. Patient is currently off COPD Pathway. Continue scheduled inhalers Steroids, Antibiotics, and Supplemental oxygen and monitor respiratory status closely. Uses 2-3L NC for comfort at home, currently on chronic 2L      Final Active Diagnoses:    Diagnosis Date Noted POA    PRINCIPAL PROBLEM:  Atrial fibrillation with RVR [I48.91] 11/24/2022 Yes    Glaucoma [H40.9] 01/16/2025 Yes    Compression fracture of body of thoracic vertebra [S22.000A] 01/16/2025 Yes    Lumbar compression fracture [S32.000A] 01/16/2025 Yes    Severe Pulmonary hypertension [I27.20] 02/05/2019 Yes    Type 2 diabetes mellitus, with long-term current use of insulin [E11.9, Z79.4] 07/14/2017 Not Applicable    COPD exacerbation [J44.1] 08/07/2014 Yes      Problems Resolved During this Admission:    Diagnosis Date Noted Date Resolved POA    Chronic respiratory failure [J96.10] 03/21/2022 01/16/2025 Yes       Discharged Condition: good    Disposition: [facility]ili*    Follow Up:   Follow-up Information       Triston Valverde MD Follow up.    Specialty: Family Medicine  Why: Clinic should contact you with appointment details. If you have not received notification by Friday please have Nursing Home SW call to schedule  Contact information:  [address] [place] [address]  [phone]               [facility] - [facility] Follow up.    Specialty: Endocrinology  Why: Clinic should contact you with appointment details. If you have not received notification by Friday please have Nursing Home  call to schedule  Contact information:  [address]  [facility] [address] [phone]  Additional information:  [facility] - Main [facility], [facility]  Floor   Please park in South Garage and take Clinic elevator             [facility] - Orthopedics 5th Fl .    Specialty: Orthopedics  Why: Clinic should contact you with appointment details. If you have not received notification by Friday please have Nursing Home SW call to schedule  Contact information:  [address] 5th Floor  [facility] [address] [phone]  Additional information:  [facility] - Main Building, 5th Floor   Please park in South Garage and take Atrium elevator             [facility] - Cardiology .    Specialty: Cardiology  Why: Clinic should contact you with appointment details. If you have not received notification by Friday please have Nursing Home SW call to schedule  Contact information:  [address]  [facility] [address] [phone]                         Patient Instructions:      Ambulatory referral/consult to Cardiac Electrophysiology   Standing Status: Future   Referral Priority: Routine Referral Type: Consultation   Referral Reason: Specialty Services Required   Requested Specialty: Cardiology   Number of Visits Requested: 1     Ambulatory referral/consult to Orthopedics   Standing Status: Future   Referral Priority: Routine Referral Type: Consultation   Requested Specialty: Orthopedic Surgery   Number of Visits Requested: 1     Ambulatory referral/consult to Endocrinology   Standing Status: Future   Referral Priority: Routine Referral Type: Consultation   Requested Specialty: Endocrinology   Number of Visits Requested: 1     ACCEPT - Ambulatory referral/consult to Heart Failure Transitional Care Clinic   Standing Status: Future   Referral Priority: Routine Referral Type: Consultation   Referral Reason: Specialty Services Required   Requested Specialty: Cardiology   Number of Visits Requested: 1       Significant Diagnostic Studies: N/A    Pending Diagnostic Studies:       None           Medications:  Reconciled Home Medications:       Medication List        START taking these medications      amoxicillin-clavulanate 875-125mg 875-125 mg per tablet  Commonly known as: AUGMENTIN  Take 1 tablet by mouth every 12 (twelve) hours. for 3 days     metoprolol tartrate 25 MG tablet  Commonly known as: LOPRESSOR  Take 1 tablet (25 mg total) by mouth 2 (two) times daily.            CHANGE how you take these medications      furosemide 20 MG tablet  Commonly known as: LASIX  Take 2 tablets (40 mg total) by mouth once daily.  What changed: how much to take            CONTINUE taking these medications      acetaminophen 325 MG tablet  Commonly known as: TYLENOL  Take 325 mg by mouth every 6 (six) hours as needed for Pain.     albuterol-ipratropium 2.5 mg-0.5 mg/3 mL nebulizer solution  Commonly known as: DUO-NEB  Take 3 mLs by nebulization every 6 (six) hours as needed for Wheezing. Rescue     ANORO ELLIPTA 62.5-25 mcg/actuation Dsdv  Generic drug: umeclidinium-vilanteroL  INHALE 1 PUFF INTO THE LUNGS ONCE DAILY.     brimonidine 0.2% 0.2 % Drop  Commonly known as: ALPHAGAN  PLACE 1 DROP INTO BOTH EYES 2 TIMES A DAY.     busPIRone 7.5 MG tablet  Commonly known as: BUSPAR  Take 7.5 mg by mouth 2 (two) times daily.     CALCIUM 600 + D(3) ORAL  Take 1 tablet by mouth Daily.     cholestyramine-aspartame 4 gram Pwpk  Commonly known as: PREVALITE  TAKE 1 PACKET (4 G TOTAL) BY MOUTH 2 (TWO) TIMES DAILY. FOR DIARRHEA     citalopram 20 MG tablet  Commonly known as: CeleXA  Take 1 tablet (20 mg total) by mouth once daily.     diclofenac sodium 1 % Gel  Commonly known as: VOLTAREN  Apply to hands and ankles topically twice daily as needed for pain     famotidine 20 MG tablet  Commonly known as: PEPCID  Take 1 tablet (20 mg total) by mouth once daily.     gabapentin 300 MG capsule  Commonly known as: NEURONTIN  TAKE 1 CAPSULE BY MOUTH THREE TIMES A DAY     latanoprost 0.005 % ophthalmic solution  Place 1 drop into both eyes every evening.     levalbuterol 0.63 mg/3 mL  nebulizer solution  Commonly known as: XOPENEX  Take 3 mLs (0.63 mg total) by nebulization every 8 (eight) hours. Rescue     LEVEMIR FLEXPEN 100 unit/mL (3 mL) Inpn pen  Generic drug: insulin detemir U-100 (Levemir)  Inject 10 Units into the skin every evening.     NovoLOG Flexpen U-100 Insulin 100 unit/mL (3 mL) Inpn pen  Generic drug: insulin aspart U-100  Inject 0-8 Units into the skin as needed (sliding scale). B to 149 mg/dL = 0 units  150-199 mg/dL = 2 units  200 to 249 mg/dL = 4 units  250 to 299 mg/dL = 6 units  300 to 999 mg/dL = 8 units  Notify Provider if BG is more than 400 mg/dL.     ondansetron 4 MG tablet  Commonly known as: ZOFRAN  Take 4 mg by mouth every 8 (eight) hours as needed for Nausea.     potassium chloride SA 20 MEQ tablet  Commonly known as: K-DUR,KLOR-CON  Take 20 mEq by mouth once daily.     senna 8.6 mg tablet  Commonly known as: SENOKOT  Take 1 tablet by mouth once daily.     tamsulosin 0.4 mg Cap  Commonly known as: FLOMAX  Take 1 capsule (0.4 mg total) by mouth once daily.     timolol maleate 0.5% 0.5 % Drop  Commonly known as: TIMOPTIC  Place 1 drop into both eyes once daily.     XARELTO 20 mg Tab  Generic drug: rivaroxaban  TAKE 1 TABLET BY MOUTH EVERY DAY WITH DINNER OR EVENING MEAL              Indwelling Lines/Drains at time of discharge:   Lines/Drains/Airways       None                   Time spent on the discharge of patient: 32 minutes         Jared Caldera MD  Department of Hospital Medicine  [facility]

## 2025-01-19 NOTE — PLAN OF CARE
Problem: Skin Injury Risk Increased  Goal: Skin Health and Integrity  1/19/2025 0226 by Colleen Escobar RN  Outcome: Progressing  1/19/2025 0225 by Colleen Escobar RN  Outcome: Progressing     Problem: Adult Inpatient Plan of Care  Goal: Plan of Care Review  1/19/2025 0226 by Colleen Escobar RN  Outcome: Progressing  1/19/2025 0225 by Colleen Escobar RN  Outcome: Progressing  Goal: Patient-Specific Goal (Individualized)  1/19/2025 0226 by Colleen Escobar RN  Outcome: Progressing  1/19/2025 0225 by Colleen Escobar RN  Outcome: Progressing  Goal: Absence of Hospital-Acquired Illness or Injury  1/19/2025 0226 by Colleen Escobar RN  Outcome: Progressing  1/19/2025 0225 by Colleen Escobar RN  Outcome: Progressing  Goal: Optimal Comfort and Wellbeing  1/19/2025 0226 by Colleen Escobar RN  Outcome: Progressing  1/19/2025 0225 by Colleen Escobar RN  Outcome: Progressing  Goal: Readiness for Transition of Care  1/19/2025 0226 by Colleen Escobar RN  Outcome: Progressing  1/19/2025 0225 by Colleen Escobar RN  Outcome: Progressing     Problem: Diabetes Comorbidity  Goal: Blood Glucose Level Within Targeted Range  1/19/2025 0226 by Colleen Escobar, RN  Outcome: Progressing  1/19/2025 0225 by Colleen Escobar RN  Outcome: Progressing     Problem: Wound  Goal: Optimal Coping  1/19/2025 0226 by Colleen Escobar RN  Outcome: Progressing  1/19/2025 0225 by Colleen Escobar RN  Outcome: Progressing  Goal: Optimal Functional Ability  1/19/2025 0226 by Colleen Escobar RN  Outcome: Progressing  1/19/2025 0225 by Colleen Escobar RN  Outcome: Progressing  Goal: Absence of Infection Signs and Symptoms  1/19/2025 0226 by Colleen Escobar RN  Outcome: Progressing  1/19/2025 0225 by Shawn, Colleen R., RN  Outcome: Progressing  Goal: Improved Oral Intake  1/19/2025 0226 by Colleen Escobar RN  Outcome: Progressing  1/19/2025 0225 by Shawn  Colleen MULLER RN  Outcome: Progressing  Goal: Optimal Pain Control and Function  1/19/2025 0226 by Colleen Escobar, RN  Outcome: Progressing  1/19/2025 0225 by Colleen Escobar RN  Outcome: Progressing  Goal: Skin Health and Integrity  1/19/2025 0226 by Colleen Escobar, RN  Outcome: Progressing  1/19/2025 0225 by Colleen Escobar RN  Outcome: Progressing  Goal: Optimal Wound Healing  1/19/2025 0226 by Colleen Escobar RN  Outcome: Progressing  1/19/2025 0225 by Colleen Escobar RN  Outcome: Progressing     Problem: Fall Injury Risk  Goal: Absence of Fall and Fall-Related Injury  1/19/2025 0226 by Colleen Escobar, RN  Outcome: Progressing  1/19/2025 0225 by Colleen Escobar, RN  Outcome: Progressing

## 2025-01-19 NOTE — PLAN OF CARE
Case Management Final Discharge Note      Discharge Disposition: [facility]    New DME ordered / company name: None    Relevant SDOH / Transition of Care Barriers:  None    Person available to provide assistance at home when needed and their contact information: NH resident    Scheduled followup appointment:     Referrals placed: EP, Endocrine, HF, Orthopedics    Transportation: Ambulance         Patient educated on discharge services and updated on DC plan. Bedside RN notified, patient clear to discharge from Case Management Perspective.      01/19/25 1209   Final Note   Assessment Type Final Discharge Note   Anticipated Discharge Disposition [facility]   Hospital Resources/Appts/Education Provided Provided patient/caregiver with written discharge plan information   Post-Acute Status   Post-Acute Authorization Placement   Post-Acute Placement Status Set-up Complete/Auth obtained   Discharge Delays None known at this time     Jero Loy - [facility]  Discharge Final Note    Primary Care Provider: Triston Valverde MD    Expected Discharge Date: 1/19/2025    Final Discharge Note (most recent)       Final Note - 01/19/25 1209          Final Note    Assessment Type Final Discharge Note (P)      Anticipated Discharge Disposition skilled nursing Nursing Facility (P)      Hospital Resources/Appts/Education Provided Provided patient/caregiver with written discharge plan information (P)         Post-Acute Status    Post-Acute Authorization Placement (P)      Post-Acute Placement Status Set-up Complete/Auth obtained (P)      Discharge Delays None known at this time (P)                      Important Message from Medicare             Contact Info       Triston Valverde MD   Specialty: Family Medicine   Relationship: PCP - General    [address] [place] [address]   Phone: [phone]       Next Steps: Follow up    [facility] - [facility] 6th Fl   Specialty: Endocrinology    [address]  [facility]  [address]   Phone: [phone]       Next Steps: Follow up    [facility] - Orthopedics 5th Fl   Specialty: Orthopedics    [address], [address]  [facility] [address]   Phone: [phone]       Next Steps: Follow up    [facility] - Cardiology   Specialty: Cardiology    [address]  [facility] [address]   Phone: [phone]       Next Steps: Follow up

## 2025-01-27 ENCOUNTER — DOCUMENTATION ONLY (OUTPATIENT)
Dept: CARDIOLOGY | Facility: CLINIC | Age: 75
End: 2025-01-27
Payer: MEDICARE

## 2025-01-27 NOTE — PROGRESS NOTES
Heart Failure Transitional Care Clinic (HFTCC) Team notified of pt referral via Ambulatory Referral to Heart Failure Transitional Care ([id number]).    Patient screened today 1/27/2025 by provider and LPN for enrollment to program.      Pt was deemed not a candidate for enrollment at this time related to patient has follow up barrier: pt is being discharged to non-[facility] skilled nursing/rehab facility, nursing home or other facility that will be unable to assist pt with participation. The pt resides at [facility].    Pt will require additional follow up planning per primary team.     If pt status, diagnosis, or treatment plan changes , please place AMB referral to Heart Failure Transitional Care Clinic ([id number]) for HFTCC enrollment re-evalution.

## 2025-01-31 ENCOUNTER — TELEPHONE (OUTPATIENT)
Dept: ELECTROPHYSIOLOGY | Facility: CLINIC | Age: 75
End: 2025-01-31
Payer: MEDICARE

## 2025-01-31 DIAGNOSIS — I48.91 ATRIAL FIBRILLATION WITH RVR: Primary | ICD-10-CM

## 2025-01-31 NOTE — TELEPHONE ENCOUNTER
Called patient to schedule NP appointment with Dr. Shell. Was transferred to the nurse for the residents and she put me on hold and then the phone hung up.

## 2025-01-31 NOTE — TELEPHONE ENCOUNTER
Called patients daughter in attempt to contact her mother or schedule appointment and get some medical history. She told me to go through the facility.

## 2025-03-12 ENCOUNTER — CLINICAL SUPPORT (OUTPATIENT)
Dept: OPHTHALMOLOGY | Facility: CLINIC | Age: 75
End: 2025-03-12
Payer: MEDICARE

## 2025-03-12 ENCOUNTER — OFFICE VISIT (OUTPATIENT)
Dept: OPHTHALMOLOGY | Facility: CLINIC | Age: 75
End: 2025-03-12
Payer: MEDICARE

## 2025-03-12 DIAGNOSIS — H40.1133 PRIMARY OPEN ANGLE GLAUCOMA OF BOTH EYES, SEVERE STAGE: Primary | ICD-10-CM

## 2025-03-12 DIAGNOSIS — Z96.1 PSEUDOPHAKIA OF BOTH EYES: ICD-10-CM

## 2025-03-12 PROCEDURE — 1159F MED LIST DOCD IN RCRD: CPT | Mod: GW,CPTII,S$GLB, | Performed by: STUDENT IN AN ORGANIZED HEALTH CARE EDUCATION/TRAINING PROGRAM

## 2025-03-12 PROCEDURE — G2211 COMPLEX E/M VISIT ADD ON: HCPCS | Mod: GW,S$GLB,, | Performed by: STUDENT IN AN ORGANIZED HEALTH CARE EDUCATION/TRAINING PROGRAM

## 2025-03-12 PROCEDURE — 92083 EXTENDED VISUAL FIELD XM: CPT | Mod: GW,S$GLB,, | Performed by: STUDENT IN AN ORGANIZED HEALTH CARE EDUCATION/TRAINING PROGRAM

## 2025-03-12 PROCEDURE — 3288F FALL RISK ASSESSMENT DOCD: CPT | Mod: GW,CPTII,S$GLB, | Performed by: STUDENT IN AN ORGANIZED HEALTH CARE EDUCATION/TRAINING PROGRAM

## 2025-03-12 PROCEDURE — 99214 OFFICE O/P EST MOD 30 MIN: CPT | Mod: GW,S$GLB,, | Performed by: STUDENT IN AN ORGANIZED HEALTH CARE EDUCATION/TRAINING PROGRAM

## 2025-03-12 PROCEDURE — 3044F HG A1C LEVEL LT 7.0%: CPT | Mod: GW,CPTII,S$GLB, | Performed by: STUDENT IN AN ORGANIZED HEALTH CARE EDUCATION/TRAINING PROGRAM

## 2025-03-12 PROCEDURE — 1101F PT FALLS ASSESS-DOCD LE1/YR: CPT | Mod: GW,CPTII,S$GLB, | Performed by: STUDENT IN AN ORGANIZED HEALTH CARE EDUCATION/TRAINING PROGRAM

## 2025-03-12 PROCEDURE — 99999 PR PBB SHADOW E&M-EST. PATIENT-LVL III: CPT | Mod: PBBFAC,,, | Performed by: STUDENT IN AN ORGANIZED HEALTH CARE EDUCATION/TRAINING PROGRAM

## 2025-03-12 PROCEDURE — 1126F AMNT PAIN NOTED NONE PRSNT: CPT | Mod: GW,CPTII,S$GLB, | Performed by: STUDENT IN AN ORGANIZED HEALTH CARE EDUCATION/TRAINING PROGRAM

## 2025-03-12 RX ORDER — DORZOLAMIDE HCL 20 MG/ML
1 SOLUTION/ DROPS OPHTHALMIC 2 TIMES DAILY
COMMUNITY

## 2025-03-12 RX ORDER — BIMATOPROST 0.3 MG/ML
1 SOLUTION/ DROPS OPHTHALMIC NIGHTLY
COMMUNITY

## 2025-03-12 NOTE — PROGRESS NOTES
Subjective:  HPI    DLS: 12/03/2024    Pt here for HVF review;  Pt states no eye pain but feels like she has noticed vision changes with   her OS. Pt states the nursing home didn't put in her eye drops this AM.    Meds;  Brimonidine BID OU  Dorzolamide BID OU  Lumigan QHS OU    1. Primary open angle glaucoma of both eyes, severe stage   2. Pseudophakia of both eyes   3.Type 2 diabetes mellitus without retinopathy       Last edited by Nicolette Ferreira on 3/12/2025 10:02 AM.        Exam:  See encounter report for full exam    Assessment:  1. Primary open angle glaucoma of both eyes, severe stage  RAPD OS  - Synopsis: Referred by Dr. Hdez. Diagnosed with glaucoma 1990s. PMH: CVA, PAF on xarelto, Type 2 DM with neuropathy, HTN, HLD, Chronic respiratory failure, COPD, osteopenia, IBS, anxiety, tobacco use, Essential tremor, prior BZO dependence  - H/o low BP  - Surg hx: phaco/IOL OU 2000s   - Laser hx:   ?YAG OS  SLT OS 9/24/24  SLT OD 10/22/24  - Glaucoma FHx: sister, mother, daughter  -  / 566    - Gonio: CBB/pseudophakic OU (Coulon 9/2024)  - Tmax: 30/39  - Target IOP: low teens OU  - Med adverse effects: BB (chronic respiratory failure)    Baseline HVF 8/2024 -- VFI 51/7  Baseline RNFL 11/2021 -- avg 62/154 (poor scan OS)  Baseline photos pending    Last:  HVF nasal step involving central OD, extinguished centrally OS, VFI 51/7  OCT diffusely thin avg 47/45  RAPD OS    03/12/2025  IOP adequate  HVF 24-2 OD: poorly reliable, SNS/INS, VFI 54 -- stable c/w 8/2024  HVF 10-2 OS: reliable, extinguished, MD -33.52, PSD 1.32    2. Pseudophakia of both eyes  - Good lens position, monitor  - Update MRX with optometry -- OS very limited by glaucomatous damage    Plan:  Tilted nerves make assessment more challenging but there is significant cupping OU. Cannot take BB due to lung condition.   Now s/p SLT OU with decent response  Today: IOP at goal. HVF stable OD, extinguished OS. CPM.  - Continue Brim 2/2, Dorz 2/2,  Lumigan qhs/qhs    Today's visit is associated with current and anticipated ongoing medical care related to this patient's single serious/complex condition (glaucoma). Follow up is to be continued indefinitely to monitor the condition.    Return 3-4 months [facility] or [facility] -- HVF 24-2 OD, VA, IOP (maternity)    Luci Finnegan MD  [facility], Gl[facility]

## 2025-03-12 NOTE — PROGRESS NOTES
24-2  sf  od     10-2  ss  os       Rel & Fix =  good ou     Coop =       good     Patient has allergies to adhesives   Used pirate patch for testing     Jthomas    Mrx    -1.75     od    -1.00 + 1.50 x 90   os

## 2025-03-25 ENCOUNTER — OFFICE VISIT (OUTPATIENT)
Dept: PODIATRY | Facility: CLINIC | Age: 75
End: 2025-03-25
Payer: MEDICARE

## 2025-03-25 VITALS
HEIGHT: 59 IN | RESPIRATION RATE: 18 BRPM | BODY MASS INDEX: 21.17 KG/M2 | HEART RATE: 99 BPM | SYSTOLIC BLOOD PRESSURE: 106 MMHG | WEIGHT: 105 LBS | DIASTOLIC BLOOD PRESSURE: 72 MMHG

## 2025-03-25 DIAGNOSIS — B35.1 ONYCHOMYCOSIS DUE TO DERMATOPHYTE: ICD-10-CM

## 2025-03-25 DIAGNOSIS — E11.51 TYPE II DIABETES MELLITUS WITH PERIPHERAL CIRCULATORY DISORDER: Primary | ICD-10-CM

## 2025-03-25 PROCEDURE — 99999 PR PBB SHADOW E&M-EST. PATIENT-LVL IV: CPT | Mod: PBBFAC,,, | Performed by: PODIATRIST

## 2025-03-25 NOTE — PROGRESS NOTES
Subjective:     Patient ID: Deb Webb is a 74 y.o. female.    Chief Complaint: Diabetic Foot Exam (/[facility] External Claim/Source Organization//), Foot Pain, and Nail Care    Deb is a 74 y.o. female who presents to the clinic for evaluation and treatment of high risk feet. Deb has a past medical history of Allergy, Arthritis, Cataract, Colon polyps, COPD (chronic obstructive pulmonary disease), Diabetes mellitus type II, Diabetic neuropathy, Fever blister, Glaucoma (increased eye pressure), Hyperlipidemia, Hypertension, Irritable bowel syndrome, Keloid cicatrix, Osteoporosis, Retained cholelithiasis following cholecystectomy(997.41), and Right patella fracture. The patient's chief complaint is long, thick toenails. This patient has documented high risk feet requiring routine maintenance secondary to diabetes mellitis and those secondary complications of diabetes, as mentioned..    PCP: Triston Valverde MD    Date Last Seen by PCP:   Chief Complaint   Patient presents with    Diabetic Foot Exam       [facility] External Claim  Source Organization        Foot Pain    Nail Care     Hemoglobin A1C   Date Value Ref Range Status   01/15/2025 6.7 (H) 4.0 - 5.6 % Final     Comment:     ADA Screening Guidelines:  5.7-6.4%  Consistent with prediabetes  >or=6.5%  Consistent with diabetes    High levels of fetal hemoglobin interfere with the HbA1C  assay. Heterozygous hemoglobin variants (HbS, HgC, etc)do  not significantly interfere with this assay.   However, presence of multiple variants may affect accuracy.     12/25/2023 6.6 (H) 0.0 - 5.6 % Final     Comment:     Reference Interval:  5.0 - 5.6 Normal   5.7 - 6.4 High Risk   > 6.5 Diabetic      Hgb A1c results are standardized based on the (NGSP) National   Glycohemoglobin Standardization Program.      Hemoglobin A1C levels are related to mean serum/plasma glucose   during  the preceding 2-3 months.        07/19/2023 5.8 (H) 4.0 - 5.6 % Final     Comment:     ADA Screening Guidelines:  5.7-6.4%  Consistent with prediabetes  >or=6.5%  Consistent with diabetes    High levels of fetal hemoglobin interfere with the HbA1C  assay. Heterozygous hemoglobin variants (HbS, HgC, etc)do  not significantly interfere with this assay.   However, presence of multiple variants may affect accuracy.         Review of Systems   Constitutional: Negative for chills, decreased appetite and fever.   Cardiovascular:  Negative for leg swelling.   Skin:  Positive for dry skin. Negative for itching.   Musculoskeletal:  Positive for arthritis, muscle weakness and stiffness. Negative for joint pain, joint swelling and myalgias.   Gastrointestinal:  Negative for nausea and vomiting.   Neurological:  Negative for loss of balance, numbness and paresthesias.          Patient Active Problem List   Diagnosis    Osteoporosis, postmenopausal    Controlled type 2 diabetes mellitus with diabetic neuropathy, without long-term current use of insulin    Combined hyperlipidemia associated with type 2 diabetes mellitus    Primary osteoarthritis involving multiple joints    Anxiety    COPD exacerbation    Tobacco use disorder    IBS (irritable bowel syndrome)    S/P R knee surgery, ORIF patella (3/4/15)    Chronic allergic rhinitis    Essential tremor    Primary open angle glaucoma of both eyes, severe stage    Type 2 diabetes mellitus, with long-term current use of insulin    Open angle with borderline findings and high glaucoma risk in both eyes    Near syncope    History of stroke    Severe Pulmonary hypertension    Chronic combined systolic and diastolic congestive heart failure    Chronic left shoulder pain    Essential hypertension    GERD without esophagitis    Chronic kidney disease, stage 3a    Hepatic steatosis    Advance care planning    Closed displaced fracture of left pubis with routine healing    Displaced fracture  of lateral end of left clavicle, initial encounter for closed fracture    Interstitial lung disease    Recurrent upper respiratory infection (URI)    PAF (paroxysmal atrial fibrillation)    Ventricular tachycardia    Atrial fibrillation with RVR    Edema of hand    Acute pain of right shoulder    Major depressive disorder, recurrent, moderate    Aortic atherosclerosis    Dysphagia    Chronic pain of both knees    Weakness of both lower extremities    Gait abnormality    Syncope and collapse    Hypomagnesemia    Hypotension    Closed fracture of right pubis    Cachexia    Discharge planning issues    Acute cystitis without hematuria    Debility    Atrial flutter with rapid ventricular response    Coagulopathy    Acute blood loss anemia    Glaucoma    Compression fracture of body of thoracic vertebra    Lumbar compression fracture       Current Outpatient Medications on File Prior to Visit   Medication Sig Dispense Refill    acetaminophen (TYLENOL) 325 MG tablet Take 325 mg by mouth every 6 (six) hours as needed for Pain.      albuterol-ipratropium (DUO-NEB) 2.5 mg-0.5 mg/3 mL nebulizer solution Take 3 mLs by nebulization every 6 (six) hours as needed for Wheezing. Rescue      ANORO ELLIPTA 62.5-25 mcg/actuation DsDv INHALE 1 PUFF INTO THE LUNGS ONCE DAILY. 60 each 0    bimatoprost (LUMIGAN) 0.03 % ophthalmic drops Place 1 drop into both eyes every evening.      brimonidine 0.2% (ALPHAGAN) 0.2 % Drop PLACE 1 DROP INTO BOTH EYES 2 TIMES A DAY. 5 mL 6    busPIRone (BUSPAR) 7.5 MG tablet Take 7.5 mg by mouth 2 (two) times daily.      calcium carbonate/vitamin D3 (CALCIUM 600 + D,3, ORAL) Take 1 tablet by mouth Daily.      cholestyramine-aspartame (PREVALITE) 4 gram PwPk TAKE 1 PACKET (4 G TOTAL) BY MOUTH 2 (TWO) TIMES DAILY. FOR DIARRHEA 180 packet 3    citalopram (CELEXA) 20 MG tablet Take 1 tablet (20 mg total) by mouth once daily. 90 tablet 1    diclofenac sodium (VOLTAREN) 1 % Gel Apply to hands and ankles topically  twice daily as needed for pain      dorzolamide (TRUSOPT) 2 % ophthalmic solution Place 1 drop into both eyes 2 (two) times a day.      furosemide (LASIX) 20 MG tablet Take 2 tablets (40 mg total) by mouth once daily. 90 tablet 1    gabapentin (NEURONTIN) 300 MG capsule TAKE 1 CAPSULE BY MOUTH THREE TIMES A  capsule 1    latanoprost 0.005 % ophthalmic solution Place 1 drop into both eyes every evening. 7.5 mL 3    LEVEMIR FLEXPEN 100 unit/mL (3 mL) InPn pen Inject 10 Units into the skin every evening.      metoprolol tartrate (LOPRESSOR) 25 MG tablet Take 1 tablet (25 mg total) by mouth 2 (two) times daily. 180 tablet 3    NOVOLOG FLEXPEN U-100 INSULIN 100 unit/mL (3 mL) InPn pen Inject 0-8 Units into the skin as needed (sliding scale). B to 149 mg/dL = 0 units  150-199 mg/dL = 2 units  200 to 249 mg/dL = 4 units  250 to 299 mg/dL = 6 units  300 to 999 mg/dL = 8 units  Notify Provider if BG is more than 400 mg/dL.      ondansetron (ZOFRAN) 4 MG tablet Take 4 mg by mouth every 8 (eight) hours as needed for Nausea.      potassium chloride SA (K-DUR,KLOR-CON) 20 MEQ tablet Take 20 mEq by mouth once daily.      senna (SENOKOT) 8.6 mg tablet Take 1 tablet by mouth once daily.      XARELTO 20 mg Tab TAKE 1 TABLET BY MOUTH EVERY DAY WITH DINNER OR EVENING MEAL 90 tablet 3    famotidine (PEPCID) 20 MG tablet Take 1 tablet (20 mg total) by mouth once daily. 30 tablet 11    levalbuterol (XOPENEX) 0.63 mg/3 mL nebulizer solution Take 3 mLs (0.63 mg total) by nebulization every 8 (eight) hours. Rescue  0    tamsulosin (FLOMAX) 0.4 mg Cap Take 1 capsule (0.4 mg total) by mouth once daily. 30 capsule 11    timolol maleate 0.5% (TIMOPTIC) 0.5 % Drop Place 1 drop into both eyes once daily. 5 mL 6    [DISCONTINUED] irbesartan (AVAPRO) 75 MG tablet Take 1 tablet (75 mg total) by mouth once daily. 90 tablet 1    [DISCONTINUED] loratadine (CLARITIN) 10 mg tablet TAKE 1 TABLET (10 MG TOTAL) BY MOUTH DAILY AS NEEDED FOR  ALLERGIES (OR RUNNY NOSE). 90 tablet 1     No current facility-administered medications on file prior to visit.       Review of patient's allergies indicates:   Allergen Reactions    Silicone      Burn skin    Adhesive Rash       Past Surgical History:   Procedure Laterality Date    BACK SURGERY  2006    CATARACT EXTRACTION W/  INTRAOCULAR LENS IMPLANT Bilateral     CHOLECYSTECTOMY      Laparoscopic    COLONOSCOPY      COLONOSCOPY N/A 8/31/2022    Procedure: COLONOSCOPY;  Surgeon: Salvatore Butler MD;  Location: [facility];  Service: Gastroenterology;  Laterality: N/A;    COLONOSCOPY N/A 1/12/2024    Procedure: COLONOSCOPY;  Surgeon: Jose Manuel Gilmore MD;  [facility]  Service: Endoscopy;  Laterality: N/A;    ESOPHAGEAL DILATION N/A 8/30/2022    Procedure: DILATION, ESOPHAGUS;  Surgeon: Salvatore Butler MD;  Location: [facility];  Service: Gastroenterology;  Laterality: N/A;    ESOPHAGOGASTRODUODENOSCOPY N/A 8/30/2022    Procedure: EGD (ESOPHAGOGASTRODUODENOSCOPY);  Surgeon: Salvatore Butler MD;  Location: [facility];  Service: Gastroenterology;  Laterality: N/A;    ESOPHAGOGASTRODUODENOSCOPY N/A 5/23/2023    Procedure: ESOPHAGOGASTRODUODENOSCOPY (EGD);  Surgeon: Desmond Duffy Jr., MD;  Location: [facility];  Service: Endoscopy;  Laterality: N/A;    ESOPHAGOGASTRODUODENOSCOPY N/A 1/11/2024    Procedure: EGD (ESOPHAGOGASTRODUODENOSCOPY);  Surgeon: Jose Manuel Gilmore MD;  Location: [facility] ([address] [place] [address]);  Service: Endoscopy;  Laterality: N/A;    HERNIA REPAIR      HYSTERECTOMY      KNEE SURGERY Right 3/4/2015    Dr Weller     OOPHORECTOMY      ovarian tumor      Benign    TONSILLECTOMY, ADENOIDECTOMY         Family History   Problem Relation Name Age of Onset    Cancer Mother age 81         Skin    Skin cancer Mother age 81     Glaucoma Mother age 81     Cataracts Mother age 81     Diabetes Mother age 81     Heart disease Father age 76     Diabetes Father age 76     Skin cancer Sister      Diabetes  Sister      Diabetes Daughter      Breast cancer Maternal Aunt      Melanoma Neg Hx      Psoriasis Neg Hx      Eczema Neg Hx      Lupus Neg Hx      Amblyopia Neg Hx      Blindness Neg Hx      Macular degeneration Neg Hx      Retinal detachment Neg Hx      Strabismus Neg Hx         Social History     Socioeconomic History    Marital status:    Occupational History     Employer: [employer]   Tobacco Use    Smoking status: Every Day     Current packs/day: 0.50     Average packs/day: 0.5 packs/day for 40.0 years (20.0 ttl pk-yrs)     Types: Cigarettes    Smokeless tobacco: Former    Tobacco comments:     Not ready to quit smoking. Cut down on cigarettes but enjoys having a couple cigarettes a day.   Substance and Sexual Activity    Alcohol use: No    Drug use: No    Sexual activity: Never   Other Topics Concern    Are you pregnant or think you may be? No    Breast-feeding No     Social Drivers of Health     Financial Resource Strain: Low Risk  (1/17/2025)    Overall Financial Resource Strain (CARDIA)     Difficulty of Paying Living Expenses: Not hard at all   Food Insecurity: No Food Insecurity (1/17/2025)    Hunger Vital Sign     Worried About Running Out of Food in the Last Year: Never true     Ran Out of Food in the Last Year: Never true   Transportation Needs: No Transportation Needs (1/17/2025)    TRANSPORTATION NEEDS     Transportation : No   Physical Activity: Inactive (1/17/2025)    Exercise Vital Sign     Days of Exercise per Week: 0 days     Minutes of Exercise per Session: 0 min   Stress: No Stress Concern Present (1/17/2025)    [employer] - Occupational Stress Questionnaire     Feeling of Stress : Not at all   Housing Stability: Unknown (1/17/2025)    Housing Stability Vital Sign     Unable to Pay for Housing in the Last Year: No     Homeless in the Last Year: No           Objective:     Vitals:    03/25/25 0954   BP: 106/72   Pulse: 99   Resp: 18   Weight:  "47.6 kg (105 lb)   Height: 4' 11" (1.499 m)   PainSc:   7   PainLoc: Foot        Physical Exam  Vitals reviewed.   Constitutional:       Appearance: She is well-developed.   Cardiovascular:      Comments: Dorsalis pedis and posterior tibial pulses are diminished Bilaterally. Toes are cool to touch. Feet are warm proximally.There is decreased digital hair. Skin is atrophic, slightly hyperpigmented, and mildly edematous      Musculoskeletal:         General: No tenderness. Normal range of motion.      Comments: Adequate joint range of motion without pain, limitation, nor crepitation Bilateral feet and ankle joints. Muscle strength is 5/5 in all groups bilaterally.         Skin:     General: Skin is warm and dry.      Findings: No ecchymosis, erythema or lesion.      Comments: Nails x 10  are elongated by  2-7mm's, thickened by 2-3 mm's, dystrophic, and are whitish in  coloration . Xerosis Bilaterally. No open lesions noted.       Neurological:      Mental Status: She is alert and oriented to person, place, and time.      Comments: [place] 5.07 monofilamant testing is diminished Tc feet. Sharp/dull sensation diminished Bilaterally. Light touch absent Bilaterally.       Psychiatric:         Behavior: Behavior normal.           Assessment:      Encounter Diagnoses   Name Primary?    Type II diabetes mellitus with peripheral circulatory disorder Yes    Onychomycosis due to dermatophyte      Plan:     Deb was seen today for diabetic foot exam, foot pain and nail care.    Diagnoses and all orders for this visit:    Type II diabetes mellitus with peripheral circulatory disorder    Onychomycosis due to dermatophyte      I counseled the patient on her conditions, their implications and medical management.        - Shoe inspection. Diabetic Foot Education. Patient reminded of the importance of good nutrition and blood sugar control to help prevent podiatric complications of diabetes. Patient instructed on proper " foot hygeine. We discussed wearing proper shoe gear, daily foot inspections, never walking without protective shoe gear, never putting sharp instruments to feet, routine podiatric nail visits every 2-3 months.      - With patient's permission, nails were aggressively reduced and debrided x 10 to their soft tissue attachment mechanically , removing all offending nail and debris. Patient relates relief following the procedure. She will continue to monitor the areas daily, inspect her feet, wear protective shoe gear when ambulatory, moisturizer to maintain skin integrity and follow in this office in approximately 2-3 months, sooner p.r.n.